# Patient Record
Sex: FEMALE | Race: WHITE | NOT HISPANIC OR LATINO | URBAN - METROPOLITAN AREA
[De-identification: names, ages, dates, MRNs, and addresses within clinical notes are randomized per-mention and may not be internally consistent; named-entity substitution may affect disease eponyms.]

---

## 2021-07-09 ENCOUNTER — OFFICE (AMBULATORY)
Dept: URBAN - METROPOLITAN AREA PATHOLOGY 4 | Facility: PATHOLOGY | Age: 71
End: 2021-07-09
Payer: MEDICARE

## 2021-07-09 ENCOUNTER — ON CAMPUS - OUTPATIENT (AMBULATORY)
Dept: URBAN - METROPOLITAN AREA HOSPITAL 2 | Facility: HOSPITAL | Age: 71
End: 2021-07-09
Payer: MEDICARE

## 2021-07-09 VITALS
SYSTOLIC BLOOD PRESSURE: 139 MMHG | HEART RATE: 78 BPM | DIASTOLIC BLOOD PRESSURE: 75 MMHG | DIASTOLIC BLOOD PRESSURE: 81 MMHG | WEIGHT: 161 LBS | HEART RATE: 74 BPM | HEART RATE: 82 BPM | HEART RATE: 79 BPM | RESPIRATION RATE: 16 BRPM | HEART RATE: 95 BPM | OXYGEN SATURATION: 100 % | SYSTOLIC BLOOD PRESSURE: 141 MMHG | DIASTOLIC BLOOD PRESSURE: 89 MMHG | OXYGEN SATURATION: 97 % | DIASTOLIC BLOOD PRESSURE: 82 MMHG | SYSTOLIC BLOOD PRESSURE: 116 MMHG | HEART RATE: 80 BPM | SYSTOLIC BLOOD PRESSURE: 154 MMHG | RESPIRATION RATE: 18 BRPM | OXYGEN SATURATION: 99 % | HEIGHT: 63 IN | SYSTOLIC BLOOD PRESSURE: 144 MMHG | TEMPERATURE: 97.5 F | SYSTOLIC BLOOD PRESSURE: 179 MMHG | DIASTOLIC BLOOD PRESSURE: 67 MMHG | DIASTOLIC BLOOD PRESSURE: 84 MMHG | HEART RATE: 75 BPM | SYSTOLIC BLOOD PRESSURE: 177 MMHG | DIASTOLIC BLOOD PRESSURE: 68 MMHG | OXYGEN SATURATION: 96 % | OXYGEN SATURATION: 98 % | SYSTOLIC BLOOD PRESSURE: 164 MMHG | DIASTOLIC BLOOD PRESSURE: 85 MMHG

## 2021-07-09 DIAGNOSIS — K63.3 ULCER OF INTESTINE: ICD-10-CM

## 2021-07-09 DIAGNOSIS — K52.9 NONINFECTIVE GASTROENTERITIS AND COLITIS, UNSPECIFIED: ICD-10-CM

## 2021-07-09 DIAGNOSIS — K62.1 RECTAL POLYP: ICD-10-CM

## 2021-07-09 DIAGNOSIS — K64.4 RESIDUAL HEMORRHOIDAL SKIN TAGS: ICD-10-CM

## 2021-07-09 DIAGNOSIS — K52.89 OTHER SPECIFIED NONINFECTIVE GASTROENTERITIS AND COLITIS: ICD-10-CM

## 2021-07-09 DIAGNOSIS — K64.0 FIRST DEGREE HEMORRHOIDS: ICD-10-CM

## 2021-07-09 DIAGNOSIS — Z86.010 PERSONAL HISTORY OF COLONIC POLYPS: ICD-10-CM

## 2021-07-09 LAB
GI HISTOLOGY: A. SELECT: (no result)
GI HISTOLOGY: B. SELECT: (no result)
GI HISTOLOGY: C. UNSPECIFIED: (no result)
GI HISTOLOGY: D. SELECT: (no result)
GI HISTOLOGY: PDF REPORT: (no result)

## 2021-07-09 PROCEDURE — 45380 COLONOSCOPY AND BIOPSY: CPT | Mod: PT,59 | Performed by: INTERNAL MEDICINE

## 2021-07-09 PROCEDURE — 88305 TISSUE EXAM BY PATHOLOGIST: CPT | Mod: 26 | Performed by: INTERNAL MEDICINE

## 2021-07-09 PROCEDURE — 45385 COLONOSCOPY W/LESION REMOVAL: CPT | Mod: PT | Performed by: INTERNAL MEDICINE

## 2021-07-09 PROCEDURE — 45380 COLONOSCOPY AND BIOPSY: CPT | Mod: 59,PT | Performed by: INTERNAL MEDICINE

## 2021-07-09 RX ORDER — PREDNISONE 10 MG/1
TABLET ORAL
Qty: 189 | Refills: 0 | Status: COMPLETED
End: 2021-10-27

## 2021-08-20 ENCOUNTER — OFFICE (AMBULATORY)
Dept: URBAN - METROPOLITAN AREA CLINIC 64 | Facility: CLINIC | Age: 71
End: 2021-08-20

## 2021-08-20 VITALS
HEIGHT: 63 IN | HEART RATE: 90 BPM | DIASTOLIC BLOOD PRESSURE: 100 MMHG | WEIGHT: 173 LBS | SYSTOLIC BLOOD PRESSURE: 187 MMHG

## 2021-08-20 DIAGNOSIS — R10.11 RIGHT UPPER QUADRANT PAIN: ICD-10-CM

## 2021-08-20 DIAGNOSIS — R10.30 LOWER ABDOMINAL PAIN, UNSPECIFIED: ICD-10-CM

## 2021-08-20 DIAGNOSIS — R15.2 FECAL URGENCY: ICD-10-CM

## 2021-08-20 DIAGNOSIS — R19.7 DIARRHEA, UNSPECIFIED: ICD-10-CM

## 2021-08-20 DIAGNOSIS — R11.2 NAUSEA WITH VOMITING, UNSPECIFIED: ICD-10-CM

## 2021-08-20 DIAGNOSIS — K63.3 ULCER OF INTESTINE: ICD-10-CM

## 2021-08-20 PROCEDURE — 99214 OFFICE O/P EST MOD 30 MIN: CPT | Performed by: NURSE PRACTITIONER

## 2021-08-20 RX ORDER — DICYCLOMINE HYDROCHLORIDE 10 MG/1
40 CAPSULE ORAL
Qty: 60 | Refills: 11 | Status: ACTIVE
Start: 2021-08-20

## 2021-09-02 PROBLEM — K63.89 OTHER SPECIFIED DISEASES OF INTESTINE: Status: ACTIVE | Noted: 2021-07-09

## 2021-10-27 ENCOUNTER — OFFICE (AMBULATORY)
Dept: URBAN - METROPOLITAN AREA CLINIC 64 | Facility: CLINIC | Age: 71
End: 2021-10-27

## 2021-10-27 VITALS
WEIGHT: 183 LBS | SYSTOLIC BLOOD PRESSURE: 189 MMHG | HEIGHT: 63 IN | HEART RATE: 92 BPM | DIASTOLIC BLOOD PRESSURE: 92 MMHG

## 2021-10-27 DIAGNOSIS — R19.7 DIARRHEA, UNSPECIFIED: ICD-10-CM

## 2021-10-27 DIAGNOSIS — R10.11 RIGHT UPPER QUADRANT PAIN: ICD-10-CM

## 2021-10-27 DIAGNOSIS — R15.9 FULL INCONTINENCE OF FECES: ICD-10-CM

## 2021-10-27 PROCEDURE — 99214 OFFICE O/P EST MOD 30 MIN: CPT | Performed by: INTERNAL MEDICINE

## 2021-12-16 ENCOUNTER — OFFICE (AMBULATORY)
Dept: URBAN - METROPOLITAN AREA CLINIC 64 | Facility: CLINIC | Age: 71
End: 2021-12-16

## 2021-12-16 VITALS
HEIGHT: 63 IN | HEART RATE: 81 BPM | WEIGHT: 174 LBS | DIASTOLIC BLOOD PRESSURE: 72 MMHG | SYSTOLIC BLOOD PRESSURE: 118 MMHG

## 2021-12-16 DIAGNOSIS — K52.9 NONINFECTIVE GASTROENTERITIS AND COLITIS, UNSPECIFIED: ICD-10-CM

## 2021-12-16 PROBLEM — K64.0 FIRST DEGREE HEMORRHOIDS: Status: ACTIVE | Noted: 2021-07-09

## 2021-12-16 PROBLEM — K63.3: Status: ACTIVE | Noted: 2021-07-09

## 2021-12-16 PROCEDURE — 99214 OFFICE O/P EST MOD 30 MIN: CPT | Performed by: NURSE PRACTITIONER

## 2021-12-16 RX ORDER — COLESTIPOL HYDROCHLORIDE 1 G/1
2 TABLET, FILM COATED ORAL
Qty: 60 | Refills: 11 | Status: ACTIVE
Start: 2021-12-16

## 2021-12-16 RX ORDER — PANTOPRAZOLE SODIUM 20 MG/1
20 TABLET, DELAYED RELEASE ORAL
Qty: 90 | Refills: 3 | Status: ACTIVE
Start: 2021-12-16

## 2022-12-09 NOTE — SERVICENOTES
I have personally verified, reviewed, released, signed, authenticated, authorized, confirmed,finalized, and approved the actions of the CMA. Pt does have ulceration and narrowing of TI likely related crohn's disease vs NSAIDS, multiple bx were performed, will start pred given h/o RLQ pain, diarrhea, family h/o CD

## 2024-09-17 ENCOUNTER — OFFICE (AMBULATORY)
Age: 74
End: 2024-09-17
Payer: MEDICARE

## 2024-09-17 ENCOUNTER — OFFICE (AMBULATORY)
Dept: URBAN - METROPOLITAN AREA CLINIC 64 | Facility: CLINIC | Age: 74
End: 2024-09-17
Payer: MEDICARE

## 2024-09-17 VITALS
HEART RATE: 90 BPM | HEIGHT: 63 IN | HEART RATE: 90 BPM | DIASTOLIC BLOOD PRESSURE: 69 MMHG | WEIGHT: 129 LBS | SYSTOLIC BLOOD PRESSURE: 118 MMHG | HEIGHT: 63 IN | DIASTOLIC BLOOD PRESSURE: 69 MMHG | DIASTOLIC BLOOD PRESSURE: 69 MMHG | WEIGHT: 129 LBS | SYSTOLIC BLOOD PRESSURE: 118 MMHG | SYSTOLIC BLOOD PRESSURE: 118 MMHG | HEART RATE: 90 BPM | HEIGHT: 63 IN | HEIGHT: 63 IN | WEIGHT: 129 LBS | WEIGHT: 129 LBS | HEART RATE: 90 BPM | SYSTOLIC BLOOD PRESSURE: 118 MMHG | WEIGHT: 129 LBS | SYSTOLIC BLOOD PRESSURE: 118 MMHG | HEIGHT: 63 IN | HEART RATE: 90 BPM | HEART RATE: 90 BPM | SYSTOLIC BLOOD PRESSURE: 118 MMHG | HEIGHT: 63 IN | HEART RATE: 90 BPM | WEIGHT: 129 LBS | DIASTOLIC BLOOD PRESSURE: 69 MMHG | WEIGHT: 129 LBS | DIASTOLIC BLOOD PRESSURE: 69 MMHG | DIASTOLIC BLOOD PRESSURE: 69 MMHG | DIASTOLIC BLOOD PRESSURE: 69 MMHG | SYSTOLIC BLOOD PRESSURE: 118 MMHG | HEIGHT: 63 IN

## 2024-09-17 DIAGNOSIS — K63.3 ULCER OF INTESTINE: ICD-10-CM

## 2024-09-17 DIAGNOSIS — R11.2 NAUSEA WITH VOMITING, UNSPECIFIED: ICD-10-CM

## 2024-09-17 DIAGNOSIS — R93.3 ABNORMAL FINDINGS ON DIAGNOSTIC IMAGING OF OTHER PARTS OF DI: ICD-10-CM

## 2024-09-17 DIAGNOSIS — R10.84 GENERALIZED ABDOMINAL PAIN: ICD-10-CM

## 2024-09-17 DIAGNOSIS — K12.0 RECURRENT ORAL APHTHAE: ICD-10-CM

## 2024-09-17 DIAGNOSIS — R63.4 ABNORMAL WEIGHT LOSS: ICD-10-CM

## 2024-09-17 DIAGNOSIS — M06.9 RHEUMATOID ARTHRITIS, UNSPECIFIED: ICD-10-CM

## 2024-09-17 DIAGNOSIS — R19.7 DIARRHEA, UNSPECIFIED: ICD-10-CM

## 2024-09-17 PROCEDURE — 99214 OFFICE O/P EST MOD 30 MIN: CPT

## 2024-10-08 ENCOUNTER — HOSPITAL ENCOUNTER (EMERGENCY)
Facility: HOSPITAL | Age: 74
Discharge: LEFT BEFORE DISCHARGE | DRG: 981 | End: 2024-10-08
Attending: EMERGENCY MEDICINE
Payer: MEDICARE

## 2024-10-08 ENCOUNTER — HOSPITAL ENCOUNTER (INPATIENT)
Facility: HOSPITAL | Age: 74
LOS: 14 days | Discharge: HOME | End: 2024-10-23
Attending: EMERGENCY MEDICINE | Admitting: INTERNAL MEDICINE
Payer: MEDICARE

## 2024-10-08 VITALS
TEMPERATURE: 98 F | OXYGEN SATURATION: 98 % | WEIGHT: 121 LBS | SYSTOLIC BLOOD PRESSURE: 122 MMHG | HEIGHT: 61 IN | DIASTOLIC BLOOD PRESSURE: 70 MMHG | HEART RATE: 78 BPM | RESPIRATION RATE: 18 BRPM | BODY MASS INDEX: 22.84 KG/M2

## 2024-10-08 DIAGNOSIS — I34.0 MODERATE TO SEVERE MITRAL REGURGITATION: ICD-10-CM

## 2024-10-08 DIAGNOSIS — R07.89 CHEST PAIN, ATYPICAL: ICD-10-CM

## 2024-10-08 DIAGNOSIS — T17.800A MULTIPLE TRACHEOBRONCHIAL MUCUS PLUGS: ICD-10-CM

## 2024-10-08 DIAGNOSIS — R91.8 LUNG NODULES: ICD-10-CM

## 2024-10-08 DIAGNOSIS — E87.1 HYPONATREMIA: ICD-10-CM

## 2024-10-08 DIAGNOSIS — J98.4 CAVITARY LESION OF LUNG: ICD-10-CM

## 2024-10-08 DIAGNOSIS — E87.6 HYPOKALEMIA: ICD-10-CM

## 2024-10-08 DIAGNOSIS — E87.1 HYPONATREMIA: Primary | ICD-10-CM

## 2024-10-08 DIAGNOSIS — R53.81 PHYSICAL DECONDITIONING: ICD-10-CM

## 2024-10-08 DIAGNOSIS — I25.10 CAD, MULTIPLE VESSEL: Primary | ICD-10-CM

## 2024-10-08 DIAGNOSIS — R19.7 DIARRHEA, UNSPECIFIED TYPE: ICD-10-CM

## 2024-10-08 LAB
ALBUMIN SERPL-MCNC: 3.3 G/DL (ref 3.5–5.2)
ALBUMIN SERPL-MCNC: 3.3 G/DL (ref 3.5–5.2)
ALBUMIN/GLOB SERPL: 1.3 G/DL
ALBUMIN/GLOB SERPL: 1.4 G/DL
ALP SERPL-CCNC: 100 U/L (ref 39–117)
ALP SERPL-CCNC: 89 U/L (ref 39–117)
ALT SERPL W P-5'-P-CCNC: 13 U/L (ref 1–33)
ALT SERPL W P-5'-P-CCNC: 17 U/L (ref 1–33)
ANION GAP SERPL CALCULATED.3IONS-SCNC: 14.4 MMOL/L (ref 5–15)
ANION GAP SERPL CALCULATED.3IONS-SCNC: 18.5 MMOL/L (ref 5–15)
AST SERPL-CCNC: 15 U/L (ref 1–32)
AST SERPL-CCNC: 18 U/L (ref 1–32)
BACTERIA UR QL AUTO: ABNORMAL /HPF
BASOPHILS # BLD AUTO: 0.01 10*3/MM3 (ref 0–0.2)
BASOPHILS # BLD AUTO: 0.01 10*3/MM3 (ref 0–0.2)
BASOPHILS NFR BLD AUTO: 0.2 % (ref 0–1.5)
BASOPHILS NFR BLD AUTO: 0.2 % (ref 0–1.5)
BILIRUB SERPL-MCNC: 0.7 MG/DL (ref 0–1.2)
BILIRUB SERPL-MCNC: 0.7 MG/DL (ref 0–1.2)
BILIRUB UR QL STRIP: NEGATIVE
BUN SERPL-MCNC: 24 MG/DL (ref 8–23)
BUN SERPL-MCNC: 35 MG/DL (ref 8–23)
BUN/CREAT SERPL: 32 (ref 7–25)
BUN/CREAT SERPL: 32.7 (ref 7–25)
CALCIUM SPEC-SCNC: 8.7 MG/DL (ref 8.6–10.5)
CALCIUM SPEC-SCNC: 8.7 MG/DL (ref 8.6–10.5)
CHLORIDE SERPL-SCNC: 81 MMOL/L (ref 98–107)
CHLORIDE SERPL-SCNC: 87 MMOL/L (ref 98–107)
CLARITY UR: CLEAR
CO2 SERPL-SCNC: 22.5 MMOL/L (ref 22–29)
CO2 SERPL-SCNC: 24.6 MMOL/L (ref 22–29)
COLOR UR: ABNORMAL
CREAT SERPL-MCNC: 0.75 MG/DL (ref 0.57–1)
CREAT SERPL-MCNC: 1.07 MG/DL (ref 0.57–1)
DEPRECATED RDW RBC AUTO: 57.1 FL (ref 37–54)
DEPRECATED RDW RBC AUTO: 57.2 FL (ref 37–54)
EGFRCR SERPLBLD CKD-EPI 2021: 54.6 ML/MIN/1.73
EGFRCR SERPLBLD CKD-EPI 2021: 83.7 ML/MIN/1.73
EOSINOPHIL # BLD AUTO: 0.07 10*3/MM3 (ref 0–0.4)
EOSINOPHIL # BLD AUTO: 0.07 10*3/MM3 (ref 0–0.4)
EOSINOPHIL NFR BLD AUTO: 1.1 % (ref 0.3–6.2)
EOSINOPHIL NFR BLD AUTO: 1.6 % (ref 0.3–6.2)
ERYTHROCYTE [DISTWIDTH] IN BLOOD BY AUTOMATED COUNT: 17 % (ref 12.3–15.4)
ERYTHROCYTE [DISTWIDTH] IN BLOOD BY AUTOMATED COUNT: 17.1 % (ref 12.3–15.4)
GLOBULIN UR ELPH-MCNC: 2.3 GM/DL
GLOBULIN UR ELPH-MCNC: 2.6 GM/DL
GLUCOSE SERPL-MCNC: 103 MG/DL (ref 65–99)
GLUCOSE SERPL-MCNC: 98 MG/DL (ref 65–99)
GLUCOSE UR STRIP-MCNC: NEGATIVE MG/DL
HCT VFR BLD AUTO: 27.6 % (ref 34–46.6)
HCT VFR BLD AUTO: 30.4 % (ref 34–46.6)
HGB BLD-MCNC: 10.3 G/DL (ref 12–15.9)
HGB BLD-MCNC: 9.4 G/DL (ref 12–15.9)
HGB UR QL STRIP.AUTO: NEGATIVE
HOLD SPECIMEN: NORMAL
HYALINE CASTS UR QL AUTO: ABNORMAL /LPF
IMM GRANULOCYTES # BLD AUTO: 0.02 10*3/MM3 (ref 0–0.05)
IMM GRANULOCYTES # BLD AUTO: 0.02 10*3/MM3 (ref 0–0.05)
IMM GRANULOCYTES NFR BLD AUTO: 0.3 % (ref 0–0.5)
IMM GRANULOCYTES NFR BLD AUTO: 0.4 % (ref 0–0.5)
KETONES UR QL STRIP: ABNORMAL
LEUKOCYTE ESTERASE UR QL STRIP.AUTO: ABNORMAL
LYMPHOCYTES # BLD AUTO: 0.69 10*3/MM3 (ref 0.7–3.1)
LYMPHOCYTES # BLD AUTO: 1.21 10*3/MM3 (ref 0.7–3.1)
LYMPHOCYTES NFR BLD AUTO: 15.3 % (ref 19.6–45.3)
LYMPHOCYTES NFR BLD AUTO: 18.3 % (ref 19.6–45.3)
MAGNESIUM SERPL-MCNC: 1.9 MG/DL (ref 1.6–2.4)
MAGNESIUM SERPL-MCNC: 2 MG/DL (ref 1.6–2.4)
MCH RBC QN AUTO: 31.2 PG (ref 26.6–33)
MCH RBC QN AUTO: 31.4 PG (ref 26.6–33)
MCHC RBC AUTO-ENTMCNC: 33.9 G/DL (ref 31.5–35.7)
MCHC RBC AUTO-ENTMCNC: 34.1 G/DL (ref 31.5–35.7)
MCV RBC AUTO: 92.1 FL (ref 79–97)
MCV RBC AUTO: 92.3 FL (ref 79–97)
MONOCYTES # BLD AUTO: 0.05 10*3/MM3 (ref 0.1–0.9)
MONOCYTES # BLD AUTO: 0.1 10*3/MM3 (ref 0.1–0.9)
MONOCYTES NFR BLD AUTO: 1.1 % (ref 5–12)
MONOCYTES NFR BLD AUTO: 1.5 % (ref 5–12)
NEUTROPHILS NFR BLD AUTO: 3.67 10*3/MM3 (ref 1.7–7)
NEUTROPHILS NFR BLD AUTO: 5.19 10*3/MM3 (ref 1.7–7)
NEUTROPHILS NFR BLD AUTO: 78.6 % (ref 42.7–76)
NEUTROPHILS NFR BLD AUTO: 81.4 % (ref 42.7–76)
NITRITE UR QL STRIP: NEGATIVE
NRBC BLD AUTO-RTO: 0 /100 WBC (ref 0–0.2)
NRBC BLD AUTO-RTO: 0 /100 WBC (ref 0–0.2)
PH UR STRIP.AUTO: 5.5 [PH] (ref 5–8)
PLATELET # BLD AUTO: 402 10*3/MM3 (ref 140–450)
PLATELET # BLD AUTO: 449 10*3/MM3 (ref 140–450)
PMV BLD AUTO: 8.6 FL (ref 6–12)
PMV BLD AUTO: 9 FL (ref 6–12)
POTASSIUM SERPL-SCNC: 3 MMOL/L (ref 3.5–5.2)
POTASSIUM SERPL-SCNC: 3.2 MMOL/L (ref 3.5–5.2)
PROT SERPL-MCNC: 5.6 G/DL (ref 6–8.5)
PROT SERPL-MCNC: 5.9 G/DL (ref 6–8.5)
PROT UR QL STRIP: NEGATIVE
RBC # BLD AUTO: 2.99 10*6/MM3 (ref 3.77–5.28)
RBC # BLD AUTO: 3.3 10*6/MM3 (ref 3.77–5.28)
RBC # UR STRIP: ABNORMAL /HPF
REF LAB TEST METHOD: ABNORMAL
SODIUM SERPL-SCNC: 122 MMOL/L (ref 136–145)
SODIUM SERPL-SCNC: 126 MMOL/L (ref 136–145)
SP GR UR STRIP: 1.01 (ref 1–1.03)
SQUAMOUS #/AREA URNS HPF: ABNORMAL /HPF
TRANS CELLS #/AREA URNS HPF: ABNORMAL /HPF
UROBILINOGEN UR QL STRIP: ABNORMAL
WBC # UR STRIP: ABNORMAL /HPF
WBC NRBC COR # BLD AUTO: 4.51 10*3/MM3 (ref 3.4–10.8)
WBC NRBC COR # BLD AUTO: 6.6 10*3/MM3 (ref 3.4–10.8)
WHOLE BLOOD HOLD COAG: NORMAL

## 2024-10-08 PROCEDURE — 83735 ASSAY OF MAGNESIUM: CPT | Performed by: PHYSICIAN ASSISTANT

## 2024-10-08 PROCEDURE — 80053 COMPREHEN METABOLIC PANEL: CPT | Performed by: NURSE PRACTITIONER

## 2024-10-08 PROCEDURE — 99283 EMERGENCY DEPT VISIT LOW MDM: CPT

## 2024-10-08 PROCEDURE — 85025 COMPLETE CBC W/AUTO DIFF WBC: CPT | Performed by: NURSE PRACTITIONER

## 2024-10-08 PROCEDURE — P9612 CATHETERIZE FOR URINE SPEC: HCPCS

## 2024-10-08 PROCEDURE — 99285 EMERGENCY DEPT VISIT HI MDM: CPT

## 2024-10-08 PROCEDURE — 81001 URINALYSIS AUTO W/SCOPE: CPT | Performed by: NURSE PRACTITIONER

## 2024-10-08 PROCEDURE — 36415 COLL VENOUS BLD VENIPUNCTURE: CPT

## 2024-10-08 PROCEDURE — G0378 HOSPITAL OBSERVATION PER HR: HCPCS

## 2024-10-08 PROCEDURE — 80053 COMPREHEN METABOLIC PANEL: CPT | Performed by: PHYSICIAN ASSISTANT

## 2024-10-08 PROCEDURE — 83735 ASSAY OF MAGNESIUM: CPT | Performed by: NURSE PRACTITIONER

## 2024-10-08 PROCEDURE — 25810000003 SODIUM CHLORIDE 0.9 % SOLUTION: Performed by: NURSE PRACTITIONER

## 2024-10-08 PROCEDURE — 85025 COMPLETE CBC W/AUTO DIFF WBC: CPT | Performed by: PHYSICIAN ASSISTANT

## 2024-10-08 RX ORDER — FOLIC ACID 1 MG/1
1 TABLET ORAL DAILY
Status: ON HOLD | COMMUNITY
Start: 2024-10-25

## 2024-10-08 RX ORDER — SODIUM CHLORIDE AND POTASSIUM CHLORIDE 300; 900 MG/100ML; MG/100ML
125 INJECTION, SOLUTION INTRAVENOUS CONTINUOUS
Status: DISCONTINUED | OUTPATIENT
Start: 2024-10-08 | End: 2024-10-08 | Stop reason: SDUPTHER

## 2024-10-08 RX ORDER — SODIUM CHLORIDE 0.9 % (FLUSH) 0.9 %
10 SYRINGE (ML) INJECTION AS NEEDED
Status: DISCONTINUED | OUTPATIENT
Start: 2024-10-08 | End: 2024-10-23 | Stop reason: HOSPADM

## 2024-10-08 RX ORDER — AMLODIPINE BESYLATE 10 MG/1
10 TABLET ORAL DAILY
COMMUNITY
End: 2024-10-23 | Stop reason: HOSPADM

## 2024-10-08 RX ORDER — BISACODYL 10 MG
10 SUPPOSITORY, RECTAL RECTAL DAILY PRN
Status: DISCONTINUED | OUTPATIENT
Start: 2024-10-08 | End: 2024-10-23 | Stop reason: HOSPADM

## 2024-10-08 RX ORDER — BISACODYL 5 MG/1
5 TABLET, DELAYED RELEASE ORAL DAILY PRN
Status: DISCONTINUED | OUTPATIENT
Start: 2024-10-08 | End: 2024-10-23 | Stop reason: HOSPADM

## 2024-10-08 RX ORDER — EZETIMIBE 10 MG/1
1 TABLET ORAL DAILY
COMMUNITY
Start: 2024-10-25 | End: 2024-11-19

## 2024-10-08 RX ORDER — SODIUM CHLORIDE 0.9 % (FLUSH) 0.9 %
10 SYRINGE (ML) INJECTION AS NEEDED
Status: DISCONTINUED | OUTPATIENT
Start: 2024-10-08 | End: 2024-10-09

## 2024-10-08 RX ORDER — SODIUM CHLORIDE AND POTASSIUM CHLORIDE 300; 900 MG/100ML; MG/100ML
125 INJECTION, SOLUTION INTRAVENOUS CONTINUOUS
Status: DISPENSED | OUTPATIENT
Start: 2024-10-08 | End: 2024-10-09

## 2024-10-08 RX ORDER — AMOXICILLIN 250 MG
2 CAPSULE ORAL 2 TIMES DAILY PRN
Status: DISCONTINUED | OUTPATIENT
Start: 2024-10-08 | End: 2024-10-23 | Stop reason: HOSPADM

## 2024-10-08 RX ORDER — POTASSIUM CHLORIDE 1500 MG/1
40 TABLET, EXTENDED RELEASE ORAL ONCE
Status: COMPLETED | OUTPATIENT
Start: 2024-10-08 | End: 2024-10-08

## 2024-10-08 RX ORDER — PANTOPRAZOLE SODIUM 20 MG/1
1 TABLET, DELAYED RELEASE ORAL DAILY
Status: ON HOLD | COMMUNITY
Start: 2024-10-25

## 2024-10-08 RX ORDER — LEVOTHYROXINE SODIUM 50 UG/1
1 TABLET ORAL DAILY
Status: ON HOLD | COMMUNITY
Start: 2024-10-25

## 2024-10-08 RX ORDER — POLYETHYLENE GLYCOL 3350 17 G/17G
17 POWDER, FOR SOLUTION ORAL DAILY PRN
Status: DISCONTINUED | OUTPATIENT
Start: 2024-10-08 | End: 2024-10-23 | Stop reason: HOSPADM

## 2024-10-08 RX ORDER — SODIUM CHLORIDE 0.9 % (FLUSH) 0.9 %
10 SYRINGE (ML) INJECTION EVERY 12 HOURS SCHEDULED
Status: DISCONTINUED | OUTPATIENT
Start: 2024-10-08 | End: 2024-10-23 | Stop reason: HOSPADM

## 2024-10-08 RX ORDER — ONDANSETRON 2 MG/ML
4 INJECTION INTRAMUSCULAR; INTRAVENOUS EVERY 6 HOURS PRN
Status: DISCONTINUED | OUTPATIENT
Start: 2024-10-08 | End: 2024-10-18 | Stop reason: SDUPTHER

## 2024-10-08 RX ORDER — PREDNISONE 5 MG/1
1 TABLET ORAL DAILY
COMMUNITY
Start: 2024-10-25 | End: 2024-11-12 | Stop reason: HOSPADM

## 2024-10-08 RX ORDER — TIOTROPIUM BROMIDE 18 UG/1
1 CAPSULE ORAL; RESPIRATORY (INHALATION)
COMMUNITY
Start: 2024-10-25 | End: 2024-11-19

## 2024-10-08 RX ORDER — SODIUM CHLORIDE 0.9 % (FLUSH) 0.9 %
10 SYRINGE (ML) INJECTION AS NEEDED
Status: DISCONTINUED | OUTPATIENT
Start: 2024-10-08 | End: 2024-10-08 | Stop reason: HOSPADM

## 2024-10-08 RX ORDER — METHOTREXATE 2.5 MG/1
6 TABLET ORAL WEEKLY
Status: ON HOLD | COMMUNITY
Start: 2024-10-25

## 2024-10-08 RX ORDER — MONTELUKAST SODIUM 4 MG/1
1 TABLET, CHEWABLE ORAL 2 TIMES DAILY
COMMUNITY
Start: 2024-10-25 | End: 2024-11-06

## 2024-10-08 RX ORDER — HYDROCHLOROTHIAZIDE 25 MG/1
25 TABLET ORAL DAILY
COMMUNITY
End: 2024-10-23 | Stop reason: HOSPADM

## 2024-10-08 RX ADMIN — POTASSIUM CHLORIDE 40 MEQ: 1500 TABLET, EXTENDED RELEASE ORAL at 11:22

## 2024-10-08 RX ADMIN — SODIUM CHLORIDE 1000 ML: 9 INJECTION, SOLUTION INTRAVENOUS at 11:24

## 2024-10-08 NOTE — Clinical Note
A 6 fr sheath was successfully inserted into the right femoral artery. Sheath insertion not delayed. Arava Counseling:  Patient counseled regarding adverse effects of Arava including but not limited to nausea, vomiting, abnormalities in liver function tests. Patients may develop mouth sores, rash, diarrhea, and abnormalities in blood counts. The patient understands that monitoring is required including LFTs and blood counts.  There is a rare possibility of scarring of the liver and lung problems that can occur when taking methotrexate. Persistent nausea, loss of appetite, pale stools, dark urine, cough, and shortness of breath should be reported immediately. Patient advised to discontinue Arava treatment and consult with a physician prior to attempting conception. The patient will have to undergo a treatment to eliminate Arava from the body prior to conception.

## 2024-10-08 NOTE — Clinical Note
Sheath Left Intact after the procedure.  Pressure Bag was used to stabalize the sheath post procedure.

## 2024-10-08 NOTE — ED PROVIDER NOTES
"Subjective     Provider in Triage Note  Patient is a 74-year-old female history of Crohn's presents to the ER with her family stating that she change her mind regarding admission.  Patient was initially seen here earlier today for \"abnormal blood work\", and was offered admission but ultimately left AGAINST MEDICAL ADVICE.  Patient states that her primary care doctor called her and told her to come back into the hospital.  She reports intermittent abdominal cramping.  No nausea vomiting or diarrhea.  No chest pain shortness of breath.  No headache or fever.  She is unsure what blood work was abnormal earlier.    Due to significant overcrowding in the emergency department patient was initially seen and evaluated in triage.  Provider in triage recommended patient placement in the treatment area to initiate therapy and movement to an ER bed as soon as possible.      History of Present Illness  I interviewed the patient HPI and agree with the nurse practitioner providing triage note as noted above  Review of Systems    No past medical history on file.    Allergies   Allergen Reactions    Codeine Hives    Penicillin G Sodium Hives       No past surgical history on file.    No family history on file.    Social History     Socioeconomic History    Marital status:            Objective   Physical Exam  Neurologic exam is nonfocal.  Neck has no adenopathy JVD or bruits.  Lungs clear.  Heart has regular rhythm without murmur.  Chest nontender.  Ab soft.  Manage exam unremarkable.  Procedures           ED Course      Results for orders placed or performed during the hospital encounter of 10/08/24   Comprehensive Metabolic Panel    Specimen: Arm, Left; Blood   Result Value Ref Range    Glucose 103 (H) 65 - 99 mg/dL    BUN 24 (H) 8 - 23 mg/dL    Creatinine 0.75 0.57 - 1.00 mg/dL    Sodium 126 (L) 136 - 145 mmol/L    Potassium 3.2 (L) 3.5 - 5.2 mmol/L    Chloride 87 (L) 98 - 107 mmol/L    CO2 24.6 22.0 - 29.0 mmol/L    " Calcium 8.7 8.6 - 10.5 mg/dL    Total Protein 5.6 (L) 6.0 - 8.5 g/dL    Albumin 3.3 (L) 3.5 - 5.2 g/dL    ALT (SGPT) 13 1 - 33 U/L    AST (SGOT) 15 1 - 32 U/L    Alkaline Phosphatase 89 39 - 117 U/L    Total Bilirubin 0.7 0.0 - 1.2 mg/dL    Globulin 2.3 gm/dL    A/G Ratio 1.4 g/dL    BUN/Creatinine Ratio 32.0 (H) 7.0 - 25.0    Anion Gap 14.4 5.0 - 15.0 mmol/L    eGFR 83.7 >60.0 mL/min/1.73   Magnesium    Specimen: Arm, Left; Blood   Result Value Ref Range    Magnesium 1.9 1.6 - 2.4 mg/dL   CBC Auto Differential    Specimen: Arm, Left; Blood   Result Value Ref Range    WBC 4.51 3.40 - 10.80 10*3/mm3    RBC 2.99 (L) 3.77 - 5.28 10*6/mm3    Hemoglobin 9.4 (L) 12.0 - 15.9 g/dL    Hematocrit 27.6 (L) 34.0 - 46.6 %    MCV 92.3 79.0 - 97.0 fL    MCH 31.4 26.6 - 33.0 pg    MCHC 34.1 31.5 - 35.7 g/dL    RDW 17.1 (H) 12.3 - 15.4 %    RDW-SD 57.2 (H) 37.0 - 54.0 fl    MPV 8.6 6.0 - 12.0 fL    Platelets 402 140 - 450 10*3/mm3    Neutrophil % 81.4 (H) 42.7 - 76.0 %    Lymphocyte % 15.3 (L) 19.6 - 45.3 %    Monocyte % 1.1 (L) 5.0 - 12.0 %    Eosinophil % 1.6 0.3 - 6.2 %    Basophil % 0.2 0.0 - 1.5 %    Immature Grans % 0.4 0.0 - 0.5 %    Neutrophils, Absolute 3.67 1.70 - 7.00 10*3/mm3    Lymphocytes, Absolute 0.69 (L) 0.70 - 3.10 10*3/mm3    Monocytes, Absolute 0.05 (L) 0.10 - 0.90 10*3/mm3    Eosinophils, Absolute 0.07 0.00 - 0.40 10*3/mm3    Basophils, Absolute 0.01 0.00 - 0.20 10*3/mm3    Immature Grans, Absolute 0.02 0.00 - 0.05 10*3/mm3    nRBC 0.0 0.0 - 0.2 /100 WBC   Gold Top - SST   Result Value Ref Range    Extra Tube Hold for add-ons.    Light Blue Top   Result Value Ref Range    Extra Tube Hold for add-ons.                                               Medical Decision Making  BMP shows hyponatremia sodium 126 and potassium 3.2.  She has no renal insufficiency.  CBC shows no leukocytosis no left shift and chronic anemia unchanged from baseline.  Magnesium is 1.9.  Patient placed in ED observation for IV fluids and  potassium replacement.    Amount and/or Complexity of Data Reviewed  Labs: ordered. Decision-making details documented in ED Course.    Risk  Prescription drug management.  Decision regarding hospitalization.        Final diagnoses:   Hyponatremia   Hypokalemia       ED Disposition  ED Disposition       ED Disposition   Decision to Admit    Condition   --    Comment   --               No follow-up provider specified.       Medication List      No changes were made to your prescriptions during this visit.            Nadir Barrientos MD  10/08/24 0612

## 2024-10-09 ENCOUNTER — APPOINTMENT (OUTPATIENT)
Dept: GENERAL RADIOLOGY | Facility: HOSPITAL | Age: 74
End: 2024-10-09
Payer: MEDICARE

## 2024-10-09 LAB
ANION GAP SERPL CALCULATED.3IONS-SCNC: 9.4 MMOL/L (ref 5–15)
ANION GAP SERPL CALCULATED.3IONS-SCNC: 9.5 MMOL/L (ref 5–15)
BASOPHILS # BLD AUTO: 0 10*3/MM3 (ref 0–0.2)
BASOPHILS NFR BLD AUTO: 0 % (ref 0–1.5)
BUN SERPL-MCNC: 13 MG/DL (ref 8–23)
BUN SERPL-MCNC: 19 MG/DL (ref 8–23)
BUN/CREAT SERPL: 25 (ref 7–25)
BUN/CREAT SERPL: 31.7 (ref 7–25)
CALCIUM SPEC-SCNC: 7.8 MG/DL (ref 8.6–10.5)
CALCIUM SPEC-SCNC: 8 MG/DL (ref 8.6–10.5)
CHLORIDE SERPL-SCNC: 91 MMOL/L (ref 98–107)
CHLORIDE SERPL-SCNC: 93 MMOL/L (ref 98–107)
CO2 SERPL-SCNC: 23.5 MMOL/L (ref 22–29)
CO2 SERPL-SCNC: 25.6 MMOL/L (ref 22–29)
CREAT SERPL-MCNC: 0.52 MG/DL (ref 0.57–1)
CREAT SERPL-MCNC: 0.6 MG/DL (ref 0.57–1)
CREAT UR-MCNC: 55.8 MG/DL
DEPRECATED RDW RBC AUTO: 58.1 FL (ref 37–54)
EGFRCR SERPLBLD CKD-EPI 2021: 94.3 ML/MIN/1.73
EGFRCR SERPLBLD CKD-EPI 2021: 97.6 ML/MIN/1.73
EOSINOPHIL # BLD AUTO: 0.05 10*3/MM3 (ref 0–0.4)
EOSINOPHIL NFR BLD AUTO: 1.7 % (ref 0.3–6.2)
ERYTHROCYTE [DISTWIDTH] IN BLOOD BY AUTOMATED COUNT: 17.2 % (ref 12.3–15.4)
GLUCOSE SERPL-MCNC: 102 MG/DL (ref 65–99)
GLUCOSE SERPL-MCNC: 103 MG/DL (ref 65–99)
HCT VFR BLD AUTO: 24.6 % (ref 34–46.6)
HCT VFR BLD AUTO: 25.6 % (ref 34–46.6)
HGB BLD-MCNC: 8.1 G/DL (ref 12–15.9)
HGB BLD-MCNC: 8.4 G/DL (ref 12–15.9)
IMM GRANULOCYTES # BLD AUTO: 0.01 10*3/MM3 (ref 0–0.05)
IMM GRANULOCYTES NFR BLD AUTO: 0.3 % (ref 0–0.5)
LYMPHOCYTES # BLD AUTO: 0.66 10*3/MM3 (ref 0.7–3.1)
LYMPHOCYTES NFR BLD AUTO: 22.4 % (ref 19.6–45.3)
MCH RBC QN AUTO: 30.8 PG (ref 26.6–33)
MCHC RBC AUTO-ENTMCNC: 32.9 G/DL (ref 31.5–35.7)
MCV RBC AUTO: 93.5 FL (ref 79–97)
MONOCYTES # BLD AUTO: 0.07 10*3/MM3 (ref 0.1–0.9)
MONOCYTES NFR BLD AUTO: 2.4 % (ref 5–12)
NEUTROPHILS NFR BLD AUTO: 2.15 10*3/MM3 (ref 1.7–7)
NEUTROPHILS NFR BLD AUTO: 73.2 % (ref 42.7–76)
NRBC BLD AUTO-RTO: 0 /100 WBC (ref 0–0.2)
OSMOLALITY UR: 431 MOSM/KG (ref 300–800)
PLATELET # BLD AUTO: 343 10*3/MM3 (ref 140–450)
PMV BLD AUTO: 8.8 FL (ref 6–12)
POTASSIUM SERPL-SCNC: 3.7 MMOL/L (ref 3.5–5.2)
POTASSIUM SERPL-SCNC: 4 MMOL/L (ref 3.5–5.2)
PROT ?TM UR-MCNC: 8.5 MG/DL
RBC # BLD AUTO: 2.63 10*6/MM3 (ref 3.77–5.28)
SODIUM SERPL-SCNC: 125 MMOL/L (ref 136–145)
SODIUM SERPL-SCNC: 126 MMOL/L (ref 136–145)
SODIUM SERPL-SCNC: 126 MMOL/L (ref 136–145)
SODIUM UR-SCNC: <20 MMOL/L
TRIGL SERPL-MCNC: 182 MG/DL (ref 0–150)
URATE SERPL-MCNC: 6.7 MG/DL (ref 2.4–5.7)
WBC NRBC COR # BLD AUTO: 2.94 10*3/MM3 (ref 3.4–10.8)

## 2024-10-09 PROCEDURE — 25010000002 ONDANSETRON PER 1 MG: Performed by: EMERGENCY MEDICINE

## 2024-10-09 PROCEDURE — 85025 COMPLETE CBC W/AUTO DIFF WBC: CPT | Performed by: EMERGENCY MEDICINE

## 2024-10-09 PROCEDURE — 84156 ASSAY OF PROTEIN URINE: CPT | Performed by: INTERNAL MEDICINE

## 2024-10-09 PROCEDURE — 85018 HEMOGLOBIN: CPT | Performed by: PHYSICIAN ASSISTANT

## 2024-10-09 PROCEDURE — 83935 ASSAY OF URINE OSMOLALITY: CPT | Performed by: PHYSICIAN ASSISTANT

## 2024-10-09 PROCEDURE — 84478 ASSAY OF TRIGLYCERIDES: CPT | Performed by: PHYSICIAN ASSISTANT

## 2024-10-09 PROCEDURE — 84300 ASSAY OF URINE SODIUM: CPT | Performed by: PHYSICIAN ASSISTANT

## 2024-10-09 PROCEDURE — 97162 PT EVAL MOD COMPLEX 30 MIN: CPT

## 2024-10-09 PROCEDURE — 94761 N-INVAS EAR/PLS OXIMETRY MLT: CPT

## 2024-10-09 PROCEDURE — 71045 X-RAY EXAM CHEST 1 VIEW: CPT

## 2024-10-09 PROCEDURE — 25010000002 SODIUM CHLORIDE 0.9 % WITH KCL 40 MEQ/L 40-0.9 MEQ/L-% SOLUTION: Performed by: EMERGENCY MEDICINE

## 2024-10-09 PROCEDURE — 84550 ASSAY OF BLOOD/URIC ACID: CPT | Performed by: PHYSICIAN ASSISTANT

## 2024-10-09 PROCEDURE — 80048 BASIC METABOLIC PNL TOTAL CA: CPT | Performed by: INTERNAL MEDICINE

## 2024-10-09 PROCEDURE — 25810000003 SODIUM CHLORIDE 0.9 % SOLUTION: Performed by: INTERNAL MEDICINE

## 2024-10-09 PROCEDURE — 94640 AIRWAY INHALATION TREATMENT: CPT

## 2024-10-09 PROCEDURE — 80048 BASIC METABOLIC PNL TOTAL CA: CPT | Performed by: EMERGENCY MEDICINE

## 2024-10-09 PROCEDURE — 94799 UNLISTED PULMONARY SVC/PX: CPT

## 2024-10-09 PROCEDURE — 85014 HEMATOCRIT: CPT | Performed by: PHYSICIAN ASSISTANT

## 2024-10-09 PROCEDURE — 82570 ASSAY OF URINE CREATININE: CPT | Performed by: INTERNAL MEDICINE

## 2024-10-09 RX ORDER — AMLODIPINE BESYLATE 5 MG/1
10 TABLET ORAL DAILY
Status: DISCONTINUED | OUTPATIENT
Start: 2024-10-09 | End: 2024-10-09

## 2024-10-09 RX ORDER — FOLIC ACID 1 MG/1
1 TABLET ORAL DAILY
Status: DISCONTINUED | OUTPATIENT
Start: 2024-10-09 | End: 2024-10-23 | Stop reason: HOSPADM

## 2024-10-09 RX ORDER — PANTOPRAZOLE SODIUM 40 MG/1
40 TABLET, DELAYED RELEASE ORAL DAILY
Status: DISCONTINUED | OUTPATIENT
Start: 2024-10-09 | End: 2024-10-23 | Stop reason: HOSPADM

## 2024-10-09 RX ORDER — LEVOTHYROXINE SODIUM 50 UG/1
50 TABLET ORAL DAILY
Status: DISCONTINUED | OUTPATIENT
Start: 2024-10-09 | End: 2024-10-18

## 2024-10-09 RX ADMIN — ONDANSETRON 4 MG: 2 INJECTION INTRAMUSCULAR; INTRAVENOUS at 03:55

## 2024-10-09 RX ADMIN — FOLIC ACID 1 MG: 1 TABLET ORAL at 08:07

## 2024-10-09 RX ADMIN — Medication 10 ML: at 08:08

## 2024-10-09 RX ADMIN — TIOTROPIUM BROMIDE INHALATION SPRAY 2 PUFF: 3.12 SPRAY, METERED RESPIRATORY (INHALATION) at 11:50

## 2024-10-09 RX ADMIN — Medication 10 ML: at 03:55

## 2024-10-09 RX ADMIN — SERTRALINE 50 MG: 50 TABLET, FILM COATED ORAL at 08:08

## 2024-10-09 RX ADMIN — SODIUM CHLORIDE 400 ML: 9 INJECTION, SOLUTION INTRAVENOUS at 21:59

## 2024-10-09 RX ADMIN — LEVOTHYROXINE SODIUM 50 MCG: 0.05 TABLET ORAL at 08:07

## 2024-10-09 RX ADMIN — PANTOPRAZOLE SODIUM 40 MG: 40 TABLET, DELAYED RELEASE ORAL at 08:07

## 2024-10-09 RX ADMIN — POTASSIUM CHLORIDE AND SODIUM CHLORIDE 125 ML/HR: 900; 300 INJECTION, SOLUTION INTRAVENOUS at 01:27

## 2024-10-09 RX ADMIN — ONDANSETRON 4 MG: 2 INJECTION INTRAMUSCULAR; INTRAVENOUS at 15:51

## 2024-10-09 RX ADMIN — Medication 10 ML: at 21:23

## 2024-10-09 RX ADMIN — POTASSIUM CHLORIDE AND SODIUM CHLORIDE 125 ML/HR: 900; 300 INJECTION, SOLUTION INTRAVENOUS at 10:14

## 2024-10-09 NOTE — PLAN OF CARE
Problem: Adult Inpatient Plan of Care  Goal: Plan of Care Review  Outcome: Progressing  Flowsheets (Taken 10/9/2024 0623)  Progress: improving  Plan of Care Reviewed With: patient  Goal: Patient-Specific Goal (Individualized)  Outcome: Progressing  Goal: Absence of Hospital-Acquired Illness or Injury  Outcome: Progressing  Intervention: Identify and Manage Fall Risk  Recent Flowsheet Documentation  Taken 10/9/2024 0622 by Paulette Denson RN  Safety Promotion/Fall Prevention:   safety round/check completed   mobility aid in reach   gait belt   fall prevention program maintained   clutter free environment maintained   assistive device/personal items within reach   activity supervised  Taken 10/9/2024 0537 by Paulette Denson RN  Safety Promotion/Fall Prevention: safety round/check completed  Taken 10/9/2024 0430 by Paulette Denson RN  Safety Promotion/Fall Prevention:   safety round/check completed   fall prevention program maintained   mobility aid in reach   assistive device/personal items within reach   activity supervised   clutter free environment maintained  Taken 10/9/2024 0305 by Paulette Denson RN  Safety Promotion/Fall Prevention: (refused nonskid socks)   safety round/check completed   mobility aid in reach   gait belt   fall prevention program maintained   clutter free environment maintained   assistive device/personal items within reach   activity supervised  Taken 10/9/2024 0230 by Paulette Denson RN  Safety Promotion/Fall Prevention: (refuses nonskid socks)   safety round/check completed   mobility aid in reach   gait belt   fall prevention program maintained   clutter free environment maintained   assistive device/personal items within reach   activity supervised  Taken 10/9/2024 0148 by Paulette Denson, RN  Safety Promotion/Fall Prevention:   safety round/check completed   mobility aid in reach   gait belt   fall prevention program maintained  Taken 10/9/2024 0015 by Janiya  ULYSSES Caldwell  Safety Promotion/Fall Prevention: (refuses gripper socks)   safety round/check completed   mobility aid in reach   gait belt   fall prevention program maintained   assistive device/personal items within reach   activity supervised  Intervention: Prevent and Manage VTE (Venous Thromboembolism) Risk  Recent Flowsheet Documentation  Taken 10/9/2024 0015 by Paulette Denson RN  VTE Prevention/Management: sequential compression devices off  Goal: Optimal Comfort and Wellbeing  Outcome: Progressing  Intervention: Provide Person-Centered Care  Recent Flowsheet Documentation  Taken 10/9/2024 0015 by Paulette Denson RN  Trust Relationship/Rapport:   care explained   questions answered  Goal: Readiness for Transition of Care  Outcome: Progressing  Intervention: Mutually Develop Transition Plan  Recent Flowsheet Documentation  Taken 10/9/2024 0022 by Paulette Denson RN  Transportation Anticipated: family or friend will provide  Patient/Family Anticipated Services at Transition: none  Patient/Family Anticipates Transition to: home with family  Taken 10/9/2024 0019 by Paulette Denson RN  Equipment Currently Used at Home:   rollator   nebulizer   commode     Problem: Fall Injury Risk  Goal: Absence of Fall and Fall-Related Injury  Outcome: Progressing  Intervention: Promote Injury-Free Environment  Recent Flowsheet Documentation  Taken 10/9/2024 0622 by Paulette Denson RN  Safety Promotion/Fall Prevention:   safety round/check completed   mobility aid in reach   gait belt   fall prevention program maintained   clutter free environment maintained   assistive device/personal items within reach   activity supervised  Taken 10/9/2024 0537 by Paulette Denson RN  Safety Promotion/Fall Prevention: safety round/check completed  Taken 10/9/2024 0430 by Paulette Denson RN  Safety Promotion/Fall Prevention:   safety round/check completed   fall prevention program maintained   mobility aid in reach    assistive device/personal items within reach   activity supervised   clutter free environment maintained  Taken 10/9/2024 0305 by Paulette Denson RN  Safety Promotion/Fall Prevention: (refused nonskid socks)   safety round/check completed   mobility aid in reach   gait belt   fall prevention program maintained   clutter free environment maintained   assistive device/personal items within reach   activity supervised  Taken 10/9/2024 0230 by Paulette Denson RN  Safety Promotion/Fall Prevention: (refuses nonskid socks)   safety round/check completed   mobility aid in reach   gait belt   fall prevention program maintained   clutter free environment maintained   assistive device/personal items within reach   activity supervised  Taken 10/9/2024 0148 by Paulette Denson RN  Safety Promotion/Fall Prevention:   safety round/check completed   mobility aid in reach   gait belt   fall prevention program maintained  Taken 10/9/2024 0015 by Paulette Denson, RN  Safety Promotion/Fall Prevention: (refuses gripper socks)   safety round/check completed   mobility aid in reach   gait belt   fall prevention program maintained   assistive device/personal items within reach   activity supervised   Goal Outcome Evaluation:  Plan of Care Reviewed With: patient        Progress: improving

## 2024-10-09 NOTE — THERAPY EVALUATION
Patient Name: Maryann Gaitan  : 1950    MRN: [id number]                              Today's Date: 10/9/2024       Admit Date: 10/8/2024    Visit Dx:     ICD-10-CM ICD-9-CM   1. Hyponatremia  E87.1 276.1   2. Hypokalemia  E87.6 276.8     Patient Active Problem List   Diagnosis    Hyponatremia     Past Medical History:   Diagnosis Date    Arthritis     COPD (chronic obstructive pulmonary disease)     Hypertension      Past Surgical History:   Procedure Laterality Date    HYSTERECTOMY        General Information       Row Name 10/09/24 1543          Physical Therapy Time and Intention    Document Type evaluation  -     Mode of Treatment physical therapy  -       Row Name 10/09/24 1543          General Information    Patient Profile Reviewed yes  -     Prior Level of Function min assist:;mod assist:;gait;bed mobility;bathing;max assist:;home management  -     Existing Precautions/Restrictions fall  -     Barriers to Rehab previous functional deficit;medically complex  -       Row Name 10/09/24 1543          Living Environment    People in Home grandchild(keegan)  adult grandchildren  -       Row Name 10/09/24 1543          Home Main Entrance    Number of Stairs, Main Entrance two  -     Stair Railings, Main Entrance none  -       Row Name 10/09/24 1543          Stairs Within Home, Primary    Number of Stairs, Within Home, Primary none  -       Row Name 10/09/24 1543          Cognition    Orientation Status (Cognition) oriented x 4  -       Row Name 10/09/24 1543          Safety Issues, Functional Mobility    Impairments Affecting Function (Mobility) endurance/activity tolerance;strength;balance;postural/trunk control  -               User Key  (r) = Recorded By, (t) = Taken By, (c) = Cosigned By      Initials Name Provider Type     Jessica Dickinson, PT Physical Therapist                   Mobility       Row Name 10/09/24 1545          Bed Mobility    Bed Mobility bed mobility (all)  activities  -     All Activities, [place] (Bed Mobility) minimum assist (75% patient effort)  -     Assistive Device (Bed Mobility) bed rails;head of bed elevated  -[facility] Name 10/09/24 1545          Bed-Chair Transfer    Bed-Chair [place] (Transfers) minimum assist (75% patient effort)  -     Assistive Device (Bed-Chair Transfers) walker, front-wheeled  -[facility] Name 10/09/24 1545          Sit-Stand Transfer    Sit-Stand [place] (Transfers) minimum assist (75% patient effort)  -     Assistive Device (Sit-Stand Transfers) walker, front-wheeled  -[facility] Name 10/09/24 1545          Gait/Stairs (Locomotion)    Patient was able to Ambulate no, other medical factors prevent ambulation  -     Reason Patient was unable to Ambulate --  Pt refused  -               User Key  (r) = Recorded By, (t) = Taken By, (c) = Cosigned By      Initials Name Provider Type     Jessica Dickinson, PT Physical Therapist                   Obj/Interventions       [facility] Name 10/09/24 1546          Range of Motion Comprehensive    General Range of Motion no range of motion deficits identified  -AH       Row Name 10/09/24 1546          Strength Comprehensive (MMT)    General Manual Muscle Testing (MMT) Assessment lower extremity strength deficits identified  -     Comment, General Manual Muscle Testing (MMT) Assessment BLE grossly 3+/5  -AH       Row Name 10/09/24 1546          Balance    Balance Assessment sitting static balance;sitting dynamic balance;sit to stand dynamic balance;standing static balance  -     Static Sitting Balance supervision  -     Dynamic Sitting Balance contact guard  -     Position, Sitting Balance unsupported;sitting edge of bed  -     Sit to Stand Dynamic Balance minimal assist  -     Static Standing Balance contact guard  -     Position/Device Used, Standing Balance walker, front-wheeled  -[facility] Name 10/09/24 1546          Sensory Assessment  (Somatosensory)    Sensory Assessment (Somatosensory) sensation intact  -               User Key  (r) = Recorded By, (t) = Taken By, (c) = Cosigned By      Initials Name Provider Type    Jessica Aparicio, PT Physical Therapist                   Goals/Plan       [facility] Name 10/09/24 1601          Bed Mobility Goal 1 (PT)    Activity/Assistive Device (Bed Mobility Goal 1, PT) bed mobility activities, all  -     [place] Level/Cues Needed (Bed Mobility Goal 1, PT) supervision required  -     Time Frame (Bed Mobility Goal 1, PT) long term goal (LTG);2 weeks  -[facility] Name 10/09/24 1601          Transfer Goal 1 (PT)    Activity/Assistive Device (Transfer Goal 1, PT) transfers, all  -     [place] Level/Cues Needed (Transfer Goal 1, PT) supervision required  -     Time Frame (Transfer Goal 1, PT) long term goal (LTG);2 weeks  -[facility] Name 10/09/24 1601          Gait Training Goal 1 (PT)    Activity/Assistive Device (Gait Training Goal 1, PT) gait (walking locomotion)  -     [place] Level (Gait Training Goal 1, PT) standby assist  -     Distance (Gait Training Goal 1, PT) 30 ft  -     Time Frame (Gait Training Goal 1, PT) long term goal (LTG);2 weeks  -[facility] Name 10/09/24 1601          Therapy Assessment/Plan (PT)    Planned Therapy Interventions (PT) balance training;bed mobility training;gait training;home exercise program;patient/family education;strengthening;transfer training;stair training  [facility]               User Key  (r) = Recorded By, (t) = Taken By, (c) = Cosigned By      Initials Name Provider Type    Jessica Aparicio, CHINTAN Physical Therapist                   Clinical Impression       Row Name 10/09/24 1548          Pain    Pretreatment Pain Rating 0/10 - no pain  -     Posttreatment Pain Rating 0/10 - no pain  -[facility] Name 10/09/24 1542          Plan of Care Review    Plan of Care Reviewed With patient  -     Outcome Evaluation Maryann CERVANTES  "Kandy is a 75 yo F with a PMH of Crohn's disease presented to [facility] on 10/8/24 for abnormal blood work from her primary care doctor and intermittent abdominal cramping, being treated for hyponatremia. Pt is A&Ox4, reports she lives with her 2 adult grandaughters in a H with 2 RICKEY. Pt has 24/7 care from her grandchildren and states she recieves assistance with all mobility and ADLs. She has a rollator which she says \"topples over all the time\" when she ambulates and her grandaughter walks with her to the bathroom and to and from her bedroom so she doesn't fall. Today she requires Justa with bed mobility and transfer to chair. Pt is very anxious about falling and refused to ambulate on this date despite education on importance of movement in the acute setting. Pt has BLE weakness and poor activity endurance. She would benefit from SNF upon d/c to address weakness and functional endurance deficits but pt adamately refuses to stay inpatient for rehab,  PT may be another option. PT will follow while IP and progress activity as tolerated.  -       Row Name 10/09/24 2717          Therapy Assessment/Plan (PT)    Rehab Potential (PT) good, to achieve stated therapy goals  -     Criteria for Skilled Interventions Met (PT) yes  -     Therapy Frequency (PT) 5 times/wk  -       Row Name 10/09/24 5157          Positioning and Restraints    Pre-Treatment Position in bed  -     Post Treatment Position chair  -     In Chair notified nsg;call light within reach;encouraged to call for assist;exit alarm on;with nsg;sitting  -               User Key  (r) = Recorded By, (t) = Taken By, (c) = Cosigned By      Initials Name Provider Type    Jessica Aparicio, PT Physical Therapist                   Outcome Measures       Row Name 10/09/24 0800          How much help from another person do you currently need...    Turning from your back to your side while in flat bed without using bedrails? 3  -KH     Moving from " lying on back to sitting on the side of a flat bed without bedrails? 3  -KH     Moving to and from a bed to a chair (including a wheelchair)? 3  -KH     Standing up from a chair using your arms (e.g., wheelchair, bedside chair)? 2  -KH     Climbing 3-5 steps with a railing? 1  -KH     To walk in hospital room? 2  -KH     AM-PAC 6 Clicks Score (PT) 14  -KH     Highest Level of Mobility Goal 4 --> Transfer to chair/commode  -               User Key  (r) = Recorded By, (t) = Taken By, (c) = Cosigned By      Initials Name Provider Type     Yady Jefferson, RN Registered Nurse                                 Physical Therapy Education       Title: PT OT SLP Therapies (Done)       Topic: Physical Therapy (Done)       Point: Mobility training (Done)       Learning Progress Summary             Patient Acceptance, E, VU,NR by  at 10/9/2024 1604                         Point: Home exercise program (Done)       Learning Progress Summary             Patient Acceptance, E, VU,NR by  at 10/9/2024 1604                         Point: Body mechanics (Done)       Learning Progress Summary             Patient Acceptance, E, VU,NR by  at 10/9/2024 1604                         Point: Precautions (Done)       Learning Progress Summary             Patient Acceptance, E, VU,NR by  at 10/9/2024 1604                                         User Key       Initials Effective Dates Name Provider Type [facility] 08/12/24 -  Jessica Dickinson, PT Physical Therapist PT                  PT Recommendation and Plan  Planned Therapy Interventions (PT): balance training, bed mobility training, gait training, home exercise program, patient/family education, strengthening, transfer training, stair training  Plan of Care Reviewed With: patient  Outcome Evaluation: Maryann Gaitan is a 75 yo F with a PMH of Crohn's disease presented to [facility] on 10/8/24 for abnormal blood work from her primary care doctor and intermittent abdominal  "cramping, being treated for hyponatremia. Pt is A&Ox4, reports she lives with her 2 adult grandaughters in a [facility] with 2 RICKEY. Pt has 24/7 care from her grandchildren and states she recieves assistance with all mobility and ADLs. She has a rollator which she says \"topples over all the time\" when she ambulates and her grandaughter walks with her to the bathroom and to and from her bedroom so she doesn't fall. Today she requires Justa with bed mobility and transfer to chair. Pt is very anxious about falling and refused to ambulate on this date despite education on importance of movement in the acute setting. Pt has BLE weakness and poor activity endurance. She would benefit from SNF upon d/c to address weakness and functional endurance deficits but pt adamately refuses to stay inpatient for rehab, HH PT may be another option. PT will follow while IP and progress activity as tolerated.     Time Calculation:         PT Charges       Row Name 10/09/24 1604             Time Calculation    Start Time 1338  -      Stop Time 1403  -      Time Calculation (min) 25 min  -      PT Received On 10/09/24  -      PT - Next Appointment 10/10/24  -      PT Goal Re-Cert Due Date 10/23/24  -                User Key  (r) = Recorded By, (t) = Taken By, (c) = Cosigned By      Initials Name Provider Type    Jessica Aparicio, PT Physical Therapist                  Therapy Charges for Today       Code Description Service Date Service Provider Modifiers Qty    [id number] HC PT EVAL MOD COMPLEXITY 4 10/9/2024 Jessica Dickinson, PT GP 1            PT G-Codes  AM-PAC 6 Clicks Score (PT): 14  PT Discharge Summary  Anticipated Discharge Disposition (PT): skilled nursing facility, home with home health    Jessica Dickinson, CHINTAN  10/9/2024    "

## 2024-10-09 NOTE — H&P
"[facility] H&P    Patient Name: Maryann Gaitan  : 1950  MRN: [id number]  Primary Care Physician: Eduard Little MD  Date of admission: 10/8/2024     Patient Care Team:  Eduard Little MD as PCP - General (Family Medicine)          Subjective   History Present Illness     Chief Complaint:   Chief Complaint   Patient presents with    Abnormal Lab         History of Present Illness  Obtained from admitting physician HPI on 10/8/2024:  Patient is a 74-year-old female history of Crohn's presents to the ER with her family stating that she change her mind regarding admission.  Patient was initially seen here earlier today for \"abnormal blood work\", and was offered admission but ultimately left AGAINST MEDICAL ADVICE.  Patient states that her primary care doctor called her and told her to come back into the hospital.  She reports intermittent abdominal cramping.  No nausea vomiting or diarrhea.  No chest pain shortness of breath.  No headache or fever.  She is unsure what blood work was abnormal earlier.     10/09/24:  Patient confirms the HPI noted above and reports that she has had increase in her baseline GI symptoms including severe abdominal cramps as well as some vomiting and diarrhea which she relates to her history of Crohn's disease.  She does report some occasional peripheral edema but reports that her p.o. intake as well as urine output has been generally normal.  She denies any other pain, dyspnea, cough, lightheadedness or fever.  Following fluid replacement patient does report that she is feeling somewhat better.  Some diffuse wheezing and slight rhonchi were noted on exam, patient reports she is a former smoker.  She has been taking Cymbalta at this dose for a prolonged time        ROS  Review of Systems   Constitutional: Negative.   HENT: Negative.     Eyes: Negative.    Cardiovascular: Negative.    Respiratory: Negative.     Skin: Negative.    Musculoskeletal: " Negative.    Gastrointestinal:  Positive for abdominal pain, diarrhea, nausea and vomiting.   Genitourinary: Negative.    Neurological: Negative.    Psychiatric/Behavioral: Negative.           Personal History     Past Medical History:   Past Medical History:   Diagnosis Date    Arthritis     COPD (chronic obstructive pulmonary disease)     Hypertension        Surgical History:      Past Surgical History:   Procedure Laterality Date    HYSTERECTOMY             Family History: family history is not on file. Otherwise pertinent FHx was reviewed and unremarkable.     Social History:  reports that she has quit smoking. Her smoking use included cigarettes. She has never used smokeless tobacco. She reports that she does not drink alcohol and does not use drugs.      Medications:  Prior to Admission medications    Medication Sig Start Date End Date Taking? Authorizing Provider   Adalimumab (Humira, 2 Pen,) 40 MG/0.4ML Pen-injector Kit Inject 40 mg under the skin into the appropriate area as directed Every 14 (Fourteen) Days.   Yes Donna Kraft MD   amLODIPine (NORVASC) 10 MG tablet Take 1 tablet by mouth Daily.   Yes Donna Kraft MD   cholecalciferol (VITAMIN D3) 1.25 MG (85083 UT) capsule Take 1 capsule by mouth 2 (Two) Times a Week. Wed, Sat   Yes Donna Kraft MD   colestipol (COLESTID) 1 g tablet Take 1 tablet by mouth 2 (Two) Times a Day.   Yes Donna Kraft MD   diclofenac (VOLTAREN) 50 MG EC tablet Take 1 tablet by mouth 2 (Two) Times a Day.   Yes Donna Kraft MD   ezetimibe (ZETIA) 10 MG tablet Take 1 tablet by mouth Daily.   Yes Donna Kraft MD   folic acid (FOLVITE) 1 MG tablet Take 1 tablet by mouth Daily.   Yes Donna Kraft MD   hydroCHLOROthiazide 25 MG tablet Take 1 tablet by mouth Daily.   Yes Donna Kraft MD   levothyroxine (SYNTHROID, LEVOTHROID) 50 MCG tablet Take 1 tablet by mouth Daily.   Yes Donna Kraft MD   methotrexate  2.5 MG tablet Take 8 tablets by mouth 1 (One) Time Per Week.   Yes Provider, MD Donna   pantoprazole (PROTONIX) 20 MG EC tablet Take 1 tablet by mouth Daily.   Yes Provider, MD Donna   predniSONE (DELTASONE) 5 MG tablet Take 1 tablet by mouth Daily.   Yes Provider, MD Donna   sertraline (ZOLOFT) 50 MG tablet Take 1 tablet by mouth Daily.   Yes Donna Kraft MD   tiotropium (SPIRIVA) 18 MCG per inhalation capsule Place 1 capsule into inhaler and inhale Daily.   Yes Provider, MD Donna       Allergies:    Allergies   Allergen Reactions    Codeine Hives    Penicillin G Sodium Hives       Objective   Objective     Vital Signs  Temp:  [97.6 °F (36.4 °C)-98.1 °F (36.7 °C)] 98.1 °F (36.7 °C)  Heart Rate:  [76-90] 78  Resp:  [16-18] 18  BP: ()/(57-73) 106/61  SpO2:  [96 %-100 %] 96 %  on   ;   Device (Oxygen Therapy): room air  Body mass index is 22.87 kg/m².    Physical Exam  Vitals reviewed.   Constitutional:       General: She is not in acute distress.     Appearance: Normal appearance. She is normal weight. She is not ill-appearing, toxic-appearing or diaphoretic.   HENT:      Head: Normocephalic.      Right Ear: External ear normal.      Left Ear: External ear normal.      Nose: Nose normal.      Mouth/Throat:      Mouth: Mucous membranes are moist.   Eyes:      Extraocular Movements: Extraocular movements intact.   Cardiovascular:      Rate and Rhythm: Normal rate and regular rhythm.      Pulses: Normal pulses.   Pulmonary:      Effort: Pulmonary effort is normal.      Breath sounds: Wheezing and rhonchi present.   Abdominal:      General: Bowel sounds are normal.      Palpations: Abdomen is soft.   Musculoskeletal:      Cervical back: Normal range of motion.      Right lower leg: Edema present.      Left lower leg: Edema present.   Skin:     General: Skin is warm and dry.      Capillary Refill: Capillary refill takes less than 2 seconds.   Neurological:      General: No focal  deficit present.      Mental Status: She is alert.   Psychiatric:         Mood and Affect: Mood normal.         Behavior: Behavior normal.         Thought Content: Thought content normal.         Judgment: Judgment normal.     Results Review:  I have personally reviewed most recent lab results and radiology images and interpretations and agree with findings, most notably: BMP, CBC, chest x-ray, UA, urine sodium, urine osmolality, uric acid.    Results from last 7 days   Lab Units 10/09/24  1407 10/09/24  0542   WBC 10*3/mm3  --  2.94*   HEMOGLOBIN g/dL 8.4* 8.1*   HEMATOCRIT % 25.6* 24.6*   PLATELETS 10*3/mm3  --  343     Results from last 7 days   Lab Units 10/09/24  1407 10/09/24  0542 10/08/24  2105   SODIUM mmol/L 125* 126* 126*   POTASSIUM mmol/L  --  4.0 3.2*   CHLORIDE mmol/L  --  91* 87*   CO2 mmol/L  --  25.6 24.6   BUN mg/dL  --  19 24*   CREATININE mg/dL  --  0.60 0.75   GLUCOSE mg/dL  --  102* 103*   CALCIUM mg/dL  --  7.8* 8.7   ALK PHOS U/L  --   --  89   ALT (SGPT) U/L  --   --  13   AST (SGOT) U/L  --   --  15     Estimated Creatinine Clearance: 71.3 mL/min (by C-G formula based on SCr of 0.6 mg/dL).  Brief Urine Lab Results  (Last result in the past 365 days)        Color   Clarity   Blood   Leuk Est   Nitrite   Protein   CREAT   Urine HCG        10/08/24 1023 Dark Yellow   Clear   Negative   Trace   Negative   Negative                   Microbiology Results (last 10 days)       ** No results found for the last 240 hours. **            ECG/EMG Results (most recent)       None                    XR Chest 1 View    Result Date: 10/9/2024  Impression: No active disease Electronically Signed: Lawson Pritchard MD  10/9/2024 12:48 PM EDT  Workstation ID: [id number]       Estimated Creatinine Clearance: 71.3 mL/min (by C-G formula based on SCr of 0.6 mg/dL).    Assessment & Plan   Assessment/Plan       Active Hospital Problems    Diagnosis  POA    **Hyponatremia [E87.1]  Yes      Resolved Hospital Problems   No  resolved problems to display.     Hyponatremia        Lab Results   Component Value Date      (L) 10/09/2024     GLUCOSE 102 (H) 10/09/2024    --Trended to 125 on the afternoon of 10/9, nephrology consulted  -UA shows a specific gravity of 1.014  -Urine osmolality  -Urine sodium  -Uric acid  -In the ED pt saline with 40 mEq/L of potassium chloride  -Hold hydrochlorothiazide  -Check triglycerides  -Recheck sodium at 8 hours  -Hospitalist consulted for further management     Hypokalemia        Lab Results   Component Value Date     K 4.0 10/09/2024     MG 1.9 10/08/2024   -Potassium initially 3.0  -Electrolyte replacement protocol ordered  -Monitor potassium and magnesium     Anemia        Lab Results   Component Value Date     HGB 8.1 (L) 10/09/2024     MCV 93.5 10/09/2024     MCHC 32.9 10/09/2024   -Hemoglobin: 10.3 on the morning of 10/8  -Monitor H&H closely  -Trended to 8.4 on the afternoon of 10/9     Hypertension  -Well controlled       BP Readings from Last 1 Encounters:   10/09/24 103/62   -Hydrochlorothiazide held due to hyponatremia as above and will hold amlodipine for now as well.  - Monitor while admitted     Hypothyroidism  -Levothyroxine     Depression  -Hold Zoloft for now due to hyponatremia            VTE Prophylaxis - Documented VTE Prophylaxis Not Indicated  Reason:        Start        10/08/24 5711  VTE Prophylaxis Not Indicated: Low Risk; No Risk Factors (0)  Once                              CODE STATUS:    Code Status and Medical Interventions: CPR (Attempt to Resuscitate); Full Support   Ordered at: 10/09/24 1058     Code Status (Patient has no pulse and is not breathing):    CPR (Attempt to Resuscitate)     Medical Interventions (Patient has pulse or is breathing):    Full Support       This patient has been examined wearing personal protective equipment.     I discussed the patient's findings and my recommendations with patient and nursing staff.      Signature:Electronically signed  by Daniel Jasmine PA-C, 10/09/24, 2:43 PM EDT.

## 2024-10-09 NOTE — H&P
"[facility] Medicine Services  History & Physical    Patient Name: Maryann Gaitan  : 1950  MRN: [id number]  Primary Care Physician:  Eduard Little MD  Date of admission: 10/8/2024  Date and Time of Service: 10/8/2024 at 1530    Subjective      Chief Complaint: \"abnormal labs\"    History of Present Illness: Maryann Gaitan is a 74 y.o. female with a CMH of HTN, anemia, Crohns, who presented to [facility] on 10/8/2024 due to \"abnormal labs\". Patient was noted to be hyponatremic with Na of 122. Patient was then admitted to ED observation unit. Despite holding    medications and giving IVFs, patient sodium levels have improved slightly but continue to remain low with latest sodium 125. Patient has now met inpatient criteria and hospitalist team to assume care for remainder of admission.     Patient states she is feeling better today. She currently reports n/v/d worsening over the last week with diffuse abdominal tenderness. She states she believes she is in acute crohn's. Denies bloody/black stools. Denies excessive fluid intake at home. Denies h/o alcohol use.     Review of Systems   Constitutional:  Negative for chills and fever.   Respiratory:  Negative for shortness of breath.    Cardiovascular:  Negative for chest pain.   Gastrointestinal:  Positive for abdominal pain, diarrhea, nausea and vomiting.       Personal History     Past Medical History:   Diagnosis Date    Arthritis     COPD (chronic obstructive pulmonary disease)     Hypertension        Past Surgical History:   Procedure Laterality Date    HYSTERECTOMY         Family History: family history is not on file. Otherwise pertinent FHx was reviewed and not pertinent to current issue.    Social History:  reports that she has quit smoking. Her smoking use included cigarettes. She has never used smokeless tobacco. She reports that she does not drink alcohol and does not use drugs.    Home Medications:  Prior to Admission " Medications       Prescriptions Last Dose Informant Patient Reported? Taking?    Adalimumab (Humira, 2 Pen,) 40 MG/0.4ML Pen-injector Kit 9/27/2024  Yes Yes    Inject 40 mg under the skin into the appropriate area as directed Every 14 (Fourteen) Days.    amLODIPine (NORVASC) 10 MG tablet   Yes Yes    Take 1 tablet by mouth Daily.    cholecalciferol (VITAMIN D3) 1.25 MG (44025 UT) capsule   Yes Yes    Take 1 capsule by mouth 2 (Two) Times a Week. Wed, Sat    colestipol (COLESTID) 1 g tablet   Yes Yes    Take 1 tablet by mouth 2 (Two) Times a Day.    diclofenac (VOLTAREN) 50 MG EC tablet   Yes Yes    Take 1 tablet by mouth 2 (Two) Times a Day.    ezetimibe (ZETIA) 10 MG tablet   Yes Yes    Take 1 tablet by mouth Daily.    folic acid (FOLVITE) 1 MG tablet   Yes Yes    Take 1 tablet by mouth Daily.    hydroCHLOROthiazide 25 MG tablet   Yes Yes    Take 1 tablet by mouth Daily.    levothyroxine (SYNTHROID, LEVOTHROID) 50 MCG tablet   Yes Yes    Take 1 tablet by mouth Daily.    methotrexate 2.5 MG tablet   Yes Yes    Take 8 tablets by mouth 1 (One) Time Per Week.    pantoprazole (PROTONIX) 20 MG EC tablet   Yes Yes    Take 1 tablet by mouth Daily.    predniSONE (DELTASONE) 5 MG tablet   Yes Yes    Take 1 tablet by mouth Daily.    sertraline (ZOLOFT) 50 MG tablet   Yes Yes    Take 1 tablet by mouth Daily.    tiotropium (SPIRIVA) 18 MCG per inhalation capsule   Yes Yes    Place 1 capsule into inhaler and inhale Daily.              Allergies:  Allergies   Allergen Reactions    Codeine Hives    Penicillin G Sodium Hives       Objective      Vitals:   Temp:  [97.6 °F (36.4 °C)-98.1 °F (36.7 °C)] 98.1 °F (36.7 °C)  Heart Rate:  [76-90] 78  Resp:  [16-18] 18  BP: ()/(57-73) 106/61  Body mass index is 22.87 kg/m².    Physical Exam  General: 73 yo WF, Alert and oriented, well nourished, no acute distress.  HENT: Normocephalic, normal hearing, moist oral mucosa, no scleral icterus.  Neck: Supple, non-tender, no carotid bruits,  no JVD, no LAD.  Lungs: Clear to auscultation, non-labored respiration.  Heart: RRR, no murmur, gallop or edema.  Abdomen: Soft, mild diffuse ttp, non-distended, + bowel sounds.  Musculoskeletal: Normal range of motion and strength, no tenderness or swelling.  Skin: Skin is warm, dry and pink, no rashes or lesions.  Psychiatric: Cooperative, appropriate mood and affect.    Diagnostic Data:  Lab Results (last 24 hours)       Procedure Component Value Units Date/Time    Sodium [[id number]]  (Abnormal) Collected: 10/09/24 1407    Specimen: Blood from Arm, Right Updated: 10/09/24 1432     Sodium 125 mmol/L     Hemoglobin & Hematocrit, Blood [[id number]]  (Abnormal) Collected: 10/09/24 1407    Specimen: Blood from Arm, Right Updated: 10/09/24 1416     Hemoglobin 8.4 g/dL      Hematocrit 25.6 %     Osmolality, Urine - Urine, Clean Catch [[id number]]  (Normal) Collected: 10/09/24 0939    Specimen: Urine, Clean Catch Updated: 10/09/24 1021     Osmolality, Urine 431 mOsm/kg     Sodium, Urine, Random - Urine, Clean Catch [[id number]] Collected: 10/09/24 0939    Specimen: Urine, Clean Catch Updated: 10/09/24 0956     Sodium, Urine <20 mmol/L     Narrative:      Reference intervals for random urine have not been established.  Clinical usage is dependent upon physician's interpretation in combination with other laboratory tests.       Uric Acid [[id number]]  (Abnormal) Collected: 10/09/24 0542    Specimen: Blood from Arm, Right Updated: 10/09/24 0817     Uric Acid 6.7 mg/dL     Triglycerides [[id number]]  (Abnormal) Collected: 10/09/24 0542    Specimen: Blood from Arm, Right Updated: 10/09/24 0817     Triglycerides 182 mg/dL     Basic Metabolic Panel [[id number]]  (Abnormal) Collected: 10/09/24 0542    Specimen: Blood from Arm, Right Updated: 10/09/24 0633     Glucose 102 mg/dL      BUN 19 mg/dL      Creatinine 0.60 mg/dL      Sodium 126 mmol/L      Potassium 4.0 mmol/L      Chloride 91 mmol/L      CO2 25.6 mmol/L      Calcium 7.8  mg/dL      BUN/Creatinine Ratio 31.7     Anion Gap 9.4 mmol/L      eGFR 94.3 mL/min/1.73     Narrative:      GFR Normal >60  Chronic Kidney Disease <60  Kidney Failure <15    The GFR formula is only valid for adults with stable renal function between ages 18 and 70.    CBC Auto Differential [[id number]]  (Abnormal) Collected: 10/09/24 0542    Specimen: Blood from Arm, Right Updated: 10/09/24 0603     WBC 2.94 10*3/mm3      RBC 2.63 10*6/mm3      Hemoglobin 8.1 g/dL      Hematocrit 24.6 %      MCV 93.5 fL      MCH 30.8 pg      MCHC 32.9 g/dL      RDW 17.2 %      RDW-SD 58.1 fl      MPV 8.8 fL      Platelets 343 10*3/mm3      Neutrophil % 73.2 %      Lymphocyte % 22.4 %      Monocyte % 2.4 %      Eosinophil % 1.7 %      Basophil % 0.0 %      Immature Grans % 0.3 %      Neutrophils, Absolute 2.15 10*3/mm3      Lymphocytes, Absolute 0.66 10*3/mm3      Monocytes, Absolute 0.07 10*3/mm3      Eosinophils, Absolute 0.05 10*3/mm3      Basophils, Absolute 0.00 10*3/mm3      Immature Grans, Absolute 0.01 10*3/mm3      nRBC 0.0 /100 WBC     Comprehensive Metabolic Panel [[id number]]  (Abnormal) Collected: 10/08/24 2105    Specimen: Blood from Arm, Left Updated: 10/08/24 2131     Glucose 103 mg/dL      BUN 24 mg/dL      Creatinine 0.75 mg/dL      Sodium 126 mmol/L      Potassium 3.2 mmol/L      Chloride 87 mmol/L      CO2 24.6 mmol/L      Calcium 8.7 mg/dL      Total Protein 5.6 g/dL      Albumin 3.3 g/dL      ALT (SGPT) 13 U/L      AST (SGOT) 15 U/L      Alkaline Phosphatase 89 U/L      Total Bilirubin 0.7 mg/dL      Globulin 2.3 gm/dL      A/G Ratio 1.4 g/dL      BUN/Creatinine Ratio 32.0     Anion Gap 14.4 mmol/L      eGFR 83.7 mL/min/1.73     Narrative:      GFR Normal >60  Chronic Kidney Disease <60  Kidney Failure <15    The GFR formula is only valid for adults with stable renal function between ages 18 and 70.    Magnesium [[id number]]  (Normal) Collected: 10/08/24 2105    Specimen: Blood from Arm, Left Updated: 10/08/24  2131     Magnesium 1.9 mg/dL     Extra Tubes [[id number]] Collected: 10/08/24 2105    Specimen: Blood from Arm, Left Updated: 10/08/24 2116    Narrative:      The following orders were created for panel order Extra Tubes.  Procedure                               Abnormality         Status                     ---------                               -----------         ------                     Gold Top - SST[[id number]]                                   Final result               Light Blue Top[[id number]]                                   Final result                 Please view results for these tests on the individual orders.    Gold Top - SST [[id number]] Collected: 10/08/24 2105    Specimen: Blood from Arm, Left Updated: 10/08/24 2116     Extra Tube Hold for add-ons.     Comment: Auto resulted.       Light Blue Top [[id number]] Collected: 10/08/24 2105    Specimen: Blood from Arm, Left Updated: 10/08/24 2116     Extra Tube Hold for add-ons.     Comment: Auto resulted       CBC & Differential [[id number]]  (Abnormal) Collected: 10/08/24 2105    Specimen: Blood from Arm, Left Updated: 10/08/24 2113    Narrative:      The following orders were created for panel order CBC & Differential.  Procedure                               Abnormality         Status                     ---------                               -----------         ------                     CBC Auto Differential[[id number]]        Abnormal            Final result                 Please view results for these tests on the individual orders.    CBC Auto Differential [[id number]]  (Abnormal) Collected: 10/08/24 2105    Specimen: Blood from Arm, Left Updated: 10/08/24 2113     WBC 4.51 10*3/mm3      RBC 2.99 10*6/mm3      Hemoglobin 9.4 g/dL      Hematocrit 27.6 %      MCV 92.3 fL      MCH 31.4 pg      MCHC 34.1 g/dL      RDW 17.1 %      RDW-SD 57.2 fl      MPV 8.6 fL      Platelets 402 10*3/mm3      Neutrophil % 81.4 %      Lymphocyte % 15.3 %      Monocyte  % 1.1 %      Eosinophil % 1.6 %      Basophil % 0.2 %      Immature Grans % 0.4 %      Neutrophils, Absolute 3.67 10*3/mm3      Lymphocytes, Absolute 0.69 10*3/mm3      Monocytes, Absolute 0.05 10*3/mm3      Eosinophils, Absolute 0.07 10*3/mm3      Basophils, Absolute 0.01 10*3/mm3      Immature Grans, Absolute 0.02 10*3/mm3      nRBC 0.0 /100 WBC              Imaging Results (Last 24 Hours)       Procedure Component Value Units Date/Time    XR Chest 1 View [[id number]] Collected: 10/09/24 1241     Updated: 10/09/24 1250    Narrative:      XR CHEST 1 VW    Date of Exam: 10/9/2024 12:29 PM EDT    Indication: Diffuse wheeze/rhonchi    Comparison: None available.    Findings:  Heart size is within normal limits. The pulmonary vascular pattern in the chest is normal and the lungs appear clear.      Impression:      Impression:  No active disease      Electronically Signed: Lawson Pritchard MD    10/9/2024 12:48 PM EDT    Workstation ID: [id number]              Assessment & Plan        This is a 74 y.o. female with:    Active and Resolved Problems  Active Hospital Problems    Diagnosis  POA    **Hyponatremia [E87.1]  Yes      Resolved Hospital Problems   No resolved problems to display.       Hyponatremia   Na 122 -> 126 -> 125   Urine Osm 431, Urine Na < 20  Continue holding HCTZ, sertraline   Nephrology consulted to assist     Hypokalemia  K initially 3, now 4  Continue to monitor and replete lytes as necessary     Nausea, vomiting, diarrhea  Crohns disease  Symptomatic control  Continue to monitor    Anemia   Hgb 10.3 OA, now 8.4  No overt s/sx of bleeding   Continue to monitor CBC  Transfuse if Hgb < 7    Hypertension   BP low   Holding HCTZ given hypoNa and holding Amlodipine    Hypothyroidism  Continue Levothyroxine        Depression  Holding sertraline       VTE Prophylaxis:  Mechanical VTE prophylaxis orders are present.        The patient desires to be as follows:    CODE STATUS:    Code Status (Patient has no pulse  and is not breathing): CPR (Attempt to Resuscitate)  Medical Interventions (Patient has pulse or is breathing): Full Support        Nadir Gaitan, who can be contacted at [phone], is the designated person to make medical decisions on the patient's behalf if she is incapable of doing so. This was clarified with patient and/or next of kin on 10/8/2024 during the course of this H&P.    Admission Status:  I believe this patient meets inpatient status.    Expected Length of Stay: 2-3 days    PDMP and Medication Dispenses via Sidebar reviewed and consistent with patient reported medications.    I discussed the patient's findings and my recommendations with patient.      Signature:     This document has been electronically signed by Fco Figueroa PA-C on October 9, 2024 15:31 EDT   [facility] Hospitalist Team

## 2024-10-09 NOTE — ED NOTES
"Nursing report ED to floor  Maryann Gaitan  74 y.o.  female    HPI:   Chief Complaint   Patient presents with    Abnormal Lab       Admitting doctor:   Nadir Barrientos MD    Admitting diagnosis:   The primary encounter diagnosis was Hyponatremia. A diagnosis of Hypokalemia was also pertinent to this visit.    Code status:   Current Code Status       Date Active Code Status Order ID Comments User Context       Not on file            Allergies:   Codeine and Penicillin g sodium    Isolation:  No active isolations     Fall Risk:  Fall Risk Assessment was completed, and patient is at high risk for falls.   Predictive Model Details         16 (Low) Factor Value    Calculated 10/8/2024 22:53 Age 74    Risk of Fall Model Active Peripheral IV Present     Magnesium 1.9 mg/dL     Diastolic BP 58     Chloride 87 mmol/L     Drug Use Not Asked     Albumin 3.3 g/dL     Calcium 8.7 mg/dL     Armando Scale not on file     Total Bilirubin 0.7 mg/dL     Number of Distinct Medication Classes administered 1     Respiratory Rate 16     Days after Admission 0.122     Tobacco Use Not Asked     Potassium 3.2 mmol/L     Creatinine 0.75 mg/dL     ALT 13 U/L         Weight:       10/08/24  1956   Weight: 54.9 kg (121 lb 0.5 oz)       Intake and Output  No intake or output data in the 24 hours ending 10/08/24 2254    Diet:   Dietary Orders (From admission, onward)       Start     Ordered    10/08/24 2251  Diet: Regular/House; Fluid Consistency: Thin (IDDSI 0)  Diet Effective Now        References:    Diet Order Crosswalk   Question Answer Comment   Diets: Regular/House    Fluid Consistency: Thin (IDDSI 0)        10/08/24 2250                     Most recent vitals:   Vitals:    10/08/24 1956 10/08/24 1957 10/08/24 2056 10/08/24 2158   BP: 96/61  127/73 109/58   Pulse: 87  81 80   Resp: 18  16 16   Temp: 97.6 °F (36.4 °C)      SpO2:  100% 97% 97%   Weight: 54.9 kg (121 lb 0.5 oz)      Height: 154.9 cm (61\")          Active LDAs/IV Access:   Lines, " Drains & Airways       Active LDAs       Name Placement date Placement time Site Days    Peripheral IV 10/08/24 2034 Left;Posterior Forearm 10/08/24  2034  Forearm  less than 1                    Skin Condition:   Skin Assessments (last day)       None             Labs (abnormal labs have a star):   Labs Reviewed   COMPREHENSIVE METABOLIC PANEL - Abnormal; Notable for the following components:       Result Value    Glucose 103 (*)     BUN 24 (*)     Sodium 126 (*)     Potassium 3.2 (*)     Chloride 87 (*)     Total Protein 5.6 (*)     Albumin 3.3 (*)     BUN/Creatinine Ratio 32.0 (*)     All other components within normal limits    Narrative:     GFR Normal >60  Chronic Kidney Disease <60  Kidney Failure <15    The GFR formula is only valid for adults with stable renal function between ages 18 and 70.   CBC WITH AUTO DIFFERENTIAL - Abnormal; Notable for the following components:    RBC 2.99 (*)     Hemoglobin 9.4 (*)     Hematocrit 27.6 (*)     RDW 17.1 (*)     RDW-SD 57.2 (*)     Neutrophil % 81.4 (*)     Lymphocyte % 15.3 (*)     Monocyte % 1.1 (*)     Lymphocytes, Absolute 0.69 (*)     Monocytes, Absolute 0.05 (*)     All other components within normal limits   MAGNESIUM - Normal   BASIC METABOLIC PANEL   CBC WITH AUTO DIFFERENTIAL   CBC AND DIFFERENTIAL    Narrative:     The following orders were created for panel order CBC & Differential.  Procedure                               Abnormality         Status                     ---------                               -----------         ------                     CBC Auto Differential[[id number]]        Abnormal            Final result                 Please view results for these tests on the individual orders.   EXTRA TUBES    Narrative:     The following orders were created for panel order Extra Tubes.  Procedure                               Abnormality         Status                     ---------                               -----------         ------                      Gold Top - SST[[id number]]                                   Final result               Light Blue Top[[id number]]                                   Final result                 Please view results for these tests on the individual orders.   [facility]   LIGHT BLUE TOP       LOC: Person, Place, Time, and Situation    Telemetry:  Observation Unit    Cardiac Monitoring Ordered: no    EKG:   No orders to display       Medications Given in the ED:   Medications   sodium chloride 0.9 % flush 10 mL (has no administration in time range)   sodium chloride 0.9 % with KCl 40 mEq/L infusion (has no administration in time range)   sodium chloride 0.9 % flush 10 mL (has no administration in time range)   sodium chloride 0.9 % flush 10 mL (has no administration in time range)   ondansetron (ZOFRAN) injection 4 mg (has no administration in time range)   melatonin tablet 5 mg (has no administration in time range)   sennosides-docusate (PERICOLACE) 8.6-50 MG per tablet 2 tablet (has no administration in time range)     And   polyethylene glycol (MIRALAX) packet 17 g (has no administration in time range)     And   bisacodyl (DULCOLAX) EC tablet 5 mg (has no administration in time range)     And   bisacodyl (DULCOLAX) suppository 10 mg (has no administration in time range)       Imaging results:  No radiology results for the last day    Social issues:   Social History     Socioeconomic History    Marital status:        NIH Stroke Scale:  Interval: (not recorded)  1a. Level of Consciousness: (not recorded)  1b. LOC Questions: (not recorded)  1c. LOC Commands: (not recorded)  2. Best Gaze: (not recorded)  3. Visual: (not recorded)  4. Facial Palsy: (not recorded)  5a. Motor Arm, Left: (not recorded)  5b. Motor Arm, Right: (not recorded)  6a. Motor Leg, Left: (not recorded)  6b. Motor Leg, Right: (not recorded)  7. Limb Ataxia: (not recorded)  8. Sensory: (not recorded)  9. Best Language: (not recorded)  10.  Dysarthria: (not recorded)  11. Extinction and Inattention (formerly Neglect): (not recorded)    Total (NIH Stroke Scale): (not recorded)     Additional notable assessment information:     Nursing report ED to floor:  Pat Gates RN   10/08/24 22:54 EDT

## 2024-10-09 NOTE — PLAN OF CARE
"Goal Outcome Evaluation:  Plan of Care Reviewed With: patient           Outcome Evaluation: Maryann Gaitan is a 73 yo F with a PMH of Crohn's disease presented to [facility] on 10/8/24 for abnormal blood work from her primary care doctor and intermittent abdominal cramping, being treated for hyponatremia. Pt is A&Ox4, reports she lives with her 2 adult grandaughters in a [facility] with 2 [facility]. Pt has 24/7 care from her grandchildren and states she recieves assistance with all mobility and ADLs. She has a rollator which she says \"topples over all the time\" when she ambulates and her grandaughter walks with her to the bathroom and to and from her bedroom so she doesn't fall. Today she requires Justa with bed mobility and transfer to chair. Pt is very anxious about falling and refused to ambulate on this date despite education on importance of movement in the acute setting. Pt has BLE weakness and poor activity endurance. She would benefit from SNF upon d/c to address weakness and functional endurance deficits but pt adamately refuses to stay inpatient for rehab, HH PT may be another option. PT will follow while IP and progress activity as tolerated.      Anticipated Discharge Disposition (PT): skilled nursing facility, home with home health                        "

## 2024-10-09 NOTE — CONSULTS
INITIAL CONSULT NOTE      Patient Name: Maryann Gaitan  : 1950  MRN: [id number]  Primary Care Physician: Eduard Little MD  Date of admission: 10/8/2024    Patient Care Team:  Eduard Little MD as PCP - General (Family Medicine)        Reason for Consult:       BISHNU  Subjective   History of Present Illness:   Chief Complaint:   Chief Complaint   Patient presents with    Abnormal Lab     HISTORY:  Maryann Gaitan is a 74 y.o. female with past medical history of Crohn disease, hypertension, on hydrochlorothiazide, history of COPD, hypothyroidism, on methotrexate also on Humira. who presents with hyponatremia as her primary care provider sent her to the ER she was on Cymbalta that has been discontinued but patient blood pressure medicine also discontinued patient sodium level that was 122 already 126 today no other complaint at this time.  Urine studies with increased urine osmolality but normal urine sodium.  At less than 20.  Patient has history of Crohn disease which is active at this time and she is having diarrhea almost every day for the last 1 month.  She is not sure when hydrochlorothiazide was started and whether she was taking or not.  She denies drinking a lot of water.  She is weak she is not eating much.  Her albumin level is significantly low and other electrolytes are also affected specially magnesium of 1.4.  Check phosphorus level.  Uric acid level is not on the low side.          Review of systems:  All other review of system unremarkable  Constitutional: No fever, no chills, no lethargy, no weakness.  HEENT:  No headache, otalgia, itchy eyes, nasal discharge or sore throat.  Cardiac:  No chest pain, dyspnea, orthopnea or PND.  Chest:              No cough, phlegm or wheezing.  Abdomen:  No abdominal pain, nausea or vomiting.  Neuro:  No focal weakness, abnormal movements orseizure like activity.  :   No hematuria, no pyuria, no dysuria, no flank pain.  ROS was  otherwise negative except as mentioned in the [place].       Personal History:     Past Medical History:   Past Medical History:   Diagnosis Date    Arthritis     COPD (chronic obstructive pulmonary disease)     Hypertension        Surgical History:      Past Surgical History:   Procedure Laterality Date    HYSTERECTOMY         Family History: family history is not on file. Otherwise pertinent FHx was reviewed and unremarkable.     Social History:  reports that she has quit smoking. Her smoking use included cigarettes. She has never used smokeless tobacco. She reports that she does not drink alcohol and does not use drugs.    Medications:  Prior to Admission medications    Medication Sig Start Date End Date Taking? Authorizing Provider   Adalimumab (Humira, 2 Pen,) 40 MG/0.4ML Pen-injector Kit Inject 40 mg under the skin into the appropriate area as directed Every 14 (Fourteen) Days.   Yes Donna Kraft MD   amLODIPine (NORVASC) 10 MG tablet Take 1 tablet by mouth Daily.   Yes Donna Kraft MD   cholecalciferol (VITAMIN D3) 1.25 MG (18757 UT) capsule Take 1 capsule by mouth 2 (Two) Times a Week. Wed, Sat   Yes Donna Kraft MD   colestipol (COLESTID) 1 g tablet Take 1 tablet by mouth 2 (Two) Times a Day.   Yes Donna Kraft MD   diclofenac (VOLTAREN) 50 MG EC tablet Take 1 tablet by mouth 2 (Two) Times a Day.   Yes Donna Kraft MD   ezetimibe (ZETIA) 10 MG tablet Take 1 tablet by mouth Daily.   Yes Donna Kraft MD   folic acid (FOLVITE) 1 MG tablet Take 1 tablet by mouth Daily.   Yes Donna Kraft MD   hydroCHLOROthiazide 25 MG tablet Take 1 tablet by mouth Daily.   Yes Donna Kraft MD   levothyroxine (SYNTHROID, LEVOTHROID) 50 MCG tablet Take 1 tablet by mouth Daily.   Yes Donna Kraft MD   methotrexate 2.5 MG tablet Take 8 tablets by mouth 1 (One) Time Per Week.   Yes Donna Kraft MD   pantoprazole (PROTONIX) 20 MG EC tablet Take 1  tablet by mouth Daily.   Yes Provider, MD Donna   predniSONE (DELTASONE) 5 MG tablet Take 1 tablet by mouth Daily.   Yes Provider, MD Donna   sertraline (ZOLOFT) 50 MG tablet Take 1 tablet by mouth Daily.   Yes Provider, MD Donna   tiotropium (SPIRIVA) 18 MCG per inhalation capsule Place 1 capsule into inhaler and inhale Daily.   Yes Provider, MD Donna     Scheduled Meds:folic acid, 1 mg, Oral, Daily  levothyroxine, 50 mcg, Oral, Daily  pantoprazole, 40 mg, Oral, Daily  [Held by provider] sertraline, 50 mg, Oral, Daily  sodium chloride, 10 mL, Intravenous, Q12H  tiotropium bromide monohydrate, 2 puff, Inhalation, Daily - RT      Continuous Infusions:   PRN Meds:  senna-docusate sodium **AND** polyethylene glycol **AND** bisacodyl **AND** bisacodyl    melatonin    ondansetron    sodium chloride  Allergies:    Allergies   Allergen Reactions    Codeine Hives    Penicillin G Sodium Hives       Objective   Exam:     Vital Signs  Temp:  [97.6 °F (36.4 °C)-98.1 °F (36.7 °C)] 98.1 °F (36.7 °C)  Heart Rate:  [76-90] 78  Resp:  [16-18] 18  BP: ()/(57-73) 106/61  SpO2:  [96 %-100 %] 96 %  on   ;   Device (Oxygen Therapy): room air  Body mass index is 22.87 kg/m².  EXAM  General: Elderly white female in no acute distress.    Head:      Normocephalic and atraumatic.    Eyes:      PERRL/EOM intact, conjunctivae and sclerae clear without nystagmus.    Neck:      No masses, thyromegaly,  trachea central   Lungs:    Clear bilaterally to auscultation.    Heart:      Regular rate and rhythm, no murmur no gallop  Abd:        Soft, nontender, not distended, bowel sounds positive, no shifting dullness.  Msk:        No deformity or scoliosis noted of thoracic or lumbar spine.    Pulses:   Pulses normal in all 4 extremities.    Extremities:        No cyanosis or clubbing--no significant edema.    Neuro:    No focal deficits.   alert oriented x3  Skin:       Intact without lesions or rashes.    Psych:    Alert  and cooperative; normal mood and affect; normal attention span       Results Review:  I have personally reviewed most recent Data :  BMP @LABNT(creatinine:10)  CBC    Results from last 7 days   Lab Units 10/09/24  1407 10/09/24  0542 10/08/24  2105 10/08/24  0927   WBC 10*3/mm3  --  2.94* 4.51 6.60   HEMOGLOBIN g/dL 8.4* 8.1* 9.4* 10.3*   PLATELETS 10*3/mm3  --  343 402 449     CMP   Results from last 7 days   Lab Units 10/09/24  1407 10/09/24  0542 10/08/24  2105 10/08/24  0927   SODIUM mmol/L 125* 126* 126* 122*   POTASSIUM mmol/L  --  4.0 3.2* 3.0*   CHLORIDE mmol/L  --  91* 87* 81*   CO2 mmol/L  --  25.6 24.6 22.5   BUN mg/dL  --  19 24* 35*   CREATININE mg/dL  --  0.60 0.75 1.07*   GLUCOSE mg/dL  --  102* 103* 98   ALBUMIN g/dL  --   --  3.3* 3.3*   BILIRUBIN mg/dL  --   --  0.7 0.7   ALK PHOS U/L  --   --  89 100   AST (SGOT) U/L  --   --  15 18   ALT (SGPT) U/L  --   --  13 17     ABG      XR Chest 1 View    Result Date: 10/9/2024  Impression: No active disease Electronically Signed: Lawson Pritchard MD  10/9/2024 12:48 PM EDT  Workstation ID: [id number]           Assessment & Plan   Assessment and Plan:         Hyponatremia    ASSESSMENT:  Hyponatremia likely etiology thiazide diuretics in the presence of Cymbalta  History of hypertension  History of arthritis  History of Crohn disease          PLAN :     Clinically patient has diarrhea seem to be somewhat volume depleted blood pressure is on the low side although BNP level is high I will start normal saline at 100 cc an hour for next 8 hours total of 1 L will be given that will improve patient hemodynamics will help with the blood pressure  Check basic metabolic panel every 4 hours.  Follow-up with repeat labs later today and tomorrow morning  Sodium level dropped to 123 this morning repeat 1/1/2024 at this time   need to follow-up with GI regarding Crohn disease  Need to improve diarrhea  Will not restart HCTZ at the time of discharge  Okay to restart  Cymbalta  Thank you for letting me participate      Buddy Pierre MD  [facility]  10/9/2024  15:44 EDT    Patient antihypertensive medication also discontinued sodium level that was 122 increased to 126

## 2024-10-10 LAB
ALBUMIN SERPL-MCNC: 2.6 G/DL (ref 3.5–5.2)
ALBUMIN/GLOB SERPL: 1.4 G/DL
ALP SERPL-CCNC: 70 U/L (ref 39–117)
ALT SERPL W P-5'-P-CCNC: 13 U/L (ref 1–33)
ANION GAP SERPL CALCULATED.3IONS-SCNC: 6.3 MMOL/L (ref 5–15)
ANISOCYTOSIS BLD QL: ABNORMAL
AST SERPL-CCNC: 14 U/L (ref 1–32)
BILIRUB SERPL-MCNC: 0.5 MG/DL (ref 0–1.2)
BUN SERPL-MCNC: 8 MG/DL (ref 8–23)
BUN/CREAT SERPL: 18.2 (ref 7–25)
CALCIUM SPEC-SCNC: 7.9 MG/DL (ref 8.6–10.5)
CHLORIDE SERPL-SCNC: 92 MMOL/L (ref 98–107)
CO2 SERPL-SCNC: 24.7 MMOL/L (ref 22–29)
CREAT SERPL-MCNC: 0.44 MG/DL (ref 0.57–1)
DEPRECATED RDW RBC AUTO: 57.9 FL (ref 37–54)
EGFRCR SERPLBLD CKD-EPI 2021: 101.6 ML/MIN/1.73
ELLIPTOCYTES BLD QL SMEAR: ABNORMAL
EOSINOPHIL # BLD MANUAL: 0.02 10*3/MM3 (ref 0–0.4)
EOSINOPHIL NFR BLD MANUAL: 1 % (ref 0.3–6.2)
ERYTHROCYTE [DISTWIDTH] IN BLOOD BY AUTOMATED COUNT: 17 % (ref 12.3–15.4)
GLOBULIN UR ELPH-MCNC: 1.8 GM/DL
GLUCOSE SERPL-MCNC: 99 MG/DL (ref 65–99)
HCT VFR BLD AUTO: 23.3 % (ref 34–46.6)
HGB BLD-MCNC: 7.7 G/DL (ref 12–15.9)
LYMPHOCYTES # BLD MANUAL: 0.45 10*3/MM3 (ref 0.7–3.1)
LYMPHOCYTES NFR BLD MANUAL: 5 % (ref 5–12)
MAGNESIUM SERPL-MCNC: 1.4 MG/DL (ref 1.6–2.4)
MCH RBC QN AUTO: 30.8 PG (ref 26.6–33)
MCHC RBC AUTO-ENTMCNC: 33 G/DL (ref 31.5–35.7)
MCV RBC AUTO: 93.2 FL (ref 79–97)
MONOCYTES # BLD: 0.08 10*3/MM3 (ref 0.1–0.9)
NEUTROPHILS # BLD AUTO: 1 10*3/MM3 (ref 1.7–7)
NEUTROPHILS NFR BLD MANUAL: 58 % (ref 42.7–76)
NEUTS BAND NFR BLD MANUAL: 7 % (ref 0–5)
NT-PROBNP SERPL-MCNC: 1780 PG/ML (ref 0–900)
OSMOLALITY UR: 443 MOSM/KG (ref 300–800)
PLATELET # BLD AUTO: 315 10*3/MM3 (ref 140–450)
PMV BLD AUTO: 9.3 FL (ref 6–12)
POTASSIUM SERPL-SCNC: 3.2 MMOL/L (ref 3.5–5.2)
PROT SERPL-MCNC: 4.4 G/DL (ref 6–8.5)
RBC # BLD AUTO: 2.5 10*6/MM3 (ref 3.77–5.28)
SCAN SLIDE: NORMAL
SMALL PLATELETS BLD QL SMEAR: ADEQUATE
SODIUM SERPL-SCNC: 122 MMOL/L (ref 136–145)
SODIUM SERPL-SCNC: 123 MMOL/L (ref 136–145)
SODIUM SERPL-SCNC: 124 MMOL/L (ref 136–145)
SODIUM SERPL-SCNC: 125 MMOL/L (ref 136–145)
SODIUM SERPL-SCNC: 126 MMOL/L (ref 136–145)
SODIUM UR-SCNC: <20 MMOL/L
URATE SERPL-MCNC: 3.8 MG/DL (ref 2.4–5.7)
VARIANT LYMPHS NFR BLD MANUAL: 29 % (ref 19.6–45.3)
WBC MORPH BLD: NORMAL
WBC NRBC COR # BLD AUTO: 1.54 10*3/MM3 (ref 3.4–10.8)

## 2024-10-10 PROCEDURE — 84300 ASSAY OF URINE SODIUM: CPT | Performed by: INTERNAL MEDICINE

## 2024-10-10 PROCEDURE — 84550 ASSAY OF BLOOD/URIC ACID: CPT | Performed by: INTERNAL MEDICINE

## 2024-10-10 PROCEDURE — 85007 BL SMEAR W/DIFF WBC COUNT: CPT | Performed by: INTERNAL MEDICINE

## 2024-10-10 PROCEDURE — 83735 ASSAY OF MAGNESIUM: CPT | Performed by: INTERNAL MEDICINE

## 2024-10-10 PROCEDURE — 25810000003 SODIUM CHLORIDE 0.9 % SOLUTION: Performed by: INTERNAL MEDICINE

## 2024-10-10 PROCEDURE — 83935 ASSAY OF URINE OSMOLALITY: CPT | Performed by: INTERNAL MEDICINE

## 2024-10-10 PROCEDURE — 85025 COMPLETE CBC W/AUTO DIFF WBC: CPT | Performed by: INTERNAL MEDICINE

## 2024-10-10 PROCEDURE — 84295 ASSAY OF SERUM SODIUM: CPT | Performed by: INTERNAL MEDICINE

## 2024-10-10 PROCEDURE — 83880 ASSAY OF NATRIURETIC PEPTIDE: CPT | Performed by: INTERNAL MEDICINE

## 2024-10-10 PROCEDURE — 80053 COMPREHEN METABOLIC PANEL: CPT | Performed by: INTERNAL MEDICINE

## 2024-10-10 RX ORDER — ENOXAPARIN SODIUM 100 MG/ML
40 INJECTION SUBCUTANEOUS EVERY 24 HOURS
Status: DISCONTINUED | OUTPATIENT
Start: 2024-10-10 | End: 2024-10-21

## 2024-10-10 RX ORDER — SODIUM CHLORIDE 9 MG/ML
125 INJECTION, SOLUTION INTRAVENOUS CONTINUOUS
Status: DISCONTINUED | OUTPATIENT
Start: 2024-10-10 | End: 2024-10-10

## 2024-10-10 RX ADMIN — Medication 15 G: at 08:17

## 2024-10-10 RX ADMIN — SODIUM CHLORIDE 125 ML/HR: 9 INJECTION, SOLUTION INTRAVENOUS at 15:55

## 2024-10-10 RX ADMIN — PANTOPRAZOLE SODIUM 40 MG: 40 TABLET, DELAYED RELEASE ORAL at 08:10

## 2024-10-10 RX ADMIN — LEVOTHYROXINE SODIUM 50 MCG: 0.05 TABLET ORAL at 08:10

## 2024-10-10 RX ADMIN — FOLIC ACID 1 MG: 1 TABLET ORAL at 08:10

## 2024-10-10 RX ADMIN — Medication 10 ML: at 08:10

## 2024-10-10 RX ADMIN — Medication 15 G: at 20:40

## 2024-10-10 NOTE — PROGRESS NOTES
[facility] MEDICINE SERVICE  DAILY PROGRESS NOTE    NAME: Maryann Gaitan  : 1950  MRN: [id number]      LOS: 1 day     PROVIDER OF SERVICE: Dede Liu MD    Chief Complaint: Hyponatremia    Subjective:     Interval History:    Patient seen and evaluated at bedside. Continues to have several episodes of diarrhea. Reports she has been seeing Dr. Castillo as an outpatient over the last couple of months for increased diarrhea related to crohns. Has scopes scheduled 10/31.    Treatment plan discussed with patient. All questions addressed.     Review of Systems:   Denies fevers, chills  Denies chest pain, edema  Denies shortness of breath, cough  +n/v/d  Denies dysuria, hematuria    Objective:     Vital Signs  Temp:  [97.5 °F (36.4 °C)-98 °F (36.7 °C)] 97.8 °F (36.6 °C)  Heart Rate:  [85-95] 85  Resp:  [16-17] 16  BP: ()/(46-65) 97/57   Body mass index is 24.2 kg/m².    Physical Exam   General: No acute distress, appears stated age  Neuro: Awake and alert, oriented x3, no focal deficits appreciated  HEENT: EOMI, dry mucous membranes  CV: RRR, no murmurs appreciated, no peripheral edema  Pulm: CTAB, no increased work of breathing  Abd: Soft, nontender, nondistended  Skin: Warm, dry and intact  Psych: Appropriate mood and affect    Scheduled Meds   folic acid, 1 mg, Oral, Daily  levothyroxine, 50 mcg, Oral, Daily  pantoprazole, 40 mg, Oral, Daily  [Held by provider] sertraline, 50 mg, Oral, Daily  sodium chloride, 10 mL, Intravenous, Q12H  tiotropium bromide monohydrate, 2 puff, Inhalation, Daily - RT  Urea, 15 g, Oral, BID       PRN Meds     senna-docusate sodium **AND** polyethylene glycol **AND** bisacodyl **AND** bisacodyl    melatonin    ondansetron    sodium chloride   Infusions  sodium chloride, 125 mL/hr, Last Rate: Stopped (10/10/24 0946)          Diagnostic Data    Results from last 7 days   Lab Units 10/10/24  1228 10/10/24  0808 10/10/24  0428   WBC 10*3/mm3  --   --  1.54*   HEMOGLOBIN  g/dL  --   --  7.7*   HEMATOCRIT %  --   --  23.3*   PLATELETS 10*3/mm3  --   --  315   GLUCOSE mg/dL  --   --  99   CREATININE mg/dL  --   --  0.44*   BUN mg/dL  --   --  8   SODIUM mmol/L 122*   < > 123*   POTASSIUM mmol/L  --   --  3.2*   AST (SGOT) U/L  --   --  14   ALT (SGPT) U/L  --   --  13   ALK PHOS U/L  --   --  70   BILIRUBIN mg/dL  --   --  0.5   ANION GAP mmol/L  --   --  6.3    < > = values in this interval not displayed.       XR Chest 1 View    Result Date: 10/9/2024  Impression: No active disease Electronically Signed: Lawson Pritchard MD  10/9/2024 12:48 PM EDT  Workstation ID: [id number]     Interval results reviewed.    Assessment/Plan:     Hyponatremia  - Nephrology consulted, appreciate recs  - Likely secondary to hypovolemia given recent diarrhea, nausea and vomiting  - Holding HCTZ  - Fluids per nephrology    Hypokalemia  - Repeat labs in am  - Replete prn    Crohns disease  Diarrhea  - Follows with Dr. Castillo; plans for outpatient scopes 10/31 for ongoing Crohns  - Symptomatic control    Anemia  - No overt s/sx of bleeding  - Will continue to monitor  - Hold off on pharmacological vte ppx given drop in hgb since admission  - Repeat labs in am    Hypertension  - Holding HCTZ given hyponatremia  - Holding amlodipine given soft bps    Depression  - Okay to resume ssri per nephrology    Treatment plan discussed with nursing staff.     VTE Prophylaxis:  Mechanical VTE prophylaxis orders are present.    Code status is   Code Status and Medical Interventions: CPR (Attempt to Resuscitate); Full Support   Ordered at: 10/09/24 1058     Code Status (Patient has no pulse and is not breathing):    CPR (Attempt to Resuscitate)     Medical Interventions (Patient has pulse or is breathing):    Full Support       Plan for disposition: Pending clinical course    Barriers to discharge: Hyponatremia    Time: 35+ minutes     Signature: Electronically signed by Dede Liu MD, 10/10/24, 13:38 EDT.  Meri Yeung  Hospitalist Team

## 2024-10-10 NOTE — PLAN OF CARE
Problem: Adult Inpatient Plan of Care  Goal: Plan of Care Review  Outcome: Progressing  Flowsheets (Taken 10/10/2024 0648)  Progress: improving  Plan of Care Reviewed With: patient  Goal: Patient-Specific Goal (Individualized)  Outcome: Progressing  Goal: Absence of Hospital-Acquired Illness or Injury  Outcome: Progressing  Intervention: Identify and Manage Fall Risk  Recent Flowsheet Documentation  Taken 10/10/2024 0200 by Paulette Denson RN  Safety Promotion/Fall Prevention:   safety round/check completed   mobility aid in reach   fall prevention program maintained   clutter free environment maintained   assistive device/personal items within reach   activity supervised   gait belt  Taken 10/10/2024 0040 by Paulette Denson RN  Safety Promotion/Fall Prevention:   safety round/check completed   mobility aid in reach   gait belt   fall prevention program maintained   activity supervised   assistive device/personal items within reach   clutter free environment maintained  Taken 10/9/2024 2215 by Paulette Denson RN  Safety Promotion/Fall Prevention:   safety round/check completed   mobility aid in reach   fall prevention program maintained   gait belt   assistive device/personal items within reach   activity supervised   clutter free environment maintained  Taken 10/9/2024 2020 by Paulette Denson RN  Safety Promotion/Fall Prevention: (refuses nonskid socks)   safety round/check completed   mobility aid in reach   fall prevention program maintained   clutter free environment maintained   assistive device/personal items within reach   activity supervised  Intervention: Prevent and Manage VTE (Venous Thromboembolism) Risk  Recent Flowsheet Documentation  Taken 10/9/2024 2020 by Paulette Denson RN  VTE Prevention/Management: sequential compression devices off  Intervention: Prevent Infection  Recent Flowsheet Documentation  Taken 10/9/2024 2020 by Paulette Denson RN  Infection Prevention: hand hygiene  promoted  Goal: Optimal Comfort and Wellbeing  Outcome: Progressing  Intervention: Provide Person-Centered Care  Recent Flowsheet Documentation  Taken 10/9/2024 2020 by Paulette Denson RN  Trust Relationship/Rapport:   care explained   questions answered  Goal: Readiness for Transition of Care  Outcome: Progressing     Problem: Fall Injury Risk  Goal: Absence of Fall and Fall-Related Injury  Outcome: Progressing  Intervention: Promote Injury-Free Environment  Recent Flowsheet Documentation  Taken 10/10/2024 0200 by Paulette Denson RN  Safety Promotion/Fall Prevention:   safety round/check completed   mobility aid in reach   fall prevention program maintained   clutter free environment maintained   assistive device/personal items within reach   activity supervised   gait belt  Taken 10/10/2024 0040 by Paulette Denson RN  Safety Promotion/Fall Prevention:   safety round/check completed   mobility aid in reach   gait belt   fall prevention program maintained   activity supervised   assistive device/personal items within reach   clutter free environment maintained  Taken 10/9/2024 2215 by Paulette Denson RN  Safety Promotion/Fall Prevention:   safety round/check completed   mobility aid in reach   fall prevention program maintained   gait belt   assistive device/personal items within reach   activity supervised   clutter free environment maintained  Taken 10/9/2024 2020 by Paulette Denson RN  Safety Promotion/Fall Prevention: (refuses nonskid socks)   safety round/check completed   mobility aid in reach   fall prevention program maintained   clutter free environment maintained   assistive device/personal items within reach   activity supervised     Problem: Skin Injury Risk Increased  Goal: Skin Health and Integrity  Outcome: Progressing   Goal Outcome Evaluation:  Plan of Care Reviewed With: patient        Progress: improving

## 2024-10-10 NOTE — PLAN OF CARE
Problem: Adult Inpatient Plan of Care  Goal: Plan of Care Review  Flowsheets (Taken 10/10/2024 1741)  Goal: Absence of Hospital-Acquired Illness or Injury  Intervention: Identify and Manage Fall Risk  Recent Flowsheet Documentation  Taken 10/10/2024 1600 by Yady Jefferson RN  Safety Promotion/Fall Prevention: safety round/check completed  Taken 10/10/2024 1400 by Yady Jefferson RN  Safety Promotion/Fall Prevention: safety round/check completed  Taken 10/10/2024 1200 by Yady Jefferson RN  Safety Promotion/Fall Prevention: safety round/check completed  Taken 10/10/2024 1000 by Yady Jefferson RN  Safety Promotion/Fall Prevention: safety round/check completed  Taken 10/10/2024 0800 by Yady Jefferson RN  Safety Promotion/Fall Prevention: safety round/check completed  Intervention: Prevent Skin Injury  Recent Flowsheet Documentation  Taken 10/10/2024 1600 by Yady Jefferson RN  Body Position: position changed independently  Intervention: Prevent and Manage VTE (Venous Thromboembolism) Risk  Recent Flowsheet Documentation  Taken 10/10/2024 1600 by Yady Jefferson RN  Range of Motion: active ROM (range of motion) encouraged  Taken 10/10/2024 1200 by Yady Jefferson RN  Activity Management:   up to bedside commode   back to bed  Taken 10/10/2024 0800 by Yady Jefferson RN  Activity Management:   up to bedside commode   back to bed  Range of Motion: active ROM (range of motion) encouraged   Goal Outcome Evaluation:         Plan of Care Reviewed With: patient  Progress: no change

## 2024-10-11 ENCOUNTER — INPATIENT HOSPITAL (AMBULATORY)
Dept: URBAN - METROPOLITAN AREA HOSPITAL 84 | Facility: HOSPITAL | Age: 74
End: 2024-10-11
Payer: MEDICARE

## 2024-10-11 ENCOUNTER — INPATIENT HOSPITAL (AMBULATORY)
Age: 74
End: 2024-10-11
Payer: MEDICARE

## 2024-10-11 ENCOUNTER — APPOINTMENT (OUTPATIENT)
Dept: CT IMAGING | Facility: HOSPITAL | Age: 74
End: 2024-10-11
Payer: MEDICARE

## 2024-10-11 DIAGNOSIS — R63.4 ABNORMAL WEIGHT LOSS: ICD-10-CM

## 2024-10-11 DIAGNOSIS — E87.8 OTHER DISORDERS OF ELECTROLYTE AND FLUID BALANCE, NOT ELSEWH: ICD-10-CM

## 2024-10-11 DIAGNOSIS — D64.9 ANEMIA, UNSPECIFIED: ICD-10-CM

## 2024-10-11 DIAGNOSIS — Z90.49 ACQUIRED ABSENCE OF OTHER SPECIFIED PARTS OF DIGESTIVE TRACT: ICD-10-CM

## 2024-10-11 DIAGNOSIS — R11.2 NAUSEA WITH VOMITING, UNSPECIFIED: ICD-10-CM

## 2024-10-11 DIAGNOSIS — R19.7 DIARRHEA, UNSPECIFIED: ICD-10-CM

## 2024-10-11 LAB
ABO GROUP BLD: NORMAL
ANION GAP SERPL CALCULATED.3IONS-SCNC: 7.8 MMOL/L (ref 5–15)
ANION GAP SERPL CALCULATED.3IONS-SCNC: 8 MMOL/L (ref 5–15)
ANION GAP SERPL CALCULATED.3IONS-SCNC: 9.1 MMOL/L (ref 5–15)
BASOPHILS # BLD AUTO: 0 10*3/MM3 (ref 0–0.2)
BASOPHILS NFR BLD AUTO: 0 % (ref 0–1.5)
BLD GP AB SCN SERPL QL: NEGATIVE
BUN SERPL-MCNC: 21 MG/DL (ref 8–23)
BUN SERPL-MCNC: 40 MG/DL (ref 8–23)
BUN SERPL-MCNC: 55 MG/DL (ref 8–23)
BUN/CREAT SERPL: 114.6 (ref 7–25)
BUN/CREAT SERPL: 55.3 (ref 7–25)
BUN/CREAT SERPL: 81.6 (ref 7–25)
CALCIUM SPEC-SCNC: 7.7 MG/DL (ref 8.6–10.5)
CALCIUM SPEC-SCNC: 8.1 MG/DL (ref 8.6–10.5)
CALCIUM SPEC-SCNC: 8.5 MG/DL (ref 8.6–10.5)
CHLORIDE SERPL-SCNC: 94 MMOL/L (ref 98–107)
CHLORIDE SERPL-SCNC: 94 MMOL/L (ref 98–107)
CHLORIDE SERPL-SCNC: 97 MMOL/L (ref 98–107)
CO2 SERPL-SCNC: 23.9 MMOL/L (ref 22–29)
CO2 SERPL-SCNC: 24 MMOL/L (ref 22–29)
CO2 SERPL-SCNC: 24.2 MMOL/L (ref 22–29)
CREAT SERPL-MCNC: 0.38 MG/DL (ref 0.57–1)
CREAT SERPL-MCNC: 0.48 MG/DL (ref 0.57–1)
CREAT SERPL-MCNC: 0.49 MG/DL (ref 0.57–1)
CRP SERPL-MCNC: 8.01 MG/DL (ref 0–0.5)
D-LACTATE SERPL-SCNC: 1.6 MMOL/L (ref 0.5–2)
DEPRECATED RDW RBC AUTO: 57.3 FL (ref 37–54)
EGFRCR SERPLBLD CKD-EPI 2021: 105.3 ML/MIN/1.73
EGFRCR SERPLBLD CKD-EPI 2021: 99 ML/MIN/1.73
EGFRCR SERPLBLD CKD-EPI 2021: 99.5 ML/MIN/1.73
EOSINOPHIL # BLD AUTO: 0.05 10*3/MM3 (ref 0–0.4)
EOSINOPHIL NFR BLD AUTO: 1.8 % (ref 0.3–6.2)
ERYTHROCYTE [DISTWIDTH] IN BLOOD BY AUTOMATED COUNT: 16.8 % (ref 12.3–15.4)
ERYTHROCYTE [SEDIMENTATION RATE] IN BLOOD: 8 MM/HR (ref 0–30)
FERRITIN SERPL-MCNC: 427 NG/ML (ref 13–150)
GLUCOSE SERPL-MCNC: 108 MG/DL (ref 65–99)
GLUCOSE SERPL-MCNC: 122 MG/DL (ref 65–99)
GLUCOSE SERPL-MCNC: 139 MG/DL (ref 65–99)
HCT VFR BLD AUTO: 21.5 % (ref 34–46.6)
HCT VFR BLD AUTO: 24.6 % (ref 34–46.6)
HGB BLD-MCNC: 7 G/DL (ref 12–15.9)
HGB BLD-MCNC: 7.8 G/DL (ref 12–15.9)
HOLD SPECIMEN: NORMAL
IMM GRANULOCYTES # BLD AUTO: 0.19 10*3/MM3 (ref 0–0.05)
IMM GRANULOCYTES NFR BLD AUTO: 6.8 % (ref 0–0.5)
IRON 24H UR-MRATE: 25 MCG/DL (ref 37–145)
IRON SATN MFR SERPL: 13 % (ref 20–50)
LYMPHOCYTES # BLD AUTO: 0.73 10*3/MM3 (ref 0.7–3.1)
LYMPHOCYTES NFR BLD AUTO: 26.1 % (ref 19.6–45.3)
MAGNESIUM SERPL-MCNC: 1.3 MG/DL (ref 1.6–2.4)
MCH RBC QN AUTO: 30.6 PG (ref 26.6–33)
MCHC RBC AUTO-ENTMCNC: 32.6 G/DL (ref 31.5–35.7)
MCV RBC AUTO: 93.9 FL (ref 79–97)
MONOCYTES # BLD AUTO: 0.11 10*3/MM3 (ref 0.1–0.9)
MONOCYTES NFR BLD AUTO: 3.9 % (ref 5–12)
NEUTROPHILS NFR BLD AUTO: 1.72 10*3/MM3 (ref 1.7–7)
NEUTROPHILS NFR BLD AUTO: 61.4 % (ref 42.7–76)
NRBC BLD AUTO-RTO: 0 /100 WBC (ref 0–0.2)
NT-PROBNP SERPL-MCNC: 2573 PG/ML (ref 0–900)
PLATELET # BLD AUTO: 269 10*3/MM3 (ref 140–450)
PMV BLD AUTO: 9 FL (ref 6–12)
POTASSIUM SERPL-SCNC: 2.6 MMOL/L (ref 3.5–5.2)
POTASSIUM SERPL-SCNC: 3 MMOL/L (ref 3.5–5.2)
POTASSIUM SERPL-SCNC: 3.9 MMOL/L (ref 3.5–5.2)
RBC # BLD AUTO: 2.29 10*6/MM3 (ref 3.77–5.28)
RETICS # AUTO: 0.02 10*6/MM3 (ref 0.02–0.13)
RETICS/RBC NFR AUTO: 0.72 % (ref 0.7–1.9)
RH BLD: POSITIVE
SODIUM SERPL-SCNC: 126 MMOL/L (ref 136–145)
SODIUM SERPL-SCNC: 127 MMOL/L (ref 136–145)
SODIUM SERPL-SCNC: 129 MMOL/L (ref 136–145)
T&S EXPIRATION DATE: NORMAL
TIBC SERPL-MCNC: 192 MCG/DL (ref 298–536)
TRANSFERRIN SERPL-MCNC: 129 MG/DL (ref 200–360)
WBC NRBC COR # BLD AUTO: 2.8 10*3/MM3 (ref 3.4–10.8)
WHOLE BLOOD HOLD COAG: NORMAL
WHOLE BLOOD HOLD COAG: NORMAL

## 2024-10-11 PROCEDURE — 86901 BLOOD TYPING SEROLOGIC RH(D): CPT | Performed by: STUDENT IN AN ORGANIZED HEALTH CARE EDUCATION/TRAINING PROGRAM

## 2024-10-11 PROCEDURE — 99223 1ST HOSP IP/OBS HIGH 75: CPT | Performed by: NURSE PRACTITIONER

## 2024-10-11 PROCEDURE — 99221 1ST HOSP IP/OBS SF/LOW 40: CPT | Performed by: INTERNAL MEDICINE

## 2024-10-11 PROCEDURE — 86850 RBC ANTIBODY SCREEN: CPT | Performed by: STUDENT IN AN ORGANIZED HEALTH CARE EDUCATION/TRAINING PROGRAM

## 2024-10-11 PROCEDURE — 85025 COMPLETE CBC W/AUTO DIFF WBC: CPT | Performed by: STUDENT IN AN ORGANIZED HEALTH CARE EDUCATION/TRAINING PROGRAM

## 2024-10-11 PROCEDURE — 82607 VITAMIN B-12: CPT | Performed by: INTERNAL MEDICINE

## 2024-10-11 PROCEDURE — 36430 TRANSFUSION BLD/BLD COMPNT: CPT

## 2024-10-11 PROCEDURE — 97112 NEUROMUSCULAR REEDUCATION: CPT

## 2024-10-11 PROCEDURE — 82746 ASSAY OF FOLIC ACID SERUM: CPT | Performed by: INTERNAL MEDICINE

## 2024-10-11 PROCEDURE — 94799 UNLISTED PULMONARY SVC/PX: CPT

## 2024-10-11 PROCEDURE — 74177 CT ABD & PELVIS W/CONTRAST: CPT

## 2024-10-11 PROCEDURE — 82728 ASSAY OF FERRITIN: CPT | Performed by: INTERNAL MEDICINE

## 2024-10-11 PROCEDURE — 86900 BLOOD TYPING SEROLOGIC ABO: CPT

## 2024-10-11 PROCEDURE — 94761 N-INVAS EAR/PLS OXIMETRY MLT: CPT

## 2024-10-11 PROCEDURE — 80048 BASIC METABOLIC PNL TOTAL CA: CPT | Performed by: STUDENT IN AN ORGANIZED HEALTH CARE EDUCATION/TRAINING PROGRAM

## 2024-10-11 PROCEDURE — 83540 ASSAY OF IRON: CPT | Performed by: INTERNAL MEDICINE

## 2024-10-11 PROCEDURE — 85652 RBC SED RATE AUTOMATED: CPT | Performed by: NURSE PRACTITIONER

## 2024-10-11 PROCEDURE — 83735 ASSAY OF MAGNESIUM: CPT | Performed by: NURSE PRACTITIONER

## 2024-10-11 PROCEDURE — 97530 THERAPEUTIC ACTIVITIES: CPT

## 2024-10-11 PROCEDURE — 80048 BASIC METABOLIC PNL TOTAL CA: CPT | Performed by: INTERNAL MEDICINE

## 2024-10-11 PROCEDURE — 25510000001 IOPAMIDOL PER 1 ML: Performed by: STUDENT IN AN ORGANIZED HEALTH CARE EDUCATION/TRAINING PROGRAM

## 2024-10-11 PROCEDURE — 86900 BLOOD TYPING SEROLOGIC ABO: CPT | Performed by: STUDENT IN AN ORGANIZED HEALTH CARE EDUCATION/TRAINING PROGRAM

## 2024-10-11 PROCEDURE — 84466 ASSAY OF TRANSFERRIN: CPT | Performed by: INTERNAL MEDICINE

## 2024-10-11 PROCEDURE — 83605 ASSAY OF LACTIC ACID: CPT | Performed by: STUDENT IN AN ORGANIZED HEALTH CARE EDUCATION/TRAINING PROGRAM

## 2024-10-11 PROCEDURE — 85045 AUTOMATED RETICULOCYTE COUNT: CPT | Performed by: INTERNAL MEDICINE

## 2024-10-11 PROCEDURE — 83880 ASSAY OF NATRIURETIC PEPTIDE: CPT | Performed by: INTERNAL MEDICINE

## 2024-10-11 PROCEDURE — 86901 BLOOD TYPING SEROLOGIC RH(D): CPT

## 2024-10-11 PROCEDURE — 85018 HEMOGLOBIN: CPT | Performed by: STUDENT IN AN ORGANIZED HEALTH CARE EDUCATION/TRAINING PROGRAM

## 2024-10-11 PROCEDURE — P9016 RBC LEUKOCYTES REDUCED: HCPCS

## 2024-10-11 PROCEDURE — 86140 C-REACTIVE PROTEIN: CPT | Performed by: NURSE PRACTITIONER

## 2024-10-11 PROCEDURE — 94664 DEMO&/EVAL PT USE INHALER: CPT

## 2024-10-11 PROCEDURE — 86923 COMPATIBILITY TEST ELECTRIC: CPT

## 2024-10-11 PROCEDURE — 85014 HEMATOCRIT: CPT | Performed by: STUDENT IN AN ORGANIZED HEALTH CARE EDUCATION/TRAINING PROGRAM

## 2024-10-11 RX ORDER — DIPHENHYDRAMINE HYDROCHLORIDE AND LIDOCAINE HYDROCHLORIDE AND ALUMINUM HYDROXIDE AND MAGNESIUM HYDRO
10 KIT EVERY 6 HOURS
Status: DISCONTINUED | OUTPATIENT
Start: 2024-10-11 | End: 2024-10-23 | Stop reason: HOSPADM

## 2024-10-11 RX ORDER — MAGNESIUM SULFATE HEPTAHYDRATE 40 MG/ML
2 INJECTION, SOLUTION INTRAVENOUS
Status: CANCELLED | OUTPATIENT
Start: 2024-10-11 | End: 2024-10-11

## 2024-10-11 RX ORDER — POTASSIUM CHLORIDE 1500 MG/1
40 TABLET, EXTENDED RELEASE ORAL EVERY 4 HOURS
Status: DISPENSED | OUTPATIENT
Start: 2024-10-11 | End: 2024-10-11

## 2024-10-11 RX ORDER — ALBUTEROL SULFATE 0.63 MG/3ML
0.63 SOLUTION RESPIRATORY (INHALATION) EVERY 6 HOURS PRN
Status: DISCONTINUED | OUTPATIENT
Start: 2024-10-11 | End: 2024-10-23 | Stop reason: HOSPADM

## 2024-10-11 RX ORDER — IOPAMIDOL 755 MG/ML
100 INJECTION, SOLUTION INTRAVASCULAR
Status: COMPLETED | OUTPATIENT
Start: 2024-10-11 | End: 2024-10-11

## 2024-10-11 RX ORDER — SODIUM CHLORIDE 1 G/1
1 TABLET ORAL
Status: DISCONTINUED | OUTPATIENT
Start: 2024-10-11 | End: 2024-10-11

## 2024-10-11 RX ORDER — SODIUM, POTASSIUM,MAG SULFATES 17.5-3.13G
1 SOLUTION, RECONSTITUTED, ORAL ORAL EVERY 12 HOURS
Status: COMPLETED | OUTPATIENT
Start: 2024-10-11 | End: 2024-10-12

## 2024-10-11 RX ORDER — BUDESONIDE 3 MG/1
9 CAPSULE, COATED PELLETS ORAL DAILY
Status: DISCONTINUED | OUTPATIENT
Start: 2024-10-11 | End: 2024-10-14

## 2024-10-11 RX ORDER — NITROGLYCERIN 0.4 MG/1
0.4 TABLET SUBLINGUAL
Status: DISCONTINUED | OUTPATIENT
Start: 2024-10-11 | End: 2024-10-23 | Stop reason: HOSPADM

## 2024-10-11 RX ORDER — NYSTATIN 100000 [USP'U]/ML
5 SUSPENSION ORAL 4 TIMES DAILY
Status: DISPENSED | OUTPATIENT
Start: 2024-10-11 | End: 2024-10-21

## 2024-10-11 RX ORDER — LIDOCAINE HYDROCHLORIDE 20 MG/ML
10 SOLUTION OROPHARYNGEAL
Status: DISCONTINUED | OUTPATIENT
Start: 2024-10-11 | End: 2024-10-23 | Stop reason: HOSPADM

## 2024-10-11 RX ORDER — SODIUM CHLORIDE 1 G/1
1 TABLET ORAL ONCE
Status: COMPLETED | OUTPATIENT
Start: 2024-10-11 | End: 2024-10-11

## 2024-10-11 RX ADMIN — POTASSIUM CHLORIDE 40 MEQ: 1500 TABLET, EXTENDED RELEASE ORAL at 17:57

## 2024-10-11 RX ADMIN — POTASSIUM CHLORIDE 40 MEQ: 1500 TABLET, EXTENDED RELEASE ORAL at 05:51

## 2024-10-11 RX ADMIN — TIOTROPIUM BROMIDE INHALATION SPRAY 2 PUFF: 3.12 SPRAY, METERED RESPIRATORY (INHALATION) at 08:03

## 2024-10-11 RX ADMIN — NYSTATIN 500000 UNITS: 100000 SUSPENSION ORAL at 17:58

## 2024-10-11 RX ADMIN — Medication 15 G: at 07:43

## 2024-10-11 RX ADMIN — NYSTATIN 500000 UNITS: 100000 SUSPENSION ORAL at 21:26

## 2024-10-11 RX ADMIN — DIPHENHYDRAMINE HYDROCHLORIDE AND LIDOCAINE HYDROCHLORIDE AND ALUMINUM HYDROXIDE AND MAGNESIUM HYDRO 10 ML: KIT at 17:58

## 2024-10-11 RX ADMIN — SODIUM SULFATE, POTASSIUM SULFATE, MAGNESIUM SULFATE 1 BOTTLE: 17.5; 3.13; 1.6 SOLUTION, CONCENTRATE ORAL at 21:22

## 2024-10-11 RX ADMIN — IOPAMIDOL 100 ML: 755 INJECTION, SOLUTION INTRAVENOUS at 19:21

## 2024-10-11 RX ADMIN — SODIUM CHLORIDE 1 G: 1 TABLET ORAL at 17:57

## 2024-10-11 RX ADMIN — Medication 15 G: at 21:26

## 2024-10-11 RX ADMIN — PANTOPRAZOLE SODIUM 40 MG: 40 TABLET, DELAYED RELEASE ORAL at 07:44

## 2024-10-11 RX ADMIN — FOLIC ACID 1 MG: 1 TABLET ORAL at 07:44

## 2024-10-11 RX ADMIN — Medication 10 ML: at 21:26

## 2024-10-11 RX ADMIN — LEVOTHYROXINE SODIUM 50 MCG: 0.05 TABLET ORAL at 07:44

## 2024-10-11 RX ADMIN — ALBUTEROL SULFATE 0.63 MG: 0.63 SOLUTION RESPIRATORY (INHALATION) at 18:50

## 2024-10-11 RX ADMIN — SERTRALINE 50 MG: 50 TABLET, FILM COATED ORAL at 07:44

## 2024-10-11 NOTE — CONSULTS
"        Hematology/Oncology Inpatient Consultation    Patient name: Maryann Gaitan  : 1950  MRN: [id number]  Referring Provider: Dede Liu  Reason for Consultation: Anemia and leukopenia    Chief complaint: abdominal cramping in setting of Crohn's disease    History of present illness:    Maryann Gaitan is a 74 y.o. female with past medical history significant for hypertension, COPD, arthritis, Crohn's who presented to [facility] on 10/8/2024 with complaints of \"abnormal blood work\" per her PCP.  Initially left AGAINST MEDICAL ADVICE however her primary care told her to get herself back to the emergency room and get admitted.  On admission she did report intermittent severe abdominal cramping, nausea, vomiting.  OF NOTE: well known to GI team who is also consulted  2021 colonoscopy by Dr. Castillo-ulceration, granularity, scarring, and stenosis in TI with biopsy showing focal chronic active ileitis with detached fragment of acutely inflamed chronic ulcer.  HP polyp, grade 1 internal and external, benign random colon biopsies.  2024 CT A/P with -bowel wall thickening involving ileal loops and the terminal ileum with fatty infiltration of the cecum and ascending colon, additional bowel wall thickening involving sigmoid colon and rectum   -*Current GI plan is to get CT enterography for further eval for possible bleeding, GI PCR, fecal calprotectin, start budesonide to monitor for improvement in back diarrhea and may need to repeat colonoscopy    -10/8/2024 WBC 6.6 (differential left shifted), hemoglobin 10.3/hematocrit 30.4, with normal indices except for RDW elevated at 17% platelets 449K.  -10/9/2024 WBC 2.94, hemoglobin 8.1/hematocrit 24.6 normal indices, platelets 343K  -10/11/2024 WBC 2.8 (differential not as left shifted, immature granulocytes up at 6.8% (190 absolute), hemoglobin 7.0/hematocrit 21.5 with normal indices except for RDW elevated at 16.8%, platelets 269K    -10/11/24 CT " enterography abdomen pelvis with contrast ordered.    10/11/24  Hematology/Oncology was consulted for anemia and leukopenia  On consult, the patient reports fatigue, chronic abdominal pain/cramping and non-bloody diarrhea but denies unintentional weight loss, fevers, chills, drenching night sweats, lymphadenopathy, BRBPR, melena, bruising. She denies having any IV iron or blood transfusions.  Per RN pt has not received budesonide yet.      PCP: Eduard Little MD    History:  Past Medical History:   Diagnosis Date    Arthritis     COPD (chronic obstructive pulmonary disease)     Hypertension    ,   Past Surgical History:   Procedure Laterality Date    HYSTERECTOMY     , History reviewed. No pertinent family history.,   Social History     Tobacco Use    Smoking status: Former     Types: Cigarettes    Smokeless tobacco: Never   Vaping Use    Vaping status: Never Used   Substance Use Topics    Alcohol use: Never    Drug use: Never   ,   Medications Prior to Admission   Medication Sig Dispense Refill Last Dose    Adalimumab (Humira, 2 Pen,) 40 MG/0.4ML Pen-injector Kit Inject 40 mg under the skin into the appropriate area as directed Every 14 (Fourteen) Days.   9/27/2024    amLODIPine (NORVASC) 10 MG tablet Take 1 tablet by mouth Daily.       cholecalciferol (VITAMIN D3) 1.25 MG (20084 UT) capsule Take 1 capsule by mouth 2 (Two) Times a Week. Wed, Sat       colestipol (COLESTID) 1 g tablet Take 1 tablet by mouth 2 (Two) Times a Day.       diclofenac (VOLTAREN) 50 MG EC tablet Take 1 tablet by mouth 2 (Two) Times a Day.       ezetimibe (ZETIA) 10 MG tablet Take 1 tablet by mouth Daily.       folic acid (FOLVITE) 1 MG tablet Take 1 tablet by mouth Daily.       hydroCHLOROthiazide 25 MG tablet Take 1 tablet by mouth Daily.       levothyroxine (SYNTHROID, LEVOTHROID) 50 MCG tablet Take 1 tablet by mouth Daily.       methotrexate 2.5 MG tablet Take 8 tablets by mouth 1 (One) Time Per Week.       pantoprazole  "(PROTONIX) 20 MG EC tablet Take 1 tablet by mouth Daily.       predniSONE (DELTASONE) 5 MG tablet Take 1 tablet by mouth Daily.       sertraline (ZOLOFT) 50 MG tablet Take 1 tablet by mouth Daily.       tiotropium (SPIRIVA) 18 MCG per inhalation capsule Place 1 capsule into inhaler and inhale Daily.      , Scheduled Meds:  Budesonide, 9 mg, Oral, Daily  [Held by provider] enoxaparin, 40 mg, Subcutaneous, Q24H  First Mouthwash (Magic Mouthwash), 10 mL, Swish & Spit, Q6H  folic acid, 1 mg, Oral, Daily  levothyroxine, 50 mcg, Oral, Daily  pantoprazole, 40 mg, Oral, Daily  potassium chloride ER, 40 mEq, Oral, Q4H  sertraline, 50 mg, Oral, Daily  sodium chloride, 10 mL, Intravenous, Q12H  sodium chloride, 1 g, Oral, Once  tiotropium bromide monohydrate, 2 puff, Inhalation, Daily - RT  Urea, 15 g, Oral, BID    , Continuous Infusions:   , PRN Meds:    albuterol    senna-docusate sodium **AND** polyethylene glycol **AND** bisacodyl **AND** bisacodyl    Lidocaine Viscous HCl    Magnesium Standard Dose Replacement - Follow Nurse / BPA Driven Protocol    melatonin    nitroglycerin    ondansetron    Phosphorus Replacement - Follow Nurse / BPA Driven Protocol    Potassium Replacement - Follow Nurse / BPA Driven Protocol    sodium chloride   Allergies:  Codeine and Penicillin g sodium    Subjective     ROS:  Review of Systems   Constitutional:  Positive for fatigue.   HENT: Negative.     Eyes: Negative.    Respiratory:  Positive for shortness of breath.    Cardiovascular: Negative.    Gastrointestinal:  Positive for abdominal pain and diarrhea.   Endocrine: Negative.    Genitourinary: Negative.    Musculoskeletal: Negative.    Allergic/Immunologic: Negative.    Neurological: Negative.    Hematological: Negative.    Psychiatric/Behavioral: Negative.          Objective   Vital Signs:   /67   Pulse 83   Temp 97.8 °F (36.6 °C) (Oral)   Resp 15   Ht 154.9 cm (61\")   Wt 58 kg (127 lb 13.9 oz)   SpO2 96%   BMI 24.16 kg/m² "     Physical Exam: (performed by MD)  Physical Exam  Vitals and nursing note reviewed.   Constitutional:       General: She is not in acute distress.     Appearance: Normal appearance. She is normal weight. She is not ill-appearing or toxic-appearing.   HENT:      Head: Normocephalic and atraumatic.      Right Ear: External ear normal.      Left Ear: External ear normal.      Nose: Nose normal.      Mouth/Throat:      Mouth: Mucous membranes are moist.   Eyes:      Extraocular Movements: Extraocular movements intact.      Pupils: Pupils are equal, round, and reactive to light.   Cardiovascular:      Rate and Rhythm: Normal rate and regular rhythm.      Pulses: Normal pulses.      Heart sounds: Normal heart sounds.   Pulmonary:      Effort: Pulmonary effort is normal.      Breath sounds: Normal breath sounds. No wheezing.   Abdominal:      General: Abdomen is flat.      Palpations: Abdomen is soft.      Comments: Normal pitched hyper motile bowel sounds   Genitourinary:     Comments: deferred  Musculoskeletal:         General: Normal range of motion.      Cervical back: Normal range of motion.   Neurological:      General: No focal deficit present.      Mental Status: She is alert and oriented to person, place, and time.   Psychiatric:         Mood and Affect: Mood normal.         Behavior: Behavior normal.         Results Review:  Lab Results (last 48 hours)       Procedure Component Value Units Date/Time    Lactic Acid, Plasma [[id number]]  (Normal) Collected: 10/11/24 1257    Specimen: Blood Updated: 10/11/24 1338     Lactate 1.6 mmol/L     Basic Metabolic Panel [[id number]]  (Abnormal) Collected: 10/11/24 1258    Specimen: Blood Updated: 10/11/24 1331     Glucose 139 mg/dL      BUN 55 mg/dL      Creatinine 0.48 mg/dL      Sodium 126 mmol/L      Potassium 3.0 mmol/L      Chloride 94 mmol/L      CO2 24.2 mmol/L      Calcium 8.1 mg/dL      BUN/Creatinine Ratio 114.6     Anion Gap 7.8 mmol/L      eGFR 99.5 mL/min/1.73      Narrative:      GFR Normal >60  Chronic Kidney Disease <60  Kidney Failure <15    The GFR formula is only valid for adults with stable renal function between ages 18 and 70.    Magnesium [[id number]]  (Abnormal) Collected: 10/11/24 1258    Specimen: Blood Updated: 10/11/24 1331     Magnesium 1.3 mg/dL     Extra Tubes [[id number]] Collected: 10/11/24 1258    Specimen: Blood, Venous Line Updated: 10/11/24 1315    Narrative:      The following orders were created for panel order Extra Tubes.  Procedure                               Abnormality         Status                     ---------                               -----------         ------                     Red Top[[id number]]                                          Final result               Red Top[[id number]]                                          Final result               Grn Na Hep No Gel[[id number]]                                Final result               Gold Top - SST[[id number]]                                   Final result               Gold Top - SST[[id number]]                                   Final result               Light Blue Top[[id number]]                                   Final result               Light Blue Top[[id number]]                                   Final result                 Please view results for these tests on the individual orders.    Grn Na Hep No Gel [[id number]] Collected: 10/11/24 1258    Specimen: Blood Updated: 10/11/24 1315     Extra Tube Hold for add-ons.     Comment: Auto resulted.       Hemoglobin & Hematocrit, Blood [[id number]]  (Abnormal) Collected: 10/11/24 1237    Specimen: Blood from Arm, Left Updated: 10/11/24 1307     Hemoglobin 7.8 g/dL      Hematocrit 24.6 %     Red Top [[id number]] Collected: 10/11/24 1258    Specimen: Blood Updated: 10/11/24 1302     Extra Tube Hold for add-ons.     Comment: Auto resulted.       Red Top [[id number]] Collected: 10/11/24 1258    Specimen: Blood Updated: 10/11/24 1302     Extra  Tube Hold for add-ons.     Comment: Auto resulted.       Gold Top - SST [[id number]] Collected: 10/11/24 1258    Specimen: Blood Updated: 10/11/24 1302     Extra Tube Hold for add-ons.     Comment: Auto resulted.       Gold Top - SST [[id number]] Collected: 10/11/24 1258    Specimen: Blood Updated: 10/11/24 1302     Extra Tube Hold for add-ons.     Comment: Auto resulted.       Light Blue Top [[id number]] Collected: 10/11/24 1258    Specimen: Blood Updated: 10/11/24 1302     Extra Tube Hold for add-ons.     Comment: Auto resulted       Light Blue Top [[id number]] Collected: 10/11/24 1258    Specimen: Blood Updated: 10/11/24 1302     Extra Tube Hold for add-ons.     Comment: Auto resulted       Sedimentation Rate [[id number]]  (Normal) Collected: 10/11/24 0358    Specimen: Blood from Arm, Right Updated: 10/11/24 1156     Sed Rate 8 mm/hr     C-reactive Protein [[id number]]  (Abnormal) Collected: 10/11/24 0358    Specimen: Blood from Arm, Right Updated: 10/11/24 1143     C-Reactive Protein 8.01 mg/dL     BNP [[id number]]  (Abnormal) Collected: 10/11/24 0358    Specimen: Blood from Arm, Right Updated: 10/11/24 0824     proBNP 2,573.0 pg/mL     Narrative:      This assay is used as an aid in the diagnosis of individuals suspected of having heart failure. It can be used as an aid in the diagnosis of acute decompensated heart failure (ADHF) in patients presenting with signs and symptoms of ADHF to the emergency department (ED). In addition, NT-proBNP of <300 pg/mL indicates ADHF is not likely.    Age Range Result Interpretation  NT-proBNP Concentration (pg/mL:      <50             Positive            >450                   Gray                 300-450                    Negative             <300    50-75           Positive            >900                  Gray                300-900                  Negative            <300      >75             Positive            >1800                  Gray                300-1800                   Negative            <300    Basic Metabolic Panel [[id number]]  (Abnormal) Collected: 10/11/24 0358    Specimen: Blood from Arm, Right Updated: 10/11/24 0512     Glucose 108 mg/dL      BUN 21 mg/dL      Creatinine 0.38 mg/dL      Sodium 127 mmol/L      Potassium 2.6 mmol/L      Chloride 94 mmol/L      CO2 23.9 mmol/L      Calcium 7.7 mg/dL      BUN/Creatinine Ratio 55.3     Anion Gap 9.1 mmol/L      eGFR 105.3 mL/min/1.73     Narrative:      GFR Normal >60  Chronic Kidney Disease <60  Kidney Failure <15    The GFR formula is only valid for adults with stable renal function between ages 18 and 70.    CBC & Differential [[id number]]  (Abnormal) Collected: 10/11/24 0358    Specimen: Blood from Arm, Right Updated: 10/11/24 0440    Narrative:      The following orders were created for panel order CBC & Differential.  Procedure                               Abnormality         Status                     ---------                               -----------         ------                     CBC Auto Differential[[id number]]        Abnormal            Final result               Scan Slide[[id number]]                                                                    Please view results for these tests on the individual orders.    CBC Auto Differential [[id number]]  (Abnormal) Collected: 10/11/24 0358    Specimen: Blood from Arm, Right Updated: 10/11/24 0440     WBC 2.80 10*3/mm3      RBC 2.29 10*6/mm3      Hemoglobin 7.0 g/dL      Hematocrit 21.5 %      MCV 93.9 fL      MCH 30.6 pg      MCHC 32.6 g/dL      RDW 16.8 %      RDW-SD 57.3 fl      MPV 9.0 fL      Platelets 269 10*3/mm3      Neutrophil % 61.4 %      Lymphocyte % 26.1 %      Monocyte % 3.9 %      Eosinophil % 1.8 %      Basophil % 0.0 %      Immature Grans % 6.8 %      Neutrophils, Absolute 1.72 10*3/mm3      Lymphocytes, Absolute 0.73 10*3/mm3      Monocytes, Absolute 0.11 10*3/mm3      Eosinophils, Absolute 0.05 10*3/mm3      Basophils, Absolute 0.00 10*3/mm3       Immature Grans, Absolute 0.19 10*3/mm3      nRBC 0.0 /100 WBC     Sodium [[id number]]  (Abnormal) Collected: 10/10/24 2055    Specimen: Blood from Arm, Left Updated: 10/10/24 2121     Sodium 125 mmol/L     Sodium [[id number]]  (Abnormal) Collected: 10/10/24 1603    Specimen: Blood from Arm, Right Updated: 10/10/24 1627     Sodium 126 mmol/L     Osmolality, Urine - Straight Cath [[id number]]  (Normal) Collected: 10/10/24 1524    Specimen: Urine from Straight Cath Updated: 10/10/24 1547     Osmolality, Urine 443 mOsm/kg     Sodium, Urine, Random - Straight Cath [[id number]] Collected: 10/10/24 1524    Specimen: Urine from Straight Cath Updated: 10/10/24 1541     Sodium, Urine <20 mmol/L     Narrative:      Reference intervals for random urine have not been established.  Clinical usage is dependent upon physician's interpretation in combination with other laboratory tests.       Sodium [[id number]]  (Abnormal) Collected: 10/10/24 1228    Specimen: Blood from Arm, Left Updated: 10/10/24 1258     Sodium 122 mmol/L     Sodium [[id number]]  (Abnormal) Collected: 10/10/24 0808    Specimen: Blood from Arm, Right Updated: 10/10/24 0846     Sodium 124 mmol/L     Manual Differential [[id number]]  (Abnormal) Collected: 10/10/24 0428    Specimen: Blood from Arm, Left Updated: 10/10/24 0709     Neutrophil % 58.0 %      Lymphocyte % 29.0 %      Monocyte % 5.0 %      Eosinophil % 1.0 %      Bands %  7.0 %      Neutrophils Absolute 1.00 10*3/mm3      Lymphocytes Absolute 0.45 10*3/mm3      Monocytes Absolute 0.08 10*3/mm3      Eosinophils Absolute 0.02 10*3/mm3      Anisocytosis Slight/1+     Elliptocytes Slight/1+     WBC Morphology Normal     Platelet Estimate Adequate    CBC & Differential [[id number]]  (Abnormal) Collected: 10/10/24 0428    Specimen: Blood from Arm, Left Updated: 10/10/24 0709    Narrative:      The following orders were created for panel order CBC & Differential.  Procedure                               Abnormality          Status                     ---------                               -----------         ------                     CBC Auto Differential[[id number]]        Abnormal            Final result               Scan Slide[[id number]]                                       Final result                 Please view results for these tests on the individual orders.    CBC Auto Differential [[id number]]  (Abnormal) Collected: 10/10/24 0428    Specimen: Blood from Arm, Left Updated: 10/10/24 0709     WBC 1.54 10*3/mm3      RBC 2.50 10*6/mm3      Hemoglobin 7.7 g/dL      Hematocrit 23.3 %      MCV 93.2 fL      MCH 30.8 pg      MCHC 33.0 g/dL      RDW 17.0 %      RDW-SD 57.9 fl      MPV 9.3 fL      Platelets 315 10*3/mm3     Narrative:      The previously reported component NRBC is no longer being reported. Previous result was 0.0 /100 WBC (Reference Range: 0.0-0.2 /100 WBC) on 10/10/2024 at 0609 EDT.    Scan Slide [[id number]] Collected: 10/10/24 0428    Specimen: Blood from Arm, Left Updated: 10/10/24 0709     Scan Slide --     Comment: See Manual Differential Results       Magnesium [[id number]]  (Abnormal) Collected: 10/10/24 0428    Specimen: Blood from Arm, Left Updated: 10/10/24 0630     Magnesium 1.4 mg/dL     BNP [[id number]]  (Abnormal) Collected: 10/10/24 0428    Specimen: Blood from Arm, Left Updated: 10/10/24 0623     proBNP 1,780.0 pg/mL     Narrative:      This assay is used as an aid in the diagnosis of individuals suspected of having heart failure. It can be used as an aid in the diagnosis of acute decompensated heart failure (ADHF) in patients presenting with signs and symptoms of ADHF to the emergency department (ED). In addition, NT-proBNP of <300 pg/mL indicates ADHF is not likely.    Age Range Result Interpretation  NT-proBNP Concentration (pg/mL:      <50             Positive            >450                   Gray                 300-450                    Negative             <300    50-75           Positive             >900                  Gray                300-900                  Negative            <300      >75             Positive            >1800                  Gray                300-1800                  Negative            <300    Comprehensive Metabolic Panel [[id number]]  (Abnormal) Collected: 10/10/24 0428    Specimen: Blood from Arm, Left Updated: 10/10/24 0623     Glucose 99 mg/dL      BUN 8 mg/dL      Creatinine 0.44 mg/dL      Sodium 123 mmol/L      Potassium 3.2 mmol/L      Chloride 92 mmol/L      CO2 24.7 mmol/L      Calcium 7.9 mg/dL      Total Protein 4.4 g/dL      Albumin 2.6 g/dL      ALT (SGPT) 13 U/L      AST (SGOT) 14 U/L      Alkaline Phosphatase 70 U/L      Total Bilirubin 0.5 mg/dL      Globulin 1.8 gm/dL      A/G Ratio 1.4 g/dL      BUN/Creatinine Ratio 18.2     Anion Gap 6.3 mmol/L      eGFR 101.6 mL/min/1.73     Narrative:      GFR Normal >60  Chronic Kidney Disease <60  Kidney Failure <15    The GFR formula is only valid for adults with stable renal function between ages 18 and 70.    Uric Acid [[id number]]  (Normal) Collected: 10/10/24 0428    Specimen: Blood from Arm, Left Updated: 10/10/24 0623     Uric Acid 3.8 mg/dL     Creatinine Urine Random (kidney function) GFR component - Urine, Clean Catch [[id number]] Collected: 10/09/24 0939    Specimen: Urine, Clean Catch Updated: 10/09/24 2103     Creatinine, Urine 55.8 mg/dL     Narrative:      Reference intervals for random urine have not been established.  Clinical usage is dependent upon physician's interpretation in combination with other laboratory tests.       Basic Metabolic Panel [[id number]]  (Abnormal) Collected: 10/09/24 1407    Specimen: Blood from Arm, Right Updated: 10/09/24 2056     Glucose 103 mg/dL      BUN 13 mg/dL      Creatinine 0.52 mg/dL      Sodium 126 mmol/L      Potassium 3.7 mmol/L      Chloride 93 mmol/L      CO2 23.5 mmol/L      Calcium 8.0 mg/dL      BUN/Creatinine Ratio 25.0     Anion Gap 9.5 mmol/L      eGFR 97.6  mL/min/1.73     Narrative:      GFR Normal >60  Chronic Kidney Disease <60  Kidney Failure <15    The GFR formula is only valid for adults with stable renal function between ages 18 and 70.             Pending Results: GI teams he ordered.    Imaging Reviewed:   XR Chest 1 View    Result Date: 10/9/2024  Impression: No active disease Electronically Signed: Lawson Pritchard MD  10/9/2024 12:48 PM EDT  Workstation ID: [id number]          Assessment & Plan   ASSESSMENT/PLAN  Normocytic anemia and leukopenia:-subacute? Pre-admission labs unavailable to see tempo. Unknown etiology: ddx large but includes poor nutrition, bleed, primary bone marrow disorder, infection,  -Some immature granulocytes noted on today's CBC. Ordered flow cytometry to ensure no leukemic cells present. Patient relatively asymptomatic.   -did added on retic, B12, folate, iron studies and ferritin to earlier labs to ensure no multi-vitamin/mineral deficiency isn't at least a partial cause.  -agree with CBCdiff daily while inpatient to check ANC level and follow immature granulocytes  -I agree with GI w/u and treatment plan given hx of Crohn's.  -If hgb <7 and symptomatic agree with PRBC  -monitor for fevers    will continue to follow    Electronically signed by Dede Amezquita MD PhD, 10/11/24, 4:46 PM EDT.        Thank you for this consult. We will be happy to follow along with you.

## 2024-10-11 NOTE — PROGRESS NOTES
PROGRESS NOTE      Patient Name: Maryann Gaitan  : 1950  MRN: [id number]  Primary Care Physician: Eduard Little MD  Date of admission: 10/8/2024    Patient Care Team:  Eduard Little MD as PCP - General (Family Medicine)        Subjective   Subjective:     Patient is still complaining of weakness not feeling good still having significant diarrhea  Review of systems:  All other review of system unremarkable      Allergies:    Allergies   Allergen Reactions    Codeine Hives    Penicillin G Sodium Hives       Objective   Exam:     Vital Signs  Temp:  [97.4 °F (36.3 °C)-97.8 °F (36.6 °C)] 97.5 °F (36.4 °C)  Heart Rate:  [78-86] 80  Resp:  [15-16] 16  BP: ()/(49-63) 121/63  SpO2:  [96 %-99 %] 99 %  on   ;   Device (Oxygen Therapy): room air  Body mass index is 24.16 kg/m².    General: Elderly female in no acute distress.    Head:      Normocephalic and atraumatic.    Eyes:      PERRL/EOM intact, conjunctivae and sclerae clear without nystagmus.    Neck:      No masses, thyromegaly,  trachea central with normal respiratory effort   Lungs:    Clear bilaterally to auscultation.    Heart:      Regular rate and rhythm, no murmur no gallop  Abd:        Soft, nontender, not distended, bowel sounds positive, no shifting dullness   Pulses:   Pulses palpable  Extr:        No cyanosis or clubbing--no edema.    Neuro:    No focal deficits.   alert oriented x3  Skin:       Intact without lesions or rashes.    Psych:    Alert and cooperative; normal mood and affect; .      Results Review:  I have personally reviewed most recent Data :  CBC    Results from last 7 days   Lab Units 10/11/24  0358 10/10/24  0428 10/09/24  1407 10/09/24  0542 10/08/24  2105 10/08/24  0927   WBC 10*3/mm3 2.80* 1.54*  --  2.94* 4.51 6.60   HEMOGLOBIN g/dL 7.0* 7.7* 8.4* 8.1* 9.4* 10.3*   PLATELETS 10*3/mm3 269 315  --  343 402 449     CMP   Results from last 7 days   Lab Units 10/11/24  0358 10/10/24  0435  10/10/24  1603 10/10/24  1228 10/10/24  0808 10/10/24  0428 10/09/24  1407 10/09/24  0542 10/08/24  2105 10/08/24  0927   SODIUM mmol/L 127* 125* 126* 122* 124* 123* 126*  125* 126* 126* 122*   POTASSIUM mmol/L 2.6*  --   --   --   --  3.2* 3.7 4.0 3.2* 3.0*   CHLORIDE mmol/L 94*  --   --   --   --  92* 93* 91* 87* 81*   CO2 mmol/L 23.9  --   --   --   --  24.7 23.5 25.6 24.6 22.5   BUN mg/dL 21  --   --   --   --  8 13 19 24* 35*   CREATININE mg/dL 0.38*  --   --   --   --  0.44* 0.52* 0.60 0.75 1.07*   GLUCOSE mg/dL 108*  --   --   --   --  99 103* 102* 103* 98   ALBUMIN g/dL  --   --   --   --   --  2.6*  --   --  3.3* 3.3*   BILIRUBIN mg/dL  --   --   --   --   --  0.5  --   --  0.7 0.7   ALK PHOS U/L  --   --   --   --   --  70  --   --  89 100   AST (SGOT) U/L  --   --   --   --   --  14  --   --  15 18   ALT (SGPT) U/L  --   --   --   --   --  13  --   --  13 17     ABG      XR Chest 1 View    Result Date: 10/9/2024  Impression: No active disease Electronically Signed: Lawson Pritchard MD  10/9/2024 12:48 PM EDT  Workstation ID: [id number]       Scheduled Meds:[Held by provider] enoxaparin, 40 mg, Subcutaneous, Q24H  folic acid, 1 mg, Oral, Daily  levothyroxine, 50 mcg, Oral, Daily  pantoprazole, 40 mg, Oral, Daily  potassium chloride ER, 40 mEq, Oral, Q4H  sertraline, 50 mg, Oral, Daily  sodium chloride, 10 mL, Intravenous, Q12H  sodium chloride, 1 g, Oral, Once  tiotropium bromide monohydrate, 2 puff, Inhalation, Daily - RT  Urea, 15 g, Oral, BID      Continuous Infusions:   PRN Meds:  senna-docusate sodium **AND** polyethylene glycol **AND** bisacodyl **AND** bisacodyl    Magnesium Standard Dose Replacement - Follow Nurse / BPA Driven Protocol    melatonin    ondansetron    Phosphorus Replacement - Follow Nurse / BPA Driven Protocol    Potassium Replacement - Follow Nurse / BPA Driven Protocol    sodium chloride    Assessment & Plan   Assessment and Plan:         Hyponatremia    ASSESSMENT:  Hyponatremia  likely etiology thiazide diuretics in the presence of Cymbalta  History of hypertension  History of arthritis  History of Crohn disease    PLAN :      Clinically patient has diarrhea seem to be somewhat volume depleted blood pressure is on the low side   Hyponatremia at this time is multifactorial including initially the use of thiazide and SSRI but also diarrhea volume depletion poor nutritional state decreased p.o. intake also contributing to hyponatremia   patient will be receiving blood at this time IV fluid not given because of some shortness of breath and wheezing yesterday although chest x-ray was unremarkable  I want to give 500 cc of saline bolus at this time but I will leave up to primary provider  Follow-up with repeat sodium later today  Patient also developing significant anemia with hemoglobin dropping from 10-7 in last 3 days  White cell count also dropping  Follow-up with hematology and GI  Repeat sodium in 4 hours again  Significant hypokalemia secondary to diarrhea  improvement in potassium will help with the sodium to  Thank you for letting me participate        Electronically signed by Buddy Pierre MD,   [facility] kidney consultant  [phone]  10/11/2024  08:29 EDT

## 2024-10-11 NOTE — THERAPY TREATMENT NOTE
"Subjective: Pt agreeable to therapeutic plan of care.    Objective:     Bed mobility - Min-A. Cues required for sequencing rolling and performing sidelying>sit.    Transfers - Min-A    Ambulation - 3 feet Min-A    Therapeutic Exercise - 10 Reps B LE AROM lying supine: ankle pumps, knee to chest, SLR    Vitals: WNL    Pain: 0 VAS   Location: N/A  Intervention for pain: N/A    Education: Provided education on the importance of mobility in the acute care setting, Verbal/Tactile Cues, Transfer Training, and Energy conservation strategies    Assessment: Maryann Gaitan presents with functional mobility impairments which indicate the need for skilled intervention. Pt with flat affect and poor motivation, requires encouragement. Pt requires [facility] for transfers, would continue to benefit from SNF. Tolerating session today without incident. Will continue to follow and progress as tolerated.     Plan/Recommendations:   If medically appropriate, Moderate Intensity Therapy recommended post-acute care. This is recommended as therapy feels the patient would require 3-4 days per week and wouldn't tolerate \"3 hour daily\" rehab intensity. SNF would be the preferred choice. If the patient does not agree to SNF, arrange HH or OP depending on home bound status. If patient is medically complex, consider LTACH. Pt requires no DME at discharge.     Pt desires Home with family assist at discharge. Pt cooperative; agreeable to therapeutic recommendations and plan of care.         Basic Mobility 6-click:  Rollin = Total, A lot = 2, A little = 3; 4 = None  Supine>Sit:   1 = Total, A lot = 2, A little = 3; 4 = None   Sit>Stand with arms:  1 = Total, A lot = 2, A little = 3; 4 = None  Bed>Chair:   1 = Total, A lot = 2, A little = 3; 4 = None  Ambulate in room:  1 = Total, A lot = 2, A little = 3; 4 = None  3-5 Steps with railin = Total, A lot = 2, A little = 3; 4 = None  Score: 17    Modified [place]: N/A = No pre-op " stroke/TIA    Post-Tx Position: Up in Chair, Alarms activated, and Call light and personal items within reach  PPE: gloves

## 2024-10-11 NOTE — PLAN OF CARE
Assessment: Maryann Gaitan presents with functional mobility impairments which indicate the need for skilled intervention. Pt with flat affect and poor motivation, requires encouragement. Pt requires [facility] for transfers, would continue to benefit from SNF. Tolerating session today without incident. Will continue to follow and progress as tolerated.

## 2024-10-11 NOTE — PLAN OF CARE
Goal Outcome Evaluation:   Patient has had no complaints of pain. Patient having frequent bowel movements.

## 2024-10-11 NOTE — CASE MANAGEMENT/SOCIAL WORK
Discharge Planning Assessment   [place]     Patient Name: Maryann Gaitan  MRN: [id number]  Today's Date: 10/11/2024    Admit Date: 10/8/2024    Plan: From home with family. Refusing SNF.   Discharge Needs Assessment       Row Name 10/11/24 1513       Living Environment    People in Home grandchild(keegan)    Current Living Arrangements home    Potentially Unsafe Housing Conditions none    In the past 12 months has the electric, gas, oil, or water company threatened to shut off services in your home? No    Primary Care Provided by self    Provides Primary Care For no one    Family Caregiver if Needed child(keegan), adult    Family Caregiver Names Son-Nadir    Quality of Family Relationships helpful;involved;supportive    Able to Return to Prior Arrangements yes       Resource/Environmental Concerns    Resource/Environmental Concerns none    Transportation Concerns none       Transportation Needs    In the past 12 months, has lack of transportation kept you from medical appointments or from getting medications? no    In the past 12 months, has lack of transportation kept you from meetings, work, or from getting things needed for daily living? No       Food Insecurity    Within the past 12 months, you worried that your food would run out before you got the money to buy more. Never true    Within the past 12 months, the food you bought just didn't last and you didn't have money to get more. Never true       Transition Planning    Patient/Family Anticipates Transition to home with family;home with help/services    Patient/Family Anticipated Services at Transition none    Transportation Anticipated family or friend will provide       Discharge Needs Assessment    Readmission Within the Last 30 Days no previous admission in last 30 days    Equipment Currently Used at Home rollator;nebulizer;commode    Concerns to be Addressed discharge planning    Anticipated Changes Related to Illness none                   Discharge Plan        Row Name 10/11/24 1514       Plan    Plan From home with family. Refusing SNF.    Patient/Family in Agreement with Plan yes    Plan Comments CM met with the patient at the bedside. Patient lives at home with her grandkids. [place] does not drive and depends on her family for transportation. Family will transport at discharge. Patient does ADL but has help from her family. PCP (Sidney) and pharmacy ([facility]) confirmed. Patient does not wish to be enrolled in the MTB program. Denies financial assistance needs with medications and/or food. CM let the patient know that PT is recommending SNF but patient is declining at this time. Patient would like to think about Home Health at this time. CM educated the patient on why PT is recommending SNF but patient still declining. Discharge barriers; GI following, Nephrology following, hematology following, 10/11-echo pending, electrolyte replacement, K critical, increased WBC.                  Continued Care and Services - Admitted Since 10/8/2024    No active coordination exists for this encounter.       Expected Discharge Date and Time       Expected Discharge Date Expected Discharge Time    Oct 12, 2024            Demographic Summary       Row Name 10/11/24 1512       General Information    Admission Type inpatient    Arrived From emergency department    Required Notices Provided Important Message from Medicare    Referral Source admission list    Reason for Consult discharge planning    Preferred Language English       Contact Information    Permission Granted to Share Info With                    Functional Status       Row Name 10/11/24 1512       Functional Status    Usual Activity Tolerance fair    Current Activity Tolerance fair       Functional Status, IADL    Medications independent    Meal Preparation assistive person    Housekeeping assistive person    Laundry independent    Shopping assistive person                Patient Forms       Row Name  10/11/24 1521       Patient Forms    Important Message from Medicare (IMM) Delivered  IMM given by MangoRN    Delivered to Patient    Method of delivery In person                  Met with patient in room wearing PPE: mask.    Maintained distance greater than six feet and spent less than 15 minutes in the room.       Radha Rodriguez RN

## 2024-10-11 NOTE — CONSULTS
"GI CONSULT  NOTE:    Referring Provider:  Dr. Liu    Chief complaint: Diarrhea    Subjective .     History of present illness: Maryann Gaitan is a 74 y.o. female with possible Crohn's, hypertension, COPD, RA, hysterectomy, and cholecystectomy who presents with complaints of abnormal labs.  She was noted to be hyponatremic with a sodium of 122.  Nephrology has been consulted.  GI has been asked to consult due to diarrhea.  The patient is well-known to our practice and was recently seen by Stefany Martines NP in 9/2024 for suspected Crohn's disease and mouth sores.  She has a history of colonoscopy in 2021 that showed chronic active ileitis with ulceration and possible TI stricture.  Pathology favored NSAID induced or possibly infectious ileitis.  No evidence of IBD was noted, but pathologist stated that the biopsy fragments were \"minute\" and therefore IBD could not be entirely ruled out.  Prometheus testing in 8/2021 was not consistent with IBD.  However, fecal calprotectin was significantly elevated at 365 in 11/2021.  She was treated with budesonide at that time and reportedly responded to this medication.  However, since budesonide taper, the patient has not been on any medication for possible Crohn's disease.  She does have rheumatoid arthritis and is followed by a rheumatologist.  Recently, she was prescribed a prednisone taper starting at 60 mg daily.  Also managed with  on hydrochlorothiazide and methotrexate.  She had previously been on orencia, but states that this was discontinued approximately 3 months ago.  Reports that she was started on Humira at that time.  Reports mouth sores and worsening diarrhea since medication change.  Patient reports that she has been having diarrhea daily.  Reports up to 7 loose bowel movements daily and states that diarrhea is waking her at night.  She denies any bright red blood per rectum or melena.  Denies recent antibiotic use.  She denies any improvement in diarrhea symptoms " on prednisone taper.  Denies abdominal pain.  Has had some nausea/vomiting.  Reports unintentional weight loss of approximately 30 pounds since 6/2024 due to sores/ulcers in her mouth and esophagus.  Denies heartburn or dysphagia.  She has states that she is unable to eat secondary to pain from the sores in her mouth and esophagus.  No recent fever.    11/2021 fecal calprotectin -365  8/2021 Prometheus -pattern not consistent with IBD    6/2024 CT A/P with -bowel wall thickening involving ileal loops and the terminal ileum with fatty infiltration of the cecum and ascending colon, additional bowel wall thickening involving sigmoid colon and rectum  11/3/2021 CT A/P with: Abnormal thickening of the wall of the distal ileum consistent with enteritis, questionable Crohn's.  No abscess.      Endo History:  7/2021 colonoscopy by Dr. Castillo-ulceration, granularity, scarring, and stenosis in TI with biopsy showing focal chronic active ileitis with detached fragment of acutely inflamed chronic ulcer.  HP polyp, grade 1 internal and external, benign random colon biopsies.  8/2016 EUS by Dr. Castillo-normal pancreatic parenchyma, and leg grade C esophagitis, hiatal hernia.    Past Medical History:  Past Medical History:   Diagnosis Date    Arthritis     COPD (chronic obstructive pulmonary disease)     Hypertension        Past Surgical History:  Past Surgical History:   Procedure Laterality Date    HYSTERECTOMY         Social History:  Social History     Tobacco Use    Smoking status: Former     Types: Cigarettes    Smokeless tobacco: Never   Vaping Use    Vaping status: Never Used   Substance Use Topics    Alcohol use: Never    Drug use: Never       Family History:  History reviewed. No pertinent family history.    Medications:  Medications Prior to Admission   Medication Sig Dispense Refill Last Dose    Adalimumab (Humira, 2 Pen,) 40 MG/0.4ML Pen-injector Kit Inject 40 mg under the skin into the appropriate area as directed  Every 14 (Fourteen) Days.   9/27/2024    amLODIPine (NORVASC) 10 MG tablet Take 1 tablet by mouth Daily.       cholecalciferol (VITAMIN D3) 1.25 MG (48476 UT) capsule Take 1 capsule by mouth 2 (Two) Times a Week. Wed, Sat       colestipol (COLESTID) 1 g tablet Take 1 tablet by mouth 2 (Two) Times a Day.       diclofenac (VOLTAREN) 50 MG EC tablet Take 1 tablet by mouth 2 (Two) Times a Day.       ezetimibe (ZETIA) 10 MG tablet Take 1 tablet by mouth Daily.       folic acid (FOLVITE) 1 MG tablet Take 1 tablet by mouth Daily.       hydroCHLOROthiazide 25 MG tablet Take 1 tablet by mouth Daily.       levothyroxine (SYNTHROID, LEVOTHROID) 50 MCG tablet Take 1 tablet by mouth Daily.       methotrexate 2.5 MG tablet Take 8 tablets by mouth 1 (One) Time Per Week.       pantoprazole (PROTONIX) 20 MG EC tablet Take 1 tablet by mouth Daily.       predniSONE (DELTASONE) 5 MG tablet Take 1 tablet by mouth Daily.       sertraline (ZOLOFT) 50 MG tablet Take 1 tablet by mouth Daily.       tiotropium (SPIRIVA) 18 MCG per inhalation capsule Place 1 capsule into inhaler and inhale Daily.          Scheduled Meds:[Held by provider] enoxaparin, 40 mg, Subcutaneous, Q24H  folic acid, 1 mg, Oral, Daily  levothyroxine, 50 mcg, Oral, Daily  pantoprazole, 40 mg, Oral, Daily  potassium chloride ER, 40 mEq, Oral, Q4H  sertraline, 50 mg, Oral, Daily  sodium chloride, 10 mL, Intravenous, Q12H  sodium chloride, 1 g, Oral, Once  tiotropium bromide monohydrate, 2 puff, Inhalation, Daily - RT  Urea, 15 g, Oral, BID      Continuous Infusions:   PRN Meds:.  senna-docusate sodium **AND** polyethylene glycol **AND** bisacodyl **AND** bisacodyl    Magnesium Standard Dose Replacement - Follow Nurse / BPA Driven Protocol    melatonin    nitroglycerin    ondansetron    Phosphorus Replacement - Follow Nurse / BPA Driven Protocol    Potassium Replacement - Follow Nurse / BPA Driven Protocol    sodium chloride    ALLERGIES:  Codeine and Penicillin g  sodium    ROS:  The following systems were reviewed and negative;   Constitution:  No fevers, chills, no unintentional weight loss  Skin: no rash, no jaundice  Eyes:  No blurry vision, no eye pain  HENT:  No change in hearing or smell  Resp:  No dyspnea or cough  CV:  No chest pain or palpitations  :  No dysuria, hematuria  Musculoskeletal:  No leg cramps or arthralgias  Neuro:  No tremor, no numbness  Psych:  No depression or confusion    Objective     Vital Signs:   Vitals:    10/11/24 0800 10/11/24 0803 10/11/24 0807 10/11/24 1144   BP: 100/47  (!) 88/48 111/84   BP Location: Left arm   Right arm   Patient Position: Lying   Lying   Pulse: 90 78 80 89   Resp: 16 16 16 12   Temp: 98 °F (36.7 °C)   98.7 °F (37.1 °C)   TempSrc: Oral      SpO2: 95% 99% 99% 94%   Weight:       Height:           Physical Exam:       General Appearance:    Awake and alert, in no acute distress, ill-appearing in bed   Head:    Normocephalic, without obvious abnormality, atraumatic   Throat:   Mouth ulcerations/sores present,  no thrush, oral mucosa moist   Lungs:     Respirations regular, even and unlabored   Chest Wall:    No abnormalities observed   Abdomen:     Soft, non-tender, no rebound or guarding, non-distended   Rectal:     Deferred   Extremities:   Moves all extremities, no edema, no cyanosis   Pulses:   Pulses palpable and equal bilaterally   Skin:   No rash, no jaundice, normal palpation   Lymph nodes:   No cervical, supraclavicular or submandibular palpable adenopathy   Neurologic:   Cranial nerves 2 - 12 grossly intact, no asterixis       Results Review:   I reviewed the patient's labs and imaging.  CBC    Results from last 7 days   Lab Units 10/11/24  0358 10/10/24  0428 10/09/24  1407 10/09/24  0542 10/08/24  2105 10/08/24  0927   WBC 10*3/mm3 2.80* 1.54*  --  2.94* 4.51 6.60   HEMOGLOBIN g/dL 7.0* 7.7* 8.4* 8.1* 9.4* 10.3*   PLATELETS 10*3/mm3 269 315  --  343 402 449     CMP   Results from last 7 days   Lab Units  "10/11/24  0358 10/10/24  2055 10/10/24  1603 10/10/24  1228 10/10/24  0808 10/10/24  0428 10/09/24  1407 10/09/24  0542 10/08/24  2105 10/08/24  0927   SODIUM mmol/L 127* 125* 126* 122* 124* 123* 126*  125* 126* 126* 122*   POTASSIUM mmol/L 2.6*  --   --   --   --  3.2* 3.7 4.0 3.2* 3.0*   CHLORIDE mmol/L 94*  --   --   --   --  92* 93* 91* 87* 81*   CO2 mmol/L 23.9  --   --   --   --  24.7 23.5 25.6 24.6 22.5   BUN mg/dL 21  --   --   --   --  8 13 19 24* 35*   CREATININE mg/dL 0.38*  --   --   --   --  0.44* 0.52* 0.60 0.75 1.07*   GLUCOSE mg/dL 108*  --   --   --   --  99 103* 102* 103* 98   ALBUMIN g/dL  --   --   --   --   --  2.6*  --   --  3.3* 3.3*   BILIRUBIN mg/dL  --   --   --   --   --  0.5  --   --  0.7 0.7   ALK PHOS U/L  --   --   --   --   --  70  --   --  89 100   AST (SGOT) U/L  --   --   --   --   --  14  --   --  15 18   ALT (SGPT) U/L  --   --   --   --   --  13  --   --  13 17   MAGNESIUM mg/dL  --   --   --   --   --  1.4*  --   --  1.9 2.0     Cr Clearance Estimated Creatinine Clearance: 106.4 mL/min (A) (by C-G formula based on SCr of 0.38 mg/dL (L)).  Coag     HbA1C No results found for: \"HGBA1C\"  Blood Glucose No results found for: \"POCGLU\"  Infection     UA    Results from last 7 days   Lab Units 10/08/24  1023   NITRITE UA  Negative   WBC UA /HPF 0-2   BACTERIA UA /HPF Trace*   SQUAM EPITHEL UA /HPF 0-2     Imaging Results (Last 72 Hours)       Procedure Component Value Units Date/Time    XR Chest 1 View [[id number]] Collected: 10/09/24 1241     Updated: 10/09/24 1250    Narrative:      XR CHEST 1 VW    Date of Exam: 10/9/2024 12:29 PM EDT    Indication: Diffuse wheeze/rhonchi    Comparison: None available.    Findings:  Heart size is within normal limits. The pulmonary vascular pattern in the chest is normal and the lungs appear clear.      Impression:      Impression:  No active disease      Electronically Signed: Lawson Pritchard MD    10/9/2024 12:48 PM EDT    Workstation ID: [id number] "            ASSESSMENT:  -Diarrhea  -Nausea/vomiting  -Unintentional weight loss  -Electrolyte abnormality  -Leukopenia  -Normocytic anemia  -Oral ulcers/sores  -Hypertension  -COPD  -Rheumatoid arthritis  -History of hysterectomy  -History of cholecystectomy    PLAN:  Patient is a 74-year-old female with history of rheumatoid arthritis and cholecystectomy who presented on 10/8 with hyponatremia.  Nephrology has been consulted.  GI has been asked to evaluate the patient for persistent diarrhea.  She does have a history of possible Crohn's disease, but this has not been biopsy-proven and [facility] testing has not been consistent with IBD.  Fecal calprotectin was elevated in 11/2021.    We will plan CT enterography for further evaluation.  Check GI PCR.  CRP 8.01.  Plan fecal calprotectin.  Will start budesonide and monitor for improvement in diarrhea.  Hemoglobin 7.0.  Continue to monitor H/H and transfuse as needed.  No reports of overt GI bleeding.  Continue PPI.  For no improvement in diarrhea, may need to consider repeat colonoscopy.  Antiemetics/analgesics as needed.  Supportive care.      I discussed the patients findings and my recommendations with the patient.  I will discuss the case with Dr. Delgado and change the plan accordingly.    We appreciate the referral.    Electronically signed by DRU Lew, 10/11/24, 12:00 PM EDT.

## 2024-10-11 NOTE — PLAN OF CARE
Problem: Adult Inpatient Plan of Care  Goal: Plan of Care Review  Outcome: Progressing  Goal: Patient-Specific Goal (Individualized)  Outcome: Progressing  Goal: Absence of Hospital-Acquired Illness or Injury  Outcome: Progressing  Intervention: Identify and Manage Fall Risk  Recent Flowsheet Documentation  Taken 10/11/2024 0230 by Katarina Brenner RN  Safety Promotion/Fall Prevention:   safety round/check completed   room organization consistent   clutter free environment maintained   assistive device/personal items within reach  Taken 10/11/2024 0040 by Katarina Brenner RN  Safety Promotion/Fall Prevention:   safety round/check completed   room organization consistent   clutter free environment maintained   assistive device/personal items within reach  Taken 10/10/2024 2240 by Katarina Brenner RN  Safety Promotion/Fall Prevention:   safety round/check completed   room organization consistent   clutter free environment maintained   assistive device/personal items within reach  Taken 10/10/2024 2045 by Katarina Brenner RN  Safety Promotion/Fall Prevention:   safety round/check completed   room organization consistent   clutter free environment maintained   assistive device/personal items within reach  Intervention: Prevent Skin Injury  Recent Flowsheet Documentation  Taken 10/10/2024 2045 by Katarina Brenner RN  Body Position: position changed independently  Skin Protection: adhesive use limited  Intervention: Prevent and Manage VTE (Venous Thromboembolism) Risk  Recent Flowsheet Documentation  Taken 10/10/2024 2045 by Katarina Brenner RN  Activity Management: up to bedside commode  VTE Prevention/Management:   compression stockings off   foot pump device off   sequential compression devices off  Range of Motion: active ROM (range of motion) encouraged  Intervention: Prevent Infection  Recent Flowsheet Documentation  Taken 10/11/2024 0230 by Katarina Brenner RN  Infection Prevention:   single patient room provided   rest/sleep  promoted   hand hygiene promoted   environmental surveillance performed  Taken 10/11/2024 0040 by Katarina Brenner RN  Infection Prevention:   single patient room provided   rest/sleep promoted   hand hygiene promoted   environmental surveillance performed  Taken 10/10/2024 2240 by Katarina Brenner RN  Infection Prevention:   single patient room provided   rest/sleep promoted   hand hygiene promoted   environmental surveillance performed  Taken 10/10/2024 2045 by Katarina Brenner RN  Infection Prevention:   single patient room provided   rest/sleep promoted   hand hygiene promoted   environmental surveillance performed  Goal: Optimal Comfort and Wellbeing  Outcome: Progressing  Intervention: Provide Person-Centered Care  Recent Flowsheet Documentation  Taken 10/10/2024 2045 by Katarina Brenner RN  Trust Relationship/Rapport:   care explained   choices provided   emotional support provided   empathic listening provided   questions answered   questions encouraged   reassurance provided   thoughts/feelings acknowledged  Goal: Readiness for Transition of Care  Outcome: Progressing     Problem: Fall Injury Risk  Goal: Absence of Fall and Fall-Related Injury  Outcome: Progressing  Intervention: Identify and Manage Contributors  Recent Flowsheet Documentation  Taken 10/10/2024 2045 by Katarina Brenner RN  Medication Review/Management: medications reviewed  Intervention: Promote Injury-Free Environment  Recent Flowsheet Documentation  Taken 10/11/2024 0230 by Katarina Brenner RN  Safety Promotion/Fall Prevention:   safety round/check completed   room organization consistent   clutter free environment maintained   assistive device/personal items within reach  Taken 10/11/2024 0040 by Katarina Brenner RN  Safety Promotion/Fall Prevention:   safety round/check completed   room organization consistent   clutter free environment maintained   assistive device/personal items within reach  Taken 10/10/2024 2240 by Katarina Brenner RN  Safety  Promotion/Fall Prevention:   safety round/check completed   room organization consistent   clutter free environment maintained   assistive device/personal items within reach  Taken 10/10/2024 2045 by Katarina Brenner RN  Safety Promotion/Fall Prevention:   safety round/check completed   room organization consistent   clutter free environment maintained   assistive device/personal items within reach     Problem: Skin Injury Risk Increased  Goal: Skin Health and Integrity  Outcome: Progressing  Intervention: Optimize Skin Protection  Recent Flowsheet Documentation  Taken 10/10/2024 2045 by Katarina Brenner RN  Pressure Reduction Techniques: frequent weight shift encouraged  Head of Bed (HOB) Positioning: HOB elevated  Pressure Reduction Devices: pressure-redistributing mattress utilized  Skin Protection: adhesive use limited   Goal Outcome Evaluation:

## 2024-10-11 NOTE — PROGRESS NOTES
[facility] MEDICINE SERVICE  DAILY PROGRESS NOTE    NAME: Maryann Gaitan  : 1950  MRN: [id number]      LOS: 2 days     PROVIDER OF SERVICE: Dede Liu MD    Chief Complaint: Hyponatremia    Subjective:     Interval History:    Patient seen and evaluated at bedside.  Patient with worsening diarrhea.  Has had at least 7 bowel movements this morning.  Denies any blood in stool.    Treatment plan discussed with patient. All questions addressed.     Review of Systems:   Denies fevers, chills  Denies chest pain, edema  Denies shortness of breath, cough  +n/v/d  Denies dysuria, hematuria    Objective:     Vital Signs  Temp:  [97.4 °F (36.3 °C)-98.7 °F (37.1 °C)] 98.7 °F (37.1 °C)  Heart Rate:  [78-90] 89  Resp:  [12-16] 12  BP: ()/(47-84) 111/84   Body mass index is 24.16 kg/m².    Physical Exam   General: No acute distress, appears stated age  Neuro: Awake and alert, oriented x3, no focal deficits appreciated  HEENT: EOMI, dry mucous membranes  CV: RRR, no murmurs appreciated, no peripheral edema  Pulm: CTAB, no increased work of breathing  Abd: Soft, nontender, nondistended  Skin: Warm, dry and intact  Psych: Appropriate mood and affect    Scheduled Meds   [Held by provider] enoxaparin, 40 mg, Subcutaneous, Q24H  folic acid, 1 mg, Oral, Daily  levothyroxine, 50 mcg, Oral, Daily  pantoprazole, 40 mg, Oral, Daily  potassium chloride ER, 40 mEq, Oral, Q4H  sertraline, 50 mg, Oral, Daily  sodium chloride, 10 mL, Intravenous, Q12H  sodium chloride, 1 g, Oral, Once  tiotropium bromide monohydrate, 2 puff, Inhalation, Daily - RT  Urea, 15 g, Oral, BID       PRN Meds     senna-docusate sodium **AND** polyethylene glycol **AND** bisacodyl **AND** bisacodyl    Magnesium Standard Dose Replacement - Follow Nurse / BPA Driven Protocol    melatonin    nitroglycerin    ondansetron    Phosphorus Replacement - Follow Nurse / BPA Driven Protocol    Potassium Replacement - Follow Nurse / BPA Driven Protocol     sodium chloride   Infusions           Diagnostic Data    Results from last 7 days   Lab Units 10/11/24  0358 10/10/24  0808 10/10/24  0428   WBC 10*3/mm3 2.80*  --  1.54*   HEMOGLOBIN g/dL 7.0*  --  7.7*   HEMATOCRIT % 21.5*  --  23.3*   PLATELETS 10*3/mm3 269  --  315   GLUCOSE mg/dL 108*  --  99   CREATININE mg/dL 0.38*  --  0.44*   BUN mg/dL 21  --  8   SODIUM mmol/L 127*   < > 123*   POTASSIUM mmol/L 2.6*  --  3.2*   AST (SGOT) U/L  --   --  14   ALT (SGPT) U/L  --   --  13   ALK PHOS U/L  --   --  70   BILIRUBIN mg/dL  --   --  0.5   ANION GAP mmol/L 9.1  --  6.3    < > = values in this interval not displayed.       XR Chest 1 View    Result Date: 10/9/2024  Impression: No active disease Electronically Signed: Lawson Pritchard MD  10/9/2024 12:48 PM EDT  Workstation ID: [id number]     Interval results reviewed.    Assessment/Plan:     Hyponatremia  - Nephrology consulted, appreciate recs  - Likely secondary to hypovolemia given recent diarrhea, nausea and vomiting  - Holding HCTZ  - Fluids per nephrology    Hypokalemia  - Likely exacerbated by significant episodes of diarrhea  - Repeat labs in am  - Replete prn    Crohns disease  Diarrhea  - Follows with Dr. Castillo; plans for outpatient scopes 10/31 for ongoing Crohns  - Continues to have significant diarrhea which is provoking electrolyte abnormalities  - Also in setting of worsening anemia  - Will consult GI, appreciate recs    Anemia  - No overt s/sx of bleeding  -Significant drop from 10-7 during admission  - Hold off on pharmacological vte ppx given drop in hgb since admission  -Transfuse 1 unit packed red blood cells today, repeat H&H  - Unclear etiology of anemia but concern for GI losses given significant diarrhea in the setting of Crohn's disease  - Given anemia with concurrent leukopenia, will consult hematology, appreciate recs    Leukopenia  - Not present on admission  - On Humira and methotrexate outpatient  - Consulted hematology, appreciate  recs    Hypertension  - Holding HCTZ given hyponatremia  - Holding amlodipine given soft bps    Depression  - Okay to resume ssri per nephrology    Treatment plan discussed with nursing staff.     VTE Prophylaxis:  Pharmacologic & mechanical VTE prophylaxis orders are present.    Code status is   Code Status and Medical Interventions: CPR (Attempt to Resuscitate); Full Support   Ordered at: 10/09/24 1058     Code Status (Patient has no pulse and is not breathing):    CPR (Attempt to Resuscitate)     Medical Interventions (Patient has pulse or is breathing):    Full Support       Plan for disposition: Pending clinical course    Barriers to discharge: Hyponatremia    Time: 35+ minutes     Signature: Electronically signed by Dede Liu MD, 10/11/24, 12:26 EDT.  [facility] Hospitalist Team

## 2024-10-12 ENCOUNTER — ANESTHESIA (OUTPATIENT)
Dept: GASTROENTEROLOGY | Facility: HOSPITAL | Age: 74
End: 2024-10-12
Payer: MEDICARE

## 2024-10-12 ENCOUNTER — APPOINTMENT (OUTPATIENT)
Dept: CARDIOLOGY | Facility: HOSPITAL | Age: 74
End: 2024-10-12
Payer: MEDICARE

## 2024-10-12 ENCOUNTER — ANESTHESIA EVENT (OUTPATIENT)
Dept: GASTROENTEROLOGY | Facility: HOSPITAL | Age: 74
End: 2024-10-12
Payer: MEDICARE

## 2024-10-12 ENCOUNTER — APPOINTMENT (OUTPATIENT)
Dept: GENERAL RADIOLOGY | Facility: HOSPITAL | Age: 74
End: 2024-10-12
Payer: MEDICARE

## 2024-10-12 ENCOUNTER — INPATIENT HOSPITAL (AMBULATORY)
Age: 74
End: 2024-10-12
Payer: MEDICARE

## 2024-10-12 ENCOUNTER — INPATIENT HOSPITAL (AMBULATORY)
Dept: URBAN - METROPOLITAN AREA HOSPITAL 84 | Facility: HOSPITAL | Age: 74
End: 2024-10-12
Payer: MEDICARE

## 2024-10-12 DIAGNOSIS — K44.9 DIAPHRAGMATIC HERNIA WITHOUT OBSTRUCTION OR GANGRENE: ICD-10-CM

## 2024-10-12 DIAGNOSIS — R93.3 ABNORMAL FINDINGS ON DIAGNOSTIC IMAGING OF OTHER PARTS OF DI: ICD-10-CM

## 2024-10-12 DIAGNOSIS — K50.812 CROHN'S DISEASE OF BOTH SMALL AND LARGE INTESTINE WITH INTES: ICD-10-CM

## 2024-10-12 DIAGNOSIS — R11.0 NAUSEA: ICD-10-CM

## 2024-10-12 DIAGNOSIS — K59.1 FUNCTIONAL DIARRHEA: ICD-10-CM

## 2024-10-12 DIAGNOSIS — K29.50 UNSPECIFIED CHRONIC GASTRITIS WITHOUT BLEEDING: ICD-10-CM

## 2024-10-12 DIAGNOSIS — K64.1 SECOND DEGREE HEMORRHOIDS: ICD-10-CM

## 2024-10-12 DIAGNOSIS — B78.9 STRONGYLOIDIASIS, UNSPECIFIED: ICD-10-CM

## 2024-10-12 PROBLEM — R19.7 DIARRHEA: Status: ACTIVE | Noted: 2024-10-08

## 2024-10-12 LAB
ALBUMIN SERPL-MCNC: 2.7 G/DL (ref 3.5–5.2)
ALBUMIN/GLOB SERPL: 1.2 G/DL
ALP SERPL-CCNC: 80 U/L (ref 39–117)
ALT SERPL W P-5'-P-CCNC: 13 U/L (ref 1–33)
ANION GAP SERPL CALCULATED.3IONS-SCNC: 12.2 MMOL/L (ref 5–15)
ANISOCYTOSIS BLD QL: ABNORMAL
AST SERPL-CCNC: 16 U/L (ref 1–32)
BH BB BLOOD EXPIRATION DATE: NORMAL
BH BB BLOOD TYPE BARCODE: 5100
BH BB DISPENSE STATUS: NORMAL
BH BB PRODUCT CODE: NORMAL
BH BB UNIT NUMBER: NORMAL
BILIRUB SERPL-MCNC: 1 MG/DL (ref 0–1.2)
BUN SERPL-MCNC: 38 MG/DL (ref 8–23)
BUN/CREAT SERPL: 74.5 (ref 7–25)
CALCIUM SPEC-SCNC: 8.7 MG/DL (ref 8.6–10.5)
CHLORIDE SERPL-SCNC: 96 MMOL/L (ref 98–107)
CO2 SERPL-SCNC: 23.8 MMOL/L (ref 22–29)
CREAT SERPL-MCNC: 0.51 MG/DL (ref 0.57–1)
CROSSMATCH INTERPRETATION: NORMAL
DEPRECATED RDW RBC AUTO: 54.9 FL (ref 37–54)
EGFRCR SERPLBLD CKD-EPI 2021: 98.1 ML/MIN/1.73
ERYTHROCYTE [DISTWIDTH] IN BLOOD BY AUTOMATED COUNT: 16.4 % (ref 12.3–15.4)
FOLATE SERPL-MCNC: 18.3 NG/ML (ref 4.78–24.2)
GLOBULIN UR ELPH-MCNC: 2.3 GM/DL
GLUCOSE SERPL-MCNC: 145 MG/DL (ref 65–99)
HCT VFR BLD AUTO: 32.9 % (ref 34–46.6)
HGB BLD-MCNC: 11 G/DL (ref 12–15.9)
LYMPHOCYTES # BLD MANUAL: 0.34 10*3/MM3 (ref 0.7–3.1)
LYMPHOCYTES NFR BLD MANUAL: 2 % (ref 5–12)
MCH RBC QN AUTO: 30.6 PG (ref 26.6–33)
MCHC RBC AUTO-ENTMCNC: 33.4 G/DL (ref 31.5–35.7)
MCV RBC AUTO: 91.4 FL (ref 79–97)
MONOCYTES # BLD: 0.05 10*3/MM3 (ref 0.1–0.9)
NEUTROPHILS # BLD AUTO: 2.24 10*3/MM3 (ref 1.7–7)
NEUTROPHILS NFR BLD MANUAL: 78 % (ref 42.7–76)
NEUTS BAND NFR BLD MANUAL: 7 % (ref 0–5)
PLAT MORPH BLD: NORMAL
PLATELET # BLD AUTO: 257 10*3/MM3 (ref 140–450)
PMV BLD AUTO: 9 FL (ref 6–12)
POTASSIUM SERPL-SCNC: 3.6 MMOL/L (ref 3.5–5.2)
PROT SERPL-MCNC: 5 G/DL (ref 6–8.5)
RBC # BLD AUTO: 3.6 10*6/MM3 (ref 3.77–5.28)
SCAN SLIDE: NORMAL
SODIUM SERPL-SCNC: 132 MMOL/L (ref 136–145)
TOXIC GRANULATION: ABNORMAL
UNIT  ABO: NORMAL
UNIT  RH: NORMAL
VARIANT LYMPHS NFR BLD MANUAL: 13 % (ref 19.6–45.3)
VIT B12 BLD-MCNC: 312 PG/ML (ref 211–946)
WBC NRBC COR # BLD AUTO: 2.64 10*3/MM3 (ref 3.4–10.8)

## 2024-10-12 PROCEDURE — 94799 UNLISTED PULMONARY SVC/PX: CPT

## 2024-10-12 PROCEDURE — 25010000002 PROPOFOL 10 MG/ML EMULSION

## 2024-10-12 PROCEDURE — 93306 TTE W/DOPPLER COMPLETE: CPT | Performed by: INTERNAL MEDICINE

## 2024-10-12 PROCEDURE — 45380 COLONOSCOPY AND BIOPSY: CPT | Performed by: INTERNAL MEDICINE

## 2024-10-12 PROCEDURE — 45386 COLONOSCOPY W/BALLOON DILAT: CPT | Performed by: INTERNAL MEDICINE

## 2024-10-12 PROCEDURE — 99233 SBSQ HOSP IP/OBS HIGH 50: CPT | Performed by: INTERNAL MEDICINE

## 2024-10-12 PROCEDURE — 88184 FLOWCYTOMETRY/ TC 1 MARKER: CPT

## 2024-10-12 PROCEDURE — 93010 ELECTROCARDIOGRAM REPORT: CPT | Performed by: INTERNAL MEDICINE

## 2024-10-12 PROCEDURE — 88185 FLOWCYTOMETRY/TC ADD-ON: CPT | Performed by: INTERNAL MEDICINE

## 2024-10-12 PROCEDURE — 80053 COMPREHEN METABOLIC PANEL: CPT | Performed by: NURSE PRACTITIONER

## 2024-10-12 PROCEDURE — 25810000003 SODIUM CHLORIDE 0.9 % SOLUTION

## 2024-10-12 PROCEDURE — 25010000002 PHENYLEPHRINE 10 MG/ML SOLUTION

## 2024-10-12 PROCEDURE — 43239 EGD BIOPSY SINGLE/MULTIPLE: CPT | Performed by: INTERNAL MEDICINE

## 2024-10-12 PROCEDURE — 0DB98ZX EXCISION OF DUODENUM, VIA NATURAL OR ARTIFICIAL OPENING ENDOSCOPIC, DIAGNOSTIC: ICD-10-PCS | Performed by: INTERNAL MEDICINE

## 2024-10-12 PROCEDURE — 71045 X-RAY EXAM CHEST 1 VIEW: CPT

## 2024-10-12 PROCEDURE — 85007 BL SMEAR W/DIFF WBC COUNT: CPT | Performed by: NURSE PRACTITIONER

## 2024-10-12 PROCEDURE — 88305 TISSUE EXAM BY PATHOLOGIST: CPT | Performed by: INTERNAL MEDICINE

## 2024-10-12 PROCEDURE — 0DB78ZX EXCISION OF STOMACH, PYLORUS, VIA NATURAL OR ARTIFICIAL OPENING ENDOSCOPIC, DIAGNOSTIC: ICD-10-PCS | Performed by: INTERNAL MEDICINE

## 2024-10-12 PROCEDURE — 94761 N-INVAS EAR/PLS OXIMETRY MLT: CPT

## 2024-10-12 PROCEDURE — 85025 COMPLETE CBC W/AUTO DIFF WBC: CPT | Performed by: NURSE PRACTITIONER

## 2024-10-12 PROCEDURE — 94664 DEMO&/EVAL PT USE INHALER: CPT

## 2024-10-12 PROCEDURE — 0DBE8ZX EXCISION OF LARGE INTESTINE, VIA NATURAL OR ARTIFICIAL OPENING ENDOSCOPIC, DIAGNOSTIC: ICD-10-PCS | Performed by: INTERNAL MEDICINE

## 2024-10-12 PROCEDURE — 93306 TTE W/DOPPLER COMPLETE: CPT

## 2024-10-12 PROCEDURE — C1726 CATH, BAL DIL, NON-VASCULAR: HCPCS | Performed by: INTERNAL MEDICINE

## 2024-10-12 PROCEDURE — 63710000001 ONDANSETRON ODT 4 MG TABLET DISPERSIBLE: Performed by: INTERNAL MEDICINE

## 2024-10-12 PROCEDURE — 0D7C8ZZ DILATION OF ILEOCECAL VALVE, VIA NATURAL OR ARTIFICIAL OPENING ENDOSCOPIC: ICD-10-PCS | Performed by: INTERNAL MEDICINE

## 2024-10-12 PROCEDURE — 25010000002 LIDOCAINE PF 2% 2 % SOLUTION

## 2024-10-12 PROCEDURE — 25010000002 METHYLPREDNISOLONE PER 40 MG: Performed by: INTERNAL MEDICINE

## 2024-10-12 PROCEDURE — 93005 ELECTROCARDIOGRAM TRACING: CPT | Performed by: STUDENT IN AN ORGANIZED HEALTH CARE EDUCATION/TRAINING PROGRAM

## 2024-10-12 RX ORDER — EPHEDRINE SULFATE 5 MG/ML
5 INJECTION INTRAVENOUS ONCE AS NEEDED
Status: DISCONTINUED | OUTPATIENT
Start: 2024-10-12 | End: 2024-10-12 | Stop reason: HOSPADM

## 2024-10-12 RX ORDER — PROPOFOL 10 MG/ML
VIAL (ML) INTRAVENOUS AS NEEDED
Status: DISCONTINUED | OUTPATIENT
Start: 2024-10-12 | End: 2024-10-12 | Stop reason: SURG

## 2024-10-12 RX ORDER — ONDANSETRON 4 MG/1
4 TABLET, ORALLY DISINTEGRATING ORAL EVERY 6 HOURS PRN
Status: DISCONTINUED | OUTPATIENT
Start: 2024-10-12 | End: 2024-10-23 | Stop reason: HOSPADM

## 2024-10-12 RX ORDER — IPRATROPIUM BROMIDE AND ALBUTEROL SULFATE 2.5; .5 MG/3ML; MG/3ML
3 SOLUTION RESPIRATORY (INHALATION) ONCE AS NEEDED
Status: DISCONTINUED | OUTPATIENT
Start: 2024-10-12 | End: 2024-10-12 | Stop reason: HOSPADM

## 2024-10-12 RX ORDER — HYDRALAZINE HYDROCHLORIDE 20 MG/ML
5 INJECTION INTRAMUSCULAR; INTRAVENOUS
Status: DISCONTINUED | OUTPATIENT
Start: 2024-10-12 | End: 2024-10-12 | Stop reason: HOSPADM

## 2024-10-12 RX ORDER — ONDANSETRON 2 MG/ML
4 INJECTION INTRAMUSCULAR; INTRAVENOUS ONCE AS NEEDED
Status: DISCONTINUED | OUTPATIENT
Start: 2024-10-12 | End: 2024-10-12 | Stop reason: HOSPADM

## 2024-10-12 RX ORDER — ONDANSETRON 2 MG/ML
4 INJECTION INTRAMUSCULAR; INTRAVENOUS EVERY 6 HOURS PRN
Status: DISCONTINUED | OUTPATIENT
Start: 2024-10-12 | End: 2024-10-23 | Stop reason: HOSPADM

## 2024-10-12 RX ORDER — METHYLPREDNISOLONE SODIUM SUCCINATE 40 MG/ML
20 INJECTION, POWDER, LYOPHILIZED, FOR SOLUTION INTRAMUSCULAR; INTRAVENOUS EVERY 8 HOURS
Status: DISCONTINUED | OUTPATIENT
Start: 2024-10-12 | End: 2024-10-17

## 2024-10-12 RX ORDER — MESALAMINE 1.2 G/1
4.8 TABLET, DELAYED RELEASE ORAL
Status: DISCONTINUED | OUTPATIENT
Start: 2024-10-13 | End: 2024-10-23 | Stop reason: HOSPADM

## 2024-10-12 RX ORDER — PHENYLEPHRINE HYDROCHLORIDE 10 MG/ML
INJECTION INTRAVENOUS AS NEEDED
Status: DISCONTINUED | OUTPATIENT
Start: 2024-10-12 | End: 2024-10-12 | Stop reason: SURG

## 2024-10-12 RX ORDER — SODIUM CHLORIDE 9 MG/ML
INJECTION, SOLUTION INTRAVENOUS CONTINUOUS PRN
Status: DISCONTINUED | OUTPATIENT
Start: 2024-10-12 | End: 2024-10-12 | Stop reason: SURG

## 2024-10-12 RX ORDER — LABETALOL HYDROCHLORIDE 5 MG/ML
5 INJECTION, SOLUTION INTRAVENOUS
Status: DISCONTINUED | OUTPATIENT
Start: 2024-10-12 | End: 2024-10-12 | Stop reason: HOSPADM

## 2024-10-12 RX ORDER — METOPROLOL TARTRATE 1 MG/ML
5 INJECTION, SOLUTION INTRAVENOUS ONCE
Status: COMPLETED | OUTPATIENT
Start: 2024-10-12 | End: 2024-10-12

## 2024-10-12 RX ORDER — LIDOCAINE HYDROCHLORIDE 20 MG/ML
INJECTION, SOLUTION EPIDURAL; INFILTRATION; INTRACAUDAL; PERINEURAL AS NEEDED
Status: DISCONTINUED | OUTPATIENT
Start: 2024-10-12 | End: 2024-10-12 | Stop reason: SURG

## 2024-10-12 RX ADMIN — DIPHENHYDRAMINE HYDROCHLORIDE AND LIDOCAINE HYDROCHLORIDE AND ALUMINUM HYDROXIDE AND MAGNESIUM HYDRO 10 ML: KIT at 08:34

## 2024-10-12 RX ADMIN — PHENYLEPHRINE HYDROCHLORIDE 100 MCG: 10 INJECTION INTRAVENOUS at 13:26

## 2024-10-12 RX ADMIN — ONDANSETRON 4 MG: 4 TABLET, ORALLY DISINTEGRATING ORAL at 17:21

## 2024-10-12 RX ADMIN — NYSTATIN 500000 UNITS: 100000 SUSPENSION ORAL at 08:36

## 2024-10-12 RX ADMIN — METHYLPREDNISOLONE SODIUM SUCCINATE 20 MG: 40 INJECTION, POWDER, FOR SOLUTION INTRAMUSCULAR; INTRAVENOUS at 15:24

## 2024-10-12 RX ADMIN — DIPHENHYDRAMINE HYDROCHLORIDE AND LIDOCAINE HYDROCHLORIDE AND ALUMINUM HYDROXIDE AND MAGNESIUM HYDRO 10 ML: KIT at 22:05

## 2024-10-12 RX ADMIN — PROPOFOL INJECTABLE EMULSION 50 MG: 10 INJECTION, EMULSION INTRAVENOUS at 13:17

## 2024-10-12 RX ADMIN — Medication 10 ML: at 20:12

## 2024-10-12 RX ADMIN — PANTOPRAZOLE SODIUM 40 MG: 40 TABLET, DELAYED RELEASE ORAL at 08:36

## 2024-10-12 RX ADMIN — PROPOFOL INJECTABLE EMULSION 125 MCG/KG/MIN: 10 INJECTION, EMULSION INTRAVENOUS at 13:19

## 2024-10-12 RX ADMIN — Medication 15 G: at 08:34

## 2024-10-12 RX ADMIN — METHYLPREDNISOLONE SODIUM SUCCINATE 20 MG: 40 INJECTION, POWDER, FOR SOLUTION INTRAMUSCULAR; INTRAVENOUS at 23:24

## 2024-10-12 RX ADMIN — SODIUM CHLORIDE: 9 INJECTION, SOLUTION INTRAVENOUS at 13:10

## 2024-10-12 RX ADMIN — Medication 10 ML: at 08:40

## 2024-10-12 RX ADMIN — METOPROLOL TARTRATE 5 MG: 1 INJECTION, SOLUTION INTRAVENOUS at 20:08

## 2024-10-12 RX ADMIN — TIOTROPIUM BROMIDE INHALATION SPRAY 2 PUFF: 3.12 SPRAY, METERED RESPIRATORY (INHALATION) at 11:04

## 2024-10-12 RX ADMIN — NYSTATIN 500000 UNITS: 100000 SUSPENSION ORAL at 22:04

## 2024-10-12 RX ADMIN — PHENYLEPHRINE HYDROCHLORIDE 100 MCG: 10 INJECTION INTRAVENOUS at 13:32

## 2024-10-12 RX ADMIN — LIDOCAINE HYDROCHLORIDE 50 MG: 20 INJECTION, SOLUTION EPIDURAL; INFILTRATION; INTRACAUDAL; PERINEURAL at 13:17

## 2024-10-12 RX ADMIN — SODIUM SULFATE, POTASSIUM SULFATE, MAGNESIUM SULFATE 1 BOTTLE: 17.5; 3.13; 1.6 SOLUTION, CONCENTRATE ORAL at 06:34

## 2024-10-12 RX ADMIN — DIPHENHYDRAMINE HYDROCHLORIDE AND LIDOCAINE HYDROCHLORIDE AND ALUMINUM HYDROXIDE AND MAGNESIUM HYDRO 10 ML: KIT at 01:15

## 2024-10-12 RX ADMIN — Medication 15 G: at 22:05

## 2024-10-12 NOTE — PROGRESS NOTES
Hematology/Oncology Progress Note    Patient name: Maryann Gaitan  : 1950  MRN: [id number]      Chief complaint:  abdominal cramping in setting of Crohn's disease     Subjective/interim summary:    -10/11/2024 CT A/P contrast Enterography: Areas of bowel wall thickening and enhancement through the distal ileum, colon and rectum consistent the patient's history of Crohn's disease. No evidence of bowel obstruction, perforation or abscess.   -Dr Rob Strong [facility] performed endoscopy: And colonoscopy reporting 2 cm hiatal hernia with erythema congestion diffusely throughout the stomach consistent with gastritis, biopsies taken and pending.  Duodenum appeared to be normal, biopsies taken and pending colonoscopy showed significant ulceration with a high-grade stricture within 3 cm of the ileocecal valve and terminal ileum measuring approximately 5 mm in diameter and length.  Stricture widened but still unable to pass.  Biopsies taken.  Colon noted to have multiple ulcerations of varying size and stages of healing with skip lesions with biopsies taken.  Mild differential reticulosis of sigmoid colon without diverticulitis or bleeding with grade 2 internal hemorrhoids.    -1 PRBC given as well    10/12/24  patient reports fatigue, chronic abdominal pain/cramping is not as bad today and continued non-bloody diarrhea but denies fevers, chills, drenching night sweats, BRBPR, melena, bruising.  Has had budesonide helped but GI is recommending IV steroids now      History:  Past Medical History:   Diagnosis Date    Arthritis     COPD (chronic obstructive pulmonary disease)     Hypertension    ,   Past Surgical History:   Procedure Laterality Date    HYSTERECTOMY     , History reviewed. No pertinent family history.,   Social History     Tobacco Use    Smoking status: Former     Types: Cigarettes    Smokeless tobacco: Never   Vaping Use    Vaping status: Never Used   Substance Use Topics    Alcohol use: Never     Drug use: Never   ,   Medications Prior to Admission   Medication Sig Dispense Refill Last Dose/Taking    Adalimumab (Humira, 2 Pen,) 40 MG/0.4ML Pen-injector Kit Inject 40 mg under the skin into the appropriate area as directed Every 14 (Fourteen) Days.   9/27/2024    amLODIPine (NORVASC) 10 MG tablet Take 1 tablet by mouth Daily.   Taking    cholecalciferol (VITAMIN D3) 1.25 MG (86548 UT) capsule Take 1 capsule by mouth 2 (Two) Times a Week. Wed, Sat   Taking    colestipol (COLESTID) 1 g tablet Take 1 tablet by mouth 2 (Two) Times a Day.   Taking    diclofenac (VOLTAREN) 50 MG EC tablet Take 1 tablet by mouth 2 (Two) Times a Day.   Taking    ezetimibe (ZETIA) 10 MG tablet Take 1 tablet by mouth Daily.   Taking    folic acid (FOLVITE) 1 MG tablet Take 1 tablet by mouth Daily.   Taking    hydroCHLOROthiazide 25 MG tablet Take 1 tablet by mouth Daily.   Taking    levothyroxine (SYNTHROID, LEVOTHROID) 50 MCG tablet Take 1 tablet by mouth Daily.   Taking    methotrexate 2.5 MG tablet Take 8 tablets by mouth 1 (One) Time Per Week.   Taking    pantoprazole (PROTONIX) 20 MG EC tablet Take 1 tablet by mouth Daily.   Taking    predniSONE (DELTASONE) 5 MG tablet Take 1 tablet by mouth Daily.   Taking    sertraline (ZOLOFT) 50 MG tablet Take 1 tablet by mouth Daily.   Taking    tiotropium (SPIRIVA) 18 MCG per inhalation capsule Place 1 capsule into inhaler and inhale Daily.   Taking   , Scheduled Meds:  Budesonide, 9 mg, Oral, Daily  [Held by provider] enoxaparin, 40 mg, Subcutaneous, Q24H  First Mouthwash (Magic Mouthwash), 10 mL, Swish & Spit, Q6H  folic acid, 1 mg, Oral, Daily  levothyroxine, 50 mcg, Oral, Daily  nystatin, 5 mL, Swish & Swallow, 4x Daily  pantoprazole, 40 mg, Oral, Daily  sertraline, 50 mg, Oral, Daily  sodium chloride, 10 mL, Intravenous, Q12H  tiotropium bromide monohydrate, 2 puff, Inhalation, Daily - RT  Urea, 15 g, Oral, BID    , Continuous Infusions:   , PRN Meds:    albuterol    atropine     "senna-docusate sodium **AND** polyethylene glycol **AND** bisacodyl **AND** bisacodyl    ePHEDrine Sulfate (Pressors)    hydrALAZINE    ipratropium-albuterol    labetalol    Lidocaine Viscous HCl    Magnesium Standard Dose Replacement - Follow Nurse / BPA Driven Protocol    melatonin    nitroglycerin    ondansetron    ondansetron    Phosphorus Replacement - Follow Nurse / BPA Driven Protocol    Potassium Replacement - Follow Nurse / BPA Driven Protocol    sodium chloride   Allergies:  Codeine and Penicillin g sodium    ROS:  Review of Systems   Constitutional:  Positive for fatigue. Negative for activity change, appetite change, chills and diaphoresis.   HENT: Negative.     Eyes: Negative.    Respiratory: Negative.     Cardiovascular: Negative.    Gastrointestinal:  Positive for diarrhea. Negative for abdominal pain.   Endocrine: Negative.    Genitourinary: Negative.    Musculoskeletal: Negative.    Skin: Negative.    Allergic/Immunologic: Negative.    Neurological: Negative.    Hematological: Negative.    Psychiatric/Behavioral:  Positive for confusion. Negative for hallucinations.         Objective   Vital Signs:   /68 (BP Location: Right arm, Patient Position: Sitting)   Pulse 79   Temp 98 °F (36.7 °C) (Oral)   Resp 22   Ht 154.9 cm (61\")   Wt 57.6 kg (127 lb)   SpO2 97%   BMI 24.00 kg/m²     Physical Exam: (performed by MD)  Physical Exam  Vitals and nursing note reviewed.   Constitutional:       General: She is not in acute distress.     Appearance: Normal appearance. She is normal weight. She is ill-appearing.   HENT:      Head: Normocephalic and atraumatic.      Right Ear: External ear normal.      Left Ear: External ear normal.      Nose: Nose normal.      Mouth/Throat:      Mouth: Mucous membranes are moist.      Pharynx: Oropharynx is clear.   Eyes:      Extraocular Movements: Extraocular movements intact.      Conjunctiva/sclera: Conjunctivae normal.      Pupils: Pupils are equal, round, and " reactive to light.   Cardiovascular:      Rate and Rhythm: Normal rate and regular rhythm.      Pulses: Normal pulses.      Heart sounds: Normal heart sounds.   Pulmonary:      Effort: Pulmonary effort is normal.      Breath sounds: Normal breath sounds.   Abdominal:      General: Abdomen is flat.      Tenderness: There is abdominal tenderness.      Comments: Hypoactive normal pitch bowel sounds mildly tender to exam on palpation   Genitourinary:     Comments: Deferred  Musculoskeletal:         General: Normal range of motion.      Cervical back: Normal range of motion.   Skin:     General: Skin is warm and dry.   Neurological:      General: No focal deficit present.      Mental Status: She is alert and oriented to person, place, and time. Mental status is at baseline.   Psychiatric:         Mood and Affect: Mood normal.         Behavior: Behavior normal.         Thought Content: Thought content normal.         Results Review:  Lab Results (last 48 hours)       Procedure Component Value Units Date/Time    CBC & Differential [[id number]]  (Abnormal) Collected: 10/12/24 0019    Specimen: Blood from Arm, Right Updated: 10/12/24 0323    Narrative:      The following orders were created for panel order CBC & Differential.  Procedure                               Abnormality         Status                     ---------                               -----------         ------                     CBC Auto Differential[[id number]]        Abnormal            Final result               Scan Slide[[id number]]                                       Final result                 Please view results for these tests on the individual orders.    Manual Differential [[id number]]  (Abnormal) Collected: 10/12/24 0019    Specimen: Blood from Arm, Right Updated: 10/12/24 0323     Neutrophil % 78.0 %      Lymphocyte % 13.0 %      Monocyte % 2.0 %      Bands %  7.0 %      Neutrophils Absolute 2.24 10*3/mm3      Lymphocytes Absolute 0.34 10*3/mm3       Monocytes Absolute 0.05 10*3/mm3      Anisocytosis Slight/1+     Toxic Granulation Slight/1+     Platelet Morphology Normal    CBC Auto Differential [[id number]]  (Abnormal) Collected: 10/12/24 0019    Specimen: Blood from Arm, Right Updated: 10/12/24 0323     WBC 2.64 10*3/mm3      RBC 3.60 10*6/mm3      Hemoglobin 11.0 g/dL      Comment: Result checked          Hematocrit 32.9 %      MCV 91.4 fL      MCH 30.6 pg      MCHC 33.4 g/dL      RDW 16.4 %      RDW-SD 54.9 fl      MPV 9.0 fL      Platelets 257 10*3/mm3     Scan Slide [[id number]] Collected: 10/12/24 0019    Specimen: Blood from Arm, Right Updated: 10/12/24 0323     Scan Slide --     Comment: See Manual Differential Results       Comprehensive Metabolic Panel [[id number]]  (Abnormal) Collected: 10/12/24 0019    Specimen: Blood from Arm, Right Updated: 10/12/24 0239     Glucose 145 mg/dL      BUN 38 mg/dL      Creatinine 0.51 mg/dL      Sodium 132 mmol/L      Potassium 3.6 mmol/L      Comment: Slight hemolysis detected by analyzer. Result may be falsely elevated.        Chloride 96 mmol/L      CO2 23.8 mmol/L      Calcium 8.7 mg/dL      Total Protein 5.0 g/dL      Albumin 2.7 g/dL      ALT (SGPT) 13 U/L      AST (SGOT) 16 U/L      Alkaline Phosphatase 80 U/L      Total Bilirubin 1.0 mg/dL      Globulin 2.3 gm/dL      A/G Ratio 1.2 g/dL      BUN/Creatinine Ratio 74.5     Anion Gap 12.2 mmol/L      eGFR 98.1 mL/min/1.73     Narrative:      GFR Normal >60  Chronic Kidney Disease <60  Kidney Failure <15    The GFR formula is only valid for adults with stable renal function between ages 18 and 70.    Flow Cytometry [[id number]] Collected: 10/12/24 0019    Specimen: Blood from Arm, Right Updated: 10/12/24 0054    Vitamin B12 [[id number]]  (Normal) Collected: 10/11/24 1258    Specimen: Blood Updated: 10/12/24 0035     Vitamin B-12 312 pg/mL     Narrative:      Results may be falsely increased if patient taking Biotin.      Folate [[id number]]  (Normal) Collected:  10/11/24 1258    Specimen: Blood Updated: 10/12/24 0035     Folate 18.30 ng/mL     Narrative:      Results may be falsely increased if patient taking Biotin.      Iron Profile [[id number]]  (Abnormal) Collected: 10/11/24 1258    Specimen: Blood Updated: 10/11/24 1819     Iron 25 mcg/dL      Iron Saturation (TSAT) 13 %      Transferrin 129 mg/dL      TIBC 192 mcg/dL     Ferritin [[id number]]  (Abnormal) Collected: 10/11/24 1258    Specimen: Blood Updated: 10/11/24 1819     Ferritin 427.00 ng/mL     Narrative:      Results may be falsely decreased if patient taking Biotin.      Basic Metabolic Panel [[id number]]  (Abnormal) Collected: 10/11/24 1722    Specimen: Blood Updated: 10/11/24 1816     Glucose 122 mg/dL      BUN 40 mg/dL      Creatinine 0.49 mg/dL      Sodium 129 mmol/L      Potassium 3.9 mmol/L      Chloride 97 mmol/L      CO2 24.0 mmol/L      Calcium 8.5 mg/dL      BUN/Creatinine Ratio 81.6     Anion Gap 8.0 mmol/L      eGFR 99.0 mL/min/1.73     Narrative:      GFR Normal >60  Chronic Kidney Disease <60  Kidney Failure <15    The GFR formula is only valid for adults with stable renal function between ages 18 and 70.    Reticulocytes [[id number]]  (Abnormal) Collected: 10/11/24 1237    Specimen: Blood from Arm, Left Updated: 10/11/24 1759     Reticulocyte % 0.72 %      Reticulocyte Absolute 0.0182 10*6/mm3     Lactic Acid, Plasma [[id number]]  (Normal) Collected: 10/11/24 1257    Specimen: Blood Updated: 10/11/24 1338     Lactate 1.6 mmol/L     Basic Metabolic Panel [[id number]]  (Abnormal) Collected: 10/11/24 1258    Specimen: Blood Updated: 10/11/24 1331     Glucose 139 mg/dL      BUN 55 mg/dL      Creatinine 0.48 mg/dL      Sodium 126 mmol/L      Potassium 3.0 mmol/L      Chloride 94 mmol/L      CO2 24.2 mmol/L      Calcium 8.1 mg/dL      BUN/Creatinine Ratio 114.6     Anion Gap 7.8 mmol/L      eGFR 99.5 mL/min/1.73     Narrative:      GFR Normal >60  Chronic Kidney Disease <60  Kidney Failure <15    The GFR  formula is only valid for adults with stable renal function between ages 18 and 70.    Magnesium [[id number]]  (Abnormal) Collected: 10/11/24 1258    Specimen: Blood Updated: 10/11/24 1331     Magnesium 1.3 mg/dL     Extra Tubes [[id number]] Collected: 10/11/24 1258    Specimen: Blood Updated: 10/11/24 1315    Narrative:      The following orders were created for panel order Extra Tubes.  Procedure                               Abnormality         Status                     ---------                               -----------         ------                     Red Top[[id number]]                                          Final result               Red Top[[id number]]                                          Final result               Grn Na Hep No Gel[[id number]]                                Final result               Gold Top - SST[[id number]]                                   Final result               Gold Top - SST[[id number]]                                   Final result               Light Blue Top[[id number]]                                   Final result               Light Blue Top[[id number]]                                   Final result                 Please view results for these tests on the individual orders.    Grn Na Hep No Gel [[id number]] Collected: 10/11/24 1258    Specimen: Blood Updated: 10/11/24 1315     Extra Tube Hold for add-ons.     Comment: Auto resulted.       Hemoglobin & Hematocrit, Blood [[id number]]  (Abnormal) Collected: 10/11/24 1237    Specimen: Blood from Arm, Left Updated: 10/11/24 1307     Hemoglobin 7.8 g/dL      Hematocrit 24.6 %     Red Top [[id number]] Collected: 10/11/24 1258    Specimen: Blood Updated: 10/11/24 1302     Extra Tube Hold for add-ons.     Comment: Auto resulted.       Red Top [[id number]] Collected: 10/11/24 1258    Specimen: Blood Updated: 10/11/24 1302     Extra Tube Hold for add-ons.     Comment: Auto resulted.       Gold Top - SST [[id number]] Collected: 10/11/24  1258    Specimen: Blood Updated: 10/11/24 1302     Extra Tube Hold for add-ons.     Comment: Auto resulted.       Gold Top - SST [[id number]] Collected: 10/11/24 1258    Specimen: Blood Updated: 10/11/24 1302     Extra Tube Hold for add-ons.     Comment: Auto resulted.       Light Blue Top [[id number]] Collected: 10/11/24 1258    Specimen: Blood Updated: 10/11/24 1302     Extra Tube Hold for add-ons.     Comment: Auto resulted       Light Blue Top [[id number]] Collected: 10/11/24 1258    Specimen: Blood Updated: 10/11/24 1302     Extra Tube Hold for add-ons.     Comment: Auto resulted       Sedimentation Rate [[id number]]  (Normal) Collected: 10/11/24 0358    Specimen: Blood from Arm, Right Updated: 10/11/24 1156     Sed Rate 8 mm/hr     C-reactive Protein [[id number]]  (Abnormal) Collected: 10/11/24 0358    Specimen: Blood from Arm, Right Updated: 10/11/24 1143     C-Reactive Protein 8.01 mg/dL     BNP [[id number]]  (Abnormal) Collected: 10/11/24 0358    Specimen: Blood from Arm, Right Updated: 10/11/24 0824     proBNP 2,573.0 pg/mL     Narrative:      This assay is used as an aid in the diagnosis of individuals suspected of having heart failure. It can be used as an aid in the diagnosis of acute decompensated heart failure (ADHF) in patients presenting with signs and symptoms of ADHF to the emergency department (ED). In addition, NT-proBNP of <300 pg/mL indicates ADHF is not likely.    Age Range Result Interpretation  NT-proBNP Concentration (pg/mL:      <50             Positive            >450                   Gray                 300-450                    Negative             <300    50-75           Positive            >900                  Gray                300-900                  Negative            <300      >75             Positive            >1800                  Gray                300-1800                  Negative            <300    Basic Metabolic Panel [[id number]]  (Abnormal) Collected: 10/11/24 0358     Specimen: Blood from Arm, Right Updated: 10/11/24 0512     Glucose 108 mg/dL      BUN 21 mg/dL      Creatinine 0.38 mg/dL      Sodium 127 mmol/L      Potassium 2.6 mmol/L      Chloride 94 mmol/L      CO2 23.9 mmol/L      Calcium 7.7 mg/dL      BUN/Creatinine Ratio 55.3     Anion Gap 9.1 mmol/L      eGFR 105.3 mL/min/1.73     Narrative:      GFR Normal >60  Chronic Kidney Disease <60  Kidney Failure <15    The GFR formula is only valid for adults with stable renal function between ages 18 and 70.    CBC & Differential [[id number]]  (Abnormal) Collected: 10/11/24 0358    Specimen: Blood from Arm, Right Updated: 10/11/24 0440    Narrative:      The following orders were created for panel order CBC & Differential.  Procedure                               Abnormality         Status                     ---------                               -----------         ------                     CBC Auto Differential[[id number]]        Abnormal            Final result               Scan Slide[[id number]]                                                                    Please view results for these tests on the individual orders.    CBC Auto Differential [[id number]]  (Abnormal) Collected: 10/11/24 0358    Specimen: Blood from Arm, Right Updated: 10/11/24 0440     WBC 2.80 10*3/mm3      RBC 2.29 10*6/mm3      Hemoglobin 7.0 g/dL      Hematocrit 21.5 %      MCV 93.9 fL      MCH 30.6 pg      MCHC 32.6 g/dL      RDW 16.8 %      RDW-SD 57.3 fl      MPV 9.0 fL      Platelets 269 10*3/mm3      Neutrophil % 61.4 %      Lymphocyte % 26.1 %      Monocyte % 3.9 %      Eosinophil % 1.8 %      Basophil % 0.0 %      Immature Grans % 6.8 %      Neutrophils, Absolute 1.72 10*3/mm3      Lymphocytes, Absolute 0.73 10*3/mm3      Monocytes, Absolute 0.11 10*3/mm3      Eosinophils, Absolute 0.05 10*3/mm3      Basophils, Absolute 0.00 10*3/mm3      Immature Grans, Absolute 0.19 10*3/mm3      nRBC 0.0 /100 WBC     Sodium [[id number]]  (Abnormal)  Collected: 10/10/24 2055    Specimen: Blood from Arm, Left Updated: 10/10/24 2121     Sodium 125 mmol/L     Sodium [[id number]]  (Abnormal) Collected: 10/10/24 1603    Specimen: Blood from Arm, Right Updated: 10/10/24 1627     Sodium 126 mmol/L     Osmolality, Urine - Straight Cath [[id number]]  (Normal) Collected: 10/10/24 1524    Specimen: Urine from Straight Cath Updated: 10/10/24 1547     Osmolality, Urine 443 mOsm/kg     Sodium, Urine, Random - Straight Cath [[id number]] Collected: 10/10/24 1524    Specimen: Urine from Straight Cath Updated: 10/10/24 1541     Sodium, Urine <20 mmol/L     Narrative:      Reference intervals for random urine have not been established.  Clinical usage is dependent upon physician's interpretation in combination with other laboratory tests.                Pending Results: EGD and colonoscopy path, Flow cytometry    Imaging Reviewed:   CT Enterography Abdomen Pelvis w Contrast    Result Date: 10/11/2024  Impression: Areas of bowel wall thickening and enhancement through the distal ileum, colon and rectum consistent the patient's history of Crohn's disease. No evidence of bowel obstruction, perforation or abscess. Electronically Signed: Jakob Hernandez MD  10/11/2024 10:08 PM EDT  Workstation ID: [id number]    XR Chest 1 View    Result Date: 10/9/2024  Impression: No active disease Electronically Signed: Lawson Pritchard MD  10/9/2024 12:48 PM EDT  Workstation ID: [id number]          Assessment & Plan   ASSESSMENT/PLAN  Normocytic anemia and leukopenia:-subacute? Pre-admission labs unavailable to see tempo. Unknown etiology: ddx large but includes poor nutrition, bleed, primary bone marrow disorder, infection,  -Some immature granulocytes noted on today's CBC. flow cytometry pending to ensure no leukemic cells present. Patient relatively asymptomatic.   -10/11/2022 reticulocyte numbers 0.72 (normal) however absolute reticulocyte decreased at 0.0182 suggesting either not enough building blocks to  make blood or bone marrow dysfunction  -B12 312 (normal), folate 18.3 (normal), iron studies (iron 25 low, iron saturation 13% low) and ferritin (427 high, but is an acute phase reactant) -appears there could still be some component of iron deficiency involved which is not surprising as she has a disease that could cause on again off again bleeding; will likely have to deal with IV iron as what was seen on EGD means she will be unlikely to tolerate oral iron or be able to absorb it as she will likely be on chronic PPIs  -agree with CBCdiff daily while inpatient to check ANC level and follow immature granulocytes  -see GI w/u and treatment plan given hx of Crohn's.  -If hgb <7 and symptomatic agree with PRBC already given  -monitor for fevers     will continue to follow    Electronically signed by Dede Amezquita MD PhD, 10/12/24, 1:38 PM EDT.        Thank you for this consult. We will be happy to follow along with you.

## 2024-10-12 NOTE — SIGNIFICANT NOTE
10/12/24 1710   OTHER   Discipline physical therapy assistant   Rehab Time/Intention   Session Not Performed patient/family declined treatment  (patient had colonoscopy earlier and did ot want to get up, c/o feeling cold.)   Therapy Assessment/Plan (PT)   Criteria for Skilled Interventions Met (PT)   (patient had colonoscopy earlier and did not want to get oob, c/o being cold)

## 2024-10-12 NOTE — ANESTHESIA PREPROCEDURE EVALUATION
Anesthesia Evaluation     Patient summary reviewed and Nursing notes reviewed   NPO Solid Status: > 8 hours  NPO Liquid Status: > 8 hours           Airway   Mallampati: II  TM distance: >3 FB  Neck ROM: full  No difficulty expected  Dental - normal exam     Pulmonary    (+) COPD,  Cardiovascular     (+) hypertension      Neuro/Psych  GI/Hepatic/Renal/Endo      Musculoskeletal     Abdominal    Substance History      OB/GYN          Other   arthritis, autoimmune disease rheumatoid arthritis, blood dyscrasia anemia,                 Anesthesia Plan    ASA 3     MAC     intravenous induction     Anesthetic plan, risks, benefits, and alternatives have been provided, discussed and informed consent has been obtained with: patient.    Plan discussed with CRNA.    CODE STATUS:    Code Status (Patient has no pulse and is not breathing): CPR (Attempt to Resuscitate)  Medical Interventions (Patient has pulse or is breathing): Full Support

## 2024-10-12 NOTE — OP NOTE
COLONOSCOPY, ESOPHAGOGASTRODUODENOSCOPY Procedure Report    Patient Name:  Maryann Gaitan  YOB: 1950    Date of Surgery:  10/12/2024     Preop diagnosis:  Nausea  Chronic diarrhea  Abnormal CT of the small bowel and colon    Postop diagnosis:  Small hiatal hernia  Nonerosive chronic gastritis  Normal duodenum  Terminal ileum stricture  Terminal ileum ulcers  Colon ulcers  Crohn's disease of small bowel and colon with complication new sigmoid colon diverticulosis without diverticulitis or bleeding  Grade 2 internal hemorrhoids        Procedure(s):  COLONOSCOPY WITH BIOPSY AND WIRE GUIDED BALLOON DILATION OF TERMINAL ILEUM  ESOPHAGOGASTRODUODENOSCOPY WITH BIOPSY X 2 AREA       Staff:  Surgeon(s):  Rob Strong MD      Anesthesia: Monitored Anesthesia Care    Implants:    Nothing was implanted during the procedure    Specimen:        See Below    Estimated blood loss: Minimal     DNR status: Patient wishes full code during the procedure    Complications:  None    Description of Procedure:  Informed consent was obtained for the procedure, including sedation.  Risks of perforation, hemorrhage, adverse drug reaction and aspiration were discussed.  The patient was brought into the endoscopy suite. Continuous cardiopulmonary monitoring was performed. The patient was placed in the left lateral decubitus position.  The bite block was inserted into the patient's mouth. After adequate sedation was attained, the Olympus gastroscope was inserted into the patient's mouth and advanced to the second portion of the duodenum without difficulty.  Circumferential examination was performed. A retroflex exam was performed in the patient's stomach.  On completion of the exam, the bowel was decompressed, the scope was removed from the patient, the patient tolerated the procedure well, there were no immediate post-operative complications.     Examination of the esophagus: Normal mucosa.  A small 2 cm hiatal  hernia was noted.  Examination of the stomach: Erythema and congestion seen diffusely throughout the stomach consistent with gastritis.  Cold forceps biopsies of the antrum body taken for histology and to rule H. pylori  Examination of the duodenum: Normal to second duodenum.  Cold forceps biopsies obtained to evaluate for malabsorptive process    Subsequently,  the Olympus colonoscope was inserted into the patient's rectum and advanced to the level of the cecum and terminal ileum without difficulty.  The bowel prep was good.  Circumferential examination of the patient's colon was performed on scope withdrawal.  The cecum, ascending colon, and hepatic flexure were examined twice.  The transverse colon, splenic flexure, descending, sigmoid colon and rectum were examined.  The bowel was decompressed, the scope was withdrawn from the patient, and the patient tolerated the procedure well. There were no immediate post-operative complications.     Findings:   Terminal ileum: Significant ulceration with a high-grade stricture seen within 3 cm of the ileocecal valve and the terminal ileum measuring approximately 5 mm in diameter and 5 mm in length.  The pediatric colonoscope was unable to traverse the stricture.  A wire was placed across the stricture with the balloon which was inflated from 10 mm up to 11 mm and 12 mm and held for 120 seconds.  It was then deflated revealing effective dilation of the stricture.  I was still unable to pass a pediatric colonoscope beyond the stricture.  Cold forceps biopsies were obtained for histology  Colon: Multiple ulcerations of varying sizes and varying stages of healing were seen throughout the colon with skip lesions preserving parts of the transverse colon descending and sigmoid colon with marked ulceration in the distal sigmoid colon and rectum.  Cold forceps biopsies obtained for histology  Mild diverticulosis of sigmoid colon without diverticulitis or bleeding  Grade 2 internal  hemorrhoids    Impression:  EGD shows small hiatal hernia, nonerosive gastritis and normal duodenum.  Colonoscopy shows Crohn's disease of the small bowel and colon with stricture of the terminal ileum unable to traverse with pediatric colonoscope even after wire-guided balloon dilation    Recommendations:  Follow-up on pathology  Solu-Medrol 20 mg IV every 8 hours  Mesalamine 4.8 g daily  Low residue diet  Will need Humira drug levels evaluated as an outpatient  Consider Crohn's stricturing clinical trial versus other advanced therapy such as Rinvoq which is also indicated for rheumatoid arthritis    We appreciate the referral    Electronically signed by Rob Strong MD, 10/12/24, 1:56 PM EDT.

## 2024-10-12 NOTE — PLAN OF CARE
"Continue to monitor and assess pain.  Patient is alert and oriented with no complaints but is having a bowel prep done so frequent bathroom breaks needed.   Problem: Adult Inpatient Plan of Care  Goal: Plan of Care Review  Outcome: Progressing  Flowsheets  Taken 10/12/2024 0700 by Skyla Laws RN  Progress: no change  Plan of Care Reviewed With: patient  Taken 10/9/2024 1547 by Jessica Dickinson, PT  Outcome Evaluation: Maryann Gaitan is a 73 yo F with a PMH of Crohn's disease presented to [facility] on 10/8/24 for abnormal blood work from her primary care doctor and intermittent abdominal cramping, being treated for hyponatremia. Pt is A&Ox4, reports she lives with her 2 adult grandaughters in a [facility] with 2 [facility]. Pt has 24/7 care from her grandchildren and states she recieves assistance with all mobility and ADLs. She has a rollator which she says \"topples over all the time\" when she ambulates and her grandaughter walks with her to the bathroom and to and from her bedroom so she doesn't fall. Today she requires Justa with bed mobility and transfer to chair. Pt is very anxious about falling and refused to ambulate on this date despite education on importance of movement in the acute setting. Pt has BLE weakness and poor activity endurance. She would benefit from SNF upon d/c to address weakness and functional endurance deficits but pt adamately refuses to stay inpatient for rehab, HH PT may be another option. PT will follow while IP and progress activity as tolerated.  Goal: Patient-Specific Goal (Individualized)  Outcome: Progressing  Goal: Absence of Hospital-Acquired Illness or Injury  Outcome: Progressing  Intervention: Identify and Manage Fall Risk  Flowsheets (Taken 10/12/2024 0700)  Safety Promotion/Fall Prevention:   clutter free environment maintained   assistive device/personal items within reach   fall prevention program maintained   safety round/check completed  Intervention: Prevent Skin " Injury  Flowsheets (Taken 10/12/2024 0700)  Body Position: position changed independently  Skin Protection: incontinence pads utilized  Intervention: Prevent and Manage VTE (Venous Thromboembolism) Risk  Flowsheets (Taken 10/11/2024 2017 by Conchita Hudson, RN)  VTE Prevention/Management:   sequential compression devices off   patient refused intervention  Intervention: Prevent Infection  Flowsheets (Taken 10/12/2024 0700)  Infection Prevention:   hand hygiene promoted   equipment surfaces disinfected   personal protective equipment utilized   rest/sleep promoted   single patient room provided  Goal: Optimal Comfort and Wellbeing  Outcome: Progressing  Intervention: Monitor Pain and Promote Comfort  Flowsheets (Taken 10/11/2024 1512 by Mari Petit, RN)  Pain Management Interventions: no interventions per patient request  Intervention: Provide Person-Centered Care  Flowsheets (Taken 10/11/2024 2017 by Conchita Hudson, RN)  Trust Relationship/Rapport:   care explained   choices provided  Goal: Readiness for Transition of Care  Outcome: Progressing  Intervention: Mutually Develop Transition Plan  Flowsheets (Taken 10/11/2024 1513 by Radha Rodriguez, RN)  Equipment Currently Used at Home:   rollator   nebulizer   commode  Anticipated Changes Related to Illness: none  Transportation Anticipated: family or friend will provide  Transportation Concerns: none  Concerns to be Addressed: discharge planning  Readmission Within the Last 30 Days: no previous admission in last 30 days  Patient/Family Anticipated Services at Transition: none  Patient/Family Anticipates Transition to:   home with family   home with help/services     Problem: Fall Injury Risk  Goal: Absence of Fall and Fall-Related Injury  Outcome: Progressing  Intervention: Identify and Manage Contributors  Flowsheets  Taken 10/12/2024 0700 by Skyla Laws RN  Medication Review/Management: medications reviewed  Taken 10/11/2024 2017 by Conchita Hudson,  RN  Self-Care Promotion:   independence encouraged   BADL personal objects within [facility]  Intervention: Promote Injury-Free Environment  Flowsheets (Taken 10/12/2024 0700)  Safety Promotion/Fall Prevention:   clutter free environment maintained   assistive device/personal items within [facility]   fall prevention program maintained   safety round/check completed     Problem: Skin Injury Risk Increased  Goal: Skin Health and Integrity  Outcome: Progressing  Intervention: Optimize Skin Protection  Recent Flowsheet Documentation  Taken 10/12/2024 0700 by Skyla Laws RN  Skin Protection: incontinence pads utilized   Goal Outcome Evaluation:  Plan of Care Reviewed With: patient        Progress: no change

## 2024-10-12 NOTE — CONSULTS
"Nutrition Services    Patient Name: Maryann Gaitan  YOB: 1950  MRN: [id number]  Admission date: 10/8/2024    Comment:      CLINICAL NUTRITION ASSESSMENT      Reason for Assessment 10/12:  Malnutrition screening tool score of 3     H&P      Past Medical History:   Diagnosis Date    Arthritis     COPD (chronic obstructive pulmonary disease)     Hypertension        Past Surgical History:   Procedure Laterality Date    HYSTERECTOMY          Current Problems      Hyponatremia   -Nephrology following     Hypokalemia  -Resolved currently     Nausea, vomiting, diarrhea  Crohns disease  -EGD colonoscopy 10/12     Anemia      Hypertension      Hypothyroidism     Depression          Encounter Information        Trending Narrative     10/12: Pt presented to the ED on 10/8 due to abnormal blood work and declined admission leaving AMA. Pt later returned to ED after being advised to return by her PCP and was admitted. Pt does report some intermittent abdominal cramping. RD unable to visit pt in person at this time. Pt in ENDO currently for EGD/colonoscopy. Will attempt to perform physical assessment at a later time. Diarrhea in the last 24 hours r/t bowel prep. NPO  currently due to EGD/colonoscopy.      Anthropometrics        Current Height, Weight Height: 154.9 cm (61\")  Weight: 57.6 kg (127 lb) (10/12/24 1153)       Usual Body Weight (UBW) Unable to obtain from patient       Trending Weight Hx     This admission: 10/12: 127#             PTA: 10/12: No weight trends documented PTA.     Wt Readings from Last 30 Encounters:   10/12/24 1153 57.6 kg (127 lb)   10/12/24 0757 57.6 kg (127 lb)   10/11/24 0420 58 kg (127 lb 13.9 oz)   10/10/24 0503 58.1 kg (128 lb 1.4 oz)   10/08/24 1956 54.9 kg (121 lb 0.5 oz)   10/08/24 0828 54.9 kg (121 lb)      BMI kg/m2 Body mass index is 24 kg/m².       Labs        Pertinent Labs Hyponatremia - management per attending   Magnesium replaced today   Results from last 7 days   Lab " "Units 10/12/24  0019 10/11/24  1722 10/11/24  1258 10/10/24  0808 10/10/24  0428 10/09/24  0542 10/08/24  2105   SODIUM mmol/L 132* 129* 126*   < > 123*   < > 126*   POTASSIUM mmol/L 3.6 3.9 3.0*   < > 3.2*   < > 3.2*   CHLORIDE mmol/L 96* 97* 94*   < > 92*   < > 87*   CO2 mmol/L 23.8 24.0 24.2   < > 24.7   < > 24.6   BUN mg/dL 38* 40* 55*   < > 8   < > 24*   CREATININE mg/dL 0.51* 0.49* 0.48*   < > 0.44*   < > 0.75   CALCIUM mg/dL 8.7 8.5* 8.1*   < > 7.9*   < > 8.7   BILIRUBIN mg/dL 1.0  --   --   --  0.5  --  0.7   ALK PHOS U/L 80  --   --   --  70  --  89   ALT (SGPT) U/L 13  --   --   --  13  --  13   AST (SGOT) U/L 16  --   --   --  14  --  15   GLUCOSE mg/dL 145* 122* 139*   < > 99   < > 103*    < > = values in this interval not displayed.     Results from last 7 days   Lab Units 10/12/24  0019 10/11/24  1258 10/11/24  0358 10/10/24  0428 10/09/24  1407 10/09/24  0542 10/08/24  2105   MAGNESIUM mg/dL  --  1.3*  --  1.4*  --   --  1.9   HEMOGLOBIN g/dL 11.0*  --    < > 7.7*   < > 8.1* 9.4*   HEMATOCRIT % 32.9*  --    < > 23.3*   < > 24.6* 27.6*   TRIGLYCERIDES mg/dL  --   --   --   --   --  182*  --     < > = values in this interval not displayed.     No results found for: \"HGBA1C\"     Medications    Scheduled Medications Budesonide, 9 mg, Oral, Daily  [Held by provider] enoxaparin, 40 mg, Subcutaneous, Q24H  First Mouthwash (Magic Mouthwash), 10 mL, Swish & Spit, Q6H  folic acid, 1 mg, Oral, Daily  levothyroxine, 50 mcg, Oral, Daily  nystatin, 5 mL, Swish & Swallow, 4x Daily  pantoprazole, 40 mg, Oral, Daily  sertraline, 50 mg, Oral, Daily  sodium chloride, 10 mL, Intravenous, Q12H  tiotropium bromide monohydrate, 2 puff, Inhalation, Daily - RT  Urea, 15 g, Oral, BID        Infusions      PRN Medications   albuterol    senna-docusate sodium **AND** polyethylene glycol **AND** bisacodyl **AND** bisacodyl    Lidocaine Viscous HCl    Magnesium Standard Dose Replacement - Follow Nurse / BPA Driven Protocol    " melatonin    nitroglycerin    ondansetron    Phosphorus Replacement - Follow Nurse / BPA Driven Protocol    Potassium Replacement - Follow Nurse / BPA Driven Protocol    sodium chloride     Physical Findings        Trending Physical   Appearance, NFPE 10/12: ALOK   --  Edema  NO edema documented      Bowel Function Last documented BM 10/12 - diarrhea     Tubes No feeding tube in place      Chewing/Swallowing No issues reported      Skin Unstageable sacral spine PI     --  Current Nutrition Orders & Evaluation of Intake       Oral Nutrition     Food Allergies NKFA   Current PO Diet NPO Diet NPO Type: Strict NPO   Supplement None    PO Evaluation     Trending % PO Intake 10/12: No meals documented    --  Nutritional Risk Screening        NRS-2002 Score          Nutrition Diagnosis         Nutrition Dx Problem 1 Inadequate po intake related to ongoing GI issues as evidenced by NPO status.       Nutrition Dx Problem 2        Intervention Goal         Intervention Goal(s) Tolerate po diet when able to resume  PO intake  > 75%  Prevent weight loss      Nutrition Intervention        RD Action Resume po diet when clinically feasible    Monitor for need for ONS after po diet resumed     Nutrition Prescription          Diet Prescription NPO   Supplement Prescription NPO   --  Monitor/Evaluation        Monitor Per protocol, I&O, PO intake, Pertinent labs, Weight, Skin status, GI status, Hemodynamic stability         Electronically signed by:  Renetta Arriaga RD  10/12/24 12:00 EDT

## 2024-10-12 NOTE — PLAN OF CARE
Goal Outcome Evaluation:  Plan of Care Reviewed With: patient        Progress: no change     Pt currently awake abed. Pt on bowel prep; stool green, runny, with chunks present. No distress noted. VSS cont plan of care.

## 2024-10-12 NOTE — PROGRESS NOTES
PROGRESS NOTE      Patient Name: Maryann Gaitan  : 1950  MRN: [id number]  Primary Care Physician: Eduard Little MD  Date of admission: 10/8/2024    Patient Care Team:  Eduard Little MD as PCP - General (Family Medicine)        Subjective   Subjective:     Seen and examined, comfortable, not in distress,  Sodium is stable at 132,      Review of systems:  All other review of system unremarkable      Allergies:    Allergies   Allergen Reactions    Codeine Hives    Penicillin G Sodium Hives       Objective   Exam:     Vital Signs  Temp:  [97.5 °F (36.4 °C)-98.9 °F (37.2 °C)] 97.5 °F (36.4 °C)  Heart Rate:  [] 84  Resp:  [12-28] 19  BP: ()/(47-84) 129/71  SpO2:  [93 %-100 %] 97 %  on   ;   Device (Oxygen Therapy): room air  Body mass index is 24.16 kg/m².    General: Elderly female in no acute distress.    Head:      Normocephalic and atraumatic.    Eyes:      PERRL/EOM intact, conjunctivae and sclerae clear without nystagmus.    Neck:      No masses, thyromegaly,  trachea central with normal respiratory effort   Lungs:    Clear bilaterally to auscultation.    Heart:      Regular rate and rhythm, no murmur no gallop  Abd:        Soft, nontender, not distended, bowel sounds positive, no shifting dullness   Pulses:   Pulses palpable  Extr:        No cyanosis or clubbing--no edema.    Neuro:    No focal deficits.   alert oriented x3  Skin:       Intact without lesions or rashes.    Psych:    Alert and cooperative; normal mood and affect; .      Results Review:  I have personally reviewed most recent Data :  CBC    Results from last 7 days   Lab Units 10/12/24  0019 10/11/24  1237 10/11/24  0358 10/10/24  0428 10/09/24  1407 10/09/24  0542 10/08/24  2105 10/08/24  0927   WBC 10*3/mm3 2.64*  --  2.80* 1.54*  --  2.94* 4.51 6.60   HEMOGLOBIN g/dL 11.0* 7.8* 7.0* 7.7* 8.4* 8.1* 9.4* 10.3*   PLATELETS 10*3/mm3 257  --  269 315  --  343 402 449     CMP   Results from last 7 days   Lab  Units 10/12/24  0019 10/11/24  1722 10/11/24  1258 10/11/24  0358 10/10/24  2055 10/10/24  1603 10/10/24  1228 10/10/24  0808 10/10/24  0428 10/09/24  1407 10/09/24  0542 10/08/24  2105 10/08/24  0927   SODIUM mmol/L 132* 129* 126* 127* 125* 126* 122*   < > 123* 126*  125* 126* 126* 122*   POTASSIUM mmol/L 3.6 3.9 3.0* 2.6*  --   --   --   --  3.2* 3.7 4.0 3.2* 3.0*   CHLORIDE mmol/L 96* 97* 94* 94*  --   --   --   --  92* 93* 91* 87* 81*   CO2 mmol/L 23.8 24.0 24.2 23.9  --   --   --   --  24.7 23.5 25.6 24.6 22.5   BUN mg/dL 38* 40* 55* 21  --   --   --   --  8 13 19 24* 35*   CREATININE mg/dL 0.51* 0.49* 0.48* 0.38*  --   --   --   --  0.44* 0.52* 0.60 0.75 1.07*   GLUCOSE mg/dL 145* 122* 139* 108*  --   --   --   --  99 103* 102* 103* 98   ALBUMIN g/dL 2.7*  --   --   --   --   --   --   --  2.6*  --   --  3.3* 3.3*   BILIRUBIN mg/dL 1.0  --   --   --   --   --   --   --  0.5  --   --  0.7 0.7   ALK PHOS U/L 80  --   --   --   --   --   --   --  70  --   --  89 100   AST (SGOT) U/L 16  --   --   --   --   --   --   --  14  --   --  15 18   ALT (SGPT) U/L 13  --   --   --   --   --   --   --  13  --   --  13 17    < > = values in this interval not displayed.     ABG      CT Enterography Abdomen Pelvis w Contrast    Result Date: 10/11/2024  Impression: Areas of bowel wall thickening and enhancement through the distal ileum, colon and rectum consistent the patient's history of Crohn's disease. No evidence of bowel obstruction, perforation or abscess. Electronically Signed: Jakob Hernandez MD  10/11/2024 10:08 PM EDT  Workstation ID: [id number]       Scheduled Meds:Budesonide, 9 mg, Oral, Daily  [Held by provider] enoxaparin, 40 mg, Subcutaneous, Q24H  First Mouthwash (Magic Mouthwash), 10 mL, Swish & Spit, Q6H  folic acid, 1 mg, Oral, Daily  levothyroxine, 50 mcg, Oral, Daily  nystatin, 5 mL, Swish & Swallow, 4x Daily  pantoprazole, 40 mg, Oral, Daily  sertraline, 50 mg, Oral, Daily  sodium chloride, 10 mL,  Intravenous, Q12H  tiotropium bromide monohydrate, 2 puff, Inhalation, Daily - RT  Urea, 15 g, Oral, BID      Continuous Infusions:   PRN Meds:  albuterol    senna-docusate sodium **AND** polyethylene glycol **AND** bisacodyl **AND** bisacodyl    Lidocaine Viscous HCl    Magnesium Standard Dose Replacement - Follow Nurse / BPA Driven Protocol    melatonin    nitroglycerin    ondansetron    Phosphorus Replacement - Follow Nurse / BPA Driven Protocol    Potassium Replacement - Follow Nurse / BPA Driven Protocol    sodium chloride    Assessment & Plan   Assessment and Plan:         Hyponatremia    Diarrhea    ASSESSMENT:  Hyponatremia likely etiology thiazide diuretics in the presence of Cymbalta  History of hypertension  History of arthritis  History of Crohn disease    PLAN :      Clinically patient has diarrhea seem to be somewhat volume depleted blood pressure is on the low side   Hyponatremia at this time is multifactorial including initially the use of thiazide and SSRI but also diarrhea volume depletion poor nutritional state decreased p.o. intake also contributing to hyponatremia     Sodium is stable at 132, patient getting EGD and colonoscopy, bowel prep as well as being n.p.o. definitely helping, needs to be on a fluid restriction when allowed to eat and drink, also supplement boost and Ensure,  Follow-up labs tomorrow,  Potassium acceptable today, continue replacement as per protocol with ongoing loss,  Follow-up with hematology and GI  Closely follow,  Blood pressure is stable, noted hypotensive episode yesterday,        Electronically signed by Ck Crump MD,   [facility] kidney consultant  [phone]  10/12/2024  07:35 EDT

## 2024-10-12 NOTE — ANESTHESIA POSTPROCEDURE EVALUATION
Patient: Maryann Gaitan    Procedure Summary       Date: 10/12/24 Room / Location: [facility] ENDOSCOPY 1 / [facility] ENDOSCOPY    Anesthesia Start: 1310 Anesthesia Stop: 1402    Procedures:       COLONOSCOPY WITH BIOPSY AND WIRE GUIDED BALLOON DILATION OF TERMINAL ILEUM      ESOPHAGOGASTRODUODENOSCOPY WITH BIOPSY X 2 AREA Diagnosis:       Diarrhea, unspecified type      (Diarrhea, unspecified type [R19.7])    Surgeons: Rob Strong MD Provider: Jimmy Castanon MD    Anesthesia Type: MAC ASA Status: 3            Anesthesia Type: MAC    Vitals  Vitals Value Taken Time   /69 10/12/24 1442   Temp 97.6 °F (36.4 °C) 10/12/24 1358   Pulse 90 10/12/24 1443   Resp 16 10/12/24 1428   SpO2 92 % 10/12/24 1443   Vitals shown include unfiled device data.        Post Anesthesia Care and Evaluation    Patient location during evaluation: PACU  Patient participation: complete - patient participated  Level of consciousness: awake  Pain scale: See nurse's notes for pain score.  Pain management: adequate    Airway patency: patent  Anesthetic complications: No anesthetic complications  PONV Status: none  Cardiovascular status: acceptable  Respiratory status: acceptable and spontaneous ventilation  Hydration status: acceptable    Comments: Patient seen and examined postoperatively; vital signs stable; SpO2 greater than or equal to 90%; cardiopulmonary status stable; nausea/vomiting adequately controlled; pain adequately controlled; no apparent anesthesia complications; patient discharged from anesthesia care when discharge criteria were met

## 2024-10-12 NOTE — PROGRESS NOTES
[facility] MEDICINE SERVICE  DAILY PROGRESS NOTE    NAME: Maryann Gaitan  : 1950  MRN: [id number]      LOS: 3 days     PROVIDER OF SERVICE: Jonathan Mackay MD    Chief Complaint: Hyponatremia    Subjective:     Interval History:    Patient seen and evaluated at bedside.  Patient is c/o diarrhea.      Treatment plan discussed with patient. All questions addressed.     Review of Systems:   Denies fevers, chills  Denies chest pain, edema  Denies shortness of breath, cough  +n/v/d  Denies dysuria, hematuria    Objective:     Vital Signs  Temp:  [97.5 °F (36.4 °C)-98.9 °F (37.2 °C)] 97.5 °F (36.4 °C)  Heart Rate:  [] 83  Resp:  [12-28] 16  BP: (109-149)/(61-84) 149/63   Body mass index is 24 kg/m².    Physical Exam   General: No acute distress, appears stated age  Neuro: Awake and alert, oriented x3, no focal deficits appreciated  HEENT: EOMI, dry mucous membranes  CV: RRR, no murmurs appreciated, no peripheral edema  Pulm: CTAB, no increased work of breathing  Abd: Soft, nontender, nondistended  Skin: Warm, dry and intact  Psych: Appropriate mood and affect    Scheduled Meds   Budesonide, 9 mg, Oral, Daily  [Held by provider] enoxaparin, 40 mg, Subcutaneous, Q24H  First Mouthwash (Magic Mouthwash), 10 mL, Swish & Spit, Q6H  folic acid, 1 mg, Oral, Daily  levothyroxine, 50 mcg, Oral, Daily  nystatin, 5 mL, Swish & Swallow, 4x Daily  pantoprazole, 40 mg, Oral, Daily  sertraline, 50 mg, Oral, Daily  sodium chloride, 10 mL, Intravenous, Q12H  tiotropium bromide monohydrate, 2 puff, Inhalation, Daily - RT  Urea, 15 g, Oral, BID       PRN Meds     albuterol    senna-docusate sodium **AND** polyethylene glycol **AND** bisacodyl **AND** bisacodyl    Lidocaine Viscous HCl    Magnesium Standard Dose Replacement - Follow Nurse / BPA Driven Protocol    melatonin    nitroglycerin    ondansetron    Phosphorus Replacement - Follow Nurse / BPA Driven Protocol    Potassium Replacement - Follow Nurse / BPA  Driven Protocol    sodium chloride   Infusions           Diagnostic Data    Results from last 7 days   Lab Units 10/12/24  0019   WBC 10*3/mm3 2.64*   HEMOGLOBIN g/dL 11.0*   HEMATOCRIT % 32.9*   PLATELETS 10*3/mm3 257   GLUCOSE mg/dL 145*   CREATININE mg/dL 0.51*   BUN mg/dL 38*   SODIUM mmol/L 132*   POTASSIUM mmol/L 3.6   AST (SGOT) U/L 16   ALT (SGPT) U/L 13   ALK PHOS U/L 80   BILIRUBIN mg/dL 1.0   ANION GAP mmol/L 12.2       CT Enterography Abdomen Pelvis w Contrast    Result Date: 10/11/2024  Impression: Areas of bowel wall thickening and enhancement through the distal ileum, colon and rectum consistent the patient's history of Crohn's disease. No evidence of bowel obstruction, perforation or abscess. Electronically Signed: Jakob Hernandez MD  10/11/2024 10:08 PM EDT  Workstation ID: [id number]     Interval results reviewed.    Assessment/Plan:     Hyponatremia  - Nephrology consulted, appreciate recs  - Likely secondary to hypovolemia given recent diarrhea, nausea and vomiting  - Holding HCTZ  - Fluids per nephrology    Hypokalemia  - Likely exacerbated by significant episodes of diarrhea  - Replete prn    Crohns disease  Diarrhea  - Follows with Dr. Castillo; plans for outpatient scopes 10/31 for ongoing Crohns  - Continues to have significant diarrhea which is provoking electrolyte abnormalities  - Also in setting of worsening anemia  - GI consulted- noted plan for EGD/colonoscopy today     Anemia  - No overt s/sx of bleeding  -Significant drop from 10-7 during admission  - Hold off on pharmacological vte ppx given drop in hgb since admission  -Transfuse 1 unit packed red blood cells today, repeat H&H  - Unclear etiology of anemia but concern for GI losses given significant diarrhea in the setting of Crohn's disease  - Hematology following - follow anemia work up    Leukopenia  - Not present on admission  - On Humira and methotrexate outpatient  - Consulted hematology, appreciate recs    Hypertension  - Holding  HCTZ given hyponatremia  - Holding amlodipine given soft bps    Depression  - Okay to resume ssri per nephrology    Treatment plan discussed with nursing staff.     VTE Prophylaxis:  Pharmacologic & mechanical VTE prophylaxis orders are present.    Code status is   Code Status and Medical Interventions: CPR (Attempt to Resuscitate); Full Support   Ordered at: 10/09/24 1058     Code Status (Patient has no pulse and is not breathing):    CPR (Attempt to Resuscitate)     Medical Interventions (Patient has pulse or is breathing):    Full Support       Plan for disposition: Pending clinical course    Barriers to discharge: medical clearance     Time: 35+ minutes     Signature: Electronically signed by Jonathan Mackay MD, 10/12/24, 11:10 EDT.  [facility] Hospitalist Team

## 2024-10-13 ENCOUNTER — INPATIENT HOSPITAL (AMBULATORY)
Age: 74
End: 2024-10-13
Payer: MEDICARE

## 2024-10-13 ENCOUNTER — APPOINTMENT (OUTPATIENT)
Dept: GENERAL RADIOLOGY | Facility: HOSPITAL | Age: 74
End: 2024-10-13
Payer: MEDICARE

## 2024-10-13 ENCOUNTER — INPATIENT HOSPITAL (AMBULATORY)
Dept: URBAN - METROPOLITAN AREA HOSPITAL 84 | Facility: HOSPITAL | Age: 74
End: 2024-10-13
Payer: MEDICARE

## 2024-10-13 DIAGNOSIS — Z90.49 ACQUIRED ABSENCE OF OTHER SPECIFIED PARTS OF DIGESTIVE TRACT: ICD-10-CM

## 2024-10-13 DIAGNOSIS — B78.9 STRONGYLOIDIASIS, UNSPECIFIED: ICD-10-CM

## 2024-10-13 DIAGNOSIS — D72.819 DECREASED WHITE BLOOD CELL COUNT, UNSPECIFIED: ICD-10-CM

## 2024-10-13 DIAGNOSIS — D64.9 ANEMIA, UNSPECIFIED: ICD-10-CM

## 2024-10-13 DIAGNOSIS — K12.30 ORAL MUCOSITIS (ULCERATIVE), UNSPECIFIED: ICD-10-CM

## 2024-10-13 DIAGNOSIS — K50.812 CROHN'S DISEASE OF BOTH SMALL AND LARGE INTESTINE WITH INTES: ICD-10-CM

## 2024-10-13 DIAGNOSIS — E87.8 OTHER DISORDERS OF ELECTROLYTE AND FLUID BALANCE, NOT ELSEWH: ICD-10-CM

## 2024-10-13 LAB
ANION GAP SERPL CALCULATED.3IONS-SCNC: 8.9 MMOL/L (ref 5–15)
ANION GAP SERPL CALCULATED.3IONS-SCNC: 9.2 MMOL/L (ref 5–15)
ARTERIAL PATENCY WRIST A: POSITIVE
ATMOSPHERIC PRESS: ABNORMAL MM[HG]
B PARAPERT DNA SPEC QL NAA+PROBE: NOT DETECTED
B PERT DNA SPEC QL NAA+PROBE: NOT DETECTED
BASE EXCESS BLDA CALC-SCNC: 1.4 MMOL/L (ref 0–3)
BDY SITE: ABNORMAL
BH CV ECHO MEAS - AO MAX PG: 9.6 MMHG
BH CV ECHO MEAS - AO MEAN PG: 5 MMHG
BH CV ECHO MEAS - AO V2 MAX: 155 CM/SEC
BH CV ECHO MEAS - AO V2 VTI: 33.3 CM
BH CV ECHO MEAS - AVA(I,D): 1.4 CM2
BH CV ECHO MEAS - EDV(CUBED): 79.5 ML
BH CV ECHO MEAS - EDV(MOD-SP4): 69.9 ML
BH CV ECHO MEAS - EF(MOD-SP4): 35.1 %
BH CV ECHO MEAS - ESV(CUBED): 50.7 ML
BH CV ECHO MEAS - ESV(MOD-SP4): 45.4 ML
BH CV ECHO MEAS - FS: 14 %
BH CV ECHO MEAS - IVS/LVPW: 0.78 CM
BH CV ECHO MEAS - IVSD: 0.7 CM
BH CV ECHO MEAS - LA DIMENSION: 2.5 CM
BH CV ECHO MEAS - LAT PEAK E' VEL: 12.3 CM/SEC
BH CV ECHO MEAS - LV DIASTOLIC VOL/BSA (35-75): 44.9 CM2
BH CV ECHO MEAS - LV MASS(C)D: 105.3 GRAMS
BH CV ECHO MEAS - LV MAX PG: 3.6 MMHG
BH CV ECHO MEAS - LV MEAN PG: 2 MMHG
BH CV ECHO MEAS - LV SYSTOLIC VOL/BSA (12-30): 29.1 CM2
BH CV ECHO MEAS - LV V1 MAX: 94.3 CM/SEC
BH CV ECHO MEAS - LV V1 VTI: 18.3 CM
BH CV ECHO MEAS - LVIDD: 4.3 CM
BH CV ECHO MEAS - LVIDS: 3.7 CM
BH CV ECHO MEAS - LVOT AREA: 2.5 CM2
BH CV ECHO MEAS - LVOT DIAM: 1.8 CM
BH CV ECHO MEAS - LVPWD: 0.9 CM
BH CV ECHO MEAS - MED PEAK E' VEL: 8.1 CM/SEC
BH CV ECHO MEAS - MV A MAX VEL: 110 CM/SEC
BH CV ECHO MEAS - MV DEC SLOPE: 534 CM/SEC2
BH CV ECHO MEAS - MV DEC TIME: 0.16 SEC
BH CV ECHO MEAS - MV E MAX VEL: 94.9 CM/SEC
BH CV ECHO MEAS - MV E/A: 0.86
BH CV ECHO MEAS - MV MAX PG: 8.1 MMHG
BH CV ECHO MEAS - MV MEAN PG: 3 MMHG
BH CV ECHO MEAS - MV P1/2T: 67.5 MSEC
BH CV ECHO MEAS - MV V2 VTI: 28.7 CM
BH CV ECHO MEAS - MVA(P1/2T): 3.3 CM2
BH CV ECHO MEAS - MVA(VTI): 1.62 CM2
BH CV ECHO MEAS - RAP SYSTOLE: 3 MMHG
BH CV ECHO MEAS - RVDD: 3.9 CM
BH CV ECHO MEAS - RVSP: 23.6 MMHG
BH CV ECHO MEAS - SV(LVOT): 46.6 ML
BH CV ECHO MEAS - SV(MOD-SP4): 24.5 ML
BH CV ECHO MEAS - SVI(LVOT): 29.9 ML/M2
BH CV ECHO MEAS - SVI(MOD-SP4): 15.7 ML/M2
BH CV ECHO MEAS - TAPSE (>1.6): 2.21 CM
BH CV ECHO MEAS - TR MAX PG: 20.6 MMHG
BH CV ECHO MEAS - TR MAX VEL: 227 CM/SEC
BH CV ECHO MEASUREMENTS AVERAGE E/E' RATIO: 9.3
BH CV XLRA - TDI S': 9.7 CM/SEC
BUN SERPL-MCNC: 32 MG/DL (ref 8–23)
BUN SERPL-MCNC: 37 MG/DL (ref 8–23)
BUN/CREAT SERPL: 57.1 (ref 7–25)
BUN/CREAT SERPL: 64.9 (ref 7–25)
C PNEUM DNA NPH QL NAA+NON-PROBE: NOT DETECTED
CA-I BLDA-SCNC: 1.27 MMOL/L (ref 1.15–1.33)
CALCIUM SPEC-SCNC: 8.4 MG/DL (ref 8.6–10.5)
CALCIUM SPEC-SCNC: 8.5 MG/DL (ref 8.6–10.5)
CHLORIDE SERPL-SCNC: 102 MMOL/L (ref 98–107)
CHLORIDE SERPL-SCNC: 107 MMOL/L (ref 98–107)
CHOLEST SERPL-MCNC: 141 MG/DL (ref 0–200)
CO2 SERPL-SCNC: 22.1 MMOL/L (ref 22–29)
CO2 SERPL-SCNC: 25.8 MMOL/L (ref 22–29)
CREAT BLDA-MCNC: 0.85 MG/DL (ref 0.6–1.3)
CREAT SERPL-MCNC: 0.56 MG/DL (ref 0.57–1)
CREAT SERPL-MCNC: 0.57 MG/DL (ref 0.57–1)
D DIMER PPP FEU-MCNC: 1.21 MG/L (FEU) (ref 0–0.74)
D-LACTATE SERPL-SCNC: 0.6 MMOL/L (ref 0.2–2)
DEPRECATED RDW RBC AUTO: 58.3 FL (ref 37–54)
EGFRCR SERPLBLD CKD-EPI 2021: 72 ML/MIN/1.73
EGFRCR SERPLBLD CKD-EPI 2021: 95.5 ML/MIN/1.73
EGFRCR SERPLBLD CKD-EPI 2021: 95.9 ML/MIN/1.73
ERYTHROCYTE [DISTWIDTH] IN BLOOD BY AUTOMATED COUNT: 17.4 % (ref 12.3–15.4)
FLUAV SUBTYP SPEC NAA+PROBE: NOT DETECTED
FLUBV RNA ISLT QL NAA+PROBE: NOT DETECTED
GEN 5 2HR TROPONIN T REFLEX: 22 NG/L
GLUCOSE BLDC GLUCOMTR-MCNC: 143 MG/DL (ref 70–105)
GLUCOSE BLDC GLUCOMTR-MCNC: 156 MG/DL (ref 74–100)
GLUCOSE BLDC GLUCOMTR-MCNC: 156 MG/DL (ref 74–100)
GLUCOSE SERPL-MCNC: 141 MG/DL (ref 65–99)
GLUCOSE SERPL-MCNC: 190 MG/DL (ref 65–99)
HADV DNA SPEC NAA+PROBE: NOT DETECTED
HBA1C MFR BLD: 5.64 % (ref 4.8–5.6)
HCO3 BLDA-SCNC: 25.9 MMOL/L (ref 21–28)
HCOV 229E RNA SPEC QL NAA+PROBE: NOT DETECTED
HCOV HKU1 RNA SPEC QL NAA+PROBE: NOT DETECTED
HCOV NL63 RNA SPEC QL NAA+PROBE: NOT DETECTED
HCOV OC43 RNA SPEC QL NAA+PROBE: NOT DETECTED
HCT VFR BLD AUTO: 30.3 % (ref 34–46.6)
HCT VFR BLDA CALC: 28 % (ref 38–51)
HDLC SERPL-MCNC: 55 MG/DL (ref 40–60)
HEMODILUTION: NO
HGB BLD-MCNC: 9.9 G/DL (ref 12–15.9)
HGB BLDA-MCNC: 9.4 G/DL (ref 12–17)
HMPV RNA NPH QL NAA+NON-PROBE: NOT DETECTED
HPIV1 RNA ISLT QL NAA+PROBE: NOT DETECTED
HPIV2 RNA SPEC QL NAA+PROBE: NOT DETECTED
HPIV3 RNA NPH QL NAA+PROBE: NOT DETECTED
HPIV4 P GENE NPH QL NAA+PROBE: NOT DETECTED
INHALED O2 CONCENTRATION: 28 %
LDLC SERPL CALC-MCNC: 68 MG/DL (ref 0–100)
LDLC/HDLC SERPL: 1.21 {RATIO}
M PNEUMO IGG SER IA-ACNC: NOT DETECTED
MAGNESIUM SERPL-MCNC: 1.5 MG/DL (ref 1.6–2.4)
MAGNESIUM SERPL-MCNC: 2.8 MG/DL (ref 1.6–2.4)
MAGNESIUM SERPL-MCNC: 3.7 MG/DL (ref 1.6–2.4)
MCH RBC QN AUTO: 30.4 PG (ref 26.6–33)
MCHC RBC AUTO-ENTMCNC: 32.7 G/DL (ref 31.5–35.7)
MCV RBC AUTO: 92.9 FL (ref 79–97)
MODALITY: ABNORMAL
MRSA DNA SPEC QL NAA+PROBE: NORMAL
PCO2 BLDA: 39.8 MM HG (ref 35–48)
PH BLDA: 7.42 PH UNITS (ref 7.35–7.45)
PLATELET # BLD AUTO: 173 10*3/MM3 (ref 140–450)
PMV BLD AUTO: 8.7 FL (ref 6–12)
PO2 BLD: 240 MM[HG] (ref 0–500)
PO2 BLDA: 67.1 MM HG (ref 83–108)
POTASSIUM BLDA-SCNC: 3.8 MMOL/L (ref 3.5–4.5)
POTASSIUM SERPL-SCNC: 3.1 MMOL/L (ref 3.5–5.2)
POTASSIUM SERPL-SCNC: 3.6 MMOL/L (ref 3.5–5.2)
POTASSIUM SERPL-SCNC: 5 MMOL/L (ref 3.5–5.2)
RBC # BLD AUTO: 3.26 10*6/MM3 (ref 3.77–5.28)
RHINOVIRUS RNA SPEC NAA+PROBE: NOT DETECTED
RSV RNA NPH QL NAA+NON-PROBE: NOT DETECTED
SAO2 % BLDCOA: 93.4 % (ref 94–98)
SARS-COV-2 RNA NPH QL NAA+NON-PROBE: NOT DETECTED
SINUS: 2.5 CM
SODIUM BLD-SCNC: 137 MMOL/L (ref 138–146)
SODIUM SERPL-SCNC: 137 MMOL/L (ref 136–145)
SODIUM SERPL-SCNC: 138 MMOL/L (ref 136–145)
STJ: 1.7 CM
TRIGL SERPL-MCNC: 97 MG/DL (ref 0–150)
TROPONIN T DELTA: -1 NG/L
TROPONIN T SERPL HS-MCNC: 23 NG/L
VLDLC SERPL-MCNC: 18 MG/DL (ref 5–40)
WBC NRBC COR # BLD AUTO: 3.43 10*3/MM3 (ref 3.4–10.8)

## 2024-10-13 PROCEDURE — 83605 ASSAY OF LACTIC ACID: CPT

## 2024-10-13 PROCEDURE — 94799 UNLISTED PULMONARY SVC/PX: CPT

## 2024-10-13 PROCEDURE — 83735 ASSAY OF MAGNESIUM: CPT | Performed by: NURSE PRACTITIONER

## 2024-10-13 PROCEDURE — 99232 SBSQ HOSP IP/OBS MODERATE 35: CPT | Performed by: NURSE PRACTITIONER

## 2024-10-13 PROCEDURE — 0202U NFCT DS 22 TRGT SARS-COV-2: CPT | Performed by: STUDENT IN AN ORGANIZED HEALTH CARE EDUCATION/TRAINING PROGRAM

## 2024-10-13 PROCEDURE — 83036 HEMOGLOBIN GLYCOSYLATED A1C: CPT

## 2024-10-13 PROCEDURE — 80051 ELECTROLYTE PANEL: CPT

## 2024-10-13 PROCEDURE — 87641 MR-STAPH DNA AMP PROBE: CPT | Performed by: STUDENT IN AN ORGANIZED HEALTH CARE EDUCATION/TRAINING PROGRAM

## 2024-10-13 PROCEDURE — 83735 ASSAY OF MAGNESIUM: CPT | Performed by: INTERNAL MEDICINE

## 2024-10-13 PROCEDURE — 83735 ASSAY OF MAGNESIUM: CPT | Performed by: STUDENT IN AN ORGANIZED HEALTH CARE EDUCATION/TRAINING PROGRAM

## 2024-10-13 PROCEDURE — 99222 1ST HOSP IP/OBS MODERATE 55: CPT | Performed by: INTERNAL MEDICINE

## 2024-10-13 PROCEDURE — 93010 ELECTROCARDIOGRAM REPORT: CPT | Performed by: INTERNAL MEDICINE

## 2024-10-13 PROCEDURE — 85027 COMPLETE CBC AUTOMATED: CPT | Performed by: NURSE PRACTITIONER

## 2024-10-13 PROCEDURE — 93005 ELECTROCARDIOGRAM TRACING: CPT | Performed by: STUDENT IN AN ORGANIZED HEALTH CARE EDUCATION/TRAINING PROGRAM

## 2024-10-13 PROCEDURE — 85018 HEMOGLOBIN: CPT

## 2024-10-13 PROCEDURE — 71045 X-RAY EXAM CHEST 1 VIEW: CPT

## 2024-10-13 PROCEDURE — 84484 ASSAY OF TROPONIN QUANT: CPT | Performed by: STUDENT IN AN ORGANIZED HEALTH CARE EDUCATION/TRAINING PROGRAM

## 2024-10-13 PROCEDURE — 82330 ASSAY OF CALCIUM: CPT

## 2024-10-13 PROCEDURE — 82803 BLOOD GASES ANY COMBINATION: CPT | Performed by: STUDENT IN AN ORGANIZED HEALTH CARE EDUCATION/TRAINING PROGRAM

## 2024-10-13 PROCEDURE — 80048 BASIC METABOLIC PNL TOTAL CA: CPT | Performed by: INTERNAL MEDICINE

## 2024-10-13 PROCEDURE — 82948 REAGENT STRIP/BLOOD GLUCOSE: CPT

## 2024-10-13 PROCEDURE — 82565 ASSAY OF CREATININE: CPT

## 2024-10-13 PROCEDURE — 25010000002 MAGNESIUM SULFATE 2 GM/50ML SOLUTION: Performed by: STUDENT IN AN ORGANIZED HEALTH CARE EDUCATION/TRAINING PROGRAM

## 2024-10-13 PROCEDURE — 94761 N-INVAS EAR/PLS OXIMETRY MLT: CPT

## 2024-10-13 PROCEDURE — 87040 BLOOD CULTURE FOR BACTERIA: CPT | Performed by: STUDENT IN AN ORGANIZED HEALTH CARE EDUCATION/TRAINING PROGRAM

## 2024-10-13 PROCEDURE — 36600 WITHDRAWAL OF ARTERIAL BLOOD: CPT | Performed by: STUDENT IN AN ORGANIZED HEALTH CARE EDUCATION/TRAINING PROGRAM

## 2024-10-13 PROCEDURE — 25010000002 METHYLPREDNISOLONE PER 40 MG: Performed by: INTERNAL MEDICINE

## 2024-10-13 PROCEDURE — 99233 SBSQ HOSP IP/OBS HIGH 50: CPT | Performed by: INTERNAL MEDICINE

## 2024-10-13 PROCEDURE — 85379 FIBRIN DEGRADATION QUANT: CPT | Performed by: INTERNAL MEDICINE

## 2024-10-13 PROCEDURE — 25010000002 ENOXAPARIN PER 10 MG: Performed by: NURSE PRACTITIONER

## 2024-10-13 PROCEDURE — 83993 ASSAY FOR CALPROTECTIN FECAL: CPT | Performed by: NURSE PRACTITIONER

## 2024-10-13 PROCEDURE — 80061 LIPID PANEL: CPT

## 2024-10-13 PROCEDURE — 84132 ASSAY OF SERUM POTASSIUM: CPT | Performed by: STUDENT IN AN ORGANIZED HEALTH CARE EDUCATION/TRAINING PROGRAM

## 2024-10-13 PROCEDURE — 25010000002 CEFEPIME PER 500 MG: Performed by: STUDENT IN AN ORGANIZED HEALTH CARE EDUCATION/TRAINING PROGRAM

## 2024-10-13 PROCEDURE — 94664 DEMO&/EVAL PT USE INHALER: CPT

## 2024-10-13 PROCEDURE — 80048 BASIC METABOLIC PNL TOTAL CA: CPT | Performed by: NURSE PRACTITIONER

## 2024-10-13 RX ORDER — HYDROXYZINE HYDROCHLORIDE 25 MG/1
25 TABLET, FILM COATED ORAL 3 TIMES DAILY PRN
Status: DISCONTINUED | OUTPATIENT
Start: 2024-10-13 | End: 2024-10-23 | Stop reason: HOSPADM

## 2024-10-13 RX ORDER — MAGNESIUM SULFATE HEPTAHYDRATE 40 MG/ML
2 INJECTION, SOLUTION INTRAVENOUS
Status: COMPLETED | OUTPATIENT
Start: 2024-10-13 | End: 2024-10-13

## 2024-10-13 RX ORDER — POTASSIUM CHLORIDE 1500 MG/1
40 TABLET, EXTENDED RELEASE ORAL EVERY 4 HOURS
Status: COMPLETED | OUTPATIENT
Start: 2024-10-13 | End: 2024-10-13

## 2024-10-13 RX ADMIN — DIPHENHYDRAMINE HYDROCHLORIDE AND LIDOCAINE HYDROCHLORIDE AND ALUMINUM HYDROXIDE AND MAGNESIUM HYDRO 10 ML: KIT at 13:47

## 2024-10-13 RX ADMIN — NYSTATIN 500000 UNITS: 100000 SUSPENSION ORAL at 17:01

## 2024-10-13 RX ADMIN — Medication 10 ML: at 09:13

## 2024-10-13 RX ADMIN — NYSTATIN 500000 UNITS: 100000 SUSPENSION ORAL at 13:47

## 2024-10-13 RX ADMIN — MAGNESIUM SULFATE HEPTAHYDRATE 2 G: 40 INJECTION, SOLUTION INTRAVENOUS at 04:38

## 2024-10-13 RX ADMIN — SERTRALINE 50 MG: 50 TABLET, FILM COATED ORAL at 09:12

## 2024-10-13 RX ADMIN — DIPHENHYDRAMINE HYDROCHLORIDE AND LIDOCAINE HYDROCHLORIDE AND ALUMINUM HYDROXIDE AND MAGNESIUM HYDRO 10 ML: KIT at 09:12

## 2024-10-13 RX ADMIN — POTASSIUM CHLORIDE 40 MEQ: 1500 TABLET, EXTENDED RELEASE ORAL at 13:48

## 2024-10-13 RX ADMIN — PANTOPRAZOLE SODIUM 40 MG: 40 TABLET, DELAYED RELEASE ORAL at 09:12

## 2024-10-13 RX ADMIN — ENOXAPARIN SODIUM 40 MG: 100 INJECTION SUBCUTANEOUS at 16:59

## 2024-10-13 RX ADMIN — MAGNESIUM SULFATE HEPTAHYDRATE 2 G: 40 INJECTION, SOLUTION INTRAVENOUS at 06:09

## 2024-10-13 RX ADMIN — BUDESONIDE 9 MG: 3 CAPSULE ORAL at 09:12

## 2024-10-13 RX ADMIN — Medication 10 ML: at 21:12

## 2024-10-13 RX ADMIN — DIPHENHYDRAMINE HYDROCHLORIDE AND LIDOCAINE HYDROCHLORIDE AND ALUMINUM HYDROXIDE AND MAGNESIUM HYDRO 10 ML: KIT at 19:55

## 2024-10-13 RX ADMIN — POTASSIUM CHLORIDE 40 MEQ: 1500 TABLET, EXTENDED RELEASE ORAL at 04:38

## 2024-10-13 RX ADMIN — METHYLPREDNISOLONE SODIUM SUCCINATE 20 MG: 40 INJECTION, POWDER, FOR SOLUTION INTRAMUSCULAR; INTRAVENOUS at 16:59

## 2024-10-13 RX ADMIN — LEVOTHYROXINE SODIUM 50 MCG: 0.05 TABLET ORAL at 09:12

## 2024-10-13 RX ADMIN — NYSTATIN 500000 UNITS: 100000 SUSPENSION ORAL at 09:12

## 2024-10-13 RX ADMIN — NYSTATIN 500000 UNITS: 100000 SUSPENSION ORAL at 21:11

## 2024-10-13 RX ADMIN — Medication 15 G: at 09:12

## 2024-10-13 RX ADMIN — FOLIC ACID 1 MG: 1 TABLET ORAL at 09:12

## 2024-10-13 RX ADMIN — MESALAMINE 4.8 G: 800 TABLET, DELAYED RELEASE ORAL at 10:28

## 2024-10-13 RX ADMIN — MAGNESIUM SULFATE HEPTAHYDRATE 2 G: 40 INJECTION, SOLUTION INTRAVENOUS at 09:15

## 2024-10-13 RX ADMIN — TIOTROPIUM BROMIDE INHALATION SPRAY 2 PUFF: 3.12 SPRAY, METERED RESPIRATORY (INHALATION) at 08:54

## 2024-10-13 RX ADMIN — POTASSIUM CHLORIDE 40 MEQ: 1500 TABLET, EXTENDED RELEASE ORAL at 09:12

## 2024-10-13 RX ADMIN — METHYLPREDNISOLONE SODIUM SUCCINATE 20 MG: 40 INJECTION, POWDER, FOR SOLUTION INTRAMUSCULAR; INTRAVENOUS at 09:12

## 2024-10-13 RX ADMIN — DIPHENHYDRAMINE HYDROCHLORIDE AND LIDOCAINE HYDROCHLORIDE AND ALUMINUM HYDROXIDE AND MAGNESIUM HYDRO 10 ML: KIT at 00:34

## 2024-10-13 NOTE — PLAN OF CARE
Continue to monitor and assess pain.  Patient is alert and oriented with no current complaints.   Problem: Adult Inpatient Plan of Care  Goal: Plan of Care Review  Outcome: Progressing  Flowsheets (Taken 10/13/2024 0724)  Progress: improving  Plan of Care Reviewed With:   patient   family  Goal: Patient-Specific Goal (Individualized)  Outcome: Progressing  Goal: Absence of Hospital-Acquired Illness or Injury  Outcome: Progressing  Intervention: Identify and Manage Fall Risk  Flowsheets (Taken 10/13/2024 0724)  Safety Promotion/Fall Prevention:   clutter free environment maintained   assistive device/personal items within reach   fall prevention program maintained   safety round/check completed  Intervention: Prevent Skin Injury  Flowsheets  Taken 10/12/2024 2008 by Conchita Hudson, RN  Body Position:   supine   position changed independently  Taken 10/12/2024 0730 by Skyla Laws RN  Skin Protection:   drying agents applied   incontinence pads utilized  Intervention: Prevent and Manage VTE (Venous Thromboembolism) Risk  Flowsheets (Taken 10/12/2024 2008 by Conchita Hudson, RN)  VTE Prevention/Management:   SCDs (sequential compression devices) off   patient refused intervention  Intervention: Prevent Infection  Flowsheets (Taken 10/13/2024 0724)  Infection Prevention:   hand hygiene promoted   personal protective equipment utilized   rest/sleep promoted   single patient room provided  Goal: Optimal Comfort and Wellbeing  Outcome: Progressing  Intervention: Monitor Pain and Promote Comfort  Flowsheets (Taken 10/13/2024 0724)  Pain Management Interventions:   relaxation techniques promoted   quiet environment facilitated  Intervention: Provide Person-Centered Care  Flowsheets (Taken 10/12/2024 2008 by Conchita Hudson, RN)  Trust Relationship/Rapport:   care explained   choices provided  Goal: Readiness for Transition of Care  Outcome: Progressing  Intervention: Mutually Develop Transition Plan  Flowsheets (Taken  10/11/2024 1513 by Radha Rodriguez, RN)  Equipment Currently Used at Home:   rollator   nebulizer   commode  Anticipated Changes Related to Illness: none  Transportation Anticipated: family or friend will provide  Transportation Concerns: none  Concerns to be Addressed: discharge planning  Readmission Within the Last 30 Days: no previous admission in last 30 days  Patient/Family Anticipated Services at Transition: none  Patient/Family Anticipates Transition to:   home with family   home with help/services     Problem: Fall Injury Risk  Goal: Absence of Fall and Fall-Related Injury  Outcome: Progressing  Intervention: Identify and Manage Contributors  Flowsheets  Taken 10/13/2024 0609 by Conchita Hudson, RN  Medication Review/Management:   medications reviewed   high-risk medications identified  Taken 10/12/2024 2008 by Conchita Hudson, RN  Self-Care Promotion:   independence encouraged   BADL personal objects within [facility]  Intervention: Promote Injury-Free Environment  Flowsheets (Taken 10/13/2024 0724)  Safety Promotion/Fall Prevention:   clutter free environment maintained   assistive device/personal items within [facility]   fall prevention program maintained   safety round/check completed     Problem: Skin Injury Risk Increased  Goal: Skin Health and Integrity  Outcome: Progressing  Intervention: Optimize Skin Protection  Flowsheets  Taken 10/12/2024 2008 by Conchita Hudson, RN  Activity Management:   up to bedside commode   activity encouraged  Head of Bed (HOB) Positioning: HOB at 30-45 degrees  Pressure Reduction Devices: specialty bed utilized  Taken 10/12/2024 0730 by Skyla Laws, RN  Pressure Reduction Techniques: frequent weight shift encouraged  Skin Protection:   drying agents applied   incontinence pads utilized   Goal Outcome Evaluation:  Plan of Care Reviewed With: patient, family        Progress: improving

## 2024-10-13 NOTE — NURSING NOTE
Rapid response called at 1130 for chest pain. BP on arrival was 139/61 (87). Oxygen 99% on 2 liters. EKG showed sinus arrhythmia. ABG and CXR ordered by Dr. Mackay. STAT trop ordered and collected. ABG showed pO2 67, RT increased patients oxygen to 4 liters. Patient will remain on the unit at this time.

## 2024-10-13 NOTE — PROGRESS NOTES
"Pharmacy Antimicrobial Dosing Service    Subjective:  Maryann Gaitan is a 74 y.o.female admitted with hyponatremia. Pharmacy has been consulted to dose Vancomycin and Cefepime for possible PNA.      Assessment/Plan    1. Day #1 Vancomycin: Goal -600 mcg*h/mL.  Will give vancomycin 1250 mg (~22 mg/kg TBW) x 1 dose, followed by 750 mg q12h, & check random level 10/15 am.   Per [facility], predicted  mg/L.hr  T1/2: 13 hr    2. Day #1 Cefepime: 2 g IV q12h for estCrCl 30-59 mL/min.    Will continue to monitor drug levels, renal function, culture and sensitivities, and patient clinical status.       Objective:  Relevant clinical data and objective history reviewed:  154.9 cm (61\")   57.6 kg (127 lb)   Ideal body weight: 47.8 kg (105 lb 6.1 oz)  Adjusted ideal body weight: 51.7 kg (114 lb 0.4 oz)  Body mass index is 24 kg/m².        Results from last 7 days   Lab Units 10/13/24  1144 10/13/24  0224 10/12/24  0019   CREATININE mg/dL 0.85 0.57 0.51*     Estimated Creatinine Clearance: 47.4 mL/min (by C-G formula based on SCr of 0.85 mg/dL).  I/O last 3 completed shifts:  In: 500 [I.V.:500]  Out: 550 [Stool:550]    Results from last 7 days   Lab Units 10/13/24  0224 10/12/24  0019 10/11/24  0358   WBC 10*3/mm3 3.43 2.64* 2.80*     Temperature    10/13/24 0435 10/13/24 0749 10/13/24 1131   Temp: 98.1 °F (36.7 °C) 98.1 °F (36.7 °C) 97.7 °F (36.5 °C)     Baseline culture/source/susceptibility:  Microbiology Results (last 10 days)       ** No results found for the last 240 hours. **            Tyrell Vera [facility]  10/13/24 12:18 EDT    "

## 2024-10-13 NOTE — CONSULTS
"    Cardiology Consult Note    Patient Identification:  Name: Maryann Gaitan  Age: 74 y.o.  Sex: female  :  1950  MRN: [id number]             Requesting Physician :  Jonathan Mackay MD     Reason for Consultation / Chief Complaint :   Arrhythmia, chest pain, shortness of breath    History of Present Illness:      Maryann Gaitan is a 74-year-old female with a PMH of    COPD  Hypertension  Rheumatoid arthritis  Crohn's disease    who presented to [facility] on 10/8/2024 due to \"abnormal labs\" and nausea/vomiting/diarrhea.  Patient noted to be hyponatremic with sodium of 122, hypokalemic potassium 3.0 as well as anemic.  Patient has been followed by nephrology and GI during hospitalization.  She underwent EGD and colonoscopy yesterday showing small hiatal hernia, nonerosive chronic gastritis and sigmoid colon diverticulitis.  Per GI she has a history of Crohn disease but this has not been proven by biopsy as [employer] testing has not been consistent with IBD.  Cardiology consulted for chest pain and abnormal EKG.  Rapid response called this morning on patient secondary to acute sudden onset of sharp left-sided chest pain with associated symptoms of shortness of breath, palpitations, lightheadedness/dizziness.  EKG 10/12 reviewed showing sinus tachycardia with PACs. Stat EKG today without acute ST-T segment changes, ABG with PaO2 of 67, troponin 23, H&H 9.9/30.3.  Patient denies personal or family history of CAD, hyperlipidemia.  She does report history of hypertension, type 2 diabetes.    Cardiology attending addendum :    I have personally performed a face-to-face diagnostic evaluation, physical exam and reviewed data on this patient.  I have reviewed documentation done by me and nurse practitioner  and corrected as needed.  And agree with the different components of documentation.Greater than 50% of the time spent in the care of this patient was provided by attending consultant/me.        Assessment:  " :    Chest pain  Shortness of breath  Arrhythmia  Abnormal EKG  Hypertension  Mitral regurgitation  Hyponatremia  Hypokalemia  Crohn's disease  Normocytic anemia  COPD  Multifocal pneumonia  Hyperglycemia, prediabetes with A1c of 5.6 from    Recommendations / Plan:        Patient presented 10/9/2024 because of abnormal labs showing low sodium, was complaining of nausea vomiting and diarrhea.  Patient's hydrochlorothiazide was held and nephrology consulted.  HS troponin is 23 and 22.  Previous proBNP was 2573.  Sodium is improved to 137.  Glucose elevated.  EKG done 10/13/2024 reviewed/interpreted by me reveals sinus arrhythmia at the rate of 78 bpm.  Patient has multifocal pneumonia and acute hypoxic respiratory failure.  He is on IV antibiotics and oxygen.  Blood cultures are pending.    Patient is tachycardic and has atypical chest pain.  Will check echo.  Will schedule stress test.  Will check electrolytes and replete as needed.  Follow-up with nephrology for hyponatremia  Follow-up with primary team and GI for nausea vomiting and possible Crohn's disease and microcytic anemia  Will follow and consider further evaluation treatment depending on how her condition evolves           Diagnosis Plan   1. Hyponatremia        2. Hypokalemia        3. Diarrhea, unspecified type  Case Request    Case Request    Tissue Pathology Exam    Tissue Pathology Exam                 Past Medical History:  Past Medical History:   Diagnosis Date    Arthritis     COPD (chronic obstructive pulmonary disease)     Hypertension      Past Surgical History:  Past Surgical History:   Procedure Laterality Date    HYSTERECTOMY        Allergies:  Allergies   Allergen Reactions    Codeine Hives    Penicillin G Sodium Hives     Home Meds:  Medications Prior to Admission   Medication Sig Dispense Refill Last Dose/Taking    Adalimumab (Humira, 2 Pen,) 40 MG/0.4ML Pen-injector Kit Inject 40 mg under the skin into the appropriate area as directed  Every 14 (Fourteen) Days.   9/27/2024    amLODIPine (NORVASC) 10 MG tablet Take 1 tablet by mouth Daily.   Taking    cholecalciferol (VITAMIN D3) 1.25 MG (07236 UT) capsule Take 1 capsule by mouth 2 (Two) Times a Week. Wed, Sat   Taking    colestipol (COLESTID) 1 g tablet Take 1 tablet by mouth 2 (Two) Times a Day.   Taking    diclofenac (VOLTAREN) 50 MG EC tablet Take 1 tablet by mouth 2 (Two) Times a Day.   Taking    ezetimibe (ZETIA) 10 MG tablet Take 1 tablet by mouth Daily.   Taking    folic acid (FOLVITE) 1 MG tablet Take 1 tablet by mouth Daily.   Taking    hydroCHLOROthiazide 25 MG tablet Take 1 tablet by mouth Daily.   Taking    levothyroxine (SYNTHROID, LEVOTHROID) 50 MCG tablet Take 1 tablet by mouth Daily.   Taking    methotrexate 2.5 MG tablet Take 8 tablets by mouth 1 (One) Time Per Week.   Taking    pantoprazole (PROTONIX) 20 MG EC tablet Take 1 tablet by mouth Daily.   Taking    predniSONE (DELTASONE) 5 MG tablet Take 1 tablet by mouth Daily.   Taking    sertraline (ZOLOFT) 50 MG tablet Take 1 tablet by mouth Daily.   Taking    tiotropium (SPIRIVA) 18 MCG per inhalation capsule Place 1 capsule into inhaler and inhale Daily.   Taking     Current Meds:     Current Facility-Administered Medications:     albuterol (ACCUNEB) nebulizer solution 0.63 mg, 0.63 mg, Nebulization, Q6H PRN, Mikayla Kc APRN, 0.63 mg at 10/11/24 1850    sennosides-docusate (PERICOLACE) 8.6-50 MG per tablet 2 tablet, 2 tablet, Oral, BID PRN **AND** polyethylene glycol (MIRALAX) packet 17 g, 17 g, Oral, Daily PRN **AND** bisacodyl (DULCOLAX) EC tablet 5 mg, 5 mg, Oral, Daily PRN **AND** bisacodyl (DULCOLAX) suppository 10 mg, 10 mg, Rectal, Daily PRN, Mikayla Kc APRN    Budesonide (ENTOCORT EC) 24 hr capsule 9 mg, 9 mg, Oral, Daily, Mikayla Kc APRN, 9 mg at 10/13/24 0912    cefepime 2000 mg IVPB in 100 mL NS (MBP), 2,000 mg, Intravenous, Once, Jonathan Mackay MD, Last Rate: 0 mL/hr at 10/13/24 1348,  Restarted at 10/13/24 1426    [START ON 10/14/2024] cefepime 2000 mg IVPB in 100 mL NS (MBP), 2,000 mg, Intravenous, Q12H, Jonathan Mackay MD    Enoxaparin Sodium (LOVENOX) syringe 40 mg, 40 mg, Subcutaneous, Q24H, Mikayla Kc APRN, 40 mg at 10/13/24 1659    First Mouthwash (Magic Mouthwash) 10 mL, 10 mL, Swish & Spit, Q6H, Mikayla Kc APRN, 10 mL at 10/13/24 1347    folic acid (FOLVITE) tablet 1 mg, 1 mg, Oral, Daily, Mikayla Kc APRN, 1 mg at 10/13/24 0912    hydrOXYzine (ATARAX) tablet 25 mg, 25 mg, Oral, TID PRN, Jonatahn Mackay MD    influenza vac split high-dose (FLUZONE HIGH DOSE) injection 0.5 mL, 0.5 mL, Intramuscular, During Hospitalization, Jonathan Mackay MD    levothyroxine (SYNTHROID, LEVOTHROID) tablet 50 mcg, 50 mcg, Oral, Daily, Mikayla Kc APRN, 50 mcg at 10/13/24 0912    Lidocaine Viscous HCl (XYLOCAINE) 2 % solution 10 mL, 10 mL, Mouth/Throat, Q3H PRN, Mikayla Kc APRN    Magnesium Standard Dose Replacement - Follow Nurse / BPA Driven Protocol, , Does not apply, PRN, Mikayla Kc APRN    melatonin tablet 5 mg, 5 mg, Oral, Nightly PRN, Mikayla Kc APRN    mesalamine (LIALDA) EC tablet 4.8 g, 4.8 g, Oral, Daily With Breakfast, Rob Strong MD, 4.8 g at 10/13/24 1028    methylPREDNISolone sodium succinate (SOLU-Medrol) injection 20 mg, 20 mg, Intravenous, Q8H, Rob Strong MD, 20 mg at 10/13/24 1659    nitroglycerin (NITROSTAT) SL tablet 0.4 mg, 0.4 mg, Sublingual, Q5 Min PRN, Mikayla Kc APRN    nystatin (MYCOSTATIN) 100,000 unit/mL suspension 500,000 Units, 5 mL, Swish & Swallow, 4x Daily, Mikayla Kc APRN, 500,000 Units at 10/13/24 1701    ondansetron (ZOFRAN) injection 4 mg, 4 mg, Intravenous, Q6H PRN, Mikayla Kc APRN, 4 mg at 10/09/24 1551    ondansetron ODT (ZOFRAN-ODT) disintegrating tablet 4 mg, 4 mg, Oral, Q6H PRN, 4 mg at 10/12/24 1721 **OR** ondansetron (ZOFRAN) injection 4 mg, 4 mg,  "Intravenous, Q6H PRN, Rob Strong MD    pantoprazole (PROTONIX) EC tablet 40 mg, 40 mg, Oral, Daily, Mikayla Kc APRN, 40 mg at 10/13/24 0912    Pharmacy to Dose Cefepime, , Does not apply, Continuous PRN, Jonathan Mackay MD    Phosphorus Replacement - Follow Nurse / BPA Driven Protocol, , Does not apply, PRN, Mikayla Kc APRN    Potassium Replacement - Follow Nurse / BPA Driven Protocol, , Does not apply, PRN, Cary Emery APRN    sertraline (ZOLOFT) tablet 50 mg, 50 mg, Oral, Daily, Mikayla Kc APRN, 50 mg at 10/13/24 0912    sodium chloride 0.9 % flush 10 mL, 10 mL, Intravenous, Q12H, Mikayla Kc APRN, 10 mL at 10/13/24 0913    sodium chloride 0.9 % flush 10 mL, 10 mL, Intravenous, PRN, Mikayla Kc APRN    tiotropium (SPIRIVA RESPIMAT) 2.5 mcg/act aerosol solution inhaler, 2 puff, Inhalation, Daily - RT, Mikayla Kc APRN, 2 puff at 10/13/24 0854  Social History:   Social History     Tobacco Use    Smoking status: Former     Types: Cigarettes    Smokeless tobacco: Never   Substance Use Topics    Alcohol use: Never      Family History:  History reviewed. No pertinent family history.     Review of Systems : Review of Systems   Constitutional: Positive for malaise/fatigue. Negative for diaphoresis.   Cardiovascular:  Positive for chest pain and palpitations. Negative for leg swelling, near-syncope and syncope.   Respiratory:  Positive for shortness of breath.    Gastrointestinal:  Positive for nausea. Negative for vomiting.   Neurological:  Positive for dizziness and light-headedness.          Constitutional:  Temp:  [97.7 °F (36.5 °C)-98.5 °F (36.9 °C)] 98 °F (36.7 °C)  Heart Rate:  [] 91  Resp:  [14-28] 24  BP: (114-147)/(57-77) 147/77    Physical Exam   /77 (BP Location: Right arm, Patient Position: Lying)   Pulse 91   Temp 98 °F (36.7 °C) (Oral)   Resp 24   Ht 154.9 cm (61\")   Wt 57.6 kg (127 lb)   SpO2 100%   BMI 24.00 kg/m² "   Physical Exam  General:  Appears in no acute distress  Eyes: Sclerae are anicteric,  conjunctivae are clear   HEENT:  No JVD. Thyroid not visibly enlarged. No mucosal pallor or cyanosis  Respiratory: Respirations regular and unlabored at rest.  Scattered rhonchi  Cardiovascular: S1,S2 Regular rate and rhythm.  2 out of 6 holosystolic murmur.  Gastrointestinal: Abdomen soft, flat, nontender. Bowel sounds present.   Musculoskeletal:  No abnormal movements  Extremities: No digital clubbing or cyanosis  Skin: Color pink. Skin warm and dry to touch. No rashes  No xanthoma  Neuro: Alert and awake, no lateralizing deficits appreciated    Cardiographics  ECG: EKG tracing was  personally reviewed/interpreted by me  ECG 12 Lead Chest Pain   Preliminary Result   HEART RATE=78  bpm   RR Mltypllh=466  ms   CT Breaqyrq=709  ms   P Horizontal Axis=-4  deg   P Front Axis=76  deg   QRSD Interval=94  ms   QT Vyqsdroe=598  ms   WUlC=250  ms   QRS Axis=32  deg   T Wave Axis=75  deg   - OTHERWISE NORMAL ECG -   Sinus arrhythmia   Low voltage, precordial leads   Date and Time of Study:2024-10-13 11:34:48      ECG 12 Lead Rhythm Change   Preliminary Result   HEART RATE=81  bpm   RR Lvewjplg=329  ms   CT Vzblgkdz=355  ms   P Horizontal Axis=-1  deg   P Front Axis=78  deg   QRSD Interval=94  ms   QT Dcbsrrei=160  ms   QPlG=828  ms   QRS Axis=15  deg   T Wave Axis=65  deg   - OTHERWISE NORMAL ECG -   Sinus rhythm   Low voltage, precordial leads   Date and Time of Study:2024-10-13 09:35:55      ECG 12 Lead Rhythm Change   Preliminary Result   HEART ECUQ=959  bpm   RR Vvlrkfmg=614  ms   CT Interval=  ms   P Horizontal Axis=  deg   P Front Axis=  deg   QRSD Interval=82  ms   QT Vxtlzbjn=325  ms   PNrS=222  ms   QRS Axis=20  deg   T Wave Axis=151  deg   - ABNORMAL ECG -   Atrial flutter/fibrillation   Ventricular premature complex   Probable  anterior infarct, age indeterminate   When compared with ECG of 05-Oct-2015 11:54:44,   Significant  change in rhythm: previously sinus   Date and Time of Study:2024-10-12 19:50:31      Telemetry Scan   Final Result      Telemetry Scan   Final Result      Telemetry Scan   Final Result      Telemetry Scan   Final Result      Telemetry Scan   Final Result      Telemetry Scan   Final Result      Telemetry Scan   Final Result      ECG 12 Lead Tachycardia    (Results Pending)       Telemetry: sinus rhythm     Echocardiogram:   Results for orders placed during the hospital encounter of 10/08/24    Adult Transthoracic Echo Complete w/ Color, Spectral and Contrast if Necessary Per Protocol    Interpretation Summary    Left ventricular systolic function is low normal. Left ventricular ejection fraction appears to be 51 - 55%.    Left ventricular diastolic function was normal.    The right ventricular cavity is mildly dilated.    Left atrial volume is severely increased.    The right atrial cavity is moderate to severely  dilated.    Moderate to severe mitral valve regurgitation is present.    Estimated right ventricular systolic pressure from tricuspid regurgitation is normal (<35 mmHg).    Conclusion    Technically difficult study due to poor acoustic windows.  Normal LV size.  Low normal LV systolic function, with estimated LV ejection fraction of 50%  Mildly dilated right ventricle.  Severe left atrial enlargement by volumes.  Pulmonic valve is not well visualized.  Aortic valve is not well-visualized.  But does not have any significant AS or AR.  Mitral valve appears structurally normal.  Moderate to severe mitral regurgitation seen.  Calculated RV systolic pressure normal at 24 mmHg  Tricuspid valve appears structurally normal, trace tricuspid regurgitation seen.  Regurgitation seen.  No pericardial effusion seen.  Proximal aorta appears normal in size.      Imaging  Chest X-ray:   Imaging Results (Last 24 Hours)       Procedure Component Value Units Date/Time    XR Chest 1 View [[id number]] Collected: 10/13/24 7431      Updated: 10/13/24 1211    Narrative:      XR CHEST 1 VW    Date of Exam: 10/13/2024 11:55 AM EDT    Indication: chest pain    Comparison: AP chest x-ray 10/12/2024, 10/9/2024, two-view chest x-ray 8/14/2024, CT chest 7/20/2023    Findings:  Multifocal patchy airspace opacities throughout the left lung of mildly decreased compared to 10/12/2024 chest x-ray. Right lung appears clear. No pneumothorax or large pleural effusion is seen. Cardiomediastinal contours appear stable.      Impression:      Impression:  Mildly decreased patchy airspace opacities throughout the left lung compared to 10/12/2024 chest x-ray, likely due to multifocal pneumonia.      Electronically Signed: Danae Gao    10/13/2024 12:09 PM EDT    Workstation ID: [id number]    XR Chest 1 View [[id number]] Collected: 10/12/24 2029     Updated: 10/12/24 2032    Narrative:      XR CHEST 1 VW    Date of Exam: 10/12/2024 8:18 PM EDT    Indication: tachycardia    Comparison: Chest radiograph 10/9/2024    Findings:  The heart size and pulmonary vessels are normal. There are diffuse alveolar airspace opacities throughout the left lung consistent with multifocal pneumonia. The right lung is clear. There is background of diffuse emphysema.      Impression:      Impression:  Multifocal pneumonia throughout the left lung.        Electronically Signed: Nadir Chow MD    10/12/2024 8:30 PM EDT    Workstation ID: [id number]            Lab Review: I have reviewed the labs  Results from last 7 days   Lab Units 10/13/24  1414 10/13/24  1152   HSTROP T ng/L 22* 23*     Results from last 7 days   Lab Units 10/13/24  1152   MAGNESIUM mg/dL 3.7*     Results from last 7 days   Lab Units 10/13/24  1152 10/13/24  1144 10/13/24  0224   SODIUM mmol/L  --   --  137   POTASSIUM mmol/L 3.6  --  3.1*   BUN mg/dL  --   --  37*   CREATININE mg/dL  --  0.85 0.57   CALCIUM mg/dL  --   --  8.5*         Results from last 7 days   Lab Units 10/11/24  0358   PROBNP pg/mL 2,573.0*      Results from last 7 days   Lab Units 10/13/24  1144 10/13/24  0224 10/12/24  0019 10/11/24  1237 10/11/24  0358   WBC 10*3/mm3  --  3.43 2.64*  --  2.80*   HEMOGLOBIN g/dL  --  9.9* 11.0*   < > 7.0*   HEMOGLOBIN, POC g/dL 9.4*  --   --   --   --    HEMATOCRIT %  --  30.3* 32.9*   < > 21.5*   HEMATOCRIT POC % 28*  --   --   --   --    PLATELETS 10*3/mm3  --  173 257  --  269    < > = values in this interval not displayed.                 Travis Connor MD  10/13/2024, 18:14 EDT      EMR Dragon/Transcription:   Dictated utilizing Dragon dictation

## 2024-10-13 NOTE — SIGNIFICANT NOTE
10/13/24 1608   OTHER   Discipline physical therapy assistant   Rehab Time/Intention   Session Not Performed unable to treat, medical status change  (PT held today due to rapid called earlier with pt having aflutter.)   Recommendation   PT - Next Appointment 10/14/24

## 2024-10-13 NOTE — PROGRESS NOTES
LOS: 4 days   Patient Care Team:  Eduard Little MD as PCP - General (Family Medicine)      Subjective     Interval History:   LABS: WBCs 3.43, hemoglobin 9.9 (11), platelets 173.  Sodium 137, potassium 3.1, creatinine 0.57.  10/12/2024 EGD/colonoscopy (Dr Strong) small hiatal hernia.  Nonerosive chronic gastritis.  Normal duodenum.  TI stricture status post dilation to 12 mm.  TI ulcers.  Colon ulcers.  Sigmoid colon diverticulosis.  Grade 2 internal hemorrhoids.  Fast team in process for chest pain.     ROS:   No chest pain, shortness of breath, or cough.         Medication Review:     Current Facility-Administered Medications:     albuterol (ACCUNEB) nebulizer solution 0.63 mg, 0.63 mg, Nebulization, Q6H PRN, Mikayla Kc APRN, 0.63 mg at 10/11/24 1850    sennosides-docusate (PERICOLACE) 8.6-50 MG per tablet 2 tablet, 2 tablet, Oral, BID PRN **AND** polyethylene glycol (MIRALAX) packet 17 g, 17 g, Oral, Daily PRN **AND** bisacodyl (DULCOLAX) EC tablet 5 mg, 5 mg, Oral, Daily PRN **AND** bisacodyl (DULCOLAX) suppository 10 mg, 10 mg, Rectal, Daily PRN, Mikayla Kc APRN    Budesonide (ENTOCORT EC) 24 hr capsule 9 mg, 9 mg, Oral, Daily, Mikayla Kc APRN, 9 mg at 10/13/24 0912    Enoxaparin Sodium (LOVENOX) syringe 40 mg, 40 mg, Subcutaneous, Q24H, Mikayla Kc APRN    First Mouthwash (Magic Mouthwash) 10 mL, 10 mL, Swish & Spit, Q6H, Mikayla Kc APRN, 10 mL at 10/13/24 0912    folic acid (FOLVITE) tablet 1 mg, 1 mg, Oral, Daily, Mikayla Kc APRN, 1 mg at 10/13/24 0912    influenza vac split high-dose (FLUZONE HIGH DOSE) injection 0.5 mL, 0.5 mL, Intramuscular, During Hospitalization, Jonathan Mackay MD    levothyroxine (SYNTHROID, LEVOTHROID) tablet 50 mcg, 50 mcg, Oral, Daily, Mikayla Kc APRN, 50 mcg at 10/13/24 0912    Lidocaine Viscous HCl (XYLOCAINE) 2 % solution 10 mL, 10 mL, Mouth/Throat, Q3H PRN, Mikayla Kc APRN    Magnesium Standard Dose  Replacement - Follow Nurse / BPA Driven Protocol, , Does not apply, PRN, Mikayla Kc APRN    magnesium sulfate 2g/50 mL (PREMIX) infusion, 2 g, Intravenous, Q2H, Jonathan Mackay MD, 2 g at 10/13/24 0915    melatonin tablet 5 mg, 5 mg, Oral, Nightly PRN, Mikayla Kc APRN    mesalamine (LIALDA) EC tablet 4.8 g, 4.8 g, Oral, Daily With Breakfast, Rob Strong MD, 4.8 g at 10/13/24 1028    methylPREDNISolone sodium succinate (SOLU-Medrol) injection 20 mg, 20 mg, Intravenous, Q8H, Rob Strong MD, 20 mg at 10/13/24 0912    nitroglycerin (NITROSTAT) SL tablet 0.4 mg, 0.4 mg, Sublingual, Q5 Min PRN, Mikayla Kc APRN    nystatin (MYCOSTATIN) 100,000 unit/mL suspension 500,000 Units, 5 mL, Swish & Swallow, 4x Daily, Mikayla Kc APRN, 500,000 Units at 10/13/24 0912    ondansetron (ZOFRAN) injection 4 mg, 4 mg, Intravenous, Q6H PRN, Mikayla Kc APRN, 4 mg at 10/09/24 1551    ondansetron ODT (ZOFRAN-ODT) disintegrating tablet 4 mg, 4 mg, Oral, Q6H PRN, 4 mg at 10/12/24 1721 **OR** ondansetron (ZOFRAN) injection 4 mg, 4 mg, Intravenous, Q6H PRN, Rob Strong MD    pantoprazole (PROTONIX) EC tablet 40 mg, 40 mg, Oral, Daily, Mikayla Kc APRN, 40 mg at 10/13/24 0912    Phosphorus Replacement - Follow Nurse / BPA Driven Protocol, , Does not apply, PRN, Mikayla Kc APRN    potassium chloride (KLOR-CON M20) CR tablet 40 mEq, 40 mEq, Oral, Q4H, Jonathan Mackay MD, 40 mEq at 10/13/24 0912    Potassium Replacement - Follow Nurse / BPA Driven Protocol, , Does not apply, PRN, Cary Emery APRN    sertraline (ZOLOFT) tablet 50 mg, 50 mg, Oral, Daily, Mikayla Kc APRN, 50 mg at 10/13/24 0912    sodium chloride 0.9 % flush 10 mL, 10 mL, Intravenous, Q12H, Mikayla Kc APRN, 10 mL at 10/13/24 0913    sodium chloride 0.9 % flush 10 mL, 10 mL, Intravenous, PRN, Mikayla Kc APRN    tiotropium (SPIRIVA RESPIMAT) 2.5 mcg/act aerosol  solution inhaler, 2 puff, Inhalation, Daily - RT, Mikayla Kc, APRN, 2 puff at 10/13/24 0854      Objective  Resting in the hospital bed. NAD. No family present. Multiple staff at bedside.     Vital Signs  Temp:  [97.6 °F (36.4 °C)-98.5 °F (36.9 °C)] 98.1 °F (36.7 °C)  Heart Rate:  [] 76  Resp:  [14-28] 20  BP: ()/(33-71) 114/61  Physical Exam:    General Appearance:    Awake and alert, in no acute distress   Head:    Normocephalic, without obvious abnormality   Eyes:          Conjunctivae normal, anicteric sclerae   Ears:    Hearing intact   Throat:   No oral lesions, no thrush, oral mucosa moist   Neck:   No adenopathy, supple, no JVD   Lungs:       respirations regular, even and unlabored        Abdomen:      soft, non-tender, no rebound or guarding, non-distended, no hepatosplenomegaly   Rectal:     Deferred   Extremities:   No edema, no cyanosis, no redness   Skin:   No bleeding, bruising or rash, no jaundice   Neurologic:   Sensation   intact        Results Review:    CBC    Results from last 7 days   Lab Units 10/13/24  0224 10/12/24  0019 10/11/24  1237 10/11/24  0358 10/10/24  0428 10/09/24  1407 10/09/24  0542 10/08/24  2105 10/08/24  0927   WBC 10*3/mm3 3.43 2.64*  --  2.80* 1.54*  --  2.94* 4.51 6.60   HEMOGLOBIN g/dL 9.9* 11.0* 7.8* 7.0* 7.7* 8.4* 8.1* 9.4* 10.3*   PLATELETS 10*3/mm3 173 257  --  269 315  --  343 402 449     CMP   Results from last 7 days   Lab Units 10/13/24  0224 10/12/24  0019 10/11/24  1722 10/11/24  1258 10/11/24  0358 10/10/24  2055 10/10/24  1603 10/10/24  0808 10/10/24  0428 10/09/24  1407 10/09/24  0542 10/08/24  2105 10/08/24  0927   SODIUM mmol/L 137 132* 129* 126* 127* 125* 126*   < > 123* 126*  125*   < > 126* 122*   POTASSIUM mmol/L 3.1* 3.6 3.9 3.0* 2.6*  --   --   --  3.2* 3.7   < > 3.2* 3.0*   CHLORIDE mmol/L 102 96* 97* 94* 94*  --   --   --  92* 93*   < > 87* 81*   CO2 mmol/L 25.8 23.8 24.0 24.2 23.9  --   --   --  24.7 23.5   < > 24.6 22.5   BUN  "mg/dL 37* 38* 40* 55* 21  --   --   --  8 13   < > 24* 35*   CREATININE mg/dL 0.57 0.51* 0.49* 0.48* 0.38*  --   --   --  0.44* 0.52*   < > 0.75 1.07*   GLUCOSE mg/dL 141* 145* 122* 139* 108*  --   --   --  99 103*   < > 103* 98   ALBUMIN g/dL  --  2.7*  --   --   --   --   --   --  2.6*  --   --  3.3* 3.3*   BILIRUBIN mg/dL  --  1.0  --   --   --   --   --   --  0.5  --   --  0.7 0.7   ALK PHOS U/L  --  80  --   --   --   --   --   --  70  --   --  89 100   AST (SGOT) U/L  --  16  --   --   --   --   --   --  14  --   --  15 18   ALT (SGPT) U/L  --  13  --   --   --   --   --   --  13  --   --  13 17   MAGNESIUM mg/dL 1.5*  --   --  1.3*  --   --   --   --  1.4*  --   --  1.9 2.0    < > = values in this interval not displayed.     Cr Clearance Estimated Creatinine Clearance: 70.7 mL/min (by C-G formula based on SCr of 0.57 mg/dL).  Coag     HbA1C No results found for: \"HGBA1C\"  Blood Glucose No results found for: \"POCGLU\"  Infection     UA    Results from last 7 days   Lab Units 10/08/24  1023   NITRITE UA  Negative   WBC UA /HPF 0-2   BACTERIA UA /HPF Trace*   SQUAM EPITHEL UA /HPF 0-2     Radiology(recent) XR Chest 1 View    Result Date: 10/12/2024  Impression: Multifocal pneumonia throughout the left lung. Electronically Signed: Nadir Chow MD  10/12/2024 8:30 PM EDT  Workstation ID: [id number]    CT Enterography Abdomen Pelvis w Contrast    Result Date: 10/11/2024  Impression: Areas of bowel wall thickening and enhancement through the distal ileum, colon and rectum consistent the patient's history of Crohn's disease. No evidence of bowel obstruction, perforation or abscess. Electronically Signed: Jakob Hernandez MD  10/11/2024 10:08 PM EDT  Workstation ID: [id number]         Assessment & Plan   -Crohn's disease of small bowel & colon   -Unintentional weight loss  -Electrolyte abnormality  -Leukopenia  -Normocytic anemia  -Oral ulcers/sores  -Hypertension  -COPD  -Rheumatoid arthritis  -History of " hysterectomy  -History of cholecystectomy    10/12/2024 EGD/colonoscopy (Dr Strong) small hiatal hernia.  Nonerosive chronic gastritis.  Normal duodenum.  TI stricture status post dilation to 12 mm.  TI ulcers.  Colon ulcers.  Sigmoid colon diverticulosis.  Grade 2 internal hemorrhoids.     PLAN:  Patient is a 74-year-old female with history of rheumatoid arthritis and cholecystectomy who presented on 10/8 with hyponatremia.  Nephrology has been consulted.  GI has been asked to evaluate the patient for persistent diarrhea.  She does have a history of possible Crohn's disease, but this has not been biopsy-proven and [facility] testing has not been consistent with IBD.  Fecal calprotectin was elevated in 11/2021.    Continue Mesalamine, solumedrol  Low residue diet.   Humira drug levels as an outpatient.   Consider Crohn's stricturing clinical trial versus other advanced therapy such as Rinvoq which is also indicated for rheumatoid arthritis       DRU Warner  10/13/24  10:35 EDT

## 2024-10-13 NOTE — PLAN OF CARE
Problem: Adult Inpatient Plan of Care  Goal: Plan of Care Review  Outcome: Not Progressing  Flowsheets (Taken 10/13/2024 0304)  Progress: no change  Plan of Care Reviewed With: patient  Goal: Patient-Specific Goal (Individualized)  Outcome: Not Progressing  Goal: Absence of Hospital-Acquired Illness or Injury  Outcome: Not Progressing  Intervention: Identify and Manage Fall Risk  Recent Flowsheet Documentation  Taken 10/13/2024 0220 by Conchita Hudson RN  Safety Promotion/Fall Prevention:   assistive device/personal items within reach   clutter free environment maintained   fall prevention program maintained   nonskid shoes/slippers when out of bed   room organization consistent   safety round/check completed  Taken 10/13/2024 0016 by Conchita Hudson RN  Safety Promotion/Fall Prevention:   assistive device/personal items within reach   clutter free environment maintained   fall prevention program maintained   nonskid shoes/slippers when out of bed   room organization consistent   safety round/check completed  Taken 10/12/2024 2208 by Conchita Hudson RN  Safety Promotion/Fall Prevention:   assistive device/personal items within reach   clutter free environment maintained   fall prevention program maintained   nonskid shoes/slippers when out of bed   room organization consistent   safety round/check completed  Taken 10/12/2024 2008 by Conchita Hudson, RN  Safety Promotion/Fall Prevention:   assistive device/personal items within reach   clutter free environment maintained   fall prevention program maintained   nonskid shoes/slippers when out of bed   safety round/check completed   room organization consistent  Intervention: Prevent Skin Injury  Recent Flowsheet Documentation  Taken 10/12/2024 2008 by Conchita Hudson, RN  Body Position:   supine   position changed independently  Intervention: Prevent and Manage VTE (Venous Thromboembolism) Risk  Recent Flowsheet Documentation  Taken 10/12/2024 2008 by Conchita Hudson  RN  VTE Prevention/Management:   SCDs (sequential compression devices) off   patient refused intervention  Intervention: Prevent Infection  Recent Flowsheet Documentation  Taken 10/13/2024 0220 by Conchita Hudson RN  Infection Prevention:   environmental surveillance performed   hand hygiene promoted   rest/sleep promoted   single patient room provided  Taken 10/13/2024 0016 by Conchita Hudson RN  Infection Prevention:   environmental surveillance performed   hand hygiene promoted   single patient room provided  Taken 10/12/2024 2208 by Conchita Hudson RN  Infection Prevention:   environmental surveillance performed   hand hygiene promoted   single patient room provided  Taken 10/12/2024 2008 by Conchita Hudson RN  Infection Prevention:   environmental surveillance performed   hand hygiene promoted   single patient room provided  Goal: Optimal Comfort and Wellbeing  Outcome: Not Progressing  Intervention: Provide Person-Centered Care  Recent Flowsheet Documentation  Taken 10/12/2024 2008 by Conchita Hudson RN  Trust Relationship/Rapport:   care explained   choices provided  Goal: Readiness for Transition of Care  Outcome: Not Progressing     Problem: Fall Injury Risk  Goal: Absence of Fall and Fall-Related Injury  Outcome: Not Progressing  Intervention: Identify and Manage Contributors  Recent Flowsheet Documentation  Taken 10/13/2024 0016 by Conchita Hudson RN  Medication Review/Management:   medications reviewed   high-risk medications identified  Taken 10/12/2024 2208 by Conchita Hudson RN  Medication Review/Management:   medications reviewed   high-risk medications identified  Taken 10/12/2024 2008 by Conchita Hudson RN  Medication Review/Management:   medications reviewed   high-risk medications identified  Self-Care Promotion:   independence encouraged   BADL personal objects within reach  Intervention: Promote Injury-Free Environment  Recent Flowsheet Documentation  Taken 10/13/2024 0220 by Conchita Hudson  RN  Safety Promotion/Fall Prevention:   assistive device/personal items within reach   clutter free environment maintained   fall prevention program maintained   nonskid shoes/slippers when out of bed   room organization consistent   safety round/check completed  Taken 10/13/2024 0016 by Conchita Hudson RN  Safety Promotion/Fall Prevention:   assistive device/personal items within reach   clutter free environment maintained   fall prevention program maintained   nonskid shoes/slippers when out of bed   room organization consistent   safety round/check completed  Taken 10/12/2024 2208 by Conchita Hudson RN  Safety Promotion/Fall Prevention:   assistive device/personal items within reach   clutter free environment maintained   fall prevention program maintained   nonskid shoes/slippers when out of bed   room organization consistent   safety round/check completed  Taken 10/12/2024 2008 by Conchita Hudson RN  Safety Promotion/Fall Prevention:   assistive device/personal items within reach   clutter free environment maintained   fall prevention program maintained   nonskid shoes/slippers when out of bed   safety round/check completed   room organization consistent     Problem: Skin Injury Risk Increased  Goal: Skin Health and Integrity  Outcome: Not Progressing  Intervention: Optimize Skin Protection  Recent Flowsheet Documentation  Taken 10/12/2024 2008 by Conchita Hudson, RN  Activity Management:   up to bedside commode   activity encouraged  Head of Bed (HOB) Positioning: HOB at 30-45 degrees  Pressure Reduction Devices: specialty bed utilized   Goal Outcome Evaluation:  Plan of Care Reviewed With: patient        Progress: no change     Alert and oriented x 4. Takes medication whole and tolerates well. Incontinent of bladder, external catheter in place. Continent of bowel. No c/o pain/discomfort noted. Increased SOB noted, O2 therapy increased from 3 LPM via NC to 5 LPM via NC with positive effect noted. Episode of  A-adele/HELEN-hoang noted, MD made aware. IV Metoprolol administered with positive effect noted. O2 therapy titrated down to 4 LPM via NC and tolerating well. Assist x 1 for transfers. Continues to be followed by nephrology, hematology and gastroenterology. Per case management, patient refusing SNF placement at this time. From home. Currently in bed, eyes closed. Rise and fall of chest observed. Call bell in reach.

## 2024-10-13 NOTE — PROGRESS NOTES
PROGRESS NOTE      Patient Name: Maryann Gaitan  : 1950  MRN: [id number]  Primary Care Physician: Eduard Little MD  Date of admission: 10/8/2024    Patient Care Team:  Eduard Little MD as PCP - General (Family Medicine)        Subjective   Subjective:     Seen and examined, comfortable, not in distress,  Sodium is 137 today, comfortable, not in distress,      Review of systems:  All other review of system unremarkable      Allergies:    Allergies   Allergen Reactions    Codeine Hives    Penicillin G Sodium Hives       Objective   Exam:     Vital Signs  Temp:  [97.6 °F (36.4 °C)-98.5 °F (36.9 °C)] 97.7 °F (36.5 °C)  Heart Rate:  [] 79  Resp:  [14-28] 25  BP: ()/(33-71) 124/65  SpO2:  [91 %-100 %] 100 %  on  Flow (L/min) (Oxygen Therapy):  [2-6] 2;   Device (Oxygen Therapy): nasal cannula  Body mass index is 24 kg/m².    General: Elderly female in no acute distress.    Head:      Normocephalic and atraumatic.    Eyes:      PERRL/EOM intact, conjunctivae and sclerae clear without nystagmus.    Neck:      No masses, thyromegaly,  trachea central with normal respiratory effort   Lungs:    Clear bilaterally to auscultation.    Heart:      Regular rate and rhythm, no murmur no gallop  Abd:        Soft, nontender, not distended, bowel sounds positive, no shifting dullness   Pulses:   Pulses palpable  Extr:        No cyanosis or clubbing--no edema.    Neuro:    No focal deficits.   alert oriented x3  Skin:       Intact without lesions or rashes.    Psych:    Alert and cooperative; normal mood and affect; .      Results Review:  I have personally reviewed most recent Data :  CBC    Results from last 7 days   Lab Units 10/13/24  1144 10/13/24  0224 10/12/24  0019 10/11/24  1237 10/11/24  0358 10/10/24  0428 10/09/24  1407 10/09/24  0542 10/08/24  2105 10/08/24  0927   WBC 10*3/mm3  --  3.43 2.64*  --  2.80* 1.54*  --  2.94* 4.51 6.60   HEMOGLOBIN g/dL  --  9.9* 11.0* 7.8* 7.0* 7.7*  8.4* 8.1* 9.4* 10.3*   HEMOGLOBIN, POC g/dL 9.4*  --   --   --   --   --   --   --   --   --    PLATELETS 10*3/mm3  --  173 257  --  269 315  --  343 402 449     CMP   Results from last 7 days   Lab Units 10/13/24  1152 10/13/24  1144 10/13/24  0224 10/12/24  0019 10/11/24  1722 10/11/24  1258 10/11/24  0358 10/10/24  2055 10/10/24  1603 10/10/24  0808 10/10/24  0428 10/09/24  1407 10/09/24  0542 10/08/24  2105 10/08/24  0927   SODIUM mmol/L  --   --  137 132* 129* 126* 127* 125* 126*   < > 123* 126*  125*   < > 126* 122*   POTASSIUM mmol/L 3.6  --  3.1* 3.6 3.9 3.0* 2.6*  --   --   --  3.2* 3.7   < > 3.2* 3.0*   CHLORIDE mmol/L  --   --  102 96* 97* 94* 94*  --   --   --  92* 93*   < > 87* 81*   CO2 mmol/L  --   --  25.8 23.8 24.0 24.2 23.9  --   --   --  24.7 23.5   < > 24.6 22.5   BUN mg/dL  --   --  37* 38* 40* 55* 21  --   --   --  8 13   < > 24* 35*   CREATININE mg/dL  --  0.85 0.57 0.51* 0.49* 0.48* 0.38*  --   --   --  0.44* 0.52*   < > 0.75 1.07*   GLUCOSE mg/dL  --   --  141* 145* 122* 139* 108*  --   --   --  99 103*   < > 103* 98   ALBUMIN g/dL  --   --   --  2.7*  --   --   --   --   --   --  2.6*  --   --  3.3* 3.3*   BILIRUBIN mg/dL  --   --   --  1.0  --   --   --   --   --   --  0.5  --   --  0.7 0.7   ALK PHOS U/L  --   --   --  80  --   --   --   --   --   --  70  --   --  89 100   AST (SGOT) U/L  --   --   --  16  --   --   --   --   --   --  14  --   --  15 18   ALT (SGPT) U/L  --   --   --  13  --   --   --   --   --   --  13  --   --  13 17    < > = values in this interval not displayed.     ABG    Results from last 7 days   Lab Units 10/13/24  1144   PH, ARTERIAL pH units 7.422   PCO2, ARTERIAL mm Hg 39.8   PO2 ART mm Hg 67.1*   O2 SATURATION ART % 93.4*   BASE EXCESS ART mmol/L 1.4     XR Chest 1 View    Result Date: 10/13/2024  Impression: Mildly decreased patchy airspace opacities throughout the left lung compared to 10/12/2024 chest x-ray, likely due to multifocal pneumonia.  Electronically Signed: Danae Gao  10/13/2024 12:09 PM EDT  Workstation ID: [id number]    XR Chest 1 View    Result Date: 10/12/2024  Impression: Multifocal pneumonia throughout the left lung. Electronically Signed: Nadir Chow MD  10/12/2024 8:30 PM EDT  Workstation ID: [id number]    CT Enterography Abdomen Pelvis w Contrast    Result Date: 10/11/2024  Impression: Areas of bowel wall thickening and enhancement through the distal ileum, colon and rectum consistent the patient's history of Crohn's disease. No evidence of bowel obstruction, perforation or abscess. Electronically Signed: Jakob Hernandez MD  10/11/2024 10:08 PM EDT  Workstation ID: [id number]       Scheduled Meds:Budesonide, 9 mg, Oral, Daily  cefepime, 2,000 mg, Intravenous, Once  [START ON 10/14/2024] cefepime, 2,000 mg, Intravenous, Q12H  enoxaparin, 40 mg, Subcutaneous, Q24H  First Mouthwash (Magic Mouthwash), 10 mL, Swish & Spit, Q6H  folic acid, 1 mg, Oral, Daily  levothyroxine, 50 mcg, Oral, Daily  mesalamine, 4.8 g, Oral, Daily With Breakfast  methylPREDNISolone sodium succinate, 20 mg, Intravenous, Q8H  nystatin, 5 mL, Swish & Swallow, 4x Daily  pantoprazole, 40 mg, Oral, Daily  potassium chloride ER, 40 mEq, Oral, Q4H  sertraline, 50 mg, Oral, Daily  sodium chloride, 10 mL, Intravenous, Q12H  tiotropium bromide monohydrate, 2 puff, Inhalation, Daily - RT  [START ON 10/14/2024] vancomycin, 750 mg, Intravenous, Q12H  vancomycin, 1,250 mg, Intravenous, Once      Continuous Infusions:Pharmacy to Dose Cefepime,   Pharmacy to dose vancomycin,       PRN Meds:  albuterol    senna-docusate sodium **AND** polyethylene glycol **AND** bisacodyl **AND** bisacodyl    hydrOXYzine    influenza vaccine    Lidocaine Viscous HCl    Magnesium Standard Dose Replacement - Follow Nurse / BPA Driven Protocol    melatonin    nitroglycerin    ondansetron    ondansetron ODT **OR** ondansetron    Pharmacy to Dose Cefepime    Pharmacy to dose vancomycin    Phosphorus  Replacement - Follow Nurse / BPA Driven Protocol    Potassium Replacement - Follow Nurse / BPA Driven Protocol    sodium chloride    Assessment & Plan   Assessment and Plan:         Hyponatremia    Diarrhea    ASSESSMENT:  Hyponatremia likely etiology thiazide diuretics in the presence of Cymbalta  History of hypertension  History of arthritis  History of Crohn disease   10/12/2024 EGD/colonoscopy  small hiatal hernia.  Nonerosive chronic gastritis.  Normal duodenum.  TI stricture status post dilation to 12 mm.  TI ulcers.  Colon ulcers.  Sigmoid colon diverticulosis.  Grade 2 internal hemorrhoids     PLAN :        Hyponatremia etiology multifactorial including initially the use of thiazide and SSRI but also diarrhea volume depletion poor nutritional state decreased p.o. intake also contributing to hyponatremia     Patient's sodium is much better today 137, continue fluid restriction, will stop the urea, and evaluate labs again tomorrow,  Kidney function looks stable,  Magnesium is 1.3, give 1 g of IV magnesium, if not replaced.   Potassium low, had bowel prep yesterday,  continue replacement as per protocol and recheck,  Supplement boost,  Patient has been started for vancomycin and cefepime, judicious dosing, risk of BISHNU,  Status post EGD and colonoscopy yesterday, report reviewed,  Follow-up with hematology and GI  Closely follow,           Electronically signed by Ck Crump MD,   [facility] kidney consultant  [phone]  10/13/2024  13:23 EDT

## 2024-10-13 NOTE — PROGRESS NOTES
[facility] MEDICINE SERVICE  DAILY PROGRESS NOTE    NAME: Maryann Gaitan  : 1950  MRN: [id number]      LOS: 4 days     PROVIDER OF SERVICE: Jonathan Mackay MD    Chief Complaint: Hyponatremia    Subjective:     Interval History:    Patient seen and evaluated at bedside.  Denies any new complaint this morning but later rapid was called because of chest pain and dyspnea.  Stat EKG and troponin ordered.  Noted to have sinus arrhythmia on EKG and elevated troponin 23.  Cardiology was consulted earlier.  Also, x-ray showed multifocal pneumonia, will start patient on IV antibiotic for hospital-acquired pneumonia.    Treatment plan discussed with patient. All questions addressed.     Review of Systems:   Denies fevers, chills  Denies chest pain, edema  Denies shortness of breath, cough  +n/v/d  Denies dysuria, hematuria    Objective:     Vital Signs  Temp:  [97.6 °F (36.4 °C)-98.5 °F (36.9 °C)] 97.7 °F (36.5 °C)  Heart Rate:  [] 77  Resp:  [14-28] 25  BP: ()/(33-71) 139/61  Flow (L/min) (Oxygen Therapy):  [2-6] 2   Body mass index is 24 kg/m².    Physical Exam   General: No acute distress, appears stated age  Neuro: Awake and alert, oriented x3, no focal deficits appreciated  HEENT: EOMI, dry mucous membranes  CV: RRR, no murmurs appreciated, no peripheral edema  Pulm: CTAB, no increased work of breathing  Abd: Soft, nontender, nondistended  Skin: Warm, dry and intact  Psych: Appropriate mood and affect    Scheduled Meds   Budesonide, 9 mg, Oral, Daily  cefepime, 2,000 mg, Intravenous, Once  [START ON 10/14/2024] cefepime, 2,000 mg, Intravenous, Q12H  enoxaparin, 40 mg, Subcutaneous, Q24H  First Mouthwash (Magic Mouthwash), 10 mL, Swish & Spit, Q6H  folic acid, 1 mg, Oral, Daily  levothyroxine, 50 mcg, Oral, Daily  mesalamine, 4.8 g, Oral, Daily With Breakfast  methylPREDNISolone sodium succinate, 20 mg, Intravenous, Q8H  nystatin, 5 mL, Swish & Swallow, 4x Daily  pantoprazole, 40 mg,  Oral, Daily  potassium chloride ER, 40 mEq, Oral, Q4H  sertraline, 50 mg, Oral, Daily  sodium chloride, 10 mL, Intravenous, Q12H  tiotropium bromide monohydrate, 2 puff, Inhalation, Daily - RT  [START ON 10/14/2024] vancomycin, 750 mg, Intravenous, Q12H  vancomycin, 1,250 mg, Intravenous, Once       PRN Meds     albuterol    senna-docusate sodium **AND** polyethylene glycol **AND** bisacodyl **AND** bisacodyl    hydrOXYzine    influenza vaccine    Lidocaine Viscous HCl    Magnesium Standard Dose Replacement - Follow Nurse / BPA Driven Protocol    melatonin    nitroglycerin    ondansetron    ondansetron ODT **OR** ondansetron    Pharmacy to Dose Cefepime    Pharmacy to dose vancomycin    Phosphorus Replacement - Follow Nurse / BPA Driven Protocol    Potassium Replacement - Follow Nurse / BPA Driven Protocol    sodium chloride   Infusions  Pharmacy to Dose Cefepime,   Pharmacy to dose vancomycin,             Diagnostic Data    Results from last 7 days   Lab Units 10/13/24  1152 10/13/24  1144 10/13/24  0224 10/12/24  0019   WBC 10*3/mm3  --   --  3.43 2.64*   HEMOGLOBIN g/dL  --   --  9.9* 11.0*   HEMOGLOBIN, POC g/dL  --  9.4*  --   --    HEMATOCRIT %  --   --  30.3* 32.9*   HEMATOCRIT POC %  --  28*  --   --    PLATELETS 10*3/mm3  --   --  173 257   GLUCOSE mg/dL  --   --  141* 145*   CREATININE mg/dL  --  0.85 0.57 0.51*   BUN mg/dL  --   --  37* 38*   SODIUM mmol/L  --   --  137 132*   POTASSIUM mmol/L 3.6  --  3.1* 3.6   AST (SGOT) U/L  --   --   --  16   ALT (SGPT) U/L  --   --   --  13   ALK PHOS U/L  --   --   --  80   BILIRUBIN mg/dL  --   --   --  1.0   ANION GAP mmol/L  --   --  9.2 12.2       XR Chest 1 View    Result Date: 10/13/2024  Impression: Mildly decreased patchy airspace opacities throughout the left lung compared to 10/12/2024 chest x-ray, likely due to multifocal pneumonia. Electronically Signed: Danae Gao  10/13/2024 12:09 PM EDT  Workstation ID: [id number]    XR Chest 1 View    Result Date:  10/12/2024  Impression: Multifocal pneumonia throughout the left lung. Electronically Signed: Nadir Chow MD  10/12/2024 8:30 PM EDT  Workstation ID: [id number]    CT Enterography Abdomen Pelvis w Contrast    Result Date: 10/11/2024  Impression: Areas of bowel wall thickening and enhancement through the distal ileum, colon and rectum consistent the patient's history of Crohn's disease. No evidence of bowel obstruction, perforation or abscess. Electronically Signed: Jakob Hernandez MD  10/11/2024 10:08 PM EDT  Workstation ID: [id number]     Interval results reviewed.    Assessment/Plan:     Multifocal pneumonia  Acute hypoxic respiratory failure  Send blood cultures and respiratory viral panel  Start patient on IV vancomycin and IV cefepime-pharmacy to dose  Supplemental oxygen to keep SpO2 more than 95%  Closely monitor vitals    Sinus arrhythmia  -Noted on EKG likely due to electrolyte imbalance  -Monitor and replace goal K>4, Mg>2 and PO4>3.5  -Cardiology consult    Hyponatremia  - Nephrology consulted, appreciate recs  - Likely secondary to hypovolemia given recent diarrhea, nausea and vomiting  - Holding HCTZ  - Fluids per nephrology    Hypokalemia  - Likely exacerbated by significant episodes of diarrhea  - Replete prn    Crohns disease  Diarrhea  - Follows with Dr. Castillo; plans for outpatient scopes 10/31 for ongoing Crohns  - Continues to have significant diarrhea which is provoking electrolyte abnormalities  - Also in setting of worsening anemia  - GI consulted- s/p EGD/colonoscopy-10/12/2024 EGD/colonoscopy (Dr Strong) small hiatal hernia.  Nonerosive chronic gastritis.  Normal duodenum.  TI stricture status post dilation to 12 mm.  TI ulcers.  Colon ulcers.  Sigmoid colon diverticulosis.  Grade 2 internal hemorrhoids.    - Continue Mesalamine, solumedrol  - Low residue diet.   - Humira drug levels as an outpatient.   - Consider Crohn's stricturing clinical trial versus other advanced therapy such as Rinvoq  which is also indicated for rheumatoid arthritis       Anemia  - No overt s/sx of bleeding  -Significant drop from 10-7 during admission  - Hold off on pharmacological vte ppx given drop in hgb since admission  -Transfuse 1 unit packed red blood cells today, repeat H&H  - Unclear etiology of anemia but concern for GI losses given significant diarrhea in the setting of Crohn's disease  - Hematology following     Leukopenia  - Not present on admission  - On Humira and methotrexate outpatient  - Consulted hematology, appreciate recs    Hypertension  - Holding HCTZ given hyponatremia  - Holding amlodipine given soft bps    Depression  - Okay to resume ssri per nephrology    Treatment plan discussed with nursing staff.     VTE Prophylaxis:  Pharmacologic & mechanical VTE prophylaxis orders are present.    Code status is   Code Status and Medical Interventions: CPR (Attempt to Resuscitate); Full Support   Ordered at: 10/09/24 1058     Code Status (Patient has no pulse and is not breathing):    CPR (Attempt to Resuscitate)     Medical Interventions (Patient has pulse or is breathing):    Full Support       Plan for disposition: Pending clinical course    Barriers to discharge: medical clearance     Time: 35+ minutes     Signature: Electronically signed by Jonathan Mackay MD, 10/13/24, 12:27 EDT.  [facility] Hospitalist Team

## 2024-10-13 NOTE — PROGRESS NOTES
"        Hematology/Oncology Progress Note     Patient name: Maryann Gaitan  : 1950  MRN: [id number]      Chief complaint: In distress, chest pain    Subjective/interim summary:    10/13/24  patient had rapid response team in the room when writer approached, originally was responding only to pressing on the extremities with a \"ouch\", complaining of chest pain, feeling like \"I am going to be sick\".      History:  Past Medical History:   Diagnosis Date    Arthritis     COPD (chronic obstructive pulmonary disease)     Hypertension    ,   Past Surgical History:   Procedure Laterality Date    HYSTERECTOMY     , History reviewed. No pertinent family history.,   Social History     Tobacco Use    Smoking status: Former     Types: Cigarettes    Smokeless tobacco: Never   Vaping Use    Vaping status: Never Used   Substance Use Topics    Alcohol use: Never    Drug use: Never   ,   Medications Prior to Admission   Medication Sig Dispense Refill Last Dose/Taking    Adalimumab (Humira, 2 Pen,) 40 MG/0.4ML Pen-injector Kit Inject 40 mg under the skin into the appropriate area as directed Every 14 (Fourteen) Days.   2024    amLODIPine (NORVASC) 10 MG tablet Take 1 tablet by mouth Daily.   Taking    cholecalciferol (VITAMIN D3) 1.25 MG (65158 UT) capsule Take 1 capsule by mouth 2 (Two) Times a Week. Wed, Sat   Taking    colestipol (COLESTID) 1 g tablet Take 1 tablet by mouth 2 (Two) Times a Day.   Taking    diclofenac (VOLTAREN) 50 MG EC tablet Take 1 tablet by mouth 2 (Two) Times a Day.   Taking    ezetimibe (ZETIA) 10 MG tablet Take 1 tablet by mouth Daily.   Taking    folic acid (FOLVITE) 1 MG tablet Take 1 tablet by mouth Daily.   Taking    hydroCHLOROthiazide 25 MG tablet Take 1 tablet by mouth Daily.   Taking    levothyroxine (SYNTHROID, LEVOTHROID) 50 MCG tablet Take 1 tablet by mouth Daily.   Taking    methotrexate 2.5 MG tablet Take 8 tablets by mouth 1 (One) Time Per Week.   Taking    pantoprazole (PROTONIX) 20 " MG EC tablet Take 1 tablet by mouth Daily.   Taking    predniSONE (DELTASONE) 5 MG tablet Take 1 tablet by mouth Daily.   Taking    sertraline (ZOLOFT) 50 MG tablet Take 1 tablet by mouth Daily.   Taking    tiotropium (SPIRIVA) 18 MCG per inhalation capsule Place 1 capsule into inhaler and inhale Daily.   Taking   , Scheduled Meds:  Budesonide, 9 mg, Oral, Daily  cefepime, 2,000 mg, Intravenous, Once  [START ON 10/14/2024] cefepime, 2,000 mg, Intravenous, Q12H  enoxaparin, 40 mg, Subcutaneous, Q24H  First Mouthwash (Magic Mouthwash), 10 mL, Swish & Spit, Q6H  folic acid, 1 mg, Oral, Daily  levothyroxine, 50 mcg, Oral, Daily  mesalamine, 4.8 g, Oral, Daily With Breakfast  methylPREDNISolone sodium succinate, 20 mg, Intravenous, Q8H  nystatin, 5 mL, Swish & Swallow, 4x Daily  pantoprazole, 40 mg, Oral, Daily  potassium chloride ER, 40 mEq, Oral, Q4H  sertraline, 50 mg, Oral, Daily  sodium chloride, 10 mL, Intravenous, Q12H  tiotropium bromide monohydrate, 2 puff, Inhalation, Daily - RT  [START ON 10/14/2024] vancomycin, 750 mg, Intravenous, Q12H  vancomycin, 1,250 mg, Intravenous, Once    , Continuous Infusions:  Pharmacy to Dose Cefepime,   Pharmacy to dose vancomycin,     , PRN Meds:    albuterol    senna-docusate sodium **AND** polyethylene glycol **AND** bisacodyl **AND** bisacodyl    hydrOXYzine    influenza vaccine    Lidocaine Viscous HCl    Magnesium Standard Dose Replacement - Follow Nurse / BPA Driven Protocol    melatonin    nitroglycerin    ondansetron    ondansetron ODT **OR** ondansetron    Pharmacy to Dose Cefepime    Pharmacy to dose vancomycin    Phosphorus Replacement - Follow Nurse / BPA Driven Protocol    Potassium Replacement - Follow Nurse / BPA Driven Protocol    sodium chloride   Allergies:  Codeine and Penicillin g sodium      ROS:  Review of Systems     Objective   Vital Signs:   /61 (BP Location: Right arm, Patient Position: Lying)   Pulse 77   Temp 97.7 °F (36.5 °C) (Oral)   Resp  "25   Ht 154.9 cm (61\")   Wt 57.6 kg (127 lb)   SpO2 100%   BMI 24.00 kg/m²     Physical Exam: (performed by MD)  Vitals and nursing note reviewed.   Constitutional:       General: She is in acute distress.     Appearance: Normal appearance. She is normal weight. She is ill-appearing.   HENT:      Head: Normocephalic and atraumatic.      Right Ear: External ear normal.      Left Ear: External ear normal.      Nose: Nose normal.      Mouth/Throat:      Mouth: Mucous membranes are moist.      Pharynx: Oropharynx is clear.   Eyes:      Extraocular Movements: Extraocular movements intact.      Conjunctiva/sclera: Conjunctivae normal.      Pupils: Pupils are equal, round, and reactive to light.   Cardiovascular:      Rate and Rhythm: Normal rate and regular rhythm.      Pulses: Normal pulses.      Heart sounds: Normal heart sounds.   Pulmonary:      Effort: Pulmonary effort is normal.      Breath sounds: Normal breath sounds.   Abdominal:      General: Abdomen is flat.      Tenderness: There is abdominal tenderness.      Comments: Hypoactive normal pitch bowel sounds mildly tender to exam on palpation   Genitourinary:     Comments: Deferred  Musculoskeletal:         Unable to assess due to mental status  Skin:     General: Skin is warm and dry, some tenting on pinching the skin.   Neurological:      Mental Status: She is not alert, only responding to painful stimuli  Psychiatric:         Unable to assess at this time       Results Review:  Lab Results (last 48 hours)       Procedure Component Value Units Date/Time    Potassium [[id number]]  (Normal) Collected: 10/13/24 1152    Specimen: Blood Updated: 10/13/24 1226     Potassium 3.6 mmol/L     High Sensitivity Troponin T [[id number]]  (Abnormal) Collected: 10/13/24 1152    Specimen: Blood Updated: 10/13/24 1226     HS Troponin T 23 ng/L     Narrative:      High Sensitive Troponin T Reference Range:  <14.0 ng/L- Negative Female for AMI  <22.0 ng/L- Negative Male for " AMI  >=14 - Abnormal Female indicating possible myocardial injury.  >=22 - Abnormal Male indicating possible myocardial injury.   Clinicians would have to utilize clinical acumen, EKG, Troponin, and serial changes to determine if it is an Acute Myocardial Infarction or myocardial injury due to an underlying chronic condition.         Magnesium [[id number]] Collected: 10/13/24 1152    Specimen: Blood Updated: 10/13/24 1159    POC Lactate [[id number]]  (Normal) Collected: 10/13/24 1144    Specimen: Arterial Blood Updated: 10/13/24 1148     Lactate 0.6 mmol/L      Comment: Serial Number: [id number]:  [id number]       POC Glucose Once [[id number]]  (Abnormal) Collected: 10/13/24 1144    Specimen: Arterial Blood Updated: 10/13/24 1148     Glucose 156 mg/dL      Comment: Serial Number: [id number]:  [id number]       Blood Gas, Arterial - [[id number]]  (Abnormal) Collected: 10/13/24 1144    Specimen: Arterial Blood Updated: 10/13/24 1148     Site Left Radial     Blayne's Test Positive     pH, Arterial 7.422 pH units      pCO2, Arterial 39.8 mm Hg      pO2, Arterial 67.1 mm Hg      HCO3, Arterial 25.9 mmol/L      Base Excess, Arterial 1.4 mmol/L      Comment: Serial Number: [id number]:  [id number]        O2 Saturation, Arterial 93.4 %      Barometric Pressure for Blood Gas --     Comment: N/A        Modality Cannula     FIO2 28 %      Hemodilution No     PO2/FIO2 240    POC Creatinine [[id number]]  (Normal) Collected: 10/13/24 1144    Specimen: Arterial Blood Updated: 10/13/24 1148     Creatinine 0.85 mg/dL      Comment: Serial Number: [id number]:  [id number]        eGFR 72.0 mL/min/1.73     POCT Electrolytes +HGB +HCT [[id number]]  (Abnormal) Collected: 10/13/24 1144    Specimen: Arterial Blood Updated: 10/13/24 1148     Sodium 137 mmol/L      POC Potassium 3.8 mmol/L      Ionized Calcium 1.27 mmol/L      Comment: Serial Number: [id number]:  [id number]        Glucose 156 mg/dL      Hematocrit 28 %      Hemoglobin 9.4 g/dL     POC  Glucose Once [[id number]]  (Abnormal) Collected: 10/13/24 1133    Specimen: Blood Updated: 10/13/24 1134     Glucose 143 mg/dL      Comment: Serial Number: [id number]:  [id number]       Tissue Pathology Exam [[id number]] Collected: 10/12/24 1319    Specimen: Tissue from Small Intestine, Duodenum; Tissue from Stomach; Tissue from Small Intestine; Tissue from Large Intestine, Right / Ascending Colon; Tissue from Large Intestine, Left / Descending Colon; Tissue from Large Intestine, Rectum Updated: 10/13/24 0653    Basic Metabolic Panel [[id number]]  (Abnormal) Collected: 10/13/24 0224    Specimen: Blood from Arm, Right Updated: 10/13/24 0310     Glucose 141 mg/dL      BUN 37 mg/dL      Creatinine 0.57 mg/dL      Sodium 137 mmol/L      Potassium 3.1 mmol/L      Chloride 102 mmol/L      CO2 25.8 mmol/L      Calcium 8.5 mg/dL      BUN/Creatinine Ratio 64.9     Anion Gap 9.2 mmol/L      eGFR 95.5 mL/min/1.73     Narrative:      GFR Normal >60  Chronic Kidney Disease <60  Kidney Failure <15    The GFR formula is only valid for adults with stable renal function between ages 18 and 70.    Magnesium [[id number]]  (Abnormal) Collected: 10/13/24 0224    Specimen: Blood from Arm, Right Updated: 10/13/24 0310     Magnesium 1.5 mg/dL     CBC (No Diff) [[id number]]  (Abnormal) Collected: 10/13/24 0224    Specimen: Blood from Arm, Right Updated: 10/13/24 0243     WBC 3.43 10*3/mm3      RBC 3.26 10*6/mm3      Hemoglobin 9.9 g/dL      Hematocrit 30.3 %      MCV 92.9 fL      MCH 30.4 pg      MCHC 32.7 g/dL      RDW 17.4 %      RDW-SD 58.3 fl      MPV 8.7 fL      Platelets 173 10*3/mm3     CBC & Differential [[id number]]  (Abnormal) Collected: 10/12/24 0019    Specimen: Blood from Arm, Right Updated: 10/12/24 0323    Narrative:      The following orders were created for panel order CBC & Differential.  Procedure                               Abnormality         Status                     ---------                                -----------         ------                     CBC Auto Differential[[id number]]        Abnormal            Final result               Scan Slide[[id number]]                                       Final result                 Please view results for these tests on the individual orders.    Manual Differential [[id number]]  (Abnormal) Collected: 10/12/24 0019    Specimen: Blood from Arm, Right Updated: 10/12/24 0323     Neutrophil % 78.0 %      Lymphocyte % 13.0 %      Monocyte % 2.0 %      Bands %  7.0 %      Neutrophils Absolute 2.24 10*3/mm3      Lymphocytes Absolute 0.34 10*3/mm3      Monocytes Absolute 0.05 10*3/mm3      Anisocytosis Slight/1+     Toxic Granulation Slight/1+     Platelet Morphology Normal    CBC Auto Differential [[id number]]  (Abnormal) Collected: 10/12/24 0019    Specimen: Blood from Arm, Right Updated: 10/12/24 0323     WBC 2.64 10*3/mm3      RBC 3.60 10*6/mm3      Hemoglobin 11.0 g/dL      Comment: Result checked          Hematocrit 32.9 %      MCV 91.4 fL      MCH 30.6 pg      MCHC 33.4 g/dL      RDW 16.4 %      RDW-SD 54.9 fl      MPV 9.0 fL      Platelets 257 10*3/mm3     Scan Slide [[id number]] Collected: 10/12/24 0019    Specimen: Blood from Arm, Right Updated: 10/12/24 0323     Scan Slide --     Comment: See Manual Differential Results       Comprehensive Metabolic Panel [[id number]]  (Abnormal) Collected: 10/12/24 0019    Specimen: Blood from Arm, Right Updated: 10/12/24 0239     Glucose 145 mg/dL      BUN 38 mg/dL      Creatinine 0.51 mg/dL      Sodium 132 mmol/L      Potassium 3.6 mmol/L      Comment: Slight hemolysis detected by analyzer. Result may be falsely elevated.        Chloride 96 mmol/L      CO2 23.8 mmol/L      Calcium 8.7 mg/dL      Total Protein 5.0 g/dL      Albumin 2.7 g/dL      ALT (SGPT) 13 U/L      AST (SGOT) 16 U/L      Alkaline Phosphatase 80 U/L      Total Bilirubin 1.0 mg/dL      Globulin 2.3 gm/dL      A/G Ratio 1.2 g/dL      BUN/Creatinine Ratio 74.5     Anion Gap  12.2 mmol/L      eGFR 98.1 mL/min/1.73     Narrative:      GFR Normal >60  Chronic Kidney Disease <60  Kidney Failure <15    The GFR formula is only valid for adults with stable renal function between ages 18 and 70.    Flow Cytometry [[id number]] Collected: 10/12/24 0019    Specimen: Blood from Arm, Right Updated: 10/12/24 0054    Vitamin B12 [[id number]]  (Normal) Collected: 10/11/24 1258    Specimen: Blood Updated: 10/12/24 0035     Vitamin B-12 312 pg/mL     Narrative:      Results may be falsely increased if patient taking Biotin.      Folate [[id number]]  (Normal) Collected: 10/11/24 1258    Specimen: Blood Updated: 10/12/24 0035     Folate 18.30 ng/mL     Narrative:      Results may be falsely increased if patient taking Biotin.      Iron Profile [[id number]]  (Abnormal) Collected: 10/11/24 1258    Specimen: Blood Updated: 10/11/24 1819     Iron 25 mcg/dL      Iron Saturation (TSAT) 13 %      Transferrin 129 mg/dL      TIBC 192 mcg/dL     Ferritin [[id number]]  (Abnormal) Collected: 10/11/24 1258    Specimen: Blood Updated: 10/11/24 1819     Ferritin 427.00 ng/mL     Narrative:      Results may be falsely decreased if patient taking Biotin.      Basic Metabolic Panel [[id number]]  (Abnormal) Collected: 10/11/24 1722    Specimen: Blood Updated: 10/11/24 1816     Glucose 122 mg/dL      BUN 40 mg/dL      Creatinine 0.49 mg/dL      Sodium 129 mmol/L      Potassium 3.9 mmol/L      Chloride 97 mmol/L      CO2 24.0 mmol/L      Calcium 8.5 mg/dL      BUN/Creatinine Ratio 81.6     Anion Gap 8.0 mmol/L      eGFR 99.0 mL/min/1.73     Narrative:      GFR Normal >60  Chronic Kidney Disease <60  Kidney Failure <15    The GFR formula is only valid for adults with stable renal function between ages 18 and 70.    Reticulocytes [[id number]]  (Abnormal) Collected: 10/11/24 1237    Specimen: Blood from Arm, Left Updated: 10/11/24 1759     Reticulocyte % 0.72 %      Reticulocyte Absolute 0.0182 10*6/mm3     Lactic Acid, Plasma  [[id number]]  (Normal) Collected: 10/11/24 1257    Specimen: Blood Updated: 10/11/24 1338     Lactate 1.6 mmol/L     Basic Metabolic Panel [[id number]]  (Abnormal) Collected: 10/11/24 1258    Specimen: Blood Updated: 10/11/24 1331     Glucose 139 mg/dL      BUN 55 mg/dL      Creatinine 0.48 mg/dL      Sodium 126 mmol/L      Potassium 3.0 mmol/L      Chloride 94 mmol/L      CO2 24.2 mmol/L      Calcium 8.1 mg/dL      BUN/Creatinine Ratio 114.6     Anion Gap 7.8 mmol/L      eGFR 99.5 mL/min/1.73     Narrative:      GFR Normal >60  Chronic Kidney Disease <60  Kidney Failure <15    The GFR formula is only valid for adults with stable renal function between ages 18 and 70.    Magnesium [[id number]]  (Abnormal) Collected: 10/11/24 1258    Specimen: Blood Updated: 10/11/24 1331     Magnesium 1.3 mg/dL     Extra Tubes [[id number]] Collected: 10/11/24 1258    Specimen: Blood Updated: 10/11/24 1315    Narrative:      The following orders were created for panel order Extra Tubes.  Procedure                               Abnormality         Status                     ---------                               -----------         ------                     Red Top[[id number]]                                          Final result               Red Top[[id number]]                                          Final result               Grn Na Hep No Gel[[id number]]                                Final result               Gold Top - SST[[id number]]                                   Final result               Gold Top - SST[[id number]]                                   Final result               Light Blue Top[[id number]]                                   Final result               Light Blue Top[[id number]]                                   Final result                 Please view results for these tests on the individual orders.    Grn Na Hep No Gel [[id number]] Collected: 10/11/24 1258    Specimen: Blood Updated: 10/11/24 1315     Extra Tube Hold  for add-ons.     Comment: Auto resulted.       Hemoglobin & Hematocrit, Blood [[id number]]  (Abnormal) Collected: 10/11/24 1237    Specimen: Blood from Arm, Left Updated: 10/11/24 1307     Hemoglobin 7.8 g/dL      Hematocrit 24.6 %     Red Top [[id number]] Collected: 10/11/24 1258    Specimen: Blood Updated: 10/11/24 1302     Extra Tube Hold for add-ons.     Comment: Auto resulted.       Red Top [[id number]] Collected: 10/11/24 1258    Specimen: Blood Updated: 10/11/24 1302     Extra Tube Hold for add-ons.     Comment: Auto resulted.       Gold Top - SST [[id number]] Collected: 10/11/24 1258    Specimen: Blood Updated: 10/11/24 1302     Extra Tube Hold for add-ons.     Comment: Auto resulted.       Gold Top - SST [[id number]] Collected: 10/11/24 1258    Specimen: Blood Updated: 10/11/24 1302     Extra Tube Hold for add-ons.     Comment: Auto resulted.       Light Blue Top [[id number]] Collected: 10/11/24 1258    Specimen: Blood Updated: 10/11/24 1302     Extra Tube Hold for add-ons.     Comment: Auto resulted       Light Blue Top [[id number]] Collected: 10/11/24 1258    Specimen: Blood Updated: 10/11/24 1302     Extra Tube Hold for add-ons.     Comment: Auto resulted                Pending Results: cardiology assessment    Imaging Reviewed:   XR Chest 1 View    Result Date: 10/13/2024  Impression: Mildly decreased patchy airspace opacities throughout the left lung compared to 10/12/2024 chest x-ray, likely due to multifocal pneumonia. Electronically Signed: Danae Gao  10/13/2024 12:09 PM EDT  Workstation ID: [id number]    XR Chest 1 View    Result Date: 10/12/2024  Impression: Multifocal pneumonia throughout the left lung. Electronically Signed: Nadir Chow MD  10/12/2024 8:30 PM EDT  Workstation ID: [id number]    CT Enterography Abdomen Pelvis w Contrast    Result Date: 10/11/2024  Impression: Areas of bowel wall thickening and enhancement through the distal ileum, colon and rectum consistent the patient's history of  Crohn's disease. No evidence of bowel obstruction, perforation or abscess. Electronically Signed: Jakob Hernandez MD  10/11/2024 10:08 PM EDT  Workstation ID: [id number]    XR Chest 1 View    Result Date: 10/9/2024  Impression: No active disease Electronically Signed: Lawson Pritchard MD  10/9/2024 12:48 PM EDT  Workstation ID: [id number]          Assessment & Plan   ASSESSMENT/PLAN  Normocytic anemia and leukopenia:-subacute? Pre-admission labs unavailable to see tempo. Unknown etiology: ddx large but includes poor nutrition, bleed, primary bone marrow disorder, infection,  -Some immature granulocytes noted on today's CBC. flow cytometry pending to ensure no leukemic cells present. Patient relatively asymptomatic.   -10/11/2022 reticulocyte numbers 0.72 (normal) however absolute reticulocyte decreased at 0.0182 suggesting either not enough building blocks to make blood or bone marrow dysfunction  -B12 312 (normal), folate 18.3 (normal), iron studies (iron 25 low, iron saturation 13% low) and ferritin (427 high, but is an acute phase reactant) -appears there could still be some component of iron deficiency involved which is not surprising as she has a disease that could cause on again off again bleeding; will likely have to deal with IV iron as what was seen on EGD means she will be unlikely to tolerate oral iron or be able to absorb it as she will likely be on chronic PPIs  -agree with CBCdiff daily while inpatient to check ANC level and follow immature granulocytes  -see GI w/u and treatment plan given hx of Crohn's.  -If hgb <7 and symptomatic agree with PRBC ( 1 already given this admission)  -monitor for fevers    2.  Patient complaining of chest pain-may be exacerbated by anemia, electrolyte abnormalities from her diarrhea as she is required potassium and magnesium supplementation  -Rapid response at bedside with patient's nurse, labs to come back with after repletion potassium within normal limits, patient had been  given at least 2 g of magnesium, another 2 hanging.  Patient's vitals were 120 systolic,Heart rate was ranging between the 80s and 110s.  And she was saturating 98 to 100% on room air.  Afebrile.  EKG was done on bedside by rapid and looked like NSR, troponins were sent and pending, ABG was sent.  Hemoglobin was 9.9 but she appeared to be possibly dry to me so that might have been because she was well and Meckley concentrated.  RN reported that she had had at least 3 glasses of ice water this morning and had gotten up to urinate.  -I discussed with the nurses and rapid response all potential interventions; they were doing everything that I would asked them to do. team had paged the hospitalist and cardiology.  RN and rapid response reported no further assistance from me, while patient appeared to be in distress she was clinically stable, so I deferred to primary team and cardiology.     will continue to follow    Electronically signed by Dede Amezquita MD PhD, 10/13/24, 12:29 PM EDT.        Thank you for this consult. We will be happy to follow along with you.

## 2024-10-14 ENCOUNTER — INPATIENT HOSPITAL (AMBULATORY)
Age: 74
End: 2024-10-14
Payer: MEDICARE

## 2024-10-14 ENCOUNTER — APPOINTMENT (OUTPATIENT)
Dept: CT IMAGING | Facility: HOSPITAL | Age: 74
End: 2024-10-14
Payer: MEDICARE

## 2024-10-14 ENCOUNTER — INPATIENT HOSPITAL (AMBULATORY)
Dept: URBAN - METROPOLITAN AREA HOSPITAL 84 | Facility: HOSPITAL | Age: 74
End: 2024-10-14
Payer: MEDICARE

## 2024-10-14 DIAGNOSIS — D72.819 DECREASED WHITE BLOOD CELL COUNT, UNSPECIFIED: ICD-10-CM

## 2024-10-14 DIAGNOSIS — K50.812 CROHN'S DISEASE OF BOTH SMALL AND LARGE INTESTINE WITH INTES: ICD-10-CM

## 2024-10-14 DIAGNOSIS — K12.30 ORAL MUCOSITIS (ULCERATIVE), UNSPECIFIED: ICD-10-CM

## 2024-10-14 DIAGNOSIS — E87.8 OTHER DISORDERS OF ELECTROLYTE AND FLUID BALANCE, NOT ELSEWH: ICD-10-CM

## 2024-10-14 DIAGNOSIS — D64.9 ANEMIA, UNSPECIFIED: ICD-10-CM

## 2024-10-14 DIAGNOSIS — Z90.49 ACQUIRED ABSENCE OF OTHER SPECIFIED PARTS OF DIGESTIVE TRACT: ICD-10-CM

## 2024-10-14 PROBLEM — I34.0 MODERATE TO SEVERE MITRAL REGURGITATION: Status: ACTIVE | Noted: 2024-10-08

## 2024-10-14 PROBLEM — R07.89 CHEST PAIN, ATYPICAL: Status: ACTIVE | Noted: 2024-10-08

## 2024-10-14 LAB
ANION GAP SERPL CALCULATED.3IONS-SCNC: 5.1 MMOL/L (ref 5–15)
BUN SERPL-MCNC: 24 MG/DL (ref 8–23)
BUN/CREAT SERPL: 46.2 (ref 7–25)
CALCIUM SPEC-SCNC: 8.2 MG/DL (ref 8.6–10.5)
CHLORIDE SERPL-SCNC: 106 MMOL/L (ref 98–107)
CO2 SERPL-SCNC: 23.9 MMOL/L (ref 22–29)
CREAT SERPL-MCNC: 0.52 MG/DL (ref 0.57–1)
EGFRCR SERPLBLD CKD-EPI 2021: 97.6 ML/MIN/1.73
GLUCOSE SERPL-MCNC: 150 MG/DL (ref 65–99)
MAGNESIUM SERPL-MCNC: 2.5 MG/DL (ref 1.6–2.4)
POTASSIUM SERPL-SCNC: 5.3 MMOL/L (ref 3.5–5.2)
QT INTERVAL: 306 MS
QT INTERVAL: 372 MS
QT INTERVAL: 380 MS
QTC INTERVAL: 424 MS
QTC INTERVAL: 434 MS
QTC INTERVAL: 435 MS
SODIUM SERPL-SCNC: 135 MMOL/L (ref 136–145)

## 2024-10-14 PROCEDURE — 99232 SBSQ HOSP IP/OBS MODERATE 35: CPT | Performed by: NURSE PRACTITIONER

## 2024-10-14 PROCEDURE — 84433 ASY THIOPURIN S-MTHYLTRNSFRS: CPT | Performed by: INTERNAL MEDICINE

## 2024-10-14 PROCEDURE — 80048 BASIC METABOLIC PNL TOTAL CA: CPT | Performed by: INTERNAL MEDICINE

## 2024-10-14 PROCEDURE — 25010000002 ENOXAPARIN PER 10 MG: Performed by: INTERNAL MEDICINE

## 2024-10-14 PROCEDURE — 25510000001 IOPAMIDOL PER 1 ML: Performed by: STUDENT IN AN ORGANIZED HEALTH CARE EDUCATION/TRAINING PROGRAM

## 2024-10-14 PROCEDURE — 71275 CT ANGIOGRAPHY CHEST: CPT

## 2024-10-14 PROCEDURE — 99232 SBSQ HOSP IP/OBS MODERATE 35: CPT | Performed by: INTERNAL MEDICINE

## 2024-10-14 PROCEDURE — 83735 ASSAY OF MAGNESIUM: CPT | Performed by: INTERNAL MEDICINE

## 2024-10-14 PROCEDURE — 25010000002 METHYLPREDNISOLONE PER 40 MG: Performed by: INTERNAL MEDICINE

## 2024-10-14 PROCEDURE — 25010000002 CEFEPIME PER 500 MG: Performed by: STUDENT IN AN ORGANIZED HEALTH CARE EDUCATION/TRAINING PROGRAM

## 2024-10-14 RX ORDER — IPRATROPIUM BROMIDE AND ALBUTEROL SULFATE 2.5; .5 MG/3ML; MG/3ML
3 SOLUTION RESPIRATORY (INHALATION) EVERY 4 HOURS PRN
Status: DISCONTINUED | OUTPATIENT
Start: 2024-10-14 | End: 2024-10-23 | Stop reason: HOSPADM

## 2024-10-14 RX ORDER — IOPAMIDOL 755 MG/ML
100 INJECTION, SOLUTION INTRAVASCULAR
Status: COMPLETED | OUTPATIENT
Start: 2024-10-14 | End: 2024-10-14

## 2024-10-14 RX ADMIN — DIPHENHYDRAMINE HYDROCHLORIDE AND LIDOCAINE HYDROCHLORIDE AND ALUMINUM HYDROXIDE AND MAGNESIUM HYDRO 10 ML: KIT at 13:02

## 2024-10-14 RX ADMIN — NYSTATIN 500000 UNITS: 100000 SUSPENSION ORAL at 18:59

## 2024-10-14 RX ADMIN — DIPHENHYDRAMINE HYDROCHLORIDE AND LIDOCAINE HYDROCHLORIDE AND ALUMINUM HYDROXIDE AND MAGNESIUM HYDRO 10 ML: KIT at 00:17

## 2024-10-14 RX ADMIN — CEFEPIME 2000 MG: 2 INJECTION, POWDER, FOR SOLUTION INTRAVENOUS at 09:44

## 2024-10-14 RX ADMIN — IOPAMIDOL 100 ML: 755 INJECTION, SOLUTION INTRAVENOUS at 10:38

## 2024-10-14 RX ADMIN — NYSTATIN 500000 UNITS: 100000 SUSPENSION ORAL at 20:20

## 2024-10-14 RX ADMIN — Medication 10 ML: at 20:21

## 2024-10-14 RX ADMIN — METHYLPREDNISOLONE SODIUM SUCCINATE 20 MG: 40 INJECTION, POWDER, FOR SOLUTION INTRAMUSCULAR; INTRAVENOUS at 17:11

## 2024-10-14 RX ADMIN — LEVOTHYROXINE SODIUM 50 MCG: 0.05 TABLET ORAL at 08:57

## 2024-10-14 RX ADMIN — MESALAMINE 4.8 G: 800 TABLET, DELAYED RELEASE ORAL at 11:06

## 2024-10-14 RX ADMIN — DIPHENHYDRAMINE HYDROCHLORIDE AND LIDOCAINE HYDROCHLORIDE AND ALUMINUM HYDROXIDE AND MAGNESIUM HYDRO 10 ML: KIT at 20:22

## 2024-10-14 RX ADMIN — PANTOPRAZOLE SODIUM 40 MG: 40 TABLET, DELAYED RELEASE ORAL at 11:01

## 2024-10-14 RX ADMIN — FOLIC ACID 1 MG: 1 TABLET ORAL at 11:01

## 2024-10-14 RX ADMIN — METHYLPREDNISOLONE SODIUM SUCCINATE 20 MG: 40 INJECTION, POWDER, FOR SOLUTION INTRAMUSCULAR; INTRAVENOUS at 00:10

## 2024-10-14 RX ADMIN — DIPHENHYDRAMINE HYDROCHLORIDE AND LIDOCAINE HYDROCHLORIDE AND ALUMINUM HYDROXIDE AND MAGNESIUM HYDRO 10 ML: KIT at 08:57

## 2024-10-14 RX ADMIN — NYSTATIN 500000 UNITS: 100000 SUSPENSION ORAL at 08:57

## 2024-10-14 RX ADMIN — NYSTATIN 500000 UNITS: 100000 SUSPENSION ORAL at 12:56

## 2024-10-14 RX ADMIN — ENOXAPARIN SODIUM 40 MG: 100 INJECTION SUBCUTANEOUS at 17:11

## 2024-10-14 RX ADMIN — SERTRALINE 50 MG: 50 TABLET, FILM COATED ORAL at 11:01

## 2024-10-14 RX ADMIN — CEFEPIME 2000 MG: 2 INJECTION, POWDER, FOR SOLUTION INTRAVENOUS at 00:10

## 2024-10-14 RX ADMIN — SODIUM ZIRCONIUM CYCLOSILICATE 10 G: 10 POWDER, FOR SUSPENSION ORAL at 12:56

## 2024-10-14 RX ADMIN — CEFEPIME 2000 MG: 2 INJECTION, POWDER, FOR SOLUTION INTRAVENOUS at 21:48

## 2024-10-14 RX ADMIN — Medication 10 ML: at 11:01

## 2024-10-14 NOTE — PROGRESS NOTES
"  Cardiology Progress Note    Patient Identification:  Name: Maryann Gaitan  Age: 74 y.o.  Sex: female  :  1950  MRN: [id number]                 Follow Up / Chief Complaint: Chest pain  Chief Complaint   Patient presents with    Abnormal Lab       Interval History: Patient presented to the hospital with abnormal labs underwent EGD and colonoscopy and developed chest pain    NP note: Patient seen and examined.  She denies any further chest pain examination she is resting in bed she does report shortness of breath and has cough with significant rhonchi.  Elevated D-dimer noted therefore CT PE protocol has been ordered.  Echocardiogram with moderate to severe MR will cancel stress test and plan for cardiac cath tomorrow if CT is negative for PE plan for Dr. Navarro to evaluate MR     Electronically signed by DRU Rubio, 10/14/24, 1:47 PM EDT.    Cardiology attending addendum :    I have personally performed a face-to-face diagnostic evaluation, physical exam and reviewed data on this patient.  I have reviewed documentation done by me and nurse practitioner  and corrected as needed.  And agree with the different components of documentation.Greater than 50% of the time spent in the care of this patient was provided by attending consultant/me.        Subjective: Patient seen and examined; chart and labs reviewed; discussed with bedside nurse.  Patient had elevated D-dimer.  CT PE study is negative for PE but is abnormal with cavitary lesion.      Objective:  10/14/2024: Sodium 135 potassium 5.3 BUN 24 creatinine 0.52 glucose 150    History of present illness:      Maryann Gaitan is a 74-year-old female with a PMH of     COPD  Hypertension  Rheumatoid arthritis  Crohn's disease     who presented to [facility] on 10/8/2024 due to \"abnormal labs\" and nausea/vomiting/diarrhea.  Patient noted to be hyponatremic with sodium of 122, hypokalemic potassium 3.0 as well as anemic.  Patient has been followed by nephrology and GI " during hospitalization.  She underwent EGD and colonoscopy yesterday showing small hiatal hernia, nonerosive chronic gastritis and sigmoid colon diverticulitis.  Per GI she has a history of Crohn disease but this has not been proven by biopsy as [employer] testing has not been consistent with IBD.  Cardiology consulted for chest pain and abnormal EKG.  Rapid response called this morning on patient secondary to acute sudden onset of sharp left-sided chest pain with associated symptoms of shortness of breath, palpitations, lightheadedness/dizziness.  EKG 10/12 reviewed showing sinus tachycardia with PACs. Stat EKG today without acute ST-T segment changes, ABG with PaO2 of 67, troponin 23, H&H 9.9/30.3.  Patient denies personal or family history of CAD, hyperlipidemia.  She does report history of hypertension, type 2 diabetes.                Assessment:  :     Chest pain  Shortness of breath  Arrhythmia  Abnormal EKG  Hypertension  Mitral regurgitation  Hyponatremia  Hypokalemia  Crohn's disease  Normocytic anemia  COPD  Multifocal pneumonia  Hyperglycemia, prediabetes with A1c of 5.6 from     Recommendations / Plan:         Patient presented 10/9/2024 because of abnormal labs showing low sodium, was complaining of nausea vomiting and diarrhea.  Patient's hydrochlorothiazide was held and nephrology consulted.  HS troponin is 23 and 22.  Previous proBNP was 2573.  Sodium is improved to 137.  Glucose elevated.  EKG done 10/13/2024 reviewed/interpreted by me reveals sinus arrhythmia at the rate of 78 bpm.  Patient has multifocal pneumonia and acute hypoxic respiratory failure.  He is on IV antibiotics and oxygen.  Blood cultures are pending.  Patient had D-dimer which was elevated.  CT PE study is negative for PE but has cavitary lesion in the lung.  Will consult pulmonary.  Patient has severe MR will benefit from cardiac cath.  Will review with structural heart team.  Will follow and consider further evaluation treatment  depending on how her condition evolves.     Echocardiogram 10/12/2024 is revealing EF of 51 to 55% with mild RV enlargement severe left atrial enlargement and right atrial enlargement and moderate to severe MR    Will check electrolytes and replete as needed.  Follow-up with nephrology for hyponatremia  Follow-up with primary team and GI for nausea vomiting and possible Crohn's disease and microcytic anemia  Will follow and consider further evaluation treatment depending on how her condition evolves    Copied text in this portion of the note has been reviewed and is accurate as of 10/14/2024    Past Medical History:  Past Medical History:   Diagnosis Date    Arthritis     COPD (chronic obstructive pulmonary disease)     Hypertension      Past Surgical History:  Past Surgical History:   Procedure Laterality Date    HYSTERECTOMY          Social History:   Social History     Tobacco Use    Smoking status: Former     Types: Cigarettes    Smokeless tobacco: Never   Substance Use Topics    Alcohol use: Never      Family History:  History reviewed. No pertinent family history.       Allergies:  Allergies   Allergen Reactions    Codeine Hives    Penicillin G Sodium Hives     Scheduled Meds:  cefepime, 2,000 mg, Once  cefepime, 2,000 mg, Q12H  enoxaparin, 40 mg, Q24H  First Mouthwash (Magic Mouthwash), 10 mL, Q6H  folic acid, 1 mg, Daily  levothyroxine, 50 mcg, Daily  mesalamine, 4.8 g, Daily With Breakfast  methylPREDNISolone sodium succinate, 20 mg, Q8H  nystatin, 5 mL, 4x Daily  pantoprazole, 40 mg, Daily  sertraline, 50 mg, Daily  sodium chloride, 10 mL, Q12H  tiotropium bromide monohydrate, 2 puff, Daily - RT          Review of Systems:   ROS  Review of Systems   Constitution: Negative for chills and fever.   Cardiovascular: Negative for chest pain and palpitations.   Respiratory: Negative for cough and hemoptysis.    Gastrointestinal: Negative for nausea.        Constitutional:  Temp:  [97.7 °F (36.5 °C)-98.4 °F (36.9  "°C)] 98 °F (36.7 °C)  Heart Rate:  [73-94] 79  Resp:  [14-25] 17  BP: (114-147)/(61-77) 118/67    Physical Exam   /67 (BP Location: Right arm, Patient Position: Lying)   Pulse 79   Temp 98 °F (36.7 °C) (Oral)   Resp 17   Ht 154.9 cm (61\")   Wt 57.6 kg (127 lb)   SpO2 100%   BMI 24.00 kg/m²   General:  Appears in no acute distress  Eyes: Sclera is anicteric,  conjunctiva is clear   HEENT:  No JVD. Thyroid not visibly enlarged. No mucosal pallor or cyanosis  Respiratory: Respirations regular and unlabored at rest.  Scattered rhonchi   Cardiovascular: S1,S2 Regular rate and rhythm.  2/6 holosystolic murmur  Gastrointestinal: Abdomen nondistended.  Musculoskeletal:  No abnormal movements  Extremities: No digital clubbing or cyanosis  Skin: Color pink.   Neuro: Alert and awake.    INTAKE AND OUTPUT:    Intake/Output Summary (Last 24 hours) at 10/14/2024 0748  Last data filed at 10/14/2024 0000  Gross per 24 hour   Intake 990 ml   Output 60 ml   Net 930 ml       Cardiographics  Telemetry: sinus rhythm         ECG:   ECG 12 Lead Chest Pain   Preliminary Result   HEART RATE=78  bpm   RR Zckklfej=365  ms   OK Ikkhrttu=239  ms   P Horizontal Axis=-4  deg   P Front Axis=76  deg   QRSD Interval=94  ms   QT Ksnnnicx=621  ms   ATmM=253  ms   QRS Axis=32  deg   T Wave Axis=75  deg   - OTHERWISE NORMAL ECG -   Sinus arrhythmia   Low voltage, precordial leads   Date and Time of Study:2024-10-13 11:34:48      ECG 12 Lead Rhythm Change   Preliminary Result   HEART RATE=81  bpm   RR Igelwrpu=131  ms   OK Xxjvizmv=452  ms   P Horizontal Axis=-1  deg   P Front Axis=78  deg   QRSD Interval=94  ms   QT Rgfwhxtt=547  ms   PFsR=826  ms   QRS Axis=15  deg   T Wave Axis=65  deg   - OTHERWISE NORMAL ECG -   Sinus rhythm   Low voltage, precordial leads   Date and Time of Study:2024-10-13 09:35:55      ECG 12 Lead Rhythm Change   Preliminary Result   HEART VEZH=237  bpm   RR Jlpzdnzv=885  ms   OK Interval=  ms   P Horizontal Axis=  " deg   P Front Axis=  deg   QRSD Interval=82  ms   QT Eesaidvd=120  ms   VXwL=345  ms   QRS Axis=20  deg   T Wave Axis=151  deg   - ABNORMAL ECG -   Atrial flutter/fibrillation   Ventricular premature complex   Probable  anterior infarct, age indeterminate   When compared with ECG of 05-Oct-2015 11:54:44,   Significant change in rhythm: previously sinus   Date and Time of Study:2024-10-12 19:50:31      Telemetry Scan   Final Result      Telemetry Scan   Final Result      Telemetry Scan   Final Result      Telemetry Scan   Final Result      Telemetry Scan   Final Result      Telemetry Scan   Final Result      Telemetry Scan   Final Result      Telemetry Scan   Final Result      Telemetry Scan   Final Result      Telemetry Scan   Final Result      Telemetry Scan   Final Result      Telemetry Scan   Final Result      Telemetry Scan   Final Result      Telemetry Scan   Final Result      Telemetry Scan   Final Result      Telemetry Scan   Final Result      ECG 12 Lead Tachycardia    (Results Pending)     I have personally reviewed EKG    Echocardiogram: Results for orders placed during the hospital encounter of 10/08/24    Adult Transthoracic Echo Complete w/ Color, Spectral and Contrast if Necessary Per Protocol    Interpretation Summary    Left ventricular systolic function is low normal. Left ventricular ejection fraction appears to be 51 - 55%.    Left ventricular diastolic function was normal.    The right ventricular cavity is mildly dilated.    Left atrial volume is severely increased.    The right atrial cavity is moderate to severely  dilated.    Moderate to severe mitral valve regurgitation is present.    Estimated right ventricular systolic pressure from tricuspid regurgitation is normal (<35 mmHg).    Conclusion    Technically difficult study due to poor acoustic windows.  Normal LV size.  Low normal LV systolic function, with estimated LV ejection fraction of 50%  Mildly dilated right ventricle.  Severe left  atrial enlargement by volumes.  Pulmonic valve is not well visualized.  Aortic valve is not well-visualized.  But does not have any significant AS or AR.  Mitral valve appears structurally normal.  Moderate to severe mitral regurgitation seen.  Calculated RV systolic pressure normal at 24 mmHg  Tricuspid valve appears structurally normal, trace tricuspid regurgitation seen.  Regurgitation seen.  No pericardial effusion seen.  Proximal aorta appears normal in size.      Lab Review   I have reviewed the labs  Results from last 7 days   Lab Units 10/13/24  1414 10/13/24  1152   HSTROP T ng/L 22* 23*     Results from last 7 days   Lab Units 10/14/24  0425   MAGNESIUM mg/dL 2.5*     Results from last 7 days   Lab Units 10/14/24  0425   SODIUM mmol/L 135*   POTASSIUM mmol/L 5.3*   BUN mg/dL 24*   CREATININE mg/dL 0.52*   CALCIUM mg/dL 8.2*         Results from last 7 days   Lab Units 10/13/24  1144 10/13/24  0224 10/12/24  0019 10/11/24  1237 10/11/24  0358   WBC 10*3/mm3  --  3.43 2.64*  --  2.80*   HEMOGLOBIN g/dL  --  9.9* 11.0*   < > 7.0*   HEMOGLOBIN, POC g/dL 9.4*  --   --   --   --    HEMATOCRIT %  --  30.3* 32.9*   < > 21.5*   HEMATOCRIT POC % 28*  --   --   --   --    PLATELETS 10*3/mm3  --  173 257  --  269    < > = values in this interval not displayed.           RADIOLOGY:  Imaging Results (Last 24 Hours)       Procedure Component Value Units Date/Time    XR Chest 1 View [[id number]] Collected: 10/13/24 1205     Updated: 10/13/24 1211    Narrative:      XR CHEST 1 VW    Date of Exam: 10/13/2024 11:55 AM EDT    Indication: chest pain    Comparison: AP chest x-ray 10/12/2024, 10/9/2024, two-view chest x-ray 8/14/2024, CT chest 7/20/2023    Findings:  Multifocal patchy airspace opacities throughout the left lung of mildly decreased compared to 10/12/2024 chest x-ray. Right lung appears clear. No pneumothorax or large pleural effusion is seen. Cardiomediastinal contours appear stable.      Impression:       "Impression:  Mildly decreased patchy airspace opacities throughout the left lung compared to 10/12/2024 chest x-ray, likely due to multifocal pneumonia.      Electronically Signed: Danae Gao    10/13/2024 12:09 PM EDT    Workstation ID: [id number]                  )10/14/2024  MD EVERETTE Araujo WO Funding/Transcription:   \"Dictated utilizing Dragon dictation\".   "

## 2024-10-14 NOTE — NURSING NOTE
WOCN note:    74 yr old female admitted 10/8/24 with abnormal labs and hyponatremia. Patient has a hx of Crohn's disease, COPD and HTN. WOCN consult received for a wound in the left breast fold and possible hospital acquired pressure injury.     Patient presents with a full thickness unstageable pressure injury within the left breast fold that measures approximately 0.7 x 1.3 cm with a soft yellow wound base. There is no intertrigo or yeast rash noted to the area. The wound was cleansed with NS and a silicone foam dressing was applied.   Patient is also noted to have a partial thickness stage 2 pressure injury to her mid sacrum that measures about 1.3 x 0.8 cm. There is a silicone foam dressing in place. A waffle cushion was provided for the chair and patient was instructed on frequent turns and weight shifts. We will continue to follow.

## 2024-10-14 NOTE — PROGRESS NOTES
LOS: 5 days   Patient Care Team:  Eduard Little MD as PCP - General (Family Medicine)      Subjective     Interval History:     Subjective: Patient reports that she is feeling some better.  Does report 5-6 loose bowel movements in the past 24 hours.  No overt GI bleeding.  Denies nausea/vomiting or abdominal pain.  Tolerating small amounts of oral intake.      ROS:   No chest pain, shortness of breath, or cough.        Medication Review:     Current Facility-Administered Medications:     albuterol (ACCUNEB) nebulizer solution 0.63 mg, 0.63 mg, Nebulization, Q6H PRN, Rob Strong MD, 0.63 mg at 10/11/24 1850    sennosides-docusate (PERICOLACE) 8.6-50 MG per tablet 2 tablet, 2 tablet, Oral, BID PRN **AND** polyethylene glycol (MIRALAX) packet 17 g, 17 g, Oral, Daily PRN **AND** bisacodyl (DULCOLAX) EC tablet 5 mg, 5 mg, Oral, Daily PRN **AND** bisacodyl (DULCOLAX) suppository 10 mg, 10 mg, Rectal, Daily PRN, Rob Strong MD    cefepime 2000 mg IVPB in 100 mL NS (MBP), 2,000 mg, Intravenous, Once, Rob Strong MD, Last Rate: 0 mL/hr at 10/13/24 1348, Restarted at 10/13/24 1426    cefepime 2000 mg IVPB in 100 mL NS (MBP), 2,000 mg, Intravenous, Q8H, Jonathan Mackay MD, 2,000 mg at 10/14/24 0944    Enoxaparin Sodium (LOVENOX) syringe 40 mg, 40 mg, Subcutaneous, Q24H, Rob Strong MD, 40 mg at 10/13/24 1659    First Mouthwash (Magic Mouthwash) 10 mL, 10 mL, Swish & Spit, Q6H, Rob Strong MD, 10 mL at 10/14/24 0857    folic acid (FOLVITE) tablet 1 mg, 1 mg, Oral, Daily, Rob Strong MD, 1 mg at 10/13/24 0912    hydrOXYzine (ATARAX) tablet 25 mg, 25 mg, Oral, TID PRN, Rob Strong MD    influenza vac split high-dose (FLUZONE HIGH DOSE) injection 0.5 mL, 0.5 mL, Intramuscular, During Hospitalization, Rob Strong MD    levothyroxine (SYNTHROID, LEVOTHROID) tablet 50 mcg, 50 mcg, Oral, Daily,  Rob Strong MD, 50 mcg at 10/14/24 0857    Lidocaine Viscous HCl (XYLOCAINE) 2 % solution 10 mL, 10 mL, Mouth/Throat, Q3H PRN, Rob Strong MD    Magnesium Standard Dose Replacement - Follow Nurse / BPA Driven Protocol, , Does not apply, PRN, Rob Strong MD    melatonin tablet 5 mg, 5 mg, Oral, Nightly PRN, Rob Strong MD    mesalamine (LIALDA) EC tablet 4.8 g, 4.8 g, Oral, Daily With Breakfast, Rob Strong MD, 4.8 g at 10/13/24 1028    methylPREDNISolone sodium succinate (SOLU-Medrol) injection 20 mg, 20 mg, Intravenous, Q8H, Rob Strong MD, 20 mg at 10/14/24 0010    nitroglycerin (NITROSTAT) SL tablet 0.4 mg, 0.4 mg, Sublingual, Q5 Min PRN, Rob Strong MD    nystatin (MYCOSTATIN) 100,000 unit/mL suspension 500,000 Units, 5 mL, Swish & Swallow, 4x Daily, Rob Strong MD, 500,000 Units at 10/14/24 0857    ondansetron (ZOFRAN) injection 4 mg, 4 mg, Intravenous, Q6H PRN, Rob Strong MD, 4 mg at 10/09/24 1551    ondansetron ODT (ZOFRAN-ODT) disintegrating tablet 4 mg, 4 mg, Oral, Q6H PRN, 4 mg at 10/12/24 1721 **OR** ondansetron (ZOFRAN) injection 4 mg, 4 mg, Intravenous, Q6H PRN, Rob Strong MD    pantoprazole (PROTONIX) EC tablet 40 mg, 40 mg, Oral, Daily, Rob Strong MD, 40 mg at 10/13/24 0912    Pharmacy to Dose Cefepime, , Does not apply, Continuous PRN, Rob Strong MD    Phosphorus Replacement - Follow Nurse / BPA Driven Protocol, , Does not apply, PRN, Rob Strong MD    Potassium Replacement - Follow Nurse / BPA Driven Protocol, , Does not apply, PRN, Rob Strong MD    sertraline (ZOLOFT) tablet 50 mg, 50 mg, Oral, Daily, Rob Strong MD, 50 mg at 10/13/24 0912    sodium chloride 0.9 % flush 10 mL, 10 mL, Intravenous, Q12H, Rob Strong MD, 10 mL at 10/13/24 2112    sodium  chloride 0.9 % flush 10 mL, 10 mL, Intravenous, PRN, Rob Strong MD    tiotropium (SPIRIVA RESPIMAT) 2.5 mcg/act aerosol solution inhaler, 2 puff, Inhalation, Daily - RT, Rob Strong MD, 2 puff at 10/13/24 0854      Objective     Vital Signs  Vitals:    10/13/24 2011 10/14/24 0000 10/14/24 0342 10/14/24 0727   BP: 122/67 118/70 124/63 118/67   BP Location: Right arm Right arm Right arm Right arm   Patient Position: Lying Lying Lying Lying   Pulse: 84 94 84 79   Resp: 22 24 24 17   Temp: 98.1 °F (36.7 °C) 98.4 °F (36.9 °C) 98 °F (36.7 °C) 98 °F (36.7 °C)   TempSrc: Oral Oral Oral Oral   SpO2: 97% 99% 98% 100%   Weight:       Height:           Physical Exam:     General Appearance:    Awake and alert, in no acute distress   Head:    Normocephalic, without obvious abnormality   Eyes:          Conjunctivae normal, anicteric sclera   Throat:   No oral lesions, no thrush, oral mucosa moist   Neck:   No adenopathy, supple, no JVD   Lungs:     respirations regular, even and unlabored   Abdomen:     Soft, non-tender, no rebound or guarding, non-distended   Rectal:     Deferred   Extremities:   No edema, no cyanosis   Skin:   No bruising or rash, no jaundice        Results Review:    CBC    Results from last 7 days   Lab Units 10/13/24  1144 10/13/24  0224 10/12/24  0019 10/11/24  1237 10/11/24  0358 10/10/24  0428 10/09/24  1407 10/09/24  0542 10/08/24  2105 10/08/24  0927   WBC 10*3/mm3  --  3.43 2.64*  --  2.80* 1.54*  --  2.94* 4.51 6.60   HEMOGLOBIN g/dL  --  9.9* 11.0* 7.8* 7.0* 7.7* 8.4* 8.1* 9.4* 10.3*   HEMOGLOBIN, POC g/dL 9.4*  --   --   --   --   --   --   --   --   --    PLATELETS 10*3/mm3  --  173 257  --  269 315  --  343 402 449     CMP   Results from last 7 days   Lab Units 10/14/24  0425 10/13/24  2027 10/13/24  1152 10/13/24  1144 10/13/24  0224 10/12/24  0019 10/11/24  1722 10/11/24  1258 10/11/24  0358 10/10/24  0808 10/10/24  0428 10/09/24  0542 10/08/24  2105 10/08/24  0927    SODIUM mmol/L 135* 138  --   --  137 132* 129* 126* 127*   < > 123*   < > 126* 122*   POTASSIUM mmol/L 5.3* 5.0 3.6  --  3.1* 3.6 3.9 3.0* 2.6*  --  3.2*   < > 3.2* 3.0*   CHLORIDE mmol/L 106 107  --   --  102 96* 97* 94* 94*  --  92*   < > 87* 81*   CO2 mmol/L 23.9 22.1  --   --  25.8 23.8 24.0 24.2 23.9  --  24.7   < > 24.6 22.5   BUN mg/dL 24* 32*  --   --  37* 38* 40* 55* 21  --  8   < > 24* 35*   CREATININE mg/dL 0.52* 0.56*  --  0.85 0.57 0.51* 0.49* 0.48* 0.38*  --  0.44*   < > 0.75 1.07*   GLUCOSE mg/dL 150* 190*  --   --  141* 145* 122* 139* 108*  --  99   < > 103* 98   ALBUMIN g/dL  --   --   --   --   --  2.7*  --   --   --   --  2.6*  --  3.3* 3.3*   BILIRUBIN mg/dL  --   --   --   --   --  1.0  --   --   --   --  0.5  --  0.7 0.7   ALK PHOS U/L  --   --   --   --   --  80  --   --   --   --  70  --  89 100   AST (SGOT) U/L  --   --   --   --   --  16  --   --   --   --  14  --  15 18   ALT (SGPT) U/L  --   --   --   --   --  13  --   --   --   --  13  --  13 17   MAGNESIUM mg/dL 2.5* 2.8* 3.7*  --  1.5*  --   --  1.3*  --   --  1.4*  --  1.9 2.0    < > = values in this interval not displayed.     Cr Clearance Estimated Creatinine Clearance: 77.5 mL/min (A) (by C-G formula based on SCr of 0.52 mg/dL (L)).  Coag     HbA1C   Lab Results   Component Value Date    HGBA1C 5.64 (H) 10/13/2024     Results from last 7 days   Lab Units 10/13/24  1414 10/13/24  1152   HSTROP T ng/L 22* 23*     Infection     UA    Results from last 7 days   Lab Units 10/08/24  1023   NITRITE UA  Negative   WBC UA /HPF 0-2   BACTERIA UA /HPF Trace*   SQUAM EPITHEL UA /HPF 0-2     Microbiology Results (last 10 days)       Procedure Component Value - Date/Time    Respiratory Panel PCR w/COVID-19(SARS-CoV-2) ANAMIKA/FABRICIO/JOSSY/PAD/COR/DANIELLE In-House, NP Swab in UTM/VTM, 2 HR TAT - Swab, Nasopharynx [[id number]]  (Normal) Collected: 10/13/24 1228    Lab Status: Final result Specimen: Swab from Nasopharynx Updated: 10/13/24 1325     ADENOVIRUS,  PCR Not Detected     Coronavirus 229E Not Detected     Coronavirus HKU1 Not Detected     Coronavirus NL63 Not Detected     Coronavirus OC43 Not Detected     COVID19 Not Detected     Human Metapneumovirus Not Detected     Human Rhinovirus/Enterovirus Not Detected     Influenza A PCR Not Detected     Influenza B PCR Not Detected     Parainfluenza Virus 1 Not Detected     Parainfluenza Virus 2 Not Detected     Parainfluenza Virus 3 Not Detected     Parainfluenza Virus 4 Not Detected     RSV, PCR Not Detected     Bordetella pertussis pcr Not Detected     Bordetella parapertussis PCR Not Detected     Chlamydophila pneumoniae PCR Not Detected     Mycoplasma pneumo by PCR Not Detected    Narrative:      In the setting of a positive respiratory panel with a viral infection PLUS a negative procalcitonin without other underlying concern for bacterial infection, consider observing off antibiotics or discontinuation of antibiotics and continue supportive care. If the respiratory panel is positive for atypical bacterial infection (Bordetella pertussis, Chlamydophila pneumoniae, or Mycoplasma pneumoniae), consider antibiotic de-escalation to target atypical bacterial infection.    MRSA Screen, PCR (Inpatient) - Swab, Nares [[id number]]  (Normal) Collected: 10/13/24 1228    Lab Status: Final result Specimen: Swab from Nares Updated: 10/13/24 1349     MRSA PCR No MRSA Detected    Narrative:      The negative predictive value of this diagnostic test is high and should only be used to consider de-escalating anti-MRSA therapy. A positive result may indicate colonization with MRSA and must be correlated clinically.          Imaging Results (Last 72 Hours)       Procedure Component Value Units Date/Time    XR Chest 1 View [[id number]] Collected: 10/13/24 1205     Updated: 10/13/24 1211    Narrative:      XR CHEST 1 VW    Date of Exam: 10/13/2024 11:55 AM EDT    Indication: chest pain    Comparison: [facility] chest x-ray 10/12/2024, 10/9/2024,  two-view chest x-ray 8/14/2024, CT chest 7/20/2023    Findings:  Multifocal patchy airspace opacities throughout the left lung of mildly decreased compared to 10/12/2024 chest x-ray. Right lung appears clear. No pneumothorax or large pleural effusion is seen. Cardiomediastinal contours appear stable.      Impression:      Impression:  Mildly decreased patchy airspace opacities throughout the left lung compared to 10/12/2024 chest x-ray, likely due to multifocal pneumonia.      Electronically Signed: Danae Gao    10/13/2024 12:09 PM EDT    Workstation ID: [id number]    XR Chest 1 View [[id number]] Collected: 10/12/24 2029     Updated: 10/12/24 2032    Narrative:      XR CHEST 1 VW    Date of Exam: 10/12/2024 8:18 PM EDT    Indication: tachycardia    Comparison: Chest radiograph 10/9/2024    Findings:  The heart size and pulmonary vessels are normal. There are diffuse alveolar airspace opacities throughout the left lung consistent with multifocal pneumonia. The right lung is clear. There is background of diffuse emphysema.      Impression:      Impression:  Multifocal pneumonia throughout the left lung.        Electronically Signed: Nadir Chow MD    10/12/2024 8:30 PM EDT    Workstation ID: [id number]    CT Enterography Abdomen Pelvis w Contrast [[id number]] Collected: 10/11/24 2201     Updated: 10/11/24 2210    Narrative:      CT ABDOMEN PELVIS W CONTRAST ENTEROGRAPHY    Date of Exam: 10/11/2024 7:15 PM EDT    Indication: suspected Crohn's, abdominal pain, diarrhea.    Comparison: 6/25/2024    Technique: Axial CT images were obtained of the abdomen and pelvis after the uneventful intravenous administration of iodinated contrast.  Neutral oral contrast was also administered for the enterography protocol.  Sagittal and coronal reconstructions   were performed.  Automated exposure control and iterative reconstruction methods were used.      Findings:  ABDOMEN: There is a minimal right pleural effusion with adjacent  airspace consolidation likely atelectasis. The liver, spleen, pancreas and adrenal glands are normal. Subcentimeter cyst in the left kidney. Prior cholecystectomy.    PELVIS: Fluid is present throughout the large and small bowel. There are areas of bowel wall thickening and enhancement throughout the distal ileum, colon and rectum consistent with the patient's history of Crohn's disease. No evidence of bowel   obstruction, perforation or abscess. Prior hysterectomy. The abdominal aorta is mildly ectatic measuring up to 2.5 cm. Atherosclerotic vascular calcification is present. There is dense calcification of the aortic bifurcation. Degenerative changes are   present in the lumbar spine. Mild anterolisthesis at L4-L5.      Impression:      Impression:  Areas of bowel wall thickening and enhancement through the distal ileum, colon and rectum consistent the patient's history of Crohn's disease. No evidence of bowel obstruction, perforation or abscess.        Electronically Signed: Jakob Hernandez MD    10/11/2024 10:08 PM EDT    Workstation ID: [id number]            Assessment & Plan     ASSESSMENT:  -Crohn's disease of small bowel & colon   -Unintentional weight loss  -Electrolyte abnormality  -Leukopenia  -Normocytic anemia  -Oral ulcers/sores  -Hypertension  -COPD  -Rheumatoid arthritis  -History of hysterectomy  -History of cholecystectomy    PLAN:  Patient is a 74-year-old female with history of rheumatoid arthritis and cholecystectomy who presented on 10/8 with hyponatremia. Nephrology has been consulted. GI has been asked to evaluate the patient for persistent diarrhea. She does have a history of possible Crohn's disease, but this has not been biopsy-proven and [facility] testing has not been consistent with IBD. Fecal calprotectin was elevated in 11/2021.     Patient with no new complaints today.  Reports 5-6 loose bowel movements in the past 24 hours without overt GI bleeding.  No nausea/vomiting or abdominal  pain.  S/p EGD/colonoscopy on 10/12 showing small hiatal hernia, gastritis, TI stricture s/p dilation to 12 mm, TI and colon ulcers, diverticulosis, and grade 2 internal hemorrhoids.  Path is still pending.  Continue mesalamine and IV Solu-Medrol.  Diet as tolerated.  Patient will likely need Rinvoq as outpatient. Task has been sent to our office to initiate approval process.  Could also consider clinical trial secondary to Crohn's with stricture.  Continue diet as tolerated.  Likely home in the next 1 to 2 days from GI standpoint.  Continue supportive care.      Electronically signed by DRU Lew, 10/14/24, 9:58 AM EDT.

## 2024-10-14 NOTE — CASE MANAGEMENT/SOCIAL WORK
Continued Stay Note  ERIC Yeung     Patient Name: Maryann Gaitan  MRN: [id number]  Today's Date: 10/14/2024    Admit Date: 10/8/2024    Plan: Return home with family. Refusing SNF. Needs [facility] choices   Discharge Plan       Row Name 10/14/24 1714       Plan    Plan Return home with family. Refusing SNF. Needs [facility] choices    Plan Comments DC barriers: pending blood culture results, CT angiogram chest= new lesion, IV abx, I steroids, ECHO tomorrow. Pulm, cardiology, oncology, GI and nephrology following.                     Veronica Ceja RN      Office phone: [phone]  Office fax: [phone]

## 2024-10-14 NOTE — PLAN OF CARE
Goal Outcome Evaluation:              Outcome Evaluation: Patient slept some overnight. She woke up several times to have bowel movements. Patient's vitals have been stable and she has been Alert or Oriented x4. Patient has been NPO since midnight, due to going for a stress test later today. She has been on room air through out the shift without incident.

## 2024-10-14 NOTE — SIGNIFICANT NOTE
10/14/24 1141   OTHER   Discipline physical therapist   Rehab Time/Intention   Session Not Performed patient/family declined treatment  (Pt reports she wants to hold on PT today.  She had stress test, and is possibly having heart cath tomorrow.)   Therapy Assessment/Plan (PT)   Criteria for Skilled Interventions Met (PT) yes;meets criteria   Recommendation   PT - Next Appointment 10/15/24

## 2024-10-14 NOTE — PROGRESS NOTES
"        Hematology/Oncology Progress Note    Patient name: Maryann Gaitan  : 1950  MRN: [id number]    Chief complaint: In distress, \"I don't feel well\"     Subjective/interim summary:    10/14/24  patient reports she is still not feeling well, but not completely able to give full ROS reliably.     History:  Past Medical History:   Diagnosis Date    Arthritis     COPD (chronic obstructive pulmonary disease)     Hypertension    ,   Past Surgical History:   Procedure Laterality Date    HYSTERECTOMY     , History reviewed. No pertinent family history.,   Social History     Tobacco Use    Smoking status: Former     Types: Cigarettes    Smokeless tobacco: Never   Vaping Use    Vaping status: Never Used   Substance Use Topics    Alcohol use: Never    Drug use: Never   ,   Medications Prior to Admission   Medication Sig Dispense Refill Last Dose/Taking    Adalimumab (Humira, 2 Pen,) 40 MG/0.4ML Pen-injector Kit Inject 40 mg under the skin into the appropriate area as directed Every 14 (Fourteen) Days.   2024    amLODIPine (NORVASC) 10 MG tablet Take 1 tablet by mouth Daily.   Taking    cholecalciferol (VITAMIN D3) 1.25 MG (04588 UT) capsule Take 1 capsule by mouth 2 (Two) Times a Week. Wed, Sat   Taking    colestipol (COLESTID) 1 g tablet Take 1 tablet by mouth 2 (Two) Times a Day.   Taking    diclofenac (VOLTAREN) 50 MG EC tablet Take 1 tablet by mouth 2 (Two) Times a Day.   Taking    ezetimibe (ZETIA) 10 MG tablet Take 1 tablet by mouth Daily.   Taking    folic acid (FOLVITE) 1 MG tablet Take 1 tablet by mouth Daily.   Taking    hydroCHLOROthiazide 25 MG tablet Take 1 tablet by mouth Daily.   Taking    levothyroxine (SYNTHROID, LEVOTHROID) 50 MCG tablet Take 1 tablet by mouth Daily.   Taking    methotrexate 2.5 MG tablet Take 8 tablets by mouth 1 (One) Time Per Week.   Taking    pantoprazole (PROTONIX) 20 MG EC tablet Take 1 tablet by mouth Daily.   Taking    predniSONE (DELTASONE) 5 MG tablet Take 1 tablet " by mouth Daily.   Taking    sertraline (ZOLOFT) 50 MG tablet Take 1 tablet by mouth Daily.   Taking    tiotropium (SPIRIVA) 18 MCG per inhalation capsule Place 1 capsule into inhaler and inhale Daily.   Taking   , Scheduled Meds:  cefepime, 2,000 mg, Intravenous, Once  cefepime, 2,000 mg, Intravenous, Q8H  enoxaparin, 40 mg, Subcutaneous, Q24H  First Mouthwash (Magic Mouthwash), 10 mL, Swish & Spit, Q6H  folic acid, 1 mg, Oral, Daily  levothyroxine, 50 mcg, Oral, Daily  mesalamine, 4.8 g, Oral, Daily With Breakfast  methylPREDNISolone sodium succinate, 20 mg, Intravenous, Q8H  nystatin, 5 mL, Swish & Swallow, 4x Daily  pantoprazole, 40 mg, Oral, Daily  sertraline, 50 mg, Oral, Daily  sodium chloride, 10 mL, Intravenous, Q12H  tiotropium bromide monohydrate, 2 puff, Inhalation, Daily - RT    , Continuous Infusions:  Pharmacy to Dose Cefepime,     , PRN Meds:    albuterol    senna-docusate sodium **AND** polyethylene glycol **AND** bisacodyl **AND** bisacodyl    hydrOXYzine    influenza vaccine    Lidocaine Viscous HCl    Magnesium Standard Dose Replacement - Follow Nurse / BPA Driven Protocol    melatonin    nitroglycerin    ondansetron    ondansetron ODT **OR** ondansetron    Pharmacy to Dose Cefepime    Phosphorus Replacement - Follow Nurse / BPA Driven Protocol    Potassium Replacement - Follow Nurse / BPA Driven Protocol    sodium chloride   Allergies:  Codeine and Penicillin g sodium    Review of Systems   Constitutional:  Positive for fatigue. Negative for activity change, appetite change, chills and diaphoresis.   HENT: Negative.     Eyes: Negative.    Respiratory: Negative.     Cardiovascular: Negative.    Gastrointestinal:  Negative for abdominal pain.   Endocrine: Negative.    Genitourinary: Negative.    Musculoskeletal: Negative.    Skin: Negative.    Allergic/Immunologic: Negative.    Neurological: Negative.    Hematological: Negative.    Psychiatric/Behavioral:  Positive for confusion. Negative for  "hallucinations.     Objective   Vital Signs:   BP 97/59 (BP Location: Right arm, Patient Position: Lying)   Pulse 86   Temp 98 °F (36.7 °C) (Oral)   Resp 24   Ht 154.9 cm (61\")   Wt 57.6 kg (127 lb)   SpO2 98%   BMI 24.00 kg/m²     Physical Exam: (performed by MD)  Vitals and nursing note reviewed.   Constitutional:       General: She is not in acute distress.     Appearance: Normal appearance. She is normal weight. She is ill-appearing.   HENT:      Head: Normocephalic and atraumatic.      Right Ear: External ear normal.      Left Ear: External ear normal.      Nose: Nose normal.      Mouth/Throat:      Mouth: Mucous membranes are moist.      Pharynx: Oropharynx is clear.   Eyes:      Extraocular Movements: Extraocular movements intact.      Conjunctiva/sclera: Conjunctivae normal.      Pupils: Pupils are equal, round, and reactive to light.   Cardiovascular:      Rate and Rhythm: Normal rate and regular rhythm.      Pulses: Normal pulses.      Heart sounds: Normal heart sounds.   Pulmonary:      Effort: Pulmonary effort is normal.      Breath sounds: Normal breath sounds.   Abdominal:      General: Abdomen is flat.      Tenderness: There is abdominal tenderness.      Comments: Hypoactive normal pitch bowel sounds mildly tender to exam on palpation   Genitourinary:     Comments: Deferred  Musculoskeletal:         General: Normal range of motion.      Cervical back: Normal range of motion.   Skin:     General: Skin is warm and dry.   Neurological:      General: No focal deficit present.      Mental Status: She is alert and oriented to person, place, and time. Mental status is at baseline.   Psychiatric:         Mood and Affect: Mood normal.         Behavior: Behavior normal.         Thought Content: Thought content normal.     Results Review:  Lab Results (last 48 hours)       Procedure Component Value Units Date/Time    Blood Culture - Blood, Arm, Left [[id number]]  (Normal) Collected: 10/13/24 1415    " Specimen: Blood from Arm, Left Updated: 10/14/24 1445     Blood Culture No growth at 24 hours    Narrative:      Less than seven (7) mL's of blood was collected.  Insufficient quantity may yield false negative results.    Blood Culture - Blood, Arm, Right [[id number]]  (Normal) Collected: 10/13/24 1415    Specimen: Blood from Arm, Right Updated: 10/14/24 1445     Blood Culture No growth at 24 hours    Narrative:      Less than seven (7) mL's of blood was collected.  Insufficient quantity may yield false negative results.    Tissue Pathology Exam [[id number]] Collected: 10/12/24 1319    Specimen: Tissue from Small Intestine, Duodenum; Tissue from Stomach; Tissue from Small Intestine; Tissue from Large Intestine, Right / Ascending Colon; Tissue from Large Intestine, Left / Descending Colon; Tissue from Large Intestine, Rectum Updated: 10/14/24 1007    Magnesium [[id number]]  (Abnormal) Collected: 10/14/24 0425    Specimen: Blood Updated: 10/14/24 0524     Magnesium 2.5 mg/dL     Basic Metabolic Panel [[id number]]  (Abnormal) Collected: 10/14/24 0425    Specimen: Blood Updated: 10/14/24 0524     Glucose 150 mg/dL      BUN 24 mg/dL      Creatinine 0.52 mg/dL      Sodium 135 mmol/L      Potassium 5.3 mmol/L      Chloride 106 mmol/L      CO2 23.9 mmol/L      Calcium 8.2 mg/dL      BUN/Creatinine Ratio 46.2     Anion Gap 5.1 mmol/L      eGFR 97.6 mL/min/1.73     Narrative:      GFR Normal >60  Chronic Kidney Disease <60  Kidney Failure <15    The GFR formula is only valid for adults with stable renal function between ages 18 and 70.    TPMT Enzyme [[id number]] Collected: 10/14/24 0425    Specimen: Blood Updated: 10/14/24 0453    Basic Metabolic Panel [[id number]]  (Abnormal) Collected: 10/13/24 2027    Specimen: Blood Updated: 10/13/24 2122     Glucose 190 mg/dL      BUN 32 mg/dL      Creatinine 0.56 mg/dL      Sodium 138 mmol/L      Potassium 5.0 mmol/L      Chloride 107 mmol/L      CO2 22.1 mmol/L      Calcium 8.4 mg/dL       "BUN/Creatinine Ratio 57.1     Anion Gap 8.9 mmol/L      eGFR 95.9 mL/min/1.73     Narrative:      GFR Normal >60  Chronic Kidney Disease <60  Kidney Failure <15    The GFR formula is only valid for adults with stable renal function between ages 18 and 70.    Magnesium [[id number]]  (Abnormal) Collected: 10/13/24 2027    Specimen: Blood Updated: 10/13/24 2122     Magnesium 2.8 mg/dL     Hemoglobin A1c [[id number]]  (Abnormal) Collected: 10/13/24 0224    Specimen: Blood from Arm, Right Updated: 10/13/24 1826     Hemoglobin A1C 5.64 %     Calprotectin, Fecal - Stool, Per Rectum [[id number]] Collected: 10/13/24 1605    Specimen: Stool from Per Rectum Updated: 10/13/24 1636    D-dimer, Quantitative [[id number]]  (Abnormal) Collected: 10/13/24 1305    Specimen: Blood from Arm, Right Updated: 10/13/24 1542     D-Dimer, Quantitative 1.21 mg/L (FEU)     Narrative:      According to the assay 's published package insert, a normal (<0.50 mg/L (FEU)) D-dimer result in conjunction with a non-high clinical probability assessment, excludes deep vein thrombosis (DVT) and pulmonary embolism (PE) with high sensitivity.    D-dimer values increase with age and this can make VTE exclusion of an older population difficult. To address this, the [employer], based on best available evidence and recent guidelines, recommends that clinicians use age-adjusted D-dimer thresholds in patients greater than 50 years of age with: a) a low probability of PE who do not meet all Pulmonary Embolism Rule Out Criteria, or b) in those with intermediate probability of PE.   The formula for an age-adjusted D-dimer cut-off is \"age/100\".  For example, a 60 year old patient would have an age-adjusted cut-off of 0.60 mg/L (FEU) and an 80 year old 0.80 mg/L (FEU).    Lipid Panel [[id number]] Collected: 10/13/24 1414    Specimen: Blood from Arm, Right Updated: 10/13/24 1539     Total Cholesterol 141 mg/dL      Triglycerides 97 mg/dL      " HDL Cholesterol 55 mg/dL      LDL Cholesterol  68 mg/dL      VLDL Cholesterol 18 mg/dL      LDL/HDL Ratio 1.21    Narrative:      Cholesterol Reference Ranges  (U.S. Department of Health and Human Services ATP III Classifications)    Desirable          <200 mg/dL  Borderline High    200-239 mg/dL  High Risk          >240 mg/dL      Triglyceride Reference Ranges  (U.S. Department of Health and Human Services ATP III Classifications)    Normal           <150 mg/dL  Borderline High  150-199 mg/dL  High             200-499 mg/dL  Very High        >500 mg/dL    HDL Reference Ranges  (U.S. Department of Health and Human Services ATP III Classifications)    Low     <40 mg/dl (major risk factor for CHD)  High    >60 mg/dl ('negative' risk factor for CHD)        LDL Reference Ranges  (U.S. Department of Health and Human Services ATP III Classifications)    Optimal          <100 mg/dL  Near Optimal     100-129 mg/dL  Borderline High  130-159 mg/dL  High             160-189 mg/dL  Very High        >189 mg/dL    High Sensitivity Troponin T 2Hr [[id number]]  (Abnormal) Collected: 10/13/24 1414    Specimen: Blood from Arm, Right Updated: 10/13/24 1459     HS Troponin T 22 ng/L      Troponin T Delta -1 ng/L     Narrative:      High Sensitive Troponin T Reference Range:  <14.0 ng/L- Negative Female for AMI  <22.0 ng/L- Negative Male for AMI  >=14 - Abnormal Female indicating possible myocardial injury.  >=22 - Abnormal Male indicating possible myocardial injury.   Clinicians would have to utilize clinical acumen, EKG, Troponin, and serial changes to determine if it is an Acute Myocardial Infarction or myocardial injury due to an underlying chronic condition.         MRSA Screen, PCR (Inpatient) - Swab, Nares [[id number]]  (Normal) Collected: 10/13/24 1228    Specimen: Swab from Nares Updated: 10/13/24 1349     MRSA PCR No MRSA Detected    Narrative:      The negative predictive value of this diagnostic test is high and should only be  used to consider de-escalating anti-MRSA therapy. A positive result may indicate colonization with MRSA and must be correlated clinically.    Respiratory Panel PCR w/COVID-19(SARS-CoV-2) ANAMIKA/FABRICIO/JOSSY/PAD/COR/DANIELLE In-House, NP Swab in UTM/VTM, 2 HR TAT - Swab, Nasopharynx [[id number]]  (Normal) Collected: 10/13/24 1228    Specimen: Swab from Nasopharynx Updated: 10/13/24 1325     ADENOVIRUS, PCR Not Detected     Coronavirus 229E Not Detected     Coronavirus HKU1 Not Detected     Coronavirus NL63 Not Detected     Coronavirus OC43 Not Detected     COVID19 Not Detected     Human Metapneumovirus Not Detected     Human Rhinovirus/Enterovirus Not Detected     Influenza A PCR Not Detected     Influenza B PCR Not Detected     Parainfluenza Virus 1 Not Detected     Parainfluenza Virus 2 Not Detected     Parainfluenza Virus 3 Not Detected     Parainfluenza Virus 4 Not Detected     RSV, PCR Not Detected     Bordetella pertussis pcr Not Detected     Bordetella parapertussis PCR Not Detected     Chlamydophila pneumoniae PCR Not Detected     Mycoplasma pneumo by PCR Not Detected    Narrative:      In the setting of a positive respiratory panel with a viral infection PLUS a negative procalcitonin without other underlying concern for bacterial infection, consider observing off antibiotics or discontinuation of antibiotics and continue supportive care. If the respiratory panel is positive for atypical bacterial infection (Bordetella pertussis, Chlamydophila pneumoniae, or Mycoplasma pneumoniae), consider antibiotic de-escalation to target atypical bacterial infection.    Magnesium [[id number]]  (Abnormal) Collected: 10/13/24 1152    Specimen: Blood Updated: 10/13/24 1229     Magnesium 3.7 mg/dL      Comment: Pt with Magnesium sulfate       Potassium [[id number]]  (Normal) Collected: 10/13/24 1152    Specimen: Blood Updated: 10/13/24 1226     Potassium 3.6 mmol/L     High Sensitivity Troponin T [[id number]]  (Abnormal) Collected: 10/13/24  1152    Specimen: Blood Updated: 10/13/24 1226     HS Troponin T 23 ng/L     Narrative:      High Sensitive Troponin T Reference Range:  <14.0 ng/L- Negative Female for AMI  <22.0 ng/L- Negative Male for AMI  >=14 - Abnormal Female indicating possible myocardial injury.  >=22 - Abnormal Male indicating possible myocardial injury.   Clinicians would have to utilize clinical acumen, EKG, Troponin, and serial changes to determine if it is an Acute Myocardial Infarction or myocardial injury due to an underlying chronic condition.         POC Lactate [[id number]]  (Normal) Collected: 10/13/24 1144    Specimen: Arterial Blood Updated: 10/13/24 1148     Lactate 0.6 mmol/L      Comment: Serial Number: [id number]:  [id number]       POC Glucose Once [[id number]]  (Abnormal) Collected: 10/13/24 1144    Specimen: Arterial Blood Updated: 10/13/24 1148     Glucose 156 mg/dL      Comment: Serial Number: [id number]:  [id number]       Blood Gas, Arterial - [[id number]]  (Abnormal) Collected: 10/13/24 1144    Specimen: Arterial Blood Updated: 10/13/24 1148     Site Left Radial     Blayne's Test Positive     pH, Arterial 7.422 pH units      pCO2, Arterial 39.8 mm Hg      pO2, Arterial 67.1 mm Hg      HCO3, Arterial 25.9 mmol/L      Base Excess, Arterial 1.4 mmol/L      Comment: Serial Number: [id number]:  [id number]        O2 Saturation, Arterial 93.4 %      Barometric Pressure for Blood Gas --     Comment: N/A        Modality Cannula     FIO2 28 %      Hemodilution No     PO2/FIO2 240    POC Creatinine [[id number]]  (Normal) Collected: 10/13/24 1144    Specimen: Arterial Blood Updated: 10/13/24 1148     Creatinine 0.85 mg/dL      Comment: Serial Number: [id number]:  [id number]        eGFR 72.0 mL/min/1.73     POCT Electrolytes +HGB +HCT [[id number]]  (Abnormal) Collected: 10/13/24 1144    Specimen: Arterial Blood Updated: 10/13/24 1148     Sodium 137 mmol/L      POC Potassium 3.8 mmol/L      Ionized Calcium 1.27 mmol/L      Comment: Serial  Number: [id number]:  [id number]        Glucose 156 mg/dL      Hematocrit 28 %      Hemoglobin 9.4 g/dL     POC Glucose Once [[id number]]  (Abnormal) Collected: 10/13/24 1133    Specimen: Blood Updated: 10/13/24 1134     Glucose 143 mg/dL      Comment: Serial Number: [id number]:  [id number]       Basic Metabolic Panel [[id number]]  (Abnormal) Collected: 10/13/24 0224    Specimen: Blood from Arm, Right Updated: 10/13/24 0310     Glucose 141 mg/dL      BUN 37 mg/dL      Creatinine 0.57 mg/dL      Sodium 137 mmol/L      Potassium 3.1 mmol/L      Chloride 102 mmol/L      CO2 25.8 mmol/L      Calcium 8.5 mg/dL      BUN/Creatinine Ratio 64.9     Anion Gap 9.2 mmol/L      eGFR 95.5 mL/min/1.73     Narrative:      GFR Normal >60  Chronic Kidney Disease <60  Kidney Failure <15    The GFR formula is only valid for adults with stable renal function between ages 18 and 70.    Magnesium [[id number]]  (Abnormal) Collected: 10/13/24 0224    Specimen: Blood from Arm, Right Updated: 10/13/24 0310     Magnesium 1.5 mg/dL     CBC (No Diff) [[id number]]  (Abnormal) Collected: 10/13/24 0224    Specimen: Blood from Arm, Right Updated: 10/13/24 0243     WBC 3.43 10*3/mm3      RBC 3.26 10*6/mm3      Hemoglobin 9.9 g/dL      Hematocrit 30.3 %      MCV 92.9 fL      MCH 30.4 pg      MCHC 32.7 g/dL      RDW 17.4 %      RDW-SD 58.3 fl      MPV 8.7 fL      Platelets 173 10*3/mm3              Pending Results: flow cytometry    Imaging Reviewed:   CT Angiogram Chest    Result Date: 10/14/2024  Impression: 1. No evidence for pulmonary embolus. 2. New cavitary lesion in the left upper lung measuring 1.6 x 1.4 cm. There is adjacent groundglass opacification with additional nodular opacities and tree-in-bud opacities within the left upper and left lower lung. This may represent a multifocal pneumonia however underlying cavitary neoplasm cannot be excluded. Recommend treatment with follow-up imaging to ensure complete resolution. 3. Calcified and  noncalcified atherosclerotic disease involving the thoracic aortic arch and descending thoracic aorta with several aortic ulcers along the descending thoracic aorta. Electronically Signed: Manisha Khan MD  10/14/2024 12:01 PM EDT  Workstation ID: [id number]    XR Chest 1 View    Result Date: 10/13/2024  Impression: Mildly decreased patchy airspace opacities throughout the left lung compared to 10/12/2024 chest x-ray, likely due to multifocal pneumonia. Electronically Signed: Danae Gao  10/13/2024 12:09 PM EDT  Workstation ID: [id number]    XR Chest 1 View    Result Date: 10/12/2024  Impression: Multifocal pneumonia throughout the left lung. Electronically Signed: Nadir Chow MD  10/12/2024 8:30 PM EDT  Workstation ID: [id number]    CT Enterography Abdomen Pelvis w Contrast    Result Date: 10/11/2024  Impression: Areas of bowel wall thickening and enhancement through the distal ileum, colon and rectum consistent the patient's history of Crohn's disease. No evidence of bowel obstruction, perforation or abscess. Electronically Signed: Jakob Hernandez MD  10/11/2024 10:08 PM EDT  Workstation ID: [id number]    XR Chest 1 View    Result Date: 10/9/2024  Impression: No active disease Electronically Signed: Lawson Pritchard MD  10/9/2024 12:48 PM EDT  Workstation ID: [id number]          Assessment & Plan   ASSESSMENT/PLAN  Normocytic anemia and leukopenia:-subacute? Pre-admission labs unavailable to see tempo. Unknown etiology: ddx large but includes poor nutrition, bleed, primary bone marrow disorder, infection,  -Some immature granulocytes noted on today's CBC. flow cytometry still pending to ensure no leukemic cells present. Patient relatively asymptomatic.   -10/11/2022 reticulocyte numbers 0.72 (normal) however absolute reticulocyte decreased at 0.0182 suggesting either not enough building blocks to make blood or bone marrow dysfunction  -B12 312 (normal), folate 18.3 (normal), iron studies (iron 25 low, iron saturation 13%  low) and ferritin (427 high, but is an acute phase reactant) -appears there could still be some component of iron deficiency involved which is not surprising as she has a disease that could cause on again off again bleeding; will likely have to be treated in the future with IV iron as what was seen on EGD means she will be unlikely to tolerate oral iron or be able to absorb it as she will likely be on chronic PPIs  -agree with CBCdiff daily while inpatient to check ANC level and follow immature granulocytes: no diff yesterday but WBC has increased from 2.64-3.43, hemoglobin is 9.9, platelets are still within normal limits at 173K (almost half of what they were 5 days ago).  Since patient remains inpatient, flow cytometry still pending we will continue to follow.  -see GI w/u and treatment plan given hx of Crohn's.  -If hgb <7 and symptomatic agree with PRBC already given  -monitor for fevers     will continue to follow    Electronically signed by Dede Amezquita MD PhD, 10/14/24, 5:44 PM EDT.        Thank you for this consult. We will be happy to follow along with you.

## 2024-10-14 NOTE — PLAN OF CARE
Goal Outcome Evaluation:  Plan of Care Reviewed With: patient           Outcome Evaluation: A&Ox4, VSS, no complaints of pain. Per cardiology stress test was canceled, CT ordered and completed, plans for cardiac cath 10/15 and potentially GONZÁLEZ. NPO at midnight. Will continue to monitor.

## 2024-10-14 NOTE — PROGRESS NOTES
[facility] MEDICINE SERVICE  DAILY PROGRESS NOTE    NAME: Maryann Gaitan  : 1950  MRN: [id number]      LOS: 5 days     PROVIDER OF SERVICE: Jonathan Mackay MD    Chief Complaint: Hyponatremia    Subjective:     Interval History:  History taken from: Patient and patient's chart     Shortness of breath and dyspnea better this am. D-dimer elevated, CT PE protocol order noted. Scheduled for Stress test today.         Review of Systems:   Review of Systems  All negative except above   Objective:     Vital Signs  Temp:  [97.7 °F (36.5 °C)-98.4 °F (36.9 °C)] 98 °F (36.7 °C)  Heart Rate:  [73-94] 79  Resp:  [17-25] 17  BP: (118-147)/(61-77) 118/67  Flow (L/min) (Oxygen Therapy):  [2] 2   Body mass index is 24 kg/m².    Physical Exam  Physical Exam  General: Alert and oriented, no acute distress.  HENT: Normocephalic, moist oral mucosa, no scleral icterus.  Neck: Supple, nontender, no carotid bruits, no JVD, no LAD.  Lungs: Clear to auscultation, nonlabored respiration.  Heart: RRR, no murmur, gallop or edema.  Abdomen: Soft, nontender, nondistended, + bowel sounds.  Musculoskeletal: Normal range of motion and strength, no tenderness or swelling.  Neuro: alert and awake, moving all 4 extremities   Skin: Skin is warm, dry and pink, no rashes or lesions.  Psychiatric: Cooperative, appropriate mood and affect.         Diagnostic Data    Results from last 7 days   Lab Units 10/14/24  0425 10/13/24  1152 10/13/24  1144 10/13/24  0224 10/12/24  0019   WBC 10*3/mm3  --   --   --  3.43 2.64*   HEMOGLOBIN g/dL  --   --   --  9.9* 11.0*   HEMOGLOBIN, POC g/dL  --   --  9.4*  --   --    HEMATOCRIT %  --   --   --  30.3* 32.9*   HEMATOCRIT POC %  --   --  28*  --   --    PLATELETS 10*3/mm3  --   --   --  173 257   GLUCOSE mg/dL 150*   < >  --  141* 145*   CREATININE mg/dL 0.52*   < > 0.85 0.57 0.51*   BUN mg/dL 24*   < >  --  37* 38*   SODIUM mmol/L 135*   < >  --  137 132*   POTASSIUM mmol/L 5.3*   < >  --   3.1* 3.6   AST (SGOT) U/L  --   --   --   --  16   ALT (SGPT) U/L  --   --   --   --  13   ALK PHOS U/L  --   --   --   --  80   BILIRUBIN mg/dL  --   --   --   --  1.0   ANION GAP mmol/L 5.1   < >  --  9.2 12.2    < > = values in this interval not displayed.       XR Chest 1 View    Result Date: 10/13/2024  Impression: Mildly decreased patchy airspace opacities throughout the left lung compared to 10/12/2024 chest x-ray, likely due to multifocal pneumonia. Electronically Signed: Danae Gao  10/13/2024 12:09 PM EDT  Workstation ID: [id number]    XR Chest 1 View    Result Date: 10/12/2024  Impression: Multifocal pneumonia throughout the left lung. Electronically Signed: Nadir Chow MD  10/12/2024 8:30 PM EDT  Workstation ID: [id number]       I have reviewed patient labs and imaging     Assessment/Plan:     Active and Resolved Problems  Multifocal pneumonia  Acute hypoxic respiratory failure  Send blood cultures and respiratory viral panel  continue IV cefepime-pharmacy to dose  MRSA negative- IV vanco discontinued  Supplemental oxygen to keep SpO2 more than 95%  Closely monitor vitals  D-Dimer elevated - CT PE protocol ordered.      Sinus arrhythmia  -Noted on EKG likely due to electrolyte imbalance  -Monitor and replace goal K>4, Mg>2 and PO4>3.5  -Cardiology consulted  -noted plan for stress test     Hyponatremia  - Nephrology consulted, appreciate recs  - Likely secondary to hypovolemia given recent diarrhea, nausea and vomiting  - Holding HCTZ  - Fluids per nephrology  - Na 135 today     Hypokalemia  - Likely exacerbated by significant episodes of diarrhea  - Replete prn     Crohns disease  Diarrhea  - Follows with Dr. Castillo; plans for outpatient scopes 10/31 for ongoing Crohns  - Continues to have significant diarrhea which is provoking electrolyte abnormalities  - Also in setting of worsening anemia  - GI consulted- s/p EGD/colonoscopy-10/12/2024 EGD/colonoscopy (Dr Strong) small hiatal hernia.  Nonerosive  chronic gastritis.  Normal duodenum.  TI stricture status post dilation to 12 mm.  TI ulcers.  Colon ulcers.  Sigmoid colon diverticulosis.  Grade 2 internal hemorrhoids.    - Continue Mesalamine, solumedrol  - Low residue diet.   - Patient will likely need Rinvoq as outpatient. Task has been sent to our office to initiate approval process.  - Could also consider clinical trial secondary to Crohn's with stricture.  Continue diet as tolerated.  - Likely home in the next 1 to 2 days from GI standpoint.        Anemia  - No overt s/sx of bleeding  -Significant drop from 10-7 during admission  - Hold off on pharmacological vte ppx given drop in hgb since admission  -Transfuse 1 unit packed red blood cells today, repeat H&H  - Unclear etiology of anemia but concern for GI losses given significant diarrhea in the setting of Crohn's disease  - Hematology following      Leukopenia  - Not present on admission  - On Humira and methotrexate outpatient  - Consulted hematology, appreciate recs     Hypertension  - Holding HCTZ given hyponatremia  - Holding amlodipine given soft bps     Depression  - Okay to resume ssri per nephrology       VTE Prophylaxis:  Pharmacologic & mechanical VTE prophylaxis orders are present.             Disposition Planning:     Barriers to Discharge:medical clearance   Anticipated Date of Discharge: 10/16  Place of Discharge: home      Time: 40 minutes     Code Status and Medical Interventions: CPR (Attempt to Resuscitate); Full Support   Ordered at: 10/09/24 1058     Code Status (Patient has no pulse and is not breathing):    CPR (Attempt to Resuscitate)     Medical Interventions (Patient has pulse or is breathing):    Full Support       Signature: Electronically signed by Jonathan Mackay MD, 10/14/24, 10:08 EDT.  [facility] Hospitalist Team

## 2024-10-14 NOTE — PROGRESS NOTES
PROGRESS NOTE      Patient Name: Maryann Gaitan  : 1950  MRN: [id number]  Primary Care Physician: Eduard Little MD  Date of admission: 10/8/2024    Patient Care Team:  Eduard Little MD as PCP - General (Family Medicine)        Subjective   Subjective:     Seen and examined, comfortable, not in distress,  Sodium is 137 today, comfortable, not in distress,      Review of systems:  All other review of system unremarkable      Allergies:    Allergies   Allergen Reactions    Codeine Hives    Penicillin G Sodium Hives       Objective   Exam:     Vital Signs  Temp:  [97.7 °F (36.5 °C)-98.4 °F (36.9 °C)] 98 °F (36.7 °C)  Heart Rate:  [73-94] 79  Resp:  [17-25] 17  BP: (118-147)/(61-77) 118/67  SpO2:  [97 %-100 %] 100 %  on  Flow (L/min) (Oxygen Therapy):  [2] 2;   Device (Oxygen Therapy): room air  Body mass index is 24 kg/m².    General: Elderly female in no acute distress.    Head:      Normocephalic and atraumatic.    Eyes:      PERRL/EOM intact, conjunctivae and sclerae clear without nystagmus.    Neck:      No masses, thyromegaly,  trachea central with normal respiratory effort   Lungs:    Clear bilaterally to auscultation.    Heart:      Regular rate and rhythm, no murmur no gallop  Abd:        Soft, nontender, not distended, bowel sounds positive, no shifting dullness   Pulses:   Pulses palpable  Extr:        No cyanosis or clubbing--no edema.    Neuro:    No focal deficits.   alert oriented x3  Skin:       Intact without lesions or rashes.    Psych:    Alert and cooperative; normal mood and affect; .      Results Review:  I have personally reviewed most recent Data :  CBC    Results from last 7 days   Lab Units 10/13/24  1144 10/13/24  0224 10/12/24  0019 10/11/24  1237 10/11/24  0358 10/10/24  0428 10/09/24  1407 10/09/24  0542 10/08/24  2105 10/08/24  0927   WBC 10*3/mm3  --  3.43 2.64*  --  2.80* 1.54*  --  2.94* 4.51 6.60   HEMOGLOBIN g/dL  --  9.9* 11.0* 7.8* 7.0* 7.7* 8.4*  8.1* 9.4* 10.3*   HEMOGLOBIN, POC g/dL 9.4*  --   --   --   --   --   --   --   --   --    PLATELETS 10*3/mm3  --  173 257  --  269 315  --  343 402 449     CMP   Results from last 7 days   Lab Units 10/14/24  0425 10/13/24  2027 10/13/24  1152 10/13/24  1144 10/13/24  0224 10/12/24  0019 10/11/24  1722 10/11/24  1258 10/11/24  0358 10/10/24  0808 10/10/24  0428 10/09/24  0542 10/08/24  2105 10/08/24  0927   SODIUM mmol/L 135* 138  --   --  137 132* 129* 126* 127*   < > 123*   < > 126* 122*   POTASSIUM mmol/L 5.3* 5.0 3.6  --  3.1* 3.6 3.9 3.0* 2.6*  --  3.2*   < > 3.2* 3.0*   CHLORIDE mmol/L 106 107  --   --  102 96* 97* 94* 94*  --  92*   < > 87* 81*   CO2 mmol/L 23.9 22.1  --   --  25.8 23.8 24.0 24.2 23.9  --  24.7   < > 24.6 22.5   BUN mg/dL 24* 32*  --   --  37* 38* 40* 55* 21  --  8   < > 24* 35*   CREATININE mg/dL 0.52* 0.56*  --  0.85 0.57 0.51* 0.49* 0.48* 0.38*  --  0.44*   < > 0.75 1.07*   GLUCOSE mg/dL 150* 190*  --   --  141* 145* 122* 139* 108*  --  99   < > 103* 98   ALBUMIN g/dL  --   --   --   --   --  2.7*  --   --   --   --  2.6*  --  3.3* 3.3*   BILIRUBIN mg/dL  --   --   --   --   --  1.0  --   --   --   --  0.5  --  0.7 0.7   ALK PHOS U/L  --   --   --   --   --  80  --   --   --   --  70  --  89 100   AST (SGOT) U/L  --   --   --   --   --  16  --   --   --   --  14  --  15 18   ALT (SGPT) U/L  --   --   --   --   --  13  --   --   --   --  13  --  13 17    < > = values in this interval not displayed.     ABG    Results from last 7 days   Lab Units 10/13/24  1144   PH, ARTERIAL pH units 7.422   PCO2, ARTERIAL mm Hg 39.8   PO2 ART mm Hg 67.1*   O2 SATURATION ART % 93.4*   BASE EXCESS ART mmol/L 1.4     XR Chest 1 View    Result Date: 10/13/2024  Impression: Mildly decreased patchy airspace opacities throughout the left lung compared to 10/12/2024 chest x-ray, likely due to multifocal pneumonia. Electronically Signed: Danae Gao  10/13/2024 12:09 PM EDT  Workstation ID: [id number]    XR Chest  1 View    Result Date: 10/12/2024  Impression: Multifocal pneumonia throughout the left lung. Electronically Signed: Nadir Chow MD  10/12/2024 8:30 PM EDT  Workstation ID: [id number]     Results for orders placed during the hospital encounter of 10/08/24    Adult Transthoracic Echo Complete w/ Color, Spectral and Contrast if Necessary Per Protocol    Interpretation Summary    Left ventricular systolic function is low normal. Left ventricular ejection fraction appears to be 51 - 55%.    Left ventricular diastolic function was normal.    The right ventricular cavity is mildly dilated.    Left atrial volume is severely increased.    The right atrial cavity is moderate to severely  dilated.    Moderate to severe mitral valve regurgitation is present.    Estimated right ventricular systolic pressure from tricuspid regurgitation is normal (<35 mmHg).    Conclusion    Technically difficult study due to poor acoustic windows.  Normal LV size.  Low normal LV systolic function, with estimated LV ejection fraction of 50%  Mildly dilated right ventricle.  Severe left atrial enlargement by volumes.  Pulmonic valve is not well visualized.  Aortic valve is not well-visualized.  But does not have any significant AS or AR.  Mitral valve appears structurally normal.  Moderate to severe mitral regurgitation seen.  Calculated RV systolic pressure normal at 24 mmHg  Tricuspid valve appears structurally normal, trace tricuspid regurgitation seen.  Regurgitation seen.  No pericardial effusion seen.  Proximal aorta appears normal in size.    Scheduled Meds:cefepime, 2,000 mg, Intravenous, Once  cefepime, 2,000 mg, Intravenous, Q8H  enoxaparin, 40 mg, Subcutaneous, Q24H  First Mouthwash (Magic Mouthwash), 10 mL, Swish & Spit, Q6H  folic acid, 1 mg, Oral, Daily  levothyroxine, 50 mcg, Oral, Daily  mesalamine, 4.8 g, Oral, Daily With Breakfast  methylPREDNISolone sodium succinate, 20 mg, Intravenous, Q8H  nystatin, 5 mL, Swish & Swallow, 4x  Daily  pantoprazole, 40 mg, Oral, Daily  sertraline, 50 mg, Oral, Daily  sodium chloride, 10 mL, Intravenous, Q12H  sodium zirconium cyclosilicate, 10 g, Oral, Once  tiotropium bromide monohydrate, 2 puff, Inhalation, Daily - RT      Continuous Infusions:Pharmacy to Dose Cefepime,       PRN Meds:  albuterol    senna-docusate sodium **AND** polyethylene glycol **AND** bisacodyl **AND** bisacodyl    hydrOXYzine    influenza vaccine    Lidocaine Viscous HCl    Magnesium Standard Dose Replacement - Follow Nurse / BPA Driven Protocol    melatonin    nitroglycerin    ondansetron    ondansetron ODT **OR** ondansetron    Pharmacy to Dose Cefepime    Phosphorus Replacement - Follow Nurse / BPA Driven Protocol    Potassium Replacement - Follow Nurse / BPA Driven Protocol    sodium chloride    Assessment & Plan   Assessment and Plan:         Hyponatremia    Diarrhea    ASSESSMENT:  Hyponatremia likely etiology thiazide diuretics in the presence of Cymbalta  History of hypertension  History of arthritis  History of Crohn disease   10/12/2024 EGD/colonoscopy  small hiatal hernia.  Nonerosive chronic gastritis.  Normal duodenum.  TI stricture status post dilation to 12 mm.  TI ulcers.  Colon ulcers.  Sigmoid colon diverticulosis.  Grade 2 internal hemorrhoids     PLAN :        Hyponatremia etiology multifactorial including initially the use of thiazide and SSRI but also diarrhea volume depletion poor nutritional state decreased p.o. intake also contributing to hyponatremia     Sodium level continue to improve at this time no need for urea at this time nutritional status is improved  Patient will go for cardiac catheterization  Kidney function looks stable,   hyperkalemia noted a dose of Lokelma was given.  I think will improve discontinue potassium replacement  Continue to follow-up with the nutritional status  Patient has been started for vancomycin and cefepime, judicious dosing, risk of BISHNU,  Status post EGD and colonoscopy  yesterday, report reviewed,  Follow-up with hematology and GI  Closely follow,           Electronically signed by Buddy Pierre MD,   [facility] kidney consultant  [phone]  10/14/2024  11:01 EDT

## 2024-10-14 NOTE — PROGRESS NOTES
Nutrition Services    Patient Name: Maryann Gaitan  YOB: 1950  MRN: [id number]  Admission date: 10/8/2024    See MSA below.    Moderate chronic disease related malnutrition R/t diminished appetite and likely malabsorption with diarrhea/Crohns Dz x 3 months; as evidenced by weight loss greater than 7.5% in three months, with intakes meeting less than 75% estimated needs for at least one month, and moderate muscle and fat wasting per NFPE.    PROGRESS NOTE      Encounter Information: Progress note to check on NFPE and screen for malnutrition. Pt with persistent diarrhea leading up to this admission. Pt reports an estimated 30# weight loss (documented weight Hx not available) over the last three months due to persistent diarrhea and GI distress impacting intakes/and likely indicative of malabsorption. GI service is following for further diagnostics and plan. When scoping pt this admission, significant ulceration and stricture was found within the ileum, requiring dilation. Multiple ulcerations also were found within the colon, which were biopsied. Diagnostics were revealing for Crohns Dz and pt starting medications for this. Suspect as medication takes effect, absorption may improve and weight may begin to stabilize. See MSA. Of note -- pt with B-12 level less than 400 pg/mL - due to age and GI Dz (inflammatory bowel Dz), need to maintain level above 400pg/mL. Secure message sent to attending to suggest supplementation.     Nutrition problem statement: Moderate chronic disease related malnutrition R/t diminished appetite and likely malabsorption with diarrhea/Crohns Dz x 3 months; as evidenced by weight loss greater than 7.5% in three months, with intakes meeting less than 75% estimated needs for at least one month, and moderate muscle and fat wasting per NFPE.       PO Diet: NPO Diet NPO Type: Strict NPO  NPO Diet NPO Type: Strict NPO   PO Supplements: None ordered, but pt open to Boost supplements when  diet advances    PO Intake:  0-25% at meals        Current nutrition support:    Nutrition support review:        Labs (reviewed below): Mild hyponatremia - monitor  Hyperkalemia - new (possibly rebound from replacement)   Hyperglycemia - ongoing; may be R/t steroid side-effects  Folate 18.3 - WNL  B-12 - 312 - LOW based on age and GI status - secure message sent to provider regarding supplementation        GI Function:  Ongoing diarrhea; GI following and medication to begin to help address this problem        Nutrition Intervention Updates: Recommend starting B-12 supplementation, as serum level is less than 400pg/mL. Given pt's likely malabsorption with Crohns and current use of Protonix, pt will benefit from injectable B-12 rather than oral tablets.     Recommend starting 1000 micrograms B-12 IM weekly x 4 weeks, then monthly thereafter.     Most recent folate lab was WNL, but would recommend re-checking this every 3-6 months for further monitoring.        Results from last 7 days   Lab Units 10/14/24  0425 10/13/24  2027 10/13/24  1152 10/13/24  1144 10/13/24  0224 10/12/24  0019 10/10/24  0808 10/10/24  0428 10/09/24  0542 10/08/24  2105   SODIUM mmol/L 135* 138  --   --  137 132*   < > 123*   < > 126*   POTASSIUM mmol/L 5.3* 5.0 3.6  --  3.1* 3.6   < > 3.2*   < > 3.2*   CHLORIDE mmol/L 106 107  --   --  102 96*   < > 92*   < > 87*   CO2 mmol/L 23.9 22.1  --   --  25.8 23.8   < > 24.7   < > 24.6   BUN mg/dL 24* 32*  --   --  37* 38*   < > 8   < > 24*   CREATININE mg/dL 0.52* 0.56*  --  0.85 0.57 0.51*   < > 0.44*   < > 0.75   CALCIUM mg/dL 8.2* 8.4*  --   --  8.5* 8.7   < > 7.9*   < > 8.7   BILIRUBIN mg/dL  --   --   --   --   --  1.0  --  0.5  --  0.7   ALK PHOS U/L  --   --   --   --   --  80  --  70  --  89   ALT (SGPT) U/L  --   --   --   --   --  13  --  13  --  13   AST (SGOT) U/L  --   --   --   --   --  16  --  14  --  15   GLUCOSE mg/dL 150* 190*  --   --  141* 145*   < > 99   < > 103*    < > = values  in this interval not displayed.     Results from last 7 days   Lab Units 10/14/24  0425 10/13/24  2027 10/13/24  1414 10/13/24  1152 10/13/24  1144   MAGNESIUM mg/dL 2.5* 2.8*  --  3.7*  --    HEMOGLOBIN, POC g/dL  --   --   --   --  9.4*   HEMATOCRIT POC %  --   --   --   --  28*   TRIGLYCERIDES mg/dL  --   --  97  --   --      COVID19   Date Value Ref Range Status   10/13/2024 Not Detected Not Detected - Ref. Range Final     Lab Results   Component Value Date    HGBA1C 5.64 (H) 10/13/2024     Malnutrition Severity Assessment      Patient meets criteria for : Moderate (non-severe) Malnutrition  Malnutrition Type (Last 8 Hours)       Malnutrition Severity Assessment       Row Name 10/14/24 1828       Malnutrition Severity Assessment    Malnutrition Type Chronic Disease - Related Malnutrition      Row Name 10/14/24 1828       Insufficient Energy Intake     Insufficient Energy Intake Findings Moderate    Insufficient Energy Intake  <75% of est. energy requirement for > or equal to 1 month      Row Name 10/14/24 1828       Unintentional Weight Loss     Unintentional Weight Loss Findings Severe    Unintentional Weight Loss  Weight loss greater than 7.5% in three months      Row Name 10/14/24 1828       Muscle Loss    Loss of Muscle Mass Findings Moderate    [place] Region Moderate - slight depression    Clavicle Bone Region Moderate - some protrusion in females, visible in males    Dorsal Hand Region Moderate - slight depression    Patellar Region Moderate - patella more prominent, less muscle definition around patella    Anterior Thigh Region Moderate - mild depression on inner thigh    Posterior Calf Region Moderate - some roundness, slight firmness      Row Name 10/14/24 1828       Fat Loss    Subcutaneous Fat Loss Findings Moderate    Orbital Region  Moderate -  somewhat hollowness, slightly dark circles      Row Name 10/14/24 1828       Criteria Met (Must meet criteria for severity in at least 2 of these categories:  M Wasting, Fat Loss, Fluid, Secondary Signs, Wt. Status, Intake)    Patient meets criteria for  Moderate (non-severe) Malnutrition                       RD to follow up per protocol.    Electronically signed by:  Jessica Caal RD  10/14/24 18:21 EDT

## 2024-10-15 ENCOUNTER — APPOINTMENT (OUTPATIENT)
Dept: CARDIOLOGY | Facility: HOSPITAL | Age: 74
End: 2024-10-15
Payer: MEDICARE

## 2024-10-15 ENCOUNTER — INPATIENT HOSPITAL (AMBULATORY)
Age: 74
End: 2024-10-15
Payer: MEDICARE

## 2024-10-15 ENCOUNTER — INPATIENT HOSPITAL (AMBULATORY)
Dept: URBAN - METROPOLITAN AREA HOSPITAL 84 | Facility: HOSPITAL | Age: 74
End: 2024-10-15
Payer: MEDICARE

## 2024-10-15 DIAGNOSIS — Z90.49 ACQUIRED ABSENCE OF OTHER SPECIFIED PARTS OF DIGESTIVE TRACT: ICD-10-CM

## 2024-10-15 DIAGNOSIS — K50.812 CROHN'S DISEASE OF BOTH SMALL AND LARGE INTESTINE WITH INTES: ICD-10-CM

## 2024-10-15 DIAGNOSIS — K12.30 ORAL MUCOSITIS (ULCERATIVE), UNSPECIFIED: ICD-10-CM

## 2024-10-15 DIAGNOSIS — D64.9 ANEMIA, UNSPECIFIED: ICD-10-CM

## 2024-10-15 DIAGNOSIS — D72.819 DECREASED WHITE BLOOD CELL COUNT, UNSPECIFIED: ICD-10-CM

## 2024-10-15 DIAGNOSIS — E87.8 OTHER DISORDERS OF ELECTROLYTE AND FLUID BALANCE, NOT ELSEWH: ICD-10-CM

## 2024-10-15 PROBLEM — J98.4 CAVITARY LESION OF LUNG: Status: ACTIVE | Noted: 2024-10-08

## 2024-10-15 PROBLEM — T17.800A MULTIPLE TRACHEOBRONCHIAL MUCUS PLUGS: Status: ACTIVE | Noted: 2024-10-08

## 2024-10-15 LAB
ACT BLD: 134 SECONDS (ref 89–137)
ACT BLD: 152 SECONDS (ref 89–137)
ALBUMIN SERPL-MCNC: 2.4 G/DL (ref 3.5–5.2)
ALBUMIN/GLOB SERPL: 1.4 G/DL
ALP SERPL-CCNC: 70 U/L (ref 39–117)
ALT SERPL W P-5'-P-CCNC: 7 U/L (ref 1–33)
ANION GAP SERPL CALCULATED.3IONS-SCNC: 7 MMOL/L (ref 5–15)
AST SERPL-CCNC: 8 U/L (ref 1–32)
BASOPHILS # BLD AUTO: 0 10*3/MM3 (ref 0–0.2)
BASOPHILS NFR BLD AUTO: 0 % (ref 0–1.5)
BH CV XLRA MEAS - DIST GSV CALF DIST LEFT: 0.16 CM
BH CV XLRA MEAS - DIST GSV CALF DIST RIGHT: 0.15 CM
BH CV XLRA MEAS - DIST GSV THIGH DIST LEFT: 0.24 CM
BH CV XLRA MEAS - DIST GSV THIGH DIST RIGHT: 0.31 CM
BH CV XLRA MEAS - MID GSV CALF LEFT: 0.11 CM
BH CV XLRA MEAS - MID GSV CALF RIGHT: 0.14 CM
BH CV XLRA MEAS - MID GSV THIGH  LEFT: 0.29 CM
BH CV XLRA MEAS - MID GSV THIGH  RIGHT: 0.33 CM
BH CV XLRA MEAS - PROX GSV CALF DIST LEFT: 0.12 CM
BH CV XLRA MEAS - PROX GSV CALF DIST RIGHT: 0.12 CM
BH CV XLRA MEAS - PROX GSV THIGH  LEFT: 0.34 CM
BH CV XLRA MEAS - PROX GSV THIGH  RIGHT: 0.35 CM
BH CV XLRA MEAS LEFT CAROTID BULB EDV: 22.7 CM/SEC
BH CV XLRA MEAS LEFT CAROTID BULB PSV: 107 CM/SEC
BH CV XLRA MEAS LEFT DIST CCA EDV: 19.6 CM/SEC
BH CV XLRA MEAS LEFT DIST CCA PSV: 117 CM/SEC
BH CV XLRA MEAS LEFT DIST ICA EDV: -18 CM/SEC
BH CV XLRA MEAS LEFT DIST ICA PSV: -47.4 CM/SEC
BH CV XLRA MEAS LEFT ICA/CCA RATIO: 0.97
BH CV XLRA MEAS LEFT PROX CCA EDV: 18.9 CM/SEC
BH CV XLRA MEAS LEFT PROX CCA PSV: 80.6 CM/SEC
BH CV XLRA MEAS LEFT PROX ECA PSV: 122 CM/SEC
BH CV XLRA MEAS LEFT PROX ICA EDV: 40 CM/SEC
BH CV XLRA MEAS LEFT PROX ICA PSV: 113 CM/SEC
BH CV XLRA MEAS LEFT PROX SCLA PSV: 113 CM/SEC
BH CV XLRA MEAS LEFT VERTEBRAL A EDV: 13.2 CM/SEC
BH CV XLRA MEAS LEFT VERTEBRAL A PSV: 67.5 CM/SEC
BH CV XLRA MEAS RIGHT CAROTID BULB EDV: 24.5 CM/SEC
BH CV XLRA MEAS RIGHT CAROTID BULB PSV: 93.9 CM/SEC
BH CV XLRA MEAS RIGHT DIST CCA EDV: 13.7 CM/SEC
BH CV XLRA MEAS RIGHT DIST CCA PSV: 70.2 CM/SEC
BH CV XLRA MEAS RIGHT DIST ICA EDV: -37 CM/SEC
BH CV XLRA MEAS RIGHT DIST ICA PSV: -96.7 CM/SEC
BH CV XLRA MEAS RIGHT ICA/CCA RATIO: -1.26
BH CV XLRA MEAS RIGHT PROX CCA EDV: 25.5 CM/SEC
BH CV XLRA MEAS RIGHT PROX CCA PSV: 80.8 CM/SEC
BH CV XLRA MEAS RIGHT PROX ECA PSV: -159 CM/SEC
BH CV XLRA MEAS RIGHT PROX ICA EDV: -33.6 CM/SEC
BH CV XLRA MEAS RIGHT PROX ICA PSV: -102 CM/SEC
BH CV XLRA MEAS RIGHT PROX SCLA PSV: 146 CM/SEC
BH CV XLRA MEAS RIGHT VERTEBRAL A EDV: -14.7 CM/SEC
BH CV XLRA MEAS RIGHT VERTEBRAL A PSV: -35 CM/SEC
BILIRUB SERPL-MCNC: 0.4 MG/DL (ref 0–1.2)
BUN SERPL-MCNC: 12 MG/DL (ref 8–23)
BUN/CREAT SERPL: 28.6 (ref 7–25)
CALCIUM SPEC-SCNC: 7.9 MG/DL (ref 8.6–10.5)
CALPROTECTIN STL-MCNT: 1590 UG/G (ref 0–120)
CHLORIDE SERPL-SCNC: 102 MMOL/L (ref 98–107)
CO2 SERPL-SCNC: 22 MMOL/L (ref 22–29)
CREAT SERPL-MCNC: 0.42 MG/DL (ref 0.57–1)
DEPRECATED RDW RBC AUTO: 62.4 FL (ref 37–54)
EGFRCR SERPLBLD CKD-EPI 2021: 102.8 ML/MIN/1.73
EOSINOPHIL # BLD AUTO: 0.02 10*3/MM3 (ref 0–0.4)
EOSINOPHIL NFR BLD AUTO: 0.9 % (ref 0.3–6.2)
ERYTHROCYTE [DISTWIDTH] IN BLOOD BY AUTOMATED COUNT: 17.8 % (ref 12.3–15.4)
GLOBULIN UR ELPH-MCNC: 1.7 GM/DL
GLUCOSE SERPL-MCNC: 123 MG/DL (ref 65–99)
HCT VFR BLD AUTO: 26.4 % (ref 34–46.6)
HGB BLD-MCNC: 8.4 G/DL (ref 12–15.9)
IMM GRANULOCYTES # BLD AUTO: 0.01 10*3/MM3 (ref 0–0.05)
IMM GRANULOCYTES NFR BLD AUTO: 0.4 % (ref 0–0.5)
INR PPP: 1.01 (ref 0.93–1.1)
LAB AP CASE REPORT: NORMAL
LAB AP DIAGNOSIS COMMENT: NORMAL
LYMPHOCYTES # BLD AUTO: 0.73 10*3/MM3 (ref 0.7–3.1)
LYMPHOCYTES NFR BLD AUTO: 31.9 % (ref 19.6–45.3)
MCH RBC QN AUTO: 30.5 PG (ref 26.6–33)
MCHC RBC AUTO-ENTMCNC: 31.8 G/DL (ref 31.5–35.7)
MCV RBC AUTO: 96 FL (ref 79–97)
MONOCYTES # BLD AUTO: 0.28 10*3/MM3 (ref 0.1–0.9)
MONOCYTES NFR BLD AUTO: 12.2 % (ref 5–12)
NEUTROPHILS NFR BLD AUTO: 1.25 10*3/MM3 (ref 1.7–7)
NEUTROPHILS NFR BLD AUTO: 54.6 % (ref 42.7–76)
NRBC BLD AUTO-RTO: 0 /100 WBC (ref 0–0.2)
PATH REPORT.FINAL DX SPEC: NORMAL
PATH REPORT.GROSS SPEC: NORMAL
PLATELET # BLD AUTO: 145 10*3/MM3 (ref 140–450)
PMV BLD AUTO: 9.9 FL (ref 6–12)
POTASSIUM SERPL-SCNC: 4.4 MMOL/L (ref 3.5–5.2)
PROT SERPL-MCNC: 4.1 G/DL (ref 6–8.5)
PROTHROMBIN TIME: 11 SECONDS (ref 9.6–11.7)
RBC # BLD AUTO: 2.75 10*6/MM3 (ref 3.77–5.28)
SODIUM SERPL-SCNC: 131 MMOL/L (ref 136–145)
WBC NRBC COR # BLD AUTO: 2.29 10*3/MM3 (ref 3.4–10.8)

## 2024-10-15 PROCEDURE — 85610 PROTHROMBIN TIME: CPT | Performed by: NURSE PRACTITIONER

## 2024-10-15 PROCEDURE — 99232 SBSQ HOSP IP/OBS MODERATE 35: CPT | Performed by: INTERNAL MEDICINE

## 2024-10-15 PROCEDURE — 99232 SBSQ HOSP IP/OBS MODERATE 35: CPT | Performed by: NURSE PRACTITIONER

## 2024-10-15 PROCEDURE — B2111ZZ FLUOROSCOPY OF MULTIPLE CORONARY ARTERIES USING LOW OSMOLAR CONTRAST: ICD-10-PCS | Performed by: INTERNAL MEDICINE

## 2024-10-15 PROCEDURE — 93458 L HRT ARTERY/VENTRICLE ANGIO: CPT | Performed by: INTERNAL MEDICINE

## 2024-10-15 PROCEDURE — 80053 COMPREHEN METABOLIC PANEL: CPT | Performed by: NURSE PRACTITIONER

## 2024-10-15 PROCEDURE — 97116 GAIT TRAINING THERAPY: CPT

## 2024-10-15 PROCEDURE — 25810000003 SODIUM CHLORIDE 0.9 % SOLUTION: Performed by: NURSE PRACTITIONER

## 2024-10-15 PROCEDURE — B2151ZZ FLUOROSCOPY OF LEFT HEART USING LOW OSMOLAR CONTRAST: ICD-10-PCS | Performed by: INTERNAL MEDICINE

## 2024-10-15 PROCEDURE — 94799 UNLISTED PULMONARY SVC/PX: CPT

## 2024-10-15 PROCEDURE — 4A023N7 MEASUREMENT OF CARDIAC SAMPLING AND PRESSURE, LEFT HEART, PERCUTANEOUS APPROACH: ICD-10-PCS | Performed by: INTERNAL MEDICINE

## 2024-10-15 PROCEDURE — 25010000002 CEFEPIME PER 500 MG: Performed by: STUDENT IN AN ORGANIZED HEALTH CARE EDUCATION/TRAINING PROGRAM

## 2024-10-15 PROCEDURE — 93970 EXTREMITY STUDY: CPT | Performed by: SURGERY

## 2024-10-15 PROCEDURE — 94761 N-INVAS EAR/PLS OXIMETRY MLT: CPT

## 2024-10-15 PROCEDURE — C1894 INTRO/SHEATH, NON-LASER: HCPCS | Performed by: INTERNAL MEDICINE

## 2024-10-15 PROCEDURE — 93970 EXTREMITY STUDY: CPT

## 2024-10-15 PROCEDURE — 25510000001 IOPAMIDOL PER 1 ML: Performed by: INTERNAL MEDICINE

## 2024-10-15 PROCEDURE — 93880 EXTRACRANIAL BILAT STUDY: CPT

## 2024-10-15 PROCEDURE — 25810000003 SODIUM CHLORIDE 0.9 % SOLUTION: Performed by: INTERNAL MEDICINE

## 2024-10-15 PROCEDURE — 25010000002 METHYLPREDNISOLONE PER 40 MG: Performed by: INTERNAL MEDICINE

## 2024-10-15 PROCEDURE — 25010000002 FENTANYL CITRATE (PF) 100 MCG/2ML SOLUTION: Performed by: INTERNAL MEDICINE

## 2024-10-15 PROCEDURE — 93880 EXTRACRANIAL BILAT STUDY: CPT | Performed by: SURGERY

## 2024-10-15 PROCEDURE — 25010000002 HYDROMORPHONE 1 MG/ML SOLUTION: Performed by: INTERNAL MEDICINE

## 2024-10-15 PROCEDURE — 25010000002 LIDOCAINE 2% SOLUTION: Performed by: INTERNAL MEDICINE

## 2024-10-15 PROCEDURE — C1769 GUIDE WIRE: HCPCS | Performed by: INTERNAL MEDICINE

## 2024-10-15 PROCEDURE — 85347 COAGULATION TIME ACTIVATED: CPT

## 2024-10-15 PROCEDURE — 85025 COMPLETE CBC W/AUTO DIFF WBC: CPT | Performed by: NURSE PRACTITIONER

## 2024-10-15 PROCEDURE — 25010000002 MIDAZOLAM PER 1 MG: Performed by: INTERNAL MEDICINE

## 2024-10-15 RX ORDER — IVERMECTIN 3 MG/1
200 TABLET ORAL EVERY 24 HOURS
Status: DISCONTINUED | OUTPATIENT
Start: 2024-10-15 | End: 2024-10-16

## 2024-10-15 RX ORDER — MIDAZOLAM HYDROCHLORIDE 1 MG/ML
INJECTION, SOLUTION INTRAMUSCULAR; INTRAVENOUS
Status: DISCONTINUED | OUTPATIENT
Start: 2024-10-15 | End: 2024-10-15 | Stop reason: HOSPADM

## 2024-10-15 RX ORDER — LIDOCAINE HYDROCHLORIDE 20 MG/ML
INJECTION, SOLUTION INFILTRATION; PERINEURAL
Status: DISCONTINUED | OUTPATIENT
Start: 2024-10-15 | End: 2024-10-15 | Stop reason: HOSPADM

## 2024-10-15 RX ORDER — SODIUM CHLORIDE 9 MG/ML
INJECTION, SOLUTION INTRAVENOUS
Status: COMPLETED | OUTPATIENT
Start: 2024-10-15 | End: 2024-10-15

## 2024-10-15 RX ORDER — ACETAMINOPHEN 325 MG/1
650 TABLET ORAL EVERY 4 HOURS PRN
Status: DISCONTINUED | OUTPATIENT
Start: 2024-10-15 | End: 2024-10-22

## 2024-10-15 RX ORDER — IOPAMIDOL 755 MG/ML
INJECTION, SOLUTION INTRAVASCULAR
Status: DISCONTINUED | OUTPATIENT
Start: 2024-10-15 | End: 2024-10-15 | Stop reason: HOSPADM

## 2024-10-15 RX ORDER — FENTANYL CITRATE 50 UG/ML
INJECTION, SOLUTION INTRAMUSCULAR; INTRAVENOUS
Status: DISCONTINUED | OUTPATIENT
Start: 2024-10-15 | End: 2024-10-15 | Stop reason: HOSPADM

## 2024-10-15 RX ORDER — SODIUM CHLORIDE 9 MG/ML
50 INJECTION, SOLUTION INTRAVENOUS CONTINUOUS
Status: DISPENSED | OUTPATIENT
Start: 2024-10-15 | End: 2024-10-15

## 2024-10-15 RX ADMIN — METHYLPREDNISOLONE SODIUM SUCCINATE 20 MG: 40 INJECTION, POWDER, FOR SOLUTION INTRAMUSCULAR; INTRAVENOUS at 00:10

## 2024-10-15 RX ADMIN — Medication 10 ML: at 08:56

## 2024-10-15 RX ADMIN — CEFEPIME 2000 MG: 2 INJECTION, POWDER, FOR SOLUTION INTRAVENOUS at 13:28

## 2024-10-15 RX ADMIN — CEFEPIME 2000 MG: 2 INJECTION, POWDER, FOR SOLUTION INTRAVENOUS at 23:03

## 2024-10-15 RX ADMIN — NYSTATIN 500000 UNITS: 100000 SUSPENSION ORAL at 17:44

## 2024-10-15 RX ADMIN — DIPHENHYDRAMINE HYDROCHLORIDE AND LIDOCAINE HYDROCHLORIDE AND ALUMINUM HYDROXIDE AND MAGNESIUM HYDRO 10 ML: KIT at 00:15

## 2024-10-15 RX ADMIN — CEFEPIME 2000 MG: 2 INJECTION, POWDER, FOR SOLUTION INTRAVENOUS at 05:46

## 2024-10-15 RX ADMIN — METHYLPREDNISOLONE SODIUM SUCCINATE 20 MG: 40 INJECTION, POWDER, FOR SOLUTION INTRAMUSCULAR; INTRAVENOUS at 08:49

## 2024-10-15 RX ADMIN — SODIUM CHLORIDE 50 ML/HR: 9 INJECTION, SOLUTION INTRAVENOUS at 17:45

## 2024-10-15 RX ADMIN — NYSTATIN 500000 UNITS: 100000 SUSPENSION ORAL at 23:05

## 2024-10-15 NOTE — PROGRESS NOTES
LOS: 6 days   Patient Care Team:  Eduard Little MD as PCP - General (Family Medicine)      Subjective     Interval History:     Subjective: Patient continues to complain of diarrhea.  No overt GI bleeding.  Denies abdominal pain or nausea/vomiting.      ROS:   No chest pain, shortness of breath, or cough.        Medication Review:     Current Facility-Administered Medications:     albuterol (ACCUNEB) nebulizer solution 0.63 mg, 0.63 mg, Nebulization, Q6H PRN, Rob Strong MD, 0.63 mg at 10/11/24 1850    sennosides-docusate (PERICOLACE) 8.6-50 MG per tablet 2 tablet, 2 tablet, Oral, BID PRN **AND** polyethylene glycol (MIRALAX) packet 17 g, 17 g, Oral, Daily PRN **AND** bisacodyl (DULCOLAX) EC tablet 5 mg, 5 mg, Oral, Daily PRN **AND** bisacodyl (DULCOLAX) suppository 10 mg, 10 mg, Rectal, Daily PRN, Rob Strong MD    cefepime 2000 mg IVPB in 100 mL NS (MBP), 2,000 mg, Intravenous, Once, Rob Strong MD, Last Rate: 0 mL/hr at 10/13/24 1348, Restarted at 10/13/24 1426    cefepime 2000 mg IVPB in 100 mL NS (MBP), 2,000 mg, Intravenous, Q8H, Jonathan Mackay MD, 2,000 mg at 10/15/24 0546    Enoxaparin Sodium (LOVENOX) syringe 40 mg, 40 mg, Subcutaneous, Q24H, Rob Strong MD, 40 mg at 10/14/24 1711    First Mouthwash (Magic Mouthwash) 10 mL, 10 mL, Swish & Spit, Q6H, Rob Strong MD, 10 mL at 10/15/24 0015    folic acid (FOLVITE) tablet 1 mg, 1 mg, Oral, Daily, Rob Strong MD, 1 mg at 10/14/24 1101    hydrOXYzine (ATARAX) tablet 25 mg, 25 mg, Oral, TID PRN, Rob Strong MD    influenza vac split high-dose (FLUZONE HIGH DOSE) injection 0.5 mL, 0.5 mL, Intramuscular, During Hospitalization, Rob Strong MD    ipratropium-albuterol (DUO-NEB) nebulizer solution 3 mL, 3 mL, Nebulization, Q4H PRN, Jonathan Mackay MD    levothyroxine (SYNTHROID, LEVOTHROID) tablet 50 mcg, 50 mcg, Oral,  Daily, Rob Strong MD, 50 mcg at 10/14/24 0857    Lidocaine Viscous HCl (XYLOCAINE) 2 % solution 10 mL, 10 mL, Mouth/Throat, Q3H PRN, Rob Strong MD    Magnesium Standard Dose Replacement - Follow Nurse / BPA Driven Protocol, , Does not apply, PRN, Rob Strong MD    melatonin tablet 5 mg, 5 mg, Oral, Nightly PRN, Rob Strong MD    mesalamine (LIALDA) EC tablet 4.8 g, 4.8 g, Oral, Daily With Breakfast, Rob Strong MD, 4.8 g at 10/14/24 1106    methylPREDNISolone sodium succinate (SOLU-Medrol) injection 20 mg, 20 mg, Intravenous, Q8H, Rob Strong MD, 20 mg at 10/15/24 0849    nitroglycerin (NITROSTAT) SL tablet 0.4 mg, 0.4 mg, Sublingual, Q5 Min PRN, Rob Strong MD    nystatin (MYCOSTATIN) 100,000 unit/mL suspension 500,000 Units, 5 mL, Swish & Swallow, 4x Daily, Rob Strong MD, 500,000 Units at 10/14/24 2020    ondansetron (ZOFRAN) injection 4 mg, 4 mg, Intravenous, Q6H PRN, Rob Strong MD, 4 mg at 10/09/24 1551    ondansetron ODT (ZOFRAN-ODT) disintegrating tablet 4 mg, 4 mg, Oral, Q6H PRN, 4 mg at 10/12/24 1721 **OR** ondansetron (ZOFRAN) injection 4 mg, 4 mg, Intravenous, Q6H PRN, Rob Strong MD    pantoprazole (PROTONIX) EC tablet 40 mg, 40 mg, Oral, Daily, Rob Strong MD, 40 mg at 10/14/24 1101    Pharmacy to Dose Cefepime, , Does not apply, Continuous PRN, Rob Strong MD    Phosphorus Replacement - Follow Nurse / BPA Driven Protocol, , Does not apply, PRN, Rob Strong MD    Potassium Replacement - Follow Nurse / BPA Driven Protocol, , Does not apply, PRN, Rob Strong MD    sertraline (ZOLOFT) tablet 50 mg, 50 mg, Oral, Daily, Rob Strong MD, 50 mg at 10/14/24 1101    sodium chloride 0.9 % flush 10 mL, 10 mL, Intravenous, Q12H, Rob Strong MD, 10 mL at 10/15/24 0856    sodium  chloride 0.9 % flush 10 mL, 10 mL, Intravenous, PRN, Rob Strong MD    tiotropium (SPIRIVA RESPIMAT) 2.5 mcg/act aerosol solution inhaler, 2 puff, Inhalation, Daily - RT, Rob Strong MD, 2 puff at 10/13/24 0854      Objective     Vital Signs  Vitals:    10/15/24 0500 10/15/24 0616 10/15/24 0722 10/15/24 0902   BP: 116/55 116/56  117/65   BP Location: Right arm Right arm  Right arm   Patient Position: Lying Lying  Lying   Pulse: 107 80 88    Resp: 22 20 16 24   Temp: 98.7 °F (37.1 °C)   97.7 °F (36.5 °C)   TempSrc: Oral   Oral   SpO2: 94% 95% 95%    Weight:       Height:           Physical Exam:     General Appearance:    Awake and alert, in no acute distress   Head:    Normocephalic, without obvious abnormality   Eyes:          Conjunctivae normal, anicteric sclera   Throat:   No oral lesions, no thrush, oral mucosa moist   Neck:   No adenopathy, supple, no JVD   Lungs:     respirations regular, even and unlabored   Abdomen:     Soft, non-tender, no rebound or guarding, non-distended   Rectal:     Deferred   Extremities:   No edema, no cyanosis   Skin:   No bruising or rash, no jaundice        Results Review:    CBC    Results from last 7 days   Lab Units 10/15/24  0008 10/13/24  1144 10/13/24  0224 10/12/24  0019 10/11/24  1237 10/11/24  0358 10/10/24  0428 10/09/24  1407 10/09/24  0542 10/08/24  2105   WBC 10*3/mm3 2.29*  --  3.43 2.64*  --  2.80* 1.54*  --  2.94* 4.51   HEMOGLOBIN g/dL 8.4*  --  9.9* 11.0* 7.8* 7.0* 7.7*   < > 8.1* 9.4*   HEMOGLOBIN, POC g/dL  --  9.4*  --   --   --   --   --   --   --   --    PLATELETS 10*3/mm3 145  --  173 257  --  269 315  --  343 402    < > = values in this interval not displayed.     CMP   Results from last 7 days   Lab Units 10/15/24  0008 10/14/24  0425 10/13/24  2027 10/13/24  1152 10/13/24  1144 10/13/24  0224 10/12/24  0019 10/11/24  1722 10/11/24  1258 10/10/24  0808 10/10/24  0428 10/09/24  0542 10/08/24  2105   SODIUM mmol/L 131* 135*  138  --   --  137 132* 129* 126*   < > 123*   < > 126*   POTASSIUM mmol/L 4.4 5.3* 5.0 3.6  --  3.1* 3.6 3.9 3.0*   < > 3.2*   < > 3.2*   CHLORIDE mmol/L 102 106 107  --   --  102 96* 97* 94*   < > 92*   < > 87*   CO2 mmol/L 22.0 23.9 22.1  --   --  25.8 23.8 24.0 24.2   < > 24.7   < > 24.6   BUN mg/dL 12 24* 32*  --   --  37* 38* 40* 55*   < > 8   < > 24*   CREATININE mg/dL 0.42* 0.52* 0.56*  --  0.85 0.57 0.51* 0.49* 0.48*   < > 0.44*   < > 0.75   GLUCOSE mg/dL 123* 150* 190*  --   --  141* 145* 122* 139*   < > 99   < > 103*   ALBUMIN g/dL 2.4*  --   --   --   --   --  2.7*  --   --   --  2.6*  --  3.3*   BILIRUBIN mg/dL 0.4  --   --   --   --   --  1.0  --   --   --  0.5  --  0.7   ALK PHOS U/L 70  --   --   --   --   --  80  --   --   --  70  --  89   AST (SGOT) U/L 8  --   --   --   --   --  16  --   --   --  14  --  15   ALT (SGPT) U/L 7  --   --   --   --   --  13  --   --   --  13  --  13   MAGNESIUM mg/dL  --  2.5* 2.8* 3.7*  --  1.5*  --   --  1.3*  --  1.4*  --  1.9    < > = values in this interval not displayed.     Cr Clearance Estimated Creatinine Clearance: 95.9 mL/min (A) (by C-G formula based on SCr of 0.42 mg/dL (L)).  Coag   Results from last 7 days   Lab Units 10/15/24  0008   INR  1.01     HbA1C   Lab Results   Component Value Date    HGBA1C 5.64 (H) 10/13/2024     Results from last 7 days   Lab Units 10/13/24  1414 10/13/24  1152   HSTROP T ng/L 22* 23*     Infection   Results from last 7 days   Lab Units 10/13/24  1415   BLOODCX  No growth at 24 hours  No growth at 24 hours     UA      Microbiology Results (last 10 days)       Procedure Component Value - Date/Time    Blood Culture - Blood, Arm, Left [[id number]]  (Normal) Collected: 10/13/24 1415    Lab Status: Preliminary result Specimen: Blood from Arm, Left Updated: 10/14/24 1445     Blood Culture No growth at 24 hours    Narrative:      Less than seven (7) mL's of blood was collected.  Insufficient quantity may yield false negative  results.    Blood Culture - Blood, Arm, Right [[id number]]  (Normal) Collected: 10/13/24 1415    Lab Status: Preliminary result Specimen: Blood from Arm, Right Updated: 10/14/24 1445     Blood Culture No growth at 24 hours    Narrative:      Less than seven (7) mL's of blood was collected.  Insufficient quantity may yield false negative results.    Respiratory Panel PCR w/COVID-19(SARS-CoV-2) ANAMIKA/FABRICIO/JOSSY/PAD/COR/DANIELLE In-House, NP Swab in UTM/VTM, 2 HR TAT - Swab, Nasopharynx [[id number]]  (Normal) Collected: 10/13/24 1228    Lab Status: Final result Specimen: Swab from Nasopharynx Updated: 10/13/24 1325     ADENOVIRUS, PCR Not Detected     Coronavirus 229E Not Detected     Coronavirus HKU1 Not Detected     Coronavirus NL63 Not Detected     Coronavirus OC43 Not Detected     COVID19 Not Detected     Human Metapneumovirus Not Detected     Human Rhinovirus/Enterovirus Not Detected     Influenza A PCR Not Detected     Influenza B PCR Not Detected     Parainfluenza Virus 1 Not Detected     Parainfluenza Virus 2 Not Detected     Parainfluenza Virus 3 Not Detected     Parainfluenza Virus 4 Not Detected     RSV, PCR Not Detected     Bordetella pertussis pcr Not Detected     Bordetella parapertussis PCR Not Detected     Chlamydophila pneumoniae PCR Not Detected     Mycoplasma pneumo by PCR Not Detected    Narrative:      In the setting of a positive respiratory panel with a viral infection PLUS a negative procalcitonin without other underlying concern for bacterial infection, consider observing off antibiotics or discontinuation of antibiotics and continue supportive care. If the respiratory panel is positive for atypical bacterial infection (Bordetella pertussis, Chlamydophila pneumoniae, or Mycoplasma pneumoniae), consider antibiotic de-escalation to target atypical bacterial infection.    MRSA Screen, PCR (Inpatient) - Swab, Nares [[id number]]  (Normal) Collected: 10/13/24 1228    Lab Status: Final result Specimen: Swab from  [place] Updated: 10/13/24 1349     MRSA PCR No MRSA Detected    Narrative:      The negative predictive value of this diagnostic test is high and should only be used to consider de-escalating anti-MRSA therapy. A positive result may indicate colonization with MRSA and must be correlated clinically.          Imaging Results (Last 72 Hours)       Procedure Component Value Units Date/Time    CT Angiogram Chest [[id number]] Collected: 10/14/24 1152     Updated: 10/14/24 1203    Narrative:      CT ANGIOGRAM CHEST    Date of Exam: 10/14/2024 10:17 AM EDT    Indication: elevated d-dimer, shortness of breath.    Comparison: Chest x-ray 10/13/2024 and CT chest 7/20/2023    Technique: CTA of the chest was performed before and after the uneventful intravenous administration of iodinated contrast. Reconstructed coronal and sagittal images were also obtained. In addition, a 3-D volume rendered image was created for   interpretation. Automated exposure control and iterative reconstruction methods were used.      Findings:  PULMONARY VASCULATURE: Pulmonary arteries are widely patent without evidence of embolus.Main pulmonary artery is normal in size. No evidence of right heart strain.    MEDIASTINUM: There is calcified atherosclerotic disease at the thoracic aortic arch. Along the descending thoracic aorta there is noncalcified atherosclerotic disease and wall thickening with several penetrating aortic ulcers noted. No aortic dissection   identified. No mass nor pericardial effusion.  CORONARY ARTERIES: Mild to moderate calcified atherosclerotic disease.    LUNGS: Evaluation of lung parenchyma demonstrates a cavitary lesion in the left upper lung measuring 1.6 x 1.4 cm (image 34 of series 4 which is new from prior study. There is some adjacent groundglass attenuation and opacification with septal thickening   extending into the left upper lung. Findings are superimposed on a background of centrilobular emphysema. There is an  additional subpleural nodule in the right upper lung measuring 3 mm (image 32 of series 4). Additional groundglass opacities and   infiltrates are noted within the lingula and in a peripheral distribution in the left lower lung some of which have a tree-in-bud configuration compatible with inflammatory changes. Some nodular opacification extends along the length of the left major   fissure.  PLEURAL SPACE: No effusion, mass, nor pneumothorax.  LYMPH NODES: There are no pathologically enlarged lymph nodes.    UPPER ABDOMEN: The liver has decreased attenuation compatible with steatosis.    OSSEOUS STRUCTURES: Appropriate for age with no acute process identified.      Impression:      Impression:    1. No evidence for pulmonary embolus.    2. New cavitary lesion in the left upper lung measuring 1.6 x 1.4 cm. There is adjacent groundglass opacification with additional nodular opacities and tree-in-bud opacities within the left upper and left lower lung. This may represent a multifocal   pneumonia however underlying cavitary neoplasm cannot be excluded. Recommend treatment with follow-up imaging to ensure complete resolution.    3. Calcified and noncalcified atherosclerotic disease involving the thoracic aortic arch and descending thoracic aorta with several aortic ulcers along the descending thoracic aorta.        Electronically Signed: Manisha Khan MD    10/14/2024 12:01 PM EDT    Workstation ID: [id number]    XR Chest 1 View [[id number]] Collected: 10/13/24 1205     Updated: 10/13/24 1211    Narrative:      XR CHEST 1 VW    Date of Exam: 10/13/2024 11:55 AM EDT    Indication: chest pain    Comparison: AP chest x-ray 10/12/2024, 10/9/2024, two-view chest x-ray 8/14/2024, CT chest 7/20/2023    Findings:  Multifocal patchy airspace opacities throughout the left lung of mildly decreased compared to 10/12/2024 chest x-ray. Right lung appears clear. No pneumothorax or large pleural effusion is seen. Cardiomediastinal  contours appear stable.      Impression:      Impression:  Mildly decreased patchy airspace opacities throughout the left lung compared to 10/12/2024 chest x-ray, likely due to multifocal pneumonia.      Electronically Signed: Danae Gao    10/13/2024 12:09 PM EDT    Workstation ID: [id number]    XR Chest 1 View [[id number]] Collected: 10/12/24 2029     Updated: 10/12/24 2032    Narrative:      XR CHEST 1 VW    Date of Exam: 10/12/2024 8:18 PM EDT    Indication: tachycardia    Comparison: Chest radiograph 10/9/2024    Findings:  The heart size and pulmonary vessels are normal. There are diffuse alveolar airspace opacities throughout the left lung consistent with multifocal pneumonia. The right lung is clear. There is background of diffuse emphysema.      Impression:      Impression:  Multifocal pneumonia throughout the left lung.        Electronically Signed: Nadir Chow MD    10/12/2024 8:30 PM EDT    Workstation ID: [id number]            Assessment & Plan     ASSESSMENT:  -Crohn's disease of small bowel & colon   -Strongyloides  -Unintentional weight loss  -Electrolyte abnormality  -Leukopenia  -Normocytic anemia  -Oral ulcers/sores  -Hypertension  -COPD  -Rheumatoid arthritis  -History of hysterectomy  -History of cholecystectomy     PLAN:  Patient is a 74-year-old female with history of rheumatoid arthritis and cholecystectomy who presented on 10/8 with hyponatremia. Nephrology has been consulted. GI has been asked to evaluate the patient for persistent diarrhea. She does have a history of possible Crohn's disease, but this has not been biopsy-proven and [facility] testing has not been consistent with IBD. Fecal calprotectin was elevated in 11/2021.     Patient continues to complain of diarrhea.  No abdominal pain or nausea/vomiting.  S/p EGD/colonoscopy on 10/12 showing small hiatal hernia, gastritis, TI stricture s/p dilation to 12 mm, TI and colon ulcers, diverticulosis, and grade 2 internal hemorrhoids. Path  is still pending.   I have spoken to Dr. Strong who received a call from pathology.  Patient does have strongyloides.  Will treat with ivermectin.  Continue mesalamine and IV Solu-Medrol.  Diet as tolerated.  Patient will likely need Rinvoq as outpatient. Task has been sent to our office to initiate approval process.  Could also consider clinical trial secondary to Crohn's with stricture.  Patient reports that heart cath is planned for today.  Await results.  Continue supportive care.      Electronically signed by DRU Lew, 10/15/24, 11:05 AM EDT.

## 2024-10-15 NOTE — PROGRESS NOTES
Cardiology Progress Note    Patient Identification:  Name: Maryann Gaitan  Age: 74 y.o.  Sex: female  :  1950  MRN: [id number]                 Follow Up / Chief Complaint: Chest pain  Chief Complaint   Patient presents with    Abnormal Lab       Interval History: Patient presented to the hospital with abnormal labs underwent EGD and colonoscopy and developed chest pain    NP note: Patient seen and examined.  Sitting up in chair.  She denies any chest pain but continues to have significant wheezing and rhonchi on examination.  She reports that she was seen by pulmonology with plan for possible bronchoscopy tomorrow due to her CT scan cavarity lesion and pneumonia.  CT also shows calcified and noncalcified atherosclerotic disease of the thoracic aortic arch with several aortic ulcers long the descending thoracic aorta.     Patient to undergo cardiac cath this afternoon and possible GONZÁLEZ tomorrow.     Electronically signed by DRU Rubio, 10/15/24, 1:00 PM EDT.      Cardiology attending addendum :    I have personally performed a face-to-face diagnostic evaluation, physical exam and reviewed data on this patient.  I have reviewed documentation done by me and nurse practitioner  and corrected as needed.  And agree with the different components of documentation.Greater than 50% of the time spent in the care of this patient was provided by attending consultant/me.        Subjective: Patient seen and examined; chart and labs reviewed; discussed with bedside nurse.  Patient had elevated D-dimer.  CT PE study is negative for PE but is abnormal with cavitary lesion.      Objective:  10/14/2024: Sodium 135 potassium 5.3 BUN 24 creatinine 0.52 glucose 150  10/15/2024: sodium 131, potassium 4.4. bun 12 creatinine 0.42 hgb 8.4     History of present illness:      Maryann Gaitan is a 74-year-old female with a PMH of     COPD  Hypertension  Rheumatoid arthritis  Crohn's disease     who presented to [facility] on 10/8/2024  "due to \"abnormal labs\" and nausea/vomiting/diarrhea.  Patient noted to be hyponatremic with sodium of 122, hypokalemic potassium 3.0 as well as anemic.  Patient has been followed by nephrology and GI during hospitalization.  She underwent EGD and colonoscopy yesterday showing small hiatal hernia, nonerosive chronic gastritis and sigmoid colon diverticulitis.  Per GI she has a history of Crohn disease but this has not been proven by biopsy as [employer] testing has not been consistent with IBD.  Cardiology consulted for chest pain and abnormal EKG.  Rapid response called this morning on patient secondary to acute sudden onset of sharp left-sided chest pain with associated symptoms of shortness of breath, palpitations, lightheadedness/dizziness.  EKG 10/12 reviewed showing sinus tachycardia with PACs. Stat EKG today without acute ST-T segment changes, ABG with PaO2 of 67, troponin 23, H&H 9.9/30.3.  Patient denies personal or family history of CAD, hyperlipidemia.  She does report history of hypertension, type 2 diabetes.                Assessment:  :     Chest pain  Shortness of breath  Arrhythmia  Abnormal EKG  Hypertension  Mitral regurgitation  Hyponatremia  Hypokalemia  Crohn's disease  Normocytic anemia  COPD  Multifocal pneumonia  Hyperglycemia, prediabetes with A1c of 5.6 from     Recommendations / Plan:         Patient presented 10/9/2024 because of abnormal labs showing low sodium, was complaining of nausea vomiting and diarrhea.  Patient's hydrochlorothiazide was held and nephrology consulted.  HS troponin is 23 and 22.  Previous proBNP was 2573.  Sodium is improved to 137.  Glucose elevated.  EKG done 10/13/2024 reviewed/interpreted by me reveals sinus arrhythmia at the rate of 78 bpm.  Patient has multifocal pneumonia and acute hypoxic respiratory failure.  He is on IV antibiotics and oxygen.  Blood cultures are pending.  Patient had D-dimer which was elevated.  CT PE study is negative for PE but has " cavitary lesion in the lung.  Will follow-up with pulmonary pulmonary.  Patient has severe MR will benefit from cardiac cath.  Patient underwent cardiac cath 10/15/2024 which revealed total right and severe ostial LAD disease.  Descending thoracic aorta has an outpouching probably saccular aneurysm versus  penetrating aortic ulcer .  Will consult CT surgery.  Discussed with .  Echocardiogram 10/12/2024 is revealing EF of 51 to 55% with mild RV enlargement severe left atrial enlargement and right atrial enlargement and moderate to severe MR    Follow-up with nephrology for hyponatremia  Follow-up with primary team and GI for nausea vomiting and possible Crohn's disease and microcytic anemia  Will follow and consider further evaluation treatment depending on how her condition evolves    Copied text in this portion of the note has been reviewed and is accurate as of 10/15/2024    Past Medical History:  Past Medical History:   Diagnosis Date    Arthritis     COPD (chronic obstructive pulmonary disease)     Hypertension      Past Surgical History:  Past Surgical History:   Procedure Laterality Date    COLONOSCOPY N/A 10/12/2024    Procedure: COLONOSCOPY WITH BIOPSY AND WIRE GUIDED BALLOON DILATION OF TERMINAL ILEUM;  Surgeon: Rob Strong MD;  Location: [facility] ENDOSCOPY;  Service: Gastroenterology;  Laterality: N/A;  Colitis, crohns of terminal ileum, right colon ulcers, diverticulosis, hemorroids    ENDOSCOPY N/A 10/12/2024    Procedure: ESOPHAGOGASTRODUODENOSCOPY WITH BIOPSY X 2 AREA;  Surgeon: Rob Strong MD;  Location: [facility] ENDOSCOPY;  Service: Gastroenterology;  Laterality: N/A;  Chronic gastritis, HH    HYSTERECTOMY          Social History:   Social History     Tobacco Use    Smoking status: Former     Types: Cigarettes    Smokeless tobacco: Never   Substance Use Topics    Alcohol use: Never      Family History:  History reviewed. No pertinent family history.  "      Allergies:  Allergies   Allergen Reactions    Codeine Hives    Penicillin G Sodium Hives     Scheduled Meds:  cefepime, 2,000 mg, Once  cefepime, 2,000 mg, Q8H  [Transfer Hold] enoxaparin, 40 mg, Q24H  First Mouthwash (Magic Mouthwash), 10 mL, Q6H  folic acid, 1 mg, Daily  [Transfer Hold] ivermectin, 200 mcg/kg, Q24H  levothyroxine, 50 mcg, Daily  mesalamine, 4.8 g, Daily With Breakfast  methylPREDNISolone sodium succinate, 20 mg, Q8H  nystatin, 5 mL, 4x Daily  pantoprazole, 40 mg, Daily  sertraline, 50 mg, Daily  sodium chloride, 10 mL, Q12H  tiotropium bromide monohydrate, 2 puff, Daily - RT          Review of Systems:   ROS  Review of Systems   Constitution: Negative for chills and fever.   Cardiovascular: Negative for chest pain and palpitations.   Respiratory: Negative for cough and hemoptysis.    Gastrointestinal: Negative for nausea.        Constitutional:  Temp:  [97.2 °F (36.2 °C)-98.9 °F (37.2 °C)] 97.2 °F (36.2 °C)  Heart Rate:  [] 83  Resp:  [15-24] 18  BP: (116-142)/(55-81) 126/81    Physical Exam   /81   Pulse 83   Temp 97.2 °F (36.2 °C) (Oral)   Resp 18   Ht 154.9 cm (61\")   Wt 57.6 kg (127 lb)   SpO2 98%   BMI 24.00 kg/m²   General:  Appears in no acute distress  Eyes: Sclera is anicteric,  conjunctiva is clear   HEENT:  No JVD. Thyroid not visibly enlarged. No mucosal pallor or cyanosis  Respiratory: Respirations regular and unlabored at rest.  Scattered rhonchi   Cardiovascular: S1,S2 Regular rate and rhythm.  2/6 holosystolic murmur  Gastrointestinal: Abdomen nondistended.  Musculoskeletal:  No abnormal movements  Extremities: No digital clubbing or cyanosis  Skin: Color pink.   Neuro: Alert and awake.    INTAKE AND OUTPUT:    Intake/Output Summary (Last 24 hours) at 10/15/2024 0447  Last data filed at 10/15/2024 0546  Gross per 24 hour   Intake 440 ml   Output --   Net 440 ml       Cardiographics  Telemetry: sinus rhythm         ECG:   ECG 12 Lead Chest Pain   Final " Result   HEART RATE=78  bpm   RR Udgudskg=807  ms   ID Icbepyjf=158  ms   P Horizontal Axis=-4  deg   P Front Axis=76  deg   QRSD Interval=94  ms   QT Ohuqollp=805  ms   YHwA=358  ms   QRS Axis=32  deg   T Wave Axis=75  deg   - OTHERWISE NORMAL ECG -   Sinus arrhythmia   Low voltage, precordial leads   When compared with ECG of 13-Oct-2024 09:35:55,   No significant change   Electronically Signed By: Travis Connor ([facility]) 2024-10-14 08:10:34   Date and Time of Study:2024-10-13 11:34:48      ECG 12 Lead Rhythm Change   Final Result   HEART RATE=81  bpm   RR Wuajpkib=137  ms   ID Rgnwjtfq=903  ms   P Horizontal Axis=-1  deg   P Front Axis=78  deg   QRSD Interval=94  ms   QT Gelomolz=108  ms   AVvI=415  ms   QRS Axis=15  deg   T Wave Axis=65  deg   - OTHERWISE NORMAL ECG -   Sinus rhythm   Low voltage, precordial leads   When compared with ECG of 12-Oct-2024 19:50:31,   Significant change in rhythm: previously atrial fibrillation   Electronically Signed By: Travis Connor ([facility]) 2024-10-14 08:10:41   Date and Time of Study:2024-10-13 09:35:55      ECG 12 Lead Rhythm Change   Final Result   HEART HSKY=551  bpm   RR Siennewo=566  ms   ID Interval=  ms   P Horizontal Axis=  deg   P Front Axis=  deg   QRSD Interval=82  ms   QT Dycnjhva=107  ms   HMzQ=937  ms   QRS Axis=20  deg   T Wave Axis=151  deg   - ABNORMAL ECG -   Atrial flutter/fibrillation   Ventricular premature complex   Probable  anterior infarct, age indeterminate   When compared with ECG of 05-Oct-2015 11:54:44,   Significant change in rhythm: previously sinus   Electronically Signed By: Travis Connor ([facility]) 2024-10-14 08:10:51   Date and Time of Study:2024-10-12 19:50:31      Telemetry Scan   Final Result      Telemetry Scan   Final Result      Telemetry Scan   Final Result      Telemetry Scan   Final Result      Telemetry Scan   Final Result      Telemetry Scan   Final Result      Telemetry Scan   Final Result      Telemetry Scan   Final Result       Telemetry Scan   Final Result      Telemetry Scan   Final Result      Telemetry Scan   Final Result      Telemetry Scan   Final Result      Telemetry Scan   Final Result      Telemetry Scan   Final Result      Telemetry Scan   Final Result      Telemetry Scan   Final Result      Telemetry Scan   Final Result      Telemetry Scan   Final Result      ECG 12 Lead Tachycardia    (Results Pending)     I have personally reviewed EKG    Echocardiogram: Results for orders placed during the hospital encounter of 10/08/24    Adult Transthoracic Echo Complete w/ Color, Spectral and Contrast if Necessary Per Protocol    Interpretation Summary    Left ventricular systolic function is low normal. Left ventricular ejection fraction appears to be 51 - 55%.    Left ventricular diastolic function was normal.    The right ventricular cavity is mildly dilated.    Left atrial volume is severely increased.    The right atrial cavity is moderate to severely  dilated.    Moderate to severe mitral valve regurgitation is present.    Estimated right ventricular systolic pressure from tricuspid regurgitation is normal (<35 mmHg).    Conclusion    Technically difficult study due to poor acoustic windows.  Normal LV size.  Low normal LV systolic function, with estimated LV ejection fraction of 50%  Mildly dilated right ventricle.  Severe left atrial enlargement by volumes.  Pulmonic valve is not well visualized.  Aortic valve is not well-visualized.  But does not have any significant AS or AR.  Mitral valve appears structurally normal.  Moderate to severe mitral regurgitation seen.  Calculated RV systolic pressure normal at 24 mmHg  Tricuspid valve appears structurally normal, trace tricuspid regurgitation seen.  Regurgitation seen.  No pericardial effusion seen.  Proximal aorta appears normal in size.      Lab Review   I have reviewed the labs  Results from last 7 days   Lab Units 10/13/24  1414 10/13/24  1152   HSTROP T ng/L 22* 23*  "    Results from last 7 days   Lab Units 10/14/24  0425   MAGNESIUM mg/dL 2.5*     Results from last 7 days   Lab Units 10/15/24  0008   SODIUM mmol/L 131*   POTASSIUM mmol/L 4.4   BUN mg/dL 12   CREATININE mg/dL 0.42*   CALCIUM mg/dL 7.9*         Results from last 7 days   Lab Units 10/15/24  0008 10/13/24  1144 10/13/24  0224 10/12/24  0019   WBC 10*3/mm3 2.29*  --  3.43 2.64*   HEMOGLOBIN g/dL 8.4*  --  9.9* 11.0*   HEMOGLOBIN, POC g/dL  --  9.4*  --   --    HEMATOCRIT % 26.4*  --  30.3* 32.9*   HEMATOCRIT POC %  --  28*  --   --    PLATELETS 10*3/mm3 145  --  173 257     Results from last 7 days   Lab Units 10/15/24  0008   INR  1.01       RADIOLOGY:  Imaging Results (Last 24 Hours)       ** No results found for the last 24 hours. **                  )10/15/2024  Travis Connor MD      [facility] Dragon/Transcription:   \"Dictated utilizing Dragon dictation\".   "

## 2024-10-15 NOTE — CONSULTS
Group: [facility]         CONSULT NOTE    Patient Identification:  Maryann Gaitan  74 y.o.  female  1950  [id number]            Requesting physician: Attending physician    Reason for Consultation: Cavitary lung lesion      History of Present Illness:  75 y/o female with h/o Crohn's disease on oral steroids who presented 10/8/2024 with hyponatremia, chronic nausea and diarrhea. Mild chronic cough.    Assessment:  Cavitary lung lesion  Multifocal pneumonia in a patient on chronic steroids  Hypoxia  COPD: not in exacerbation     Hyponatremia  Crohn disease  Anemia  HTN  Hypothyroidism    s/p EGD/colonoscopy-10/12/2024 EGD/colonoscopy, small hiatal hernia. Nonerosive chronic gastritis. Normal duodenum. TI stricture status post dilation to 12 mm. TI ulcers. Colon ulcers. Sigmoid colon diverticulosis. Grade 2 internal hemorrhoids , strongyloides.      Recommendations:  Schedule bronchoscopy 10/16/2024  Oxygen supplement and titration to maintain saturation 90 to 95%  Bronchodilators, spiriva     Abx: cefepime    Followed by GI: on steroids, mesalamine and ivermectin.  DVT/GI prophylaxis  Check daily labs and correct electrolytes as needed  I personally reviewed the radiological studies      Review of Sytems:  Constitutional: Negative for chills, and fever and positive for malaise/fatigue.   HENT: Negative.    Eyes: Negative.    Cardiovascular: Negative.    Respiratory: Positive for cough and shortness of breath.    Skin: Negative.    Musculoskeletal: Negative.    Gastrointestinal: chronic abd pain and nausea  Genitourinary: Negative.    Neurological: Negative.    Psychiatric/Behavioral: Negative.    Past Medical History:  Past Medical History:   Diagnosis Date    Arthritis     COPD (chronic obstructive pulmonary disease)     Hypertension        Past Surgical History:  Past Surgical History:   Procedure Laterality Date    HYSTERECTOMY          Home Meds:  Medications Prior to Admission   Medication Sig  Dispense Refill Last Dose/Taking    Adalimumab (Humira, 2 Pen,) 40 MG/0.4ML Pen-injector Kit Inject 40 mg under the skin into the appropriate area as directed Every 14 (Fourteen) Days.   9/27/2024    amLODIPine (NORVASC) 10 MG tablet Take 1 tablet by mouth Daily.   Taking    cholecalciferol (VITAMIN D3) 1.25 MG (35023 UT) capsule Take 1 capsule by mouth 2 (Two) Times a Week. Wed, Sat   Taking    colestipol (COLESTID) 1 g tablet Take 1 tablet by mouth 2 (Two) Times a Day.   Taking    diclofenac (VOLTAREN) 50 MG EC tablet Take 1 tablet by mouth 2 (Two) Times a Day.   Taking    ezetimibe (ZETIA) 10 MG tablet Take 1 tablet by mouth Daily.   Taking    folic acid (FOLVITE) 1 MG tablet Take 1 tablet by mouth Daily.   Taking    hydroCHLOROthiazide 25 MG tablet Take 1 tablet by mouth Daily.   Taking    levothyroxine (SYNTHROID, LEVOTHROID) 50 MCG tablet Take 1 tablet by mouth Daily.   Taking    methotrexate 2.5 MG tablet Take 8 tablets by mouth 1 (One) Time Per Week.   Taking    pantoprazole (PROTONIX) 20 MG EC tablet Take 1 tablet by mouth Daily.   Taking    predniSONE (DELTASONE) 5 MG tablet Take 1 tablet by mouth Daily.   Taking    sertraline (ZOLOFT) 50 MG tablet Take 1 tablet by mouth Daily.   Taking    tiotropium (SPIRIVA) 18 MCG per inhalation capsule Place 1 capsule into inhaler and inhale Daily.   Taking       Allergies:  Allergies   Allergen Reactions    Codeine Hives    Penicillin G Sodium Hives       Social History:   Social History     Socioeconomic History    Marital status:    Tobacco Use    Smoking status: Former     Types: Cigarettes    Smokeless tobacco: Never   Vaping Use    Vaping status: Never Used   Substance and Sexual Activity    Alcohol use: Never    Drug use: Never    Sexual activity: Defer       Family History:  History reviewed. No pertinent family history.    Physical Exam:  /56 (BP Location: Right arm, Patient Position: Lying)   Pulse 88   Temp 98.7 °F (37.1 °C) (Oral)   Resp 16   " Ht 154.9 cm (61\")   Wt 57.6 kg (127 lb)   SpO2 95%   BMI 24.00 kg/m²  Body mass index is 24 kg/m². 95% 57.6 kg (127 lb)  General Appearance:  Alert   HEENT:  Normocephalic, without obvious abnormality, Conjunctiva/corneas clear,.   Nares normal, no drainage     Neck:  Supple, symmetrical, trachea midline. No JVD.  Lungs /Chest wall:   Bilateral basal rhonchi, respirations unlabored, symmetrical wall movement.     Heart:  Regular rate and rhythm, S1 S2 normal  Abdomen: Soft, non-tender, no masses, no organomegaly.    Extremities: No edema, no clubbing or cyanosis    LABS:  Lab Results   Component Value Date    CALCIUM 7.9 (L) 10/15/2024     Results from last 7 days   Lab Units 10/15/24  0008 10/14/24  0425 10/13/24  2027 10/13/24  1152 10/13/24  1144 10/13/24  0224 10/12/24  0019 10/11/24  1237 10/11/24  0358 10/10/24  0808 10/10/24  0428   MAGNESIUM mg/dL  --  2.5* 2.8* 3.7*  --  1.5*  --    < >  --   --  1.4*   SODIUM mmol/L 131* 135* 138  --   --  137 132*   < > 127*   < > 123*   POTASSIUM mmol/L 4.4 5.3* 5.0 3.6  --  3.1* 3.6   < > 2.6*  --  3.2*   CHLORIDE mmol/L 102 106 107  --   --  102 96*   < > 94*  --  92*   CO2 mmol/L 22.0 23.9 22.1  --   --  25.8 23.8   < > 23.9  --  24.7   BUN mg/dL 12 24* 32*  --   --  37* 38*   < > 21  --  8   CREATININE mg/dL 0.42* 0.52* 0.56*  --  0.85 0.57 0.51*   < > 0.38*  --  0.44*   GLUCOSE mg/dL 123* 150* 190*  --   --  141* 145*   < > 108*  --  99   CALCIUM mg/dL 7.9* 8.2* 8.4*  --   --  8.5* 8.7   < > 7.7*  --  7.9*   WBC 10*3/mm3 2.29*  --   --   --   --  3.43 2.64*  --  2.80*  --  1.54*   HEMOGLOBIN g/dL 8.4*  --   --   --   --  9.9* 11.0*   < > 7.0*  --  7.7*   HEMOGLOBIN, POC g/dL  --   --   --   --  9.4*  --   --   --   --   --   --    PLATELETS 10*3/mm3 145  --   --   --   --  173 257  --  269  --  315   ALT (SGPT) U/L 7  --   --   --   --   --  13  --   --   --  13   AST (SGOT) U/L 8  --   --   --   --   --  16  --   --   --  14   PROBNP pg/mL  --   --   --   --   " "--   --   --   --  2,573.0*  --  1,780.0*    < > = values in this interval not displayed.     Lab Results   Component Value Date    TROPONINT 22 (H) 10/13/2024     Results from last 7 days   Lab Units 10/13/24  1414 10/13/24  1152   HSTROP T ng/L 22* 23*     Results from last 7 days   Lab Units 10/13/24  1415   BLOODCX  No growth at 24 hours  No growth at 24 hours     Results from last 7 days   Lab Units 10/13/24  1144 10/11/24  1257   LACTATE mmol/L 0.6 1.6     Results from last 7 days   Lab Units 10/13/24  1144   PH, ARTERIAL pH units 7.422   PCO2, ARTERIAL mm Hg 39.8   PO2 ART mm Hg 67.1*   O2 SATURATION ART % 93.4*   MODALITY  Cannula     Results from last 7 days   Lab Units 10/13/24  1228   ADENOVIRUS DETECTION BY PCR  Not Detected   CORONAVIRUS 229E  Not Detected   CORONAVIRUS HKU1  Not Detected   CORONAVIRUS NL63  Not Detected   CORONAVIRUS OC43  Not Detected   HUMAN METAPNEUMOVIRUS  Not Detected   HUMAN RHINOVIRUS/ENTEROVIRUS  Not Detected   INFLUENZA B PCR  Not Detected   PARAINFLUENZA 1  Not Detected   PARAINFLUENZA VIRUS 2  Not Detected   PARAINFLUENZA VIRUS 3  Not Detected   PARAINFLUENZA VIRUS 4  Not Detected   BORDETELLA PERTUSSIS PCR  Not Detected   CHLAMYDOPHILA PNEUMONIAE PCR  Not Detected   MYCOPLAMA PNEUMO PCR  Not Detected   INFLUENZA A PCR  Not Detected   RSV, PCR  Not Detected     Results from last 7 days   Lab Units 10/15/24  0008   INR  1.01     Results from last 7 days   Lab Units 10/13/24  1415   BLOODCX  No growth at 24 hours  No growth at 24 hours     No results found for: \"TSH\"  Estimated Creatinine Clearance: 95.9 mL/min (A) (by C-G formula based on SCr of 0.42 mg/dL (L)).  Results from last 7 days   Lab Units 10/08/24  1023   NITRITE UA  Negative   WBC UA /HPF 0-2   BACTERIA UA /HPF Trace*   SQUAM EPITHEL UA /HPF 0-2        Imaging:  Imaging Results (Last 24 Hours)       Procedure Component Value Units Date/Time    CT Angiogram Chest [[id number]] Collected: 10/14/24 1152     Updated: " 10/14/24 1203    Narrative:      CT ANGIOGRAM CHEST    Date of Exam: 10/14/2024 10:17 AM EDT    Indication: elevated d-dimer, shortness of breath.    Comparison: Chest x-ray 10/13/2024 and CT chest 7/20/2023    Technique: CTA of the chest was performed before and after the uneventful intravenous administration of iodinated contrast. Reconstructed coronal and sagittal images were also obtained. In addition, a 3-D volume rendered image was created for   interpretation. Automated exposure control and iterative reconstruction methods were used.      Findings:  PULMONARY VASCULATURE: Pulmonary arteries are widely patent without evidence of embolus.Main pulmonary artery is normal in size. No evidence of right heart strain.    MEDIASTINUM: There is calcified atherosclerotic disease at the thoracic aortic arch. Along the descending thoracic aorta there is noncalcified atherosclerotic disease and wall thickening with several penetrating aortic ulcers noted. No aortic dissection   identified. No mass nor pericardial effusion.  CORONARY ARTERIES: Mild to moderate calcified atherosclerotic disease.    LUNGS: Evaluation of lung parenchyma demonstrates a cavitary lesion in the left upper lung measuring 1.6 x 1.4 cm (image 34 of series 4 which is new from prior study. There is some adjacent groundglass attenuation and opacification with septal thickening   extending into the left upper lung. Findings are superimposed on a background of centrilobular emphysema. There is an additional subpleural nodule in the right upper lung measuring 3 mm (image 32 of series 4). Additional groundglass opacities and   infiltrates are noted within the lingula and in a peripheral distribution in the left lower lung some of which have a tree-in-bud configuration compatible with inflammatory changes. Some nodular opacification extends along the length of the left major   fissure.  PLEURAL SPACE: No effusion, mass, nor pneumothorax.  LYMPH NODES: There  are no pathologically enlarged lymph nodes.    UPPER ABDOMEN: The liver has decreased attenuation compatible with steatosis.    OSSEOUS STRUCTURES: Appropriate for age with no acute process identified.      Impression:      Impression:    1. No evidence for pulmonary embolus.    2. New cavitary lesion in the left upper lung measuring 1.6 x 1.4 cm. There is adjacent groundglass opacification with additional nodular opacities and tree-in-bud opacities within the left upper and left lower lung. This may represent a multifocal   pneumonia however underlying cavitary neoplasm cannot be excluded. Recommend treatment with follow-up imaging to ensure complete resolution.    3. Calcified and noncalcified atherosclerotic disease involving the thoracic aortic arch and descending thoracic aorta with several aortic ulcers along the descending thoracic aorta.        Electronically Signed: Manisha Khan MD    10/14/2024 12:01 PM EDT    Workstation ID: [id number]              Current Meds:   SCHEDULE  cefepime, 2,000 mg, Intravenous, Once  cefepime, 2,000 mg, Intravenous, Q8H  enoxaparin, 40 mg, Subcutaneous, Q24H  First Mouthwash (Magic Mouthwash), 10 mL, Swish & Spit, Q6H  folic acid, 1 mg, Oral, Daily  levothyroxine, 50 mcg, Oral, Daily  mesalamine, 4.8 g, Oral, Daily With Breakfast  methylPREDNISolone sodium succinate, 20 mg, Intravenous, Q8H  nystatin, 5 mL, Swish & Swallow, 4x Daily  pantoprazole, 40 mg, Oral, Daily  sertraline, 50 mg, Oral, Daily  sodium chloride, 10 mL, Intravenous, Q12H  tiotropium bromide monohydrate, 2 puff, Inhalation, Daily - RT      Infusions  Pharmacy to Dose Cefepime,       PRNs    albuterol    senna-docusate sodium **AND** polyethylene glycol **AND** bisacodyl **AND** bisacodyl    hydrOXYzine    influenza vaccine    ipratropium-albuterol    Lidocaine Viscous HCl    Magnesium Standard Dose Replacement - Follow Nurse / BPA Driven Protocol    melatonin    nitroglycerin    ondansetron    ondansetron  ODT **OR** ondansetron    Pharmacy to Dose Cefepime    Phosphorus Replacement - Follow Nurse / BPA Driven Protocol    Potassium Replacement - Follow Nurse / BPA Driven Protocol    sodium chloride        Gallo Pope MD  10/15/2024  08:49 EDT      Much of this encounter note is an electronic transcription/translation of spoken language to printed text using Dragon Software.

## 2024-10-15 NOTE — PLAN OF CARE
Goal Outcome Evaluation:         Maryann Gaitan presents with functional mobility impairments which indicate the need for skilled intervention. PT is progressing well with her mobility, expresses fear of using rollator and reporting she wants a w/c.  Educated on importance of staying mobile and not relying solely on w/c for mobility.  Pt does well ambulating in gonzales with CGAx1.  Tolerating session today without incident. Will continue to follow and progress as tolerated.

## 2024-10-15 NOTE — PROGRESS NOTES
[facility] MEDICINE SERVICE  DAILY PROGRESS NOTE    NAME: Maryann Gaitan  : 1950  MRN: [id number]      LOS: 6 days     PROVIDER OF SERVICE: Jonathan Mackay MD    Chief Complaint: Hyponatremia    Subjective:     Interval History:  History taken from: Patient and patient's chart     Mild shortness of breath.  Scheduled for cardiac cath today.        Review of Systems:   Review of Systems  All negative except above   Objective:     Vital Signs  Temp:  [97.2 °F (36.2 °C)-98.9 °F (37.2 °C)] 97.2 °F (36.2 °C)  Heart Rate:  [] 88  Resp:  [16-24] 16  BP: ()/(55-70) 117/65  Flow (L/min) (Oxygen Therapy):  [2] 2   Body mass index is 24 kg/m².    Physical Exam  Physical Exam  General: Alert and oriented, no acute distress.  HENT: Normocephalic, moist oral mucosa, no scleral icterus.  Neck: Supple, nontender, no carotid bruits, no JVD, no LAD.  Lungs: Bilateral rhonchi  Heart: RRR, no murmur, gallop or edema.  Abdomen: Soft, nontender, nondistended, + bowel sounds.  Musculoskeletal: Normal range of motion and strength, no tenderness or swelling.  Neuro: alert and awake, moving all 4 extremities   Skin: Skin is warm, dry and pink, no rashes or lesions.  Psychiatric: Cooperative, appropriate mood and affect.         Diagnostic Data    Results from last 7 days   Lab Units 10/15/24  0008   WBC 10*3/mm3 2.29*   HEMOGLOBIN g/dL 8.4*   HEMATOCRIT % 26.4*   PLATELETS 10*3/mm3 145   GLUCOSE mg/dL 123*   CREATININE mg/dL 0.42*   BUN mg/dL 12   SODIUM mmol/L 131*   POTASSIUM mmol/L 4.4   AST (SGOT) U/L 8   ALT (SGPT) U/L 7   ALK PHOS U/L 70   BILIRUBIN mg/dL 0.4   ANION GAP mmol/L 7.0       CT Angiogram Chest    Result Date: 10/14/2024  Impression: 1. No evidence for pulmonary embolus. 2. New cavitary lesion in the left upper lung measuring 1.6 x 1.4 cm. There is adjacent groundglass opacification with additional nodular opacities and tree-in-bud opacities within the left upper and left lower lung.  This may represent a multifocal pneumonia however underlying cavitary neoplasm cannot be excluded. Recommend treatment with follow-up imaging to ensure complete resolution. 3. Calcified and noncalcified atherosclerotic disease involving the thoracic aortic arch and descending thoracic aorta with several aortic ulcers along the descending thoracic aorta. Electronically Signed: Manisha Khan MD  10/14/2024 12:01 PM EDT  Workstation ID: [id number]    XR Chest 1 View    Result Date: 10/13/2024  Impression: Mildly decreased patchy airspace opacities throughout the left lung compared to 10/12/2024 chest x-ray, likely due to multifocal pneumonia. Electronically Signed: Danae Gao  10/13/2024 12:09 PM EDT  Workstation ID: [id number]       I have reviewed patient labs and imaging     Assessment/Plan:     Active and Resolved Problems  Multifocal pneumonia  Cavitary lung lesion  Acute hypoxic respiratory failure  Send blood cultures and respiratory viral panel  continue IV cefepime-pharmacy to dose  MRSA negative- IV vanco discontinued  Supplemental oxygen to keep SpO2 more than 95%  Closely monitor vitals  CT PE showed cavitary lung lesion-pulmonary consulted     Sinus arrhythmia  Severe MR  -Noted on EKG likely due to electrolyte imbalance  -Monitor and replace goal K>4, Mg>2 and PO4>3.5  -Cardiology consulted  -noted plan for cardiac cath      Hyponatremia  - Nephrology consulted, appreciate recs  - Likely secondary to hypovolemia given recent diarrhea, nausea and vomiting  - Holding HCTZ  - Fluids per nephrology       Hypokalemia  - Likely exacerbated by significant episodes of diarrhea  - Replete prn     Crohns disease  Diarrhea  - Follows with Dr. Castillo; plans for outpatient scopes 10/31 for ongoing Crohns  - Continues to have significant diarrhea which is provoking electrolyte abnormalities  - Also in setting of worsening anemia  - GI consulted- s/p EGD/colonoscopy-10/12/2024 EGD/colonoscopy (Dr Strong) small  hiatal hernia.  Nonerosive chronic gastritis.  Normal duodenum.  TI stricture status post dilation to 12 mm.  TI ulcers.  Colon ulcers.  Sigmoid colon diverticulosis.  Grade 2 internal hemorrhoids.    - Continue Mesalamine, solumedrol  - Low residue diet.   - Patient will likely need Rinvoq as outpatient. Task has been sent to our office to initiate approval process.  - Could also consider clinical trial secondary to Crohn's with stricture.  Continue diet as tolerated.  - Likely home in the next 1 to 2 days from GI standpoint.        Anemia  - No overt s/sx of bleeding  -Significant drop from 10-7 during admission  - Hold off on pharmacological vte ppx given drop in hgb since admission  -Transfuse 1 unit packed red blood cells today, repeat H&H  - Unclear etiology of anemia but concern for GI losses given significant diarrhea in the setting of Crohn's disease  - Hematology following      Leukopenia  - Not present on admission  - On Humira and methotrexate outpatient  - Consulted hematology, appreciate recs     Hypertension  - Holding HCTZ given hyponatremia  - Holding amlodipine given soft bps     Depression  - Okay to resume ssri per nephrology       VTE Prophylaxis:  Pharmacologic & mechanical VTE prophylaxis orders are present.             Disposition Planning:     Barriers to Discharge:medical clearance   Anticipated Date of Discharge: 10/18  Place of Discharge: home      Time: 40 minutes     Code Status and Medical Interventions: CPR (Attempt to Resuscitate); Full Support   Ordered at: 10/09/24 1058     Code Status (Patient has no pulse and is not breathing):    CPR (Attempt to Resuscitate)     Medical Interventions (Patient has pulse or is breathing):    Full Support       Signature: Electronically signed by Jonathan Mackay MD, 10/15/24, 11:32 EDT.  [facility] Hospitalist Team

## 2024-10-15 NOTE — PLAN OF CARE
Goal Outcome Evaluation:        Problem: Adult Inpatient Plan of Care  Goal: Absence of Hospital-Acquired Illness or Injury  Intervention: Identify and Manage Fall Risk  Intervention: Prevent and Manage VTE (Venous Thromboembolism) Risk  Goal: Optimal Comfort and Wellbeing  Intervention: Provide Person-Centered Care     Problem: Fall Injury Risk  Goal: Absence of Fall and Fall-Related Injury  Intervention: Identify and Manage Contributors  Intervention: Promote Injury-Free Environment     Problem: Skin Injury Risk Increased  Goal: Skin Health and Integrity  Intervention: Optimize Skin Protection      Patient was A&O Patient went for procedure at around 1400 and is still of the unit.

## 2024-10-15 NOTE — DISCHARGE PLACEMENT REQUEST
"Bhargavi Gaitan (74 y.o. Female)       Date of Birth   1950    Social Security Number       Address   [address] [place] [address] [place] [facility]    Home Phone   [phone]    MRN   [id number]       Scientologist   None    Marital Status                               Admission Date   10/8/24    Admission Type   Emergency    Admitting Provider   Colt Franklin MD    Attending Provider   Jonathan Mackay MD    Department, Room/Bed   [facility] INPATIENT, 309/1       Discharge Date       Discharge Disposition       Discharge Destination                                 Attending Provider: Jonathan Mackay MD    Allergies: Codeine, Penicillin G Sodium    Isolation: None   Infection: None   Code Status: CPR    Ht: 154.9 cm (61\")   Wt: 57.6 kg (127 lb)    Admission Cmt: None   Principal Problem: Hyponatremia [E87.1]                   Active Insurance as of 10/8/2024       Primary Coverage       Payor Plan Insurance Group Employer/Plan Group    HUMANA MEDICARE REPLACEMENT HUMANA MED ADV SNP HMO [id number]       Payor Plan Address Payor Plan Phone Number Payor Plan Fax Number Effective Dates    [address] [address] [phone]  4/1/2024 - None Entered    [facility] [id number]         Subscriber Name Subscriber Birth Date Member ID       BHARGAVI GAITAN 1950 [id number]                     Emergency Contacts        (Rel.) Home Phone Work Phone Mobile Phone    Nadir Gaitan (Son) -- -- [phone]    Sania Celeste (Grandchild) -- -- [phone]    Hector [place] (Grandchild) -- -- [phone]                "

## 2024-10-15 NOTE — PROGRESS NOTES
PROGRESS NOTE      Patient Name: Maryann Gaitan  : 1950  MRN: [id number]  Primary Care Physician: Eduard Little MD  Date of admission: 10/8/2024    Patient Care Team:  Eduard Little MD as PCP - General (Family Medicine)        Subjective   Subjective:     Seen and examined, comfortable, not in distress,  Sodium is 137 today, comfortable, not in distress,      Review of systems:  All other review of system unremarkable      Allergies:    Allergies   Allergen Reactions    Codeine Hives    Penicillin G Sodium Hives       Objective   Exam:     Vital Signs  Temp:  [97.2 °F (36.2 °C)-98.9 °F (37.2 °C)] 97.2 °F (36.2 °C)  Heart Rate:  [] 83  Resp:  [15-24] 18  BP: (116-142)/(55-81) 126/81  SpO2:  [92 %-100 %] 98 %  on  Flow (L/min) (Oxygen Therapy):  [2] 2;   Device (Oxygen Therapy): room air  Body mass index is 24 kg/m².    General: Elderly female in no acute distress.    Head:      Normocephalic and atraumatic.    Eyes:      PERRL/EOM intact, conjunctivae and sclerae clear without nystagmus.    Neck:      No masses, thyromegaly,  trachea central with normal respiratory effort   Lungs:    Clear bilaterally to auscultation.    Heart:      Regular rate and rhythm, no murmur no gallop  Abd:        Soft, nontender, not distended, bowel sounds positive, no shifting dullness   Pulses:   Pulses palpable  Extr:        No cyanosis or clubbing--no edema.    Neuro:    No focal deficits.   alert oriented x3  Skin:       Intact without lesions or rashes.    Psych:    Alert and cooperative; normal mood and affect; .      Results Review:  I have personally reviewed most recent Data :  CBC    Results from last 7 days   Lab Units 10/15/24  0008 10/13/24  1144 10/13/24  0224 10/12/24  0019 10/11/24  1237 10/11/24  0358 10/10/24  0428 10/09/24  1407 10/09/24  0542 10/08/24  2105   WBC 10*3/mm3 2.29*  --  3.43 2.64*  --  2.80* 1.54*  --  2.94* 4.51   HEMOGLOBIN g/dL 8.4*  --  9.9* 11.0* 7.8* 7.0* 7.7*    < > 8.1* 9.4*   HEMOGLOBIN, POC g/dL  --  9.4*  --   --   --   --   --   --   --   --    PLATELETS 10*3/mm3 145  --  173 257  --  269 315  --  343 402    < > = values in this interval not displayed.     CMP   Results from last 7 days   Lab Units 10/15/24  0008 10/14/24  0425 10/13/24  2027 10/13/24  1152 10/13/24  1144 10/13/24  0224 10/12/24  0019 10/11/24  1722 10/11/24  1258 10/10/24  0808 10/10/24  0428 10/09/24  0542 10/08/24  2105   SODIUM mmol/L 131* 135* 138  --   --  137 132* 129* 126*   < > 123*   < > 126*   POTASSIUM mmol/L 4.4 5.3* 5.0 3.6  --  3.1* 3.6 3.9 3.0*   < > 3.2*   < > 3.2*   CHLORIDE mmol/L 102 106 107  --   --  102 96* 97* 94*   < > 92*   < > 87*   CO2 mmol/L 22.0 23.9 22.1  --   --  25.8 23.8 24.0 24.2   < > 24.7   < > 24.6   BUN mg/dL 12 24* 32*  --   --  37* 38* 40* 55*   < > 8   < > 24*   CREATININE mg/dL 0.42* 0.52* 0.56*  --  0.85 0.57 0.51* 0.49* 0.48*   < > 0.44*   < > 0.75   GLUCOSE mg/dL 123* 150* 190*  --   --  141* 145* 122* 139*   < > 99   < > 103*   ALBUMIN g/dL 2.4*  --   --   --   --   --  2.7*  --   --   --  2.6*  --  3.3*   BILIRUBIN mg/dL 0.4  --   --   --   --   --  1.0  --   --   --  0.5  --  0.7   ALK PHOS U/L 70  --   --   --   --   --  80  --   --   --  70  --  89   AST (SGOT) U/L 8  --   --   --   --   --  16  --   --   --  14  --  15   ALT (SGPT) U/L 7  --   --   --   --   --  13  --   --   --  13  --  13    < > = values in this interval not displayed.     ABG    Results from last 7 days   Lab Units 10/13/24  1144   PH, ARTERIAL pH units 7.422   PCO2, ARTERIAL mm Hg 39.8   PO2 ART mm Hg 67.1*   O2 SATURATION ART % 93.4*   BASE EXCESS ART mmol/L 1.4     CT Angiogram Chest    Result Date: 10/14/2024  Impression: 1. No evidence for pulmonary embolus. 2. New cavitary lesion in the left upper lung measuring 1.6 x 1.4 cm. There is adjacent groundglass opacification with additional nodular opacities and tree-in-bud opacities within the left upper and left lower lung. This  may represent a multifocal pneumonia however underlying cavitary neoplasm cannot be excluded. Recommend treatment with follow-up imaging to ensure complete resolution. 3. Calcified and noncalcified atherosclerotic disease involving the thoracic aortic arch and descending thoracic aorta with several aortic ulcers along the descending thoracic aorta. Electronically Signed: Manisha Khan MD  10/14/2024 12:01 PM EDT  Workstation ID: [id number]     Results for orders placed during the hospital encounter of 10/08/24    Adult Transthoracic Echo Complete w/ Color, Spectral and Contrast if Necessary Per Protocol    Interpretation Summary    Left ventricular systolic function is low normal. Left ventricular ejection fraction appears to be 51 - 55%.    Left ventricular diastolic function was normal.    The right ventricular cavity is mildly dilated.    Left atrial volume is severely increased.    The right atrial cavity is moderate to severely  dilated.    Moderate to severe mitral valve regurgitation is present.    Estimated right ventricular systolic pressure from tricuspid regurgitation is normal (<35 mmHg).    Conclusion    Technically difficult study due to poor acoustic windows.  Normal LV size.  Low normal LV systolic function, with estimated LV ejection fraction of 50%  Mildly dilated right ventricle.  Severe left atrial enlargement by volumes.  Pulmonic valve is not well visualized.  Aortic valve is not well-visualized.  But does not have any significant AS or AR.  Mitral valve appears structurally normal.  Moderate to severe mitral regurgitation seen.  Calculated RV systolic pressure normal at 24 mmHg  Tricuspid valve appears structurally normal, trace tricuspid regurgitation seen.  Regurgitation seen.  No pericardial effusion seen.  Proximal aorta appears normal in size.    Scheduled Meds:cefepime, 2,000 mg, Intravenous, Once  cefepime, 2,000 mg, Intravenous, Q8H  [Transfer Hold] enoxaparin, 40 mg, Subcutaneous,  Q24H  First Mouthwash (Magic Mouthwash), 10 mL, Swish & Spit, Q6H  folic acid, 1 mg, Oral, Daily  [Transfer Hold] ivermectin, 200 mcg/kg, Oral, Q24H  levothyroxine, 50 mcg, Oral, Daily  mesalamine, 4.8 g, Oral, Daily With Breakfast  methylPREDNISolone sodium succinate, 20 mg, Intravenous, Q8H  nystatin, 5 mL, Swish & Swallow, 4x Daily  pantoprazole, 40 mg, Oral, Daily  sertraline, 50 mg, Oral, Daily  sodium chloride, 10 mL, Intravenous, Q12H  tiotropium bromide monohydrate, 2 puff, Inhalation, Daily - RT      Continuous Infusions:Pharmacy to Dose Cefepime,   sodium chloride, 50 mL/hr, Last Rate: 50 mL/hr (10/15/24 2772)      PRN Meds:  acetaminophen    albuterol    senna-docusate sodium **AND** polyethylene glycol **AND** bisacodyl **AND** bisacodyl    hydrOXYzine    influenza vaccine    ipratropium-albuterol    Lidocaine Viscous HCl    Magnesium Standard Dose Replacement - Follow Nurse / BPA Driven Protocol    melatonin    nitroglycerin    ondansetron    ondansetron ODT **OR** ondansetron    Pharmacy to Dose Cefepime    Phosphorus Replacement - Follow Nurse / BPA Driven Protocol    Potassium Replacement - Follow Nurse / BPA Driven Protocol    sodium chloride    Assessment & Plan   Assessment and Plan:         Hyponatremia    Diarrhea    Moderate to severe mitral regurgitation    Chest pain, atypical    Cavitary lesion of lung    Multiple tracheobronchial mucus plugs    ASSESSMENT:  Hyponatremia likely etiology thiazide diuretics in the presence of Cymbalta  History of hypertension  History of arthritis  History of Crohn disease   10/12/2024 EGD/colonoscopy  small hiatal hernia.  Nonerosive chronic gastritis.  Normal duodenum.  TI stricture status post dilation to 12 mm.  TI ulcers.  Colon ulcers.  Sigmoid colon diverticulosis.  Grade 2 internal hemorrhoids     PLAN :        Hyponatremia etiology multifactorial including initially the use of thiazide and SSRI but also diarrhea volume depletion poor nutritional state  decreased p.o. intake also contributing to hyponatremia     Sodium level is slightly lower than yesterday patient is also n.p.o.  Continue diet  Follow-up with sodium level tomorrow if needed another dose of urea may be needed by tomorrow  Patient is going for colonoscopy that might be contributing to hyponatremia because of diarrhea  Potassium level better  Continue to follow-up with the nutritional status  Patient has been started for vancomycin and cefepime, judicious dosing, risk of BISHNU,  Status post EGD and colonoscopy yesterday, report reviewed,  Follow-up with hematology and GI  Closely follow,           Electronically signed by Buddy Pierre MD,   [facility] kidney consultant  [phone]  10/15/2024  17:56 EDT

## 2024-10-15 NOTE — THERAPY TREATMENT NOTE
"Subjective: Pt agreeable to therapeutic plan of care.    Objective:   Pt agreeable to work with therapy after encouragement.  Plans heart cath and GONZÁLEZ today at 1400.    Precautions - fall    Bed mobility - Independent  Transfers - Supervision  Ambulation - 100 feet CGA, with hand held assist, pt does not want to use her rollator that is in the room    Vitals: WNL    Pain: 0 VAS   Location:   Intervention for pain: N/A    Education: Provided education on the importance of mobility in the acute care setting    Assessment: Maryann Gaitan presents with functional mobility impairments which indicate the need for skilled intervention. PT is progressing well with her mobility, expresses fear of using rollator and reporting she wants a w/c.  Educated on importance of staying mobile and not relying solely on w/c for mobility.  Pt does well ambulating in gonzales with CGAx1.  Tolerating session today without incident. Will continue to follow and progress as tolerated.     Plan/Recommendations:   If medically appropriate, Low Intensity Therapy recommended post-acute care - This is recommended as therapy feels this patient would require 2-3 visits per week. OP or HH would be the best option depending on patient's home bound status. Consider, if the patient has other  \"skilled\" needs such as wounds, IV antibiotics, etc. Combined with \"low intensity\" could also equate to a SNF. If patient is medically complex, consider LTAC. Pt requires no DME at discharge.     Pt desires Home with family assist and Home Health at discharge. Pt cooperative; agreeable to therapeutic recommendations and plan of care.         Basic Mobility 6-click:  Rollin = Total, A lot = 2, A little = 3; 4 = None  Supine>Sit:   1 = Total, A lot = 2, A little = 3; 4 = None   Sit>Stand with arms:  1 = Total, A lot = 2, A little = 3; 4 = None  Bed>Chair:   1 = Total, A lot = 2, A little = 3; 4 = None  Ambulate in room:  1 = Total, A lot = 2, A little = 3; 4 " = None  3-5 Steps with railin = Total, A lot = 2, A little = 3; 4 = None  Score: 22    Modified Doug: N/A = No pre-op stroke/TIA    Post-Tx Position: Up in Chair, Alarms activated, and Call light and personal items within reach  PPE: gloves and surgical mask

## 2024-10-15 NOTE — CASE MANAGEMENT/SOCIAL WORK
Continued Stay Note  [facility]     Patient Name: Maryann Gaitan  MRN: [id number]  Today's Date: 10/15/2024    Admit Date: 10/8/2024    Plan: Return home with family and [facility] (accepting- will need order.) Pt refusing SNF   Discharge Plan       Row Name 10/15/24 1559       Plan    Plan Return home with family and [facility] (accepting- will need order.) Pt refusing SNF               Veronica Ceja RN      Office phone: [phone]  Office fax: [phone]

## 2024-10-15 NOTE — PROGRESS NOTES
Pt not in room when rounding: Flow still pending. All cell lines again ?dilutional?. Pt planned for/getting heart cath.  Will round on again tomorrow

## 2024-10-15 NOTE — CASE MANAGEMENT/SOCIAL WORK
Continued Stay Note   [facility]     Patient Name: Maryann Gaitan  MRN: [id number]  Today's Date: 10/15/2024    Admit Date: 10/8/2024    Plan: Return home with family and [facility] (pending acceptance- will need order.) Pt refusing SNF   Discharge Plan       Row Name 10/15/24 1138       Plan    Plan Return home with family and [facility] (pending acceptance- will need order.) Pt refusing SNF    Plan Comments CM met with pt at bedside to discuss SNF choices per PT recommendations; pt declining SNF but is open to home healthcare and choices obtained: 1) [facility] referral placed and liachip Odom contacted (pending acceptance) 2) Fernando Ceja RN     Office phone: [phone]  Office fax: [phone]

## 2024-10-15 NOTE — PLAN OF CARE
Goal Outcome Evaluation:           Progress: no change  Outcome Evaluation: Pt resting in bed A/Ox4, remains on IV ABT, awaiting stress test and cardiac cath with potential GONZÁLEZ in am.  Will be NPO at 0730 this morning per MD order. Pt remains a falls risk with bed alarm on, Pt remainsa  q2turn.  Will cotninue with current orders care plans and monitoring

## 2024-10-16 ENCOUNTER — DOCUMENTATION (OUTPATIENT)
Dept: HOME HEALTH SERVICES | Facility: HOME HEALTHCARE | Age: 74
End: 2024-10-16
Payer: MEDICARE

## 2024-10-16 ENCOUNTER — APPOINTMENT (OUTPATIENT)
Dept: CARDIOLOGY | Facility: HOSPITAL | Age: 74
End: 2024-10-16
Payer: MEDICARE

## 2024-10-16 ENCOUNTER — ANESTHESIA EVENT (OUTPATIENT)
Dept: GASTROENTEROLOGY | Facility: HOSPITAL | Age: 74
End: 2024-10-16
Payer: MEDICARE

## 2024-10-16 ENCOUNTER — INPATIENT HOSPITAL (AMBULATORY)
Age: 74
End: 2024-10-16
Payer: MEDICARE

## 2024-10-16 ENCOUNTER — ANESTHESIA (OUTPATIENT)
Dept: GASTROENTEROLOGY | Facility: HOSPITAL | Age: 74
End: 2024-10-16
Payer: MEDICARE

## 2024-10-16 ENCOUNTER — INPATIENT HOSPITAL (AMBULATORY)
Dept: URBAN - METROPOLITAN AREA HOSPITAL 84 | Facility: HOSPITAL | Age: 74
End: 2024-10-16
Payer: MEDICARE

## 2024-10-16 DIAGNOSIS — D64.9 ANEMIA, UNSPECIFIED: ICD-10-CM

## 2024-10-16 DIAGNOSIS — K50.812 CROHN'S DISEASE OF BOTH SMALL AND LARGE INTESTINE WITH INTES: ICD-10-CM

## 2024-10-16 DIAGNOSIS — K12.30 ORAL MUCOSITIS (ULCERATIVE), UNSPECIFIED: ICD-10-CM

## 2024-10-16 DIAGNOSIS — E87.8 OTHER DISORDERS OF ELECTROLYTE AND FLUID BALANCE, NOT ELSEWH: ICD-10-CM

## 2024-10-16 DIAGNOSIS — Z90.49 ACQUIRED ABSENCE OF OTHER SPECIFIED PARTS OF DIGESTIVE TRACT: ICD-10-CM

## 2024-10-16 DIAGNOSIS — D72.819 DECREASED WHITE BLOOD CELL COUNT, UNSPECIFIED: ICD-10-CM

## 2024-10-16 DIAGNOSIS — B78.9 STRONGYLOIDIASIS, UNSPECIFIED: ICD-10-CM

## 2024-10-16 LAB
ALBUMIN SERPL-MCNC: 2.5 G/DL (ref 3.5–5.2)
ALBUMIN/GLOB SERPL: 1.6 G/DL
ALP SERPL-CCNC: 64 U/L (ref 39–117)
ALT SERPL W P-5'-P-CCNC: 7 U/L (ref 1–33)
ANION GAP SERPL CALCULATED.3IONS-SCNC: 7.4 MMOL/L (ref 5–15)
AST SERPL-CCNC: 9 U/L (ref 1–32)
B PARAPERT DNA SPEC QL NAA+PROBE: NOT DETECTED
B PERT DNA SPEC QL NAA+PROBE: NOT DETECTED
BASOPHILS # BLD AUTO: 0.01 10*3/MM3 (ref 0–0.2)
BASOPHILS NFR BLD AUTO: 0.4 % (ref 0–1.5)
BH CV ECHO MEAS - LAA 0 DEGREE LENGTH: 1.3 CM
BH CV ECHO MEAS - LAA 0 DEGREE WIDTH: 1.4 CM
BH CV ECHO MEAS - LAA 135 DEGREE LENGTH: 1.2 CM
BH CV ECHO MEAS - LAA 135 DEGREE WIDTH: 1.2 CM
BH CV ECHO MEAS - LAA 45 DEGREE LENGTH: 1.8 CM
BH CV ECHO MEAS - LAA 45 DEGREE WIDTH: 1.5 CM
BH CV ECHO MEAS - LAA 90 DEGREE LENGTH: 1.6 CM
BH CV ECHO MEAS - LAA 90 DEGREE WIDTH: 1.8 CM
BH CV ECHO MEAS - MR MAX PG: 29.6 MMHG
BH CV ECHO MEAS - MR MAX VEL: 272 CM/SEC
BH CV ECHO MEAS - MV DEC SLOPE: 434 CM/SEC2
BH CV ECHO MEAS - MV MAX PG: 94.8 MMHG
BH CV ECHO MEAS - MV MEAN PG: 44 MMHG
BH CV ECHO MEAS - MV P1/2T: 90.4 MSEC
BH CV ECHO MEAS - MV V2 VTI: 128.2 CM
BH CV ECHO MEAS - MVA(P1/2T): 2.43 CM2
BILIRUB SERPL-MCNC: 0.4 MG/DL (ref 0–1.2)
BUN SERPL-MCNC: 8 MG/DL (ref 8–23)
BUN/CREAT SERPL: 25 (ref 7–25)
C PNEUM DNA NPH QL NAA+NON-PROBE: NOT DETECTED
CALCIUM SPEC-SCNC: 8 MG/DL (ref 8.6–10.5)
CHLORIDE SERPL-SCNC: 99 MMOL/L (ref 98–107)
CO2 SERPL-SCNC: 22.6 MMOL/L (ref 22–29)
CREAT SERPL-MCNC: 0.32 MG/DL (ref 0.57–1)
CRP SERPL-MCNC: 2.03 MG/DL (ref 0–0.5)
DEPRECATED RDW RBC AUTO: 58.1 FL (ref 37–54)
EGFRCR SERPLBLD CKD-EPI 2021: 109.8 ML/MIN/1.73
EOSINOPHIL # BLD AUTO: 0.02 10*3/MM3 (ref 0–0.4)
EOSINOPHIL NFR BLD AUTO: 0.8 % (ref 0.3–6.2)
ERYTHROCYTE [DISTWIDTH] IN BLOOD BY AUTOMATED COUNT: 16.8 % (ref 12.3–15.4)
FLUAV SUBTYP SPEC NAA+PROBE: NOT DETECTED
FLUBV RNA ISLT QL NAA+PROBE: NOT DETECTED
GLOBULIN UR ELPH-MCNC: 1.6 GM/DL
GLUCOSE SERPL-MCNC: 127 MG/DL (ref 65–99)
HADV DNA SPEC NAA+PROBE: NOT DETECTED
HCOV 229E RNA SPEC QL NAA+PROBE: NOT DETECTED
HCOV HKU1 RNA SPEC QL NAA+PROBE: NOT DETECTED
HCOV NL63 RNA SPEC QL NAA+PROBE: NOT DETECTED
HCOV OC43 RNA SPEC QL NAA+PROBE: NOT DETECTED
HCT VFR BLD AUTO: 25.5 % (ref 34–46.6)
HGB BLD-MCNC: 8.3 G/DL (ref 12–15.9)
HMPV RNA NPH QL NAA+NON-PROBE: NOT DETECTED
HPIV1 RNA ISLT QL NAA+PROBE: NOT DETECTED
HPIV2 RNA SPEC QL NAA+PROBE: NOT DETECTED
HPIV3 RNA NPH QL NAA+PROBE: NOT DETECTED
HPIV4 P GENE NPH QL NAA+PROBE: NOT DETECTED
IMM GRANULOCYTES # BLD AUTO: 0.02 10*3/MM3 (ref 0–0.05)
IMM GRANULOCYTES NFR BLD AUTO: 0.8 % (ref 0–0.5)
LYMPHOCYTES # BLD AUTO: 0.46 10*3/MM3 (ref 0.7–3.1)
LYMPHOCYTES NFR BLD AUTO: 19.3 % (ref 19.6–45.3)
Lab: NORMAL
M PNEUMO IGG SER IA-ACNC: NOT DETECTED
MCH RBC QN AUTO: 31.1 PG (ref 26.6–33)
MCHC RBC AUTO-ENTMCNC: 32.5 G/DL (ref 31.5–35.7)
MCV RBC AUTO: 95.5 FL (ref 79–97)
MONOCYTES # BLD AUTO: 0.17 10*3/MM3 (ref 0.1–0.9)
MONOCYTES NFR BLD AUTO: 7.1 % (ref 5–12)
NEUTROPHILS NFR BLD AUTO: 1.7 10*3/MM3 (ref 1.7–7)
NEUTROPHILS NFR BLD AUTO: 71.6 % (ref 42.7–76)
NRBC BLD AUTO-RTO: 0 /100 WBC (ref 0–0.2)
PLATELET # BLD AUTO: 182 10*3/MM3 (ref 140–450)
PMV BLD AUTO: 10 FL (ref 6–12)
POTASSIUM SERPL-SCNC: 4.1 MMOL/L (ref 3.5–5.2)
PROT SERPL-MCNC: 4.1 G/DL (ref 6–8.5)
RBC # BLD AUTO: 2.67 10*6/MM3 (ref 3.77–5.28)
RHINOVIRUS RNA SPEC NAA+PROBE: NOT DETECTED
RSV RNA NPH QL NAA+NON-PROBE: NOT DETECTED
SARS-COV-2 RNA NPH QL NAA+NON-PROBE: NOT DETECTED
SODIUM SERPL-SCNC: 129 MMOL/L (ref 136–145)
WBC NRBC COR # BLD AUTO: 2.38 10*3/MM3 (ref 3.4–10.8)

## 2024-10-16 PROCEDURE — 87205 SMEAR GRAM STAIN: CPT | Performed by: INTERNAL MEDICINE

## 2024-10-16 PROCEDURE — 80053 COMPREHEN METABOLIC PANEL: CPT | Performed by: NURSE PRACTITIONER

## 2024-10-16 PROCEDURE — 99232 SBSQ HOSP IP/OBS MODERATE 35: CPT | Performed by: NURSE PRACTITIONER

## 2024-10-16 PROCEDURE — 25010000002 ONDANSETRON PER 1 MG: Performed by: NURSE PRACTITIONER

## 2024-10-16 PROCEDURE — 25010000002 LIDOCAINE PF 1% 1 % SOLUTION: Performed by: NURSE ANESTHETIST, CERTIFIED REGISTERED

## 2024-10-16 PROCEDURE — 99232 SBSQ HOSP IP/OBS MODERATE 35: CPT | Performed by: INTERNAL MEDICINE

## 2024-10-16 PROCEDURE — 93312 ECHO TRANSESOPHAGEAL: CPT

## 2024-10-16 PROCEDURE — 94799 UNLISTED PULMONARY SVC/PX: CPT

## 2024-10-16 PROCEDURE — 87070 CULTURE OTHR SPECIMN AEROBIC: CPT | Performed by: INTERNAL MEDICINE

## 2024-10-16 PROCEDURE — 87798 DETECT AGENT NOS DNA AMP: CPT | Performed by: INTERNAL MEDICINE

## 2024-10-16 PROCEDURE — 3E1F88Z IRRIGATION OF RESPIRATORY TRACT USING IRRIGATING SUBSTANCE, VIA NATURAL OR ARTIFICIAL OPENING ENDOSCOPIC: ICD-10-PCS | Performed by: INTERNAL MEDICINE

## 2024-10-16 PROCEDURE — 88312 SPECIAL STAINS GROUP 1: CPT | Performed by: INTERNAL MEDICINE

## 2024-10-16 PROCEDURE — 25010000002 PHENYLEPHRINE 10 MG/ML SOLUTION: Performed by: NURSE ANESTHETIST, CERTIFIED REGISTERED

## 2024-10-16 PROCEDURE — 93325 DOPPLER ECHO COLOR FLOW MAPG: CPT | Performed by: INTERNAL MEDICINE

## 2024-10-16 PROCEDURE — 25010000002 CEFEPIME PER 500 MG: Performed by: STUDENT IN AN ORGANIZED HEALTH CARE EDUCATION/TRAINING PROGRAM

## 2024-10-16 PROCEDURE — 93325 DOPPLER ECHO COLOR FLOW MAPG: CPT

## 2024-10-16 PROCEDURE — 87206 SMEAR FLUORESCENT/ACID STAI: CPT | Performed by: INTERNAL MEDICINE

## 2024-10-16 PROCEDURE — 94761 N-INVAS EAR/PLS OXIMETRY MLT: CPT

## 2024-10-16 PROCEDURE — 87102 FUNGUS ISOLATION CULTURE: CPT | Performed by: INTERNAL MEDICINE

## 2024-10-16 PROCEDURE — 94664 DEMO&/EVAL PT USE INHALER: CPT

## 2024-10-16 PROCEDURE — 25010000002 METHYLPREDNISOLONE PER 40 MG: Performed by: NURSE PRACTITIONER

## 2024-10-16 PROCEDURE — 85025 COMPLETE CBC W/AUTO DIFF WBC: CPT | Performed by: NURSE PRACTITIONER

## 2024-10-16 PROCEDURE — 87116 MYCOBACTERIA CULTURE: CPT | Performed by: INTERNAL MEDICINE

## 2024-10-16 PROCEDURE — 25010000002 PROPOFOL 200 MG/20ML EMULSION: Performed by: NURSE ANESTHETIST, CERTIFIED REGISTERED

## 2024-10-16 PROCEDURE — 93320 DOPPLER ECHO COMPLETE: CPT | Performed by: INTERNAL MEDICINE

## 2024-10-16 PROCEDURE — 87305 ASPERGILLUS AG IA: CPT | Performed by: INTERNAL MEDICINE

## 2024-10-16 PROCEDURE — 0202U NFCT DS 22 TRGT SARS-COV-2: CPT | Performed by: INTERNAL MEDICINE

## 2024-10-16 PROCEDURE — 93312 ECHO TRANSESOPHAGEAL: CPT | Performed by: INTERNAL MEDICINE

## 2024-10-16 PROCEDURE — 93320 DOPPLER ECHO COMPLETE: CPT

## 2024-10-16 PROCEDURE — 93321 DOPPLER ECHO F-UP/LMTD STD: CPT

## 2024-10-16 PROCEDURE — 88108 CYTOPATH CONCENTRATE TECH: CPT | Performed by: INTERNAL MEDICINE

## 2024-10-16 PROCEDURE — 86140 C-REACTIVE PROTEIN: CPT | Performed by: NURSE PRACTITIONER

## 2024-10-16 PROCEDURE — 25810000003 SODIUM CHLORIDE 0.9 % SOLUTION: Performed by: NURSE ANESTHETIST, CERTIFIED REGISTERED

## 2024-10-16 RX ORDER — ASPIRIN 81 MG/1
81 TABLET ORAL DAILY
Status: DISCONTINUED | OUTPATIENT
Start: 2024-10-16 | End: 2024-10-23 | Stop reason: HOSPADM

## 2024-10-16 RX ORDER — SODIUM CHLORIDE 9 MG/ML
INJECTION, SOLUTION INTRAVENOUS CONTINUOUS PRN
Status: DISCONTINUED | OUTPATIENT
Start: 2024-10-16 | End: 2024-10-16 | Stop reason: SURG

## 2024-10-16 RX ORDER — LIDOCAINE HYDROCHLORIDE 10 MG/ML
INJECTION, SOLUTION EPIDURAL; INFILTRATION; INTRACAUDAL; PERINEURAL AS NEEDED
Status: DISCONTINUED | OUTPATIENT
Start: 2024-10-16 | End: 2024-10-16 | Stop reason: SURG

## 2024-10-16 RX ORDER — IVERMECTIN 3 MG/1
200 TABLET ORAL EVERY 24 HOURS
Status: COMPLETED | OUTPATIENT
Start: 2024-10-16 | End: 2024-10-17

## 2024-10-16 RX ORDER — PROPOFOL 10 MG/ML
INJECTION, EMULSION INTRAVENOUS AS NEEDED
Status: DISCONTINUED | OUTPATIENT
Start: 2024-10-16 | End: 2024-10-16 | Stop reason: SURG

## 2024-10-16 RX ORDER — PHENYLEPHRINE HYDROCHLORIDE 10 MG/ML
INJECTION INTRAVENOUS AS NEEDED
Status: DISCONTINUED | OUTPATIENT
Start: 2024-10-16 | End: 2024-10-16 | Stop reason: SURG

## 2024-10-16 RX ORDER — ATORVASTATIN CALCIUM 40 MG/1
40 TABLET, FILM COATED ORAL NIGHTLY
Status: DISCONTINUED | OUTPATIENT
Start: 2024-10-16 | End: 2024-10-23 | Stop reason: HOSPADM

## 2024-10-16 RX ADMIN — NYSTATIN 500000 UNITS: 100000 SUSPENSION ORAL at 14:08

## 2024-10-16 RX ADMIN — Medication 10 ML: at 08:23

## 2024-10-16 RX ADMIN — MESALAMINE 4.8 G: 800 TABLET, DELAYED RELEASE ORAL at 08:22

## 2024-10-16 RX ADMIN — PHENYLEPHRINE HYDROCHLORIDE 100 MCG: 10 INJECTION INTRAVENOUS at 11:55

## 2024-10-16 RX ADMIN — PROPOFOL 25 MG: 10 INJECTION, EMULSION INTRAVENOUS at 11:53

## 2024-10-16 RX ADMIN — ASPIRIN 81 MG: 81 TABLET, COATED ORAL at 14:08

## 2024-10-16 RX ADMIN — NYSTATIN 500000 UNITS: 100000 SUSPENSION ORAL at 08:22

## 2024-10-16 RX ADMIN — TIOTROPIUM BROMIDE INHALATION SPRAY 2 PUFF: 3.12 SPRAY, METERED RESPIRATORY (INHALATION) at 07:49

## 2024-10-16 RX ADMIN — Medication 15 G: at 14:08

## 2024-10-16 RX ADMIN — DIPHENHYDRAMINE HYDROCHLORIDE AND LIDOCAINE HYDROCHLORIDE AND ALUMINUM HYDROXIDE AND MAGNESIUM HYDRO 10 ML: KIT at 00:09

## 2024-10-16 RX ADMIN — DIPHENHYDRAMINE HYDROCHLORIDE AND LIDOCAINE HYDROCHLORIDE AND ALUMINUM HYDROXIDE AND MAGNESIUM HYDRO 10 ML: KIT at 08:22

## 2024-10-16 RX ADMIN — CEFEPIME 2000 MG: 2 INJECTION, POWDER, FOR SOLUTION INTRAVENOUS at 22:54

## 2024-10-16 RX ADMIN — LEVOTHYROXINE SODIUM 50 MCG: 0.05 TABLET ORAL at 08:22

## 2024-10-16 RX ADMIN — PROPOFOL 25 MG: 10 INJECTION, EMULSION INTRAVENOUS at 11:44

## 2024-10-16 RX ADMIN — METHYLPREDNISOLONE SODIUM SUCCINATE 20 MG: 40 INJECTION, POWDER, FOR SOLUTION INTRAMUSCULAR; INTRAVENOUS at 17:18

## 2024-10-16 RX ADMIN — ATORVASTATIN CALCIUM 40 MG: 40 TABLET, FILM COATED ORAL at 21:37

## 2024-10-16 RX ADMIN — PROPOFOL 25 MG: 10 INJECTION, EMULSION INTRAVENOUS at 11:56

## 2024-10-16 RX ADMIN — METHYLPREDNISOLONE SODIUM SUCCINATE 20 MG: 40 INJECTION, POWDER, FOR SOLUTION INTRAMUSCULAR; INTRAVENOUS at 08:22

## 2024-10-16 RX ADMIN — Medication 10 ML: at 22:07

## 2024-10-16 RX ADMIN — PROPOFOL 25 MG: 10 INJECTION, EMULSION INTRAVENOUS at 12:10

## 2024-10-16 RX ADMIN — NYSTATIN 500000 UNITS: 100000 SUSPENSION ORAL at 21:37

## 2024-10-16 RX ADMIN — METHYLPREDNISOLONE SODIUM SUCCINATE 20 MG: 40 INJECTION, POWDER, FOR SOLUTION INTRAMUSCULAR; INTRAVENOUS at 00:08

## 2024-10-16 RX ADMIN — PROPOFOL 20 MG: 10 INJECTION, EMULSION INTRAVENOUS at 12:20

## 2024-10-16 RX ADMIN — Medication 15 G: at 21:37

## 2024-10-16 RX ADMIN — CEFEPIME 2000 MG: 2 INJECTION, POWDER, FOR SOLUTION INTRAVENOUS at 08:23

## 2024-10-16 RX ADMIN — FOLIC ACID 1 MG: 1 TABLET ORAL at 08:22

## 2024-10-16 RX ADMIN — ONDANSETRON 4 MG: 2 INJECTION INTRAMUSCULAR; INTRAVENOUS at 14:09

## 2024-10-16 RX ADMIN — DIPHENHYDRAMINE HYDROCHLORIDE AND LIDOCAINE HYDROCHLORIDE AND ALUMINUM HYDROXIDE AND MAGNESIUM HYDRO 10 ML: KIT at 01:00

## 2024-10-16 RX ADMIN — PHENYLEPHRINE HYDROCHLORIDE 100 MCG: 10 INJECTION INTRAVENOUS at 12:14

## 2024-10-16 RX ADMIN — LIDOCAINE HYDROCHLORIDE 50 MG: 10 INJECTION, SOLUTION EPIDURAL; INFILTRATION; INTRACAUDAL; PERINEURAL at 11:44

## 2024-10-16 RX ADMIN — PANTOPRAZOLE SODIUM 40 MG: 40 TABLET, DELAYED RELEASE ORAL at 08:22

## 2024-10-16 RX ADMIN — DIPHENHYDRAMINE HYDROCHLORIDE AND LIDOCAINE HYDROCHLORIDE AND ALUMINUM HYDROXIDE AND MAGNESIUM HYDRO 10 ML: KIT at 14:06

## 2024-10-16 RX ADMIN — ONDANSETRON 4 MG: 2 INJECTION, SOLUTION INTRAMUSCULAR; INTRAVENOUS at 14:01

## 2024-10-16 RX ADMIN — PROPOFOL 25 MG: 10 INJECTION, EMULSION INTRAVENOUS at 12:15

## 2024-10-16 RX ADMIN — CEFEPIME 2000 MG: 2 INJECTION, POWDER, FOR SOLUTION INTRAVENOUS at 14:05

## 2024-10-16 RX ADMIN — SODIUM CHLORIDE: 9 INJECTION, SOLUTION INTRAVENOUS at 11:41

## 2024-10-16 RX ADMIN — PROPOFOL 20 MG: 10 INJECTION, EMULSION INTRAVENOUS at 12:23

## 2024-10-16 RX ADMIN — NYSTATIN 500000 UNITS: 100000 SUSPENSION ORAL at 17:18

## 2024-10-16 RX ADMIN — IVERMECTIN 12000 MCG: 3 TABLET ORAL at 14:06

## 2024-10-16 RX ADMIN — SERTRALINE 50 MG: 50 TABLET, FILM COATED ORAL at 08:22

## 2024-10-16 RX ADMIN — DIPHENHYDRAMINE HYDROCHLORIDE AND LIDOCAINE HYDROCHLORIDE AND ALUMINUM HYDROXIDE AND MAGNESIUM HYDRO 10 ML: KIT at 22:04

## 2024-10-16 RX ADMIN — PROPOFOL 25 MG: 10 INJECTION, EMULSION INTRAVENOUS at 11:47

## 2024-10-16 RX ADMIN — PROPOFOL 25 MG: 10 INJECTION, EMULSION INTRAVENOUS at 12:04

## 2024-10-16 NOTE — PROGRESS NOTES
Daily Progress Note        Hyponatremia    Diarrhea    Moderate to severe mitral regurgitation    Chest pain, atypical    Cavitary lesion of lung    Multiple tracheobronchial mucus plugs    Assessment:    New cavitary lesion in the left upper lung measuring 1.6 x 1.4 cm  nodular opacities and tree-in-bud opacities withinthe left upper and left lower lung    patient on chronic steroids  For rheumatoid arthritis  Crohn disease    severe MR , s/p  cardiac cath 10/15/2024 which revealed total right and severe ostial LAD disease.  Descending thoracic aorta has an outpouching probably saccular aneurysm versus  penetrating aortic ulcer . CT surgery consulted    Hypoxia    COPD: not in exacerbation      Hyponatremia    Anemia  HTN  Hypothyroidism     s/p EGD/colonoscopy-10/12/2024 EGD/colonoscopy, small hiatal hernia. Nonerosive chronic gastritis. Normal duodenum. TI stricture status post dilation to 12 mm. TI ulcers. Colon ulcers. Sigmoid colon diverticulosis. Grade 2 internal hemorrhoids , strongyloides.        Recommendations:    Bronchoscopy today , with GONZÁLEZ    Oxygen supplement and titration to maintain saturation 90 to 95%  Bronchodilators, spiriva      Abx: cefepime     Followed by GI: on steroids, mesalamine and ivermectin.    I personally reviewed the radiological studies            LOS: 7 days     Subjective         Objective     Vital signs for last 24 hours:  Vitals:    10/16/24 0500 10/16/24 0700 10/16/24 0749 10/16/24 0753   BP:       BP Location:       Patient Position:       Pulse: 79  84 82   Resp:  19 18 18   Temp:  97.8 °F (36.6 °C)     TempSrc:  Oral     SpO2: 96%  100% 98%   Weight:       Height:           Intake/Output last 3 shifts:  I/O last 3 completed shifts:  In: 1390 [P.O.:640; I.V.:550; IV Piggyback:200]  Out: 0   Intake/Output this shift:  No intake/output data recorded.      Radiology  Imaging Results (Last 24 Hours)       ** No results found for the last 24 hours. **            Labs:  Results  from last 7 days   Lab Units 10/16/24  0518   WBC 10*3/mm3 2.38*   HEMOGLOBIN g/dL 8.3*   HEMATOCRIT % 25.5*   PLATELETS 10*3/mm3 182     Results from last 7 days   Lab Units 10/16/24  0518   SODIUM mmol/L 129*   POTASSIUM mmol/L 4.1   CHLORIDE mmol/L 99   CO2 mmol/L 22.6   BUN mg/dL 8   CREATININE mg/dL 0.32*   CALCIUM mg/dL 8.0*   BILIRUBIN mg/dL 0.4   ALK PHOS U/L 64   ALT (SGPT) U/L 7   AST (SGOT) U/L 9   GLUCOSE mg/dL 127*     Results from last 7 days   Lab Units 10/13/24  1144   PH, ARTERIAL pH units 7.422   PO2 ART mm Hg 67.1*   PCO2, ARTERIAL mm Hg 39.8   HCO3 ART mmol/L 25.9     Results from last 7 days   Lab Units 10/16/24  0518 10/15/24  0008 10/12/24  0019   ALBUMIN g/dL 2.5* 2.4* 2.7*     Results from last 7 days   Lab Units 10/13/24  1414 10/13/24  1152   HSTROP T ng/L 22* 23*         Results from last 7 days   Lab Units 10/14/24  0425   MAGNESIUM mg/dL 2.5*     Results from last 7 days   Lab Units 10/15/24  0008   INR  1.01               Meds:   SCHEDULE  cefepime, 2,000 mg, Intravenous, Once  cefepime, 2,000 mg, Intravenous, Q8H  [Transfer Hold] enoxaparin, 40 mg, Subcutaneous, Q24H  First Mouthwash (Magic Mouthwash), 10 mL, Swish & Spit, Q6H  folic acid, 1 mg, Oral, Daily  [Transfer Hold] ivermectin, 200 mcg/kg, Oral, Q24H  levothyroxine, 50 mcg, Oral, Daily  mesalamine, 4.8 g, Oral, Daily With Breakfast  methylPREDNISolone sodium succinate, 20 mg, Intravenous, Q8H  nystatin, 5 mL, Swish & Swallow, 4x Daily  pantoprazole, 40 mg, Oral, Daily  sertraline, 50 mg, Oral, Daily  sodium chloride, 10 mL, Intravenous, Q12H  tiotropium bromide monohydrate, 2 puff, Inhalation, Daily - RT      Infusions  Pharmacy to Dose Cefepime,       PRNs    acetaminophen    albuterol    senna-docusate sodium **AND** polyethylene glycol **AND** bisacodyl **AND** bisacodyl    hydrOXYzine    influenza vaccine    ipratropium-albuterol    Lidocaine Viscous HCl    Magnesium Standard Dose Replacement - Follow Nurse / BPA Driven  Protocol    melatonin    nitroglycerin    ondansetron    ondansetron ODT **OR** ondansetron    Pharmacy to Dose Cefepime    Phosphorus Replacement - Follow Nurse / BPA Driven Protocol    Potassium Replacement - Follow Nurse / BPA Driven Protocol    sodium chloride    Physical Exam:  Physical Exam  Cardiovascular:      Heart sounds: Murmur heard.      No gallop.   Pulmonary:      Effort: No respiratory distress.      Breath sounds: No stridor. Rhonchi and rales present. No wheezing.   Chest:      Chest wall: No tenderness.         ROS  Review of Systems   Respiratory:  Positive for cough and shortness of breath. Negative for wheezing and stridor.    Cardiovascular:  Positive for chest pain and palpitations. Negative for leg swelling.             Total time spent with patient greater than: 45 Minutes

## 2024-10-16 NOTE — CONSULTS
Patient Care Team:  Eduard Little MD as PCP - General (Family Medicine)  Referring Provider:  Dr. Connor  Reason for consultation:  MV CAD    Chief complaint:  abnormal lab work and abd cramping    Subjective     History of Present Illness:  73 y/o woman came to [facility] ED and left AMA before she could be admitted d/t abnormal lab work.  She initially sought care d/t increased GI symptoms from her baseline.  She has longstanding Crohn's disease.  She also reports a 30 pound unintentional weight loss.  Additionally, she reports occasional peripheral edema.  She has known HTN, hypothyroidism, chronic immunosuppression d/t Crohn's disease, COPD, and  former tobacco abuse.  Labs in ED showed sodium 122 and K 3 as well as wbc 2.94 and hgb 10.  Nephrology was consulted for hyponatremia and meds were adjusted.  GI consulted as well.  Heme/onc was consulted for anemia/ leukopenia.  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  Ivermectin was ordered. Cardiology consulted for CP and abn EKG after rapid response called for CP with SOA, palpitations, and lightheadedness/ dizziness.  CTA chest was obtained 10/14 d/t SOA and elevated d-dimer that showed no PE but JUVENTINO cavitary lesion and multifocal pna.  Additionally, a DTA outpouching/ saccular aneurysm vs penetrating aortic ulcer.  Pulm was consulted.  Cardiac cath showed total right and severe ostial LAD disease and severe MR. Dr. Lott was consulted for surgical evaluation.    Review of Systems   Constitutional:  Positive for fatigue and unexpected weight change.   Respiratory:  Positive for shortness of breath.    Cardiovascular:  Positive for chest pain and leg swelling.   Gastrointestinal:  Positive for diarrhea.        Past Medical History:   Diagnosis Date    Arthritis     COPD (chronic obstructive pulmonary disease)      Hypertension      Past Surgical History:   Procedure Laterality Date    CARDIAC CATHETERIZATION N/A 10/15/2024    Procedure: Left Heart Cath, possible pci;  Surgeon: Travis Connor MD;  Location: [facility] CATH INVASIVE LOCATION;  Service: Cardiovascular;  Laterality: N/A;    COLONOSCOPY N/A 10/12/2024    Procedure: COLONOSCOPY WITH BIOPSY AND WIRE GUIDED BALLOON DILATION OF TERMINAL ILEUM;  Surgeon: Rob Strong MD;  Location: [facility] ENDOSCOPY;  Service: Gastroenterology;  Laterality: N/A;  Colitis, crohns of terminal ileum, right colon ulcers, diverticulosis, hemorroids    ENDOSCOPY N/A 10/12/2024    Procedure: ESOPHAGOGASTRODUODENOSCOPY WITH BIOPSY X 2 AREA;  Surgeon: Rob Strong MD;  Location: [facility] ENDOSCOPY;  Service: Gastroenterology;  Laterality: N/A;  Chronic gastritis, HH    HYSTERECTOMY       History reviewed. No pertinent family history.  Social History     Tobacco Use    Smoking status: Former     Types: Cigarettes    Smokeless tobacco: Never   Vaping Use    Vaping status: Never Used   Substance Use Topics    Alcohol use: Never    Drug use: Never     Medications Prior to Admission   Medication Sig Dispense Refill Last Dose/Taking    Adalimumab (Humira, 2 Pen,) 40 MG/0.4ML Pen-injector Kit Inject 40 mg under the skin into the appropriate area as directed Every 14 (Fourteen) Days.   9/27/2024    amLODIPine (NORVASC) 10 MG tablet Take 1 tablet by mouth Daily.   Taking    cholecalciferol (VITAMIN D3) 1.25 MG (82262 UT) capsule Take 1 capsule by mouth 2 (Two) Times a Week. Wed, Sat   Taking    colestipol (COLESTID) 1 g tablet Take 1 tablet by mouth 2 (Two) Times a Day.   Taking    diclofenac (VOLTAREN) 50 MG EC tablet Take 1 tablet by mouth 2 (Two) Times a Day.   Taking    ezetimibe (ZETIA) 10 MG tablet Take 1 tablet by mouth Daily.   Taking    folic acid (FOLVITE) 1 MG tablet Take 1 tablet by mouth Daily.   Taking    hydroCHLOROthiazide 25 MG tablet Take 1 tablet by  "mouth Daily.   Taking    levothyroxine (SYNTHROID, LEVOTHROID) 50 MCG tablet Take 1 tablet by mouth Daily.   Taking    methotrexate 2.5 MG tablet Take 8 tablets by mouth 1 (One) Time Per Week.   Taking    pantoprazole (PROTONIX) 20 MG EC tablet Take 1 tablet by mouth Daily.   Taking    predniSONE (DELTASONE) 5 MG tablet Take 1 tablet by mouth Daily.   Taking    sertraline (ZOLOFT) 50 MG tablet Take 1 tablet by mouth Daily.   Taking    tiotropium (SPIRIVA) 18 MCG per inhalation capsule Place 1 capsule into inhaler and inhale Daily.   Taking     aspirin, 81 mg, Oral, Daily  atorvastatin, 40 mg, Oral, Nightly  cefepime, 2,000 mg, Intravenous, Once  cefepime, 2,000 mg, Intravenous, Q8H  [Transfer Hold] enoxaparin, 40 mg, Subcutaneous, Q24H  First Mouthwash (Magic Mouthwash), 10 mL, Swish & Spit, Q6H  folic acid, 1 mg, Oral, Daily  ivermectin, 200 mcg/kg, Oral, Q24H  levothyroxine, 50 mcg, Oral, Daily  mesalamine, 4.8 g, Oral, Daily With Breakfast  methylPREDNISolone sodium succinate, 20 mg, Intravenous, Q8H  nystatin, 5 mL, Swish & Swallow, 4x Daily  pantoprazole, 40 mg, Oral, Daily  sertraline, 50 mg, Oral, Daily  sodium chloride, 10 mL, Intravenous, Q12H  tiotropium bromide monohydrate, 2 puff, Inhalation, Daily - RT  Urea, 15 g, Oral, BID      Allergies:  Codeine and Penicillin g sodium    Objective      Vital Signs  Temp:  [97.6 °F (36.4 °C)-98.2 °F (36.8 °C)] 98 °F (36.7 °C)  Heart Rate:  [] 91  Resp:  [11-25] 25  BP: ()/() 116/68    Flowsheet Rows      Flowsheet Row First Filed Value   Admission Height 154.9 cm (61\") Documented at 10/08/2024 1956   Admission Weight 54.9 kg (121 lb 0.5 oz) Documented at 10/08/2024 1956          154.9 cm (61\")    Physical Exam  Vitals and nursing note reviewed.   Constitutional:       General: She is awake.      Appearance: Normal appearance. She is well-developed and well-groomed.      Comments: Seen in CVU, no family with pt at that time   HENT:      Head: " Normocephalic and atraumatic.      Nose: Nose normal.      Mouth/Throat:      Lips: Pink.      Mouth: Mucous membranes are moist.      Pharynx: Uvula midline.   Eyes:      General: Lids are normal. No scleral icterus.     Extraocular Movements: Extraocular movements intact.      Conjunctiva/sclera: Conjunctivae normal.      Pupils: Pupils are equal, round, and reactive to light.   Neck:      Thyroid: No thyroid mass or thyromegaly.      Vascular: Normal carotid pulses. No carotid bruit, hepatojugular reflux or JVD.      Trachea: Trachea normal.   Cardiovascular:      Rate and Rhythm: Normal rate and regular rhythm.      Pulses:           Carotid pulses are 2+ on the right side and 2+ on the left side.       Radial pulses are 2+ on the right side and 2+ on the left side.        Femoral pulses are 2+ on the right side and 2+ on the left side.       Popliteal pulses are 2+ on the right side and 2+ on the left side.        Dorsalis pedis pulses are 2+ on the right side and 2+ on the left side.        Posterior tibial pulses are 2+ on the right side and 2+ on the left side.      Heart sounds: Normal heart sounds. No murmur heard.     Comments: Tele:  SR 80-90s  Pulmonary:      Effort: Pulmonary effort is normal.      Breath sounds: Normal breath sounds.   Abdominal:      General: Bowel sounds are normal. There is no distension.      Palpations: Abdomen is soft.      Tenderness: There is no abdominal tenderness.   Musculoskeletal:      Cervical back: Neck supple.      Right lower leg: No edema.      Left lower leg: No edema.      Comments: Gait steady and strong without use of assistive devices   Lymphadenopathy:      Cervical: No cervical adenopathy.      Upper Body:      Right upper body: No supraclavicular adenopathy.      Left upper body: No supraclavicular adenopathy.   Skin:     General: Skin is warm and dry.      Capillary Refill: Capillary refill takes less than 2 seconds.      Findings: No erythema or rash.       Nails: There is no clubbing.   Neurological:      Mental Status: She is alert and oriented to person, place, and time.      GCS: GCS eye subscore is 4. GCS verbal subscore is 5. GCS motor subscore is 6.   Psychiatric:         Attention and Perception: Attention and perception normal.         Mood and Affect: Mood and affect normal.         Speech: Speech normal.         Behavior: Behavior normal. Behavior is cooperative.         Thought Content: Thought content normal.         Cognition and Memory: Cognition and memory normal.         Judgment: Judgment normal.         Results Review:   Lab Results (last 24 hours)       Procedure Component Value Units Date/Time    Blood Culture - Blood, Arm, Left [[id number]]  (Normal) Collected: 10/13/24 1415    Specimen: Blood from Arm, Left Updated: 10/16/24 1445     Blood Culture No growth at 3 days    Narrative:      Less than seven (7) mL's of blood was collected.  Insufficient quantity may yield false negative results.    Blood Culture - Blood, Arm, Right [[id number]]  (Normal) Collected: 10/13/24 1415    Specimen: Blood from Arm, Right Updated: 10/16/24 1445     Blood Culture No growth at 3 days    Narrative:      Less than seven (7) mL's of blood was collected.  Insufficient quantity may yield false negative results.    Respiratory Culture - Wash, Lung, Left Upper Lobe [[id number]] Collected: 10/16/24 1147    Specimen: Wash from Lung, Left Upper Lobe Updated: 10/16/24 1325     Gram Stain Moderate (3+) WBCs per low power field      Few (2+) Gram positive bacilli      Rare (1+) Gram positive cocci    Respiratory Panel PCR w/COVID-19(SARS-CoV-2) ANAMIKA/FABRICIO/JOSSY/PAD/COR/DANIELLE In-House, NP Swab in UTM/VTM, 2 HR TAT - Wash, Lung, Left Upper Lobe [[id number]]  (Normal) Collected: 10/16/24 1147    Specimen: Wash from Lung, Left Upper Lobe Updated: 10/16/24 1306     ADENOVIRUS, PCR Not Detected     Coronavirus 229E Not Detected     Coronavirus HKU1 Not Detected     Coronavirus NL63 Not  Detected     Coronavirus OC43 Not Detected     COVID19 Not Detected     Human Metapneumovirus Not Detected     Human Rhinovirus/Enterovirus Not Detected     Influenza A PCR Not Detected     Influenza B PCR Not Detected     Parainfluenza Virus 1 Not Detected     Parainfluenza Virus 2 Not Detected     Parainfluenza Virus 3 Not Detected     Parainfluenza Virus 4 Not Detected     RSV, PCR Not Detected     Bordetella pertussis pcr Not Detected     Bordetella parapertussis PCR Not Detected     Chlamydophila pneumoniae PCR Not Detected     Mycoplasma pneumo by PCR Not Detected    Narrative:      In the setting of a positive respiratory panel with a viral infection PLUS a negative procalcitonin without other underlying concern for bacterial infection, consider observing off antibiotics or discontinuation of antibiotics and continue supportive care. If the respiratory panel is positive for atypical bacterial infection (Bordetella pertussis, Chlamydophila pneumoniae, or Mycoplasma pneumoniae), consider antibiotic de-escalation to target atypical bacterial infection.    Non-gynecologic Cytology [[id number]] Collected: 10/16/24 1147    Specimen: Wash from Lung, Left Upper Lobe Updated: 10/16/24 1238    Aspergillus Galactomannan Antigen - Wash, Lung, Left Upper Lobe [[id number]] Collected: 10/16/24 1147    Specimen: Wash from Lung, Left Upper Lobe Updated: 10/16/24 1210    Pneumocystis PCR - Wash, Lung, Left Upper Lobe [[id number]] Collected: 10/16/24 1147    Specimen: Wash from Lung, Left Upper Lobe Updated: 10/16/24 1210    Fungus Culture - Wash, Lung, Left Upper Lobe [[id number]] Collected: 10/16/24 1147    Specimen: Wash from Lung, Left Upper Lobe Updated: 10/16/24 1210    AFB Culture - Wash, Lung, Left Upper Lobe [[id number]] Collected: 10/16/24 1147    Specimen: Wash from Lung, Left Upper Lobe Updated: 10/16/24 1210    Comprehensive Metabolic Panel [[id number]]  (Abnormal) Collected: 10/16/24 0518    Specimen: Blood Updated:  10/16/24 0549     Glucose 127 mg/dL      BUN 8 mg/dL      Creatinine 0.32 mg/dL      Sodium 129 mmol/L      Potassium 4.1 mmol/L      Chloride 99 mmol/L      CO2 22.6 mmol/L      Calcium 8.0 mg/dL      Total Protein 4.1 g/dL      Albumin 2.5 g/dL      ALT (SGPT) 7 U/L      AST (SGOT) 9 U/L      Alkaline Phosphatase 64 U/L      Total Bilirubin 0.4 mg/dL      Globulin 1.6 gm/dL      A/G Ratio 1.6 g/dL      BUN/Creatinine Ratio 25.0     Anion Gap 7.4 mmol/L      eGFR 109.8 mL/min/1.73     Narrative:      GFR Normal >60  Chronic Kidney Disease <60  Kidney Failure <15    The GFR formula is only valid for adults with stable renal function between ages 18 and 70.    C-reactive Protein [[id number]]  (Abnormal) Collected: 10/16/24 0518    Specimen: Blood Updated: 10/16/24 0549     C-Reactive Protein 2.03 mg/dL     CBC & Differential [[id number]]  (Abnormal) Collected: 10/16/24 0518    Specimen: Blood Updated: 10/16/24 0525    Narrative:      The following orders were created for panel order CBC & Differential.  Procedure                               Abnormality         Status                     ---------                               -----------         ------                     CBC Auto Differential[[id number]]        Abnormal            Final result                 Please view results for these tests on the individual orders.    CBC Auto Differential [[id number]]  (Abnormal) Collected: 10/16/24 0518    Specimen: Blood Updated: 10/16/24 0525     WBC 2.38 10*3/mm3      RBC 2.67 10*6/mm3      Hemoglobin 8.3 g/dL      Hematocrit 25.5 %      MCV 95.5 fL      MCH 31.1 pg      MCHC 32.5 g/dL      RDW 16.8 %      RDW-SD 58.1 fl      MPV 10.0 fL      Platelets 182 10*3/mm3      Neutrophil % 71.6 %      Lymphocyte % 19.3 %      Monocyte % 7.1 %      Eosinophil % 0.8 %      Basophil % 0.4 %      Immature Grans % 0.8 %      Neutrophils, Absolute 1.70 10*3/mm3      Lymphocytes, Absolute 0.46 10*3/mm3      Monocytes, Absolute 0.17  10*3/mm3      Eosinophils, Absolute 0.02 10*3/mm3      Basophils, Absolute 0.01 10*3/mm3      Immature Grans, Absolute 0.02 10*3/mm3      nRBC 0.0 /100 WBC     POC Activated Clotting Time [[id number]]  (Normal) Collected: 10/15/24 2304    Specimen: Arterial Blood Updated: 10/15/24 2308     Activated Clotting Time  134 Seconds      Comment: Serial Number: [id number]:  [id number]       Calprotectin, Fecal - Stool, Per Rectum [[id number]]  (Abnormal) Collected: 10/13/24 1605    Specimen: Stool from Per Rectum Updated: 10/15/24 1908     Calprotectin, Fecal 1590 ug/g      Comment: **Results verified by repeat testing**  Concentration     Interpretation   Follow-Up  < 5 - 50 ug/g     Normal           None  >50 -120 ug/g     Borderline       Re-evaluate in 4-6 weeks      >120 ug/g     Abnormal         Repeat as clinically                                     indicated       Narrative:      Performed at:  [address] [place] [address]  [id number]  : Gris Montanez MD, Phone:  [phone]    POC Activated Clotting Time [[id number]]  (Abnormal) Collected: 10/15/24 1719    Specimen: Arterial Blood Updated: 10/15/24 1728     Activated Clotting Time  152 Seconds      Comment: Serial Number: [id number]:  [id number]               Cardiac cath per Dr. Connor 10/15/2024  Hemodynamics      Pressures     Ao:                                           114/49 mmHg     LV:                                           108/9   mmHg  End-diastolic pressure:           9          mmHg  No significant aortic valvular gradient on pullback              Coronary Angiography      Left Main :  The left main   20% distal luminal irregularities     Left Anterior Descending : Heavily calcified 90% ostial and proximal LAD     Left Circumflex : Calcified 20% luminal irregularities gives rise to principal mid marginal which divides into 2 and moderate to large caliber distal marginal without disease     Right Coronary  Artery :  The right coronary artery   is dominant vessel.  100% proximal.  Bridging collaterals from left system visualized all the way up to mid RCA.                 Dominance:  []  Left  [x]  Right  []  Co-Dominant       Left Ventriculography:       Left ventriculography was done in standard [place] view.  Left ventricular size and contractility appears preserved.  EF is 65%.  MR compared to echo appears less severe.  On follow-through of aorta aorta appears calcified and there is a outpouching in the descending thoracic aorta right about the diaphragmatic level probably a saccular aneurysm or penetrating ulcer.     Impression:      Complex multivessel disease with ostial LAD and total right.  Patient had severe MR on echo and catheter MR appears less severe.  Penetrating ulcer of descending thoracic aorta     Recommendations:      CT surgery consultation      Assessment & Plan       Hyponatremia    Diarrhea    Moderate to severe mitral regurgitation    Chest pain, atypical    Cavitary lesion of lung    Multiple tracheobronchial mucus plugs      Assessment & Plan    - MV CAD, EF 65% (cath)--surgical eval in progress  - Severe mitral regurgitation--GONZÁLEZ today  - Descending thoracic dilatation/ outpouching/saccular, 3.8 cm--will need yearly aorta surveillance  - JUVENTINO cavitary lesion/ multifocal pneumonia--bronch today, on cefepime  - Crohn's disease with terminal ileum stricture--GI following, s/p EGD/ colonoscopy 10/12 and dilatation  - 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  Plans for GONZÁLEZ/ Bronch today.  Carotids normal.  Vein mapping adequate bilat thighs.  Dr. Lott to evaluate pt and provide further  recs.    Thank you for allowing us to participate in the care of this patient.      Toshia Osman, APRN  10/16/24  16:11 EDT    **all problems new to this examiner  **EKG and CXR independently reviewed and interpreted

## 2024-10-16 NOTE — PROGRESS NOTES
Spoke with patient.  Demographics verified and agreeable to Home Health services.Verified no other agency in home.  Additional info: Granddaughters Liza and Sania live with her.  Contact Son Nadir to set up appts at [phone].  He lives behind pt.  Can also try her number.   Disciplines:   Pharmacy: [facility]  PCP:  Eduard Mojica is agreeable to follow for Home Health orders and sign the POC.

## 2024-10-16 NOTE — PROGRESS NOTES
Nutrition Services    Patient Name: Maryann Gaitan  YOB: 1950  MRN: [id number]  Admission date: 10/8/2024      PROGRESS NOTE      Encounter Information: Progress note to check on PO intakes and patient desire for ONS.  Patient NPO for bronch today.  RD to order supplement for post procedure.         PO Diet: NPO Diet NPO Type: Strict NPO   PO Supplements: None ordered   PO Intake:  0-25% at meals        Current nutrition support:    Nutrition support review:        Labs (reviewed below): Hyponatremia        GI Function:  Last documented BM 10/16 (today)       Nutrition Intervention Updates: Diet advancement per MD post procedure     Add Boost Plus BID (Provides 720 kcals, 28 g protein if consumed)        Results from last 7 days   Lab Units 10/16/24  0518 10/15/24  0008 10/14/24  0425 10/13/24  0224 10/12/24  0019   SODIUM mmol/L 129* 131* 135*   < > 132*   POTASSIUM mmol/L 4.1 4.4 5.3*   < > 3.6   CHLORIDE mmol/L 99 102 106   < > 96*   CO2 mmol/L 22.6 22.0 23.9   < > 23.8   BUN mg/dL 8 12 24*   < > 38*   CREATININE mg/dL 0.32* 0.42* 0.52*   < > 0.51*   CALCIUM mg/dL 8.0* 7.9* 8.2*   < > 8.7   BILIRUBIN mg/dL 0.4 0.4  --   --  1.0   ALK PHOS U/L 64 70  --   --  80   ALT (SGPT) U/L 7 7  --   --  13   AST (SGOT) U/L 9 8  --   --  16   GLUCOSE mg/dL 127* 123* 150*   < > 145*    < > = values in this interval not displayed.     Results from last 7 days   Lab Units 10/16/24  0518 10/15/24  0008 10/14/24  0425 10/13/24  2027 10/13/24  1414 10/13/24  1152   MAGNESIUM mg/dL  --   --  2.5* 2.8*  --  3.7*   HEMOGLOBIN g/dL 8.3*   < >  --   --   --   --    HEMATOCRIT % 25.5*   < >  --   --   --   --    TRIGLYCERIDES mg/dL  --   --   --   --  97  --     < > = values in this interval not displayed.     COVID19   Date Value Ref Range Status   10/13/2024 Not Detected Not Detected - Ref. Range Final     Lab Results   Component Value Date    HGBA1C 5.64 (H) 10/13/2024     Malnutrition Severity Assessment      Patient  meets criteria for : Moderate (non-severe) Malnutrition           RD to follow up per protocol.    Electronically signed by:  Mini Arellano RD  10/16/24 08:19 EDT

## 2024-10-16 NOTE — NURSING NOTE
Care was handed off to [facility]  nurse post bronchoscopy and prior to the start of GONZÁLEZ procedure.

## 2024-10-16 NOTE — ANESTHESIA POSTPROCEDURE EVALUATION
Patient: Maryann Gaitan    Procedure Summary       Date: 10/16/24 Room / Location: [facility] ENDOSCOPY 3 / [facility] ENDOSCOPY    Anesthesia Start: 1143 Anesthesia Stop: 1230    Procedure: BRONCHOSCOPY (Bronchus) Diagnosis:     Surgeons: Gallo Pope MD Provider: Jimmy Castanon MD    Anesthesia Type: MAC ASA Status: 3            Anesthesia Type: MAC    Vitals  Vitals Value Taken Time   /68 10/16/24 1325   Temp     Pulse 92 10/16/24 1325   Resp 16 10/16/24 1315   SpO2 98 % 10/16/24 1325   Vitals shown include unfiled device data.        Post Anesthesia Care and Evaluation    Patient location during evaluation: PACU  Patient participation: complete - patient participated  Level of consciousness: awake  Pain scale: See nurse's notes for pain score.  Pain management: adequate    Airway patency: patent  Anesthetic complications: No anesthetic complications  PONV Status: none  Cardiovascular status: acceptable  Respiratory status: acceptable and spontaneous ventilation  Hydration status: acceptable    Comments: Patient seen and examined postoperatively; vital signs stable; SpO2 greater than or equal to 90%; cardiopulmonary status stable; nausea/vomiting adequately controlled; pain adequately controlled; no apparent anesthesia complications; patient discharged from anesthesia care when discharge criteria were met

## 2024-10-16 NOTE — PROGRESS NOTES
Maryann Gaitan  1950    Inpatient Progress Note    Subjective/Interim summary:  10/16/24  patient reports she is feeling better, but not completely able to give full ROS reliably. Is able to recount the last few days.       Past Medical History:   Diagnosis Date    Arthritis     COPD (chronic obstructive pulmonary disease)     Hypertension      Past Surgical History:   Procedure Laterality Date    CARDIAC CATHETERIZATION N/A 10/15/2024    Procedure: Left Heart Cath, possible pci;  Surgeon: Travis Connor MD;  Location: [facility] CATH INVASIVE LOCATION;  Service: Cardiovascular;  Laterality: N/A;    COLONOSCOPY N/A 10/12/2024    Procedure: COLONOSCOPY WITH BIOPSY AND WIRE GUIDED BALLOON DILATION OF TERMINAL ILEUM;  Surgeon: Rob Strong MD;  Location: [facility] ENDOSCOPY;  Service: Gastroenterology;  Laterality: N/A;  Colitis, crohns of terminal ileum, right colon ulcers, diverticulosis, hemorroids    ENDOSCOPY N/A 10/12/2024    Procedure: ESOPHAGOGASTRODUODENOSCOPY WITH BIOPSY X 2 AREA;  Surgeon: Rob Strong MD;  Location: [facility] ENDOSCOPY;  Service: Gastroenterology;  Laterality: N/A;  Chronic gastritis, HH    HYSTERECTOMY         Current Facility-Administered Medications:     acetaminophen (TYLENOL) tablet 650 mg, 650 mg, Oral, Q4H PRN, Corina Murcia L, APRN    albuterol (ACCUNEB) nebulizer solution 0.63 mg, 0.63 mg, Nebulization, Q6H PRN, Corina Murcia, APRN, 0.63 mg at 10/11/24 1850    aspirin EC tablet 81 mg, 81 mg, Oral, Daily, Corina Murcia L, APRN, 81 mg at 10/16/24 1408    atorvastatin (LIPITOR) tablet 40 mg, 40 mg, Oral, Nightly, Corina Murcia L, APRN    sennosides-docusate (PERICOLACE) 8.6-50 MG per tablet 2 tablet, 2 tablet, Oral, BID PRN **AND** polyethylene glycol (MIRALAX) packet 17 g, 17 g, Oral, Daily PRN **AND** bisacodyl (DULCOLAX) EC tablet 5 mg, 5 mg, Oral, Daily PRN **AND** bisacodyl (DULCOLAX) suppository 10 mg, 10 mg, Rectal, Daily PRN, Corina Murcia, APRN     cefepime 2000 mg IVPB in 100 mL NS (MBP), 2,000 mg, Intravenous, Once, Rob Strong MD, Last Rate: 0 mL/hr at 10/13/24 1348, Restarted at 10/13/24 1426    cefepime 2000 mg IVPB in 100 mL NS (MBP), 2,000 mg, Intravenous, Q8H, Jonathan Mackay MD, 2,000 mg at 10/16/24 1405    [Transfer Hold] Enoxaparin Sodium (LOVENOX) syringe 40 mg, 40 mg, Subcutaneous, Q24H, Rob Strong MD, 40 mg at 10/14/24 1711    First Mouthwash (Magic Mouthwash) 10 mL, 10 mL, Swish & Spit, Q6H, Corina Murcia, APRN, 10 mL at 10/16/24 1406    folic acid (FOLVITE) tablet 1 mg, 1 mg, Oral, Daily, Corina Murcia APRN, 1 mg at 10/16/24 0822    hydrOXYzine (ATARAX) tablet 25 mg, 25 mg, Oral, TID PRN, Corina Murcia, APRN    influenza vac split high-dose (FLUZONE HIGH DOSE) injection 0.5 mL, 0.5 mL, Intramuscular, During Hospitalization, Corina Murcia, APRN    ipratropium-albuterol (DUO-NEB) nebulizer solution 3 mL, 3 mL, Nebulization, Q4H PRN, Corina Murcia, APRN    ivermectin (STROMECTOL) tablet 12,000 mcg, 200 mcg/kg, Oral, Q24H, Corina Murcia, APRN, 12,000 mcg at 10/16/24 1406    levothyroxine (SYNTHROID, LEVOTHROID) tablet 50 mcg, 50 mcg, Oral, Daily, Corina Murcia, APRN, 50 mcg at 10/16/24 0822    Lidocaine Viscous HCl (XYLOCAINE) 2 % solution 10 mL, 10 mL, Mouth/Throat, Q3H PRN, Corina Murcia, APRN    Magnesium Standard Dose Replacement - Follow Nurse / BPA Driven Protocol, , Does not apply, PRN, Corina Murcia, APRN    melatonin tablet 5 mg, 5 mg, Oral, Nightly PRN, Corina Murcia, APRN    mesalamine (LIALDA) EC tablet 4.8 g, 4.8 g, Oral, Daily With Breakfast, Corina Murcia APRN, 4.8 g at 10/16/24 0822    methylPREDNISolone sodium succinate (SOLU-Medrol) injection 20 mg, 20 mg, Intravenous, Q8H, Corina Murcia, APRN, 20 mg at 10/16/24 1718    nitroglycerin (NITROSTAT) SL tablet 0.4 mg, 0.4 mg, Sublingual, Q5 Min PRN, Corina Murcia, APRN    nystatin (MYCOSTATIN) 100,000 unit/mL suspension 500,000 Units, 5 mL,  Swish & Swallow, 4x Daily, Corina Murcia APRN, 500,000 Units at 10/16/24 1718    ondansetron (ZOFRAN) injection 4 mg, 4 mg, Intravenous, Q6H PRN, Corina Murcia, APRN, 4 mg at 10/16/24 1409    ondansetron ODT (ZOFRAN-ODT) disintegrating tablet 4 mg, 4 mg, Oral, Q6H PRN, 4 mg at 10/12/24 1721 **OR** ondansetron (ZOFRAN) injection 4 mg, 4 mg, Intravenous, Q6H PRN, Corina Murcia, APRN, 4 mg at 10/16/24 1401    pantoprazole (PROTONIX) EC tablet 40 mg, 40 mg, Oral, Daily, Corina Murcia APRN, 40 mg at 10/16/24 0822    Pharmacy to Dose Cefepime, , Does not apply, Continuous PRN, Rob Strong MD    Phosphorus Replacement - Follow Nurse / BPA Driven Protocol, , Does not apply, PRN, Corina Murcia, APRN    Potassium Replacement - Follow Nurse / BPA Driven Protocol, , Does not apply, PRN, Rob Strong MD    sertraline (ZOLOFT) tablet 50 mg, 50 mg, Oral, Daily, Corina Murcia APRN, 50 mg at 10/16/24 0822    sodium chloride 0.9 % flush 10 mL, 10 mL, Intravenous, Q12H, Corina Murcia APRN, 10 mL at 10/16/24 0823    sodium chloride 0.9 % flush 10 mL, 10 mL, Intravenous, PRN, Corina Murcia, APRN    tiotropium (SPIRIVA RESPIMAT) 2.5 mcg/act aerosol solution inhaler, 2 puff, Inhalation, Daily - RT, Corina Murcia APRN, 2 puff at 10/16/24 0749    Urea (URE-NA) packet 15 g, 15 g, Oral, BID, Buddy Pierre MD, 15 g at 10/16/24 1408    Allergies   Allergen Reactions    Codeine Hives    Penicillin G Sodium Hives     History reviewed. No pertinent family history.    Cancer-related family history is not on file.    Social History     Tobacco Use    Smoking status: Former     Types: Cigarettes    Smokeless tobacco: Never   Vaping Use    Vaping status: Never Used   Substance Use Topics    Alcohol use: Never    Drug use: Never       Chief Complaint   Patient presents with    Abnormal Lab       Review of Systems   Constitutional:  Positive for fatigue. Negative for chills, diaphoresis and fever.   HENT: Negative.      Eyes: Negative.    Respiratory:  Positive for cough and shortness of breath.    Cardiovascular:  Negative for chest pain.   Gastrointestinal:  Positive for abdominal pain, diarrhea and nausea.   Endocrine: Negative.    Genitourinary: Negative.    Musculoskeletal: Negative.    Skin: Negative.    Allergic/Immunologic: Negative.    Neurological: Negative.    Hematological:  Bruises/bleeds easily.   Psychiatric/Behavioral: Negative.         Objective:    Vitals:    10/16/24 1400 10/16/24 1500 10/16/24 1600 10/16/24 1700   BP: 128/75 103/63 120/60    BP Location:  Right arm     Patient Position:  Lying     Pulse: 103 78 79 95   Resp:  25     Temp:  98 °F (36.7 °C)     TempSrc:  Oral     SpO2:  91% 92% 96%   Weight:       Height:           (3) Capable of limited self-care, confined to bed or chair > 50% of waking hours    Physical Exam  Vitals and nursing note reviewed.   Constitutional:       Appearance: She is ill-appearing. She is not toxic-appearing or diaphoretic.   HENT:      Head: Normocephalic and atraumatic.      Right Ear: External ear normal.      Left Ear: External ear normal.      Nose: Nose normal.      Mouth/Throat:      Mouth: Mucous membranes are moist.      Pharynx: Oropharynx is clear.   Eyes:      Extraocular Movements: Extraocular movements intact.      Conjunctiva/sclera: Conjunctivae normal.      Pupils: Pupils are equal, round, and reactive to light.   Cardiovascular:      Rate and Rhythm: Normal rate and regular rhythm.      Pulses: Normal pulses.   Pulmonary:      Effort: Pulmonary effort is normal.      Breath sounds: Rhonchi present. No wheezing.   Abdominal:      General: Bowel sounds are normal. There is no distension.      Palpations: Abdomen is soft.      Tenderness: There is abdominal tenderness.   Genitourinary:     Comments: deferred  Musculoskeletal:         General: No swelling.      Cervical back: Normal range of motion.   Skin:     General: Skin is warm and dry.      Findings:  Bruising present.   Neurological:      General: No focal deficit present.      Mental Status: She is alert and oriented to person, place, and time. Mental status is at baseline.   Psychiatric:         Mood and Affect: Mood normal.         Behavior: Behavior normal.         Thought Content: Thought content normal.         Judgment: Judgment normal.              Assessment & Plan     Normocytic anemia and leukopenia:-subacute? Most likely from strongyloides infection and pneumonia, and bleeding aortic ulcer    -10/11/2022 reticulocyte numbers 0.72 (normal) however absolute reticulocyte decreased at 0.0182 suggesting either not enough building blocks to make blood or bone marrow dysfunction  -B12 312 (normal), folate 18.3 (normal), iron studies (iron 25 low, iron saturation 13% low) and ferritin (427 high, but is an acute phase reactant given her hospital course) -appears there could still be some component of iron deficiency involved which is not surprising as she has a disease that could cause on again off again bleeding; will likely have to be treated in the future with IV iron as what was seen on EGD means she will be unlikely to tolerate oral iron or be able to absorb it as she will likely be on chronic PPIs    -Flow cytometry WNL  -CBCdiff daily while inpatient to check ANC level as neutropenic fever could add to morbidity   -see GI w/u and treatment plan given hx of Crohn's.  -Agree with transfusion -may want to consider raising transfusion parameters to hgb <9 given her multifocal arrhythmia (lower hgb will make her more likely to go into SVT), MR, and multivessel CAD +/- symptomatic particularly since she might be oozing. agree with PRBC already given  -Agree with treatment with abx for strongyloides infection and pneumonia  -monitor for fevers       will continue to follow

## 2024-10-16 NOTE — PROGRESS NOTES
PROGRESS NOTE      Patient Name: Maryann Gaitan  : 1950  MRN: [id number]  Primary Care Physician: Eduard Little MD  Date of admission: 10/8/2024    Patient Care Team:  Eduard Little MD as PCP - General (Family Medicine)        Subjective   Subjective:     Seen and examined, comfortable, not in distress,  Sodium is 137 today, comfortable, not in distress,      Review of systems:  All other review of system unremarkable      Allergies:    Allergies   Allergen Reactions    Codeine Hives    Penicillin G Sodium Hives       Objective   Exam:     Vital Signs  Temp:  [97.6 °F (36.4 °C)-98.2 °F (36.8 °C)] 98 °F (36.7 °C)  Heart Rate:  [] 91  Resp:  [11-25] 25  BP: ()/() 116/68  SpO2:  [92 %-100 %] 98 %  on  Flow (L/min) (Oxygen Therapy):  [2-10] 2;   Device (Oxygen Therapy): nasal cannula  Body mass index is 24 kg/m².    General: Elderly female in no acute distress.    Head:      Normocephalic and atraumatic.    Eyes:      PERRL/EOM intact, conjunctivae and sclerae clear without nystagmus.    Neck:      No masses, thyromegaly,  trachea central with normal respiratory effort   Lungs:    Clear bilaterally to auscultation.    Heart:      Regular rate and rhythm, no murmur no gallop  Abd:        Soft, nontender, not distended, bowel sounds positive, no shifting dullness   Pulses:   Pulses palpable  Extr:        No cyanosis or clubbing--no edema.    Neuro:    No focal deficits.   alert oriented x3  Skin:       Intact without lesions or rashes.    Psych:    Alert and cooperative; normal mood and affect; .      Results Review:  I have personally reviewed most recent Data :  CBC    Results from last 7 days   Lab Units 10/16/24  0518 10/15/24  0008 10/13/24  1144 10/13/24  0224 10/12/24  0019 10/11/24  1237 10/11/24  0358 10/10/24  0428   WBC 10*3/mm3 2.38* 2.29*  --  3.43 2.64*  --  2.80* 1.54*   HEMOGLOBIN g/dL 8.3* 8.4*  --  9.9* 11.0* 7.8* 7.0* 7.7*   HEMOGLOBIN, POC g/dL  --    --  9.4*  --   --   --   --   --    PLATELETS 10*3/mm3 182 145  --  173 257  --  269 315     CMP   Results from last 7 days   Lab Units 10/16/24  0518 10/15/24  0008 10/14/24  0425 10/13/24  2027 10/13/24  1152 10/13/24  1144 10/13/24  0224 10/12/24  0019 10/11/24  1722 10/10/24  0808 10/10/24  0428   SODIUM mmol/L 129* 131* 135* 138  --   --  137 132* 129*   < > 123*   POTASSIUM mmol/L 4.1 4.4 5.3* 5.0 3.6  --  3.1* 3.6 3.9   < > 3.2*   CHLORIDE mmol/L 99 102 106 107  --   --  102 96* 97*   < > 92*   CO2 mmol/L 22.6 22.0 23.9 22.1  --   --  25.8 23.8 24.0   < > 24.7   BUN mg/dL 8 12 24* 32*  --   --  37* 38* 40*   < > 8   CREATININE mg/dL 0.32* 0.42* 0.52* 0.56*  --  0.85 0.57 0.51* 0.49*   < > 0.44*   GLUCOSE mg/dL 127* 123* 150* 190*  --   --  141* 145* 122*   < > 99   ALBUMIN g/dL 2.5* 2.4*  --   --   --   --   --  2.7*  --   --  2.6*   BILIRUBIN mg/dL 0.4 0.4  --   --   --   --   --  1.0  --   --  0.5   ALK PHOS U/L 64 70  --   --   --   --   --  80  --   --  70   AST (SGOT) U/L 9 8  --   --   --   --   --  16  --   --  14   ALT (SGPT) U/L 7 7  --   --   --   --   --  13  --   --  13    < > = values in this interval not displayed.     ABG    Results from last 7 days   Lab Units 10/13/24  1144   PH, ARTERIAL pH units 7.422   PCO2, ARTERIAL mm Hg 39.8   PO2 ART mm Hg 67.1*   O2 SATURATION ART % 93.4*   BASE EXCESS ART mmol/L 1.4     No radiology results for the last day    Results for orders placed during the hospital encounter of 10/08/24    Adult Transesophageal Echo (GONZÁLEZ) W/ Cont if Necessary Per Protocol (Cardiology Department)    Interpretation Summary    Left ventricular systolic function is normal. Left ventricular ejection fraction appears to be 56 - 60%.    The left atrial cavity is severely dilated.    Saline test results are negative.    There is mild, posterior mitral leaflet thickening present.    Moderate to severe mitral valve regurgitation is present with a centrally-directed jet noted.    There  is moderate, (grade 3) plaque in the descending aorta present.    Scheduled Meds:aspirin, 81 mg, Oral, Daily  atorvastatin, 40 mg, Oral, Nightly  cefepime, 2,000 mg, Intravenous, Once  cefepime, 2,000 mg, Intravenous, Q8H  [Transfer Hold] enoxaparin, 40 mg, Subcutaneous, Q24H  First Mouthwash (Magic Mouthwash), 10 mL, Swish & Spit, Q6H  folic acid, 1 mg, Oral, Daily  ivermectin, 200 mcg/kg, Oral, Q24H  levothyroxine, 50 mcg, Oral, Daily  mesalamine, 4.8 g, Oral, Daily With Breakfast  methylPREDNISolone sodium succinate, 20 mg, Intravenous, Q8H  nystatin, 5 mL, Swish & Swallow, 4x Daily  pantoprazole, 40 mg, Oral, Daily  sertraline, 50 mg, Oral, Daily  sodium chloride, 10 mL, Intravenous, Q12H  tiotropium bromide monohydrate, 2 puff, Inhalation, Daily - RT  Urea, 15 g, Oral, BID      Continuous Infusions:Pharmacy to Dose Cefepime,       PRN Meds:  acetaminophen    albuterol    senna-docusate sodium **AND** polyethylene glycol **AND** bisacodyl **AND** bisacodyl    hydrOXYzine    influenza vaccine    ipratropium-albuterol    Lidocaine Viscous HCl    Magnesium Standard Dose Replacement - Follow Nurse / BPA Driven Protocol    melatonin    nitroglycerin    ondansetron    ondansetron ODT **OR** ondansetron    Pharmacy to Dose Cefepime    Phosphorus Replacement - Follow Nurse / BPA Driven Protocol    Potassium Replacement - Follow Nurse / BPA Driven Protocol    sodium chloride    Assessment & Plan   Assessment and Plan:         Hyponatremia    Diarrhea    Moderate to severe mitral regurgitation    Chest pain, atypical    Cavitary lesion of lung    Multiple tracheobronchial mucus plugs    ASSESSMENT:  Hyponatremia likely etiology thiazide diuretics in the presence of Cymbalta  History of hypertension  History of arthritis  History of Crohn disease  Severe mitral regurgitation   10/12/2024 EGD/colonoscopy  small hiatal hernia.  Nonerosive chronic gastritis.  Normal duodenum.  TI stricture status post dilation to 12 mm.  TI  ulcers.  Colon ulcers.  Sigmoid colon diverticulosis.  Grade 2 internal hemorrhoids     PLAN :        Sodium level that was getting better now worsening again  And we will restart urea at this time  Patient has cavitary lesion that might be contributing to hyponatremia with some SIADH  Urea has been started at this time because of concern of SIADH  Most recent echo showed moderate to severe mitral regurgitation  Patient may need a GONZÁLEZ    Continue to follow-up with electrolytes  Follow-up with repeat sodium tomorrow morning   patient is going for colonoscopy that might be contributing to hyponatremia because of diarrhea  Potassium level better  Continue to follow-up with the nutritional status  Diarrhea has improved  Status post EGD and colonoscopy yesterday, report reviewed,  Follow-up with hematology and GI  Closely follow,           Electronically signed by Buddy Pierre MD,   [facility] kidney consultant  [phone]  10/16/2024  15:51 EDT

## 2024-10-16 NOTE — PROGRESS NOTES
LOS: 7 days   Patient Care Team:  Eduard Little MD as PCP - General (Family Medicine)      Subjective     Interval History:     Subjective: Patient continues to report diarrhea.  No abdominal pain or nausea/vomiting.  Possible bronchoscopy scheduled for today.      ROS:   No chest pain, shortness of breath, or cough.        Medication Review:     Current Facility-Administered Medications:     acetaminophen (TYLENOL) tablet 650 mg, 650 mg, Oral, Q4H PRN, Corina Murcia, APRN    albuterol (ACCUNEB) nebulizer solution 0.63 mg, 0.63 mg, Nebulization, Q6H PRN, Corina Murcia, APRN, 0.63 mg at 10/11/24 1850    aspirin EC tablet 81 mg, 81 mg, Oral, Daily, Corina Murcia, APRN    atorvastatin (LIPITOR) tablet 40 mg, 40 mg, Oral, Nightly, Corina Murcia, APRN    sennosides-docusate (PERICOLACE) 8.6-50 MG per tablet 2 tablet, 2 tablet, Oral, BID PRN **AND** polyethylene glycol (MIRALAX) packet 17 g, 17 g, Oral, Daily PRN **AND** bisacodyl (DULCOLAX) EC tablet 5 mg, 5 mg, Oral, Daily PRN **AND** bisacodyl (DULCOLAX) suppository 10 mg, 10 mg, Rectal, Daily PRN, Corina Murcia, APRN    cefepime 2000 mg IVPB in 100 mL NS (MBP), 2,000 mg, Intravenous, Once, Rob Strong MD, Last Rate: 0 mL/hr at 10/13/24 1348, Restarted at 10/13/24 1426    cefepime 2000 mg IVPB in 100 mL NS (MBP), 2,000 mg, Intravenous, Q8H, Jonathan Mackay MD, 2,000 mg at 10/16/24 0823    [Transfer Hold] Enoxaparin Sodium (LOVENOX) syringe 40 mg, 40 mg, Subcutaneous, Q24H, Rob Strong MD, 40 mg at 10/14/24 1711    First Mouthwash (Magic Mouthwash) 10 mL, 10 mL, Swish & Spit, Q6H, Corina Murcia APRN, 10 mL at 10/16/24 0822    folic acid (FOLVITE) tablet 1 mg, 1 mg, Oral, Daily, Corina Murcia APRN, 1 mg at 10/16/24 0822    hydrOXYzine (ATARAX) tablet 25 mg, 25 mg, Oral, TID PRN, Corina Murcia APRN    influenza vac split high-dose (FLUZONE HIGH DOSE) injection 0.5 mL, 0.5 mL, Intramuscular, During Hospitalization,  Corina Murcia APRN    ipratropium-albuterol (DUO-NEB) nebulizer solution 3 mL, 3 mL, Nebulization, Q4H PRN, Corina Murcia APRN    ivermectin (STROMECTOL) tablet 12,000 mcg, 200 mcg/kg, Oral, Q24H, Corina Murcia APRN    levothyroxine (SYNTHROID, LEVOTHROID) tablet 50 mcg, 50 mcg, Oral, Daily, Corina Murcia APRN, 50 mcg at 10/16/24 0822    Lidocaine Viscous HCl (XYLOCAINE) 2 % solution 10 mL, 10 mL, Mouth/Throat, Q3H PRN, Corina Murcia APRN    Magnesium Standard Dose Replacement - Follow Nurse / BPA Driven Protocol, , Does not apply, PRN, Corina Murcia APRN    melatonin tablet 5 mg, 5 mg, Oral, Nightly PRN, Corina Murcia APRN    mesalamine (LIALDA) EC tablet 4.8 g, 4.8 g, Oral, Daily With Breakfast, Corina Murcia APRN, 4.8 g at 10/16/24 0822    methylPREDNISolone sodium succinate (SOLU-Medrol) injection 20 mg, 20 mg, Intravenous, Q8H, Corina Murcia APRN, 20 mg at 10/16/24 0822    nitroglycerin (NITROSTAT) SL tablet 0.4 mg, 0.4 mg, Sublingual, Q5 Min PRN, Corina Murcia APRROBBIE    nystatin (MYCOSTATIN) 100,000 unit/mL suspension 500,000 Units, 5 mL, Swish & Swallow, 4x Daily, Corina Murcia APRN, 500,000 Units at 10/16/24 0822    ondansetron (ZOFRAN) injection 4 mg, 4 mg, Intravenous, Q6H PRN, Corina Murcia APRN, 4 mg at 10/09/24 1551    ondansetron ODT (ZOFRAN-ODT) disintegrating tablet 4 mg, 4 mg, Oral, Q6H PRN, 4 mg at 10/12/24 1721 **OR** ondansetron (ZOFRAN) injection 4 mg, 4 mg, Intravenous, Q6H PRN, Corina Murcia APRN    pantoprazole (PROTONIX) EC tablet 40 mg, 40 mg, Oral, Daily, Corina Murcia APRN, 40 mg at 10/16/24 0822    Pharmacy to Dose Cefepime, , Does not apply, Continuous PRTae AVALOS Matthew David, MD    Phosphorus Replacement - Follow Nurse / BPA Driven Protocol, , Does not apply, PRN, Corina Murcia APRN    Potassium Replacement - Follow Nurse / BPA Driven Protocol, , Does not apply, Tae SIU Matthew David, MD    sertraline (ZOLOFT) tablet 50 mg, 50 mg, Oral, Daily, Corina Murcia  BAUDILIO, APRN, 50 mg at 10/16/24 0822    sodium chloride 0.9 % flush 10 mL, 10 mL, Intravenous, Q12H, Corina Murcia, APRN, 10 mL at 10/16/24 0823    sodium chloride 0.9 % flush 10 mL, 10 mL, Intravenous, PRN, Corina Murcia, APRN    tiotropium (SPIRIVA RESPIMAT) 2.5 mcg/act aerosol solution inhaler, 2 puff, Inhalation, Daily - RT, Corina Murcia APRN, 2 puff at 10/16/24 0749      Objective     Vital Signs  Vitals:    10/16/24 0749 10/16/24 0753 10/16/24 0800 10/16/24 0900   BP:   120/71 122/65   BP Location:       Patient Position:       Pulse: 84 82 88 81   Resp: 18 18     Temp:       TempSrc:       SpO2: 100% 98% 97% 96%   Weight:       Height:           Physical Exam:     General Appearance:    Awake and alert, in no acute distress   Head:    Normocephalic, without obvious abnormality   Eyes:          Conjunctivae normal, anicteric sclera   Throat:   No oral lesions, no thrush, oral mucosa moist   Neck:   No adenopathy, supple, no JVD   Lungs:     respirations regular, even and unlabored   Abdomen:     Soft, non-tender, no rebound or guarding, non-distended   Rectal:     Deferred   Extremities:   No edema, no cyanosis   Skin:   No bruising or rash, no jaundice        Results Review:    CBC    Results from last 7 days   Lab Units 10/16/24  0518 10/15/24  0008 10/13/24  1144 10/13/24  0224 10/12/24  0019 10/11/24  1237 10/11/24  0358 10/10/24  0428   WBC 10*3/mm3 2.38* 2.29*  --  3.43 2.64*  --  2.80* 1.54*   HEMOGLOBIN g/dL 8.3* 8.4*  --  9.9* 11.0* 7.8* 7.0* 7.7*   HEMOGLOBIN, POC g/dL  --   --  9.4*  --   --   --   --   --    PLATELETS 10*3/mm3 182 145  --  173 257  --  269 315     CMP   Results from last 7 days   Lab Units 10/16/24  0518 10/15/24  0008 10/14/24  0425 10/13/24  2027 10/13/24  1152 10/13/24  1144 10/13/24  0224 10/12/24  0019 10/11/24  1722 10/11/24  1258 10/10/24  0808 10/10/24  0428   SODIUM mmol/L 129* 131* 135* 138  --   --  137 132* 129* 126*   < > 123*   POTASSIUM mmol/L 4.1 4.4 5.3* 5.0 3.6  --   3.1* 3.6 3.9 3.0*   < > 3.2*   CHLORIDE mmol/L 99 102 106 107  --   --  102 96* 97* 94*   < > 92*   CO2 mmol/L 22.6 22.0 23.9 22.1  --   --  25.8 23.8 24.0 24.2   < > 24.7   BUN mg/dL 8 12 24* 32*  --   --  37* 38* 40* 55*   < > 8   CREATININE mg/dL 0.32* 0.42* 0.52* 0.56*  --  0.85 0.57 0.51* 0.49* 0.48*   < > 0.44*   GLUCOSE mg/dL 127* 123* 150* 190*  --   --  141* 145* 122* 139*   < > 99   ALBUMIN g/dL 2.5* 2.4*  --   --   --   --   --  2.7*  --   --   --  2.6*   BILIRUBIN mg/dL 0.4 0.4  --   --   --   --   --  1.0  --   --   --  0.5   ALK PHOS U/L 64 70  --   --   --   --   --  80  --   --   --  70   AST (SGOT) U/L 9 8  --   --   --   --   --  16  --   --   --  14   ALT (SGPT) U/L 7 7  --   --   --   --   --  13  --   --   --  13   MAGNESIUM mg/dL  --   --  2.5* 2.8* 3.7*  --  1.5*  --   --  1.3*  --  1.4*    < > = values in this interval not displayed.     Cr Clearance Estimated Creatinine Clearance: 125.9 mL/min (A) (by C-G formula based on SCr of 0.32 mg/dL (L)).  Coag   Results from last 7 days   Lab Units 10/15/24  0008   INR  1.01     HbA1C   Lab Results   Component Value Date    HGBA1C 5.64 (H) 10/13/2024     Results from last 7 days   Lab Units 10/13/24  1414 10/13/24  1152   HSTROP T ng/L 22* 23*     Infection   Results from last 7 days   Lab Units 10/13/24  1415   BLOODCX  No growth at 2 days  No growth at 2 days     UA      Microbiology Results (last 10 days)       Procedure Component Value - Date/Time    Blood Culture - Blood, Arm, Left [[id number]]  (Normal) Collected: 10/13/24 1415    Lab Status: Preliminary result Specimen: Blood from Arm, Left Updated: 10/15/24 1445     Blood Culture No growth at 2 days    Narrative:      Less than seven (7) mL's of blood was collected.  Insufficient quantity may yield false negative results.    Blood Culture - Blood, Arm, Right [[id number]]  (Normal) Collected: 10/13/24 1415    Lab Status: Preliminary result Specimen: Blood from Arm, Right Updated: 10/15/24 7950      Blood Culture No growth at 2 days    Narrative:      Less than seven (7) mL's of blood was collected.  Insufficient quantity may yield false negative results.    Respiratory Panel PCR w/COVID-19(SARS-CoV-2) ANAMIKA/FABRICIO/JOSSY/PAD/COR/DANIELLE In-House, NP Swab in UTM/VTM, 2 HR TAT - Swab, Nasopharynx [[id number]]  (Normal) Collected: 10/13/24 1228    Lab Status: Final result Specimen: Swab from Nasopharynx Updated: 10/13/24 1325     ADENOVIRUS, PCR Not Detected     Coronavirus 229E Not Detected     Coronavirus HKU1 Not Detected     Coronavirus NL63 Not Detected     Coronavirus OC43 Not Detected     COVID19 Not Detected     Human Metapneumovirus Not Detected     Human Rhinovirus/Enterovirus Not Detected     Influenza A PCR Not Detected     Influenza B PCR Not Detected     Parainfluenza Virus 1 Not Detected     Parainfluenza Virus 2 Not Detected     Parainfluenza Virus 3 Not Detected     Parainfluenza Virus 4 Not Detected     RSV, PCR Not Detected     Bordetella pertussis pcr Not Detected     Bordetella parapertussis PCR Not Detected     Chlamydophila pneumoniae PCR Not Detected     Mycoplasma pneumo by PCR Not Detected    Narrative:      In the setting of a positive respiratory panel with a viral infection PLUS a negative procalcitonin without other underlying concern for bacterial infection, consider observing off antibiotics or discontinuation of antibiotics and continue supportive care. If the respiratory panel is positive for atypical bacterial infection (Bordetella pertussis, Chlamydophila pneumoniae, or Mycoplasma pneumoniae), consider antibiotic de-escalation to target atypical bacterial infection.    MRSA Screen, PCR (Inpatient) - Swab, Nares [[id number]]  (Normal) Collected: 10/13/24 1228    Lab Status: Final result Specimen: Swab from Nares Updated: 10/13/24 1349     MRSA PCR No MRSA Detected    Narrative:      The negative predictive value of this diagnostic test is high and should only be used to consider  de-escalating anti-MRSA therapy. A positive result may indicate colonization with MRSA and must be correlated clinically.          Imaging Results (Last 72 Hours)       Procedure Component Value Units Date/Time    CT Angiogram Chest [[id number]] Collected: 10/14/24 1152     Updated: 10/14/24 1203    Narrative:      CT ANGIOGRAM CHEST    Date of Exam: 10/14/2024 10:17 AM EDT    Indication: elevated d-dimer, shortness of breath.    Comparison: Chest x-ray 10/13/2024 and CT chest 7/20/2023    Technique: CTA of the chest was performed before and after the uneventful intravenous administration of iodinated contrast. Reconstructed coronal and sagittal images were also obtained. In addition, a 3-D volume rendered image was created for   interpretation. Automated exposure control and iterative reconstruction methods were used.      Findings:  PULMONARY VASCULATURE: Pulmonary arteries are widely patent without evidence of embolus.Main pulmonary artery is normal in size. No evidence of right heart strain.    MEDIASTINUM: There is calcified atherosclerotic disease at the thoracic aortic arch. Along the descending thoracic aorta there is noncalcified atherosclerotic disease and wall thickening with several penetrating aortic ulcers noted. No aortic dissection   identified. No mass nor pericardial effusion.  CORONARY ARTERIES: Mild to moderate calcified atherosclerotic disease.    LUNGS: Evaluation of lung parenchyma demonstrates a cavitary lesion in the left upper lung measuring 1.6 x 1.4 cm (image 34 of series 4 which is new from prior study. There is some adjacent groundglass attenuation and opacification with septal thickening   extending into the left upper lung. Findings are superimposed on a background of centrilobular emphysema. There is an additional subpleural nodule in the right upper lung measuring 3 mm (image 32 of series 4). Additional groundglass opacities and   infiltrates are noted within the lingula and in a  peripheral distribution in the left lower lung some of which have a tree-in-bud configuration compatible with inflammatory changes. Some nodular opacification extends along the length of the left major   fissure.  PLEURAL SPACE: No effusion, mass, nor pneumothorax.  LYMPH NODES: There are no pathologically enlarged lymph nodes.    UPPER ABDOMEN: The liver has decreased attenuation compatible with steatosis.    OSSEOUS STRUCTURES: Appropriate for age with no acute process identified.      Impression:      Impression:    1. No evidence for pulmonary embolus.    2. New cavitary lesion in the left upper lung measuring 1.6 x 1.4 cm. There is adjacent groundglass opacification with additional nodular opacities and tree-in-bud opacities within the left upper and left lower lung. This may represent a multifocal   pneumonia however underlying cavitary neoplasm cannot be excluded. Recommend treatment with follow-up imaging to ensure complete resolution.    3. Calcified and noncalcified atherosclerotic disease involving the thoracic aortic arch and descending thoracic aorta with several aortic ulcers along the descending thoracic aorta.        Electronically Signed: Manisha Khan MD    10/14/2024 12:01 PM EDT    Workstation ID: [id number]    XR Chest 1 View [[id number]] Collected: 10/13/24 1205     Updated: 10/13/24 1211    Narrative:      XR CHEST 1 VW    Date of Exam: 10/13/2024 11:55 AM EDT    Indication: chest pain    Comparison: AP chest x-ray 10/12/2024, 10/9/2024, two-view chest x-ray 8/14/2024, CT chest 7/20/2023    Findings:  Multifocal patchy airspace opacities throughout the left lung of mildly decreased compared to 10/12/2024 chest x-ray. Right lung appears clear. No pneumothorax or large pleural effusion is seen. Cardiomediastinal contours appear stable.      Impression:      Impression:  Mildly decreased patchy airspace opacities throughout the left lung compared to 10/12/2024 chest x-ray, likely due to  multifocal pneumonia.      Electronically Signed: Danae Gao    10/13/2024 12:09 PM EDT    Workstation ID: [id number]            Assessment & Plan     ASSESSMENT:  -Crohn's disease of small bowel & colon   -Strongyloides  -Unintentional weight loss  -Electrolyte abnormality  -Leukopenia  -Normocytic anemia  -Oral ulcers/sores  -Hypertension  -COPD  -Rheumatoid arthritis  -History of hysterectomy  -History of cholecystectomy     PLAN:  Patient is a 74-year-old female with history of rheumatoid arthritis and cholecystectomy who presented on 10/8 with hyponatremia. Nephrology has been consulted. GI has been asked to evaluate the patient for persistent diarrhea. She does have a history of possible Crohn's disease, but this has not been biopsy-proven and [employer] testing has not been consistent with IBD. Fecal calprotectin was elevated in 11/2021.     Patient continues to complain of diarrhea.  Denies abdominal pain or nausea/vomiting.  S/p EGD/colonoscopy on 10/12 showing small hiatal hernia, gastritis, TI stricture s/p dilation to 12 mm, TI and colon ulcers (biopsy consistent with IBD), diverticulosis, and grade 2 internal hemorrhoids.   Pathology positive for Strongyloides.  Ivermectin was ordered yesterday, but not given for some unknown reason.  Discussed with bedside RN today and medication has been rescheduled.  Continue mesalamine and IV Solu-Medrol.  Diet as tolerated.  Patient will likely need Rinvoq as outpatient. Task has been sent to our office to initiate approval process.  Could also consider clinical trial secondary to Crohn's with stricture.  S/p heart cath yesterday which showed total right and severe ostial LAD disease.  Plan for possible bronchoscopy today due to pulmonary cavitary lesion.  Continue supportive care.      Electronically signed by DRU Lew, 10/16/24, 10:46 AM EDT.

## 2024-10-16 NOTE — SIGNIFICANT NOTE
10/16/24 1102   OTHER   Discipline physical therapist   Rehab Time/Intention   Session Not Performed patient/family declined treatment  (pt is sitting up in chair, reports she has been up walking in the room with nursing and is going down soon for a GONZÁLEZ and bronch.)   Therapy Assessment/Plan (PT)   Criteria for Skilled Interventions Met (PT) yes   Recommendation   PT - Next Appointment 10/17/24

## 2024-10-16 NOTE — CASE MANAGEMENT/SOCIAL WORK
Continued Stay Note   [facility]     Patient Name: Maryann Gaitan  MRN: [id number]  Today's Date: 10/16/2024    Admit Date: 10/8/2024    Plan: Return home with family and [facility] (accepting- will need order.) Pt refusing SNF   Discharge Plan       Row Name 10/16/24 0839       Plan    Plan Return home with family and [facility] (accepting- will need order.) Pt refusing SNF    Plan Comments DC Barriers:  NPO for Bronchoscopy today 10/16, IV Abxs, IV steroids, Pulm/Cardio/HemOnc/GI/Nephro following.                 Expected Discharge Date and Time       Expected Discharge Date Expected Discharge Time    Oct 18, 2024               TORIN Turner RN  ICU/CVU   O: [phone]  C: [phone]  [email or web address]

## 2024-10-16 NOTE — ANESTHESIA PREPROCEDURE EVALUATION
Anesthesia Evaluation     Patient summary reviewed and Nursing notes reviewed   NPO Solid Status: > 8 hours  NPO Liquid Status: > 8 hours           Airway   Mallampati: II  TM distance: >3 FB  Neck ROM: full  No difficulty expected  Dental    (+) edentulous    Pulmonary    (+) COPD,  Cardiovascular     (+) hypertension, valvular problems/murmurs MR, CAD      Neuro/Psych  GI/Hepatic/Renal/Endo      Musculoskeletal     Abdominal    Substance History      OB/GYN          Other   arthritis,     ROS/Med Hx Other: 10/15/24 heart cath  90% LAD - awaiting CT surgery consult    EF 50%              Anesthesia Plan    ASA 3     MAC     intravenous induction     Anesthetic plan, risks, benefits, and alternatives have been provided, discussed and informed consent has been obtained with: patient.    Plan discussed with CRNA.    CODE STATUS:    Code Status (Patient has no pulse and is not breathing): CPR (Attempt to Resuscitate)  Medical Interventions (Patient has pulse or is breathing): Full Support

## 2024-10-16 NOTE — PROGRESS NOTES
[facility] [address] MEDICINE SERVICE  DAILY PROGRESS NOTE    NAME: Maryann Gaitan  : 1950  MRN: [id number]      LOS: 7 days     PROVIDER OF SERVICE: Noreen Nj MD    Chief Complaint: Hyponatremia    Subjective:     Interval History:  History taken from: patient    No new complaint            Review of Systems:   Review of Systems   All other systems reviewed and are negative.      Objective:     Vital Signs  Temp:  [97.6 °F (36.4 °C)-98.2 °F (36.8 °C)] 98.2 °F (36.8 °C)  Heart Rate:  [69-99] 80  Resp:  [15-22] 16  BP: ()/(50-81) 122/77   Body mass index is 24 kg/m².    Physical Exam  Physical Exam  Constitutional:       Appearance: Normal appearance.   HENT:      Head: Normocephalic and atraumatic.      Nose: Nose normal.      Mouth/Throat:      Mouth: Mucous membranes are moist.   Eyes:      Extraocular Movements: Extraocular movements intact.      Pupils: Pupils are equal, round, and reactive to light.   Cardiovascular:      Rate and Rhythm: Normal rate and regular rhythm.   Pulmonary:      Effort: Pulmonary effort is normal.      Breath sounds: Normal breath sounds.   Abdominal:      General: Abdomen is flat. Bowel sounds are normal.      Palpations: Abdomen is soft.   Musculoskeletal:         General: Normal range of motion.      Cervical back: Normal range of motion and neck supple.   Skin:     General: Skin is warm and dry.   Neurological:      General: No focal deficit present.      Mental Status: She is alert and oriented to person, place, and time.   Psychiatric:         Mood and Affect: Mood normal.         Behavior: Behavior normal.         Thought Content: Thought content normal.         Judgment: Judgment normal.         Current Medications:  Scheduled Meds:aspirin, 81 mg, Oral, Daily  atorvastatin, 40 mg, Oral, Nightly  cefepime, 2,000 mg, Intravenous, Once  cefepime, 2,000 mg, Intravenous, Q8H  [Transfer Hold] enoxaparin, 40 mg, Subcutaneous, Q24H  First Mouthwash (Magic  Mouthwash), 10 mL, Swish & Spit, Q6H  folic acid, 1 mg, Oral, Daily  ivermectin, 200 mcg/kg, Oral, Q24H  levothyroxine, 50 mcg, Oral, Daily  mesalamine, 4.8 g, Oral, Daily With Breakfast  methylPREDNISolone sodium succinate, 20 mg, Intravenous, Q8H  nystatin, 5 mL, Swish & Swallow, 4x Daily  pantoprazole, 40 mg, Oral, Daily  sertraline, 50 mg, Oral, Daily  sodium chloride, 10 mL, Intravenous, Q12H  tiotropium bromide monohydrate, 2 puff, Inhalation, Daily - RT  Urea, 15 g, Oral, BID      Continuous Infusions:Pharmacy to Dose Cefepime,       PRN Meds:.  acetaminophen    albuterol    senna-docusate sodium **AND** polyethylene glycol **AND** bisacodyl **AND** bisacodyl    hydrOXYzine    influenza vaccine    ipratropium-albuterol    Lidocaine Viscous HCl    Magnesium Standard Dose Replacement - Follow Nurse / BPA Driven Protocol    melatonin    nitroglycerin    ondansetron    ondansetron ODT **OR** ondansetron    Pharmacy to Dose Cefepime    Phosphorus Replacement - Follow Nurse / BPA Driven Protocol    Potassium Replacement - Follow Nurse / BPA Driven Protocol    sodium chloride       Diagnostic Data    Results from last 7 days   Lab Units 10/16/24  0518   WBC 10*3/mm3 2.38*   HEMOGLOBIN g/dL 8.3*   HEMATOCRIT % 25.5*   PLATELETS 10*3/mm3 182   GLUCOSE mg/dL 127*   CREATININE mg/dL 0.32*   BUN mg/dL 8   SODIUM mmol/L 129*   POTASSIUM mmol/L 4.1   AST (SGOT) U/L 9   ALT (SGPT) U/L 7   ALK PHOS U/L 64   BILIRUBIN mg/dL 0.4   ANION GAP mmol/L 7.4       No radiology results for the last day      I reviewed the patient's new clinical results.    Assessment/Plan:     Active and Resolved Problems  Active Hospital Problems    Diagnosis  POA    **Hyponatremia [E87.1]  Yes    Diarrhea [R19.7]  Unknown    Moderate to severe mitral regurgitation [I34.0]  Unknown    Chest pain, atypical [R07.89]  Unknown    Cavitary lesion of lung [J98.4]  Unknown    Multiple tracheobronchial mucus plugs [T17.800A]  Unknown      Resolved Hospital  Problems   No resolved problems to display.       Active and Resolved Problems  Multifocal pneumonia  Cavitary lung lesion  Acute hypoxic respiratory failure  Send blood cultures and respiratory viral panel  continue IV cefepime-pharmacy to dose  MRSA negative- IV vanco discontinued  Supplemental oxygen to keep SpO2 more than 95%  Closely monitor vitals  CT PE showed cavitary lung lesion-pulmonary consulted      Hyponatremia  -Mild downward trend of sodium.  See further recommendations per nephrology.      Pneumonia with cavitary lesion in the left upper lobe of lung  - Continue cefepime and follow-up on BAL cultures.  New cavitary lesion in the left upper lung measuring 1.6 x 1.4 cm  nodular opacities and tree-in-bud opacities withinthe left upper and left lower lung  - Going for bronchoscopy today    Hypokalemia  -  Resolved      Crohns disease  Diarrhea  - Follows with Dr. Castillo; plans for outpatient scopes 10/31 for ongoing Crohns  - Continues to have significant diarrhea which is provoking electrolyte abnormalities  - Also in setting of worsening anemia  - GI consulted- s/p EGD/colonoscopy-10/12/2024 EGD/colonoscopy (Dr Strong) small hiatal hernia.  Nonerosive chronic gastritis.  Normal duodenum.  TI stricture status post dilation to 12 mm.  TI ulcers.  Colon ulcers.  Sigmoid colon diverticulosis.  Grade 2 internal hemorrhoids.    - Continue Mesalamine, solumedrol  - Low residue diet.   - Patient will likely need Rinvoq as outpatient. Task has been sent to GI office to initiate approval process.  - Could also consider clinical trial secondary to Crohn's with stricture.  Continue diet as tolerated.  - Likely home in the next 1 to 2 days from GI standpoint.        Anemia  - Mild downward trend in H&H.  Monitor.       Leukopenia  -.Upward trending WBC and improving.  Monitor.     Hypertension  - Blood pressure is controlled.  Continue current BP med regimen.     Moderate to severe mitral regurgitation     Chest pain  -severe MR , s/p  cardiac cath 10/15/2024 which revealed total right and severe ostial LAD disease.  Descending thoracic aorta has an outpouching probably saccular aneurysm versus  penetrating aortic ulcer . CT surgery consulted   -Going for GONZÁLEZ today.  Cardiology following.            VTE Prophylaxis:  Pharmacologic & mechanical VTE prophylaxis orders are present.             Disposition Planning:     Barriers to Discharge: Pending clinical improvement  Anticipated Date of Discharge:  10/20/2024  Place of Discharge: Likely home           Code Status and Medical Interventions: CPR (Attempt to Resuscitate); Full Support   Ordered at: 10/09/24 1058     Code Status (Patient has no pulse and is not breathing):    CPR (Attempt to Resuscitate)     Medical Interventions (Patient has pulse or is breathing):    Full Support       Signature: Electronically signed by Noreen Nj MD, 10/16/24, 12:07 EDT.  [facility] Hospitalist Team

## 2024-10-16 NOTE — PROGRESS NOTES
Cardiology Progress Note    Patient Identification:  Name: Maryann Gaitan  Age: 74 y.o.  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  Sitting up in chair no distress noted.  Patient denies any complaints chest pain shortness of breath.  She is on room air.  Awaiting bronchoscopy to evaluate lesion and GONZÁLEZ to evaluate MR.   Discussed with CT surgery nurse practitioner  to discuss plan with patient after GONZÁLEZ is completed later today.     Electronically signed by DRU Rubio, 10/16/24, 12:36 PM EDT.          Cardiology attending addendum :    I have personally performed a face-to-face diagnostic evaluation, physical exam and reviewed data on this patient.  I have reviewed documentation done by me and nurse practitioner  and corrected as needed.  And agree with the different components of documentation.Greater than 50% of the time spent in the care of this patient was provided by attending consultant/me.        Subjective: Patient seen and examined; chart and labs reviewed; discussed with bedside nurse.  Patient had elevated D-dimer.  CT PE study is negative for PE but is abnormal with cavitary lesion.  Patient underwent cardiac cath 10/15/2024 which revealed total right and severe ostial LAD.  Patient underwent GONZÁLEZ 10/16/2024 which revealed moderate to severe MR.      Objective:  10/14/2024: Sodium 135 potassium 5.3 BUN 24 creatinine 0.52 glucose 150  10/15/2024: sodium 131, potassium 4.4.  "bun 12 creatinine 0.42 hgb 8.4   10/16/2024: Odium 129 potassium 4.1 BUN 8 creatinine 0.32 glucose 127 CRP 2.03 WBC 2.38 hemoglobin 8.3    History of present illness:      Maryann Gaitan is a 74-year-old female with a PMH of     COPD  Hypertension  Rheumatoid arthritis  Crohn's disease     who presented to [facility] due to \"abnormal labs\" and nausea/vomiting/diarrhea.  Patient noted to be hyponatremic with sodium of 122, hypokalemic potassium 3.0 as well as anemic.  Patient has been followed by nephrology and GI during hospitalization.  She underwent EGD and colonoscopy yesterday showing small hiatal hernia, nonerosive chronic gastritis and sigmoid colon diverticulitis.  Per GI she has a history of Crohn disease but this has not been proven by biopsy as [employer] testing has not been consistent with IBD.  Cardiology consulted for chest pain and abnormal EKG.  Rapid response called this morning on patient secondary to acute sudden onset of sharp left-sided chest pain with associated symptoms of shortness of breath, palpitations, lightheadedness/dizziness.  EKG 10/12 reviewed showing sinus tachycardia with PACs. Stat EKG today without acute ST-T segment changes, ABG with PaO2 of 67, troponin 23, H&H 9.9/30.3.  Patient denies personal or family history of CAD, hyperlipidemia.  She does report history of hypertension, type 2 diabetes.      EGD/colonoscopy 10/12/2024 revealed small hiatal hernia, nonerosive gastritis, T1 stricture s/p dilatation with 12 mm, T1 ulcer, colon ulcers, sigmoid diverticulosis, grade 2 internal hemorrhoids and strongyloides  Echo 10/12/2024 EF of 51 to 55% with mild RV enlargement, severe left atrial enlargement, moderate to severe MR  Transesophageal echo 10/16/2024: LVEF of 55 to 60% with severe left atrial enlargement, moderate to severe MR and grade 3 descending aortic plaque  Cardiac cath 10/15/2024 reveals total right and severe ostial LAD, descending thoracic aorta had an " outpouching consistent with penetrating aortic ulcer versus aneurysm.        Assessment:  :     Chest pain  Shortness of breath  Arrhythmia  Abnormal EKG  Hypertension  Mitral regurgitation  Hyponatremia  Hypokalemia  Crohn's disease  Normocytic anemia  COPD, chronic steroid therapy  Multifocal pneumonia  Hyperglycemia, prediabetes with A1c of 5.6 from     Recommendations / Plan:         Patient presented 10/9/2024 because of abnormal labs showing low sodium, was complaining of nausea vomiting and diarrhea.  Patient's hydrochlorothiazide was held and nephrology consulted.  HS troponin is 23 and 22.  Previous proBNP was 2573.  Sodium is improved to 137.  Glucose elevated.  EKG done 10/13/2024 reviewed/interpreted by me reveals sinus arrhythmia at the rate of 78 bpm.  Patient has multifocal pneumonia and acute hypoxic respiratory failure.  He is on IV antibiotics and oxygen.  Blood cultures are pending.  Patient had D-dimer which was elevated.  CT PE study is negative for PE but has cavitary lesion in the lung.  Pulmonary Dr. León saw the patient.  He is scheduled for [facility] today.  Patient has severe MR will benefit from cardiac cath.  Patient underwent cardiac cath 10/15/2024 which revealed total right and severe ostial LAD disease.  Descending thoracic aorta has an outpouching probably saccular aneurysm versus  penetrating aortic ulcer .  Will consult CT surgery.  Discussed with .  Echocardiogram 10/12/2024 is revealing EF of 51 to 55% with mild RV enlargement severe left atrial enlargement and right atrial enlargement and moderate to severe MR  GONZÁLEZ is revealing moderate to severe MR.  Patient would benefit from CABG and mitral valve surgery.  Will defer timing to CT VS.  Follow-up with nephrology for hyponatremia  Follow-up with primary team and GI for nausea vomiting and possible Crohn's disease and microcytic anemia  Will follow and consider further evaluation treatment depending on how her  condition evolves    Copied text in this portion of the note has been reviewed and is accurate as of 10/16/2024    Past Medical History:  Past Medical History:   Diagnosis Date    Arthritis     COPD (chronic obstructive pulmonary disease)     Hypertension      Past Surgical History:  Past Surgical History:   Procedure Laterality Date    CARDIAC CATHETERIZATION N/A 10/15/2024    Procedure: Left Heart Cath, possible pci;  Surgeon: Travis Connor MD;  Location: [facility] CATH INVASIVE LOCATION;  Service: Cardiovascular;  Laterality: N/A;    COLONOSCOPY N/A 10/12/2024    Procedure: COLONOSCOPY WITH BIOPSY AND WIRE GUIDED BALLOON DILATION OF TERMINAL ILEUM;  Surgeon: Rob Strong MD;  Location: [facility] ENDOSCOPY;  Service: Gastroenterology;  Laterality: N/A;  Colitis, crohns of terminal ileum, right colon ulcers, diverticulosis, hemorroids    ENDOSCOPY N/A 10/12/2024    Procedure: ESOPHAGOGASTRODUODENOSCOPY WITH BIOPSY X 2 AREA;  Surgeon: Rob Strong MD;  Location: [facility] ENDOSCOPY;  Service: Gastroenterology;  Laterality: N/A;  Chronic gastritis, HH    HYSTERECTOMY          Social History:   Social History     Tobacco Use    Smoking status: Former     Types: Cigarettes    Smokeless tobacco: Never   Substance Use Topics    Alcohol use: Never      Family History:  History reviewed. No pertinent family history.       Allergies:  Allergies   Allergen Reactions    Codeine Hives    Penicillin G Sodium Hives     Scheduled Meds:  aspirin, 81 mg, Daily  atorvastatin, 40 mg, Nightly  cefepime, 2,000 mg, Once  cefepime, 2,000 mg, Q8H  [Transfer Hold] enoxaparin, 40 mg, Q24H  First Mouthwash (Magic Mouthwash), 10 mL, Q6H  folic acid, 1 mg, Daily  ivermectin, 200 mcg/kg, Q24H  levothyroxine, 50 mcg, Daily  mesalamine, 4.8 g, Daily With Breakfast  methylPREDNISolone sodium succinate, 20 mg, Q8H  nystatin, 5 mL, 4x Daily  pantoprazole, 40 mg, Daily  sertraline, 50 mg, Daily  sodium chloride, 10 mL,  "Q12H  tiotropium bromide monohydrate, 2 puff, Daily - RT  Urea, 15 g, BID          Review of Systems:   ROS        Constitutional:  Temp:  [97.6 °F (36.4 °C)-98.2 °F (36.8 °C)] 98.2 °F (36.8 °C)  Heart Rate:  [] 91  Resp:  [11-22] 16  BP: ()/() 116/68    Physical Exam   /68 (BP Location: Right arm, Patient Position: Lying)   Pulse 91   Temp 98.2 °F (36.8 °C) (Oral)   Resp 16   Ht 154.9 cm (61\")   Wt 57.6 kg (127 lb)   SpO2 98%   BMI 24.00 kg/m²   General:  Appears in no acute distress  Eyes: Sclera is anicteric,  conjunctiva is clear   HEENT:  No JVD. Thyroid not visibly enlarged. No mucosal pallor or cyanosis  Respiratory: Respirations regular and unlabored at rest.  Scattered rhonchi   Cardiovascular: S1,S2 Regular rate and rhythm.  2/6 holosystolic murmur  Gastrointestinal: Abdomen nondistended.  Musculoskeletal:  No abnormal movements  Extremities: No digital clubbing or cyanosis  Skin: Color pink.   Neuro: Alert and awake.    INTAKE AND OUTPUT:    Intake/Output Summary (Last 24 hours) at 10/16/2024 1418  Last data filed at 10/16/2024 1230  Gross per 24 hour   Intake 1150 ml   Output 0 ml   Net 1150 ml       Cardiographics  Telemetry: sinus rhythm         ECG:   ECG 12 Lead Chest Pain   Final Result   HEART RATE=78  bpm   RR Wrbjenaq=196  ms   RI Bybdbsjp=958  ms   P Horizontal Axis=-4  deg   P Front Axis=76  deg   QRSD Interval=94  ms   QT Posactqh=509  ms   PNyI=164  ms   QRS Axis=32  deg   T Wave Axis=75  deg   - OTHERWISE NORMAL ECG -   Sinus arrhythmia   Low voltage, precordial leads   When compared with ECG of 13-Oct-2024 09:35:55,   No significant change   Electronically Signed By: Travis Connor (JOSSY) 2024-10-14 08:10:34   Date and Time of Study:2024-10-13 11:34:48      ECG 12 Lead Rhythm Change   Final Result   HEART RATE=81  bpm   RR Ifpgwxpf=758  ms   RI Fnrcyail=516  ms   P Horizontal Axis=-1  deg   P Front Axis=78  deg   QRSD Interval=94  ms   QT Mkhmzyoc=669  ms "   RIsF=222  ms   QRS Axis=15  deg   T Wave Axis=65  deg   - OTHERWISE NORMAL ECG -   Sinus rhythm   Low voltage, precordial leads   When compared with ECG of 12-Oct-2024 19:50:31,   Significant change in rhythm: previously atrial fibrillation   Electronically Signed By: Travis Connor ([facility]) 2024-10-14 08:10:41   Date and Time of Study:2024-10-13 09:35:55      ECG 12 Lead Rhythm Change   Final Result   HEART BIJJ=812  bpm   RR Gsjlsxjf=262  ms   AL Interval=  ms   P Horizontal Axis=  deg   P Front Axis=  deg   QRSD Interval=82  ms   QT Sckdloon=818  ms   JFqS=626  ms   QRS Axis=20  deg   T Wave Axis=151  deg   - ABNORMAL ECG -   Atrial flutter/fibrillation   Ventricular premature complex   Probable  anterior infarct, age indeterminate   When compared with ECG of 05-Oct-2015 11:54:44,   Significant change in rhythm: previously sinus   Electronically Signed By: Travis Connor ([facility]) 2024-10-14 08:10:51   Date and Time of Study:2024-10-12 19:50:31      Telemetry Scan   Final Result      Telemetry Scan   Final Result      Telemetry Scan   Final Result      Telemetry Scan   Final Result      Telemetry Scan   Final Result      Telemetry Scan   Final Result      Telemetry Scan   Final Result      Telemetry Scan   Final Result      Telemetry Scan   Final Result      Telemetry Scan   Final Result      Telemetry Scan   Final Result      Telemetry Scan   Final Result      Telemetry Scan   Final Result      Telemetry Scan   Final Result      Telemetry Scan   Final Result      Telemetry Scan   Final Result      Telemetry Scan   Final Result      Telemetry Scan   Final Result      Telemetry Scan   Final Result      Telemetry Scan   Final Result      Telemetry Scan   Final Result      Telemetry Scan   Final Result      Telemetry Scan   Final Result      Telemetry Scan   Final Result      ECG 12 Lead Tachycardia    (Results Pending)   ECG 12 Lead Other; post op    (Results Pending)     I have personally reviewed  "EKG    Echocardiogram: Results for orders placed during the hospital encounter of 10/08/24    Adult Transesophageal Echo (GONZÁLEZ) W/ Cont if Necessary Per Protocol (Cardiology Department)    Interpretation Summary    Left ventricular systolic function is normal. Left ventricular ejection fraction appears to be 56 - 60%.    The left atrial cavity is severely dilated.    Saline test results are negative.    There is mild, posterior mitral leaflet thickening present.    Moderate to severe mitral valve regurgitation is present with a centrally-directed jet noted.    There is moderate, (grade 3) plaque in the descending aorta present.      Lab Review   I have reviewed the labs  Results from last 7 days   Lab Units 10/13/24  1414 10/13/24  1152   HSTROP T ng/L 22* 23*     Results from last 7 days   Lab Units 10/14/24  0425   MAGNESIUM mg/dL 2.5*     Results from last 7 days   Lab Units 10/16/24  0518   SODIUM mmol/L 129*   POTASSIUM mmol/L 4.1   BUN mg/dL 8   CREATININE mg/dL 0.32*   CALCIUM mg/dL 8.0*         Results from last 7 days   Lab Units 10/16/24  0518 10/15/24  0008 10/13/24  1144 10/13/24  0224   WBC 10*3/mm3 2.38* 2.29*  --  3.43   HEMOGLOBIN g/dL 8.3* 8.4*  --  9.9*   HEMOGLOBIN, POC g/dL  --   --  9.4*  --    HEMATOCRIT % 25.5* 26.4*  --  30.3*   HEMATOCRIT POC %  --   --  28*  --    PLATELETS 10*3/mm3 182 145  --  173     Results from last 7 days   Lab Units 10/15/24  0008   INR  1.01       RADIOLOGY:  Imaging Results (Last 24 Hours)       ** No results found for the last 24 hours. **                  )10/16/2024  Travis Connor MD      EMR Dragon/Transcription:   \"Dictated utilizing Dragon dictation\".   "

## 2024-10-17 ENCOUNTER — INPATIENT HOSPITAL (AMBULATORY)
Age: 74
End: 2024-10-17
Payer: MEDICARE

## 2024-10-17 ENCOUNTER — INPATIENT HOSPITAL (AMBULATORY)
Dept: URBAN - METROPOLITAN AREA HOSPITAL 84 | Facility: HOSPITAL | Age: 74
End: 2024-10-17
Payer: MEDICARE

## 2024-10-17 DIAGNOSIS — Z90.49 ACQUIRED ABSENCE OF OTHER SPECIFIED PARTS OF DIGESTIVE TRACT: ICD-10-CM

## 2024-10-17 DIAGNOSIS — B78.9 STRONGYLOIDIASIS, UNSPECIFIED: ICD-10-CM

## 2024-10-17 DIAGNOSIS — D72.819 DECREASED WHITE BLOOD CELL COUNT, UNSPECIFIED: ICD-10-CM

## 2024-10-17 DIAGNOSIS — K50.812 CROHN'S DISEASE OF BOTH SMALL AND LARGE INTESTINE WITH INTES: ICD-10-CM

## 2024-10-17 DIAGNOSIS — E87.8 OTHER DISORDERS OF ELECTROLYTE AND FLUID BALANCE, NOT ELSEWH: ICD-10-CM

## 2024-10-17 DIAGNOSIS — K12.30 ORAL MUCOSITIS (ULCERATIVE), UNSPECIFIED: ICD-10-CM

## 2024-10-17 DIAGNOSIS — D64.9 ANEMIA, UNSPECIFIED: ICD-10-CM

## 2024-10-17 LAB
ALBUMIN SERPL-MCNC: 2.6 G/DL (ref 3.5–5.2)
ALBUMIN/GLOB SERPL: 1.5 G/DL
ALP SERPL-CCNC: 65 U/L (ref 39–117)
ALT SERPL W P-5'-P-CCNC: 9 U/L (ref 1–33)
ANION GAP SERPL CALCULATED.3IONS-SCNC: 6.7 MMOL/L (ref 5–15)
AST SERPL-CCNC: 10 U/L (ref 1–32)
BASOPHILS # BLD AUTO: 0 10*3/MM3 (ref 0–0.2)
BASOPHILS NFR BLD AUTO: 0 % (ref 0–1.5)
BILIRUB SERPL-MCNC: 0.4 MG/DL (ref 0–1.2)
BUN SERPL-MCNC: 28 MG/DL (ref 8–23)
BUN/CREAT SERPL: 66.7 (ref 7–25)
CALCIUM SPEC-SCNC: 8.2 MG/DL (ref 8.6–10.5)
CHLORIDE SERPL-SCNC: 96 MMOL/L (ref 98–107)
CO2 SERPL-SCNC: 24.3 MMOL/L (ref 22–29)
CREAT SERPL-MCNC: 0.42 MG/DL (ref 0.57–1)
DEPRECATED RDW RBC AUTO: 56.6 FL (ref 37–54)
EGFRCR SERPLBLD CKD-EPI 2021: 102.8 ML/MIN/1.73
EOSINOPHIL # BLD AUTO: 0.04 10*3/MM3 (ref 0–0.4)
EOSINOPHIL NFR BLD AUTO: 1.6 % (ref 0.3–6.2)
ERYTHROCYTE [DISTWIDTH] IN BLOOD BY AUTOMATED COUNT: 16.2 % (ref 12.3–15.4)
GLOBULIN UR ELPH-MCNC: 1.7 GM/DL
GLUCOSE SERPL-MCNC: 138 MG/DL (ref 65–99)
HCT VFR BLD AUTO: 25 % (ref 34–46.6)
HGB BLD-MCNC: 8.2 G/DL (ref 12–15.9)
IMM GRANULOCYTES # BLD AUTO: 0.03 10*3/MM3 (ref 0–0.05)
IMM GRANULOCYTES NFR BLD AUTO: 1.2 % (ref 0–0.5)
LYMPHOCYTES # BLD AUTO: 0.73 10*3/MM3 (ref 0.7–3.1)
LYMPHOCYTES NFR BLD AUTO: 28.4 % (ref 19.6–45.3)
MCH RBC QN AUTO: 31.3 PG (ref 26.6–33)
MCHC RBC AUTO-ENTMCNC: 32.8 G/DL (ref 31.5–35.7)
MCV RBC AUTO: 95.4 FL (ref 79–97)
MONOCYTES # BLD AUTO: 0.32 10*3/MM3 (ref 0.1–0.9)
MONOCYTES NFR BLD AUTO: 12.5 % (ref 5–12)
NEUTROPHILS NFR BLD AUTO: 1.45 10*3/MM3 (ref 1.7–7)
NEUTROPHILS NFR BLD AUTO: 56.3 % (ref 42.7–76)
NRBC BLD AUTO-RTO: 0 /100 WBC (ref 0–0.2)
PLATELET # BLD AUTO: 209 10*3/MM3 (ref 140–450)
PMV BLD AUTO: 9.3 FL (ref 6–12)
POTASSIUM SERPL-SCNC: 4 MMOL/L (ref 3.5–5.2)
PROT SERPL-MCNC: 4.3 G/DL (ref 6–8.5)
RBC # BLD AUTO: 2.62 10*6/MM3 (ref 3.77–5.28)
REF LAB TEST METHOD: NORMAL
SODIUM SERPL-SCNC: 127 MMOL/L (ref 136–145)
WBC NRBC COR # BLD AUTO: 2.57 10*3/MM3 (ref 3.4–10.8)

## 2024-10-17 PROCEDURE — 25810000003 SODIUM CHLORIDE 0.9 % SOLUTION: Performed by: INTERNAL MEDICINE

## 2024-10-17 PROCEDURE — 99232 SBSQ HOSP IP/OBS MODERATE 35: CPT | Performed by: NURSE PRACTITIONER

## 2024-10-17 PROCEDURE — 99232 SBSQ HOSP IP/OBS MODERATE 35: CPT | Performed by: INTERNAL MEDICINE

## 2024-10-17 PROCEDURE — 94799 UNLISTED PULMONARY SVC/PX: CPT

## 2024-10-17 PROCEDURE — 25010000002 CEFEPIME PER 500 MG: Performed by: STUDENT IN AN ORGANIZED HEALTH CARE EDUCATION/TRAINING PROGRAM

## 2024-10-17 PROCEDURE — 85025 COMPLETE CBC W/AUTO DIFF WBC: CPT | Performed by: NURSE PRACTITIONER

## 2024-10-17 PROCEDURE — 80053 COMPREHEN METABOLIC PANEL: CPT | Performed by: NURSE PRACTITIONER

## 2024-10-17 PROCEDURE — 94761 N-INVAS EAR/PLS OXIMETRY MLT: CPT

## 2024-10-17 PROCEDURE — 99222 1ST HOSP IP/OBS MODERATE 55: CPT | Performed by: INTERNAL MEDICINE

## 2024-10-17 PROCEDURE — 25010000002 METHYLPREDNISOLONE PER 40 MG: Performed by: NURSE PRACTITIONER

## 2024-10-17 PROCEDURE — 94664 DEMO&/EVAL PT USE INHALER: CPT

## 2024-10-17 PROCEDURE — 25010000002 NA FERRIC GLUC CPLX PER 12.5 MG: Performed by: INTERNAL MEDICINE

## 2024-10-17 RX ORDER — PREDNISONE 20 MG/1
40 TABLET ORAL
Status: DISCONTINUED | OUTPATIENT
Start: 2024-10-18 | End: 2024-10-23 | Stop reason: HOSPADM

## 2024-10-17 RX ADMIN — CEFEPIME 2000 MG: 2 INJECTION, POWDER, FOR SOLUTION INTRAVENOUS at 15:18

## 2024-10-17 RX ADMIN — Medication 15 G: at 08:09

## 2024-10-17 RX ADMIN — Medication 15 G: at 20:29

## 2024-10-17 RX ADMIN — DIPHENHYDRAMINE HYDROCHLORIDE AND LIDOCAINE HYDROCHLORIDE AND ALUMINUM HYDROXIDE AND MAGNESIUM HYDRO 10 ML: KIT at 08:08

## 2024-10-17 RX ADMIN — ASPIRIN 81 MG: 81 TABLET, COATED ORAL at 08:10

## 2024-10-17 RX ADMIN — METHYLPREDNISOLONE SODIUM SUCCINATE 20 MG: 40 INJECTION, POWDER, FOR SOLUTION INTRAMUSCULAR; INTRAVENOUS at 08:09

## 2024-10-17 RX ADMIN — NYSTATIN 500000 UNITS: 100000 SUSPENSION ORAL at 12:40

## 2024-10-17 RX ADMIN — DIPHENHYDRAMINE HYDROCHLORIDE AND LIDOCAINE HYDROCHLORIDE AND ALUMINUM HYDROXIDE AND MAGNESIUM HYDRO 10 ML: KIT at 12:40

## 2024-10-17 RX ADMIN — MESALAMINE 4.8 G: 800 TABLET, DELAYED RELEASE ORAL at 08:09

## 2024-10-17 RX ADMIN — CEFEPIME 2000 MG: 2 INJECTION, POWDER, FOR SOLUTION INTRAVENOUS at 07:20

## 2024-10-17 RX ADMIN — METHYLPREDNISOLONE SODIUM SUCCINATE 20 MG: 40 INJECTION, POWDER, FOR SOLUTION INTRAMUSCULAR; INTRAVENOUS at 01:09

## 2024-10-17 RX ADMIN — ATORVASTATIN CALCIUM 40 MG: 40 TABLET, FILM COATED ORAL at 20:29

## 2024-10-17 RX ADMIN — DIPHENHYDRAMINE HYDROCHLORIDE AND LIDOCAINE HYDROCHLORIDE AND ALUMINUM HYDROXIDE AND MAGNESIUM HYDRO 10 ML: KIT at 02:10

## 2024-10-17 RX ADMIN — PANTOPRAZOLE SODIUM 40 MG: 40 TABLET, DELAYED RELEASE ORAL at 08:09

## 2024-10-17 RX ADMIN — IVERMECTIN 12000 MCG: 3 TABLET ORAL at 12:39

## 2024-10-17 RX ADMIN — DIPHENHYDRAMINE HYDROCHLORIDE AND LIDOCAINE HYDROCHLORIDE AND ALUMINUM HYDROXIDE AND MAGNESIUM HYDRO 10 ML: KIT at 18:22

## 2024-10-17 RX ADMIN — NYSTATIN 500000 UNITS: 100000 SUSPENSION ORAL at 08:10

## 2024-10-17 RX ADMIN — SODIUM CHLORIDE 250 MG: 9 INJECTION, SOLUTION INTRAVENOUS at 12:40

## 2024-10-17 RX ADMIN — NYSTATIN 500000 UNITS: 100000 SUSPENSION ORAL at 18:22

## 2024-10-17 RX ADMIN — SERTRALINE 50 MG: 50 TABLET, FILM COATED ORAL at 08:10

## 2024-10-17 RX ADMIN — LEVOTHYROXINE SODIUM 50 MCG: 0.05 TABLET ORAL at 08:09

## 2024-10-17 RX ADMIN — Medication 10 ML: at 20:29

## 2024-10-17 RX ADMIN — CEFEPIME 2000 MG: 2 INJECTION, POWDER, FOR SOLUTION INTRAVENOUS at 21:30

## 2024-10-17 RX ADMIN — NYSTATIN 500000 UNITS: 100000 SUSPENSION ORAL at 20:29

## 2024-10-17 RX ADMIN — TIOTROPIUM BROMIDE INHALATION SPRAY 2 PUFF: 3.12 SPRAY, METERED RESPIRATORY (INHALATION) at 07:39

## 2024-10-17 RX ADMIN — FOLIC ACID 1 MG: 1 TABLET ORAL at 08:09

## 2024-10-17 NOTE — CONSULTS
Referring Provider: Noreen Nj MD    Reason for Consultation: Structural cardiology consultation for treatment of severe mitral regurgitation.      Patient Care Team:  Eduard Little MD as PCP - General (Family Medicine)      SUBJECTIVE     Chief Complaint: Abnormal labs, nausea, vomiting, hyponatremia, heart failure    History of present illness:  Maryann Gaitan is a 74 y.o. female with hypertension, hyperlipidemia, hypothyroidism, coronary artery disease, moderate to severe symptomatic mitral valve regurgitation who has been referred to structural heart team to discuss therapeutic options for severe mitral regurgitation.  Patient was noted to be hyponatremic, mildly elevated troponin and proBNP of 2500.  She was found to have multifocal pneumonia with acute respiratory failure and was started on antibiotics, steroids and oxygen supplementation.  Echocardiogram showed preserved LV function with moderate to severe mitral regurgitation which was confirmed on transesophageal echocardiogram.  Cardiac catheterization showed significant coronary disease with  of the right coronary artery and severe ostial LAD disease.  She also has penetrating thoracic aortic ulcer.  Cardiac surgery was consulted however the patient has been turned down for surgery due to multiple comorbidities including COPD, pneumonia, Crohn's disease, rheumatoid arthritis, anemia.  Plan is to proceed with revascularization/PCI next week.  Structural cardiology team has been consulted for percutaneous management of valvular heart disease.      Review of systems:    Constitutional: + weakness, fatigue, fever, rigors, chills   Eyes: No vision changes, eye pain   ENT/oropharynx: No difficulty swallowing, sore throat, epistaxis, changes in hearing   Cardiovascular: No chest pain, chest tightness, palpitations, paroxysmal nocturnal dyspnea, orthopnea, diaphoresis, dizziness / syncopal episode   Respiratory: + shortness of breath,  dyspnea on exertion, cough, wheezing, hemoptysis   Gastrointestinal: No abdominal pain, nausea, vomiting, diarrhea, bloody stools   Genitourinary: No hematuria, dysuria   Neurological: No headache, tremors, numbness, one-sided weakness    Musculoskeletal: No cramps, myalgias, joint pain, joint swelling   Integument: No rash, edema        Personal History:      Past Medical History:   Diagnosis Date    Arthritis     COPD (chronic obstructive pulmonary disease)     Hypertension        Past Surgical History:   Procedure Laterality Date    CARDIAC CATHETERIZATION N/A 10/15/2024    Procedure: Left Heart Cath, possible pci;  Surgeon: Travis Connor MD;  Location: [facility] CATH INVASIVE LOCATION;  Service: Cardiovascular;  Laterality: N/A;    COLONOSCOPY N/A 10/12/2024    Procedure: COLONOSCOPY WITH BIOPSY AND WIRE GUIDED BALLOON DILATION OF TERMINAL ILEUM;  Surgeon: Rob Strong MD;  Location: [facility] ENDOSCOPY;  Service: Gastroenterology;  Laterality: N/A;  Colitis, crohns of terminal ileum, right colon ulcers, diverticulosis, hemorroids    ENDOSCOPY N/A 10/12/2024    Procedure: ESOPHAGOGASTRODUODENOSCOPY WITH BIOPSY X 2 AREA;  Surgeon: Rob Strong MD;  Location: [facility] ENDOSCOPY;  Service: Gastroenterology;  Laterality: N/A;  Chronic gastritis, HH    HYSTERECTOMY         History reviewed. No pertinent family history.    Social History     Tobacco Use    Smoking status: Former     Types: Cigarettes    Smokeless tobacco: Never   Vaping Use    Vaping status: Never Used   Substance Use Topics    Alcohol use: Never    Drug use: Never        Home meds:  Prior to Admission medications    Medication Sig Start Date End Date Taking? Authorizing Provider   Adalimumab (Humira, 2 Pen,) 40 MG/0.4ML Pen-injector Kit Inject 40 mg under the skin into the appropriate area as directed Every 14 (Fourteen) Days.   Yes Provider, MD Donna   amLODIPine (NORVASC) 10 MG tablet Take 1 tablet by mouth  Daily.   Yes Donna Kraft MD   cholecalciferol (VITAMIN D3) 1.25 MG (17938 UT) capsule Take 1 capsule by mouth 2 (Two) Times a Week. Wed, Sat   Yes Donna Kraft MD   colestipol (COLESTID) 1 g tablet Take 1 tablet by mouth 2 (Two) Times a Day.   Yes Donna Kraft MD   diclofenac (VOLTAREN) 50 MG EC tablet Take 1 tablet by mouth 2 (Two) Times a Day.   Yes Donna Kraft MD   ezetimibe (ZETIA) 10 MG tablet Take 1 tablet by mouth Daily.   Yes Donna Kraft MD   folic acid (FOLVITE) 1 MG tablet Take 1 tablet by mouth Daily.   Yes Donna Kraft MD   hydroCHLOROthiazide 25 MG tablet Take 1 tablet by mouth Daily.   Yes Donna Kraft MD   levothyroxine (SYNTHROID, LEVOTHROID) 50 MCG tablet Take 1 tablet by mouth Daily.   Yes Donna Kraft MD   methotrexate 2.5 MG tablet Take 8 tablets by mouth 1 (One) Time Per Week.   Yes Donna Kraft MD   pantoprazole (PROTONIX) 20 MG EC tablet Take 1 tablet by mouth Daily.   Yes Donna Kraft MD   predniSONE (DELTASONE) 5 MG tablet Take 1 tablet by mouth Daily.   Yes Donna Kraft MD   sertraline (ZOLOFT) 50 MG tablet Take 1 tablet by mouth Daily.   Yes Donna Kraft MD   tiotropium (SPIRIVA) 18 MCG per inhalation capsule Place 1 capsule into inhaler and inhale Daily.   Yes Donna Kraft MD       Allergies:     Codeine and Penicillin g sodium    Scheduled Meds:aspirin, 81 mg, Oral, Daily  atorvastatin, 40 mg, Oral, Nightly  cefepime, 2,000 mg, Intravenous, Once  cefepime, 2,000 mg, Intravenous, Q8H  [Transfer Hold] enoxaparin, 40 mg, Subcutaneous, Q24H  First Mouthwash (Magic Mouthwash), 10 mL, Swish & Spit, Q6H  folic acid, 1 mg, Oral, Daily  levothyroxine, 50 mcg, Oral, Daily  mesalamine, 4.8 g, Oral, Daily With Breakfast  nystatin, 5 mL, Swish & Swallow, 4x Daily  pantoprazole, 40 mg, Oral, Daily  [START ON 10/18/2024] predniSONE, 40 mg, Oral, Daily With Breakfast  sertraline, 50  "mg, Oral, Daily  sodium chloride, 10 mL, Intravenous, Q12H  tiotropium bromide monohydrate, 2 puff, Inhalation, Daily - RT  Urea, 15 g, Oral, BID      Continuous Infusions:Pharmacy to Dose Cefepime,       PRN Meds:  acetaminophen    albuterol    senna-docusate sodium **AND** polyethylene glycol **AND** bisacodyl **AND** bisacodyl    hydrOXYzine    influenza vaccine    ipratropium-albuterol    Lidocaine Viscous HCl    Magnesium Standard Dose Replacement - Follow Nurse / BPA Driven Protocol    melatonin    nitroglycerin    ondansetron    ondansetron ODT **OR** ondansetron    Pharmacy to Dose Cefepime    Phosphorus Replacement - Follow Nurse / BPA Driven Protocol    Potassium Replacement - Follow Nurse / BPA Driven Protocol    sodium chloride      OBJECTIVE    Vital Signs  Vitals:    10/17/24 0739 10/17/24 0743 10/17/24 0753 10/17/24 1230   BP:   100/61 126/59   BP Location:   Left arm Left arm   Patient Position:   Lying Lying   Pulse: 80 87     Resp: 18 18 22 24   Temp:   98 °F (36.7 °C) 98.1 °F (36.7 °C)   TempSrc:   Oral Oral   SpO2: 96% 95%     Weight:       Height:           Flowsheet Rows      Flowsheet Row First Filed Value   Admission Height 154.9 cm (61\") Documented at 10/08/2024 1956   Admission Weight 54.9 kg (121 lb 0.5 oz) Documented at 10/08/2024 1956              Intake/Output Summary (Last 24 hours) at 10/17/2024 1617  Last data filed at 10/17/2024 0900  Gross per 24 hour   Intake 1050 ml   Output 600 ml   Net 450 ml        Telemetry: Sinus rhythm/sinus arrhythmia    Physical Exam:  The patient is alert, oriented and in no distress.  Frail-appearing  Vital signs as noted above.  Head and neck revealed no carotid bruits or jugular venous distention.  No thyromegaly or lymphadenopathy is present  Lungs clear.  No wheezing.  Breath sounds are normal bilaterally.  Heart: Normal first and second heart sounds. No murmur.  No precordial rub is present.  No gallop is present.  Abdomen: Soft and nontender.  No " organomegaly is present.  Extremities with good peripheral pulses without any pedal edema.  Skin: Warm and dry.  Musculoskeletal system is grossly normal.  CNS grossly normal.       Results Review:  I have personally reviewed the results from the time of this admission to 10/17/2024 16:17 EDT and agree with these findings:  []  Laboratory  []  Microbiology  []  Radiology  []  EKG/Telemetry   []  Cardiology/Vascular   []  Pathology  []  Old records  []  Other:    Most notable findings include:     Lab Results (last 24 hours)       Procedure Component Value Units Date/Time    Blood Culture - Blood, Arm, Left [[id number]]  (Normal) Collected: 10/13/24 1415    Specimen: Blood from Arm, Left Updated: 10/17/24 1445     Blood Culture No growth at 4 days    Narrative:      Less than seven (7) mL's of blood was collected.  Insufficient quantity may yield false negative results.    Blood Culture - Blood, Arm, Right [[id number]]  (Normal) Collected: 10/13/24 1415    Specimen: Blood from Arm, Right Updated: 10/17/24 1445     Blood Culture No growth at 4 days    Narrative:      Less than seven (7) mL's of blood was collected.  Insufficient quantity may yield false negative results.    TPMT Enzyme [[id number]] Collected: 10/14/24 0425    Specimen: Blood Updated: 10/17/24 1436     Reference Lab Report See Attached Report    Respiratory Culture - Wash, Lung, Left Upper Lobe [[id number]] Collected: 10/16/24 1147    Specimen: Wash from Lung, Left Upper Lobe Updated: 10/17/24 1044     Respiratory Culture Light growth (2+) The culture consists of normal respiratory wu. This is a preliminary report; final report to follow.     Gram Stain Moderate (3+) WBCs per low power field      Few (2+) Gram positive bacilli      Rare (1+) Gram positive cocci    AFB Culture - Wash, Lung, Left Upper Lobe [[id number]] Collected: 10/16/24 1147    Specimen: Wash from Lung, Left Upper Lobe Updated: 10/17/24 0855     AFB Stain No acid fast bacilli seen on  concentrated smear    Comprehensive Metabolic Panel [[id number]]  (Abnormal) Collected: 10/17/24 0443    Specimen: Blood Updated: 10/17/24 0514     Glucose 138 mg/dL      BUN 28 mg/dL      Creatinine 0.42 mg/dL      Sodium 127 mmol/L      Potassium 4.0 mmol/L      Chloride 96 mmol/L      CO2 24.3 mmol/L      Calcium 8.2 mg/dL      Total Protein 4.3 g/dL      Albumin 2.6 g/dL      ALT (SGPT) 9 U/L      AST (SGOT) 10 U/L      Alkaline Phosphatase 65 U/L      Total Bilirubin 0.4 mg/dL      Globulin 1.7 gm/dL      A/G Ratio 1.5 g/dL      BUN/Creatinine Ratio 66.7     Anion Gap 6.7 mmol/L      eGFR 102.8 mL/min/1.73     Narrative:      GFR Normal >60  Chronic Kidney Disease <60  Kidney Failure <15    The GFR formula is only valid for adults with stable renal function between ages 18 and 70.    CBC & Differential [[id number]]  (Abnormal) Collected: 10/17/24 0443    Specimen: Blood Updated: 10/17/24 0450    Narrative:      The following orders were created for panel order CBC & Differential.  Procedure                               Abnormality         Status                     ---------                               -----------         ------                     CBC Auto Differential[[id number]]        Abnormal            Final result                 Please view results for these tests on the individual orders.    CBC Auto Differential [[id number]]  (Abnormal) Collected: 10/17/24 0443    Specimen: Blood Updated: 10/17/24 0450     WBC 2.57 10*3/mm3      RBC 2.62 10*6/mm3      Hemoglobin 8.2 g/dL      Hematocrit 25.0 %      MCV 95.4 fL      MCH 31.3 pg      MCHC 32.8 g/dL      RDW 16.2 %      RDW-SD 56.6 fl      MPV 9.3 fL      Platelets 209 10*3/mm3      Neutrophil % 56.3 %      Lymphocyte % 28.4 %      Monocyte % 12.5 %      Eosinophil % 1.6 %      Basophil % 0.0 %      Immature Grans % 1.2 %      Neutrophils, Absolute 1.45 10*3/mm3      Lymphocytes, Absolute 0.73 10*3/mm3      Monocytes, Absolute 0.32 10*3/mm3       Eosinophils, Absolute 0.04 10*3/mm3      Basophils, Absolute 0.00 10*3/mm3      Immature Grans, Absolute 0.03 10*3/mm3      nRBC 0.0 /100 WBC     Flow Cytometry [[id number]] Collected: 10/12/24 0019    Specimen: Blood from Arm, Right Updated: 10/16/24 1632     Consult Global Result Comment^Text^TXT     Comment: Peripheral Blood:  No significant immunophenotypic abnormality detected.  DISCLAIMER: REFER TO HARDCOPY OR PDF FOR COMPLETE RESULT.   If synopsis provided, clinical decisions should not be   based on this interfaced synopsis alone.  Performed at:  1 - [facility].  [address], [place] [address]  :  Jaja Pantoja M.D., Ph.D., Phone:  [phone]       Narrative:      Performed at:  1 - [facility].  [address], [place]  [address]  :  Jaja Pantoja M.D., Ph.D., Phone:  [phone]            Imaging Results (Last 24 Hours)       ** No results found for the last 24 hours. **            LAB RESULTS (LAST 7 DAYS)    CBC  Results from last 7 days   Lab Units 10/17/24  0443 10/16/24  0518 10/15/24  0008 10/13/24  1144 10/13/24  0224 10/12/24  0019 10/11/24  1237 10/11/24  0358   WBC 10*3/mm3 2.57* 2.38* 2.29*  --  3.43 2.64*  --  2.80*   RBC 10*6/mm3 2.62* 2.67* 2.75*  --  3.26* 3.60*  --  2.29*   HEMOGLOBIN g/dL 8.2* 8.3* 8.4*  --  9.9* 11.0* 7.8* 7.0*   HEMOGLOBIN, POC g/dL  --   --   --  9.4*  --   --   --   --    HEMATOCRIT % 25.0* 25.5* 26.4*  --  30.3* 32.9* 24.6* 21.5*   HEMATOCRIT POC %  --   --   --  28*  --   --   --   --    MCV fL 95.4 95.5 96.0  --  92.9 91.4  --  93.9   PLATELETS 10*3/mm3 209 182 145  --  173 257  --  269       BMP  Results from last 7 days   Lab Units 10/17/24  0443 10/16/24  0518 10/15/24  0008 10/14/24  0425 10/13/24  2027 10/13/24  1152 10/13/24  1144 10/13/24  0224 10/12/24  0019 10/11/24  1722 10/11/24  1258   SODIUM mmol/L 127* 129* 131* 135* 138  --    --  137 132*   < > 126*   POTASSIUM mmol/L 4.0 4.1 4.4 5.3* 5.0 3.6  --  3.1* 3.6   < > 3.0*   CHLORIDE mmol/L 96* 99 102 106 107  --   --  102 96*   < > 94*   CO2 mmol/L 24.3 22.6 22.0 23.9 22.1  --   --  25.8 23.8   < > 24.2   BUN mg/dL 28* 8 12 24* 32*  --   --  37* 38*   < > 55*   CREATININE mg/dL 0.42* 0.32* 0.42* 0.52* 0.56*  --  0.85 0.57 0.51*   < > 0.48*   GLUCOSE mg/dL 138* 127* 123* 150* 190*  --   --  141* 145*   < > 139*   MAGNESIUM mg/dL  --   --   --  2.5* 2.8* 3.7*  --  1.5*  --   --  1.3*    < > = values in this interval not displayed.       CMP   Results from last 7 days   Lab Units 10/17/24  0443 10/16/24  0518 10/15/24  0008 10/14/24  0425 10/13/24  2027 10/13/24  1152 10/13/24  1144 10/13/24  0224 10/12/24  0019   SODIUM mmol/L 127* 129* 131* 135* 138  --   --  137 132*   POTASSIUM mmol/L 4.0 4.1 4.4 5.3* 5.0 3.6  --  3.1* 3.6   CHLORIDE mmol/L 96* 99 102 106 107  --   --  102 96*   CO2 mmol/L 24.3 22.6 22.0 23.9 22.1  --   --  25.8 23.8   BUN mg/dL 28* 8 12 24* 32*  --   --  37* 38*   CREATININE mg/dL 0.42* 0.32* 0.42* 0.52* 0.56*  --  0.85 0.57 0.51*   GLUCOSE mg/dL 138* 127* 123* 150* 190*  --   --  141* 145*   ALBUMIN g/dL 2.6* 2.5* 2.4*  --   --   --   --   --  2.7*   BILIRUBIN mg/dL 0.4 0.4 0.4  --   --   --   --   --  1.0   ALK PHOS U/L 65 64 70  --   --   --   --   --  80   AST (SGOT) U/L 10 9 8  --   --   --   --   --  16   ALT (SGPT) U/L 9 7 7  --   --   --   --   --  13       BNP        TROPONIN  Results from last 7 days   Lab Units 10/13/24  1414   HSTROP T ng/L 22*       CoAg  Results from last 7 days   Lab Units 10/15/24  0008   INR  1.01       Creatinine Clearance  Estimated Creatinine Clearance: 95.9 mL/min (A) (by C-G formula based on SCr of 0.42 mg/dL (L)).    ABG  Results from last 7 days   Lab Units 10/13/24  1144   PH, ARTERIAL pH units 7.422   PCO2, ARTERIAL mm Hg 39.8   PO2 ART mm Hg 67.1*   O2 SATURATION ART % 93.4*   BASE EXCESS ART mmol/L 1.4         Radiology  No  radiology results for the last day      EKG  I personally viewed and interpreted the patient's EKG/Telemetry data:  ECG 12 Lead Chest Pain   Final Result   HEART RATE=78  bpm   RR Brotsovu=483  ms   MD Keffheib=041  ms   P Horizontal Axis=-4  deg   P Front Axis=76  deg   QRSD Interval=94  ms   QT Zixrokie=186  ms   WEzO=578  ms   QRS Axis=32  deg   T Wave Axis=75  deg   - OTHERWISE NORMAL ECG -   Sinus arrhythmia   Low voltage, precordial leads   When compared with ECG of 13-Oct-2024 09:35:55,   No significant change   Electronically Signed By: Travis Connor (JOSSY) 2024-10-14 08:10:34   Date and Time of Study:2024-10-13 11:34:48      ECG 12 Lead Rhythm Change   Final Result   HEART RATE=81  bpm   RR Ydmytnrz=635  ms   MD Dnbrtkco=471  ms   P Horizontal Axis=-1  deg   P Front Axis=78  deg   QRSD Interval=94  ms   QT Xzwwdasv=262  ms   UEsJ=697  ms   QRS Axis=15  deg   T Wave Axis=65  deg   - OTHERWISE NORMAL ECG -   Sinus rhythm   Low voltage, precordial leads   When compared with ECG of 12-Oct-2024 19:50:31,   Significant change in rhythm: previously atrial fibrillation   Electronically Signed By: Travis Connor (JOSSY) 2024-10-14 08:10:41   Date and Time of Study:2024-10-13 09:35:55      ECG 12 Lead Rhythm Change   Final Result   HEART ABMX=564  bpm   RR Xdsshrbt=184  ms   MD Interval=  ms   P Horizontal Axis=  deg   P Front Axis=  deg   QRSD Interval=82  ms   QT Awoaarvg=530  ms   MLqA=565  ms   QRS Axis=20  deg   T Wave Axis=151  deg   - ABNORMAL ECG -   Atrial flutter/fibrillation   Ventricular premature complex   Probable  anterior infarct, age indeterminate   When compared with ECG of 05-Oct-2015 11:54:44,   Significant change in rhythm: previously sinus   Electronically Signed By: Travis Connor (JOSSY) 2024-10-14 08:10:51   Date and Time of Study:2024-10-12 19:50:31      Telemetry Scan   Final Result      Telemetry Scan   Final Result      Telemetry Scan   Final Result      Telemetry Scan   Final  Result      Telemetry Scan   Final Result      Telemetry Scan   Final Result      Telemetry Scan   Final Result      Telemetry Scan   Final Result      Telemetry Scan   Final Result      Telemetry Scan   Final Result      Telemetry Scan   Final Result      Telemetry Scan   Final Result      Telemetry Scan   Final Result      Telemetry Scan   Final Result      Telemetry Scan   Final Result      Telemetry Scan   Final Result      Telemetry Scan   Final Result      Telemetry Scan   Final Result      Telemetry Scan   Final Result      Telemetry Scan   Final Result      Telemetry Scan   Final Result      Telemetry Scan   Final Result      Telemetry Scan   Final Result      Telemetry Scan   Final Result      Telemetry Scan   Final Result      Telemetry Scan   Final Result      Telemetry Scan   Final Result      Telemetry Scan   Final Result      Telemetry Scan   Final Result      Telemetry Scan   Final Result      ECG 12 Lead Tachycardia    (Results Pending)   ECG 12 Lead Other; post op    (Results Pending)         Echocardiogram:    Results for orders placed during the hospital encounter of 10/08/24    Adult Transesophageal Echo (G[facility]LEZ) W/ Cont if Necessary Per Protocol (Cardiology Department)    Interpretation Summary    Left ventricular systolic function is normal. Left ventricular ejection fraction appears to be 56 - 60%.    The left atrial cavity is severely dilated.    Saline test results are negative.    There is mild, posterior mitral leaflet thickening present.    Moderate to severe mitral valve regurgitation is present with a centrally-directed jet noted.    There is moderate, (grade 3) plaque in the descending aorta present.        Stress Test:        Cardiac Catheterization:  Results for orders placed during the hospital encounter of 10/08/24    Cardiac Catheterization/Vascular Study    Conclusion  Table formatting from the original result was not included.  October 15, 2024      Heart Cath Report    NAME:               Maryann Gaitan  :                1950  AGE/SEX:        74 y.o. female  MRN:                [id number]        Procedures Performed    Left heart catheterization  Selective coronary angiography  Left ventriculography    :   Travis Connor MD    Vascular Access Site: Femoral    Indication for procedure: Chest pain, shortness of breath, elevated troponin, severe MR, hypertension, prediabetes      Procedure Note    After discussing the risks, benefits, and alternatives of the procedure, informed consent was obtained.  Timeout was done before the procedure.  Moderate conscious sedation was given utilizing IV Versed and fentanyl administered by RN with continuous EKG oximetry and hemodynamic monitoring supervised by me throughout the entire case.  I was present with the patient for the duration of moderate sedation and supervised staff who had no other duties and monitored the patient for the entire procedure patient had Pradhan 2-3 sedation scale. the vascular access site was prepped and draped in the usual sterile fashion.  2% lidocaine was used for local anesthesia. Appropriate landmarks were assessed.  A 6 Malay short sheath was inserted in the artery using the modified Seldinger technique.  Patient had heavily calcified femoral arteries on both sides.  Had to use glide wire to go up..    Selective coronary angiography was performed with JL4 and JR4 diagnostic catheters. Left ventriculogram  was performed with an angled pigtail catheter.  All exchanges were performed over the wire.  No specimens were removed.  There were no apparent acute or early complications.  The patient tolerated the procedure well and was transferred to the recovery area in stable condition.    Artery hemostasis will be achieved with  manual pressure in OPCV.        Complications:  None  Blood Loss: minimal    Hemodynamics    Pressures    Ao:    114/49 mmHg  LV:    108/9 mmHg  End-diastolic pressure:  9  mmHg  No  significant aortic valvular gradient on pullback    Coronary Angiography    Left Main :  The left main   20% distal luminal irregularities    Left Anterior Descending : Heavily calcified 90% ostial and proximal LAD    Left Circumflex : Calcified 20% luminal irregularities gives rise to principal mid marginal which divides into 2 and moderate to large caliber distal marginal without disease    Right Coronary Artery :  The right coronary artery   is dominant vessel.  100% proximal.  Bridging collaterals from left system visualized all the way up to mid RCA.    Dominance:  []  Left  [x]  Right  []  Co-Dominant    Left Ventriculography:    Left ventriculography was done in standard [place] view.  Left ventricular size and contractility appears preserved.  EF is 65%.  MR compared to echo appears less severe.  On follow-through of aorta aorta appears calcified and there is a outpouching in the descending thoracic aorta right about the diaphragmatic level probably a saccular aneurysm or penetrating ulcer.    Impression:    Complex multivessel disease with ostial LAD and total right.  Patient had severe MR on echo and catheter MR appears less severe.  Penetrating ulcer of descending thoracic aorta    Recommendations:    CT surgery consultation        I sincerely appreciate the opportunity to participate in your patient's care. Please feel free to contact me anytime if I can be of assistance in this or any other way.      Pertinent History    Past Medical History:  Diagnosis Date   Arthritis   COPD (chronic obstructive pulmonary disease)   Hypertension    Past Surgical History:  Procedure Laterality Date   COLONOSCOPY N/A 10/12/2024  Procedure: COLONOSCOPY WITH BIOPSY AND WIRE GUIDED BALLOON DILATION OF TERMINAL ILEUM;  Surgeon: Rob Strong MD;  Location: [facility] ENDOSCOPY;  Service: Gastroenterology;  Laterality: N/A;  Colitis, crohns of terminal ileum, right colon ulcers, diverticulosis, hemorroids   ENDOSCOPY  N/A 10/12/2024  Procedure: ESOPHAGOGASTRODUODENOSCOPY WITH BIOPSY X 2 AREA;  Surgeon: Rob Strong MD;  Location: [facility] ENDOSCOPY;  Service: Gastroenterology;  Laterality: N/A;  Chronic gastritis, HH   HYSTERECTOMY    Prior to Admission medications  Medication Sig Start Date End Date Taking? Authorizing Provider  Adalimumab (Humira, 2 Pen,) 40 MG/0.4ML Pen-injector Kit Inject 40 mg under the skin into the appropriate area as directed Every 14 (Fourteen) Days.   Yes Donna Kraft MD  amLODIPine (NORVASC) 10 MG tablet Take 1 tablet by mouth Daily.   Yes Donna Kraft MD  cholecalciferol (VITAMIN D3) 1.25 MG (95509 UT) capsule Take 1 capsule by mouth 2 (Two) Times a Week. Wed, Sat   Yes Donna Kraft MD  colestipol (COLESTID) 1 g tablet Take 1 tablet by mouth 2 (Two) Times a Day.   Yes Donna Kraft MD  diclofenac (VOLTAREN) 50 MG EC tablet Take 1 tablet by mouth 2 (Two) Times a Day.   Yes Donna Kraft MD  ezetimibe (ZETIA) 10 MG tablet Take 1 tablet by mouth Daily.   Yes Donna Kraft MD  folic acid (FOLVITE) 1 MG tablet Take 1 tablet by mouth Daily.   Yes Donna Kraft MD  hydroCHLOROthiazide 25 MG tablet Take 1 tablet by mouth Daily.   Yes Donna Kraft MD  levothyroxine (SYNTHROID, LEVOTHROID) 50 MCG tablet Take 1 tablet by mouth Daily.   Yes Donna Kraft MD  methotrexate 2.5 MG tablet Take 8 tablets by mouth 1 (One) Time Per Week.   Yes Donna Kraft MD  pantoprazole (PROTONIX) 20 MG EC tablet Take 1 tablet by mouth Daily.   Yes Donna Kraft MD  predniSONE (DELTASONE) 5 MG tablet Take 1 tablet by mouth Daily.   Yes Donna Kraft MD  sertraline (ZOLOFT) 50 MG tablet Take 1 tablet by mouth Daily.   Yes Donna Kraft MD  tiotropium (SPIRIVA) 18 MCG per inhalation capsule Place 1 capsule into inhaler and inhale Daily.   Yes Donna Kraft MD      Pre-Procedure Notes  H&P Performed  [x]  Yes  []  No       []  N/A    Indications:  []  ACS <= 24 HRS  []  ACS >24 HRS  []  New Onset Angina <= 2 mos  []  Worsening Angina  []  Resuscitated Cardiac Arrest  []  Angina on Exertion:  []  Suspected CAD  []  Valvular Disease  []  Pericardial Disease  []  Cardiac Arrythmia  []  Cardiomyopathy  []  LV Dysfunction  []  Syncope  []  Post Cardiac Transplant  []  Eval. For Exercise Clearance  []  Other  []  Pre-Operative Evaluation  If Pre-Op Eval:  Evaluation for Surgery Type:  []  Cardiac Surgery   []  Non-Cardiac Surgery  Functional Capacity:  []  <4 METS  []  >=4 METS w/o symptoms  []  >= 4 METS with symptoms  []  Unknown  Surgical Risk:  []  Low  []  Intermediate  []  High Risk: Vascular  []  High Risk Non-Vascular    Risks, Benefits, & Complications Discussed:  [x]  Yes  []  No  []  N/A    Questions Answered:  [x]  Yes  []  No  []  N/A    Consent Obtained:  [x]  Yes  []  No  []  N/A    CHF: []  Yes  [x]  No  If Yes:  Newly Diagnosed?  []  Yes  []  No  If Yes:  HF Type:  []  Diastolic  []  Systolic  []  Unknown      Travis Connor MD  10/15/2024  16:19 EDT  Electronically signed by Travis Connor MD, 10/15/24, 4:19 PM EDT.        Other:      ASSESSMENT & PLAN:    Principal Problem:    Hyponatremia  Active Problems:    Diarrhea    Moderate to severe mitral regurgitation    Chest pain, atypical    Cavitary lesion of lung    Multiple tracheobronchial mucus plugs    Severe mitral valve regurgitation  Reviewed echocardiogram and transesophageal echocardiogram  Anatomy is suitable for transcatheter onec-bf-teax mitral valve repair.  Procedure was described to the patient in details and risk and benefit were also discussed.  I have specifically discussed the timing of the procedure with the patient.  MitraClip will be offered electively after treatment of pneumonia has been completed and PCI has been performed.  She is currently in NYHA class III, acute on chronic HFpEF  Consider starting  Jardiance    STS score  CABG + MVR   Operative Mortality 11.5%   Morbidity & Mortality 34.4%   Stroke 2.34%   Renal Failure 5.9%   Reoperation 13%   Prolonged Ventilation 19.5%   Deep Sternal Wound Infection 0.128%   Long Hospital Stay (>14 days) 28.5%   Short Hospital Stay (<6 days)* 5.58%     Coronary artery disease   of RCA, severe ostial LAD lesion  Turndown for CABG  Plan for PCI next week  Currently on aspirin, high intensity statin.    Hyperlipidemia  LDL 68, HDL 55, triglycerides 97 and total cholesterol 141.  Continue high intensity statin  A1c is 5.6.    Multifocal pneumonia  Currently on antibiotics    Anemia  H&H 8.2/25.  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

## 2024-10-17 NOTE — PROGRESS NOTES
Hematology Inpatient Progress Note     Patient name: Maryann Gaitan  : 1950  MRN: [id number]      Chief complaint: chest pain    Subjective/Interim summary:  10/17/24  patient reports she is feeling better, abdomen is not as painful, diarrhea is not as bad. Is drinking sprite.    PCP: Eduard Little MD    History:  Past Medical History:   Diagnosis Date    Arthritis     COPD (chronic obstructive pulmonary disease)     Hypertension    ,   Past Surgical History:   Procedure Laterality Date    BRONCHOSCOPY N/A 10/16/2024    Procedure: BRONCHOSCOPY;  Surgeon: Gallo Pope MD;  Location: [facility] ENDOSCOPY;  Service: Pulmonary;  Laterality: N/A;    CARDIAC CATHETERIZATION N/A 10/15/2024    Procedure: Left Heart Cath, possible pci;  Surgeon: Travis Connor MD;  Location: [facility] CATH INVASIVE LOCATION;  Service: Cardiovascular;  Laterality: N/A;    COLONOSCOPY N/A 10/12/2024    Procedure: COLONOSCOPY WITH BIOPSY AND WIRE GUIDED BALLOON DILATION OF TERMINAL ILEUM;  Surgeon: Rob Strong MD;  Location: [facility] ENDOSCOPY;  Service: Gastroenterology;  Laterality: N/A;  Colitis, crohns of terminal ileum, right colon ulcers, diverticulosis, hemorroids    ENDOSCOPY N/A 10/12/2024    Procedure: ESOPHAGOGASTRODUODENOSCOPY WITH BIOPSY X 2 AREA;  Surgeon: Rob Strong MD;  Location: [facility] ENDOSCOPY;  Service: Gastroenterology;  Laterality: N/A;  Chronic gastritis, HH    HYSTERECTOMY     , History reviewed. No pertinent family history.,   Social History     Tobacco Use    Smoking status: Former     Types: Cigarettes    Smokeless tobacco: Never   Vaping Use    Vaping status: Never Used   Substance Use Topics    Alcohol use: Never    Drug use: Never   ,   Medications Prior to Admission   Medication Sig Dispense Refill Last Dose/Taking    Adalimumab (Humira, 2 Pen,) 40 MG/0.4ML Pen-injector Kit Inject 40 mg under the skin into the appropriate area as directed Every 14  (Fourteen) Days.   9/27/2024    amLODIPine (NORVASC) 10 MG tablet Take 1 tablet by mouth Daily.   Taking    cholecalciferol (VITAMIN D3) 1.25 MG (44624 UT) capsule Take 1 capsule by mouth 2 (Two) Times a Week. Wed, Sat   Taking    colestipol (COLESTID) 1 g tablet Take 1 tablet by mouth 2 (Two) Times a Day.   Taking    diclofenac (VOLTAREN) 50 MG EC tablet Take 1 tablet by mouth 2 (Two) Times a Day.   Taking    ezetimibe (ZETIA) 10 MG tablet Take 1 tablet by mouth Daily.   Taking    folic acid (FOLVITE) 1 MG tablet Take 1 tablet by mouth Daily.   Taking    hydroCHLOROthiazide 25 MG tablet Take 1 tablet by mouth Daily.   Taking    levothyroxine (SYNTHROID, LEVOTHROID) 50 MCG tablet Take 1 tablet by mouth Daily.   Taking    methotrexate 2.5 MG tablet Take 8 tablets by mouth 1 (One) Time Per Week.   Taking    pantoprazole (PROTONIX) 20 MG EC tablet Take 1 tablet by mouth Daily.   Taking    predniSONE (DELTASONE) 5 MG tablet Take 1 tablet by mouth Daily.   Taking    sertraline (ZOLOFT) 50 MG tablet Take 1 tablet by mouth Daily.   Taking    tiotropium (SPIRIVA) 18 MCG per inhalation capsule Place 1 capsule into inhaler and inhale Daily.   Taking   , Scheduled Meds:  aspirin, 81 mg, Oral, Daily  atorvastatin, 40 mg, Oral, Nightly  cefepime, 2,000 mg, Intravenous, Once  cefepime, 2,000 mg, Intravenous, Q8H  [Transfer Hold] enoxaparin, 40 mg, Subcutaneous, Q24H  First Mouthwash (Magic Mouthwash), 10 mL, Swish & Spit, Q6H  folic acid, 1 mg, Oral, Daily  levothyroxine, 50 mcg, Oral, Daily  mesalamine, 4.8 g, Oral, Daily With Breakfast  nystatin, 5 mL, Swish & Swallow, 4x Daily  pantoprazole, 40 mg, Oral, Daily  [START ON 10/18/2024] predniSONE, 40 mg, Oral, Daily With Breakfast  sertraline, 50 mg, Oral, Daily  sodium chloride, 10 mL, Intravenous, Q12H  tiotropium bromide monohydrate, 2 puff, Inhalation, Daily - RT  Urea, 15 g, Oral, BID    , Continuous Infusions:  Pharmacy to Dose Cefepime,     , PRN Meds:    acetaminophen     "albuterol    senna-docusate sodium **AND** polyethylene glycol **AND** bisacodyl **AND** bisacodyl    hydrOXYzine    influenza vaccine    ipratropium-albuterol    Lidocaine Viscous HCl    Magnesium Standard Dose Replacement - Follow Nurse / BPA Driven Protocol    melatonin    nitroglycerin    ondansetron    ondansetron ODT **OR** ondansetron    Pharmacy to Dose Cefepime    Phosphorus Replacement - Follow Nurse / BPA Driven Protocol    Potassium Replacement - Follow Nurse / BPA Driven Protocol    sodium chloride   Allergies:  Codeine and Penicillin g sodium    Review of Systems   Constitutional:  Positive for fatigue. Negative for chills, diaphoresis and fever.   HENT: Negative.     Eyes: Negative.    Respiratory:  Positive for cough (improved) and shortness of breath (improved).    Cardiovascular:  Negative for chest pain.   Gastrointestinal:  Positive for abdominal pain (improved), diarrhea (improved) and nausea.   Endocrine: Negative.    Genitourinary: Negative.    Musculoskeletal: Negative.    Skin: Negative.    Allergic/Immunologic: Negative.    Neurological: Negative.    Hematological:  Bruises/bleeds easily.   Psychiatric/Behavioral: Negative.          Objective   Vital Signs:   /71 (BP Location: Right arm, Patient Position: Lying)   Pulse 87   Temp 98.2 °F (36.8 °C) (Oral)   Resp 26   Ht 154.9 cm (61\")   Wt 57.6 kg (127 lb)   SpO2 95%   BMI 24.00 kg/m²     Physical Exam: (performed by MD)  Vitals and nursing note reviewed.   Constitutional:       Appearance: She is chronically ill-appearing. She is not toxic-appearing or diaphoretic.   HENT:      Head: Normocephalic and atraumatic.      Right Ear: External ear normal.      Left Ear: External ear normal.      Nose: Nose normal.      Mouth/Throat:      Mouth: Mucous membranes are moist.      Pharynx: Oropharynx is clear.   Eyes:      Extraocular Movements: Extraocular movements intact.      Conjunctiva/sclera: Conjunctivae normal.      Pupils: " Pupils are equal, round, and reactive to light.   Cardiovascular:      Rate and Rhythm: Normal rate and regular rhythm.      Pulses: Normal pulses.   Pulmonary:      Effort: Pulmonary effort is normal.      Breath sounds: Rhonchi present but much improved. No wheezing.   Abdominal:      General: Bowel sounds are normal. There is no distension.      Palpations: Abdomen is soft.      Tenderness: There is abdominal tenderness.   Genitourinary:     Comments: deferred  Musculoskeletal:         General: No swelling.      Cervical back: Normal range of motion.   Skin:     General: Skin is warm and dry.      Findings: Bruising present.   Neurological:      General: No focal deficit present.      Mental Status: She is alert and oriented to person, place, and time. Mental status is at baseline.   Psychiatric:         Mood and Affect: Mood normal.         Behavior: Behavior normal.         Thought Content: Thought content normal.         Judgment: Judgment normal.        Results Review:  Lab Results (last 48 hours)       Procedure Component Value Units Date/Time    Blood Culture - Blood, Arm, Left [[id number]]  (Normal) Collected: 10/13/24 1415    Specimen: Blood from Arm, Left Updated: 10/17/24 1445     Blood Culture No growth at 4 days    Narrative:      Less than seven (7) mL's of blood was collected.  Insufficient quantity may yield false negative results.    Blood Culture - Blood, Arm, Right [[id number]]  (Normal) Collected: 10/13/24 1415    Specimen: Blood from Arm, Right Updated: 10/17/24 1445     Blood Culture No growth at 4 days    Narrative:      Less than seven (7) mL's of blood was collected.  Insufficient quantity may yield false negative results.    TPMT Enzyme [[id number]] Collected: 10/14/24 0425    Specimen: Blood Updated: 10/17/24 1436     Reference Lab Report See Attached Report    Respiratory Culture - Wash, Lung, Left Upper Lobe [[id number]] Collected: 10/16/24 1147    Specimen: Wash from Lung, Left Upper Lobe  Updated: 10/17/24 1044     Respiratory Culture Light growth (2+) The culture consists of normal respiratory wu. This is a preliminary report; final report to follow.     Gram Stain Moderate (3+) WBCs per low power field      Few (2+) Gram positive bacilli      Rare (1+) Gram positive cocci    AFB Culture - Wash, Lung, Left Upper Lobe [[id number]] Collected: 10/16/24 1147    Specimen: Wash from Lung, Left Upper Lobe Updated: 10/17/24 0855     AFB Stain No acid fast bacilli seen on concentrated smear    Comprehensive Metabolic Panel [[id number]]  (Abnormal) Collected: 10/17/24 0443    Specimen: Blood Updated: 10/17/24 0514     Glucose 138 mg/dL      BUN 28 mg/dL      Creatinine 0.42 mg/dL      Sodium 127 mmol/L      Potassium 4.0 mmol/L      Chloride 96 mmol/L      CO2 24.3 mmol/L      Calcium 8.2 mg/dL      Total Protein 4.3 g/dL      Albumin 2.6 g/dL      ALT (SGPT) 9 U/L      AST (SGOT) 10 U/L      Alkaline Phosphatase 65 U/L      Total Bilirubin 0.4 mg/dL      Globulin 1.7 gm/dL      A/G Ratio 1.5 g/dL      BUN/Creatinine Ratio 66.7     Anion Gap 6.7 mmol/L      eGFR 102.8 mL/min/1.73     Narrative:      GFR Normal >60  Chronic Kidney Disease <60  Kidney Failure <15    The GFR formula is only valid for adults with stable renal function between ages 18 and 70.    CBC & Differential [[id number]]  (Abnormal) Collected: 10/17/24 0443    Specimen: Blood Updated: 10/17/24 0450    Narrative:      The following orders were created for panel order CBC & Differential.  Procedure                               Abnormality         Status                     ---------                               -----------         ------                     CBC Auto Differential[[id number]]        Abnormal            Final result                 Please view results for these tests on the individual orders.    CBC Auto Differential [[id number]]  (Abnormal) Collected: 10/17/24 0443    Specimen: Blood Updated: 10/17/24 0450     WBC 2.57 10*3/mm3       RBC 2.62 10*6/mm3      Hemoglobin 8.2 g/dL      Hematocrit 25.0 %      MCV 95.4 fL      MCH 31.3 pg      MCHC 32.8 g/dL      RDW 16.2 %      RDW-SD 56.6 fl      MPV 9.3 fL      Platelets 209 10*3/mm3      Neutrophil % 56.3 %      Lymphocyte % 28.4 %      Monocyte % 12.5 %      Eosinophil % 1.6 %      Basophil % 0.0 %      Immature Grans % 1.2 %      Neutrophils, Absolute 1.45 10*3/mm3      Lymphocytes, Absolute 0.73 10*3/mm3      Monocytes, Absolute 0.32 10*3/mm3      Eosinophils, Absolute 0.04 10*3/mm3      Basophils, Absolute 0.00 10*3/mm3      Immature Grans, Absolute 0.03 10*3/mm3      nRBC 0.0 /100 WBC     Flow Cytometry [[id number]] Collected: 10/12/24 0019    Specimen: Blood from Arm, Right Updated: 10/16/24 1632     Consult Global Result Comment^Text^TXT     Comment: Peripheral Blood:  No significant immunophenotypic abnormality detected.  DISCLAIMER: REFER TO HARDCOPY OR PDF FOR COMPLETE RESULT.   If synopsis provided, clinical decisions should not be   based on this interfaced synopsis alone.  Performed at:  [facility]  [address] [address]  :  Jaja Pantoja M.D., Ph.D., Phone:  [phone]       Narrative:      Performed at:  [facility]  [address], [place] [address]  :  Jaja Pantoja M.D., Ph.D., Phone:  [phone]    Respiratory Panel PCR w/COVID-19(SARS-CoV-2) ANAMIKA/FABRICIO/JOSSY/PAD/COR/DANIELLE In-House, NP Swab in UTM/VTM, 2 HR TAT - Wash, Lung, Left Upper Lobe [[id number]]  (Normal) Collected: 10/16/24 1147    Specimen: Wash from Lung, Left Upper Lobe Updated: 10/16/24 1306     ADENOVIRUS, PCR Not Detected     Coronavirus 229E Not Detected     Coronavirus HKU1 Not Detected     Coronavirus NL63 Not Detected     Coronavirus OC43 Not Detected     COVID19 Not Detected     Human Metapneumovirus Not Detected     Human Rhinovirus/Enterovirus Not Detected      Influenza A PCR Not Detected     Influenza B PCR Not Detected     Parainfluenza Virus 1 Not Detected     Parainfluenza Virus 2 Not Detected     Parainfluenza Virus 3 Not Detected     Parainfluenza Virus 4 Not Detected     RSV, PCR Not Detected     Bordetella pertussis pcr Not Detected     Bordetella parapertussis PCR Not Detected     Chlamydophila pneumoniae PCR Not Detected     Mycoplasma pneumo by PCR Not Detected    Narrative:      In the setting of a positive respiratory panel with a viral infection PLUS a negative procalcitonin without other underlying concern for bacterial infection, consider observing off antibiotics or discontinuation of antibiotics and continue supportive care. If the respiratory panel is positive for atypical bacterial infection (Bordetella pertussis, Chlamydophila pneumoniae, or Mycoplasma pneumoniae), consider antibiotic de-escalation to target atypical bacterial infection.    Non-gynecologic Cytology [[id number]] Collected: 10/16/24 1147    Specimen: Wash from Lung, Left Upper Lobe Updated: 10/16/24 1238    Aspergillus Galactomannan Antigen - Wash, Lung, Left Upper Lobe [[id number]] Collected: 10/16/24 1147    Specimen: Wash from Lung, Left Upper Lobe Updated: 10/16/24 1210    Pneumocystis PCR - Wash, Lung, Left Upper Lobe [[id number]] Collected: 10/16/24 1147    Specimen: Wash from Lung, Left Upper Lobe Updated: 10/16/24 1210    Fungus Culture - Wash, Lung, Left Upper Lobe [[id number]] Collected: 10/16/24 1147    Specimen: Wash from Lung, Left Upper Lobe Updated: 10/16/24 1210    Comprehensive Metabolic Panel [[id number]]  (Abnormal) Collected: 10/16/24 0518    Specimen: Blood Updated: 10/16/24 0549     Glucose 127 mg/dL      BUN 8 mg/dL      Creatinine 0.32 mg/dL      Sodium 129 mmol/L      Potassium 4.1 mmol/L      Chloride 99 mmol/L      CO2 22.6 mmol/L      Calcium 8.0 mg/dL      Total Protein 4.1 g/dL      Albumin 2.5 g/dL      ALT (SGPT) 7 U/L      AST (SGOT) 9 U/L      Alkaline  Phosphatase 64 U/L      Total Bilirubin 0.4 mg/dL      Globulin 1.6 gm/dL      A/G Ratio 1.6 g/dL      BUN/Creatinine Ratio 25.0     Anion Gap 7.4 mmol/L      eGFR 109.8 mL/min/1.73     Narrative:      GFR Normal >60  Chronic Kidney Disease <60  Kidney Failure <15    The GFR formula is only valid for adults with stable renal function between ages 18 and 70.    C-reactive Protein [[id number]]  (Abnormal) Collected: 10/16/24 0518    Specimen: Blood Updated: 10/16/24 0549     C-Reactive Protein 2.03 mg/dL     CBC & Differential [[id number]]  (Abnormal) Collected: 10/16/24 0518    Specimen: Blood Updated: 10/16/24 0525    Narrative:      The following orders were created for panel order CBC & Differential.  Procedure                               Abnormality         Status                     ---------                               -----------         ------                     CBC Auto Differential[[id number]]        Abnormal            Final result                 Please view results for these tests on the individual orders.    CBC Auto Differential [[id number]]  (Abnormal) Collected: 10/16/24 0518    Specimen: Blood Updated: 10/16/24 0525     WBC 2.38 10*3/mm3      RBC 2.67 10*6/mm3      Hemoglobin 8.3 g/dL      Hematocrit 25.5 %      MCV 95.5 fL      MCH 31.1 pg      MCHC 32.5 g/dL      RDW 16.8 %      RDW-SD 58.1 fl      MPV 10.0 fL      Platelets 182 10*3/mm3      Neutrophil % 71.6 %      Lymphocyte % 19.3 %      Monocyte % 7.1 %      Eosinophil % 0.8 %      Basophil % 0.4 %      Immature Grans % 0.8 %      Neutrophils, Absolute 1.70 10*3/mm3      Lymphocytes, Absolute 0.46 10*3/mm3      Monocytes, Absolute 0.17 10*3/mm3      Eosinophils, Absolute 0.02 10*3/mm3      Basophils, Absolute 0.01 10*3/mm3      Immature Grans, Absolute 0.02 10*3/mm3      nRBC 0.0 /100 WBC     POC Activated Clotting Time [[id number]]  (Normal) Collected: 10/15/24 2304    Specimen: Arterial Blood Updated: 10/15/24 2308     Activated Clotting  Time  134 Seconds      Comment: Serial Number: [id number]:  [id number]       Calprotectin, Fecal - Stool, Per Rectum [[id number]]  (Abnormal) Collected: 10/13/24 1605    Specimen: Stool from Per Rectum Updated: 10/15/24 1908     Calprotectin, Fecal 1590 ug/g      Comment: **Results verified by repeat testing**  Concentration     Interpretation   Follow-Up  < 5 - 50 ug/g     Normal           None  >50 -120 ug/g     Borderline       Re-evaluate in 4-6 weeks      >120 ug/g     Abnormal         Repeat as clinically                                     indicated       Narrative:      Performed at:  [address] [place] [address]  [id number]  : Gris Montanez MD, Phone:  [phone]    POC Activated Clotting Time [[id number]]  (Abnormal) Collected: 10/15/24 1719    Specimen: Arterial Blood Updated: 10/15/24 1728     Activated Clotting Time  152 Seconds      Comment: Serial Number: [id number]:  [id number]                Pending Results: none    Imaging Reviewed:   CT Angiogram Chest    Result Date: 10/14/2024  Impression: 1. No evidence for pulmonary embolus. 2. New cavitary lesion in the left upper lung measuring 1.6 x 1.4 cm. There is adjacent groundglass opacification with additional nodular opacities and tree-in-bud opacities within the left upper and left lower lung. This may represent a multifocal pneumonia however underlying cavitary neoplasm cannot be excluded. Recommend treatment with follow-up imaging to ensure complete resolution. 3. Calcified and noncalcified atherosclerotic disease involving the thoracic aortic arch and descending thoracic aorta with several aortic ulcers along the descending thoracic aorta. Electronically Signed: Manisha Khan MD  10/14/2024 12:01 PM EDT  Workstation ID: [id number]    XR Chest 1 View    Result Date: 10/13/2024  Impression: Mildly decreased patchy airspace opacities throughout the left lung compared to 10/12/2024 chest x-ray, likely due to  multifocal pneumonia. Electronically Signed: Danaetangela Gao  10/13/2024 12:09 PM EDT  Workstation ID: [id number]    XR Chest 1 View    Result Date: 10/12/2024  Impression: Multifocal pneumonia throughout the left lung. Electronically Signed: Nadir Chow MD  10/12/2024 8:30 PM EDT  Workstation ID: [id number]    CT Enterography Abdomen Pelvis w Contrast    Result Date: 10/11/2024  Impression: Areas of bowel wall thickening and enhancement through the distal ileum, colon and rectum consistent the patient's history of Crohn's disease. No evidence of bowel obstruction, perforation or abscess. Electronically Signed: Jakob Hernandez MD  10/11/2024 10:08 PM EDT  Workstation ID: [id number]          Assessment & Plan   Normocytic anemia and leukopenia:-subacute? Most likely from strongyloides infection and pneumonia, and MR     -10/11/2022 reticulocyte numbers 0.72 (normal) however absolute reticulocyte decreased at 0.0182 suggesting either not enough building blocks to make blood or bone marrow dysfunction  -B12 312 (normal), folate 18.3 (normal), iron studies (iron 25 low, iron saturation 13% low) and ferritin (427 high, but is an acute phase reactant given her hospital course) -appears there could still be some component of iron deficiency involved which is not surprising as she has a disease that could cause on again off again bleeding; will likely have to be treated in the future with IV iron as what was seen on EGD means she will be unlikely to tolerate oral iron or be able to absorb it as she will likely be on chronic PPIs     -Flow cytometry WNL  -CBCdiff daily while inpatient to check ANC level as neutropenic fever could add to morbidity   -see GI w/u and treatment plan given hx of Crohn's.  -Agree with transfusion -may want to consider raising transfusion parameters to hgb <8/9 particularly prior to procedures, given her multifocal arrhythmia (lower hgb will make her more likely to go into SVT), MR, and multivessel CAD +/-  symptomatic particularly since she might be oozing. agree with PRBC already given  -Agree with treatment with abx for strongyloides infection and pneumonia  -WBC/hgb stable for now, plts have been WNL so would be ok for DAPT for cardiac procedures.  -monitor for fevers    Electronically signed by Dede Amezquita MD PhD, 10/17/24, 5:24 PM EDT.        Thank you for this consult. We will be happy to follow along with you.

## 2024-10-17 NOTE — PROGRESS NOTES
"Enter Query Response Below      Query Response: moderate malnutrition              If applicable, please update the problem list.       Patient: Maryann Gaitan        : 1950  Account: [id number]           Admit Date:         How to Respond to this query:       a. Click New Note     b. Answer query within the yellow box.                c. Update the Problem List, if applicable.      If you have any questions about this query contact me at: [email or web address]     Dr. Nj,    Patient presented on 10/8 and was seen by Registered Dietitian on 10/14.  Registered Dietitian documented patient's moderate insufficient energy intake, severe unintentional weight loss, moderate loss of fat and muscle, and, \"Moderate chronic disease related malnutrition R/t diminished appetite and likely malabsorption with diarrhea/Crohns Dz\" and, \"pt open to Boost supplements when diet advances.\"    Please clarify diagnosis treated/monitored:    - moderate malnutrition  - other- specify __________    By submitting this query, we are merely seeking further clarification of documentation to accurately reflect all conditions that you are monitoring, evaluating, treating or that extend the hospitalization or utilize additional resources of care. Please utilize your independent clinical judgment when addressing the question(s) above.     This query and your response, once completed, will be entered into the legal medical record.    Sincerely,  Ruby Butler RN CCDS [facility]  Clinical Documentation Integrity Program   "

## 2024-10-17 NOTE — CASE MANAGEMENT/SOCIAL WORK
Continued Stay Note   Gamal     Patient Name: Maryann Gaitan  MRN: [id number]  Today's Date: 10/17/2024    Admit Date: 10/8/2024    Plan: Return home with family and [facility] (accepting- will need order.) Pt refusing SNF   Discharge Plan       Row Name 10/17/24 1155       Plan    Plan Return home with family and [facility] (accepting- will need order.) Pt refusing SNF    Plan Comments DC Barriers: IV Abxs, IV steroids, monitor/replace electrolytes, paracite tx, Pulm/Cardio/HemOnc/GI/Nephro following.                 Expected Discharge Date and Time       Expected Discharge Date Expected Discharge Time    Oct 18, 2024               TORIN Turner RN  ICU/CVU   O: [phone]  C: [phone]  [email or web address]

## 2024-10-17 NOTE — PROCEDURES
Bronchoscopy Procedure Note    Timeout was done appropriately by staff    Procedure:  Bronchoscopy, Diagnostic   Left upper lobe washing    Preoperative Diagnosis:   left upper lobe nodule    Postoperative Diagnosis:     No endobronchial lesions  No endobronchial secretions  Left upper lobe washing was obtained    Anesthesia: Moderate Sedation    Procedure Details: Patient was consented for the procedure with all risks and benefits of the procedure explained in detail.  Patient was given the opportunity to ask questions and all concerns were answered.  The bronchocope was inserted into the main airway via the oropharynx. An anatomical survey was done of the main airways and the subsegmental bronchus to at least the first subsegmental level of all five lobes of both lungs.  The findings are reported below.  A bronchoalveolar lavage was performed using aliquots of normal saline instilled into the airways then aspirated back.      Findings:         No endobronchial lesions  No endobronchial secretions  Left upper lobe washing was obtained    Patient tolerated procedure well        Estimated Blood Loss:  Minimal           Specimens:  Sent serosanguinous fluid                Complications:  None; patient tolerated the procedure well.           Disposition: PACU - hemodynamically stable.      Patient tolerated the procedure well.    Oz Cho MD  10/17/2024  17:24 EDT

## 2024-10-17 NOTE — PROGRESS NOTES
[facility] MEDICINE SERV[facility]  DAILY PROGRESS NOTE    NAME: Maryann Gaitan  : 1950  MRN: [id number]      LOS: 8 days     PROVIDER OF SERVICE: Noreen Nj MD    Chief Complaint: Hyponatremia    Subjective:     Interval History:  History taken from: patient    No new complaint      Review of Systems:   Review of Systems   All other systems reviewed and are negative.      Objective:     Vital Signs  Temp:  [97.8 °F (36.6 °C)-98.3 °F (36.8 °C)] 98.1 °F (36.7 °C)  Heart Rate:  [71-99] 87  Resp:  [18-25] 24  BP: ()/(53-79) 126/59  Flow (L/min) (Oxygen Therapy):  [2] 2   Body mass index is 24 kg/m².    Physical Exam  Physical Exam  Constitutional:       Appearance: Normal appearance.   HENT:      Head: Normocephalic and atraumatic.      Nose: Nose normal.      Mouth/Throat:      Mouth: Mucous membranes are moist.   Eyes:      Extraocular Movements: Extraocular movements intact.      Pupils: Pupils are equal, round, and reactive to light.   Cardiovascular:      Rate and Rhythm: Normal rate and regular rhythm.   Pulmonary:      Effort: Pulmonary effort is normal.      Breath sounds: Normal breath sounds.   Abdominal:      General: Abdomen is flat. Bowel sounds are normal.      Palpations: Abdomen is soft.   Musculoskeletal:         General: Normal range of motion.      Cervical back: Normal range of motion and neck supple.   Skin:     General: Skin is warm and dry.   Neurological:      General: No focal deficit present.      Mental Status: She is alert and oriented to person, place, and time.   Psychiatric:         Mood and Affect: Mood normal.         Behavior: Behavior normal.         Thought Content: Thought content normal.         Judgment: Judgment normal.         Current Medications:  Scheduled Meds:aspirin, 81 mg, Oral, Daily  atorvastatin, 40 mg, Oral, Nightly  cefepime, 2,000 mg, Intravenous, Once  cefepime, 2,000 mg, Intravenous, Q8H  [Transfer Hold] enoxaparin, 40 mg, Subcutaneous,  Q24H  First Mouthwash (Magic Mouthwash), 10 mL, Swish & Spit, Q6H  folic acid, 1 mg, Oral, Daily  levothyroxine, 50 mcg, Oral, Daily  mesalamine, 4.8 g, Oral, Daily With Breakfast  nystatin, 5 mL, Swish & Swallow, 4x Daily  pantoprazole, 40 mg, Oral, Daily  [START ON 10/18/2024] predniSONE, 40 mg, Oral, Daily With Breakfast  sertraline, 50 mg, Oral, Daily  sodium chloride, 10 mL, Intravenous, Q12H  tiotropium bromide monohydrate, 2 puff, Inhalation, Daily - RT  Urea, 15 g, Oral, BID      Continuous Infusions:Pharmacy to Dose Cefepime,       PRN Meds:.  acetaminophen    albuterol    senna-docusate sodium **AND** polyethylene glycol **AND** bisacodyl **AND** bisacodyl    hydrOXYzine    influenza vaccine    ipratropium-albuterol    Lidocaine Viscous HCl    Magnesium Standard Dose Replacement - Follow Nurse / BPA Driven Protocol    melatonin    nitroglycerin    ondansetron    ondansetron ODT **OR** ondansetron    Pharmacy to Dose Cefepime    Phosphorus Replacement - Follow Nurse / BPA Driven Protocol    Potassium Replacement - Follow Nurse / BPA Driven Protocol    sodium chloride       Diagnostic Data    Results from last 7 days   Lab Units 10/17/24  0443   WBC 10*3/mm3 2.57*   HEMOGLOBIN g/dL 8.2*   HEMATOCRIT % 25.0*   PLATELETS 10*3/mm3 209   GLUCOSE mg/dL 138*   CREATININE mg/dL 0.42*   BUN mg/dL 28*   SODIUM mmol/L 127*   POTASSIUM mmol/L 4.0   AST (SGOT) U/L 10   ALT (SGPT) U/L 9   ALK PHOS U/L 65   BILIRUBIN mg/dL 0.4   ANION GAP mmol/L 6.7       No radiology results for the last day      I reviewed the patient's new clinical results.    Assessment/Plan:     Active and Resolved Problems  Active Hospital Problems    Diagnosis  POA    **Hyponatremia [E87.1]  Yes    Diarrhea [R19.7]  Unknown    Moderate to severe mitral regurgitation [I34.0]  Unknown    Chest pain, atypical [R07.89]  Unknown    Cavitary lesion of lung [J98.4]  Unknown    Multiple tracheobronchial mucus plugs [T17.800A]  Unknown      Resolved  Hospital Problems   No resolved problems to display.       Active and Resolved Problems  Multifocal pneumonia  Cavitary lung lesion  Acute hypoxic respiratory failure  Send blood cultures and respiratory viral panel  continue IV cefepime-pharmacy to dose  MRSA negative- IV vanco discontinued  Supplemental oxygen to keep SpO2 more than 95%  Closely monitor vitals  CT PE showed cavitary lung lesion-pulmonary consulted      Hyponatremia  -Downward trend of sodium.  See further recommendations per nephrology.      Pneumonia with cavitary lesion in the left upper lobe of lung  - Continue cefepime and follow-up on BAL cultures.  New cavitary lesion in the left upper lung measuring 1.6 x 1.4 cm  nodular opacities and tree-in-bud opacities withinthe left upper and left lower lung  - Status post bronchoscopy.  Follow-up on BAL cultures and other results    Hypokalemia  -  Resolved      Crohns disease-see management per GI  Diarrhea-see management per GI  - Follows with Dr. Castillo; plans for outpatient scopes 10/31 for ongoing Crohns  - Continues to have significant diarrhea which is provoking electrolyte abnormalities  - Also in setting of worsening anemia  - GI consulted- s/p EGD/colonoscopy-10/12/2024 EGD/colonoscopy (Dr Strong) small hiatal hernia.  Nonerosive chronic gastritis.  Normal duodenum.  TI stricture status post dilation to 12 mm.  TI ulcers.  Colon ulcers.  Sigmoid colon diverticulosis.  Grade 2 internal hemorrhoids.    - Continue Mesalamine, solumedrol  - Low residue diet.   - Patient will likely need Rinvoq as outpatient. Task has been sent to GI office to initiate approval process.  - Could also consider clinical trial secondary to Crohn's with stricture.  Continue diet as tolerated.  - Likely home in the next 1 to 2 days from GI standpoint.        Anemia  - Mild downward trend in H&H.  Monitor.       Leukopenia  -.Upward trending WBC and improving.  Monitor.     Hypertension  - Blood pressure is  controlled.  Continue current BP med regimen.     Moderate to severe mitral regurgitation    Chest pain  -severe MR , s/p  cardiac cath 10/15/2024 which revealed total right and severe ostial LAD disease.  Descending thoracic aorta has an outpouching probably saccular aneurysm versus  penetrating aortic ulcer . CT surgery consulted   -Status post GONZÁLEZ which showed EF of 56 to 60% with a dilated left atrium.  Cardiology planning PCI for next week and evaluation for MitraClip procedure.           VTE Prophylaxis:  Pharmacologic & mechanical VTE prophylaxis orders are present.             Disposition Planning:     Barriers to Discharge: Pending clinical improvement  Anticipated Date of Discharge:  10/20/2024  Place of Discharge: Likely home           Code Status and Medical Interventions: CPR (Attempt to Resuscitate); Full Support   Ordered at: 10/09/24 1058     Code Status (Patient has no pulse and is not breathing):    CPR (Attempt to Resuscitate)     Medical Interventions (Patient has pulse or is breathing):    Full Support       Signature: Electronically signed by Noreen Nj MD, 10/17/24, 14:46 EDT.  [facility] Hospitalist Team

## 2024-10-17 NOTE — PLAN OF CARE
Problem: Skin Injury Risk Increased  Goal: Skin Health and Integrity  Outcome: Progressing  Intervention: Optimize Skin Protection  Recent Flowsheet Documentation  Taken 10/17/2024 1600 by Key Shanks RN  Pressure Reduction Techniques: frequent weight shift encouraged  Head of Bed (HOB) Positioning: [facility] elevated  Pressure Reduction Devices: pressure-redistributing mattress utilized  Skin Protection: silicone foam dressing in place  Taken 10/17/2024 1400 by Key Shanks RN  Pressure Reduction Techniques: frequent weight shift encouraged  Head of Bed (HOB) Positioning: HOB elevated  Pressure Reduction Devices: pressure-redistributing mattress utilized  Skin Protection: silicone foam dressing in place  Taken 10/17/2024 1200 by Key Shanks RN  Pressure Reduction Techniques: frequent weight shift encouraged  Head of Bed (HOB) Positioning: [facility] elevated  Pressure Reduction Devices: pressure-redistributing mattress utilized  Skin Protection: silicone foam dressing in place  Taken 10/17/2024 1000 by Key Shanks RN  Pressure Reduction Techniques: frequent weight shift encouraged  Head of Bed (HOB) Positioning: [facility] elevated  Pressure Reduction Devices: pressure-redistributing mattress utilized  Skin Protection: silicone foam dressing in place  Taken 10/17/2024 0800 by Key Shanks RN  Pressure Reduction Techniques: frequent weight shift encouraged  Head of Bed (HOB) Positioning: [facility] elevated  Pressure Reduction Devices: pressure-redistributing mattress utilized  Skin Protection:   silicone foam dressing in place   transparent dressing maintained   incontinence pads utilized  Intervention: Promote and Optimize Oral Intake  Recent Flowsheet Documentation  Taken 10/17/2024 0800 by Key Shanks RN  Nutrition Interventions: supplemental drinks provided     Problem: Fall Injury Risk  Goal: Absence of Fall and Fall-Related Injury  Outcome: Progressing  Intervention: Identify and Manage  Contributors  Recent Flowsheet Documentation  Taken 10/17/2024 1600 by Key Shanks RN  Medication Review/Management: medications reviewed  Self-Care Promotion: independence encouraged  Taken 10/17/2024 1200 by Key Shanks RN  Medication Review/Management: medications reviewed  Self-Care Promotion: independence encouraged  Taken 10/17/2024 0800 by Key Shanks, RN  Medication Review/Management: medications reviewed  Self-Care Promotion: independence encouraged  Intervention: Promote Injury-Free Environment  Recent Flowsheet Documentation  Taken 10/17/2024 1600 by Key Shanks, RN  Safety Promotion/Fall Prevention:   safety round/check completed   room organization consistent   nonskid shoes/slippers when out of bed   lighting adjusted   fall prevention program maintained   clutter free environment maintained   assistive device/personal items within reach  Taken 10/17/2024 1400 by Key Shanks RN  Safety Promotion/Fall Prevention:   safety round/check completed   room organization consistent   nonskid shoes/slippers when out of bed   lighting adjusted   fall prevention program maintained   clutter free environment maintained   assistive device/personal items within reach  Taken 10/17/2024 1200 by Key Shanks, RN  Safety Promotion/Fall Prevention:   safety round/check completed   room organization consistent   nonskid shoes/slippers when out of bed   lighting adjusted   fall prevention program maintained   clutter free environment maintained   assistive device/personal items within reach  Taken 10/17/2024 1000 by Key Shanks, RN  Safety Promotion/Fall Prevention:   safety round/check completed   room organization consistent   nonskid shoes/slippers when out of bed   lighting adjusted   fall prevention program maintained   clutter free environment maintained   assistive device/personal items within reach  Taken 10/17/2024 0800 by Key Shanks, RN  Safety  Promotion/Fall Prevention:   safety round/check completed   room organization consistent   nonskid shoes/slippers when out of bed   lighting adjusted   fall prevention program maintained   clutter free environment maintained   assistive device/personal items within reach   Goal Outcome Evaluation:         Pt resting comfortably. Pt received Kareem Gluconate today. NCO pain. Several small stools today. Pt using call light appropriately. Son at bedside during conversation with cardiology regarding plan of care.

## 2024-10-17 NOTE — PROGRESS NOTES
CC:  abnormal lab work and abd cramping     F/U:  MV CAD/ mod to severe MR/ DTA saccular dilatation--Camporrotondo  EF 65% (cath)    Subjective:  no c/o's, wants to get everything fixed    No events overnight  GONZÁLEZ yest EF 55-60%, mod to severe MR with central jet, moderate grade 3 plaque in DTA  Bronch yest cultures sent      PREOP studies:  Carotid duplex:  < 50% bilat  Vein amari:  adequate above knees bilat  A1c:  5.64   Plt agg:  pending  MRSA screen:  negative  COVID-19 screen:  negative  UA:  negative for UTI 10/8        Intake/Output Summary (Last 24 hours) at 10/17/2024 1216  Last data filed at 10/17/2024 0900  Gross per 24 hour   Intake 1250 ml   Output 600 ml   Net 650 ml     Temp:  [97.8 °F (36.6 °C)-98.3 °F (36.8 °C)] 98 °F (36.7 °C)  Heart Rate:  [] 87  Resp:  [11-25] 22  BP: ()/() 100/61      Results from last 7 days   Lab Units 10/17/24  0443 10/16/24  0518 10/15/24  0008   WBC 10*3/mm3 2.57* 2.38* 2.29*   HEMOGLOBIN g/dL 8.2* 8.3* 8.4*   HEMATOCRIT % 25.0* 25.5* 26.4*   PLATELETS 10*3/mm3 209 182 145   INR   --   --  1.01     Results from last 7 days   Lab Units 10/17/24  0443 10/15/24  0008 10/14/24  0425   CREATININE mg/dL 0.42*   < > 0.52*   POTASSIUM mmol/L 4.0   < > 5.3*   SODIUM mmol/L 127*   < > 135*   MAGNESIUM mg/dL  --   --  2.5*    < > = values in this interval not displayed.       Physical Exam:  Neuro intact, nad, resting in bed, son at bedside  Tele:  SR 80s  Diminished bases, 95% 2L  Benign abd, + BM  No edema    Assessment/Plan:  Principal Problem:    Hyponatremia  Active Problems:    Diarrhea    Moderate to severe mitral regurgitation    Chest pain, atypical    Cavitary lesion of lung    Multiple tracheobronchial mucus plugs    - MV CAD, EF 65% (cath)--surgical eval in progress  - Severe mitral regurgitation--GONZÁLEZ today  - Descending thoracic dilatation/ outpouching/saccular, 3.8 cm--will need yearly aorta surveillance  - JUVENTINO cavitary lesion/ multifocal  pneumonia--bronch today, on cefepime  - Crohn's disease with terminal ileum stricture--GI following, s/p EGD/ colonoscopy 10/12 and dilatation  - 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 Lott and Dr. Connor discussed pt today.  Plans for PCI next week and evaluation for MitraClip procedure.  D/w Dr. Cho, plans for IV iron today.  Long d/w son at bedside.  Plans for her to f/u in [facility] in 6 months with CTA to re-evaluate her DTA.  Our office will call her closer to 6 month kelli and order CTA scan at that time.  D/w Dr. Lott--her aorta is NOT leaking.    Toshia Gimenez-Priscilla, APRN  10/17/2024  12:16 EDT

## 2024-10-17 NOTE — PROGRESS NOTES
LOS: 8 days   Patient Care Team:  Eduard Little MD as PCP - General (Family Medicine)      Subjective     Interval History:     Subjective: Patient reports 3-4 loose bowel movements in the past 24 hours.  No overt GI bleeding.  Denies nausea/vomiting or abdominal pain.      ROS:   No chest pain, shortness of breath, or cough.        Medication Review:     Current Facility-Administered Medications:     acetaminophen (TYLENOL) tablet 650 mg, 650 mg, Oral, Q4H PRN, Corina Murcia L, APRN    albuterol (ACCUNEB) nebulizer solution 0.63 mg, 0.63 mg, Nebulization, Q6H PRN, Corina Murcia L, APRN, 0.63 mg at 10/11/24 1850    aspirin EC tablet 81 mg, 81 mg, Oral, Daily, Corina Murcia, APRN, 81 mg at 10/17/24 0810    atorvastatin (LIPITOR) tablet 40 mg, 40 mg, Oral, Nightly, Corina Murcia L, APRN, 40 mg at 10/16/24 2137    sennosides-docusate (PERICOLACE) 8.6-50 MG per tablet 2 tablet, 2 tablet, Oral, BID PRN **AND** polyethylene glycol (MIRALAX) packet 17 g, 17 g, Oral, Daily PRN **AND** bisacodyl (DULCOLAX) EC tablet 5 mg, 5 mg, Oral, Daily PRN **AND** bisacodyl (DULCOLAX) suppository 10 mg, 10 mg, Rectal, Daily PRN, Corina Murcia L, APRN    cefepime 2000 mg IVPB in 100 mL NS (MBP), 2,000 mg, Intravenous, Once, Rob Strong MD, Last Rate: 0 mL/hr at 10/13/24 1348, Restarted at 10/13/24 1426    cefepime 2000 mg IVPB in 100 mL NS (MBP), 2,000 mg, Intravenous, Q8H, Jonathan Mackay MD, 2,000 mg at 10/17/24 0720    [Transfer Hold] Enoxaparin Sodium (LOVENOX) syringe 40 mg, 40 mg, Subcutaneous, Q24H, Rob Strong MD, 40 mg at 10/14/24 1711    ferric gluconate (FERRLECIT) 250 MG in sodium chloride 0.9% 250 mL IVPB, 250 mg, Intravenous, Once, Draw, MD Oz, Last Rate: 125 mL/hr at 10/17/24 1240, 250 mg at 10/17/24 1240    First Mouthwash (Magic Mouthwash) 10 mL, 10 mL, Swish & Spit, Q6H, Corina Murcia, APRN, 10 mL at 10/17/24 1240    folic acid (FOLVITE) tablet 1 mg, 1 mg, Oral, Daily,  Leezer, Corina L, APRN, 1 mg at 10/17/24 0809    hydrOXYzine (ATARAX) tablet 25 mg, 25 mg, Oral, TID PRN, Corina Murcia APRN    influenza vac split high-dose (FLUZONE HIGH DOSE) injection 0.5 mL, 0.5 mL, Intramuscular, During Hospitalization, Corina Murcia APRN    ipratropium-albuterol (DUO-NEB) nebulizer solution 3 mL, 3 mL, Nebulization, Q4H PRN, Corina Murcia APRN    levothyroxine (SYNTHROID, LEVOTHROID) tablet 50 mcg, 50 mcg, Oral, Daily, Corina Murcia APRN, 50 mcg at 10/17/24 0809    Lidocaine Viscous HCl (XYLOCAINE) 2 % solution 10 mL, 10 mL, Mouth/Throat, Q3H PRN, Corina Murcia APRN    Magnesium Standard Dose Replacement - Follow Nurse / BPA Driven Protocol, , Does not apply, PRN, Corina Murcia APRN    melatonin tablet 5 mg, 5 mg, Oral, Nightly PRN, Corina Murcia APRN    mesalamine (LIALDA) EC tablet 4.8 g, 4.8 g, Oral, Daily With Breakfast, Corina Murcia APRN, 4.8 g at 10/17/24 0809    methylPREDNISolone sodium succinate (SOLU-Medrol) injection 20 mg, 20 mg, Intravenous, Q8H, Corina Murcia APRN, 20 mg at 10/17/24 0809    nitroglycerin (NITROSTAT) SL tablet 0.4 mg, 0.4 mg, Sublingual, Q5 Min PRN, Corina Murcia APRN    nystatin (MYCOSTATIN) 100,000 unit/mL suspension 500,000 Units, 5 mL, Swish & Swallow, 4x Daily, Corina Murcia APRN, 500,000 Units at 10/17/24 1240    ondansetron (ZOFRAN) injection 4 mg, 4 mg, Intravenous, Q6H PRN, Corina Murcia APRN, 4 mg at 10/16/24 1409    ondansetron ODT (ZOFRAN-ODT) disintegrating tablet 4 mg, 4 mg, Oral, Q6H PRN, 4 mg at 10/12/24 1721 **OR** ondansetron (ZOFRAN) injection 4 mg, 4 mg, Intravenous, Q6H PRN, Corina Murcia, APRN, 4 mg at 10/16/24 1401    pantoprazole (PROTONIX) EC tablet 40 mg, 40 mg, Oral, Daily, Corina Murcia, APRN, 40 mg at 10/17/24 0809    Pharmacy to Dose Cefepime, , Does not apply, Continuous PRN, Rob Strong MD    Phosphorus Replacement - Follow Nurse / BPA Driven Protocol, , Does not apply, PRN, Corina Murcia, APRN    Potassium  Replacement - Follow Nurse / BPA Driven Protocol, , Does not apply, PRN, Rob Strong MD    sertraline (ZOLOFT) tablet 50 mg, 50 mg, Oral, Daily, Corina Murcia APRN, 50 mg at 10/17/24 0810    sodium chloride 0.9 % flush 10 mL, 10 mL, Intravenous, Q12H, Corina Murcia, APRN, 10 mL at 10/16/24 2207    sodium chloride 0.9 % flush 10 mL, 10 mL, Intravenous, PRN, Corina Murcia, APRN    tiotropium (SPIRIVA RESPIMAT) 2.5 mcg/act aerosol solution inhaler, 2 puff, Inhalation, Daily - RT, Corina Murcia APRN, 2 puff at 10/17/24 0739    Urea (URE-NA) packet 15 g, 15 g, Oral, BID, Buddy Pierre MD, 15 g at 10/17/24 0809      Objective     Vital Signs  Vitals:    10/17/24 0739 10/17/24 0743 10/17/24 0753 10/17/24 1230   BP:   100/61 126/59   BP Location:   Left arm Left arm   Patient Position:   Lying Lying   Pulse: 80 87     Resp: 18 18 22 24   Temp:   98 °F (36.7 °C) 98.1 °F (36.7 °C)   TempSrc:   Oral Oral   SpO2: 96% 95%     Weight:       Height:           Physical Exam:     General Appearance:    Awake and alert, in no acute distress   Head:    Normocephalic, without obvious abnormality   Eyes:          Conjunctivae normal, anicteric sclera   Throat:   No oral lesions, no thrush, oral mucosa moist   Neck:   No adenopathy, supple, no JVD   Lungs:     respirations regular, even and unlabored   Abdomen:     Soft, non-tender, no rebound or guarding, non-distended   Rectal:     Deferred   Extremities:   No edema, no cyanosis   Skin:   No bruising or rash, no jaundice        Results Review:    CBC    Results from last 7 days   Lab Units 10/17/24  0443 10/16/24  0518 10/15/24  0008 10/13/24  1144 10/13/24  0224 10/12/24  0019 10/11/24  1237 10/11/24  0358   WBC 10*3/mm3 2.57* 2.38* 2.29*  --  3.43 2.64*  --  2.80*   HEMOGLOBIN g/dL 8.2* 8.3* 8.4*  --  9.9* 11.0* 7.8* 7.0*   HEMOGLOBIN, POC g/dL  --   --   --  9.4*  --   --   --   --    PLATELETS 10*3/mm3 209 182 145  --  173 257  --  269     CMP   Results from  last 7 days   Lab Units 10/17/24  0443 10/16/24  0518 10/15/24  0008 10/14/24  0425 10/13/24  2027 10/13/24  1152 10/13/24  1144 10/13/24  0224 10/12/24  0019 10/11/24  1722 10/11/24  1258   SODIUM mmol/L 127* 129* 131* 135* 138  --   --  137 132*   < > 126*   POTASSIUM mmol/L 4.0 4.1 4.4 5.3* 5.0 3.6  --  3.1* 3.6   < > 3.0*   CHLORIDE mmol/L 96* 99 102 106 107  --   --  102 96*   < > 94*   CO2 mmol/L 24.3 22.6 22.0 23.9 22.1  --   --  25.8 23.8   < > 24.2   BUN mg/dL 28* 8 12 24* 32*  --   --  37* 38*   < > 55*   CREATININE mg/dL 0.42* 0.32* 0.42* 0.52* 0.56*  --  0.85 0.57 0.51*   < > 0.48*   GLUCOSE mg/dL 138* 127* 123* 150* 190*  --   --  141* 145*   < > 139*   ALBUMIN g/dL 2.6* 2.5* 2.4*  --   --   --   --   --  2.7*  --   --    BILIRUBIN mg/dL 0.4 0.4 0.4  --   --   --   --   --  1.0  --   --    ALK PHOS U/L 65 64 70  --   --   --   --   --  80  --   --    AST (SGOT) U/L 10 9 8  --   --   --   --   --  16  --   --    ALT (SGPT) U/L 9 7 7  --   --   --   --   --  13  --   --    MAGNESIUM mg/dL  --   --   --  2.5* 2.8* 3.7*  --  1.5*  --   --  1.3*    < > = values in this interval not displayed.     Cr Clearance Estimated Creatinine Clearance: 95.9 mL/min (A) (by C-G formula based on SCr of 0.42 mg/dL (L)).  Coag   Results from last 7 days   Lab Units 10/15/24  0008   INR  1.01     HbA1C   Lab Results   Component Value Date    HGBA1C 5.64 (H) 10/13/2024     Results from last 7 days   Lab Units 10/13/24  1414 10/13/24  1152   HSTROP T ng/L 22* 23*     Infection   Results from last 7 days   Lab Units 10/16/24  1147 10/13/24  1415   BLOODCX   --  No growth at 3 days  No growth at 3 days   RESPCX  Light growth (2+) The culture consists of normal respiratory wu. This is a preliminary report; final report to follow.  --      UA      Microbiology Results (last 10 days)       Procedure Component Value - Date/Time    AFB Culture - Wash, Lung, Left Upper Lobe [[id number]] Collected: 10/16/24 1147    Lab Status:  Preliminary result Specimen: Wash from Lung, Left Upper Lobe Updated: 10/17/24 0855     AFB Stain No acid fast bacilli seen on concentrated smear    Respiratory Culture - Wash, Lung, Left Upper Lobe [[id number]] Collected: 10/16/24 1147    Lab Status: Preliminary result Specimen: Wash from Lung, Left Upper Lobe Updated: 10/17/24 1044     Respiratory Culture Light growth (2+) The culture consists of normal respiratory wu. This is a preliminary report; final report to follow.     Gram Stain Moderate (3+) WBCs per low power field      Few (2+) Gram positive bacilli      Rare (1+) Gram positive cocci    Respiratory Panel PCR w/COVID-19(SARS-CoV-2) ANAMIKA/FABRICIO/JOSSY/PAD/COR/DANIELLE In-House, NP Swab in UTM/VTM, 2 HR TAT - Wash, Lung, Left Upper Lobe [[id number]]  (Normal) Collected: 10/16/24 1147    Lab Status: Final result Specimen: Wash from Lung, Left Upper Lobe Updated: 10/16/24 1306     ADENOVIRUS, PCR Not Detected     Coronavirus 229E Not Detected     Coronavirus HKU1 Not Detected     Coronavirus NL63 Not Detected     Coronavirus OC43 Not Detected     COVID19 Not Detected     Human Metapneumovirus Not Detected     Human Rhinovirus/Enterovirus Not Detected     Influenza A PCR Not Detected     Influenza B PCR Not Detected     Parainfluenza Virus 1 Not Detected     Parainfluenza Virus 2 Not Detected     Parainfluenza Virus 3 Not Detected     Parainfluenza Virus 4 Not Detected     RSV, PCR Not Detected     Bordetella pertussis pcr Not Detected     Bordetella parapertussis PCR Not Detected     Chlamydophila pneumoniae PCR Not Detected     Mycoplasma pneumo by PCR Not Detected    Narrative:      In the setting of a positive respiratory panel with a viral infection PLUS a negative procalcitonin without other underlying concern for bacterial infection, consider observing off antibiotics or discontinuation of antibiotics and continue supportive care. If the respiratory panel is positive for atypical bacterial infection (Bordetella  pertussis, Chlamydophila pneumoniae, or Mycoplasma pneumoniae), consider antibiotic de-escalation to target atypical bacterial infection.    Blood Culture - Blood, Arm, Left [[id number]]  (Normal) Collected: 10/13/24 1415    Lab Status: Preliminary result Specimen: Blood from Arm, Left Updated: 10/16/24 1445     Blood Culture No growth at 3 days    Narrative:      Less than seven (7) mL's of blood was collected.  Insufficient quantity may yield false negative results.    Blood Culture - Blood, Arm, Right [[id number]]  (Normal) Collected: 10/13/24 1415    Lab Status: Preliminary result Specimen: Blood from Arm, Right Updated: 10/16/24 1445     Blood Culture No growth at 3 days    Narrative:      Less than seven (7) mL's of blood was collected.  Insufficient quantity may yield false negative results.    Respiratory Panel PCR w/COVID-19(SARS-CoV-2) ANAMIKA/FABRICIO/JOSSY/PAD/COR/DANIELLE In-House, NP Swab in UTM/VTM, 2 HR TAT - Swab, Nasopharynx [[id number]]  (Normal) Collected: 10/13/24 1228    Lab Status: Final result Specimen: Swab from Nasopharynx Updated: 10/13/24 1325     ADENOVIRUS, PCR Not Detected     Coronavirus 229E Not Detected     Coronavirus HKU1 Not Detected     Coronavirus NL63 Not Detected     Coronavirus OC43 Not Detected     COVID19 Not Detected     Human Metapneumovirus Not Detected     Human Rhinovirus/Enterovirus Not Detected     Influenza A PCR Not Detected     Influenza B PCR Not Detected     Parainfluenza Virus 1 Not Detected     Parainfluenza Virus 2 Not Detected     Parainfluenza Virus 3 Not Detected     Parainfluenza Virus 4 Not Detected     RSV, PCR Not Detected     Bordetella pertussis pcr Not Detected     Bordetella parapertussis PCR Not Detected     Chlamydophila pneumoniae PCR Not Detected     Mycoplasma pneumo by PCR Not Detected    Narrative:      In the setting of a positive respiratory panel with a viral infection PLUS a negative procalcitonin without other underlying concern for bacterial infection,  consider observing off antibiotics or discontinuation of antibiotics and continue supportive care. If the respiratory panel is positive for atypical bacterial infection (Bordetella pertussis, Chlamydophila pneumoniae, or Mycoplasma pneumoniae), consider antibiotic de-escalation to target atypical bacterial infection.    MRSA Screen, PCR (Inpatient) - Swab, Nares [[id number]]  (Normal) Collected: 10/13/24 1228    Lab Status: Final result Specimen: Swab from Nares Updated: 10/13/24 1349     MRSA PCR No MRSA Detected    Narrative:      The negative predictive value of this diagnostic test is high and should only be used to consider de-escalating anti-MRSA therapy. A positive result may indicate colonization with MRSA and must be correlated clinically.          Imaging Results (Last 72 Hours)       ** No results found for the last 72 hours. **            Assessment & Plan     ASSESSMENT:  -Crohn's disease of small bowel & colon   -Strongyloides  -Unintentional weight loss  -Electrolyte abnormality  -Leukopenia  -Normocytic anemia  -Oral ulcers/sores  -Hypertension  -COPD  -Rheumatoid arthritis  -History of hysterectomy  -History of cholecystectomy     PLAN:  Patient is a 74-year-old female with history of rheumatoid arthritis and cholecystectomy who presented on 10/8 with hyponatremia. Nephrology has been consulted. GI has been asked to evaluate the patient for persistent diarrhea. She does have a history of possible Crohn's disease, but this has not been biopsy-proven and [facility] testing has not been consistent with IBD. Fecal calprotectin was elevated in 11/2021. S/p EGD/colonoscopy on 10/12 showing small hiatal hernia, gastritis, TI stricture s/p dilation to 12 mm, TI and colon ulcers (biopsy consistent with IBD), diverticulosis, and grade 2 internal hemorrhoids.   Pathology positive for Strongyloides. Treated with Ivermectin.     Patient with no new complaints.  Reports 3-4 loose bowel movements in the past 24  hours.  Patient has been treated with ivermectin for Strongyloides.  Continue mesalamine.  Will switch IV Solu-Medrol to oral prednisone.  Diet as tolerated.  Patient will likely need Rinvoq as outpatient. Task has been sent to our office to initiate approval process.  Could also consider clinical trial secondary to Crohn's with stricture.  S/p heart cath yesterday which showed total right and severe ostial LAD disease.  Plan for PCI next week.  Pulmonary following for cavitary lung lesion.  Continue supportive care.      Electronically signed by DRU Lew, 10/17/24, 1:00 PM EDT.

## 2024-10-17 NOTE — PROGRESS NOTES
Daily Progress Note        Hyponatremia    Diarrhea    Moderate to severe mitral regurgitation    Chest pain, atypical    Cavitary lesion of lung    Multiple tracheobronchial mucus plugs    Assessment:    New cavitary lesion in the left upper lung measuring 1.6 x 1.4 cm  nodular opacities and tree-in-bud opacities withinthe left upper and left lower lung    patient on chronic steroids  For rheumatoid arthritis  Crohn disease    severe MR , s/p  cardiac cath 10/15/2024 which revealed total right and severe ostial LAD disease.  Descending thoracic aorta has an outpouching probably saccular aneurysm versus  penetrating aortic ulcer . CT surgery consulted    Hypoxia    COPD: not in exacerbation      Hyponatremia    Anemia  HTN  Hypothyroidism     s/p EGD/colonoscopy-10/12/2024 EGD/colonoscopy, small hiatal hernia. Nonerosive chronic gastritis. Normal duodenum. TI stricture status post dilation to 12 mm. TI ulcers. Colon ulcers. Sigmoid colon diverticulosis. Grade 2 internal hemorrhoids , strongyloides.        Recommendations:     Iron transfusion     PET scan as an outpatient     Monitor bronchoscopy results    Oxygen supplement and titration to maintain saturation 90 to 95%  Bronchodilators, spiriva      Abx: cefepime will downgrade in 24 hour to avoid neurology side effects     Followed by GI: on steroids, mesalamine and ivermectin.    I personally reviewed the radiological studies            LOS: 8 days     Subjective         Objective     Vital signs for last 24 hours:  Vitals:    10/17/24 0743 10/17/24 0753 10/17/24 1230 10/17/24 1641   BP:  100/61 126/59 134/71   BP Location:  Left arm Left arm Right arm   Patient Position:  Lying Lying Lying   Pulse: 87      Resp: 18 22 24 26   Temp:  98 °F (36.7 °C) 98.1 °F (36.7 °C) 98.2 °F (36.8 °C)   TempSrc:  Oral Oral Oral   SpO2: 95%      Weight:       Height:           Intake/Output last 3 shifts:  I/O last 3 completed shifts:  In: 2200 [P.O.:1450; I.V.:750]  Out: 0    Intake/Output this shift:  I/O this shift:  In: -   Out: 600 [Urine:600]      Radiology  Imaging Results (Last 24 Hours)       ** No results found for the last 24 hours. **            Labs:  Results from last 7 days   Lab Units 10/17/24  0443   WBC 10*3/mm3 2.57*   HEMOGLOBIN g/dL 8.2*   HEMATOCRIT % 25.0*   PLATELETS 10*3/mm3 209     Results from last 7 days   Lab Units 10/17/24  0443   SODIUM mmol/L 127*   POTASSIUM mmol/L 4.0   CHLORIDE mmol/L 96*   CO2 mmol/L 24.3   BUN mg/dL 28*   CREATININE mg/dL 0.42*   CALCIUM mg/dL 8.2*   BILIRUBIN mg/dL 0.4   ALK PHOS U/L 65   ALT (SGPT) U/L 9   AST (SGOT) U/L 10   GLUCOSE mg/dL 138*     Results from last 7 days   Lab Units 10/13/24  1144   PH, ARTERIAL pH units 7.422   PO2 ART mm Hg 67.1*   PCO2, ARTERIAL mm Hg 39.8   HCO3 ART mmol/L 25.9     Results from last 7 days   Lab Units 10/17/24  0443 10/16/24  0518 10/15/24  0008   ALBUMIN g/dL 2.6* 2.5* 2.4*     Results from last 7 days   Lab Units 10/13/24  1414 10/13/24  1152   HSTROP T ng/L 22* 23*         Results from last 7 days   Lab Units 10/14/24  0425   MAGNESIUM mg/dL 2.5*     Results from last 7 days   Lab Units 10/15/24  0008   INR  1.01               Meds:   SCHEDULE  aspirin, 81 mg, Oral, Daily  atorvastatin, 40 mg, Oral, Nightly  cefepime, 2,000 mg, Intravenous, Once  cefepime, 2,000 mg, Intravenous, Q8H  [Transfer Hold] enoxaparin, 40 mg, Subcutaneous, Q24H  First Mouthwash (Magic Mouthwash), 10 mL, Swish & Spit, Q6H  folic acid, 1 mg, Oral, Daily  levothyroxine, 50 mcg, Oral, Daily  mesalamine, 4.8 g, Oral, Daily With Breakfast  nystatin, 5 mL, Swish & Swallow, 4x Daily  pantoprazole, 40 mg, Oral, Daily  [START ON 10/18/2024] predniSONE, 40 mg, Oral, Daily With Breakfast  sertraline, 50 mg, Oral, Daily  sodium chloride, 10 mL, Intravenous, Q12H  tiotropium bromide monohydrate, 2 puff, Inhalation, Daily - RT  Urea, 15 g, Oral, BID      Infusions  Pharmacy to Dose Cefepime,       PRNs    acetaminophen     albuterol    senna-docusate sodium **AND** polyethylene glycol **AND** bisacodyl **AND** bisacodyl    hydrOXYzine    influenza vaccine    ipratropium-albuterol    Lidocaine Viscous HCl    Magnesium Standard Dose Replacement - Follow Nurse / BPA Driven Protocol    melatonin    nitroglycerin    ondansetron    ondansetron ODT **OR** ondansetron    Pharmacy to Dose Cefepime    Phosphorus Replacement - Follow Nurse / BPA Driven Protocol    Potassium Replacement - Follow Nurse / BPA Driven Protocol    sodium chloride    Physical Exam:  Physical Exam  Cardiovascular:      Heart sounds: Murmur heard.      No gallop.   Pulmonary:      Effort: No respiratory distress.      Breath sounds: No stridor. Rhonchi and rales present. No wheezing.   Chest:      Chest wall: No tenderness.         ROS  Review of Systems   Respiratory:  Positive for cough and shortness of breath. Negative for wheezing and stridor.    Cardiovascular:  Positive for chest pain and palpitations. Negative for leg swelling.             Total time spent with patient greater than: 45 Minutes

## 2024-10-17 NOTE — PROGRESS NOTES
PROGRESS NOTE      Patient Name: Maryann Gaitan  : 1950  MRN: [id number]  Primary Care Physician: Eduard Little MD  Date of admission: 10/8/2024    Patient Care Team:  Eduard Little MD as PCP - General (Family Medicine)        Subjective   Subjective:     Seen and examined, comfortable, not in distress,  Sodium is 137 today, comfortable, not in distress,      Review of systems:  All other review of system unremarkable      Allergies:    Allergies   Allergen Reactions    Codeine Hives    Penicillin G Sodium Hives       Objective   Exam:     Vital Signs  Temp:  [97.8 °F (36.6 °C)-98.3 °F (36.8 °C)] 98.1 °F (36.7 °C)  Heart Rate:  [71-99] 87  Resp:  [18-24] 24  BP: ()/(53-79) 126/59  SpO2:  [92 %-98 %] 95 %  on  Flow (L/min) (Oxygen Therapy):  [2] 2;   Device (Oxygen Therapy): room air  Body mass index is 24 kg/m².    General: Elderly female in no acute distress.    Head:      Normocephalic and atraumatic.    Eyes:      PERRL/EOM intact, conjunctivae and sclerae clear without nystagmus.    Neck:      No masses, thyromegaly,  trachea central with normal respiratory effort   Lungs:    Clear bilaterally to auscultation.    Heart:      Regular rate and rhythm, no murmur no gallop  Abd:        Soft, nontender, not distended, bowel sounds positive, no shifting dullness   Pulses:   Pulses palpable  Extr:        No cyanosis or clubbing--no edema.    Neuro:    No focal deficits.   alert oriented x3  Skin:       Intact without lesions or rashes.    Psych:    Alert and cooperative; normal mood and affect; .      Results Review:  I have personally reviewed most recent Data :  CBC    Results from last 7 days   Lab Units 10/17/24  0443 10/16/24  0518 10/15/24  0008 10/13/24  1144 10/13/24  0224 10/12/24  0019 10/11/24  1237 10/11/24  0358   WBC 10*3/mm3 2.57* 2.38* 2.29*  --  3.43 2.64*  --  2.80*   HEMOGLOBIN g/dL 8.2* 8.3* 8.4*  --  9.9* 11.0* 7.8* 7.0*   HEMOGLOBIN, POC g/dL  --   --   --   9.4*  --   --   --   --    PLATELETS 10*3/mm3 209 182 145  --  173 257  --  269     CMP   Results from last 7 days   Lab Units 10/17/24  0443 10/16/24  0518 10/15/24  0008 10/14/24  0425 10/13/24  2027 10/13/24  1152 10/13/24  1144 10/13/24  0224 10/12/24  0019   SODIUM mmol/L 127* 129* 131* 135* 138  --   --  137 132*   POTASSIUM mmol/L 4.0 4.1 4.4 5.3* 5.0 3.6  --  3.1* 3.6   CHLORIDE mmol/L 96* 99 102 106 107  --   --  102 96*   CO2 mmol/L 24.3 22.6 22.0 23.9 22.1  --   --  25.8 23.8   BUN mg/dL 28* 8 12 24* 32*  --   --  37* 38*   CREATININE mg/dL 0.42* 0.32* 0.42* 0.52* 0.56*  --  0.85 0.57 0.51*   GLUCOSE mg/dL 138* 127* 123* 150* 190*  --   --  141* 145*   ALBUMIN g/dL 2.6* 2.5* 2.4*  --   --   --   --   --  2.7*   BILIRUBIN mg/dL 0.4 0.4 0.4  --   --   --   --   --  1.0   ALK PHOS U/L 65 64 70  --   --   --   --   --  80   AST (SGOT) U/L 10 9 8  --   --   --   --   --  16   ALT (SGPT) U/L 9 7 7  --   --   --   --   --  13     ABG    Results from last 7 days   Lab Units 10/13/24  1144   PH, ARTERIAL pH units 7.422   PCO2, ARTERIAL mm Hg 39.8   PO2 ART mm Hg 67.1*   O2 SATURATION ART % 93.4*   BASE EXCESS ART mmol/L 1.4     No radiology results for the last day    Results for orders placed during the hospital encounter of 10/08/24    Adult Transesophageal Echo (GONZÁLEZ) W/ Cont if Necessary Per Protocol (Cardiology Department)    Interpretation Summary    Left ventricular systolic function is normal. Left ventricular ejection fraction appears to be 56 - 60%.    The left atrial cavity is severely dilated.    Saline test results are negative.    There is mild, posterior mitral leaflet thickening present.    Moderate to severe mitral valve regurgitation is present with a centrally-directed jet noted.    There is moderate, (grade 3) plaque in the descending aorta present.    Scheduled Meds:aspirin, 81 mg, Oral, Daily  atorvastatin, 40 mg, Oral, Nightly  cefepime, 2,000 mg, Intravenous, Once  cefepime, 2,000 mg,  Intravenous, Q8H  [Transfer Hold] enoxaparin, 40 mg, Subcutaneous, Q24H  First Mouthwash (Magic Mouthwash), 10 mL, Swish & Spit, Q6H  folic acid, 1 mg, Oral, Daily  levothyroxine, 50 mcg, Oral, Daily  mesalamine, 4.8 g, Oral, Daily With Breakfast  nystatin, 5 mL, Swish & Swallow, 4x Daily  pantoprazole, 40 mg, Oral, Daily  [START ON 10/18/2024] predniSONE, 40 mg, Oral, Daily With Breakfast  sertraline, 50 mg, Oral, Daily  sodium chloride, 10 mL, Intravenous, Q12H  tiotropium bromide monohydrate, 2 puff, Inhalation, Daily - RT  Urea, 15 g, Oral, BID      Continuous Infusions:Pharmacy to Dose Cefepime,       PRN Meds:  acetaminophen    albuterol    senna-docusate sodium **AND** polyethylene glycol **AND** bisacodyl **AND** bisacodyl    hydrOXYzine    influenza vaccine    ipratropium-albuterol    Lidocaine Viscous HCl    Magnesium Standard Dose Replacement - Follow Nurse / BPA Driven Protocol    melatonin    nitroglycerin    ondansetron    ondansetron ODT **OR** ondansetron    Pharmacy to Dose Cefepime    Phosphorus Replacement - Follow Nurse / BPA Driven Protocol    Potassium Replacement - Follow Nurse / BPA Driven Protocol    sodium chloride    Assessment & Plan   Assessment and Plan:         Hyponatremia    Diarrhea    Moderate to severe mitral regurgitation    Chest pain, atypical    Cavitary lesion of lung    Multiple tracheobronchial mucus plugs    ASSESSMENT:  Hyponatremia likely etiology thiazide diuretics in the presence of Cymbalta  History of hypertension  History of arthritis  History of Crohn disease  Severe mitral regurgitation   10/12/2024 EGD/colonoscopy  small hiatal hernia.  Nonerosive chronic gastritis.  Normal duodenum.  TI stricture status post dilation to 12 mm.  TI ulcers.  Colon ulcers.  Sigmoid colon diverticulosis.  Grade 2 internal hemorrhoids     PLAN :        Sodium level that was getting better now worsening again  Today Na 127  Pt is advised to stay on fluid restriction <1L   Patient has  cavitary lesion that might be contributing to hyponatremia with some SIADH  Urea to be continued  at this time because of concern of SIADH  Most recent echo showed moderate to severe mitral regurgitation  Patient may need a GONZÁLEZ  Pt need PCI next week     Continue to follow-up with electrolytes  Follow-up with repeat sodium tomorrow morning   Potassium level better  Continue to follow-up with the nutritional status  Diarrhea has improved  Status post EGD and colonoscopy yesterday, report reviewed,  Follow-up with hematology and GI  Closely follow,           Electronically signed by Buddy Pierre MD,   [facility] kidney consultant  [phone]  10/17/2024  15:56 EDT

## 2024-10-17 NOTE — PROGRESS NOTES
Cardiology Progress Note    Patient Identification:  Name: Maryann Gaitan  Age: 74 y.o.  Sex: female  :  1950  MRN: [id number]                 Follow Up / Chief Complaint: Chest pain  Chief Complaint   Patient presents with    Abnormal Lab       Interval History: Patient presented to the hospital with abnormal labs underwent EGD and colonoscopy and developed chest pain  Patient underwent cardiac cath on 10/15/2024 that showed complex multivessel disease with ostial LAD and total right she has severe MR and penetrating ulcer of the descending thoracic aorta CT surgery has been consulted for evaluation      NP note: Patient seen and examined.  Sitting up in bed.  No complaints of chest pain or shortness of breath patient is on room air.  At the time of my exam uncertain plan.  Awaiting further evaluation from CT surgery.  Continue aspirin, statin for now.     Electronically signed by DRU Rubio, 10/17/24, 1:34 PM EDT.          Cardiology attending addendum :    I have personally performed a face-to-face diagnostic evaluation, physical exam and reviewed data on this patient.  I have reviewed documentation done by me and nurse practitioner  and corrected as needed.  And agree with the different components of documentation.Greater than 50% of the time spent in the care of this patient was provided by attending consultant/me.        Subjective: Patient seen and examined; chart and labs reviewed; discussed with bedside nurse.  Patient had elevated D-dimer.  CT PE study is negative for PE but is abnormal with cavitary lesion.  Patient underwent cardiac cath 10/15/2024 which revealed total right and severe ostial LAD.  Patient underwent GONZÁLEZ 10/16/2024 which revealed moderate to severe MR.      Objective:  10/14/2024: Sodium 135 potassium 5.3 BUN 24 creatinine 0.52 glucose 150  10/15/2024: sodium 131, potassium 4.4. bun 12 creatinine 0.42 hgb 8.4   10/16/2024: sodium 129 potassium 4.1 BUN 8 creatinine 0.32  "glucose 127 CRP 2.03 WBC 2.38 hemoglobin 8.3  10/17/2024: Sodium 127 potassium 4.0 BUN 28 creatinine 0.42 hemoglobin 8.2    History of present illness:      Maryann Gaitan is a 74-year-old female with a PMH of     COPD  Hypertension  Rheumatoid arthritis  Crohn's disease     who [facility] to [facility]/8/[facility] due to \"abnormal labs\" and nausea/vomiting/diarrhea.  Patient noted to be hyponatremic with sodium of 122, hypokalemic potassium 3.0 as well as anemic.  Patient has been followed by nephrology and GI during hospitalization.  She underwent EGD and colonoscopy yesterday showing small hiatal hernia, nonerosive chronic gastritis and sigmoid colon diverticulitis.  Per GI she has a history of Crohn disease but this has not been proven by biopsy as [employer] testing has not been consistent with IBD.  Cardiology consulted for chest pain and abnormal EKG.  Rapid response called this morning on patient secondary to acute sudden onset of sharp left-sided chest pain with associated symptoms of shortness of breath, palpitations, lightheadedness/dizziness.  EKG 10/12 reviewed showing sinus tachycardia with PACs. Stat EKG today without acute ST-T segment changes, ABG with PaO2 of 67, troponin 23, H&H 9.9/30.3.  Patient denies personal or family history of CAD, hyperlipidemia.  She does report history of hypertension, type 2 diabetes.      EGD/colonoscopy 10/12/2024 revealed small hiatal hernia, nonerosive gastritis, T1 stricture s/p dilatation with 12 mm, T1 ulcer, colon ulcers, sigmoid diverticulosis, grade 2 internal hemorrhoids and strongyloides  Echo 10/12/2024 EF of 51 to 55% with mild RV enlargement, severe left atrial enlargement, moderate to severe MR  Transesophageal echo 10/16/2024: LVEF of 55 to 60% with severe left atrial enlargement, moderate to severe MR and grade 3 descending aortic plaque  Cardiac cath 10/15/2024 reveals total right and severe ostial LAD, descending thoracic aorta had an outpouching consistent " with penetrating aortic ulcer versus aneurysm.        Assessment:  :     Chest pain  Shortness of breath  Arrhythmia  Abnormal EKG  Hypertension  Mitral regurgitation  Hyponatremia  Hypokalemia  Crohn's disease  Normocytic anemia  COPD, chronic steroid therapy  Multifocal pneumonia  Hyperglycemia, prediabetes with A1c of 5.6 from     Recommendations / Plan:         Patient presented 10/9/2024 because of abnormal labs showing low sodium, was complaining of nausea vomiting and diarrhea.  Patient's hydrochlorothiazide was held and nephrology consulted.  HS troponin is 23 and 22.  Previous proBNP was 2573.  Sodium is improved to 137.  Glucose elevated.  EKG done 10/13/2024 reviewed/interpreted by me reveals sinus arrhythmia at the rate of 78 bpm.  Patient has multifocal pneumonia and acute hypoxic respiratory failure.  He is on IV antibiotics and oxygen.  Blood cultures are pending.  Patient had D-dimer which was elevated.  CT PE study is negative for PE but has cavitary lesion in the lung.  Pulmonary Dr. León saw the patient.  He is scheduled [facility] today.  Patient has severe MR will benefit from cardiac cath.  Patient underwent cardiac cath 10/15/2024 which revealed total right and severe ostial LAD disease.  Descending thoracic aorta has an outpouching probably saccular aneurysm versus  penetrating aortic ulcer .  Echocardiogram 10/12/2024 is revealing EF of 51 to 55% with mild RV enlargement severe left atrial enlargement and right atrial enlargement and moderate to severe MR  GONZÁLEZ is revealing moderate to severe MR.  Patient would benefit from CABG and mitral valve surgery.  Patient was seen by .  Patient has been turned down for surgery due to multiple comorbid conditions.  Patient has a cavitary lesion in her lungs had bronchoscopy.  Had multiple mucous plugs.  Is awaiting lavage results.  Will continue to monitor pulmonary status and once she recovers from that hopefully early next week we  will do PCI to LAD.  For her mitral regurgitation will follow-up with structural heart team.  Follow-up with primary team and GI for nausea vomiting and possible Crohn's disease and microcytic anemia  Discussed with multiple family members were at bedside and explained risk benefits alternatives of various approaches.  Will follow and consider further evaluation treatment depending on how her condition evolves    Copied text in this portion of the note has been reviewed and is accurate as of 10/17/2024    Past Medical History:  Past Medical History:   Diagnosis Date    Arthritis     COPD (chronic obstructive pulmonary disease)     Hypertension      Past Surgical History:  Past Surgical History:   Procedure Laterality Date    BRONCHOSCOPY N/A 10/16/2024    Procedure: BRONCHOSCOPY;  Surgeon: Gallo Pope MD;  Location: [facility] ENDOSCOPY;  Service: Pulmonary;  Laterality: N/A;    CARDIAC CATHETERIZATION N/A 10/15/2024    Procedure: Left Heart Cath, possible pci;  Surgeon: Travis Connor MD;  Location: [facility] CATH INVASIVE LOCATION;  Service: Cardiovascular;  Laterality: N/A;    COLONOSCOPY N/A 10/12/2024    Procedure: COLONOSCOPY WITH BIOPSY AND WIRE GUIDED BALLOON DILATION OF TERMINAL ILEUM;  Surgeon: Rob Strong MD;  Location: [facility] ENDOSCOPY;  Service: Gastroenterology;  Laterality: N/A;  Colitis, crohns of terminal ileum, right colon ulcers, diverticulosis, hemorroids    ENDOSCOPY N/A 10/12/2024    Procedure: ESOPHAGOGASTRODUODENOSCOPY WITH BIOPSY X 2 AREA;  Surgeon: Rob Strong MD;  Location: [facility] ENDOSCOPY;  Service: Gastroenterology;  Laterality: N/A;  Chronic gastritis, HH    HYSTERECTOMY          Social History:   Social History     Tobacco Use    Smoking status: Former     Types: Cigarettes    Smokeless tobacco: Never   Substance Use Topics    Alcohol use: Never      Family History:  History reviewed. No pertinent family history.       Allergies:  Allergies  "  Allergen Reactions    Codeine Hives    Penicillin G Sodium Hives     Scheduled Meds:  aspirin, 81 mg, Daily  atorvastatin, 40 mg, Nightly  cefepime, 2,000 mg, Once  cefepime, 2,000 mg, Q8H  [Transfer Hold] enoxaparin, 40 mg, Q24H  First Mouthwash (Magic Mouthwash), 10 mL, Q6H  folic acid, 1 mg, Daily  levothyroxine, 50 mcg, Daily  mesalamine, 4.8 g, Daily With Breakfast  nystatin, 5 mL, 4x Daily  pantoprazole, 40 mg, Daily  [START ON 10/18/2024] predniSONE, 40 mg, Daily With Breakfast  sertraline, 50 mg, Daily  sodium chloride, 10 mL, Q12H  tiotropium bromide monohydrate, 2 puff, Daily - RT  Urea, 15 g, BID          Review of Systems:   Review of Systems   Constitutional: Positive for malaise/fatigue.   Cardiovascular:  Negative for chest pain.   Respiratory:  Negative for shortness of breath.            Constitutional:  Temp:  [97.8 °F (36.6 °C)-98.3 °F (36.8 °C)] 98.2 °F (36.8 °C)  Heart Rate:  [71-99] 87  Resp:  [18-26] 26  BP: ()/(53-78) 134/71    Physical Exam   /71 (BP Location: Right arm, Patient Position: Lying)   Pulse 87   Temp 98.2 °F (36.8 °C) (Oral)   Resp 26   Ht 154.9 cm (61\")   Wt 57.6 kg (127 lb)   SpO2 95%   BMI 24.00 kg/m²   General:  Appears in no acute distress  Eyes: Sclera is anicteric,  conjunctiva is clear   HEENT:  No JVD. Thyroid not visibly enlarged. No mucosal pallor or cyanosis  Respiratory: Respirations regular and unlabored at rest.  Scattered rhonchi   Cardiovascular: S1,S2 Regular rate and rhythm.  2/6 holosystolic murmur  Gastrointestinal: Abdomen nondistended.  Musculoskeletal:  No abnormal movements  Extremities: No digital clubbing or cyanosis  Skin: Color pink.   Neuro: Alert and awake.    INTAKE AND OUTPUT:    Intake/Output Summary (Last 24 hours) at 10/17/2024 1904  Last data filed at 10/17/2024 1800  Gross per 24 hour   Intake 2010 ml   Output 600 ml   Net 1410 ml       Cardiographics  Telemetry: sinus rhythm         ECG:   ECG 12 Lead Chest Pain   Final " Result   HEART RATE=78  bpm   RR Jjcisoum=512  ms   UT Qcolajpf=117  ms   P Horizontal Axis=-4  deg   P Front Axis=76  deg   QRSD Interval=94  ms   QT Scrnvbej=435  ms   YRfQ=567  ms   QRS Axis=32  deg   T Wave Axis=75  deg   - OTHERWISE NORMAL ECG -   Sinus arrhythmia   Low voltage, precordial leads   When compared with ECG of 13-Oct-2024 09:35:55,   No significant change   Electronically Signed By: Travis Connor ([facility]) 2024-10-14 08:10:34   Date and Time of Study:2024-10-13 11:34:48      ECG 12 Lead Rhythm Change   Final Result   HEART RATE=81  bpm   RR Dixzqljx=854  ms   UT Fkktqooa=567  ms   P Horizontal Axis=-1  deg   P Front Axis=78  deg   QRSD Interval=94  ms   QT Pffxvwdw=675  ms   XJzL=258  ms   QRS Axis=15  deg   T Wave Axis=65  deg   - OTHERWISE NORMAL ECG -   Sinus rhythm   Low voltage, precordial leads   When compared with ECG of 12-Oct-2024 19:50:31,   Significant change in rhythm: previously atrial fibrillation   Electronically Signed By: Travis Connor ([facility]) 2024-10-14 08:10:41   Date and Time of Study:2024-10-13 09:35:55      ECG 12 Lead Rhythm Change   Final Result   HEART FGKV=747  bpm   RR Xszbkizt=211  ms   UT Interval=  ms   P Horizontal Axis=  deg   P Front Axis=  deg   QRSD Interval=82  ms   QT Vbamemiu=151  ms   ASzW=661  ms   QRS Axis=20  deg   T Wave Axis=151  deg   - ABNORMAL ECG -   Atrial flutter/fibrillation   Ventricular premature complex   Probable  anterior infarct, age indeterminate   When compared with ECG of 05-Oct-2015 11:54:44,   Significant change in rhythm: previously sinus   Electronically Signed By: Travis Connor ([facility]) 2024-10-14 08:10:51   Date and Time of Study:2024-10-12 19:50:31      Telemetry Scan   Final Result      Telemetry Scan   Final Result      Telemetry Scan   Final Result      Telemetry Scan   Final Result      Telemetry Scan   Final Result      Telemetry Scan   Final Result      Telemetry Scan   Final Result      Telemetry Scan   Final Result       Telemetry Scan   Final Result      Telemetry Scan   Final Result      Telemetry Scan   Final Result      Telemetry Scan   Final Result      Telemetry Scan   Final Result      Telemetry Scan   Final Result      Telemetry Scan   Final Result      Telemetry Scan   Final Result      Telemetry Scan   Final Result      Telemetry Scan   Final Result      Telemetry Scan   Final Result      Telemetry Scan   Final Result      Telemetry Scan   Final Result      Telemetry Scan   Final Result      Telemetry Scan   Final Result      Telemetry Scan   Final Result      Telemetry Scan   Final Result      Telemetry Scan   Final Result      Telemetry Scan   Final Result      Telemetry Scan   Final Result      Telemetry Scan   Final Result      Telemetry Scan   Final Result      ECG 12 Lead Tachycardia    (Results Pending)   ECG 12 Lead Other; post op    (Results Pending)     I have personally reviewed EKG    Echocardiogram: Results for orders placed during the hospital encounter of 10/08/24    Adult Transesophageal Echo (GONZÁLEZ) W/ Cont if Necessary Per Protocol (Cardiology Department)    Interpretation Summary    Left ventricular systolic function is normal. Left ventricular ejection fraction appears to be 56 - 60%.    The left atrial cavity is severely dilated.    Saline test results are negative.    There is mild, posterior mitral leaflet thickening present.    Moderate to severe mitral valve regurgitation is present with a centrally-directed jet noted.    There is moderate, (grade 3) plaque in the descending aorta present.      Lab Review   I have reviewed the labs  Results from last 7 days   Lab Units 10/13/24  1414 10/13/24  1152   HSTROP T ng/L 22* 23*     Results from last 7 days   Lab Units 10/14/24  0425   MAGNESIUM mg/dL 2.5*     Results from last 7 days   Lab Units 10/17/24  0443   SODIUM mmol/L 127*   POTASSIUM mmol/L 4.0   BUN mg/dL 28*   CREATININE mg/dL 0.42*   CALCIUM mg/dL 8.2*         Results from last 7 days   Lab  "Units 10/17/24  0443 10/16/24  0518 10/15/24  0008   WBC 10*3/mm3 2.57* 2.38* 2.29*   HEMOGLOBIN g/dL 8.2* 8.3* 8.4*   HEMATOCRIT % 25.0* 25.5* 26.4*   PLATELETS 10*3/mm3 209 182 145     Results from last 7 days   Lab Units 10/15/24  0008   INR  1.01       RADIOLOGY:  Imaging Results (Last 24 Hours)       ** No results found for the last 24 hours. **                  )10/17/2024  Travis Connor MD      [facility] [place]/Transcription:   \"Dictated utilizing Dragon dictation\".   "

## 2024-10-18 LAB
ALBUMIN SERPL-MCNC: 2.6 G/DL (ref 3.5–5.2)
ALBUMIN/GLOB SERPL: 1.6 G/DL
ALP SERPL-CCNC: 67 U/L (ref 39–117)
ALT SERPL W P-5'-P-CCNC: 8 U/L (ref 1–33)
ANION GAP SERPL CALCULATED.3IONS-SCNC: 5.2 MMOL/L (ref 5–15)
AST SERPL-CCNC: 10 U/L (ref 1–32)
BACTERIA SPEC AEROBE CULT: NORMAL
BACTERIA SPEC AEROBE CULT: NORMAL
BACTERIA SPEC RESP CULT: NORMAL
BASOPHILS # BLD AUTO: 0.01 10*3/MM3 (ref 0–0.2)
BASOPHILS NFR BLD AUTO: 0.3 % (ref 0–1.5)
BILIRUB SERPL-MCNC: 0.3 MG/DL (ref 0–1.2)
BUN SERPL-MCNC: 28 MG/DL (ref 8–23)
BUN/CREAT SERPL: 66.7 (ref 7–25)
CALCIUM SPEC-SCNC: 8.4 MG/DL (ref 8.6–10.5)
CHLORIDE SERPL-SCNC: 98 MMOL/L (ref 98–107)
CO2 SERPL-SCNC: 23.8 MMOL/L (ref 22–29)
CREAT SERPL-MCNC: 0.42 MG/DL (ref 0.57–1)
DEPRECATED RDW RBC AUTO: 54.7 FL (ref 37–54)
EGFRCR SERPLBLD CKD-EPI 2021: 102.8 ML/MIN/1.73
EOSINOPHIL # BLD AUTO: 0.24 10*3/MM3 (ref 0–0.4)
EOSINOPHIL NFR BLD AUTO: 7.1 % (ref 0.3–6.2)
ERYTHROCYTE [DISTWIDTH] IN BLOOD BY AUTOMATED COUNT: 15.9 % (ref 12.3–15.4)
GLOBULIN UR ELPH-MCNC: 1.6 GM/DL
GLUCOSE SERPL-MCNC: 113 MG/DL (ref 65–99)
GRAM STN SPEC: NORMAL
HCT VFR BLD AUTO: 29.5 % (ref 34–46.6)
HGB BLD-MCNC: 9.7 G/DL (ref 12–15.9)
IMM GRANULOCYTES # BLD AUTO: 0.25 10*3/MM3 (ref 0–0.05)
IMM GRANULOCYTES NFR BLD AUTO: 7.4 % (ref 0–0.5)
LYMPHOCYTES # BLD AUTO: 1.42 10*3/MM3 (ref 0.7–3.1)
LYMPHOCYTES NFR BLD AUTO: 41.8 % (ref 19.6–45.3)
MCH RBC QN AUTO: 31.2 PG (ref 26.6–33)
MCHC RBC AUTO-ENTMCNC: 32.9 G/DL (ref 31.5–35.7)
MCV RBC AUTO: 94.9 FL (ref 79–97)
MONOCYTES # BLD AUTO: 0.68 10*3/MM3 (ref 0.1–0.9)
MONOCYTES NFR BLD AUTO: 20 % (ref 5–12)
NEUTROPHILS NFR BLD AUTO: 0.8 10*3/MM3 (ref 1.7–7)
NEUTROPHILS NFR BLD AUTO: 23.4 % (ref 42.7–76)
NRBC BLD AUTO-RTO: 0 /100 WBC (ref 0–0.2)
PLATELET # BLD AUTO: 288 10*3/MM3 (ref 140–450)
PMV BLD AUTO: 9.7 FL (ref 6–12)
POTASSIUM SERPL-SCNC: 3.2 MMOL/L (ref 3.5–5.2)
POTASSIUM SERPL-SCNC: 5.2 MMOL/L (ref 3.5–5.2)
PROT SERPL-MCNC: 4.2 G/DL (ref 6–8.5)
QT INTERVAL: 371 MS
QTC INTERVAL: 415 MS
RBC # BLD AUTO: 3.11 10*6/MM3 (ref 3.77–5.28)
REF LAB TEST METHOD: NORMAL
SODIUM SERPL-SCNC: 127 MMOL/L (ref 136–145)
WBC NRBC COR # BLD AUTO: 3.4 10*3/MM3 (ref 3.4–10.8)

## 2024-10-18 PROCEDURE — 85025 COMPLETE CBC W/AUTO DIFF WBC: CPT | Performed by: NURSE PRACTITIONER

## 2024-10-18 PROCEDURE — 94799 UNLISTED PULMONARY SVC/PX: CPT

## 2024-10-18 PROCEDURE — 84132 ASSAY OF SERUM POTASSIUM: CPT | Performed by: INTERNAL MEDICINE

## 2024-10-18 PROCEDURE — 80053 COMPREHEN METABOLIC PANEL: CPT | Performed by: NURSE PRACTITIONER

## 2024-10-18 PROCEDURE — 93010 ELECTROCARDIOGRAM REPORT: CPT | Performed by: INTERNAL MEDICINE

## 2024-10-18 PROCEDURE — 94761 N-INVAS EAR/PLS OXIMETRY MLT: CPT

## 2024-10-18 PROCEDURE — 25010000002 CEFEPIME PER 500 MG: Performed by: STUDENT IN AN ORGANIZED HEALTH CARE EDUCATION/TRAINING PROGRAM

## 2024-10-18 PROCEDURE — 93005 ELECTROCARDIOGRAM TRACING: CPT | Performed by: INTERNAL MEDICINE

## 2024-10-18 PROCEDURE — 63710000001 PREDNISONE PER 1 MG: Performed by: NURSE PRACTITIONER

## 2024-10-18 PROCEDURE — 99232 SBSQ HOSP IP/OBS MODERATE 35: CPT | Performed by: INTERNAL MEDICINE

## 2024-10-18 PROCEDURE — 94664 DEMO&/EVAL PT USE INHALER: CPT

## 2024-10-18 RX ORDER — POTASSIUM CHLORIDE 1500 MG/1
40 TABLET, EXTENDED RELEASE ORAL EVERY 4 HOURS
Status: COMPLETED | OUTPATIENT
Start: 2024-10-18 | End: 2024-10-18

## 2024-10-18 RX ORDER — METOPROLOL TARTRATE 50 MG
50 TABLET ORAL EVERY 12 HOURS SCHEDULED
Status: DISCONTINUED | OUTPATIENT
Start: 2024-10-18 | End: 2024-10-23 | Stop reason: HOSPADM

## 2024-10-18 RX ORDER — LEVOTHYROXINE SODIUM 50 UG/1
50 TABLET ORAL
Status: DISCONTINUED | OUTPATIENT
Start: 2024-10-18 | End: 2024-10-23 | Stop reason: HOSPADM

## 2024-10-18 RX ADMIN — ATORVASTATIN CALCIUM 40 MG: 40 TABLET, FILM COATED ORAL at 21:03

## 2024-10-18 RX ADMIN — DIPHENHYDRAMINE HYDROCHLORIDE AND LIDOCAINE HYDROCHLORIDE AND ALUMINUM HYDROXIDE AND MAGNESIUM HYDRO 10 ML: KIT at 18:23

## 2024-10-18 RX ADMIN — PREDNISONE 40 MG: 20 TABLET ORAL at 08:39

## 2024-10-18 RX ADMIN — DIPHENHYDRAMINE HYDROCHLORIDE AND LIDOCAINE HYDROCHLORIDE AND ALUMINUM HYDROXIDE AND MAGNESIUM HYDRO 10 ML: KIT at 07:51

## 2024-10-18 RX ADMIN — PANTOPRAZOLE SODIUM 40 MG: 40 TABLET, DELAYED RELEASE ORAL at 08:35

## 2024-10-18 RX ADMIN — Medication 10 ML: at 21:04

## 2024-10-18 RX ADMIN — CEFEPIME 2000 MG: 2 INJECTION, POWDER, FOR SOLUTION INTRAVENOUS at 13:37

## 2024-10-18 RX ADMIN — DIPHENHYDRAMINE HYDROCHLORIDE AND LIDOCAINE HYDROCHLORIDE AND ALUMINUM HYDROXIDE AND MAGNESIUM HYDRO 10 ML: KIT at 13:38

## 2024-10-18 RX ADMIN — POTASSIUM CHLORIDE 40 MEQ: 1500 TABLET, EXTENDED RELEASE ORAL at 08:35

## 2024-10-18 RX ADMIN — LEVOTHYROXINE SODIUM 50 MCG: 0.05 TABLET ORAL at 08:35

## 2024-10-18 RX ADMIN — POTASSIUM CHLORIDE 40 MEQ: 1500 TABLET, EXTENDED RELEASE ORAL at 13:38

## 2024-10-18 RX ADMIN — TIOTROPIUM BROMIDE INHALATION SPRAY 2 PUFF: 3.12 SPRAY, METERED RESPIRATORY (INHALATION) at 10:12

## 2024-10-18 RX ADMIN — NYSTATIN 500000 UNITS: 100000 SUSPENSION ORAL at 21:03

## 2024-10-18 RX ADMIN — Medication 15 G: at 08:36

## 2024-10-18 RX ADMIN — METOPROLOL TARTRATE 50 MG: 50 TABLET, FILM COATED ORAL at 08:44

## 2024-10-18 RX ADMIN — SERTRALINE 50 MG: 50 TABLET, FILM COATED ORAL at 08:35

## 2024-10-18 RX ADMIN — NYSTATIN 500000 UNITS: 100000 SUSPENSION ORAL at 18:23

## 2024-10-18 RX ADMIN — ASPIRIN 81 MG: 81 TABLET, COATED ORAL at 08:35

## 2024-10-18 RX ADMIN — FOLIC ACID 1 MG: 1 TABLET ORAL at 08:35

## 2024-10-18 RX ADMIN — METOPROLOL TARTRATE 50 MG: 50 TABLET, FILM COATED ORAL at 21:03

## 2024-10-18 RX ADMIN — DIPHENHYDRAMINE HYDROCHLORIDE AND LIDOCAINE HYDROCHLORIDE AND ALUMINUM HYDROXIDE AND MAGNESIUM HYDRO 10 ML: KIT at 01:33

## 2024-10-18 RX ADMIN — Medication 15 G: at 21:03

## 2024-10-18 RX ADMIN — NYSTATIN 500000 UNITS: 100000 SUSPENSION ORAL at 08:36

## 2024-10-18 RX ADMIN — NYSTATIN 500000 UNITS: 100000 SUSPENSION ORAL at 13:38

## 2024-10-18 RX ADMIN — IPRATROPIUM BROMIDE AND ALBUTEROL SULFATE 3 ML: .5; 3 SOLUTION RESPIRATORY (INHALATION) at 14:41

## 2024-10-18 RX ADMIN — Medication 10 ML: at 08:42

## 2024-10-18 RX ADMIN — MESALAMINE 4.8 G: 800 TABLET, DELAYED RELEASE ORAL at 09:20

## 2024-10-18 RX ADMIN — CEFEPIME 2000 MG: 2 INJECTION, POWDER, FOR SOLUTION INTRAVENOUS at 05:32

## 2024-10-18 NOTE — PROGRESS NOTES
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  Descending thoracic aorta has an outpouching probably saccular aneurysm versus  penetrating aortic ulcer . CT surgery consulted    COPD: not in exacerbation      Hyponatremia    Anemia  HTN  Hypothyroidism     s/p EGD/colonoscopy-10/12/2024 EGD/colonoscopy, small hiatal hernia. Nonerosive chronic gastritis. Normal duodenum. TI stricture status post dilation to 12 mm. TI ulcers. Colon ulcers. Sigmoid colon diverticulosis. Grade 2 internal hemorrhoids , strongyloides.        Recommendations:     ivermectin 2 days only    S/p Iron transfusion     PET scan as an outpatient     Monitor bronchoscopy results    Oxygen supplement and titration to maintain saturation 90 to 95%  Bronchodilators, spiriva      Abx: cefepime will downgrade in 24 hour to avoid neurology side effects     Followed by GI: on steroids, mesalamine and ivermectin.    I personally reviewed the radiological studies            LOS: 9 days     Subjective         Objective     Vital signs for last 24 hours:  Vitals:    10/18/24 0345 10/18/24 0400 10/18/24 0415 10/18/24 0730   BP:  140/76  126/82   BP Location:    Right arm   Patient Position:    Lying   Pulse: 112 110 96 103   Resp:    25   Temp:    98 °F (36.7 °C)   TempSrc:    Oral   SpO2: 96% 97% 93% 96%   Weight:       Height:           Intake/Output last 3 shifts:  I/O last 3 completed shifts:  In: 3244  [P.O.:1430; I.V.:1114; IV Piggyback:700]  Out: 1200 [Urine:1200]  Intake/Output this shift:  No intake/output data recorded.      Radiology  Imaging Results (Last 24 Hours)       ** No results found for the last 24 hours. **            Labs:  Results from last 7 days   Lab Units 10/18/24  0454   WBC 10*3/mm3 3.40   HEMOGLOBIN g/dL 9.7*   HEMATOCRIT % 29.5*   PLATELETS 10*3/mm3 288     Results from last 7 days   Lab Units 10/18/24  0454   SODIUM mmol/L 127*   POTASSIUM mmol/L 3.2*   CHLORIDE mmol/L 98   CO2 mmol/L 23.8   BUN mg/dL 28*   CREATININE mg/dL 0.42*   CALCIUM mg/dL 8.4*   BILIRUBIN mg/dL 0.3   ALK PHOS U/L 67   ALT (SGPT) U/L 8   AST (SGOT) U/L 10   GLUCOSE mg/dL 113*     Results from last 7 days   Lab Units 10/13/24  1144   PH, ARTERIAL pH units 7.422   PO2 ART mm Hg 67.1*   PCO2, ARTERIAL mm Hg 39.8   HCO3 ART mmol/L 25.9     Results from last 7 days   Lab Units 10/18/24  0454 10/17/24  0443 10/16/24  0518   ALBUMIN g/dL 2.6* 2.6* 2.5*     Results from last 7 days   Lab Units 10/13/24  1414 10/13/24  1152   HSTROP T ng/L 22* 23*         Results from last 7 days   Lab Units 10/14/24  0425   MAGNESIUM mg/dL 2.5*     Results from last 7 days   Lab Units 10/15/24  0008   INR  1.01               Meds:   SCHEDULE  aspirin, 81 mg, Oral, Daily  atorvastatin, 40 mg, Oral, Nightly  cefepime, 2,000 mg, Intravenous, Once  cefepime, 2,000 mg, Intravenous, Q8H  [Transfer Hold] enoxaparin, 40 mg, Subcutaneous, Q24H  First Mouthwash (Magic Mouthwash), 10 mL, Swish & Spit, Q6H  folic acid, 1 mg, Oral, Daily  levothyroxine, 50 mcg, Oral, Q AM  mesalamine, 4.8 g, Oral, Daily With Breakfast  metoprolol tartrate, 50 mg, Oral, Q12H  nystatin, 5 mL, Swish & Swallow, 4x Daily  pantoprazole, 40 mg, Oral, Daily  potassium chloride ER, 40 mEq, Oral, Q4H  predniSONE, 40 mg, Oral, Daily With Breakfast  sertraline, 50 mg, Oral, Daily  sodium chloride, 10 mL, Intravenous, Q12H  tiotropium bromide monohydrate, 2 puff, Inhalation, Daily  - RT  Urea, 15 g, Oral, BID      Infusions  Pharmacy to Dose Cefepime,       PRNs    acetaminophen    albuterol    senna-docusate sodium **AND** polyethylene glycol **AND** bisacodyl **AND** bisacodyl    hydrOXYzine    influenza vaccine    ipratropium-albuterol    Lidocaine Viscous HCl    Magnesium Standard Dose Replacement - Follow Nurse / BPA Driven Protocol    melatonin    nitroglycerin    ondansetron ODT **OR** ondansetron    Pharmacy to Dose Cefepime    Phosphorus Replacement - Follow Nurse / BPA Driven Protocol    Potassium Replacement - Follow Nurse / BPA Driven Protocol    sodium chloride    Physical Exam:  Physical Exam  Cardiovascular:      Heart sounds: Murmur heard.      No gallop.   Pulmonary:      Effort: No respiratory distress.      Breath sounds: No stridor. Rhonchi and rales present. No wheezing.   Chest:      Chest wall: No tenderness.         ROS  Review of Systems   Respiratory:  Positive for cough and shortness of breath. Negative for wheezing and stridor.    Cardiovascular:  Positive for chest pain and palpitations. Negative for leg swelling.             Total time spent with patient greater than: 45 Minutes

## 2024-10-18 NOTE — SIGNIFICANT NOTE
10/18/24 1601   OTHER   Discipline physical therapist   Rehab Time/Intention   Session Not Performed   (cath pending, will follow up)   Therapy Assessment/Plan (PT)   Criteria for Skilled Interventions Met (PT) yes   Recommendation   PT - Next Appointment 10/19/24

## 2024-10-18 NOTE — PROGRESS NOTES
PROGRESS NOTE      Patient Name: Maryann Gaitan  : 1950  MRN: [id number]  Primary Care Physician: Eduard Little MD  Date of admission: 10/8/2024    Patient Care Team:  Eduard Little MD as PCP - General (Family Medicine)        Subjective   Subjective:     Seen and examined, comfortable, not in distress,  Sodium is 137 today, comfortable, not in distress,      Review of systems:  All other review of system unremarkable      Allergies:    Allergies   Allergen Reactions    Codeine Hives    Penicillin G Sodium Hives       Objective   Exam:     Vital Signs  Temp:  [97.9 °F (36.6 °C)-98.2 °F (36.8 °C)] 98 °F (36.7 °C)  Heart Rate:  [] 76  Resp:  [20-29] 22  BP: (120-145)/(64-83) 121/72  SpO2:  [93 %-100 %] 100 %  on   ;   Device (Oxygen Therapy): room air  Body mass index is 24 kg/m².    General: Elderly female in no acute distress.    Head:      Normocephalic and atraumatic.    Eyes:      PERRL/EOM intact, conjunctivae and sclerae clear without nystagmus.    Neck:      No masses, thyromegaly,  trachea central with normal respiratory effort   Lungs:    Clear bilaterally to auscultation.    Heart:      Regular rate and rhythm, no murmur no gallop  Abd:        Soft, nontender, not distended, bowel sounds positive, no shifting dullness   Pulses:   Pulses palpable  Extr:        No cyanosis or clubbing--no edema.    Neuro:    No focal deficits.   alert oriented x3  Skin:       Intact without lesions or rashes.    Psych:    Alert and cooperative; normal mood and affect; .      Results Review:  I have personally reviewed most recent Data :  CBC    Results from last 7 days   Lab Units 10/18/24  0454 10/17/24  0443 10/16/24  0518 10/15/24  0008 10/13/24  1144 10/13/24  0224 10/12/24  0019   WBC 10*3/mm3 3.40 2.57* 2.38* 2.29*  --  3.43 2.64*   HEMOGLOBIN g/dL 9.7* 8.2* 8.3* 8.4*  --  9.9* 11.0*   HEMOGLOBIN, POC g/dL  --   --   --   --  9.4*  --   --    PLATELETS 10*3/mm3 288 394 265 145  --   173 257     CMP   Results from last 7 days   Lab Units 10/18/24  0454 10/17/24  0443 10/16/24  0518 10/15/24  0008 10/14/24  0425 10/13/24  2027 10/13/24  1152 10/13/24  1144 10/13/24  0224 10/12/24  0019   SODIUM mmol/L 127* 127* 129* 131* 135* 138  --   --  137 132*   POTASSIUM mmol/L 3.2* 4.0 4.1 4.4 5.3* 5.0 3.6  --  3.1* 3.6   CHLORIDE mmol/L 98 96* 99 102 106 107  --   --  102 96*   CO2 mmol/L 23.8 24.3 22.6 22.0 23.9 22.1  --   --  25.8 23.8   BUN mg/dL 28* 28* 8 12 24* 32*  --   --  37* 38*   CREATININE mg/dL 0.42* 0.42* 0.32* 0.42* 0.52* 0.56*  --  0.85 0.57 0.51*   GLUCOSE mg/dL 113* 138* 127* 123* 150* 190*  --   --  141* 145*   ALBUMIN g/dL 2.6* 2.6* 2.5* 2.4*  --   --   --   --   --  2.7*   BILIRUBIN mg/dL 0.3 0.4 0.4 0.4  --   --   --   --   --  1.0   ALK PHOS U/L 67 65 64 70  --   --   --   --   --  80   AST (SGOT) U/L 10 10 9 8  --   --   --   --   --  16   ALT (SGPT) U/L 8 9 7 7  --   --   --   --   --  13     ABG    Results from last 7 days   Lab Units 10/13/24  1144   PH, ARTERIAL pH units 7.422   PCO2, ARTERIAL mm Hg 39.8   PO2 ART mm Hg 67.1*   O2 SATURATION ART % 93.4*   BASE EXCESS ART mmol/L 1.4     No radiology results for the last day    Results for orders placed during the hospital encounter of 10/08/24    Adult Transesophageal Echo (GONZÁLEZ) W/ Cont if Necessary Per Protocol (Cardiology Department)    Interpretation Summary    Left ventricular systolic function is normal. Left ventricular ejection fraction appears to be 56 - 60%.    The left atrial cavity is severely dilated.    Saline test results are negative.    There is mild, posterior mitral leaflet thickening present.    Moderate to severe mitral valve regurgitation is present with a centrally-directed jet noted.    There is moderate, (grade 3) plaque in the descending aorta present.    Scheduled Meds:aspirin, 81 mg, Oral, Daily  atorvastatin, 40 mg, Oral, Nightly  [Transfer Hold] enoxaparin, 40 mg, Subcutaneous, Q24H  First Mouthwash  (Magic Mouthwash), 10 mL, Swish & Spit, Q6H  folic acid, 1 mg, Oral, Daily  levothyroxine, 50 mcg, Oral, Q AM  mesalamine, 4.8 g, Oral, Daily With Breakfast  metoprolol tartrate, 50 mg, Oral, Q12H  nystatin, 5 mL, Swish & Swallow, 4x Daily  pantoprazole, 40 mg, Oral, Daily  predniSONE, 40 mg, Oral, Daily With Breakfast  sertraline, 50 mg, Oral, Daily  sodium chloride, 10 mL, Intravenous, Q12H  tiotropium bromide monohydrate, 2 puff, Inhalation, Daily - RT  Urea, 15 g, Oral, BID      Continuous Infusions:     PRN Meds:  acetaminophen    albuterol    senna-docusate sodium **AND** polyethylene glycol **AND** bisacodyl **AND** bisacodyl    hydrOXYzine    influenza vaccine    ipratropium-albuterol    Lidocaine Viscous HCl    Magnesium Standard Dose Replacement - Follow Nurse / BPA Driven Protocol    melatonin    nitroglycerin    ondansetron ODT **OR** ondansetron    Phosphorus Replacement - Follow Nurse / BPA Driven Protocol    Potassium Replacement - Follow Nurse / BPA Driven Protocol    sodium chloride    Assessment & Plan   Assessment and Plan:         Hyponatremia    Diarrhea    Moderate to severe mitral regurgitation    Chest pain, atypical    Cavitary lesion of lung    Multiple tracheobronchial mucus plugs    ASSESSMENT:  Hyponatremia likely etiology thiazide diuretics in the presence of Cymbalta  History of hypertension  History of arthritis  History of Crohn disease  Severe mitral regurgitation   10/12/2024 EGD/colonoscopy  small hiatal hernia.  Nonerosive chronic gastritis.  Normal duodenum.  TI stricture status post dilation to 12 mm.  TI ulcers.  Colon ulcers.  Sigmoid colon diverticulosis.  Grade 2 internal hemorrhoids     PLAN :        Sodium level that was getting better now worsening again  Today Na 127 again continue UREA   Although pt on SSRI can cause Hyponatremia to me patient lungs are more likley caused recent acute drop  Pt is advised to stay on fluid restriction <1L   Patient has cavitary lesion  that might be contributing to hyponatremia with some SIADH  Urea to be continued  at this time because of concern of SIADH  SSRI can be stopped if OK with psych may need evaluation from them  Most recent echo showed moderate to severe mitral regurgitation  Patient may need a GONZÁLEZ  Pt need PCI next week   Continue to follow-up with electrolytes  Follow-up with repeat sodium tomorrow morning   Potassium level better but still low cont to replace   Continue to follow-up with the nutritional status  Diarrhea has improved  Status post EGD and colonoscopy yesterday, report reviewed,  Follow-up with hematology and GI  Closely follow,           Electronically signed by Buddy Pierre MD,   [facility] kidney consultant  [phone]  10/18/2024  17:46 EDT

## 2024-10-18 NOTE — CASE MANAGEMENT/SOCIAL WORK
Continued Stay Note  ERIC Yeung     Patient Name: Maryann Gaitan  MRN: [id number]  Today's Date: 10/18/2024    Admit Date: 10/8/2024    Plan: Plan to return home with family and current [facility]  SN/PT/OT (need new order). Declines SNF.   Discharge Plan       Row Name 10/18/24 1226       Plan    Plan Plan to return home with family and current [facility]  SN/PT/OT (need new order). Declines SNF.    Plan Comments Cm received message from liaison that patient was current with [facility], referral in basket, accepted. Do Not plan to do [facility] during this admit, will schedule as OP. DC Barriers: IV Iron, IV Abxs, IV steroids, monitor/replace electrolytes, paracite tx, cultures pending, Pulm/Cardio/HemOnc/GI/Nephro following.                 Expected Discharge Date and Time       Expected Discharge Date Expected Discharge Time    Oct 21, 2024               TORIN Turner RN  ICU/CVU   O: [phone]  C: [phone]  [email or web address]

## 2024-10-18 NOTE — PROGRESS NOTES
Cardiology Progress Note    Patient Identification:  Name: Maryann Gaitan  Age: 74 y.o.  Sex: female  :  1950  MRN: [id number]                 Follow Up / Chief Complaint: Chest pain  Chief Complaint   Patient presents with    Abnormal Lab       Interval History: Patient presented to the hospital with abnormal labs underwent EGD and colonoscopy and developed chest pain  Patient underwent cardiac cath on 10/15/2024 that showed complex multivessel disease with ostial LAD and total right she has severe MR and penetrating ulcer of the descending thoracic aorta CT surgery has been consulted for evaluation      NP note: Patient seen by Dr. Connor.  Plans for PCI next week sometime.  Cultures pending from Bronch.      Continue aspirin, statin, beta blocker     Electronically signed by DRU Rubio, 10/18/24, 2:53 PM EDT.      Cardiology attending addendum :    I have personally performed a face-to-face diagnostic evaluation, physical exam and reviewed data on this patient.  I have reviewed documentation done by me and nurse practitioner  and corrected as needed.  And agree with the different components of documentation.Greater than 50% of the time spent in the care of this patient was provided by attending consultant/me.        Subjective: Patient seen and examined; chart and labs reviewed; discussed with bedside nurse.  Patient had elevated D-dimer.  CT PE study is negative for PE but is abnormal with cavitary lesion.  Patient underwent cardiac cath 10/15/2024 which revealed total right and severe ostial LAD.  Patient underwent GONZÁLEZ 10/16/2024 which revealed moderate to severe MR.      Objective:    10/14/2024: Sodium 135 potassium 5.3 BUN 24 creatinine 0.52 glucose 150  10/15/2024: sodium 131, potassium 4.4. bun 12 creatinine 0.42 hgb 8.4   10/16/2024: sodium 129 potassium 4.1 BUN 8 creatinine 0.32 glucose 127 CRP 2.03 WBC 2.38 hemoglobin 8.3  10/17/2024: Sodium 127 potassium 4.0 BUN 28 creatinine 0.42  "hemoglobin 8.2  10/18/2024: Sodium 127 potassium 3.2 BUN 28 creatinine 0.42 hemoglobin 9.7    History of present illness:      Maryann Gaitan is a 74-year-old female with a PMH of     COPD  Hypertension  Rheumatoid arthritis  Crohn's disease     who presented to [facility] on 10/8/2024 due to \"abnormal labs\" and nausea/vomiting/diarrhea.  Patient noted to be hyponatremic with sodium of 122, hypokalemic potassium 3.0 as well as anemic.  Patient has been followed by nephrology and GI during hospitalization.  She underwent EGD and colonoscopy yesterday showing small hiatal hernia, nonerosive chronic gastritis and sigmoid colon diverticulitis.  Per GI she has a history of Crohn disease but this has not been proven by biopsy as [employer] testing has not been consistent with IBD.  Cardiology consulted for chest pain and abnormal EKG.  Rapid response called this morning on patient secondary to acute sudden onset of sharp left-sided chest pain with associated symptoms of shortness of breath, palpitations, lightheadedness/dizziness.  EKG 10/12 reviewed showing sinus tachycardia with PACs. Stat EKG today without acute ST-T segment changes, ABG with PaO2 of 67, troponin 23, H&H 9.9/30.3.  Patient denies personal or family history of CAD, hyperlipidemia.  She does report history of hypertension, type 2 diabetes.      EGD/colonoscopy 10/12/2024 revealed small hiatal hernia, nonerosive gastritis, T1 stricture s/p dilatation with 12 mm, T1 ulcer, colon ulcers, sigmoid diverticulosis, grade 2 internal hemorrhoids and strongyloides  Echo 10/12/2024 EF of 51 to 55% with mild RV enlargement, severe left atrial enlargement, moderate to severe MR  Transesophageal echo 10/16/2024: LVEF of 55 to 60% with severe left atrial enlargement, moderate to severe MR and grade 3 descending aortic plaque  Cardiac cath 10/15/2024 reveals total right and severe ostial LAD, descending thoracic aorta had an outpouching consistent with penetrating aortic ulcer " versus aneurysm.        Assessment:  :     Chest pain  Shortness of breath  Arrhythmia  Abnormal EKG  Hypertension  Mitral regurgitation  Hyponatremia  Hypokalemia  Crohn's disease  Normocytic anemia  COPD, chronic steroid therapy  Multifocal pneumonia  Hyperglycemia, prediabetes with A1c of 5.6 from     Recommendations / Plan:         Patient presented 10/9/2024 because of abnormal labs showing low sodium, was complaining of nausea vomiting and diarrhea.  Patient's hydrochlorothiazide was held and nephrology consulted.  HS troponin is 23 and 22.  Previous proBNP was 2573.  Sodium is improved to 137.  Glucose elevated.  EKG done 10/13/2024 reviewed/interpreted by me reveals sinus arrhythmia at the rate of 78 bpm.  Patient has multifocal pneumonia and acute hypoxic respiratory failure.  He is on IV antibiotics and oxygen.  Blood cultures are pending.  Patient had D-dimer which was elevated.  CT PE study is negative for PE but has cavitary lesion in the lung.  Pulmonary DrNandini Cho, underwent bronchoscopy.  Patient has severe MR will benefit from cardiac cath.  Patient underwent cardiac cath 10/15/2024 which revealed total right and severe ostial LAD disease.  Descending thoracic aorta has an outpouching probably saccular aneurysm versus  penetrating aortic ulcer .  Echocardiogram 10/12/2024 is revealing EF of 51 to 55% with mild RV enlargement severe left atrial enlargement and right atrial enlargement and moderate to severe MR  GONZÁLEZ is revealing moderate to severe MR.  Patient would benefit from CABG and mitral valve surgery.  Patient was seen by .  Patient has been turned down for surgery due to multiple comorbid conditions.  Patient has a cavitary lesion in her lungs had bronchoscopy.  Had multiple mucous plugs.  Is awaiting lavage results.  Will schedule for PCI to LAD week after discharge electively.  Patient's RCA is total is not amenable to PCI.  For her mitral regurgitation will follow-up with  structural heart team.  Follow-up with primary team and GI for nausea vomiting and possible Crohn's disease and microcytic anemia  Discussed with multiple family members were at bedside and explained risk benefits alternatives of various approaches.  Will follow and consider further evaluation treatment depending on how her condition evolves    Copied text in this portion of the note has been reviewed and is accurate as of 10/18/2024    Past Medical History:  Past Medical History:   Diagnosis Date    Arthritis     COPD (chronic obstructive pulmonary disease)     Hypertension      Past Surgical History:  Past Surgical History:   Procedure Laterality Date    BRONCHOSCOPY N/A 10/16/2024    Procedure: BRONCHOSCOPY;  Surgeon: Gallo Pope MD;  Location: [facility] ENDOSCOPY;  Service: Pulmonary;  Laterality: N/A;    CARDIAC CATHETERIZATION N/A 10/15/2024    Procedure: Left Heart Cath, possible pci;  Surgeon: Travis Connor MD;  Location: [facility] CATH INVASIVE LOCATION;  Service: Cardiovascular;  Laterality: N/A;    COLONOSCOPY N/A 10/12/2024    Procedure: COLONOSCOPY WITH BIOPSY AND WIRE GUIDED BALLOON DILATION OF TERMINAL ILEUM;  Surgeon: Rob Strong MD;  Location: [facility] ENDOSCOPY;  Service: Gastroenterology;  Laterality: N/A;  Colitis, crohns of terminal ileum, right colon ulcers, diverticulosis, hemorroids    ENDOSCOPY N/A 10/12/2024    Procedure: ESOPHAGOGASTRODUODENOSCOPY WITH BIOPSY X 2 AREA;  Surgeon: Rob Strong MD;  Location: [facility] ENDOSCOPY;  Service: Gastroenterology;  Laterality: N/A;  Chronic gastritis, HH    HYSTERECTOMY          Social History:   Social History     Tobacco Use    Smoking status: Former     Types: Cigarettes    Smokeless tobacco: Never   Substance Use Topics    Alcohol use: Never      Family History:  History reviewed. No pertinent family history.       Allergies:  Allergies   Allergen Reactions    Codeine Hives    Penicillin G Sodium Hives  "    Scheduled Meds:  aspirin, 81 mg, Daily  atorvastatin, 40 mg, Nightly  cefepime, 2,000 mg, Once  cefepime, 2,000 mg, Q8H  [Transfer Hold] enoxaparin, 40 mg, Q24H  First Mouthwash (Magic Mouthwash), 10 mL, Q6H  folic acid, 1 mg, Daily  levothyroxine, 50 mcg, Daily  mesalamine, 4.8 g, Daily With Breakfast  nystatin, 5 mL, 4x Daily  pantoprazole, 40 mg, Daily  predniSONE, 40 mg, Daily With Breakfast  sertraline, 50 mg, Daily  sodium chloride, 10 mL, Q12H  tiotropium bromide monohydrate, 2 puff, Daily - RT  Urea, 15 g, BID          Review of Systems:   Review of Systems   Constitutional: Positive for malaise/fatigue.   Cardiovascular:  Negative for chest pain.   Respiratory:  Negative for shortness of breath.            Constitutional:  Temp:  [97.9 °F (36.6 °C)-98.2 °F (36.8 °C)] 98.2 °F (36.8 °C)  Heart Rate:  [] 96  Resp:  [18-29] 26  BP: (100-145)/(59-83) 140/76    Physical Exam   /76   Pulse 96   Temp 98.2 °F (36.8 °C) (Oral)   Resp 26   Ht 154.9 cm (61\")   Wt 57.6 kg (127 lb)   SpO2 93%   BMI 24.00 kg/m²   General:  Appears in no acute distress  Eyes: Sclera is anicteric,  conjunctiva is clear   HEENT:  No JVD. Thyroid not visibly enlarged. No mucosal pallor or cyanosis  Respiratory: Respirations regular and unlabored at rest.  Scattered rhonchi   Cardiovascular: S1,S2 Regular rate and rhythm.  2/6 holosystolic murmur  Gastrointestinal: Abdomen nondistended.  Musculoskeletal:  No abnormal movements  Extremities: No digital clubbing or cyanosis  Skin: Color pink.   Neuro: Alert and awake.    INTAKE AND OUTPUT:    Intake/Output Summary (Last 24 hours) at 10/18/2024 0714  Last data filed at 10/18/2024 0600  Gross per 24 hour   Intake 2894 ml   Output 1200 ml   Net 1694 ml       Cardiographics  Telemetry: sinus rhythm         ECG:   ECG 12 Lead Chest Pain   Final Result   HEART RATE=78  bpm   RR Tszwbqbc=859  ms   ND Luusitwi=051  ms   P Horizontal Axis=-4  deg   P Front Axis=76  deg   QRSD " Interval=94  ms   QT Pttsnlbn=534  ms   KGpZ=918  ms   QRS Axis=32  deg   T Wave Axis=75  deg   - OTHERWISE NORMAL ECG -   Sinus arrhythmia   Low voltage, precordial leads   When compared with ECG of 13-Oct-2024 09:35:55,   No significant change   Electronically Signed By: Travis Connor ([facility]) 2024-10-14 08:10:34   Date and Time of Study:2024-10-13 11:34:48      ECG 12 Lead Rhythm Change   Final Result   HEART RATE=81  bpm   RR Tautinfp=035  ms   PA Dsolvjqo=244  ms   P Horizontal Axis=-1  deg   P Front Axis=78  deg   QRSD Interval=94  ms   QT Plltfiyx=387  ms   ROvZ=960  ms   QRS Axis=15  deg   T Wave Axis=65  deg   - OTHERWISE NORMAL ECG -   Sinus rhythm   Low voltage, precordial leads   When compared with ECG of 12-Oct-2024 19:50:31,   Significant change in rhythm: previously atrial fibrillation   Electronically Signed By: Travis Connor ([facility]) 2024-10-14 08:10:41   Date and Time of Study:2024-10-13 09:35:55      ECG 12 Lead Rhythm Change   Final Result   HEART OMMF=088  bpm   RR Ianolcgr=132  ms   PA Interval=  ms   P Horizontal Axis=  deg   P Front Axis=  deg   QRSD Interval=82  ms   QT Sdkwloos=617  ms   KUcT=438  ms   QRS Axis=20  deg   T Wave Axis=151  deg   - ABNORMAL ECG -   Atrial flutter/fibrillation   Ventricular premature complex   Probable  anterior infarct, age indeterminate   When compared with ECG of 05-Oct-2015 11:54:44,   Significant change in rhythm: previously sinus   Electronically Signed By: Travis Connor ([facility]) 2024-10-14 08:10:51   Date and Time of Study:2024-10-12 19:50:31      Telemetry Scan   Final Result      Telemetry Scan   Final Result      Telemetry Scan   Final Result      Telemetry Scan   Final Result      Telemetry Scan   Final Result      Telemetry Scan   Final Result      Telemetry Scan   Final Result      Telemetry Scan   Final Result      Telemetry Scan   Final Result      Telemetry Scan   Final Result      Telemetry Scan   Final Result      Telemetry Scan   Final  Result      Telemetry Scan   Final Result      Telemetry Scan   Final Result      Telemetry Scan   Final Result      Telemetry Scan   Final Result      Telemetry Scan   Final Result      Telemetry Scan   Final Result      Telemetry Scan   Final Result      Telemetry Scan   Final Result      Telemetry Scan   Final Result      Telemetry Scan   Final Result      Telemetry Scan   Final Result      Telemetry Scan   Final Result      Telemetry Scan   Final Result      Telemetry Scan   Final Result      Telemetry Scan   Final Result      Telemetry Scan   Final Result      Telemetry Scan   Final Result      Telemetry Scan   Final Result      Telemetry Scan   Final Result      Telemetry Scan   Final Result      Telemetry Scan   Final Result      Telemetry Scan   Final Result      ECG 12 Lead Tachycardia    (Results Pending)   ECG 12 Lead Other; post op    (Results Pending)     I have personally reviewed EKG    Echocardiogram: Results for orders placed during the hospital encounter of 10/08/24    Adult Transesophageal Echo (GONZÁLEZ) W/ Cont if Necessary Per Protocol (Cardiology Department)    Interpretation Summary    Left ventricular systolic function is normal. Left ventricular ejection fraction appears to be 56 - 60%.    The left atrial cavity is severely dilated.    Saline test results are negative.    There is mild, posterior mitral leaflet thickening present.    Moderate to severe mitral valve regurgitation is present with a centrally-directed jet noted.    There is moderate, (grade 3) plaque in the descending aorta present.      Lab Review   I have reviewed the labs  Results from last 7 days   Lab Units 10/13/24  1414 10/13/24  1152   HSTROP T ng/L 22* 23*     Results from last 7 days   Lab Units 10/14/24  0425   MAGNESIUM mg/dL 2.5*     Results from last 7 days   Lab Units 10/18/24  0454   SODIUM mmol/L 127*   POTASSIUM mmol/L 3.2*   BUN mg/dL 28*   CREATININE mg/dL 0.42*   CALCIUM mg/dL 8.4*         Results from last 7  "days   Lab Units 10/18/24  0454 10/17/24  0443 10/16/24  0518   WBC 10*3/mm3 3.40 2.57* 2.38*   HEMOGLOBIN g/dL 9.7* 8.2* 8.3*   HEMATOCRIT % 29.5* 25.0* 25.5*   PLATELETS 10*3/mm3 288 209 182     Results from last 7 days   Lab Units 10/15/24  0008   INR  1.01       RADIOLOGY:  Imaging Results (Last 24 Hours)       ** No results found for the last 24 hours. **                  )10/18/2024  MD EVERETTE Araujo/Transcription:   \"Dictated utilizing Dragon dictation\".   "

## 2024-10-18 NOTE — PROGRESS NOTES
CC:  abnormal lab work and abd cramping     F/U:  MV CAD/ mod to severe MR/ DTA saccular dilatation--Camporrotondo  EF 65% (cath)    Subjective:  no c/o's    Plans for PCI this admission  GONZÁLEZ 10/16 EF 55-60%, mod to severe MR with central jet, moderate grade 3 plaque in DTA  Bronch 10/16 cultures sent      PREOP studies:  Carotid duplex:  < 50% bilat  Vein amari:  adequate above knees bilat  A1c:  5.64   Plt agg:  pending  MRSA screen:  negative  COVID-19 screen:  negative  UA:  negative for UTI 10/8        Intake/Output Summary (Last 24 hours) at 10/18/2024 1043  Last data filed at 10/18/2024 0600  Gross per 24 hour   Intake 2414 ml   Output 600 ml   Net 1814 ml     Temp:  [97.9 °F (36.6 °C)-98.2 °F (36.8 °C)] 98 °F (36.7 °C)  Heart Rate:  [] 103  Resp:  [20-29] 20  BP: (120-145)/(59-83) 126/82      Results from last 7 days   Lab Units 10/18/24  0454 10/17/24  0443 10/16/24  0518 10/15/24  0008   WBC 10*3/mm3 3.40 2.57*   < > 2.29*   HEMOGLOBIN g/dL 9.7* 8.2*   < > 8.4*   HEMATOCRIT % 29.5* 25.0*   < > 26.4*   PLATELETS 10*3/mm3 288 209   < > 145   INR   --   --   --  1.01    < > = values in this interval not displayed.     Results from last 7 days   Lab Units 10/18/24  0454 10/15/24  0008 10/14/24  0425   CREATININE mg/dL 0.42*   < > 0.52*   POTASSIUM mmol/L 3.2*   < > 5.3*   SODIUM mmol/L 127*   < > 135*   MAGNESIUM mg/dL  --   --  2.5*    < > = values in this interval not displayed.       Physical Exam:  Neuro intact, nad, resting in bed, son at bedside  Tele:  SR 80s  Diminished bases, 95% 2L  Benign abd, + BM  No edema    Assessment/Plan:  Principal Problem:    Hyponatremia  Active Problems:    Diarrhea    Moderate to severe mitral regurgitation    Chest pain, atypical    Cavitary lesion of lung    Multiple tracheobronchial mucus plugs    - MV CAD, EF 65% (cath)--surgical eval in progress  - Severe mitral regurgitation--GONZÁLEZ today  - Descending thoracic dilatation/ outpouching/saccular, 3.8 cm--will need  yearly aorta surveillance  - JUVENTINO cavitary lesion/ multifocal pneumonia--bronch today, on cefepime  - Crohn's disease with terminal ileum stricture--GI following, s/p EGD/ colonoscopy 10/12 and dilatation  - 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 Lott and Dr. Connor discussed pt yest.  Plans for PCI next week and evaluation for MitraClip procedure.  This plan was also d/w pt's son at bedside per Dr. Lott.   Plans for her to f/u in [facility] in 6 months with CTA to re-evaluate her DTA.  Our office will call her closer to 6 month kelli and order CTA scan at that time. Per Dr. Lott--her aorta is NOT leaking.    Toshia Osman, DRU  10/18/2024  10:43 EDT

## 2024-10-18 NOTE — CONSULTS
Nutrition Services    Patient Name: Maryann Gaitan  YOB: 1950  MRN: [id number]  Admission date: 10/8/2024    Comment:  -- Continue current diet and encourage good PO intakes.      CLINICAL NUTRITION ASSESSMENT      Reason for Assessment Follow up protocol   10/12:  Malnutrition screening tool score of 3     H&P      Past Medical History:   Diagnosis Date    Arthritis     COPD (chronic obstructive pulmonary disease)     Hypertension        Past Surgical History:   Procedure Laterality Date    BRONCHOSCOPY N/A 10/16/2024    Procedure: BRONCHOSCOPY;  Surgeon: Gallo Pope MD;  Location: [facility] ENDOSCOPY;  Service: Pulmonary;  Laterality: N/A;    CARDIAC CATHETERIZATION N/A 10/15/2024    Procedure: Left Heart Cath, possible pci;  Surgeon: Travis Connor MD;  Location: [facility] CATH INVASIVE LOCATION;  Service: Cardiovascular;  Laterality: N/A;    COLONOSCOPY N/A 10/12/2024    Procedure: COLONOSCOPY WITH BIOPSY AND WIRE GUIDED BALLOON DILATION OF TERMINAL ILEUM;  Surgeon: Rob Strong MD;  Location: [facility] ENDOSCOPY;  Service: Gastroenterology;  Laterality: N/A;  Colitis, crohns of terminal ileum, right colon ulcers, diverticulosis, hemorroids    ENDOSCOPY N/A 10/12/2024    Procedure: ESOPHAGOGASTRODUODENOSCOPY WITH BIOPSY X 2 AREA;  Surgeon: Rob Strong MD;  Location: [facility] ENDOSCOPY;  Service: Gastroenterology;  Laterality: N/A;  Chronic gastritis, HH    HYSTERECTOMY          Current Problems   Hyponatremia   -Nephrology following     Hypokalemia  -Resolved currently     Nausea, vomiting, diarrhea  Crohns disease  -EGD colonoscopy 10/12     Anemia      Hypertension      Hypothyroidism     Depression     Multiple tracheobronchial mucus plugs   - S/P bronch 10/16       Encounter Information        Trending Narrative     10/18: Since last RD review, diet advanced to solids.  S/P bronch 10/18.  Patient asleep at time of RD visit, RD left patient to rest.      10/12:  "Pt presented to the ED on 10/8 due to abnormal blood work and declined admission leaving A. Pt later returned to ED after being advised to return by her PCP and was admitted. Pt does report some intermittent abdominal cramping. RD unable to visit pt in person at this time. Pt in ENDO currently for EGD/colonoscopy. Will attempt to perform physical assessment at a later time. Diarrhea in the last 24 hours r/t bowel prep. NPO  currently due to EGD/colonoscopy.      Anthropometrics        Current Height, Weight Height: 154.9 cm (61\")  Weight: 57.6 kg (127 lb) (10/12/24 1153)       Usual Body Weight (UBW) Unable to obtain from patient       Trending Weight Hx     This admission: 10/18: no new weight since last RD review, RD ordered new one to be obtained   10/12: 127#             PTA: 10/12: No weight trends documented PTA.     Wt Readings from Last 30 Encounters:   10/12/24 1153 57.6 kg (127 lb)   10/12/24 0757 57.6 kg (127 lb)   10/11/24 0420 58 kg (127 lb 13.9 oz)   10/10/24 0503 58.1 kg (128 lb 1.4 oz)   10/08/24 1956 54.9 kg (121 lb 0.5 oz)   10/08/24 0828 54.9 kg (121 lb)      BMI kg/m2 Body mass index is 24 kg/m².       Labs        Pertinent Labs Hyponatremia - fluid restriction noted      Results from last 7 days   Lab Units 10/18/24  0454 10/17/24  0443 10/16/24  0518   SODIUM mmol/L 127* 127* 129*   POTASSIUM mmol/L 3.2* 4.0 4.1   CHLORIDE mmol/L 98 96* 99   CO2 mmol/L 23.8 24.3 22.6   BUN mg/dL 28* 28* 8   CREATININE mg/dL 0.42* 0.42* 0.32*   CALCIUM mg/dL 8.4* 8.2* 8.0*   BILIRUBIN mg/dL 0.3 0.4 0.4   ALK PHOS U/L 67 65 64   ALT (SGPT) U/L 8 9 7   AST (SGOT) U/L 10 10 9   GLUCOSE mg/dL 113* 138* 127*     Results from last 7 days   Lab Units 10/18/24  0454 10/15/24  0008 10/14/24  0425 10/13/24  2027 10/13/24  1414 10/13/24  1152   MAGNESIUM mg/dL  --   --  2.5* 2.8*  --  3.7*   HEMOGLOBIN g/dL 9.7*   < >  --   --   --   --    HEMATOCRIT % 29.5*   < >  --   --   --   --    TRIGLYCERIDES mg/dL  --   --   --   " --  97  --     < > = values in this interval not displayed.     Lab Results   Component Value Date    HGBA1C 5.64 (H) 10/13/2024        Medications    Scheduled Medications aspirin, 81 mg, Oral, Daily  atorvastatin, 40 mg, Oral, Nightly  cefepime, 2,000 mg, Intravenous, Once  cefepime, 2,000 mg, Intravenous, Q8H  [Transfer Hold] enoxaparin, 40 mg, Subcutaneous, Q24H  First Mouthwash (Magic Mouthwash), 10 mL, Swish & Spit, Q6H  folic acid, 1 mg, Oral, Daily  levothyroxine, 50 mcg, Oral, Q AM  mesalamine, 4.8 g, Oral, Daily With Breakfast  nystatin, 5 mL, Swish & Swallow, 4x Daily  pantoprazole, 40 mg, Oral, Daily  potassium chloride ER, 40 mEq, Oral, Q4H  predniSONE, 40 mg, Oral, Daily With Breakfast  sertraline, 50 mg, Oral, Daily  sodium chloride, 10 mL, Intravenous, Q12H  tiotropium bromide monohydrate, 2 puff, Inhalation, Daily - RT  Urea, 15 g, Oral, BID        Infusions Pharmacy to Dose Cefepime,         PRN Medications   acetaminophen    albuterol    senna-docusate sodium **AND** polyethylene glycol **AND** bisacodyl **AND** bisacodyl    hydrOXYzine    influenza vaccine    ipratropium-albuterol    Lidocaine Viscous HCl    Magnesium Standard Dose Replacement - Follow Nurse / BPA Driven Protocol    melatonin    nitroglycerin    ondansetron ODT **OR** ondansetron    Pharmacy to Dose Cefepime    Phosphorus Replacement - Follow Nurse / BPA Driven Protocol    Potassium Replacement - Follow Nurse / BPA Driven Protocol    sodium chloride     Physical Findings        Trending Physical   Appearance, NFPE 10/18: Agree with previous assessment     10/14: NFPE completed, consistent with nutrition diagnosis of malnutrition using AND/ASPEN criteria. See MSA below.     10/12: ALOK   --  Edema  No edema documented      Bowel Function Last documented BM 10/18 (today)     Tubes No feeding tube in place      Chewing/Swallowing No issues reported      Skin Unstageable sacral spine PI per [facility]OCN note 10/14     --  Current Nutrition  Orders & Evaluation of Intake       Oral Nutrition     Food Allergies NKFA   Current PO Diet Diet: Fluid Restriction (240 mL/tray); 1000 mL/day; Fluid Consistency: Thin (IDDSI 0)   Supplement None    PO Evaluation     Trending % PO Intake 10/18: 55% average PO intakes x last 5 documented meals   10/12: No meals documented    --  Nutritional Risk Screening        NRS-2002 Score          Nutrition Diagnosis         Nutrition Dx Problem 1 Inadequate energy intake related to intake less than needs as evidenced by PO intakes less than 75% since admission.        Nutrition Dx Problem 2        Intervention Goal         Intervention Goal(s) PO intake  > 75%  Prevent weight loss      Nutrition Intervention        RD Action Monitor PO intakes      Nutrition Prescription          Diet Prescription Regular, 1000 mL fluid restriction    Supplement Prescription None    --  Monitor/Evaluation        Monitor Per protocol, I&O, PO intake, Pertinent labs, Weight, Skin status, GI status, Hemodynamic stability     Electronically signed by:  Mini Arellano RD  10/18/24 08:35 EDT

## 2024-10-18 NOTE — PROGRESS NOTES
[facility] MEDICINE SERVICE  DAILY PROGRESS NOTE    NAME: Maryann Gaitan  : 1950  MRN: [id number]      LOS: 9 days     PROVIDER OF SERVICE: Noreen Nj MD    Chief Complaint: Hyponatremia    Subjective:     Interval History:  History taken from: patient    No new complaint      Review of Systems:   Review of Systems   All other systems reviewed and are negative.      Objective:     Vital Signs  Temp:  [97.9 °F (36.6 °C)-98.2 °F (36.8 °C)] 98 °F (36.7 °C)  Heart Rate:  [] 103  Resp:  [20-29] 22  BP: (120-145)/(59-83) 121/72   Body mass index is 24 kg/m².    Physical Exam  Physical Exam  Constitutional:       Appearance: Normal appearance.   HENT:      Head: Normocephalic and atraumatic.      Nose: Nose normal.      Mouth/Throat:      Mouth: Mucous membranes are moist.   Eyes:      Extraocular Movements: Extraocular movements intact.      Pupils: Pupils are equal, round, and reactive to light.   Cardiovascular:      Rate and Rhythm: Normal rate and regular rhythm.   Pulmonary:      Effort: Pulmonary effort is normal.      Breath sounds: Normal breath sounds.   Abdominal:      General: Abdomen is flat. Bowel sounds are normal.      Palpations: Abdomen is soft.   Musculoskeletal:         General: Normal range of motion.      Cervical back: Normal range of motion and neck supple.   Skin:     General: Skin is warm and dry.   Neurological:      General: No focal deficit present.      Mental Status: She is alert and oriented to person, place, and time.   Psychiatric:         Mood and Affect: Mood normal.         Behavior: Behavior normal.         Thought Content: Thought content normal.         Judgment: Judgment normal.         Current Medications:  Scheduled Meds:aspirin, 81 mg, Oral, Daily  atorvastatin, 40 mg, Oral, Nightly  cefepime, 2,000 mg, Intravenous, Once  cefepime, 2,000 mg, Intravenous, Q8H  [Transfer Hold] enoxaparin, 40 mg, Subcutaneous, Q24H  First Mouthwash (Magic Mouthwash),  10 mL, Swish & Spit, Q6H  folic acid, 1 mg, Oral, Daily  levothyroxine, 50 mcg, Oral, Q AM  mesalamine, 4.8 g, Oral, Daily With Breakfast  metoprolol tartrate, 50 mg, Oral, Q12H  nystatin, 5 mL, Swish & Swallow, 4x Daily  pantoprazole, 40 mg, Oral, Daily  potassium chloride ER, 40 mEq, Oral, Q4H  predniSONE, 40 mg, Oral, Daily With Breakfast  sertraline, 50 mg, Oral, Daily  sodium chloride, 10 mL, Intravenous, Q12H  tiotropium bromide monohydrate, 2 puff, Inhalation, Daily - RT  Urea, 15 g, Oral, BID      Continuous Infusions:Pharmacy to Dose Cefepime,       PRN Meds:.  acetaminophen    albuterol    senna-docusate sodium **AND** polyethylene glycol **AND** bisacodyl **AND** bisacodyl    hydrOXYzine    influenza vaccine    ipratropium-albuterol    Lidocaine Viscous HCl    Magnesium Standard Dose Replacement - Follow Nurse / BPA Driven Protocol    melatonin    nitroglycerin    ondansetron ODT **OR** ondansetron    Pharmacy to Dose Cefepime    Phosphorus Replacement - Follow Nurse / BPA Driven Protocol    Potassium Replacement - Follow Nurse / BPA Driven Protocol    sodium chloride       Diagnostic Data    Results from last 7 days   Lab Units 10/18/24  0454   WBC 10*3/mm3 3.40   HEMOGLOBIN g/dL 9.7*   HEMATOCRIT % 29.5*   PLATELETS 10*3/mm3 288   GLUCOSE mg/dL 113*   CREATININE mg/dL 0.42*   BUN mg/dL 28*   SODIUM mmol/L 127*   POTASSIUM mmol/L 3.2*   AST (SGOT) U/L 10   ALT (SGPT) U/L 8   ALK PHOS U/L 67   BILIRUBIN mg/dL 0.3   ANION GAP mmol/L 5.2       No radiology results for the last day      I reviewed the patient's new clinical results.    Assessment/Plan:     Active and Resolved Problems  Active Hospital Problems    Diagnosis  POA    **Hyponatremia [E87.1]  Yes    Diarrhea [R19.7]  Unknown    Moderate to severe mitral regurgitation [I34.0]  Unknown    Chest pain, atypical [R07.89]  Unknown    Cavitary lesion of lung [J98.4]  Unknown    Multiple tracheobronchial mucus plugs [T17.800A]  Unknown      Resolved  Hospital Problems   No resolved problems to display.       Active and Resolved Problems  Multifocal pneumonia  Cavitary lung lesion  Acute hypoxic respiratory failure  Send blood cultures and respiratory viral panel  continue IV cefepime-pharmacy to dose  MRSA negative- IV vanco discontinued  Supplemental oxygen to keep SpO2 more than 95%  Closely monitor vitals  CT PE showed cavitary lung lesion-pulmonary consulted      Hyponatremia  -Downward trend of sodium.  See further recommendations per nephrology.      Pneumonia with cavitary lesion in the left upper lobe of lung  - Continue cefepime and follow-up on BAL cultures.  New cavitary lesion in the left upper lung measuring 1.6 x 1.4 cm  nodular opacities and tree-in-bud opacities withinthe left upper and left lower lung  - Status post bronchoscopy.  Follow-up on BAL cultures and other results    Hypokalemia  -  Replace potassium     Crohns disease-see management per GI  Diarrhea-see management per GI  - Follows with Dr. Castillo; plans for outpatient scopes 10/31 for ongoing Crohns  - Continues to have significant diarrhea which is provoking electrolyte abnormalities  - Also in setting of worsening anemia  - GI consulted- s/p EGD/colonoscopy-10/12/2024 EGD/colonoscopy (Dr Strong) small hiatal hernia.  Nonerosive chronic gastritis.  Normal duodenum.  TI stricture status post dilation to 12 mm.  TI ulcers.  Colon ulcers.  Sigmoid colon diverticulosis.  Grade 2 internal hemorrhoids.    - Continue Mesalamine, solumedrol  - Low residue diet.   - Patient will likely need Rinvoq as outpatient. Task has been sent to GI office to initiate approval process.  - Could also consider clinical trial secondary to Crohn's with stricture.  Continue diet as tolerated.  - Likely home in the next 1 to 2 days from GI standpoint.        Anemia  - Mild downward trend in H&H.  Monitor.       Leukopenia  -.Upward trending WBC and improving.  Monitor.     Hypertension  - Blood pressure is  controlled.  Continue current BP med regimen.     Moderate to severe mitral regurgitation    Chest pain  -severe MR , s/p  cardiac cath 10/15/2024 which revealed total right and severe ostial LAD disease.  Descending thoracic aorta has an outpouching probably saccular aneurysm versus  penetrating aortic ulcer . CT surgery consulted   -Status post GONZÁLEZ which showed EF of 56 to 60% with a dilated left atrium.  Cardiology planning PCI for next week and evaluation for MitraClip procedure.           VTE Prophylaxis:  Pharmacologic & mechanical VTE prophylaxis orders are present.             Disposition Planning:     Barriers to Discharge: Pending clinical improvement  Anticipated Date of Discharge:  10/20/2024  Place of Discharge: Likely home           Code Status and Medical Interventions: CPR (Attempt to Resuscitate); Full Support   Ordered at: 10/09/24 1058     Code Status (Patient has no pulse and is not breathing):    CPR (Attempt to Resuscitate)     Medical Interventions (Patient has pulse or is breathing):    Full Support       Signature: Electronically signed by Noreen Nj MD, 10/18/24, 11:44 EDT.  [facility] Hospitalist Team

## 2024-10-19 LAB
ANION GAP SERPL CALCULATED.3IONS-SCNC: 7.5 MMOL/L (ref 5–15)
BUN SERPL-MCNC: 24 MG/DL (ref 8–23)
BUN/CREAT SERPL: 58.5 (ref 7–25)
CALCIUM SPEC-SCNC: 8.3 MG/DL (ref 8.6–10.5)
CHLORIDE SERPL-SCNC: 98 MMOL/L (ref 98–107)
CO2 SERPL-SCNC: 23.5 MMOL/L (ref 22–29)
CREAT SERPL-MCNC: 0.41 MG/DL (ref 0.57–1)
EGFRCR SERPLBLD CKD-EPI 2021: 103.4 ML/MIN/1.73
GLUCOSE SERPL-MCNC: 96 MG/DL (ref 65–99)
POTASSIUM SERPL-SCNC: 4.2 MMOL/L (ref 3.5–5.2)
SODIUM SERPL-SCNC: 129 MMOL/L (ref 136–145)

## 2024-10-19 PROCEDURE — 80048 BASIC METABOLIC PNL TOTAL CA: CPT | Performed by: INTERNAL MEDICINE

## 2024-10-19 PROCEDURE — 94664 DEMO&/EVAL PT USE INHALER: CPT

## 2024-10-19 PROCEDURE — 94799 UNLISTED PULMONARY SVC/PX: CPT

## 2024-10-19 PROCEDURE — 63710000001 PREDNISONE PER 1 MG: Performed by: NURSE PRACTITIONER

## 2024-10-19 PROCEDURE — 99232 SBSQ HOSP IP/OBS MODERATE 35: CPT | Performed by: INTERNAL MEDICINE

## 2024-10-19 PROCEDURE — 94761 N-INVAS EAR/PLS OXIMETRY MLT: CPT

## 2024-10-19 RX ADMIN — ASPIRIN 81 MG: 81 TABLET, COATED ORAL at 08:46

## 2024-10-19 RX ADMIN — DIPHENHYDRAMINE HYDROCHLORIDE AND LIDOCAINE HYDROCHLORIDE AND ALUMINUM HYDROXIDE AND MAGNESIUM HYDRO 10 ML: KIT at 20:00

## 2024-10-19 RX ADMIN — LEVOTHYROXINE SODIUM 50 MCG: 0.05 TABLET ORAL at 06:29

## 2024-10-19 RX ADMIN — PREDNISONE 40 MG: 20 TABLET ORAL at 08:45

## 2024-10-19 RX ADMIN — SERTRALINE 50 MG: 50 TABLET, FILM COATED ORAL at 08:46

## 2024-10-19 RX ADMIN — Medication 10 ML: at 20:01

## 2024-10-19 RX ADMIN — DIPHENHYDRAMINE HYDROCHLORIDE AND LIDOCAINE HYDROCHLORIDE AND ALUMINUM HYDROXIDE AND MAGNESIUM HYDRO 10 ML: KIT at 01:06

## 2024-10-19 RX ADMIN — DIPHENHYDRAMINE HYDROCHLORIDE AND LIDOCAINE HYDROCHLORIDE AND ALUMINUM HYDROXIDE AND MAGNESIUM HYDRO 10 ML: KIT at 12:00

## 2024-10-19 RX ADMIN — PANTOPRAZOLE SODIUM 40 MG: 40 TABLET, DELAYED RELEASE ORAL at 08:45

## 2024-10-19 RX ADMIN — FOLIC ACID 1 MG: 1 TABLET ORAL at 08:46

## 2024-10-19 RX ADMIN — TIOTROPIUM BROMIDE INHALATION SPRAY 2 PUFF: 3.12 SPRAY, METERED RESPIRATORY (INHALATION) at 08:07

## 2024-10-19 RX ADMIN — METOPROLOL TARTRATE 50 MG: 50 TABLET, FILM COATED ORAL at 08:46

## 2024-10-19 RX ADMIN — NYSTATIN 500000 UNITS: 100000 SUSPENSION ORAL at 20:01

## 2024-10-19 RX ADMIN — DIPHENHYDRAMINE HYDROCHLORIDE AND LIDOCAINE HYDROCHLORIDE AND ALUMINUM HYDROXIDE AND MAGNESIUM HYDRO 10 ML: KIT at 08:45

## 2024-10-19 RX ADMIN — Medication 10 ML: at 08:46

## 2024-10-19 RX ADMIN — ATORVASTATIN CALCIUM 40 MG: 40 TABLET, FILM COATED ORAL at 20:01

## 2024-10-19 RX ADMIN — METOPROLOL TARTRATE 50 MG: 50 TABLET, FILM COATED ORAL at 20:01

## 2024-10-19 RX ADMIN — Medication 15 G: at 08:45

## 2024-10-19 RX ADMIN — NYSTATIN 500000 UNITS: 100000 SUSPENSION ORAL at 08:45

## 2024-10-19 RX ADMIN — NYSTATIN 500000 UNITS: 100000 SUSPENSION ORAL at 11:47

## 2024-10-19 RX ADMIN — MESALAMINE 4.8 G: 800 TABLET, DELAYED RELEASE ORAL at 08:46

## 2024-10-19 NOTE — PROGRESS NOTES
[facility] MEDICINE SERVICE  DAILY PROGRESS NOTE    NAME: Maryann Gaitan  : 1950  MRN: [id number]      LOS: 10 days     PROVIDER OF SERVICE: Noreen Nj MD    Chief Complaint: Hyponatremia    Subjective:     Interval History:  History taken from: patient    No new complaint      Review of Systems:   Review of Systems   All other systems reviewed and are negative.      Objective:     Vital Signs  Temp:  [98 °F (36.7 °C)-98.3 °F (36.8 °C)] 98 °F (36.7 °C)  Heart Rate:  [] 81  Resp:  [17-22] 20  BP: ()/(53-74) 93/59   Body mass index is 24 kg/m².    Physical Exam  Physical Exam  Constitutional:       Appearance: Normal appearance.   HENT:      Head: Normocephalic and atraumatic.      Nose: Nose normal.      Mouth/Throat:      Mouth: Mucous membranes are moist.   Eyes:      Extraocular Movements: Extraocular movements intact.      Pupils: Pupils are equal, round, and reactive to light.   Cardiovascular:      Rate and Rhythm: Normal rate and regular rhythm.   Pulmonary:      Effort: Pulmonary effort is normal.      Breath sounds: Normal breath sounds.   Abdominal:      General: Abdomen is flat. Bowel sounds are normal.      Palpations: Abdomen is soft.   Musculoskeletal:         General: Normal range of motion.      Cervical back: Normal range of motion and neck supple.   Skin:     General: Skin is warm and dry.   Neurological:      General: No focal deficit present.      Mental Status: She is alert and oriented to person, place, and time.   Psychiatric:         Mood and Affect: Mood normal.         Behavior: Behavior normal.         Thought Content: Thought content normal.         Judgment: Judgment normal.         Current Medications:  Scheduled Meds:aspirin, 81 mg, Oral, Daily  atorvastatin, 40 mg, Oral, Nightly  [Transfer Hold] enoxaparin, 40 mg, Subcutaneous, Q24H  First Mouthwash (Magic Mouthwash), 10 mL, Swish & Spit, Q6H  folic acid, 1 mg, Oral, Daily  levothyroxine, 50 mcg,  Oral, Q AM  mesalamine, 4.8 g, Oral, Daily With Breakfast  metoprolol tartrate, 50 mg, Oral, Q12H  nystatin, 5 mL, Swish & Swallow, 4x Daily  pantoprazole, 40 mg, Oral, Daily  predniSONE, 40 mg, Oral, Daily With Breakfast  sertraline, 50 mg, Oral, Daily  sodium chloride, 10 mL, Intravenous, Q12H  tiotropium bromide monohydrate, 2 puff, Inhalation, Daily - RT  Urea, 15 g, Oral, BID      Continuous Infusions:     PRN Meds:.  acetaminophen    albuterol    senna-docusate sodium **AND** polyethylene glycol **AND** bisacodyl **AND** bisacodyl    hydrOXYzine    influenza vaccine    ipratropium-albuterol    Lidocaine Viscous HCl    Magnesium Standard Dose Replacement - Follow Nurse / BPA Driven Protocol    melatonin    nitroglycerin    ondansetron ODT **OR** ondansetron    Phosphorus Replacement - Follow Nurse / BPA Driven Protocol    Potassium Replacement - Follow Nurse / BPA Driven Protocol    sodium chloride       Diagnostic Data    Results from last 7 days   Lab Units 10/19/24  1052 10/18/24  1648 10/18/24  0454   WBC 10*3/mm3  --   --  3.40   HEMOGLOBIN g/dL  --   --  9.7*   HEMATOCRIT %  --   --  29.5*   PLATELETS 10*3/mm3  --   --  288   GLUCOSE mg/dL 96  --  113*   CREATININE mg/dL 0.41*  --  0.42*   BUN mg/dL 24*  --  28*   SODIUM mmol/L 129*  --  127*   POTASSIUM mmol/L 4.2   < > 3.2*   AST (SGOT) U/L  --   --  10   ALT (SGPT) U/L  --   --  8   ALK PHOS U/L  --   --  67   BILIRUBIN mg/dL  --   --  0.3   ANION GAP mmol/L 7.5  --  5.2    < > = values in this interval not displayed.       No radiology results for the last day      I reviewed the patient's new clinical results.    Assessment/Plan:     Active and Resolved Problems  Active Hospital Problems    Diagnosis  POA    **Hyponatremia [E87.1]  Yes    Diarrhea [R19.7]  Unknown    Moderate to severe mitral regurgitation [I34.0]  Unknown    Chest pain, atypical [R07.89]  Unknown    Cavitary lesion of lung [J98.4]  Unknown    Multiple tracheobronchial mucus plugs  [T17.800A]  Unknown      Resolved Hospital Problems   No resolved problems to display.       Active and Resolved Problems  Multifocal pneumonia  Cavitary lung lesion  Acute hypoxic respiratory failure  Send blood cultures and respiratory viral panel  continue IV cefepime-pharmacy to dose  MRSA negative- IV vanco discontinued  Supplemental oxygen to keep SpO2 more than 95%  Closely monitor vitals  CT PE showed cavitary lung lesion-pulmonary consulted      Hyponatremia  -Downward trend of sodium.  See further recommendations per nephrology.      Pneumonia with cavitary lesion in the left upper lobe of lung  - Management per pulmonary.  Follow-up on BAL cultures.  New cavitary lesion in the left upper lung measuring 1.6 x 1.4 cm  nodular opacities and tree-in-bud opacities withinthe left upper and left lower lung  - Status post bronchoscopy.  Follow-up on BAL cultures and other results    Hypokalemia  - Resolved     Crohns disease-see management per GI-on mesalamine and prednisone  Diarrhea-see management per GI  - Follows with Dr. Castillo; plans for outpatient scopes 10/31 for ongoing Crohns  - Continues to have significant diarrhea which is provoking electrolyte abnormalities  - Also in setting of worsening anemia  - GI consulted- s/p EGD/colonoscopy-10/12/2024 EGD/colonoscopy (Dr Strong) small hiatal hernia.  Nonerosive chronic gastritis.  Normal duodenum.  TI stricture status post dilation to 12 mm.  TI ulcers.  Colon ulcers.  Sigmoid colon diverticulosis.  Grade 2 internal hemorrhoids.    - Continue Mesalamine, solumedrol  - Low residue diet.   - Patient will likely need Rinvoq as outpatient. Task has been sent to GI office to initiate approval process.  - Could also consider clinical trial secondary to Crohn's with stricture.  Continue diet as tolerated.  - Likely home in the next 1 to 2 days from GI standpoint.        Anemia  - Mild downward trend in H&H.  Monitor.       Leukopenia  -.Upward trending WBC and  improving.  Monitor.     Hypertension  - Blood pressure is controlled.  Continue current BP med regimen.     Moderate to severe mitral regurgitation    Chest pain  -severe MR , s/p  cardiac cath 10/15/2024 which revealed total right and severe ostial LAD disease.  Descending thoracic aorta has an outpouching probably saccular aneurysm versus  penetrating aortic ulcer . CT surgery consulted   -Status post GONZÁLEZ which showed EF of 56 to 60% with a dilated left atrium.  Cardiology planning PCI for next week and evaluation for MitraClip procedure(after recovered from the pneumonia).           VTE Prophylaxis:  Pharmacologic & mechanical VTE prophylaxis orders are present.             Disposition Planning:     Barriers to Discharge: Pending clinical improvement  Anticipated Date of Discharge:  10/20/2024  Place of Discharge: Likely home           Code Status and Medical Interventions: CPR (Attempt to Resuscitate); Full Support   Ordered at: 10/09/24 1058     Code Status (Patient has no pulse and is not breathing):    CPR (Attempt to Resuscitate)     Medical Interventions (Patient has pulse or is breathing):    Full Support       Signature: Electronically signed by Noreen Nj MD, 10/19/24, 15:21 EDT.  [facility] Hospitalist Team

## 2024-10-19 NOTE — PROGRESS NOTES
Maryann Gaitan  1950    Inpatient Progress Note    Subjective/Interim summary:  10/19/2024: Feeling progressively better. Stronger. Able to eat. No nausea or vomiting.        Past Medical History:   Diagnosis Date    Arthritis     COPD (chronic obstructive pulmonary disease)     Hypertension      Past Surgical History:   Procedure Laterality Date    BRONCHOSCOPY N/A 10/16/2024    Procedure: BRONCHOSCOPY;  Surgeon: Gallo Pope MD;  Location: [facility] ENDOSCOPY;  Service: Pulmonary;  Laterality: N/A;    CARDIAC CATHETERIZATION N/A 10/15/2024    Procedure: Left Heart Cath, possible pci;  Surgeon: Travis Connor MD;  Location: [facility] CATH INVASIVE LOCATION;  Service: Cardiovascular;  Laterality: N/A;    COLONOSCOPY N/A 10/12/2024    Procedure: COLONOSCOPY WITH BIOPSY AND WIRE GUIDED BALLOON DILATION OF TERMINAL ILEUM;  Surgeon: Rob Strong MD;  Location: [facility] ENDOSCOPY;  Service: Gastroenterology;  Laterality: N/A;  Colitis, crohns of terminal ileum, right colon ulcers, diverticulosis, hemorroids    ENDOSCOPY N/A 10/12/2024    Procedure: ESOPHAGOGASTRODUODENOSCOPY WITH BIOPSY X 2 AREA;  Surgeon: Rob Strong MD;  Location: [facility] ENDOSCOPY;  Service: Gastroenterology;  Laterality: N/A;  Chronic gastritis, HH    HYSTERECTOMY         Current Facility-Administered Medications:     acetaminophen (TYLENOL) tablet 650 mg, 650 mg, Oral, Q4H PRN, Corina Murcia L, APRN    albuterol (ACCUNEB) nebulizer solution 0.63 mg, 0.63 mg, Nebulization, Q6H PRN, Corina Murcia, APRN, 0.63 mg at 10/11/24 1850    aspirin EC tablet 81 mg, 81 mg, Oral, Daily, Corina Murcia, APRN, 81 mg at 10/19/24 0846    atorvastatin (LIPITOR) tablet 40 mg, 40 mg, Oral, Nightly, Corina Murcia, APRN, 40 mg at 10/18/24 2103    sennosides-docusate (PERICOLACE) 8.6-50 MG per tablet 2 tablet, 2 tablet, Oral, BID PRN **AND** polyethylene glycol (MIRALAX) packet 17 g, 17 g, Oral, Daily PRN **AND** bisacodyl (DULCOLAX) EC  tablet 5 mg, 5 mg, Oral, Daily PRN **AND** bisacodyl (DULCOLAX) suppository 10 mg, 10 mg, Rectal, Daily PRN, Corina Murcia APRN    [Transfer Hold] Enoxaparin Sodium (LOVENOX) syringe 40 mg, 40 mg, Subcutaneous, Q24H, Rob Strong MD, 40 mg at 10/14/24 1711    First Mouthwash (Magic Mouthwash) 10 mL, 10 mL, Swish & Spit, Q6H, Corina Murcia APRN, 10 mL at 10/19/24 1200    folic acid (FOLVITE) tablet 1 mg, 1 mg, Oral, Daily, Corina Murcia APRN, 1 mg at 10/19/24 0846    hydrOXYzine (ATARAX) tablet 25 mg, 25 mg, Oral, TID PRN, Corina Murcia APRN    influenza vac split high-dose (FLUZONE HIGH DOSE) injection 0.5 mL, 0.5 mL, Intramuscular, During Hospitalization, Corina Murcia APRN    ipratropium-albuterol (DUO-NEB) nebulizer solution 3 mL, 3 mL, Nebulization, Q4H PRN, Corina Murcia APRN, 3 mL at 10/18/24 1441    levothyroxine (SYNTHROID, LEVOTHROID) tablet 50 mcg, 50 mcg, Oral, Q AM, Noreen Nj MD, 50 mcg at 10/19/24 0629    Lidocaine Viscous HCl (XYLOCAINE) 2 % solution 10 mL, 10 mL, Mouth/Throat, Q3H PRN, Corina Murcia APRN    Magnesium Standard Dose Replacement - Follow Nurse / BPA Driven Protocol, , Does not apply, PRN, Corina Murcia APRN    melatonin tablet 5 mg, 5 mg, Oral, Nightly PRN, Corina Murcia APRN    mesalamine (LIALDA) EC tablet 4.8 g, 4.8 g, Oral, Daily With Breakfast, Corina Murcia APRN, 4.8 g at 10/19/24 0846    metoprolol tartrate (LOPRESSOR) tablet 50 mg, 50 mg, Oral, Q12H, Travis Connor MD, 50 mg at 10/19/24 0846    nitroglycerin (NITROSTAT) SL tablet 0.4 mg, 0.4 mg, Sublingual, Q5 Min PRN, Corina Murcia APRN    nystatin (MYCOSTATIN) 100,000 unit/mL suspension 500,000 Units, 5 mL, Swish & Swallow, 4x Daily, Corina Murcia APRN, 500,000 Units at 10/19/24 1147    ondansetron ODT (ZOFRAN-ODT) disintegrating tablet 4 mg, 4 mg, Oral, Q6H PRN, 4 mg at 10/12/24 1721 **OR** ondansetron (ZOFRAN) injection 4 mg, 4 mg, Intravenous, Q6H PRN, Corina Murcia APRN, 4 mg  at 10/16/24 1401    pantoprazole (PROTONIX) EC tablet 40 mg, 40 mg, Oral, Daily, Corina Murcia, APRN, 40 mg at 10/19/24 0845    Phosphorus Replacement - Follow Nurse / BPA Driven Protocol, , Does not apply, PRN, Corina Murcia, APRN    Potassium Replacement - Follow Nurse / BPA Driven Protocol, , Does not apply, PRN, Rob Strong MD    predniSONE (DELTASONE) tablet 40 mg, 40 mg, Oral, Daily With Breakfast, Annalee Anderson, APRN, 40 mg at 10/19/24 0845    sertraline (ZOLOFT) tablet 50 mg, 50 mg, Oral, Daily, Corina Murcia, APRN, 50 mg at 10/19/24 0846    sodium chloride 0.9 % flush 10 mL, 10 mL, Intravenous, Q12H, Corina Murcia APRN, 10 mL at 10/19/24 0846    sodium chloride 0.9 % flush 10 mL, 10 mL, Intravenous, PRN, Corina Murcia, APRN    tiotropium (SPIRIVA RESPIMAT) 2.5 mcg/act aerosol solution inhaler, 2 puff, Inhalation, Daily - RT, Corina Murcia APRROBBIE, 2 puff at 10/19/24 0807    Urea (URE-NA) packet 15 g, 15 g, Oral, BID, Buddy Pierre MD, 15 g at 10/19/24 0845    Allergies   Allergen Reactions    Codeine Hives    Penicillin G Sodium Hives     History reviewed. No pertinent family history.    Cancer-related family history is not on file.    Social History     Tobacco Use    Smoking status: Former     Types: Cigarettes    Smokeless tobacco: Never   Vaping Use    Vaping status: Never Used   Substance Use Topics    Alcohol use: Never    Drug use: Never     Chief Complaint   Patient presents with    Abnormal Lab     Review of Systems   Constitutional:  Positive for fatigue. Negative for chills, diaphoresis and fever.   HENT: Negative.     Eyes: Negative.    Respiratory:  Positive for cough and shortness of breath.    Cardiovascular:  Negative for chest pain.   Gastrointestinal:  Positive for abdominal pain, diarrhea and nausea.   Endocrine: Negative.    Genitourinary: Negative.    Musculoskeletal: Negative.    Skin: Negative.    Allergic/Immunologic: Negative.    Neurological: Negative.     Hematological:  Bruises/bleeds easily.   Psychiatric/Behavioral: Negative.       Objective:    Vitals:    10/19/24 1100 10/19/24 1200 10/19/24 1300 10/19/24 1400   BP: 106/58 104/57 101/57 93/59   BP Location:       Patient Position:       Pulse: 94 91 79 81   Resp:       Temp:       TempSrc:       SpO2: 97% 96% 95% 96%   Weight:       Height:         (3) Capable of limited self-care, confined to bed or chair > 50% of waking hours    Physical Exam  Vitals and nursing note reviewed.   Constitutional:       General: She is not in acute distress.     Appearance: She is ill-appearing. She is not toxic-appearing or diaphoretic.   HENT:      Head: Normocephalic and atraumatic.      Right Ear: External ear normal.      Left Ear: External ear normal.      Nose: Nose normal.      Mouth/Throat:      Mouth: Mucous membranes are moist.      Pharynx: Oropharynx is clear. No oropharyngeal exudate or posterior oropharyngeal erythema.   Eyes:      General: No scleral icterus.        Right eye: No discharge.         Left eye: No discharge.      Extraocular Movements: Extraocular movements intact.      Conjunctiva/sclera: Conjunctivae normal.      Pupils: Pupils are equal, round, and reactive to light.   Cardiovascular:      Rate and Rhythm: Normal rate and regular rhythm.      Pulses: Normal pulses.      Heart sounds: No murmur heard.     No friction rub. No gallop.   Pulmonary:      Effort: Pulmonary effort is normal. No respiratory distress.      Breath sounds: No stridor. No wheezing, rhonchi or rales.   Abdominal:      General: Bowel sounds are normal. There is no distension.      Palpations: Abdomen is soft. There is no mass.      Tenderness: There is no abdominal tenderness. There is no right CVA tenderness, left CVA tenderness, guarding or rebound.      Hernia: No hernia is present.      Comments: Rounded and soft. No tenderness.    Genitourinary:     Comments: deferred  Musculoskeletal:         General: No tenderness,  deformity or signs of injury.      Cervical back: Normal range of motion. No rigidity.      Right lower leg: No edema.      Left lower leg: No edema.   Lymphadenopathy:      Cervical: No cervical adenopathy.   Skin:     General: Skin is warm and dry.      Coloration: Skin is not jaundiced or pale.      Findings: No bruising, lesion or rash.   Neurological:      General: No focal deficit present.      Mental Status: She is alert and oriented to person, place, and time. Mental status is at baseline.      Cranial Nerves: No cranial nerve deficit.   Psychiatric:         Mood and Affect: Mood normal.         Behavior: Behavior normal.         Thought Content: Thought content normal.         Judgment: Judgment normal.     I Jorge Mcdaniels MD performed the physical exam on 10/19/2024 as documented above.       Assessment & Plan     Normocytic anemia and leukopenia: Seems to be improving. No intervention at this time.     Jorge Mcdaniels MD on 10/19/2024 at 14:54

## 2024-10-19 NOTE — PROGRESS NOTES
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  Descending thoracic aorta has an outpouching probably saccular aneurysm versus  penetrating aortic ulcer . CT surgery consulted    COPD: not in exacerbation      Hyponatremia    Anemia  HTN  Hypothyroidism     s/p EGD/colonoscopy-10/12/2024 EGD/colonoscopy, small hiatal hernia. Nonerosive chronic gastritis. Normal duodenum. TI stricture status post dilation to 12 mm. TI ulcers. Colon ulcers. Sigmoid colon diverticulosis. Grade 2 internal hemorrhoids , strongyloides.        Recommendations:     ivermectin 2 days only    S/p Iron transfusion     PET scan as an outpatient     Monitor bronchoscopy results    Oxygen supplement and titration to maintain saturation 90 to 95%  Bronchodilators, spiriva      Abx: cefepime will downgrade in 24 hour to avoid neurology side effects     Followed by GI: on steroids, mesalamine and ivermectin.    I personally reviewed the radiological studies            LOS: 10 days     Subjective         Objective     Vital signs for last 24 hours:  Vitals:    10/19/24 0700 10/19/24 0800 10/19/24 0807 10/19/24 0809   BP: 94/58 111/72     BP Location: Right arm      Patient Position: Lying      Pulse: 75 105 100 106   Resp: 21  20    Temp: 98 °F (36.7 °C)      TempSrc: Oral      SpO2: 94% 98% 96% 95%   Weight:       Height:           Intake/Output last 3 shifts:  I/O last 3 completed shifts:  In: 5480  [P.O.:900; I.V.:1274; IV Piggyback:100]  Out: 1700 [Urine:1700]  Intake/Output this shift:  I/O this shift:  In: -   Out: 750 [Urine:750]      Radiology  Imaging Results (Last 24 Hours)       ** No results found for the last 24 hours. **            Labs:  Results from last 7 days   Lab Units 10/18/24  0454   WBC 10*3/mm3 3.40   HEMOGLOBIN g/dL 9.7*   HEMATOCRIT % 29.5*   PLATELETS 10*3/mm3 288     Results from last 7 days   Lab Units 10/18/24  1648 10/18/24  0454   SODIUM mmol/L  --  127*   POTASSIUM mmol/L 5.2 3.2*   CHLORIDE mmol/L  --  98   CO2 mmol/L  --  23.8   BUN mg/dL  --  28*   CREATININE mg/dL  --  0.42*   CALCIUM mg/dL  --  8.4*   BILIRUBIN mg/dL  --  0.3   ALK PHOS U/L  --  67   ALT (SGPT) U/L  --  8   AST (SGOT) U/L  --  10   GLUCOSE mg/dL  --  113*     Results from last 7 days   Lab Units 10/13/24  1144   PH, ARTERIAL pH units 7.422   PO2 ART mm Hg 67.1*   PCO2, ARTERIAL mm Hg 39.8   HCO3 ART mmol/L 25.9     Results from last 7 days   Lab Units 10/18/24  0454 10/17/24  0443 10/16/24  0518   ALBUMIN g/dL 2.6* 2.6* 2.5*     Results from last 7 days   Lab Units 10/13/24  1414 10/13/24  1152   HSTROP T ng/L 22* 23*         Results from last 7 days   Lab Units 10/14/24  0425   MAGNESIUM mg/dL 2.5*     Results from last 7 days   Lab Units 10/15/24  0008   INR  1.01               Meds:   SCHEDULE  aspirin, 81 mg, Oral, Daily  atorvastatin, 40 mg, Oral, Nightly  [Transfer Hold] enoxaparin, 40 mg, Subcutaneous, Q24H  First Mouthwash (Magic Mouthwash), 10 mL, Swish & Spit, Q6H  folic acid, 1 mg, Oral, Daily  levothyroxine, 50 mcg, Oral, Q AM  mesalamine, 4.8 g, Oral, Daily With Breakfast  metoprolol tartrate, 50 mg, Oral, Q12H  nystatin, 5 mL, Swish & Swallow, 4x Daily  pantoprazole, 40 mg, Oral, Daily  predniSONE, 40 mg, Oral, Daily With Breakfast  sertraline, 50 mg, Oral, Daily  sodium chloride, 10 mL, Intravenous, Q12H  tiotropium bromide monohydrate, 2 puff, Inhalation, Daily - RT  Urea, 15 g, Oral,  BID      Infusions       PRNs    acetaminophen    albuterol    senna-docusate sodium **AND** polyethylene glycol **AND** bisacodyl **AND** bisacodyl    hydrOXYzine    influenza vaccine    ipratropium-albuterol    Lidocaine Viscous HCl    Magnesium Standard Dose Replacement - Follow Nurse / BPA Driven Protocol    melatonin    nitroglycerin    ondansetron ODT **OR** ondansetron    Phosphorus Replacement - Follow Nurse / BPA Driven Protocol    Potassium Replacement - Follow Nurse / BPA Driven Protocol    sodium chloride    Physical Exam:  Physical Exam  Cardiovascular:      Heart sounds: Murmur heard.      No gallop.   Pulmonary:      Effort: No respiratory distress.      Breath sounds: No stridor. Rhonchi and rales present. No wheezing.   Chest:      Chest wall: No tenderness.         ROS  Review of Systems   Respiratory:  Positive for cough and shortness of breath. Negative for wheezing and stridor.    Cardiovascular:  Positive for chest pain and palpitations. Negative for leg swelling.             Total time spent with patient greater than: 45 Minutes

## 2024-10-19 NOTE — PROGRESS NOTES
CC--- pneumonia, coronary artery disease, mitral regurgitation    Sub  Patient resting and denies any active dyspnea or chest pain or febrile illness        Past Medical History:   Diagnosis Date    Arthritis     COPD (chronic obstructive pulmonary disease)     Hypertension      Past Surgical History:   Procedure Laterality Date    BRONCHOSCOPY N/A 10/16/2024    Procedure: BRONCHOSCOPY;  Surgeon: Gallo Pope MD;  Location: [facility] ENDOSCOPY;  Service: Pulmonary;  Laterality: N/A;    CARDIAC CATHETERIZATION N/A 10/15/2024    Procedure: Left Heart Cath, possible pci;  Surgeon: Travis Connor MD;  Location: [facility] CATH INVASIVE LOCATION;  Service: Cardiovascular;  Laterality: N/A;    COLONOSCOPY N/A 10/12/2024    Procedure: COLONOSCOPY WITH BIOPSY AND WIRE GUIDED BALLOON DILATION OF TERMINAL ILEUM;  Surgeon: Rob Strong MD;  Location: [facility] ENDOSCOPY;  Service: Gastroenterology;  Laterality: N/A;  Colitis, crohns of terminal ileum, right colon ulcers, diverticulosis, hemorroids    ENDOSCOPY N/A 10/12/2024    Procedure: ESOPHAGOGASTRODUODENOSCOPY WITH BIOPSY X 2 AREA;  Surgeon: Rob Strong MD;  Location: [facility] ENDOSCOPY;  Service: Gastroenterology;  Laterality: N/A;  Chronic gastritis, HH    HYSTERECTOMY         Physical Exam    General:      well developed, well nourished, in no acute distress.    Head:      normocephalic and atraumatic.    Eyes:      PERRL/EOM intact, conjunctivae and sclerae clear without nystagmus.    Neck:      no  thyromegaly, trachea central with normal respiratory effort  Lungs:      clear bilaterally to auscultation.    Heart:       regular rate and rhythm, S1, S2 without  rubs, or gallops  Skin:      intact without lesions or rashes.    Psych:      alert and cooperative; normal mood and affect; normal attention span and concentration.            CBC    Results from last 7 days   Lab Units 10/18/24  0454 10/17/24  0443 10/16/24  0518 10/15/24  0008  10/13/24  1144 10/13/24  0224   WBC 10*3/mm3 3.40 2.57* 2.38* 2.29*  --  3.43   HEMOGLOBIN g/dL 9.7* 8.2* 8.3* 8.4*  --  9.9*   HEMOGLOBIN, POC g/dL  --   --   --   --  9.4*  --    PLATELETS 10*3/mm3 288 209 182 145  --  173     BMP   Results from last 7 days   Lab Units 10/19/24  1052 10/18/24  1648 10/18/24  0454 10/17/24  0443 10/16/24  0518 10/15/24  0008 10/14/24  0425 10/13/24  2027 10/13/24  1152 10/13/24  1144 10/13/24  0224   SODIUM mmol/L 129*  --  127* 127* 129* 131* 135* 138  --   --  137   POTASSIUM mmol/L 4.2 5.2 3.2* 4.0 4.1 4.4 5.3* 5.0 3.6  --  3.1*   CHLORIDE mmol/L 98  --  98 96* 99 102 106 107  --   --  102   CO2 mmol/L 23.5  --  23.8 24.3 22.6 22.0 23.9 22.1  --   --  25.8   BUN mg/dL 24*  --  28* 28* 8 12 24* 32*  --   --  37*   CREATININE mg/dL 0.41*  --  0.42* 0.42* 0.32* 0.42* 0.52* 0.56*  --    < > 0.57   GLUCOSE mg/dL 96  --  113* 138* 127* 123* 150* 190*  --   --  141*   MAGNESIUM mg/dL  --   --   --   --   --   --  2.5* 2.8* 3.7*  --  1.5*    < > = values in this interval not displayed.     Infection   Results from last 7 days   Lab Units 10/16/24  1147 10/13/24  1415   BLOODCX   --  No growth at 5 days  No growth at 5 days   RESPCX  Light growth (2+) Normal respiratory wu. No S. aureus or Pseudomonas aeruginosa detected. Final report.  --      CMP   Results from last 7 days   Lab Units 10/19/24  1052 10/18/24  1648 10/18/24  0454 10/17/24  0443 10/16/24  0518 10/15/24  0008 10/14/24  0425 10/13/24  2027   SODIUM mmol/L 129*  --  127* 127* 129* 131* 135* 138   POTASSIUM mmol/L 4.2 5.2 3.2* 4.0 4.1 4.4 5.3* 5.0   CHLORIDE mmol/L 98  --  98 96* 99 102 106 107   CO2 mmol/L 23.5  --  23.8 24.3 22.6 22.0 23.9 22.1   BUN mg/dL 24*  --  28* 28* 8 12 24* 32*   CREATININE mg/dL 0.41*  --  0.42* 0.42* 0.32* 0.42* 0.52* 0.56*   GLUCOSE mg/dL 96  --  113* 138* 127* 123* 150* 190*   ALBUMIN g/dL  --   --  2.6* 2.6* 2.5* 2.4*  --   --    BILIRUBIN mg/dL  --   --  0.3 0.4 0.4 0.4  --   --    ALK  PHOS U/L  --   --  67 65 64 70  --   --    AST (SGOT) U/L  --   --  10 10 9 8  --   --    ALT (SGPT) U/L  --   --  8 9 7 7  --   --          Assessment and plan    Coronary artery disease with LAD stenosis awaiting PCI  Cavitary  pneumonia being followed by pulmonary team  Moderate to severe MR  Hypokalemia corrected  Mild hyponatremia  Normocytic anemia    Currently stable and await recovery from cavitary pneumonia before LAD PCI      Electronically signed by Ashkan Ruiz MD, 10/19/24, 1:50 PM EDT.

## 2024-10-19 NOTE — PROGRESS NOTES
PROGRESS NOTE      Patient Name: Maryann Gaitan  : 1950  MRN: [id number]  Primary Care Physician: Eduard Little MD  Date of admission: 10/8/2024    Patient Care Team:  Eduard Little MD as PCP - General (Family Medicine)        Subjective   Subjective:     Seen and examined, comfortable, not in distress,  Sodium is 137 today, comfortable, not in distress,      Review of systems:  All other review of system unremarkable      Allergies:    Allergies   Allergen Reactions    Codeine Hives    Penicillin G Sodium Hives       Objective   Exam:     Vital Signs  Temp:  [98 °F (36.7 °C)-98.3 °F (36.8 °C)] 98 °F (36.7 °C)  Heart Rate:  [] 80  Resp:  [17-22] 22  BP: ()/(53-74) 120/70  SpO2:  [94 %-99 %] 96 %  on   ;   Device (Oxygen Therapy): room air  Body mass index is 24 kg/m².    General: Elderly female in no acute distress.    Head:      Normocephalic and atraumatic.    Eyes:      PERRL/EOM intact, conjunctivae and sclerae clear without nystagmus.    Neck:      No masses, thyromegaly,  trachea central with normal respiratory effort   Lungs:    Clear bilaterally to auscultation.    Heart:      Regular rate and rhythm, no murmur no gallop  Abd:        Soft, nontender, not distended, bowel sounds positive, no shifting dullness   Pulses:   Pulses palpable  Extr:        No cyanosis or clubbing--no edema.    Neuro:    No focal deficits.   alert oriented x3  Skin:       Intact without lesions or rashes.    Psych:    Alert and cooperative; normal mood and affect; .      Results Review:  I have personally reviewed most recent Data :  CBC    Results from last 7 days   Lab Units 10/18/24  0454 10/17/24  0443 10/16/24  0518 10/15/24  0008 10/13/24  1144 10/13/24  0224   WBC 10*3/mm3 3.40 2.57* 2.38* 2.29*  --  3.43   HEMOGLOBIN g/dL 9.7* 8.2* 8.3* 8.4*  --  9.9*   HEMOGLOBIN, POC g/dL  --   --   --   --  9.4*  --    PLATELETS 10*3/mm3 288 209 182 145  --  173     CMP   Results from last 7  days   Lab Units 10/19/24  1052 10/18/24  1648 10/18/24  0454 10/17/24  0443 10/16/24  0518 10/15/24  0008 10/14/24  0425 10/13/24  2027   SODIUM mmol/L 129*  --  127* 127* 129* 131* 135* 138   POTASSIUM mmol/L 4.2 5.2 3.2* 4.0 4.1 4.4 5.3* 5.0   CHLORIDE mmol/L 98  --  98 96* 99 102 106 107   CO2 mmol/L 23.5  --  23.8 24.3 22.6 22.0 23.9 22.1   BUN mg/dL 24*  --  28* 28* 8 12 24* 32*   CREATININE mg/dL 0.41*  --  0.42* 0.42* 0.32* 0.42* 0.52* 0.56*   GLUCOSE mg/dL 96  --  113* 138* 127* 123* 150* 190*   ALBUMIN g/dL  --   --  2.6* 2.6* 2.5* 2.4*  --   --    BILIRUBIN mg/dL  --   --  0.3 0.4 0.4 0.4  --   --    ALK PHOS U/L  --   --  67 65 64 70  --   --    AST (SGOT) U/L  --   --  10 10 9 8  --   --    ALT (SGPT) U/L  --   --  8 9 7 7  --   --      ABG    Results from last 7 days   Lab Units 10/13/24  1144   PH, ARTERIAL pH units 7.422   PCO2, ARTERIAL mm Hg 39.8   PO2 ART mm Hg 67.1*   O2 SATURATION ART % 93.4*   BASE EXCESS ART mmol/L 1.4     No radiology results for the last day    Results for orders placed during the hospital encounter of 10/08/24    Adult Transesophageal Echo (GONZÁLEZ) W/ Cont if Necessary Per Protocol (Cardiology Department)    Interpretation Summary    Left ventricular systolic function is normal. Left ventricular ejection fraction appears to be 56 - 60%.    The left atrial cavity is severely dilated.    Saline test results are negative.    There is mild, posterior mitral leaflet thickening present.    Moderate to severe mitral valve regurgitation is present with a centrally-directed jet noted.    There is moderate, (grade 3) plaque in the descending aorta present.    Scheduled Meds:aspirin, 81 mg, Oral, Daily  atorvastatin, 40 mg, Oral, Nightly  [Transfer Hold] enoxaparin, 40 mg, Subcutaneous, Q24H  First Mouthwash (Magic Mouthwash), 10 mL, Swish & Spit, Q6H  folic acid, 1 mg, Oral, Daily  levothyroxine, 50 mcg, Oral, Q AM  mesalamine, 4.8 g, Oral, Daily With Breakfast  metoprolol tartrate, 50  mg, Oral, Q12H  nystatin, 5 mL, Swish & Swallow, 4x Daily  pantoprazole, 40 mg, Oral, Daily  predniSONE, 40 mg, Oral, Daily With Breakfast  sertraline, 50 mg, Oral, Daily  sodium chloride, 10 mL, Intravenous, Q12H  tiotropium bromide monohydrate, 2 puff, Inhalation, Daily - RT  Urea, 15 g, Oral, BID      Continuous Infusions:     PRN Meds:  acetaminophen    albuterol    senna-docusate sodium **AND** polyethylene glycol **AND** bisacodyl **AND** bisacodyl    hydrOXYzine    influenza vaccine    ipratropium-albuterol    Lidocaine Viscous HCl    Magnesium Standard Dose Replacement - Follow Nurse / BPA Driven Protocol    melatonin    nitroglycerin    ondansetron ODT **OR** ondansetron    Phosphorus Replacement - Follow Nurse / BPA Driven Protocol    Potassium Replacement - Follow Nurse / BPA Driven Protocol    sodium chloride    Assessment & Plan   Assessment and Plan:         Hyponatremia    Diarrhea    Moderate to severe mitral regurgitation    Chest pain, atypical    Cavitary lesion of lung    Multiple tracheobronchial mucus plugs    ASSESSMENT:  Hyponatremia likely etiology thiazide diuretics in the presence of Cymbalta  History of hypertension  History of arthritis  History of Crohn disease  Severe mitral regurgitation   10/12/2024 EGD/colonoscopy  small hiatal hernia.  Nonerosive chronic gastritis.  Normal duodenum.  TI stricture status post dilation to 12 mm.  TI ulcers.  Colon ulcers.  Sigmoid colon diverticulosis.  Grade 2 internal hemorrhoids     PLAN :        Sodium level that was getting better now worsening again  Today Na better than yesterday on p.o. urea at this time  Although pt on SSRI can cause Hyponatremia to me patient lungs condition are more likley caused recent acute drop in the sodium  Pt is advised to stay on fluid restriction <1L   Patient has cavitary lesion that might be contributing to hyponatremia with some SIADH  Urea to be continued  at this time because of concern of SIADH  SSRI can be  stopped if OK with psych may need evaluation from them  Most recent echo showed moderate to severe mitral regurgitation  Patient may need a GONZÁLEZ  Pt need PCI next week   Continue to follow-up with electrolytes  Follow-up with repeat sodium tomorrow morning   Potassium level better but still low cont to replace   Continue to follow-up with the nutritional status  Diarrhea has improved  Status post EGD and colonoscopy yesterday, report reviewed,  Follow-up with hematology and GI  Closely follow,           Electronically signed by Buddy Pierre MD,   [facility] kidney consultant  [phone]  10/19/2024  18:58 EDT

## 2024-10-20 ENCOUNTER — APPOINTMENT (OUTPATIENT)
Dept: CARDIOLOGY | Facility: HOSPITAL | Age: 74
End: 2024-10-20
Payer: MEDICARE

## 2024-10-20 LAB
ANION GAP SERPL CALCULATED.3IONS-SCNC: 7.9 MMOL/L (ref 5–15)
BH CV UPPER VENOUS LEFT AXILLARY AUGMENT: NORMAL
BH CV UPPER VENOUS LEFT AXILLARY COMPRESS: NORMAL
BH CV UPPER VENOUS LEFT AXILLARY PHASIC: NORMAL
BH CV UPPER VENOUS LEFT AXILLARY SPONT: NORMAL
BH CV UPPER VENOUS LEFT BASILIC FOREARM COMPRESS: NORMAL
BH CV UPPER VENOUS LEFT BASILIC UPPER COLOR: 1
BH CV UPPER VENOUS LEFT BASILIC UPPER COMPRESS: NORMAL
BH CV UPPER VENOUS LEFT BASILIC UPPER THROMBUS: NORMAL
BH CV UPPER VENOUS LEFT BRACHIAL COMPRESS: NORMAL
BH CV UPPER VENOUS LEFT CEPHALIC FOREARM COMPRESS: NORMAL
BH CV UPPER VENOUS LEFT CEPHALIC UPPER COMPRESS: NORMAL
BH CV UPPER VENOUS LEFT INTERNAL JUGULAR AUGMENT: NORMAL
BH CV UPPER VENOUS LEFT INTERNAL JUGULAR COMPRESS: NORMAL
BH CV UPPER VENOUS LEFT INTERNAL JUGULAR PHASIC: NORMAL
BH CV UPPER VENOUS LEFT INTERNAL JUGULAR SPONT: NORMAL
BH CV UPPER VENOUS LEFT RADIAL COMPRESS: NORMAL
BH CV UPPER VENOUS LEFT SUBCLAVIAN AUGMENT: NORMAL
BH CV UPPER VENOUS LEFT SUBCLAVIAN COMPRESS: NORMAL
BH CV UPPER VENOUS LEFT SUBCLAVIAN PHASIC: NORMAL
BH CV UPPER VENOUS LEFT SUBCLAVIAN SPONT: NORMAL
BH CV UPPER VENOUS LEFT ULNAR COMPRESS: NORMAL
BH CV UPPER VENOUS RIGHT INTERNAL JUGULAR AUGMENT: NORMAL
BH CV UPPER VENOUS RIGHT INTERNAL JUGULAR COMPRESS: NORMAL
BH CV UPPER VENOUS RIGHT INTERNAL JUGULAR PHASIC: NORMAL
BH CV UPPER VENOUS RIGHT INTERNAL JUGULAR SPONT: NORMAL
BH CV UPPER VENOUS RIGHT SUBCLAVIAN AUGMENT: NORMAL
BH CV UPPER VENOUS RIGHT SUBCLAVIAN COMPRESS: NORMAL
BH CV UPPER VENOUS RIGHT SUBCLAVIAN PHASIC: NORMAL
BH CV UPPER VENOUS RIGHT SUBCLAVIAN SPONT: NORMAL
BH CV VAS PRELIMINARY FINDINGS SCRIPTING: 1
BUN SERPL-MCNC: 12 MG/DL (ref 8–23)
BUN/CREAT SERPL: 34.3 (ref 7–25)
CALCIUM SPEC-SCNC: 8 MG/DL (ref 8.6–10.5)
CHLORIDE SERPL-SCNC: 96 MMOL/L (ref 98–107)
CO2 SERPL-SCNC: 26.1 MMOL/L (ref 22–29)
CREAT SERPL-MCNC: 0.35 MG/DL (ref 0.57–1)
DEPRECATED RDW RBC AUTO: 55 FL (ref 37–54)
EGFRCR SERPLBLD CKD-EPI 2021: 107.4 ML/MIN/1.73
ERYTHROCYTE [DISTWIDTH] IN BLOOD BY AUTOMATED COUNT: 16 % (ref 12.3–15.4)
GLUCOSE SERPL-MCNC: 79 MG/DL (ref 65–99)
HCT VFR BLD AUTO: 27.3 % (ref 34–46.6)
HGB BLD-MCNC: 9.1 G/DL (ref 12–15.9)
MCH RBC QN AUTO: 31.6 PG (ref 26.6–33)
MCHC RBC AUTO-ENTMCNC: 33.3 G/DL (ref 31.5–35.7)
MCV RBC AUTO: 94.8 FL (ref 79–97)
PLATELET # BLD AUTO: 316 10*3/MM3 (ref 140–450)
PMV BLD AUTO: 9.5 FL (ref 6–12)
POTASSIUM SERPL-SCNC: 3.5 MMOL/L (ref 3.5–5.2)
POTASSIUM SERPL-SCNC: 3.7 MMOL/L (ref 3.5–5.2)
RBC # BLD AUTO: 2.88 10*6/MM3 (ref 3.77–5.28)
SODIUM SERPL-SCNC: 130 MMOL/L (ref 136–145)
WBC NRBC COR # BLD AUTO: 6.27 10*3/MM3 (ref 3.4–10.8)

## 2024-10-20 PROCEDURE — 94799 UNLISTED PULMONARY SVC/PX: CPT

## 2024-10-20 PROCEDURE — 93971 EXTREMITY STUDY: CPT | Performed by: SURGERY

## 2024-10-20 PROCEDURE — 85027 COMPLETE CBC AUTOMATED: CPT | Performed by: INTERNAL MEDICINE

## 2024-10-20 PROCEDURE — 99232 SBSQ HOSP IP/OBS MODERATE 35: CPT | Performed by: INTERNAL MEDICINE

## 2024-10-20 PROCEDURE — 84132 ASSAY OF SERUM POTASSIUM: CPT | Performed by: INTERNAL MEDICINE

## 2024-10-20 PROCEDURE — 94761 N-INVAS EAR/PLS OXIMETRY MLT: CPT

## 2024-10-20 PROCEDURE — 63710000001 PREDNISONE PER 1 MG: Performed by: NURSE PRACTITIONER

## 2024-10-20 PROCEDURE — 80048 BASIC METABOLIC PNL TOTAL CA: CPT | Performed by: INTERNAL MEDICINE

## 2024-10-20 PROCEDURE — 94664 DEMO&/EVAL PT USE INHALER: CPT

## 2024-10-20 PROCEDURE — 93971 EXTREMITY STUDY: CPT

## 2024-10-20 RX ORDER — POTASSIUM CHLORIDE 1500 MG/1
40 TABLET, EXTENDED RELEASE ORAL EVERY 4 HOURS
Status: COMPLETED | OUTPATIENT
Start: 2024-10-20 | End: 2024-10-20

## 2024-10-20 RX ORDER — POTASSIUM CHLORIDE 1500 MG/1
20 TABLET, EXTENDED RELEASE ORAL ONCE
Status: COMPLETED | OUTPATIENT
Start: 2024-10-20 | End: 2024-10-20

## 2024-10-20 RX ADMIN — NYSTATIN 500000 UNITS: 100000 SUSPENSION ORAL at 08:06

## 2024-10-20 RX ADMIN — METOPROLOL TARTRATE 50 MG: 50 TABLET, FILM COATED ORAL at 08:06

## 2024-10-20 RX ADMIN — POTASSIUM CHLORIDE 20 MEQ: 1500 TABLET, EXTENDED RELEASE ORAL at 08:06

## 2024-10-20 RX ADMIN — LEVOTHYROXINE SODIUM 50 MCG: 0.05 TABLET ORAL at 05:46

## 2024-10-20 RX ADMIN — DIPHENHYDRAMINE HYDROCHLORIDE AND LIDOCAINE HYDROCHLORIDE AND ALUMINUM HYDROXIDE AND MAGNESIUM HYDRO 10 ML: KIT at 11:58

## 2024-10-20 RX ADMIN — SERTRALINE 50 MG: 50 TABLET, FILM COATED ORAL at 08:06

## 2024-10-20 RX ADMIN — DIPHENHYDRAMINE HYDROCHLORIDE AND LIDOCAINE HYDROCHLORIDE AND ALUMINUM HYDROXIDE AND MAGNESIUM HYDRO 10 ML: KIT at 20:46

## 2024-10-20 RX ADMIN — Medication 10 ML: at 08:06

## 2024-10-20 RX ADMIN — PREDNISONE 40 MG: 20 TABLET ORAL at 08:06

## 2024-10-20 RX ADMIN — DIPHENHYDRAMINE HYDROCHLORIDE AND LIDOCAINE HYDROCHLORIDE AND ALUMINUM HYDROXIDE AND MAGNESIUM HYDRO 10 ML: KIT at 08:11

## 2024-10-20 RX ADMIN — NYSTATIN 500000 UNITS: 100000 SUSPENSION ORAL at 11:58

## 2024-10-20 RX ADMIN — HYDROXYZINE HYDROCHLORIDE 25 MG: 25 TABLET, FILM COATED ORAL at 22:22

## 2024-10-20 RX ADMIN — MESALAMINE 4.8 G: 800 TABLET, DELAYED RELEASE ORAL at 08:06

## 2024-10-20 RX ADMIN — PANTOPRAZOLE SODIUM 40 MG: 40 TABLET, DELAYED RELEASE ORAL at 08:06

## 2024-10-20 RX ADMIN — TIOTROPIUM BROMIDE INHALATION SPRAY 2 PUFF: 3.12 SPRAY, METERED RESPIRATORY (INHALATION) at 08:16

## 2024-10-20 RX ADMIN — NYSTATIN 500000 UNITS: 100000 SUSPENSION ORAL at 20:46

## 2024-10-20 RX ADMIN — ASPIRIN 81 MG: 81 TABLET, COATED ORAL at 08:06

## 2024-10-20 RX ADMIN — METOPROLOL TARTRATE 50 MG: 50 TABLET, FILM COATED ORAL at 20:46

## 2024-10-20 RX ADMIN — Medication 10 ML: at 21:01

## 2024-10-20 RX ADMIN — ATORVASTATIN CALCIUM 40 MG: 40 TABLET, FILM COATED ORAL at 20:46

## 2024-10-20 RX ADMIN — FOLIC ACID 1 MG: 1 TABLET ORAL at 08:06

## 2024-10-20 RX ADMIN — POTASSIUM CHLORIDE 40 MEQ: 1500 TABLET, EXTENDED RELEASE ORAL at 16:15

## 2024-10-20 RX ADMIN — POTASSIUM CHLORIDE 40 MEQ: 1500 TABLET, EXTENDED RELEASE ORAL at 20:59

## 2024-10-20 RX ADMIN — HYDROXYZINE HYDROCHLORIDE 25 MG: 25 TABLET, FILM COATED ORAL at 04:37

## 2024-10-20 NOTE — PROGRESS NOTES
CC--- pneumonia, coronary artery disease, mitral regurgitation    Sub  Patient is comfortable without any active symptoms        Past Medical History:   Diagnosis Date    Arthritis     COPD (chronic obstructive pulmonary disease)     Hypertension      Past Surgical History:   Procedure Laterality Date    BRONCHOSCOPY N/A 10/16/2024    Procedure: BRONCHOSCOPY;  Surgeon: Gallo Pope MD;  Location: [facility] ENDOSCOPY;  Service: Pulmonary;  Laterality: N/A;    CARDIAC CATHETERIZATION N/A 10/15/2024    Procedure: Left Heart Cath, possible pci;  Surgeon: Travis Connor MD;  Location: [facility] CATH INVASIVE LOCATION;  Service: Cardiovascular;  Laterality: N/A;    COLONOSCOPY N/A 10/12/2024    Procedure: COLONOSCOPY WITH BIOPSY AND WIRE GUIDED BALLOON DILATION OF TERMINAL ILEUM;  Surgeon: Rob Strong MD;  Location: [facility] ENDOSCOPY;  Service: Gastroenterology;  Laterality: N/A;  Colitis, crohns of terminal ileum, right colon ulcers, diverticulosis, hemorroids    ENDOSCOPY N/A 10/12/2024    Procedure: ESOPHAGOGASTRODUODENOSCOPY WITH BIOPSY X 2 AREA;  Surgeon: Rob Strong MD;  Location: [facility] ENDOSCOPY;  Service: Gastroenterology;  Laterality: N/A;  Chronic gastritis, HH    HYSTERECTOMY         Physical Exam    General:      well developed, well nourished, in no acute distress.    Head:      normocephalic and atraumatic.    Eyes:      PERRL/EOM intact, conjunctivae and sclerae clear without nystagmus.    Neck:      no  thyromegaly, trachea central with normal respiratory effort  Lungs:      clear bilaterally to auscultation.    Heart:       regular rate and rhythm, S1, S2 without murmurs, rubs, or gallops  Skin:      intact without lesions or rashes.    Psych:      alert and cooperative; normal mood and affect; normal attention span and concentration.              CBC    Results from last 7 days   Lab Units 10/20/24  0539 10/18/24  0454 10/17/24  0443 10/16/24  0518 10/15/24  0008  10/13/24  1144   WBC 10*3/mm3 6.27 3.40 2.57* 2.38* 2.29*  --    HEMOGLOBIN g/dL 9.1* 9.7* 8.2* 8.3* 8.4*  --    HEMOGLOBIN, POC g/dL  --   --   --   --   --  9.4*   PLATELETS 10*3/mm3 316 288 209 182 145  --      BMP   Results from last 7 days   Lab Units 10/20/24  0539 10/19/24  1052 10/18/24  1648 10/18/24  0454 10/17/24  0443 10/16/24  0518 10/15/24  0008 10/14/24  0425 10/13/24  2027 10/13/24  1152 10/13/24  1152   SODIUM mmol/L 130* 129*  --  127* 127* 129* 131* 135* 138   < >  --    POTASSIUM mmol/L 3.7 4.2 5.2 3.2* 4.0 4.1 4.4 5.3* 5.0  --  3.6   CHLORIDE mmol/L 96* 98  --  98 96* 99 102 106 107   < >  --    CO2 mmol/L 26.1 23.5  --  23.8 24.3 22.6 22.0 23.9 22.1   < >  --    BUN mg/dL 12 24*  --  28* 28* 8 12 24* 32*   < >  --    CREATININE mg/dL 0.35* 0.41*  --  0.42* 0.42* 0.32* 0.42* 0.52* 0.56*   < >  --    GLUCOSE mg/dL 79 96  --  113* 138* 127* 123* 150* 190*   < >  --    MAGNESIUM mg/dL  --   --   --   --   --   --   --  2.5* 2.8*  --  3.7*    < > = values in this interval not displayed.     Infection   Results from last 7 days   Lab Units 10/16/24  1147 10/13/24  1415   BLOODCX   --  No growth at 5 days  No growth at 5 days   RESPCX  Light growth (2+) Normal respiratory wu. No S. aureus or Pseudomonas aeruginosa detected. Final report.  --      CMP   Results from last 7 days   Lab Units 10/20/24  0539 10/19/24  1052 10/18/24  1648 10/18/24  0454 10/17/24  0443 10/16/24  0518 10/15/24  0008 10/14/24  0425   SODIUM mmol/L 130* 129*  --  127* 127* 129* 131* 135*   POTASSIUM mmol/L 3.7 4.2 5.2 3.2* 4.0 4.1 4.4 5.3*   CHLORIDE mmol/L 96* 98  --  98 96* 99 102 106   CO2 mmol/L 26.1 23.5  --  23.8 24.3 22.6 22.0 23.9   BUN mg/dL 12 24*  --  28* 28* 8 12 24*   CREATININE mg/dL 0.35* 0.41*  --  0.42* 0.42* 0.32* 0.42* 0.52*   GLUCOSE mg/dL 79 96  --  113* 138* 127* 123* 150*   ALBUMIN g/dL  --   --   --  2.6* 2.6* 2.5* 2.4*  --    BILIRUBIN mg/dL  --   --   --  0.3 0.4 0.4 0.4  --    ALK PHOS U/L  --    --   --  67 65 64 70  --    AST (SGOT) U/L  --   --   --  10 10 9 8  --    ALT (SGPT) U/L  --   --   --  8 9 7 7  --          Assessment and plan    Coronary disease with LAD stenosis awaiting PCI  Catheter pneumonia being treated by pulmonary team  Moderate to severe MR  Hypokalemia corrected  Mild hyponatremia stable  Normocytic anemia    Currently stable and await recovery from cavitary pneumonia before LAD PCI      Electronically signed by Ashkan Ruiz MD, 10/20/24, 10:57 AM EDT.

## 2024-10-20 NOTE — PROGRESS NOTES
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  Descending thoracic aorta has an outpouching probably saccular aneurysm versus  penetrating aortic ulcer . CT surgery consulted    COPD: not in exacerbation      Hyponatremia    Anemia  HTN  Hypothyroidism     s/p EGD/colonoscopy-10/12/2024 EGD/colonoscopy, small hiatal hernia. Nonerosive chronic gastritis. Normal duodenum. TI stricture status post dilation to 12 mm. TI ulcers. Colon ulcers. Sigmoid colon diverticulosis. Grade 2 internal hemorrhoids , strongyloides.        Recommendations:    S/p ivermectin 2 days only    S/p Iron transfusion     PET scan as an outpatient     Monitor bronchoscopy results    Oxygen supplement and titration to maintain saturation 90 to 95%  Bronchodilators, spiriva      Abx: cefepime will downgrade in 24 hour to avoid neurology side effects     Followed by GI: on steroids, mesalamine and ivermectin.    I personally reviewed the radiological studies            LOS: 11 days     Subjective         Objective     Vital signs for last 24 hours:  Vitals:    10/20/24 0816 10/20/24 0820 10/20/24 0830 10/20/24 0900   BP:    91/50   BP Location:       Patient Position:       Pulse: 102 113 120 86   Resp: 24 24     Temp:       TempSrc:       SpO2: 97% 96% 95% 95%   Weight:       Height:           Intake/Output last 3 shifts:  I/O last 3 completed shifts:  In: 630 [P.O.:630]  Out: 2050  [Urine:2050]  Intake/Output this shift:  I/O this shift:  In: 237 [P.O.:237]  Out: -       Radiology  Imaging Results (Last 24 Hours)       ** No results found for the last 24 hours. **            Labs:  Results from last 7 days   Lab Units 10/20/24  0539   WBC 10*3/mm3 6.27   HEMOGLOBIN g/dL 9.1*   HEMATOCRIT % 27.3*   PLATELETS 10*3/mm3 316     Results from last 7 days   Lab Units 10/20/24  0539 10/18/24  1648 10/18/24  0454   SODIUM mmol/L 130*   < > 127*   POTASSIUM mmol/L 3.7   < > 3.2*   CHLORIDE mmol/L 96*   < > 98   CO2 mmol/L 26.1   < > 23.8   BUN mg/dL 12   < > 28*   CREATININE mg/dL 0.35*   < > 0.42*   CALCIUM mg/dL 8.0*   < > 8.4*   BILIRUBIN mg/dL  --   --  0.3   ALK PHOS U/L  --   --  67   ALT (SGPT) U/L  --   --  8   AST (SGOT) U/L  --   --  10   GLUCOSE mg/dL 79   < > 113*    < > = values in this interval not displayed.     Results from last 7 days   Lab Units 10/13/24  1144   PH, ARTERIAL pH units 7.422   PO2 ART mm Hg 67.1*   PCO2, ARTERIAL mm Hg 39.8   HCO3 ART mmol/L 25.9     Results from last 7 days   Lab Units 10/18/24  0454 10/17/24  0443 10/16/24  0518   ALBUMIN g/dL 2.6* 2.6* 2.5*     Results from last 7 days   Lab Units 10/13/24  1414 10/13/24  1152   HSTROP T ng/L 22* 23*         Results from last 7 days   Lab Units 10/14/24  0425   MAGNESIUM mg/dL 2.5*     Results from last 7 days   Lab Units 10/15/24  0008   INR  1.01               Meds:   SCHEDULE  aspirin, 81 mg, Oral, Daily  atorvastatin, 40 mg, Oral, Nightly  [Transfer Hold] enoxaparin, 40 mg, Subcutaneous, Q24H  First Mouthwash (Magic Mouthwash), 10 mL, Swish & Spit, Q6H  folic acid, 1 mg, Oral, Daily  levothyroxine, 50 mcg, Oral, Q AM  mesalamine, 4.8 g, Oral, Daily With Breakfast  metoprolol tartrate, 50 mg, Oral, Q12H  nystatin, 5 mL, Swish & Swallow, 4x Daily  pantoprazole, 40 mg, Oral, Daily  predniSONE, 40 mg, Oral, Daily With Breakfast  sertraline, 50 mg, Oral, Daily  sodium chloride, 10 mL, Intravenous, Q12H  tiotropium  bromide monohydrate, 2 puff, Inhalation, Daily - RT  Urea, 15 g, Oral, BID      Infusions       PRNs    acetaminophen    albuterol    senna-docusate sodium **AND** polyethylene glycol **AND** bisacodyl **AND** bisacodyl    hydrOXYzine    influenza vaccine    ipratropium-albuterol    Lidocaine Viscous HCl    Magnesium Standard Dose Replacement - Follow Nurse / BPA Driven Protocol    melatonin    nitroglycerin    ondansetron ODT **OR** ondansetron    Phosphorus Replacement - Follow Nurse / BPA Driven Protocol    Potassium Replacement - Follow Nurse / BPA Driven Protocol    sodium chloride    Physical Exam:  Physical Exam  Cardiovascular:      Heart sounds: Murmur heard.      No gallop.   Pulmonary:      Effort: No respiratory distress.      Breath sounds: No stridor. Rhonchi and rales present. No wheezing.   Chest:      Chest wall: No tenderness.         ROS  Review of Systems   Respiratory:  Positive for cough and shortness of breath. Negative for wheezing and stridor.    Cardiovascular:  Positive for chest pain and palpitations. Negative for leg swelling.             Total time spent with patient greater than: 45 Minutes

## 2024-10-20 NOTE — PROGRESS NOTES
PROGRESS NOTE      Patient Name: Maryann Gaitan  : 1950  MRN: [id number]  Primary Care Physician: Eduard Little MD  Date of admission: 10/8/2024    Patient Care Team:  Eduard Little MD as PCP - General (Family Medicine)        Subjective   Subjective:     Seen and examined, comfortable, not in distress,  Sodium is 137 today, comfortable, not in distress,      Review of systems:  All other review of system unremarkable      Allergies:    Allergies   Allergen Reactions    Codeine Hives    Penicillin G Sodium Hives       Objective   Exam:     Vital Signs  Temp:  [97.6 °F (36.4 °C)-98.2 °F (36.8 °C)] 98 °F (36.7 °C)  Heart Rate:  [] 85  Resp:  [18-29] 25  BP: ()/(48-70) 108/65  SpO2:  [92 %-97 %] 96 %  on   ;   Device (Oxygen Therapy): room air  Body mass index is 24 kg/m².    General: Elderly female in no acute distress.    Head:      Normocephalic and atraumatic.    Eyes:      PERRL/EOM intact, conjunctivae and sclerae clear without nystagmus.    Neck:      No masses, thyromegaly,  trachea central with normal respiratory effort   Lungs:    Clear bilaterally to auscultation.    Heart:      Regular rate and rhythm, no murmur no gallop  Abd:        Soft, nontender, not distended, bowel sounds positive, no shifting dullness   Pulses:   Pulses palpable  Extr:        No cyanosis or clubbing--no edema.    Neuro:    No focal deficits.   alert oriented x3  Skin:       Intact without lesions or rashes.    Psych:    Alert and cooperative; normal mood and affect; .      Results Review:  I have personally reviewed most recent Data :  CBC    Results from last 7 days   Lab Units 10/20/24  0539 10/18/24  0454 10/17/24  0443 10/16/24  0518 10/15/24  0008   WBC 10*3/mm3 6.27 3.40 2.57* 2.38* 2.29*   HEMOGLOBIN g/dL 9.1* 9.7* 8.2* 8.3* 8.4*   PLATELETS 10*3/mm3 316 288 209 182 145     CMP   Results from last 7 days   Lab Units 10/20/24  1155 10/20/24  0539 10/19/24  1052 10/18/24  1644  10/18/24  0454 10/17/24  0443 10/16/24  0518 10/15/24  0008 10/14/24  0425   SODIUM mmol/L  --  130* 129*  --  127* 127* 129* 131* 135*   POTASSIUM mmol/L 3.5 3.7 4.2 5.2 3.2* 4.0 4.1 4.4 5.3*   CHLORIDE mmol/L  --  96* 98  --  98 96* 99 102 106   CO2 mmol/L  --  26.1 23.5  --  23.8 24.3 22.6 22.0 23.9   BUN mg/dL  --  12 24*  --  28* 28* 8 12 24*   CREATININE mg/dL  --  0.35* 0.41*  --  0.42* 0.42* 0.32* 0.42* 0.52*   GLUCOSE mg/dL  --  79 96  --  113* 138* 127* 123* 150*   ALBUMIN g/dL  --   --   --   --  2.6* 2.6* 2.5* 2.4*  --    BILIRUBIN mg/dL  --   --   --   --  0.3 0.4 0.4 0.4  --    ALK PHOS U/L  --   --   --   --  67 65 64 70  --    AST (SGOT) U/L  --   --   --   --  10 10 9 8  --    ALT (SGPT) U/L  --   --   --   --  8 9 7 7  --      ABG          No radiology results for the last day    Results for orders placed during the hospital encounter of 10/08/24    Adult Transesophageal Echo (GONZÁLEZ) W/ Cont if Necessary Per Protocol (Cardiology Department)    Interpretation Summary    Left ventricular systolic function is normal. Left ventricular ejection fraction appears to be 56 - 60%.    The left atrial cavity is severely dilated.    Saline test results are negative.    There is mild, posterior mitral leaflet thickening present.    Moderate to severe mitral valve regurgitation is present with a centrally-directed jet noted.    There is moderate, (grade 3) plaque in the descending aorta present.    Scheduled Meds:aspirin, 81 mg, Oral, Daily  atorvastatin, 40 mg, Oral, Nightly  [Transfer Hold] enoxaparin, 40 mg, Subcutaneous, Q24H  First Mouthwash (Magic Mouthwash), 10 mL, Swish & Spit, Q6H  folic acid, 1 mg, Oral, Daily  levothyroxine, 50 mcg, Oral, Q AM  mesalamine, 4.8 g, Oral, Daily With Breakfast  metoprolol tartrate, 50 mg, Oral, Q12H  nystatin, 5 mL, Swish & Swallow, 4x Daily  pantoprazole, 40 mg, Oral, Daily  potassium chloride ER, 40 mEq, Oral, Q4H  predniSONE, 40 mg, Oral, Daily With  Breakfast  sertraline, 50 mg, Oral, Daily  sodium chloride, 10 mL, Intravenous, Q12H  tiotropium bromide monohydrate, 2 puff, Inhalation, Daily - RT  Urea, 15 g, Oral, BID      Continuous Infusions:     PRN Meds:  acetaminophen    albuterol    senna-docusate sodium **AND** polyethylene glycol **AND** bisacodyl **AND** bisacodyl    hydrOXYzine    influenza vaccine    ipratropium-albuterol    Lidocaine Viscous HCl    Magnesium Standard Dose Replacement - Follow Nurse / BPA Driven Protocol    melatonin    nitroglycerin    ondansetron ODT **OR** ondansetron    Phosphorus Replacement - Follow Nurse / BPA Driven Protocol    Potassium Replacement - Follow Nurse / BPA Driven Protocol    sodium chloride    Assessment & Plan   Assessment and Plan:         Hyponatremia    Diarrhea    Moderate to severe mitral regurgitation    Chest pain, atypical    Cavitary lesion of lung    Multiple tracheobronchial mucus plugs    ASSESSMENT:  Hyponatremia likely etiology thiazide diuretics in the presence of Cymbalta  History of hypertension  History of arthritis  History of Crohn disease  Severe mitral regurgitation   10/12/2024 EGD/colonoscopy  small hiatal hernia.  Nonerosive chronic gastritis.  Normal duodenum.  TI stricture status post dilation to 12 mm.  TI ulcers.  Colon ulcers.  Sigmoid colon diverticulosis.  Grade 2 internal hemorrhoids     PLAN :        Sodium level getting better now   Sodium level improved to 130  Today Na better than yesterday on p.o. urea at this time  Although pt on SSRI can cause Hyponatremia to me patient lungs condition are more likley caused recent acute drop in the sodium  Pt is advised to stay on fluid restriction <1L   Patient has cavitary lesion that might be contributing to hyponatremia with some SIADH  Urea to be continued  at this time because of concern of SIADH  SSRI can be stopped if OK with psych may need evaluation from them  Most recent echo showed moderate to severe mitral  regurgitation  Patient may need a GONZÁLEZ  Pt need PCI next week   Continue to follow-up with electrolytes  Follow-up with repeat sodium tomorrow morning   Potassium level better but still low cont to replace   Continue to follow-up with the nutritional status  Diarrhea has improved  Status post EGD and colonoscopy   Follow-up with hematology and GI  Closely follow,           Electronically signed by Buddy Pierre MD,   [facility] kidney consultant  [phone]  10/20/2024  17:25 EDT

## 2024-10-20 NOTE — PROGRESS NOTES
[facility] MEDICINE SERVICE  DAILY PROGRESS NOTE    NAME: Maryann Gaitan  : 1950  MRN: [id number]      LOS: 11 days     PROVIDER OF SERVICE: Noreen Nj MD    Chief Complaint: Hyponatremia    Subjective:     Interval History:  History taken from: patient    No new complaint      Review of Systems:   Review of Systems   All other systems reviewed and are negative.      Objective:     Vital Signs  Temp:  [97.6 °F (36.4 °C)-98.2 °F (36.8 °C)] 98.2 °F (36.8 °C)  Heart Rate:  [] 106  Resp:  [18-29] 29  BP: ()/(48-70) 107/53   Body mass index is 24 kg/m².    Physical Exam  Physical Exam  Constitutional:       Appearance: Normal appearance.   HENT:      Head: Normocephalic and atraumatic.      Nose: Nose normal.      Mouth/Throat:      Mouth: Mucous membranes are moist.   Eyes:      Extraocular Movements: Extraocular movements intact.      Pupils: Pupils are equal, round, and reactive to light.   Cardiovascular:      Rate and Rhythm: Normal rate and regular rhythm.   Pulmonary:      Effort: Pulmonary effort is normal.      Breath sounds: Normal breath sounds.   Abdominal:      General: Abdomen is flat. Bowel sounds are normal.      Palpations: Abdomen is soft.   Musculoskeletal:         General: Normal range of motion.      Cervical back: Normal range of motion and neck supple.   Skin:     General: Skin is warm and dry.   Neurological:      General: No focal deficit present.      Mental Status: She is alert and oriented to person, place, and time.   Psychiatric:         Mood and Affect: Mood normal.         Behavior: Behavior normal.         Thought Content: Thought content normal.         Judgment: Judgment normal.         Current Medications:  Scheduled Meds:aspirin, 81 mg, Oral, Daily  atorvastatin, 40 mg, Oral, Nightly  [Transfer Hold] enoxaparin, 40 mg, Subcutaneous, Q24H  First Mouthwash (Magic Mouthwash), 10 mL, Swish & Spit, Q6H  folic acid, 1 mg, Oral, Daily  levothyroxine, 50  mcg, Oral, Q AM  mesalamine, 4.8 g, Oral, Daily With Breakfast  metoprolol tartrate, 50 mg, Oral, Q12H  nystatin, 5 mL, Swish & Swallow, 4x Daily  pantoprazole, 40 mg, Oral, Daily  predniSONE, 40 mg, Oral, Daily With Breakfast  sertraline, 50 mg, Oral, Daily  sodium chloride, 10 mL, Intravenous, Q12H  tiotropium bromide monohydrate, 2 puff, Inhalation, Daily - RT  Urea, 15 g, Oral, BID      Continuous Infusions:     PRN Meds:.  acetaminophen    albuterol    senna-docusate sodium **AND** polyethylene glycol **AND** bisacodyl **AND** bisacodyl    hydrOXYzine    influenza vaccine    ipratropium-albuterol    Lidocaine Viscous HCl    Magnesium Standard Dose Replacement - Follow Nurse / BPA Driven Protocol    melatonin    nitroglycerin    ondansetron ODT **OR** ondansetron    Phosphorus Replacement - Follow Nurse / BPA Driven Protocol    Potassium Replacement - Follow Nurse / BPA Driven Protocol    sodium chloride       Diagnostic Data    Results from last 7 days   Lab Units 10/20/24  1155 10/20/24  0539 10/18/24  1648 10/18/24  0454   WBC 10*3/mm3  --  6.27  --  3.40   HEMOGLOBIN g/dL  --  9.1*  --  9.7*   HEMATOCRIT %  --  27.3*  --  29.5*   PLATELETS 10*3/mm3  --  316  --  288   GLUCOSE mg/dL  --  79   < > 113*   CREATININE mg/dL  --  0.35*   < > 0.42*   BUN mg/dL  --  12   < > 28*   SODIUM mmol/L  --  130*   < > 127*   POTASSIUM mmol/L 3.5 3.7   < > 3.2*   AST (SGOT) U/L  --   --   --  10   ALT (SGPT) U/L  --   --   --  8   ALK PHOS U/L  --   --   --  67   BILIRUBIN mg/dL  --   --   --  0.3   ANION GAP mmol/L  --  7.9   < > 5.2    < > = values in this interval not displayed.       No radiology results for the last day      I reviewed the patient's new clinical results.    Assessment/Plan:     Active and Resolved Problems  Active Hospital Problems    Diagnosis  POA    **Hyponatremia [E87.1]  Yes    Diarrhea [R19.7]  Unknown    Moderate to severe mitral regurgitation [I34.0]  Unknown    Chest pain, atypical [R07.89]   Unknown    Cavitary lesion of lung [J98.4]  Unknown    Multiple tracheobronchial mucus plugs [T17.800A]  Unknown      Resolved Hospital Problems   No resolved problems to display.       Active and Resolved Problems  Multifocal pneumonia  Cavitary lung lesion  Acute hypoxic respiratory failure  Send blood cultures and respiratory viral panel  continue IV cefepime-pharmacy to dose  MRSA negative- IV vanco discontinued  Supplemental oxygen to keep SpO2 more than 95%  Closely monitor vitals  CT PE showed cavitary lung lesion-pulmonary consulted      Hyponatremia  -Improving, monitor.  See further recommendations per nephrology.      Pneumonia with cavitary lesion in the left upper lobe of lung  - Management per pulmonary.  Follow-up on BAL cultures.  New cavitary lesion in the left upper lung measuring 1.6 x 1.4 cm  nodular opacities and tree-in-bud opacities withinthe left upper and left lower lung  - Status post bronchoscopy.  Follow-up on BAL cultures and other results    Hypokalemia  - Resolved     Crohns disease-see management per GI-on mesalamine and prednisone  Diarrhea-see management per GI  - Follows with Dr. Castillo; plans for outpatient scopes 10/31 for ongoing Crohns  - Continues to have significant diarrhea which is provoking electrolyte abnormalities  - Also in setting of worsening anemia  - GI consulted- s/p EGD/colonoscopy-10/12/2024 EGD/colonoscopy (Dr Strong) small hiatal hernia.  Nonerosive chronic gastritis.  Normal duodenum.  TI stricture status post dilation to 12 mm.  TI ulcers.  Colon ulcers.  Sigmoid colon diverticulosis.  Grade 2 internal hemorrhoids.    - Continue Mesalamine, solumedrol  - Low residue diet.   - Patient will likely need Rinvoq as outpatient. Task has been sent to GI office to initiate approval process.  - Could also consider clinical trial secondary to Crohn's with stricture.  Continue diet as tolerated.  - Likely home in the next 1 to 2 days from GI standpoint.         Anemia  - Mild downward trend in H&H.  Monitor.       Leukopenia  -.Upward trending WBC and improving.  Monitor.     Hypertension  - Blood pressure is controlled.  Continue current BP med regimen.     Moderate to severe mitral regurgitation    Chest pain  -severe MR , s/p  cardiac cath 10/15/2024 which revealed total right and severe ostial LAD disease.  Descending thoracic aorta has an outpouching probably saccular aneurysm versus  penetrating aortic ulcer . CT surgery consulted   -Status post GONZÁLEZ which showed EF of 56 to 60% with a dilated left atrium.  Cardiology planning PCI for next week and evaluation for MitraClip procedure(after recovered from the pneumonia).           VTE Prophylaxis:  Pharmacologic & mechanical VTE prophylaxis orders are present.             Disposition Planning:     Barriers to Discharge: Pending clinical improvement  Anticipated Date of Discharge:  10/20/2024  Place of Discharge: Likely home           Code Status and Medical Interventions: CPR (Attempt to Resuscitate); Full Support   Ordered at: 10/09/24 1058     Code Status (Patient has no pulse and is not breathing):    CPR (Attempt to Resuscitate)     Medical Interventions (Patient has pulse or is breathing):    Full Support       Signature: Electronically signed by Noreen Nj MD, 10/20/24, 12:47 EDT.  [facility] Hospitalist Team

## 2024-10-21 PROBLEM — I25.10 CAD, MULTIPLE VESSEL: Status: ACTIVE | Noted: 2024-10-08

## 2024-10-21 LAB
ANION GAP SERPL CALCULATED.3IONS-SCNC: 5.7 MMOL/L (ref 5–15)
BUN SERPL-MCNC: 9 MG/DL (ref 8–23)
BUN/CREAT SERPL: 23.7 (ref 7–25)
CALCIUM SPEC-SCNC: 8.4 MG/DL (ref 8.6–10.5)
CHLORIDE SERPL-SCNC: 100 MMOL/L (ref 98–107)
CO2 SERPL-SCNC: 25.3 MMOL/L (ref 22–29)
CREAT SERPL-MCNC: 0.38 MG/DL (ref 0.57–1)
DEPRECATED RDW RBC AUTO: 56.1 FL (ref 37–54)
EGFRCR SERPLBLD CKD-EPI 2021: 105.3 ML/MIN/1.73
ERYTHROCYTE [DISTWIDTH] IN BLOOD BY AUTOMATED COUNT: 16.6 % (ref 12.3–15.4)
GLUCOSE SERPL-MCNC: 84 MG/DL (ref 65–99)
HCT VFR BLD AUTO: 26.7 % (ref 34–46.6)
HGB BLD-MCNC: 8.7 G/DL (ref 12–15.9)
LAB AP CASE REPORT: NORMAL
MCH RBC QN AUTO: 30.7 PG (ref 26.6–33)
MCHC RBC AUTO-ENTMCNC: 32.6 G/DL (ref 31.5–35.7)
MCV RBC AUTO: 94.3 FL (ref 79–97)
P JIROVECII DNA L RESP QL NAA+NON-PROBE: NEGATIVE
PATH REPORT.FINAL DX SPEC: NORMAL
PATH REPORT.GROSS SPEC: NORMAL
PLATELET # BLD AUTO: 361 10*3/MM3 (ref 140–450)
PMV BLD AUTO: 9.2 FL (ref 6–12)
POTASSIUM SERPL-SCNC: 4.8 MMOL/L (ref 3.5–5.2)
POTASSIUM SERPL-SCNC: 5 MMOL/L (ref 3.5–5.2)
RBC # BLD AUTO: 2.83 10*6/MM3 (ref 3.77–5.28)
REF LAB TEST METHOD: NORMAL
SODIUM SERPL-SCNC: 131 MMOL/L (ref 136–145)
WBC NRBC COR # BLD AUTO: 9.38 10*3/MM3 (ref 3.4–10.8)

## 2024-10-21 PROCEDURE — 63710000001 PREDNISONE PER 1 MG: Performed by: NURSE PRACTITIONER

## 2024-10-21 PROCEDURE — 80048 BASIC METABOLIC PNL TOTAL CA: CPT | Performed by: INTERNAL MEDICINE

## 2024-10-21 PROCEDURE — 84132 ASSAY OF SERUM POTASSIUM: CPT | Performed by: INTERNAL MEDICINE

## 2024-10-21 PROCEDURE — 99232 SBSQ HOSP IP/OBS MODERATE 35: CPT | Performed by: INTERNAL MEDICINE

## 2024-10-21 PROCEDURE — 94761 N-INVAS EAR/PLS OXIMETRY MLT: CPT

## 2024-10-21 PROCEDURE — 94664 DEMO&/EVAL PT USE INHALER: CPT

## 2024-10-21 PROCEDURE — 85027 COMPLETE CBC AUTOMATED: CPT | Performed by: INTERNAL MEDICINE

## 2024-10-21 PROCEDURE — 94799 UNLISTED PULMONARY SVC/PX: CPT

## 2024-10-21 RX ORDER — ENOXAPARIN SODIUM 100 MG/ML
40 INJECTION SUBCUTANEOUS
Status: DISCONTINUED | OUTPATIENT
Start: 2024-10-21 | End: 2024-10-23

## 2024-10-21 RX ADMIN — PREDNISONE 40 MG: 20 TABLET ORAL at 07:51

## 2024-10-21 RX ADMIN — PANTOPRAZOLE SODIUM 40 MG: 40 TABLET, DELAYED RELEASE ORAL at 08:48

## 2024-10-21 RX ADMIN — FOLIC ACID 1 MG: 1 TABLET ORAL at 08:48

## 2024-10-21 RX ADMIN — Medication 10 ML: at 08:48

## 2024-10-21 RX ADMIN — DIPHENHYDRAMINE HYDROCHLORIDE AND LIDOCAINE HYDROCHLORIDE AND ALUMINUM HYDROXIDE AND MAGNESIUM HYDRO 10 ML: KIT at 07:51

## 2024-10-21 RX ADMIN — METOPROLOL TARTRATE 50 MG: 50 TABLET, FILM COATED ORAL at 20:13

## 2024-10-21 RX ADMIN — TIOTROPIUM BROMIDE INHALATION SPRAY 2 PUFF: 3.12 SPRAY, METERED RESPIRATORY (INHALATION) at 07:16

## 2024-10-21 RX ADMIN — SERTRALINE 50 MG: 50 TABLET, FILM COATED ORAL at 08:48

## 2024-10-21 RX ADMIN — NYSTATIN 500000 UNITS: 100000 SUSPENSION ORAL at 07:51

## 2024-10-21 RX ADMIN — DIPHENHYDRAMINE HYDROCHLORIDE AND LIDOCAINE HYDROCHLORIDE AND ALUMINUM HYDROXIDE AND MAGNESIUM HYDRO 10 ML: KIT at 12:15

## 2024-10-21 RX ADMIN — LEVOTHYROXINE SODIUM 50 MCG: 0.05 TABLET ORAL at 05:57

## 2024-10-21 RX ADMIN — NYSTATIN 500000 UNITS: 100000 SUSPENSION ORAL at 12:07

## 2024-10-21 RX ADMIN — MESALAMINE 4.8 G: 800 TABLET, DELAYED RELEASE ORAL at 07:51

## 2024-10-21 RX ADMIN — METOPROLOL TARTRATE 50 MG: 50 TABLET, FILM COATED ORAL at 08:48

## 2024-10-21 RX ADMIN — DIPHENHYDRAMINE HYDROCHLORIDE AND LIDOCAINE HYDROCHLORIDE AND ALUMINUM HYDROXIDE AND MAGNESIUM HYDRO 10 ML: KIT at 00:25

## 2024-10-21 RX ADMIN — ATORVASTATIN CALCIUM 40 MG: 40 TABLET, FILM COATED ORAL at 20:13

## 2024-10-21 RX ADMIN — DIPHENHYDRAMINE HYDROCHLORIDE AND LIDOCAINE HYDROCHLORIDE AND ALUMINUM HYDROXIDE AND MAGNESIUM HYDRO 10 ML: KIT at 20:13

## 2024-10-21 RX ADMIN — ASPIRIN 81 MG: 81 TABLET, COATED ORAL at 08:48

## 2024-10-21 NOTE — PROGRESS NOTES
Cardiology Progress Note    Patient Identification:  Name: Maryann Gaitan  Age: 74 y.o.  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   Plans for laser PCI tomorrow       NP note: Patient seen and examined this morning.  No distress noted.  Discussed with hospitalist.  Abx have been completed.  Patient does continue to have cough with sputum, but feels much improved.      Continue aspirin, statin, beta blocker     Electronically signed by DRU Rubio, 10/21/24, 10:18 AM EDT.      Cardiology attending addendum :    I have personally performed a face-to-face diagnostic evaluation, physical exam and reviewed data on this patient.  I have reviewed documentation done by me and nurse practitioner  and corrected as needed.  And agree with the different components of documentation.Greater than 50% of the time spent in the care of this patient was provided by attending consultant/me.        Subjective: Patient seen and examined; chart and labs reviewed; discussed with bedside nurse.  Patient had elevated D-dimer.  CT PE study is negative for PE but is abnormal with cavitary lesion.  Patient underwent cardiac cath 10/15/2024 which revealed total right and severe ostial LAD.  Patient underwent GONZÁLEZ 10/16/2024 which revealed moderate to severe MR. Patient was turned down for surgery.      Objective:    10/14/2024: Sodium 135 potassium 5.3 BUN 24 creatinine 0.52 glucose 150  10/15/2024: sodium 131, potassium  "4.4. bun 12 creatinine 0.42 hgb 8.4   10/16/2024: sodium 129 potassium 4.1 BUN 8 creatinine 0.32 glucose 127 CRP 2.03 WBC 2.38 hemoglobin 8.3  10/17/2024: Sodium 127 potassium 4.0 BUN 28 creatinine 0.42 hemoglobin 8.2  10/18/2024: Sodium 127 potassium 3.2 BUN 28 creatinine 0.42 hemoglobin 9.7    10/21/2024: Sodium 131 creatinine 0.38 hemoglobin 8.7    History of present illness:      Maryann Gaitan is a 74-year-old female with a PMH of     COPD  Hypertension  Rheumatoid arthritis  Crohn's disease     who presented to [facility] on 10/8/2024 due to \"abnormal labs\" and nausea/vomiting/diarrhea.  Patient noted to be hyponatremic with sodium of 122, hypokalemic potassium 3.0 as well as anemic.  Patient has been followed by nephrology and GI during hospitalization.  She underwent EGD and colonoscopy yesterday showing small hiatal hernia, nonerosive chronic gastritis and sigmoid colon diverticulitis.  Per GI she has a history of Crohn disease but this has not been proven by biopsy as [employer] testing has not been consistent with IBD.  Cardiology consulted for chest pain and abnormal EKG.  Rapid response called this morning on patient secondary to acute sudden onset of sharp left-sided chest pain with associated symptoms of shortness of breath, palpitations, lightheadedness/dizziness.  EKG 10/12 reviewed showing sinus tachycardia with PACs. Stat EKG today without acute ST-T segment changes, ABG with PaO2 of 67, troponin 23, H&H 9.9/30.3.  Patient denies personal or family history of CAD, hyperlipidemia.  She does report history of hypertension, type 2 diabetes.      EGD/colonoscopy 10/12/2024 revealed small hiatal hernia, nonerosive gastritis, T1 stricture s/p dilatation with 12 mm, T1 ulcer, colon ulcers, sigmoid diverticulosis, grade 2 internal hemorrhoids and strongyloides  Echo 10/12/2024 EF of 51 to 55% with mild RV enlargement, severe left atrial enlargement, moderate to severe MR  Transesophageal echo 10/16/2024: LVEF of " 55 to 60% with severe left atrial enlargement, moderate to severe MR and grade 3 descending aortic plaque  Cardiac cath 10/15/2024 reveals total right and severe ostial LAD, descending thoracic aorta had an outpouching consistent with penetrating aortic ulcer versus aneurysm.        Assessment:  :     Chest pain  Shortness of breath  Arrhythmia  Abnormal EKG  Hypertension  Mitral regurgitation  Hyponatremia  Hypokalemia  Crohn's disease  Normocytic anemia  COPD, chronic steroid therapy  Multifocal pneumonia  Hyperglycemia, prediabetes with A1c of 5.6 from     Recommendations / Plan:         Patient presented 10/9/2024 because of abnormal labs showing low sodium, was complaining of nausea vomiting and diarrhea.  Patient's hydrochlorothiazide was held and nephrology consulted.  HS troponin is 23 and 22.  Previous proBNP was 2573.  Sodium is improved to 137.  Glucose elevated.  EKG done 10/13/2024 reviewed/interpreted by me reveals sinus arrhythmia at the rate of 78 bpm.  Patient has multifocal pneumonia and acute hypoxic respiratory failure.  He is on IV antibiotics and oxygen.  Blood cultures are pending.  Patient had D-dimer which was elevated.  CT PE study is negative for PE but has cavitary lesion in the lung.  Pulmonary Dr. Cho, underwent bronchoscopy.  Patient has severe MR will benefit from cardiac cath.  Patient underwent cardiac cath 10/15/2024 which revealed total right and severe ostial LAD disease.  Descending thoracic aorta has an outpouching probably saccular aneurysm versus  penetrating aortic ulcer .  Echocardiogram 10/12/2024 is revealing EF of 51 to 55% with mild RV enlargement severe left atrial enlargement and right atrial enlargement and moderate to severe MR  GONZÁLEZ is revealing moderate to severe MR.  Patient would benefit from CABG and mitral valve surgery.  Patient was seen by .  Patient has been turned down for surgery due to multiple comorbid conditions.  Patient has a cavitary  lesion in her lungs had bronchoscopy.  Had multiple mucous plugs.  Is awaiting lavage results.  Will schedule for PCI to LAD with laser atherectomy.  Risk benefits alternatives discussed.  Right appears to be chronic total with bridging collaterals.  For her mitral regurgitation will follow-up with structural heart team.  Follow-up with primary team and GI for nausea vomiting and possible Crohn's disease and microcytic anemia  Discussed with multiple family members were at bedside and explained risk benefits alternatives of various approaches.  Will follow and consider further evaluation treatment depending on how her condition evolves    Copied text in this portion of the note has been reviewed and is accurate as of 10/21/2024    Past Medical History:  Past Medical History:   Diagnosis Date    Arthritis     COPD (chronic obstructive pulmonary disease)     Hypertension      Past Surgical History:  Past Surgical History:   Procedure Laterality Date    BRONCHOSCOPY N/A 10/16/2024    Procedure: BRONCHOSCOPY;  Surgeon: Gallo Pope MD;  Location: [facility] ENDOSCOPY;  Service: Pulmonary;  Laterality: N/A;    CARDIAC CATHETERIZATION N/A 10/15/2024    Procedure: Left Heart Cath, possible pci;  Surgeon: Travis Connor MD;  Location: [facility] CATH INVASIVE LOCATION;  Service: Cardiovascular;  Laterality: N/A;    COLONOSCOPY N/A 10/12/2024    Procedure: COLONOSCOPY WITH BIOPSY AND WIRE GUIDED BALLOON DILATION OF TERMINAL ILEUM;  Surgeon: Rob Strong MD;  Location: [facility] ENDOSCOPY;  Service: Gastroenterology;  Laterality: N/A;  Colitis, crohns of terminal ileum, right colon ulcers, diverticulosis, hemorroids    ENDOSCOPY N/A 10/12/2024    Procedure: ESOPHAGOGASTRODUODENOSCOPY WITH BIOPSY X 2 AREA;  Surgeon: Rob Strong MD;  Location: [facility] ENDOSCOPY;  Service: Gastroenterology;  Laterality: N/A;  Chronic gastritis, HH    HYSTERECTOMY          Social History:   Social History  "    Tobacco Use    Smoking status: Former     Types: Cigarettes    Smokeless tobacco: Never   Substance Use Topics    Alcohol use: Never      Family History:  History reviewed. No pertinent family history.       Allergies:  Allergies   Allergen Reactions    Codeine Hives    Penicillin G Sodium Hives     Scheduled Meds:  aspirin, 81 mg, Daily  atorvastatin, 40 mg, Nightly  enoxaparin, 40 mg, Q24H  First Mouthwash (Magic Mouthwash), 10 mL, Q6H  folic acid, 1 mg, Daily  levothyroxine, 50 mcg, Q AM  mesalamine, 4.8 g, Daily With Breakfast  metoprolol tartrate, 50 mg, Q12H  nystatin, 5 mL, 4x Daily  pantoprazole, 40 mg, Daily  predniSONE, 40 mg, Daily With Breakfast  sertraline, 50 mg, Daily  sodium chloride, 10 mL, Q12H  tiotropium bromide monohydrate, 2 puff, Daily - RT  Urea, 15 g, BID          Review of Systems:   Review of Systems   Constitutional: Positive for malaise/fatigue.   Cardiovascular:  Negative for chest pain.   Respiratory:  Negative for shortness of breath.            Constitutional:  Temp:  [97.8 °F (36.6 °C)-98.5 °F (36.9 °C)] 97.8 °F (36.6 °C)  Heart Rate:  [] 89  Resp:  [18-28] 20  BP: ()/(52-74) 94/54    Physical Exam   BP 94/54   Pulse 89   Temp 97.8 °F (36.6 °C) (Oral)   Resp 20   Ht 154.9 cm (61\")   Wt 57.6 kg (127 lb)   SpO2 94%   BMI 24.00 kg/m²   General:  Appears in no acute distress  Eyes: Sclera is anicteric,  conjunctiva is clear   HEENT:  No JVD. Thyroid not visibly enlarged. No mucosal pallor or cyanosis  Respiratory: Respirations regular and unlabored at rest.  Scattered rhonchi   Cardiovascular: S1,S2 Regular rate and rhythm.  2/6 holosystolic murmur  Gastrointestinal: Abdomen nondistended.  Musculoskeletal:  No abnormal movements  Extremities: No digital clubbing or cyanosis  Skin: Color pink.   Neuro: Alert and awake.    INTAKE AND OUTPUT:    Intake/Output Summary (Last 24 hours) at 10/21/2024 1715  Last data filed at 10/21/2024 1400  Gross per 24 hour   Intake " 660 ml   Output 780 ml   Net -120 ml       Cardiographics  Telemetry: sinus rhythm         ECG:   ECG 12 Lead Chest Pain   Final Result   HEART RATE=75  bpm   RR Djqmdkao=229  ms   OH Imszdpdr=675  ms   P Horizontal Axis=-46  deg   P Front Axis=-29  deg   QRSD Interval=84  ms   QT Agzjrazi=071  ms   ZVaW=015  ms   QRS Axis=29  deg   T Wave Axis=73  deg   - OTHERWISE NORMAL ECG -   Sinus rhythm   Atrial premature complex   Low voltage, precordial leads   When compared with ECG of 18-Oct-2024 08:29:17,   Significant rate decrease   Electronically Signed By: Juan Hamilton ([facility]) 2024-10-18 17:10:19   Date and Time of Study:2024-10-18 14:18:25      ECG 12 Lead Rhythm Change   Preliminary Result   HEART KHGC=287  bpm   RR Mhuokqkx=555  ms   OH Rlveaauy=462  ms   P Horizontal Axis=95  deg   P Front Axis=77  deg   QRSD Interval=81  ms   QT Fsdnsiic=088  ms   YXmE=597  ms   QRS Axis=28  deg   T Wave Axis=79  deg   - ABNORMAL ECG -   Incomplete analysis due to missing data in precordial lead(s)   Sinus tachycardia   Multiple premature complexes, vent & supraven   Date and Time of Study:2024-10-18 08:29:17      ECG 12 Lead Chest Pain   Final Result   HEART RATE=78  bpm   RR Uzejrxox=394  ms   OH Btvcsqpi=647  ms   P Horizontal Axis=-4  deg   P Front Axis=76  deg   QRSD Interval=94  ms   QT Jritxmsu=626  ms   EIpD=264  ms   QRS Axis=32  deg   T Wave Axis=75  deg   - OTHERWISE NORMAL ECG -   Sinus arrhythmia   Low voltage, precordial leads   When compared with ECG of 13-Oct-2024 09:35:55,   No significant change   Electronically Signed By: Travis Connor ([facility]) 2024-10-14 08:10:34   Date and Time of Study:2024-10-13 11:34:48      ECG 12 Lead Rhythm Change   Final Result   HEART RATE=81  bpm   RR Lzslszsm=091  ms   OH Kheywoly=553  ms   P Horizontal Axis=-1  deg   P Front Axis=78  deg   QRSD Interval=94  ms   QT Gwscfmxd=359  ms   JErX=705  ms   QRS Axis=15  deg   T Wave Axis=65  deg   - OTHERWISE NORMAL ECG -   Sinus  rhythm   Low voltage, precordial leads   When compared with ECG of 12-Oct-2024 19:50:31,   Significant change in rhythm: previously atrial fibrillation   Electronically Signed By: Travis Connor ([facility]) 2024-10-14 08:10:41   Date and Time of Study:2024-10-13 09:35:55      ECG 12 Lead Rhythm Change   Final Result   HEART HBCC=521  bpm   RR Imgacdkm=643  ms   PA Interval=  ms   P Horizontal Axis=  deg   P Front Axis=  deg   QRSD Interval=82  ms   QT Ficwkvti=407  ms   UHhQ=240  ms   QRS Axis=20  deg   T Wave Axis=151  deg   - ABNORMAL ECG -   Atrial flutter/fibrillation   Ventricular premature complex   Probable  anterior infarct, age indeterminate   When compared with ECG of 05-Oct-2015 11:54:44,   Significant change in rhythm: previously sinus   Electronically Signed By: Travis Connor ([facility]) 2024-10-14 08:10:51   Date and Time of Study:2024-10-12 19:50:31      Telemetry Scan   Final Result      Telemetry Scan   Final Result      Telemetry Scan   Final Result      Telemetry Scan   Final Result      Telemetry Scan   Final Result      Telemetry Scan   Final Result      Telemetry Scan   Final Result      Telemetry Scan   Final Result      Telemetry Scan   Final Result      Telemetry Scan   Final Result      Telemetry Scan   Final Result      Telemetry Scan   Final Result      Telemetry Scan   Final Result      Telemetry Scan   Final Result      Telemetry Scan   Final Result      Telemetry Scan   Final Result      Telemetry Scan   Final Result      Telemetry Scan   Final Result      Telemetry Scan   Final Result      Telemetry Scan   Final Result      Telemetry Scan   Final Result      Telemetry Scan   Final Result      Telemetry Scan   Final Result      Telemetry Scan   Final Result      Telemetry Scan   Final Result      Telemetry Scan   Final Result      Telemetry Scan   Final Result      Telemetry Scan   Final Result      Telemetry Scan   Final Result      Telemetry Scan   Final Result      Telemetry Scan    Final Result      Telemetry Scan   Final Result      Telemetry Scan   Final Result      Telemetry Scan   Final Result      Telemetry Scan   Final Result      Telemetry Scan   Final Result      Telemetry Scan   Final Result      Telemetry Scan   Final Result      Telemetry Scan   Final Result      Telemetry Scan   Final Result      Telemetry Scan   Final Result      Telemetry Scan   Final Result      Telemetry Scan   Final Result      Telemetry Scan   Final Result      Telemetry Scan   Final Result      ECG 12 Lead Tachycardia    (Results Pending)   ECG 12 Lead Other; post op    (Results Pending)     I have personally reviewed EKG    Echocardiogram: Results for orders placed during the hospital encounter of 10/08/24    Adult Transesophageal Echo (GONZÁLEZ) W/ Cont if Necessary Per Protocol (Cardiology Department)    Interpretation Summary    Left ventricular systolic function is normal. Left ventricular ejection fraction appears to be 56 - 60%.    The left atrial cavity is severely dilated.    Saline test results are negative.    There is mild, posterior mitral leaflet thickening present.    Moderate to severe mitral valve regurgitation is present with a centrally-directed jet noted.    There is moderate, (grade 3) plaque in the descending aorta present.      Lab Review   I have reviewed the labs              Results from last 7 days   Lab Units 10/21/24  0542   SODIUM mmol/L 131*   POTASSIUM mmol/L 4.8   BUN mg/dL 9   CREATININE mg/dL 0.38*   CALCIUM mg/dL 8.4*         Results from last 7 days   Lab Units 10/21/24  0542 10/20/24  0539 10/18/24  0454   WBC 10*3/mm3 9.38 6.27 3.40   HEMOGLOBIN g/dL 8.7* 9.1* 9.7*   HEMATOCRIT % 26.7* 27.3* 29.5*   PLATELETS 10*3/mm3 361 316 288     Results from last 7 days   Lab Units 10/15/24  0008   INR  1.01       RADIOLOGY:  Imaging Results (Last 24 Hours)       ** No results found for the last 24 hours. **                  )10/21/2024  Travis Connor MD      EMR  "Dragon/Transcription:   \"Dictated utilizing Dragon dictation\".   "

## 2024-10-21 NOTE — CASE MANAGEMENT/SOCIAL WORK
Continued Stay Note  [facility]     Patient Name: Maryann Gaitan  MRN: [id number]  Today's Date: 10/21/2024    Admit Date: 10/8/2024    Plan: Plan to return home with family and current [facility] SN/PT/OT (need new order). Declines SNF.   Discharge Plan       Row Name 10/21/24 1150       Plan    Plan Plan to return home with family and current [facility] SN/PT/OT (need new order). Declines SNF.    Plan Comments  received a message from Cris with [employer] and she was mistaken about this patient being current  wrong patient.  [facility] updated and accepted. DC Barriers: Plan C today 10/22, monitor/replace electrolytes,  Pulm/Cardio/HemOnc/GI/Nephro following.                 Expected Discharge Date and Time       Expected Discharge Date Expected Discharge Time    Oct 22, 2024               TORIN Turner RN  ICU/CVU   O: [phone]  C: [phone]  [email or web address]

## 2024-10-21 NOTE — PROGRESS NOTES
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  Descending thoracic aorta has an outpouching probably saccular aneurysm versus  penetrating aortic ulcer . CT surgery consulted    COPD: not in exacerbation      Hyponatremia    Anemia  HTN  Hypothyroidism     s/p EGD/colonoscopy-10/12/2024 EGD/colonoscopy, small hiatal hernia. Nonerosive chronic gastritis. Normal duodenum. TI stricture status post dilation to 12 mm. TI ulcers. Colon ulcers. Sigmoid colon diverticulosis. Grade 2 internal hemorrhoids , strongyloides.        Recommendations:    S/p ivermectin 2 days only    S/p Iron transfusion     PET scan as an outpatient     Monitor bronchoscopy results    Oxygen supplement and titration to maintain saturation 90 to 95%  Bronchodilators, spiriva      Abx: cefepime will downgrade in 24 hour to avoid neurology side effects     Followed by GI: on steroids, mesalamine and ivermectin.    I personally reviewed the radiological studies            LOS: 12 days     Subjective         Objective     Vital signs for last 24 hours:  Vitals:    10/21/24 0500 10/21/24 0600 10/21/24 0716 10/21/24 0717   BP: 113/58 115/70  112/64   BP Location:    Right arm   Patient Position:    Lying   Pulse: 96 86 83 86   Resp:   18 18   Temp:    98.3 °F (36.8 °C)   TempSrc:    Oral   SpO2: 96% 93% 92% 93%   Weight:       Height:           Intake/Output last 3 shifts:  I/O last 3 completed  shifts:  In: 770 [P.O.:770]  Out: 1150 [Urine:1150]  Intake/Output this shift:  No intake/output data recorded.      Radiology  Imaging Results (Last 24 Hours)       ** No results found for the last 24 hours. **            Labs:  Results from last 7 days   Lab Units 10/21/24  0542   WBC 10*3/mm3 9.38   HEMOGLOBIN g/dL 8.7*   HEMATOCRIT % 26.7*   PLATELETS 10*3/mm3 361     Results from last 7 days   Lab Units 10/21/24  0542 10/18/24  1648 10/18/24  0454   SODIUM mmol/L 131*   < > 127*   POTASSIUM mmol/L 4.8   < > 3.2*   CHLORIDE mmol/L 100   < > 98   CO2 mmol/L 25.3   < > 23.8   BUN mg/dL 9   < > 28*   CREATININE mg/dL 0.38*   < > 0.42*   CALCIUM mg/dL 8.4*   < > 8.4*   BILIRUBIN mg/dL  --   --  0.3   ALK PHOS U/L  --   --  67   ALT (SGPT) U/L  --   --  8   AST (SGOT) U/L  --   --  10   GLUCOSE mg/dL 84   < > 113*    < > = values in this interval not displayed.           Results from last 7 days   Lab Units 10/18/24  0454 10/17/24  0443 10/16/24  0518   ALBUMIN g/dL 2.6* 2.6* 2.5*                     Results from last 7 days   Lab Units 10/15/24  0008   INR  1.01               Meds:   SCHEDULE  aspirin, 81 mg, Oral, Daily  atorvastatin, 40 mg, Oral, Nightly  [Transfer Hold] enoxaparin, 40 mg, Subcutaneous, Q24H  First Mouthwash (Magic Mouthwash), 10 mL, Swish & Spit, Q6H  folic acid, 1 mg, Oral, Daily  levothyroxine, 50 mcg, Oral, Q AM  mesalamine, 4.8 g, Oral, Daily With Breakfast  metoprolol tartrate, 50 mg, Oral, Q12H  nystatin, 5 mL, Swish & Swallow, 4x Daily  pantoprazole, 40 mg, Oral, Daily  predniSONE, 40 mg, Oral, Daily With Breakfast  sertraline, 50 mg, Oral, Daily  sodium chloride, 10 mL, Intravenous, Q12H  tiotropium bromide monohydrate, 2 puff, Inhalation, Daily - RT  Urea, 15 g, Oral, BID      Infusions       PRNs    acetaminophen    albuterol    senna-docusate sodium **AND** polyethylene glycol **AND** bisacodyl **AND** bisacodyl    hydrOXYzine    influenza vaccine    ipratropium-albuterol    Lidocaine  Viscous HCl    Magnesium Standard Dose Replacement - Follow Nurse / BPA Driven Protocol    melatonin    nitroglycerin    ondansetron ODT **OR** ondansetron    Phosphorus Replacement - Follow Nurse / BPA Driven Protocol    Potassium Replacement - Follow Nurse / BPA Driven Protocol    sodium chloride    Physical Exam:  Physical Exam  Cardiovascular:      Heart sounds: Murmur heard.      No gallop.   Pulmonary:      Effort: No respiratory distress.      Breath sounds: No stridor. Rhonchi and rales present. No wheezing.   Chest:      Chest wall: No tenderness.         ROS  Review of Systems   Respiratory:  Positive for cough and shortness of breath. Negative for wheezing and stridor.    Cardiovascular:  Positive for chest pain and palpitations. Negative for leg swelling.             Total time spent with patient greater than: 45 Minutes

## 2024-10-21 NOTE — SIGNIFICANT NOTE
10/21/24 1510   OTHER   Discipline physical therapist   Rehab Time/Intention   Session Not Performed patient/family declined treatment;other (see comments)  (PT checked on pt in AM and she declined PT.)   Recommendation   PT - Next Appointment 10/22/24

## 2024-10-21 NOTE — PROGRESS NOTES
PROGRESS NOTE      Patient Name: Maryann Gaitan  : 1950  MRN: [id number]  Primary Care Physician: Eduard Little MD  Date of admission: 10/8/2024    Patient Care Team:  Eduard Little MD as PCP - General (Family Medicine)        Subjective   Subjective:     Seen and examined, comfortable, not in distress,  Sodium is 137 today, comfortable, not in distress,      Review of systems:  All other review of system unremarkable      Allergies:    Allergies   Allergen Reactions    Codeine Hives    Penicillin G Sodium Hives       Objective   Exam:     Vital Signs  Temp:  [97.8 °F (36.6 °C)-98.5 °F (36.9 °C)] 98.3 °F (36.8 °C)  Heart Rate:  [] 86  Resp:  [18-28] 18  BP: ()/(52-70) 112/64  SpO2:  [92 %-96 %] 93 %  on   ;   Device (Oxygen Therapy): room air  Body mass index is 24 kg/m².    General: Elderly female in no acute distress.    Head:      Normocephalic and atraumatic.    Eyes:      PERRL/EOM intact, conjunctivae and sclerae clear without nystagmus.    Neck:      No masses, thyromegaly,  trachea central with normal respiratory effort   Lungs:    Clear bilaterally to auscultation.    Heart:      Regular rate and rhythm, no murmur no gallop  Abd:        Soft, nontender, not distended, bowel sounds positive, no shifting dullness   Pulses:   Pulses palpable  Extr:        No cyanosis or clubbing--no edema.    Neuro:    No focal deficits.   alert oriented x3  Skin:       Intact without lesions or rashes.    Psych:    Alert and cooperative; normal mood and affect; .      Results Review:  I have personally reviewed most recent Data :  CBC    Results from last 7 days   Lab Units 10/21/24  0542 10/20/24  0539 10/18/24  0454 10/17/24  0443 10/16/24  0518 10/15/24  0008   WBC 10*3/mm3 9.38 6.27 3.40 2.57* 2.38* 2.29*   HEMOGLOBIN g/dL 8.7* 9.1* 9.7* 8.2* 8.3* 8.4*   PLATELETS 10*3/mm3 361 316 288 209 182 145     CMP   Results from last 7 days   Lab Units 10/21/24  0542 10/21/24  0022  10/20/24  1155 10/20/24  0539 10/19/24  1052 10/18/24  1648 10/18/24  0454 10/17/24  0443 10/16/24  0518 10/15/24  0008   SODIUM mmol/L 131*  --   --  130* 129*  --  127* 127* 129* 131*   POTASSIUM mmol/L 4.8 5.0 3.5 3.7 4.2 5.2 3.2* 4.0 4.1 4.4   CHLORIDE mmol/L 100  --   --  96* 98  --  98 96* 99 102   CO2 mmol/L 25.3  --   --  26.1 23.5  --  23.8 24.3 22.6 22.0   BUN mg/dL 9  --   --  12 24*  --  28* 28* 8 12   CREATININE mg/dL 0.38*  --   --  0.35* 0.41*  --  0.42* 0.42* 0.32* 0.42*   GLUCOSE mg/dL 84  --   --  79 96  --  113* 138* 127* 123*   ALBUMIN g/dL  --   --   --   --   --   --  2.6* 2.6* 2.5* 2.4*   BILIRUBIN mg/dL  --   --   --   --   --   --  0.3 0.4 0.4 0.4   ALK PHOS U/L  --   --   --   --   --   --  67 65 64 70   AST (SGOT) U/L  --   --   --   --   --   --  10 10 9 8   ALT (SGPT) U/L  --   --   --   --   --   --  8 9 7 7     ABG          No radiology results for the last day    Results for orders placed during the hospital encounter of 10/08/24    Adult Transesophageal Echo (GONZLÁEZ) W/ Cont if Necessary Per Protocol (Cardiology Department)    Interpretation Summary    Left ventricular systolic function is normal. Left ventricular ejection fraction appears to be 56 - 60%.    The left atrial cavity is severely dilated.    Saline test results are negative.    There is mild, posterior mitral leaflet thickening present.    Moderate to severe mitral valve regurgitation is present with a centrally-directed jet noted.    There is moderate, (grade 3) plaque in the descending aorta present.    Scheduled Meds:aspirin, 81 mg, Oral, Daily  atorvastatin, 40 mg, Oral, Nightly  [Transfer Hold] enoxaparin, 40 mg, Subcutaneous, Q24H  First Mouthwash (Magic Mouthwash), 10 mL, Swish & Spit, Q6H  folic acid, 1 mg, Oral, Daily  levothyroxine, 50 mcg, Oral, Q AM  mesalamine, 4.8 g, Oral, Daily With Breakfast  metoprolol tartrate, 50 mg, Oral, Q12H  nystatin, 5 mL, Swish & Swallow, 4x Daily  pantoprazole, 40 mg, Oral,  Daily  predniSONE, 40 mg, Oral, Daily With Breakfast  sertraline, 50 mg, Oral, Daily  sodium chloride, 10 mL, Intravenous, Q12H  tiotropium bromide monohydrate, 2 puff, Inhalation, Daily - RT  Urea, 15 g, Oral, BID      Continuous Infusions:     PRN Meds:  acetaminophen    albuterol    senna-docusate sodium **AND** polyethylene glycol **AND** bisacodyl **AND** bisacodyl    hydrOXYzine    influenza vaccine    ipratropium-albuterol    Lidocaine Viscous HCl    Magnesium Standard Dose Replacement - Follow Nurse / BPA Driven Protocol    melatonin    nitroglycerin    ondansetron ODT **OR** ondansetron    Phosphorus Replacement - Follow Nurse / BPA Driven Protocol    Potassium Replacement - Follow Nurse / BPA Driven Protocol    sodium chloride    Assessment & Plan   Assessment and Plan:         Hyponatremia    Diarrhea    Moderate to severe mitral regurgitation    Chest pain, atypical    Cavitary lesion of lung    Multiple tracheobronchial mucus plugs    CAD, multiple vessel    ASSESSMENT:  Hyponatremia likely etiology thiazide diuretics in the presence of Cymbalta  History of hypertension  History of arthritis  History of Crohn disease  Severe mitral regurgitation   10/12/2024 EGD/colonoscopy  small hiatal hernia.  Nonerosive chronic gastritis.  Normal duodenum.  TI stricture status post dilation to 12 mm.  TI ulcers.  Colon ulcers.  Sigmoid colon diverticulosis.  Grade 2 internal hemorrhoids     PLAN :        Sodium level getting better now   Sodium level improved to 131   she is not taking her urea last 2 days  I think with fluid restriction sodium level is getting better  Okay to be discharged with fluid restriction and add sodium chloride pill if sodium level drop again  Although pt on SSRI can cause Hyponatremia to me patient lungs condition are more likley caused recent acute drop in the sodium  Pt is advised to stay on fluid restriction <1L   Patient has cavitary lesion that might be contributing to hyponatremia  with some SIADH  Follow-up in the renal clinic  PCI next week as an outpatient  GONZÁLEZ showed severe mitral regurgitation with a plaque in the ascending aorta  Pt need PCI next week   Continue to follow-up with electrolytes  Follow-up with repeat sodium tomorrow morning   Potassium level better but still low cont to replace   Continue to follow-up with the nutritional status  Diarrhea has improved  Status post EGD and colonoscopy   Follow-up with hematology and GI  Closely follow,           Electronically signed by Buddy Pierre MD,   [facility] kidney consultant  [phone]  10/21/2024  11:24 EDT

## 2024-10-21 NOTE — PROGRESS NOTES
[facility] MEDICINE SERVICE  DAILY PROGRESS NOTE    NAME: Maryann Gaitan  : 1950  MRN: [id number]      LOS: 12 days     PROVIDER OF SERVICE: Noreen Nj MD    Chief Complaint: Hyponatremia    Subjective:     Interval History:  History taken from: patient    No new complaint      Review of Systems:   Review of Systems   All other systems reviewed and are negative.      Objective:     Vital Signs  Temp:  [97.8 °F (36.6 °C)-98.5 °F (36.9 °C)] 98 °F (36.7 °C)  Heart Rate:  [] 86  Resp:  [18-28] 27  BP: ()/(52-70) 112/64   Body mass index is 24 kg/m².    Physical Exam  Physical Exam  Constitutional:       Appearance: Normal appearance.   HENT:      Head: Normocephalic and atraumatic.      Nose: Nose normal.      Mouth/Throat:      Mouth: Mucous membranes are moist.   Eyes:      Extraocular Movements: Extraocular movements intact.      Pupils: Pupils are equal, round, and reactive to light.   Cardiovascular:      Rate and Rhythm: Normal rate and regular rhythm.   Pulmonary:      Effort: Pulmonary effort is normal.      Breath sounds: Normal breath sounds.   Abdominal:      General: Abdomen is flat. Bowel sounds are normal.      Palpations: Abdomen is soft.   Musculoskeletal:         General: Normal range of motion.      Cervical back: Normal range of motion and neck supple.   Skin:     General: Skin is warm and dry.   Neurological:      General: No focal deficit present.      Mental Status: She is alert and oriented to person, place, and time.   Psychiatric:         Mood and Affect: Mood normal.         Behavior: Behavior normal.         Thought Content: Thought content normal.         Judgment: Judgment normal.         Current Medications:  Scheduled Meds:aspirin, 81 mg, Oral, Daily  atorvastatin, 40 mg, Oral, Nightly  [Transfer Hold] enoxaparin, 40 mg, Subcutaneous, Q24H  First Mouthwash (Magic Mouthwash), 10 mL, Swish & Spit, Q6H  folic acid, 1 mg, Oral, Daily  levothyroxine, 50  mcg, Oral, Q AM  mesalamine, 4.8 g, Oral, Daily With Breakfast  metoprolol tartrate, 50 mg, Oral, Q12H  nystatin, 5 mL, Swish & Swallow, 4x Daily  pantoprazole, 40 mg, Oral, Daily  predniSONE, 40 mg, Oral, Daily With Breakfast  sertraline, 50 mg, Oral, Daily  sodium chloride, 10 mL, Intravenous, Q12H  tiotropium bromide monohydrate, 2 puff, Inhalation, Daily - RT  Urea, 15 g, Oral, BID      Continuous Infusions:     PRN Meds:.  acetaminophen    albuterol    senna-docusate sodium **AND** polyethylene glycol **AND** bisacodyl **AND** bisacodyl    hydrOXYzine    influenza vaccine    ipratropium-albuterol    Lidocaine Viscous HCl    Magnesium Standard Dose Replacement - Follow Nurse / BPA Driven Protocol    melatonin    nitroglycerin    ondansetron ODT **OR** ondansetron    Phosphorus Replacement - Follow Nurse / BPA Driven Protocol    Potassium Replacement - Follow Nurse / BPA Driven Protocol    sodium chloride       Diagnostic Data    Results from last 7 days   Lab Units 10/21/24  0542 10/18/24  1648 10/18/24  0454   WBC 10*3/mm3 9.38   < > 3.40   HEMOGLOBIN g/dL 8.7*   < > 9.7*   HEMATOCRIT % 26.7*   < > 29.5*   PLATELETS 10*3/mm3 361   < > 288   GLUCOSE mg/dL 84   < > 113*   CREATININE mg/dL 0.38*   < > 0.42*   BUN mg/dL 9   < > 28*   SODIUM mmol/L 131*   < > 127*   POTASSIUM mmol/L 4.8   < > 3.2*   AST (SGOT) U/L  --   --  10   ALT (SGPT) U/L  --   --  8   ALK PHOS U/L  --   --  67   BILIRUBIN mg/dL  --   --  0.3   ANION GAP mmol/L 5.7   < > 5.2    < > = values in this interval not displayed.       No radiology results for the last day      I reviewed the patient's new clinical results.    Assessment/Plan:     Active and Resolved Problems  Active Hospital Problems    Diagnosis  POA    **Hyponatremia [E87.1]  Yes    Diarrhea [R19.7]  Unknown    Moderate to severe mitral regurgitation [I34.0]  Unknown    Chest pain, atypical [R07.89]  Unknown    Cavitary lesion of lung [J98.4]  Unknown    Multiple tracheobronchial  mucus plugs [T17.800A]  Unknown    CAD, multiple vessel [I25.10]  Unknown      Resolved Hospital Problems   No resolved problems to display.       Active and Resolved Problems  Multifocal pneumonia  Cavitary lung lesion  Acute hypoxic respiratory failure  Send blood cultures and respiratory viral panel  continue IV cefepime-pharmacy to dose  MRSA negative- IV vanco discontinued  Supplemental oxygen to keep SpO2 more than 95%  Closely monitor vitals  CT PE showed cavitary lung lesion-pulmonary consulted      Hyponatremia  -Improving, monitor.  See further recommendations per nephrology.      Pneumonia with cavitary lesion in the left upper lobe of lung  - Management per pulmonary.  Follow-up on BAL cultures.  New cavitary lesion in the left upper lung measuring 1.6 x 1.4 cm  nodular opacities and tree-in-bud opacities withinthe left upper and left lower lung  - Status post bronchoscopy.  Follow-up on BAL cultures and other results    Hypokalemia  - Resolved     Crohns disease-see management per GI-on mesalamine and prednisone  Diarrhea-see management per GI  - Follows with Dr. Castillo; plans for outpatient scopes 10/31 for ongoing Crohns  - Continues to have significant diarrhea which is provoking electrolyte abnormalities  - Also in setting of worsening anemia  - GI consulted- s/p EGD/colonoscopy-10/12/2024 EGD/colonoscopy (Dr Strong) small hiatal hernia.  Nonerosive chronic gastritis.  Normal duodenum.  TI stricture status post dilation to 12 mm.  TI ulcers.  Colon ulcers.  Sigmoid colon diverticulosis.  Grade 2 internal hemorrhoids.    - Continue Mesalamine, solumedrol  - Low residue diet.   - Patient will likely need Rinvoq as outpatient. Task has been sent to GI office to initiate approval process.  - Could also consider clinical trial secondary to Crohn's with stricture.  Continue diet as tolerated.  - Likely home in the next 1 to 2 days from GI standpoint.        Anemia  - Mild downward trend in H&H.   Monitor.       Leukopenia  -.Upward trending WBC and improving.  Monitor.     Hypertension  - Blood pressure is controlled.  Continue current BP med regimen.     Moderate to severe mitral regurgitation    Chest pain  -severe MR , s/p  cardiac cath 10/15/2024 which revealed total right and severe ostial LAD disease.  Descending thoracic aorta has an outpouching probably saccular aneurysm versus  penetrating aortic ulcer . CT surgery consulted   -Status post GONZÁLEZ which showed EF of 56 to 60% with a dilated left atrium.  Cardiology planning PCI for tomorrow and evaluation for MitraClip procedure(after recovered from the pneumonia).           VTE Prophylaxis:  Pharmacologic & mechanical VTE prophylaxis orders are present.             Disposition Planning:     Barriers to Discharge: Pending clinical improvement  Anticipated Date of Discharge:  10/26/2024  Place of Discharge: Likely home           Code Status and Medical Interventions: CPR (Attempt to Resuscitate); Full Support   Ordered at: 10/09/24 1058     Code Status (Patient has no pulse and is not breathing):    CPR (Attempt to Resuscitate)     Medical Interventions (Patient has pulse or is breathing):    Full Support       Signature: Electronically signed by Noreen Nj MD, 10/21/24, 11:55 EDT.  [facility] Hospitalist Team

## 2024-10-21 NOTE — NURSING NOTE
Met with patient for an introduction to the Structural Heart Team.  Education given on Mitral Regurgitation and procedures available for treatment.  Educational handouts and contact information given.  Patient is scheduled for laser PCI carmen zapata/Dr. Connor.  MitraClip procedure discussed with patient and patient agreeable to be scheduled on 11/21/24 at 0800.  Will call patient with Pre-Op Instructions once discharged from the hospital.  Patient encouraged to call with any questions. Will continue to follow.

## 2024-10-22 PROBLEM — I25.10 CAD, MULTIPLE VESSEL: Chronic | Status: ACTIVE | Noted: 2024-10-08

## 2024-10-22 PROBLEM — I50.31 ACUTE HEART FAILURE WITH PRESERVED EJECTION FRACTION (HFPEF): Status: ACTIVE | Noted: 2024-10-22

## 2024-10-22 LAB
ACT BLD: 152 SECONDS (ref 89–137)
ACT BLD: 201 SECONDS (ref 89–137)
ALBUMIN SERPL-MCNC: 2.5 G/DL (ref 3.5–5.2)
ALBUMIN/GLOB SERPL: 1.3 G/DL
ALP SERPL-CCNC: 74 U/L (ref 39–117)
ALT SERPL W P-5'-P-CCNC: 9 U/L (ref 1–33)
ANION GAP SERPL CALCULATED.3IONS-SCNC: 6.6 MMOL/L (ref 5–15)
ANISOCYTOSIS BLD QL: ABNORMAL
AST SERPL-CCNC: 13 U/L (ref 1–32)
BILIRUB SERPL-MCNC: 0.3 MG/DL (ref 0–1.2)
BUN SERPL-MCNC: 9 MG/DL (ref 8–23)
BUN/CREAT SERPL: 20.5 (ref 7–25)
CALCIUM SPEC-SCNC: 8.3 MG/DL (ref 8.6–10.5)
CHLORIDE SERPL-SCNC: 99 MMOL/L (ref 98–107)
CO2 SERPL-SCNC: 25.4 MMOL/L (ref 22–29)
CREAT SERPL-MCNC: 0.44 MG/DL (ref 0.57–1)
DEPRECATED RDW RBC AUTO: 59 FL (ref 37–54)
EGFRCR SERPLBLD CKD-EPI 2021: 101.6 ML/MIN/1.73
ERYTHROCYTE [DISTWIDTH] IN BLOOD BY AUTOMATED COUNT: 17.2 % (ref 12.3–15.4)
GLOBULIN UR ELPH-MCNC: 1.9 GM/DL
GLUCOSE SERPL-MCNC: 82 MG/DL (ref 65–99)
HCT VFR BLD AUTO: 26.3 % (ref 34–46.6)
HGB BLD-MCNC: 8.2 G/DL (ref 12–15.9)
INR PPP: 0.95 (ref 0.93–1.1)
LARGE PLATELETS: ABNORMAL
LYMPHOCYTES # BLD MANUAL: 5.66 10*3/MM3 (ref 0.7–3.1)
LYMPHOCYTES NFR BLD MANUAL: 10 % (ref 5–12)
MCH RBC QN AUTO: 30 PG (ref 26.6–33)
MCHC RBC AUTO-ENTMCNC: 31.2 G/DL (ref 31.5–35.7)
MCV RBC AUTO: 96.3 FL (ref 79–97)
MONOCYTES # BLD: 1.01 10*3/MM3 (ref 0.1–0.9)
NEUTROPHILS # BLD AUTO: 3.44 10*3/MM3 (ref 1.7–7)
NEUTROPHILS NFR BLD MANUAL: 30 % (ref 42.7–76)
NEUTS BAND NFR BLD MANUAL: 4 % (ref 0–5)
PLATELET # BLD AUTO: 345 10*3/MM3 (ref 140–450)
PMV BLD AUTO: 8.8 FL (ref 6–12)
POTASSIUM SERPL-SCNC: 4.3 MMOL/L (ref 3.5–5.2)
PROT SERPL-MCNC: 4.4 G/DL (ref 6–8.5)
PROTHROMBIN TIME: 10.4 SECONDS (ref 9.6–11.7)
QT INTERVAL: 336 MS
QTC INTERVAL: 456 MS
RBC # BLD AUTO: 2.73 10*6/MM3 (ref 3.77–5.28)
SCAN SLIDE: NORMAL
SODIUM SERPL-SCNC: 131 MMOL/L (ref 136–145)
VARIANT LYMPHS NFR BLD MANUAL: 3 % (ref 0–5)
VARIANT LYMPHS NFR BLD MANUAL: 53 % (ref 19.6–45.3)
WBC MORPH BLD: NORMAL
WBC NRBC COR # BLD AUTO: 10.11 10*3/MM3 (ref 3.4–10.8)

## 2024-10-22 PROCEDURE — 94761 N-INVAS EAR/PLS OXIMETRY MLT: CPT

## 2024-10-22 PROCEDURE — 027034Z DILATION OF CORONARY ARTERY, ONE ARTERY WITH DRUG-ELUTING INTRALUMINAL DEVICE, PERCUTANEOUS APPROACH: ICD-10-PCS | Performed by: INTERNAL MEDICINE

## 2024-10-22 PROCEDURE — 25810000003 SODIUM CHLORIDE 0.9 % SOLUTION: Performed by: INTERNAL MEDICINE

## 2024-10-22 PROCEDURE — 80053 COMPREHEN METABOLIC PANEL: CPT | Performed by: NURSE PRACTITIONER

## 2024-10-22 PROCEDURE — 94799 UNLISTED PULMONARY SVC/PX: CPT

## 2024-10-22 PROCEDURE — 85347 COAGULATION TIME ACTIVATED: CPT

## 2024-10-22 PROCEDURE — 63710000001 PREDNISONE PER 1 MG: Performed by: NURSE PRACTITIONER

## 2024-10-22 PROCEDURE — 02C03ZZ EXTIRPATION OF MATTER FROM CORONARY ARTERY, ONE ARTERY, PERCUTANEOUS APPROACH: ICD-10-PCS | Performed by: INTERNAL MEDICINE

## 2024-10-22 PROCEDURE — 4A033BC MEASUREMENT OF ARTERIAL PRESSURE, CORONARY, PERCUTANEOUS APPROACH: ICD-10-PCS | Performed by: INTERNAL MEDICINE

## 2024-10-22 PROCEDURE — 92928 PRQ TCAT PLMT NTRAC ST 1 LES: CPT | Performed by: INTERNAL MEDICINE

## 2024-10-22 PROCEDURE — 93799 UNLISTED CV SVC/PROCEDURE: CPT | Performed by: INTERNAL MEDICINE

## 2024-10-22 PROCEDURE — 97530 THERAPEUTIC ACTIVITIES: CPT

## 2024-10-22 PROCEDURE — C1769 GUIDE WIRE: HCPCS | Performed by: INTERNAL MEDICINE

## 2024-10-22 PROCEDURE — 25010000002 HEPARIN (PORCINE) PER 1000 UNITS: Performed by: INTERNAL MEDICINE

## 2024-10-22 PROCEDURE — C1887 CATHETER, GUIDING: HCPCS | Performed by: INTERNAL MEDICINE

## 2024-10-22 PROCEDURE — 85025 COMPLETE CBC W/AUTO DIFF WBC: CPT | Performed by: NURSE PRACTITIONER

## 2024-10-22 PROCEDURE — C1725 CATH, TRANSLUMIN NON-LASER: HCPCS | Performed by: INTERNAL MEDICINE

## 2024-10-22 PROCEDURE — 85007 BL SMEAR W/DIFF WBC COUNT: CPT | Performed by: NURSE PRACTITIONER

## 2024-10-22 PROCEDURE — C1874 STENT, COATED/COV W/DEL SYS: HCPCS | Performed by: INTERNAL MEDICINE

## 2024-10-22 PROCEDURE — C9600 PERC DRUG-EL COR STENT SING: HCPCS | Performed by: INTERNAL MEDICINE

## 2024-10-22 PROCEDURE — 99232 SBSQ HOSP IP/OBS MODERATE 35: CPT | Performed by: INTERNAL MEDICINE

## 2024-10-22 PROCEDURE — 25510000001 IOPAMIDOL PER 1 ML: Performed by: INTERNAL MEDICINE

## 2024-10-22 PROCEDURE — C1894 INTRO/SHEATH, NON-LASER: HCPCS | Performed by: INTERNAL MEDICINE

## 2024-10-22 PROCEDURE — 93571 IV DOP VEL&/PRESS C FLO 1ST: CPT | Performed by: INTERNAL MEDICINE

## 2024-10-22 PROCEDURE — 25010000002 LIDOCAINE 1 % SOLUTION: Performed by: INTERNAL MEDICINE

## 2024-10-22 PROCEDURE — 85610 PROTHROMBIN TIME: CPT | Performed by: NURSE PRACTITIONER

## 2024-10-22 DEVICE — XIENCE SKYPOINT™ EVEROLIMUS ELUTING CORONARY STENT SYSTEM 3.50 MM X 18 MM / RAPID-EXCHANGE
Type: IMPLANTABLE DEVICE | Site: CORONARY | Status: FUNCTIONAL
Brand: XIENCE SKYPOINT™

## 2024-10-22 RX ORDER — CLOPIDOGREL BISULFATE 75 MG/1
300 TABLET ORAL ONCE
Status: DISCONTINUED | OUTPATIENT
Start: 2024-10-22 | End: 2024-10-23

## 2024-10-22 RX ORDER — IOPAMIDOL 755 MG/ML
INJECTION, SOLUTION INTRAVASCULAR
Status: DISCONTINUED | OUTPATIENT
Start: 2024-10-22 | End: 2024-10-22 | Stop reason: HOSPADM

## 2024-10-22 RX ORDER — SODIUM CHLORIDE 9 MG/ML
INJECTION, SOLUTION INTRAVENOUS
Status: COMPLETED | OUTPATIENT
Start: 2024-10-22 | End: 2024-10-22

## 2024-10-22 RX ORDER — CLOPIDOGREL BISULFATE 75 MG/1
75 TABLET ORAL DAILY
Status: DISCONTINUED | OUTPATIENT
Start: 2024-10-23 | End: 2024-10-23

## 2024-10-22 RX ORDER — HEPARIN SODIUM 1000 [USP'U]/ML
INJECTION, SOLUTION INTRAVENOUS; SUBCUTANEOUS
Status: DISCONTINUED | OUTPATIENT
Start: 2024-10-22 | End: 2024-10-22 | Stop reason: HOSPADM

## 2024-10-22 RX ORDER — LIDOCAINE HYDROCHLORIDE 10 MG/ML
INJECTION, SOLUTION INFILTRATION; PERINEURAL
Status: DISCONTINUED | OUTPATIENT
Start: 2024-10-22 | End: 2024-10-22 | Stop reason: HOSPADM

## 2024-10-22 RX ORDER — ACETAMINOPHEN 325 MG/1
650 TABLET ORAL EVERY 4 HOURS PRN
Status: DISCONTINUED | OUTPATIENT
Start: 2024-10-22 | End: 2024-10-23 | Stop reason: HOSPADM

## 2024-10-22 RX ORDER — CLOPIDOGREL BISULFATE 75 MG/1
TABLET ORAL
Status: DISCONTINUED | OUTPATIENT
Start: 2024-10-22 | End: 2024-10-22 | Stop reason: HOSPADM

## 2024-10-22 RX ORDER — SODIUM CHLORIDE 9 MG/ML
75 INJECTION, SOLUTION INTRAVENOUS CONTINUOUS
Status: DISPENSED | OUTPATIENT
Start: 2024-10-22 | End: 2024-10-23

## 2024-10-22 RX ADMIN — TIOTROPIUM BROMIDE INHALATION SPRAY 2 PUFF: 3.12 SPRAY, METERED RESPIRATORY (INHALATION) at 07:22

## 2024-10-22 RX ADMIN — DIPHENHYDRAMINE HYDROCHLORIDE AND LIDOCAINE HYDROCHLORIDE AND ALUMINUM HYDROXIDE AND MAGNESIUM HYDRO 10 ML: KIT at 01:38

## 2024-10-22 RX ADMIN — PANTOPRAZOLE SODIUM 40 MG: 40 TABLET, DELAYED RELEASE ORAL at 08:43

## 2024-10-22 RX ADMIN — ASPIRIN 81 MG: 81 TABLET, COATED ORAL at 08:44

## 2024-10-22 RX ADMIN — LEVOTHYROXINE SODIUM 50 MCG: 0.05 TABLET ORAL at 05:06

## 2024-10-22 RX ADMIN — ATORVASTATIN CALCIUM 40 MG: 40 TABLET, FILM COATED ORAL at 20:17

## 2024-10-22 RX ADMIN — DIPHENHYDRAMINE HYDROCHLORIDE AND LIDOCAINE HYDROCHLORIDE AND ALUMINUM HYDROXIDE AND MAGNESIUM HYDRO 10 ML: KIT at 12:23

## 2024-10-22 RX ADMIN — METOPROLOL TARTRATE 50 MG: 50 TABLET, FILM COATED ORAL at 08:44

## 2024-10-22 RX ADMIN — METOPROLOL TARTRATE 50 MG: 50 TABLET, FILM COATED ORAL at 20:17

## 2024-10-22 RX ADMIN — PREDNISONE 40 MG: 20 TABLET ORAL at 08:44

## 2024-10-22 RX ADMIN — SODIUM CHLORIDE 75 ML/HR: 9 INJECTION, SOLUTION INTRAVENOUS at 16:15

## 2024-10-22 RX ADMIN — DIPHENHYDRAMINE HYDROCHLORIDE AND LIDOCAINE HYDROCHLORIDE AND ALUMINUM HYDROXIDE AND MAGNESIUM HYDRO 10 ML: KIT at 20:56

## 2024-10-22 RX ADMIN — Medication 10 ML: at 08:58

## 2024-10-22 RX ADMIN — MESALAMINE 4.8 G: 800 TABLET, DELAYED RELEASE ORAL at 08:43

## 2024-10-22 RX ADMIN — Medication 10 ML: at 21:57

## 2024-10-22 RX ADMIN — FOLIC ACID 1 MG: 1 TABLET ORAL at 08:44

## 2024-10-22 RX ADMIN — SERTRALINE 50 MG: 50 TABLET, FILM COATED ORAL at 08:43

## 2024-10-22 NOTE — PROGRESS NOTES
PROGRESS NOTE      Patient Name: Maryann Gaitan  : 1950  MRN: [id number]  Primary Care Physician: Eduard Little MD  Date of admission: 10/8/2024    Patient Care Team:  Eduard Little MD as PCP - General (Family Medicine)        Subjective   Subjective:     Seen and examined, comfortable, not in distress  Sodium stable      Review of systems:  All other review of system unremarkable      Allergies:    Allergies   Allergen Reactions    Codeine Hives    Penicillin G Sodium Hives       Objective   Exam:     Vital Signs  Temp:  [97.8 °F (36.6 °C)-98.4 °F (36.9 °C)] 98.4 °F (36.9 °C)  Heart Rate:  [73-95] 85  Resp:  [16-27] 25  BP: ()/(54-78) 106/55  SpO2:  [91 %-97 %] 93 %  on  Flow (L/min) (Oxygen Therapy):  [2-3] 2;   Device (Oxygen Therapy): room air  Body mass index is 24 kg/m².    General: Elderly female in no acute distress.    Head:      Normocephalic and atraumatic.    Eyes:      PERRL/EOM intact, conjunctivae and sclerae clear without nystagmus.    Neck:      No masses, thyromegaly,  trachea central with normal respiratory effort   Lungs:    Clear bilaterally to auscultation.    Heart:      Regular rate and rhythm, no murmur no gallop  Abd:        Soft, nontender, not distended, bowel sounds positive, no shifting dullness   Pulses:   Pulses palpable  Extr:        No cyanosis or clubbing--no edema.    Neuro:    No focal deficits.   alert oriented x3  Skin:       Intact without lesions or rashes.    Psych:    Alert and cooperative; normal mood and affect; .      Results Review:  I have personally reviewed most recent Data :  CBC    Results from last 7 days   Lab Units 10/22/24  0335 10/21/24  0542 10/20/24  0539 10/18/24  0454 10/17/24  0443 10/16/24  0518   WBC 10*3/mm3 10.11 9.38 6.27 3.40 2.57* 2.38*   HEMOGLOBIN g/dL 8.2* 8.7* 9.1* 9.7* 8.2* 8.3*   PLATELETS 10*3/mm3 345 361 316 288 209 182     CMP   Results from last 7 days   Lab Units 10/22/24  0335 10/21/24  0542  10/21/24  0022 10/20/24  1155 10/20/24  0539 10/19/24  1052 10/18/24  1648 10/18/24  0454 10/17/24  0443 10/16/24  0518   SODIUM mmol/L 131* 131*  --   --  130* 129*  --  127* 127* 129*   POTASSIUM mmol/L 4.3 4.8 5.0 3.5 3.7 4.2 5.2 3.2* 4.0 4.1   CHLORIDE mmol/L 99 100  --   --  96* 98  --  98 96* 99   CO2 mmol/L 25.4 25.3  --   --  26.1 23.5  --  23.8 24.3 22.6   BUN mg/dL 9 9  --   --  12 24*  --  28* 28* 8   CREATININE mg/dL 0.44* 0.38*  --   --  0.35* 0.41*  --  0.42* 0.42* 0.32*   GLUCOSE mg/dL 82 84  --   --  79 96  --  113* 138* 127*   ALBUMIN g/dL 2.5*  --   --   --   --   --   --  2.6* 2.6* 2.5*   BILIRUBIN mg/dL 0.3  --   --   --   --   --   --  0.3 0.4 0.4   ALK PHOS U/L 74  --   --   --   --   --   --  67 65 64   AST (SGOT) U/L 13  --   --   --   --   --   --  10 10 9   ALT (SGPT) U/L 9  --   --   --   --   --   --  8 9 7     ABG          No radiology results for the last day    Results for orders placed during the hospital encounter of 10/08/24    Adult Transesophageal Echo (GONZÁLEZ) W/ Cont if Necessary Per Protocol (Cardiology Department)    Interpretation Summary    Left ventricular systolic function is normal. Left ventricular ejection fraction appears to be 56 - 60%.    The left atrial cavity is severely dilated.    Saline test results are negative.    There is mild, posterior mitral leaflet thickening present.    Moderate to severe mitral valve regurgitation is present with a centrally-directed jet noted.    There is moderate, (grade 3) plaque in the descending aorta present.    Scheduled Meds:aspirin, 81 mg, Oral, Daily  atorvastatin, 40 mg, Oral, Nightly  enoxaparin, 40 mg, Subcutaneous, Q24H  First Mouthwash (Magic Mouthwash), 10 mL, Swish & Spit, Q6H  folic acid, 1 mg, Oral, Daily  levothyroxine, 50 mcg, Oral, Q AM  mesalamine, 4.8 g, Oral, Daily With Breakfast  metoprolol tartrate, 50 mg, Oral, Q12H  pantoprazole, 40 mg, Oral, Daily  predniSONE, 40 mg, Oral, Daily With Breakfast  sertraline, 50  mg, Oral, Daily  sodium chloride, 10 mL, Intravenous, Q12H  tiotropium bromide monohydrate, 2 puff, Inhalation, Daily - RT      Continuous Infusions:     PRN Meds:  acetaminophen    albuterol    senna-docusate sodium **AND** polyethylene glycol **AND** bisacodyl **AND** bisacodyl    hydrOXYzine    influenza vaccine    ipratropium-albuterol    Lidocaine Viscous HCl    Magnesium Standard Dose Replacement - Follow Nurse / BPA Driven Protocol    melatonin    nitroglycerin    ondansetron ODT **OR** ondansetron    Phosphorus Replacement - Follow Nurse / BPA Driven Protocol    Potassium Replacement - Follow Nurse / BPA Driven Protocol    sodium chloride    Assessment & Plan   Assessment and Plan:         Hyponatremia    Diarrhea    Moderate to severe mitral regurgitation    Chest pain, atypical    Cavitary lesion of lung    Multiple tracheobronchial mucus plugs    CAD, multiple vessel    ASSESSMENT:  Hyponatremia likely etiology thiazide diuretics in the presence of Cymbalta  History of hypertension  History of arthritis  History of Crohn disease  Severe mitral regurgitation   10/12/2024 EGD/colonoscopy  small hiatal hernia.  Nonerosive chronic gastritis.  Normal duodenum.  TI stricture status post dilation to 12 mm.  TI ulcers.  Colon ulcers.  Sigmoid colon diverticulosis.  Grade 2 internal hemorrhoids     PLAN :        Sodium level improving, up to 131 today  She was refusing Urea, no longer ordered. Continue fluid restriction   Okay to be discharged with fluid restriction and add sodium chloride pill if sodium level drop again  Although pt on SSRI can cause Hyponatremia to me patient lungs condition are more likley the cause of recent acute drop in the sodium  Pt is advised to stay on fluid restriction <1L   Patient has cavitary lesion that might be contributing to hyponatremia with some SIADH  Follow-up in the renal clinic  PCI later today   GONZÁLEZ showed severe mitral regurgitation with a plaque in the ascending  aorta  Continue to follow-up with electrolytes  Continue to follow-up with the nutritional status  Diarrhea has improved  Status post EGD and colonoscopy   Follow-up with hematology and GI  Daily labs    We will continue to follow         Electronically signed by DRU Bueno   [facility] kidney consultant  [phone]  10/22/2024  12:32 EDT

## 2024-10-22 NOTE — PROGRESS NOTES
[facility] MEDICINE SERVICE  DAILY PROGRESS NOTE    NAME: Maryann Gaitan  : 1950  MRN: [id number]      LOS: 13 days     PROVIDER OF SERVICE: Noreen Nj MD    Chief Complaint: Hyponatremia    Subjective:     Interval History:  History taken from: patient    No new complaint      Review of Systems:   Review of Systems   All other systems reviewed and are negative.      Objective:     Vital Signs  Temp:  [97.8 °F (36.6 °C)-98.4 °F (36.9 °C)] 98.4 °F (36.9 °C)  Heart Rate:  [73-95] 86  Resp:  [16-27] 25  BP: ()/(53-78) 121/72  Flow (L/min) (Oxygen Therapy):  [1-3] 1   Body mass index is 24 kg/m².    Physical Exam  Physical Exam  Constitutional:       Appearance: Normal appearance.   HENT:      Head: Normocephalic and atraumatic.      Nose: Nose normal.      Mouth/Throat:      Mouth: Mucous membranes are moist.   Eyes:      Extraocular Movements: Extraocular movements intact.      Pupils: Pupils are equal, round, and reactive to light.   Cardiovascular:      Rate and Rhythm: Normal rate and regular rhythm.   Pulmonary:      Effort: Pulmonary effort is normal.      Breath sounds: Normal breath sounds.   Abdominal:      General: Abdomen is flat. Bowel sounds are normal.      Palpations: Abdomen is soft.   Musculoskeletal:         General: Normal range of motion.      Cervical back: Normal range of motion and neck supple.   Skin:     General: Skin is warm and dry.   Neurological:      General: No focal deficit present.      Mental Status: She is alert and oriented to person, place, and time.   Psychiatric:         Mood and Affect: Mood normal.         Behavior: Behavior normal.         Thought Content: Thought content normal.         Judgment: Judgment normal.         Current Medications:  Scheduled Meds:aspirin, 81 mg, Oral, Daily  atorvastatin, 40 mg, Oral, Nightly  enoxaparin, 40 mg, Subcutaneous, Q24H  First Mouthwash (Magic Mouthwash), 10 mL, Swish & Spit, Q6H  folic acid, 1 mg, Oral,  Daily  levothyroxine, 50 mcg, Oral, Q AM  mesalamine, 4.8 g, Oral, Daily With Breakfast  metoprolol tartrate, 50 mg, Oral, Q12H  pantoprazole, 40 mg, Oral, Daily  predniSONE, 40 mg, Oral, Daily With Breakfast  sertraline, 50 mg, Oral, Daily  sodium chloride, 10 mL, Intravenous, Q12H  tiotropium bromide monohydrate, 2 puff, Inhalation, Daily - RT      Continuous Infusions:     PRN Meds:.  acetaminophen    albuterol    senna-docusate sodium **AND** polyethylene glycol **AND** bisacodyl **AND** bisacodyl    hydrOXYzine    influenza vaccine    ipratropium-albuterol    Lidocaine Viscous HCl    Magnesium Standard Dose Replacement - Follow Nurse / BPA Driven Protocol    melatonin    nitroglycerin    ondansetron ODT **OR** ondansetron    Phosphorus Replacement - Follow Nurse / BPA Driven Protocol    Potassium Replacement - Follow Nurse / BPA Driven Protocol    sodium chloride       Diagnostic Data    Results from last 7 days   Lab Units 10/22/24  0335   WBC 10*3/mm3 10.11   HEMOGLOBIN g/dL 8.2*   HEMATOCRIT % 26.3*   PLATELETS 10*3/mm3 345   GLUCOSE mg/dL 82   CREATININE mg/dL 0.44*   BUN mg/dL 9   SODIUM mmol/L 131*   POTASSIUM mmol/L 4.3   AST (SGOT) U/L 13   ALT (SGPT) U/L 9   ALK PHOS U/L 74   BILIRUBIN mg/dL 0.3   ANION GAP mmol/L 6.6       No radiology results for the last day      I reviewed the patient's new clinical results.    Assessment/Plan:     Active and Resolved Problems  Active Hospital Problems    Diagnosis  POA    **Hyponatremia [E87.1]  Yes    Diarrhea [R19.7]  Unknown    Moderate to severe mitral regurgitation [I34.0]  Unknown    Chest pain, atypical [R07.89]  Unknown    Cavitary lesion of lung [J98.4]  Unknown    Multiple tracheobronchial mucus plugs [T17.800A]  Unknown    CAD, multiple vessel [I25.10]  Unknown      Resolved Hospital Problems   No resolved problems to display.       Active and Resolved Problems  Multifocal pneumonia  Cavitary lung lesion  Acute hypoxic respiratory  failure          Moderate to severe mitral regurgitation    Chest pain  -severe MR , s/p  cardiac cath 10/15/2024 which revealed total right and severe ostial LAD disease.  Descending thoracic aorta has an outpouching probably saccular aneurysm versus  penetrating aortic ulcer . CT surgery consulted   -Status post GONZÁLEZ which showed EF of 56 to 60% with a dilated left atrium.  Cardiology planning PCI to LAD today . Evaluation for MitraClip procedure to be done upon follow-up with structural heart team per cardiology    Hyponatremia  -Improving, monitor.  See further recommendations per nephrology.      Pneumonia with cavitary lesion in the left upper lobe of lung  - Has completed antibiotics.  New cavitary lesion in the left upper lung measuring 1.6 x 1.4 cm  nodular opacities and tree-in-bud opacities withinthe left upper and left lower lung  - Status post bronchoscopy.  BAL culture grew normal respiratory wu.      Hypokalemia  - Resolved     Crohns disease-see management per GI-on mesalamine and prednisone  Diarrhea-see management per GI  - Follows with Dr. Castillo; plans for outpatient scopes 10/31 for ongoing Crohns  - Continues to have significant diarrhea which is provoking electrolyte abnormalities  - Also in setting of worsening anemia  - GI consulted- s/p EGD/colonoscopy-10/12/2024 EGD/colonoscopy (Dr Strong) small hiatal hernia.  Nonerosive chronic gastritis.  Normal duodenum.  TI stricture status post dilation to 12 mm.  TI ulcers.  Colon ulcers.  Sigmoid colon diverticulosis.  Grade 2 internal hemorrhoids.    - Continue Mesalamine, prednisone  - Low residue diet.   - Patient will likely need Rinvoq as outpatient. Task has been sent to GI office to initiate approval process.  - Could also consider clinical trial secondary to Crohn's with stricture.  Continue diet as tolerated.     Anemia  - Mild downward trend in H&H.  Monitor.    Leukopenia  -.Upward trending WBC and improving.  Monitor.    CHF  history/chronic diastolic CHF  - EF of 56 to 60%.  Not on ACE inhibitors or ARB's or beta-blocker therapy due to hypotension.  Midodrine ordered for hypotension.     Hypertension  - Blood pressure is controlled.  Continue current BP med regimen.       VTE Prophylaxis:  Pharmacologic & mechanical VTE prophylaxis orders are present.             Disposition Planning:     Barriers to Discharge: Pending clinical improvement  Anticipated Date of Discharge:  10/26/2024  Place of Discharge: Likely home           Code Status and Medical Interventions: CPR (Attempt to Resuscitate); Full Support   Ordered at: 10/09/24 1058     Code Status (Patient has no pulse and is not breathing):    CPR (Attempt to Resuscitate)     Medical Interventions (Patient has pulse or is breathing):    Full Support       Signature: Electronically signed by Noreen Nj MD, 10/22/24, 14:54 EDT.  [facility] Hospitalist Team

## 2024-10-22 NOTE — CASE MANAGEMENT/SOCIAL WORK
Continued Stay Note   [facility]     Patient Name: Maryann Gaitan  MRN: [id number]  Today's Date: 10/22/2024    Admit Date: 10/8/2024    Plan: Plan to return home with family and current [facility]  SN/PT/OT (need new order). Declines SNF.   Discharge Plan       Row Name 10/22/24 0842       Plan    Plan Plan to return home with family and current [facility]  SN/PT/OT (need new order). Declines SNF.    Plan Comments DC Barriers: Plan [facility] today 10/22, monitor/replace electrolytes, Pulm/Cardio/HemOnc/GI/Nephro following.                 Expected Discharge Date and Time       Expected Discharge Date Expected Discharge Time    Oct 23, 2024               TORIN Turner RN  ICU/CVU   O: [phone]  C: [phone]  [email or web address]

## 2024-10-22 NOTE — PLAN OF CARE
Goal Outcome Evaluation:  Plan of Care Reviewed With: patient     Problem: Adult Inpatient Plan of Care  Goal: Plan of Care Review  Outcome: Progressing  Flowsheets (Taken 10/22/2024 7863)  Progress: improving  Plan of Care Reviewed With: patient        Progress: improving

## 2024-10-22 NOTE — THERAPY TREATMENT NOTE
"Subjective: Pt agreeable to therapeutic plan of care. Pt to have PCI this afternoon.    Objective:     Precautions - cardiac , HFR    Bed mobility - CGA  Transfers - CGA  Ambulation - Pt agreeable to small steps for transfer to recliner only    Vitals: WNL    Pain: 0 VAS   Location: N/A  Intervention for pain: N/A    Education: Provided education on the importance of mobility in the acute care setting, Verbal/Tactile Cues, and Transfer Training    Assessment: Maryann Gaitan presents with functional mobility impairments which indicate the need for skilled intervention. Cardiology plans for pt to have laser PCI his afternoon. Pt agreeable with encouragement to sit up in recliner. Pt fatigues easily with minimal exertion. Tolerating session today without incident. Will continue to follow and progress as tolerated.     Plan/Recommendations:   If medically appropriate, Moderate Intensity Therapy recommended post-acute care. This is recommended as therapy feels the patient would require 3-4 days per week and wouldn't tolerate \"3 hour daily\" rehab intensity. SNF would be the preferred choice. If the patient does not agree to SNF, arrange HH or OP depending on home bound status. If patient is medically complex, consider LTACH. Pt requires no DME at discharge.     Pt desires Home with family assist and Home Health at discharge. Pt cooperative; agreeable to therapeutic recommendations and plan of care.         Basic Mobility 6-click:  Rollin = Total, A lot = 2, A little = 3; 4 = None  Supine>Sit:   1 = Total, A lot = 2, A little = 3; 4 = None   Sit>Stand with arms:  1 = Total, A lot = 2, A little = 3; 4 = None  Bed>Chair:   1 = Total, A lot = 2, A little = 3; 4 = None  Ambulate in room:  1 = Total, A lot = 2, A little = 3; 4 = None  3-5 Steps with railin = Total, A lot = 2, A little = 3; 4 = None  Score: 18    Modified Doug: N/A = No pre-op stroke/TIA    Post-Tx Position: Up in Chair, Staff Present, Alarms " activated, and Call light and personal items within reach  PPE: gloves

## 2024-10-22 NOTE — PROGRESS NOTES
Nutrition Services    Patient Name: Maryann Gaitan  YOB: 1950  MRN: [id number]  Admission date: 10/8/2024      PROGRESS NOTE      Encounter Information: Progress note to check on PO intakes.  NPO for laser coronary atherectomy today.         PO Diet: NPO Diet NPO Type: Sips with Meds   PO Supplements: Boost Plus BID    PO Intake:  0-100% at meals        Current nutrition support:    Nutrition support review:        Labs (reviewed below): Hyponatremia        GI Function:  Last documented BM 10/22 (today)       Nutrition Intervention Updates: Diet advancement per MD post procedure        Results from last 7 days   Lab Units 10/22/24  0335 10/21/24  0542 10/21/24  0022 10/20/24  1155 10/20/24  0539 10/18/24  1648 10/18/24  0454 10/17/24  0443   SODIUM mmol/L 131* 131*  --   --  130*   < > 127* 127*   POTASSIUM mmol/L 4.3 4.8 5.0   < > 3.7   < > 3.2* 4.0   CHLORIDE mmol/L 99 100  --   --  96*   < > 98 96*   CO2 mmol/L 25.4 25.3  --   --  26.1   < > 23.8 24.3   BUN mg/dL 9 9  --   --  12   < > 28* 28*   CREATININE mg/dL 0.44* 0.38*  --   --  0.35*   < > 0.42* 0.42*   CALCIUM mg/dL 8.3* 8.4*  --   --  8.0*   < > 8.4* 8.2*   BILIRUBIN mg/dL 0.3  --   --   --   --   --  0.3 0.4   ALK PHOS U/L 74  --   --   --   --   --  67 65   ALT (SGPT) U/L 9  --   --   --   --   --  8 9   AST (SGOT) U/L 13  --   --   --   --   --  10 10   GLUCOSE mg/dL 82 84  --   --  79   < > 113* 138*    < > = values in this interval not displayed.     Results from last 7 days   Lab Units 10/22/24  0335   HEMOGLOBIN g/dL 8.2*   HEMATOCRIT % 26.3*     COVID19   Date Value Ref Range Status   10/16/2024 Not Detected Not Detected - Ref. Range Final     Lab Results   Component Value Date    HGBA1C 5.64 (H) 10/13/2024     Malnutrition Severity Assessment      Patient meets criteria for : Moderate (non-severe) Malnutrition           RD to follow up per protocol.    Electronically signed by:  Mini Arellano RD  10/22/24 11:00 EDT

## 2024-10-22 NOTE — PROGRESS NOTES
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  Descending thoracic aorta has an outpouching probably saccular aneurysm versus  penetrating aortic ulcer . CT surgery consulted    COPD: not in exacerbation      Hyponatremia    Anemia  HTN  Hypothyroidism     s/p EGD/colonoscopy-10/12/2024 EGD/colonoscopy, small hiatal hernia. Nonerosive chronic gastritis. Normal duodenum. TI stricture status post dilation to 12 mm. TI ulcers. Colon ulcers. Sigmoid colon diverticulosis. Grade 2 internal hemorrhoids , strongyloides.        Recommendations:     PET scan as an outpatient     Monitor bronchoscopy results    Oxygen supplement and titration to maintain saturation 90 to 95%  Bronchodilators, spiriva      Followed by GI: on steroids, mesalamine     I personally reviewed the radiological studies            LOS: 13 days     Subjective         Objective     Vital signs for last 24 hours:  Vitals:    10/22/24 0600 10/22/24 0716 10/22/24 0722 10/22/24 0723   BP: 93/61 110/73     BP Location:  Right arm     Patient Position:  Sitting     Pulse: 77 89 89 88   Resp:  27 18 18   Temp:  98.3 °F (36.8 °C)     TempSrc:  Oral     SpO2: 93% 96% 96% 95%   Weight:       Height:           Intake/Output last 3 shifts:  I/O last 3 completed shifts:  In: 780  [P.O.:780]  Out: 780 [Urine:780]  Intake/Output this shift:  No intake/output data recorded.      Radiology  Imaging Results (Last 24 Hours)       ** No results found for the last 24 hours. **            Labs:  Results from last 7 days   Lab Units 10/22/24  0335   WBC 10*3/mm3 10.11   HEMOGLOBIN g/dL 8.2*   HEMATOCRIT % 26.3*   PLATELETS 10*3/mm3 345     Results from last 7 days   Lab Units 10/22/24  0335   SODIUM mmol/L 131*   POTASSIUM mmol/L 4.3   CHLORIDE mmol/L 99   CO2 mmol/L 25.4   BUN mg/dL 9   CREATININE mg/dL 0.44*   CALCIUM mg/dL 8.3*   BILIRUBIN mg/dL 0.3   ALK PHOS U/L 74   ALT (SGPT) U/L 9   AST (SGOT) U/L 13   GLUCOSE mg/dL 82           Results from last 7 days   Lab Units 10/22/24  0335 10/18/24  0454 10/17/24  0443   ALBUMIN g/dL 2.5* 2.6* 2.6*                     Results from last 7 days   Lab Units 10/22/24  0335   INR  0.95               Meds:   SCHEDULE  aspirin, 81 mg, Oral, Daily  atorvastatin, 40 mg, Oral, Nightly  enoxaparin, 40 mg, Subcutaneous, Q24H  First Mouthwash (Magic Mouthwash), 10 mL, Swish & Spit, Q6H  folic acid, 1 mg, Oral, Daily  levothyroxine, 50 mcg, Oral, Q AM  mesalamine, 4.8 g, Oral, Daily With Breakfast  metoprolol tartrate, 50 mg, Oral, Q12H  pantoprazole, 40 mg, Oral, Daily  predniSONE, 40 mg, Oral, Daily With Breakfast  sertraline, 50 mg, Oral, Daily  sodium chloride, 10 mL, Intravenous, Q12H  tiotropium bromide monohydrate, 2 puff, Inhalation, Daily - RT  Urea, 15 g, Oral, BID      Infusions       PRNs    acetaminophen    albuterol    senna-docusate sodium **AND** polyethylene glycol **AND** bisacodyl **AND** bisacodyl    hydrOXYzine    influenza vaccine    ipratropium-albuterol    Lidocaine Viscous HCl    Magnesium Standard Dose Replacement - Follow Nurse / BPA Driven Protocol    melatonin    nitroglycerin    ondansetron ODT **OR** ondansetron    Phosphorus Replacement - Follow Nurse / BPA Driven Protocol    Potassium Replacement - Follow Nurse / BPA Driven Protocol     sodium chloride    Physical Exam:  Physical Exam  Cardiovascular:      Heart sounds: Murmur heard.      No gallop.   Pulmonary:      Effort: No respiratory distress.      Breath sounds: No stridor. Rhonchi and rales present. No wheezing.   Chest:      Chest wall: No tenderness.         ROS  Review of Systems   Respiratory:  Positive for cough and shortness of breath. Negative for wheezing and stridor.    Cardiovascular:  Positive for chest pain and palpitations. Negative for leg swelling.             Total time spent with patient greater than: 45 Minutes

## 2024-10-22 NOTE — PROGRESS NOTES
Cardiology Progress Note    Patient Identification:  Name: Maryann Gaitan  Age: 74 y.o.  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   Plans for laser PCI tomorrow       NP note: Patient seen and examined this morning.  Patient sitting in chair.  No distress noted.  Awaiting laser PCI this afternoon.   Discussed mitraclip will be scheduled outpatient in about 4 weeks.     Electronically signed by DRU Rubio, 10/22/24, 12:39 PM EDT.    Cardiology attending addendum :    I have personally performed a face-to-face diagnostic evaluation, physical exam and reviewed data on this patient.  I have reviewed documentation done by me and nurse practitioner  and corrected as needed.  And agree with the different components of documentation.Greater than 50% of the time spent in the care of this patient was provided by attending consultant/me.        Subjective: Patient seen and examined; chart and labs reviewed; discussed with bedside nurse.  Patient had elevated D-dimer.  CT PE study is negative for PE but is abnormal with cavitary lesion.  Patient underwent cardiac cath 10/15/2024 which revealed total right and severe ostial LAD.  Patient underwent GONZÁLEZ 10/16/2024 which revealed moderate to severe MR. Patient was turned down for surgery.      Objective:    10/14/2024: Sodium 135 potassium 5.3 BUN 24 creatinine 0.52 glucose 150  10/15/2024: sodium 131, potassium 4.4. bun 12 creatinine 0.42 hgb 8.4  "  10/16/2024: sodium 129 potassium 4.1 BUN 8 creatinine 0.32 glucose 127 CRP 2.03 WBC 2.38 hemoglobin 8.3  10/17/2024: Sodium 127 potassium 4.0 BUN 28 creatinine 0.42 hemoglobin 8.2  10/18/2024: Sodium 127 potassium 3.2 BUN 28 creatinine 0.42 hemoglobin 9.7    10/21/2024: Sodium 131 creatinine 0.38 hemoglobin 8.7  10/22/2024: Sodium 131 creatinine 0.44 hemoglobin 8.2    History of present illness:      Maryann Gaitan is a 74-year-old female with a PMH of     COPD  Hypertension  Rheumatoid arthritis  Crohn's disease     who presented to [facility] on 10/[facility]/2024 due to \"abnormal labs\" and nausea/vomiting/diarrhea.  Patient noted to be hyponatremic with sodium of 122, hypokalemic potassium 3.0 as well as anemic.  Patient has been followed by nephrology and GI during hospitalization.  She underwent EGD and colonoscopy yesterday showing small hiatal hernia, nonerosive chronic gastritis and sigmoid colon diverticulitis.  Per GI she has a history of Crohn disease but this has not been proven by biopsy as [employer] testing has not been consistent with IBD.  Cardiology consulted for chest pain and abnormal EKG.  Rapid response called this morning on patient secondary to acute sudden onset of sharp left-sided chest pain with associated symptoms of shortness of breath, palpitations, lightheadedness/dizziness.  EKG 10/12 reviewed showing sinus tachycardia with PACs. Stat EKG today without acute ST-T segment changes, ABG with PaO2 of 67, troponin 23, H&H 9.9/30.3.  Patient denies personal or family history of CAD, hyperlipidemia.  She does report history of hypertension, type 2 diabetes.      EGD/colonoscopy 10/12/2024 revealed small hiatal hernia, nonerosive gastritis, T1 stricture s/p dilatation with 12 mm, T1 ulcer, colon ulcers, sigmoid diverticulosis, grade 2 internal hemorrhoids and strongyloides  Echo 10/12/2024 EF of 51 to 55% with mild RV enlargement, severe left atrial enlargement, moderate to severe MR  Transesophageal echo " 10/16/2024: LVEF of 55 to 60% with severe left atrial enlargement, moderate to severe MR and grade 3 descending aortic plaque  Cardiac cath 10/15/2024 reveals total right and severe ostial LAD, descending thoracic aorta had an outpouching consistent with penetrating aortic ulcer versus aneurysm.        Assessment:  :     Chest pain  Shortness of breath  Arrhythmia  Abnormal EKG  Hypertension  Mitral regurgitation  CAD  Acute congestive heart failure due to diastolic dysfunction from CAD and valvular heart disease/mitral regurgitation  Hyponatremia  Hypokalemia  Crohn's disease  Normocytic anemia  COPD, chronic steroid therapy  Multifocal pneumonia  Hyperglycemia, prediabetes with A1c of 5.6 from     Recommendations / Plan:         Patient presented 10/9/2024 because of abnormal labs showing low sodium, was complaining of nausea vomiting and diarrhea.  Patient's hydrochlorothiazide was held and nephrology consulted.  HS troponin is 23 and 22.  Previous proBNP was 2573.  Sodium is improved to 137.  Glucose elevated.  EKG done 10/13/2024 reviewed/interpreted by me reveals sinus arrhythmia at the rate of 78 bpm.  Patient has multifocal pneumonia and acute hypoxic respiratory failure.  He is on IV antibiotics and oxygen.  Blood cultures are pending.  Patient had D-dimer which was elevated.  CT PE study is negative for PE but has cavitary lesion in the lung.  Pulmonary Dr. Cho, underwent bronchoscopy.  Patient has severe MR will benefit from cardiac cath.  Patient underwent cardiac cath 10/15/2024 which revealed total right and severe ostial LAD disease.  Descending thoracic aorta has an outpouching probably saccular aneurysm versus  penetrating aortic ulcer .  Echocardiogram 10/12/2024 is revealing EF of 51 to 55% with mild RV enlargement severe left atrial enlargement and right atrial enlargement and moderate to severe MR  GONZÁLEZ is revealing moderate to severe MR.  Patient would benefit from CABG and mitral valve  surgery.  Patient was seen by .  Patient has been turned down for surgery due to multiple comorbid conditions.  Patient has a cavitary lesion in her lungs had bronchoscopy.  Had multiple mucous plugs.  Is awaiting lavage results.  Patient underwent drug-eluting stent to ostial LAD 10/12/2024.  Will follow-up mitral regurgitation with structural heart team as an outpatient.  Will give dual antiplatelet therapy with aspirin and Plavix as tolerated.  Will continue aspirin Plavix metoprolol statin send add ARB's losartan as blood pressure allows.  Follow-up with primary team and GI for nausea vomiting and possible Crohn's disease and microcytic anemia  Discussed with multiple family members were at bedside and explained risk benefits alternatives of various approaches.  Will follow and consider further evaluation treatment depending on how her condition evolves    Copied text in this portion of the note has been reviewed and is accurate as of 10/22/2024    Past Medical History:  Past Medical History:   Diagnosis Date    Arthritis     COPD (chronic obstructive pulmonary disease)     Hypertension      Past Surgical History:  Past Surgical History:   Procedure Laterality Date    BRONCHOSCOPY N/A 10/16/2024    Procedure: BRONCHOSCOPY;  Surgeon: Gallo Pope MD;  Location: [facility] ENDOSCOPY;  Service: Pulmonary;  Laterality: N/A;    CARDIAC CATHETERIZATION N/A 10/15/2024    Procedure: Left Heart Cath, possible pci;  Surgeon: Travis Connor MD;  Location: [facility] CATH INVASIVE LOCATION;  Service: Cardiovascular;  Laterality: N/A;    COLONOSCOPY N/A 10/12/2024    Procedure: COLONOSCOPY WITH BIOPSY AND WIRE GUIDED BALLOON DILATION OF TERMINAL ILEUM;  Surgeon: Rob Strong MD;  Location: [facility] ENDOSCOPY;  Service: Gastroenterology;  Laterality: N/A;  Colitis, crohns of terminal ileum, right colon ulcers, diverticulosis, hemorroids    ENDOSCOPY N/A 10/12/2024    Procedure:  "ESOPHAGOGASTRODUODENOSCOPY WITH BIOPSY X 2 AREA;  Surgeon: Rob Strong MD;  Location: [facility] ENDOSCOPY;  Service: Gastroenterology;  Laterality: N/A;  Chronic gastritis, HH    HYSTERECTOMY          Social History:   Social History     Tobacco Use    Smoking status: Former     Types: Cigarettes    Smokeless tobacco: Never   Substance Use Topics    Alcohol use: Never      Family History:  History reviewed. No pertinent family history.       Allergies:  Allergies   Allergen Reactions    Codeine Hives    Penicillin G Sodium Hives     Scheduled Meds:  [Transfer Hold] aspirin, 81 mg, Daily  [Transfer Hold] atorvastatin, 40 mg, Nightly  [Transfer Hold] enoxaparin, 40 mg, Q24H  [Transfer Hold] First Mouthwash (Magic Mouthwash), 10 mL, Q6H  [Transfer Hold] folic acid, 1 mg, Daily  [Transfer Hold] levothyroxine, 50 mcg, Q AM  [Transfer Hold] mesalamine, 4.8 g, Daily With Breakfast  metoprolol tartrate, 50 mg, Q12H  [Transfer Hold] pantoprazole, 40 mg, Daily  [Transfer Hold] predniSONE, 40 mg, Daily With Breakfast  [Transfer Hold] sertraline, 50 mg, Daily  [Transfer Hold] sodium chloride, 10 mL, Q12H  [Transfer Hold] tiotropium bromide monohydrate, 2 puff, Daily - RT          Review of Systems:   Review of Systems   Constitutional: Positive for malaise/fatigue.   Cardiovascular:  Negative for chest pain.   Respiratory:  Negative for shortness of breath.            Constitutional:  Temp:  [98.2 °F (36.8 °C)-98.4 °F (36.9 °C)] 98.4 °F (36.9 °C)  Heart Rate:  [71-95] 71  Resp:  [15-27] 21  BP: ()/(53-78) 121/63    Physical Exam   /63   Pulse 71   Temp 98.4 °F (36.9 °C) (Oral)   Resp 21   Ht 154.9 cm (61\")   Wt 57.6 kg (127 lb)   SpO2 98%   BMI 24.00 kg/m²   General:  Appears in no acute distress  Eyes: Sclera is anicteric,  conjunctiva is clear   HEENT:  No JVD. Thyroid not visibly enlarged. No mucosal pallor or cyanosis  Respiratory: Respirations regular and unlabored at rest.  Scattered " rhonchi   Cardiovascular: S1,S2 Regular rate and rhythm.  2/6 holosystolic murmur  Gastrointestinal: Abdomen nondistended.  Musculoskeletal:  No abnormal movements  Extremities: No digital clubbing or cyanosis  Skin: Color pink.   Neuro: Alert and awake.    INTAKE AND OUTPUT:    Intake/Output Summary (Last 24 hours) at 10/22/2024 1602  Last data filed at 10/22/2024 1400  Gross per 24 hour   Intake 410 ml   Output 645 ml   Net -235 ml       Cardiographics  Telemetry: sinus rhythm         ECG:   ECG 12 Lead Chest Pain   Final Result   HEART RATE=75  bpm   RR Ypsemejh=284  ms   AR Whnqtffu=667  ms   P Horizontal Axis=-46  deg   P Front Axis=-29  deg   QRSD Interval=84  ms   QT Zwyhszpm=495  ms   NNnF=653  ms   QRS Axis=29  deg   T Wave Axis=73  deg   - OTHERWISE NORMAL ECG -   Sinus rhythm   Atrial premature complex   Low voltage, precordial leads   When compared with ECG of 18-Oct-2024 08:29:17,   Significant rate decrease   Electronically Signed By: Juan Hamilton ([facility]) 2024-10-18 17:10:19   Date and Time of Study:2024-10-18 14:18:25      ECG 12 Lead Rhythm Change   Final Result   HEART MSXI=966  bpm   RR Lqlrkdld=530  ms   AR Pqchbxfw=113  ms   P Horizontal Axis=95  deg   P Front Axis=77  deg   QRSD Interval=81  ms   QT Kqhnqgdc=189  ms   QQpO=023  ms   QRS Axis=28  deg   T Wave Axis=79  deg   - ABNORMAL ECG -   Incomplete analysis due to missing data in precordial lead(s)   Sinus tachycardia   Multiple premature complexes, vent & supraven   When compared with ECG of 13-Oct-2024 11:34:48,   Significant rate increase   Significant axis, voltage or hypertrophy change   Electronically Signed By: Dmitri Navarro ([facility]) 2024-10-22 09:17:18   Date and Time of Study:2024-10-18 08:29:17      ECG 12 Lead Chest Pain   Final Result   HEART RATE=78  bpm   RR Sixfovwr=382  ms   AR Eujdstfj=308  ms   P Horizontal Axis=-4  deg   P Front Axis=76  deg   QRSD Interval=94  ms   QT Tcgucrqj=560  ms   MAoM=091  ms   QRS Axis=32  deg    T Wave Axis=75  deg   - OTHERWISE NORMAL ECG -   Sinus arrhythmia   Low voltage, precordial leads   When compared with ECG of 13-Oct-2024 09:35:55,   No significant change   Electronically Signed By: Travis Connor ([facility]) 2024-10-14 08:10:34   Date and Time of Study:2024-10-13 11:34:48      ECG 12 Lead Rhythm Change   Final Result   HEART RATE=81  bpm   RR Mtsuvdxm=340  ms   MA Bkxfdsnl=587  ms   P Horizontal Axis=-1  deg   P Front Axis=78  deg   QRSD Interval=94  ms   QT Hgwnqkec=849  ms   EZhL=083  ms   QRS Axis=15  deg   T Wave Axis=65  deg   - OTHERWISE NORMAL ECG -   Sinus rhythm   Low voltage, precordial leads   When compared with ECG of 12-Oct-2024 19:50:31,   Significant change in rhythm: previously atrial fibrillation   Electronically Signed By: Travis Connor ([facility]) 2024-10-14 08:10:41   Date and Time of Study:2024-10-13 09:35:55      ECG 12 Lead Rhythm Change   Final Result   HEART WFRM=473  bpm   RR Bknglysg=166  ms   MA Interval=  ms   P Horizontal Axis=  deg   P Front Axis=  deg   QRSD Interval=82  ms   QT Kwkykyns=646  ms   YWqW=086  ms   QRS Axis=20  deg   T Wave Axis=151  deg   - ABNORMAL ECG -   Atrial flutter/fibrillation   Ventricular premature complex   Probable  anterior infarct, age indeterminate   When compared with ECG of 05-Oct-2015 11:54:44,   Significant change in rhythm: previously sinus   Electronically Signed By: Travis Connor ([facility]) 2024-10-14 08:10:51   Date and Time of Study:2024-10-12 19:50:31      Telemetry Scan   Final Result      Telemetry Scan   Final Result      Telemetry Scan   Final Result      Telemetry Scan   Final Result      Telemetry Scan   Final Result      Telemetry Scan   Final Result      Telemetry Scan   Final Result      Telemetry Scan   Final Result      Telemetry Scan   Final Result      Telemetry Scan   Final Result      Telemetry Scan   Final Result      Telemetry Scan   Final Result      Telemetry Scan   Final Result      Telemetry Scan   Final  Result      Telemetry Scan   Final Result      Telemetry Scan   Final Result      Telemetry Scan   Final Result      Telemetry Scan   Final Result      Telemetry Scan   Final Result      Telemetry Scan   Final Result      Telemetry Scan   Final Result      Telemetry Scan   Final Result      Telemetry Scan   Final Result      Telemetry Scan   Final Result      Telemetry Scan   Final Result      Telemetry Scan   Final Result      Telemetry Scan   Final Result      Telemetry Scan   Final Result      Telemetry Scan   Final Result      Telemetry Scan   Final Result      Telemetry Scan   Final Result      Telemetry Scan   Final Result      Telemetry Scan   Final Result      Telemetry Scan   Final Result      Telemetry Scan   Final Result      Telemetry Scan   Final Result      Telemetry Scan   Final Result      Telemetry Scan   Final Result      Telemetry Scan   Final Result      Telemetry Scan   Final Result      Telemetry Scan   Final Result      Telemetry Scan   Final Result      Telemetry Scan   Final Result      Telemetry Scan   Final Result      Telemetry Scan   Final Result      Telemetry Scan   Final Result      Telemetry Scan   Final Result      Telemetry Scan   Final Result      Telemetry Scan   Final Result      Telemetry Scan   Final Result      Telemetry Scan   Final Result      Telemetry Scan   Final Result      Telemetry Scan   Final Result      Telemetry Scan   Final Result      Telemetry Scan   Final Result      Telemetry Scan   Final Result      Telemetry Scan   Final Result      Telemetry Scan   Final Result      Telemetry Scan   Final Result      Telemetry Scan   Final Result      Telemetry Scan   Final Result      ECG 12 Lead Tachycardia    (Results Pending)   ECG 12 Lead Other; post op    (Results Pending)     I have personally reviewed EKG    Echocardiogram: Results for orders placed during the hospital encounter of 10/08/24    Adult Transesophageal Echo (GONZÁLEZ) W/ Cont if Necessary Per Protocol  "(Cardiology Department)    Interpretation Summary    Left ventricular systolic function is normal. Left ventricular ejection fraction appears to be 56 - 60%.    The left atrial cavity is severely dilated.    Saline test results are negative.    There is mild, posterior mitral leaflet thickening present.    Moderate to severe mitral valve regurgitation is present with a centrally-directed jet noted.    There is moderate, (grade 3) plaque in the descending aorta present.      Lab Review   I have reviewed the labs              Results from last 7 days   Lab Units 10/22/24  0335   SODIUM mmol/L 131*   POTASSIUM mmol/L 4.3   BUN mg/dL 9   CREATININE mg/dL 0.44*   CALCIUM mg/dL 8.3*         Results from last 7 days   Lab Units 10/22/24  0335 10/21/24  0542 10/20/24  0539   WBC 10*3/mm3 10.11 9.38 6.27   HEMOGLOBIN g/dL 8.2* 8.7* 9.1*   HEMATOCRIT % 26.3* 26.7* 27.3*   PLATELETS 10*3/mm3 345 361 316     Results from last 7 days   Lab Units 10/22/24  0335   INR  0.95       RADIOLOGY:  Imaging Results (Last 24 Hours)       ** No results found for the last 24 hours. **                  )10/22/2024  Travis Connor MD      EMR Dragon/Transcription:   \"Dictated utilizing Dragon dictation\".   "

## 2024-10-22 NOTE — PLAN OF CARE
Goal Outcome Evaluation:      Pt rested throughout shift. VSS. PT NPO after breakfast for heart cath. All questions and concerns addressed.

## 2024-10-22 NOTE — PLAN OF CARE
"Goal Outcome Evaluation:  ssessment: Maryann Gaitan presents with functional mobility impairments which indicate the need for skilled intervention. Cardiology plans for pt to have laser PCI his afternoon. Pt agreeable with encouragement to sit up in recliner. Pt fatigues easily with minimal exertion. Tolerating session today without incident. Will continue to follow and progress as tolerated.     Plan/Recommendations:   If medically appropriate, Moderate Intensity Therapy recommended post-acute care. This is recommended as therapy feels the patient would require 3-4 days per week and wouldn't tolerate \"3 hour daily\" rehab intensity. SNF would be the preferred choice. If the patient does not agree to SNF, arrange HH or OP depending on home bound status. If patient is medically complex, consider LTACH. Pt requires no DME at discharge.     Pt desires Home with family assist and Home Health at discharge. Pt cooperative; agreeable to therapeutic recommendations and plan of care.                    Anticipated Discharge Disposition (PT): skilled nursing facility                        "

## 2024-10-23 ENCOUNTER — READMISSION MANAGEMENT (OUTPATIENT)
Dept: CALL CENTER | Facility: HOSPITAL | Age: 74
End: 2024-10-23
Payer: MEDICARE

## 2024-10-23 ENCOUNTER — APPOINTMENT (OUTPATIENT)
Dept: RESPIRATORY THERAPY | Facility: HOSPITAL | Age: 74
End: 2024-10-23
Payer: MEDICARE

## 2024-10-23 VITALS
TEMPERATURE: 98.4 F | WEIGHT: 127 LBS | SYSTOLIC BLOOD PRESSURE: 121 MMHG | RESPIRATION RATE: 25 BRPM | HEART RATE: 82 BPM | BODY MASS INDEX: 23.98 KG/M2 | HEIGHT: 61 IN | OXYGEN SATURATION: 97 % | DIASTOLIC BLOOD PRESSURE: 73 MMHG

## 2024-10-23 LAB
ABO GROUP BLD: NORMAL
ALBUMIN SERPL-MCNC: 2.4 G/DL (ref 3.5–5.2)
ANION GAP SERPL CALCULATED.3IONS-SCNC: 7 MMOL/L (ref 5–15)
BLD GP AB SCN SERPL QL: NEGATIVE
BUN SERPL-MCNC: 8 MG/DL (ref 8–23)
BUN/CREAT SERPL: 22.2 (ref 7–25)
CALCIUM SPEC-SCNC: 7.6 MG/DL (ref 8.6–10.5)
CHLORIDE SERPL-SCNC: 101 MMOL/L (ref 98–107)
CO2 SERPL-SCNC: 26 MMOL/L (ref 22–29)
CREAT SERPL-MCNC: 0.36 MG/DL (ref 0.57–1)
EGFRCR SERPLBLD CKD-EPI 2021: 106.7 ML/MIN/1.73
FUNGUS WND CULT: NORMAL
GLUCOSE SERPL-MCNC: 74 MG/DL (ref 65–99)
HCT VFR BLD AUTO: 26.3 % (ref 34–46.6)
HCT VFR BLD AUTO: 29.8 % (ref 34–46.6)
HGB BLD-MCNC: 7.7 G/DL (ref 12–15.9)
HGB BLD-MCNC: 9.6 G/DL (ref 12–15.9)
MYCOBACTERIUM SPEC CULT: NORMAL
NIGHT BLUE STAIN TISS: NORMAL
POTASSIUM SERPL-SCNC: 3.9 MMOL/L (ref 3.5–5.2)
POTASSIUM SERPL-SCNC: 4.3 MMOL/L (ref 3.5–5.2)
RH BLD: POSITIVE
SODIUM SERPL-SCNC: 134 MMOL/L (ref 136–145)
T&S EXPIRATION DATE: NORMAL

## 2024-10-23 PROCEDURE — 94799 UNLISTED PULMONARY SVC/PX: CPT

## 2024-10-23 PROCEDURE — 86900 BLOOD TYPING SEROLOGIC ABO: CPT | Performed by: INTERNAL MEDICINE

## 2024-10-23 PROCEDURE — 94761 N-INVAS EAR/PLS OXIMETRY MLT: CPT

## 2024-10-23 PROCEDURE — 86923 COMPATIBILITY TEST ELECTRIC: CPT

## 2024-10-23 PROCEDURE — 85014 HEMATOCRIT: CPT | Performed by: INTERNAL MEDICINE

## 2024-10-23 PROCEDURE — 94664 DEMO&/EVAL PT USE INHALER: CPT

## 2024-10-23 PROCEDURE — 99232 SBSQ HOSP IP/OBS MODERATE 35: CPT | Performed by: INTERNAL MEDICINE

## 2024-10-23 PROCEDURE — 82040 ASSAY OF SERUM ALBUMIN: CPT

## 2024-10-23 PROCEDURE — 63710000001 PREDNISONE PER 1 MG: Performed by: INTERNAL MEDICINE

## 2024-10-23 PROCEDURE — 86900 BLOOD TYPING SEROLOGIC ABO: CPT

## 2024-10-23 PROCEDURE — P9016 RBC LEUKOCYTES REDUCED: HCPCS

## 2024-10-23 PROCEDURE — 84132 ASSAY OF SERUM POTASSIUM: CPT | Performed by: INTERNAL MEDICINE

## 2024-10-23 PROCEDURE — 36430 TRANSFUSION BLD/BLD COMPNT: CPT

## 2024-10-23 PROCEDURE — 86850 RBC ANTIBODY SCREEN: CPT | Performed by: INTERNAL MEDICINE

## 2024-10-23 PROCEDURE — 86901 BLOOD TYPING SEROLOGIC RH(D): CPT | Performed by: INTERNAL MEDICINE

## 2024-10-23 PROCEDURE — 93010 ELECTROCARDIOGRAM REPORT: CPT | Performed by: INTERNAL MEDICINE

## 2024-10-23 PROCEDURE — 85018 HEMOGLOBIN: CPT | Performed by: INTERNAL MEDICINE

## 2024-10-23 PROCEDURE — 93005 ELECTROCARDIOGRAM TRACING: CPT | Performed by: INTERNAL MEDICINE

## 2024-10-23 PROCEDURE — 80048 BASIC METABOLIC PNL TOTAL CA: CPT | Performed by: INTERNAL MEDICINE

## 2024-10-23 RX ORDER — METOPROLOL TARTRATE 50 MG
50 TABLET ORAL EVERY 12 HOURS SCHEDULED
Qty: 60 TABLET | Refills: 0 | Status: SHIPPED | OUTPATIENT
Start: 2024-10-23 | End: 2024-10-30

## 2024-10-23 RX ORDER — MESALAMINE 1.2 G/1
4.8 TABLET, DELAYED RELEASE ORAL
Qty: 120 TABLET | Refills: 0 | Status: SHIPPED | OUTPATIENT
Start: 2024-10-24 | End: 2024-11-06

## 2024-10-23 RX ORDER — POTASSIUM CHLORIDE 1.5 G/1.58G
20 POWDER, FOR SOLUTION ORAL ONCE
Status: COMPLETED | OUTPATIENT
Start: 2024-10-23 | End: 2024-10-23

## 2024-10-23 RX ORDER — MIDODRINE HYDROCHLORIDE 10 MG/1
10 TABLET ORAL
Qty: 90 TABLET | Refills: 0 | Status: ON HOLD | OUTPATIENT
Start: 2024-10-23 | End: 2024-11-22

## 2024-10-23 RX ORDER — ASPIRIN 81 MG/1
81 TABLET ORAL DAILY
Qty: 30 TABLET | Refills: 0 | Status: ON HOLD | OUTPATIENT
Start: 2024-10-24 | End: 2024-11-12

## 2024-10-23 RX ORDER — CLOPIDOGREL BISULFATE 75 MG/1
75 TABLET ORAL DAILY
Status: DISCONTINUED | OUTPATIENT
Start: 2024-10-24 | End: 2024-10-23 | Stop reason: HOSPADM

## 2024-10-23 RX ORDER — ATORVASTATIN CALCIUM 40 MG/1
40 TABLET, FILM COATED ORAL NIGHTLY
Qty: 30 TABLET | Refills: 0 | Status: ON HOLD | OUTPATIENT
Start: 2024-10-23 | End: 2024-11-22

## 2024-10-23 RX ORDER — CLOPIDOGREL BISULFATE 75 MG/1
75 TABLET ORAL DAILY
Qty: 30 TABLET | Refills: 0 | Status: ON HOLD | OUTPATIENT
Start: 2024-10-23 | End: 2024-11-22

## 2024-10-23 RX ORDER — MIDODRINE HYDROCHLORIDE 5 MG/1
10 TABLET ORAL
Status: DISCONTINUED | OUTPATIENT
Start: 2024-10-23 | End: 2024-10-23 | Stop reason: HOSPADM

## 2024-10-23 RX ADMIN — PANTOPRAZOLE SODIUM 40 MG: 40 TABLET, DELAYED RELEASE ORAL at 09:08

## 2024-10-23 RX ADMIN — ASPIRIN 81 MG: 81 TABLET, COATED ORAL at 09:08

## 2024-10-23 RX ADMIN — POTASSIUM CHLORIDE 20 MEQ: 1.5 POWDER, FOR SOLUTION ORAL at 05:20

## 2024-10-23 RX ADMIN — LEVOTHYROXINE SODIUM 50 MCG: 0.05 TABLET ORAL at 05:21

## 2024-10-23 RX ADMIN — METOPROLOL TARTRATE 50 MG: 50 TABLET, FILM COATED ORAL at 09:07

## 2024-10-23 RX ADMIN — Medication 10 ML: at 09:09

## 2024-10-23 RX ADMIN — MESALAMINE 4.8 G: 800 TABLET, DELAYED RELEASE ORAL at 09:08

## 2024-10-23 RX ADMIN — DIPHENHYDRAMINE HYDROCHLORIDE AND LIDOCAINE HYDROCHLORIDE AND ALUMINUM HYDROXIDE AND MAGNESIUM HYDRO 10 ML: KIT at 01:46

## 2024-10-23 RX ADMIN — CLOPIDOGREL BISULFATE 75 MG: 75 TABLET ORAL at 09:07

## 2024-10-23 RX ADMIN — PREDNISONE 40 MG: 20 TABLET ORAL at 09:08

## 2024-10-23 RX ADMIN — MIDODRINE HYDROCHLORIDE 10 MG: 5 TABLET ORAL at 17:35

## 2024-10-23 RX ADMIN — APIXABAN 5 MG: 5 TABLET, FILM COATED ORAL at 12:02

## 2024-10-23 RX ADMIN — DIPHENHYDRAMINE HYDROCHLORIDE AND LIDOCAINE HYDROCHLORIDE AND ALUMINUM HYDROXIDE AND MAGNESIUM HYDRO 10 ML: KIT at 06:46

## 2024-10-23 RX ADMIN — SERTRALINE 50 MG: 50 TABLET, FILM COATED ORAL at 09:08

## 2024-10-23 RX ADMIN — DIPHENHYDRAMINE HYDROCHLORIDE AND LIDOCAINE HYDROCHLORIDE AND ALUMINUM HYDROXIDE AND MAGNESIUM HYDRO 10 ML: KIT at 13:22

## 2024-10-23 RX ADMIN — TIOTROPIUM BROMIDE INHALATION SPRAY 2 PUFF: 3.12 SPRAY, METERED RESPIRATORY (INHALATION) at 08:53

## 2024-10-23 RX ADMIN — FOLIC ACID 1 MG: 1 TABLET ORAL at 09:07

## 2024-10-23 NOTE — PROGRESS NOTES
PROGRESS NOTE      Patient Name: Maryann Gaitan  : 1950  MRN: [id number]  Primary Care Physician: Eduard Little MD  Date of admission: 10/8/2024    Patient Care Team:  Eduard Little MD as PCP - General (Family Medicine)        Subjective   Subjective:     Seen and examined, comfortable, not in distress  Sodium improving       Review of systems:  All other review of system unremarkable      Allergies:    Allergies   Allergen Reactions    Codeine Hives    Penicillin G Sodium Hives       Objective   Exam:     Vital Signs  Temp:  [97.8 °F (36.6 °C)-98.4 °F (36.9 °C)] 97.8 °F (36.6 °C)  Heart Rate:  [] 94  Resp:  [15-27] 25  BP: ()/(48-72) 114/63  SpO2:  [90 %-100 %] 98 %  on  Flow (L/min) (Oxygen Therapy):  [1] 1;   Device (Oxygen Therapy): room air  Body mass index is 24 kg/m².    General: Elderly female in no acute distress.    Head:      Normocephalic and atraumatic.    Eyes:      PERRL/EOM intact, conjunctivae and sclerae clear without nystagmus.    Neck:      No masses, thyromegaly,  trachea central with normal respiratory effort   Lungs:    Clear bilaterally to auscultation.    Heart:      Regular rate and rhythm, no murmur no gallop  Abd:        Soft, nontender, not distended, bowel sounds positive, no shifting dullness   Pulses:   Pulses palpable  Extr:        No cyanosis or clubbing--no edema.    Neuro:    No focal deficits.   alert oriented x3  Skin:       Intact without lesions or rashes.    Psych:    Alert and cooperative; normal mood and affect; .      Results Review:  I have personally reviewed most recent Data :  CBC    Results from last 7 days   Lab Units 10/22/24  0335 10/21/24  0542 10/20/24  0539 10/18/24  0454 10/17/24  0443   WBC 10*3/mm3 10.11 9.38 6.27 3.40 2.57*   HEMOGLOBIN g/dL 8.2* 8.7* 9.1* 9.7* 8.2*   PLATELETS 10*3/mm3 345 361 316 288 209     CMP   Results from last 7 days   Lab Units 10/23/24  0922 10/23/24  0314 10/22/24  0335 10/21/24  0542  10/21/24  0022 10/20/24  1155 10/20/24  0539 10/19/24  1052 10/18/24  1648 10/18/24  7274 10/17/24  8203   SODIUM mmol/L  --  134* 131* 131*  --   --  130* 129*  --  127* 127*   POTASSIUM mmol/L 4.3 3.9 4.3 4.8 5.0 3.5 3.7 4.2   < > 3.2* 4.0   CHLORIDE mmol/L  --  101 99 100  --   --  96* 98  --  98 96*   CO2 mmol/L  --  26.0 25.4 25.3  --   --  26.1 23.5  --  23.8 24.3   BUN mg/dL  --  8 9 9  --   --  12 24*  --  28* 28*   CREATININE mg/dL  --  0.36* 0.44* 0.38*  --   --  0.35* 0.41*  --  0.42* 0.42*   GLUCOSE mg/dL  --  74 82 84  --   --  79 96  --  113* 138*   ALBUMIN g/dL  --   --  2.5*  --   --   --   --   --   --  2.6* 2.6*   BILIRUBIN mg/dL  --   --  0.3  --   --   --   --   --   --  0.3 0.4   ALK PHOS U/L  --   --  74  --   --   --   --   --   --  67 65   AST (SGOT) U/L  --   --  13  --   --   --   --   --   --  10 10   ALT (SGPT) U/L  --   --  9  --   --   --   --   --   --  8 9    < > = values in this interval not displayed.     ABG          No radiology results for the last day    Results for orders placed during the hospital encounter of 10/08/24    Adult Transesophageal Echo (GONZÁLEZ) W/ Cont if Necessary Per Protocol (Cardiology Department)    Interpretation Summary    Left ventricular systolic function is normal. Left ventricular ejection fraction appears to be 56 - 60%.    The left atrial cavity is severely dilated.    Saline test results are negative.    There is mild, posterior mitral leaflet thickening present.    Moderate to severe mitral valve regurgitation is present with a centrally-directed jet noted.    There is moderate, (grade 3) plaque in the descending aorta present.    Scheduled Meds:apixaban, 5 mg, Oral, Q12H  aspirin, 81 mg, Oral, Daily  atorvastatin, 40 mg, Oral, Nightly  clopidogrel, 300 mg, Oral, Once   And  clopidogrel, 75 mg, Oral, Daily  First Mouthwash (Magic Mouthwash), 10 mL, Swish & Spit, Q6H  folic acid, 1 mg, Oral, Daily  levothyroxine, 50 mcg, Oral, Q AM  mesalamine, 4.8 g,  Oral, Daily With Breakfast  metoprolol tartrate, 50 mg, Oral, Q12H  pantoprazole, 40 mg, Oral, Daily  predniSONE, 40 mg, Oral, Daily With Breakfast  sertraline, 50 mg, Oral, Daily  sodium chloride, 10 mL, Intravenous, Q12H  tiotropium bromide monohydrate, 2 puff, Inhalation, Daily - RT      Continuous Infusions:sodium chloride, 75 mL/hr, Last Rate: 75 mL/hr (10/22/24 1615)        PRN Meds:  acetaminophen    albuterol    senna-docusate sodium **AND** polyethylene glycol **AND** bisacodyl **AND** bisacodyl    hydrOXYzine    influenza vaccine    ipratropium-albuterol    Lidocaine Viscous HCl    Magnesium Standard Dose Replacement - Follow Nurse / BPA Driven Protocol    melatonin    nitroglycerin    ondansetron ODT **OR** ondansetron    Phosphorus Replacement - Follow Nurse / BPA Driven Protocol    Potassium Replacement - Follow Nurse / BPA Driven Protocol    sodium chloride    Assessment & Plan   Assessment and Plan:         Hyponatremia    Diarrhea    Moderate to severe mitral regurgitation    Chest pain, atypical    Cavitary lesion of lung    Multiple tracheobronchial mucus plugs    CAD, multiple vessel    Acute heart failure with preserved ejection fraction (HFpEF)    ASSESSMENT:  Hyponatremia likely etiology thiazide diuretics in the presence of Cymbalta  History of hypertension  History of arthritis  History of Crohn disease  Severe mitral regurgitation   10/12/2024 EGD/colonoscopy  small hiatal hernia.  Nonerosive chronic gastritis.  Normal duodenum.  TI stricture status post dilation to 12 mm.  TI ulcers.  Colon ulcers.  Sigmoid colon diverticulosis.  Grade 2 internal hemorrhoids     PLAN :        Sodium level improving, up to 134 today  She was refusing Urea, no longer ordered. Continue fluid restriction   Okay to be discharged with fluid restriction and add sodium chloride pill if sodium level drop again  Although pt on SSRI can cause Hyponatremia to me patient lungs condition are more likley the cause of  recent acute drop in the sodium  Pt is advised to stay on fluid restriction <1L   Patient has cavitary lesion that might be contributing to hyponatremia with some SIADH  Follow-up in the renal clinic  PCI later today possibly?  GONZÁLEZ showed severe mitral regurgitation with a plaque in the ascending aorta  Electrolytes/acid base stable, calcium low, likely from hypoalbuminemia but will check albumin level in am   Continue to follow-up with the nutritional status  Diarrhea has improved  Status post EGD and colonoscopy   Follow-up with hematology and GI  Daily labs   We will continue to follow         Electronically signed by DRU Bu[facility] kidney consultant  [phone]  10/23/2024  11:13 EDT

## 2024-10-23 NOTE — ED PROVIDER NOTES
Subjective   History of Present Illness  Patient is a 74-year-old white female with history of Crohn's disease and hypertension presents today with reports of abnormal labs demonstrate instructed to come to the ED by her primary care provider.  She is unsure which labs were abnormal.  She has had some loose nonbloody stools.  No significant pain.  Some nausea no vomiting.  She denies any fevers.  She does feel generally weak.      Review of Systems   Constitutional:  Negative for fever.   Gastrointestinal:  Positive for diarrhea and nausea. Negative for abdominal pain and vomiting.       Past Medical History:   Diagnosis Date    Arthritis     COPD (chronic obstructive pulmonary disease)     Hypertension        Allergies   Allergen Reactions    Codeine Hives    Penicillin G Sodium Hives       Past Surgical History:   Procedure Laterality Date    BRONCHOSCOPY N/A 10/16/2024    Procedure: BRONCHOSCOPY;  Surgeon: Gallo Pope MD;  Location: [facility] ENDOSCOPY;  Service: Pulmonary;  Laterality: N/A;    CARDIAC CATHETERIZATION N/A 10/15/2024    Procedure: Left Heart Cath, possible pci;  Surgeon: Travis Connor MD;  Location: [facility] CATH INVASIVE LOCATION;  Service: Cardiovascular;  Laterality: N/A;    COLONOSCOPY N/A 10/12/2024    Procedure: COLONOSCOPY WITH BIOPSY AND WIRE GUIDED BALLOON DILATION OF TERMINAL ILEUM;  Surgeon: Rob Strong MD;  Location: [facility] ENDOSCOPY;  Service: Gastroenterology;  Laterality: N/A;  Colitis, crohns of terminal ileum, right colon ulcers, diverticulosis, hemorroids    ENDOSCOPY N/A 10/12/2024    Procedure: ESOPHAGOGASTRODUODENOSCOPY WITH BIOPSY X 2 AREA;  Surgeon: Rob Strong MD;  Location: [facility] ENDOSCOPY;  Service: Gastroenterology;  Laterality: N/A;  Chronic gastritis, HH    HYSTERECTOMY         History reviewed. No pertinent family history.    Social History     Socioeconomic History    Marital status:    Tobacco Use    Smoking status:  Former     Types: Cigarettes    Smokeless tobacco: Never   Vaping Use    Vaping status: Never Used   Substance and Sexual Activity    Alcohol use: Never    Drug use: Never    Sexual activity: Defer           Objective   Physical Exam  Vital signs and triage nurse note reviewed.  Constitutional: Awake, alert; well-developed and well-nourished. No acute distress is noted.  HEENT: Normocephalic, atraumatic; pupils are PERRL with intact EOM; oropharynx is pink and moist without exudate or erythema.  No drooling or pooling of oral secretions.  Neck: Supple, full range of motion without pain; no cervical lymphadenopathy. Normal phonation.  Cardiovascular: Regular rate and rhythm, normal S1-S2.  No murmur noted.  Pulmonary: Respiratory effort regular nonlabored, breath sounds clear to auscultation all fields.  Abdomen: Soft, nontender, nondistended with normoactive bowel sounds; no rebound or guarding.  Musculoskeletal: Independent range of motion of all extremities with no palpable tenderness or edema.   Skin: Flesh tone, warm, dry, intact; no erythematous or petechial rash or lesion.    Procedures           ED Course      Labs Reviewed   COMPREHENSIVE METABOLIC PANEL - Abnormal; Notable for the following components:       Result Value    BUN 35 (*)     Creatinine 1.07 (*)     Sodium 122 (*)     Potassium 3.0 (*)     Chloride 81 (*)     Total Protein 5.9 (*)     Albumin 3.3 (*)     BUN/Creatinine Ratio 32.7 (*)     Anion Gap 18.5 (*)     eGFR 54.6 (*)     All other components within normal limits    Narrative:     GFR Normal >60  Chronic Kidney Disease <60  Kidney Failure <15    The GFR formula is only valid for adults with stable renal function between ages 18 and 70.   URINALYSIS W/ MICROSCOPIC IF INDICATED (NO CULTURE) - Abnormal; Notable for the following components:    Color, UA Dark Yellow (*)     Ketones, UA 15 mg/dL (1+) (*)     Leuk Esterase, UA Trace (*)     All other components within normal limits   CBC WITH  AUTO DIFFERENTIAL - Abnormal; Notable for the following components:    RBC 3.30 (*)     Hemoglobin 10.3 (*)     Hematocrit 30.4 (*)     RDW 17.0 (*)     RDW-SD 57.1 (*)     Neutrophil % 78.6 (*)     Lymphocyte % 18.3 (*)     Monocyte % 1.5 (*)     All other components within normal limits   URINALYSIS, MICROSCOPIC ONLY - Abnormal; Notable for the following components:    Bacteria, UA Trace (*)     All other components within normal limits   MAGNESIUM - Normal   CBC AND DIFFERENTIAL    Narrative:     The following orders were created for panel order CBC & Differential.  Procedure                               Abnormality         Status                     ---------                               -----------         ------                     CBC Auto Differential[[id number]]        Abnormal            Final result                 Please view results for these tests on the individual orders.     No radiology results for the last day  Medications   sodium chloride 0.9 % bolus 1,000 mL (0 mL Intravenous Stopped 10/8/24 1226)   potassium chloride (KLOR-CON M20) CR tablet 40 mEq (40 mEq Oral Given 10/8/24 1122)                                              Medical Decision Making  Patient presents today with the above complaint.    She had above exam and evaluation.  IV was established.  Labs were obtained.    Workup: CBC significant for hemoglobin of 10.3 which appears stable from prior, normal white blood cell count of 6.6.  Metabolic panel significant for sodium of 122 and potassium of 3.0.  Magnesium within normal limits at 2.0.  Urinalysis shows trace leuks and trace bacteria.    Patient is given potassium 40 mEq p.o.  She is given a small normal saline fluid bolus.    Findings were discussed with the patient.   Discussed need for admission due to patient's hyponatremia.  She declines states she will follow-up with her primary care provider.  Risk of leaving were discussed with the patient and she voiced understanding.   She signed out AMA.    Amount and/or Complexity of Data Reviewed  Labs: ordered.    Risk  Prescription drug management.        Final diagnoses:   Hyponatremia   Hypokalemia       ED Disposition  ED Disposition       ED Disposition   AMA    Condition   --    Comment   --               No follow-up provider specified.       Medication List      No changes were made to your prescriptions during this visit.            Maranda Christian, APRN  10/23/24 0641

## 2024-10-23 NOTE — PROGRESS NOTES
PROGRESS NOTE      Patient Name: Maryann Gaitan  : 1950  MRN: [id number]  Primary Care Physician: Eduard Little MD  Date of admission: 10/8/2024    Patient Care Team:  Eduard Little MD as PCP - General (Family Medicine)        Subjective   Subjective:     Seen and examined, comfortable, not in distress  Sodium stable      Review of systems:  All other review of system unremarkable      Allergies:    Allergies   Allergen Reactions    Codeine Hives    Penicillin G Sodium Hives       Objective   Exam:     Vital Signs  Temp:  [97.8 °F (36.6 °C)-98.5 °F (36.9 °C)] 98.4 °F (36.9 °C)  Heart Rate:  [] 80  Resp:  [22-27] 25  BP: ()/(45-68) 114/65  SpO2:  [90 %-100 %] 97 %  on  Flow (L/min) (Oxygen Therapy):  [1] 1;   Device (Oxygen Therapy): room air  Body mass index is 24 kg/m².    General: Elderly female in no acute distress.    Head:      Normocephalic and atraumatic.    Eyes:      PERRL/EOM intact, conjunctivae and sclerae clear without nystagmus.    Neck:      No masses, thyromegaly,  trachea central with normal respiratory effort   Lungs:    Clear bilaterally to auscultation.    Heart:      Regular rate and rhythm, no murmur no gallop  Abd:        Soft, nontender, not distended, bowel sounds positive, no shifting dullness   Pulses:   Pulses palpable  Extr:        No cyanosis or clubbing--no edema.    Neuro:    No focal deficits.   alert oriented x3  Skin:       Intact without lesions or rashes.    Psych:    Alert and cooperative; normal mood and affect; .      Results Review:  I have personally reviewed most recent Data :  CBC    Results from last 7 days   Lab Units 10/23/24  1118 10/22/24  0335 10/21/24  0542 10/20/24  0539 10/18/24  0454 10/17/24  0443   WBC 10*3/mm3  --  10.11 9.38 6.27 3.40 2.57*   HEMOGLOBIN g/dL 7.7* 8.2* 8.7* 9.1* 9.7* 8.2*   PLATELETS 10*3/mm3  --  345 361 316 288 209     CMP   Results from last 7 days   Lab Units 10/23/24  0922 10/23/24  0314  10/22/24  0335 10/21/24  0542 10/21/24  0022 10/20/24  1155 10/20/24  0539 10/19/24  1052 10/18/24  1648 10/18/24  0454 10/17/24  0443   SODIUM mmol/L  --  134* 131* 131*  --   --  130* 129*  --  127* 127*   POTASSIUM mmol/L 4.3 3.9 4.3 4.8 5.0 3.5 3.7 4.2   < > 3.2* 4.0   CHLORIDE mmol/L  --  101 99 100  --   --  96* 98  --  98 96*   CO2 mmol/L  --  26.0 25.4 25.3  --   --  26.1 23.5  --  23.8 24.3   BUN mg/dL  --  8 9 9  --   --  12 24*  --  28* 28*   CREATININE mg/dL  --  0.36* 0.44* 0.38*  --   --  0.35* 0.41*  --  0.42* 0.42*   GLUCOSE mg/dL  --  74 82 84  --   --  79 96  --  113* 138*   ALBUMIN g/dL 2.4*  --  2.5*  --   --   --   --   --   --  2.6* 2.6*   BILIRUBIN mg/dL  --   --  0.3  --   --   --   --   --   --  0.3 0.4   ALK PHOS U/L  --   --  74  --   --   --   --   --   --  67 65   AST (SGOT) U/L  --   --  13  --   --   --   --   --   --  10 10   ALT (SGPT) U/L  --   --  9  --   --   --   --   --   --  8 9    < > = values in this interval not displayed.     ABG          No radiology results for the last day    Results for orders placed during the hospital encounter of 10/08/24    Adult Transesophageal Echo (GONZÁLEZ) W/ Cont if Necessary Per Protocol (Cardiology Department)    Interpretation Summary    Left ventricular systolic function is normal. Left ventricular ejection fraction appears to be 56 - 60%.    The left atrial cavity is severely dilated.    Saline test results are negative.    There is mild, posterior mitral leaflet thickening present.    Moderate to severe mitral valve regurgitation is present with a centrally-directed jet noted.    There is moderate, (grade 3) plaque in the descending aorta present.    Scheduled Meds:aspirin, 81 mg, Oral, Daily  atorvastatin, 40 mg, Oral, Nightly  [START ON 10/24/2024] clopidogrel, 75 mg, Oral, Daily  First Mouthwash (Magic Mouthwash), 10 mL, Swish & Spit, Q6H  folic acid, 1 mg, Oral, Daily  levothyroxine, 50 mcg, Oral, Q AM  mesalamine, 4.8 g, Oral, Daily With  Breakfast  metoprolol tartrate, 50 mg, Oral, Q12H  midodrine, 10 mg, Oral, TID AC  pantoprazole, 40 mg, Oral, Daily  predniSONE, 40 mg, Oral, Daily With Breakfast  sertraline, 50 mg, Oral, Daily  sodium chloride, 10 mL, Intravenous, Q12H  tiotropium bromide monohydrate, 2 puff, Inhalation, Daily - RT      Continuous Infusions:     PRN Meds:  acetaminophen    albuterol    senna-docusate sodium **AND** polyethylene glycol **AND** bisacodyl **AND** bisacodyl    hydrOXYzine    influenza vaccine    ipratropium-albuterol    Lidocaine Viscous HCl    Magnesium Standard Dose Replacement - Follow Nurse / BPA Driven Protocol    melatonin    nitroglycerin    ondansetron ODT **OR** ondansetron    Phosphorus Replacement - Follow Nurse / BPA Driven Protocol    Potassium Replacement - Follow Nurse / BPA Driven Protocol    sodium chloride    Assessment & Plan   Assessment and Plan:         Hyponatremia    Diarrhea    Moderate to severe mitral regurgitation    Chest pain, atypical    Cavitary lesion of lung    Multiple tracheobronchial mucus plugs    CAD, multiple vessel    Acute heart failure with preserved ejection fraction (HFpEF)    ASSESSMENT:  Hyponatremia likely etiology thiazide diuretics in the presence of Cymbalta  History of hypertension  History of arthritis  History of Crohn disease  Severe mitral regurgitation   10/12/2024 EGD/colonoscopy  small hiatal hernia.  Nonerosive chronic gastritis.  Normal duodenum.  TI stricture status post dilation to 12 mm.  TI ulcers.  Colon ulcers.  Sigmoid colon diverticulosis.  Grade 2 internal hemorrhoids     PLAN :        Sodium level improving, up to 133 today  She was refusing Urea, no longer ordered. Continue fluid restriction   No more urea  Okay to be discharged sodium chloride pill only if needed  Although pt on SSRI can cause Hyponatremia to me patient lungs condition are more likley the cause of recent acute drop in the sodium  Status post PCI yesterday  Pt is advised to stay  on fluid restriction <1L   Patient has cavitary lesion that might be contributing to hyponatremia with some SIADH  Follow-up in the renal clinic  GONZÁLEZ showed severe mitral regurgitation with a plaque in the ascending aorta  Continue to follow-up with electrolytes  Continue to follow-up with the nutritional status  GI regarding Crohn disease  Daily labs    We will continue to follow         Electronically signed by Buddy Pierre MD,   [facility] kidney consultant  [phone]  10/23/2024  16:56 EDT

## 2024-10-23 NOTE — DISCHARGE SUMMARY
[facility] Medicine Services  Discharge Summary    Date of Service: 10/23/2024  Patient Name: Maryann Gaitan  : 1950  MRN: [id number]    Date of Admission: 10/8/2024  Discharge Diagnosis:     Moderate to severe mitral regurgitation  Hyponatremia  Pneumonia with cavitary lesion in the left upper lobe of lung  Hypokalemia  Crohn's disease  Anemia  Chronic diastolic CHF  Hypertension    Date of Discharge: 10/23/2024  Primary Care Physician: Eduard Little MD      Presenting Problem:   Hypokalemia [E87.6]  Hyponatremia [E87.1]    Active and Resolved Hospital Problems:  Active Hospital Problems    Diagnosis POA    **Hyponatremia [E87.1] Yes    Acute heart failure with preserved ejection fraction (HFpEF) [I50.31] Unknown    Diarrhea [R19.7] Unknown    Moderate to severe mitral regurgitation [I34.0] Unknown    Chest pain, atypical [R07.89] Unknown    Cavitary lesion of lung [J98.4] Unknown    Multiple tracheobronchial mucus plugs [T17.800A] Unknown    CAD, multiple vessel [I25.10] Unknown      Resolved Hospital Problems   No resolved problems to display.         Hospital Course       Hospital Course:  This patient presented with a low sodium level and was treated for this.  She had been on hydrochlorothiazide which was held.  Nephrology was consulted.  Hyponatremia gradually improved.      She was also diagnosed with multifocal pneumonia with acute hypoxic respiratory failure and treated with IV antibiotics.  A CT scan of the chest PE protocol was done which was negative for PE but showed cavitary lesions in the lungs.  Pulmonary was consulted and she had a bronchoscopy done.  BAL culture grew normal respiratory wu.  She did complete her course of antibiotics during the hospital course.  She was weaned off of oxygen successfully to room air.      She had mild elevated troponins and an echocardiogram was done which showed an ejection fraction of 56 to 60% and moderate to severe mitral  regurgitation.(GONZÁLEZ) cardiology felt the patient would benefit from CABG and mitral valve surgery.  Cardiothoracic surgery however turndown this patient due to multiple medical comorbidities.  She is to follow-up with the structural heart team upon discharge per cardiology.  Cardiology performed cardiac cath on this patient which revealed right total and severe ostial LAD disease.  PCI was performed to LAD.  Patient has been cleared for discharge home by cardiology.  She was started on dual antiplatelet therapy with optimal medical management for CAD.  Of note is that during cardiac catheter it was observed that she had an episode of atrial fibrillation and so Eliquis was started by cardiology however her hemoglobin and hematocrit trended downwards requiring transfusion of packed red blood cells and so Eliquis was held.  Cardiology planning on MCOT in the outpatient setting.      Patient was also seen by gastroenterology for Crohn's disease management.  Patient was on mesalamine and prednisone during the hospital course.  An EGD and colonoscopy were done by gastroenterology.  EGD showed a small hiatal hernia with nonerosive chronic gastritis and a normal duodenum.  Patient was found to have stricture and is status post dilation.  Colonoscopy showed sigmoid diverticulosis with grade 2 internal hemorrhoids.  GI recommended continuing mesalamine and prednisone.          DISCHARGE Follow Up Recommendations for labs and diagnostics: Follow-up with PCP in 1 week, follow-up with cardiology in 1 week, follow-up with pulmonary in 1 week, follow-up with nephrology in 1 week, F/u with Gi in one week.        Day of Discharge     Vital Signs:  Temp:  [97.8 °F (36.6 °C)-98.5 °F (36.9 °C)] 98.4 °F (36.9 °C)  Heart Rate:  [] 82  Resp:  [22-27] 25  BP: ()/(45-73) 121/73    Physical Exam:  Physical Exam  Constitutional:       Appearance: Normal appearance.   HENT:      Head: Normocephalic and atraumatic.      Nose: Nose  normal.      Mouth/Throat:      Mouth: Mucous membranes are moist.   Eyes:      Extraocular Movements: Extraocular movements intact.      Pupils: Pupils are equal, round, and reactive to light.   Cardiovascular:      Rate and Rhythm: Normal rate and regular rhythm.   Pulmonary:      Effort: Pulmonary effort is normal.      Breath sounds: Normal breath sounds.   Abdominal:      General: Abdomen is flat. Bowel sounds are normal.      Palpations: Abdomen is soft.   Musculoskeletal:         General: Normal range of motion.      Cervical back: Normal range of motion and neck supple.   Skin:     General: Skin is warm and dry.   Neurological:      General: No focal deficit present.      Mental Status: She is alert and oriented to person, place, and time.   Psychiatric:         Mood and Affect: Mood normal.         Behavior: Behavior normal.         Thought Content: Thought content normal.         Judgment: Judgment normal.           Pertinent  and/or Most Recent Results     LAB RESULTS:      Lab 10/23/24  1734 10/23/24  1118 10/22/24  0335 10/21/24  0542 10/20/24  0539 10/18/24  0454 10/17/24  0443   WBC  --   --  10.11 9.38 6.27 3.40 2.57*   HEMOGLOBIN 9.6* 7.7* 8.2* 8.7* 9.1* 9.7* 8.2*   HEMATOCRIT 29.8* 26.3* 26.3* 26.7* 27.3* 29.5* 25.0*   PLATELETS  --   --  345 361 316 288 209   NEUTROS ABS  --   --  3.44  --   --  0.80* 1.45*   IMMATURE GRANS (ABS)  --   --   --   --   --  0.25* 0.03   LYMPHS ABS  --   --   --   --   --  1.42 0.73   MONOS ABS  --   --   --   --   --  0.68 0.32   EOS ABS  --   --   --   --   --  0.24 0.04   MCV  --   --  96.3 94.3 94.8 94.9 95.4   PROTIME  --   --  10.4  --   --   --   --          Lab 10/23/24  0922 10/23/24  0314 10/22/24  0335 10/21/24  0542 10/21/24  0022 10/20/24  1155 10/20/24  0539 10/19/24  1052   SODIUM  --  134* 131* 131*  --   --  130* 129*   POTASSIUM 4.3 3.9 4.3 4.8 5.0   < > 3.7 4.2   CHLORIDE  --  101 99 100  --   --  96* 98   CO2  --  26.0 25.4 25.3  --   --  26.1 23.5    ANION GAP  --  7.0 6.6 5.7  --   --  7.9 7.5   BUN  --  8 9 9  --   --  12 24*   CREATININE  --  0.36* 0.44* 0.38*  --   --  0.35* 0.41*   EGFR  --  106.7 101.6 105.3  --   --  107.4 103.4   GLUCOSE  --  74 82 84  --   --  79 96   CALCIUM  --  7.6* 8.3* 8.4*  --   --  8.0* 8.3*    < > = values in this interval not displayed.         Lab 10/23/24  0922 10/22/24  0335 10/18/24  0454 10/17/24  0443   TOTAL PROTEIN  --  4.4* 4.2* 4.3*   ALBUMIN 2.4* 2.5* 2.6* 2.6*   GLOBULIN  --  1.9 1.6 1.7   ALT (SGPT)  --  9 8 9   AST (SGOT)  --  13 10 10   BILIRUBIN  --  0.3 0.3 0.4   ALK PHOS  --  74 67 65         Lab 10/22/24  0335   PROTIME 10.4   INR 0.95             Lab 10/23/24  1241   ABO TYPING O   RH TYPING Positive   ANTIBODY SCREEN Negative         Brief Urine Lab Results  (Last result in the past 365 days)        Color   Clarity   Blood   Leuk Est   Nitrite   Protein   CREAT   Urine HCG        10/09/24 0939             55.8               Microbiology Results (last 10 days)       Procedure Component Value - Date/Time    Fungus Culture - Wash, Lung, Left Upper Lobe [[id number]] Collected: 10/16/24 1147    Lab Status: Preliminary result Specimen: Wash from Lung, Left Upper Lobe Updated: 10/23/24 1215     Fungus Culture No fungus isolated at 1 week    Aspergillus Galactomannan Antigen - Wash, Lung, Left Upper Lobe [[id number]] Collected: 10/16/24 1147    Lab Status: Final result Specimen: Wash from Lung, Left Upper Lobe Updated: 10/18/24 1357     Reference Lab Report See Attached Report    AFB Culture - Wash, Lung, Left Upper Lobe [[id number]] Collected: 10/16/24 1147    Lab Status: Preliminary result Specimen: Wash from Lung, Left Upper Lobe Updated: 10/23/24 1215     AFB Culture No AFB isolated at 1 week     AFB Stain No acid fast bacilli seen on concentrated smear    Respiratory Culture - Wash, Lung, Left Upper Lobe [[id number]] Collected: 10/16/24 1147    Lab Status: Final result Specimen: Wash from Lung, Left Upper Lobe  Updated: 10/18/24 1109     Respiratory Culture Light growth (2+) Normal respiratory wu. No S. aureus or Pseudomonas aeruginosa detected. Final report.     Gram Stain Moderate (3+) WBCs per low power field      Few (2+) Gram positive bacilli      Rare (1+) Gram positive cocci    Pneumocystis PCR - Wash, Lung, Left Upper Lobe [[id number]] Collected: 10/16/24 1147    Lab Status: Final result Specimen: Wash from Lung, Left Upper Lobe Updated: 10/21/24 1408     Reference Lab Report See Attached Report     Pneumocystis Negative    Respiratory Panel PCR w/COVID-19(SARS-CoV-2) ANAMIKA/FABRICIO/JOSSY/PAD/COR/DANIELLE In-House, NP Swab in UTM/VTM, 2 HR TAT - Wash, Lung, Left Upper Lobe [[id number]]  (Normal) Collected: 10/16/24 1147    Lab Status: Final result Specimen: Wash from Lung, Left Upper Lobe Updated: 10/16/24 1306     ADENOVIRUS, PCR Not Detected     Coronavirus 229E Not Detected     Coronavirus HKU1 Not Detected     Coronavirus NL63 Not Detected     Coronavirus OC43 Not Detected     COVID19 Not Detected     Human Metapneumovirus Not Detected     Human Rhinovirus/Enterovirus Not Detected     Influenza A PCR Not Detected     Influenza B PCR Not Detected     Parainfluenza Virus 1 Not Detected     Parainfluenza Virus 2 Not Detected     Parainfluenza Virus 3 Not Detected     Parainfluenza Virus 4 Not Detected     RSV, PCR Not Detected     Bordetella pertussis pcr Not Detected     Bordetella parapertussis PCR Not Detected     Chlamydophila pneumoniae PCR Not Detected     Mycoplasma pneumo by PCR Not Detected    Narrative:      In the setting of a positive respiratory panel with a viral infection PLUS a negative procalcitonin without other underlying concern for bacterial infection, consider observing off antibiotics or discontinuation of antibiotics and continue supportive care. If the respiratory panel is positive for atypical bacterial infection (Bordetella pertussis, Chlamydophila pneumoniae, or Mycoplasma pneumoniae), consider  antibiotic de-escalation to target atypical bacterial infection.            CT Angiogram Chest    Result Date: 10/14/2024  Impression: Impression: 1. No evidence for pulmonary embolus. 2. New cavitary lesion in the left upper lung measuring 1.6 x 1.4 cm. There is adjacent groundglass opacification with additional nodular opacities and tree-in-bud opacities within the left upper and left lower lung. This may represent a multifocal pneumonia however underlying cavitary neoplasm cannot be excluded. Recommend treatment with follow-up imaging to ensure complete resolution. 3. Calcified and noncalcified atherosclerotic disease involving the thoracic aortic arch and descending thoracic aorta with several aortic ulcers along the descending thoracic aorta. Electronically Signed: Manisha Khan MD  10/14/2024 12:01 PM EDT  Workstation ID: [id number]    XR Chest 1 View    Result Date: 10/13/2024  Impression: Impression: Mildly decreased patchy airspace opacities throughout the left lung compared to 10/12/2024 chest x-ray, likely due to multifocal pneumonia. Electronically Signed: Danae Gao  10/13/2024 12:09 PM EDT  Workstation ID: [id number]    XR Chest 1 View    Result Date: 10/12/2024  Impression: Impression: Multifocal pneumonia throughout the left lung. Electronically Signed: Nadir Chow MD  10/12/2024 8:30 PM EDT  Workstation ID: [id number]    CT Enterography Abdomen Pelvis w Contrast    Result Date: 10/11/2024  Impression: Impression: Areas of bowel wall thickening and enhancement through the distal ileum, colon and rectum consistent the patient's history of Crohn's disease. No evidence of bowel obstruction, perforation or abscess. Electronically Signed: Jakob Hernandez MD  10/11/2024 10:08 PM EDT  Workstation ID: [id number]    XR Chest 1 View    Result Date: 10/9/2024  Impression: Impression: No active disease Electronically Signed: Lawson Pritchard MD  10/9/2024 12:48 PM EDT  Workstation ID: [id number]     Results for orders  placed during the hospital encounter of 10/08/24    Duplex Venous Upper Extremity - Left CAR    Interpretation Summary    Acute left upper extremity superficial thrombophlebitis noted in the basilic (upper arm).    All other left sided vessels appear normal.      Results for orders placed during the hospital encounter of 10/08/24    Duplex Venous Upper Extremity - Left CAR    Interpretation Summary    Acute left upper extremity superficial thrombophlebitis noted in the basilic (upper arm).    All other left sided vessels appear normal.      Results for orders placed during the hospital encounter of 10/08/24    Adult Transesophageal Echo (GONZÁLEZ) W/ Cont if Necessary Per Protocol (Cardiology Department)    Interpretation Summary    Left ventricular systolic function is normal. Left ventricular ejection fraction appears to be 56 - 60%.    The left atrial cavity is severely dilated.    Saline test results are negative.    There is mild, posterior mitral leaflet thickening present.    Moderate to severe mitral valve regurgitation is present with a centrally-directed jet noted.    There is moderate, (grade 3) plaque in the descending aorta present.      Labs Pending at Discharge:  Pending Labs       Order Current Status    AFB Culture - Wash, Lung, Left Upper Lobe Preliminary result    Fungus Culture - Wash, Lung, Left Upper Lobe Preliminary result            Procedures Performed  Procedure(s):  Laser Coronary Atherectomy         Consults:   Consults       Date and Time Order Name Status Description    10/14/2024  5:03 PM Inpatient Pulmonology Consult Completed     10/13/2024 10:38 AM Inpatient Cardiology Consult Completed     10/11/2024 10:12 AM Hematology & Oncology Inpatient Consult Completed     10/11/2024  8:11 AM Inpatient Gastroenterology Consult Completed     10/9/2024  2:43 PM Inpatient Hospitalist Consult      10/9/2024  2:34 PM Inpatient Nephrology Consult                Discharge Details        Discharge  Medications        New Medications        Instructions Start Date   aspirin 81 MG EC tablet   81 mg, Oral, Daily   Start Date: October 24, 2024     atorvastatin 40 MG tablet  Commonly known as: LIPITOR   40 mg, Oral, Nightly      clopidogrel 75 MG tablet  Commonly known as: PLAVIX   75 mg, Oral, Daily      mesalamine 1.2 g EC tablet  Commonly known as: LIALDA   4.8 g, Oral, Daily With Breakfast   Start Date: October 24, 2024     metoprolol tartrate 50 MG tablet  Commonly known as: LOPRESSOR   50 mg, Oral, Every 12 Hours Scheduled      midodrine 10 MG tablet  Commonly known as: PROAMATINE   10 mg, Oral, 3 Times Daily Before Meals             Continue These Medications        Instructions Start Date   cholecalciferol 1.25 MG (41225 UT) capsule  Commonly known as: VITAMIN D3   50,000 Units, Oral, 2 Times Weekly, Wed, Sat      colestipol 1 g tablet  Commonly known as: COLESTID   1 g, Oral, 2 Times Daily      diclofenac 50 MG EC tablet  Commonly known as: VOLTAREN   50 mg, Oral, 2 Times Daily      ezetimibe 10 MG tablet  Commonly known as: ZETIA   10 mg, Oral, Daily      folic acid 1 MG tablet  Commonly known as: FOLVITE   1 mg, Oral, Daily      Humira (2 Pen) 40 MG/0.4ML Pen-injector Kit  Generic drug: Adalimumab   40 mg, Subcutaneous, Every 14 Days      levothyroxine 50 MCG tablet  Commonly known as: SYNTHROID, LEVOTHROID   50 mcg, Oral, Daily      methotrexate 2.5 MG tablet   20 mg, Oral, Weekly      pantoprazole 20 MG EC tablet  Commonly known as: PROTONIX   20 mg, Oral, Daily      predniSONE 5 MG tablet  Commonly known as: DELTASONE   5 mg, Oral, Daily      sertraline 50 MG tablet  Commonly known as: ZOLOFT   50 mg, Oral, Daily      tiotropium 18 MCG per inhalation capsule  Commonly known as: SPIRIVA   1 capsule, Inhalation, Daily - RT             Stop These Medications      amLODIPine 10 MG tablet  Commonly known as: NORVASC     hydroCHLOROthiazide 25 MG tablet              Allergies   Allergen Reactions    Codeine  Hives    Penicillin G Sodium Hives         Discharge Disposition: Home      Diet:  Hospital:  Diet Order   Procedures    Diet: Cardiac; Healthy Heart (2-3 Na+); Fluid Consistency: Thin (IDDSI 0)         Discharge Activity:         CODE STATUS:  Code Status and Medical Interventions: CPR (Attempt to Resuscitate); Full Support   Ordered at: 10/09/24 1058     Code Status (Patient has no pulse and is not breathing):    CPR (Attempt to Resuscitate)     Medical Interventions (Patient has pulse or is breathing):    Full Support         Future Appointments   Date Time Provider Department Center   10/30/2024  1:30 PM Corina Murcia, DR[facility]       Additional Instructions for the Follow-ups that You Need to Schedule       Ambulatory Referral to Home Health   As directed      Face to Face Visit Date: 10/23/2024   Follow-up provider for Plan of Care?: I treated the patient in an acute care facility and will not continue treatment after discharge.   Follow-up provider: CHANDAN PARSON [[id number]]   Reason/Clinical Findings: Limitations with mobility   Describe mobility limitations that make leaving home difficult: Limitations with mobility   Nursing/Therapeutic Services Requested: Physical Therapy   Frequency: 1 Week 1       Additional information on Labs and Follow-ups:    Follow up BMP in 2 weeks- Dr. Pierre           Time spent on Discharge including face to face service: Greater than 30 minutes    Signature: Electronically signed by Noreen Nj MD, 10/23/24, 19:35 EDT.  [facility] Hospitalist Team

## 2024-10-23 NOTE — CASE MANAGEMENT/SOCIAL WORK
Continued Stay Note   [facility]al     Patient Name: aMryann Gaitan  MRN: [id number]  Today's Date: 10/23/2024    Admit Date: 10/8/2024    Plan: Plan to return home with family and current F  SN/PT/OT (need new order). Declines SNF.   Discharge Plan       Row Name 10/23/24 0829       Plan    Plan Plan to return home with family and current F  SN/PT/OT (need new order). Declines SNF.    Plan Comments DC Barriers: monitor/replace electrolytes, Pulm/Cardio/HemOnc/GI/Nephro following.                 Expected Discharge Date and Time       Expected Discharge Date Expected Discharge Time    Oct 24, 2024               TORIN Turner RN  ICU/CVU   O: [phone]  C: [phone]  [email or web address]

## 2024-10-23 NOTE — PROGRESS NOTES
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  Descending thoracic aorta has an outpouching probably saccular aneurysm versus  penetrating aortic ulcer . CT surgery consulted    COPD: not in exacerbation      Hyponatremia    Anemia  HTN  Hypothyroidism     s/p EGD/colonoscopy-10/12/2024 EGD/colonoscopy, small hiatal hernia. Nonerosive chronic gastritis. Normal duodenum. TI stricture status post dilation to 12 mm. TI ulcers. Colon ulcers. Sigmoid colon diverticulosis. Grade 2 internal hemorrhoids , strongyloides.        Recommendations:     PET scan as an outpatient     Monitor bronchoscopy results    Oxygen supplement and titration to maintain saturation 90 to 95%  Bronchodilators, spiriva      Followed by GI: on steroids, mesalamine     I personally reviewed the radiological studies            LOS: 14 days     Subjective         Objective     Vital signs for last 24 hours:  Vitals:    10/23/24 1600 10/23/24 1633 10/23/24 1700 10/23/24 1735   BP: 97/57 114/65 118/68    BP Location:       Patient Position:       Pulse: 75 80 75 84   Resp:  25     Temp:  98.4 °F (36.9 °C)     TempSrc:       SpO2: 97% 97% 96%    Weight:       Height:           Intake/Output last 3  shifts:  I/O last 3 completed shifts:  In: 1326 [P.O.:410; I.V.:916]  Out: 995 [Urine:995]  Intake/Output this shift:  I/O this shift:  In: 1028 [P.O.:640; I.V.:30; Blood:358]  Out: 0       Radiology  Imaging Results (Last 24 Hours)       ** No results found for the last 24 hours. **            Labs:  Results from last 7 days   Lab Units 10/23/24  1734 10/23/24  1118 10/22/24  0335   WBC 10*3/mm3  --   --  10.11   HEMOGLOBIN g/dL 9.6*   < > 8.2*   HEMATOCRIT % 29.8*   < > 26.3*   PLATELETS 10*3/mm3  --   --  345    < > = values in this interval not displayed.     Results from last 7 days   Lab Units 10/23/24  0922 10/23/24  0314 10/22/24  0335   SODIUM mmol/L  --  134* 131*   POTASSIUM mmol/L 4.3 3.9 4.3   CHLORIDE mmol/L  --  101 99   CO2 mmol/L  --  26.0 25.4   BUN mg/dL  --  8 9   CREATININE mg/dL  --  0.36* 0.44*   CALCIUM mg/dL  --  7.6* 8.3*   BILIRUBIN mg/dL  --   --  0.3   ALK PHOS U/L  --   --  74   ALT (SGPT) U/L  --   --  9   AST (SGOT) U/L  --   --  13   GLUCOSE mg/dL  --  74 82           Results from last 7 days   Lab Units 10/23/24  0922 10/22/24  0335 10/18/24  0454   ALBUMIN g/dL 2.4* 2.5* 2.6*                     Results from last 7 days   Lab Units 10/22/24  0335   INR  0.95               Meds:   SCHEDULE  aspirin, 81 mg, Oral, Daily  atorvastatin, 40 mg, Oral, Nightly  [START ON 10/24/2024] clopidogrel, 75 mg, Oral, Daily  First Mouthwash (Magic Mouthwash), 10 mL, Swish & Spit, Q6H  folic acid, 1 mg, Oral, Daily  levothyroxine, 50 mcg, Oral, Q AM  mesalamine, 4.8 g, Oral, Daily With Breakfast  metoprolol tartrate, 50 mg, Oral, Q12H  midodrine, 10 mg, Oral, TID AC  pantoprazole, 40 mg, Oral, Daily  predniSONE, 40 mg, Oral, Daily With Breakfast  sertraline, 50 mg, Oral, Daily  sodium chloride, 10 mL, Intravenous, Q12H  tiotropium bromide monohydrate, 2 puff, Inhalation, Daily - RT      Infusions       PRNs    acetaminophen    albuterol    senna-docusate sodium **AND** polyethylene glycol **AND**  bisacodyl **AND** bisacodyl    hydrOXYzine    influenza vaccine    ipratropium-albuterol    Lidocaine Viscous HCl    Magnesium Standard Dose Replacement - Follow Nurse / BPA Driven Protocol    melatonin    nitroglycerin    ondansetron ODT **OR** ondansetron    Phosphorus Replacement - Follow Nurse / BPA Driven Protocol    Potassium Replacement - Follow Nurse / BPA Driven Protocol    sodium chloride    Physical Exam:  Physical Exam  Cardiovascular:      Heart sounds: Murmur heard.      No gallop.   Pulmonary:      Effort: No respiratory distress.      Breath sounds: No stridor. Rhonchi and rales present. No wheezing.   Chest:      Chest wall: No tenderness.         ROS  Review of Systems   Respiratory:  Positive for cough and shortness of breath. Negative for wheezing and stridor.    Cardiovascular:  Positive for chest pain and palpitations. Negative for leg swelling.             Total time spent with patient greater than: 45 Minutes

## 2024-10-23 NOTE — SIGNIFICANT NOTE
10/23/24 1350   OTHER   Discipline physical therapist   Rehab Time/Intention   Session Not Performed patient unavailable for treatment  (Pt is getting a blood transfusion this afternoon.)   Recommendation   PT - Next Appointment 10/24/24

## 2024-10-23 NOTE — CONSULTS
Cardiac Rehab consult.  Pt receiving care at this time.  Education given to nurse to give to patient.

## 2024-10-23 NOTE — PROGRESS NOTES
Cardiology Progress Note    Patient Identification:  Name: Maryann Gaitan  Age: 74 y.o.  Sex: female  :  1950  MRN: [id number]                 Follow Up / Chief Complaint: Chest pain  Chief Complaint   Patient presents with    Abnormal Lab       Interval History: Patient presented to the hospital with abnormal labs underwent EGD and colonoscopy and developed chest pain  Patient underwent cardiac cath on 10/15/2024 that showed complex multivessel disease with ostial LAD and total right she has severe MR and penetrating ulcer of the descending thoracic aorta CT surgery has been consulted for evaluation  Patient is not a candidate for surgery.   She underwent successful PTCA and drug-eluting stent to the ostial and proximal LAD.        NP note: Patient seen with Dr. Connor this morning.  Patient denies any complaints.  Monitor readings possible A-fib however she is in normal sinus with PACs.  Currently has been in A-fib though will start patient on Plavix and Eliquis as well as aspirin DC aspirin in 1 week    Will follow-up in office in 1 week  See AVS for appointment  Patient will be brought back in about 4 weeks for MitraClip    Electronically signed by DRU Rubio, 10/23/24, 11:04 AM EDT.      cardiology attending addendum :    I have personally performed a face-to-face diagnostic evaluation, physical exam and reviewed data on this patient.  I have reviewed documentation done by me and nurse practitioner  and corrected as needed.  And agree with the different components of documentation.Greater than 50% of the time spent in the care of this patient was provided by attending consultant/me.        Subjective: Patient seen and examined; chart and labs reviewed; discussed with bedside nurse.  Patient had elevated D-dimer.  CT PE study is negative for PE but is abnormal with cavitary lesion.  Patient underwent cardiac cath 10/15/2024 which revealed total right and severe ostial LAD.  Patient underwent  "GONZÁLEZ 10/16/2024 which revealed moderate to severe MR. Patient was turned down for surgery.      Objective:    10/14/2024: Sodium 135 potassium 5.3 BUN 24 creatinine 0.52 glucose 150  10/15/2024: sodium 131, potassium 4.4. bun 12 creatinine 0.42 hgb 8.4   10/16/2024: sodium 129 potassium 4.1 BUN 8 creatinine 0.32 glucose 127 CRP 2.03 WBC 2.38 hemoglobin 8.3  10/17/2024: Sodium 127 potassium 4.0 BUN 28 creatinine 0.42 hemoglobin 8.2  10/18/2024: Sodium 127 potassium 3.2 BUN 28 creatinine 0.42 hemoglobin 9.7    10/21/2024: Sodium 131 creatinine 0.38 hemoglobin 8.7  10/22/2024: Sodium 131 creatinine 0.44 hemoglobin 8.2  10/23/2024: Sodium 134 creatinine 0.36    History of present illness:      Maryann Gaitan is a 74-year-old female with a PMH of     COPD  Hypertension  Rheumatoid arthritis  Crohn's disease     who presented to [facility] on 10/8/2024 due to \"abnormal labs\" and nausea/vomiting/diarrhea.  Patient noted to be hyponatremic with sodium of 122, hypokalemic potassium 3.0 as well as anemic.  Patient has been followed by nephrology and GI during hospitalization.  She underwent EGD and colonoscopy yesterday showing small hiatal hernia, nonerosive chronic gastritis and sigmoid colon diverticulitis.  Per GI she has a history of Crohn disease but this has not been proven by biopsy as [employer] testing has not been consistent with IBD.  Cardiology consulted for chest pain and abnormal EKG.  Rapid response called this morning on patient secondary to acute sudden onset of sharp left-sided chest pain with associated symptoms of shortness of breath, palpitations, lightheadedness/dizziness.  EKG 10/12 reviewed showing sinus tachycardia with PACs. Stat EKG today without acute ST-T segment changes, ABG with PaO2 of 67, troponin 23, H&H 9.9/30.3.  Patient denies personal or family history of CAD, hyperlipidemia.  She does report history of hypertension, type 2 diabetes.      EGD/colonoscopy 10/12/2024 revealed small hiatal hernia, " nonerosive gastritis, T1 stricture s/p dilatation with 12 mm, T1 ulcer, colon ulcers, sigmoid diverticulosis, grade 2 internal hemorrhoids and strongyloides  Echo 10/12/2024 EF of 51 to 55% with mild RV enlargement, severe left atrial enlargement, moderate to severe MR  Transesophageal echo 10/16/2024: LVEF of 55 to 60% with severe left atrial enlargement, moderate to severe MR and grade 3 descending aortic plaque  Cardiac cath 10/15/2024 reveals total right and severe ostial LAD, descending thoracic aorta had an outpouching consistent with penetrating aortic ulcer versus aneurysm.        Assessment:  :     Chest pain  Shortness of breath  Arrhythmia  Abnormal EKG  Hypertension  Mitral regurgitation  CAD  Acute congestive heart failure due to diastolic dysfunction from CAD and valvular heart disease/mitral regurgitation  Hyponatremia  Hypokalemia  Crohn's disease  Normocytic anemia  COPD, chronic steroid therapy  Multifocal pneumonia  Hyperglycemia, prediabetes with A1c of 5.6 from     Recommendations / Plan:         Patient presented 10/9/2024 because of abnormal labs showing low sodium, was complaining of nausea vomiting and diarrhea.  Patient's hydrochlorothiazide was held and nephrology consulted.  HS troponin is 23 and 22.  Previous proBNP was 2573.  Sodium is improved to 137.  Glucose elevated.  EKG done 10/13/2024 reviewed/interpreted by me reveals sinus arrhythmia at the rate of 78 bpm.  Patient has multifocal pneumonia and acute hypoxic respiratory failure.  He is on IV antibiotics and oxygen.  Blood cultures are pending.  Patient had D-dimer which was elevated.  CT PE study is negative for PE but has cavitary lesion in the lung.  Pulmonary Dr. Cho, underwent bronchoscopy.  Patient has severe MR will benefit from cardiac cath.  Patient underwent cardiac cath 10/15/2024 which revealed total right and severe ostial LAD disease.  Descending thoracic aorta has an outpouching probably saccular aneurysm versus   penetrating aortic ulcer .  Echocardiogram 10/12/2024 is revealing EF of 51 to 55% with mild RV enlargement severe left atrial enlargement and right atrial enlargement and moderate to severe MR  GONZÁLEZ is revealing moderate to severe MR.  Patient would benefit from CABG and mitral valve surgery.  Patient was seen by .  Patient has been turned down for surgery due to multiple comorbid conditions.  Patient has a cavitary lesion in her lungs had bronchoscopy.  Had multiple mucous plugs.  Is awaiting lavage results.  Patient underwent drug-eluting stent to ostial LAD 10/12/2024.  Continue dual antiplatelet therapy with aspirin and Plavix as tolerated continue metoprolol and statins and losartan as tolerated.  Will follow-up mitral regurgitation and follow-up in with structural heart team.  Patient had an EKG done 10/12/2024 which revealed sinus rhythm degenerating to A-fib with RVR.  Patient would benefit from long-term anticoagulation to prevent thromboembolic events.  Given her Crohn's disease and microcytic anemia patient will be a poor candidate for long-term anticoagulation.  Will follow and consider watchman evaluation as outpatient.  Will follow and consider further evaluation treatment depending on how her condition evolves    Copied text in this portion of the note has been reviewed and is accurate as of 10/23/2024    Past Medical History:  Past Medical History:   Diagnosis Date    Arthritis     COPD (chronic obstructive pulmonary disease)     Hypertension      Past Surgical History:  Past Surgical History:   Procedure Laterality Date    BRONCHOSCOPY N/A 10/16/2024    Procedure: BRONCHOSCOPY;  Surgeon: Gallo Pope MD;  Location: [facility] ENDOSCOPY;  Service: Pulmonary;  Laterality: N/A;    CARDIAC CATHETERIZATION N/A 10/15/2024    Procedure: Left Heart Cath, possible pci;  Surgeon: Travis Connor MD;  Location: [facility] CATH INVASIVE LOCATION;  Service: Cardiovascular;  Laterality: N/A;  "   COLONOSCOPY N/A 10/12/2024    Procedure: COLONOSCOPY WITH BIOPSY AND WIRE GUIDED BALLOON DILATION OF TERMINAL ILEUM;  Surgeon: Rob Strong MD;  Location: [facility] ENDOSCOPY;  Service: Gastroenterology;  Laterality: N/A;  Colitis, crohns of terminal ileum, right colon ulcers, diverticulosis, hemorroids    ENDOSCOPY N/A 10/12/2024    Procedure: ESOPHAGOGASTRODUODENOSCOPY WITH BIOPSY X 2 AREA;  Surgeon: Rob Strong MD;  Location: [facility] ENDOSCOPY;  Service: Gastroenterology;  Laterality: N/A;  Chronic gastritis, HH    HYSTERECTOMY          Social History:   Social History     Tobacco Use    Smoking status: Former     Types: Cigarettes    Smokeless tobacco: Never   Substance Use Topics    Alcohol use: Never      Family History:  History reviewed. No pertinent family history.       Allergies:  Allergies   Allergen Reactions    Codeine Hives    Penicillin G Sodium Hives     Scheduled Meds:  aspirin, 81 mg, Daily  atorvastatin, 40 mg, Nightly  clopidogrel, 300 mg, Once   And  clopidogrel, 75 mg, Daily  enoxaparin, 40 mg, Q24H  First Mouthwash (Magic Mouthwash), 10 mL, Q6H  folic acid, 1 mg, Daily  levothyroxine, 50 mcg, Q AM  mesalamine, 4.8 g, Daily With Breakfast  metoprolol tartrate, 50 mg, Q12H  pantoprazole, 40 mg, Daily  predniSONE, 40 mg, Daily With Breakfast  sertraline, 50 mg, Daily  sodium chloride, 10 mL, Q12H  tiotropium bromide monohydrate, 2 puff, Daily - RT          Review of Systems:   Review of Systems   Constitutional: Positive for malaise/fatigue.   Cardiovascular:  Negative for chest pain.   Respiratory:  Negative for shortness of breath.            Constitutional:  Temp:  [97.8 °F (36.6 °C)-98.4 °F (36.9 °C)] 97.8 °F (36.6 °C)  Heart Rate:  [] 86  Resp:  [15-27] 27  BP: ()/(48-72) 99/53    Physical Exam   BP 99/53 (BP Location: Right arm, Patient Position: Sitting)   Pulse 86   Temp 97.8 °F (36.6 °C) (Oral)   Resp 27   Ht 154.9 cm (61\")   Wt 57.6 kg (127 " lb)   SpO2 93%   BMI 24.00 kg/m²   General:  Appears in no acute distress  Eyes: Sclera is anicteric,  conjunctiva is clear   HEENT:  No JVD. Thyroid not visibly enlarged. No mucosal pallor or cyanosis  Respiratory: Respirations regular and unlabored at rest.  Scattered rhonchi   Cardiovascular: S1,S2 Regular rate and rhythm.  2/6 holosystolic murmur  Gastrointestinal: Abdomen nondistended.  Musculoskeletal:  No abnormal movements  Extremities: No digital clubbing or cyanosis  Skin: Color pink.   Neuro: Alert and awake.    INTAKE AND OUTPUT:    Intake/Output Summary (Last 24 hours) at 10/23/2024 0818  Last data filed at 10/23/2024 0529  Gross per 24 hour   Intake 1156 ml   Output 595 ml   Net 561 ml       Cardiographics  Telemetry: sinus rhythm with PACs        ECG:   ECG 12 Lead Rhythm Change   Preliminary Result   HEART RATE=78  bpm   RR Ofxzmhzg=359  ms   VT Eejqehbi=619  ms   P Horizontal Axis=-9  deg   P Front Axis=14  deg   QRSD Interval=82  ms   QT Pcqhwiqf=317  ms   GYyH=419  ms   QRS Axis=42  deg   T Wave Axis=63  deg   - ABNORMAL ECG -   Sinus rhythm   Atrial premature complexes   Low voltage, precordial leads   Date and Time of Study:2024-10-23 05:26:45      ECG 12 Lead Chest Pain   Final Result   HEART RATE=75  bpm   RR Zhsvmzce=345  ms   VT Hpdurpoi=989  ms   P Horizontal Axis=-46  deg   P Front Axis=-29  deg   QRSD Interval=84  ms   QT Sovvnsaq=346  ms   JIvV=521  ms   QRS Axis=29  deg   T Wave Axis=73  deg   - OTHERWISE NORMAL ECG -   Sinus rhythm   Atrial premature complex   Low voltage, precordial leads   When compared with ECG of 18-Oct-2024 08:29:17,   Significant rate decrease   Electronically Signed By: Juan Hamilton (JOSSY) 2024-10-18 17:10:19   Date and Time of Study:2024-10-18 14:18:25      ECG 12 Lead Rhythm Change   Final Result   HEART HPVE=265  bpm   RR Ujgicuwi=384  ms   VT Hkhsrmeu=179  ms   P Horizontal Axis=95  deg   P Front Axis=77  deg   QRSD Interval=81  ms   QT  Zdnkkcjo=538  ms   RGgL=419  ms   QRS Axis=28  deg   T Wave Axis=79  deg   - ABNORMAL ECG -   Incomplete analysis due to missing data in precordial lead(s)   Sinus tachycardia   Multiple premature complexes, vent & supraven   When compared with ECG of 13-Oct-2024 11:34:48,   Significant rate increase   Significant axis, voltage or hypertrophy change   Electronically Signed By: Dmitri Navarro ([facility]) 2024-10-22 09:17:18   Date and Time of Study:2024-10-18 08:29:17      ECG 12 Lead Chest Pain   Final Result   HEART RATE=78  bpm   RR Hzjpsbmv=126  ms   SC Powpoauh=626  ms   P Horizontal Axis=-4  deg   P Front Axis=76  deg   QRSD Interval=94  ms   QT Szfvtbds=528  ms   WRwQ=805  ms   QRS Axis=32  deg   T Wave Axis=75  deg   - OTHERWISE NORMAL ECG -   Sinus arrhythmia   Low voltage, precordial leads   When compared with ECG of 13-Oct-2024 09:35:55,   No significant change   Electronically Signed By: Travis Connor ([facility]) 2024-10-14 08:10:34   Date and Time of Study:2024-10-13 11:34:48      ECG 12 Lead Rhythm Change   Final Result   HEART RATE=81  bpm   RR Nspjoqnz=667  ms   SC Ldfequwf=161  ms   P Horizontal Axis=-1  deg   P Front Axis=78  deg   QRSD Interval=94  ms   QT Usaplmyd=028  ms   CVrR=493  ms   QRS Axis=15  deg   T Wave Axis=65  deg   - OTHERWISE NORMAL ECG -   Sinus rhythm   Low voltage, precordial leads   When compared with ECG of 12-Oct-2024 19:50:31,   Significant change in rhythm: previously atrial fibrillation   Electronically Signed By: Travis Connor ([facility]) 2024-10-14 08:10:41   Date and Time of Study:2024-10-13 09:35:55      ECG 12 Lead Rhythm Change   Final Result   HEART QZCE=667  bpm   RR Gkwvarye=897  ms   SC Interval=  ms   P Horizontal Axis=  deg   P Front Axis=  deg   QRSD Interval=82  ms   QT Hdlkavxo=979  ms   SMkO=418  ms   QRS Axis=20  deg   T Wave Axis=151  deg   - ABNORMAL ECG -   Atrial flutter/fibrillation   Ventricular premature complex   Probable  anterior infarct, age indeterminate    When compared with ECG of 05-Oct-2015 11:54:44,   Significant change in rhythm: previously sinus   Electronically Signed By: Travis Connor) 2024-10-14 08:10:51   Date and Time of Study:2024-10-12 19:50:31      Telemetry Scan   Final Result      Telemetry Scan   Final Result      Telemetry Scan   Final Result      Telemetry Scan   Final Result      Telemetry Scan   Final Result      Telemetry Scan   Final Result      Telemetry Scan   Final Result      Telemetry Scan   Final Result      Telemetry Scan   Final Result      Telemetry Scan   Final Result      Telemetry Scan   Final Result      Telemetry Scan   Final Result      Telemetry Scan   Final Result      Telemetry Scan   Final Result      Telemetry Scan   Final Result      Telemetry Scan   Final Result      Telemetry Scan   Final Result      Telemetry Scan   Final Result      Telemetry Scan   Final Result      Telemetry Scan   Final Result      Telemetry Scan   Final Result      Telemetry Scan   Final Result      Telemetry Scan   Final Result      Telemetry Scan   Final Result      Telemetry Scan   Final Result      Telemetry Scan   Final Result      Telemetry Scan   Final Result      Telemetry Scan   Final Result      Telemetry Scan   Final Result      Telemetry Scan   Final Result      Telemetry Scan   Final Result      Telemetry Scan   Final Result      Telemetry Scan   Final Result      Telemetry Scan   Final Result      Telemetry Scan   Final Result      Telemetry Scan   Final Result      Telemetry Scan   Final Result      Telemetry Scan   Final Result      Telemetry Scan   Final Result      Telemetry Scan   Final Result      Telemetry Scan   Final Result      Telemetry Scan   Final Result      Telemetry Scan   Final Result      Telemetry Scan   Final Result      Telemetry Scan   Final Result      Telemetry Scan   Final Result      Telemetry Scan   Final Result      Telemetry Scan   Final Result      Telemetry Scan   Final Result      Telemetry  Scan   Final Result      Telemetry Scan   Final Result      Telemetry Scan   Final Result      Telemetry Scan   Final Result      Telemetry Scan   Final Result      Telemetry Scan   Final Result      Telemetry Scan   Final Result      Telemetry Scan   Final Result      Telemetry Scan   Final Result      Telemetry Scan   Final Result      Telemetry Scan   Final Result      Telemetry Scan   Final Result      Telemetry Scan   Final Result      Telemetry Scan   Final Result      Telemetry Scan   Final Result      Telemetry Scan   Final Result      Telemetry Scan   Final Result      ECG 12 Lead Tachycardia    (Results Pending)   ECG 12 Lead Other; post op    (Results Pending)     I have personally reviewed EKG    Echocardiogram: Results for orders placed during the hospital encounter of 10/08/24    Adult Transesophageal Echo (GONZÁLEZ) W/ Cont if Necessary Per Protocol (Cardiology Department)    Interpretation Summary    Left ventricular systolic function is normal. Left ventricular ejection fraction appears to be 56 - 60%.    The left atrial cavity is severely dilated.    Saline test results are negative.    There is mild, posterior mitral leaflet thickening present.    Moderate to severe mitral valve regurgitation is present with a centrally-directed jet noted.    There is moderate, (grade 3) plaque in the descending aorta present.      Lab Review   I have reviewed the labs              Results from last 7 days   Lab Units 10/23/24  0314   SODIUM mmol/L 134*   POTASSIUM mmol/L 3.9   BUN mg/dL 8   CREATININE mg/dL 0.36*   CALCIUM mg/dL 7.6*         Results from last 7 days   Lab Units 10/22/24  0335 10/21/24  0542 10/20/24  0539   WBC 10*3/mm3 10.11 9.38 6.27   HEMOGLOBIN g/dL 8.2* 8.7* 9.1*   HEMATOCRIT % 26.3* 26.7* 27.3*   PLATELETS 10*3/mm3 345 361 316     Results from last 7 days   Lab Units 10/22/24  0335   INR  0.95       RADIOLOGY:  Imaging Results (Last 24 Hours)       ** No results found for the last 24 hours. **  "                 )10/23/2024  MD EVERETTE Araujo/Transcription:   \"Dictated utilizing Dragon dictation\".   "

## 2024-10-24 ENCOUNTER — HOME HEALTH ADMISSION (OUTPATIENT)
Dept: HOME HEALTH SERVICES | Facility: HOME HEALTHCARE | Age: 74
End: 2024-10-24
Payer: MEDICARE

## 2024-10-24 ENCOUNTER — TRANSCRIBE ORDERS (OUTPATIENT)
Dept: HOME HEALTH SERVICES | Facility: HOME HEALTHCARE | Age: 74
End: 2024-10-24
Payer: MEDICARE

## 2024-10-24 DIAGNOSIS — E87.1 HYPONATREMIA: Primary | ICD-10-CM

## 2024-10-24 LAB
BH BB BLOOD EXPIRATION DATE: NORMAL
BH BB BLOOD TYPE BARCODE: 5100
BH BB DISPENSE STATUS: NORMAL
BH BB PRODUCT CODE: NORMAL
BH BB UNIT NUMBER: NORMAL
CROSSMATCH INTERPRETATION: NORMAL
UNIT  ABO: NORMAL
UNIT  RH: NORMAL

## 2024-10-24 NOTE — CASE MANAGEMENT/SOCIAL WORK
Case Management Discharge Note      Final Note: Home with Meri [facility].            Home Medical Care Coordination complete.      Service Provider Services Address Phone Fax Patient Preferred    [facility] [address] [place] [address] [phone] --               Transportation Services  Private: Car    Final Discharge Disposition Code: 06 - home with home health care

## 2024-10-24 NOTE — OUTREACH NOTE
Prep Survey      Flowsheet Row Responses   [facility] facility patient discharged from? [facility]   Is LACE score < 7 ? No   Eligibility Readm Mgmt   Discharge diagnosis Hyponatremia, heart cath with PCI   Does the patient have one of the following disease processes/diagnoses(primary or secondary)? Other   Does the patient have Home health ordered? Yes   What is the Home health agency?  current with [facility]   Is there a DME ordered? No   Is this a private patient? Yes   Prep survey completed? Yes            Isabel CHILD - Registered Nurse

## 2024-10-25 ENCOUNTER — HOME CARE VISIT (OUTPATIENT)
Dept: HOME HEALTH SERVICES | Facility: HOME HEALTHCARE | Age: 74
End: 2024-10-25
Payer: MEDICARE

## 2024-10-25 VITALS
OXYGEN SATURATION: 96 % | RESPIRATION RATE: 18 BRPM | DIASTOLIC BLOOD PRESSURE: 64 MMHG | TEMPERATURE: 97.4 F | HEART RATE: 61 BPM | SYSTOLIC BLOOD PRESSURE: 118 MMHG

## 2024-10-25 DIAGNOSIS — I50.32 CHRONIC HEART FAILURE WITH PRESERVED EJECTION FRACTION (HFPEF): ICD-10-CM

## 2024-10-25 DIAGNOSIS — I10 PRIMARY HYPERTENSION: ICD-10-CM

## 2024-10-25 DIAGNOSIS — I34.0 SEVERE MITRAL REGURGITATION: Primary | ICD-10-CM

## 2024-10-25 PROCEDURE — G0299 HHS/HOSPICE OF RN EA 15 MIN: HCPCS

## 2024-10-25 NOTE — Clinical Note
"Please route to Eduard Little MD    SOC Note: Patient had an excerbation of Crohns that lead her to the hospital. During the hospital stay, the patient also had a cardiac stent placed. Patient is dealing with some depression as she has had three family members pass in the last year. Son lives nearby and family friend assists patient with medications. Vital signs stable. Appetite poor. Mediplanner assessed.     Home Health ordered for: disciplines SN 1 wk 8, PT Eval, OT Eval    Reason for Hosp/Primary Dx/Co-morbidities: Hypo-osmolality and hyponatremia    Focus of Care: Hypo-osmolality and hyponatremia    Patient's goal(s):\"to get well and stronger.\"    Current Functional status/mobility/DME: Patient has rollator present in the home.     HB status/Living Arrangements: Patient is homebound and lives with family.    Skin Integrity/wound status: Wounds present; Patient stated she was too tired and would allow SN to assess wounds at next visit. Sn provided education about the importance of assessing wounds (weekly measurements and photos)    Code Status: No CPR    Fall Risk/Safety concerns: Patient is very weak with unsteady gait.     Educated on Emergency Plan, steps to take prior to going to the ER and when to Call Home Health [facility]:  Education provided and teach back used to verbilize understanding     Medication issues/Concerns:Medications reviewed and education provided about side effects.    Additional Problems/Concerns: Patient is battling with depression from passed family members.    SDOH Barriers (i.e. caregiver concerns, social isolation, transportation, food insecurity, environment, income etc.)/Need for MSW: NA"

## 2024-10-25 NOTE — HOME HEALTH
"SOC Note: Patient had an excerbation of Crohns that lead her to the hospital. During the hospital stay, the patient also had a cardiac stent placed. Patient is dealing with some depression as she has had three family members pass in the last year. Son lives nearby and family friend assists patient with medications. Vital signs stable. Appetite poor. Mediplanner assessed.     Home Health ordered for: disciplines SN 1 wk 8, PT Eval, OT Eval    Reason for Hosp/Primary Dx/Co-morbidities: Hypo-osmolality and hyponatremia    Focus of Care: Hypo-osmolality and hyponatremia    Patient's goal(s):\"to get well and stronger.\"    Current Functional status/mobility/DME: Patient has rollator present in the home.     HB status/Living Arrangements: Patient is homebound and lives with family.    Skin Integrity/wound status: Wounds present; Patient stated she was too tired and would allow SN to assess wounds at next visit. Sn provided education about the importance of assessing wounds (weekly measurements and photos)    Code Status: No CPR    Fall Risk/Safety concerns: Patient is very weak with unsteady gait.     Educated on Emergency Plan, steps to take prior to going to the ER and when to Call Home Health [facility]:  Education provided and teach back used to verbilize understanding     Medication issues/Concerns:Medications reviewed and education provided about side effects.    Additional Problems/Concerns: Patient is battling with depression from passed family members.    SDOH Barriers (i.e. caregiver concerns, social isolation, transportation, food insecurity, environment, income etc.)/Need for MSW: NA"

## 2024-10-25 NOTE — Clinical Note
please route to Eduard Little MD    Drug-Drug: methotrexate and diclofenac   Plasma concentrations and toxic effects of methotrexate may be increased by diclofenac. Severe toxicity characterized by bone marrow suppression, nephrotoxicity and mucositis has occurred in patients receiving diclofenac and high-dose methotrexate chemotherapy. Fatal toxicity has occurred. Coadministration of high-doses of methotrexate and diclofenac should be avoided. Use of low-dose methotrexate for rheumatoid arthritis, commonly used in conjunction with NSAIDs, is considerably less likely to result in a clinically significant interaction.   Details Major   methotrexate 2.5 MG tablet     diclofenac (VOLTAREN) 50 MG EC tablet   Drug-Drug  <jscript:void(0)>  Drug-Drug: aspirin and methotrexate   aspirin may increase plasma concentrations of methotrexate with an increased risk of bone marrow and hepatic toxicity.   Details Major   aspirin 81 MG EC tablet     methotrexate 2.5 MG tablet   Drug-Drug  <jscript:void(0)>  Drug-Drug: diclofenac and sertraline   Toxic effects may be increased with concurrent administration of diclofenac and sertraline. The risk of upper gastrointestinal bleeding may be increased. Patients taking both drugs concurrently should be educated about the signs and symptoms of GI bleeding.   Details Major   diclofenac (VOLTAREN) 50 MG EC tablet     sertraline (ZOLOFT) 50 MG tablet   Long-term.   Drug-Drug  <jscript:void(0)>  Drug-Drug: pantoprazole and clopidogrel   Coadministration of pantoprazole and clopidogrel may increase the risk of major adverse cardiovascular events.   Details Major   pantoprazole (PROTONIX) 20 MG EC tablet     clopidogrel (PLAVIX) 75 MG tablet   Drug-Drug  <jscript:void(0)>  Drug-Drug: methotrexate and pantoprazole   Proton pump inhibitors may decrease the renal elimination of methotrexate and potentially increase toxicity related to methotrexate, especially in patients receiving  high-dose intravenous methotrexate for the treatment of cancer.

## 2024-10-27 LAB
QT INTERVAL: 375 MS
QTC INTERVAL: 422 MS

## 2024-10-28 ENCOUNTER — READMISSION MANAGEMENT (OUTPATIENT)
Dept: CALL CENTER | Facility: HOSPITAL | Age: 74
End: 2024-10-28
Payer: MEDICARE

## 2024-10-28 ENCOUNTER — HOME CARE VISIT (OUTPATIENT)
Dept: HOME HEALTH SERVICES | Facility: HOME HEALTHCARE | Age: 74
End: 2024-10-28
Payer: MEDICARE

## 2024-10-28 VITALS — SYSTOLIC BLOOD PRESSURE: 120 MMHG | HEART RATE: 78 BPM | DIASTOLIC BLOOD PRESSURE: 80 MMHG | OXYGEN SATURATION: 98 %

## 2024-10-28 PROCEDURE — G0151 HHCP-SERV OF PT,EA 15 MIN: HCPCS

## 2024-10-29 PROBLEM — I34.0 SEVERE MITRAL REGURGITATION: Status: ACTIVE | Noted: 2024-10-25

## 2024-10-30 ENCOUNTER — TELEPHONE (OUTPATIENT)
Dept: CARDIOLOGY | Facility: HOSPITAL | Age: 74
End: 2024-10-30
Payer: MEDICARE

## 2024-10-30 ENCOUNTER — HOME CARE VISIT (OUTPATIENT)
Dept: HOME HEALTH SERVICES | Facility: HOME HEALTHCARE | Age: 74
End: 2024-10-30
Payer: MEDICARE

## 2024-10-30 ENCOUNTER — OFFICE VISIT (OUTPATIENT)
Dept: CARDIOLOGY | Facility: CLINIC | Age: 74
End: 2024-10-30
Payer: MEDICARE

## 2024-10-30 VITALS
OXYGEN SATURATION: 97 % | WEIGHT: 116 LBS | HEIGHT: 61 IN | HEART RATE: 70 BPM | BODY MASS INDEX: 21.9 KG/M2 | DIASTOLIC BLOOD PRESSURE: 66 MMHG | SYSTOLIC BLOOD PRESSURE: 112 MMHG

## 2024-10-30 DIAGNOSIS — I25.10 CAD S/P PERCUTANEOUS CORONARY ANGIOPLASTY: ICD-10-CM

## 2024-10-30 DIAGNOSIS — Z09 HOSPITAL DISCHARGE FOLLOW-UP: Primary | ICD-10-CM

## 2024-10-30 DIAGNOSIS — E78.5 DYSLIPIDEMIA: ICD-10-CM

## 2024-10-30 DIAGNOSIS — I10 ESSENTIAL (PRIMARY) HYPERTENSION: ICD-10-CM

## 2024-10-30 DIAGNOSIS — I34.0 SEVERE MITRAL REGURGITATION: ICD-10-CM

## 2024-10-30 DIAGNOSIS — I25.10 CAD, MULTIPLE VESSEL: Chronic | ICD-10-CM

## 2024-10-30 DIAGNOSIS — I34.0 NONRHEUMATIC MITRAL VALVE REGURGITATION: ICD-10-CM

## 2024-10-30 DIAGNOSIS — Z98.61 CAD S/P PERCUTANEOUS CORONARY ANGIOPLASTY: ICD-10-CM

## 2024-10-30 DIAGNOSIS — E87.1 HYPONATREMIA: ICD-10-CM

## 2024-10-30 LAB
FUNGUS WND CULT: NORMAL
MYCOBACTERIUM SPEC CULT: NORMAL
NIGHT BLUE STAIN TISS: NORMAL

## 2024-10-30 RX ORDER — METOPROLOL SUCCINATE 25 MG/1
25 TABLET, EXTENDED RELEASE ORAL DAILY
Qty: 90 TABLET | Refills: 3 | Status: SHIPPED | OUTPATIENT
Start: 2024-10-30

## 2024-10-30 NOTE — TELEPHONE ENCOUNTER
Called and s/w Sania, grand daughter of patient regarding MitraClip scheduled for 11/21/24 at 0800.  Patient to arrive at 0600, pre-op instructions emailed, as requested.  Will call back with medication instructions prior to procedure.  PAT to call and scheduled.

## 2024-10-30 NOTE — TELEPHONE ENCOUNTER
Called patient to f/u after hospital discharge regarding MitraClip scheduled 11/21/24.  No answer and no VM set up.  Will continue to follow.

## 2024-10-30 NOTE — PROGRESS NOTES
"    Subjective:     Encounter Date:10/30/2024      Patient ID: aMryann Gaitan is a 74 y.o. female.    Chief Complaint: Hospital follow-up status post cardiac cath PCI, mitral regurgitation    History of Present Illness    Maryann Gaitan is a 74-year-old female with a PMH of     CAD status post cardiac cath with multivessel CAD poor candidate for CABG, underwent PCI to ostial and proximal LAD 10/22/2024  Moderate to severe MR with central jet noted  COPD, cavitary lesion  Hypertension  Rheumatoid arthritis  Crohn's disease; + Strongyloides      who presented to [facility] on 10/8/2024 due to \"abnormal labs\" and nausea/vomiting/diarrhea.  Patient noted to be hyponatremic with sodium of 122, hypokalemic potassium 3.0 as well as anemic.  Patient has been followed by nephrology and GI during hospitalization.  She underwent EGD and colonoscopy yesterday showing small hiatal hernia, nonerosive chronic gastritis and sigmoid colon diverticulitis.  Per GI she has a history of Crohn disease but this has not been proven by biopsy as [employer] testing has not been consistent with IBD.  Cardiology consulted for chest pain and abnormal EKG.  Rapid response called this morning on patient secondary to acute sudden onset of sharp left-sided chest pain with associated symptoms of shortness of breath, palpitations, lightheadedness/dizziness.  EKG 10/12 reviewed showing sinus tachycardia with PACs.  Possible A-fib with RVR stat EKG today without acute ST-T segment changes, ABG with PaO2 of 67, troponin 23, H&H 9.9/30.3.  Patient denies personal or family history of CAD, hyperlipidemia.  She does report history of hypertension, type 2 diabetes.     Patient underwent the following procedures during hospitalization  EGD/colonoscopy 10/12/2024 revealed small hiatal hernia, nonerosive gastritis, T1 stricture s/p dilatation with 12 mm, T1 ulcer, colon ulcers, sigmoid diverticulosis, grade 2 internal hemorrhoids and strongyloides  Echo 10/12/2024 EF " of 51 to 55% with mild RV enlargement, severe left atrial enlargement, moderate to severe MR  Transesophageal echo 10/16/2024: LVEF of 55 to 60% with severe left atrial enlargement, moderate to severe MR and grade 3 descending aortic plaque  Cardiac cath 10/15/2024 reveals total right and severe ostial LAD, descending thoracic aorta had an outpouching consistent with penetrating aortic ulcer versus aneurysm.    Patient was evaluated by CT surgery not a candidate for CABG therefore patient underwent PCI and drug-eluting stent to the ostium proximal LAD.  And she was seen by valve team and will be set up for MitraClip once she recovers from this hospitalization.    Today she is here for hospital follow-up.  She is being pushed on her walker by her granddaughter.  She is very lethargic today and granddaughter reports that her PCP advised to give melatonin as she is not sleeping well since being home.  She denies any lower extremity edema but does report being somewhat short of breath.  Her lung sounds have significantly improved since hospitalization    Blood pressure today is 112/66 heart rate 70 oxygen 97% on room air weight 116 pounds          Lab Review:   10/14/2024: Sodium 135 potassium 5.3 BUN 24 creatinine 0.52 glucose 150  10/15/2024: sodium 131, potassium 4.4. bun 12 creatinine 0.42 hgb 8.4   10/16/2024: sodium 129 potassium 4.1 BUN 8 creatinine 0.32 glucose 127 CRP 2.03 WBC 2.38 hemoglobin 8.3  10/17/2024: Sodium 127 potassium 4.0 BUN 28 creatinine 0.42 hemoglobin 8.2  10/18/2024: Sodium 127 potassium 3.2 BUN 28 creatinine 0.42 hemoglobin 9.7     10/21/2024: Sodium 131 creatinine 0.38 hemoglobin 8.7  10/22/2024: Sodium 131 creatinine 0.44 hemoglobin 8.2  10/23/2024: Sodium 134 creatinine 0.36      The following portions of the patient's history were reviewed and updated as appropriate: allergies, current medications, past family history, past medical history, past social history, past surgical history, and  problem list.      Review of Systems   Constitutional: Positive for malaise/fatigue.   Cardiovascular:  Positive for dyspnea on exertion. Negative for chest pain, leg swelling and palpitations.   Respiratory:  Negative for cough and shortness of breath.    Gastrointestinal:  Positive for nausea. Negative for abdominal pain and vomiting.   Neurological:  Positive for dizziness, light-headedness, loss of balance and weakness. Negative for focal weakness, headaches and numbness.   All other systems reviewed and are negative.      Past Medical History:   Diagnosis Date    Arthritis     COPD (chronic obstructive pulmonary disease)     Hypertension      Past Surgical History:   Procedure Laterality Date    BRONCHOSCOPY N/A 10/16/2024    Procedure: BRONCHOSCOPY;  Surgeon: Gallo Pope MD;  Location: [facility] ENDOSCOPY;  Service: Pulmonary;  Laterality: N/A;    CARDIAC CATHETERIZATION N/A 10/15/2024    Procedure: Left Heart Cath, possible pci;  Surgeon: Travis Connor MD;  Location: [facility] CATH INVASIVE LOCATION;  Service: Cardiovascular;  Laterality: N/A;    CARDIAC CATHETERIZATION N/A 10/22/2024    Procedure: Laser Coronary Atherectomy;  Surgeon: Travis Connor MD;  Location: [facility] CATH INVASIVE LOCATION;  Service: Cardiovascular;  Laterality: N/A;    COLONOSCOPY N/A 10/12/2024    Procedure: COLONOSCOPY WITH BIOPSY AND WIRE GUIDED BALLOON DILATION OF TERMINAL ILEUM;  Surgeon: Rob Strong MD;  Location: [facility] ENDOSCOPY;  Service: Gastroenterology;  Laterality: N/A;  Colitis, crohns of terminal ileum, right colon ulcers, diverticulosis, hemorroids    ENDOSCOPY N/A 10/12/2024    Procedure: ESOPHAGOGASTRODUODENOSCOPY WITH BIOPSY X 2 AREA;  Surgeon: Rob Strong MD;  Location: [facility] ENDOSCOPY;  Service: Gastroenterology;  Laterality: N/A;  Chronic gastritis, HH    HYSTERECTOMY       /66 (BP Location: Left arm, Patient Position: Sitting, Cuff Size: Adult)   Pulse 70   " Ht 154.9 cm (61\")   Wt 52.6 kg (116 lb)   SpO2 97%   BMI 21.92 kg/m²   Family History   Problem Relation Age of Onset    Heart disease Mother     Dementia Mother     Stroke Mother     Heart disease Father     Hypertension Father        Current Outpatient Medications:     Adalimumab (Humira, 2 Pen,) 40 MG/0.4ML Pen-injector Kit, Inject 40 mg under the skin into the appropriate area as directed Every 14 (Fourteen) Days. Indications: Rheumatoid Arthritis, Disp: , Rfl:     aspirin 81 MG EC tablet, Take 1 tablet by mouth Daily for 30 days., Disp: 30 tablet, Rfl: 0    atorvastatin (LIPITOR) 40 MG tablet, Take 1 tablet by mouth Every Night for 30 days., Disp: 30 tablet, Rfl: 0    cholecalciferol (VITAMIN D3) 1.25 MG (10837 UT) capsule, Take 1 capsule by mouth 2 (Two) Times a Week. Wed, Sat  Indications: Vitamin D Deficiency, Disp: , Rfl:     clopidogrel (PLAVIX) 75 MG tablet, Take 1 tablet by mouth Daily for 30 days., Disp: 30 tablet, Rfl: 0    colestipol (COLESTID) 1 g tablet, Take 1 tablet by mouth 2 (Two) Times a Day. Indications: High Amount of Cholesterol in the Blood, Disp: , Rfl:     diclofenac (VOLTAREN) 50 MG EC tablet, Take 1 tablet by mouth 2 (Two) Times a Day. Indications: Rheumatoid Arthritis, Disp: , Rfl:     ezetimibe (ZETIA) 10 MG tablet, Take 1 tablet by mouth Daily. Indications: High Amount of Fats in the Blood, Disp: , Rfl:     folic acid (FOLVITE) 1 MG tablet, Take 1 tablet by mouth Daily. Indications: Anemia From Inadequate Folic Acid, Disp: , Rfl:     levothyroxine (SYNTHROID, LEVOTHROID) 50 MCG tablet, Take 1 tablet by mouth Daily. Indications: Underactive Thyroid, Disp: , Rfl:     mesalamine (LIALDA) 1.2 g EC tablet, Take 4 tablets by mouth Daily With Breakfast for 30 days., Disp: 120 tablet, Rfl: 0    methotrexate 2.5 MG tablet, Take 8 tablets by mouth 1 (One) Time Per Week. Indications: Non-oncologic, Disp: , Rfl:     midodrine (PROAMATINE) 10 MG tablet, Take 1 tablet by mouth 3 (Three) Times " a Day Before Meals for 30 days., Disp: 90 tablet, Rfl: 0    pantoprazole (PROTONIX) 20 MG EC tablet, Take 1 tablet by mouth Daily. Indications: Heartburn, Disp: , Rfl:     polyethylene glycol (MIRALAX) 17 g packet, Take 17 g by mouth Daily. Indications: Constipation, Disp: , Rfl:     predniSONE (DELTASONE) 5 MG tablet, Take 1 tablet by mouth Daily. Indications: ACUTE EXACERBATION OF COPD (INACTIVE), Disp: , Rfl:     sertraline (ZOLOFT) 50 MG tablet, Take 1 tablet by mouth Daily. Indications: Major Depressive Disorder, Disp: , Rfl:     tiotropium (SPIRIVA) 18 MCG per inhalation capsule, Place 1 capsule into inhaler and inhale Daily. Indications: Chronic Obstructive Lung Disease, Disp: , Rfl:     metoprolol succinate XL (TOPROL-XL) 25 MG 24 hr tablet, Take 1 tablet by mouth Daily. Indications: Cardiac Failure, Disp: 90 tablet, Rfl: 3  Allergies   Allergen Reactions    Codeine Hives    Penicillin G Sodium Hives     Social History     Socioeconomic History    Marital status:    Tobacco Use    Smoking status: Former     Types: Cigarettes    Smokeless tobacco: Never   Vaping Use    Vaping status: Never Used   Substance and Sexual Activity    Alcohol use: Never    Drug use: Never    Sexual activity: Defer                Objective:     Vitals reviewed.   Constitutional:       Appearance: Not in distress. Frail. Chronically ill-appearing.   Neck:      Vascular: No JVR. JVD normal.   Pulmonary:      Effort: Pulmonary effort is normal. Increased respiratory effort.      Breath sounds: Normal breath sounds. No wheezing. No rhonchi. No rales.   Chest:      Chest wall: Not tender to palpatation.   Cardiovascular:      PMI at left midclavicular line. Normal rate. Regular rhythm. Normal S1. Normal S2.       Murmurs: There is a grade 2/6 high frequency blowing holosystolic murmur at the apex.      No gallop.  No click. No rub.   Pulses:     Intact distal pulses.   Edema:     Peripheral edema absent.   Abdominal:      General:  Bowel sounds are normal.      Palpations: Abdomen is soft.      Tenderness: There is no abdominal tenderness.   Musculoskeletal: Normal range of motion.         General: No tenderness. Skin:     General: Skin is warm and dry.   Neurological:      General: No focal deficit present.      Mental Status: Alert and oriented to person, place and time.       Procedures                  Assessment:     [facility]       Diagnosis Plan   1. Hospital discharge follow-up        2. Nonrheumatic mitral valve regurgitation  CBC (No Diff)    BNP    Basic Metabolic Panel      3. Essential (primary) hypertension  BNP      4. CAD, multiple vessel        5. Hyponatremia        6. Severe mitral regurgitation        7. Dyslipidemia        8. CAD S/P percutaneous coronary angioplasty                       Plan:   Chart reviewed  Labs reviewed  Hospitalization reviewed  Echocardiogram reviewed with normal EF and moderate to severe MR  Reviewed cardiac cath with patient status post PCI    Reviewed medications with patient  Patient to continue aspirin Plavix statin metoprolol for CAD  Continue midodrine for borderline blood pressure    Change metoprolol to tartrate to succinate 25 mg daily  May be able to wean off midodrine with this change    Repeat labs next week with home health CBC BMP  BNP    - Daily weight:  same time every day, same clothing   - Low Salt (sodium) diet       Patient to call the valve coordinator ULYSSES Escoto (office phone number given) to discuss timing of MitraClip  Presently scheduled for November 21    Electronically signed by DRU Rubio, 10/30/24, 3:01 PM EDT.                  This document is intended for medical expert use only.  Reading of this document by patients and/or patient's family without participating medical staff guidance may result in misinterpretation and unintended morbidity. Any interpretation of such data is the responsibility of the patient and/or family member responsible for the patient in  concert with their primary or specialist providers, not to be left for sources of online search as such as [employer], [employer] or similar queries.  Relying on these approaches to knowledge may result in misinterpretation, misguided goals of care and even death should patient or family members try recommendations outside of the realm of professional medical care in a supervised inpatient environment.

## 2024-10-31 ENCOUNTER — HOME CARE VISIT (OUTPATIENT)
Dept: HOME HEALTH SERVICES | Facility: HOME HEALTHCARE | Age: 74
End: 2024-10-31
Payer: MEDICARE

## 2024-10-31 ENCOUNTER — TELEPHONE (OUTPATIENT)
Dept: PREADMISSION TESTING | Facility: HOSPITAL | Age: 74
End: 2024-10-31
Payer: MEDICARE

## 2024-10-31 ENCOUNTER — LAB REQUISITION (OUTPATIENT)
Dept: LAB | Facility: HOSPITAL | Age: 74
End: 2024-10-31
Payer: MEDICARE

## 2024-10-31 ENCOUNTER — TELEPHONE (OUTPATIENT)
Dept: CARDIOLOGY | Facility: HOSPITAL | Age: 74
End: 2024-10-31
Payer: MEDICARE

## 2024-10-31 VITALS
OXYGEN SATURATION: 97 % | DIASTOLIC BLOOD PRESSURE: 46 MMHG | HEART RATE: 74 BPM | SYSTOLIC BLOOD PRESSURE: 104 MMHG | TEMPERATURE: 97.4 F

## 2024-10-31 DIAGNOSIS — Z95.818 S/P MITRAL VALVE CLIP IMPLANTATION: ICD-10-CM

## 2024-10-31 DIAGNOSIS — I34.0 SEVERE MITRAL REGURGITATION: Primary | ICD-10-CM

## 2024-10-31 DIAGNOSIS — I34.0 NONRHEUMATIC MITRAL (VALVE) INSUFFICIENCY: ICD-10-CM

## 2024-10-31 DIAGNOSIS — Z98.890 S/P MITRAL VALVE CLIP IMPLANTATION: ICD-10-CM

## 2024-10-31 LAB
ANION GAP SERPL CALCULATED.3IONS-SCNC: 10 MMOL/L (ref 5–15)
BASOPHILS # BLD AUTO: 0.07 10*3/MM3 (ref 0–0.2)
BASOPHILS NFR BLD AUTO: 0.3 % (ref 0–1.5)
BUN SERPL-MCNC: 38 MG/DL (ref 8–23)
BUN/CREAT SERPL: 33.6 (ref 7–25)
CALCIUM SPEC-SCNC: 8.8 MG/DL (ref 8.6–10.5)
CHLORIDE SERPL-SCNC: 102 MMOL/L (ref 98–107)
CO2 SERPL-SCNC: 25 MMOL/L (ref 22–29)
CREAT SERPL-MCNC: 1.13 MG/DL (ref 0.57–1)
DEPRECATED RDW RBC AUTO: 63.7 FL (ref 37–54)
EGFRCR SERPLBLD CKD-EPI 2021: 51.2 ML/MIN/1.73
EOSINOPHIL # BLD AUTO: 0.12 10*3/MM3 (ref 0–0.4)
EOSINOPHIL NFR BLD AUTO: 0.5 % (ref 0.3–6.2)
ERYTHROCYTE [DISTWIDTH] IN BLOOD BY AUTOMATED COUNT: 17.9 % (ref 12.3–15.4)
GLUCOSE SERPL-MCNC: 165 MG/DL (ref 65–99)
HCT VFR BLD AUTO: 29.2 % (ref 34–46.6)
HGB BLD-MCNC: 9.2 G/DL (ref 12–15.9)
IMM GRANULOCYTES # BLD AUTO: 0.15 10*3/MM3 (ref 0–0.05)
IMM GRANULOCYTES NFR BLD AUTO: 0.7 % (ref 0–0.5)
LYMPHOCYTES # BLD AUTO: 2.4 10*3/MM3 (ref 0.7–3.1)
LYMPHOCYTES NFR BLD AUTO: 10.8 % (ref 19.6–45.3)
MCH RBC QN AUTO: 30.9 PG (ref 26.6–33)
MCHC RBC AUTO-ENTMCNC: 31.5 G/DL (ref 31.5–35.7)
MCV RBC AUTO: 98 FL (ref 79–97)
MONOCYTES # BLD AUTO: 1.81 10*3/MM3 (ref 0.1–0.9)
MONOCYTES NFR BLD AUTO: 8.2 % (ref 5–12)
NEUTROPHILS NFR BLD AUTO: 17.65 10*3/MM3 (ref 1.7–7)
NEUTROPHILS NFR BLD AUTO: 79.5 % (ref 42.7–76)
NRBC BLD AUTO-RTO: 0 /100 WBC (ref 0–0.2)
NT-PROBNP SERPL-MCNC: 1851 PG/ML (ref 0–900)
PLATELET # BLD AUTO: 406 10*3/MM3 (ref 140–450)
PMV BLD AUTO: 9.6 FL (ref 6–12)
POTASSIUM SERPL-SCNC: 4.6 MMOL/L (ref 3.5–5.2)
RBC # BLD AUTO: 2.98 10*6/MM3 (ref 3.77–5.28)
SODIUM SERPL-SCNC: 137 MMOL/L (ref 136–145)
WBC NRBC COR # BLD AUTO: 22.2 10*3/MM3 (ref 3.4–10.8)

## 2024-10-31 PROCEDURE — 85025 COMPLETE CBC W/AUTO DIFF WBC: CPT | Performed by: NURSE PRACTITIONER

## 2024-10-31 PROCEDURE — 80048 BASIC METABOLIC PNL TOTAL CA: CPT | Performed by: NURSE PRACTITIONER

## 2024-10-31 PROCEDURE — 83880 ASSAY OF NATRIURETIC PEPTIDE: CPT | Performed by: NURSE PRACTITIONER

## 2024-10-31 PROCEDURE — G0299 HHS/HOSPICE OF RN EA 15 MIN: HCPCS

## 2024-10-31 NOTE — TELEPHONE ENCOUNTER
Pilar and s/w Sania, patient's grand-daughter.  Per patient's grand-daughter, patient is currently taking amlodipine 10mg daily and hydrochlorothiazide 25mg daily.  Per NATHANIEL Escalante, patient is to STOP both of those now.  Patient can continue to take spironolactone 25mg.  Grand-daughter encouraged to call with any questions.

## 2024-10-31 NOTE — TELEPHONE ENCOUNTER
Called pt to go over PAT instructions and appt times.  Pt taking 3 meds that were not on MAR.  Notified Jevon [facility] heart nurse.  Jevon will call patient and go over meds.

## 2024-11-01 ENCOUNTER — HOME CARE VISIT (OUTPATIENT)
Dept: HOME HEALTH SERVICES | Facility: HOME HEALTHCARE | Age: 74
End: 2024-11-01
Payer: MEDICARE

## 2024-11-01 ENCOUNTER — TELEPHONE (OUTPATIENT)
Dept: CARDIOLOGY | Facility: CLINIC | Age: 74
End: 2024-11-01
Payer: MEDICARE

## 2024-11-01 NOTE — TELEPHONE ENCOUNTER
Nurse Daniela, [facility]   [phone]    Wanted to verify labs from yesterday are available and being reviewed. Concerned with BMP results.

## 2024-11-04 ENCOUNTER — HOME CARE VISIT (OUTPATIENT)
Dept: HOME HEALTH SERVICES | Facility: HOME HEALTHCARE | Age: 74
End: 2024-11-04
Payer: MEDICARE

## 2024-11-04 ENCOUNTER — TELEPHONE (OUTPATIENT)
Dept: CARDIOLOGY | Facility: HOSPITAL | Age: 74
End: 2024-11-04
Payer: MEDICARE

## 2024-11-04 PROCEDURE — G0152 HHCP-SERV OF OT,EA 15 MIN: HCPCS

## 2024-11-04 NOTE — TELEPHONE ENCOUNTER
Patient's grand-daughter called regarding her MCOT falling off last week.  Patient given number to [phone] and instcucted to call [employer]ay and notify them.  NATHANIEL Escalante notified.

## 2024-11-06 ENCOUNTER — HOSPITAL ENCOUNTER (INPATIENT)
Facility: HOSPITAL | Age: 74
LOS: 6 days | Discharge: HOME | End: 2024-11-12
Attending: FAMILY MEDICINE | Admitting: FAMILY MEDICINE
Payer: MEDICARE

## 2024-11-06 ENCOUNTER — READMISSION MANAGEMENT (OUTPATIENT)
Dept: CALL CENTER | Facility: HOSPITAL | Age: 74
End: 2024-11-06
Payer: MEDICARE

## 2024-11-06 ENCOUNTER — HOME CARE VISIT (OUTPATIENT)
Dept: HOME HEALTH SERVICES | Facility: HOME HEALTHCARE | Age: 74
End: 2024-11-06
Payer: MEDICARE

## 2024-11-06 ENCOUNTER — APPOINTMENT (OUTPATIENT)
Dept: CT IMAGING | Facility: HOSPITAL | Age: 74
End: 2024-11-06
Payer: MEDICARE

## 2024-11-06 VITALS — SYSTOLIC BLOOD PRESSURE: 110 MMHG | HEART RATE: 57 BPM | DIASTOLIC BLOOD PRESSURE: 68 MMHG | OXYGEN SATURATION: 93 %

## 2024-11-06 VITALS
TEMPERATURE: 97.8 F | SYSTOLIC BLOOD PRESSURE: 102 MMHG | HEART RATE: 82 BPM | DIASTOLIC BLOOD PRESSURE: 50 MMHG | RESPIRATION RATE: 15 BRPM | OXYGEN SATURATION: 92 %

## 2024-11-06 DIAGNOSIS — E44.0 MODERATE PROTEIN-CALORIE MALNUTRITION: ICD-10-CM

## 2024-11-06 DIAGNOSIS — N30.01 ACUTE CYSTITIS WITH HEMATURIA: ICD-10-CM

## 2024-11-06 DIAGNOSIS — J44.9 CHRONIC OBSTRUCTIVE PULMONARY DISEASE, UNSPECIFIED COPD TYPE: ICD-10-CM

## 2024-11-06 DIAGNOSIS — E87.5 HYPERKALEMIA: ICD-10-CM

## 2024-11-06 DIAGNOSIS — R10.84 GENERALIZED ABDOMINAL PAIN: ICD-10-CM

## 2024-11-06 DIAGNOSIS — N17.9 AKI (ACUTE KIDNEY INJURY): Primary | ICD-10-CM

## 2024-11-06 PROBLEM — K50.90 INFLAMMATORY BOWEL DISEASE (CROHN'S DISEASE): Status: ACTIVE | Noted: 2024-11-06

## 2024-11-06 PROBLEM — E03.9 HYPOTHYROIDISM (ACQUIRED): Status: ACTIVE | Noted: 2024-11-06

## 2024-11-06 PROBLEM — D72.829 LEUKOCYTOSIS: Status: ACTIVE | Noted: 2024-11-06

## 2024-11-06 PROBLEM — F41.8 ANXIETY ASSOCIATED WITH DEPRESSION: Status: ACTIVE | Noted: 2024-11-06

## 2024-11-06 PROBLEM — K52.9 COLITIS: Status: ACTIVE | Noted: 2024-11-06

## 2024-11-06 PROBLEM — M06.9 RHEUMATOID ARTHRITIS: Status: ACTIVE | Noted: 2024-11-06

## 2024-11-06 PROBLEM — N39.0 ACUTE UTI (URINARY TRACT INFECTION): Status: ACTIVE | Noted: 2024-11-06

## 2024-11-06 PROBLEM — K92.1 BLOOD IN STOOL: Status: ACTIVE | Noted: 2024-11-06

## 2024-11-06 LAB
ABO GROUP BLD: NORMAL
ALBUMIN SERPL-MCNC: 2.9 G/DL (ref 3.5–5.2)
ALBUMIN/GLOB SERPL: 1 G/DL
ALP SERPL-CCNC: 108 U/L (ref 39–117)
ALT SERPL W P-5'-P-CCNC: 12 U/L (ref 1–33)
ANION GAP SERPL CALCULATED.3IONS-SCNC: 13.1 MMOL/L (ref 5–15)
ANISOCYTOSIS BLD QL: ABNORMAL
AST SERPL-CCNC: 15 U/L (ref 1–32)
BACTERIA UR QL AUTO: ABNORMAL /HPF
BASOPHILS # BLD MANUAL: 0.12 10*3/MM3 (ref 0–0.2)
BASOPHILS NFR BLD MANUAL: 1 % (ref 0–1.5)
BILIRUB SERPL-MCNC: 0.4 MG/DL (ref 0–1.2)
BILIRUB UR QL STRIP: NEGATIVE
BLD GP AB SCN SERPL QL: NEGATIVE
BUN SERPL-MCNC: 75 MG/DL (ref 8–23)
BUN/CREAT SERPL: 26.4 (ref 7–25)
CALCIUM SPEC-SCNC: 9.3 MG/DL (ref 8.6–10.5)
CHLORIDE SERPL-SCNC: 97 MMOL/L (ref 98–107)
CLARITY UR: ABNORMAL
CO2 SERPL-SCNC: 23.9 MMOL/L (ref 22–29)
COLOR UR: ABNORMAL
CREAT SERPL-MCNC: 2.84 MG/DL (ref 0.57–1)
D-LACTATE SERPL-SCNC: 1 MMOL/L (ref 0.5–2)
DEPRECATED RDW RBC AUTO: 63.2 FL (ref 37–54)
EGFRCR SERPLBLD CKD-EPI 2021: 16.9 ML/MIN/1.73
ERYTHROCYTE [DISTWIDTH] IN BLOOD BY AUTOMATED COUNT: 17.8 % (ref 12.3–15.4)
FUNGUS WND CULT: NORMAL
GLOBULIN UR ELPH-MCNC: 3 GM/DL
GLUCOSE SERPL-MCNC: 138 MG/DL (ref 65–99)
GLUCOSE UR STRIP-MCNC: NEGATIVE MG/DL
HCT VFR BLD AUTO: 29.7 % (ref 34–46.6)
HGB BLD-MCNC: 9.4 G/DL (ref 12–15.9)
HGB UR QL STRIP.AUTO: NEGATIVE
HOLD SPECIMEN: NORMAL
HOLD SPECIMEN: NORMAL
HYALINE CASTS UR QL AUTO: ABNORMAL /LPF
HYPOCHROMIA BLD QL: ABNORMAL
KETONES UR QL STRIP: ABNORMAL
LARGE PLATELETS: ABNORMAL
LEUKOCYTE ESTERASE UR QL STRIP.AUTO: ABNORMAL
LIPASE SERPL-CCNC: 22 U/L (ref 13–60)
LYMPHOCYTES # BLD MANUAL: 5.61 10*3/MM3 (ref 0.7–3.1)
LYMPHOCYTES NFR BLD MANUAL: 3 % (ref 5–12)
MCH RBC QN AUTO: 31 PG (ref 26.6–33)
MCHC RBC AUTO-ENTMCNC: 31.6 G/DL (ref 31.5–35.7)
MCV RBC AUTO: 98 FL (ref 79–97)
METAMYELOCYTES NFR BLD MANUAL: 1 % (ref 0–0)
MICROCYTES BLD QL: ABNORMAL
MONOCYTES # BLD: 0.37 10*3/MM3 (ref 0.1–0.9)
MYCOBACTERIUM SPEC CULT: NORMAL
NEUTROPHILS # BLD AUTO: 6.23 10*3/MM3 (ref 1.7–7)
NEUTROPHILS NFR BLD MANUAL: 40 % (ref 42.7–76)
NEUTS BAND NFR BLD MANUAL: 10 % (ref 0–5)
NIGHT BLUE STAIN TISS: NORMAL
NITRITE UR QL STRIP: NEGATIVE
OVALOCYTES BLD QL SMEAR: ABNORMAL
PH UR STRIP.AUTO: <=5 [PH] (ref 5–8)
PLATELET # BLD AUTO: 526 10*3/MM3 (ref 140–450)
PMV BLD AUTO: 9.6 FL (ref 6–12)
POIKILOCYTOSIS BLD QL SMEAR: ABNORMAL
POLYCHROMASIA BLD QL SMEAR: ABNORMAL
POTASSIUM SERPL-SCNC: 5.8 MMOL/L (ref 3.5–5.2)
PROT SERPL-MCNC: 5.9 G/DL (ref 6–8.5)
PROT UR QL STRIP: ABNORMAL
RBC # BLD AUTO: 3.03 10*6/MM3 (ref 3.77–5.28)
RBC # UR STRIP: ABNORMAL /HPF
REF LAB TEST METHOD: ABNORMAL
RH BLD: POSITIVE
SCAN SLIDE: NORMAL
SMALL PLATELETS BLD QL SMEAR: ABNORMAL
SODIUM SERPL-SCNC: 134 MMOL/L (ref 136–145)
SP GR UR STRIP: 1.02 (ref 1–1.03)
SQUAMOUS #/AREA URNS HPF: ABNORMAL /HPF
T&S EXPIRATION DATE: NORMAL
TOXIC GRANULATION: ABNORMAL
TRANS CELLS #/AREA URNS HPF: ABNORMAL /HPF
UROBILINOGEN UR QL STRIP: ABNORMAL
VARIANT LYMPHS NFR BLD MANUAL: 45 % (ref 19.6–45.3)
WBC # UR STRIP: ABNORMAL /HPF
WBC NRBC COR # BLD AUTO: 12.46 10*3/MM3 (ref 3.4–10.8)
WHOLE BLOOD HOLD COAG: NORMAL
WHOLE BLOOD HOLD SPECIMEN: NORMAL

## 2024-11-06 PROCEDURE — 25010000002 CEFTRIAXONE PER 250 MG

## 2024-11-06 PROCEDURE — P9612 CATHETERIZE FOR URINE SPEC: HCPCS

## 2024-11-06 PROCEDURE — 83605 ASSAY OF LACTIC ACID: CPT

## 2024-11-06 PROCEDURE — 86901 BLOOD TYPING SEROLOGIC RH(D): CPT

## 2024-11-06 PROCEDURE — 25810000003 SODIUM CHLORIDE 0.9 % SOLUTION: Performed by: NURSE PRACTITIONER

## 2024-11-06 PROCEDURE — 25010000002 ONDANSETRON PER 1 MG

## 2024-11-06 PROCEDURE — 74176 CT ABD & PELVIS W/O CONTRAST: CPT

## 2024-11-06 PROCEDURE — 25810000003 SODIUM CHLORIDE 0.9 % SOLUTION

## 2024-11-06 PROCEDURE — 80053 COMPREHEN METABOLIC PANEL: CPT

## 2024-11-06 PROCEDURE — 85007 BL SMEAR W/DIFF WBC COUNT: CPT

## 2024-11-06 PROCEDURE — 85025 COMPLETE CBC W/AUTO DIFF WBC: CPT

## 2024-11-06 PROCEDURE — 36415 COLL VENOUS BLD VENIPUNCTURE: CPT

## 2024-11-06 PROCEDURE — 86900 BLOOD TYPING SEROLOGIC ABO: CPT

## 2024-11-06 PROCEDURE — G0157 HHC PT ASSISTANT EA 15: HCPCS

## 2024-11-06 PROCEDURE — 87040 BLOOD CULTURE FOR BACTERIA: CPT

## 2024-11-06 PROCEDURE — 83690 ASSAY OF LIPASE: CPT

## 2024-11-06 PROCEDURE — 86850 RBC ANTIBODY SCREEN: CPT

## 2024-11-06 PROCEDURE — 93005 ELECTROCARDIOGRAM TRACING: CPT

## 2024-11-06 PROCEDURE — 81001 URINALYSIS AUTO W/SCOPE: CPT

## 2024-11-06 PROCEDURE — 99285 EMERGENCY DEPT VISIT HI MDM: CPT

## 2024-11-06 RX ORDER — SODIUM CHLORIDE 0.9 % (FLUSH) 0.9 %
10 SYRINGE (ML) INJECTION AS NEEDED
Status: DISCONTINUED | OUTPATIENT
Start: 2024-11-06 | End: 2024-11-12 | Stop reason: HOSPADM

## 2024-11-06 RX ORDER — SODIUM CHLORIDE 9 MG/ML
100 INJECTION, SOLUTION INTRAVENOUS CONTINUOUS
Status: DISPENSED | OUTPATIENT
Start: 2024-11-06 | End: 2024-11-07

## 2024-11-06 RX ORDER — PANTOPRAZOLE SODIUM 40 MG/10ML
80 INJECTION, POWDER, LYOPHILIZED, FOR SOLUTION INTRAVENOUS ONCE
Status: COMPLETED | OUTPATIENT
Start: 2024-11-06 | End: 2024-11-06

## 2024-11-06 RX ORDER — MIDODRINE HYDROCHLORIDE 5 MG/1
10 TABLET ORAL ONCE
Status: COMPLETED | OUTPATIENT
Start: 2024-11-06 | End: 2024-11-06

## 2024-11-06 RX ORDER — KETOROLAC TROMETHAMINE 30 MG/ML
15 INJECTION, SOLUTION INTRAMUSCULAR; INTRAVENOUS ONCE
Status: DISCONTINUED | OUTPATIENT
Start: 2024-11-06 | End: 2024-11-06

## 2024-11-06 RX ORDER — ATORVASTATIN CALCIUM 40 MG/1
40 TABLET, FILM COATED ORAL NIGHTLY
Status: DISCONTINUED | OUTPATIENT
Start: 2024-11-06 | End: 2024-11-12 | Stop reason: HOSPADM

## 2024-11-06 RX ORDER — ALBUTEROL SULFATE 0.83 MG/ML
2.5 SOLUTION RESPIRATORY (INHALATION) EVERY 6 HOURS PRN
Status: ON HOLD | COMMUNITY
Start: 2024-11-13

## 2024-11-06 RX ORDER — UPADACITINIB 45 MG/1
1 TABLET, EXTENDED RELEASE ORAL DAILY
Status: ON HOLD | COMMUNITY
Start: 2024-11-13

## 2024-11-06 RX ORDER — FOLIC ACID 1 MG/1
1000 TABLET ORAL DAILY
Status: DISCONTINUED | OUTPATIENT
Start: 2024-11-07 | End: 2024-11-12 | Stop reason: HOSPADM

## 2024-11-06 RX ORDER — ONDANSETRON 2 MG/ML
4 INJECTION INTRAMUSCULAR; INTRAVENOUS EVERY 6 HOURS PRN
Status: DISCONTINUED | OUTPATIENT
Start: 2024-11-06 | End: 2024-11-12 | Stop reason: HOSPADM

## 2024-11-06 RX ORDER — LEVOTHYROXINE SODIUM 50 UG/1
50 TABLET ORAL
Status: DISCONTINUED | OUTPATIENT
Start: 2024-11-07 | End: 2024-11-12 | Stop reason: HOSPADM

## 2024-11-06 RX ORDER — PREDNISONE 5 MG/1
5 TABLET ORAL DAILY
Status: DISCONTINUED | OUTPATIENT
Start: 2024-11-07 | End: 2024-11-07

## 2024-11-06 RX ORDER — SPIRONOLACTONE 25 MG/1
1 TABLET ORAL DAILY
Status: ON HOLD | COMMUNITY
Start: 2024-11-13

## 2024-11-06 RX ORDER — PANTOPRAZOLE SODIUM 40 MG/1
40 TABLET, DELAYED RELEASE ORAL DAILY
Status: DISCONTINUED | OUTPATIENT
Start: 2024-11-07 | End: 2024-11-12 | Stop reason: HOSPADM

## 2024-11-06 RX ORDER — MIDODRINE HYDROCHLORIDE 5 MG/1
10 TABLET ORAL EVERY 8 HOURS SCHEDULED
Status: DISCONTINUED | OUTPATIENT
Start: 2024-11-06 | End: 2024-11-12 | Stop reason: HOSPADM

## 2024-11-06 RX ORDER — SPIRONOLACTONE 25 MG/1
25 TABLET ORAL DAILY
Status: DISCONTINUED | OUTPATIENT
Start: 2024-11-07 | End: 2024-11-12 | Stop reason: HOSPADM

## 2024-11-06 RX ORDER — ONDANSETRON 2 MG/ML
4 INJECTION INTRAMUSCULAR; INTRAVENOUS ONCE
Status: COMPLETED | OUTPATIENT
Start: 2024-11-06 | End: 2024-11-06

## 2024-11-06 RX ORDER — CLOPIDOGREL BISULFATE 75 MG/1
75 TABLET ORAL DAILY
Status: DISCONTINUED | OUTPATIENT
Start: 2024-11-07 | End: 2024-11-07

## 2024-11-06 RX ORDER — MORPHINE SULFATE 2 MG/ML
2 INJECTION, SOLUTION INTRAMUSCULAR; INTRAVENOUS ONCE AS NEEDED
Status: DISCONTINUED | OUTPATIENT
Start: 2024-11-06 | End: 2024-11-08

## 2024-11-06 RX ORDER — ALBUTEROL SULFATE 0.83 MG/ML
2.5 SOLUTION RESPIRATORY (INHALATION) EVERY 6 HOURS PRN
Status: DISCONTINUED | OUTPATIENT
Start: 2024-11-06 | End: 2024-11-12 | Stop reason: HOSPADM

## 2024-11-06 RX ADMIN — PANTOPRAZOLE SODIUM 80 MG: 40 INJECTION, POWDER, FOR SOLUTION INTRAVENOUS at 19:33

## 2024-11-06 RX ADMIN — SODIUM CHLORIDE 100 ML/HR: 9 INJECTION, SOLUTION INTRAVENOUS at 19:33

## 2024-11-06 RX ADMIN — ONDANSETRON 4 MG: 2 INJECTION, SOLUTION INTRAMUSCULAR; INTRAVENOUS at 15:32

## 2024-11-06 RX ADMIN — MIDODRINE HYDROCHLORIDE 10 MG: 5 TABLET ORAL at 19:33

## 2024-11-06 RX ADMIN — SODIUM CHLORIDE 1000 ML: 9 INJECTION, SOLUTION INTRAVENOUS at 15:31

## 2024-11-06 RX ADMIN — CEFTRIAXONE 1000 MG: 1 INJECTION, POWDER, FOR SOLUTION INTRAMUSCULAR; INTRAVENOUS at 17:17

## 2024-11-06 RX ADMIN — SODIUM ZIRCONIUM CYCLOSILICATE 10 G: 10 POWDER, FOR SUSPENSION ORAL at 19:33

## 2024-11-06 NOTE — ED PROVIDER NOTES
Subjective   History of Present Illness  Patient is a 74-year-old female presenting to the ED for abdominal pain and dark red stools.  Patient states she has been having generalized abdominal pain over the past 2 days that have been worsening.  She rates the pain a 7 out of 10 and states it is constant.  She also reports having some dark red loose stools today.  Reports intermittent nausea.  She denies any fever, chills, vomiting, dysuria, hematuria, recent travel.        Review of Systems   Constitutional:  Negative for chills and fever.   Gastrointestinal:  Positive for abdominal pain, blood in stool, diarrhea and nausea. Negative for constipation and vomiting.   Genitourinary:  Negative for dysuria.       Past Medical History:   Diagnosis Date    Arthritis     COPD (chronic obstructive pulmonary disease)     Hypertension        Allergies   Allergen Reactions    Codeine Hives    Penicillin G Sodium Hives       Past Surgical History:   Procedure Laterality Date    BRONCHOSCOPY N/A 10/16/2024    Procedure: BRONCHOSCOPY;  Surgeon: Gallo Pope MD;  Location: [facility] ENDOSCOPY;  Service: Pulmonary;  Laterality: N/A;    CARDIAC CATHETERIZATION N/A 10/15/2024    Procedure: Left Heart Cath, possible pci;  Surgeon: Travis Connor MD;  Location: [facility] CATH INVASIVE LOCATION;  Service: Cardiovascular;  Laterality: N/A;    CARDIAC CATHETERIZATION N/A 10/22/2024    Procedure: Laser Coronary Atherectomy;  Surgeon: Travis Connor MD;  Location: [facility] CATH INVASIVE LOCATION;  Service: Cardiovascular;  Laterality: N/A;    COLONOSCOPY N/A 10/12/2024    Procedure: COLONOSCOPY WITH BIOPSY AND WIRE GUIDED BALLOON DILATION OF TERMINAL ILEUM;  Surgeon: Rob Strong MD;  Location: [facility] ENDOSCOPY;  Service: Gastroenterology;  Laterality: N/A;  Colitis, crohns of terminal ileum, right colon ulcers, diverticulosis, hemorroids    ENDOSCOPY N/A 10/12/2024    Procedure: ESOPHAGOGASTRODUODENOSCOPY WITH  BIOPSY X 2 AREA;  Surgeon: Rob Strong MD;  Location: [facility] ENDOSCOPY;  Service: Gastroenterology;  Laterality: N/A;  Chronic gastritis, HH    HYSTERECTOMY         Family History   Problem Relation Age of Onset    Heart disease Mother     Dementia Mother     Stroke Mother     Heart disease Father     Hypertension Father        Social History     Socioeconomic History    Marital status:    Tobacco Use    Smoking status: Former     Types: Cigarettes    Smokeless tobacco: Never   Vaping Use    Vaping status: Never Used   Substance and Sexual Activity    Alcohol use: Never    Drug use: Never    Sexual activity: Defer           Objective   Physical Exam  Exam conducted with a chaperone present.   Constitutional:       Appearance: She is well-developed.   HENT:      Head: Normocephalic and atraumatic.   Eyes:      Extraocular Movements: Extraocular movements intact.      Pupils: Pupils are equal, round, and reactive to light.   Cardiovascular:      Rate and Rhythm: Normal rate and regular rhythm.      Heart sounds: Normal heart sounds.   Pulmonary:      Effort: Pulmonary effort is normal.      Breath sounds: Normal breath sounds.   Abdominal:      General: Abdomen is flat. Bowel sounds are normal.      Palpations: Abdomen is soft.      Tenderness: There is generalized abdominal tenderness.      Hernia: No hernia is present.   Genitourinary:     Comments: Hemorrhoids noted on physical exam with no signs of thrombosis.  No bleeding visualized.  Musculoskeletal:         General: Normal range of motion.      Cervical back: Normal range of motion.      Right lower leg: No edema.      Left lower leg: No edema.   Skin:     General: Skin is warm and dry.      Capillary Refill: Capillary refill takes less than 2 seconds.   Neurological:      General: No focal deficit present.      Mental Status: She is alert and oriented to person, place, and time.   Psychiatric:         Mood and Affect: Mood normal.          "Behavior: Behavior normal.         Procedures           ED Course  ED Course as of 11/06/24 1649   Wed Nov 06, 2024   1641 Spoke with Stephanie MARTINEZ with hospitalist group who agrees to admit patient. [EC]      ED Course User Index  [EC] Niya Sierra PA-C      /50   Pulse 56   Temp 97.6 °F (36.4 °C) (Oral)   Resp 16   Ht 154.9 cm (61\")   Wt 52.6 kg (115 lb 15.4 oz)   SpO2 95%   BMI 21.91 kg/m²   Labs Reviewed   COMPREHENSIVE METABOLIC PANEL - Abnormal; Notable for the following components:       Result Value    Glucose 138 (*)     BUN 75 (*)     Creatinine 2.84 (*)     Sodium 134 (*)     Potassium 5.8 (*)     Chloride 97 (*)     Total Protein 5.9 (*)     Albumin 2.9 (*)     BUN/Creatinine Ratio 26.4 (*)     eGFR 16.9 (*)     All other components within normal limits    Narrative:     GFR Normal >60  Chronic Kidney Disease <60  Kidney Failure <15    The GFR formula is only valid for adults with stable renal function between ages 18 and 70.   URINALYSIS W/ CULTURE IF INDICATED - Abnormal; Notable for the following components:    Color, UA Dark Yellow (*)     Appearance, UA Slightly Cloudy (*)     Ketones, UA Trace (*)     Protein, UA 30 mg/dL (1+) (*)     Leuk Esterase, UA Small (1+) (*)     All other components within normal limits    Narrative:     In absence of clinical symptoms, the presence of pyuria, bacteria, and/or nitrites on the urinalysis result does not correlate with infection.   CBC WITH AUTO DIFFERENTIAL - Abnormal; Notable for the following components:    WBC 12.46 (*)     RBC 3.03 (*)     Hemoglobin 9.4 (*)     Hematocrit 29.7 (*)     MCV 98.0 (*)     RDW 17.8 (*)     RDW-SD 63.2 (*)     Platelets 526 (*)     All other components within normal limits    Narrative:     The previously reported component NRBC is no longer being reported. Previous result was 0.0 /100 WBC (Reference Range: 0.0-0.2 /100 WBC) on 11/6/2024 at 1546 EST.   URINALYSIS, MICROSCOPIC ONLY - Abnormal; Notable for the " following components:    RBC, UA 3-5 (*)     WBC, UA 3-5 (*)     Bacteria, UA Trace (*)     Squamous Epithelial Cells, UA 3-6 (*)     Transitional Epithelial Cells, UA 3-6 (*)     All other components within normal limits   MANUAL DIFFERENTIAL - Abnormal; Notable for the following components:    Neutrophil % 40.0 (*)     Monocyte % 3.0 (*)     Bands %  10.0 (*)     Metamyelocyte % 1.0 (*)     Lymphocytes Absolute 5.61 (*)     All other components within normal limits   LIPASE - Normal   BLOOD CULTURE   BLOOD CULTURE   RAINBOW DRAW    Narrative:     The following orders were created for panel order [place] Draw.  Procedure                               Abnormality         Status                     ---------                               -----------         ------                     Green Top (Gel)[[id number]]                                  Final result               Lavender Top[[id number]]                                     Final result               Gold Top - SST[[id number]]                                   Final result               Light Blue Top[[id number]]                                   Final result                 Please view results for these tests on the individual orders.   SCAN SLIDE   LACTIC ACID, PLASMA   TYPE AND SCREEN   GREEN TOP   LAVENDER TOP   GOLD TOP - SST   LIGHT BLUE TOP   CBC AND DIFFERENTIAL    Narrative:     The following orders were created for panel order CBC & Differential.  Procedure                               Abnormality         Status                     ---------                               -----------         ------                     CBC Auto Differential[[id number]]        Abnormal            Final result               Scan Slide[[id number]]                                       Final result                 Please view results for these tests on the individual orders.     Medications   sodium chloride 0.9 % flush 10 mL (has no administration in time range)   cefTRIAXone  (ROCEPHIN) 1,000 mg in sodium chloride 0.9 % 100 mL MBP (has no administration in time range)   ondansetron (ZOFRAN) injection 4 mg (4 mg Intravenous Given 11/6/24 1532)   sodium chloride 0.9 % bolus 1,000 mL (1,000 mL Intravenous New Bag 11/6/24 1531)       CT Abdomen Pelvis Without Contrast    Result Date: 11/6/2024  Impression: 1. Segmental thickening and inflammatory change of the descending colon-sigmoid colon junction. Correlate for infectious or inflammatory colitis. 2. Incompletely imaged 3.8 cm mid ascending thoracic aortic aneurysm. Electronically Signed: Ute Snider MD  11/6/2024 4:23 PM EST  Workstation ID: [id number]                                            Medical Decision Making  Chart review: 10/30/24 Corina GONSALES note: Patient underwent the following procedures during hospitalization  EGD/colonoscopy 10/12/2024 revealed small hiatal hernia, nonerosive gastritis, T1 stricture s/p dilatation with 12 mm, T1 ulcer, colon ulcers, sigmoid diverticulosis, grade 2 internal hemorrhoids and strongyloides  Echo 10/12/2024 EF of 51 to 55% with mild RV enlargement, severe left atrial enlargement, moderate to severe MR  Transesophageal echo 10/16/2024: LVEF of 55 to 60% with severe left atrial enlargement, moderate to severe MR and grade 3 descending aortic plaque  Cardiac cath 10/15/2024 reveals total right and severe ostial LAD, descending thoracic aorta had an outpouching consistent with penetrating aortic ulcer versus aneurysm.  Patient was evaluated by CT surgery not a candidate for CABG therefore patient underwent PCI and drug-eluting stent to the ostium proximal LAD.  And she was seen by valve team and will be set up for MitraClip once she recovers from this hospitalization.    Patient presented to the ED for the above complaint.    Patient underwent the above exam and evaluation.    In the ED patient was placed in gown and IV was established.  Blood work was obtained to assess for anemia,  electrolyte abnormality, dehydration.  CT abdomen was obtained to assess for colitis, pancreatitis, gastritis.  EKG was obtained to assess for arrhythmia.  Patient was given Zofran.  Upon reevaluation patient resting comfortably.  Discussed findings with patient and family at bedside.  Educated patient that we will be admitting her for further IV fluids and antibiotics.  Spoke with Stephanie MARTINEZ with hospitalist group who agrees to admit patient.    EKG independently interpreted by Dr. Santizo showing sinus bradycardia rate 58, MS interval 146, QTc 432 compared to previous EKG on 10/23/2024 showing sinus rhythm rate 78.  Labs were independently interpreted by myself and deemed remarkable for the following: WBC 12.46, hemoglobin 9.4, 10% bands, and creatinine 2.84, sodium 134, potassium 5.8, lipase 22, urinalysis positive for leuk esterase and trace bacteria.  CT abdomen pelvis independently interpreted by Dr. Santizo and reviewed by myself showing: Infectious or inflammatory colitis, 3.8 cm ascending thoracic aortic aneurysm.  Further interpretation by radiologist noted above.    Appropriate PPE was worn during each patient encounter.    Based on clinical findings I anticipate this patient will require 2 midnight stay.    Discussed this patient with Dr. Santizo who agrees with plan.      Problems Addressed:  Acute cystitis with hematuria: complicated acute illness or injury  BISHNU (acute kidney injury): complicated acute illness or injury  Generalized abdominal pain: complicated acute illness or injury  Hyperkalemia: complicated acute illness or injury    Amount and/or Complexity of Data Reviewed  Labs: ordered.  Radiology: ordered.  ECG/medicine tests: ordered.    Risk  Prescription drug management.  Decision regarding hospitalization.        Final diagnoses:   BISHNU (acute kidney injury)   Hyperkalemia   Acute cystitis with hematuria   Generalized abdominal pain       ED Disposition  ED Disposition       ED Disposition    Decision to Admit    Condition   --    Comment   Level of Care: Telemetry [5]   Admitting Physician: EVLMA FREDERICK [[id number]]   Attending Physician: VELMA FREDERICK [[id number]]                 No follow-up provider specified.       Medication List      No changes were made to your prescriptions during this visit.            Niya Sierra PA-C  11/06/24 7539

## 2024-11-06 NOTE — ED NOTES
Nursing report ED to floor  Maryann Gaitan  74 y.o.  female    HPI:   Chief Complaint   Patient presents with    Black or Bloody Stool    Abdominal Pain       Admitting doctor:   Luma Ramirez MD    Admitting diagnosis:   The primary encounter diagnosis was BISHNU (acute kidney injury). Diagnoses of Hyperkalemia, Acute cystitis with hematuria, and Generalized abdominal pain were also pertinent to this visit.    Code status:   Current Code Status       Date Active Code Status Order ID Comments User Context       Prior            Allergies:   Codeine and Penicillin g sodium    Isolation:  No active isolations     Fall Risk:  Fall Risk Assessment was completed, and patient is at moderate risk for falls.   Predictive Model Details         15 (Low) Factor Value    Calculated 11/6/2024 17:20 Age 74    Risk of Fall Model Active Peripheral IV Present     Imaging order in this encounter Present     Diastolic BP 44     Magnesium not on file     Albumin 2.9 g/dL     Number of Distinct Medication Classes administered 3     Creatinine 2.84 mg/dL     Armando Scale not on file     Chloride 97 mmol/L     Respiratory Rate 16     Potassium 5.8 mmol/L     Calcium 9.3 mg/dL     Days after Admission 0.12     Total Bilirubin 0.4 mg/dL     Tobacco Use Quit     ALT 12 U/L         Weight:       11/06/24  1426   Weight: 52.6 kg (115 lb 15.4 oz)       Intake and Output  No intake or output data in the 24 hours ending 11/06/24 1737    Diet:        Most recent vitals:   Vitals:    11/06/24 1629 11/06/24 1649 11/06/24 1659 11/06/24 1722   BP: 95/46 92/48 98/44 98/48   Pulse: 55 55 54 58   Resp:       Temp:       TempSrc:       SpO2: 97% 95% 96% 96%   Weight:       Height:           Active LDAs/IV Access:   Lines, Drains & Airways       Active LDAs       Name Placement date Placement time Site Days    Peripheral IV 11/06/24 1442 Anterior;Left;Upper Arm 11/06/24  1442  Arm  less than 1                    Skin Condition:   Skin Assessments (last day)        Date/Time Skin L    11/06/24 14:44:15 [facility]             Labs (abnormal labs have a star):   Labs Reviewed   COMPREHENSIVE METABOLIC PANEL - Abnormal; Notable for the following components:       Result Value    Glucose 138 (*)     BUN 75 (*)     Creatinine 2.84 (*)     Sodium 134 (*)     Potassium 5.8 (*)     Chloride 97 (*)     Total Protein 5.9 (*)     Albumin 2.9 (*)     BUN/Creatinine Ratio 26.4 (*)     eGFR 16.9 (*)     All other components within normal limits    Narrative:     GFR Normal >60  Chronic Kidney Disease <60  Kidney Failure <15    The GFR formula is only valid for adults with stable renal function between ages 18 and 70.   URINALYSIS W/ CULTURE IF INDICATED - Abnormal; Notable for the following components:    Color, UA Dark Yellow (*)     Appearance, UA Slightly Cloudy (*)     Ketones, UA Trace (*)     Protein, UA 30 mg/dL (1+) (*)     Leuk Esterase, UA Small (1+) (*)     All other components within normal limits    Narrative:     In absence of clinical symptoms, the presence of pyuria, bacteria, and/or nitrites on the urinalysis result does not correlate with infection.   CBC WITH AUTO DIFFERENTIAL - Abnormal; Notable for the following components:    WBC 12.46 (*)     RBC 3.03 (*)     Hemoglobin 9.4 (*)     Hematocrit 29.7 (*)     MCV 98.0 (*)     RDW 17.8 (*)     RDW-SD 63.2 (*)     Platelets 526 (*)     All other components within normal limits    Narrative:     The previously reported component NRBC is no longer being reported. Previous result was 0.0 /100 WBC (Reference Range: 0.0-0.2 /100 WBC) on 11/6/2024 at 1546 EST.   URINALYSIS, MICROSCOPIC ONLY - Abnormal; Notable for the following components:    RBC, UA 3-5 (*)     WBC, UA 3-5 (*)     Bacteria, UA Trace (*)     Squamous Epithelial Cells, UA 3-6 (*)     Transitional Epithelial Cells, UA 3-6 (*)     All other components within normal limits   MANUAL DIFFERENTIAL - Abnormal; Notable for the following components:    Neutrophil % 40.0  (*)     Monocyte % 3.0 (*)     Bands %  10.0 (*)     Metamyelocyte % 1.0 (*)     Lymphocytes Absolute 5.61 (*)     All other components within normal limits   LIPASE - Normal   BLOOD CULTURE   BLOOD CULTURE   RAINBOW DRAW    Narrative:     The following orders were created for panel order [place] Draw.  Procedure                               Abnormality         Status                     ---------                               -----------         ------                     Green Top (Gel)[[id number]]                                  Final result               Lavender Top[[id number]]                                     Final result               Gold Top - SST[[id number]]                                   Final result               Light Blue Top[[id number]]                                   Final result                 Please view results for these tests on the individual orders.   SCAN SLIDE   LACTIC ACID, PLASMA   TYPE AND SCREEN   GREEN TOP   LAVENDER TOP   GOLD TOP - SST   LIGHT BLUE TOP   CBC AND DIFFERENTIAL    Narrative:     The following orders were created for panel order CBC & Differential.  Procedure                               Abnormality         Status                     ---------                               -----------         ------                     CBC Auto Differential[[id number]]        Abnormal            Final result               Scan Slide[[id number]]                                       Final result                 Please view results for these tests on the individual orders.       LOC: Person, Place, Time, and Situation    Telemetry:  Telemetry    Cardiac Monitoring Ordered: yes    EKG:   ECG 12 Lead Electrolyte Imbalance   Preliminary Result   HEART RATE=58  bpm   RR Mvyxscrq=1682  ms   IA Vmevmcbu=497  ms   P Horizontal Axis=11  deg   P Front Axis=61  deg   QRSD Interval=87  ms   QT Thyeqvea=032  ms   DHjJ=192  ms   QRS Axis=34  deg   T Wave Axis=59  deg   - OTHERWISE NORMAL ECG -    Sinus bradycardia   Date and Time of Study:2024-11-06 15:41:09      Telemetry Scan   Final Result          Medications Given in the ED:   Medications   sodium chloride 0.9 % flush 10 mL (has no administration in time range)   cefTRIAXone (ROCEPHIN) 1,000 mg in sodium chloride 0.9 % 100 mL MBP (1,000 mg Intravenous New Bag 11/6/24 1717)   ondansetron (ZOFRAN) injection 4 mg (4 mg Intravenous Given 11/6/24 1532)   sodium chloride 0.9 % bolus 1,000 mL (0 mL Intravenous Stopped 11/6/24 1601)       Imaging results:  CT Abdomen Pelvis Without Contrast    Result Date: 11/6/2024  Impression: 1. Segmental thickening and inflammatory change of the descending colon-sigmoid colon junction. Correlate for infectious or inflammatory colitis. 2. Incompletely imaged 3.8 cm mid ascending thoracic aortic aneurysm. Electronically Signed: Ute Snider MD  11/6/2024 4:23 PM EST  Workstation ID: [id number]     Social issues:   Social History     Socioeconomic History    Marital status:    Tobacco Use    Smoking status: Former     Types: Cigarettes    Smokeless tobacco: Never   Vaping Use    Vaping status: Never Used   Substance and Sexual Activity    Alcohol use: Never    Drug use: Never    Sexual activity: Defer       NIH Stroke Scale:  Interval: (not recorded)  1a. Level of Consciousness: (not recorded)  1b. LOC Questions: (not recorded)  1c. LOC Commands: (not recorded)  2. Best Gaze: (not recorded)  3. Visual: (not recorded)  4. Facial Palsy: (not recorded)  5a. Motor Arm, Left: (not recorded)  5b. Motor Arm, Right: (not recorded)  6a. Motor Leg, Left: (not recorded)  6b. Motor Leg, Right: (not recorded)  7. Limb Ataxia: (not recorded)  8. Sensory: (not recorded)  9. Best Language: (not recorded)  10. Dysarthria: (not recorded)  11. Extinction and Inattention (formerly Neglect): (not recorded)    Total (NIH Stroke Scale): (not recorded)     Additional notable assessment information:.     Nursing report ED to  floor:  Ignacia Dover LPN   11/06/24 17:37 EST

## 2024-11-06 NOTE — ED TRIAGE NOTES
PT arrived via PV c/o abd pain x 2days and dark red stools that started today. PT is on Plavix and ASA

## 2024-11-06 NOTE — Clinical Note
[facility] EMERGENCY DEPARTMENT  [address] [place] [address]  Phone: [phone]    Sania Celeste accompanied Maryann Gaitan to the emergency department on 11/6/2024. They may return to work on 11/07/2024.        Thank you for choosing [facility].    Niya Sierra PA-C

## 2024-11-06 NOTE — Clinical Note
Level of Care: Telemetry [5]   Admitting Physician: VELMA FREDERICK [[id number]]   Attending Physician: VEMLA FREDERICK [[id number]]

## 2024-11-06 NOTE — OUTREACH NOTE
Medical Week 2 Survey      Flowsheet Row Responses   [facility]? [facility]   Does the patient have one of the following disease processes/diagnoses(primary or secondary)? Other   Week 2 attempt successful? No   Unsuccessful attempts Attempt 1  [patient is currently in ED]            Marily PHILIP - Registered Nurse

## 2024-11-06 NOTE — HOME HEALTH
Pt is a 74 female who lives in a 1 story home.   Pt recently hospitalized secondary to exacerbation of her Chron's disease and hyponatremia.        PLOF pt independent with feeding grooming and toileting.   Pt required MIN assist with bathing and dressing and total assist with IADLs.    CLOF  pt independent with feeding grooming and toileting.   Pt requires MAX assist with bathing and dressing and total assist with IADLs.        Skilled OT for ther ex and adl training.   Pt and therapist in agreement with goals and poc.      Pt denies any falls or med changes

## 2024-11-06 NOTE — Clinical Note
[facility] EMERGENCY DEPARTMENT  [address] [place] [address]  Phone: [phone]    Sania Celeste accompanied Maryann Gaitan to the emergency department on 11/6/2024. They may return to work on 11/07/2024.        Thank you for choosing [facility].    Niya Sierra PA-C

## 2024-11-06 NOTE — ED NOTES
Pt c/o abdominal pain and having blood in her stool that started yesterday.  Pt sts that it has hurts to have a BM.

## 2024-11-07 ENCOUNTER — INPATIENT HOSPITAL (AMBULATORY)
Age: 74
End: 2024-11-07
Payer: MEDICARE

## 2024-11-07 ENCOUNTER — DOCUMENTATION (OUTPATIENT)
Dept: HOME HEALTH SERVICES | Facility: HOME HEALTHCARE | Age: 74
End: 2024-11-07
Payer: MEDICARE

## 2024-11-07 ENCOUNTER — INPATIENT HOSPITAL (AMBULATORY)
Dept: URBAN - METROPOLITAN AREA HOSPITAL 84 | Facility: HOSPITAL | Age: 74
End: 2024-11-07
Payer: MEDICARE

## 2024-11-07 DIAGNOSIS — D64.9 ANEMIA, UNSPECIFIED: ICD-10-CM

## 2024-11-07 DIAGNOSIS — E87.8 OTHER DISORDERS OF ELECTROLYTE AND FLUID BALANCE, NOT ELSEWH: ICD-10-CM

## 2024-11-07 DIAGNOSIS — K62.5 HEMORRHAGE OF ANUS AND RECTUM: ICD-10-CM

## 2024-11-07 DIAGNOSIS — R10.30 LOWER ABDOMINAL PAIN, UNSPECIFIED: ICD-10-CM

## 2024-11-07 DIAGNOSIS — Z90.49 ACQUIRED ABSENCE OF OTHER SPECIFIED PARTS OF DIGESTIVE TRACT: ICD-10-CM

## 2024-11-07 DIAGNOSIS — R19.7 DIARRHEA, UNSPECIFIED: ICD-10-CM

## 2024-11-07 DIAGNOSIS — R93.3 ABNORMAL FINDINGS ON DIAGNOSTIC IMAGING OF OTHER PARTS OF DI: ICD-10-CM

## 2024-11-07 DIAGNOSIS — D72.829 ELEVATED WHITE BLOOD CELL COUNT, UNSPECIFIED: ICD-10-CM

## 2024-11-07 DIAGNOSIS — K50.80 CROHN'S DISEASE OF BOTH SMALL AND LARGE INTESTINE WITHOUT CO: ICD-10-CM

## 2024-11-07 LAB
ANION GAP SERPL CALCULATED.3IONS-SCNC: 9.6 MMOL/L (ref 5–15)
BASOPHILS # BLD AUTO: 0.03 10*3/MM3 (ref 0–0.2)
BASOPHILS NFR BLD AUTO: 0.2 % (ref 0–1.5)
BUN SERPL-MCNC: 62 MG/DL (ref 8–23)
BUN/CREAT SERPL: 27.9 (ref 7–25)
CALCIUM SPEC-SCNC: 8.6 MG/DL (ref 8.6–10.5)
CHLORIDE SERPL-SCNC: 104 MMOL/L (ref 98–107)
CO2 SERPL-SCNC: 22.4 MMOL/L (ref 22–29)
CREAT SERPL-MCNC: 2.22 MG/DL (ref 0.57–1)
DEPRECATED RDW RBC AUTO: 62 FL (ref 37–54)
EGFRCR SERPLBLD CKD-EPI 2021: 22.8 ML/MIN/1.73
EOSINOPHIL # BLD AUTO: 0.3 10*3/MM3 (ref 0–0.4)
EOSINOPHIL NFR BLD AUTO: 2.4 % (ref 0.3–6.2)
ERYTHROCYTE [DISTWIDTH] IN BLOOD BY AUTOMATED COUNT: 17.9 % (ref 12.3–15.4)
GLUCOSE SERPL-MCNC: 76 MG/DL (ref 65–99)
HCT VFR BLD AUTO: 26.2 % (ref 34–46.6)
HGB BLD-MCNC: 8 G/DL (ref 12–15.9)
IMM GRANULOCYTES # BLD AUTO: 0.31 10*3/MM3 (ref 0–0.05)
IMM GRANULOCYTES NFR BLD AUTO: 2.5 % (ref 0–0.5)
LYMPHOCYTES # BLD AUTO: 4.48 10*3/MM3 (ref 0.7–3.1)
LYMPHOCYTES NFR BLD AUTO: 35.4 % (ref 19.6–45.3)
MCH RBC QN AUTO: 29.5 PG (ref 26.6–33)
MCHC RBC AUTO-ENTMCNC: 30.5 G/DL (ref 31.5–35.7)
MCV RBC AUTO: 96.7 FL (ref 79–97)
MONOCYTES # BLD AUTO: 1.39 10*3/MM3 (ref 0.1–0.9)
MONOCYTES NFR BLD AUTO: 11 % (ref 5–12)
NEUTROPHILS NFR BLD AUTO: 48.5 % (ref 42.7–76)
NEUTROPHILS NFR BLD AUTO: 6.14 10*3/MM3 (ref 1.7–7)
NRBC BLD AUTO-RTO: 0 /100 WBC (ref 0–0.2)
PLATELET # BLD AUTO: 451 10*3/MM3 (ref 140–450)
PMV BLD AUTO: 10 FL (ref 6–12)
POTASSIUM SERPL-SCNC: 5 MMOL/L (ref 3.5–5.2)
QT INTERVAL: 439 MS
QTC INTERVAL: 432 MS
RBC # BLD AUTO: 2.71 10*6/MM3 (ref 3.77–5.28)
SODIUM SERPL-SCNC: 136 MMOL/L (ref 136–145)
WBC NRBC COR # BLD AUTO: 12.65 10*3/MM3 (ref 3.4–10.8)

## 2024-11-07 PROCEDURE — 99223 1ST HOSP IP/OBS HIGH 75: CPT | Performed by: NURSE PRACTITIONER

## 2024-11-07 PROCEDURE — 94640 AIRWAY INHALATION TREATMENT: CPT

## 2024-11-07 PROCEDURE — 86682 HELMINTH ANTIBODY: CPT | Performed by: NURSE PRACTITIONER

## 2024-11-07 PROCEDURE — 94761 N-INVAS EAR/PLS OXIMETRY MLT: CPT

## 2024-11-07 PROCEDURE — 85007 BL SMEAR W/DIFF WBC COUNT: CPT | Performed by: NURSE PRACTITIONER

## 2024-11-07 PROCEDURE — 99222 1ST HOSP IP/OBS MODERATE 55: CPT | Performed by: INTERNAL MEDICINE

## 2024-11-07 PROCEDURE — 84100 ASSAY OF PHOSPHORUS: CPT | Performed by: HOSPITALIST

## 2024-11-07 PROCEDURE — 97166 OT EVAL MOD COMPLEX 45 MIN: CPT

## 2024-11-07 PROCEDURE — 83735 ASSAY OF MAGNESIUM: CPT | Performed by: HOSPITALIST

## 2024-11-07 PROCEDURE — 25010000002 METHYLPREDNISOLONE PER 40 MG: Performed by: NURSE PRACTITIONER

## 2024-11-07 PROCEDURE — 85025 COMPLETE CBC W/AUTO DIFF WBC: CPT | Performed by: NURSE PRACTITIONER

## 2024-11-07 PROCEDURE — 25810000003 SODIUM CHLORIDE 0.9 % SOLUTION: Performed by: NURSE PRACTITIONER

## 2024-11-07 PROCEDURE — 94799 UNLISTED PULMONARY SVC/PX: CPT

## 2024-11-07 PROCEDURE — 97162 PT EVAL MOD COMPLEX 30 MIN: CPT

## 2024-11-07 PROCEDURE — 25010000002 METRONIDAZOLE 500 MG/100ML SOLUTION

## 2024-11-07 PROCEDURE — 80053 COMPREHEN METABOLIC PANEL: CPT | Performed by: NURSE PRACTITIONER

## 2024-11-07 PROCEDURE — 83880 ASSAY OF NATRIURETIC PEPTIDE: CPT | Performed by: INTERNAL MEDICINE

## 2024-11-07 RX ORDER — METRONIDAZOLE 500 MG/100ML
500 INJECTION, SOLUTION INTRAVENOUS EVERY 8 HOURS
Status: DISCONTINUED | OUTPATIENT
Start: 2024-11-07 | End: 2024-11-08

## 2024-11-07 RX ORDER — METHYLPREDNISOLONE SODIUM SUCCINATE 40 MG/ML
20 INJECTION, POWDER, LYOPHILIZED, FOR SOLUTION INTRAMUSCULAR; INTRAVENOUS EVERY 8 HOURS
Status: DISCONTINUED | OUTPATIENT
Start: 2024-11-07 | End: 2024-11-12 | Stop reason: HOSPADM

## 2024-11-07 RX ORDER — CLOPIDOGREL BISULFATE 75 MG/1
75 TABLET ORAL DAILY
Status: DISCONTINUED | OUTPATIENT
Start: 2024-11-07 | End: 2024-11-09

## 2024-11-07 RX ADMIN — MIDODRINE HYDROCHLORIDE 10 MG: 5 TABLET ORAL at 13:48

## 2024-11-07 RX ADMIN — METHYLPREDNISOLONE SODIUM SUCCINATE 20 MG: 40 INJECTION, POWDER, FOR SOLUTION INTRAMUSCULAR; INTRAVENOUS at 21:22

## 2024-11-07 RX ADMIN — METRONIDAZOLE 500 MG: 500 INJECTION, SOLUTION INTRAVENOUS at 13:48

## 2024-11-07 RX ADMIN — MIDODRINE HYDROCHLORIDE 10 MG: 5 TABLET ORAL at 21:21

## 2024-11-07 RX ADMIN — CLOPIDOGREL BISULFATE 75 MG: 75 TABLET ORAL at 10:25

## 2024-11-07 RX ADMIN — METHYLPREDNISOLONE SODIUM SUCCINATE 20 MG: 40 INJECTION, POWDER, FOR SOLUTION INTRAMUSCULAR; INTRAVENOUS at 13:48

## 2024-11-07 RX ADMIN — TIOTROPIUM BROMIDE INHALATION SPRAY 2 PUFF: 3.12 SPRAY, METERED RESPIRATORY (INHALATION) at 07:34

## 2024-11-07 RX ADMIN — SODIUM CHLORIDE 100 ML/HR: 9 INJECTION, SOLUTION INTRAVENOUS at 01:28

## 2024-11-07 RX ADMIN — METRONIDAZOLE 500 MG: 500 INJECTION, SOLUTION INTRAVENOUS at 05:56

## 2024-11-07 RX ADMIN — METRONIDAZOLE 500 MG: 500 INJECTION, SOLUTION INTRAVENOUS at 21:21

## 2024-11-07 NOTE — CASE MANAGEMENT/SOCIAL WORK
Continued Stay Note   [facility]     Patient Name: Maryann Gaitan  MRN: [id number]  Today's Date: 11/7/2024    Admit Date: 11/6/2024    Plan: From home with granddaugters and [facility] (Need [facility])   Discharge Plan       Row Name 11/07/24 1610       Plan    Patient/Family in Agreement with Plan yes    Plan Comments CM received POA copy from son, CM faxed copy to HIM STAT at [phone] and placed copy in patients hard chart.      Row Name 11/07/24 1425       Plan    Plan From home with granddaugters and [facility] (Need [facility])    Patient/Family in Agreement with Plan yes    Provided Post Acute Provider List? N/A    Provided Post Acute Provider Quality & Resource List? N/A    Plan Comments CM met with patient at the bedside to review discharge planning. Patient lives at home with two granddaughters - Sania and Jerrica, is assist with IADLs and doesn't drive. Granddaughters to transport patient at d/c. Confirmed PCP, insurance, and pharmacy. Patient declines [facility]. Patient denies any difficulty affording food, utilities, or medications. Patient is not current with any HHC/OPPT/OT services. DC Barriers: ID/Neph/Nutrition/GI, IV abx, IVF, blood cultures pending, elevated BUN/Cr, drop in hgb               Expected Discharge Date and Time       Expected Discharge Date Expected Discharge Time    Nov 9, 2024      Lacy Dumont RN     [phone]  [email or web address]

## 2024-11-07 NOTE — PLAN OF CARE
Problem: Bowel Disease, Inflammatory (Ulcerative Colitis or Crohn's Disease)  Goal: Optimal Adaptation to Chronic Illness  Outcome: Not Progressing  Intervention: Support Psychosocial Response to Illness  Recent Flowsheet Documentation  Taken 11/7/2024 0814 by Katarina Peck LPN  Family/Support System Care:   self-care encouraged   support provided  Goal: Effective Diarrhea Management  Outcome: Not Progressing  Goal: Absence of Infection Signs and Symptoms  Outcome: Not Progressing  Goal: Optimal Nutrition Delivery  Outcome: Not Progressing  Goal: Optimal Pain Control and Function  Outcome: Not Progressing     Problem: Adult Inpatient Plan of Care  Goal: Plan of Care Review  Outcome: Not Progressing  Flowsheets (Taken 11/7/2024 1440)  Progress: no change  Outcome Evaluation: Patient is stable. Blood pressures on the lower side, started midodrine. See MAR. Continued with IV abx per MD order. Clear liquids started and tolerted today. Patient will likely need IP rehab. CM aware. Safety measures in place. Call light within reach. Patient able to make needs known. Plan of care ongoing.  Plan of Care Reviewed With:   patient   child  Goal: Patient-Specific Goal (Individualized)  Outcome: Not Progressing  Goal: Absence of Hospital-Acquired Illness or Injury  Outcome: Not Progressing  Intervention: Identify and Manage Fall Risk  Recent Flowsheet Documentation  Taken 11/7/2024 1401 by Katarina Peck LPN  Safety Promotion/Fall Prevention:   activity supervised   assistive device/personal items within reach   clutter free environment maintained   gait belt   fall prevention program maintained   nonskid shoes/slippers when out of bed   room organization consistent   safety round/check completed  Taken 11/7/2024 1212 by Katarina Peck LPN  Safety Promotion/Fall Prevention:   activity supervised   assistive device/personal items within reach   clutter free environment maintained   fall prevention program maintained   gait  belt   nonskid shoes/slippers when out of bed   safety round/check completed   room organization consistent  Taken 11/7/2024 1007 by Katarina Peck LPN  Safety Promotion/Fall Prevention:   activity supervised   assistive device/personal items within reach   clutter free environment maintained   nonskid shoes/slippers when out of bed   safety round/check completed   room organization consistent   fall prevention program maintained   gait belt  Taken 11/7/2024 0814 by Katarina Peck LPN  Safety Promotion/Fall Prevention:   activity supervised   clutter free environment maintained   assistive device/personal items within reach   fall prevention program maintained   gait belt   nonskid shoes/slippers when out of bed   room organization consistent  Intervention: Prevent Skin Injury  Recent Flowsheet Documentation  Taken 11/7/2024 0814 by Katarina Peck LPN  Skin Protection: incontinence pads utilized  Intervention: Prevent and Manage VTE (Venous Thromboembolism) Risk  Recent Flowsheet Documentation  Taken 11/7/2024 0814 by Katarina Peck LPN  VTE Prevention/Management:   bilateral   SCDs (sequential compression devices) off  Intervention: Prevent Infection  Recent Flowsheet Documentation  Taken 11/7/2024 1212 by Katarina Peck LPN  Infection Prevention:   hand hygiene promoted   personal protective equipment utilized   single patient room provided  Taken 11/7/2024 1007 by Katarina Peck LPN  Infection Prevention:   hand hygiene promoted   personal protective equipment utilized   single patient room provided  Taken 11/7/2024 0814 by Katarina Peck LPN  Infection Prevention:   hand hygiene promoted   personal protective equipment utilized   single patient room provided  Goal: Optimal Comfort and Wellbeing  Outcome: Not Progressing  Intervention: Provide Person-Centered Care  Recent Flowsheet Documentation  Taken 11/7/2024 0814 by Katarina Peck LPN  Trust Relationship/Rapport:   care explained   questions  encouraged   questions answered   reassurance provided   thoughts/feelings acknowledged  Goal: Readiness for Transition of Care  Outcome: Not Progressing     Problem: Skin Injury Risk Increased  Goal: Skin Health and Integrity  Outcome: Not Progressing  Intervention: Optimize Skin Protection  Recent Flowsheet Documentation  Taken 11/7/2024 1212 by Katarina Peck LPN  Activity Management: up in chair  Taken 11/7/2024 0814 by Katarina Peck LPN  Activity Management: activity encouraged  Pressure Reduction Techniques:   frequent weight shift encouraged   weight shift assistance provided  Pressure Reduction Devices: pressure-redistributing mattress utilized  Skin Protection: incontinence pads utilized     Problem: Fall Injury Risk  Goal: Absence of Fall and Fall-Related Injury  Outcome: Not Progressing  Intervention: Identify and Manage Contributors  Recent Flowsheet Documentation  Taken 11/7/2024 1401 by Katarina Peck LPN  Medication Review/Management: medications reviewed  Taken 11/7/2024 1212 by Katarina Peck LPN  Medication Review/Management: medications reviewed  Taken 11/7/2024 1007 by Katarina Peck LPN  Medication Review/Management: medications reviewed  Taken 11/7/2024 0814 by Katarina Peck LPN  Medication Review/Management: medications reviewed  Intervention: Promote Injury-Free Environment  Recent Flowsheet Documentation  Taken 11/7/2024 1401 by Katarina Peck LPN  Safety Promotion/Fall Prevention:   activity supervised   assistive device/personal items within reach   clutter free environment maintained   gait belt   fall prevention program maintained   nonskid shoes/slippers when out of bed   room organization consistent   safety round/check completed  Taken 11/7/2024 1212 by Katarina Peck LPN  Safety Promotion/Fall Prevention:   activity supervised   assistive device/personal items within reach   clutter free environment maintained   fall prevention program maintained   gait belt   nonskid  shoes/slippers when out of bed   safety round/check completed   room organization consistent  Taken 11/7/2024 1007 by Katarina Peck LPN  Safety Promotion/Fall Prevention:   activity supervised   assistive device/personal items within reach   clutter free environment maintained   nonskid shoes/slippers when out of bed   safety round/check completed   room organization consistent   fall prevention program maintained   gait belt  Taken 11/7/2024 0814 by Katarina Peck LPN  Safety Promotion/Fall Prevention:   activity supervised   clutter free environment maintained   assistive device/personal items within reach   fall prevention program maintained   gait belt   nonskid shoes/slippers when out of bed   room organization consistent   Goal Outcome Evaluation:  Plan of Care Reviewed With: patient, child        Progress: no change  Outcome Evaluation: Patient is stable. Blood pressures on the lower side, started midodrine. See MAR. Continued with IV abx per MD order. Clear liquids started and tolerted today. Patient will likely need IP rehab. CM aware. Safety measures in place. Call light within reach. Patient able to make needs known. Plan of care ongoing.

## 2024-11-07 NOTE — CONSULTS
Infectious Diseases Consult Note    Referring Provider: Marciano Multani MD    Reason for Consultation: colitis    Patient Care Team:  Eduard Little MD as PCP - General (Family Medicine)    Chief complaint abdominal pain, diarrhea, blood in stool    Subjective     History of present illness:    This is a 74-year-old female presents the hospital on 11/6/2024 with complaints of abdominal pain diarrhea and bloody stool.  Patient does take Humira  Methotrexate, Rinvoq and prednisone for both her Crohn's disease and rheumatoid arthritis.  Denies fever and chills, has chronic shortness of breath without a productive cough.  Denies nausea vomiting or urinary symptoms.  Patient is just generally weak    Review of Systems   Review of Systems   Constitutional: Negative.  Positive for fatigue.   HENT: Negative.     Eyes: Negative.    Respiratory: Negative.  Positive for shortness of breath.    Cardiovascular: Negative.    Gastrointestinal: Negative.  Positive for abdominal pain, blood in stool and diarrhea.   Endocrine: Negative.    Genitourinary: Negative.    Musculoskeletal: Negative.  Positive for joint swelling.   Skin: Negative.    Neurological: Negative.  Positive for weakness.   Psychiatric/Behavioral: Negative.     All other systems reviewed and are negative.      Medications  Medications Prior to Admission   Medication Sig Dispense Refill Last Dose/Taking    Adalimumab (Humira, 2 Pen,) 40 MG/0.4ML Pen-injector Kit Inject 40 mg under the skin into the appropriate area as directed Every 14 (Fourteen) Days. Indications: Rheumatoid Arthritis   Past Month    aspirin 81 MG EC tablet Take 1 tablet by mouth Daily for 30 days. 30 tablet 0 Taking    atorvastatin (LIPITOR) 40 MG tablet Take 1 tablet by mouth Every Night for 30 days. 30 tablet 0 Taking    methotrexate 2.5 MG tablet Take 8 tablets by mouth 1 (One) Time Per Week. Saturdays   Indications: Non-oncologic   Past Week    midodrine (PROAMATINE) 10 MG tablet  Take 1 tablet by mouth 3 (Three) Times a Day Before Meals for 30 days. 90 tablet 0 Taking    pantoprazole (PROTONIX) 20 MG EC tablet Take 1 tablet by mouth Daily. Indications: Heartburn   Taking    predniSONE (DELTASONE) 5 MG tablet Take 1 tablet by mouth Daily. Indications: ACUTE EXACERBATION OF COPD (INACTIVE)   Taking    sertraline (ZOLOFT) 50 MG tablet Take 1 tablet by mouth Daily. Indications: Major Depressive Disorder   Taking    spironolactone (ALDACTONE) 25 MG tablet Take 1 tablet by mouth Daily.   Taking    tiotropium (SPIRIVA) 18 MCG per inhalation capsule Place 1 capsule into inhaler and inhale Daily. Indications: Chronic Obstructive Lung Disease   Taking    upadacitinib ER (Rinvoq) 45 MG tablet sustained-release 24 hour extended release tablet Take 1 tablet by mouth Daily.   11/6/2024 at  8:00 AM    albuterol (PROVENTIL) (2.5 MG/3ML) 0.083% nebulizer solution Take 2.5 mg by nebulization Every 6 (Six) Hours As Needed for Wheezing.       cholecalciferol (VITAMIN D3) 1.25 MG (91640 UT) capsule Take 1 capsule by mouth 2 (Two) Times a Week. Wed, Sat  Indications: Vitamin D Deficiency       clopidogrel (PLAVIX) 75 MG tablet Take 1 tablet by mouth Daily for 30 days. 30 tablet 0     ezetimibe (ZETIA) 10 MG tablet Take 1 tablet by mouth Daily. Indications: High Amount of Fats in the Blood       folic acid (FOLVITE) 1 MG tablet Take 1 tablet by mouth Daily. Indications: Anemia From Inadequate Folic Acid       levothyroxine (SYNTHROID, LEVOTHROID) 50 MCG tablet Take 1 tablet by mouth Daily. Indications: Underactive Thyroid       Melatonin (Melatonin Extra Strength) 10 MG tablet Take 1 tablet by mouth As Needed. Indications: Trouble Sleeping          History  Past Medical History:   Diagnosis Date    Arthritis     CAD (coronary artery disease)     COPD (chronic obstructive pulmonary disease)     Hypertension     Mood disorder     Thyroid disease      Past Surgical History:   Procedure Laterality Date    BRONCHOSCOPY  N/A 10/16/2024    Procedure: BRONCHOSCOPY;  Surgeon: Gallo Pope MD;  Location: [facility] ENDOSCOPY;  Service: Pulmonary;  Laterality: N/A;    CARDIAC CATHETERIZATION N/A 10/15/2024    Procedure: Left Heart Cath, possible pci;  Surgeon: Travis Connor MD;  Location: [facility] CATH INVASIVE LOCATION;  Service: Cardiovascular;  Laterality: N/A;    CARDIAC CATHETERIZATION N/A 10/22/2024    Procedure: Laser Coronary Atherectomy;  Surgeon: Travis Connor MD;  Location: [facility] CATH INVASIVE LOCATION;  Service: Cardiovascular;  Laterality: N/A;    COLONOSCOPY N/A 10/12/2024    Procedure: COLONOSCOPY WITH BIOPSY AND WIRE GUIDED BALLOON DILATION OF TERMINAL ILEUM;  Surgeon: Rob Strong MD;  Location: [facility] ENDOSCOPY;  Service: Gastroenterology;  Laterality: N/A;  Colitis, crohns of terminal ileum, right colon ulcers, diverticulosis, hemorroids    ENDOSCOPY N/A 10/12/2024    Procedure: ESOPHAGOGASTRODUODENOSCOPY WITH BIOPSY X 2 AREA;  Surgeon: Rob Strong MD;  Location: [facility] ENDOSCOPY;  Service: Gastroenterology;  Laterality: N/A;  Chronic gastritis, HH    HYSTERECTOMY      LEFT HEART CATH         Family History  Family History   Problem Relation Age of Onset    Heart disease Mother     Dementia Mother     Stroke Mother     Heart disease Father     Hypertension Father        Social History   reports that she has quit smoking. Her smoking use included cigarettes. She has never used smokeless tobacco. She reports that she does not drink alcohol and does not use drugs.    Allergies  Codeine and Penicillin g sodium    Objective     Vital Signs   Vital Signs (last 24 hours)         11/06 0700  11/07 0659 11/07 0700  11/07 1624   Most Recent      Temp (°F) 97.6 -  98.6      97.9     97.9 (36.6) 11/07 1138    Heart Rate 54 -  77    64 -  67     67 11/07 1551    Resp 16 -  25    18 -  21     21 11/07 1551    BP 85/39 -  122/50    91/48 -  100/57     100/57 11/07 1551    SpO2 (%) 95  -  100    95 -  99     99 11/07 1551            Physical Exam:  Physical Exam  Vitals and nursing note reviewed.   Constitutional:       General: She is not in acute distress.     Appearance: She is well-developed and normal weight. She is ill-appearing. She is not diaphoretic.   HENT:      Head: Normocephalic and atraumatic.   Eyes:      General: No scleral icterus.     Extraocular Movements: Extraocular movements intact.      Conjunctiva/sclera: Conjunctivae normal.      Pupils: Pupils are equal, round, and reactive to light.   Cardiovascular:      Rate and Rhythm: Normal rate and regular rhythm.      Heart sounds: Normal heart sounds, S1 normal and S2 normal. No murmur heard.  Pulmonary:      Effort: Pulmonary effort is normal. No respiratory distress.      Breath sounds: Normal breath sounds. No stridor. No wheezing or rales.   Chest:      Chest wall: No tenderness.   Abdominal:      General: Bowel sounds are normal. There is no distension.      Palpations: Abdomen is soft. There is no mass.      Tenderness: There is abdominal tenderness. There is no guarding.   Genitourinary:     Comments: External catheter  Musculoskeletal:         General: No swelling, tenderness or deformity.      Cervical back: Neck supple.   Skin:     General: Skin is warm and dry.      Coloration: Skin is not pale.      Findings: No bruising, erythema or rash.   Neurological:      General: No focal deficit present.      Mental Status: She is alert and oriented to person, place, and time. Mental status is at baseline.      Cranial Nerves: No cranial nerve deficit.   Psychiatric:         Mood and Affect: Mood normal.         Microbiology  Microbiology Results (last 10 days)       ** No results found for the last 240 hours. **            Laboratory  Results from last 7 days   Lab Units 11/07/24  0133   WBC 10*3/mm3 12.65*   HEMOGLOBIN g/dL 8.0*   HEMATOCRIT % 26.2*   PLATELETS 10*3/mm3 451*     Results from last 7 days   Lab Units  11/07/24  0227   SODIUM mmol/L 136   POTASSIUM mmol/L 5.0   CHLORIDE mmol/L 104   CO2 mmol/L 22.4   BUN mg/dL 62*   CREATININE mg/dL 2.22*   GLUCOSE mg/dL 76   CALCIUM mg/dL 8.6     Results from last 7 days   Lab Units 11/07/24 0227   SODIUM mmol/L 136   POTASSIUM mmol/L 5.0   CHLORIDE mmol/L 104   CO2 mmol/L 22.4   BUN mg/dL 62*   CREATININE mg/dL 2.22*   GLUCOSE mg/dL 76   CALCIUM mg/dL 8.6                   Radiology  Imaging Results (Last 72 Hours)       Procedure Component Value Units Date/Time    CT Abdomen Pelvis Without Contrast [[id number]] Collected: 11/06/24 1618     Updated: 11/06/24 1625    Narrative:      CT ABDOMEN PELVIS WO CONTRAST    Date of Exam: 11/6/2024 4:08 PM EST    Indication: Abdominal pain.    Comparison: CT abdomen pelvis with contrast 10/11/2024    Technique: Axial CT images were obtained of the abdomen and pelvis without the administration of contrast. Sagittal and coronal reconstructions were performed.  Automated exposure control and iterative reconstruction methods were used.      Findings:  3.8 cm mid descending thoracic aortic aneurysm (2/1), incompletely imaged. Advanced calcific atherosclerosis in the abdominal aorta and common iliac arteries.    Heart size is normal. Coronary artery calcifications are present. No acute basilar airspace disease.    Cholecystectomy. No abnormal biliary ductal dilation. Liver, spleen, pancreas, adrenals and kidneys maintain a normal noncontrast appearance.    Submucosal fat deposition is demonstrated within the ascending and transverse colonic segments as can be seen as sequelae of prior bouts of colitis.    There is short segment wall thickening and stranding about the distal descending colon-sigmoid colon junction (series 2 image 86, series 3 image 38) suggesting active inflammation.. No evidence of bowel obstruction. Mild colonic stool burden.    Urinary bladder and rectum are within normal limits. Hysterectomy.    No adenopathy or free fluid.  No pneumatosis.    Multilevel advanced lumbar facet arthropathy with grade 1 anterolisthesis L4 upon L5. No acute or suspicious osseous abnormalities.        Impression:      Impression:    1. Segmental thickening and inflammatory change of the descending colon-sigmoid colon junction. Correlate for infectious or inflammatory colitis.  2. Incompletely imaged 3.8 cm mid ascending thoracic aortic aneurysm.            Electronically Signed: Ute Snider MD    11/6/2024 4:23 PM EST    Workstation ID: [id number]            Cardiology      Results Review:  I have reviewed all clinical data, test, lab, and imaging results.       Schedule Meds  [Held by provider] atorvastatin, 40 mg, Oral, Nightly  cefTRIAXone, 1,000 mg, Intravenous, Q24H  clopidogrel, 75 mg, Oral, Daily  [Held by provider] folic acid, 1,000 mcg, Oral, Daily  levothyroxine, 50 mcg, Oral, Q AM  [Held by provider] melatonin, 10 mg, Oral, Nightly  methylPREDNISolone sodium succinate, 20 mg, Intravenous, Q8H  metroNIDAZOLE, 500 mg, Intravenous, Q8H  midodrine, 10 mg, Oral, Q8H  [Held by provider] pantoprazole, 40 mg, Oral, Daily  sertraline, 50 mg, Oral, Daily  [Held by provider] spironolactone, 25 mg, Oral, Daily  tiotropium bromide monohydrate, 2 puff, Inhalation, Daily - RT        Infusion Meds  sodium chloride, 100 mL/hr, Last Rate: 100 mL/hr (11/07/24 0128)        PRN Meds    albuterol    influenza vaccine    Morphine    ondansetron    sodium chloride      Assessment & Plan       Assessment    Colitis noted on CT scan of abdomen pelvis probably secondary to Crohn's disease rather than infection.  Patient presented to hospital with rectal bleed.  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  Treated with p.o. ivermectin by GI service.  Patient does not appear to be septic to suspect hyperinfection     Crohn's disease and rheumatoid arthritis.  Patient states  that she has been on Humira since June but has lost over 40 pounds since that time.  She also states that she is on methotrexate, prednisone and Rinvoq.    Acute kidney injury on chronic kidney disease-nephrology following    COPD-chronically on 2 L of oxygen by nasal cannula    Plan    Discontinue Rocephin  Continue IV Flagyl 500 mg every 8 hours for now  Send stool for GI PCR panel, C. difficile toxin and antigen and ova and parasites  Stool was positive for strongyloidiasis then recommend sputum for ova and parasite to rule out disseminated disease and try to avoid long-term steroids since increase the risk of hyperinfection  Continue supportive care  A.m. labs  Patient will need to hold immunosuppressive medications until any infection has been cleared    Kiah Chirinos, DRU  11/07/24  16:24 EST    Note is dictated utilizing voice recognition software/Dragon

## 2024-11-07 NOTE — CONSULTS
INITIAL CONSULT NOTE      Patient Name: Maryann Gaitan  : 1950  MRN: [id number]  Primary Care Physician: Eduard Little MD  Date of admission: 2024    Patient Care Team:  Eduard Little MD as PCP - General (Family Medicine)        Reason for Consult:       BISHNU  Subjective   History of Present Illness:   Chief Complaint:   Chief Complaint   Patient presents with    Black or Bloody Stool    Abdominal Pain     HISTORY:  Maryann Gaitan is a 74 y.o. female with past medical history of Crohn disease, hypertension, on hydrochlorothiazide, history of COPD, hypothyroidism, on methotrexate also on Humira.  Also history of mitral regurgitation coronary artery disease mainly admitted to the hospital because of the blood in the stool and also complaining of some volume depletion with some dizziness.  Blood pressure was significantly on the low side in 90s at the time of admission also some reading of mid 80s.  Patient was taking his antihypertensive medicine also on methotrexate.  Also on diuretics.  IV fluid given creatinine that was 2.8 already improving to 2.2 with some improvement in the urine output  We will follow-up with the complete labs tomorrow morning          Review of systems:  All other review of system unremarkable  Constitutional: No fever, no chills, no lethargy, no weakness.  HEENT:  No headache, otalgia, itchy eyes, nasal discharge or sore throat.  Cardiac:  No chest pain, dyspnea, orthopnea or PND.  Chest:              No cough, phlegm or wheezing.  Abdomen:  No abdominal pain, nausea or vomiting.  Neuro:  No focal weakness, abnormal movements orseizure like activity.  :   No hematuria, no pyuria, no dysuria, no flank pain.  ROS was otherwise negative except as mentioned in the [place].       Personal History:     Past Medical History:   Past Medical History:   Diagnosis Date    Arthritis     CAD (coronary artery disease)     COPD (chronic obstructive pulmonary disease)      Hypertension     Mood disorder     Thyroid disease        Surgical History:      Past Surgical History:   Procedure Laterality Date    BRONCHOSCOPY N/A 10/16/2024    Procedure: BRONCHOSCOPY;  Surgeon: Gallo Pope MD;  Location: [facility] ENDOSCOPY;  Service: Pulmonary;  Laterality: N/A;    CARDIAC CATHETERIZATION N/A 10/15/2024    Procedure: Left Heart Cath, possible pci;  Surgeon: Travis Connor MD;  Location: [facility] CATH INVASIVE LOCATION;  Service: Cardiovascular;  Laterality: N/A;    CARDIAC CATHETERIZATION N/A 10/22/2024    Procedure: Laser Coronary Atherectomy;  Surgeon: Travis Connor MD;  Location: [facility] CATH INVASIVE LOCATION;  Service: Cardiovascular;  Laterality: N/A;    COLONOSCOPY N/A 10/12/2024    Procedure: COLONOSCOPY WITH BIOPSY AND WIRE GUIDED BALLOON DILATION OF TERMINAL ILEUM;  Surgeon: Rob Strong MD;  Location: [facility] ENDOSCOPY;  Service: Gastroenterology;  Laterality: N/A;  Colitis, crohns of terminal ileum, right colon ulcers, diverticulosis, hemorroids    ENDOSCOPY N/A 10/12/2024    Procedure: ESOPHAGOGASTRODUODENOSCOPY WITH BIOPSY X 2 AREA;  Surgeon: Rob Strong MD;  Location: [facility] ENDOSCOPY;  Service: Gastroenterology;  Laterality: N/A;  Chronic gastritis, HH    HYSTERECTOMY      LEFT HEART CATH         Family History: family history includes Dementia in her mother; Heart disease in her father and mother; Hypertension in her father; Stroke in her mother. Otherwise pertinent FHx was reviewed and unremarkable.     Social History:  reports that she has quit smoking. Her smoking use included cigarettes. She has never used smokeless tobacco. She reports that she does not drink alcohol and does not use drugs.    Medications:  Prior to Admission medications    Medication Sig Start Date End Date Taking? Authorizing Provider   Adalimumab (Humira, 2 Pen,) 40 MG/0.4ML Pen-injector Kit Inject 40 mg under the skin into the appropriate area as  directed Every 14 (Fourteen) Days.   Yes Donna Kraft MD   amLODIPine (NORVASC) 10 MG tablet Take 1 tablet by mouth Daily.   Yes Donna Kraft MD   cholecalciferol (VITAMIN D3) 1.25 MG (98844 UT) capsule Take 1 capsule by mouth 2 (Two) Times a Week. Wed, Sat   Yes Donna Kraft MD   colestipol (COLESTID) 1 g tablet Take 1 tablet by mouth 2 (Two) Times a Day.   Yes Donna Kraft MD   diclofenac (VOLTAREN) 50 MG EC tablet Take 1 tablet by mouth 2 (Two) Times a Day.   Yes Donna Kraft MD   ezetimibe (ZETIA) 10 MG tablet Take 1 tablet by mouth Daily.   Yes Donna Kraft MD   folic acid (FOLVITE) 1 MG tablet Take 1 tablet by mouth Daily.   Yes Donna Kraft MD   hydroCHLOROthiazide 25 MG tablet Take 1 tablet by mouth Daily.   Yes Donna Kraft MD   levothyroxine (SYNTHROID, LEVOTHROID) 50 MCG tablet Take 1 tablet by mouth Daily.   Yes Donna Kraft MD   methotrexate 2.5 MG tablet Take 8 tablets by mouth 1 (One) Time Per Week.   Yes Donna Kraft MD   pantoprazole (PROTONIX) 20 MG EC tablet Take 1 tablet by mouth Daily.   Yes Donna Kraft MD   predniSONE (DELTASONE) 5 MG tablet Take 1 tablet by mouth Daily.   Yes Donna Kraft MD   sertraline (ZOLOFT) 50 MG tablet Take 1 tablet by mouth Daily.   Yes Donna Kraft MD   tiotropium (SPIRIVA) 18 MCG per inhalation capsule Place 1 capsule into inhaler and inhale Daily.   Yes Donna Kraft MD     Scheduled Meds:[Held by provider] atorvastatin, 40 mg, Oral, Nightly  cefTRIAXone, 1,000 mg, Intravenous, Q24H  [Held by provider] clopidogrel, 75 mg, Oral, Daily  [Held by provider] folic acid, 1,000 mcg, Oral, Daily  levothyroxine, 50 mcg, Oral, Q AM  [Held by provider] melatonin, 10 mg, Oral, Nightly  methylPREDNISolone sodium succinate, 20 mg, Intravenous, Q8H  metroNIDAZOLE, 500 mg, Intravenous, Q8H  midodrine, 10 mg, Oral, Q8H  [Held by provider] pantoprazole, 40 mg,  Oral, Daily  sertraline, 50 mg, Oral, Daily  [Held by provider] spironolactone, 25 mg, Oral, Daily  tiotropium bromide monohydrate, 2 puff, Inhalation, Daily - RT      Continuous Infusions:sodium chloride, 100 mL/hr, Last Rate: 100 mL/hr (11/07/24 0128)      PRN Meds:  albuterol    influenza vaccine    Morphine    ondansetron    sodium chloride  Allergies:    Allergies   Allergen Reactions    Codeine Hives    Penicillin G Sodium Hives       Objective   Exam:     Vital Signs  Temp:  [97.9 °F (36.6 °C)-98.6 °F (37 °C)] 97.9 °F (36.6 °C)  Heart Rate:  [54-77] 65  Resp:  [16-25] 19  BP: ()/(38-56) 95/38  SpO2:  [95 %-98 %] 97 %  on   ;   Device (Oxygen Therapy): room air  Body mass index is 21.91 kg/m².  EXAM  General: Elderly white female in no acute distress.    Head:      Normocephalic and atraumatic.    Eyes:      PERRL/EOM intact, conjunctivae and sclerae clear without nystagmus.    Neck:      No masses, thyromegaly,  trachea central   Lungs:    Clear bilaterally to auscultation.    Heart:      Regular rate and rhythm, no murmur no gallop  Abd:        Soft, nontender, not distended, bowel sounds positive, no shifting dullness.  Msk:        No deformity or scoliosis noted of thoracic or lumbar spine.    Pulses:   Pulses normal in all 4 extremities.    Extremities:        No cyanosis or clubbing--no significant edema.    Neuro:    No focal deficits.   alert oriented x3  Skin:       Intact without lesions or rashes.    Psych:    Alert and cooperative; normal mood and affect; normal attention span       Results Review:  I have personally reviewed most recent Data :  BMP @LABRCNT(creatinine:10)  CBC    Results from last 7 days   Lab Units 11/07/24  0133 11/06/24  1442   WBC 10*3/mm3 12.65* 12.46*   HEMOGLOBIN g/dL 8.0* 9.4*   PLATELETS 10*3/mm3 451* 526*     CMP   Results from last 7 days   Lab Units 11/07/24  0227 11/06/24  1442   SODIUM mmol/L 136 134*   POTASSIUM mmol/L 5.0 5.8*   CHLORIDE mmol/L 104 97*    CO2 mmol/L 22.4 23.9   BUN mg/dL 62* 75*   CREATININE mg/dL 2.22* 2.84*   GLUCOSE mg/dL 76 138*   ALBUMIN g/dL  --  2.9*   BILIRUBIN mg/dL  --  0.4   ALK PHOS U/L  --  108   AST (SGOT) U/L  --  15   ALT (SGPT) U/L  --  12   LIPASE U/L  --  22     ABG      CT Abdomen Pelvis Without Contrast    Result Date: 11/6/2024  Impression: 1. Segmental thickening and inflammatory change of the descending colon-sigmoid colon junction. Correlate for infectious or inflammatory colitis. 2. Incompletely imaged 3.8 cm mid ascending thoracic aortic aneurysm. Electronically Signed: Ute Snider MD  11/6/2024 4:23 PM EST  Workstation ID: [id number]     Results for orders placed during the hospital encounter of 10/08/24    Adult Transesophageal Echo ([facility]) W/ Cont if Necessary Per Protocol (Cardiology Department)    Interpretation Summary    Left ventricular systolic function is normal. Left ventricular ejection fraction appears to be 56 - 60%.    The left atrial cavity is severely dilated.    Saline test results are negative.    There is mild, posterior mitral leaflet thickening present.    Moderate to severe mitral valve regurgitation is present with a centrally-directed jet noted.    There is moderate, (grade 3) plaque in the descending aorta present.        Assessment & Plan   Assessment and Plan:         Blood in stool    Severe mitral regurgitation    Dyslipidemia    CAD S/P percutaneous coronary angioplasty    Acute kidney injury    Hyperkalemia    Leukocytosis    Acute UTI (urinary tract infection)    Colitis    Inflammatory bowel disease (Crohn's disease)    Hypothyroidism (acquired)    Anxiety associated with depression    COPD (chronic obstructive pulmonary disease)    Rheumatoid arthritis    ASSESSMENT:  Acute kidney injury likely related to volume depletion   Hyperkalemia secondary to acute kidney injury which is getting better  hyponatremia much better than before   history of hypertension  History of arthritis  History of  Crohn disease  10/12/2024 outpatient EGD colonoscopy showed small hiatal hernia chronic gastritis, normal duodenum, colon ulcer noted.  History of severe mitral regurg scheduled for MitraClip  History of coronary artery disease  Blood in the stool likely related to Crohn disease        PLAN :     Renal functions are improving with IV fluid will continue IV fluid for another 24 hours  Likely BISHNU related to patient intermittent diarrhea and acute abdomen  baseline renal functions are normal with creatinine of 0.36 back in October 2024  Hyperkalemia secondary to Aldactone and BISHNU  Follow-up with the labs tomorrow morning  Potassium that was pretty high yesterday has improved  History of significant hyponatremia now better.  Likely had some SIADH which is improved  Patient need to follow-up in the renal clinic  Hold all medications specially any nephrotoxic medicine I will hold methotrexate for today may be able to restart.  Hold Aldactone for hyperkalemia      Buddy Pierre MD  [facility]  11/7/2024  15:29 EST    Patient antihypertensive medication also discontinued sodium level that was 122 increased to 126

## 2024-11-07 NOTE — CASE MANAGEMENT/SOCIAL WORK
Case Management Readmission Assessment Note    Case Management Readmission Assessment (all recorded)       Readmission Interview       Row Name 11/07/24 1430             Readmission Indications    Is the patient and/or family able to complete the readmission assessment questions? Yes      Is this hospitalization related to the prior hospital diagnosis? Yes        Row Name 11/07/24 1430             Recommendation for rehospitalization    Did you speak with your physician prior to coming to the hospital Yes  Namita Lui called PCP/ Per HH liaison during PTA visit 11/6, patient was extremely fatigued. Jeramy showed PTA  photo of earlier BM with large amount of blood noted. Granddaughter stated PCP advised patient to seek ER treatment.      If yes, what physician did you speak with? PCP - [place]        Row Name 11/07/24 1430             Follow-up Appointments    Do you have a PCP? Yes      Did you have an appointment with PCP after your hospitalization? No      Did you have an appointment with a Specialist? No  upcoming GI appt      Are you current with the Pulmonary Clinic? No      Are you current with the CHF Clinic? No        Row Name 11/07/24 1430             Medications    Did you have newly prescribed medications at discharge? Yes      Did you understand the reasons for your medications at discharge and how to take them? Yes      Did you understand the side effects of your medications? Yes      Are you taking all of you prescribed medications? Yes      What pharmacy was used to fill prescription(s)? [facility] [place]      Were medications picked up? Yes        Row Name 11/07/24 1430             Discharge Instructions    Did you understand your discharge instructions? Yes      Did your family/caregiver hear your instructions? Yes      Were you told to eat a special diet? No      Did you adhere to the diet? No      Were you given a number of someone to call if you had questions or concerns? Yes         Row Name 11/07/24 1430             Index discharge location/services    Where did you go upon discharge? Home with services      Do you have supportive family or friends in the home? Yes      What services were arranged at discharge? Home Health      Home Health Service used? [facility]      Have you had a Home Health visit since discharge? Yes        Row Name 11/07/24 1430             Discharge Readiness    On a scale of 1-5 (5 being well prepared), how ready were you for discharge 5      Recommendation based on interview Transportation assistance;Additional caregiver support        Row Name 11/07/24 1430             Palliative Care/Hospice    Are you current with Palliative Care? No      Are you current with Hospice Care? No        Row Name 11/07/24 1430 11/06/24 2051          Advance Directives (For Healthcare)    Pre-existing AND/MOST/POLST Order No No     Advance Directive Status Patient has advance directive, copy in chart Patient has advance directive, copy in chart     Type of Advance Directive Health care directive/Living will Health care directive/Living will     Have you reviewed your Advance Directive and is it valid for this stay? Yes Yes     Literature Provided on Advance Directives No No     Patient Requests Assistance on Advance Directives Patient Declined --       Row Name 11/07/24 1430             Readmission Assessment Final Comments    Final Comments Previous 10/8-10/23 To ED CT negative for PE but showed cavitary lesions. Multifocal pneumonia with acute hypoxic respiratory failure. Pulmonary performed bronch with BAL - and it grew normal resp wu. Pt had IV abx + 02. Course of IV abx complete and weaned O2 to RA. Also had mildly elevated Troponins. Echo showed EF 56-60%. Cardiology wanted CABG + mitral valve surgery however cariothoracic declined d/t comorbidities. Cardiac Cath showed right total and severe ostial LAD disease. PCI to LAD. After heart cath A fib was seen so started on Eliquis  however drop in H+H so hold Eliquis. GI consulted for Crohns mgmt, Mesalamine and Prednisone. EGD/Colon showed chronic gastritis, stricture that was dilated, diverticulosis and Grade II internal Hemorrhoids. Cards planning [facility] outpt. Current 11/6: Present to ED with abd pain and dark red stools x2days. Granddaughter showed PTA on HH visit and had previously called PCP whom both suggested going to ED. Abd CT showing colitis, BISHNU BUN 75, Cr 2.5. Elevated WBC - likely UTI. IV abx, IV steroid, IVF, blood cultures pending, Consult to cardiology, ID, Neph, Nutrition, GI

## 2024-11-07 NOTE — THERAPY EVALUATION
Patient Name: Maryann Gaitan  : 1950    MRN: [id number]                              Today's Date: 2024       Admit Date: 2024    Visit Dx:     ICD-10-CM ICD-9-CM   1. BISHNU (acute kidney injury)  N17.9 584.9   2. Hyperkalemia  E87.5 276.7   3. Acute cystitis with hematuria  N30.01 595.0   4. Generalized abdominal pain  R10.84 789.07     Patient Active Problem List   Diagnosis    Hyponatremia    Diarrhea    Moderate to severe mitral regurgitation    Chest pain, atypical    Cavitary lesion of lung    Multiple tracheobronchial mucus plugs    CAD, multiple vessel    Acute heart failure with preserved ejection fraction (HFpEF)    Severe mitral regurgitation    Dyslipidemia    CAD S/P percutaneous coronary angioplasty    Blood in stool    Acute kidney injury    Hyperkalemia    Leukocytosis    Acute UTI (urinary tract infection)    Colitis    Inflammatory bowel disease (Crohn's disease)    Hypothyroidism (acquired)    Anxiety associated with depression    COPD (chronic obstructive pulmonary disease)    Rheumatoid arthritis     Past Medical History:   Diagnosis Date    Arthritis     CAD (coronary artery disease)     COPD (chronic obstructive pulmonary disease)     Hypertension     Mood disorder     Thyroid disease      Past Surgical History:   Procedure Laterality Date    BRONCHOSCOPY N/A 10/16/2024    Procedure: BRONCHOSCOPY;  Surgeon: Gallo Pope MD;  Location: [facility] ENDOSCOPY;  Service: Pulmonary;  Laterality: N/A;    CARDIAC CATHETERIZATION N/A 10/15/2024    Procedure: Left Heart Cath, possible pci;  Surgeon: Travis Connor MD;  Location: [facility] CATH INVASIVE LOCATION;  Service: Cardiovascular;  Laterality: N/A;    CARDIAC CATHETERIZATION N/A 10/22/2024    Procedure: Laser Coronary Atherectomy;  Surgeon: Travis Connor MD;  Location: [facility] CATH INVASIVE LOCATION;  Service: Cardiovascular;  Laterality: N/A;    COLONOSCOPY N/A 10/12/2024    Procedure: COLONOSCOPY WITH BIOPSY  AND WIRE GUIDED BALLOON DILATION OF TERMINAL ILEUM;  Surgeon: Rob Strong MD;  Location: [facility] ENDOSCOPY;  Service: Gastroenterology;  Laterality: N/A;  Colitis, crohns of terminal ileum, right colon ulcers, diverticulosis, hemorroids    ENDOSCOPY N/A 10/12/2024    Procedure: ESOPHAGOGASTRODUODENOSCOPY WITH BIOPSY X 2 AREA;  Surgeon: Rob Strong MD;  Location: [facility] ENDOSCOPY;  Service: Gastroenterology;  Laterality: N/A;  Chronic gastritis, HH    HYSTERECTOMY      LEFT HEART CATH        General Information       Row Name 11/07/24 1636          OT Time and Intention    Document Type evaluation  -LS     Mode of Treatment occupational therapy  -LS     Patient Effort adequate  -LS       Row Name 11/07/24 1636          General Information    Patient Profile Reviewed yes  -LS     Prior Level of Function min assist:;ADL's;all household mobility  Uses a rollator  -LS     Existing Precautions/Restrictions fall;orthostatic hypotension  -LS     Barriers to Rehab medically complex;previous functional deficit;cognitive status  -LS       Row Name 11/07/24 1636          Living Environment    People in Home grandchild(keegan)  Grandchildren provide care for pt  -LS       Row Name 11/07/24 1636          Home Main Entrance    Number of Stairs, Main Entrance two  -LS       Row Name 11/07/24 1636          Cognition    Orientation Status (Cognition) oriented to;person;place;disoriented to;situation;time  -LS       Row Name 11/07/24 1636          Safety Issues/Impairments Affecting Functional Mobility    Safety Issues Affecting Function (Mobility) awareness of need for assistance;impulsivity;insight into deficits/self-awareness;problem-solving  -LS     Impairments Affecting Function (Mobility) endurance/activity tolerance;pain;strength;cognition  -LS               User Key  (r) = Recorded By, (t) = Taken By, (c) = Cosigned By      Initials Name Provider Type    LS Dwaine Jones OT Occupational Therapist                      Mobility/ADL's       Row Name 11/07/24 1640          Bed Mobility    Bed Mobility supine-sit;rolling right  -LS     Supine-Sit [place] (Bed Mobility) contact guard  -LS       Row Name 11/07/24 1640          Transfers    Transfers bed-chair transfer;sit-stand transfer  -LS       Row Name 11/07/24 1640          Bed-Chair Transfer    Bed-Chair [place] (Transfers) contact guard  -LS     Assistive Device (Bed-Chair Transfers) walker, front-wheeled  -LS       Row Name 11/07/24 1640          Sit-Stand Transfer    Sit-Stand [place] (Transfers) contact guard  -LS     Assistive Device (Sit-Stand Transfers) walker, front-wheeled  -LS       Row Name 11/07/24 1640          Functional Mobility    Patient was able to Ambulate no, other medical factors prevent ambulation  -     Reason Patient was unable to Ambulate Hypotension  -       Row Name 11/07/24 1640          Activities of Daily Living    BADL Assessment/Intervention toileting;lower body dressing  -       Row Name 11/07/24 1640          Toileting Assessment/Training    [place] Level (Toileting) toileting skills;dependent (less than 25% patient effort)  -       Row Name 11/07/24 1640          Lower Body Dressing Assessment/Training    [place] Level (Lower Body Dressing) lower body dressing skills;doff;don;pants/bottoms;moderate assist (50% patient effort)  -     Position (Lower Body Dressing) edge of bed sitting;supported standing  -               User Key  (r) = Recorded By, (t) = Taken By, (c) = Cosigned By      Initials Name Provider Type     Dwaine Jones OT Occupational Therapist                   Obj/Interventions       Row Name 11/07/24 1645          Sensory Assessment (Somatosensory)    Sensory Assessment (Somatosensory) sensation intact  -       Row Name 11/07/24 1645          Vision Assessment/Intervention    Vision Assessment Comment Wearing sunglasses indoors  -       Row Name 11/07/24 1646           Range of Motion Comprehensive    General Range of Motion bilateral lower extremity ROM WNL  -LS       Row Name 11/07/24 1645          Strength Comprehensive (MMT)    General Manual Muscle Testing (MMT) Assessment no strength deficits identified  -       Row Name 11/07/24 1645          Balance    Balance Assessment sitting static balance;sitting dynamic balance;standing static balance;standing dynamic balance  -LS     Static Sitting Balance independent  -LS     Dynamic Sitting Balance independent  -LS     Position, Sitting Balance sitting edge of bed  -LS     Static Standing Balance contact guard  -LS     Dynamic Standing Balance contact guard  -LS     Position/Device Used, Standing Balance walker, front-wheeled  -LS               User Key  (r) = Recorded By, (t) = Taken By, (c) = Cosigned By      Initials Name Provider Type    LS Dwaine Jones OT Occupational Therapist                   Goals/Plan       Row Name 11/07/24 1657          Bed Mobility Goal 1 (OT)    Activity/Assistive Device (Bed Mobility Goal 1, OT) bed mobility activities, all  -LS     [place] Level/Cues Needed (Bed Mobility Goal 1, OT) modified independence  -LS     Time Frame (Bed Mobility Goal 1, OT) long term goal (LTG);2 weeks  -       Row Name 11/07/24 1657          Transfer Goal 1 (OT)    Activity/Assistive Device (Transfer Goal 1, OT) transfers, all  -LS     [place] Level/Cues Needed (Transfer Goal 1, OT) modified independence  -LS     Time Frame (Transfer Goal 1, OT) long term goal (LTG);2 weeks  -       Row Name 11/07/24 1657          Dressing Goal 1 (OT)    Activity/Device (Dressing Goal 1, OT) dressing skills, all  -LS     [place]/Cues Needed (Dressing Goal 1, OT) minimum assist (75% or more patient effort)  -LS     Time Frame (Dressing Goal 1, OT) long term goal (LTG);2 weeks  -       Row Name 11/07/24 1657          Toileting Goal 1 (OT)    Activity/Device (Toileting Goal 1, OT) toileting skills, all  -LS      [place] Level/Cues Needed (Toileting Goal 1, OT) minimum assist (75% or more patient effort)  -LS     Time Frame (Toileting Goal 1, OT) long term goal (LTG);2 weeks  -LS       Row Name 11/07/24 1657          Therapy Assessment/Plan (OT)    Planned Therapy Interventions (OT) activity tolerance training;BADL retraining;functional balance retraining;IADL retraining;occupation/activity based interventions;ROM/therapeutic exercise;strengthening exercise;transfer/mobility retraining;patient/caregiver education/training  -LS               User Key  (r) = Recorded By, (t) = Taken By, (c) = Cosigned By      Initials Name Provider Type    LS Dwaine Jones OT Occupational Therapist                   Clinical Impression       Row Name 11/07/24 1646          Pain Assessment    Pretreatment Pain Rating 0/10 - no pain  -LS     Posttreatment Pain Rating 0/10 - no pain  -LS       Row Name 11/07/24 1646          Plan of Care Review    Plan of Care Reviewed With patient  -LS     Outcome Evaluation 74 y.o. female with a CMH of coronary artery disease, recent PCI October 22, 2024, COPD not on oxygen, Crohn's disease and rheumatoid arthritis, severe mitral regurgitation currently scheduled for transcatheter mitral valve repair 11/21/2024, history of thoracic aortic aneurysm 3.8 cm per recent CT, who [facility]24 with complaints of abdominal pain and dark bloody stools. Pt dx with acute UTI, still undergoing workup for melena. Patient lives with granddaughters and normally uses rollator for household mobility with min A as she has had falls using her rollator per her reports. Granddaughters assist with ADLs. Patient is current with  PT/OT. This date, pt had had a BM in brief upon arrival. Rolling in bed completed with verbal cues, otherwise pt dependent for karine-hygiene. SBA to come to sitting EOB, Min A for donning briefs over LEs. She required CGA to stand, and was able to manage briefs over  hips in standigng. Limited mobility secondary to hypotension, thus transfer was extent of mobility. At dc, pt safe to return home with 24/7 supervision/assist. OT will follow.  -       Row Name 11/07/24 1646          Therapy Assessment/Plan (OT)    Rehab Potential (OT) good  -LS     Criteria for Skilled Therapeutic Interventions Met (OT) yes;skilled treatment is necessary  -LS     Therapy Frequency (OT) 3 times/wk  -LS     Predicted Duration of Therapy Intervention (OT) until dc  -       Row Name 11/07/24 1646          Therapy Plan Review/Discharge Plan (OT)    Anticipated Discharge Disposition (OT) home with 24/7 care;home with home health  -       Row Name 11/07/24 1646          Vital Signs    Pre Systolic BP Rehab 85  -LS     Pre Treatment Diastolic BP 50  -LS     Intra Systolic BP Rehab 99  -LS     Intra Treatment Diastolic BP 44  -LS     Post Systolic BP Rehab 96  -LS     Post Treatment Diastolic BP 56  -LS     O2 Delivery Pre Treatment room air  -LS     O2 Delivery Intra Treatment room air  -LS     O2 Delivery Post Treatment room air  -LS     Pre Patient Position Supine  -LS     Intra Patient Position Standing  -LS     Post Patient Position Sitting  -LS       Row Name 11/07/24 1646          Positioning and Restraints    Pre-Treatment Position in bed  -LS     Post Treatment Position chair  -LS     In Chair notified nsg;reclined;call light within reach;encouraged to call for assist;exit alarm on;with other staff  -               User Key  (r) = Recorded By, (t) = Taken By, (c) = Cosigned By      Initials Name Provider Type    LS Dwaine Jones, [place] Occupational Therapist                   Outcome Measures       Row Name 11/07/24 1657          How much help from another is currently needed...    Putting on and taking off regular lower body clothing? 2  -LS     Bathing (including washing, rinsing, and drying) 2  -LS     Toileting (which includes using toilet bed pan or urinal) 2  -LS     Putting on and  taking off regular upper body clothing 3  -LS     Taking care of personal grooming (such as brushing teeth) 3  -LS     Eating meals 4  -LS     AM-PAC 6 Clicks Score (OT) 16  -LS       Row Name 11/07/24 1558 11/07/24 0814       How much help from another person do you currently need...    Turning from your back to your side while in flat bed without using bedrails? 4  -CR 4  -AH    Moving from lying on back to sitting on the side of a flat bed without bedrails? 4  -CR 3  -AH    Moving to and from a bed to a chair (including a wheelchair)? 3  -CR 3  -AH    Standing up from a chair using your arms (e.g., wheelchair, bedside chair)? 3  -CR 3  -AH    Climbing 3-5 steps with a railing? 3  -CR 3  -AH    To walk in hospital room? 3  -CR 3  -AH    AM-PAC 6 Clicks Score (PT) 20  -CR 19  -AH      Row Name 11/07/24 1657          Functional Assessment    Outcome Measure Options AM-PAC 6 Clicks Daily Activity (OT)  -               User Key  (r) = Recorded By, (t) = Taken By, (c) = Cosigned By      Initials Name Provider Type    CR Reyes, Carmela, PT Physical Therapist    Katarina Newsome LPN Licensed Nurse    Dwaine Calvo OT Occupational Therapist                    Occupational Therapy Education       Title: PT OT SLP Therapies (In Progress)       Topic: Occupational Therapy (In Progress)       Point: ADL training (Done)       Description:   Instruct learner(s) on proper safety adaptation and remediation techniques during self care or transfers.   Instruct in proper use of assistive devices.                  Learning Progress Summary            Patient Acceptance, E,TB, VU by  at 11/7/2024 1651                      Point: Home exercise program (Not Started)       Description:   Instruct learner(s) on appropriate technique for monitoring, assisting and/or progressing therapeutic exercises/activities.                  Learner Progress:  Not documented in this visit.              Point: Precautions (Done)        Description:   Instruct learner(s) on prescribed precautions during self-care and functional transfers.                  Learning Progress Summary            Patient Acceptance, E,TB, VU by  at 11/7/2024 1658                      Point: Body mechanics (Done)       Description:   Instruct learner(s) on proper positioning and spine alignment during self-care, functional mobility activities and/or exercises.                  Learning Progress Summary            Patient Acceptance, E,TB, VU by  at 11/7/2024 1658                                      User Key       Initials Effective Dates Name Provider Type Discipline     09/22/22 -  Dwaine Jones OT Occupational Therapist OT                  OT Recommendation and Plan  Planned Therapy Interventions (OT): activity tolerance training, BADL retraining, functional balance retraining, IADL retraining, occupation/activity based interventions, ROM/therapeutic exercise, strengthening exercise, transfer/mobility retraining, patient/caregiver education/training  Therapy Frequency (OT): 3 times/wk  Plan of Care Review  Plan of Care Reviewed With: patient  Outcome Evaluation: 74 y.o. female with a CMH of coronary artery disease, recent PCI October 22, 2024, COPD not on oxygen, Crohn's disease and rheumatoid arthritis, severe mitral regurgitation currently scheduled for transcatheter mitral valve repair 11/21/2024, history of thoracic aortic aneurysm 3.8 cm per recent CT, who presented to [facility] on 11/6/2024 with complaints of abdominal pain and dark bloody stools. Pt dx with acute UTI, still undergoing workup for melena. Patient lives with granddaughters and normally uses rollator for household mobility with min A as she has had falls using her rollator per her reports. Granddaughters assist with ADLs. Patient is current with  PT/OT. This date, pt had had a BM in brief upon arrival. Rolling in bed completed with verbal cues, otherwise pt dependent for  karine-hygiene. SBA to come to sitting EOB, Min A for donning briefs over LEs. She required CGA to stand, and was able to manage briefs over hips in standigng. Limited mobility secondary to hypotension, thus transfer was extent of mobility. At CA, pt safe to return home with 24/7 supervision/assist. OT will follow.     Time Calculation:         Time Calculation- OT       Row Name 11/07/24 1658             Time Calculation- OT    OT Start Time 1139  -LS      OT Stop Time 1158  -LS      OT Time Calculation (min) 19 min  -LS      OT Received On 11/07/24  -LS      OT - Next Appointment 11/11/24  -      OT Goal Re-Cert Due Date 11/21/24  -         Untimed Charges    OT Eval/Re-eval Minutes 19  -LS         Total Minutes    Untimed Charges Total Minutes 19  -LS       Total Minutes 19  -LS                User Key  (r) = Recorded By, (t) = Taken By, (c) = Cosigned By      Initials Name Provider Type     Dwaine Jones OT Occupational Therapist                  Therapy Charges for Today       Code Description Service Date Service Provider Modifiers Qty    88058964339 HC OT EVAL MOD COMPLEXITY 4 11/7/2024 Dwaine Jones OT GO 1                 Dwaine Jones OT  11/7/2024

## 2024-11-07 NOTE — PROGRESS NOTES
[facility] MEDICINE SERVICE  DAILY PROGRESS NOTE    NAME: Maryann Gaitan  : 1950  MRN: [id number]      LOS: 1 day     PROVIDER OF SERVICE: Marciano Multani MD    Chief Complaint: Blood in stool    Subjective:     Interval History:  History taken from: patient chart family RN    Abdominal pain.  No chest pain  Son at bedside       Review of Systems:   Review of Systems  All negative except as above   Objective:     Vital Signs  Temp:  [97.6 °F (36.4 °C)-98.6 °F (37 °C)] 97.9 °F (36.6 °C)  Heart Rate:  [54-82] 64  Resp:  [15-25] 18  BP: ()/(39-59) 91/40   Body mass index is 21.91 kg/m².    Physical Exam  Physical Exam  AOx3 NAD  RRR S1 and S2 audible  Lungs with fair air entry  Abdomen with left sided tenderness no guarding no rigidity bowel sounds positive    Diagnostic Data    Results from last 7 days   Lab Units 24  0227 24  0133 24  1442   WBC 10*3/mm3  --  12.65* 12.46*   HEMOGLOBIN g/dL  --  8.0* 9.4*   HEMATOCRIT %  --  26.2* 29.7*   PLATELETS 10*3/mm3  --  451* 526*   GLUCOSE mg/dL 76  --  138*   CREATININE mg/dL 2.22*  --  2.84*   BUN mg/dL 62*  --  75*   SODIUM mmol/L 136  --  134*   POTASSIUM mmol/L 5.0  --  5.8*   AST (SGOT) U/L  --   --  15   ALT (SGPT) U/L  --   --  12   ALK PHOS U/L  --   --  108   BILIRUBIN mg/dL  --   --  0.4   ANION GAP mmol/L 9.6  --  13.1       CT Abdomen Pelvis Without Contrast    Result Date: 2024  Impression: 1. Segmental thickening and inflammatory change of the descending colon-sigmoid colon junction. Correlate for infectious or inflammatory colitis. 2. Incompletely imaged 3.8 cm mid ascending thoracic aortic aneurysm. Electronically Signed: Ute Snider MD  2024 4:23 PM EST  Workstation ID: [id number]       I reviewed the patient's new clinical results.  I reviewed the patient's new imaging results and agree with the interpretation.    Assessment/Plan:     Active and Resolved Problems  Active Hospital Problems    Diagnosis   POA    **Blood in stool [K92.1]  Yes    Acute kidney injury [N17.9]  Yes    Hyperkalemia [E87.5]  Yes    Leukocytosis [D72.829]  Yes    Acute UTI (urinary tract infection) [N39.0]  Yes    Colitis [K52.9]  Yes    Inflammatory bowel disease (Crohn's disease) [K50.90]  Yes    Hypothyroidism (acquired) [E03.9]  Yes    Anxiety associated with depression [F41.8]  Yes    COPD (chronic obstructive pulmonary disease) [J44.9]  Yes    Rheumatoid arthritis [M06.9]  Yes    Dyslipidemia [E78.5]  Yes    CAD S/P percutaneous coronary angioplasty [I25.10, Z98.61]  Not Applicable    Severe mitral regurgitation [I34.0]  Yes      Resolved Hospital Problems   No resolved problems to display.       74-year-old female history of CAD status post PCI 10/22/2024, COPD, Crohn's disease, rheumatoid arthritis on DMARDs, hyperlipidemia, hypothyroidism, severe mitral regurg admitted to [facility] 11/6 with abdominal pain and blood in stool    #Acute colitis  #History of Crohn's  GI following started on IV Solu-Medrol.  History of Strongyloides ID consulted  GI pathogen panel, C. difficile stool for ova and parasite along with Strongyloides  Currently on ceftriaxone and Flagyl    #Acute on chronic anemia  Plavix discontinued by GI patient with recent PCI to LAD on aspirin and Plavix at home high risk to discontinue will get cardiology on board  Monitor H&H  Transfuse PRBCs as needed to keep hemoglobin around 8  Noted no plan for endoscopic intervention given recent scopes by GI    #CAD status post recent PCI to LAD 10/22/2024  #severe mitral regurg    Plavix discontinued by GI patient with recent PCI to LAD on aspirin and Plavix at home high risk for stent stenosis.  Will get cardiology on board       #BISHNU  #Hyperkalemia  On IV fluids  Nephrology following  Diuretics on hold  BMP daily  Avoid hypotension    #History of hypotension  Restart home dose of midodrine      #Hypothyroidism  Restart home dose of levothyroxine     #COPD not in  exacerbation  Continue current nebs    #Rheumatoid arthritis  Hold Plaquenil and steroids for now    #MDD  Restart Zoloft    VTE Prophylaxis:  Mechanical VTE prophylaxis orders are present.             Disposition Planning:     Barriers to Discharge:medical workup   Anticipated Date of Discharge: TBD  Place of Discharge: Home vs Rehab       Time: 52 minutes     Code Status and Medical Interventions: CPR (Attempt to Resuscitate); Full Support   Ordered at: 11/06/24 1922     Code Status (Patient has no pulse and is not breathing):    CPR (Attempt to Resuscitate)     Medical Interventions (Patient has pulse or is breathing):    Full Support       Signature: Electronically signed by Marciano Multani MD, 11/07/24, 09:11 EST.  [facility] Hospitalist Team

## 2024-11-07 NOTE — CASE MANAGEMENT/SOCIAL WORK
Discharge Planning Assessment   [place]     Patient Name: Maryann Gaitan  MRN: [id number]  Today's Date: 11/7/2024    Admit Date: 11/6/2024    Plan: From home with granddaugters and [facility] (Need LETHA)   Discharge Needs Assessment       Row Name 11/07/24 1423       Living Environment    People in Home grandchild(keegan)    Name(s) of People in Home daaleshia Pino    Current Living Arrangements home    Potentially Unsafe Housing Conditions none    In the past 12 months has the electric, gas, oil, or water company threatened to shut off services in your home? No    Primary Care Provided by self;other (see comments)  Juvencio Lui and Jerrica    Provides Primary Care For no one    Family Caregiver if Needed grandchild(keegan), adult    Family Caregiver Names Granddaughteres Sania and Jerrica    Quality of Family Relationships helpful;involved;supportive    Able to Return to Prior Arrangements yes       Resource/Environmental Concerns    Resource/Environmental Concerns none    Transportation Concerns none       Transportation Needs    In the past 12 months, has lack of transportation kept you from medical appointments or from getting medications? no    In the past 12 months, has lack of transportation kept you from meetings, work, or from getting things needed for daily living? No       Food Insecurity    Within the past 12 months, you worried that your food would run out before you got the money to buy more. Never true    Within the past 12 months, the food you bought just didn't last and you didn't have money to get more. Never true       Transition Planning    Patient/Family Anticipates Transition to home with family    Patient/Family Anticipated Services at Transition none    Transportation Anticipated family or friend will provide       Discharge Needs Assessment    Equipment Currently Used at Home shower chair;walker, rolling;nebulizer    Concerns to be Addressed no discharge needs  identified;denies needs/concerns at this time    Do you want help finding or keeping work or a job? I do not need or want help    Do you want help with school or training? For example, starting or completing job training or getting a high school diploma, GED or equivalent No    Anticipated Changes Related to Illness none    Equipment Needed After Discharge none    Outpatient/Agency/Support Group Needs adult     Provided Post Acute Provider List? N/A    Provided Post Acute Provider Quality & Resource List? N/A    Offered/Gave Vendor List no                   Discharge Plan       Row Name 11/07/24 1425       Plan    Plan From home with granddaugters and [facility] (Need LETHA)    Patient/Family in Agreement with Plan yes    Provided Post Acute Provider List? N/A    Provided Post Acute Provider Quality & Resource List? N/A    Plan Comments CM met with patient at the bedside to review discharge planning. Patient lives at home with two granddaughters - Sania and Jerrica, is assist with IADLs and doesn't drive. Granddaughters to transport patient at d/c. Confirmed PCP, insurance, and pharmacy. Patient declines [facility]. Patient denies any difficulty affording food, utilities, or medications. Patient is not current with any HHC/OPPT/OT services. DC Barriers: ID/Neph/Nutrition/GI, IV abx, IVF, blood cultures pending, elevated BUN/Cr, drop in hgb                  Continued Care and Services - Admitted Since 11/6/2024       Home Medical Care Coordination complete.      Service Provider Request Status Services Address Phone Fax Patient Preferred    [facility]  Selected Home Health Services, Home Nursing [address] [facility] [id number] [phone] --                  Expected Discharge Date and Time       Expected Discharge Date Expected Discharge Time    Nov 9, 2024            Demographic Summary       Row Name 11/07/24 1425       General Information    Admission Type inpatient    Arrived From emergency  department    Required Notices Provided Important Message from Medicare    Referral Source admission list    Reason for Consult discharge planning    Preferred Language English       Contact Information    Permission Granted to Share Info With     Contact Information Obtained for                    Functional Status       Row Name 11/07/24 1423       Functional Status    Usual Activity Tolerance fair    Current Activity Tolerance poor       Functional Status, IADL    Medications assistive equipment and person    Meal Preparation assistive equipment and person    Housekeeping assistive equipment and person    Laundry assistive equipment and person    Shopping assistive equipment and person    If for any reason you need help with day-to-day activities such as bathing, preparing meals, shopping, managing finances, etc., do you get the help you need? I could use a little more help       Mental Status    General Appearance WDL WDL       Mental Status Summary    Recent Changes in Mental Status/Cognitive Functioning no changes                  Lacy Dumont RN     [phone]  [email or web address]

## 2024-11-07 NOTE — THERAPY EVALUATION
Patient Name: Maryann Gaitan  : 1950    MRN: [id number]                              Today's Date: 2024       Admit Date: 2024    Visit Dx:     ICD-10-CM ICD-9-CM   1. BISHNU (acute kidney injury)  N17.9 584.9   2. Hyperkalemia  E87.5 276.7   3. Acute cystitis with hematuria  N30.01 595.0   4. Generalized abdominal pain  R10.84 789.07     Patient Active Problem List   Diagnosis    Hyponatremia    Diarrhea    Moderate to severe mitral regurgitation    Chest pain, atypical    Cavitary lesion of lung    Multiple tracheobronchial mucus plugs    CAD, multiple vessel    Acute heart failure with preserved ejection fraction (HFpEF)    Severe mitral regurgitation    Dyslipidemia    CAD S/P percutaneous coronary angioplasty    Blood in stool    Acute kidney injury    Hyperkalemia    Leukocytosis    Acute UTI (urinary tract infection)    Colitis    Inflammatory bowel disease (Crohn's disease)    Hypothyroidism (acquired)    Anxiety associated with depression    COPD (chronic obstructive pulmonary disease)    Rheumatoid arthritis     Past Medical History:   Diagnosis Date    Arthritis     CAD (coronary artery disease)     COPD (chronic obstructive pulmonary disease)     Hypertension     Mood disorder     Thyroid disease      Past Surgical History:   Procedure Laterality Date    BRONCHOSCOPY N/A 10/16/2024    Procedure: BRONCHOSCOPY;  Surgeon: Gallo Pope MD;  Location: [facility] ENDOSCOPY;  Service: Pulmonary;  Laterality: N/A;    CARDIAC CATHETERIZATION N/A 10/15/2024    Procedure: Left Heart Cath, possible pci;  Surgeon: Travis Connor MD;  Location: [facility] CATH INVASIVE LOCATION;  Service: Cardiovascular;  Laterality: N/A;    CARDIAC CATHETERIZATION N/A 10/22/2024    Procedure: Laser Coronary Atherectomy;  Surgeon: Travis Connor MD;  Location: [facility] CATH INVASIVE LOCATION;  Service: Cardiovascular;  Laterality: N/A;    COLONOSCOPY N/A 10/12/2024    Procedure: COLONOSCOPY WITH BIOPSY  AND WIRE GUIDED BALLOON DILATION OF TERMINAL ILEUM;  Surgeon: Rob Strong MD;  Location: [facility] ENDOSCOPY;  Service: Gastroenterology;  Laterality: N/A;  Colitis, crohns of terminal ileum, right colon ulcers, diverticulosis, hemorroids    ENDOSCOPY N/A 10/12/2024    Procedure: ESOPHAGOGASTRODUODENOSCOPY WITH BIOPSY X 2 AREA;  Surgeon: Rob Strong MD;  Location: [facility] ENDOSCOPY;  Service: Gastroenterology;  Laterality: N/A;  Chronic gastritis, HH    HYSTERECTOMY      LEFT HEART CATH        General Information       Row Name 11/07/24 1536          Physical Therapy Time and Intention    Document Type evaluation  -CR     Mode of Treatment physical therapy  -CR       Row Name 11/07/24 1536          General Information    Patient Profile Reviewed yes  -CR     Prior Level of Function min assist:;gait;all household mobility  using rollator however reports of falling with it  -CR     Existing Precautions/Restrictions fall  hypotensive  -CR     Barriers to Rehab medically complex;previous functional deficit  -CR       Row Name 11/07/24 1536          Living Environment    People in Home grandchild(keegan)  current with HH  -CR       Row Name 11/07/24 1536          Home Main Entrance    Number of Stairs, Main Entrance two  -CR       Row Name 11/07/24 1536          Cognition    Orientation Status (Cognition) oriented to;place;person  -CR       Row Name 11/07/24 1536          Safety Issues/Impairments Affecting Functional Mobility    Safety Issues Affecting Function (Mobility) friction/shear risk  -CR     Impairments Affecting Function (Mobility) endurance/activity tolerance;pain;strength  -CR               User Key  (r) = Recorded By, (t) = Taken By, (c) = Cosigned By      Initials Name Provider Type    CR Reyes, Carmela, PT Physical Therapist                   Mobility       Row Name 11/07/24 1551          Bed Mobility    Bed Mobility supine-sit  -CR     Supine-Sit Winter (Bed Mobility) contact  guard  -CR       Row Name 11/07/24 1551          Transfers    Comment, (Transfers) BP low and patient reporting of dizziness, thus gait deferred  -CR       Row Name 11/07/24 1551          Bed-Chair Transfer    Bed-Chair Gage (Transfers) contact guard  -CR     Assistive Device (Bed-Chair Transfers) walker, front-wheeled  -CR       Row Name 11/07/24 1551          Sit-Stand Transfer    Sit-Stand Gage (Transfers) contact guard  -CR     Assistive Device (Sit-Stand Transfers) walker, front-wheeled  -CR               User Key  (r) = Recorded By, (t) = Taken By, (c) = Cosigned By      Initials Name Provider Type    CR Reyes, Carmela, PT Physical Therapist                   Obj/Interventions       Row Name 11/07/24 1552          Range of Motion Comprehensive    General Range of Motion bilateral lower extremity ROM WNL  -CR       Row Name 11/07/24 1552          Strength Comprehensive (MMT)    General Manual Muscle Testing (MMT) Assessment no strength deficits identified  -CR       Row Name 11/07/24 1552          Balance    Balance Assessment sitting static balance;sitting dynamic balance;standing static balance;standing dynamic balance  -CR     Static Sitting Balance independent  -CR     Dynamic Sitting Balance independent  -CR     Position, Sitting Balance sitting edge of bed  -CR     Static Standing Balance contact guard  -CR     Dynamic Standing Balance contact guard  -CR     Position/Device Used, Standing Balance walker, front-wheeled  -CR               User Key  (r) = Recorded By, (t) = Taken By, (c) = Cosigned By      Initials Name Provider Type    CR Reyes, Carmela, PT Physical Therapist                   Goals/Plan       Row Name 11/07/24 1557          Gait Training Goal 1 (PT)    Activity/Assistive Device (Gait Training Goal 1, PT) gait (walking locomotion);assistive device use;decrease fall risk;increase endurance/gait distance;walker, rolling  -CR     Gage Level (Gait Training Goal 1, PT)  standby assist  -CR     Distance (Gait Training Goal 1, PT) 50 ft x 2  -CR     Time Frame (Gait Training Goal 1, PT) long term goal (LTG);2 weeks  -CR       Row Name 11/07/24 0114          Patient Education Goal (PT)    Activity (Patient Education Goal, PT) HEP  -CR     Titus/Cues/Accuracy (Memory Goal 2, PT) demonstrates adequately  -CR     Time Frame (Patient Education Goal, PT) long term goal (LTG);2 weeks  -CR       Row Name 11/07/24 7065          Therapy Assessment/Plan (PT)    Planned Therapy Interventions (PT) balance training;gait training;home exercise program;patient/family education;strengthening  -CR               User Key  (r) = Recorded By, (t) = Taken By, (c) = Cosigned By      Initials Name Provider Type    CR Reyes, Carmela, PT Physical Therapist                   Clinical Impression       Row Name 11/07/24 8345          Pain    Additional Documentation --  patient reports of pain during perihygiene  -CR       Row Name 11/07/24 7117          Plan of Care Review    Plan of Care Reviewed With patient  -CR     Outcome Evaluation 75 y/o female admitted on 11/6 due to abd pain and family noted dark red stools. PMH includes Crohns dse and was in hospital 10/8 to 10/23, COPD, arthritis, CAD with MVR scheduled for 11/21/2024. Patient lives with grand daughter and normally uses rollator for household mobility with min A as she has had falls using her rollator. Patient is current with HH PT/OT. At time of eval patient independent with rolling and only needed CGA for supine to sit and transfers using rw for support. Had low BP entire tx session with patient reporting of being dizzy thus gait deferred at this time. RN made aware. Patient has good family support and should be safe to return home once medically stable with HH PT/OT follow up.  -CR       Row Name 11/07/24 7338          Therapy Assessment/Plan (PT)    Patient/Family Therapy Goals Statement (PT) home  -CR     Rehab Potential (PT) good  -CR      Criteria for Skilled Interventions Met (PT) yes;skilled treatment is necessary  -CR     Therapy Frequency (PT) 5 times/wk  -CR     Predicted Duration of Therapy Intervention (PT) dc  -CR       Row Name 11/07/24 1552          Positioning and Restraints    Post Treatment Position chair  -CR     In Chair notified nsg;reclined;call light within reach;exit alarm on  -CR               User Key  (r) = Recorded By, (t) = Taken By, (c) = Cosigned By      Initials Name Provider Type    CR Reyes, Carmela, PT Physical Therapist                   Outcome Measures       Row Name 11/07/24 1558 11/07/24 0814       How much help from another person do you currently need...    Turning from your back to your side while in flat bed without using bedrails? 4  -CR 4  -AH    Moving from lying on back to sitting on the side of a flat bed without bedrails? 4  -CR 3  -AH    Moving to and from a bed to a chair (including a wheelchair)? 3  -CR 3  -AH    Standing up from a chair using your arms (e.g., wheelchair, bedside chair)? 3  -CR 3  -AH    Climbing 3-5 steps with a railing? 3  -CR 3  -AH    To walk in hospital room? 3  -CR 3  -AH    AM-PAC 6 Clicks Score (PT) 20  -CR 19  -AH              User Key  (r) = Recorded By, (t) = Taken By, (c) = Cosigned By      Initials Name Provider Type    CR Reyes, Carmela, PT Physical Therapist    Katarina Newsome LPN Licensed Nurse                                 Physical Therapy Education       Title: PT OT SLP Therapies (In Progress)       Topic: Physical Therapy (In Progress)       Point: Mobility training (Done)       Learning Progress Summary            Patient Acceptance, E, VU by CR at 11/7/2024 1558                      Point: Home exercise program (Not Started)       Learner Progress:  Not documented in this visit.                              User Key       Initials Effective Dates Name Provider Type Discipline    CR 06/16/21 -  Reyes, Carmela, PT Physical Therapist PT                  PT  Recommendation and Plan  Planned Therapy Interventions (PT): balance training, gait training, home exercise program, patient/family education, strengthening  Outcome Evaluation: 73 y/o female admitted on 11/6 due to abd pain and family noted dark red stools. PMH includes Crohns dse and was in hospital 10/8 to 10/23, COPD, arthritis, CAD with MVR scheduled for 11/21/2024. Patient lives with grand daughter and normally uses rollator for household mobility with min A as she has had falls using her rollator. Patient is current with HH PT/OT. At time of eval patient independent with rolling and only needed CGA for supine to sit and transfers using rw for support. Had low BP entire tx session with patient reporting of being dizzy thus gait deferred at this time. RN made aware. Patient has good family support and should be safe to return home once medically stable with HH PT/OT follow up.     Time Calculation:         PT Charges       Row Name 11/07/24 1559             Time Calculation    Start Time 1139  -CR      Stop Time 1156  -CR      Time Calculation (min) 17 min  -CR      PT Received On 11/07/24  -CR      PT - Next Appointment 11/08/24  -CR      PT Goal Re-Cert Due Date 11/21/24  -CR         Time Calculation- PT    Total Timed Code Minutes- PT 0 minute(s)  -CR                User Key  (r) = Recorded By, (t) = Taken By, (c) = Cosigned By      Initials Name Provider Type    CR Reyes, Carmela, CHINTAN Physical Therapist                  Therapy Charges for Today       Code Description Service Date Service Provider Modifiers Qty    58331200253 HC PT EVAL MOD COMPLEXITY 3 11/7/2024 Reyes, Carmela, PT GP 1            PT G-Codes  AM-PAC 6 Clicks Score (PT): 20  PT Discharge Summary  Anticipated Discharge Disposition (PT): home with assist, home with home health    Carmela Reyes, PT  11/7/2024

## 2024-11-07 NOTE — NURSING NOTE
WOCN note:    74 yr old female admitted 11/6/24 with reports of abdominal pain and blood in her stool. WOCN consult received for pressure injuries noted upon admission. Patient is known to this service from a previous admission. She presents with 2 unstageable pressure injuries to her mid sacrum and under her left breast.   The left breast wound has improved from last admission and is autolytically debriding. The wound base remains covered with white-yellow slough and measures approximately 0.3 x 1 cm. Recommend to cover with a silicone foam dressing.     The sacral wound measures about 1 x 0.5 cm and the base is covered with a thin yellow-white slough. Recommend to pack with a small amount of Maxorb and cover with a silicone foam dressing. Implement pressure injury prevention measures with foam wedges for sacral offloading and frequent turning. We will continue to follow.

## 2024-11-07 NOTE — CONSULTS
GI CONSULT  NOTE:    Referring Provider:  Lisa Chicas NP     Chief complaint: Abdominal pain, rectal bleeding    Subjective .     History of present illness: Maryann Gaitan is a 74 y.o. female with history of Crohn's disease, hypertension, COPD, RA, hysterectomy, and cholecystectomy who presents with complaints of abdominal pain and bloody stool.  The patient is well-known to our practice and was recently seen during inpatient admission on 10/11/2024 for complaints of diarrhea.  During her recent admission, the patient did undergo EGD/colonoscopy with results below. Pathology from endoscopy was positive for Strongyloides and she was treated with ivermectin.  During her last admission, she was also treated with IV Solu-Medrol which was transitioned to oral prednisone prior to discharge.  The patient reports that over the past 2 days she has been having worsening abdominal pain and dark bloody stools.  She states that her abdominal pain is located in the lower abdomen.  Unable to identify any exacerbating or alleviating factors.  Does report intermittent nausea, but denies vomiting.  No heartburn or dysphagia.  Reports recent diarrhea.  Does report dark red bloody stools.  No melena.  States that she is still on her prednisone taper and believes that she is down to 25 mg daily (reports that she started at 60 mg daily on discharge).  Patient is a poor historian and is not confident in which medications she is taking.  Her home medication list was reviewed and she has both Humira and Rinvoq listed.  Also on methotrexate.    11/2021 fecal calprotectin -365  8/2021 [facility] -pattern not consistent with IBD     6/2024 CT A/P with -bowel wall thickening involving ileal loops and the terminal ileum with fatty infiltration of the cecum and ascending colon, additional bowel wall thickening involving sigmoid colon and rectum  11/3/2021 CT A/P with: Abnormal thickening of the wall of the distal ileum consistent with  enteritis, questionable Crohn's.  No abscess.      Endo History:  10/12/2024 EGD/colonoscopy (Dr. Strong) -hiatal hernia, gastritis, TI stricture, TI ulcers, Crohn's disease of the small bowel and colon (biopsy consistent with IBD), diverticulosis, grade 2 internal hemorrhoids  7/2021 colonoscopy by Dr. Castillo-ulceration, granularity, scarring, and stenosis in TI with biopsy showing focal chronic active ileitis with detached fragment of acutely inflamed chronic ulcer.  HP polyp, grade 1 internal and external, benign random colon biopsies.  8/2016 EUS by Dr. Castillo-normal pancreatic parenchyma, and leg grade C esophagitis, hiatal hernia.    Past Medical History:  Past Medical History:   Diagnosis Date    Arthritis     CAD (coronary artery disease)     COPD (chronic obstructive pulmonary disease)     Hypertension     Mood disorder     Thyroid disease        Past Surgical History:  Past Surgical History:   Procedure Laterality Date    BRONCHOSCOPY N/A 10/16/2024    Procedure: BRONCHOSCOPY;  Surgeon: Gallo Pope MD;  Location: [facility] ENDOSCOPY;  Service: Pulmonary;  Laterality: N/A;    CARDIAC CATHETERIZATION N/A 10/15/2024    Procedure: Left Heart Cath, possible pci;  Surgeon: Travis Connor MD;  Location: [facility] CATH INVASIVE LOCATION;  Service: Cardiovascular;  Laterality: N/A;    CARDIAC CATHETERIZATION N/A 10/22/2024    Procedure: Laser Coronary Atherectomy;  Surgeon: Travis Connor MD;  Location: [facility] CATH INVASIVE LOCATION;  Service: Cardiovascular;  Laterality: N/A;    COLONOSCOPY N/A 10/12/2024    Procedure: COLONOSCOPY WITH BIOPSY AND WIRE GUIDED BALLOON DILATION OF TERMINAL ILEUM;  Surgeon: Rob Strong MD;  Location: [facility] ENDOSCOPY;  Service: Gastroenterology;  Laterality: N/A;  Colitis, crohns of terminal ileum, right colon ulcers, diverticulosis, hemorroids    ENDOSCOPY N/A 10/12/2024    Procedure: ESOPHAGOGASTRODUODENOSCOPY WITH BIOPSY X 2 AREA;  Surgeon:  Rob Strong MD;  Location: [facility] ENDOSCOPY;  Service: Gastroenterology;  Laterality: N/A;  Chronic gastritis, HH    HYSTERECTOMY      LEFT HEART CATH         Social History:  Social History     Tobacco Use    Smoking status: Former     Types: Cigarettes    Smokeless tobacco: Never   Vaping Use    Vaping status: Never Used   Substance Use Topics    Alcohol use: Never    Drug use: Never       Family History:  Family History   Problem Relation Age of Onset    Heart disease Mother     Dementia Mother     Stroke Mother     Heart disease Father     Hypertension Father        Medications:  Medications Prior to Admission   Medication Sig Dispense Refill Last Dose/Taking    Adalimumab (Humira, 2 Pen,) 40 MG/0.4ML Pen-injector Kit Inject 40 mg under the skin into the appropriate area as directed Every 14 (Fourteen) Days. Indications: Rheumatoid Arthritis   Past Month    aspirin 81 MG EC tablet Take 1 tablet by mouth Daily for 30 days. 30 tablet 0 Taking    atorvastatin (LIPITOR) 40 MG tablet Take 1 tablet by mouth Every Night for 30 days. 30 tablet 0 Taking    methotrexate 2.5 MG tablet Take 8 tablets by mouth 1 (One) Time Per Week. Saturdays   Indications: Non-oncologic   Past Week    midodrine (PROAMATINE) 10 MG tablet Take 1 tablet by mouth 3 (Three) Times a Day Before Meals for 30 days. 90 tablet 0 Taking    pantoprazole (PROTONIX) 20 MG EC tablet Take 1 tablet by mouth Daily. Indications: Heartburn   Taking    predniSONE (DELTASONE) 5 MG tablet Take 1 tablet by mouth Daily. Indications: ACUTE EXACERBATION OF COPD (INACTIVE)   Taking    sertraline (ZOLOFT) 50 MG tablet Take 1 tablet by mouth Daily. Indications: Major Depressive Disorder   Taking    spironolactone (ALDACTONE) 25 MG tablet Take 1 tablet by mouth Daily.   Taking    tiotropium (SPIRIVA) 18 MCG per inhalation capsule Place 1 capsule into inhaler and inhale Daily. Indications: Chronic Obstructive Lung Disease   Taking    upadacitinib ER (Rinvoq)  45 MG tablet sustained-release 24 hour extended release tablet Take 1 tablet by mouth Daily.   11/6/2024 at  8:00 AM    albuterol (PROVENTIL) (2.5 MG/3ML) 0.083% nebulizer solution Take 2.5 mg by nebulization Every 6 (Six) Hours As Needed for Wheezing.       cholecalciferol (VITAMIN D3) 1.25 MG (87344 UT) capsule Take 1 capsule by mouth 2 (Two) Times a Week. Wed, Sat  Indications: Vitamin D Deficiency       clopidogrel (PLAVIX) 75 MG tablet Take 1 tablet by mouth Daily for 30 days. 30 tablet 0     ezetimibe (ZETIA) 10 MG tablet Take 1 tablet by mouth Daily. Indications: High Amount of Fats in the Blood       folic acid (FOLVITE) 1 MG tablet Take 1 tablet by mouth Daily. Indications: Anemia From Inadequate Folic Acid       levothyroxine (SYNTHROID, LEVOTHROID) 50 MCG tablet Take 1 tablet by mouth Daily. Indications: Underactive Thyroid       Melatonin (Melatonin Extra Strength) 10 MG tablet Take 1 tablet by mouth As Needed. Indications: Trouble Sleeping          Scheduled Meds:[Held by provider] atorvastatin, 40 mg, Oral, Nightly  cefTRIAXone, 1,000 mg, Intravenous, Q24H  clopidogrel, 75 mg, Oral, Daily  [Held by provider] folic acid, 1,000 mcg, Oral, Daily  [Held by provider] levothyroxine, 50 mcg, Oral, Q AM  [Held by provider] melatonin, 10 mg, Oral, Nightly  metroNIDAZOLE, 500 mg, Intravenous, Q8H  [Held by provider] midodrine, 10 mg, Oral, Q8H  [Held by provider] pantoprazole, 40 mg, Oral, Daily  [Held by provider] predniSONE, 5 mg, Oral, Daily  [Held by provider] sertraline, 50 mg, Oral, Daily  [Held by provider] spironolactone, 25 mg, Oral, Daily  tiotropium bromide monohydrate, 2 puff, Inhalation, Daily - RT      Continuous Infusions:sodium chloride, 100 mL/hr, Last Rate: 100 mL/hr (11/07/24 0128)      PRN Meds:.  albuterol    influenza vaccine    Morphine    ondansetron    sodium chloride    ALLERGIES:  Codeine and Penicillin g sodium    ROS:  The following systems were reviewed and negative;   Constitution:   No fevers, chills, no unintentional weight loss  Skin: no rash, no jaundice  Eyes:  No blurry vision, no eye pain  HENT:  No change in hearing or smell  Resp:  No dyspnea or cough  CV:  No chest pain or palpitations  :  No dysuria, hematuria  Musculoskeletal:  No leg cramps or arthralgias  Neuro:  No tremor, no numbness  Psych:  No depression or confusion    Objective     Vital Signs:   Vitals:    11/07/24 0348 11/07/24 0734 11/07/24 0736 11/07/24 1138   BP: 91/40   91/48   BP Location: Right arm   Right arm   Patient Position: Lying   Lying   Pulse: 77 64 64 65   Resp: 16 18 18 19   Temp: 97.9 °F (36.6 °C)   97.9 °F (36.6 °C)   TempSrc: Oral   Oral   SpO2:  95% 97% 97%   Weight:       Height:           Physical Exam:       General Appearance:    Awake and alert, in no acute distress   Head:    Normocephalic, without obvious abnormality, atraumatic   Throat:   No oral lesions, no thrush, oral mucosa moist   Lungs:     Respirations regular, even and unlabored   Chest Wall:    No abnormalities observed   Abdomen:     Soft, lower abdominal tenderness, no rebound or guarding, non-distended   Rectal:     Deferred   Extremities:   Moves all extremities, no edema, no cyanosis   Pulses:   Pulses palpable and equal bilaterally   Skin:   No rash, no jaundice, normal palpation   Lymph nodes:   No cervical, supraclavicular or submandibular palpable adenopathy   Neurologic:   Cranial nerves 2 - 12 grossly intact, no asterixis       Results Review:   I reviewed the patient's labs and imaging.  CBC    Results from last 7 days   Lab Units 11/07/24  0133 11/06/24  1442 10/31/24  1340   WBC 10*3/mm3 12.65* 12.46* 22.20*   HEMOGLOBIN g/dL 8.0* 9.4* 9.2*   PLATELETS 10*3/mm3 451* 526* 406     CMP   Results from last 7 days   Lab Units 11/07/24  0227 11/06/24  1442 10/31/24  1340   SODIUM mmol/L 136 134* 137   POTASSIUM mmol/L 5.0 5.8* 4.6   CHLORIDE mmol/L 104 97* 102   CO2 mmol/L 22.4 23.9 25.0   BUN mg/dL 62* 75* 38*   CREATININE  "mg/dL 2.22* 2.84* 1.13*   GLUCOSE mg/dL 76 138* 165*   ALBUMIN g/dL  --  2.9*  --    BILIRUBIN mg/dL  --  0.4  --    ALK PHOS U/L  --  108  --    AST (SGOT) U/L  --  15  --    ALT (SGPT) U/L  --  12  --    LIPASE U/L  --  22  --      Cr Clearance Estimated Creatinine Clearance: 18.5 mL/min (A) (by C-G formula based on SCr of 2.22 mg/dL (H)).  Coag     HbA1C   Lab Results   Component Value Date    HGBA1C 5.64 (H) 10/13/2024     Blood Glucose No results found for: \"POCGLU\"  Infection     UA    Results from last 7 days   Lab Units 11/06/24  1539   NITRITE UA  Negative   WBC UA /HPF 3-5*   BACTERIA UA /HPF Trace*   SQUAM EPITHEL UA /HPF 3-6*     Imaging Results (Last 72 Hours)       Procedure Component Value Units Date/Time    CT Abdomen Pelvis Without Contrast [[id number]] Collected: 11/06/24 1618     Updated: 11/06/24 1625    Narrative:      CT ABDOMEN PELVIS WO CONTRAST    Date of Exam: 11/6/2024 4:08 PM EST    Indication: Abdominal pain.    Comparison: CT abdomen pelvis with contrast 10/11/2024    Technique: Axial CT images were obtained of the abdomen and pelvis without the administration of contrast. Sagittal and coronal reconstructions were performed.  Automated exposure control and iterative reconstruction methods were used.      Findings:  3.8 cm mid descending thoracic aortic aneurysm (2/1), incompletely imaged. Advanced calcific atherosclerosis in the abdominal aorta and common iliac arteries.    Heart size is normal. Coronary artery calcifications are present. No acute basilar airspace disease.    Cholecystectomy. No abnormal biliary ductal dilation. Liver, spleen, pancreas, adrenals and kidneys maintain a normal noncontrast appearance.    Submucosal fat deposition is demonstrated within the ascending and transverse colonic segments as can be seen as sequelae of prior bouts of colitis.    There is short segment wall thickening and stranding about the distal descending colon-sigmoid colon junction (series 2 " image 86, series 3 image 38) suggesting active inflammation.. No evidence of bowel obstruction. Mild colonic stool burden.    Urinary bladder and rectum are within normal limits. Hysterectomy.    No adenopathy or free fluid. No pneumatosis.    Multilevel advanced lumbar facet arthropathy with grade 1 anterolisthesis L4 upon L5. No acute or suspicious osseous abnormalities.        Impression:      Impression:    1. Segmental thickening and inflammatory change of the descending colon-sigmoid colon junction. Correlate for infectious or inflammatory colitis.  2. Incompletely imaged 3.8 cm mid ascending thoracic aortic aneurysm.            Electronically Signed: Ute Snider MD    11/6/2024 4:23 PM EST    Workstation ID: [id number]            ASSESSMENT:  -Lower abdominal pain  -Diarrhea with rectal bleeding  -Leukocytosis  -Normocytic anemia  -Abnormal CT showing segmental thickening and inflammatory changes of the descending/sigmoid colon  -Recent Strongyloides infection in 10/2024 s/p treatment with ivermectin  -Small bowel and colonic Crohn's disease - on Rinvoq  -Electrolyte abnormalities  -UTI  -Hypertension  -COPD  -CAD s/p stent placement on Plavix  -RA - on Humira   -History of hysterectomy  -History of cholecystectomy    PLAN:  Patient is a 74-year-old female with history of small bowel and colonic Crohn's (on Rinvoq), rheumatoid arthritis (on Humira), and cholecystectomy who presented on 11/6 with complaints of abdominal pain, diarrhea, and rectal bleeding.  Recent admission in 10/2024 for persistent diarrhea and abdominal pain as well.  EGD/colonoscopy at that time confirmed Crohn's disease.  Pathology was also positive for Strongyloides and patient was treated with ivermectin.    CT abdomen/pelvis WO on admission shows segmental thickening and inflammatory changes of the descending/sigmoid colon.  Hemoglobin 8.0 from 9.4.  Continue to monitor H/H and transfuse as needed.  We will start IV Solu-Medrol.     Hold Plavix.  Patient recently had EGD/colonoscopy on 10/12/2024.  No plans for repeat at this time.  WBC count 12.65.  Continue antibiotics.  Creatinine 2.22 from 2.84.  Nephrology has been consulted.  Await input.  ID also consulted.  Correction of electrolytes per primary care team.  Will check stool studies to assess for infectious etiology of diarrhea.  Also check stool ova and parasites to ensure that Strongyloides infection has been treated successfully.   Clear liquid diet.  Antiemetics/analgesics as needed.  Supportive care.      I discussed the patients findings and my recommendations with the patient.  I will discuss the case with Dr. Strong and change the plan accordingly.    We appreciate the referral.    Electronically signed by DRU Lew, 11/07/24, 12:25 PM EST.

## 2024-11-07 NOTE — PLAN OF CARE
Goal Outcome Evaluation:                                          Patient admitted from ED for bloody stools/ colitis, BISHNU and hyperkalemia. GI and Nephrology consulted. Patient on IV fluids. Patient currently NPO with ice chips. Patient is from home with granddaughters. Patient A&O X4.

## 2024-11-07 NOTE — H&P
[facility]  History & Physical    Patient Name: Maryann Gaitan  : 1950  MRN: [id number]  Primary Care Physician:  Eduard Little MD  Date of admission: 2024  Date and Time of Service: 2024 at 2000    Subjective      Chief Complaint: abdominal pain and bloody stool    History of Present Illness: Maryann Gaitan is a 74 y.o. female with a CMH of coronary artery disease, recent PCI 2024, COPD not on oxygen, Crohn's disease and rheumatoid arthritis on DMARDs and steroids, hyperlipidemia, hypothyroidism, severe mitral regurgitation currently scheduled for transcatheter mitral valve repair 2024,, history of thoracic aortic aneurysm 3.8 cm per recent CT, who presented to [facility] on 2024 with complaints of abdominal pain generalized and dark bloody stools over the past 2 days recently getting worse.  She rates her pain a 7 out of 10 and says it is constant nothing makes it better or worse.  Reports intermittent nausea without vomiting.  She denies any fevers chills dysuria, hematuria, chest pain or shortness of air.  She is awake and alert and in relatively good historian, granddaughter at bedside assisting with history.    .  Labs today show sodium 134, potassium 5.8, BUN 75 creatinine 2.84 baseline 0.5, glucose 138, albumin 2.9, 1.0, WBC 12.46 and afebrile, hemoglobin 9.4 and stable from previous of 9.2, platelets 526, urinalysis showed 1+ leukocytes 3-5 WBCs trace bacteria.  EKG shows sinus bradycardia heart rate 58 otherwise normal.  CT abdomen and pelvis without contrast per radiology showed segmental thickening and inflammatory changes of the descending colon sigmoid colon junction also shows 3.8 cm mild ascending thoracic aortic aneurysm.    Blood pressure was mildly over the emergency department.  Started on 1 g IV ceftriaxone and given 1 L fluid bolus in the emergency department with some improvement.  She was subsequently  given 10 mg p.o. midodrine and one-time dose Lokelma.  She will be admitted for further evaluation and treatment and gastroenterology has been consulted.    Review of Systems   Constitutional: Negative.    HENT: Negative.     Eyes: Negative.    Respiratory: Negative.     Cardiovascular: Negative.    Gastrointestinal:  Positive for abdominal pain and blood in stool.   Endocrine: Negative.    Genitourinary: Negative.    Musculoskeletal: Negative.    Skin: Negative.    Allergic/Immunologic: Negative.    Neurological: Negative.    Hematological: Negative.    Psychiatric/Behavioral: Negative.     All other systems reviewed and are negative.      Personal History     Past Medical History:   Diagnosis Date    Arthritis     CAD (coronary artery disease)     COPD (chronic obstructive pulmonary disease)     Hypertension     Mood disorder     Thyroid disease        Past Surgical History:   Procedure Laterality Date    BRONCHOSCOPY N/A 10/16/2024    Procedure: BRONCHOSCOPY;  Surgeon: Gallo Pope MD;  Location: [facility] ENDOSCOPY;  Service: Pulmonary;  Laterality: N/A;    CARDIAC CATHETERIZATION N/A 10/15/2024    Procedure: Left Heart Cath, possible pci;  Surgeon: Travis Connor MD;  Location: [facility] CATH INVASIVE LOCATION;  Service: Cardiovascular;  Laterality: N/A;    CARDIAC CATHETERIZATION N/A 10/22/2024    Procedure: Laser Coronary Atherectomy;  Surgeon: Travis Connor MD;  Location: [facility] CATH INVASIVE LOCATION;  Service: Cardiovascular;  Laterality: N/A;    COLONOSCOPY N/A 10/12/2024    Procedure: COLONOSCOPY WITH BIOPSY AND WIRE GUIDED BALLOON DILATION OF TERMINAL ILEUM;  Surgeon: Rob Strong MD;  Location: [facility] ENDOSCOPY;  Service: Gastroenterology;  Laterality: N/A;  Colitis, crohns of terminal ileum, right colon ulcers, diverticulosis, hemorroids    ENDOSCOPY N/A 10/12/2024    Procedure: ESOPHAGOGASTRODUODENOSCOPY WITH BIOPSY X 2 AREA;  Surgeon: Rob Strong MD;   Location: [facility] ENDOSCOPY;  Service: Gastroenterology;  Laterality: N/A;  Chronic gastritis, HH    HYSTERECTOMY      LEFT HEART CATH         Family History: family history includes Dementia in her mother; Heart disease in her father and mother; Hypertension in her father; Stroke in her mother. Otherwise pertinent FHx was reviewed and not pertinent to current issue.    Social History:  reports that she has quit smoking. Her smoking use included cigarettes. She has never used smokeless tobacco. She reports that she does not drink alcohol and does not use drugs.    Home Medications:  Prior to Admission Medications       Prescriptions Last Dose Informant Patient Reported? Taking?    Adalimumab (Humira, 2 Pen,) 40 MG/0.4ML Pen-injector Kit Past Month  Yes Yes    Inject 40 mg under the skin into the appropriate area as directed Every 14 (Fourteen) Days. Indications: Rheumatoid Arthritis    aspirin 81 MG EC tablet   No Yes    Take 1 tablet by mouth Daily for 30 days.    atorvastatin (LIPITOR) 40 MG tablet   No Yes    Take 1 tablet by mouth Every Night for 30 days.    methotrexate 2.5 MG tablet Past Week  Yes Yes    Take 8 tablets by mouth 1 (One) Time Per Week. Saturdays   Indications: Non-oncologic    midodrine (PROAMATINE) 10 MG tablet   No Yes    Take 1 tablet by mouth 3 (Three) Times a Day Before Meals for 30 days.    pantoprazole (PROTONIX) 20 MG EC tablet   Yes Yes    Take 1 tablet by mouth Daily. Indications: Heartburn    predniSONE (DELTASONE) 5 MG tablet   Yes Yes    Take 1 tablet by mouth Daily. Indications: ACUTE EXACERBATION OF COPD (INACTIVE)    sertraline (ZOLOFT) 50 MG tablet   Yes Yes    Take 1 tablet by mouth Daily. Indications: Major Depressive Disorder    spironolactone (ALDACTONE) 25 MG tablet   Yes Yes    Take 1 tablet by mouth Daily.    tiotropium (SPIRIVA) 18 MCG per inhalation capsule   Yes Yes    Place 1 capsule into inhaler and inhale Daily. Indications: Chronic Obstructive Lung Disease     upadacitinib ER (Rinvoq) 45 MG tablet sustained-release 24 hour extended release tablet 11/6/2024  Yes Yes    Take 1 tablet by mouth Daily.    albuterol (PROVENTIL) (2.5 MG/3ML) 0.083% nebulizer solution   Yes No    Take 2.5 mg by nebulization Every 6 (Six) Hours As Needed for Wheezing.    cholecalciferol (VITAMIN D3) 1.25 MG (38299 UT) capsule   Yes No    Take 1 capsule by mouth 2 (Two) Times a Week. Wed, Sat  Indications: Vitamin D Deficiency    clopidogrel (PLAVIX) 75 MG tablet   No No    Take 1 tablet by mouth Daily for 30 days.    ezetimibe (ZETIA) 10 MG tablet   Yes No    Take 1 tablet by mouth Daily. Indications: High Amount of Fats in the Blood    folic acid (FOLVITE) 1 MG tablet   Yes No    Take 1 tablet by mouth Daily. Indications: Anemia From Inadequate Folic Acid    levothyroxine (SYNTHROID, LEVOTHROID) 50 MCG tablet   Yes No    Take 1 tablet by mouth Daily. Indications: Underactive Thyroid    Melatonin (Melatonin Extra Strength) 10 MG tablet   Yes No    Take 1 tablet by mouth As Needed. Indications: Trouble Sleeping              Allergies:  Allergies   Allergen Reactions    Codeine Hives    Penicillin G Sodium Hives       Objective      Vitals:   Temp:  [97.6 °F (36.4 °C)-97.8 °F (36.6 °C)] 97.6 °F (36.4 °C)  Heart Rate:  [54-82] 54  Resp:  [15-16] 16  BP: ()/(39-59) 85/39  Body mass index is 21.91 kg/m².  Physical Exam  Vitals reviewed.   Constitutional:       Appearance: Normal appearance.   HENT:      Head: Normocephalic and atraumatic.      Right Ear: External ear normal.      Left Ear: External ear normal.      Nose: Nose normal.      Mouth/Throat:      Mouth: Mucous membranes are moist.   Eyes:      Extraocular Movements: Extraocular movements intact.   Cardiovascular:      Rate and Rhythm: Regular rhythm. Bradycardia present.      Pulses: Normal pulses.      Heart sounds: Normal heart sounds.   Pulmonary:      Effort: Pulmonary effort is normal.      Breath sounds: Normal breath sounds.    Abdominal:      General: Bowel sounds are normal.      Palpations: Abdomen is soft.   Genitourinary:     Comments: deferred  Musculoskeletal:         General: Normal range of motion.      Cervical back: Normal range of motion and neck supple.   Skin:     General: Skin is warm and dry.   Neurological:      Mental Status: She is alert and oriented to person, place, and time.   Psychiatric:         Mood and Affect: Mood normal.         Behavior: Behavior normal.         Thought Content: Thought content normal.         Judgment: Judgment normal.         Diagnostic Data:  Lab Results (last 24 hours)       Procedure Component Value Units Date/Time    Lactic Acid, Plasma [[id number]]  (Normal) Collected: 11/06/24 1710    Specimen: Blood from Arm, Left Updated: 11/06/24 1744     Lactate 1.0 mmol/L     Blood Culture - Blood, Arm, Right [[id number]] Collected: 11/06/24 1715    Specimen: Blood from Arm, Right Updated: 11/06/24 1724    Blood Culture - Blood, Arm, Left [[id number]] Collected: 11/06/24 1710    Specimen: Blood from Arm, Left Updated: 11/06/24 1714    CBC & Differential [[id number]]  (Abnormal) Collected: 11/06/24 1442    Specimen: Blood Updated: 11/06/24 1613    Narrative:      The following orders were created for panel order CBC & Differential.  Procedure                               Abnormality         Status                     ---------                               -----------         ------                     CBC Auto Differential[[id number]]        Abnormal            Final result               Scan Slide[[id number]]                                       Final result                 Please view results for these tests on the individual orders.    CBC Auto Differential [[id number]]  (Abnormal) Collected: 11/06/24 1442    Specimen: Blood Updated: 11/06/24 1613     WBC 12.46 10*3/mm3      RBC 3.03 10*6/mm3      Hemoglobin 9.4 g/dL      Hematocrit 29.7 %      MCV 98.0 fL      MCH 31.0 pg      MCHC 31.6 g/dL       RDW 17.8 %      RDW-SD 63.2 fl      MPV 9.6 fL      Platelets 526 10*3/mm3     Narrative:      The previously reported component NRBC is no longer being reported. Previous result was 0.0 /100 WBC (Reference Range: 0.0-0.2 /100 WBC) on 11/6/2024 at 1546 EST.    Scan Slide [[id number]] Collected: 11/06/24 1442    Specimen: Blood Updated: 11/06/24 1613     Scan Slide --     Comment: See Manual Differential Results       Manual Differential [[id number]]  (Abnormal) Collected: 11/06/24 1442    Specimen: Blood Updated: 11/06/24 1613     Neutrophil % 40.0 %      Lymphocyte % 45.0 %      Monocyte % 3.0 %      Basophil % 1.0 %      Bands %  10.0 %      Metamyelocyte % 1.0 %      Neutrophils Absolute 6.23 10*3/mm3      Lymphocytes Absolute 5.61 10*3/mm3      Monocytes Absolute 0.37 10*3/mm3      Basophils Absolute 0.12 10*3/mm3      Anisocytosis Slight/1+     Hypochromia Slight/1+     Microcytes Slight/1+     Ovalocytes Slight/1+     Poikilocytes Slight/1+     Polychromasia Slight/1+     Toxic Granulation Slight/1+     Platelet Estimate Increased     Large Platelets Slight/1+    Urinalysis, Microscopic Only - Straight Cath [[id number]]  (Abnormal) Collected: 11/06/24 1539    Specimen: Urine from Straight Cath Updated: 11/06/24 1605     RBC, UA 3-5 /HPF      WBC, UA 3-5 /HPF      Comment: Urine culture not indicated.        Bacteria, UA Trace /HPF      Squamous Epithelial Cells, UA 3-6 /HPF      Transitional Epithelial Cells, UA 3-6 /HPF      Hyaline Casts, UA 7-12 /LPF      Methodology Manual Light Microscopy    Urinalysis With Culture If Indicated - Straight Cath [[id number]]  (Abnormal) Collected: 11/06/24 1539    Specimen: Urine from Straight Cath Updated: 11/06/24 1553     Color, UA Dark Yellow     Appearance, UA Slightly Cloudy     pH, UA <=5.0     Specific Gravity, UA 1.018     Glucose, UA Negative     Ketones, UA Trace     Bilirubin, UA Negative     Blood, UA Negative     Protein, UA 30 mg/dL (1+)     Leuk Esterase, UA  Small (1+)     Nitrite, UA Negative     Urobilinogen, UA 1.0 E.U./dL    Narrative:      In absence of clinical symptoms, the presence of pyuria, bacteria, and/or nitrites on the urinalysis result does not correlate with infection.    Comprehensive Metabolic Panel [[id number]]  (Abnormal) Collected: 11/06/24 1442    Specimen: Blood Updated: 11/06/24 1522     Glucose 138 mg/dL      BUN 75 mg/dL      Creatinine 2.84 mg/dL      Sodium 134 mmol/L      Potassium 5.8 mmol/L      Chloride 97 mmol/L      CO2 23.9 mmol/L      Calcium 9.3 mg/dL      Total Protein 5.9 g/dL      Albumin 2.9 g/dL      ALT (SGPT) 12 U/L      AST (SGOT) 15 U/L      Alkaline Phosphatase 108 U/L      Total Bilirubin 0.4 mg/dL      Globulin 3.0 gm/dL      A/G Ratio 1.0 g/dL      BUN/Creatinine Ratio 26.4     Anion Gap 13.1 mmol/L      eGFR 16.9 mL/min/1.73     Narrative:      GFR Normal >60  Chronic Kidney Disease <60  Kidney Failure <15    The GFR formula is only valid for adults with stable renal function between ages 18 and 70.    Lipase [[id number]]  (Normal) Collected: 11/06/24 1442    Specimen: Blood Updated: 11/06/24 1519     Lipase 22 U/L     [place] Draw [[id number]] Collected: 11/06/24 1442    Specimen: Blood Updated: 11/06/24 1500    Narrative:      The following orders were created for panel order [place] Draw.  Procedure                               Abnormality         Status                     ---------                               -----------         ------                     Green Top (Gel)[[id number]]                                  Final result               Lavender Top[[id number]]                                     Final result               Gold Top - SST[[id number]]                                   Final result               Light Blue Top[[id number]]                                   Final result                 Please view results for these tests on the individual orders.    Green Top (Gel) [[id number]] Collected: 11/06/24 1442     Specimen: Blood Updated: 11/06/24 1500     Extra Tube Hold for add-ons.     Comment: Auto resulted.       Light Blue Top [[id number]] Collected: 11/06/24 1442    Specimen: Blood Updated: 11/06/24 1500     Extra Tube Hold for add-ons.     Comment: Auto resulted       Lavender Top [[id number]] Collected: 11/06/24 1442    Specimen: Blood Updated: 11/06/24 1500     Extra Tube hold for add-on     Comment: Auto resulted       Gold Top - SST [[id number]] Collected: 11/06/24 1442    Specimen: Blood Updated: 11/06/24 1500     Extra Tube Hold for add-ons.     Comment: Auto resulted.                Imaging Results (Last 24 Hours)       Procedure Component Value Units Date/Time    CT Abdomen Pelvis Without Contrast [[id number]] Collected: 11/06/24 1618     Updated: 11/06/24 1625    Narrative:      CT ABDOMEN PELVIS WO CONTRAST    Date of Exam: 11/6/2024 4:08 PM EST    Indication: Abdominal pain.    Comparison: CT abdomen pelvis with contrast 10/11/2024    Technique: Axial CT images were obtained of the abdomen and pelvis without the administration of contrast. Sagittal and coronal reconstructions were performed.  Automated exposure control and iterative reconstruction methods were used.      Findings:  3.8 cm mid descending thoracic aortic aneurysm (2/1), incompletely imaged. Advanced calcific atherosclerosis in the abdominal aorta and common iliac arteries.    Heart size is normal. Coronary artery calcifications are present. No acute basilar airspace disease.    Cholecystectomy. No abnormal biliary ductal dilation. Liver, spleen, pancreas, adrenals and kidneys maintain a normal noncontrast appearance.    Submucosal fat deposition is demonstrated within the ascending and transverse colonic segments as can be seen as sequelae of prior bouts of colitis.    There is short segment wall thickening and stranding about the distal descending colon-sigmoid colon junction (series 2 image 86, series 3 image 38) suggesting active  inflammation.. No evidence of bowel obstruction. Mild colonic stool burden.    Urinary bladder and rectum are within normal limits. Hysterectomy.    No adenopathy or free fluid. No pneumatosis.    Multilevel advanced lumbar facet arthropathy with grade 1 anterolisthesis L4 upon L5. No acute or suspicious osseous abnormalities.        Impression:      Impression:    1. Segmental thickening and inflammatory change of the descending colon-sigmoid colon junction. Correlate for infectious or inflammatory colitis.  2. Incompletely imaged 3.8 cm mid ascending thoracic aortic aneurysm.            Electronically Signed: Ute Snider MD    11/6/2024 4:23 PM EST    Workstation ID: [id number]              Assessment & Plan        This is a 74 y.o. female with:    Active and Resolved Problems  Active Hospital Problems    Diagnosis  POA    **Blood in stool [K92.1]  Yes     Priority: High    Acute kidney injury [N17.9]  Yes     Priority: Medium    Hyperkalemia [E87.5]  Yes     Priority: Medium    Leukocytosis [D72.829]  Yes     Priority: Medium    Acute UTI (urinary tract infection) [N39.0]  Yes     Priority: Medium    Colitis [K52.9]  Yes     Priority: Medium    Inflammatory bowel disease (Crohn's disease) [K50.90]  Yes    Hypothyroidism (acquired) [E03.9]  Yes    Anxiety associated with depression [F41.8]  Yes    COPD (chronic obstructive pulmonary disease) [J44.9]  Yes    Rheumatoid arthritis [M06.9]  Yes    Dyslipidemia [E78.5]  Yes    CAD S/P percutaneous coronary angioplasty [I25.10, Z98.61]  Not Applicable    Severe mitral regurgitation [I34.0]  Yes      Resolved Hospital Problems   No resolved problems to display.       Blood in stool, with history of Crohn's disease, CT abdomen per radiology shows colitis, n.p.o. past midnight, normal saline IV fluid hydration, morphine 2 mg every 4 hours as needed pain, Zofran 4 mg every 6 hours as needed nausea, gastroenterology consulted, monitor CBC    Acute kidney injury BUN 75  creatinine 2.8 baseline 0.5, may be secondary dehydration, avoid nephrotoxic meds trend renal function nephrology consulted for further evaluation    Hyperkalemia, potassium 5.8, one-time dose 10 mg Lokelma, repeat BMP in a.m. nephrology consulted    Leukocytosis, WBC 12.46 afebrile likely secondary to acute UTI see plan below possible infectious colitis but unlikely on ceftriaxone    Acute UTI, UA shows leukocytes WBCs 3-5 trace bacteria, IV ceftriaxone x 3 days with urine culture pending    Colitis per CT likely secondary to Crohn's see plan above, on DMARDs and prednisone currently holding for n.p.o. status gastroenterology consult    Amatory bowel disease, see plan above    Hypothyroidism, on home levothyroxine hold while n.p.o.    Anxiety associated with depression, on home sertraline n.p.o.    COPD, stable on room air does not use home oxygen, reordered home albuterol and Spiriva    Rheumatoid arthritis, on Humira q. 14 days not reordered    Dyslipidemia, formulary substitute for home statin hold while n.p.o.    Coronary artery disease status post PCI in 20-24, denies chest pain or shortness of air continuous cardiac monitoring, on home aspirin, statin and Plavix hold while n.p.o.    Severe mitral regurgitation, scheduled for mitral valve repair 11/21/2024 stable    VTE Prophylaxis:  Mechanical VTE prophylaxis orders are present.        The patient desires to be as follows:    CODE STATUS:    Code Status (Patient has no pulse and is not breathing): CPR (Attempt to Resuscitate)  Medical Interventions (Patient has pulse or is breathing): Full Support          Admission Status:  I believe this patient meets inpatient  status.    Expected Length of Stay: pending further evaluation and clinical course     PDMP and Medication Dispenses via Sidebar reviewed and consistent with patient reported medications.    I discussed the patient's findings and my recommendations with patient and family.      Signature:     This  document has been electronically signed by DRU Myrick on November 6, 2024 19:34 EST   [facility] Hospitalist Team

## 2024-11-07 NOTE — OUTREACH NOTE
Medical Week 2 Survey      Flowsheet Row Responses   [facility] facility patient discharged from? [facility]   Does the patient have one of the following disease processes/diagnoses(primary or secondary)? Other   Week 2 attempt successful? No   Unsuccessful attempts Attempt 2   Revoke Readmitted            BEATA CERVANTES - Registered Nurse

## 2024-11-07 NOTE — CONSULTS
Cardiology Consult Note    Patient Identification:  Name: Maryann Gaitan  Age: 74 y.o.  Sex: female  :  1950  MRN: [id number]             Requesting Physician :  Marciano Multani     Reason for Consultation / Chief Complaint :   Stopping Plavix after PCI due to rectal bleed    History of Present Illness:        Ms. Maryann Gaitan has PMH of     CAD status post cardiac cath with multivessel CAD poor candidate for CABG, underwent PCI to ostial and proximal LAD 10/22/2024  Moderate to severe MR with central jet noted  COPD  Hypertension  Rheumatoid arthritis  Crohn's disease    Presented 2024 through [facility] with complaint of abdominal pain and dark red stool / rectal bleed.  Patient has close disease and rheumatoid arthritis and is on immunosuppressants methotrexate, Humira, Rinvoq now presented with rectal bleed and abdominal pain.  GI did a scope and biopsies of terminal ileum which were consistent with active Crohn's and biopsies of stomach and duodenum showed strong colitis infection, was treated by ivermectin.  GI started patient on Solu-Medrol and is holding immunosuppressants and wants to hold Plavix.    Patient was recently in the hospital 10/8/2024 with nausea vomiting diarrhea and abnormal labs.  With hyponatremia and hypokalemia and anemia.  Patient suddenly started having chest pain and fast team was called because of sharp left-sided chest pain with shortness of breath dizziness EKG showed sinus tachycardia and cardiac workup revealed severe MR, cath 10/15/2024 revealing severe ostial LAD and outpouching in the descending thoracic aorta probably a penetrating aortic ulcer versus aneurysm.  CT surgery was consulted and she was turned down for CABG therefore patient underwent drug-eluting stent to ostial LAD on 10/22/2024 and was supposed to have clip to mitral valve in follow-up.  In the meantime has been having worsening symptoms and abdominal pain, weight loss     Data:  Labs 2024 -  11/7/2024 revealed sodium 134, BUN/creatinine of 75/2.84, glucose 138, albumin 2.9.  Hemoglobin 9.4 has come down to 8.  MCV 98  EKG done 11/6/2024 reviewed/interpreted by me reveals sinus bradycardia at the rate of 58 bpm.        EGD/colonoscopy 10/12/2024 revealed small hiatal hernia, nonerosive gastritis, T1 stricture s/p dilatation with 12 mm, T1 ulcer, colon ulcers, sigmoid diverticulosis, grade 2 internal hemorrhoids and strongyloides  Echo 10/12/2024 EF of 51 to 55% with mild RV enlargement, severe left atrial enlargement, moderate to severe MR  Transesophageal echo 10/16/2024: LVEF of 55 to 60% with severe left atrial enlargement, moderate to severe MR and grade 3 descending aortic plaque  Cardiac cath 10/15/2024 reveals total right and severe ostial LAD, descending thoracic aorta had an outpouching consistent with penetrating aortic ulcer versus aneurysm.   Patient was evaluated by CT surgery not a candidate for CABG therefore patient underwent PCI and drug-eluting stent to the ostium proximal LAD.  And she was seen by valve team and will be set up for MitraClip once she recovers from this hospitalization      Assessment:  :     Abdominal pain, nausea vomiting diarrhea-possible infection  Rectal bleed  Crohn's disease  CAD, status post PCI  Mitral regurgitation  Chronic HFpEF due to valvular heart disease from mitral regurgitation  Hypertensive cardiovascular disease from hypertension  Hyponatremia  Hypokalemia  Crohn's disease  Normocytic anemia  COPD, chronic steroid therapy  Multifocal pneumonia  Hyperglycemia, prediabetes with A1c of 5.6 from     Recommendations / Plan:         Patient presented 10/9/2024 because of abnormal labs showing low sodium, was complaining of nausea vomiting and diarrhea.  Patient's hydrochlorothiazide was held and nephrology consulted.  HS troponin is 23 and 22.  Previous proBNP was 2573.  Sodium is improved to 137.   CT PE study is negative for PE but has cavitary lesion in  the lung.  Pulmonary Dr. Cho, underwent bronchoscopy.  Patient has severe MR will benefit from cardiac cath.  Patient underwent cardiac cath 10/15/2024 which revealed total right and severe ostial LAD disease.  Descending thoracic aorta has an outpouching probably saccular aneurysm versus  penetrating aortic ulcer .  Echocardiogram 10/12/2024 is revealing EF of 51 to 55% with mild RV enlargement severe left atrial enlargement and right atrial enlargement and moderate to severe MR  GONZÁLEZ is revealing moderate to severe MR.  Patient would benefit from CABG and mitral valve surgery.  Patient was seen by .  Patient has been turned down for surgery due to multiple comorbid conditions.  Patient has a cavitary lesion in her lungs had bronchoscopy.  Had multiple mucous plugs.  Patient underwent drug-eluting stent to ostial LAD 10/12/2024.  Patient was sent home on dual antiplatelet therapy with aspirin and Plavix, metoprolol and statins and losartan as tolerated.  Will follow-up mitral regurgitation and follow-up in with structural heart team.  Patient had an EKG done 10/12/2024 which revealed sinus rhythm degenerating to A-fib with RVR.  Patient would benefit from long-term anticoagulation to prevent thromboembolic events.  Given her Crohn's disease and microcytic anemia patient will be a poor candidate for long-term anticoagulation.  Will follow and consider watchman evaluation as outpatient.  Patient was not tolerating losartan and metoprolol was given intermittent midodrine.  Now patient presents with abdominal pain and rectal bleed.  GI is recommending holding Plavix.  Will discuss with GI about holding all the other antiplatelet therapy including aspirin and give Plavix as soon as possible from GI standpoint.  Since patient had drug-eluting stents 10/22/2024 and it has been only 2 weeks, stopping Plavix will put her at risk for stent thrombosis and MI and death.             Diagnosis Plan   1. BISHNU  (acute kidney injury)        2. Hyperkalemia        3. Acute cystitis with hematuria        4. Generalized abdominal pain                   Past Medical History:  Past Medical History:   Diagnosis Date    Arthritis     CAD (coronary artery disease)     COPD (chronic obstructive pulmonary disease)     Hypertension     Mood disorder     Thyroid disease      Past Surgical History:  Past Surgical History:   Procedure Laterality Date    BRONCHOSCOPY N/A 10/16/2024    Procedure: BRONCHOSCOPY;  Surgeon: Gallo Pope MD;  Location: [facility] ENDOSCOPY;  Service: Pulmonary;  Laterality: N/A;    CARDIAC CATHETERIZATION N/A 10/15/2024    Procedure: Left Heart Cath, possible pci;  Surgeon: Travis Connor MD;  Location: [facility] CATH INVASIVE LOCATION;  Service: Cardiovascular;  Laterality: N/A;    CARDIAC CATHETERIZATION N/A 10/22/2024    Procedure: Laser Coronary Atherectomy;  Surgeon: Travis Connor MD;  Location: [facility] CATH INVASIVE LOCATION;  Service: Cardiovascular;  Laterality: N/A;    COLONOSCOPY N/A 10/12/2024    Procedure: COLONOSCOPY WITH BIOPSY AND WIRE GUIDED BALLOON DILATION OF TERMINAL ILEUM;  Surgeon: Rob Strong MD;  Location: [facility] ENDOSCOPY;  Service: Gastroenterology;  Laterality: N/A;  Colitis, crohns of terminal ileum, right colon ulcers, diverticulosis, hemorroids    ENDOSCOPY N/A 10/12/2024    Procedure: ESOPHAGOGASTRODUODENOSCOPY WITH BIOPSY X 2 AREA;  Surgeon: Rob Strong MD;  Location: [facility] ENDOSCOPY;  Service: Gastroenterology;  Laterality: N/A;  Chronic gastritis, HH    HYSTERECTOMY      LEFT HEART CATH        Allergies:  Allergies   Allergen Reactions    Codeine Hives    Penicillin G Sodium Hives     Home Meds:  Medications Prior to Admission   Medication Sig Dispense Refill Last Dose/Taking    Adalimumab (Humira, 2 Pen,) 40 MG/0.4ML Pen-injector Kit Inject 40 mg under the skin into the appropriate area as directed Every 14 (Fourteen) Days.  Indications: Rheumatoid Arthritis   Past Month    aspirin 81 MG EC tablet Take 1 tablet by mouth Daily for 30 days. 30 tablet 0 Taking    atorvastatin (LIPITOR) 40 MG tablet Take 1 tablet by mouth Every Night for 30 days. 30 tablet 0 Taking    methotrexate 2.5 MG tablet Take 8 tablets by mouth 1 (One) Time Per Week. Saturdays   Indications: Non-oncologic   Past Week    midodrine (PROAMATINE) 10 MG tablet Take 1 tablet by mouth 3 (Three) Times a Day Before Meals for 30 days. 90 tablet 0 Taking    pantoprazole (PROTONIX) 20 MG EC tablet Take 1 tablet by mouth Daily. Indications: Heartburn   Taking    predniSONE (DELTASONE) 5 MG tablet Take 1 tablet by mouth Daily. Indications: ACUTE EXACERBATION OF COPD (INACTIVE)   Taking    sertraline (ZOLOFT) 50 MG tablet Take 1 tablet by mouth Daily. Indications: Major Depressive Disorder   Taking    spironolactone (ALDACTONE) 25 MG tablet Take 1 tablet by mouth Daily.   Taking    tiotropium (SPIRIVA) 18 MCG per inhalation capsule Place 1 capsule into inhaler and inhale Daily. Indications: Chronic Obstructive Lung Disease   Taking    upadacitinib ER (Rinvoq) 45 MG tablet sustained-release 24 hour extended release tablet Take 1 tablet by mouth Daily.   11/6/2024 at  8:00 AM    albuterol (PROVENTIL) (2.5 MG/3ML) 0.083% nebulizer solution Take 2.5 mg by nebulization Every 6 (Six) Hours As Needed for Wheezing.       cholecalciferol (VITAMIN D3) 1.25 MG (24446 UT) capsule Take 1 capsule by mouth 2 (Two) Times a Week. Wed, Sat  Indications: Vitamin D Deficiency       clopidogrel (PLAVIX) 75 MG tablet Take 1 tablet by mouth Daily for 30 days. 30 tablet 0     ezetimibe (ZETIA) 10 MG tablet Take 1 tablet by mouth Daily. Indications: High Amount of Fats in the Blood       folic acid (FOLVITE) 1 MG tablet Take 1 tablet by mouth Daily. Indications: Anemia From Inadequate Folic Acid       levothyroxine (SYNTHROID, LEVOTHROID) 50 MCG tablet Take 1 tablet by mouth Daily. Indications: Underactive  Thyroid       Melatonin (Melatonin Extra Strength) 10 MG tablet Take 1 tablet by mouth As Needed. Indications: Trouble Sleeping        Current Meds:     Current Facility-Administered Medications:     albuterol (PROVENTIL) nebulizer solution 0.083% 2.5 mg/3mL, 2.5 mg, Nebulization, Q6H PRN, Lisa Chicas APRN    [Held by provider] atorvastatin (LIPITOR) tablet 40 mg, 40 mg, Oral, Nightly, Lisa Chicas APRN    cefTRIAXone (ROCEPHIN) 1,000 mg in sodium chloride 0.9 % 100 mL MBP, 1,000 mg, Intravenous, Q24H, Joshua Simon MD    [Held by provider] clopidogrel (PLAVIX) tablet 75 mg, 75 mg, Oral, Daily, Marciano Multani MD, 75 mg at 11/07/24 1025    [Held by provider] folic acid (FOLVITE) tablet 1,000 mcg, 1,000 mcg, Oral, Daily, Lisa Chicas APRN    influenza vac split high-dose (FLUZONE HIGH DOSE) injection 0.5 mL, 0.5 mL, Intramuscular, During Hospitalization, Lisa Chicas APRN    levothyroxine (SYNTHROID, LEVOTHROID) tablet 50 mcg, 50 mcg, Oral, Q AM, Marciano Multani MD    [Held by provider] melatonin tablet 10 mg, 10 mg, Oral, Nightly, Lisa Chicas APRN    methylPREDNISolone sodium succinate (SOLU-Medrol) injection 20 mg, 20 mg, Intravenous, Q8H, Annalee Anderson APRN, 20 mg at 11/07/24 1348    metroNIDAZOLE (FLAGYL) IVPB 500 mg, 500 mg, Intravenous, Q8H, Joshua Simon MD, Last Rate: 200 mL/hr at 11/07/24 1348, 500 mg at 11/07/24 1348    midodrine (PROAMATINE) tablet 10 mg, 10 mg, Oral, Q8H, Marciano Multani MD, 10 mg at 11/07/24 1348    morphine injection 2 mg, 2 mg, Intravenous, Once PRN, Lisa Chicas APRN    ondansetron (ZOFRAN) injection 4 mg, 4 mg, Intravenous, Q6H PRN, Lisa Chicas APRN    [Held by provider] pantoprazole (PROTONIX) EC tablet 40 mg, 40 mg, Oral, Daily, Lisa Chicas APRN    sertraline (ZOLOFT) tablet 50 mg, 50 mg, Oral, Daily, Marciano Multani MD    sodium chloride 0.9 % flush 10 mL, 10 mL,  "Intravenous, PRN, Niya Sierra PA-C    sodium chloride 0.9 % infusion, 100 mL/hr, Intravenous, Continuous, Lisa Chicas APRN, Last Rate: 100 mL/hr at 11/07/24 0128, 100 mL/hr at 11/07/24 0128    [Held by provider] spironolactone (ALDACTONE) tablet 25 mg, 25 mg, Oral, Daily, Lisa Chicas APRN    tiotropium (SPIRIVA RESPIMAT) 2.5 mcg/act aerosol solution inhaler, 2 puff, Inhalation, Daily - RT, Lisa Chicas APRN, 2 puff at 11/07/24 0734  Social History:   Social History     Tobacco Use    Smoking status: Former     Types: Cigarettes    Smokeless tobacco: Never   Substance Use Topics    Alcohol use: Never      Family History:  Family History   Problem Relation Age of Onset    Heart disease Mother     Dementia Mother     Stroke Mother     Heart disease Father     Hypertension Father         Review of Systems : Review of Systems   All other systems reviewed and are negative.                 Constitutional:  Temp:  [97.9 °F (36.6 °C)-98.6 °F (37 °C)] 97.9 °F (36.6 °C)  Heart Rate:  [54-77] 65  Resp:  [16-25] 19  BP: ()/(38-56) 95/38    Physical Exam   BP (!) 95/38   Pulse 65   Temp 97.9 °F (36.6 °C) (Oral)   Resp 19   Ht 154.9 cm (61\")   Wt 52.6 kg (115 lb 15.4 oz)   SpO2 97%   BMI 21.91 kg/m²   Physical Exam  General:  Appears chronically ill  Eyes: Sclerae are anicteric,  conjunctivae are clear   HEENT:  No JVD. Thyroid not visibly enlarged. No mucosal pallor or cyanosis  Respiratory: Respirations regular and unlabored at rest.  Clear to auscultation  Cardiovascular: S1,S2 Regular rate and rhythm.  2/6 holosystolic murmur  Gastrointestinal: Abdomen is nondistended  Musculoskeletal:  No abnormal movements  Extremities: No digital clubbing or cyanosis  Skin: Color pink. Skin warm and dry to touch. No rashes  No xanthoma  Neuro: Alert and awake, no lateralizing deficits appreciated    Cardiographics  ECG: EKG tracing was  personally reviewed/interpreted by me  ECG 12 Lead " Electrolyte Imbalance   Final Result   HEART RATE=58  bpm   RR Evxdpnvr=6639  ms   RI Hwechxex=874  ms   P Horizontal Axis=11  deg   P Front Axis=61  deg   QRSD Interval=87  ms   QT Sjowdlce=897  ms   TKgD=370  ms   QRS Axis=34  deg   T Wave Axis=59  deg   - OTHERWISE NORMAL ECG -   Sinus bradycardia   When compared with ECG of 23-Oct-2024 05:26:45,   Significant axis, voltage or hypertrophy change   Electronically Signed By: Sandro SantizoJOSSY) 2024-11-07 06:08:29   Date and Time of Study:2024-11-06 15:41:09      Telemetry Scan   Final Result      Telemetry Scan   Final Result      Telemetry Scan   Final Result          Telemetry: Sinus rhythm    Echocardiogram:   Results for orders placed during the hospital encounter of 10/08/24    Adult Transesophageal Echo (GONZÁLEZ) W/ Cont if Necessary Per Protocol (Cardiology Department)    Interpretation Summary    Left ventricular systolic function is normal. Left ventricular ejection fraction appears to be 56 - 60%.    The left atrial cavity is severely dilated.    Saline test results are negative.    There is mild, posterior mitral leaflet thickening present.    Moderate to severe mitral valve regurgitation is present with a centrally-directed jet noted.    There is moderate, (grade 3) plaque in the descending aorta present.      Imaging  Chest X-ray:   Imaging Results (Last 24 Hours)       Procedure Component Value Units Date/Time    CT Abdomen Pelvis Without Contrast [[id number]] Collected: 11/06/24 1618     Updated: 11/06/24 1625    Narrative:      CT ABDOMEN PELVIS WO CONTRAST    Date of Exam: 11/6/2024 4:08 PM EST    Indication: Abdominal pain.    Comparison: CT abdomen pelvis with contrast 10/11/2024    Technique: Axial CT images were obtained of the abdomen and pelvis without the administration of contrast. Sagittal and coronal reconstructions were performed.  Automated exposure control and iterative reconstruction methods were used.      Findings:  3.8 cm mid  descending thoracic aortic aneurysm (2/1), incompletely imaged. Advanced calcific atherosclerosis in the abdominal aorta and common iliac arteries.    Heart size is normal. Coronary artery calcifications are present. No acute basilar airspace disease.    Cholecystectomy. No abnormal biliary ductal dilation. Liver, spleen, pancreas, adrenals and kidneys maintain a normal noncontrast appearance.    Submucosal fat deposition is demonstrated within the ascending and transverse colonic segments as can be seen as sequelae of prior bouts of colitis.    There is short segment wall thickening and stranding about the distal descending colon-sigmoid colon junction (series 2 image 86, series 3 image 38) suggesting active inflammation.. No evidence of bowel obstruction. Mild colonic stool burden.    Urinary bladder and rectum are within normal limits. Hysterectomy.    No adenopathy or free fluid. No pneumatosis.    Multilevel advanced lumbar facet arthropathy with grade 1 anterolisthesis L4 upon L5. No acute or suspicious osseous abnormalities.        Impression:      Impression:    1. Segmental thickening and inflammatory change of the descending colon-sigmoid colon junction. Correlate for infectious or inflammatory colitis.  2. Incompletely imaged 3.8 cm mid ascending thoracic aortic aneurysm.            Electronically Signed: Ute Snider MD    11/6/2024 4:23 PM EST    Workstation ID: [id number]            Lab Review: I have reviewed the labs          Results from last 7 days   Lab Units 11/07/24  0227   SODIUM mmol/L 136   POTASSIUM mmol/L 5.0   BUN mg/dL 62*   CREATININE mg/dL 2.22*   CALCIUM mg/dL 8.6             Results from last 7 days   Lab Units 11/07/24  0133 11/06/24  1442   WBC 10*3/mm3 12.65* 12.46*   HEMOGLOBIN g/dL 8.0* 9.4*   HEMATOCRIT % 26.2* 29.7*   PLATELETS 10*3/mm3 451* 526*                 Travis Connor MD  11/7/2024, 15:25 EST      EMR Dragon/Transcription:   Dictated utilizing Dragon  dictation

## 2024-11-07 NOTE — PLAN OF CARE
Goal Outcome Evaluation:  Plan of Care Reviewed With: patient           Outcome Evaluation: 75 y/o female admitted on 11/6 due to abd pain and family noted dark red stools. PMH includes Crohns dse and was in hospital 10/8 to 10/23, COPD, arthritis, CAD with MVR scheduled for 11/21/2024. Patient lives with grand daughter and normally uses rollator for household mobility with min A as she has had falls using her rollator. Patient is current with HH PT/OT. At time of eval patient independent with rolling and only needed CGA for supine to sit and transfers using rw for support. Had low BP entire tx session with patient reporting of being dizzy thus gait deferred at this time. RN made aware. Patient has good family support and should be safe to return home once medically stable with HH PT/OT follow up.    Anticipated Discharge Disposition (PT): home with assist, home with home health

## 2024-11-07 NOTE — PLAN OF CARE
Goal Outcome Evaluation:  Plan of Care Reviewed With: patient           Outcome Evaluation: 74 y.o. female with a CMH of coronary artery disease, recent PCI October 22, 2024, COPD not on oxygen, Crohn's disease and rheumatoid arthritis, severe mitral regurgitation currently scheduled for transcatheter mitral valve repair 11/21/2024, history of thoracic aortic aneurysm 3.8 cm per recent CT, who presented to [facility] on 11/6/2024 with complaints of abdominal pain and dark bloody stools. Pt dx with acute UTI, still undergoing workup for melena. Patient lives with granddaughters and normally uses rollator for household mobility with min A as she has had falls using her rollator per her reports. Granddaughters assist with ADLs. Patient is current with  PT/OT. This date, pt had had a BM in brief upon arrival. Rolling in bed completed with verbal cues, otherwise pt dependent for karine-hygiene. SBA to come to sitting EOB, Min A for donning briefs over LEs. She required CGA to stand, and was able to manage briefs over hips in standigng. Limited mobility secondary to hypotension, thus transfer was extent of mobility. At RI, pt safe to return home with 24/7 supervision/assist. OT will follow.    Anticipated Discharge Disposition (OT): home with 24/7 care, home with home health

## 2024-11-08 ENCOUNTER — INPATIENT HOSPITAL (AMBULATORY)
Age: 74
End: 2024-11-08
Payer: MEDICARE

## 2024-11-08 ENCOUNTER — HOME CARE VISIT (OUTPATIENT)
Dept: HOME HEALTH SERVICES | Facility: HOME HEALTHCARE | Age: 74
End: 2024-11-08
Payer: MEDICARE

## 2024-11-08 ENCOUNTER — INPATIENT HOSPITAL (AMBULATORY)
Dept: URBAN - METROPOLITAN AREA HOSPITAL 84 | Facility: HOSPITAL | Age: 74
End: 2024-11-08
Payer: MEDICARE

## 2024-11-08 DIAGNOSIS — K62.5 HEMORRHAGE OF ANUS AND RECTUM: ICD-10-CM

## 2024-11-08 DIAGNOSIS — E87.8 OTHER DISORDERS OF ELECTROLYTE AND FLUID BALANCE, NOT ELSEWH: ICD-10-CM

## 2024-11-08 DIAGNOSIS — K50.80 CROHN'S DISEASE OF BOTH SMALL AND LARGE INTESTINE WITHOUT CO: ICD-10-CM

## 2024-11-08 DIAGNOSIS — R93.3 ABNORMAL FINDINGS ON DIAGNOSTIC IMAGING OF OTHER PARTS OF DI: ICD-10-CM

## 2024-11-08 DIAGNOSIS — Z90.49 ACQUIRED ABSENCE OF OTHER SPECIFIED PARTS OF DIGESTIVE TRACT: ICD-10-CM

## 2024-11-08 DIAGNOSIS — R19.7 DIARRHEA, UNSPECIFIED: ICD-10-CM

## 2024-11-08 DIAGNOSIS — R10.30 LOWER ABDOMINAL PAIN, UNSPECIFIED: ICD-10-CM

## 2024-11-08 DIAGNOSIS — D72.829 ELEVATED WHITE BLOOD CELL COUNT, UNSPECIFIED: ICD-10-CM

## 2024-11-08 DIAGNOSIS — D64.9 ANEMIA, UNSPECIFIED: ICD-10-CM

## 2024-11-08 LAB
ALBUMIN SERPL-MCNC: 2.8 G/DL (ref 3.5–5.2)
ALBUMIN/GLOB SERPL: 0.9 G/DL
ALP SERPL-CCNC: 100 U/L (ref 39–117)
ALT SERPL W P-5'-P-CCNC: 9 U/L (ref 1–33)
ANION GAP SERPL CALCULATED.3IONS-SCNC: 11.7 MMOL/L (ref 5–15)
ANISOCYTOSIS BLD QL: ABNORMAL
AST SERPL-CCNC: 12 U/L (ref 1–32)
BILIRUB SERPL-MCNC: 0.3 MG/DL (ref 0–1.2)
BUN SERPL-MCNC: 36 MG/DL (ref 8–23)
BUN/CREAT SERPL: 24.2 (ref 7–25)
BURR CELLS BLD QL SMEAR: ABNORMAL
CALCIUM SPEC-SCNC: 9.1 MG/DL (ref 8.6–10.5)
CHLORIDE SERPL-SCNC: 105 MMOL/L (ref 98–107)
CO2 SERPL-SCNC: 20.3 MMOL/L (ref 22–29)
CREAT SERPL-MCNC: 1.49 MG/DL (ref 0.57–1)
DEPRECATED RDW RBC AUTO: 66.4 FL (ref 37–54)
EGFRCR SERPLBLD CKD-EPI 2021: 36.7 ML/MIN/1.73
ERYTHROCYTE [DISTWIDTH] IN BLOOD BY AUTOMATED COUNT: 17.5 % (ref 12.3–15.4)
GLOBULIN UR ELPH-MCNC: 3 GM/DL
GLUCOSE SERPL-MCNC: 123 MG/DL (ref 65–99)
HCT VFR BLD AUTO: 31.7 % (ref 34–46.6)
HGB BLD-MCNC: 9.1 G/DL (ref 12–15.9)
LYMPHOCYTES # BLD MANUAL: 0.75 10*3/MM3 (ref 0.7–3.1)
MACROCYTES BLD QL SMEAR: ABNORMAL
MAGNESIUM SERPL-MCNC: 2.2 MG/DL (ref 1.6–2.4)
MCH RBC QN AUTO: 29.6 PG (ref 26.6–33)
MCHC RBC AUTO-ENTMCNC: 28.7 G/DL (ref 31.5–35.7)
MCV RBC AUTO: 103.3 FL (ref 79–97)
METAMYELOCYTES NFR BLD MANUAL: 3 % (ref 0–0)
MYELOCYTES NFR BLD MANUAL: 1 % (ref 0–0)
NEUTROPHILS # BLD AUTO: 7.29 10*3/MM3 (ref 1.7–7)
NEUTROPHILS NFR BLD MANUAL: 63 % (ref 42.7–76)
NEUTS BAND NFR BLD MANUAL: 24 % (ref 0–5)
NT-PROBNP SERPL-MCNC: 2909 PG/ML (ref 0–900)
PHOSPHATE SERPL-MCNC: 4.3 MG/DL (ref 2.5–4.5)
PLATELET # BLD AUTO: 493 10*3/MM3 (ref 140–450)
PMV BLD AUTO: 9.5 FL (ref 6–12)
POIKILOCYTOSIS BLD QL SMEAR: ABNORMAL
POTASSIUM SERPL-SCNC: 4.8 MMOL/L (ref 3.5–5.2)
PROT SERPL-MCNC: 5.8 G/DL (ref 6–8.5)
RBC # BLD AUTO: 3.07 10*6/MM3 (ref 3.77–5.28)
SCAN SLIDE: NORMAL
SMALL PLATELETS BLD QL SMEAR: ABNORMAL
SODIUM SERPL-SCNC: 137 MMOL/L (ref 136–145)
VARIANT LYMPHS NFR BLD MANUAL: 9 % (ref 19.6–45.3)
WBC MORPH BLD: NORMAL
WBC NRBC COR # BLD AUTO: 8.38 10*3/MM3 (ref 3.4–10.8)

## 2024-11-08 PROCEDURE — 25010000002 METHYLPREDNISOLONE PER 40 MG: Performed by: NURSE PRACTITIONER

## 2024-11-08 PROCEDURE — 99232 SBSQ HOSP IP/OBS MODERATE 35: CPT | Performed by: NURSE PRACTITIONER

## 2024-11-08 PROCEDURE — 25010000002 METRONIDAZOLE 500 MG/100ML SOLUTION

## 2024-11-08 PROCEDURE — 25010000002 ONDANSETRON PER 1 MG: Performed by: NURSE PRACTITIONER

## 2024-11-08 PROCEDURE — 94799 UNLISTED PULMONARY SVC/PX: CPT

## 2024-11-08 PROCEDURE — 94664 DEMO&/EVAL PT USE INHALER: CPT

## 2024-11-08 PROCEDURE — 99232 SBSQ HOSP IP/OBS MODERATE 35: CPT | Performed by: INTERNAL MEDICINE

## 2024-11-08 PROCEDURE — 94761 N-INVAS EAR/PLS OXIMETRY MLT: CPT

## 2024-11-08 PROCEDURE — 25010000002 HYDROMORPHONE PER 4 MG: Performed by: HOSPITALIST

## 2024-11-08 RX ORDER — HYDROMORPHONE HYDROCHLORIDE 1 MG/ML
0.5 INJECTION, SOLUTION INTRAMUSCULAR; INTRAVENOUS; SUBCUTANEOUS
Status: DISCONTINUED | OUTPATIENT
Start: 2024-11-08 | End: 2024-11-12 | Stop reason: HOSPADM

## 2024-11-08 RX ORDER — MORPHINE SULFATE 2 MG/ML
2 INJECTION, SOLUTION INTRAMUSCULAR; INTRAVENOUS EVERY 4 HOURS PRN
Status: DISCONTINUED | OUTPATIENT
Start: 2024-11-08 | End: 2024-11-08

## 2024-11-08 RX ADMIN — MIDODRINE HYDROCHLORIDE 10 MG: 5 TABLET ORAL at 14:08

## 2024-11-08 RX ADMIN — HYDROMORPHONE HYDROCHLORIDE 0.5 MG: 1 INJECTION, SOLUTION INTRAMUSCULAR; INTRAVENOUS; SUBCUTANEOUS at 12:43

## 2024-11-08 RX ADMIN — ONDANSETRON 4 MG: 2 INJECTION, SOLUTION INTRAMUSCULAR; INTRAVENOUS at 17:10

## 2024-11-08 RX ADMIN — METHYLPREDNISOLONE SODIUM SUCCINATE 20 MG: 40 INJECTION, POWDER, FOR SOLUTION INTRAMUSCULAR; INTRAVENOUS at 05:10

## 2024-11-08 RX ADMIN — LEVOTHYROXINE SODIUM 50 MCG: 0.05 TABLET ORAL at 05:10

## 2024-11-08 RX ADMIN — MIDODRINE HYDROCHLORIDE 10 MG: 5 TABLET ORAL at 21:10

## 2024-11-08 RX ADMIN — METRONIDAZOLE 500 MG: 500 INJECTION, SOLUTION INTRAVENOUS at 05:10

## 2024-11-08 RX ADMIN — Medication 10 MG: at 21:10

## 2024-11-08 RX ADMIN — METRONIDAZOLE 500 MG: 500 INJECTION, SOLUTION INTRAVENOUS at 14:08

## 2024-11-08 RX ADMIN — HYDROMORPHONE HYDROCHLORIDE 0.5 MG: 1 INJECTION, SOLUTION INTRAMUSCULAR; INTRAVENOUS; SUBCUTANEOUS at 21:09

## 2024-11-08 RX ADMIN — SERTRALINE 50 MG: 50 TABLET, FILM COATED ORAL at 09:24

## 2024-11-08 RX ADMIN — ONDANSETRON 4 MG: 2 INJECTION, SOLUTION INTRAMUSCULAR; INTRAVENOUS at 09:24

## 2024-11-08 RX ADMIN — METHYLPREDNISOLONE SODIUM SUCCINATE 20 MG: 40 INJECTION, POWDER, FOR SOLUTION INTRAMUSCULAR; INTRAVENOUS at 21:10

## 2024-11-08 RX ADMIN — ALBUTEROL SULFATE 2.5 MG: 2.5 SOLUTION RESPIRATORY (INHALATION) at 10:48

## 2024-11-08 RX ADMIN — HYDROMORPHONE HYDROCHLORIDE 0.5 MG: 1 INJECTION, SOLUTION INTRAMUSCULAR; INTRAVENOUS; SUBCUTANEOUS at 17:10

## 2024-11-08 RX ADMIN — METHYLPREDNISOLONE SODIUM SUCCINATE 20 MG: 40 INJECTION, POWDER, FOR SOLUTION INTRAMUSCULAR; INTRAVENOUS at 14:08

## 2024-11-08 RX ADMIN — MIDODRINE HYDROCHLORIDE 10 MG: 5 TABLET ORAL at 06:21

## 2024-11-08 RX ADMIN — TIOTROPIUM BROMIDE INHALATION SPRAY 2 PUFF: 3.12 SPRAY, METERED RESPIRATORY (INHALATION) at 10:48

## 2024-11-08 NOTE — PLAN OF CARE
Goal Outcome Evaluation:              Outcome Evaluation: patient resting in bed, vs stable, c/o 8/10 abd, prn dilaudid given, no other nursing concerns

## 2024-11-08 NOTE — PROGRESS NOTES
PROGRESS NOTE      Patient Name: Maryann Gaitan  : 1950  MRN: [id number]  Primary Care Physician: Eduard Little MD  Date of admission: 2024    Patient Care Team:  Eduard Little MD as PCP - General (Family Medicine)        Subjective   Subjective:   Patient is feeling better no new complaint at this time still complaining of weakness and some abdominal pain  Review of systems:  All other review of system unremarkable      Allergies:    Allergies   Allergen Reactions    Codeine Hives    Penicillin G Sodium Hives       Objective   Exam:     Vital Signs  Temp:  [97.9 °F (36.6 °C)-98.2 °F (36.8 °C)] 97.9 °F (36.6 °C)  Heart Rate:  [64-73] 73  Resp:  [14-24] 24  BP: ()/(51-59) 115/55  SpO2:  [98 %-99 %] 98 %  on   ;   Device (Oxygen Therapy): room air  Body mass index is 21.91 kg/m².    General: Elderly thin white female in no acute distress.    Head:      Normocephalic and atraumatic.    Eyes:      PERRL/EOM intact, conjunctivae and sclerae clear without nystagmus.    Neck:      No masses, thyromegaly,  trachea central with normal respiratory effort   Lungs:    Clear bilaterally to auscultation.    Heart:      Regular rate and rhythm, no murmur no gallop  Abd:        Soft, mildly tender, not distended, bowel sounds positive, no shifting dullness   Pulses:   Pulses palpable  Extr:        No cyanosis or clubbing--no significant edema.    Neuro:    No focal deficits.   alert oriented x3  Skin:       Intact without lesions or rashes.    Psych:    Alert and cooperative; normal mood and affect; .      Results Review:  I have personally reviewed most recent Data :  CBC    Results from last 7 days   Lab Units 24  2355 24  0133 24  1442   WBC 10*3/mm3 8.38 12.65* 12.46*   HEMOGLOBIN g/dL 9.1* 8.0* 9.4*   PLATELETS 10*3/mm3 493* 451* 526*     CMP   Results from last 7 days   Lab Units 24  2355 24  0227 24  1442   SODIUM mmol/L 137 136 134*   POTASSIUM  mmol/L 4.8 5.0 5.8*   CHLORIDE mmol/L 105 104 97*   CO2 mmol/L 20.3* 22.4 23.9   BUN mg/dL 36* 62* 75*   CREATININE mg/dL 1.49* 2.22* 2.84*   GLUCOSE mg/dL 123* 76 138*   ALBUMIN g/dL 2.8*  --  2.9*   BILIRUBIN mg/dL 0.3  --  0.4   ALK PHOS U/L 100  --  108   AST (SGOT) U/L 12  --  15   ALT (SGPT) U/L 9  --  12   LIPASE U/L  --   --  22     ABG      CT Abdomen Pelvis Without Contrast    Result Date: 11/6/2024  Impression: 1. Segmental thickening and inflammatory change of the descending colon-sigmoid colon junction. Correlate for infectious or inflammatory colitis. 2. Incompletely imaged 3.8 cm mid ascending thoracic aortic aneurysm. Electronically Signed: Ute Snider MD  11/6/2024 4:23 PM EST  Workstation ID: [id number]     Results for orders placed during the hospital encounter of 10/08/24    Adult Transesophageal Echo (GONZÁLEZ) W/ Cont if Necessary Per Protocol (Cardiology Department)    Interpretation Summary    Left ventricular systolic function is normal. Left ventricular ejection fraction appears to be 56 - 60%.    The left atrial cavity is severely dilated.    Saline test results are negative.    There is mild, posterior mitral leaflet thickening present.    Moderate to severe mitral valve regurgitation is present with a centrally-directed jet noted.    There is moderate, (grade 3) plaque in the descending aorta present.    Scheduled Meds:[Held by provider] atorvastatin, 40 mg, Oral, Nightly  [Held by provider] clopidogrel, 75 mg, Oral, Daily  folic acid, 1,000 mcg, Oral, Daily  levothyroxine, 50 mcg, Oral, Q AM  melatonin, 10 mg, Oral, Nightly  methylPREDNISolone sodium succinate, 20 mg, Intravenous, Q8H  metroNIDAZOLE, 500 mg, Intravenous, Q8H  midodrine, 10 mg, Oral, Q8H  pantoprazole, 40 mg, Oral, Daily  sertraline, 50 mg, Oral, Daily  [Held by provider] spironolactone, 25 mg, Oral, Daily  tiotropium bromide monohydrate, 2 puff, Inhalation, Daily - RT      Continuous Infusions:   PRN Meds:  albuterol     HYDROmorphone    influenza vaccine    ondansetron    sodium chloride    Assessment & Plan   Assessment and Plan:         Blood in stool    Severe mitral regurgitation    Dyslipidemia    CAD S/P percutaneous coronary angioplasty    Acute kidney injury    Hyperkalemia    Leukocytosis    Acute UTI (urinary tract infection)    Colitis    Inflammatory bowel disease (Crohn's disease)    Hypothyroidism (acquired)    Anxiety associated with depression    COPD (chronic obstructive pulmonary disease)    Rheumatoid arthritis    ASSESSMENT:  Acute kidney injury likely related to volume depletion   Hyperkalemia secondary to acute kidney injury which is getting better  hyponatremia much better than before   history of hypertension  History of arthritis  History of Crohn disease  10/12/2024 outpatient EGD colonoscopy showed small hiatal hernia chronic gastritis, normal duodenum, colon ulcer noted.  History of severe mitral regurg scheduled for MitraClip  History of coronary artery disease  Blood in the stool likely related to Crohn disease           PLAN :      Patient renal functions continue to get better   Discontinue IV fluid at this time   likely BISHNU related to patient intermittent diarrhea and acute abdomen  baseline renal functions are normal with creatinine of 0.36 back in October 2024  Hyperkalemia secondary to Aldactone and BISHNU which is improving at this time  Follow-up with the labs tomorrow morning  Labs are still pending from today  History of significant hyponatremia now better.  Likely had some SIADH which is improved  Patient need to follow-up in the renal clinic  Hold all medications specially any nephrotoxic medicine   Hold Aldactone for hyperkalemia             Electronically signed by Buddy Pierre MD,   [facility] kidney consultant  [phone]  11/8/2024  15:14 EST

## 2024-11-08 NOTE — PROGRESS NOTES
Cardiology Progress Note    Patient Identification:  Name: Maryann Gaitan  Age: 74 y.o.  Sex: female  :  1950  MRN: [id number]                 Follow Up / Chief Complaint: Antiplatelet management   Chief Complaint   Patient presents with    Black or Bloody Stool    Abdominal Pain       Interval History: Patient presented with abdominal pain and GI bleeding         NP Note:  Patient seen and examined.  Sitting up in bed.  Granddaughter at bedside.  Patient reports continued blood in stool.  Spoke with GI this morning.  Continue to hold aspirin and plavix until bleeding resolved.  Restart plavix as soon as possible due to risk for stent thrombosis.  Discussed risk with patient.     Electronically signed by DRU Rubio, 24, 1:33 PM EST.      Cardiology attending addendum :    I have personally performed a face-to-face diagnostic evaluation, physical exam and reviewed data on this patient.  I have reviewed documentation done by me and nurse practitioner  and corrected as needed.  And agree with the different components of documentation.Greater than 50% of the time spent in the care of this patient was provided by attending consultant/me.        Subjective: Patient seen and examined; chart and labs reviewed; discussed with bedside nurse         Objective:  2024 - 2024 revealed sodium 134, BUN/creatinine of 75/2.84, glucose 138, albumin 2.9.  Hemoglobin 9.4 has come down to 8.  MCV 98  2024: proBNP 2909, BUN 36 creatinine 1.49 hemoglobin 9.1    History of present illness:      Ms. Maryann Gaitan has PMH of     CAD status post cardiac cath with multivessel CAD poor candidate for CABG, underwent PCI to ostial and proximal LAD 10/22/2024  Moderate to severe MR with central jet noted  COPD  Hypertension  Rheumatoid arthritis  Crohn's disease     Presented 2024 through [facility] with complaint of abdominal pain and dark red stool / rectal bleed.  Patient has close disease and rheumatoid  arthritis and is on immunosuppressants methotrexate, Humira, Rinvoq now presented with rectal bleed and abdominal pain.  GI did a scope and biopsies of terminal ileum which were consistent with active Crohn's and biopsies of stomach and duodenum showed strong colitis infection, was treated by ivermectin.  GI started patient on Solu-Medrol and is holding immunosuppressants and wants to hold Plavix.     Patient was recently in the hospital 10/8/2024 with nausea vomiting diarrhea and abnormal labs.  With hyponatremia and hypokalemia and anemia.  Patient suddenly started having chest pain and fast team was called because of sharp left-sided chest pain with shortness of breath dizziness EKG showed sinus tachycardia and cardiac workup revealed severe MR, cath 10/15/2024 revealing severe ostial LAD and outpouching in the descending thoracic aorta probably a penetrating aortic ulcer versus aneurysm.  CT surgery was consulted and she was turned down for CABG therefore patient underwent drug-eluting stent to ostial LAD on 10/22/2024 and was supposed to have clip to mitral valve in follow-up.  In the meantime has been having worsening symptoms and abdominal pain, weight loss     Data:  Labs 11/6/2024 - 11/7/2024 revealed sodium 134, BUN/creatinine of 75/2.84, glucose 138, albumin 2.9.  Hemoglobin 9.4 has come down to 8.  MCV 98  EKG done 11/6/2024 reviewed/interpreted by me reveals sinus bradycardia at the rate of 58 bpm.        EGD/colonoscopy 10/12/2024 revealed small hiatal hernia, nonerosive gastritis, T1 stricture s/p dilatation with 12 mm, T1 ulcer, colon ulcers, sigmoid diverticulosis, grade 2 internal hemorrhoids and strongyloides  Echo 10/12/2024 EF of 51 to 55% with mild RV enlargement, severe left atrial enlargement, moderate to severe MR  Transesophageal echo 10/16/2024: LVEF of 55 to 60% with severe left atrial enlargement, moderate to severe MR and grade 3 descending aortic plaque  Cardiac cath 10/15/2024 reveals  total right and severe ostial LAD, descending thoracic aorta had an outpouching consistent with penetrating aortic ulcer versus aneurysm.   Patient was evaluated by CT surgery not a candidate for CABG therefore patient underwent PCI and drug-eluting stent to the ostium proximal LAD.  And she was seen by valve team and will be set up for MitraClip once she recovers from this hospitalization      Assessment:  :     Abdominal pain, nausea vomiting diarrhea-possible infection  Rectal bleed  Crohn's disease  CAD, status post PCI  Mitral regurgitation  Chronic HFpEF due to valvular heart disease from mitral regurgitation  Hypertensive cardiovascular disease from hypertension  Hyponatremia  Hypokalemia  Crohn's disease  Normocytic anemia  COPD, chronic steroid therapy  Multifocal pneumonia  Hyperglycemia, prediabetes with A1c of 5.6 from     Recommendations / Plan:         Patient presented 10/9/2024 because of abnormal labs showing low sodium, was complaining of nausea vomiting and diarrhea.  Patient's hydrochlorothiazide was held and nephrology consulted.  HS troponin is 23 and 22.  Previous proBNP was 2573.  Sodium is improved to 137.   CT PE study is negative for PE but has cavitary lesion in the lung.  Pulmonary Dr. Cho, underwent bronchoscopy.  Patient has severe MR will benefit from cardiac cath.  Patient underwent cardiac cath 10/15/2024 which revealed total right and severe ostial LAD disease.  Descending thoracic aorta has an outpouching probably saccular aneurysm versus  penetrating aortic ulcer .  Echocardiogram 10/12/2024 is revealing EF of 51 to 55% with mild RV enlargement severe left atrial enlargement and right atrial enlargement and moderate to severe MR  GONZÁLEZ is revealing moderate to severe MR.  Patient would benefit from CABG and mitral valve surgery.  Patient was seen by .  Patient has been turned down for surgery due to multiple comorbid conditions.  Patient has a cavitary lesion in  her lungs had bronchoscopy.  Had multiple mucous plugs.  Patient underwent drug-eluting stent to ostial LAD 10/12/2024.  Patient was sent home on dual antiplatelet therapy with aspirin and Plavix, metoprolol and statins and losartan as tolerated.  Will follow-up mitral regurgitation and follow-up in with structural heart team.  Patient had an EKG done 10/12/2024 which revealed sinus rhythm degenerating to A-fib with RVR.  Patient would benefit from long-term anticoagulation to prevent thromboembolic events.  Given her Crohn's disease and microcytic anemia patient will be a poor candidate for long-term anticoagulation.  Will follow and consider watchman evaluation as outpatient.  Patient was not tolerating losartan and metoprolol was given intermittent midodrine.  Now patient presents with abdominal pain and rectal bleed.  GI is recommending holding Plavix.  Patient unfortunately is in a tough situation.  Had drug-eluting stents done 10/22/2024, hardly 2 weeks ago.  Would benefit from Plavix to prevent stent thrombosis.  Discussed risk benefits alternatives with patient.  Will follow-up with GI.            Copied text in this portion of the note has been reviewed and is accurate as of 11/8/2024    Past Medical History:  Past Medical History:   Diagnosis Date    Arthritis     CAD (coronary artery disease)     COPD (chronic obstructive pulmonary disease)     Hypertension     Mood disorder     Thyroid disease      Past Surgical History:  Past Surgical History:   Procedure Laterality Date    BRONCHOSCOPY N/A 10/16/2024    Procedure: BRONCHOSCOPY;  Surgeon: Gallo Pope MD;  Location: [facility] ENDOSCOPY;  Service: Pulmonary;  Laterality: N/A;    CARDIAC CATHETERIZATION N/A 10/15/2024    Procedure: Left Heart Cath, possible pci;  Surgeon: Travis Connor MD;  Location: [facility] CATH INVASIVE LOCATION;  Service: Cardiovascular;  Laterality: N/A;    CARDIAC CATHETERIZATION N/A 10/22/2024    Procedure: Laser Coronary  Atherectomy;  Surgeon: Travis Connor MD;  Location: [facility] CATH INVASIVE LOCATION;  Service: Cardiovascular;  Laterality: N/A;    COLONOSCOPY N/A 10/12/2024    Procedure: COLONOSCOPY WITH BIOPSY AND WIRE GUIDED BALLOON DILATION OF TERMINAL ILEUM;  Surgeon: Rob Strong MD;  Location: [facility] ENDOSCOPY;  Service: Gastroenterology;  Laterality: N/A;  Colitis, crohns of terminal ileum, right colon ulcers, diverticulosis, hemorroids    ENDOSCOPY N/A 10/12/2024    Procedure: ESOPHAGOGASTRODUODENOSCOPY WITH BIOPSY X 2 AREA;  Surgeon: Rob Strong MD;  Location: [facility] ENDOSCOPY;  Service: Gastroenterology;  Laterality: N/A;  Chronic gastritis, HH    HYSTERECTOMY      LEFT HEART CATH          Social History:   Social History     Tobacco Use    Smoking status: Former     Types: Cigarettes    Smokeless tobacco: Never   Substance Use Topics    Alcohol use: Never      Family History:  Family History   Problem Relation Age of Onset    Heart disease Mother     Dementia Mother     Stroke Mother     Heart disease Father     Hypertension Father           Allergies:  Allergies   Allergen Reactions    Codeine Hives    Penicillin G Sodium Hives     Scheduled Meds:  [Held by provider] atorvastatin, 40 mg, Nightly  [Held by provider] clopidogrel, 75 mg, Daily  folic acid, 1,000 mcg, Daily  levothyroxine, 50 mcg, Q AM  melatonin, 10 mg, Nightly  methylPREDNISolone sodium succinate, 20 mg, Q8H  metroNIDAZOLE, 500 mg, Q8H  midodrine, 10 mg, Q8H  pantoprazole, 40 mg, Daily  sertraline, 50 mg, Daily  [Held by provider] spironolactone, 25 mg, Daily  tiotropium bromide monohydrate, 2 puff, Daily - RT          Review of Systems:   Review of Systems   Gastrointestinal:  Positive for hematochezia.     Review of Systems   Constitution: Negative for chills and fever.   Cardiovascular: Negative for chest pain and palpitations.   Respiratory: Negative for cough and hemoptysis.    Gastrointestinal: Negative for  "nausea.        Constitutional:  Temp:  [97.9 °F (36.6 °C)-98.2 °F (36.8 °C)] 97.9 °F (36.6 °C)  Heart Rate:  [64-73] 73  Resp:  [14-24] 24  BP: ()/(51-59) 115/55    Physical Exam   /55 (BP Location: Right arm, Patient Position: Lying)   Pulse 73   Temp 97.9 °F (36.6 °C) (Axillary)   Resp 24   Ht 154.9 cm (61\")   Wt 52.6 kg (115 lb 15.4 oz)   SpO2 98%   BMI 21.91 kg/m²   General:  Appears in no acute distress  Eyes: Sclera is anicteric,  conjunctiva is clear   HEENT:  No JVD. Thyroid not visibly enlarged. No mucosal pallor or cyanosis  Respiratory: Respirations regular and unlabored at rest.  Clear to auscultation  Cardiovascular: S1,S2 Regular rate and rhythm. 2/6 murmur, no rub or gallop auscultated.  . No pretibial pitting edema  Gastrointestinal: Abdomen nondistended.  Musculoskeletal:  No abnormal movements  Extremities: No digital clubbing or cyanosis  Skin: Color pink.   Neuro: Alert and awake.    INTAKE AND OUTPUT:    Intake/Output Summary (Last 24 hours) at 11/8/2024 1458  Last data filed at 11/8/2024 0908  Gross per 24 hour   Intake 420 ml   Output --   Net 420 ml       Cardiographics  Telemetry: Sinus rhythm    ECG:   ECG 12 Lead Electrolyte Imbalance   Final Result   HEART RATE=58  bpm   RR Mexkdppp=3029  ms   OR Llmimlwz=690  ms   P Horizontal Axis=11  deg   P Front Axis=61  deg   QRSD Interval=87  ms   QT Nymjgfbk=855  ms   GBqA=521  ms   QRS Axis=34  deg   T Wave Axis=59  deg   - OTHERWISE NORMAL ECG -   Sinus bradycardia   When compared with ECG of 23-Oct-2024 05:26:45,   Significant axis, voltage or hypertrophy change   Electronically Signed By: Sandro Santizo (JOSSY) 2024-11-07 06:08:29   Date and Time of Study:2024-11-06 15:41:09      Telemetry Scan   Final Result      Telemetry Scan   Final Result      Telemetry Scan   Final Result        I have personally reviewed EKG    Echocardiogram: Results for orders placed during the hospital encounter of 10/08/24    Adult Transesophageal " "Echo (GONZÁLEZ) W/ Cont if Necessary Per Protocol (Cardiology Department)    Interpretation Summary    Left ventricular systolic function is normal. Left ventricular ejection fraction appears to be 56 - 60%.    The left atrial cavity is severely dilated.    Saline test results are negative.    There is mild, posterior mitral leaflet thickening present.    Moderate to severe mitral valve regurgitation is present with a centrally-directed jet noted.    There is moderate, (grade 3) plaque in the descending aorta present.      Lab Review   I have reviewed the labs      Results from last 7 days   Lab Units 11/07/24  2355   MAGNESIUM mg/dL 2.2     Results from last 7 days   Lab Units 11/07/24  2355   SODIUM mmol/L 137   POTASSIUM mmol/L 4.8   BUN mg/dL 36*   CREATININE mg/dL 1.49*   CALCIUM mg/dL 9.1         Results from last 7 days   Lab Units 11/07/24  2355 11/07/24  0133 11/06/24  1442   WBC 10*3/mm3 8.38 12.65* 12.46*   HEMOGLOBIN g/dL 9.1* 8.0* 9.4*   HEMATOCRIT % 31.7* 26.2* 29.7*   PLATELETS 10*3/mm3 493* 451* 526*           RADIOLOGY:  Imaging Results (Last 24 Hours)       ** No results found for the last 24 hours. **                  )11/8/2024  MD EVERETTE Araujo Dragon/Transcription:   \"Dictated utilizing Dragon dictation\".   "

## 2024-11-08 NOTE — PLAN OF CARE
Goal Outcome Evaluation:      Pt was weak and dizzy when ambulating with walker to bathroom. Helped back to bed. Ex cath applied. Told patient to use call light and staff would help her to bedside commode. V/S stable with b/p running on the low side. Room air Slept between care.

## 2024-11-08 NOTE — PROGRESS NOTES
[facility] MEDICINE SERVICE  DAILY PROGRESS NOTE    NAME: Maryann Gaitan  : 1950  MRN: [id number]      LOS: 2 days     PROVIDER OF SERVICE: Marciano Multani MD    Chief Complaint: Blood in stool    Subjective:     Interval History:  History taken from: patient chart family RN    Afebrile abdominal pain controlled with meds family at bedside       Review of Systems:   Review of Systems  all negative except as above  Objective:     Vital Signs  Temp:  [97.9 °F (36.6 °C)-98.2 °F (36.8 °C)] 98.2 °F (36.8 °C)  Heart Rate:  [64-69] 69  Resp:  [19-23] 22  BP: ()/(38-58) 109/58   Body mass index is 21.91 kg/m².    Physical Exam  Physical Exam  AOx3 NAD  RRR S1 and S2 audible  Lungs with fair air entry  Abdomen with left sided tenderness no guarding no rigidity bowel sounds positive     Diagnostic Data    Results from last 7 days   Lab Units 24  2355   WBC 10*3/mm3 8.38   HEMOGLOBIN g/dL 9.1*   HEMATOCRIT % 31.7*   PLATELETS 10*3/mm3 493*   GLUCOSE mg/dL 123*   CREATININE mg/dL 1.49*   BUN mg/dL 36*   SODIUM mmol/L 137   POTASSIUM mmol/L 4.8   AST (SGOT) U/L 12   ALT (SGPT) U/L 9   ALK PHOS U/L 100   BILIRUBIN mg/dL 0.3   ANION GAP mmol/L 11.7       CT Abdomen Pelvis Without Contrast    Result Date: 2024  Impression: 1. Segmental thickening and inflammatory change of the descending colon-sigmoid colon junction. Correlate for infectious or inflammatory colitis. 2. Incompletely imaged 3.8 cm mid ascending thoracic aortic aneurysm. Electronically Signed: Ute Snider MD  2024 4:23 PM EST  Workstation ID: [id number]       I reviewed the patient's new clinical results.  I reviewed the patient's new imaging results and agree with the interpretation.    Assessment/Plan:     Active and Resolved Problems  Active Hospital Problems    Diagnosis  POA    **Blood in stool [K92.1]  Yes    Acute kidney injury [N17.9]  Yes    Hyperkalemia [E87.5]  Yes    Leukocytosis [D72.829]  Yes    Acute UTI  (urinary tract infection) [N39.0]  Yes    Colitis [K52.9]  Yes    Inflammatory bowel disease (Crohn's disease) [K50.90]  Yes    Hypothyroidism (acquired) [E03.9]  Yes    Anxiety associated with depression [F41.8]  Yes    COPD (chronic obstructive pulmonary disease) [J44.9]  Yes    Rheumatoid arthritis [M06.9]  Yes    Dyslipidemia [E78.5]  Yes    CAD S/P percutaneous coronary angioplasty [I25.10, Z98.61]  Not Applicable    Severe mitral regurgitation [I34.0]  Yes      Resolved Hospital Problems   No resolved problems to display.       74-year-old female history of CAD status post PCI 10/22/2024, COPD, Crohn's disease, rheumatoid arthritis on DMARDs, hyperlipidemia, hypothyroidism, severe mitral regurg admitted to [facility] [place], operated by [facility] 11/6 with abdominal pain and blood in stool     #Acute colitis  #History of Crohn's concern for acute flare  GI following started on IV Solu-Medrol.  History of Strongyloides ID following continue Flagyl ceftriaxone discontinued  Await GI pathogen panel, C. difficile stool for ova and parasite along with Strongyloides  Isolation per protocol     #Acute on chronic anemia  Plavix discontinued by GI patient with recent PCI to LAD on aspirin and Plavix at home high risk to discontinue cardiology following   monitor H&H  Transfuse PRBCs as needed to keep hemoglobin around 8  Noted no plan for endoscopic intervention given recent scopes by GI     #CAD status post recent PCI to LAD 10/22/2024  #severe mitral regurg     Plavix discontinued by GI patient with recent PCI to LAD on aspirin and Plavix at home high risk for stent thrombosis.  Cardiology on board to resume Plavix once cleared from GI perspective          #BISHNU  #Hyperkalemia  On IV fluids  Nephrology following  Diuretics on hold  BMP daily  Avoid hypotension     #History of hypotension  Continue home dose of midodrine        #Hypothyroidism  Continue home dose of levothyroxine      #COPD not in exacerbation  Continue current nebs      #Rheumatoid arthritis  Hold Plaquenil for now      #MDD  continueZoloft    VTE Prophylaxis:  Mechanical VTE prophylaxis orders are present.             Disposition Planning:     Barriers to Discharge: Medical workup  Anticipated Date of Discharge: TBD  Place of Discharge: Home versus rehab      Time: 45 minutes     Code Status and Medical Interventions: CPR (Attempt to Resuscitate); Full Support   Ordered at: 11/06/24 1922     Code Status (Patient has no pulse and is not breathing):    CPR (Attempt to Resuscitate)     Medical Interventions (Patient has pulse or is breathing):    Full Support       Signature: Electronically signed by Marciano Multani MD, 11/08/24, 09:02 EST.  [facility] Hospitalist Team

## 2024-11-08 NOTE — CONSULTS
Nutrition Services    Patient Name: Maryann Gaitan  YOB: 1950  MRN: [id number]  Admission date: 11/6/2024    Comment:    Moderate chronic disease related malnutrition related to prolonged suboptimal intake and suspected hypermetabolism in the setting of multiple chronic conditions including BISHNU and Crohn's disease as evidenced by 5% weight loss documented in the last 1 month and evidenced of moderate muscle and fat wasting per NFPE.     Boost Original BID (Provides 480 kcals, 20 g protein if consumed)   Boost Original may be substituted for Boost Plus at this time, due to national shortage of many ONS products. If substituted, each Boost Original will provide 240 kcal and 10g PRO.    CLINICAL NUTRITION ASSESSMENT      Reason for Assessment 11/8: Malnutrition screening tool score of 4, Nursing admission screen consult      H&P      Past Medical History:   Diagnosis Date    Arthritis     CAD (coronary artery disease)     COPD (chronic obstructive pulmonary disease)     Hypertension     Mood disorder     Thyroid disease        Past Surgical History:   Procedure Laterality Date    BRONCHOSCOPY N/A 10/16/2024    Procedure: BRONCHOSCOPY;  Surgeon: Gallo Pope MD;  Location: [facility] ENDOSCOPY;  Service: Pulmonary;  Laterality: N/A;    CARDIAC CATHETERIZATION N/A 10/15/2024    Procedure: Left Heart Cath, possible pci;  Surgeon: Travis Connor MD;  Location: [facility] CATH INVASIVE LOCATION;  Service: Cardiovascular;  Laterality: N/A;    CARDIAC CATHETERIZATION N/A 10/22/2024    Procedure: Laser Coronary Atherectomy;  Surgeon: Travis Connor MD;  Location: [facility] CATH INVASIVE LOCATION;  Service: Cardiovascular;  Laterality: N/A;    COLONOSCOPY N/A 10/12/2024    Procedure: COLONOSCOPY WITH BIOPSY AND WIRE GUIDED BALLOON DILATION OF TERMINAL ILEUM;  Surgeon: Rob Strong MD;  Location: [facility] ENDOSCOPY;  Service: Gastroenterology;  Laterality: N/A;  Colitis, crohns of  "terminal ileum, right colon ulcers, diverticulosis, hemorroids    ENDOSCOPY N/A 10/12/2024    Procedure: ESOPHAGOGASTRODUODENOSCOPY WITH BIOPSY X 2 AREA;  Surgeon: Rob Strong MD;  Location: [facility] ENDOSCOPY;  Service: Gastroenterology;  Laterality: N/A;  Chronic gastritis, HH    HYSTERECTOMY      LEFT HEART CATH          Current Problems   Blood in stool, with history of Crohn's disease     Acute kidney injury   -nephrology following     Hyperkalemia     Leukocytosis     Acute UTI      Colitis per CT likely secondary to Crohn's      Amatory bowel disease     Hypothyroidism     Anxiety associated with depression     COPD    Rheumatoid arthritis    Dyslipidemia     Coronary artery disease status post PCI in 20-24     Severe mitral regurgitation        Encounter Information        Trending Narrative     11/8: Pt presented to ED on 11/6 with complaints of bloody stool and abdominal pain worsening with attempted BM. Pt reports that this started the day prior.      Anthropometrics        Current Height, Weight Height: 154.9 cm (61\")  Weight: 52.6 kg (115 lb 15.4 oz) (11/06/24 1426)       Usual Body Weight (UBW) Unable to obtain from patient        Trending Weight Hx     This admission: 11/8: (last weight 11/6) 115#             PTA: 11/8: ~5% weight loss in the last 1 month.     Wt Readings from Last 30 Encounters:   11/06/24 1426 52.6 kg (115 lb 15.4 oz)   10/30/24 1346 52.6 kg (116 lb)   10/12/24 1153 57.6 kg (127 lb)   10/12/24 0757 57.6 kg (127 lb)   10/11/24 0420 58 kg (127 lb 13.9 oz)   10/10/24 0503 58.1 kg (128 lb 1.4 oz)   10/08/24 1956 54.9 kg (121 lb 0.5 oz)   10/08/24 0828 54.9 kg (121 lb)      BMI kg/m2 Body mass index is 21.91 kg/m².       Labs        Pertinent Labs Reviewed. Management per attending.    Results from last 7 days   Lab Units 11/07/24  2355 11/07/24  0227 11/06/24  1442   SODIUM mmol/L 137 136 134*   POTASSIUM mmol/L 4.8 5.0 5.8*   CHLORIDE mmol/L 105 104 97*   CO2 mmol/L 20.3* " 22.4 23.9   BUN mg/dL 36* 62* 75*   CREATININE mg/dL 1.49* 2.22* 2.84*   CALCIUM mg/dL 9.1 8.6 9.3   BILIRUBIN mg/dL 0.3  --  0.4   ALK PHOS U/L 100  --  108   ALT (SGPT) U/L 9  --  12   AST (SGOT) U/L 12  --  15   GLUCOSE mg/dL 123* 76 138*     Results from last 7 days   Lab Units 11/07/24  2355   MAGNESIUM mg/dL 2.2   PHOSPHORUS mg/dL 4.3   HEMOGLOBIN g/dL 9.1*   HEMATOCRIT % 31.7*     Lab Results   Component Value Date    HGBA1C 5.64 (H) 10/13/2024        Medications    Scheduled Medications [Held by provider] atorvastatin, 40 mg, Oral, Nightly  [Held by provider] clopidogrel, 75 mg, Oral, Daily  folic acid, 1,000 mcg, Oral, Daily  levothyroxine, 50 mcg, Oral, Q AM  melatonin, 10 mg, Oral, Nightly  methylPREDNISolone sodium succinate, 20 mg, Intravenous, Q8H  metroNIDAZOLE, 500 mg, Intravenous, Q8H  midodrine, 10 mg, Oral, Q8H  pantoprazole, 40 mg, Oral, Daily  sertraline, 50 mg, Oral, Daily  [Held by provider] spironolactone, 25 mg, Oral, Daily  tiotropium bromide monohydrate, 2 puff, Inhalation, Daily - RT        Infusions      PRN Medications   albuterol    HYDROmorphone    influenza vaccine    ondansetron    sodium chloride     Physical Findings        Trending Physical   Appearance, NFPE 11/8: NFPE completed, consistent with nutrition diagnosis of malnutrition using AND/ASPEN criteria. See MSA below.      --  Edema  No edema documented      Bowel Function Last documented BM 11/7     Tubes No feeding tube in place     Chewing/Swallowing No issues reported     Skin No PI documented      --  Current Nutrition Orders & Evaluation of Intake       Oral Nutrition     Food Allergies NKFA   Current PO Diet Diet: Gastrointestinal; Fiber-Restricted; Fluid Consistency: Thin (IDDSI 0)   Supplement None    PO Evaluation     Trending % PO Intake 11/8: No po intake documented since admission. Pt advanced to solid diet today    --  Nutritional Risk Screening        NRS-2002 Score          Nutrition Diagnosis          Nutrition Dx Problem 1 Moderate chronic disease related malnutrition related to prolonged suboptimal intake and suspected hypermetabolism in the setting of multiple chronic conditions including BISHNU and Crohn's disease as evidenced by 5% weight loss documented in the last 1 month and evidenced of moderate muscle and fat wasting per NFPE.       Nutrition Dx Problem 2        Intervention Goal         Intervention Goal(s) Tolerate po diet  PO intake > 75%     Nutrition Intervention        RD Action NFPE completed    Boost Original BID (Provides 480 kcals, 20 g protein if consumed)   Boost Original may be substituted for Boost Plus at this time, due to national shortage of many ONS products. If substituted, each Boost Original will provide 240 kcal and 10g PRO.       Nutrition Prescription          Diet Prescription GI Fiber-Restricted diet   Supplement Prescription Boost Original BID (Provides 480 kcals, 20 g protein if consumed)   Boost Original may be substituted for Boost Plus at this time, due to national shortage of many ONS products. If substituted, each Boost Original will provide 240 kcal and 10g PRO.   --  Monitor/Evaluation        Monitor Per protocol, I&O, PO intake, Supplement intake, Pertinent labs, Weight, Skin status, GI status, Swallow function, Hemodynamic stability     Malnutrition Severity Assessment      Patient meets criteria for : Moderate (non-severe) Malnutrition  Malnutrition Type (Last 8 Hours)       Malnutrition Severity Assessment       Row Name 11/09/24 1353       Malnutrition Severity Assessment    Malnutrition Type Chronic Disease - Related Malnutrition      Row Name 11/09/24 1353       Unintentional Weight Loss     Unintentional Weight Loss Findings Moderate    Unintentional Weight Loss  Weight loss of 5% in one month      Row Name 11/09/24 1353       Muscle Loss    Loss of Muscle Mass Findings Moderate    [place] Region Moderate - slight depression    Clavicle Bone Region Moderate - some  protrusion in females, visible in males    Dorsal Hand Region Moderate - slight depression    Patellar Region Moderate - patella more prominent, less muscle definition around patella    Anterior Thigh Region Moderate - mild depression on inner thigh    Posterior Calf Region Moderate - some roundness, slight firmness      Row Name 11/09/24 8240       Fat Loss    Subcutaneous Fat Loss Findings Moderate    Orbital Region  Moderate -  somewhat hollowness, slightly dark circles    Upper Arm Region Moderate - some fat tissue, not ample      Row Name 11/09/24 9294       Criteria Met (Must meet criteria for severity in at least 2 of these categories: M Wasting, Fat Loss, Fluid, Secondary Signs, Wt. Status, Intake)    Patient meets criteria for  Moderate (non-severe) Malnutrition                           Electronically signed by:  Renetta Arriaga, EMILY  11/08/24 12:40 EST

## 2024-11-08 NOTE — CASE MANAGEMENT/SOCIAL WORK
Continued Stay Note  ERIC Yeung     Patient Name: Maryann Gaitan  MRN: [id number]  Today's Date: 11/8/2024    Admit Date: 11/6/2024    Plan: From home with granddaugters and [facility] (Need LETHA)   Discharge Plan       Row Name 11/08/24 1600       Plan    Plan Comments Discharge barriers: IV steriods, IV flagyl, lung cx pending, blood cx pending, increased BNP, cardio & nephrology & ID & GI following.               Expected Discharge Date and Time       Expected Discharge Date Expected Discharge Time    Nov 11, 2024           Phone communication or documentation only - no physical contact with patient or family.     Radha Rodriguez RN

## 2024-11-08 NOTE — HOME HEALTH
Patient is current with [facility]. Patient was admitted to [facility] on 11/06/2024. We will continue to follow and resume care once discharged home. Thank you.

## 2024-11-08 NOTE — Clinical Note
Transfer Summary:     Patient transferred to: [facility] for Transfer: Inpatient hospital admission for blood in stool and BISHNU.   Report called to: Hospital   Summary of care, treatments, or services provided to the patient including disciplines seeing the patient: Skilled Nursing, Physical Therapy, and Occupational Therapy..  Patients progress toward goals: Ongoing, recent hospitalization  Communicable Disease: None noted this admission

## 2024-11-08 NOTE — PROGRESS NOTES
LOS: 2 days   Patient Care Team:  Eduard Little MD as PCP - General (Family Medicine)      Subjective     Interval History:     Subjective: Patient reports persistent rectal bleeding.  Reports at least 3 bowel movements overnight which were bloody.  Continues to complain of abdominal pain.      ROS:   No chest pain, shortness of breath, or cough.        Medication Review:     Current Facility-Administered Medications:     albuterol (PROVENTIL) nebulizer solution 0.083% 2.5 mg/3mL, 2.5 mg, Nebulization, Q6H PRN, Lisa Chicas APRN, 2.5 mg at 11/08/24 1048    [Held by provider] atorvastatin (LIPITOR) tablet 40 mg, 40 mg, Oral, Nightly, Lisa Chicas APRN    [Held by provider] clopidogrel (PLAVIX) tablet 75 mg, 75 mg, Oral, Daily, Corina Murcia APRN, 75 mg at 11/07/24 1025    folic acid (FOLVITE) tablet 1,000 mcg, 1,000 mcg, Oral, Daily, Marciano Multani MD    influenza vac split high-dose (FLUZONE HIGH DOSE) injection 0.5 mL, 0.5 mL, Intramuscular, During Hospitalization, Lisa Chicas APRN    levothyroxine (SYNTHROID, LEVOTHROID) tablet 50 mcg, 50 mcg, Oral, Q AM, Marciano Multani MD, 50 mcg at 11/08/24 0510    melatonin tablet 10 mg, 10 mg, Oral, Nightly, Marciano Multani MD    methylPREDNISolone sodium succinate (SOLU-Medrol) injection 20 mg, 20 mg, Intravenous, Q8H, Annalee Anderson APRN, 20 mg at 11/08/24 0510    metroNIDAZOLE (FLAGYL) IVPB 500 mg, 500 mg, Intravenous, Q8H, Joshua Simon MD, Last Rate: 200 mL/hr at 11/08/24 0510, 500 mg at 11/08/24 0510    midodrine (PROAMATINE) tablet 10 mg, 10 mg, Oral, Q8H, Marciano Multani MD, 10 mg at 11/08/24 0621    morphine injection 2 mg, 2 mg, Intravenous, Once PRN, Lisa Chicas APRN    ondansetron (ZOFRAN) injection 4 mg, 4 mg, Intravenous, Q6H PRN, Lisa Chicas APRN, 4 mg at 11/08/24 0924    pantoprazole (PROTONIX) EC tablet 40 mg, 40 mg, Oral, Daily, Marciano Multani MD    sertraline  (ZOLOFT) tablet 50 mg, 50 mg, Oral, Daily, Marciano Multani MD, 50 mg at 11/08/24 0924    sodium chloride 0.9 % flush 10 mL, 10 mL, Intravenous, PRN, Niya Sierra PA-C    [Held by provider] spironolactone (ALDACTONE) tablet 25 mg, 25 mg, Oral, Daily, Lisa Chicas APRN    tiotropium (SPIRIVA RESPIMAT) 2.5 mcg/act aerosol solution inhaler, 2 puff, Inhalation, Daily - RT, Lisa Chicas APRN, 2 puff at 11/08/24 1048      Objective     Vital Signs  Vitals:    11/08/24 1051 11/08/24 1052 11/08/24 1055 11/08/24 1215   BP:    115/55   BP Location:    Right arm   Patient Position:    Lying   Pulse: 68 69 69 73   Resp: 15 16 14 24   Temp:    97.9 °F (36.6 °C)   TempSrc:    Axillary   SpO2: 98% 98% 99% 98%   Weight:       Height:           Physical Exam:     General Appearance:    Awake and alert, in no acute distress   Head:    Normocephalic, without obvious abnormality   Eyes:          Conjunctivae normal, anicteric sclera   Throat:   No oral lesions, no thrush, oral mucosa moist   Neck:   No adenopathy, supple, no JVD   Lungs:     respirations regular, even and unlabored   Abdomen:     Soft, generalized tenderness, no rebound or guarding, non-distended   Rectal:     Deferred   Extremities:   No edema, no cyanosis   Skin:   No bruising or rash, no jaundice        Results Review:    CBC    Results from last 7 days   Lab Units 11/07/24 2355 11/07/24  0133 11/06/24  1442   WBC 10*3/mm3 8.38 12.65* 12.46*   HEMOGLOBIN g/dL 9.1* 8.0* 9.4*   PLATELETS 10*3/mm3 493* 451* 526*     CMP   Results from last 7 days   Lab Units 11/07/24  2355 11/07/24  0227 11/06/24  1442   SODIUM mmol/L 137 136 134*   POTASSIUM mmol/L 4.8 5.0 5.8*   CHLORIDE mmol/L 105 104 97*   CO2 mmol/L 20.3* 22.4 23.9   BUN mg/dL 36* 62* 75*   CREATININE mg/dL 1.49* 2.22* 2.84*   GLUCOSE mg/dL 123* 76 138*   ALBUMIN g/dL 2.8*  --  2.9*   BILIRUBIN mg/dL 0.3  --  0.4   ALK PHOS U/L 100  --  108   AST (SGOT) U/L 12  --  15   ALT (SGPT) U/L 9   --  12   MAGNESIUM mg/dL 2.2  --   --    PHOSPHORUS mg/dL 4.3  --   --    LIPASE U/L  --   --  22     Cr Clearance Estimated Creatinine Clearance: 27.5 mL/min (A) (by C-G formula based on SCr of 1.49 mg/dL (H)).  Coag     HbA1C   Lab Results   Component Value Date    HGBA1C 5.64 (H) 10/13/2024         Infection   Results from last 7 days   Lab Units 11/06/24 1715 11/06/24  1710   BLOODCX  No growth at 24 hours No growth at 24 hours     UA    Results from last 7 days   Lab Units 11/06/24  1539   NITRITE UA  Negative   WBC UA /HPF 3-5*   BACTERIA UA /HPF Trace*   SQUAM EPITHEL UA /HPF 3-6*     Microbiology Results (last 10 days)       Procedure Component Value - Date/Time    Blood Culture - Blood, Arm, Right [[id number]]  (Normal) Collected: 11/06/24 1715    Lab Status: Preliminary result Specimen: Blood from Arm, Right Updated: 11/07/24 1731     Blood Culture No growth at 24 hours    Blood Culture - Blood, Arm, Left [[id number]]  (Normal) Collected: 11/06/24 1710    Lab Status: Preliminary result Specimen: Blood from Arm, Left Updated: 11/07/24 1715     Blood Culture No growth at 24 hours          Imaging Results (Last 72 Hours)       Procedure Component Value Units Date/Time    CT Abdomen Pelvis Without Contrast [[id number]] Collected: 11/06/24 1618     Updated: 11/06/24 1625    Narrative:      CT ABDOMEN PELVIS WO CONTRAST    Date of Exam: 11/6/2024 4:08 PM EST    Indication: Abdominal pain.    Comparison: CT abdomen pelvis with contrast 10/11/2024    Technique: Axial CT images were obtained of the abdomen and pelvis without the administration of contrast. Sagittal and coronal reconstructions were performed.  Automated exposure control and iterative reconstruction methods were used.      Findings:  3.8 cm mid descending thoracic aortic aneurysm (2/1), incompletely imaged. Advanced calcific atherosclerosis in the abdominal aorta and common iliac arteries.    Heart size is normal. Coronary artery calcifications are  present. No acute basilar airspace disease.    Cholecystectomy. No abnormal biliary ductal dilation. Liver, spleen, pancreas, adrenals and kidneys maintain a normal noncontrast appearance.    Submucosal fat deposition is demonstrated within the ascending and transverse colonic segments as can be seen as sequelae of prior bouts of colitis.    There is short segment wall thickening and stranding about the distal descending colon-sigmoid colon junction (series 2 image 86, series 3 image 38) suggesting active inflammation.. No evidence of bowel obstruction. Mild colonic stool burden.    Urinary bladder and rectum are within normal limits. Hysterectomy.    No adenopathy or free fluid. No pneumatosis.    Multilevel advanced lumbar facet arthropathy with grade 1 anterolisthesis L4 upon L5. No acute or suspicious osseous abnormalities.        Impression:      Impression:    1. Segmental thickening and inflammatory change of the descending colon-sigmoid colon junction. Correlate for infectious or inflammatory colitis.  2. Incompletely imaged 3.8 cm mid ascending thoracic aortic aneurysm.            Electronically Signed: Ute Snider MD    11/6/2024 4:23 PM EST    Workstation ID: [id number]            Assessment & Plan     ASSESSMENT:  -Lower abdominal pain  -Diarrhea with rectal bleeding  -Leukocytosis  -Normocytic anemia  -Abnormal CT showing segmental thickening and inflammatory changes of the descending/sigmoid colon  -Recent Strongyloides infection in 10/2024 s/p treatment with ivermectin  -Small bowel and colonic Crohn's disease - on Rinvoq  -Electrolyte abnormalities  -UTI  -Hypertension  -COPD  -CAD s/p stent placement on Plavix  -RA - on Humira   -History of hysterectomy  -History of cholecystectomy     PLAN:  Patient is a 74-year-old female with history of small bowel and colonic Crohn's (on Rinvoq), rheumatoid arthritis (on Humira), and cholecystectomy who presented on 11/6 with complaints of abdominal pain,  diarrhea, and rectal bleeding.  Recent admission in 10/2024 for persistent diarrhea and abdominal pain as well.  EGD/colonoscopy at that time confirmed Crohn's disease.  Pathology was also positive for Strongyloides and patient was treated with ivermectin. CT abdomen/pelvis WO on admission shows segmental thickening and inflammatory changes of the descending/sigmoid colon.     Patient reports at least 3 bloody bowel movements overnight.  Continues to complain of abdominal pain.  Hemoglobin 9.1 from 8.0.  Continue to monitor H/H and transfuse as needed.  Plavix on hold due to persistent bleeding.  Continue IV Solu-Medrol.  Humira, methotrexate, and Rinvoq on hold.  Patient recently had EGD/colonoscopy on 10/12/2024. No plans for repeat at this time.   WBC count now normal.  Continue antibiotics.  Creatinine 1.49 from 2.22.  Nephrology following.  Stool for ova and parasites is pending.  Await results.  We will advance to low residue diet.  Continue antiemetics/analgesics as needed.  Continue supportive care.    Electronically signed by DRU Lew, 11/08/24, 12:19 PM EST.

## 2024-11-08 NOTE — PROGRESS NOTES
Infectious Diseases Progress Note      LOS: 2 days   Patient Care Team:  Eduard Little MD as PCP - General (Family Medicine)    Chief Complaint: Abdominal pain and nausea    Subjective   the patient has been afebrile for the last 24 hours.  The patient is on room air, hemodynamically stable, and is tolerating antimicrobial therapy.  Patient has not had a bowel movement since we saw her yesterday.  Continues to have abdominal pain and nausea          Review of Systems:   Review of Systems   Constitutional:  Positive for fatigue.   HENT: Negative.     Eyes: Negative.    Respiratory: Negative.     Cardiovascular: Negative.    Gastrointestinal:  Positive for abdominal pain and nausea.   Endocrine: Negative.    Genitourinary: Negative.    Musculoskeletal:  Positive for joint swelling.   Skin: Negative.    Neurological:  Positive for weakness.   Psychiatric/Behavioral: Negative.     All other systems reviewed and are negative.       Objective     Vital Signs  Temp:  [97.9 °F (36.6 °C)-98.2 °F (36.8 °C)] 97.9 °F (36.6 °C)  Heart Rate:  [64-81] 81  Resp:  [14-25] 25  BP: ()/(51-59) 112/57    Physical Exam:  Physical Exam  Vitals and nursing note reviewed.   Constitutional:       General: She is not in acute distress.     Appearance: She is well-developed and normal weight. She is ill-appearing. She is not diaphoretic.   HENT:      Head: Normocephalic and atraumatic.   Eyes:      General: No scleral icterus.     Extraocular Movements: Extraocular movements intact.      Conjunctiva/sclera: Conjunctivae normal.      Pupils: Pupils are equal, round, and reactive to light.   Cardiovascular:      Rate and Rhythm: Normal rate and regular rhythm.      Heart sounds: Normal heart sounds, S1 normal and S2 normal. No murmur heard.  Pulmonary:      Effort: Pulmonary effort is normal. No respiratory distress.      Breath sounds: Normal breath sounds. No stridor. No wheezing or rales.   Chest:      Chest wall: No  tenderness.   Abdominal:      General: Bowel sounds are normal. There is no distension.      Palpations: Abdomen is soft. There is no mass.      Tenderness: There is abdominal tenderness. There is no guarding.   Genitourinary:     Comments: External catheters  Musculoskeletal:         General: No swelling, tenderness or deformity. Normal range of motion.      Cervical back: Neck supple.   Skin:     General: Skin is warm and dry.      Coloration: Skin is not pale.      Findings: No bruising, erythema or rash.   Neurological:      Mental Status: She is alert and oriented to person, place, and time.          Results Review:    I have reviewed all clinical data, test, lab, and imaging results.     Radiology  No Radiology Exams Resulted Within Past 24 Hours    Cardiology    Laboratory    Results from last 7 days   Lab Units 11/07/24 2355 11/07/24  0133 11/06/24  1442   WBC 10*3/mm3 8.38 12.65* 12.46*   HEMOGLOBIN g/dL 9.1* 8.0* 9.4*   HEMATOCRIT % 31.7* 26.2* 29.7*   PLATELETS 10*3/mm3 493* 451* 526*     Results from last 7 days   Lab Units 11/07/24  2355 11/07/24  0227 11/06/24  1442   SODIUM mmol/L 137 136 134*   POTASSIUM mmol/L 4.8 5.0 5.8*   CHLORIDE mmol/L 105 104 97*   CO2 mmol/L 20.3* 22.4 23.9   BUN mg/dL 36* 62* 75*   CREATININE mg/dL 1.49* 2.22* 2.84*   GLUCOSE mg/dL 123* 76 138*   ALBUMIN g/dL 2.8*  --  2.9*   BILIRUBIN mg/dL 0.3  --  0.4   ALK PHOS U/L 100  --  108   AST (SGOT) U/L 12  --  15   ALT (SGPT) U/L 9  --  12   LIPASE U/L  --   --  22   CALCIUM mg/dL 9.1 8.6 9.3                 Microbiology   Microbiology Results (last 10 days)       Procedure Component Value - Date/Time    Blood Culture - Blood, Arm, Right [[id number]]  (Normal) Collected: 11/06/24 1715    Lab Status: Preliminary result Specimen: Blood from Arm, Right Updated: 11/07/24 1731     Blood Culture No growth at 24 hours    Blood Culture - Blood, Arm, Left [[id number]]  (Normal) Collected: 11/06/24 1710    Lab Status: Preliminary result  Specimen: Blood from Arm, Left Updated: 11/07/24 2035     Blood Culture No growth at 24 hours            Medication Review:       Schedule Meds  [Held by provider] atorvastatin, 40 mg, Oral, Nightly  [Held by provider] clopidogrel, 75 mg, Oral, Daily  folic acid, 1,000 mcg, Oral, Daily  levothyroxine, 50 mcg, Oral, Q AM  melatonin, 10 mg, Oral, Nightly  methylPREDNISolone sodium succinate, 20 mg, Intravenous, Q8H  metroNIDAZOLE, 500 mg, Intravenous, Q8H  midodrine, 10 mg, Oral, Q8H  pantoprazole, 40 mg, Oral, Daily  sertraline, 50 mg, Oral, Daily  [Held by provider] spironolactone, 25 mg, Oral, Daily  tiotropium bromide monohydrate, 2 puff, Inhalation, Daily - RT        Infusion Meds       PRN Meds    albuterol    HYDROmorphone    influenza vaccine    ondansetron    sodium chloride        Assessment & Plan       Antimicrobial Therapy   1.         2.        3.        4.        5.            Assessment     Colitis noted on CT scan of abdomen pelvis probably secondary to Crohn's disease rather than infection.  Patient presented to hospital with rectal bleed.  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  Treated with p.o. ivermectin by GI service.  Patient does not appear to be septic to suspect hyperinfection      Crohn's disease and rheumatoid arthritis.  Patient states that she has been on Humira since June but has lost over 40 pounds since that time.  She also states that she is on methotrexate, prednisone and Rinvoq.     Acute kidney injury on chronic kidney disease-nephrology following     COPD-chronically on 2 L of oxygen by nasal cannula     Plan     Discontinue IV Flagyl   Send stool for GI PCR panel, C. difficile toxin and antigen and ova and parasites patient does have a bowel movement  If stool was positive for strongyloidiasis then recommend sputum for ova and parasite to rule out disseminated disease and  try to avoid long-term steroids since increase the risk of hyperinfection  Continue supportive care  Patient will need to hold immunosuppressive medications until any infection has been cleared  The patient should follow with GI  Not much more to add from infectious disease standpoint-we will sign off at this time-please call with any questions.    Kiah Chirinos, APRN  11/08/24  17:12 EST    Note is dictated utilizing voice recognition software/Dragon

## 2024-11-09 ENCOUNTER — INPATIENT HOSPITAL (AMBULATORY)
Age: 74
End: 2024-11-09
Payer: MEDICARE

## 2024-11-09 ENCOUNTER — INPATIENT HOSPITAL (AMBULATORY)
Dept: URBAN - METROPOLITAN AREA HOSPITAL 84 | Facility: HOSPITAL | Age: 74
End: 2024-11-09
Payer: MEDICARE

## 2024-11-09 DIAGNOSIS — K62.5 HEMORRHAGE OF ANUS AND RECTUM: ICD-10-CM

## 2024-11-09 PROBLEM — E44.0 MODERATE PROTEIN-CALORIE MALNUTRITION: Status: ACTIVE | Noted: 2024-11-09

## 2024-11-09 LAB
ADV 40+41 DNA STL QL NAA+NON-PROBE: NOT DETECTED
ALBUMIN SERPL-MCNC: 2.8 G/DL (ref 3.5–5.2)
ALBUMIN/GLOB SERPL: 1.1 G/DL
ALP SERPL-CCNC: 88 U/L (ref 39–117)
ALT SERPL W P-5'-P-CCNC: 7 U/L (ref 1–33)
ANION GAP SERPL CALCULATED.3IONS-SCNC: 10.7 MMOL/L (ref 5–15)
AST SERPL-CCNC: 7 U/L (ref 1–32)
ASTRO TYP 1-8 RNA STL QL NAA+NON-PROBE: NOT DETECTED
BASOPHILS # BLD AUTO: 0.02 10*3/MM3 (ref 0–0.2)
BASOPHILS NFR BLD AUTO: 0.2 % (ref 0–1.5)
BILIRUB SERPL-MCNC: 0.2 MG/DL (ref 0–1.2)
BUN SERPL-MCNC: 21 MG/DL (ref 8–23)
BUN/CREAT SERPL: 20.6 (ref 7–25)
C CAYETANENSIS DNA STL QL NAA+NON-PROBE: NOT DETECTED
C COLI+JEJ+UPSA DNA STL QL NAA+NON-PROBE: NOT DETECTED
CALCIUM SPEC-SCNC: 9.1 MG/DL (ref 8.6–10.5)
CHLORIDE SERPL-SCNC: 100 MMOL/L (ref 98–107)
CO2 SERPL-SCNC: 22.3 MMOL/L (ref 22–29)
CREAT SERPL-MCNC: 1.02 MG/DL (ref 0.57–1)
CRYPTOSP DNA STL QL NAA+NON-PROBE: NOT DETECTED
DEPRECATED RDW RBC AUTO: 60.1 FL (ref 37–54)
E HISTOLYT DNA STL QL NAA+NON-PROBE: NOT DETECTED
EAEC PAA PLAS AGGR+AATA ST NAA+NON-PRB: NOT DETECTED
EC STX1+STX2 GENES STL QL NAA+NON-PROBE: NOT DETECTED
EGFRCR SERPLBLD CKD-EPI 2021: 57.8 ML/MIN/1.73
EOSINOPHIL # BLD AUTO: 0 10*3/MM3 (ref 0–0.4)
EOSINOPHIL NFR BLD AUTO: 0 % (ref 0.3–6.2)
EPEC EAE GENE STL QL NAA+NON-PROBE: NOT DETECTED
ERYTHROCYTE [DISTWIDTH] IN BLOOD BY AUTOMATED COUNT: 16.9 % (ref 12.3–15.4)
ETEC LTA+ST1A+ST1B TOX ST NAA+NON-PROBE: NOT DETECTED
G LAMBLIA DNA STL QL NAA+NON-PROBE: NOT DETECTED
GLOBULIN UR ELPH-MCNC: 2.5 GM/DL
GLUCOSE SERPL-MCNC: 138 MG/DL (ref 65–99)
HCT VFR BLD AUTO: 26.9 % (ref 34–46.6)
HGB BLD-MCNC: 8.4 G/DL (ref 12–15.9)
IMM GRANULOCYTES # BLD AUTO: 0.51 10*3/MM3 (ref 0–0.05)
IMM GRANULOCYTES NFR BLD AUTO: 4.1 % (ref 0–0.5)
LYMPHOCYTES # BLD AUTO: 1.82 10*3/MM3 (ref 0.7–3.1)
LYMPHOCYTES NFR BLD AUTO: 14.5 % (ref 19.6–45.3)
MAGNESIUM SERPL-MCNC: 1.8 MG/DL (ref 1.6–2.4)
MCH RBC QN AUTO: 30 PG (ref 26.6–33)
MCHC RBC AUTO-ENTMCNC: 31.2 G/DL (ref 31.5–35.7)
MCV RBC AUTO: 96.1 FL (ref 79–97)
MONOCYTES # BLD AUTO: 0.88 10*3/MM3 (ref 0.1–0.9)
MONOCYTES NFR BLD AUTO: 7 % (ref 5–12)
NEUTROPHILS NFR BLD AUTO: 74.2 % (ref 42.7–76)
NEUTROPHILS NFR BLD AUTO: 9.32 10*3/MM3 (ref 1.7–7)
NOROVIRUS GI+II RNA STL QL NAA+NON-PROBE: NOT DETECTED
NRBC BLD AUTO-RTO: 0 /100 WBC (ref 0–0.2)
P SHIGELLOIDES DNA STL QL NAA+NON-PROBE: NOT DETECTED
PHOSPHATE SERPL-MCNC: 3.2 MG/DL (ref 2.5–4.5)
PLATELET # BLD AUTO: 567 10*3/MM3 (ref 140–450)
PMV BLD AUTO: 9.5 FL (ref 6–12)
POTASSIUM SERPL-SCNC: 4.1 MMOL/L (ref 3.5–5.2)
PROT SERPL-MCNC: 5.3 G/DL (ref 6–8.5)
RBC # BLD AUTO: 2.8 10*6/MM3 (ref 3.77–5.28)
RVA RNA STL QL NAA+NON-PROBE: NOT DETECTED
S ENT+BONG DNA STL QL NAA+NON-PROBE: NOT DETECTED
SAPO I+II+IV+V RNA STL QL NAA+NON-PROBE: NOT DETECTED
SHIGELLA SP+EIEC IPAH ST NAA+NON-PROBE: NOT DETECTED
SODIUM SERPL-SCNC: 133 MMOL/L (ref 136–145)
V CHOL+PARA+VUL DNA STL QL NAA+NON-PROBE: NOT DETECTED
V CHOLERAE DNA STL QL NAA+NON-PROBE: NOT DETECTED
WBC NRBC COR # BLD AUTO: 12.55 10*3/MM3 (ref 3.4–10.8)
Y ENTEROCOL DNA STL QL NAA+NON-PROBE: NOT DETECTED

## 2024-11-09 PROCEDURE — 99232 SBSQ HOSP IP/OBS MODERATE 35: CPT | Performed by: NURSE PRACTITIONER

## 2024-11-09 PROCEDURE — 25010000002 METHYLPREDNISOLONE PER 40 MG: Performed by: NURSE PRACTITIONER

## 2024-11-09 PROCEDURE — 25010000002 HYDROMORPHONE PER 4 MG: Performed by: HOSPITALIST

## 2024-11-09 PROCEDURE — 94799 UNLISTED PULMONARY SVC/PX: CPT

## 2024-11-09 PROCEDURE — 94664 DEMO&/EVAL PT USE INHALER: CPT

## 2024-11-09 PROCEDURE — 84100 ASSAY OF PHOSPHORUS: CPT | Performed by: HOSPITALIST

## 2024-11-09 PROCEDURE — 85025 COMPLETE CBC W/AUTO DIFF WBC: CPT | Performed by: NURSE PRACTITIONER

## 2024-11-09 PROCEDURE — 87507 IADNA-DNA/RNA PROBE TQ 12-25: CPT | Performed by: NURSE PRACTITIONER

## 2024-11-09 PROCEDURE — 25010000002 ONDANSETRON PER 1 MG: Performed by: NURSE PRACTITIONER

## 2024-11-09 PROCEDURE — 94761 N-INVAS EAR/PLS OXIMETRY MLT: CPT

## 2024-11-09 PROCEDURE — 80053 COMPREHEN METABOLIC PANEL: CPT | Performed by: HOSPITALIST

## 2024-11-09 PROCEDURE — 99233 SBSQ HOSP IP/OBS HIGH 50: CPT | Performed by: INTERNAL MEDICINE

## 2024-11-09 PROCEDURE — 83735 ASSAY OF MAGNESIUM: CPT | Performed by: HOSPITALIST

## 2024-11-09 RX ORDER — CLOPIDOGREL BISULFATE 75 MG/1
75 TABLET ORAL DAILY
Status: DISCONTINUED | OUTPATIENT
Start: 2024-11-09 | End: 2024-11-12 | Stop reason: HOSPADM

## 2024-11-09 RX ADMIN — ONDANSETRON 4 MG: 2 INJECTION, SOLUTION INTRAMUSCULAR; INTRAVENOUS at 05:38

## 2024-11-09 RX ADMIN — SERTRALINE 50 MG: 50 TABLET, FILM COATED ORAL at 09:30

## 2024-11-09 RX ADMIN — TIOTROPIUM BROMIDE INHALATION SPRAY 2 PUFF: 3.12 SPRAY, METERED RESPIRATORY (INHALATION) at 10:20

## 2024-11-09 RX ADMIN — FOLIC ACID 1000 MCG: 1 TABLET ORAL at 09:30

## 2024-11-09 RX ADMIN — HYDROMORPHONE HYDROCHLORIDE 0.5 MG: 1 INJECTION, SOLUTION INTRAMUSCULAR; INTRAVENOUS; SUBCUTANEOUS at 17:24

## 2024-11-09 RX ADMIN — METHYLPREDNISOLONE SODIUM SUCCINATE 20 MG: 40 INJECTION, POWDER, FOR SOLUTION INTRAMUSCULAR; INTRAVENOUS at 21:37

## 2024-11-09 RX ADMIN — Medication 10 MG: at 21:12

## 2024-11-09 RX ADMIN — METHYLPREDNISOLONE SODIUM SUCCINATE 20 MG: 40 INJECTION, POWDER, FOR SOLUTION INTRAMUSCULAR; INTRAVENOUS at 05:37

## 2024-11-09 RX ADMIN — PANTOPRAZOLE SODIUM 40 MG: 40 TABLET, DELAYED RELEASE ORAL at 09:30

## 2024-11-09 RX ADMIN — MIDODRINE HYDROCHLORIDE 10 MG: 5 TABLET ORAL at 21:12

## 2024-11-09 RX ADMIN — HYDROMORPHONE HYDROCHLORIDE 0.5 MG: 1 INJECTION, SOLUTION INTRAMUSCULAR; INTRAVENOUS; SUBCUTANEOUS at 00:08

## 2024-11-09 RX ADMIN — HYDROMORPHONE HYDROCHLORIDE 0.5 MG: 1 INJECTION, SOLUTION INTRAMUSCULAR; INTRAVENOUS; SUBCUTANEOUS at 09:30

## 2024-11-09 RX ADMIN — ATORVASTATIN CALCIUM 40 MG: 40 TABLET, FILM COATED ORAL at 21:12

## 2024-11-09 RX ADMIN — METHYLPREDNISOLONE SODIUM SUCCINATE 20 MG: 40 INJECTION, POWDER, FOR SOLUTION INTRAMUSCULAR; INTRAVENOUS at 17:23

## 2024-11-09 RX ADMIN — LEVOTHYROXINE SODIUM 50 MCG: 0.05 TABLET ORAL at 05:38

## 2024-11-09 RX ADMIN — HYDROMORPHONE HYDROCHLORIDE 0.5 MG: 1 INJECTION, SOLUTION INTRAMUSCULAR; INTRAVENOUS; SUBCUTANEOUS at 04:51

## 2024-11-09 RX ADMIN — MIDODRINE HYDROCHLORIDE 10 MG: 5 TABLET ORAL at 17:23

## 2024-11-09 RX ADMIN — CLOPIDOGREL BISULFATE 75 MG: 75 TABLET ORAL at 17:23

## 2024-11-09 RX ADMIN — MIDODRINE HYDROCHLORIDE 10 MG: 5 TABLET ORAL at 05:38

## 2024-11-09 RX ADMIN — ONDANSETRON 4 MG: 2 INJECTION, SOLUTION INTRAMUSCULAR; INTRAVENOUS at 00:08

## 2024-11-09 NOTE — PROGRESS NOTES
" LOS: 3 days   Patient Care Team:  Eduard Little MD as PCP - General (Family Medicine)      Subjective   \" I am not feeling well\"    Interval History:    Reports 2 bowel movements since 7 AM.  Large amount of liquid dark black/reddish stool.   Patient ate 25% of her breakfast.  LABS: Sodium 133, potassium 4.1, creatinine 1.02 (1.49).  LFTs are normal.  WBCs 12.55 (8.38), hemoglobin 8.4 (9.1), platelets 569.  GI stool panel negative.    ROS:   No chest pain, shortness of breath, or cough.        Medication Review:     Current Facility-Administered Medications:     albuterol (PROVENTIL) nebulizer solution 0.083% 2.5 mg/3mL, 2.5 mg, Nebulization, Q6H PRN, Lisa Chicas APRN, 2.5 mg at 11/08/24 1048    atorvastatin (LIPITOR) tablet 40 mg, 40 mg, Oral, Nightly, Marciano Multani MD    [Held by provider] clopidogrel (PLAVIX) tablet 75 mg, 75 mg, Oral, Daily, Mikayla Kc APRN, 75 mg at 11/07/24 1025    folic acid (FOLVITE) tablet 1,000 mcg, 1,000 mcg, Oral, Daily, Marciano Multani MD, 1,000 mcg at 11/09/24 0930    HYDROmorphone (DILAUDID) injection 0.5 mg, 0.5 mg, Intravenous, Q3H PRN, Marciano Multani MD, 0.5 mg at 11/09/24 0930    influenza vac split high-dose (FLUZONE HIGH DOSE) injection 0.5 mL, 0.5 mL, Intramuscular, During Hospitalization, Lisa Chicas APRN    levothyroxine (SYNTHROID, LEVOTHROID) tablet 50 mcg, 50 mcg, Oral, Q AM, Marciano Multani MD, 50 mcg at 11/09/24 0538    melatonin tablet 10 mg, 10 mg, Oral, Nightly, Marciano Multani MD, 10 mg at 11/08/24 2110    methylPREDNISolone sodium succinate (SOLU-Medrol) injection 20 mg, 20 mg, Intravenous, Q8H, Annalee Anderson APRROBBIE, 20 mg at 11/09/24 0537    midodrine (PROAMATINE) tablet 10 mg, 10 mg, Oral, Q8H, Marciano Multani MD, 10 mg at 11/09/24 0538    ondansetron (ZOFRAN) injection 4 mg, 4 mg, Intravenous, Q6H PRN, Lisa Chicas, DRU, 4 mg at 11/09/24 0538    pantoprazole (PROTONIX) EC tablet 40 mg, " 40 mg, Oral, Daily, Marciano Multani MD, 40 mg at 11/09/24 0930    sertraline (ZOLOFT) tablet 50 mg, 50 mg, Oral, Daily, Marciano Multani MD, 50 mg at 11/09/24 0930    sodium chloride 0.9 % flush 10 mL, 10 mL, Intravenous, PRN, Niya Sierra PA-C    [Held by provider] spironolactone (ALDACTONE) tablet 25 mg, 25 mg, Oral, Daily, Lisa Chicas APRN    tiotropium (SPIRIVA RESPIMAT) 2.5 mcg/act aerosol solution inhaler, 2 puff, Inhalation, Daily - RT, Lisa Chicas APRN, 2 puff at 11/09/24 1020      Objective resting in hospital bed.  NAD.  Room 368.  Nursing assistant at bedside.  Hospitalist at bedside.    Vital Signs  Vitals:    11/09/24 0302 11/09/24 1020 11/09/24 1023 11/09/24 1203   BP: 112/56   112/61   BP Location: Right arm   Right arm   Patient Position: Lying   Lying   Pulse: 58 61 63 59   Resp: 20 16 16 22   Temp: 97.8 °F (36.6 °C)   97.8 °F (36.6 °C)   TempSrc: Oral   Oral   SpO2: 96% 99% 97% 98%   Weight:       Height:           Physical Exam:   General Appearance:    Awake and alert, in no acute distress   Head:    Normocephalic, without obvious abnormality   Eyes:          Conjunctivae normal, anicteric sclera   Throat:   No oral lesions, no thrush, oral mucosa moist   Neck:   No adenopathy, supple, no JVD   Lungs:     respirations regular, even and unlabored   Abdomen:     Soft, generalized tenderness, no rebound or guarding, non-distended   Rectal:     Deferred   Extremities:   No edema, no cyanosis   Skin:   No bruising or rash, no jaundice        Results Review:    CBC    Results from last 7 days   Lab Units 11/09/24  0443 11/07/24  2355 11/07/24  0133 11/06/24  1442   WBC 10*3/mm3 12.55* 8.38 12.65* 12.46*   HEMOGLOBIN g/dL 8.4* 9.1* 8.0* 9.4*   PLATELETS 10*3/mm3 567* 493* 451* 526*     CMP   Results from last 7 days   Lab Units 11/09/24  0443 11/07/24  2355 11/07/24  0227 11/06/24  1442   SODIUM mmol/L 133* 137 136 134*   POTASSIUM mmol/L 4.1 4.8 5.0 5.8*   CHLORIDE  mmol/L 100 105 104 97*   CO2 mmol/L 22.3 20.3* 22.4 23.9   BUN mg/dL 21 36* 62* 75*   CREATININE mg/dL 1.02* 1.49* 2.22* 2.84*   GLUCOSE mg/dL 138* 123* 76 138*   ALBUMIN g/dL 2.8* 2.8*  --  2.9*   BILIRUBIN mg/dL 0.2 0.3  --  0.4   ALK PHOS U/L 88 100  --  108   AST (SGOT) U/L 7 12  --  15   ALT (SGPT) U/L 7 9  --  12   MAGNESIUM mg/dL 1.8 2.2  --   --    PHOSPHORUS mg/dL 3.2 4.3  --   --    LIPASE U/L  --   --   --  22     Cr Clearance Estimated Creatinine Clearance: 40.2 mL/min (A) (by C-G formula based on SCr of 1.02 mg/dL (H)).  Coag     HbA1C   Lab Results   Component Value Date    HGBA1C 5.64 (H) 10/13/2024         Infection   Results from last 7 days   Lab Units 11/06/24  1715 11/06/24  1710   BLOODCX  No growth at 2 days No growth at 2 days     UA    Results from last 7 days   Lab Units 11/06/24  1539   NITRITE UA  Negative   WBC UA /HPF 3-5*   BACTERIA UA /HPF Trace*   SQUAM EPITHEL UA /HPF 3-6*     Microbiology Results (last 10 days)       Procedure Component Value - Date/Time    Gastrointestinal Panel, PCR - Stool, Per Rectum [[id number]]  (Normal) Collected: 11/09/24 0702    Lab Status: Final result Specimen: Stool from Per Rectum Updated: 11/09/24 0900     Campylobacter Not Detected     Plesiomonas shigelloides Not Detected     Salmonella Not Detected     Vibrio Not Detected     Vibrio cholerae Not Detected     Yersinia enterocolitica Not Detected     Enteroaggregative E. coli (EAEC) Not Detected     Enteropathogenic E. coli (EPEC) Not Detected     Enterotoxigenic E. coli (ETEC) lt/st Not Detected     Shiga-like toxin-producing E. coli (STEC) stx1/stx2 Not Detected     Shigella/Enteroinvasive E. coli (EIEC) Not Detected     Cryptosporidium Not Detected     Cyclospora cayetanensis Not Detected     Entamoeba histolytica Not Detected     Giardia lamblia Not Detected     Adenovirus F40/41 Not Detected     Astrovirus Not Detected     Norovirus GI/GII Not Detected     Rotavirus A Not Detected     Sapovirus  (I, II, IV or V) Not Detected    Narrative:      If Aeromonas, Staphylococcus aureus or Bacillus cereus are suspected, please order [id number]: Stool Culture, Aeromonas, S aureus, B Cereus.    Blood Culture - Blood, Arm, Right [[id number]]  (Normal) Collected: 11/06/24 1715    Lab Status: Preliminary result Specimen: Blood from Arm, Right Updated: 11/08/24 1731     Blood Culture No growth at 2 days    Blood Culture - Blood, Arm, Left [[id number]]  (Normal) Collected: 11/06/24 1710    Lab Status: Preliminary result Specimen: Blood from Arm, Left Updated: 11/08/24 1715     Blood Culture No growth at 2 days          Imaging Results (Last 72 Hours)       Procedure Component Value Units Date/Time    CT Abdomen Pelvis Without Contrast [[id number]] Collected: 11/06/24 1618     Updated: 11/06/24 1625    Narrative:      CT ABDOMEN PELVIS WO CONTRAST    Date of Exam: 11/6/2024 4:08 PM EST    Indication: Abdominal pain.    Comparison: CT abdomen pelvis with contrast 10/11/2024    Technique: Axial CT images were obtained of the abdomen and pelvis without the administration of contrast. Sagittal and coronal reconstructions were performed.  Automated exposure control and iterative reconstruction methods were used.      Findings:  3.8 cm mid descending thoracic aortic aneurysm (2/1), incompletely imaged. Advanced calcific atherosclerosis in the abdominal aorta and common iliac arteries.    Heart size is normal. Coronary artery calcifications are present. No acute basilar airspace disease.    Cholecystectomy. No abnormal biliary ductal dilation. Liver, spleen, pancreas, adrenals and kidneys maintain a normal noncontrast appearance.    Submucosal fat deposition is demonstrated within the ascending and transverse colonic segments as can be seen as sequelae of prior bouts of colitis.    There is short segment wall thickening and stranding about the distal descending colon-sigmoid colon junction (series 2 image 86, series 3 image 38)  suggesting active inflammation.. No evidence of bowel obstruction. Mild colonic stool burden.    Urinary bladder and rectum are within normal limits. Hysterectomy.    No adenopathy or free fluid. No pneumatosis.    Multilevel advanced lumbar facet arthropathy with grade 1 anterolisthesis L4 upon L5. No acute or suspicious osseous abnormalities.        Impression:      Impression:    1. Segmental thickening and inflammatory change of the descending colon-sigmoid colon junction. Correlate for infectious or inflammatory colitis.  2. Incompletely imaged 3.8 cm mid ascending thoracic aortic aneurysm.            Electronically Signed: Ute Snider MD    11/6/2024 4:23 PM EST    Workstation ID: [id number]            Assessment & Plan     ASSESSMENT:  -Lower abdominal pain  -Diarrhea with rectal bleeding  -Leukocytosis  -Normocytic anemia  -Abnormal CT showing segmental thickening and inflammatory changes of the descending/sigmoid colon  -Recent Strongyloides infection in 10/2024 s/p treatment with ivermectin  -Small bowel and colonic Crohn's disease - on Rinvoq  -Electrolyte abnormalities  -UTI  -Hypertension  -COPD  -CAD s/p stent placement on Plavix  -RA - on Humira   -History of hysterectomy  -History of cholecystectomy     PLAN:  Patient is a 74-year-old female with history of small bowel and colonic Crohn's (on Rinvoq), rheumatoid arthritis (on Humira), and cholecystectomy who presented on 11/6 with complaints of abdominal pain, diarrhea, and rectal bleeding.  Recent admission in 10/2024 for persistent diarrhea and abdominal pain as well.  EGD/colonoscopy at that time confirmed Crohn's disease.  Pathology was also positive for Strongyloides and patient was treated with ivermectin. CT abdomen/pelvis WO on admission shows segmental thickening and inflammatory changes of the descending/sigmoid colon.     2 bowel movements that were large liquidy dark black/red in color per nursing staff.  Okay to restart Plavix.  Ate 25%  of her meal.  Stool panel is negative.  Hemoglobin 8.4 from 9.1.  Continue to monitor H&H transfuse as needed.  Continue IV Solu-Medrol.  Humira, methotrexate, and Rinvoq on hold.  Patient recently had EGD/colonoscopy on 10/12/2024. No plans for repeat at this time.   Continue antibiotics.  Creatinine 1.02.  Nephrology following.  Stool for ova and parasites reordered questionable canceled.  Await results.  Pending stronglyloides antibody IgG  Low residue diet.  Continue antiemetics/analgesics as needed.  Continue supportive care.      Electronically signed by DRU Warner, 11/09/24, 12:07 PM EST.

## 2024-11-09 NOTE — PROGRESS NOTES
PROGRESS NOTE      Patient Name: Maryann Gaitan  : 1950  MRN: [id number]  Primary Care Physician: Eduard Little MD  Date of admission: 2024    Patient Care Team:  Eduard Little MD as PCP - General (Family Medicine)        Subjective   Subjective:     Seen and examined, abdominal pain is better  Alert and oriented x 3,  Kidney function is stable      Review of systems:  All other review of system unremarkable      Allergies:    Allergies   Allergen Reactions    Codeine Hives    Penicillin G Sodium Hives       Objective   Exam:     Vital Signs  Temp:  [97.7 °F (36.5 °C)-97.8 °F (36.6 °C)] 97.8 °F (36.6 °C)  Heart Rate:  [58-75] 72  Resp:  [16-25] 17  BP: (112-126)/(56-66) 126/64  SpO2:  [96 %-100 %] 99 %  on   ;   Device (Oxygen Therapy): room air  Body mass index is 21.91 kg/m².    General: Elderly thin white female in no acute distress.    Head:      Normocephalic and atraumatic.    Eyes:      PERRL/EOM intact, conjunctivae and sclerae clear without nystagmus.    Neck:      No masses, thyromegaly,  trachea central with normal respiratory effort   Lungs:    Clear bilaterally to auscultation.    Heart:      Regular rate and rhythm, no murmur no gallop  Abd:        Soft, mildly tender, not distended, bowel sounds positive, no shifting dullness   Pulses:   Pulses palpable  Extr:        No cyanosis or clubbing--no significant edema.    Neuro:    No focal deficits.   alert oriented x3  Skin:       Intact without lesions or rashes.    Psych:    Alert and cooperative; normal mood and affect; .      Results Review:  I have personally reviewed most recent Data :  CBC    Results from last 7 days   Lab Units 24  0133 24  1442   WBC 10*3/mm3 12.55* 8.38 12.65* 12.46*   HEMOGLOBIN g/dL 8.4* 9.1* 8.0* 9.4*   PLATELETS 10*3/mm3 567* 493* 451* 526*     CMP   Results from last 7 days   Lab Units 243 24/24  0227 24  1442    SODIUM mmol/L 133* 137 136 134*   POTASSIUM mmol/L 4.1 4.8 5.0 5.8*   CHLORIDE mmol/L 100 105 104 97*   CO2 mmol/L 22.3 20.3* 22.4 23.9   BUN mg/dL 21 36* 62* 75*   CREATININE mg/dL 1.02* 1.49* 2.22* 2.84*   GLUCOSE mg/dL 138* 123* 76 138*   ALBUMIN g/dL 2.8* 2.8*  --  2.9*   BILIRUBIN mg/dL 0.2 0.3  --  0.4   ALK PHOS U/L 88 100  --  108   AST (SGOT) U/L 7 12  --  15   ALT (SGPT) U/L 7 9  --  12   LIPASE U/L  --   --   --  22     ABG      No radiology results for the last day    Results for orders placed during the hospital encounter of 10/08/24    Adult Transesophageal Echo ([facility]) W/ Cont if Necessary Per Protocol (Cardiology Department)    Interpretation Summary    Left ventricular systolic function is normal. Left ventricular ejection fraction appears to be 56 - 60%.    The left atrial cavity is severely dilated.    Saline test results are negative.    There is mild, posterior mitral leaflet thickening present.    Moderate to severe mitral valve regurgitation is present with a centrally-directed jet noted.    There is moderate, (grade 3) plaque in the descending aorta present.    Scheduled Meds:atorvastatin, 40 mg, Oral, Nightly  clopidogrel, 75 mg, Oral, Daily  folic acid, 1,000 mcg, Oral, Daily  levothyroxine, 50 mcg, Oral, Q AM  melatonin, 10 mg, Oral, Nightly  methylPREDNISolone sodium succinate, 20 mg, Intravenous, Q8H  midodrine, 10 mg, Oral, Q8H  pantoprazole, 40 mg, Oral, Daily  sertraline, 50 mg, Oral, Daily  [Held by provider] spironolactone, 25 mg, Oral, Daily  tiotropium bromide monohydrate, 2 puff, Inhalation, Daily - RT      Continuous Infusions:   PRN Meds:  albuterol    HYDROmorphone    influenza vaccine    ondansetron    sodium chloride    Assessment & Plan   Assessment and Plan:         Blood in stool    Severe mitral regurgitation    Dyslipidemia    CAD S/P percutaneous coronary angioplasty    Acute kidney injury    Hyperkalemia    Leukocytosis    Acute UTI (urinary tract infection)     Colitis    Inflammatory bowel disease (Crohn's disease)    Hypothyroidism (acquired)    Anxiety associated with depression    COPD (chronic obstructive pulmonary disease)    Rheumatoid arthritis    Moderate protein-calorie malnutrition    ASSESSMENT:  Acute kidney injury likely related to volume depletion   Hyperkalemia secondary to acute kidney injury which is getting better  hyponatremia much better than before   history of hypertension  History of arthritis  History of Crohn disease  10/12/2024 outpatient EGD colonoscopy showed small hiatal hernia chronic gastritis, normal duodenum, colon ulcer noted.  History of severe mitral regurg scheduled for MitraClip  History of coronary artery disease  Blood in the stool likely related to Crohn disease           PLAN :      Patient renal functions continue to get better   Discontinue IV fluid at this time   likely BISHNU related to patient intermittent diarrhea and acute abdomen  baseline renal functions are normal with creatinine of 0.36 back in October 2024  Hyperkalemia secondary to Aldactone and BISHNU which is improving at this time  Kidney function improving, creatinine currently 1.02, close to baseline, slight hyponatremia, monitor fluid intake  History of significant hyponatremia now better.  Likely had some SIADH which is improved  Patient need to follow-up in the renal clinic  Hold all medications specially any nephrotoxic medicine   Hold Aldactone for hyperkalemia           Electronically signed by BRIAN Whitten,   [facility]  [phone]  11/9/2024  17:37 EST    I have reviewed the notes, assessments, and/or procedures performed by brian, I concur with her/his documentation on patient.     Galileo Chang MD  [facility] kidney consultants.

## 2024-11-09 NOTE — PLAN OF CARE
Goal Outcome Evaluation:            Pt has complained of Nausea and Pain in her abdomin. See MAR for Tx. Pt tolerated q2hr turning and a bed bath. Loose BM incontinence. V/S stable.

## 2024-11-09 NOTE — PLAN OF CARE
Problem: Skin Injury Risk Increased  Goal: Skin Health and Integrity  Outcome: Progressing  Intervention: Optimize Skin Protection  Recent Flowsheet Documentation  Taken 11/9/2024 1247 by Radha Juarez LPN  Head of Bed (HOB) Positioning: HOB elevated  Taken 11/9/2024 1000 by Radha Juarez LPN  Head of Bed (HOB) Positioning: HOB elevated  Taken 11/9/2024 0755 by Radha Juarez LPN  Head of Bed (HOB) Positioning: HOB elevated     Problem: Fall Injury Risk  Goal: Absence of Fall and Fall-Related Injury  Outcome: Progressing  Intervention: Identify and Manage Contributors  Recent Flowsheet Documentation  Taken 11/9/2024 1247 by Radha Juarez LPN  Medication Review/Management: medications reviewed  Taken 11/9/2024 1000 by Radha Juarez LPN  Medication Review/Management: medications reviewed  Taken 11/9/2024 0755 by Radha Juarez LPN  Medication Review/Management: medications reviewed  Intervention: Promote Injury-Free Environment  Recent Flowsheet Documentation  Taken 11/9/2024 1247 by Radha Juarez LPN  Safety Promotion/Fall Prevention:   safety round/check completed   room organization consistent   assistive device/personal items within reach   clutter free environment maintained  Taken 11/9/2024 1000 by Radha Juarez LPN  Safety Promotion/Fall Prevention:   nonskid shoes/slippers when out of bed   safety round/check completed   room organization consistent   assistive device/personal items within reach   clutter free environment maintained  Taken 11/9/2024 0755 by Radha Juarez LPN  Safety Promotion/Fall Prevention:   safety round/check completed   room organization consistent   assistive device/personal items within reach   clutter free environment maintained   fall prevention program maintained   nonskid shoes/slippers when out of bed     Problem: Malnutrition  Goal: Improved Nutritional Intake  Outcome: Progressing   Goal Outcome Evaluation:   Patient resting comfortably in bed. Breathing even and unlabored on RA. Patient with c/o  abdominal pain and PRN pain medication found effective. Patient continued to be incontinent of B&B. Continues to have loose dark black red stool. All needs currently being met. Call light within reach. Bed alarm on and bed in lowest position.

## 2024-11-09 NOTE — PROGRESS NOTES
[facility] MEDICINE SERVICE  DAILY PROGRESS NOTE    NAME: Maryann Gaitan  : 1950  MRN: [id number]      LOS: 3 days     PROVIDER OF SERVICE: Marciano Multani MD    Chief Complaint: Blood in stool    Subjective:     Interval History:  History taken from: patient chart RN    Abdominal pain controlled afebrile        Review of Systems:   Review of Systems  All negative except as above  Objective:     Vital Signs  Temp:  [97.7 °F (36.5 °C)-97.9 °F (36.6 °C)] 97.8 °F (36.6 °C)  Heart Rate:  [58-81] 58  Resp:  [14-25] 20  BP: (112-124)/(55-66) 112/56   Body mass index is 21.91 kg/m².    Physical Exam  Physical Exam  AOx3 NAD  RRR S1 and S2 audible  Lungs with fair air entry  Abdomen with left sided tenderness no guarding no rigidity bowel sounds positive           Diagnostic Data    Results from last 7 days   Lab Units 24  0443   WBC 10*3/mm3 12.55*   HEMOGLOBIN g/dL 8.4*   HEMATOCRIT % 26.9*   PLATELETS 10*3/mm3 567*   GLUCOSE mg/dL 138*   CREATININE mg/dL 1.02*   BUN mg/dL 21   SODIUM mmol/L 133*   POTASSIUM mmol/L 4.1   AST (SGOT) U/L 7   ALT (SGPT) U/L 7   ALK PHOS U/L 88   BILIRUBIN mg/dL 0.2   ANION GAP mmol/L 10.7       No radiology results for the last day      I reviewed the patient's new clinical results.  I reviewed the patient's new imaging results and agree with the interpretation.    Assessment/Plan:     Active and Resolved Problems  Active Hospital Problems    Diagnosis  POA    **Blood in stool [K92.1]  Yes    Acute kidney injury [N17.9]  Yes    Hyperkalemia [E87.5]  Yes    Leukocytosis [D72.829]  Yes    Acute UTI (urinary tract infection) [N39.0]  Yes    Colitis [K52.9]  Yes    Inflammatory bowel disease (Crohn's disease) [K50.90]  Yes    Hypothyroidism (acquired) [E03.9]  Yes    Anxiety associated with depression [F41.8]  Yes    COPD (chronic obstructive pulmonary disease) [J44.9]  Yes    Rheumatoid arthritis [M06.9]  Yes    Dyslipidemia [E78.5]  Yes    CAD S/P percutaneous  coronary angioplasty [I25.10, Z98.61]  Not Applicable    Severe mitral regurgitation [I34.0]  Yes      Resolved Hospital Problems   No resolved problems to display.       74-year-old female history of CAD status post PCI 10/22/2024, COPD, Crohn's disease, rheumatoid arthritis on DMARDs, hyperlipidemia, hypothyroidism, severe mitral regurg admitted to [facility], [place], operated by [facility] 11/6 with abdominal pain and blood in stool     #Acute colitis  #History of Crohn's concern for acute flare  GI following started on IV Solu-Medrol.  History of Strongyloides   seen by infectious disease.  Antibiotics discontinued   GI PCR panel negative. Follow-up stool for ova and parasite  Isolation per protocol     #Acute on chronic anemia  No plan for endoscopic intervention currently by GI  Monitor H&H  Plavix held on admission with GI.  Patient is at high high risk for stent thrombosis given recent PCI.  Will resume Plavix from today as discussed with gastro       #CAD status post recent PCI to LAD 10/22/2024  #severe mitral regurg     Plavix held on admission per GI.  Patient at high risk for stent thrombosis given recent PCI to LAD.  Will resume Plavix from today # discussed with GI  Cardiology is following  GDMT as tolerated           #BISHNU  #Hyperkalemia  Status post IV fluids  Nephrology following  Diuretics on hold  BMP daily  Avoid hypotension     #History of hypotension  Continue home dose of midodrine        #Hypothyroidism  Continue home dose of levothyroxine      #COPD not in exacerbation  Continue current nebs     #Rheumatoid arthritis  Hold Plaquenil for now      #MDD  continueZoloft    VTE Prophylaxis:  Mechanical VTE prophylaxis orders are present.             Disposition Planning:     Barriers to Discharge: Medical workup  Anticipated Date of Discharge: TBD  Place of Discharge: Home versus rehab      Time: 45 minutes     Code Status and Medical Interventions: CPR (Attempt to Resuscitate); Full Support   Ordered at: 11/06/24 1922      Code Status (Patient has no pulse and is not breathing):    CPR (Attempt to Resuscitate)     Medical Interventions (Patient has pulse or is breathing):    Full Support       Signature: Electronically signed by Marciano Multani MD, 11/09/24, 10:09 EST.  [facility] Hospitalist Team

## 2024-11-09 NOTE — PROGRESS NOTES
Cardiology Progress Note      PATIENT IDENTIFICATION    Name: Maryann Gaitan  Age: 74 y.o. Sex: female : 1950  MRN: [id number]    Requesting Provider    Marciano Multani MD     LOS: 3 days       Reason For Followup:  Coronary artery disease  Rectal bleeding      Subjective:    Interval History:  Seen examined.  Chart and labs reviewed.  She denies any chest pain pressure heaviness or tightness.  She reports feeling fatigued because of frequent loose stools.  No red blood noted.    Review of Systems:  A complete review of systems was undertaken with the pertinent cardiovascular findings listed in history of present illness and all other systems being negative.     Assessment & Plan    Impressions:  Coronary artery disease status post PCI 2024  Valvular heart disease currently undergoing evaluation for MitraClip  Rectal bleeding secondary to antiplatelet therapy and concomitant inflammatory bowel disease  History of aortic ulceration along the descending thoracic aorta      Recommendations:  Very difficult situation.  Unfortunately, given recent PCI, patient must remain on dual antiplatelet therapy (despite the risk for massive bleeding) for minimum of 6 months  MitraClip evaluation is currently underway  Monitor hemoglobin hematocrit along with GI for signs of acute blood loss          Objective:    Medication Review:   Scheduled Meds:atorvastatin, 40 mg, Oral, Nightly  clopidogrel, 75 mg, Oral, Daily  folic acid, 1,000 mcg, Oral, Daily  levothyroxine, 50 mcg, Oral, Q AM  melatonin, 10 mg, Oral, Nightly  methylPREDNISolone sodium succinate, 20 mg, Intravenous, Q8H  midodrine, 10 mg, Oral, Q8H  pantoprazole, 40 mg, Oral, Daily  sertraline, 50 mg, Oral, Daily  [Held by provider] spironolactone, 25 mg, Oral, Daily  tiotropium bromide monohydrate, 2 puff, Inhalation, Daily - RT      Continuous Infusions:   PRN Meds:.  albuterol    HYDROmorphone    influenza vaccine    ondansetron    sodium  chloride      Blood in stool    Severe mitral regurgitation    Dyslipidemia    CAD S/P percutaneous coronary angioplasty    Acute kidney injury    Hyperkalemia    Leukocytosis    Acute UTI (urinary tract infection)    Colitis    Inflammatory bowel disease (Crohn's disease)    Hypothyroidism (acquired)    Anxiety associated with depression    COPD (chronic obstructive pulmonary disease)    Rheumatoid arthritis    Moderate protein-calorie malnutrition         Physical Exam:    General: Alert, cooperative, no distress, appears stated age.  Frail  Head:  Normocephalic, atraumatic, mucous membranes moist  Eyes:  Conjunctivae/corneas clear, EOMs intact     Neck:  Supple,  no bruit  Lungs:  Coarse and diminished bilaterally  Chest wall: No tenderness  Heart::  Regular rate and rhythm, S1 and S2 normal, 1/6 holosystolic murmur.  No rub or gallop  Abdomen: Soft, nontender, nondistended, bowel sounds active  Extremities: No cyanosis, clubbing, or edema  Pulses: Diminished pedal pulses  Skin:  No rashes or lesions  Neuro/psych: A&O x3. CN II through XII are grossly intact with appropriate affect    Vital Signs:  Vitals:    11/09/24 1020 11/09/24 1023 11/09/24 1203 11/09/24 1558   BP:   112/61 126/64   BP Location:   Right arm Right arm   Patient Position:   Lying Lying   Pulse: 61 63 59 72   Resp: 16 16 22 17   Temp:   97.8 °F (36.6 °C)    TempSrc:   Oral    SpO2: 99% 97% 98% 99%   Weight:       Height:         Wt Readings from Last 1 Encounters:   11/06/24 52.6 kg (115 lb 15.4 oz)       Intake/Output Summary (Last 24 hours) at 11/9/2024 1714  Last data filed at 11/9/2024 1557  Gross per 24 hour   Intake 120 ml   Output 250 ml   Net -130 ml         Results Review:     CBC    Results from last 7 days   Lab Units 11/09/24  0443 11/07/24  2355 11/07/24  0133 11/06/24  1442   WBC 10*3/mm3 12.55* 8.38 12.65* 12.46*   HEMOGLOBIN g/dL 8.4* 9.1* 8.0* 9.4*   PLATELETS 10*3/mm3 567* 493* 451* 526*     Cr Clearance Estimated Creatinine  "Clearance: 40.2 mL/min (A) (by C-G formula based on SCr of 1.02 mg/dL (H)).  Coag     HbA1C   Lab Results   Component Value Date    HGBA1C 5.64 (H) 10/13/2024     Blood Glucose No results found for: \"POCGLU\"  Infection   Results from last 7 days   Lab Units 11/06/24  1715 11/06/24  1710   BLOODCX  No growth at 2 days No growth at 2 days     CMP   Results from last 7 days   Lab Units 11/09/24 0443 11/07/24 2355 11/07/24 0227 11/06/24  1442   SODIUM mmol/L 133* 137 136 134*   POTASSIUM mmol/L 4.1 4.8 5.0 5.8*   CHLORIDE mmol/L 100 105 104 97*   CO2 mmol/L 22.3 20.3* 22.4 23.9   BUN mg/dL 21 36* 62* 75*   CREATININE mg/dL 1.02* 1.49* 2.22* 2.84*   GLUCOSE mg/dL 138* 123* 76 138*   ALBUMIN g/dL 2.8* 2.8*  --  2.9*   BILIRUBIN mg/dL 0.2 0.3  --  0.4   ALK PHOS U/L 88 100  --  108   AST (SGOT) U/L 7 12  --  15   ALT (SGPT) U/L 7 9  --  12   LIPASE U/L  --   --   --  22     ABG      UA    Results from last 7 days   Lab Units 11/06/24  1539   NITRITE UA  Negative   WBC UA /HPF 3-5*   BACTERIA UA /HPF Trace*   SQUAM EPITHEL UA /HPF 3-6*     RADHAMES  No results found for: \"POCMETH\", \"POCAMPHET\", \"POCBARBITUR\", \"POCBENZO\", \"POCCOCAINE\", \"POCOPIATES\", \"POCOXYCODO\", \"POCPHENCYC\", \"POCPROPOXY\", \"POCTHC\", \"POCTRICYC\"  Lysis Labs   Results from last 7 days   Lab Units 11/09/24 0443 11/07/24 2355 11/07/24 0227 11/07/24  0133 11/06/24  1442   HEMOGLOBIN g/dL 8.4* 9.1*  --  8.0* 9.4*   PLATELETS 10*3/mm3 567* 493*  --  451* 526*   CREATININE mg/dL 1.02* 1.49* 2.22*  --  2.84*     Radiology(recent) No radiology results for the last day            Imaging Results (Last 24 Hours)       ** No results found for the last 24 hours. **            Cardiac Studies:  Echo- Results for orders placed during the hospital encounter of 10/08/24    Adult Transesophageal Echo (GONZÁLEZ) W/ Cont if Necessary Per Protocol (Cardiology Department)    Interpretation Summary    Left ventricular systolic function is normal. Left ventricular ejection fraction " appears to be 56 - 60%.    The left atrial cavity is severely dilated.    Saline test results are negative.    There is mild, posterior mitral leaflet thickening present.    Moderate to severe mitral valve regurgitation is present with a centrally-directed jet noted.    There is moderate, (grade 3) plaque in the descending aorta present.    Stress Myoview-  Cath-        Daniel Song DO  11/09/24  17:14 EST    Copied information has been reviewed and is current as of 11/09/24

## 2024-11-10 ENCOUNTER — INPATIENT HOSPITAL (AMBULATORY)
Age: 74
End: 2024-11-10
Payer: MEDICARE

## 2024-11-10 ENCOUNTER — INPATIENT HOSPITAL (AMBULATORY)
Dept: URBAN - METROPOLITAN AREA HOSPITAL 84 | Facility: HOSPITAL | Age: 74
End: 2024-11-10
Payer: MEDICARE

## 2024-11-10 DIAGNOSIS — K62.5 HEMORRHAGE OF ANUS AND RECTUM: ICD-10-CM

## 2024-11-10 DIAGNOSIS — K50.80 CROHN'S DISEASE OF BOTH SMALL AND LARGE INTESTINE WITHOUT CO: ICD-10-CM

## 2024-11-10 DIAGNOSIS — D72.829 ELEVATED WHITE BLOOD CELL COUNT, UNSPECIFIED: ICD-10-CM

## 2024-11-10 DIAGNOSIS — E87.8 OTHER DISORDERS OF ELECTROLYTE AND FLUID BALANCE, NOT ELSEWH: ICD-10-CM

## 2024-11-10 DIAGNOSIS — R93.3 ABNORMAL FINDINGS ON DIAGNOSTIC IMAGING OF OTHER PARTS OF DI: ICD-10-CM

## 2024-11-10 DIAGNOSIS — D64.9 ANEMIA, UNSPECIFIED: ICD-10-CM

## 2024-11-10 DIAGNOSIS — Z90.49 ACQUIRED ABSENCE OF OTHER SPECIFIED PARTS OF DIGESTIVE TRACT: ICD-10-CM

## 2024-11-10 DIAGNOSIS — R10.30 LOWER ABDOMINAL PAIN, UNSPECIFIED: ICD-10-CM

## 2024-11-10 DIAGNOSIS — R19.7 DIARRHEA, UNSPECIFIED: ICD-10-CM

## 2024-11-10 LAB
ALBUMIN SERPL-MCNC: 2.5 G/DL (ref 3.5–5.2)
ALBUMIN/GLOB SERPL: 1.1 G/DL
ALP SERPL-CCNC: 77 U/L (ref 39–117)
ALT SERPL W P-5'-P-CCNC: 11 U/L (ref 1–33)
ANION GAP SERPL CALCULATED.3IONS-SCNC: 9.9 MMOL/L (ref 5–15)
AST SERPL-CCNC: 20 U/L (ref 1–32)
BASOPHILS # BLD AUTO: 0.01 10*3/MM3 (ref 0–0.2)
BASOPHILS NFR BLD AUTO: 0.1 % (ref 0–1.5)
BILIRUB SERPL-MCNC: 0.2 MG/DL (ref 0–1.2)
BUN SERPL-MCNC: 17 MG/DL (ref 8–23)
BUN/CREAT SERPL: 21 (ref 7–25)
CALCIUM SPEC-SCNC: 8.3 MG/DL (ref 8.6–10.5)
CHLORIDE SERPL-SCNC: 102 MMOL/L (ref 98–107)
CO2 SERPL-SCNC: 23.1 MMOL/L (ref 22–29)
CREAT SERPL-MCNC: 0.81 MG/DL (ref 0.57–1)
DEPRECATED RDW RBC AUTO: 58.7 FL (ref 37–54)
EGFRCR SERPLBLD CKD-EPI 2021: 76.3 ML/MIN/1.73
EOSINOPHIL # BLD AUTO: 0 10*3/MM3 (ref 0–0.4)
EOSINOPHIL NFR BLD AUTO: 0 % (ref 0.3–6.2)
ERYTHROCYTE [DISTWIDTH] IN BLOOD BY AUTOMATED COUNT: 16.8 % (ref 12.3–15.4)
GLOBULIN UR ELPH-MCNC: 2.3 GM/DL
GLUCOSE SERPL-MCNC: 131 MG/DL (ref 65–99)
HCT VFR BLD AUTO: 25 % (ref 34–46.6)
HGB BLD-MCNC: 7.8 G/DL (ref 12–15.9)
IMM GRANULOCYTES # BLD AUTO: 0.47 10*3/MM3 (ref 0–0.05)
IMM GRANULOCYTES NFR BLD AUTO: 4.4 % (ref 0–0.5)
LYMPHOCYTES # BLD AUTO: 1.47 10*3/MM3 (ref 0.7–3.1)
LYMPHOCYTES NFR BLD AUTO: 13.8 % (ref 19.6–45.3)
MAGNESIUM SERPL-MCNC: 1.6 MG/DL (ref 1.6–2.4)
MCH RBC QN AUTO: 30.1 PG (ref 26.6–33)
MCHC RBC AUTO-ENTMCNC: 31.2 G/DL (ref 31.5–35.7)
MCV RBC AUTO: 96.5 FL (ref 79–97)
MONOCYTES # BLD AUTO: 0.87 10*3/MM3 (ref 0.1–0.9)
MONOCYTES NFR BLD AUTO: 8.1 % (ref 5–12)
NEUTROPHILS NFR BLD AUTO: 7.86 10*3/MM3 (ref 1.7–7)
NEUTROPHILS NFR BLD AUTO: 73.6 % (ref 42.7–76)
NRBC BLD AUTO-RTO: 0 /100 WBC (ref 0–0.2)
PHOSPHATE SERPL-MCNC: 2.5 MG/DL (ref 2.5–4.5)
PLATELET # BLD AUTO: 437 10*3/MM3 (ref 140–450)
PMV BLD AUTO: 9.7 FL (ref 6–12)
POTASSIUM SERPL-SCNC: 4.1 MMOL/L (ref 3.5–5.2)
PROT SERPL-MCNC: 4.8 G/DL (ref 6–8.5)
RBC # BLD AUTO: 2.59 10*6/MM3 (ref 3.77–5.28)
SODIUM SERPL-SCNC: 135 MMOL/L (ref 136–145)
WBC NRBC COR # BLD AUTO: 10.68 10*3/MM3 (ref 3.4–10.8)

## 2024-11-10 PROCEDURE — 94799 UNLISTED PULMONARY SVC/PX: CPT

## 2024-11-10 PROCEDURE — 99232 SBSQ HOSP IP/OBS MODERATE 35: CPT | Performed by: NURSE PRACTITIONER

## 2024-11-10 PROCEDURE — 80053 COMPREHEN METABOLIC PANEL: CPT | Performed by: HOSPITALIST

## 2024-11-10 PROCEDURE — 99232 SBSQ HOSP IP/OBS MODERATE 35: CPT | Performed by: INTERNAL MEDICINE

## 2024-11-10 PROCEDURE — 84100 ASSAY OF PHOSPHORUS: CPT | Performed by: HOSPITALIST

## 2024-11-10 PROCEDURE — 25010000002 HYDROMORPHONE PER 4 MG: Performed by: HOSPITALIST

## 2024-11-10 PROCEDURE — 94761 N-INVAS EAR/PLS OXIMETRY MLT: CPT

## 2024-11-10 PROCEDURE — 83735 ASSAY OF MAGNESIUM: CPT | Performed by: HOSPITALIST

## 2024-11-10 PROCEDURE — 94664 DEMO&/EVAL PT USE INHALER: CPT

## 2024-11-10 PROCEDURE — 87177 OVA AND PARASITES SMEARS: CPT | Performed by: NURSE PRACTITIONER

## 2024-11-10 PROCEDURE — 25010000002 METHYLPREDNISOLONE PER 40 MG: Performed by: NURSE PRACTITIONER

## 2024-11-10 PROCEDURE — 87209 SMEAR COMPLEX STAIN: CPT | Performed by: NURSE PRACTITIONER

## 2024-11-10 PROCEDURE — 85025 COMPLETE CBC W/AUTO DIFF WBC: CPT | Performed by: NURSE PRACTITIONER

## 2024-11-10 RX ADMIN — LEVOTHYROXINE SODIUM 50 MCG: 0.05 TABLET ORAL at 05:11

## 2024-11-10 RX ADMIN — CLOPIDOGREL BISULFATE 75 MG: 75 TABLET ORAL at 09:58

## 2024-11-10 RX ADMIN — TIOTROPIUM BROMIDE INHALATION SPRAY 2 PUFF: 3.12 SPRAY, METERED RESPIRATORY (INHALATION) at 06:41

## 2024-11-10 RX ADMIN — MIDODRINE HYDROCHLORIDE 10 MG: 5 TABLET ORAL at 05:11

## 2024-11-10 RX ADMIN — METHYLPREDNISOLONE SODIUM SUCCINATE 20 MG: 40 INJECTION, POWDER, FOR SOLUTION INTRAMUSCULAR; INTRAVENOUS at 16:09

## 2024-11-10 RX ADMIN — HYDROMORPHONE HYDROCHLORIDE 0.5 MG: 1 INJECTION, SOLUTION INTRAMUSCULAR; INTRAVENOUS; SUBCUTANEOUS at 20:41

## 2024-11-10 RX ADMIN — PANTOPRAZOLE SODIUM 40 MG: 40 TABLET, DELAYED RELEASE ORAL at 09:58

## 2024-11-10 RX ADMIN — SERTRALINE 50 MG: 50 TABLET, FILM COATED ORAL at 09:58

## 2024-11-10 RX ADMIN — ATORVASTATIN CALCIUM 40 MG: 40 TABLET, FILM COATED ORAL at 20:42

## 2024-11-10 RX ADMIN — FOLIC ACID 1000 MCG: 1 TABLET ORAL at 09:58

## 2024-11-10 RX ADMIN — METHYLPREDNISOLONE SODIUM SUCCINATE 20 MG: 40 INJECTION, POWDER, FOR SOLUTION INTRAMUSCULAR; INTRAVENOUS at 05:11

## 2024-11-10 RX ADMIN — MIDODRINE HYDROCHLORIDE 10 MG: 5 TABLET ORAL at 16:09

## 2024-11-10 RX ADMIN — Medication 10 MG: at 20:42

## 2024-11-10 RX ADMIN — METHYLPREDNISOLONE SODIUM SUCCINATE 20 MG: 40 INJECTION, POWDER, FOR SOLUTION INTRAMUSCULAR; INTRAVENOUS at 20:42

## 2024-11-10 NOTE — PLAN OF CARE
Goal Outcome Evaluation:     Pt IV pulled out and was stuck to her blanket. New IV put in. Tolerated well. One small smear BM that was not enough for sample needed. V/S stable. Pt verbalized that she was feeling better than she has been. Slept between care.

## 2024-11-10 NOTE — PLAN OF CARE
Problem: Skin Injury Risk Increased  Goal: Skin Health and Integrity  Outcome: Progressing  Intervention: Optimize Skin Protection  Recent Flowsheet Documentation  Taken 11/10/2024 1422 by Radha Juarez LPN  Head of Bed (HOB) Positioning: HOB elevated  Taken 11/10/2024 1029 by Radha Juarez LPN  Head of Bed (HOB) Positioning: HOB elevated  Taken 11/10/2024 0807 by Radha Juarez LPN  Head of Bed (HOB) Positioning: HOB elevated  Pressure Reduction Devices: foam padding utilized     Problem: Fall Injury Risk  Goal: Absence of Fall and Fall-Related Injury  Outcome: Progressing  Intervention: Identify and Manage Contributors  Recent Flowsheet Documentation  Taken 11/10/2024 1029 by Radha Juarez LPN  Medication Review/Management: medications reviewed  Taken 11/10/2024 0807 by Radha Juarez LPN  Medication Review/Management: medications reviewed  Intervention: Promote Injury-Free Environment  Recent Flowsheet Documentation  Taken 11/10/2024 1422 by Radha Juarez LPN  Safety Promotion/Fall Prevention:   nonskid shoes/slippers when out of bed   safety round/check completed  Taken 11/10/2024 1029 by Radha Juarez LPN  Safety Promotion/Fall Prevention:   nonskid shoes/slippers when out of bed   safety round/check completed  Taken 11/10/2024 0807 by Radha Juarez LPN  Safety Promotion/Fall Prevention:   safety round/check completed   room organization consistent   assistive device/personal items within reach   clutter free environment maintained   nonskid shoes/slippers when out of bed     Problem: Malnutrition  Goal: Improved Nutritional Intake  Outcome: Progressing   Goal Outcome Evaluation:                                             Yes

## 2024-11-10 NOTE — PROGRESS NOTES
Cardiology Progress Note      PATIENT IDENTIFICATION    Name: Maryann Gaitan  Age: 74 y.o. Sex: female : 1950  MRN: [id number]    Requesting Provider    Marciano Multani MD     LOS: 4 days       Reason For Followup:  Coronary artery disease  Rectal bleeding      Subjective:    Interval History:  Seen examined.  Chart and labs reviewed.  She reports feeling better today.  Stools have slowed down.  No chest pain.  No blood in stools.    Review of Systems:  A complete review of systems was undertaken with the pertinent cardiovascular findings listed in history of present illness and all other systems being negative.     Assessment & Plan    Impressions:  Coronary artery disease status post PCI 2024  Valvular heart disease currently undergoing evaluation for MitraClip  Rectal bleeding secondary to antiplatelet therapy and concomitant inflammatory bowel disease  History of aortic ulceration along the descending thoracic aorta      Recommendations:  Very difficult situation.  Unfortunately, given recent PCI, patient must remain on dual antiplatelet therapy (despite the risk for massive bleeding) for minimum of 6 months  MitraClip evaluation is currently underway  Monitor hemoglobin hematocrit along with GI for signs of acute blood loss          Objective:    Medication Review:   Scheduled Meds:atorvastatin, 40 mg, Oral, Nightly  clopidogrel, 75 mg, Oral, Daily  folic acid, 1,000 mcg, Oral, Daily  levothyroxine, 50 mcg, Oral, Q AM  melatonin, 10 mg, Oral, Nightly  methylPREDNISolone sodium succinate, 20 mg, Intravenous, Q8H  midodrine, 10 mg, Oral, Q8H  pantoprazole, 40 mg, Oral, Daily  sertraline, 50 mg, Oral, Daily  [Held by provider] spironolactone, 25 mg, Oral, Daily  tiotropium bromide monohydrate, 2 puff, Inhalation, Daily - RT      Continuous Infusions:   PRN Meds:.  albuterol    HYDROmorphone    influenza vaccine    ondansetron    sodium chloride      Blood in stool    Severe mitral  regurgitation    Dyslipidemia    CAD S/P percutaneous coronary angioplasty    Acute kidney injury    Hyperkalemia    Leukocytosis    Acute UTI (urinary tract infection)    Colitis    Inflammatory bowel disease (Crohn's disease)    Hypothyroidism (acquired)    Anxiety associated with depression    COPD (chronic obstructive pulmonary disease)    Rheumatoid arthritis    Moderate protein-calorie malnutrition         Physical Exam:    General: Alert, cooperative, no distress, appears stated age.  Frail  Head:  Normocephalic, atraumatic, mucous membranes moist  Eyes:  Conjunctivae/corneas clear, EOMs intact     Neck:  Supple,  no bruit  Lungs:  Coarse and diminished bilaterally  Chest wall: No tenderness  Heart::  Regular rate and rhythm, S1 and S2 normal, 1/6 holosystolic murmur.  No rub or gallop  Abdomen: Soft, nontender, nondistended, bowel sounds active  Extremities: No cyanosis, clubbing, or edema  Pulses: Diminished pedal pulses  Skin:  No rashes or lesions  Neuro/psych: A&O x3. CN II through XII are grossly intact with appropriate affect    Vital Signs:  Vitals:    11/09/24 2057 11/10/24 0500 11/10/24 0641 11/10/24 0643   BP: 122/68 104/58     BP Location: Right arm Right arm     Patient Position: Lying Lying     Pulse: 74 63 64 69   Resp: 21 19 17 18   Temp: 98 °F (36.7 °C) 98.4 °F (36.9 °C)     TempSrc: Oral Oral     SpO2:   96% 97%   Weight:       Height:         Wt Readings from Last 1 Encounters:   11/06/24 52.6 kg (115 lb 15.4 oz)       Intake/Output Summary (Last 24 hours) at 11/10/2024 1134  Last data filed at 11/10/2024 0900  Gross per 24 hour   Intake 600 ml   Output --   Net 600 ml         Results Review:     CBC    Results from last 7 days   Lab Units 11/10/24  0348 11/09/24  0443 11/07/24  2355 11/07/24  0133 11/06/24  1442   WBC 10*3/mm3 10.68 12.55* 8.38 12.65* 12.46*   HEMOGLOBIN g/dL 7.8* 8.4* 9.1* 8.0* 9.4*   PLATELETS 10*3/mm3 437 567* 493* 451* 526*     Cr Clearance Estimated Creatinine  "Clearance: 50.6 mL/min (by C-G formula based on SCr of 0.81 mg/dL).  Coag     HbA1C   Lab Results   Component Value Date    HGBA1C 5.64 (H) 10/13/2024     Blood Glucose No results found for: \"POCGLU\"  Infection   Results from last 7 days   Lab Units 11/06/24  1715 11/06/24  1710   BLOODCX  No growth at 3 days No growth at 3 days     CMP   Results from last 7 days   Lab Units 11/10/24  0348 11/09/24  0443 11/07/24  2355 11/07/24  0227 11/06/24  1442   SODIUM mmol/L 135* 133* 137 136 134*   POTASSIUM mmol/L 4.1 4.1 4.8 5.0 5.8*   CHLORIDE mmol/L 102 100 105 104 97*   CO2 mmol/L 23.1 22.3 20.3* 22.4 23.9   BUN mg/dL 17 21 36* 62* 75*   CREATININE mg/dL 0.81 1.02* 1.49* 2.22* 2.84*   GLUCOSE mg/dL 131* 138* 123* 76 138*   ALBUMIN g/dL 2.5* 2.8* 2.8*  --  2.9*   BILIRUBIN mg/dL 0.2 0.2 0.3  --  0.4   ALK PHOS U/L 77 88 100  --  108   AST (SGOT) U/L 20 7 12  --  15   ALT (SGPT) U/L 11 7 9  --  12   LIPASE U/L  --   --   --   --  22     ABG      UA    Results from last 7 days   Lab Units 11/06/24  1539   NITRITE UA  Negative   WBC UA /HPF 3-5*   BACTERIA UA /HPF Trace*   SQUAM EPITHEL UA /HPF 3-6*     RADHAMES  No results found for: \"POCMETH\", \"POCAMPHET\", \"POCBARBITUR\", \"POCBENZO\", \"POCCOCAINE\", \"POCOPIATES\", \"POCOXYCODO\", \"POCPHENCYC\", \"POCPROPOXY\", \"POCTHC\", \"POCTRICYC\"  Lysis Labs   Results from last 7 days   Lab Units 11/10/24  0348 11/09/24  0443 11/07/24  2355 11/07/24  0227 11/07/24  0133 11/06/24  1442   HEMOGLOBIN g/dL 7.8* 8.4* 9.1*  --  8.0* 9.4*   PLATELETS 10*3/mm3 437 567* 493*  --  451* 526*   CREATININE mg/dL 0.81 1.02* 1.49* 2.22*  --  2.84*     Radiology(recent) No radiology results for the last day            Imaging Results (Last 24 Hours)       ** No results found for the last 24 hours. **            Cardiac Studies:  Echo- Results for orders placed during the hospital encounter of 10/08/24    Adult Transesophageal Echo (G[facility]Z) W/ Cont if Necessary Per Protocol (Cardiology Department)    Interpretation " Summary    Left ventricular systolic function is normal. Left ventricular ejection fraction appears to be 56 - 60%.    The left atrial cavity is severely dilated.    Saline test results are negative.    There is mild, posterior mitral leaflet thickening present.    Moderate to severe mitral valve regurgitation is present with a centrally-directed jet noted.    There is moderate, (grade 3) plaque in the descending aorta present.    Stress Myoview-  Cath-        Daniel Song DO  11/10/24  11:34 EST    Copied information has been reviewed and is current as of 11/10/24

## 2024-11-10 NOTE — PROGRESS NOTES
" LOS: 4 days   Patient Care Team:  Eduard Little MD as PCP - General (Family Medicine)      Subjective    \" Feeling better today\"    Interval History:    's labs: Sodium 135, potassium 4.1, creatinine 0.81.  LFTs are normal.  WBCs 10.68 down from 12.55 yesterday, hemoglobin 7.8 (8.4), platelets 437.  Patient was placed back on her Plavix yesterday.  GI stool panel is negative.  Still pending O&P and strongyloides.  Was able to collect small stool sample today.  I observed it was brown in color.  Soft.    ROS:   No chest pain, shortness of breath, or cough.        Medication Review:     Current Facility-Administered Medications:     albuterol (PROVENTIL) nebulizer solution 0.083% 2.5 mg/3mL, 2.5 mg, Nebulization, Q6H PRN, Lisa Chicas APRN, 2.5 mg at 11/08/24 1048    atorvastatin (LIPITOR) tablet 40 mg, 40 mg, Oral, Nightly, Marciano Multani MD, 40 mg at 11/09/24 2112    clopidogrel (PLAVIX) tablet 75 mg, 75 mg, Oral, Daily, Marciano Multani MD, 75 mg at 11/09/24 1723    folic acid (FOLVITE) tablet 1,000 mcg, 1,000 mcg, Oral, Daily, Marciano Multani MD, 1,000 mcg at 11/09/24 0930    HYDROmorphone (DILAUDID) injection 0.5 mg, 0.5 mg, Intravenous, Q3H PRN, Marciano Multani MD, 0.5 mg at 11/09/24 1724    influenza vac split high-dose (FLUZONE HIGH DOSE) injection 0.5 mL, 0.5 mL, Intramuscular, During Hospitalization, Lisa Chicas APRN    levothyroxine (SYNTHROID, LEVOTHROID) tablet 50 mcg, 50 mcg, Oral, Q AM, Marciano Multani MD, 50 mcg at 11/10/24 0511    melatonin tablet 10 mg, 10 mg, Oral, Nightly, Marciano Multani MD, 10 mg at 11/09/24 2112    methylPREDNISolone sodium succinate (SOLU-Medrol) injection 20 mg, 20 mg, Intravenous, Q8H, Annalee Anderson APRN, 20 mg at 11/10/24 0511    midodrine (PROAMATINE) tablet 10 mg, 10 mg, Oral, Q8H, Marciano Multani MD, 10 mg at 11/10/24 0511    ondansetron (ZOFRAN) injection 4 mg, 4 mg, Intravenous, Q6H PRN, Aviva, " DRU Gonzalez, 4 mg at 11/09/24 0538    pantoprazole (PROTONIX) EC tablet 40 mg, 40 mg, Oral, Daily, Marciano Multani MD, 40 mg at 11/09/24 0930    sertraline (ZOLOFT) tablet 50 mg, 50 mg, Oral, Daily, Marciano Multani MD, 50 mg at 11/09/24 0930    sodium chloride 0.9 % flush 10 mL, 10 mL, Intravenous, PRN, Niya Sierra PA-C    [Held by provider] spironolactone (ALDACTONE) tablet 25 mg, 25 mg, Oral, Daily, Lisa Chicas APRN    tiotropium (SPIRIVA RESPIMAT) 2.5 mcg/act aerosol solution inhaler, 2 puff, Inhalation, Daily - RT, Lisa Chicas APRN, 2 puff at 11/10/24 0641      Objective resting in hospital bed.  NAD.  Room 368.     Vital Signs  Vitals:    11/09/24 2057 11/10/24 0500 11/10/24 0641 11/10/24 0643   BP: 122/68 104/58     BP Location: Right arm Right arm     Patient Position: Lying Lying     Pulse: 74 63 64 69   Resp: 21 19 17 18   Temp: 98 °F (36.7 °C) 98.4 °F (36.9 °C)     TempSrc: Oral Oral     SpO2:   96% 97%   Weight:       Height:           Physical Exam:   General Appearance:    Awake and alert, in no acute distress   Head:    Normocephalic, without obvious abnormality   Eyes:          Conjunctivae normal, anicteric sclera   Throat:   No oral lesions, no thrush, oral mucosa moist   Neck:   No adenopathy, supple, no JVD   Lungs:     respirations regular, even and unlabored   Abdomen:     Soft, generalized tenderness, no rebound or guarding, non-distended   Rectal:     Deferred   Extremities:   No edema, no cyanosis   Skin:   No bruising or rash, no jaundice        Results Review:    CBC    Results from last 7 days   Lab Units 11/10/24  0348 11/09/24  0443 11/07/24  2355 11/07/24  0133 11/06/24  1442   WBC 10*3/mm3 10.68 12.55* 8.38 12.65* 12.46*   HEMOGLOBIN g/dL 7.8* 8.4* 9.1* 8.0* 9.4*   PLATELETS 10*3/mm3 437 567* 493* 451* 526*     CMP   Results from last 7 days   Lab Units 11/10/24  0348 11/09/24  0443 11/07/24  2355 11/07/24  0227 11/06/24  1442   SODIUM mmol/L 135*  133* 137 136 134*   POTASSIUM mmol/L 4.1 4.1 4.8 5.0 5.8*   CHLORIDE mmol/L 102 100 105 104 97*   CO2 mmol/L 23.1 22.3 20.3* 22.4 23.9   BUN mg/dL 17 21 36* 62* 75*   CREATININE mg/dL 0.81 1.02* 1.49* 2.22* 2.84*   GLUCOSE mg/dL 131* 138* 123* 76 138*   ALBUMIN g/dL 2.5* 2.8* 2.8*  --  2.9*   BILIRUBIN mg/dL 0.2 0.2 0.3  --  0.4   ALK PHOS U/L 77 88 100  --  108   AST (SGOT) U/L 20 7 12  --  15   ALT (SGPT) U/L 11 7 9  --  12   MAGNESIUM mg/dL 1.6 1.8 2.2  --   --    PHOSPHORUS mg/dL 2.5 3.2 4.3  --   --    LIPASE U/L  --   --   --   --  22     Cr Clearance Estimated Creatinine Clearance: 50.6 mL/min (by C-G formula based on SCr of 0.81 mg/dL).  Coag     HbA1C   Lab Results   Component Value Date    HGBA1C 5.64 (H) 10/13/2024         Infection   Results from last 7 days   Lab Units 11/06/24  1715 11/06/24  1710   BLOODCX  No growth at 3 days No growth at 3 days     UA    Results from last 7 days   Lab Units 11/06/24  1539   NITRITE UA  Negative   WBC UA /HPF 3-5*   BACTERIA UA /HPF Trace*   SQUAM EPITHEL UA /HPF 3-6*     Microbiology Results (last 10 days)       Procedure Component Value - Date/Time    Gastrointestinal Panel, PCR - Stool, Per Rectum [[id number]]  (Normal) Collected: 11/09/24 0702    Lab Status: Final result Specimen: Stool from Per Rectum Updated: 11/09/24 0900     Campylobacter Not Detected     Plesiomonas shigelloides Not Detected     Salmonella Not Detected     Vibrio Not Detected     Vibrio cholerae Not Detected     Yersinia enterocolitica Not Detected     Enteroaggregative E. coli (EAEC) Not Detected     Enteropathogenic E. coli (EPEC) Not Detected     Enterotoxigenic E. coli (ETEC) lt/st Not Detected     Shiga-like toxin-producing E. coli (STEC) stx1/stx2 Not Detected     Shigella/Enteroinvasive E. coli (EIEC) Not Detected     Cryptosporidium Not Detected     Cyclospora cayetanensis Not Detected     Entamoeba histolytica Not Detected     Giardia lamblia Not Detected     Adenovirus F40/41 Not  Detected     Astrovirus Not Detected     Norovirus GI/GII Not Detected     Rotavirus A Not Detected     Sapovirus (I, II, IV or V) Not Detected    Narrative:      If Aeromonas, Staphylococcus aureus or Bacillus cereus are suspected, please order [id number]: Stool Culture, Aeromonas, S aureus, B Cereus.    Blood Culture - Blood, Arm, Right [[id number]]  (Normal) Collected: 11/06/24 1715    Lab Status: Preliminary result Specimen: Blood from Arm, Right Updated: 11/09/24 1731     Blood Culture No growth at 3 days    Blood Culture - Blood, Arm, Left [[id number]]  (Normal) Collected: 11/06/24 1710    Lab Status: Preliminary result Specimen: Blood from Arm, Left Updated: 11/09/24 1715     Blood Culture No growth at 3 days          Imaging Results (Last 72 Hours)       ** No results found for the last 72 hours. **            Assessment & Plan     ASSESSMENT:  -Lower abdominal pain  -Diarrhea with rectal bleeding  -Leukocytosis  -Normocytic anemia  -Abnormal CT showing segmental thickening and inflammatory changes of the descending/sigmoid colon  -Recent Strongyloides infection in 10/2024 s/p treatment with ivermectin  -Small bowel and colonic Crohn's disease - on Rinvoq  -Electrolyte abnormalities  -UTI  -Hypertension  -COPD  -CAD s/p stent placement on Plavix  -RA - on Humira   -History of hysterectomy  -History of cholecystectomy     PLAN:  Patient is a 74-year-old female with history of small bowel and colonic Crohn's (on Rinvoq), rheumatoid arthritis (on Humira), and cholecystectomy who presented on 11/6 with complaints of abdominal pain, diarrhea, and rectal bleeding.  Recent admission in 10/2024 for persistent diarrhea and abdominal pain as well.  EGD/colonoscopy at that time confirmed Crohn's disease.  Pathology was also positive for Strongyloides and patient was treated with ivermectin. CT abdomen/pelvis WO on admission shows segmental thickening and inflammatory changes of the descending/sigmoid colon.     GI stool  panel is negative.  Still pending O&P and stronglyloides antibody IgG.  Stool was sent this morning.  Soft and brown in color as I observed it.  Patient states she ate pretty well yesterday has had some mild nausea.  Hemoglobin 7.8. Continue to monitor H&H transfuse as needed.  Plavix was restarted yesterday.  Continue IV Solu-Medrol.  Humira, methotrexate, and Rinvoq on hold.  Patient recently had EGD/colonoscopy on 10/12/2024. No plans for repeat at this time.   Off antibiotics  Creatinine 0.81, nephrology following.  Low residue diet with boost/Ensure supplementation twice a day.  Continue antiemetics/analgesics as needed.  Improving, hopefully home soon.  Continue supportive care.      Electronically signed by DRU Warner, 11/10/24, 8:23 AM EST.

## 2024-11-10 NOTE — PROGRESS NOTES
PROGRESS NOTE      Patient Name: Maryann Gaitan  : 1950  MRN: [id number]  Primary Care Physician: Eduard Little MD  Date of admission: 2024    Patient Care Team:  Eduard Little MD as PCP - General (Family Medicine)        Subjective   Subjective:     Seen and examined, abdominal pain is better  Alert and oriented x 3,  Kidney function is stable      Review of systems:  All other review of system unremarkable      Allergies:    Allergies   Allergen Reactions    Codeine Hives    Penicillin G Sodium Hives       Objective   Exam:     Vital Signs  Temp:  [98 °F (36.7 °C)-98.4 °F (36.9 °C)] 98.3 °F (36.8 °C)  Heart Rate:  [63-74] 70  Resp:  [17-21] 17  BP: (104-124)/(58-68) 124/58  SpO2:  [96 %-97 %] 97 %  on   ;   Device (Oxygen Therapy): room air  Body mass index is 21.91 kg/m².    General: Elderly thin white female in no acute distress.    Head:      Normocephalic and atraumatic.    Eyes:      PERRL/EOM intact, conjunctivae and sclerae clear without nystagmus.    Neck:      No masses, thyromegaly,  trachea central with normal respiratory effort   Lungs:    Clear bilaterally to auscultation.    Heart:      Regular rate and rhythm, no murmur no gallop  Abd:        Soft, mildly tender, not distended, bowel sounds positive, no shifting dullness   Pulses:   Pulses palpable  Extr:        No cyanosis or clubbing--no significant edema.    Neuro:    No focal deficits.   alert oriented x3  Skin:       Intact without lesions or rashes.    Psych:    Alert and cooperative; normal mood and affect; .      Results Review:  I have personally reviewed most recent Data :  CBC    Results from last 7 days   Lab Units 11/10/24  0348 24  0443 24  2355 24  0133 24  1442   WBC 10*3/mm3 10.68 12.55* 8.38 12.65* 12.46*   HEMOGLOBIN g/dL 7.8* 8.4* 9.1* 8.0* 9.4*   PLATELETS 10*3/mm3 437 567* 493* 451* 526*     CMP   Results from last 7 days   Lab Units 11/10/24  0348 24  7097  11/07/24  2355 11/07/24  0227 11/06/24  1442   SODIUM mmol/L 135* 133* 137 136 134*   POTASSIUM mmol/L 4.1 4.1 4.8 5.0 5.8*   CHLORIDE mmol/L 102 100 105 104 97*   CO2 mmol/L 23.1 22.3 20.3* 22.4 23.9   BUN mg/dL 17 21 36* 62* 75*   CREATININE mg/dL 0.81 1.02* 1.49* 2.22* 2.84*   GLUCOSE mg/dL 131* 138* 123* 76 138*   ALBUMIN g/dL 2.5* 2.8* 2.8*  --  2.9*   BILIRUBIN mg/dL 0.2 0.2 0.3  --  0.4   ALK PHOS U/L 77 88 100  --  108   AST (SGOT) U/L 20 7 12  --  15   ALT (SGPT) U/L 11 7 9  --  12   LIPASE U/L  --   --   --   --  22     ABG      No radiology results for the last day    Results for orders placed during the hospital encounter of 10/08/24    Adult Transesophageal Echo ([facility]) W/ Cont if Necessary Per Protocol (Cardiology Department)    Interpretation Summary    Left ventricular systolic function is normal. Left ventricular ejection fraction appears to be 56 - 60%.    The left atrial cavity is severely dilated.    Saline test results are negative.    There is mild, posterior mitral leaflet thickening present.    Moderate to severe mitral valve regurgitation is present with a centrally-directed jet noted.    There is moderate, (grade 3) plaque in the descending aorta present.    Scheduled Meds:atorvastatin, 40 mg, Oral, Nightly  clopidogrel, 75 mg, Oral, Daily  folic acid, 1,000 mcg, Oral, Daily  levothyroxine, 50 mcg, Oral, Q AM  melatonin, 10 mg, Oral, Nightly  methylPREDNISolone sodium succinate, 20 mg, Intravenous, Q8H  midodrine, 10 mg, Oral, Q8H  pantoprazole, 40 mg, Oral, Daily  sertraline, 50 mg, Oral, Daily  [Held by provider] spironolactone, 25 mg, Oral, Daily  tiotropium bromide monohydrate, 2 puff, Inhalation, Daily - RT      Continuous Infusions:   PRN Meds:  albuterol    HYDROmorphone    influenza vaccine    ondansetron    sodium chloride    Assessment & Plan   Assessment and Plan:         Blood in stool    Severe mitral regurgitation    Dyslipidemia    CAD S/P percutaneous coronary angioplasty     Acute kidney injury    Hyperkalemia    Leukocytosis    Acute UTI (urinary tract infection)    Colitis    Inflammatory bowel disease (Crohn's disease)    Hypothyroidism (acquired)    Anxiety associated with depression    COPD (chronic obstructive pulmonary disease)    Rheumatoid arthritis    Moderate protein-calorie malnutrition    ASSESSMENT:  Acute kidney injury likely related to volume depletion   Hyperkalemia secondary to acute kidney injury which is getting better  hyponatremia much better than before   history of hypertension  History of arthritis  History of Crohn disease  10/12/2024 outpatient EGD colonoscopy showed small hiatal hernia chronic gastritis, normal duodenum, colon ulcer noted.  History of severe mitral regurg scheduled for MitraClip  History of coronary artery disease  Blood in the stool likely related to Crohn disease           PLAN :      Patient renal functions continue to get better   Discontinue IV fluid at this time   likely BISHNU related to patient intermittent diarrhea and acute abdomen  baseline renal functions are normal with creatinine of 0.36 back in October 2024  Hyperkalemia secondary to Aldactone and BISHNU which is improving at this time  Kidney function improving, creatinine currently 0.81 back to baseline, resolved hyponatremia, monitor fluid intake  History of significant hyponatremia now better.  Likely had some SIADH which is improved  Patient need to follow-up in the renal clinic  Defer iron and hb to medicine. Recommend to transfuse for hb less than 7.  Hold all medications specially any nephrotoxic medicine   Hold Aldactone for hyperkalemia           Electronically signed by Galileo Chang MD,   [facility] kidney consultant  [phone]  11/10/2024  18:02 EST

## 2024-11-10 NOTE — PROGRESS NOTES
[facility] MEDICINE SERVICE  DAILY PROGRESS NOTE    NAME: Maryann Gaitan  : 1950  MRN: [id number]      LOS: 4 days     PROVIDER OF SERVICE: Marciano Multani MD    Chief Complaint: Blood in stool    Subjective:     Interval History:  History taken from: patient chart RN    Diarrhea abdominal pain improving        Review of Systems:   Review of Systems  All negative except as above  Objective:     Vital Signs  Temp:  [97.8 °F (36.6 °C)-98.4 °F (36.9 °C)] 98.4 °F (36.9 °C)  Heart Rate:  [59-74] 69  Resp:  [16-22] 18  BP: (104-126)/(58-68) 104/58   Body mass index is 21.91 kg/m².    Physical Exam  Physical Exam  AOx3 NAD  RRR S1 and S2 audible  Lungs with fair air entry  Abdomen with left sided tenderness no guarding no rigidity bowel sounds positive       Diagnostic Data    Results from last 7 days   Lab Units 11/10/24  0348   WBC 10*3/mm3 10.68   HEMOGLOBIN g/dL 7.8*   HEMATOCRIT % 25.0*   PLATELETS 10*3/mm3 437   GLUCOSE mg/dL 131*   CREATININE mg/dL 0.81   BUN mg/dL 17   SODIUM mmol/L 135*   POTASSIUM mmol/L 4.1   AST (SGOT) U/L 20   ALT (SGPT) U/L 11   ALK PHOS U/L 77   BILIRUBIN mg/dL 0.2   ANION GAP mmol/L 9.9       No radiology results for the last day      I reviewed the patient's new clinical results.  I reviewed the patient's new imaging results and agree with the interpretation.    Assessment/Plan:     Active and Resolved Problems  Active Hospital Problems    Diagnosis  POA    **Blood in stool [K92.1]  Yes    Moderate protein-calorie malnutrition [E44.0]  Yes    Acute kidney injury [N17.9]  Yes    Hyperkalemia [E87.5]  Yes    Leukocytosis [D72.829]  Yes    Acute UTI (urinary tract infection) [N39.0]  Yes    Colitis [K52.9]  Yes    Inflammatory bowel disease (Crohn's disease) [K50.90]  Yes    Hypothyroidism (acquired) [E03.9]  Yes    Anxiety associated with depression [F41.8]  Yes    COPD (chronic obstructive pulmonary disease) [J44.9]  Yes    Rheumatoid arthritis [M06.9]  Yes     Dyslipidemia [E78.5]  Yes    CAD S/P percutaneous coronary angioplasty [I25.10, Z98.61]  Not Applicable    Severe mitral regurgitation [I34.0]  Yes      Resolved Hospital Problems   No resolved problems to display.       74-year-old female history of CAD status post PCI 10/22/2024, COPD, Crohn's disease, rheumatoid arthritis on DMARDs, hyperlipidemia, hypothyroidism, severe mitral regurg admitted to [facility] 11/6 with abdominal pain and blood in stool     #Acute colitis  #History of Crohn's with acute flare   GI following started on IV Solu-Medrol.  History of Strongyloides   seen by infectious disease.  Antibiotics discontinued   GI PCR panel negative. Follow-up stool for ova and parasite  Isolation per protocol     #Acute on chronic anemia  No plan for endoscopic intervention currently by GI  Monitor H&H  Plavix held on admission per GI.  Patient is at high high risk for stent thrombosis given recent PCI.  Resumed on 11/9       #CAD status post recent PCI to LAD 10/22/2024  #severe mitral regurg     Plavix held on admission per GI.  Patient at high risk for stent thrombosis given recent PCI to LAD.  Plavix resumed 11/9  Cardiology is following  GDMT as tolerated           #BISHNU  #Hyperkalemia  Status post IV fluids  Nephrology following  Diuretics on hold  BMP daily  Avoid hypotension     #History of hypotension  Continue home dose of midodrine        #Hypothyroidism  Continue home dose of levothyroxine      #COPD not in exacerbation  Continue current nebs     #Rheumatoid arthritis  Hold Plaquenil for now      #MDD  continueZoloft    VTE Prophylaxis:  Mechanical VTE prophylaxis orders are present.             Disposition Planning:     Barriers to Discharge: Medical workup  Anticipated Date of Discharge: TBD  Place of Discharge: Home versus rehab      Time: 45 minutes     Code Status and Medical Interventions: CPR (Attempt to Resuscitate); Full Support   Ordered at: 11/06/24 1922     Code Status (Patient has no  pulse and is not breathing):    CPR (Attempt to Resuscitate)     Medical Interventions (Patient has pulse or is breathing):    Full Support       Signature: Electronically signed by Marciano Multani MD, 11/10/24, 08:58 EST.  [facility]

## 2024-11-11 LAB
ALBUMIN SERPL-MCNC: 2.7 G/DL (ref 3.5–5.2)
ALBUMIN/GLOB SERPL: 1.1 G/DL
ALP SERPL-CCNC: 73 U/L (ref 39–117)
ALT SERPL W P-5'-P-CCNC: 21 U/L (ref 1–33)
ANION GAP SERPL CALCULATED.3IONS-SCNC: 9.6 MMOL/L (ref 5–15)
AST SERPL-CCNC: 31 U/L (ref 1–32)
BACTERIA SPEC AEROBE CULT: NORMAL
BACTERIA SPEC AEROBE CULT: NORMAL
BASOPHILS # BLD AUTO: 0.01 10*3/MM3 (ref 0–0.2)
BASOPHILS NFR BLD AUTO: 0.1 % (ref 0–1.5)
BILIRUB SERPL-MCNC: 0.2 MG/DL (ref 0–1.2)
BUN SERPL-MCNC: 15 MG/DL (ref 8–23)
BUN/CREAT SERPL: 26.8 (ref 7–25)
CALCIUM SPEC-SCNC: 8.7 MG/DL (ref 8.6–10.5)
CHLORIDE SERPL-SCNC: 97 MMOL/L (ref 98–107)
CO2 SERPL-SCNC: 24.4 MMOL/L (ref 22–29)
CREAT SERPL-MCNC: 0.56 MG/DL (ref 0.57–1)
DEPRECATED RDW RBC AUTO: 58 FL (ref 37–54)
EGFRCR SERPLBLD CKD-EPI 2021: 95.9 ML/MIN/1.73
EOSINOPHIL # BLD AUTO: 0 10*3/MM3 (ref 0–0.4)
EOSINOPHIL NFR BLD AUTO: 0 % (ref 0.3–6.2)
ERYTHROCYTE [DISTWIDTH] IN BLOOD BY AUTOMATED COUNT: 16.7 % (ref 12.3–15.4)
GLOBULIN UR ELPH-MCNC: 2.4 GM/DL
GLUCOSE SERPL-MCNC: 127 MG/DL (ref 65–99)
HCT VFR BLD AUTO: 25.9 % (ref 34–46.6)
HGB BLD-MCNC: 8 G/DL (ref 12–15.9)
IMM GRANULOCYTES # BLD AUTO: 0.4 10*3/MM3 (ref 0–0.05)
IMM GRANULOCYTES NFR BLD AUTO: 4 % (ref 0–0.5)
LYMPHOCYTES # BLD AUTO: 1.7 10*3/MM3 (ref 0.7–3.1)
LYMPHOCYTES NFR BLD AUTO: 16.9 % (ref 19.6–45.3)
MAGNESIUM SERPL-MCNC: 1.6 MG/DL (ref 1.6–2.4)
MCH RBC QN AUTO: 29.4 PG (ref 26.6–33)
MCHC RBC AUTO-ENTMCNC: 30.9 G/DL (ref 31.5–35.7)
MCV RBC AUTO: 95.2 FL (ref 79–97)
MONOCYTES # BLD AUTO: 0.42 10*3/MM3 (ref 0.1–0.9)
MONOCYTES NFR BLD AUTO: 4.2 % (ref 5–12)
NEUTROPHILS NFR BLD AUTO: 7.51 10*3/MM3 (ref 1.7–7)
NEUTROPHILS NFR BLD AUTO: 74.8 % (ref 42.7–76)
NRBC BLD AUTO-RTO: 0 /100 WBC (ref 0–0.2)
PHOSPHATE SERPL-MCNC: 2.6 MG/DL (ref 2.5–4.5)
PLATELET # BLD AUTO: 482 10*3/MM3 (ref 140–450)
PMV BLD AUTO: 9.3 FL (ref 6–12)
POTASSIUM SERPL-SCNC: 4.3 MMOL/L (ref 3.5–5.2)
PROT SERPL-MCNC: 5.1 G/DL (ref 6–8.5)
RBC # BLD AUTO: 2.72 10*6/MM3 (ref 3.77–5.28)
SODIUM SERPL-SCNC: 131 MMOL/L (ref 136–145)
WBC NRBC COR # BLD AUTO: 10.04 10*3/MM3 (ref 3.4–10.8)

## 2024-11-11 PROCEDURE — 94799 UNLISTED PULMONARY SVC/PX: CPT

## 2024-11-11 PROCEDURE — 97535 SELF CARE MNGMENT TRAINING: CPT | Performed by: OCCUPATIONAL THERAPIST

## 2024-11-11 PROCEDURE — 94664 DEMO&/EVAL PT USE INHALER: CPT

## 2024-11-11 PROCEDURE — 97110 THERAPEUTIC EXERCISES: CPT

## 2024-11-11 PROCEDURE — 25010000002 METHYLPREDNISOLONE PER 40 MG: Performed by: NURSE PRACTITIONER

## 2024-11-11 PROCEDURE — 80053 COMPREHEN METABOLIC PANEL: CPT | Performed by: HOSPITALIST

## 2024-11-11 PROCEDURE — 94761 N-INVAS EAR/PLS OXIMETRY MLT: CPT

## 2024-11-11 PROCEDURE — 85025 COMPLETE CBC W/AUTO DIFF WBC: CPT | Performed by: NURSE PRACTITIONER

## 2024-11-11 PROCEDURE — 84100 ASSAY OF PHOSPHORUS: CPT | Performed by: HOSPITALIST

## 2024-11-11 PROCEDURE — 83735 ASSAY OF MAGNESIUM: CPT | Performed by: HOSPITALIST

## 2024-11-11 PROCEDURE — 97116 GAIT TRAINING THERAPY: CPT

## 2024-11-11 PROCEDURE — 97112 NEUROMUSCULAR REEDUCATION: CPT | Performed by: OCCUPATIONAL THERAPIST

## 2024-11-11 PROCEDURE — 97530 THERAPEUTIC ACTIVITIES: CPT | Performed by: OCCUPATIONAL THERAPIST

## 2024-11-11 PROCEDURE — 99233 SBSQ HOSP IP/OBS HIGH 50: CPT | Performed by: INTERNAL MEDICINE

## 2024-11-11 RX ADMIN — LEVOTHYROXINE SODIUM 50 MCG: 0.05 TABLET ORAL at 05:26

## 2024-11-11 RX ADMIN — MIDODRINE HYDROCHLORIDE 10 MG: 5 TABLET ORAL at 13:29

## 2024-11-11 RX ADMIN — SERTRALINE 50 MG: 50 TABLET, FILM COATED ORAL at 08:32

## 2024-11-11 RX ADMIN — METHYLPREDNISOLONE SODIUM SUCCINATE 20 MG: 40 INJECTION, POWDER, FOR SOLUTION INTRAMUSCULAR; INTRAVENOUS at 05:26

## 2024-11-11 RX ADMIN — FOLIC ACID 1000 MCG: 1 TABLET ORAL at 08:32

## 2024-11-11 RX ADMIN — METHYLPREDNISOLONE SODIUM SUCCINATE 20 MG: 40 INJECTION, POWDER, FOR SOLUTION INTRAMUSCULAR; INTRAVENOUS at 20:45

## 2024-11-11 RX ADMIN — ATORVASTATIN CALCIUM 40 MG: 40 TABLET, FILM COATED ORAL at 20:45

## 2024-11-11 RX ADMIN — Medication 10 ML: at 08:31

## 2024-11-11 RX ADMIN — PANTOPRAZOLE SODIUM 40 MG: 40 TABLET, DELAYED RELEASE ORAL at 08:32

## 2024-11-11 RX ADMIN — CLOPIDOGREL BISULFATE 75 MG: 75 TABLET ORAL at 08:32

## 2024-11-11 RX ADMIN — Medication 10 MG: at 20:45

## 2024-11-11 RX ADMIN — TIOTROPIUM BROMIDE INHALATION SPRAY 2 PUFF: 3.12 SPRAY, METERED RESPIRATORY (INHALATION) at 08:07

## 2024-11-11 RX ADMIN — METHYLPREDNISOLONE SODIUM SUCCINATE 20 MG: 40 INJECTION, POWDER, FOR SOLUTION INTRAMUSCULAR; INTRAVENOUS at 13:29

## 2024-11-11 NOTE — PLAN OF CARE
Goal Outcome Evaluation:   Patient remains on IV steroids. Patient experienced tachycardia throughout the day. MD notified. Dressings changed today. Patient has rested in bed and chair throughout the day. Aox4. Patient able to turn and shift weight on her own. Patient has kept sunglasses on all day. She stated light bothers her eyes. Possible discharge tomorrow with home health.

## 2024-11-11 NOTE — PROGRESS NOTES
PROGRESS NOTE      Patient Name: Maryann Gaitan  : 1950  MRN: [id number]  Primary Care Physician: Eduard Little MD  Date of admission: 2024    Patient Care Team:  Eduard Little MD as PCP - General (Family Medicine)        Subjective   Subjective:     Seen and examined, abdominal pain is better  Alert and oriented x 3,  Kidney function is stable      Review of systems:  All other review of system unremarkable      Allergies:    Allergies   Allergen Reactions    Codeine Hives    Penicillin G Sodium Hives       Objective   Exam:     Vital Signs  Temp:  [97.9 °F (36.6 °C)-98.2 °F (36.8 °C)] 98 °F (36.7 °C)  Heart Rate:  [78-92] 87  Resp:  [19-22] 22  BP: (111-123)/(65-67) 123/67  SpO2:  [94 %-97 %] 97 %  on   ;   Device (Oxygen Therapy): room air  Body mass index is 21.91 kg/m².    General: Elderly thin white female in no acute distress.    Head:      Normocephalic and atraumatic.    Eyes:      PERRL/EOM intact, conjunctivae and sclerae clear without nystagmus.    Neck:      No masses, thyromegaly,  trachea central with normal respiratory effort   Lungs:    Clear bilaterally to auscultation.    Heart:      Regular rate and rhythm, no murmur no gallop  Abd:        Soft, mildly tender, not distended, bowel sounds positive, no shifting dullness   Pulses:   Pulses palpable  Extr:        No cyanosis or clubbing--no significant edema.    Neuro:    No focal deficits.   alert oriented x3  Skin:       Intact without lesions or rashes.    Psych:    Alert and cooperative; normal mood and affect; .      Results Review:  I have personally reviewed most recent Data :  CBC    Results from last 7 days   Lab Units 24  0527 11/10/24  0348 24  0443 24  2355 24  0133 24  1442   WBC 10*3/mm3 10.04 10.68 12.55* 8.38 12.65* 12.46*   HEMOGLOBIN g/dL 8.0* 7.8* 8.4* 9.1* 8.0* 9.4*   PLATELETS 10*3/mm3 482* 437 567* 493* 451* 526*     CMP   Results from last 7 days   Lab Units  11/11/24  0527 11/10/24  0348 11/09/24  0443 11/07/24  2355 11/07/24  0227 11/06/24  1442   SODIUM mmol/L 131* 135* 133* 137 136 134*   POTASSIUM mmol/L 4.3 4.1 4.1 4.8 5.0 5.8*   CHLORIDE mmol/L 97* 102 100 105 104 97*   CO2 mmol/L 24.4 23.1 22.3 20.3* 22.4 23.9   BUN mg/dL 15 17 21 36* 62* 75*   CREATININE mg/dL 0.56* 0.81 1.02* 1.49* 2.22* 2.84*   GLUCOSE mg/dL 127* 131* 138* 123* 76 138*   ALBUMIN g/dL 2.7* 2.5* 2.8* 2.8*  --  2.9*   BILIRUBIN mg/dL 0.2 0.2 0.2 0.3  --  0.4   ALK PHOS U/L 73 77 88 100  --  108   AST (SGOT) U/L 31 20 7 12  --  15   ALT (SGPT) U/L 21 11 7 9  --  12   LIPASE U/L  --   --   --   --   --  22     ABG      No radiology results for the last day    Results for orders placed during the hospital encounter of 10/08/24    Adult Transesophageal Echo ([facility]) W/ Cont if Necessary Per Protocol (Cardiology Department)    Interpretation Summary    Left ventricular systolic function is normal. Left ventricular ejection fraction appears to be 56 - 60%.    The left atrial cavity is severely dilated.    Saline test results are negative.    There is mild, posterior mitral leaflet thickening present.    Moderate to severe mitral valve regurgitation is present with a centrally-directed jet noted.    There is moderate, (grade 3) plaque in the descending aorta present.    Scheduled Meds:atorvastatin, 40 mg, Oral, Nightly  clopidogrel, 75 mg, Oral, Daily  folic acid, 1,000 mcg, Oral, Daily  levothyroxine, 50 mcg, Oral, Q AM  melatonin, 10 mg, Oral, Nightly  methylPREDNISolone sodium succinate, 20 mg, Intravenous, Q8H  midodrine, 10 mg, Oral, Q8H  pantoprazole, 40 mg, Oral, Daily  sertraline, 50 mg, Oral, Daily  [Held by provider] spironolactone, 25 mg, Oral, Daily  tiotropium bromide monohydrate, 2 puff, Inhalation, Daily - RT      Continuous Infusions:   PRN Meds:  albuterol    HYDROmorphone    influenza vaccine    ondansetron    sodium chloride    Assessment & Plan   Assessment and Plan:         Blood in  stool    Severe mitral regurgitation    Dyslipidemia    CAD S/P percutaneous coronary angioplasty    Acute kidney injury    Hyperkalemia    Leukocytosis    Acute UTI (urinary tract infection)    Colitis    Inflammatory bowel disease (Crohn's disease)    Hypothyroidism (acquired)    Anxiety associated with depression    COPD (chronic obstructive pulmonary disease)    Rheumatoid arthritis    Moderate protein-calorie malnutrition    ASSESSMENT:  Acute kidney injury likely related to volume depletion   Hyperkalemia secondary to acute kidney injury which is getting better  hyponatremia much better than before   history of hypertension  History of arthritis  History of Crohn disease  10/12/2024 outpatient EGD colonoscopy showed small hiatal hernia chronic gastritis, normal duodenum, colon ulcer noted.  History of severe mitral regurg scheduled for MitraClip  History of coronary artery disease  Blood in the stool likely related to Crohn disease           PLAN :      Patient renal functions continue to get better   No need for any additional IV fluid  Hyponatremia noted patient is drinking a lot of fluid  Will start fluid restriction otherwise patient will need urea again  likely BISHNU related to patient intermittent diarrhea and acute abdomen  Creatinine back to baseline at 0.56  Hyperkalemia secondary to Aldactone and BISHNU which is improving at this time  Follow-up with electrolytes closely  History of significant hyponatremia now better.  Likely had some SIADH which is improved  Patient need to follow-up in the renal clinic  Defer iron and hb to medicine. Recommend to transfuse for hb less than 7.  Hold all medications specially any nephrotoxic medicine   Hold Aldactone for hyperkalemia           Electronically signed by Buddy Pierre MD,   [facility] kidney consultant  [phone]  11/11/2024  12:00 EST

## 2024-11-11 NOTE — THERAPY TREATMENT NOTE
"Subjective: Pt/nursing agreeable to therapeutic plan of care.    Objective:     Precautions - Fall, hypotensive     Bed mobility - In bed upon entry  Transfers - CGA  Ambulation - 40 feet CGA with FWW    Therapeutic Exercise - 10 Reps x 1-2 sets B LE AROM supported sitting / chair; PT cuing provided for proper technique     Vitals: WNL    Pain: 8 VAS   Location: Abdomen, as well as buttocks reports is d/t wound (bandage present)  Intervention for pain: Repositioned    Education: Verbal/Tactile Cues    Assessment: Maryann Gaitan presents with functional mobility impairments which indicate the need for skilled intervention. Tolerating session today without incident. Functional performance hindered by pain, decreased activity tolerance, and generalized weakness. Recommend 24 hr care from family and HH PT upon d/c. Pt would benefit from continued skilled PT services to obtain highest functional potential, lessen caregiver burden, and to decrease fall risk. Will continue to follow and progress as tolerated.     Plan/Recommendations:   If medically appropriate, Low Intensity Therapy recommended post-acute care - This is recommended as therapy feels this patient would require 2-3 visits per week. OP or HH would be the best option depending on patient's home bound status. Consider, if the patient has other  \"skilled\" needs such as wounds, IV antibiotics, etc. Combined with \"low intensity\" could also equate to a SNF. If patient is medically complex, consider LTAC. Pt requires no DME at discharge.     Pt desires Home with Home Health and Home with family assist at discharge. Pt cooperative; agreeable to therapeutic recommendations and plan of care.         Basic Mobility 6-click:  Rollin = Total, A lot = 2, A little = 3; 4 = None  Supine>Sit:   1 = Total, A lot = 2, A little = 3; 4 = None   Sit>Stand with arms:  1 = Total, A lot = 2, A little = 3; 4 = None  Bed>Chair:   1 = Total, A lot = 2, A little = 3; 4 = " None  Ambulate in room:  1 = Total, A lot = 2, A little = 3; 4 = None  3-5 Steps with railin = Total, A lot = 2, A little = 3; 4 = None  Score: 22      Post-Tx Position: Up in Chair, Alarms activated, and Call light and personal items within reach  PPE: gloves

## 2024-11-11 NOTE — PROGRESS NOTES
[facility] MEDICINE SERVICE  DAILY PROGRESS NOTE    NAME: Maryann Gaitan  : 1950  MRN: [id number]      LOS: 5 days     PROVIDER OF SERVICE: Eusebio Montenegro MD    Chief Complaint: Blood in stool    Subjective:     Interval History:  History taken from: patient chart RN    Symptoms seem to be stable from what is noted yesterday.        Review of Systems:   Review of Systems  All negative except as above  Objective:     Vital Signs  Temp:  [97.9 °F (36.6 °C)-98.2 °F (36.8 °C)] 98 °F (36.7 °C)  Heart Rate:  [78-99] 99  Resp:  [19-22] 20  BP: (111-123)/(65-67) 118/66   Body mass index is 21.91 kg/m².    Physical Exam  Physical Exam  AOx3 NAD  RRR S1 and S2 audible  Lungs with fair air entry  Abdomen with left sided tenderness no guarding no rigidity bowel sounds positive       Diagnostic Data    Results from last 7 days   Lab Units 24  0527   WBC 10*3/mm3 10.04   HEMOGLOBIN g/dL 8.0*   HEMATOCRIT % 25.9*   PLATELETS 10*3/mm3 482*   GLUCOSE mg/dL 127*   CREATININE mg/dL 0.56*   BUN mg/dL 15   SODIUM mmol/L 131*   POTASSIUM mmol/L 4.3   AST (SGOT) U/L 31   ALT (SGPT) U/L 21   ALK PHOS U/L 73   BILIRUBIN mg/dL 0.2   ANION GAP mmol/L 9.6       No radiology results for the last day      I reviewed the patient's new clinical results.  I reviewed the patient's new imaging results and agree with the interpretation.    Assessment/Plan:     Active and Resolved Problems  Active Hospital Problems    Diagnosis  POA    **Blood in stool [K92.1]  Yes    Moderate protein-calorie malnutrition [E44.0]  Yes    Acute kidney injury [N17.9]  Yes    Hyperkalemia [E87.5]  Yes    Leukocytosis [D72.829]  Yes    Acute UTI (urinary tract infection) [N39.0]  Yes    Colitis [K52.9]  Yes    Inflammatory bowel disease (Crohn's disease) [K50.90]  Yes    Hypothyroidism (acquired) [E03.9]  Yes    Anxiety associated with depression [F41.8]  Yes    COPD (chronic obstructive pulmonary disease) [J44.9]  Yes    Rheumatoid arthritis [M06.9]   Yes    Dyslipidemia [E78.5]  Yes    CAD S/P percutaneous coronary angioplasty [I25.10, Z98.61]  Not Applicable    Severe mitral regurgitation [I34.0]  Yes      Resolved Hospital Problems   No resolved problems to display.       74-year-old female history of CAD status post PCI 10/22/2024, COPD, Crohn's disease, rheumatoid arthritis on DMARDs, hyperlipidemia, hypothyroidism, severe mitral regurg admitted to [facility], operated by [facility] 11/6 with abdominal pain and blood in stool     #Acute colitis  #History of Crohn's with acute flare   GI following started on IV Solu-Medrol.  History of Strongyloides   seen by infectious disease.  Antibiotics discontinued   GI PCR panel negative. Follow-up stool for ova and parasite  Still quite a bit of pain today.  Has not seemed to improve since what is noted yesterday in the notes.  Would benefit from more inpatient treatment today.  If improving tomorrow could likely discharge.     #Acute on chronic anemia  No plan for endoscopic intervention currently by GI  Monitor H&H  Continue the Plavix despite this.  She has a very fresh stent.  Hemoglobin is stable today.  11/11/2024       #CAD status post recent PCI to LAD 10/22/2024  #severe mitral regurg     Plavix held on admission per GI.  Patient at high risk for stent thrombosis Plavix is resumed.  Patient should keep on their Plavix given their stent was only 2 weeks ago  Cardiology is following, appreciate their recommendations  GDMT as tolerated       #BISHNU  #Hyperkalemia  Status post IV fluids  Nephrology following  Diuretics on hold  BMP daily  Avoid hypotension       #History of hypotension  Continue home dose of midodrine        #Hypothyroidism  Continue home dose of levothyroxine        #COPD not in exacerbation  Continue current nebs       #Rheumatoid arthritis  Hold Plaquenil for now        #MDD  continueZoloft    VTE Prophylaxis:  Mechanical VTE prophylaxis orders are present.             Disposition Planning:     Barriers to  Discharge: Medical workup  Anticipated Date of Discharge: 11/13/2024  Place of Discharge: Home versus rehab      Time: 45 minutes     Code Status and Medical Interventions: CPR (Attempt to Resuscitate); Full Support   Ordered at: 11/06/24 1922     Code Status (Patient has no pulse and is not breathing):    CPR (Attempt to Resuscitate)     Medical Interventions (Patient has pulse or is breathing):    Full Support       Signature: Electronically signed by Eusebio Montenegro MD, 11/11/24, 16:57 EST.  [facility] Hospitalist Team

## 2024-11-11 NOTE — THERAPY TREATMENT NOTE
Subjective: Pt agreeable to therapeutic plan of care.  Cognition: oriented to Person and Place.   Pt confused this date stating she had fallen in the bathroom this morning & her daughter & grand-daughter had to come & pick her up. Double checked with nurse who confirmed there was no report of pt falling this a.m.     Objective:     Precautions - High fall risk.     Bed Mobility: SBA   Functional Transfers: Min-A with RW for bed>b/s commode>recliner.  Pt requiring physical assist, v.c. & tactile cues as she is tending to posterior lean this date. Pt stating she felt dizzy in standing this date. BP taken & was 115/70.      Balance: sitting EOB SBA; standing = min A with RW    Toileting: Max-A for karine-care & mod A for donning brief with v.c. for initiation & follow through of task. Pt incontinent of urine.   ADL Position: supported sitting and supported standing  ADL Comments:     Grooming: ind with s/u   ADL Position: supported sitting  ADL Comments:     Vitals: WNL    Pain: 0 Education: Provided education on the importance of mobility in the acute care setting, Verbal/Tactile Cues, ADL training, and Transfer Training      Assessment: Maryann Gaitan presents with ADL impairments affecting function including balance, endurance / activity tolerance, and strength. Pt demo dec standing balance this date requiring inc assist for ADL transfers & toileting tasks. Pt c/o dizziness in standing. No s/s of orthostatic hypotension noted. Her BP was 115/70 during t/f. Pt is at a high fall risk & highly recommend 24/7 care 7 assist with all transfers/amb at home. She also demo some inc confusion this date stating she fell in the bathroom this morning. Nurse assured me there is no record of her falling. Demonstrated functioning below baseline abilities indicate the need for continued skilled intervention while inpatient. Tolerating session today without incident. Will continue to follow and progress as tolerated.  "    Plan/Recommendations:   Low Intensity Therapy recommended post-acute care - This is recommended as therapy feels this patient would require 2-3 visits per week. OP or HH would be the best option depending on patient's home bound status. Consider, if the patient has other  \"skilled\" needs such as wounds, IV antibiotics, etc. Combined with \"low intensity\" could also equate to a SNF. If patient is medically complex, consider LTAC.. Pt requires no DME at discharge.     Pt desires Home with Home Health and Home with family assist at discharge. Pt cooperative; agreeable to therapeutic recommendations and plan of care.     Modified [place]: N/A = No pre-op stroke/TIA    Post-Tx Position: Up in Chair, Alarms activated, and Call light and personal items within reach  PPE: gloves          "

## 2024-11-11 NOTE — PROGRESS NOTES
Cardiology Progress Note    Patient Identification:  Name: Maryann Gaitan  Age: 74 y.o.  Sex: female  :  1950  MRN: [id number]                 Follow Up / Chief Complaint: Antiplatelet management   Chief Complaint   Patient presents with    Black or Bloody Stool    Abdominal Pain       Interval History: Patient presented with abdominal pain and GI bleeding         NP Note:  Patient seen and examined.  Patient denies any shortness of breath or chest pain now however she continues to have significant abdominal pain but reported blood in stool since being back on Plavix.      Electronically signed by DRU Rubio, 24, 11:39 AM EST.          Cardiology attending addendum :    I have personally performed a face-to-face diagnostic evaluation, physical exam and reviewed data on this patient.  I have reviewed documentation done by me and nurse practitioner  and corrected as needed.  And agree with the different components of documentation.Greater than 50% of the time spent in the care of this patient was provided by attending consultant/me.        Subjective: Patient seen and examined; chart and labs reviewed; discussed with bedside nurse         Objective:  2024 - 2024 revealed sodium 134, BUN/creatinine of 75/2.84, glucose 138, albumin 2.9.  Hemoglobin 9.4 has come down to 8.  MCV 98  2024: proBNP 2909, BUN 36 creatinine 1.49 hemoglobin 9.1    2024 sodium 131 potassium 4.3 BUN 15 creatinine 0.56 glucose 127 hemoglobin 8.0 platelets 482    History of present illness:      Ms. Maryann Gaitan has PMH of     CAD status post cardiac cath with multivessel CAD poor candidate for CABG, underwent PCI to ostial and proximal LAD 10/22/2024  Moderate to severe MR with central jet noted  COPD  Hypertension  Rheumatoid arthritis  Crohn's disease     Presented 2024 through [facility] with complaint of abdominal pain and dark red stool / rectal bleed.  Patient has close disease and rheumatoid  arthritis and is on immunosuppressants methotrexate, Humira, Rinvoq now presented with rectal bleed and abdominal pain.  GI did a scope and biopsies of terminal ileum which were consistent with active Crohn's and biopsies of stomach and duodenum showed strong colitis infection, was treated by ivermectin.  GI started patient on Solu-Medrol and is holding immunosuppressants and wants to hold Plavix.     Patient was recently in the hospital 10/8/2024 with nausea vomiting diarrhea and abnormal labs.  With hyponatremia and hypokalemia and anemia.  Patient suddenly started having chest pain and fast team was called because of sharp left-sided chest pain with shortness of breath dizziness EKG showed sinus tachycardia and cardiac workup revealed severe MR, cath 10/15/2024 revealing severe ostial LAD and outpouching in the descending thoracic aorta probably a penetrating aortic ulcer versus aneurysm.  CT surgery was consulted and she was turned down for CABG therefore patient underwent drug-eluting stent to ostial LAD on 10/22/2024 and was supposed to have clip to mitral valve in follow-up.  In the meantime has been having worsening symptoms and abdominal pain, weight loss.  Patient has been restarted on Plavix.  Will continue for bleeding closely.     Data:  Labs 11/6/2024 - 11/7/2024 revealed sodium 134, BUN/creatinine of 75/2.84, glucose 138, albumin 2.9.  Hemoglobin 9.4 has come down to 8.  MCV 98  EKG done 11/6/2024 reviewed/interpreted by me reveals sinus bradycardia at the rate of 58 bpm.        EGD/colonoscopy 10/12/2024 revealed small hiatal hernia, nonerosive gastritis, T1 stricture s/p dilatation with 12 mm, T1 ulcer, colon ulcers, sigmoid diverticulosis, grade 2 internal hemorrhoids and strongyloides  Echo 10/12/2024 EF of 51 to 55% with mild RV enlargement, severe left atrial enlargement, moderate to severe MR  Transesophageal echo 10/16/2024: LVEF of 55 to 60% with severe left atrial enlargement, moderate to  severe MR and grade 3 descending aortic plaque  Cardiac cath 10/15/2024 reveals total right and severe ostial LAD, descending thoracic aorta had an outpouching consistent with penetrating aortic ulcer versus aneurysm.   Patient was evaluated by CT surgery not a candidate for CABG therefore patient underwent PCI and drug-eluting stent to the ostium proximal LAD.  And she was seen by valve team and will be set up for MitraClip once she recovers from this hospitalization      Assessment:  :     Abdominal pain, nausea vomiting diarrhea-possible infection  Rectal bleed  Crohn's disease  CAD, status post PCI  Mitral regurgitation  Chronic HFpEF due to valvular heart disease from mitral regurgitation  Hypertensive cardiovascular disease from hypertension  Hyponatremia  Hypokalemia  Crohn's disease  Normocytic anemia  COPD, chronic steroid therapy  Multifocal pneumonia  Hyperglycemia, prediabetes with A1c of 5.6 from     Recommendations / Plan:         Patient presented 10/9/2024 because of abnormal labs showing low sodium, was complaining of nausea vomiting and diarrhea.  Patient's hydrochlorothiazide was held and nephrology consulted.  HS troponin is 23 and 22.  Previous proBNP was 2573.  Sodium is improved to 137.   CT PE study is negative for PE but has cavitary lesion in the lung.  Pulmonary Dr. Cho, underwent bronchoscopy.  Patient has severe MR will benefit from cardiac cath.  Patient underwent cardiac cath 10/15/2024 which revealed total right and severe ostial LAD disease.  Descending thoracic aorta has an outpouching probably saccular aneurysm versus  penetrating aortic ulcer .  Echocardiogram 10/12/2024 is revealing EF of 51 to 55% with mild RV enlargement severe left atrial enlargement and right atrial enlargement and moderate to severe MR  GONZÁLEZ is revealing moderate to severe MR.  Patient would benefit from CABG and mitral valve surgery.  Patient was seen by .  Patient has been turned down for  surgery due to multiple comorbid conditions.  Patient has a cavitary lesion in her lungs had bronchoscopy.  Had multiple mucous plugs.  Patient underwent drug-eluting stent to ostial LAD 10/12/2024.  Patient was sent home on dual antiplatelet therapy with aspirin and Plavix, metoprolol and statins and losartan as tolerated.  Will follow-up mitral regurgitation and follow-up in with structural heart team.  Patient had an EKG done 10/12/2024 which revealed sinus rhythm degenerating to A-fib with RVR.  Patient would benefit from long-term anticoagulation to prevent thromboembolic events.  Given her Crohn's disease and microcytic anemia patient will be a poor candidate for long-term anticoagulation.  Will follow and consider watchman evaluation as outpatient.  Patient was not tolerating losartan and metoprolol was given intermittent midodrine.  Now patient presents with abdominal pain and rectal bleed.  GI is recommending holding Plavix.  Patient unfortunately is in a tough situation.  Had drug-eluting stents done 10/22/2024, hardly 2 weeks ago.  Would benefit from Plavix to prevent stent thrombosis.  Patient has been restarted on Plavix.  Will continue to monitor for bleeding closely.  Will monitor hemoglobin.            Copied text in this portion of the note has been reviewed and is accurate as of 11/11/2024    Past Medical History:  Past Medical History:   Diagnosis Date    Arthritis     CAD (coronary artery disease)     COPD (chronic obstructive pulmonary disease)     Hypertension     Mood disorder     Thyroid disease      Past Surgical History:  Past Surgical History:   Procedure Laterality Date    BRONCHOSCOPY N/A 10/16/2024    Procedure: BRONCHOSCOPY;  Surgeon: Gallo Pope MD;  Location: [facility] ENDOSCOPY;  Service: Pulmonary;  Laterality: N/A;    CARDIAC CATHETERIZATION N/A 10/15/2024    Procedure: Left Heart Cath, possible pci;  Surgeon: Travis Connor MD;  Location: [facility] CATH INVASIVE  LOCATION;  Service: Cardiovascular;  Laterality: N/A;    CARDIAC CATHETERIZATION N/A 10/22/2024    Procedure: Laser Coronary Atherectomy;  Surgeon: Travis Connor MD;  Location: [facility] CATH INVASIVE LOCATION;  Service: Cardiovascular;  Laterality: N/A;    COLONOSCOPY N/A 10/12/2024    Procedure: COLONOSCOPY WITH BIOPSY AND WIRE GUIDED BALLOON DILATION OF TERMINAL ILEUM;  Surgeon: Rob Strong MD;  Location: [facility] ENDOSCOPY;  Service: Gastroenterology;  Laterality: N/A;  Colitis, crohns of terminal ileum, right colon ulcers, diverticulosis, hemorroids    ENDOSCOPY N/A 10/12/2024    Procedure: ESOPHAGOGASTRODUODENOSCOPY WITH BIOPSY X 2 AREA;  Surgeon: Rob Strong MD;  Location: [facility] ENDOSCOPY;  Service: Gastroenterology;  Laterality: N/A;  Chronic gastritis, HH    HYSTERECTOMY      LEFT HEART CATH          Social History:   Social History     Tobacco Use    Smoking status: Former     Types: Cigarettes    Smokeless tobacco: Never   Substance Use Topics    Alcohol use: Never      Family History:  Family History   Problem Relation Age of Onset    Heart disease Mother     Dementia Mother     Stroke Mother     Heart disease Father     Hypertension Father           Allergies:  Allergies   Allergen Reactions    Codeine Hives    Penicillin G Sodium Hives     Scheduled Meds:  atorvastatin, 40 mg, Nightly  clopidogrel, 75 mg, Daily  folic acid, 1,000 mcg, Daily  levothyroxine, 50 mcg, Q AM  melatonin, 10 mg, Nightly  methylPREDNISolone sodium succinate, 20 mg, Q8H  midodrine, 10 mg, Q8H  pantoprazole, 40 mg, Daily  sertraline, 50 mg, Daily  [Held by provider] spironolactone, 25 mg, Daily  tiotropium bromide monohydrate, 2 puff, Daily - RT          Review of Systems:   Review of Systems   Gastrointestinal:  Positive for hematochezia.     Review of Systems   Constitution: Negative for chills and fever.   Cardiovascular: Negative for chest pain and palpitations.   Respiratory: Negative  "for cough and hemoptysis.    Gastrointestinal: Negative for nausea.        Constitutional:  Temp:  [97.9 °F (36.6 °C)-98.3 °F (36.8 °C)] 97.9 °F (36.6 °C)  Heart Rate:  [70-91] 78  Resp:  [17-22] 22  BP: (111-124)/(58-67) 121/67    Physical Exam   /67 (BP Location: Right arm, Patient Position: Lying)   Pulse 78   Temp 97.9 °F (36.6 °C) (Oral)   Resp 22   Ht 154.9 cm (61\")   Wt 52.6 kg (115 lb 15.4 oz)   SpO2 96%   BMI 21.91 kg/m²   General:  Appears in no acute distress  Eyes: Sclera is anicteric,  conjunctiva is clear   HEENT:  No JVD. Thyroid not visibly enlarged. No mucosal pallor or cyanosis  Respiratory: Respirations regular and unlabored at rest.  Clear to auscultation  Cardiovascular: S1,S2 Regular rate and rhythm. 2/6 murmur, no rub or gallop auscultated.  . No pretibial pitting edema  Gastrointestinal: Abdomen nondistended.  Musculoskeletal:  No abnormal movements  Extremities: No digital clubbing or cyanosis  Skin: Color pink.   Neuro: Alert and awake.    INTAKE AND OUTPUT:    Intake/Output Summary (Last 24 hours) at 11/11/2024 0737  Last data filed at 11/10/2024 1300  Gross per 24 hour   Intake 480 ml   Output --   Net 480 ml       Cardiographics  Telemetry: Sinus rhythm    ECG:   ECG 12 Lead Electrolyte Imbalance   Final Result   HEART RATE=58  bpm   RR Jgnqbznr=2509  ms   [place] Jflujqbc=543  ms   P Horizontal Axis=11  deg   P Front Axis=61  deg   QRSD Interval=87  ms   QT Jpywkorl=695  ms   DIrY=275  ms   QRS Axis=34  deg   T Wave Axis=59  deg   - OTHERWISE NORMAL ECG -   Sinus bradycardia   When compared with ECG of 23-Oct-2024 05:26:45,   Significant axis, voltage or hypertrophy change   Electronically Signed By: Sandro Santizo (JOSSY) 2024-11-07 06:08:29   Date and Time of Study:2024-11-06 15:41:09      Telemetry Scan   Final Result      Telemetry Scan   Final Result      Telemetry Scan   Final Result        I have personally reviewed EKG    Echocardiogram: Results for orders placed during " "the hospital encounter of 10/08/24    Adult Transesophageal Echo (GONZÁLEZ) W/ Cont if Necessary Per Protocol (Cardiology Department)    Interpretation Summary    Left ventricular systolic function is normal. Left ventricular ejection fraction appears to be 56 - 60%.    The left atrial cavity is severely dilated.    Saline test results are negative.    There is mild, posterior mitral leaflet thickening present.    Moderate to severe mitral valve regurgitation is present with a centrally-directed jet noted.    There is moderate, (grade 3) plaque in the descending aorta present.      Lab Review   I have reviewed the labs      Results from last 7 days   Lab Units 11/11/24  0527   MAGNESIUM mg/dL 1.6     Results from last 7 days   Lab Units 11/11/24  0527   SODIUM mmol/L 131*   POTASSIUM mmol/L 4.3   BUN mg/dL 15   CREATININE mg/dL 0.56*   CALCIUM mg/dL 8.7         Results from last 7 days   Lab Units 11/11/24  0527 11/10/24  0348 11/09/24  0443   WBC 10*3/mm3 10.04 10.68 12.55*   HEMOGLOBIN g/dL 8.0* 7.8* 8.4*   HEMATOCRIT % 25.9* 25.0* 26.9*   PLATELETS 10*3/mm3 482* 437 567*           RADIOLOGY:  Imaging Results (Last 24 Hours)       ** No results found for the last 24 hours. **                  )11/11/2024  MD EVERETTE Araujo/Transcription:   \"Dictated utilizing Dragon dictation\".   "

## 2024-11-11 NOTE — PLAN OF CARE
Goal Outcome Evaluation:            Pt requested pain and nausea medicine; remains on IV steroids; encourage to turn; continue to monitor

## 2024-11-11 NOTE — PLAN OF CARE
"Goal Outcome Evaluation:      Assessment: Maryann Gaitan presents with ADL impairments affecting function including balance, endurance / activity tolerance, and strength. Pt demo dec standing balance this date requiring inc assist for ADL transfers & toileting tasks. Pt c/o dizziness in standing. No s/s of orthostatic hypotension noted. Her BP was 115/70 during t/f. Pt is at a high fall risk & highly recommend 24/7 care 7 assist with all transfers/amb at home. She also demo some inc confusion this date stating she fell in the bathroom this morning. Nurse assured me there is no record of her falling. Demonstrated functioning below baseline abilities indicate the need for continued skilled intervention while inpatient. Tolerating session today without incident. Will continue to follow and progress as tolerated.     Plan/Recommendations:   Low Intensity Therapy recommended post-acute care - This is recommended as therapy feels this patient would require 2-3 visits per week. OP or HH would be the best option depending on patient's home bound status. Consider, if the patient has other  \"skilled\" needs such as wounds, IV antibiotics, etc. Combined with \"low intensity\" could also equate to a SNF. If patient is medically complex, consider LTAC.. Pt requires no DME at discharge.     Pt desires Home with Home Health and Home with family assist at discharge. Pt cooperative; agreeable to therapeutic recommendations and plan of care.                                         "

## 2024-11-11 NOTE — CASE MANAGEMENT/SOCIAL WORK
Continued Stay Note   [facility]     Patient Name: Maryann Gaitan  MRN: [id number]  Today's Date: 11/11/2024    Admit Date: 11/6/2024    Plan: From home with granddaughters. Current with [facility], will need LETHA (requested 11/11)   Discharge Plan       Row Name 11/11/24 1638       Plan    Plan From home with granddaughters. Current with [facility], will need LETHA (requested 11/11)    Patient/Family in Agreement with Plan yes    Plan Comments DC Barriers: IV Steroids, Monitor H&H. Cardio, nephrology, ID, and GI following             Expected Discharge Date and Time       Expected Discharge Date Expected Discharge Time    Nov 12, 2024         Mariah Welch RN    phone [phone]  fax [phone]

## 2024-11-11 NOTE — PLAN OF CARE
"Goal Outcome Evaluation:            Assessment: Maryann Gaitan presents with functional mobility impairments which indicate the need for skilled intervention. Tolerating session today without incident. Functional performance hindered by pain, decreased activity tolerance, and generalized weakness. Recommend 24 hr care from family and HH PT upon d/c. Pt would benefit from continued skilled PT services to obtain highest functional potential, lessen caregiver burden, and to decrease fall risk. Will continue to follow and progress as tolerated.     Plan/Recommendations:   If medically appropriate, Low Intensity Therapy recommended post-acute care - This is recommended as therapy feels this patient would require 2-3 visits per week. OP or HH would be the best option depending on patient's home bound status. Consider, if the patient has other  \"skilled\" needs such as wounds, IV antibiotics, etc. Combined with \"low intensity\" could also equate to a SNF. If patient is medically complex, consider LTAC. Pt requires no DME at discharge.     Pt desires Home with Home Health and Home with family assist at discharge. Pt cooperative; agreeable to therapeutic recommendations and plan of care.                                 "

## 2024-11-12 ENCOUNTER — READMISSION MANAGEMENT (OUTPATIENT)
Dept: CALL CENTER | Facility: HOSPITAL | Age: 74
End: 2024-11-12
Payer: MEDICARE

## 2024-11-12 ENCOUNTER — TRANSCRIBE ORDERS (OUTPATIENT)
Dept: HOME HEALTH SERVICES | Facility: HOME HEALTHCARE | Age: 74
End: 2024-11-12
Payer: MEDICARE

## 2024-11-12 VITALS
TEMPERATURE: 98.2 F | SYSTOLIC BLOOD PRESSURE: 125 MMHG | RESPIRATION RATE: 23 BRPM | WEIGHT: 115.96 LBS | OXYGEN SATURATION: 96 % | HEART RATE: 75 BPM | BODY MASS INDEX: 21.89 KG/M2 | HEIGHT: 61 IN | DIASTOLIC BLOOD PRESSURE: 73 MMHG

## 2024-11-12 DIAGNOSIS — N17.9 AKI (ACUTE KIDNEY INJURY): Primary | ICD-10-CM

## 2024-11-12 LAB
ALBUMIN SERPL-MCNC: 3 G/DL (ref 3.5–5.2)
ALBUMIN/GLOB SERPL: 1.4 G/DL
ALP SERPL-CCNC: 81 U/L (ref 39–117)
ALT SERPL W P-5'-P-CCNC: 45 U/L (ref 1–33)
ANION GAP SERPL CALCULATED.3IONS-SCNC: 9.7 MMOL/L (ref 5–15)
AST SERPL-CCNC: 36 U/L (ref 1–32)
BASOPHILS # BLD AUTO: 0.01 10*3/MM3 (ref 0–0.2)
BASOPHILS NFR BLD AUTO: 0.1 % (ref 0–1.5)
BILIRUB SERPL-MCNC: 0.2 MG/DL (ref 0–1.2)
BUN SERPL-MCNC: 17 MG/DL (ref 8–23)
BUN/CREAT SERPL: 28.8 (ref 7–25)
CALCIUM SPEC-SCNC: 8.9 MG/DL (ref 8.6–10.5)
CHLORIDE SERPL-SCNC: 100 MMOL/L (ref 98–107)
CO2 SERPL-SCNC: 25.3 MMOL/L (ref 22–29)
CREAT SERPL-MCNC: 0.59 MG/DL (ref 0.57–1)
DEPRECATED RDW RBC AUTO: 56.6 FL (ref 37–54)
EGFRCR SERPLBLD CKD-EPI 2021: 94.7 ML/MIN/1.73
EOSINOPHIL # BLD AUTO: 0 10*3/MM3 (ref 0–0.4)
EOSINOPHIL NFR BLD AUTO: 0 % (ref 0.3–6.2)
ERYTHROCYTE [DISTWIDTH] IN BLOOD BY AUTOMATED COUNT: 16.9 % (ref 12.3–15.4)
GLOBULIN UR ELPH-MCNC: 2.2 GM/DL
GLUCOSE SERPL-MCNC: 148 MG/DL (ref 65–99)
HCT VFR BLD AUTO: 25.9 % (ref 34–46.6)
HGB BLD-MCNC: 8.2 G/DL (ref 12–15.9)
IMM GRANULOCYTES # BLD AUTO: 0.41 10*3/MM3 (ref 0–0.05)
IMM GRANULOCYTES NFR BLD AUTO: 4.4 % (ref 0–0.5)
LYMPHOCYTES # BLD AUTO: 1.81 10*3/MM3 (ref 0.7–3.1)
LYMPHOCYTES NFR BLD AUTO: 19.4 % (ref 19.6–45.3)
MAGNESIUM SERPL-MCNC: 1.7 MG/DL (ref 1.6–2.4)
MCH RBC QN AUTO: 29.7 PG (ref 26.6–33)
MCHC RBC AUTO-ENTMCNC: 31.7 G/DL (ref 31.5–35.7)
MCV RBC AUTO: 93.8 FL (ref 79–97)
MONOCYTES # BLD AUTO: 0.54 10*3/MM3 (ref 0.1–0.9)
MONOCYTES NFR BLD AUTO: 5.8 % (ref 5–12)
NEUTROPHILS NFR BLD AUTO: 6.57 10*3/MM3 (ref 1.7–7)
NEUTROPHILS NFR BLD AUTO: 70.3 % (ref 42.7–76)
NRBC BLD AUTO-RTO: 0 /100 WBC (ref 0–0.2)
PHOSPHATE SERPL-MCNC: 2.6 MG/DL (ref 2.5–4.5)
PLATELET # BLD AUTO: 523 10*3/MM3 (ref 140–450)
PMV BLD AUTO: 9.4 FL (ref 6–12)
POTASSIUM SERPL-SCNC: 4.1 MMOL/L (ref 3.5–5.2)
PROT SERPL-MCNC: 5.2 G/DL (ref 6–8.5)
RBC # BLD AUTO: 2.76 10*6/MM3 (ref 3.77–5.28)
SODIUM SERPL-SCNC: 135 MMOL/L (ref 136–145)
STRONGYLOIDES IGG SER QL IA: NEGATIVE
WBC NRBC COR # BLD AUTO: 9.34 10*3/MM3 (ref 3.4–10.8)

## 2024-11-12 PROCEDURE — 85025 COMPLETE CBC W/AUTO DIFF WBC: CPT | Performed by: NURSE PRACTITIONER

## 2024-11-12 PROCEDURE — 90662 IIV NO PRSV INCREASED AG IM: CPT | Performed by: NURSE PRACTITIONER

## 2024-11-12 PROCEDURE — 25010000002 METHYLPREDNISOLONE PER 40 MG: Performed by: NURSE PRACTITIONER

## 2024-11-12 PROCEDURE — 84100 ASSAY OF PHOSPHORUS: CPT | Performed by: HOSPITALIST

## 2024-11-12 PROCEDURE — 83735 ASSAY OF MAGNESIUM: CPT | Performed by: HOSPITALIST

## 2024-11-12 PROCEDURE — 25010000002 INFLUENZA VAC SPLIT HIGH-DOSE 0.5 ML SUSPENSION PREFILLED SYRINGE: Performed by: NURSE PRACTITIONER

## 2024-11-12 PROCEDURE — G0008 ADMIN INFLUENZA VIRUS VAC: HCPCS | Performed by: NURSE PRACTITIONER

## 2024-11-12 PROCEDURE — 80053 COMPREHEN METABOLIC PANEL: CPT | Performed by: HOSPITALIST

## 2024-11-12 PROCEDURE — 99233 SBSQ HOSP IP/OBS HIGH 50: CPT | Performed by: INTERNAL MEDICINE

## 2024-11-12 RX ORDER — ASPIRIN 81 MG/1
81 TABLET ORAL DAILY
Qty: 30 TABLET | Refills: 0 | Status: ON HOLD | OUTPATIENT
Start: 2024-11-15 | End: 2024-12-15

## 2024-11-12 RX ORDER — PREDNISONE 10 MG/1
TABLET ORAL
Qty: 126 TABLET | Refills: 0 | Status: ON HOLD | OUTPATIENT
Start: 2024-11-12 | End: 2025-01-07

## 2024-11-12 RX ADMIN — LEVOTHYROXINE SODIUM 50 MCG: 0.05 TABLET ORAL at 04:22

## 2024-11-12 RX ADMIN — PANTOPRAZOLE SODIUM 40 MG: 40 TABLET, DELAYED RELEASE ORAL at 08:48

## 2024-11-12 RX ADMIN — CLOPIDOGREL BISULFATE 75 MG: 75 TABLET ORAL at 08:48

## 2024-11-12 RX ADMIN — FOLIC ACID 1000 MCG: 1 TABLET ORAL at 08:48

## 2024-11-12 RX ADMIN — SERTRALINE 50 MG: 50 TABLET, FILM COATED ORAL at 08:48

## 2024-11-12 RX ADMIN — METHYLPREDNISOLONE SODIUM SUCCINATE 20 MG: 40 INJECTION, POWDER, FOR SOLUTION INTRAMUSCULAR; INTRAVENOUS at 04:22

## 2024-11-12 RX ADMIN — INFLUENZA A VIRUS A/VICTORIA/4897/2022 IVR-238 (H1N1) ANTIGEN (FORMALDEHYDE INACTIVATED), INFLUENZA A VIRUS A/CALIFORNIA/122/2022 SAN-022 (H3N2) ANTIGEN (FORMALDEHYDE INACTIVATED), AND INFLUENZA B VIRUS B/MICHIGAN/01/2021 ANTIGEN (FORMALDEHYDE INACTIVATED) 0.5 ML: 60; 60; 60 INJECTION, SUSPENSION INTRAMUSCULAR at 12:13

## 2024-11-12 NOTE — NURSING NOTE
Met with patient and reviewed MitraClip instructions for procedure schedule on 11/21/24.  Medication instructions reviewed and emailed to grand-daughter SaniaNandini YANCEY appt scheduled for 11/20/24 at 1330.

## 2024-11-12 NOTE — PLAN OF CARE
Goal Outcome Evaluation:         Patient discharging today. Plan to return home with home health. Patient Aox4 with no complaints of pain today. Rested in bed throughout the day.

## 2024-11-12 NOTE — PROGRESS NOTES
PROGRESS NOTE      Patient Name: Maryann Gaitan  : 1950  MRN: [id number]  Primary Care Physician: Eduard Little MD  Date of admission: 2024    Patient Care Team:  Eduard Little MD as PCP - General (Family Medicine)        Subjective   Subjective:     Seen and examined, abdominal pain is better  Alert and oriented x 3,  Kidney function is stable      Review of systems:  All other review of system unremarkable      Allergies:    Allergies   Allergen Reactions    Codeine Hives    Penicillin G Sodium Hives       Objective   Exam:     Vital Signs  Temp:  [97.4 °F (36.3 °C)-98.3 °F (36.8 °C)] 98.2 °F (36.8 °C)  Heart Rate:  [] 75  Resp:  [17-23] 23  BP: (122-133)/(71-78) 125/73  SpO2:  [96 %-100 %] 96 %  on   ;   Device (Oxygen Therapy): room air  Body mass index is 21.91 kg/m².    General: Elderly thin white female in no acute distress.    Head:      Normocephalic and atraumatic.    Eyes:      PERRL/EOM intact, conjunctivae and sclerae clear without nystagmus.    Neck:      No masses, thyromegaly,  trachea central with normal respiratory effort   Lungs:    Clear bilaterally to auscultation.    Heart:      Regular rate and rhythm, no murmur no gallop  Abd:        Soft, mildly tender, not distended, bowel sounds positive, no shifting dullness   Pulses:   Pulses palpable  Extr:        No cyanosis or clubbing--no significant edema.    Neuro:    No focal deficits.   alert oriented x3  Skin:       Intact without lesions or rashes.    Psych:    Alert and cooperative; normal mood and affect; .      Results Review:  I have personally reviewed most recent Data :  CBC    Results from last 7 days   Lab Units 24  0421 24  0527 11/10/24  0348 24  0443 24  2355 24  0133 24  1442   WBC 10*3/mm3 9.34 10.04 10.68 12.55* 8.38 12.65* 12.46*   HEMOGLOBIN g/dL 8.2* 8.0* 7.8* 8.4* 9.1* 8.0* 9.4*   PLATELETS 10*3/mm3 523* 482* 437 567* 493* 451* 526*     CMP    Results from last 7 days   Lab Units 11/12/24  0421 11/11/24  0527 11/10/24  0348 11/09/24  0443 11/07/24  2355 11/07/24  0227 11/06/24  1442   SODIUM mmol/L 135* 131* 135* 133* 137 136 134*   POTASSIUM mmol/L 4.1 4.3 4.1 4.1 4.8 5.0 5.8*   CHLORIDE mmol/L 100 97* 102 100 105 104 97*   CO2 mmol/L 25.3 24.4 23.1 22.3 20.3* 22.4 23.9   BUN mg/dL 17 15 17 21 36* 62* 75*   CREATININE mg/dL 0.59 0.56* 0.81 1.02* 1.49* 2.22* 2.84*   GLUCOSE mg/dL 148* 127* 131* 138* 123* 76 138*   ALBUMIN g/dL 3.0* 2.7* 2.5* 2.8* 2.8*  --  2.9*   BILIRUBIN mg/dL 0.2 0.2 0.2 0.2 0.3  --  0.4   ALK PHOS U/L 81 73 77 88 100  --  108   AST (SGOT) U/L 36* 31 20 7 12  --  15   ALT (SGPT) U/L 45* 21 11 7 9  --  12   LIPASE U/L  --   --   --   --   --   --  22     ABG      No radiology results for the last day    Results for orders placed during the hospital encounter of 10/08/24    Adult Transesophageal Echo (GONZÁLEZ) W/ Cont if Necessary Per Protocol (Cardiology Department)    Interpretation Summary    Left ventricular systolic function is normal. Left ventricular ejection fraction appears to be 56 - 60%.    The left atrial cavity is severely dilated.    Saline test results are negative.    There is mild, posterior mitral leaflet thickening present.    Moderate to severe mitral valve regurgitation is present with a centrally-directed jet noted.    There is moderate, (grade 3) plaque in the descending aorta present.    Scheduled Meds:atorvastatin, 40 mg, Oral, Nightly  clopidogrel, 75 mg, Oral, Daily  folic acid, 1,000 mcg, Oral, Daily  levothyroxine, 50 mcg, Oral, Q AM  melatonin, 10 mg, Oral, Nightly  methylPREDNISolone sodium succinate, 20 mg, Intravenous, Q8H  midodrine, 10 mg, Oral, Q8H  pantoprazole, 40 mg, Oral, Daily  sertraline, 50 mg, Oral, Daily  [Held by provider] spironolactone, 25 mg, Oral, Daily  tiotropium bromide monohydrate, 2 puff, Inhalation, Daily - RT      Continuous Infusions:   PRN Meds:  albuterol    HYDROmorphone     ondansetron    sodium chloride    Assessment & Plan   Assessment and Plan:         Blood in stool    Severe mitral regurgitation    Dyslipidemia    CAD S/P percutaneous coronary angioplasty    Acute kidney injury    Hyperkalemia    Leukocytosis    Acute UTI (urinary tract infection)    Colitis    Inflammatory bowel disease (Crohn's disease)    Hypothyroidism (acquired)    Anxiety associated with depression    COPD (chronic obstructive pulmonary disease)    Rheumatoid arthritis    Moderate protein-calorie malnutrition    ASSESSMENT:  Acute kidney injury likely related to volume depletion   Hyperkalemia secondary to acute kidney injury which is getting better  hyponatremia much better than before   history of hypertension  History of arthritis  History of Crohn disease  10/12/2024 outpatient EGD colonoscopy showed small hiatal hernia chronic gastritis, normal duodenum, colon ulcer noted.  History of severe mitral regurg scheduled for MitraClip  History of coronary artery disease  Blood in the stool likely related to Crohn disease           PLAN :      Patient renal functions continue to get better   No need for any additional IV fluid  Hyponatremia secondary to increased free water intake in the presence of some SIADH  Sodium level is improving after fluid restriction Will start fluid restriction otherwise patient will need urea again  likely BISHNU related to patient intermittent diarrhea and acute abdomen  Creatinine back to baseline at 0.56  Hyperkalemia secondary to Aldactone and BISHNU which is improving at this time  Follow-up with electrolytes closely  Patient need to follow-up in the renal clinic  Defer iron and hb to medicine. Recommend to transfuse for hb less than 7.  Hold all medications specially any nephrotoxic medicine   Hold Aldactone for hyperkalemia           Electronically signed by Buddy Pierre MD,   [facility] kidney consultant  [phone]  11/12/2024  15:04 EST

## 2024-11-12 NOTE — PROGRESS NOTES
Cardiology Progress Note    Patient Identification:  Name: Maryann Gaitan  Age: 74 y.o.  Sex: female  :  1950  MRN: [id number]                 Follow Up / Chief Complaint: Antiplatelet management   Chief Complaint   Patient presents with    Black or Bloody Stool    Abdominal Pain       Interval History: Patient presented with abdominal pain and GI bleeding         NP Note:  Patient seen and examined.  Patient denies any chest pain shortness of breath.  She does report some intermittent dizziness when getting up.  Vital signs stable on midodrine.  Patient denies any further bleeding and she reports that she is going home today    Continue Plavix would also recommend to restart aspirin for her recent PCI.    Continue with plans for MitraClip as outpatient on 2024    Electronically signed by DRU Rubio, 24, 12:03 PM EST.      Cardiology attending addendum :    I have personally performed a face-to-face diagnostic evaluation, physical exam and reviewed data on this patient.  I have reviewed documentation done by me and nurse practitioner  and corrected as needed.  And agree with the different components of documentation.Greater than 50% of the time spent in the care of this patient was provided by attending consultant/me.        Subjective: Patient seen and examined; chart and labs reviewed; discussed with bedside nurse         Objective:  2024 - 2024 revealed sodium 134, BUN/creatinine of 75/2.84, glucose 138, albumin 2.9.  Hemoglobin 9.4 has come down to 8.  MCV 98  2024: proBNP 2909, BUN 36 creatinine 1.49 hemoglobin 9.1    2024 sodium 131 potassium 4.3 BUN 15 creatinine 0.56 glucose 127 hemoglobin 8.0 platelets 482  2024: AST 36 ALT 45 hemoglobin 8.2 platelets 523    History of present illness:      Ms. Maryann Gaitan has PMH of     CAD status post cardiac cath with multivessel CAD poor candidate for CABG, underwent PCI to ostial and proximal LAD  10/22/2024  Moderate to severe MR with central jet noted  COPD  Hypertension  Rheumatoid arthritis  Crohn's disease     Presented 11/6/2024 through [facility] with complaint of abdominal pain and dark red stool / rectal bleed.  Patient has close disease and rheumatoid arthritis and is on immunosuppressants methotrexate, Humira, Rinvoq now presented with rectal bleed and abdominal pain.  GI did a scope and biopsies of terminal ileum which were consistent with active Crohn's and biopsies of stomach and duodenum showed strong colitis infection, was treated by ivermectin.  GI started patient on Solu-Medrol and is holding immunosuppressants and wants to hold Plavix.     Patient was recently in the hospital 10/8/2024 with nausea vomiting diarrhea and abnormal labs.  With hyponatremia and hypokalemia and anemia.  Patient suddenly started having chest pain and fast team was called because of sharp left-sided chest pain with shortness of breath dizziness EKG showed sinus tachycardia and cardiac workup revealed severe MR, cath 10/15/2024 revealing severe ostial LAD and outpouching in the descending thoracic aorta probably a penetrating aortic ulcer versus aneurysm.  CT surgery was consulted and she was turned down for CABG therefore patient underwent drug-eluting stent to ostial LAD on 10/22/2024 and was supposed to have clip to mitral valve in follow-up.  In the meantime has been having worsening symptoms and abdominal pain, weight loss.  Patient has been restarted on Plavix.  Will continue for bleeding closely.     Data:  Labs 11/6/2024 - 11/7/2024 revealed sodium 134, BUN/creatinine of 75/2.84, glucose 138, albumin 2.9.  Hemoglobin 9.4 has come down to 8.  MCV 98  EKG done 11/6/2024 reviewed/interpreted by me reveals sinus bradycardia at the rate of 58 bpm.        EGD/colonoscopy 10/12/2024 revealed small hiatal hernia, nonerosive gastritis, T1 stricture s/p dilatation with 12 mm, T1 ulcer, colon ulcers, sigmoid  diverticulosis, grade 2 internal hemorrhoids and strongyloides  Echo 10/12/2024 EF of 51 to 55% with mild RV enlargement, severe left atrial enlargement, moderate to severe MR  Transesophageal echo 10/16/2024: LVEF of 55 to 60% with severe left atrial enlargement, moderate to severe MR and grade 3 descending aortic plaque  Cardiac cath 10/15/2024 reveals total right and severe ostial LAD, descending thoracic aorta had an outpouching consistent with penetrating aortic ulcer versus aneurysm.   Patient was evaluated by CT surgery not a candidate for CABG therefore patient underwent PCI and drug-eluting stent to the ostium proximal LAD.  And she was seen by valve team and will be set up for MitraClip once she recovers from this hospitalization      Assessment:  :     Abdominal pain, nausea vomiting diarrhea-possible infection  Rectal bleed  Crohn's disease  CAD, status post PCI  Mitral regurgitation  Chronic HFpEF due to valvular heart disease from mitral regurgitation  Hypertensive cardiovascular disease from hypertension  Hyponatremia  Hypokalemia  Crohn's disease  Normocytic anemia  COPD, chronic steroid therapy  Multifocal pneumonia  Hyperglycemia, prediabetes with A1c of 5.6 from     Recommendations / Plan:         Patient presented 10/9/2024 because of abnormal labs showing low sodium, was complaining of nausea vomiting and diarrhea.  Patient's hydrochlorothiazide was held and nephrology consulted.  HS troponin is 23 and 22.  Previous proBNP was 2573.  Sodium is improved to 137.   CT PE study is negative for PE but has cavitary lesion in the lung.  Pulmonary Dr. Cho, underwent bronchoscopy.  Patient has severe MR will benefit from cardiac cath.  Patient underwent cardiac cath 10/15/2024 which revealed total right and severe ostial LAD disease.  Descending thoracic aorta has an outpouching probably saccular aneurysm versus  penetrating aortic ulcer .  Echocardiogram 10/12/2024 is revealing EF of 51 to 55% with  mild RV enlargement severe left atrial enlargement and right atrial enlargement and moderate to severe MR  [facility] is revealing moderate to severe MR.  Patient would benefit from CABG and mitral valve surgery.  Patient was seen by .  Patient has been turned down for surgery due to multiple comorbid conditions.  Patient has a cavitary lesion in her lungs had bronchoscopy.  Had multiple mucous plugs.  Patient underwent drug-eluting stent to ostial LAD 10/12/2024.  Patient was sent home on dual antiplatelet therapy with aspirin and Plavix, metoprolol and statins and losartan as tolerated.  Will follow-up mitral regurgitation and follow-up in with structural heart team.  Patient had an EKG done 10/12/2024 which revealed sinus rhythm degenerating to A-fib with RVR.  Patient would benefit from long-term anticoagulation to prevent thromboembolic events.  Given her Crohn's disease and microcytic anemia patient will be a poor candidate for long-term anticoagulation.  Will follow and consider watchman evaluation as outpatient.  Patient was not tolerating losartan and metoprolol was given intermittent midodrine.  Now patient presents with abdominal pain and rectal bleed.  GI was recommending holding Plavix.  Patient unfortunately is in a tough situation.  Had drug-eluting stents done 10/22/2024, hardly 2 weeks ago.  Would benefit from Plavix to prevent stent thrombosis.  Patient has been receiving Plavix uninterrupted.  Is without chest pain or shortness of breath.  Will monitor for bleeding.  Reviewed medications with patient and her daughter            Copied text in this portion of the note has been reviewed and is accurate as of 11/12/2024    Past Medical History:  Past Medical History:   Diagnosis Date    Arthritis     CAD (coronary artery disease)     COPD (chronic obstructive pulmonary disease)     Hypertension     Mood disorder     Thyroid disease      Past Surgical History:  Past Surgical History:    Procedure Laterality Date    BRONCHOSCOPY N/A 10/16/2024    Procedure: BRONCHOSCOPY;  Surgeon: Gallo Pope MD;  Location: [facility] ENDOSCOPY;  Service: Pulmonary;  Laterality: N/A;    CARDIAC CATHETERIZATION N/A 10/15/2024    Procedure: Left Heart Cath, possible pci;  Surgeon: Travis Connor MD;  Location: [facility] CATH INVASIVE LOCATION;  Service: Cardiovascular;  Laterality: N/A;    CARDIAC CATHETERIZATION N/A 10/22/2024    Procedure: Laser Coronary Atherectomy;  Surgeon: Travis Connor MD;  Location: [facility] CATH INVASIVE LOCATION;  Service: Cardiovascular;  Laterality: N/A;    COLONOSCOPY N/A 10/12/2024    Procedure: COLONOSCOPY WITH BIOPSY AND WIRE GUIDED BALLOON DILATION OF TERMINAL ILEUM;  Surgeon: Rob Strong MD;  Location: [facility] ENDOSCOPY;  Service: Gastroenterology;  Laterality: N/A;  Colitis, crohns of terminal ileum, right colon ulcers, diverticulosis, hemorroids    ENDOSCOPY N/A 10/12/2024    Procedure: ESOPHAGOGASTRODUODENOSCOPY WITH BIOPSY X 2 AREA;  Surgeon: Rob Strong MD;  Location: [facility] ENDOSCOPY;  Service: Gastroenterology;  Laterality: N/A;  Chronic gastritis, HH    HYSTERECTOMY      LEFT HEART CATH          Social History:   Social History     Tobacco Use    Smoking status: Former     Types: Cigarettes    Smokeless tobacco: Never   Substance Use Topics    Alcohol use: Never      Family History:  Family History   Problem Relation Age of Onset    Heart disease Mother     Dementia Mother     Stroke Mother     Heart disease Father     Hypertension Father           Allergies:  Allergies   Allergen Reactions    Codeine Hives    Penicillin G Sodium Hives     Scheduled Meds:  atorvastatin, 40 mg, Nightly  clopidogrel, 75 mg, Daily  folic acid, 1,000 mcg, Daily  levothyroxine, 50 mcg, Q AM  melatonin, 10 mg, Nightly  methylPREDNISolone sodium succinate, 20 mg, Q8H  midodrine, 10 mg, Q8H  pantoprazole, 40 mg, Daily  sertraline, 50 mg, Daily  [Held  "by provider] spironolactone, 25 mg, Daily  tiotropium bromide monohydrate, 2 puff, Daily - RT          Review of Systems:   ROS  Review of Systems   Constitution: Negative for chills and fever.   Cardiovascular: Negative for chest pain and palpitations.   Respiratory: Negative for cough and hemoptysis.    Gastrointestinal: Negative for nausea.        Constitutional:  Temp:  [97.4 °F (36.3 °C)-98.3 °F (36.8 °C)] 98.2 °F (36.8 °C)  Heart Rate:  [] 75  Resp:  [17-23] 23  BP: (122-133)/(71-78) 125/73    Physical Exam   /73 (BP Location: Right arm, Patient Position: Lying)   Pulse 75   Temp 98.2 °F (36.8 °C) (Oral)   Resp 23   Ht 154.9 cm (61\")   Wt 52.6 kg (115 lb 15.4 oz)   SpO2 96%   BMI 21.91 kg/m²   General:  Appears in no acute distress  Eyes: Sclera is anicteric,  conjunctiva is clear   HEENT:  No JVD. Thyroid not visibly enlarged. No mucosal pallor or cyanosis  Respiratory: Respirations regular and unlabored at rest.  Clear to auscultation  Cardiovascular: S1,S2 Regular rate and rhythm. 2/6 murmur, no rub or gallop auscultated.  . No pretibial pitting edema  Gastrointestinal: Abdomen nondistended.  Musculoskeletal:  No abnormal movements  Extremities: No digital clubbing or cyanosis  Skin: Color pink.   Neuro: Alert and awake.    INTAKE AND OUTPUT:    Intake/Output Summary (Last 24 hours) at 11/12/2024 1446  Last data filed at 11/12/2024 1335  Gross per 24 hour   Intake 240 ml   Output 200 ml   Net 40 ml       Cardiographics  Telemetry: Sinus rhythm    ECG:   ECG 12 Lead Electrolyte Imbalance   Final Result   HEART RATE=58  bpm   RR Xdipdphd=7304  ms   ID Znbbtzdu=287  ms   P Horizontal Axis=11  deg   P Front Axis=61  deg   QRSD Interval=87  ms   QT Taudklqt=802  ms   FOlW=381  ms   QRS Axis=34  deg   T Wave Axis=59  deg   - OTHERWISE NORMAL ECG -   Sinus bradycardia   When compared with ECG of 23-Oct-2024 05:26:45,   Significant axis, voltage or hypertrophy change   Electronically Signed By: " "Sandro Santizo (JOSSY) 2024-11-07 06:08:29   Date and Time of Study:2024-11-06 15:41:09      Telemetry Scan   Final Result      Telemetry Scan   Final Result      Telemetry Scan   Final Result        I have personally reviewed EKG    Echocardiogram: Results for orders placed during the hospital encounter of 10/08/24    Adult Transesophageal Echo ([facility]) W/ Cont if Necessary Per Protocol (Cardiology Department)    Interpretation Summary    Left ventricular systolic function is normal. Left ventricular ejection fraction appears to be 56 - 60%.    The left atrial cavity is severely dilated.    Saline test results are negative.    There is mild, posterior mitral leaflet thickening present.    Moderate to severe mitral valve regurgitation is present with a centrally-directed jet noted.    There is moderate, (grade 3) plaque in the descending aorta present.      Lab Review   I have reviewed the labs      Results from last 7 days   Lab Units 11/12/24  0421   MAGNESIUM mg/dL 1.7     Results from last 7 days   Lab Units 11/12/24  0421   SODIUM mmol/L 135*   POTASSIUM mmol/L 4.1   BUN mg/dL 17   CREATININE mg/dL 0.59   CALCIUM mg/dL 8.9         Results from last 7 days   Lab Units 11/12/24  0421 11/11/24  0527 11/10/24  0348   WBC 10*3/mm3 9.34 10.04 10.68   HEMOGLOBIN g/dL 8.2* 8.0* 7.8*   HEMATOCRIT % 25.9* 25.9* 25.0*   PLATELETS 10*3/mm3 523* 482* 437           RADIOLOGY:  Imaging Results (Last 24 Hours)       ** No results found for the last 24 hours. **                  )11/12/2024  MD EVERETTE Araujo/Transcription:   \"Dictated utilizing Dragon dictation\".   "

## 2024-11-12 NOTE — OUTREACH NOTE
Prep Survey      Flowsheet Row Responses   [facility] patient discharged [facility]? [facility]   Is LACE score < 7 ? No   Eligibility Readm Mgmt   Discharge diagnosis Blood in stool   Does the patient have one of the following disease processes/diagnoses(primary or secondary)? Other   Does the patient have Home health ordered? Yes   What is the Home health agency?  current with [facility]   Is there a DME ordered? No   Prep survey completed? Yes            Isabel CHILD - Registered Nurse

## 2024-11-12 NOTE — PLAN OF CARE
Goal Outcome Evaluation:      Pt voiced no concerns this shift; remains on IV steroids; encourage to turn; dressing in place to left flank/breast and coccyx; up with assist; continue to monitor

## 2024-11-12 NOTE — DISCHARGE SUMMARY
"             [facility] Medicine Services  Discharge Summary    Date of Service: 2024  Patient Name: Maryann Gaitan  : 1950  MRN: [id number]    Date of Admission: 2024  Discharge Diagnosis: Blood in stool  Date of Discharge: 2024  Primary Care Physician: Eduard Little MD      Presenting Problem:   Blood in stool [K92.1]  Hyperkalemia [E87.5]  Generalized abdominal pain [R10.84]  BISHNU (acute kidney injury) [N17.9]  Acute cystitis with hematuria [N30.01]    Active and Resolved Hospital Problems:  Active Hospital Problems    Diagnosis POA    **Blood in stool [K92.1] Yes    Moderate protein-calorie malnutrition [E44.0] Yes    Acute kidney injury [N17.9] Yes    Hyperkalemia [E87.5] Yes    Leukocytosis [D72.829] Yes    Acute UTI (urinary tract infection) [N39.0] Yes    Colitis [K52.9] Yes    Inflammatory bowel disease (Crohn's disease) [K50.90] Yes    Hypothyroidism (acquired) [E03.9] Yes    Anxiety associated with depression [F41.8] Yes    COPD (chronic obstructive pulmonary disease) [J44.9] Yes    Rheumatoid arthritis [M06.9] Yes    Dyslipidemia [E78.5] Yes    CAD S/P percutaneous coronary angioplasty [I25.10, Z98.61] Not Applicable    Severe mitral regurgitation [I34.0] Yes      Resolved Hospital Problems   No resolved problems to display.         Hospital Course     HPI:    \"Maryann Gaitan is a 74 y.o. female with a CMH of coronary artery disease, recent PCI 2024, COPD not on oxygen, Crohn's disease and rheumatoid arthritis on DMARDs and steroids, hyperlipidemia, hypothyroidism, severe mitral regurgitation currently scheduled for transcatheter mitral valve repair 2024,, history of thoracic aortic aneurysm 3.8 cm per recent CT, who presented to [facility] on 2024 with complaints of abdominal pain generalized and dark bloody stools over the past 2 days recently getting worse.  She rates her pain a 7 out of 10 and says it is constant nothing makes " "it better or worse.  Reports intermittent nausea without vomiting.  She denies any fevers chills dysuria, hematuria, chest pain or shortness of air.  She is awake and alert and in relatively good historian, granddaughter at bedside assisting with history.     .  Labs today show sodium 134, potassium 5.8, BUN 75 creatinine 2.84 baseline 0.5, glucose 138, albumin 2.9, 1.0, WBC 12.46 and afebrile, hemoglobin 9.4 and stable from previous of 9.2, platelets 526, urinalysis showed 1+ leukocytes 3-5 WBCs trace bacteria.  EKG shows sinus bradycardia heart rate 58 otherwise normal.  CT abdomen and pelvis without contrast per radiology showed segmental thickening and inflammatory changes of the descending colon sigmoid colon junction also shows 3.8 cm mild ascending thoracic aortic aneurysm.     Blood pressure was mildly over the emergency department.  Started on 1 g IV ceftriaxone and given 1 L fluid bolus in the emergency department with some improvement.  She was subsequently given 10 mg p.o. midodrine and one-time dose Lokelma.  She will be admitted for further evaluation and treatment and gastroenterology has been consulted.\"    Hospital Course:  Patient was admitted to the hospital with a flare of acute colitis with her history of Crohn's disease.  She was treated with IV steroids with improvements in her symptoms.  She was evaluated by cardiology and gastroenterology during this admission.  Despite her slight lowering of her hemoglobin cardiology recommended keeping her Plavix going given her very fresh cardiac stent within the last 2 weeks.  Her bleeding from the GI tract improved substantially during the steroid treatment.  Plan for patient to go home with a prolonged steroid taper and follow-up with her primary gastroenterologist outpatient.  She did have an acute kidney injury during this admission which improved status post volume resuscitation.        DISCHARGE Follow Up Recommendations for labs and diagnostics: "     Cardiology  Gastroenterology  Primary care medicine        Day of Discharge     Vital Signs:  Temp:  [97.4 °F (36.3 °C)-98.3 °F (36.8 °C)] 98.2 °F (36.8 °C)  Heart Rate:  [] 75  Resp:  [17-23] 23  BP: (122-133)/(71-78) 125/73    Physical Exam:  Physical Exam  Constitutional:       Appearance: Normal appearance.   Eyes:      Extraocular Movements: Extraocular movements intact.      Pupils: Pupils are equal, round, and reactive to light.   Cardiovascular:      Rate and Rhythm: Normal rate and regular rhythm.   Pulmonary:      Effort: Pulmonary effort is normal.      Breath sounds: Normal breath sounds.   Abdominal:      General: Abdomen is flat.      Palpations: Abdomen is soft.   Neurological:      Mental Status: She is alert. Mental status is at baseline.   Psychiatric:         Mood and Affect: Mood normal.            Pertinent  and/or Most Recent Results     LAB RESULTS:      Lab 11/12/24  0421 11/11/24  0527 11/10/24  0348 11/09/24  0443 11/07/24  2355 11/07/24  0133 11/06/24  1710   WBC 9.34 10.04 10.68 12.55* 8.38 12.65*  --    HEMOGLOBIN 8.2* 8.0* 7.8* 8.4* 9.1* 8.0*  --    HEMATOCRIT 25.9* 25.9* 25.0* 26.9* 31.7* 26.2*  --    PLATELETS 523* 482* 437 567* 493* 451*  --    NEUTROS ABS 6.57 7.51* 7.86* 9.32* 7.29* 6.14  --    IMMATURE GRANS (ABS) 0.41* 0.40* 0.47* 0.51*  --  0.31*  --    LYMPHS ABS 1.81 1.70 1.47 1.82  --  4.48*  --    MONOS ABS 0.54 0.42 0.87 0.88  --  1.39*  --    EOS ABS 0.00 0.00 0.00 0.00  --  0.30  --    MCV 93.8 95.2 96.5 96.1 103.3* 96.7  --    LACTATE  --   --   --   --   --   --  1.0         Lab 11/12/24  0421 11/11/24  0527 11/10/24  0348 11/09/24  0443 11/07/24  2355   SODIUM 135* 131* 135* 133* 137   POTASSIUM 4.1 4.3 4.1 4.1 4.8   CHLORIDE 100 97* 102 100 105   CO2 25.3 24.4 23.1 22.3 20.3*   ANION GAP 9.7 9.6 9.9 10.7 11.7   BUN 17 15 17 21 36*   CREATININE 0.59 0.56* 0.81 1.02* 1.49*   EGFR 94.7 95.9 76.3 57.8* 36.7*   GLUCOSE 148* 127* 131* 138* 123*   CALCIUM 8.9 8.7  8.3* 9.1 9.1   MAGNESIUM 1.7 1.6 1.6 1.8 2.2   PHOSPHORUS 2.6 2.6 2.5 3.2 4.3         Lab 11/12/24  0421 11/11/24  0527 11/10/24  0348 11/09/24  0443 11/07/24  2355 11/06/24  1442   TOTAL PROTEIN 5.2* 5.1* 4.8* 5.3* 5.8* 5.9*   ALBUMIN 3.0* 2.7* 2.5* 2.8* 2.8* 2.9*   GLOBULIN 2.2 2.4 2.3 2.5 3.0 3.0   ALT (SGPT) 45* 21 11 7 9 12   AST (SGOT) 36* 31 20 7 12 15   BILIRUBIN 0.2 0.2 0.2 0.2 0.3 0.4   ALK PHOS 81 73 77 88 100 108   LIPASE  --   --   --   --   --  22         Lab 11/07/24  2355   PROBNP 2,909.0*             Lab 11/06/24  1524   ABO TYPING O   RH TYPING Positive   ANTIBODY SCREEN Negative         Brief Urine Lab Results  (Last result in the past 365 days)        Color   Clarity   Blood   Leuk Est   Nitrite   Protein   CREAT   Urine HCG        11/06/24 1539 Dark Yellow   Slightly Cloudy   Negative   Small (1+)   Negative   30 mg/dL (1+)                 Microbiology Results (last 10 days)       Procedure Component Value - Date/Time    Gastrointestinal Panel, PCR - Stool, Per Rectum [[id number]]  (Normal) Collected: 11/09/24 0702    Lab Status: Final result Specimen: Stool from Per Rectum Updated: 11/09/24 0900     Campylobacter Not Detected     Plesiomonas shigelloides Not Detected     Salmonella Not Detected     Vibrio Not Detected     Vibrio cholerae Not Detected     Yersinia enterocolitica Not Detected     Enteroaggregative E. coli (EAEC) Not Detected     Enteropathogenic E. coli (EPEC) Not Detected     Enterotoxigenic E. coli (ETEC) lt/st Not Detected     Shiga-like toxin-producing E. coli (STEC) stx1/stx2 Not Detected     Shigella/Enteroinvasive E. coli (EIEC) Not Detected     Cryptosporidium Not Detected     Cyclospora cayetanensis Not Detected     Entamoeba histolytica Not Detected     Giardia lamblia Not Detected     Adenovirus F40/41 Not Detected     Astrovirus Not Detected     Norovirus GI/GII Not Detected     Rotavirus A Not Detected     Sapovirus (I, II, IV or V) Not Detected    Narrative:      If  Aeromonas, Staphylococcus aureus or Bacillus cereus are suspected, please order [id number]: Stool Culture, Aeromonas, S aureus, B Cereus.    Blood Culture - Blood, Arm, Right [[id number]]  (Normal) Collected: 11/06/24 1715    Lab Status: Final result Specimen: Blood from Arm, Right Updated: 11/11/24 1731     Blood Culture No growth at 5 days    Blood Culture - Blood, Arm, Left [[id number]]  (Normal) Collected: 11/06/24 1710    Lab Status: Final result Specimen: Blood from Arm, Left Updated: 11/11/24 1715     Blood Culture No growth at 5 days            CT Abdomen Pelvis Without Contrast    Result Date: 11/6/2024  Impression: Impression: 1. Segmental thickening and inflammatory change of the descending colon-sigmoid colon junction. Correlate for infectious or inflammatory colitis. 2. Incompletely imaged 3.8 cm mid ascending thoracic aortic aneurysm. Electronically Signed: Ute Snider MD  11/6/2024 4:23 PM EST  Workstation ID: [id number]    CT Angiogram Chest    Result Date: 10/14/2024  Impression: Impression: 1. No evidence for pulmonary embolus. 2. New cavitary lesion in the left upper lung measuring 1.6 x 1.4 cm. There is adjacent groundglass opacification with additional nodular opacities and tree-in-bud opacities within the left upper and left lower lung. This may represent a multifocal pneumonia however underlying cavitary neoplasm cannot be excluded. Recommend treatment with follow-up imaging to ensure complete resolution. 3. Calcified and noncalcified atherosclerotic disease involving the thoracic aortic arch and descending thoracic aorta with several aortic ulcers along the descending thoracic aorta. Electronically Signed: Manisha Khan MD  10/14/2024 12:01 PM EDT  Workstation ID: [id number]     Results for orders placed during the hospital encounter of 10/08/24    Duplex Venous Upper Extremity - Left CAR    Interpretation Summary    Acute left upper extremity superficial thrombophlebitis noted in the basilic  (upper arm).    All other left sided vessels appear normal.      Results for orders placed during the hospital encounter of 10/08/24    Duplex Venous Upper Extremity - Left CAR    Interpretation Summary    Acute left upper extremity superficial thrombophlebitis noted in the basilic (upper arm).    All other left sided vessels appear normal.      Results for orders placed during the hospital encounter of 10/08/24    Adult Transesophageal Echo (GONZÁLEZ) W/ Cont if Necessary Per Protocol (Cardiology Department)    Interpretation Summary    Left ventricular systolic function is normal. Left ventricular ejection fraction appears to be 56 - 60%.    The left atrial cavity is severely dilated.    Saline test results are negative.    There is mild, posterior mitral leaflet thickening present.    Moderate to severe mitral valve regurgitation is present with a centrally-directed jet noted.    There is moderate, (grade 3) plaque in the descending aorta present.      Labs Pending at Discharge:  Pending Results       Procedure [Order ID] Specimen - Date/Time    Ova & Parasite Examination - Stool, Per Rectum [[id number]] Collected: 11/10/24 1807    Specimen: Stool from Per Rectum Updated: 11/10/24 1834            Procedures Performed           Consults:   Consults       Date and Time Order Name Status Description    11/7/2024  1:20 PM Inpatient Cardiology Consult Completed     11/7/2024 10:08 AM Inpatient Nephrology Consult      11/7/2024  5:48 AM Inpatient Infectious Diseases Consult Completed     11/6/2024  7:25 PM Inpatient Gastroenterology Consult Completed     11/6/2024  4:29 PM Hospitalist (on-call MD unless specified)      10/14/2024  5:03 PM Inpatient Pulmonology Consult Completed     10/13/2024 10:38 AM Inpatient Cardiology Consult Completed     10/11/2024 10:12 AM Hematology & Oncology Inpatient Consult Completed     10/11/2024  8:11 AM Inpatient Gastroenterology Consult Completed               Discharge Details         Discharge Medications        Changes to Medications        Instructions Start Date   aspirin 81 MG EC tablet  What changed: These instructions start on November 15, 2024. If you are unsure what to do until then, ask your doctor or other care provider.   81 mg, Oral, Daily   Start Date: November 15, 2024     predniSONE 10 MG tablet  Commonly known as: DELTASONE  What changed:   medication strength  See the new instructions.   Take 4 tablets by mouth Daily for 7 days, THEN 3.5 tablets Daily for 7 days, THEN 3 tablets Daily for 7 days, THEN 2.5 tablets Daily for 7 days, THEN 2 tablets Daily for 7 days, THEN 1.5 tablets Daily for 7 days, THEN 1 tablet Daily for 7 days, THEN 0.5 tablets Daily for 7 days. Indications: Crohn's Disease   Start Date: November 12, 2024            Continue These Medications        Instructions Start Date   albuterol (2.5 MG/3ML) 0.083% nebulizer solution  Commonly known as: PROVENTIL   2.5 mg, Nebulization, Every 6 Hours PRN      atorvastatin 40 MG tablet  Commonly known as: LIPITOR   40 mg, Oral, Nightly      cholecalciferol 1.25 MG (27018 UT) capsule  Commonly known as: VITAMIN D3   1 capsule, 2 Times Weekly      clopidogrel 75 MG tablet  Commonly known as: PLAVIX   75 mg, Oral, Daily      ezetimibe 10 MG tablet  Commonly known as: ZETIA   1 tablet, Daily      folic acid 1 MG tablet  Commonly known as: FOLVITE   1 tablet, Daily      Humira (2 Pen) 40 MG/0.4ML Pen-injector Kit  Generic drug: Adalimumab   40 mg, Every 14 Days      levothyroxine 50 MCG tablet  Commonly known as: SYNTHROID, LEVOTHROID   1 tablet, Daily      Melatonin Extra Strength 10 MG tablet  Generic drug: Melatonin   10 mg, Oral, As Needed      methotrexate 2.5 MG tablet   8 tablets, Weekly      midodrine 10 MG tablet  Commonly known as: PROAMATINE   10 mg, Oral, 3 Times Daily Before Meals      pantoprazole 20 MG EC tablet  Commonly known as: PROTONIX   1 tablet, Daily      Rinvoq 45 MG tablet sustained-release 24 hour  extended release tablet  Generic drug: upadacitinib ER   45 mg, Daily      sertraline 50 MG tablet  Commonly known as: ZOLOFT   1 tablet, Daily      spironolactone 25 MG tablet  Commonly known as: ALDACTONE   25 mg, Daily      tiotropium 18 MCG per inhalation capsule  Commonly known as: SPIRIVA   1 capsule, Daily - RT               Allergies   Allergen Reactions    Codeine Hives    Penicillin G Sodium Hives         Discharge Disposition:   Home or Self Care    Diet:  Hospital:  Diet Order   Procedures    Diet: Fluid Restriction (240 mL/tray); 1000 mL/day; Fluid Consistency: Thin (IDDSI 0)         Discharge Activity:   Activity Instructions    As tolerated             CODE STATUS:  Code Status and Medical Interventions: CPR (Attempt to Resuscitate); Full Support   Ordered at: 11/06/24 1922     Code Status (Patient has no pulse and is not breathing):    CPR (Attempt to Resuscitate)     Medical Interventions (Patient has pulse or is breathing):    Full Support         Future Appointments   Date Time Provider Department Center   11/13/2024 To Be Determined Corina Pierre, RN  [facility]   11/14/2024 To Be Determined Akhil Scott, PT  [facility]   11/15/2024 To Be Determined Jackelyn Galeana OT  [facility]   11/20/2024  1:25 PM C19 PRE SCREEN [facility] LAB [facility]   11/20/2024  1:30 PM HEART 2 BH [facility] PAT [facility] PAT [facility]   11/21/2024  8:00 AM BH [facility] OPCV 1 [facility] O[facility]V [facility]   12/5/2024 11:00 AM Niya Mendoza APRN [facility]   1/6/2025  8:30 AM [facility] NA ECHO [facility]ARD [facility] NA   1/6/2025  9:45 AM Travis Connor MD [facility]   9/24/2025 10:15 AM [facility] NA ECHO [facility] NA CARD CTR NA   9/24/2025 11:45 AM Travis Connor MD [facility] NA       Additional Instructions for the Follow-ups that You Need to Schedule       [facility]ulatory Ref[facility])   As directed      Face to Face Visit Date: 11/12/2024   Follow-up provider for Plan of Care?: I treated  the patient in an acute care facility and will not continue treatment after discharge.   Follow-up provider: XXX [[id number]]   Reason/Clinical Findings: debility   Describe mobility limitations that make leaving home difficult: debility   Nursing/Therapeutic Services Requested: Skilled Nursing   Skilled nursing orders: Medication education   Frequency: 1 Week 1                Time spent on Discharge including face to face service:  60 minutes    Signature: Electronically signed by Eusebio Montenegro MD, 11/12/24, 15:45 EST.  [facility] Hospitalist Team

## 2024-11-12 NOTE — CASE MANAGEMENT/SOCIAL WORK
Case Management Discharge Note      Final Note: Home current with [facility], LETHA order placed.    Provided Post Acute Provider List?: N/A  Provided Post Acute Provider Quality & Resource List?: N/A    Selected Continued Care - Discharged on 11/12/2024 Admission date: 11/6/2024 - Discharge disposition: Home or Self Care        Home Medical Care Coordination complete.      Service Provider Services Address Phone Fax Patient Preferred    [facility] Home Nursing [address] [place] [address] [phone] --                    Selected Continued Care - Prior Encounters Includes continued care and service providers with selected services from prior encounters from 8/8/2024 to 11/12/2024      Discharged on 10/23/2024 Admission date: 10/8/2024 - Discharge disposition: Home-Health Care Svc      Home Medical Care       Service Provider Services Address Phone Fax Patient Preferred     [facility] [address] [place] [address] [phone] --                   Transportation Services  Private: Car    Final Discharge Disposition Code: 01 - home or self-care   Statement Selected

## 2024-11-13 ENCOUNTER — HOME CARE VISIT (OUTPATIENT)
Dept: HOME HEALTH SERVICES | Facility: HOME HEALTHCARE | Age: 74
End: 2024-11-13
Payer: MEDICARE

## 2024-11-13 LAB
CV ZIO BASELINE AVG BPM: 73 BPM
CV ZIO BASELINE BPM HIGH: 250 BPM
CV ZIO BASELINE BPM LOW: 46 BPM
CV ZIO DEVICE ANALYSIS TIME: NORMAL
CV ZIO ECT SVE COUNT: NORMAL EPISODES
CV ZIO ECT SVE CPLT COUNT: 4212 EPISODES
CV ZIO ECT SVE CPLT FREQ: NORMAL
CV ZIO ECT SVE FREQ: NORMAL
CV ZIO ECT SVE TPLT COUNT: 728 EPISODES
CV ZIO ECT SVE TPLT FREQ: NORMAL
CV ZIO ECT VE COUNT: 40 EPISODES
CV ZIO ECT VE CPLT COUNT: 7 EPISODES
CV ZIO ECT VE CPLT FREQ: NORMAL
CV ZIO ECT VE FREQ: NORMAL
CV ZIO ECT VE TPLT COUNT: 0 EPISODES
CV ZIO ECT VE TPLT FREQ: 0
CV ZIO ECTOPIC SVE COUPLET RAW PERCENT: 1.23 %
CV ZIO ECTOPIC SVE ISOLATED PERCENT: 7.14 %
CV ZIO ECTOPIC SVE TRIPLET RAW PERCENT: 0.32 %
CV ZIO ECTOPIC VE COUPLET RAW PERCENT: 0 %
CV ZIO ECTOPIC VE ISOLATED PERCENT: 0.01 %
CV ZIO ECTOPIC VE TRIPLET RAW PERCENT: 0 %
CV ZIO ENROLLMENT END: NORMAL
CV ZIO ENROLLMENT START: NORMAL
CV ZIO PATIENT EVENTS DIARIES: 0
CV ZIO PATIENT EVENTS TRIGGERS: 0
CV ZIO PAUSE COUNT: 0
CV ZIO PRESCRIPTION STATUS: NORMAL
CV ZIO SVT AVG BPM: 160 BPM
CV ZIO SVT BPM HIGH: 250 BPM
CV ZIO SVT BPM LOW: 92 BPM
CV ZIO SVT COUNT: 1238
CV ZIO SVT F EPI AVG BPM: 209 BPM
CV ZIO SVT F EPI BEATS: 17 BEATS
CV ZIO SVT F EPI BPM HIGH: 250 BPM
CV ZIO SVT F EPI BPM LOW: 132 BPM
CV ZIO SVT F EPI DUR: 5.1 SEC
CV ZIO SVT F EPI END: NORMAL
CV ZIO SVT F EPI START: NORMAL
CV ZIO SVT L EPI AVG BPM: 185 BPM
CV ZIO SVT L EPI BEATS: 110 BEATS
CV ZIO SVT L EPI BPM HIGH: 207 BPM
CV ZIO SVT L EPI BPM LOW: 169 BPM
CV ZIO SVT L EPI DUR: 35.9 SEC
CV ZIO SVT L EPI END: NORMAL
CV ZIO SVT L EPI START: NORMAL
CV ZIO TOTAL  ENROLLMENT PERIOD: NORMAL
CV ZIO VT COUNT: 0
FUNGUS WND CULT: NORMAL
MYCOBACTERIUM SPEC CULT: NORMAL
NIGHT BLUE STAIN TISS: NORMAL
O+P SPEC MICRO: NORMAL
O+P STL CONC: NORMAL

## 2024-11-13 PROCEDURE — G0299 HHS/HOSPICE OF RN EA 15 MIN: HCPCS

## 2024-11-13 PROCEDURE — 93228 REMOTE 30 DAY ECG REV/REPORT: CPT | Performed by: INTERNAL MEDICINE

## 2024-11-13 NOTE — HOME HEALTH
Resumption of Care Note: Pt. was inpatient at [facility] and returned home on 11/12/24 for flare up of Colitis r/t Crohn's dz., pt. was treated with steroids and released home, pt. to have valve replaced next week at [facility].  Pt. has new wound care orders for under left breast and sacral spine.  Pt's grand-daughters live with her and are primary caregivers, they were educated on wound care. Pt. aware and in agreeance with home health services, SN discussed with pt. importance of not missing visits for home health and compliance.    Reason for hospitalization/new problems: Colitis, hyponatremia, electrolyte imbalance    [facility] [place] Findings: as above    New/changed medications: Prednisone    New/changed orders: wound care orders, see care plan    Educated on Emergency Plan, steps to take prior to going to the ER and when to Call [facility]:  patient and grand-daughters aware    Plan/Focus of Care and Skilled need: Hypo-osmolality and hyponatremia

## 2024-11-13 NOTE — Clinical Note
Resumption of Care Note: Pt. was inpatient at [facility] and returned home on 11/12/24 for flare up of Colitis r/t Crohn's dz., pt. was treated with steroids and released home, pt. to have valve replaced next week at [facility].  Pt. has new wound care orders for under left breast and sacral spine.  Pt's grand-daughters live with her and are primary caregivers, they were educated on wound care. Pt. aware and in agreeance with home health services, SN discussed with pt. importance of not missing visits for home health and compliance.    Reason for hospitalization/new problems: Colitis, hyponatremia, electrolyte imbalance    [facility] [place] Findings: as above    New/changed medications: Prednisone    New/changed orders: wound care orders, see care plan    Educated on Emergency Plan, steps to take prior to going to the ER and when to Call [facility]:  patient and grand-daughters aware    Plan/Focus of Care and Skilled need: Hypo-osmolality and hyponatremia

## 2024-11-13 NOTE — Clinical Note
Drug-Drug  <jscript:void(0)>  Drug-Drug: pantoprazole and clopidogrel   Coadministration of pantoprazole and clopidogrel may increase the risk of major adverse cardiovascular events.   Details Major   pantoprazole (PROTONIX) 20 MG EC tablet     clopidogrel (PLAVIX) 75 MG tablet   Drug-Drug  <jscript:void(0)>  Drug-Drug: aspirin and methotrexate   aspirin may increase plasma concentrations of methotrexate with an increased risk of bone marrow and hepatic toxicity.   Details Major   aspirin 81 MG EC tablet     methotrexate 2.5 MG tablet   Drug-Drug  <jscript:void(0)>  Drug-Drug: methotrexate and pantoprazole   Proton pump inhibitors may decrease the renal elimination of methotrexate and potentially increase toxicity related to methotrexate, especially in patients receiving high-dose intravenous methotrexate for the treatment of cancer.   Details Major   methotrexate 2.5 MG tablet     pantoprazole (PROTONIX) 20 MG EC tablet

## 2024-11-14 ENCOUNTER — HOME CARE VISIT (OUTPATIENT)
Dept: HOME HEALTH SERVICES | Facility: HOME HEALTHCARE | Age: 74
End: 2024-11-14
Payer: MEDICARE

## 2024-11-14 VITALS
DIASTOLIC BLOOD PRESSURE: 60 MMHG | TEMPERATURE: 98 F | SYSTOLIC BLOOD PRESSURE: 110 MMHG | OXYGEN SATURATION: 98 % | HEART RATE: 88 BPM

## 2024-11-14 VITALS
HEIGHT: 64 IN | TEMPERATURE: 97.9 F | WEIGHT: 121 LBS | DIASTOLIC BLOOD PRESSURE: 70 MMHG | RESPIRATION RATE: 19 BRPM | OXYGEN SATURATION: 93 % | HEART RATE: 86 BPM | BODY MASS INDEX: 20.66 KG/M2 | SYSTOLIC BLOOD PRESSURE: 133 MMHG

## 2024-11-14 PROCEDURE — G0151 HHCP-SERV OF PT,EA 15 MIN: HCPCS

## 2024-11-15 ENCOUNTER — HOME CARE VISIT (OUTPATIENT)
Dept: HOME HEALTH SERVICES | Facility: HOME HEALTHCARE | Age: 74
End: 2024-11-15
Payer: MEDICARE

## 2024-11-15 PROCEDURE — G0152 HHCP-SERV OF OT,EA 15 MIN: HCPCS

## 2024-11-17 VITALS — SYSTOLIC BLOOD PRESSURE: 110 MMHG | DIASTOLIC BLOOD PRESSURE: 60 MMHG

## 2024-11-18 NOTE — HOME HEALTH
Pt is a 75 yo female who lives in a 1 story home with family members.   Pt recently hospitalized secondary to acute colitis      PLOF pt independent with feeding grooming and toileting. Pt required MIN assist with bathing and dressing and total assist with IADLs.     CLOF pt independent with feeding grooming and toileting. Pt requires MAX assist with bathing and dressing and total assist with IADLs.     Skilled OT for ther ex and adl training. Pt and therapist in agreement with goals and poc.     Pt denies any falls or med changes

## 2024-11-19 ENCOUNTER — HOSPITAL ENCOUNTER (INPATIENT)
Facility: HOSPITAL | Age: 74
LOS: 15 days | Discharge: NURSING, REHAB OR LONG-TERM CARE | DRG: 329 | End: 2024-12-04
Attending: EMERGENCY MEDICINE | Admitting: INTERNAL MEDICINE
Payer: MEDICARE

## 2024-11-19 ENCOUNTER — READMISSION MANAGEMENT (OUTPATIENT)
Dept: CALL CENTER | Facility: HOSPITAL | Age: 74
End: 2024-11-19
Payer: MEDICARE

## 2024-11-19 ENCOUNTER — HOME CARE VISIT (OUTPATIENT)
Dept: HOME HEALTH SERVICES | Facility: HOME HEALTHCARE | Age: 74
End: 2024-11-19
Payer: MEDICARE

## 2024-11-19 ENCOUNTER — ANESTHESIA EVENT (OUTPATIENT)
Dept: PERIOP | Facility: HOSPITAL | Age: 74
End: 2024-11-19
Payer: MEDICARE

## 2024-11-19 ENCOUNTER — APPOINTMENT (OUTPATIENT)
Dept: GENERAL RADIOLOGY | Facility: HOSPITAL | Age: 74
DRG: 329 | End: 2024-11-19
Payer: MEDICARE

## 2024-11-19 DIAGNOSIS — K50.90 CROHN DISEASE: ICD-10-CM

## 2024-11-19 DIAGNOSIS — Z98.890 S/P MITRAL VALVE CLIP IMPLANTATION: ICD-10-CM

## 2024-11-19 DIAGNOSIS — K92.2 GASTROINTESTINAL HEMORRHAGE, UNSPECIFIED GASTROINTESTINAL HEMORRHAGE TYPE: Primary | ICD-10-CM

## 2024-11-19 DIAGNOSIS — K57.30 DVRTCLOS OF LG INT W/O PERFORATION OR ABSCESS W/O BLEEDING: ICD-10-CM

## 2024-11-19 DIAGNOSIS — I34.0 SEVERE MITRAL REGURGITATION: ICD-10-CM

## 2024-11-19 DIAGNOSIS — Z95.818 S/P MITRAL VALVE CLIP IMPLANTATION: ICD-10-CM

## 2024-11-19 DIAGNOSIS — D64.9 ANEMIA, UNSPECIFIED TYPE: ICD-10-CM

## 2024-11-19 DIAGNOSIS — R07.9 CHEST PAIN, UNSPECIFIED TYPE: ICD-10-CM

## 2024-11-19 DIAGNOSIS — I50.32 CHRONIC HEART FAILURE WITH PRESERVED EJECTION FRACTION (HFPEF): ICD-10-CM

## 2024-11-19 LAB
ABO GROUP BLD: NORMAL
ALBUMIN SERPL-MCNC: 2.9 G/DL (ref 3.5–5.2)
ALBUMIN/GLOB SERPL: 1.3 G/DL
ALP SERPL-CCNC: 64 U/L (ref 39–117)
ALT SERPL W P-5'-P-CCNC: 35 U/L (ref 1–33)
ANION GAP SERPL CALCULATED.3IONS-SCNC: 9.8 MMOL/L (ref 5–15)
APTT PPP: 21.9 SECONDS (ref 22.7–35.4)
AST SERPL-CCNC: 19 U/L (ref 1–32)
BASOPHILS # BLD AUTO: 0.01 10*3/MM3 (ref 0–0.2)
BASOPHILS NFR BLD AUTO: 0.1 % (ref 0–1.5)
BILIRUB SERPL-MCNC: 0.3 MG/DL (ref 0–1.2)
BLD GP AB SCN SERPL QL: NEGATIVE
BUN SERPL-MCNC: 28 MG/DL (ref 8–23)
BUN/CREAT SERPL: 37.8 (ref 7–25)
CALCIUM SPEC-SCNC: 8.7 MG/DL (ref 8.6–10.5)
CHLORIDE SERPL-SCNC: 106 MMOL/L (ref 98–107)
CO2 SERPL-SCNC: 22.2 MMOL/L (ref 22–29)
CREAT SERPL-MCNC: 0.74 MG/DL (ref 0.57–1)
DEPRECATED RDW RBC AUTO: 66 FL (ref 37–54)
EGFRCR SERPLBLD CKD-EPI 2021: 85 ML/MIN/1.73
EOSINOPHIL # BLD AUTO: 0 10*3/MM3 (ref 0–0.4)
EOSINOPHIL NFR BLD AUTO: 0 % (ref 0.3–6.2)
ERYTHROCYTE [DISTWIDTH] IN BLOOD BY AUTOMATED COUNT: 18.1 % (ref 12.3–15.4)
GEN 5 1HR TROPONIN T REFLEX: 17 NG/L
GLOBULIN UR ELPH-MCNC: 2.2 GM/DL
GLUCOSE SERPL-MCNC: 77 MG/DL (ref 65–99)
HCT VFR BLD AUTO: 18.9 % (ref 34–46.6)
HCT VFR BLD AUTO: 23.5 % (ref 34–46.6)
HGB BLD-MCNC: 5.7 G/DL (ref 12–15.9)
HGB BLD-MCNC: 7.3 G/DL (ref 12–15.9)
HOLD SPECIMEN: NORMAL
IMM GRANULOCYTES # BLD AUTO: 0.15 10*3/MM3 (ref 0–0.05)
IMM GRANULOCYTES NFR BLD AUTO: 0.9 % (ref 0–0.5)
INR PPP: 0.99 (ref 0.9–1.1)
LIPASE SERPL-CCNC: 19 U/L (ref 13–60)
LYMPHOCYTES # BLD AUTO: 2.76 10*3/MM3 (ref 0.7–3.1)
LYMPHOCYTES NFR BLD AUTO: 16.3 % (ref 19.6–45.3)
MCH RBC QN AUTO: 30.2 PG (ref 26.6–33)
MCHC RBC AUTO-ENTMCNC: 30.2 G/DL (ref 31.5–35.7)
MCV RBC AUTO: 100 FL (ref 79–97)
MONOCYTES # BLD AUTO: 1.45 10*3/MM3 (ref 0.1–0.9)
MONOCYTES NFR BLD AUTO: 8.6 % (ref 5–12)
NEUTROPHILS NFR BLD AUTO: 12.56 10*3/MM3 (ref 1.7–7)
NEUTROPHILS NFR BLD AUTO: 74.1 % (ref 42.7–76)
NRBC BLD AUTO-RTO: 0 /100 WBC (ref 0–0.2)
PLATELET # BLD AUTO: 435 10*3/MM3 (ref 140–450)
PMV BLD AUTO: 10 FL (ref 6–12)
POTASSIUM SERPL-SCNC: 4.3 MMOL/L (ref 3.5–5.2)
PROT SERPL-MCNC: 5.1 G/DL (ref 6–8.5)
PROTHROMBIN TIME: 13.2 SECONDS (ref 11.7–14.2)
QT INTERVAL: 406 MS
QTC INTERVAL: 429 MS
RBC # BLD AUTO: 1.89 10*6/MM3 (ref 3.77–5.28)
RH BLD: POSITIVE
SODIUM SERPL-SCNC: 138 MMOL/L (ref 136–145)
T&S EXPIRATION DATE: NORMAL
TROPONIN T NUMERIC DELTA: -4 NG/L
TROPONIN T SERPL HS-MCNC: 21 NG/L
WBC NRBC COR # BLD AUTO: 16.93 10*3/MM3 (ref 3.4–10.8)

## 2024-11-19 PROCEDURE — 93005 ELECTROCARDIOGRAM TRACING: CPT

## 2024-11-19 PROCEDURE — P9016 RBC LEUKOCYTES REDUCED: HCPCS

## 2024-11-19 PROCEDURE — 86923 COMPATIBILITY TEST ELECTRIC: CPT

## 2024-11-19 PROCEDURE — 99285 EMERGENCY DEPT VISIT HI MDM: CPT

## 2024-11-19 PROCEDURE — 93005 ELECTROCARDIOGRAM TRACING: CPT | Performed by: EMERGENCY MEDICINE

## 2024-11-19 PROCEDURE — 80053 COMPREHEN METABOLIC PANEL: CPT | Performed by: EMERGENCY MEDICINE

## 2024-11-19 PROCEDURE — 85610 PROTHROMBIN TIME: CPT | Performed by: EMERGENCY MEDICINE

## 2024-11-19 PROCEDURE — 83690 ASSAY OF LIPASE: CPT | Performed by: EMERGENCY MEDICINE

## 2024-11-19 PROCEDURE — 93010 ELECTROCARDIOGRAM REPORT: CPT | Performed by: STUDENT IN AN ORGANIZED HEALTH CARE EDUCATION/TRAINING PROGRAM

## 2024-11-19 PROCEDURE — 36415 COLL VENOUS BLD VENIPUNCTURE: CPT

## 2024-11-19 PROCEDURE — 84484 ASSAY OF TROPONIN QUANT: CPT | Performed by: EMERGENCY MEDICINE

## 2024-11-19 PROCEDURE — 85730 THROMBOPLASTIN TIME PARTIAL: CPT | Performed by: EMERGENCY MEDICINE

## 2024-11-19 PROCEDURE — 85014 HEMATOCRIT: CPT | Performed by: INTERNAL MEDICINE

## 2024-11-19 PROCEDURE — 86900 BLOOD TYPING SEROLOGIC ABO: CPT

## 2024-11-19 PROCEDURE — 71045 X-RAY EXAM CHEST 1 VIEW: CPT

## 2024-11-19 PROCEDURE — 85025 COMPLETE CBC W/AUTO DIFF WBC: CPT | Performed by: EMERGENCY MEDICINE

## 2024-11-19 PROCEDURE — 86900 BLOOD TYPING SEROLOGIC ABO: CPT | Performed by: EMERGENCY MEDICINE

## 2024-11-19 PROCEDURE — 86850 RBC ANTIBODY SCREEN: CPT | Performed by: EMERGENCY MEDICINE

## 2024-11-19 PROCEDURE — 85018 HEMOGLOBIN: CPT | Performed by: INTERNAL MEDICINE

## 2024-11-19 PROCEDURE — 36430 TRANSFUSION BLD/BLD COMPNT: CPT

## 2024-11-19 PROCEDURE — 25010000002 METHYLPREDNISOLONE PER 40 MG: Performed by: INTERNAL MEDICINE

## 2024-11-19 PROCEDURE — 86901 BLOOD TYPING SEROLOGIC RH(D): CPT | Performed by: EMERGENCY MEDICINE

## 2024-11-19 RX ORDER — METHYLPREDNISOLONE SODIUM SUCCINATE 40 MG/ML
20 INJECTION, POWDER, LYOPHILIZED, FOR SOLUTION INTRAMUSCULAR; INTRAVENOUS EVERY 8 HOURS
Status: DISCONTINUED | OUTPATIENT
Start: 2024-11-19 | End: 2024-11-29

## 2024-11-19 RX ORDER — SODIUM CHLORIDE 0.9 % (FLUSH) 0.9 %
10 SYRINGE (ML) INJECTION AS NEEDED
Status: DISCONTINUED | OUTPATIENT
Start: 2024-11-19 | End: 2024-12-04 | Stop reason: HOSPADM

## 2024-11-19 RX ORDER — AMOXICILLIN 250 MG
2 CAPSULE ORAL 2 TIMES DAILY PRN
Status: DISCONTINUED | OUTPATIENT
Start: 2024-11-19 | End: 2024-12-04 | Stop reason: HOSPADM

## 2024-11-19 RX ORDER — POLYETHYLENE GLYCOL 3350 17 G/17G
17 POWDER, FOR SOLUTION ORAL DAILY PRN
Status: DISCONTINUED | OUTPATIENT
Start: 2024-11-19 | End: 2024-12-04 | Stop reason: HOSPADM

## 2024-11-19 RX ORDER — ATORVASTATIN CALCIUM 40 MG/1
40 TABLET, FILM COATED ORAL NIGHTLY
Status: DISCONTINUED | OUTPATIENT
Start: 2024-11-19 | End: 2024-12-04 | Stop reason: HOSPADM

## 2024-11-19 RX ORDER — SODIUM CHLORIDE 0.9 % (FLUSH) 0.9 %
10 SYRINGE (ML) INJECTION EVERY 12 HOURS SCHEDULED
Status: DISCONTINUED | OUTPATIENT
Start: 2024-11-19 | End: 2024-12-04 | Stop reason: HOSPADM

## 2024-11-19 RX ORDER — NITROGLYCERIN 0.4 MG/1
0.4 TABLET SUBLINGUAL
Status: DISCONTINUED | OUTPATIENT
Start: 2024-11-19 | End: 2024-11-21

## 2024-11-19 RX ORDER — BISACODYL 5 MG/1
5 TABLET, DELAYED RELEASE ORAL DAILY PRN
Status: DISCONTINUED | OUTPATIENT
Start: 2024-11-19 | End: 2024-12-04 | Stop reason: HOSPADM

## 2024-11-19 RX ORDER — METOPROLOL TARTRATE 50 MG
50 TABLET ORAL 2 TIMES DAILY
COMMUNITY
End: 2024-12-04 | Stop reason: HOSPADM

## 2024-11-19 RX ORDER — LEVOTHYROXINE SODIUM 50 UG/1
50 TABLET ORAL DAILY
Status: DISCONTINUED | OUTPATIENT
Start: 2024-11-19 | End: 2024-11-29

## 2024-11-19 RX ORDER — FOLIC ACID 1 MG/1
1000 TABLET ORAL DAILY
Status: DISCONTINUED | OUTPATIENT
Start: 2024-11-19 | End: 2024-12-04 | Stop reason: HOSPADM

## 2024-11-19 RX ORDER — AMLODIPINE BESYLATE 10 MG/1
10 TABLET ORAL DAILY
COMMUNITY
End: 2024-12-17 | Stop reason: HOSPADM

## 2024-11-19 RX ORDER — PANTOPRAZOLE SODIUM 40 MG/1
40 TABLET, DELAYED RELEASE ORAL DAILY
Status: DISCONTINUED | OUTPATIENT
Start: 2024-11-19 | End: 2024-12-04 | Stop reason: HOSPADM

## 2024-11-19 RX ORDER — BISACODYL 10 MG
10 SUPPOSITORY, RECTAL RECTAL DAILY PRN
Status: DISCONTINUED | OUTPATIENT
Start: 2024-11-19 | End: 2024-12-04 | Stop reason: HOSPADM

## 2024-11-19 RX ORDER — MIDODRINE HYDROCHLORIDE 5 MG/1
10 TABLET ORAL
Status: DISCONTINUED | OUTPATIENT
Start: 2024-11-19 | End: 2024-11-25

## 2024-11-19 RX ORDER — METOPROLOL TARTRATE 25 MG/1
25 TABLET, FILM COATED ORAL 2 TIMES DAILY
Status: DISCONTINUED | OUTPATIENT
Start: 2024-11-19 | End: 2024-11-22

## 2024-11-19 RX ORDER — SODIUM CHLORIDE 9 MG/ML
40 INJECTION, SOLUTION INTRAVENOUS AS NEEDED
Status: DISCONTINUED | OUTPATIENT
Start: 2024-11-19 | End: 2024-12-04 | Stop reason: HOSPADM

## 2024-11-19 RX ADMIN — Medication 10 ML: at 21:55

## 2024-11-19 RX ADMIN — MIDODRINE HYDROCHLORIDE 10 MG: 5 TABLET ORAL at 17:53

## 2024-11-19 RX ADMIN — SERTRALINE HYDROCHLORIDE 50 MG: 50 TABLET ORAL at 17:53

## 2024-11-19 RX ADMIN — METHYLPREDNISOLONE SODIUM SUCCINATE 20 MG: 40 INJECTION, POWDER, FOR SOLUTION INTRAMUSCULAR; INTRAVENOUS at 17:52

## 2024-11-19 RX ADMIN — ATORVASTATIN CALCIUM 40 MG: 40 TABLET, FILM COATED ORAL at 21:55

## 2024-11-19 RX ADMIN — FOLIC ACID 1000 MCG: 1 TABLET ORAL at 17:53

## 2024-11-19 RX ADMIN — METOPROLOL TARTRATE 25 MG: 25 TABLET, FILM COATED ORAL at 21:55

## 2024-11-19 RX ADMIN — PANTOPRAZOLE SODIUM 40 MG: 40 TABLET, DELAYED RELEASE ORAL at 17:53

## 2024-11-19 RX ADMIN — LEVOTHYROXINE SODIUM 50 MCG: 0.05 TABLET ORAL at 17:53

## 2024-11-19 NOTE — PLAN OF CARE
Goal Outcome Evaluation:  Plan of Care Reviewed With: patient, family                Problem: Adult Inpatient Plan of Care  Goal: Plan of Care Review  Outcome: Progressing  Flowsheets (Taken 11/19/2024 1738)  Plan of Care Reviewed With:   patient   family  Goal: Patient-Specific Goal (Individualized)  Outcome: Progressing  Goal: Absence of Hospital-Acquired Illness or Injury  Outcome: Progressing  Intervention: Identify and Manage Fall Risk  Recent Flowsheet Documentation  Taken 11/19/2024 1600 by Maricel Woods RN  Safety Promotion/Fall Prevention:   activity supervised   assistive device/personal items within reach   clutter free environment maintained   fall prevention program maintained   nonskid shoes/slippers when out of bed   room organization consistent   safety round/check completed  Intervention: Prevent Skin Injury  Recent Flowsheet Documentation  Taken 11/19/2024 1600 by Maricel Woods RN  Body Position:   position changed independently   supine   weight shifting  Skin Protection: pulse oximeter probe site changed  Intervention: Prevent and Manage VTE (Venous Thromboembolism) Risk  Recent Flowsheet Documentation  Taken 11/19/2024 1600 by Maricel Woods RN  VTE Prevention/Management:   bilateral   SCDs (sequential compression devices) off  Goal: Optimal Comfort and Wellbeing  Outcome: Progressing  Intervention: Provide Person-Centered Care  Recent Flowsheet Documentation  Taken 11/19/2024 1600 by Maricel Woods RN  Trust Relationship/Rapport:   care explained   choices provided   emotional support provided   empathic listening provided   questions answered   questions encouraged   reassurance provided   thoughts/feelings acknowledged  Goal: Readiness for Transition of Care  Outcome: Progressing  Intervention: Mutually Develop Transition Plan  Recent Flowsheet Documentation  Taken 11/19/2024 1714 by Maricel Woods RN  Transportation Anticipated: family or friend will provide  Patient/Family  Anticipated Services at Transition: none  Patient/Family Anticipates Transition to: home with family  Taken 11/19/2024 1713 by Maricel Woods, RN  Equipment Currently Used at Home:   oxygen   pulse ox   commode      Patient a/ox4, from home where she lives with family for chest pain x 4 days, hgb was 5.7 transfusing blood, having bloody BM and states she was here last week for same and then sent home. Coccyx wound open to air, wound care consulted, reviewed plan of care with patient and family. Call light in reach

## 2024-11-19 NOTE — ED NOTES
Pt brought in by granddaughter for chest pain. Pt reports chest pain for 4 days. Granddaughter states pt did not want to come until today. Pt states pain is constant & she has history of PCI. Pt A&0 x2. Pt granddaughter also reports bright red blood & said pt recently had bleed. Pt placed in gown, applied to monitor. VS stable at this time. Family at bedside.    no

## 2024-11-19 NOTE — H&P
[facility] Medicine Services  History & Physical    Patient Name: Maryann Gaitan  : 1950  MRN: [id number]  Primary Care Physician:  Eduard Little MD  Date of admission: 2024  Date and Time of Service: 2024 at 1630    Subjective      Chief Complaint: Abdominal pain, rectal bleeding, shortness of breath    History of Present Illness: Maryann Gaitan is a 74 y.o. female with a CMH of close disease, CAD, COPD, hypertension, hypothyroidism who presents to the hospital with complaints of abdominal pain, chest pain, shortness of breath and rectal bleeding.  The patient was recently admitted to the hospital on  for mild rectal bleeding and was found to have an acute Crohn's exacerbation.  She was discharged on a prolonged steroid taper however she states that she still has some abdominal pain and was not feeling well on discharge.  She did have a small drop in her hemoglobin prior to discharge during her previous hospital stay but her hemoglobin was 8.4 on the day of discharge.  Over the last few days she has continued to have worsening lower abdominal pain with increased chest pain and shortness of breath and much more rectal bleeding than before.  Of note, the patient did have LHC in 2024 with PCI to ostial lesion and was placed on dual antiplatelet therapy with aspirin and Plavix.  This was continued from her previous hospitalization despite her mild rectal bleeding, given the increased risk for stent thrombosis with sudden discontinuation of DAPT.  She denies any headaches but does complain of dizziness, lightheadedness and nausea.      Review of Systems  10 point ROS negative except for above    Personal History     Past Medical History:   Diagnosis Date    Arthritis     CAD (coronary artery disease)     COPD (chronic obstructive pulmonary disease)     Hypertension     Mood disorder     Thyroid disease        Past Surgical History:   Procedure Laterality Date     BRONCHOSCOPY N/A 10/16/2024    Procedure: BRONCHOSCOPY;  Surgeon: Gallo Pope MD;  Location: [facility] ENDOSCOPY;  Service: Pulmonary;  Laterality: N/A;    CARDIAC CATHETERIZATION N/A 10/15/2024    Procedure: Left Heart Cath, possible pci;  Surgeon: Travis Connor MD;  Location: [facility] CATH INVASIVE LOCATION;  Service: Cardiovascular;  Laterality: N/A;    CARDIAC CATHETERIZATION N/A 10/22/2024    Procedure: Laser Coronary Atherectomy;  Surgeon: Travis Connor MD;  Location: [facility] CATH INVASIVE LOCATION;  Service: Cardiovascular;  Laterality: N/A;    COLONOSCOPY N/A 10/12/2024    Procedure: COLONOSCOPY WITH BIOPSY AND WIRE GUIDED BALLOON DILATION OF TERMINAL ILEUM;  Surgeon: Rob Strong MD;  Location: [facility] ENDOSCOPY;  Service: Gastroenterology;  Laterality: N/A;  Colitis, crohns of terminal ileum, right colon ulcers, diverticulosis, hemorroids    ENDOSCOPY N/A 10/12/2024    Procedure: ESOPHAGOGASTRODUODENOSCOPY WITH BIOPSY X 2 AREA;  Surgeon: Rob Strong MD;  Location: [facility] ENDOSCOPY;  Service: Gastroenterology;  Laterality: N/A;  Chronic gastritis, HH    HYSTERECTOMY      LEFT HEART CATH         Family History: family history includes Dementia in her mother; Heart disease in her father and mother; Hypertension in her father; Stroke in her mother. Otherwise pertinent FHx was reviewed and not pertinent to current issue.    Social History:  reports that she has quit smoking. Her smoking use included cigarettes. She has never used smokeless tobacco. She reports that she does not drink alcohol and does not use drugs.    Home Medications:  Prior to Admission Medications       Prescriptions Last Dose Informant Patient Reported? Taking?    Adalimumab (Humira, 2 Pen,) 40 MG/0.4ML Pen-injector Kit Past Week  Yes Yes    Inject 40 mg under the skin into the appropriate area as directed 1 (One) Time Per Week. Saturdays   Indications: Rheumatoid Arthritis    aspirin 81 MG  EC tablet   No Yes    Take 1 tablet by mouth Daily for 30 days. Indications: Disease involving Lipid Deposits in the Arteries    atorvastatin (LIPITOR) 40 MG tablet   No Yes    Take 1 tablet by mouth Every Night for 30 days.    methotrexate 2.5 MG tablet Past Week  Yes Yes    Take 6 tablets by mouth 1 (One) Time Per Week. Saturdays   Indications: Non-oncologic    midodrine (PROAMATINE) 10 MG tablet   No Yes    Take 1 tablet by mouth 3 (Three) Times a Day Before Meals for 30 days.    pantoprazole (PROTONIX) 20 MG EC tablet   Yes Yes    Take 1 tablet by mouth Daily. Indications: Heartburn    sertraline (ZOLOFT) 50 MG tablet   Yes Yes    Take 1 tablet by mouth Daily. Indications: Major Depressive Disorder    spironolactone (ALDACTONE) 25 MG tablet   Yes Yes    Take 1 tablet by mouth Daily. Indications: Edema    upadacitinib ER (Rinvoq) 45 MG tablet sustained-release 24 hour extended release tablet   Yes Yes    Take 1 tablet by mouth Daily. Indications: Rheumatoid Arthritis    albuterol (PROVENTIL) (2.5 MG/3ML) 0.083% nebulizer solution   Yes Yes    Take 2.5 mg by nebulization Every 6 (Six) Hours As Needed for Wheezing. Indications: Spasm of Lung Air Passages    amLODIPine (NORVASC) 10 MG tablet   Yes Yes    Take 1 tablet by mouth Daily.    cholecalciferol (VITAMIN D3) 1.25 MG (01753 UT) capsule Past Week  Yes Yes    Take 1 capsule by mouth 2 (Two) Times a Week. Wed, Sat  Indications: Vitamin D Deficiency    clopidogrel (PLAVIX) 75 MG tablet   No Yes    Take 1 tablet by mouth Daily for 30 days.    diclofenac (VOLTAREN) 50 MG EC tablet   Yes Yes    Take 1 tablet by mouth 2 (Two) Times a Day.    folic acid (FOLVITE) 1 MG tablet   Yes Yes    Take 1 tablet by mouth Daily. Indications: Anemia From Inadequate Folic Acid    levothyroxine (SYNTHROID, LEVOTHROID) 50 MCG tablet   Yes Yes    Take 1 tablet by mouth Daily. Indications: Underactive Thyroid    Melatonin (Melatonin Extra Strength) 10 MG tablet   Yes Yes    Take 1  tablet by mouth As Needed. Indications: Trouble Sleeping    metoprolol tartrate (LOPRESSOR) 50 MG tablet   Yes Yes    Take 1 tablet by mouth 2 (Two) Times a Day.    predniSONE (DELTASONE) 10 MG tablet   No Yes    Take 4 tablets by mouth Daily for 7 days, THEN 3.5 tablets Daily for 7 days, THEN 3 tablets Daily for 7 days, THEN 2.5 tablets Daily for 7 days, THEN 2 tablets Daily for 7 days, THEN 1.5 tablets Daily for 7 days, THEN 1 tablet Daily for 7 days, THEN 0.5 tablets Daily for 7 days. Indications: Crohn's Disease              Allergies:  Allergies   Allergen Reactions    Codeine Hives    Penicillin G Sodium Hives       Objective      Vitals:   Temp:  [97.7 °F (36.5 °C)-98.3 °F (36.8 °C)] 98 °F (36.7 °C)  Heart Rate:  [58-69] 58  Resp:  [16-20] 16  BP: (111-134)/(55-68) 132/63  Body mass index is 17.13 kg/m².  Physical Exam  General Appearance:  Alert, cooperative, no distress, appears stated age  Head:  Normocephalic, without obvious abnormality, atraumatic  Eyes:  PERRL, conjunctiva/corneas clear, EOM's intact, fundi benign, both eyes  Ears:  Normal TM's and external ear canals, both ears  Nose: Nares normal, septum midline, mucosa normal, no drainage or sinus tenderness  Throat: Lips, mucosa, and tongue normal; teeth and gums normal  Neck: Supple, symmetrical, trachea midline, no adenopathy, thyroid: not enlarged, symmetric, no tenderness/mass/nodules, no carotid bruit or JVD  Lungs:   Clear to auscultation bilaterally, respirations unlabored  Heart:  Regular rate and rhythm, S1, S2 normal, no murmur, rub or gallop  Abdomen:  Soft, non-tender, bowel sounds active all four quadrants,  no masses, no organomegaly  Extremities: Extremities normal, atraumatic, no cyanosis or edema  Pulses: 2+ and symmetric  Skin: Skin color, texture, turgor normal, no rashes or lesions  Neurologic: Normal      Diagnostic Data:  Lab Results (last 24 hours)       Procedure Component Value Units Date/Time    High Sensitivity Troponin T  2Hr [[id number]]  (Abnormal) Collected: 11/19/24 1503    Specimen: Blood Updated: 11/19/24 1550     HS Troponin T 17 ng/L      Troponin T Delta -4 ng/L     Narrative:      High Sensitive Troponin T Reference Range:  <14.0 ng/L- Negative Female for AMI  <22.0 ng/L- Negative Male for AMI  >=14 - Abnormal Female indicating possible myocardial injury.  >=22 - Abnormal Male indicating possible myocardial injury.   Clinicians would have to utilize clinical acumen, EKG, Troponin, and serial changes to determine if it is an Acute Myocardial Infarction or myocardial injury due to an underlying chronic condition.         Comprehensive Metabolic Panel [[id number]]  (Abnormal) Collected: 11/19/24 1246    Specimen: Blood Updated: 11/19/24 1320     Glucose 77 mg/dL      BUN 28 mg/dL      Creatinine 0.74 mg/dL      Sodium 138 mmol/L      Potassium 4.3 mmol/L      Chloride 106 mmol/L      CO2 22.2 mmol/L      Calcium 8.7 mg/dL      Total Protein 5.1 g/dL      Albumin 2.9 g/dL      ALT (SGPT) 35 U/L      AST (SGOT) 19 U/L      Alkaline Phosphatase 64 U/L      Total Bilirubin 0.3 mg/dL      Globulin 2.2 gm/dL      A/G Ratio 1.3 g/dL      BUN/Creatinine Ratio 37.8     Anion Gap 9.8 mmol/L      eGFR 85.0 mL/min/1.73     Narrative:      GFR Normal >60  Chronic Kidney Disease <60  Kidney Failure <15    The GFR formula is only valid for adults with stable renal function between ages 18 and 70.    Lipase [[id number]]  (Normal) Collected: 11/19/24 1246    Specimen: Blood Updated: 11/19/24 1320     Lipase 19 U/L     High Sensitivity Troponin T [[id number]]  (Abnormal) Collected: 11/19/24 1246    Specimen: Blood Updated: 11/19/24 1320     HS Troponin T 21 ng/L     Narrative:      High Sensitive Troponin T Reference Range:  <14.0 ng/L- Negative Female for AMI  <22.0 ng/L- Negative Male for AMI  >=14 - Abnormal Female indicating possible myocardial injury.  >=22 - Abnormal Male indicating possible myocardial injury.   Clinicians would have to  utilize clinical acumen, EKG, Troponin, and serial changes to determine if it is an Acute Myocardial Infarction or myocardial injury due to an underlying chronic condition.         CBC & Differential [[id number]]  (Abnormal) Collected: 11/19/24 1246    Specimen: Blood Updated: 11/19/24 1307    Narrative:      The following orders were created for panel order CBC & Differential.  Procedure                               Abnormality         Status                     ---------                               -----------         ------                     CBC Auto Differential[[id number]]        Abnormal            Final result                 Please view results for these tests on the individual orders.    aPTT [[id number]]  (Abnormal) Collected: 11/19/24 1246    Specimen: Blood Updated: 11/19/24 1307     PTT 21.9 seconds     CBC Auto Differential [[id number]]  (Abnormal) Collected: 11/19/24 1246    Specimen: Blood Updated: 11/19/24 1307     WBC 16.93 10*3/mm3      RBC 1.89 10*6/mm3      Hemoglobin 5.7 g/dL      Hematocrit 18.9 %      .0 fL      MCH 30.2 pg      MCHC 30.2 g/dL      RDW 18.1 %      RDW-SD 66.0 fl      MPV 10.0 fL      Platelets 435 10*3/mm3      Neutrophil % 74.1 %      Lymphocyte % 16.3 %      Monocyte % 8.6 %      Eosinophil % 0.0 %      Basophil % 0.1 %      Immature Grans % 0.9 %      Neutrophils, Absolute 12.56 10*3/mm3      Lymphocytes, Absolute 2.76 10*3/mm3      Monocytes, Absolute 1.45 10*3/mm3      Eosinophils, Absolute 0.00 10*3/mm3      Basophils, Absolute 0.01 10*3/mm3      Immature Grans, Absolute 0.15 10*3/mm3      nRBC 0.0 /100 WBC     Protime-INR [[id number]]  (Normal) Collected: 11/19/24 1246    Specimen: Blood Updated: 11/19/24 1307     Protime 13.2 Seconds      INR 0.99    Extra Tubes [[id number]] Collected: 11/19/24 1246    Specimen: Blood, Venous Line Updated: 11/19/24 1300    Narrative:      The following orders were created for panel order Extra Tubes.  Procedure                                Abnormality         Status                     ---------                               -----------         ------                     Gold Top - [facility][[id number]]                                   Final result                 Please view results for these tests on the individual orders.    Gold Top - [facility] [[id number]] Collected: 11/19/24 1246    Specimen: Blood Updated: 11/19/24 1300     Extra Tube Hold for add-ons.     Comment: Auto resulted.                Imaging Results (Last 24 Hours)       Procedure Component Value Units Date/Time    XR Chest 1 View [[id number]] Collected: 11/19/24 1333     Updated: 11/19/24 1336    Narrative:      XR CHEST 1 VW    Date of Exam: 11/19/2024 1:19 PM EST    Indication: cp    Comparison: CT chest 11/14/2024. AP chest 10/13/2024    Findings:  No acute airspace disease. Normal heart size. No pleural effusion or pneumothorax or acute osseous abnormality. Moderate degenerative changes of the shoulders.      Impression:      Impression:  No acute chest finding.      Electronically Signed: Ute Snider MD    11/19/2024 1:34 PM EST    Workstation ID: [id number]              Assessment & Plan        This is a 74 y.o. female with:    Active and Resolved Problems  Active Hospital Problems    Diagnosis  POA    **Acute lower GI bleeding [K92.2]  Yes      Resolved Hospital Problems   No resolved problems to display.       Acute lower GI bleed  -Concerned that the patient  may have persistent Crohn's exacerbation causing persistent inflammation and lower GI bleeding  -Check CT of the abdomen  -GI consult  -Reinitiate IV steroids  -Patient has significant blood loss related to this and will require PRBC transfusion  -Hold aspirin and Plavix today and consult cardiology for further assistance and management  -Continue PPI    Acute blood loss anemia  -Secondary to GI bleed as above  -Transfused 2 units PRBC  -GI consult  -Holding DAPT as above temporarily    Hypertension  -Resume home  metoprolol at lower dose but hold other medications in the setting of GI bleed with potential for hypotension    CAD  -Patient has previous recent history of PCI with stent placement and was on DAPT although this is likely exacerbating her GI bleed  -Holding DAPT  -Continue statin for now    Hypothyroidism  -Resume home Synthroid            VTE Prophylaxis:  Mechanical VTE prophylaxis orders are signed & held.          The patient desires to be as follows:    CODE STATUS:       Full code        Admission Status:  I believe this patient meets inpatient status.    Expected Length of Stay: Greater than 2 midnights    PDMP and Medication Dispenses via Sidebar reviewed and consistent with patient reported medications.    I discussed the patient's findings and my recommendations with patient.      Signature:     This document has been electronically signed by El Childress MD on November 19, 2024 17:26 [facility]

## 2024-11-19 NOTE — ED PROVIDER NOTES
Subjective   History of Present Illness  Chief complaint per family and patient.  Bloody stool chest pain abdominal pain    History of present illness 74-year-old female who was discharged the hospital on 12th November after having colitis currently on steroids who complains of chest pain today bright red blood in the stool and some abdominal pain.  Has been no fever chills she has been some weakness she denies any vomiting blood but no appetite.  There is no neck arm jaw pain this is pressure in nature without shortness of breath nothing otherwise seem to trigger or make it better or worse.  Patient continues to be on Plavix and aspirin therapy.      Review of Systems   Constitutional:  Negative for chills and fever.   Respiratory:  Positive for chest tightness. Negative for shortness of breath.    Cardiovascular:  Positive for chest pain. Negative for leg swelling.   Gastrointestinal:  Positive for abdominal pain. Negative for blood in stool.   Genitourinary:  Negative for difficulty urinating and dysuria.   Skin:  Negative for rash.   Neurological:  Positive for weakness and light-headedness.   Psychiatric/Behavioral:  Negative for confusion.        Past Medical History:   Diagnosis Date    Arthritis     CAD (coronary artery disease)     COPD (chronic obstructive pulmonary disease)     Hypertension     Mood disorder     Thyroid disease        Allergies   Allergen Reactions    Codeine Hives    Penicillin G Sodium Hives       Past Surgical History:   Procedure Laterality Date    BRONCHOSCOPY N/A 10/16/2024    Procedure: BRONCHOSCOPY;  Surgeon: Gallo Pope MD;  Location: [facility] ENDOSCOPY;  Service: Pulmonary;  Laterality: N/A;    CARDIAC CATHETERIZATION N/A 10/15/2024    Procedure: Left Heart Cath, possible pci;  Surgeon: Travis Connor MD;  Location: [facility] CATH INVASIVE LOCATION;  Service: Cardiovascular;  Laterality: N/A;    CARDIAC CATHETERIZATION N/A 10/22/2024    Procedure: Laser Coronary  Atherectomy;  Surgeon: Travis Connor MD;  Location: [facility] CATH INVASIVE LOCATION;  Service: Cardiovascular;  Laterality: N/A;    COLONOSCOPY N/A 10/12/2024    Procedure: COLONOSCOPY WITH BIOPSY AND WIRE GUIDED BALLOON DILATION OF TERMINAL ILEUM;  Surgeon: Rob Strong MD;  Location: [facility] ENDOSCOPY;  Service: Gastroenterology;  Laterality: N/A;  Colitis, crohns of terminal ileum, right colon ulcers, diverticulosis, hemorroids    ENDOSCOPY N/A 10/12/2024    Procedure: ESOPHAGOGASTRODUODENOSCOPY WITH BIOPSY X 2 AREA;  Surgeon: Rob Storng MD;  Location: [facility] ENDOSCOPY;  Service: Gastroenterology;  Laterality: N/A;  Chronic gastritis, HH    HYSTERECTOMY      LEFT HEART CATH         Family History   Problem Relation Age of Onset    Heart disease Mother     Dementia Mother     Stroke Mother     Heart disease Father     Hypertension Father        Social History     Socioeconomic History    Marital status:    Tobacco Use    Smoking status: Former     Types: Cigarettes    Smokeless tobacco: Never   Vaping Use    Vaping status: Never Used   Substance and Sexual Activity    Alcohol use: Never    Drug use: Never    Sexual activity: Defer     Prior to Admission medications    Medication Sig Start Date End Date Taking? Authorizing Provider   Adalimumab (Humira, 2 Pen,) 40 MG/0.4ML Pen-injector Kit Inject 40 mg under the skin into the appropriate area as directed 1 (One) Time Per Week. Saturdays   Indications: Rheumatoid Arthritis 10/25/24  Yes ProviderDonna MD   albuterol (PROVENTIL) (2.5 MG/3ML) 0.083% nebulizer solution Take 2.5 mg by nebulization Every 6 (Six) Hours As Needed for Wheezing. Indications: Spasm of Lung Air Passages 11/13/24  Yes ProviderDonna MD   amLODIPine (NORVASC) 10 MG tablet Take 1 tablet by mouth Daily.   Yes ProviderDonna MD   aspirin 81 MG EC tablet Take 1 tablet by mouth Daily for 30 days. Indications: Disease involving Lipid  Deposits in the Arteries 11/15/24 12/15/24 Yes Eusebio Montenegro MD   atorvastatin (LIPITOR) 40 MG tablet Take 1 tablet by mouth Every Night for 30 days. 10/23/24 11/22/24 Yes Noreen Nj MD   cholecalciferol (VITAMIN D3) 1.25 MG (69581 UT) capsule Take 1 capsule by mouth 2 (Two) Times a Week. Wed, Sat  Indications: Vitamin D Deficiency 10/25/24  Yes Donna Kraft MD   clopidogrel (PLAVIX) 75 MG tablet Take 1 tablet by mouth Daily for 30 days. 10/23/24 11/22/24 Yes Noreen Nj MD   diclofenac (VOLTAREN) 50 MG EC tablet Take 1 tablet by mouth 2 (Two) Times a Day.   Yes Donna Kraft MD   folic acid (FOLVITE) 1 MG tablet Take 1 tablet by mouth Daily. Indications: Anemia From Inadequate Folic Acid 10/25/24  Yes Donna Kraft MD   levothyroxine (SYNTHROID, LEVOTHROID) 50 MCG tablet Take 1 tablet by mouth Daily. Indications: Underactive Thyroid 10/25/24  Yes Donna Kraft MD   Melatonin (Melatonin Extra Strength) 10 MG tablet Take 1 tablet by mouth As Needed. Indications: Trouble Sleeping 10/30/24  Yes Donna Kraft MD   methotrexate 2.5 MG tablet Take 6 tablets by mouth 1 (One) Time Per Week. Saturdays   Indications: Non-oncologic 10/25/24  Yes Donna Kraft MD   metoprolol tartrate (LOPRESSOR) 50 MG tablet Take 1 tablet by mouth 2 (Two) Times a Day.   Yes Donna Kraft MD   midodrine (PROAMATINE) 10 MG tablet Take 1 tablet by mouth 3 (Three) Times a Day Before Meals for 30 days. 10/23/24 11/22/24 Yes Noreen Nj MD   pantoprazole (PROTONIX) 20 MG EC tablet Take 1 tablet by mouth Daily. Indications: Heartburn 10/25/24  Yes Donna Kraft MD   predniSONE (DELTASONE) 10 MG tablet Take 4 tablets by mouth Daily for 7 days, THEN 3.5 tablets Daily for 7 days, THEN 3 tablets Daily for 7 days, THEN 2.5 tablets Daily for 7 days, THEN 2 tablets Daily for 7 days, THEN 1.5 tablets Daily for 7 days, THEN 1 tablet Daily for 7 days, THEN 0.5  tablets Daily for 7 days. Indications: Crohn's Disease 11/12/24 1/7/25 Yes Eusebio Montenegro MD   sertraline (ZOLOFT) 50 MG tablet Take 1 tablet by mouth Daily. Indications: Major Depressive Disorder 10/25/24  Yes Donna Kraft MD   spironolactone (ALDACTONE) 25 MG tablet Take 1 tablet by mouth Daily. Indications: Edema 11/13/24  Yes Donna Kraft MD   upadacitinib ER (Rinvoq) 45 MG tablet sustained-release 24 hour extended release tablet Take 1 tablet by mouth Daily. Indications: Rheumatoid Arthritis 11/13/24  Yes Donna Kraft MD   ezetimibe (ZETIA) 10 MG tablet Take 1 tablet by mouth Daily. Indications: High Amount of Fats in the Blood 10/25/24 11/19/24  Donna Kraft MD   tiotropium (SPIRIVA) 18 MCG per inhalation capsule Place 1 capsule into inhaler and inhale Daily. Indications: Chronic Obstructive Lung Disease 10/25/24 11/19/24  Donna Kraft MD          Objective   Physical Exam  Constitutional 74-year-old awake alert chronic ill-appearing but nontoxic.  Triage vital signs reviewed.  HEENT unremarkable neck supple no adenopathy no JVD no bruits lungs occasional wheeze but no retraction heart regular without murmur rub abdomen soft with some mild lower abdominal pain but no rebound no guarding good bowel sounds no peritoneal findings or pulsatile masses extremities pulses equal upper and lower extremities no edema cords or Homans' sign no evidence of DVT skin is warm and dry without rashes or cellulitic changes neurologic awake alert follows commands no face asymmetry speech normal without focal weakness.  Procedures           ED Course      Results for orders placed or performed during the hospital encounter of 11/19/24   ECG 12 Lead Chest Pain    Collection Time: 11/19/24 12:13 PM   Result Value Ref Range    QT Interval 406 ms    QTC Interval 429 ms   Comprehensive Metabolic Panel    Collection Time: 11/19/24 12:46 PM    Specimen: Blood   Result Value Ref Range     Glucose 77 65 - 99 mg/dL    BUN 28 (H) 8 - 23 mg/dL    Creatinine 0.74 0.57 - 1.00 mg/dL    Sodium 138 136 - 145 mmol/L    Potassium 4.3 3.5 - 5.2 mmol/L    Chloride 106 98 - 107 mmol/L    CO2 22.2 22.0 - 29.0 mmol/L    Calcium 8.7 8.6 - 10.5 mg/dL    Total Protein 5.1 (L) 6.0 - 8.5 g/dL    Albumin 2.9 (L) 3.5 - 5.2 g/dL    ALT (SGPT) 35 (H) 1 - 33 U/L    AST (SGOT) 19 1 - 32 U/L    Alkaline Phosphatase 64 39 - 117 U/L    Total Bilirubin 0.3 0.0 - 1.2 mg/dL    Globulin 2.2 gm/dL    A/G Ratio 1.3 g/dL    BUN/Creatinine Ratio 37.8 (H) 7.0 - 25.0    Anion Gap 9.8 5.0 - 15.0 mmol/L    eGFR 85.0 >60.0 mL/min/1.73   Protime-INR    Collection Time: 11/19/24 12:46 PM    Specimen: Blood   Result Value Ref Range    Protime 13.2 11.7 - 14.2 Seconds    INR 0.99 0.90 - 1.10   aPTT    Collection Time: 11/19/24 12:46 PM    Specimen: Blood   Result Value Ref Range    PTT 21.9 (L) 22.7 - 35.4 seconds   Lipase    Collection Time: 11/19/24 12:46 PM    Specimen: Blood   Result Value Ref Range    Lipase 19 13 - 60 U/L   High Sensitivity Troponin T    Collection Time: 11/19/24 12:46 PM    Specimen: Blood   Result Value Ref Range    HS Troponin T 21 (H) <14 ng/L   CBC Auto Differential    Collection Time: 11/19/24 12:46 PM    Specimen: Blood   Result Value Ref Range    WBC 16.93 (H) 3.40 - 10.80 10*3/mm3    RBC 1.89 (L) 3.77 - 5.28 10*6/mm3    Hemoglobin 5.7 (C) 12.0 - 15.9 g/dL    Hematocrit 18.9 (C) 34.0 - 46.6 %    .0 (H) 79.0 - 97.0 fL    MCH 30.2 26.6 - 33.0 pg    MCHC 30.2 (L) 31.5 - 35.7 g/dL    RDW 18.1 (H) 12.3 - 15.4 %    RDW-SD 66.0 (H) 37.0 - 54.0 fl    MPV 10.0 6.0 - 12.0 fL    Platelets 435 140 - 450 10*3/mm3    Neutrophil % 74.1 42.7 - 76.0 %    Lymphocyte % 16.3 (L) 19.6 - 45.3 %    Monocyte % 8.6 5.0 - 12.0 %    Eosinophil % 0.0 (L) 0.3 - 6.2 %    Basophil % 0.1 0.0 - 1.5 %    Immature Grans % 0.9 (H) 0.0 - 0.5 %    Neutrophils, Absolute 12.56 (H) 1.70 - 7.00 10*3/mm3    Lymphocytes, Absolute 2.76 0.70 - 3.10  10*3/mm3    Monocytes, Absolute 1.45 (H) 0.10 - 0.90 10*3/mm3    Eosinophils, Absolute 0.00 0.00 - 0.40 10*3/mm3    Basophils, Absolute 0.01 0.00 - 0.20 10*3/mm3    Immature Grans, Absolute 0.15 (H) 0.00 - 0.05 10*3/mm3    nRBC 0.0 0.0 - 0.2 /100 WBC   Gold Top - SST    Collection Time: 11/19/24 12:46 PM   Result Value Ref Range    Extra Tube Hold for add-ons.    High Sensitivity Troponin T 2Hr    Collection Time: 11/19/24  3:03 PM    Specimen: Blood   Result Value Ref Range    HS Troponin T 17 (H) <14 ng/L    Troponin T Delta -4 (L) >=-4 - <+4 ng/L   Type & Screen    Collection Time: 11/19/24  3:03 PM    Specimen: Blood   Result Value Ref Range    ABO Type O     RH type Positive     Antibody Screen Negative     T&S Expiration Date 11/22/2024 11:59:59 PM    Prepare RBC, 2 Units    Collection Time: 11/19/24  4:02 PM   Result Value Ref Range    Product Code U8740P88     Unit Number [id number]     UNIT  ABO O     UNIT  RH POS     Crossmatch Interpretation Compatible     Dispense Status IS     Blood Expiration Date 202412112359     Blood Type Barcode 5100     Product Code H5170O46     Unit Number [id number]     UNIT  ABO O     UNIT  RH POS     Crossmatch Interpretation Compatible     Dispense Status XM     Blood Expiration Date 202412122359     Blood Type Barcode 5100      XR Chest 1 View    Result Date: 11/19/2024  Impression: No acute chest finding. Electronically Signed: Ute Snider MD  11/19/2024 1:34 PM EST  Workstation ID: [id number]   Medications   sodium chloride 0.9 % flush 10 mL (has no administration in time range)                                            EKG my interpretation normal sinus rhythm rate 67 normal axis hypertrophy nonspecific T wave changes noted anterior leads QTc of 4 and 29 no significant change from previous abnormal EKG compared to 11/6/2024            Medical Decision Making  Decision making.  IV established monitor placement review of sinus rhythm EKG obtained my  interpretation normal sinus rhythm rate of 67 normal axis hypertrophy nonspecific T wave changes anterior leads QTc of 429 no change from 11/6/2024.  Chest x-ray my independent review no pneumonia failure pneumothorax radiology review unremarkable.  Labs obtained my independent review comprehensive metabolic profile was unremarkable and a BUN of 28.  The patient had a lipase of 19 CBC unremarkable and a hemoglobin of 5.7 this was over 8 when she left the hospital just recently on the 12th.  Troponin was 21 with a 2-hour repeat of 17.  The patient's recent had a stent placed just within the last month as well.  She is scheduled to have a transcatheter valve replacement as well in a couple weeks.  Patient did have a lot of blood in her stool.  Hemoglobin is down to 5.7 she will be transfused 2 units of blood as well.  Repeat exam she is resting comfortably abdomen soft nontender good bowel sounds no peritoneal findings or pulsatile masses.  In no distress I talked to the family and they are made aware of the findings records reviewed the patient was just discharged on the 12th after having significant colitis and was discharged on prednisone her white count 16,000 I think this is most likely secondary to the steroids.  I do not have any suspicion of a peritonitis or a perforated viscus or ischemic bowel or sepsis based on my history and physical clinical findings I do not see any evidence that suggest acute myocardial infarction DVT pulmonary embolism or dissection by my exam either.  I think her symptoms probably triggered from anemia.  I talked to the hospitalist case discussed and she will be admitted for further care and transfusion stable unremarkable ER course.    Problems Addressed:  Anemia, unspecified type: complicated acute illness or injury  Chest pain, unspecified type: complicated acute illness or injury  Gastrointestinal hemorrhage, unspecified gastrointestinal hemorrhage type: complicated acute illness  or injury    Amount and/or Complexity of Data Reviewed  External Data Reviewed: labs and notes.  Labs: ordered. Decision-making details documented in ED Course.  Radiology: ordered and independent interpretation performed. Decision-making details documented in ED Course.  ECG/medicine tests: ordered and independent interpretation performed. Decision-making details documented in ED Course.    Risk  Decision regarding hospitalization.        Final diagnoses:   Gastrointestinal hemorrhage, unspecified gastrointestinal hemorrhage type   Anemia, unspecified type   Chest pain, unspecified type       ED Disposition  ED Disposition       ED Disposition   Decision to Admit    Condition   --    Comment   Level of Care: Telemetry [5]   Admitting Physician: SHAHIDA NAVA [[id number]]   Attending Physician: SHAHIDA NAVA [[id number]]                 No follow-up provider specified.       Medication List      No changes were made to your prescriptions during this visit.            Lawson Garcia MD  11/19/24 [id number]

## 2024-11-19 NOTE — ED NOTES
CRN notified of pt critical values & need for ER room. Awaiting room placement. VS stable at this time. RN will continue to monitor

## 2024-11-19 NOTE — NURSING NOTE
Per blood bank draw hemoglobin 30 minutes after 1st unit on PRBC infuses. Since HGB was 5.7. before starting second unit of PRBC

## 2024-11-19 NOTE — Clinical Note
Level of Care: Telemetry [5]   Admitting Physician: SHAHIDA NAVA [[id number]]   Attending Physician: SHAHIDA NAVA [[id number]]

## 2024-11-19 NOTE — NURSING NOTE
Report called to JAYNE, patient going to room 262 when clean. Patient and patient daughter updated.

## 2024-11-19 NOTE — Clinical Note
Health Psychology                  Clinic    Department of Medicine  Luann Calhoun, Ph.D., L.P. [phone]                          [facility]  [facility] Bhanu Cagle, Ph.D.,  L.P. [phone]                 3rd Floor  [place] Mail Code 741   Jaylyn Lang, Ph.D., L.P. [phone] [address] Adiel Fuentes, Ph.D., A.B.P.P., L.P. [phone]        [place] [id number]  [place] [address]           Elvia Haro, Ph.D., L.P. [phone]     Referral Source:  Loretta Villeda M.D.    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    History of Presenting Concerns: Dr. Mitchell reports concern for over a year of shortness of breath.  He has undergone pulmonary and cardiac testing which he reports has not revealed a source.  He has wondered whether anxiety might be factor, though has doubt that is all due to anxiety.  He states he generally does not think of himself as anxious and that the symptoms can occur even when he is by himself without experiencing any stress.  He has noticed for example that while doing housework or cooking he can experience it.  While we were meeting, they were couple of instances when he seemed to be having some minor breathing changes.  He states that the symptoms have not worsened since he last saw Dr. Villeda, his primary care physician, in March.  They are not interfering with his activities.  He sometimes notices it when he is walking his dog (usually 45 to 60 minutes).    Dr. Mitchell acknowledges that he tends to have some types of anxiety.  For example, if he feels that he does something that might inconvenience or upset somebody else he becomes anxious.  There was a time when he and his wife were having  removed. difficulties and she would become upset with him about something.  He can be fearful that she might become upset about things.  When experiencing anxiety, it seems to be more in the realm of worry than physical symptoms.  He tends to ruminate.  He does not experience palpitations, sweating, weak knees, tachycardia though does experience a sense of mild fear and at times but has noticed an increase in the rate of breathing.    Medical History:  Stephen is generally in good health.  He recently was evaluated for some muscle aches including an EMG, which was found to be normal and those symptoms have subsided.  Exercise consists of walking with a goal of 10,000 steps and he often will also try to ride an exercise bicycle for 30 minutes, covering 8 miles.  He has had some elevated blood pressure readings in clinic and wonders about whitecoat hypertension.  He doubts he has hypertension.  His alcohol use is fairly rare.  In the past he drank a small glass of red wine per night for health reasons but stopped because he did not think it actually helped.  He is a non-smoker.  Caffeine use is occasional green tea.  He denies use of street drugs.  He acknowledges mild hearing problems.    Past Medical History:   Diagnosis Date     BPH (benign prostatic hyperplasia)      Essential hypertension 02/2018     Hyperlipidemia LDL goal <130      Pemphigus     pemphigus foliaceus     Retinal detachment 2009    left eye- corrected with laser     Vitreous detachment 2009    LE     Past Surgical History:   Procedure Laterality Date     NO HISTORY OF SURGERY  1/28/14    derm     RETINOPEXY LASER PROPHYLAXIS BREAK(S) OS (LEFT EYE)      2009 - Left eye     Current Outpatient Medications   Medication     ARTIFICIAL TEAR SOLUTION OP     Cholecalciferol (VITAMIN D PO)     clobetasol (TEMOVATE) 0.05 % external solution     clobetasol (TEMOVATE) 0.05 % ointment     clobetasol propionate 0.05 % SHAM     COMPOUNDED NON-CONTROLLED SUBSTANCE (CMPD  "RX) - PHARMACY TO MIX COMPOUNDED MEDICATION     desonide (DESOWEN) 0.05 % ointment     finasteride (PROSCAR) 5 MG tablet     finasteride (PROSCAR) 5 MG tablet     hydrocortisone butyrate (LOCOID) 0.1 % SOLN     ketoconazole (NIZORAL) 2 % cream     ketoconazole (NIZORAL) 2 % shampoo     lovastatin (MEVACOR) 20 MG tablet     Multiple Vitamin (MULTIVITAMIN) per tablet     tacrolimus (PROTOPIC) 0.1 % ointment     triamcinolone (KENALOG) 0.1 % external cream     triamcinolone (KENALOG) 0.1 % ointment     No current facility-administered medications for this visit.      Wt Readings from Last 4 Encounters:   19 78.9 kg (174 lb)   06/10/19 77.6 kg (171 lb)   19 77.9 kg (171 lb 12.8 oz)   19 79.8 kg (176 lb)     Estimated body mass index is 25.7 kg/m  as calculated from the following:    Height as of 19: 1.753 m (5' 9\").    Weight as of 19: 78.9 kg (174 lb).    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  Both parents were smokers and  of CHF.  His father  at age 78 and his mother  at age 80.  He reports that it was a loving family without abuse or neglect.  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  He then took a position in  at the [employer], retiring in .    Dr. Mitchell has been  twice.  The first marriage in  ended in .  He has 2 children from that marriage, a son in [place] and a daughter in [place].  In  he started to date his current wife Dunia, marrying in .  He reports that things are currently going well, though there have been some difficult times around the time of her diagnosis with a sarcoma of the buttocks in .  She underwent surgery in .  She continues to experience pain, and has some mobility issues, but there is no evidence of return of disease.  He feels she " can be overly critical of him.    He reports that he has some friends but does not see them very often.  Recreations include walking the dog, watching [employer] or [employer] movies.  He takes on many of the household chores, such as cooking, dishwashing, some laundry.  He likes listening to pod casts.  He goes out once a week with his wife to the [employer] buffet.    Psychiatric History: Dr. Mitchell first sought mental health assistance in the late 1960s when he saw a psychiatrist named Dr. Keys in [facility].  He then saw her Tayler Nino, Ph.D. at [facility] around the time of his divorce.  Subsequently he saw Cristian Alves individually and for group.  He has found counseling to be helpful in the past.  There is no history of psychiatric hospitalizations, chemical dependency treatment, or suicide attempts.  He briefly tried a medication, Asendin, in approximately 1980 around the time of his divorce, and when his father was dying.  He participates in a caregivers' group at [place], where he has been hair  regular member since 2015.  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    Psychological Screening: Dr. Mitchell completed the following four screening measures which are all within normal limits.    CAGE-AID Score:  CAGE-AID Total Score 7/29/2013 5/7/2019   Total Score 0 0   Total Score MyChart - 0 (A total score of 2 or greater is considered clinically significant)     CAGE-AID score  > 1 is a positive screen, suggesting further discussion is needed to determine if evaluation for alcohol or substance abuse is appropriate.  A score > 2 is considered clinically significant, suggesting further evaluation of alcohol or substance-related problems is indicated.    BAYRON-7 Score:  BAYRON-7 SCORE 2/4/2019 5/7/2019   Total Score - 0 (minimal anxiety)   Total Score 1 0     PHQ-9  Score:  PHQ-9 SCORE 5/7/2019   PHQ-9 Total Score MyChart 2 (Minimal depression)   PHQ-9 Total Score 2     WHODAS 2.0 Total Score 5/7/2019   Total Score 14   Total Score MyChart 14     Session:  We discussed his difficulty dealing with other's anger, especially his wife's.  She got good news about her most recent scan h and he had good news about his prostate exam, so they are both less anxious.  We discussed RET.CBT and how he might apply it to the situations that upset him.  I suggested readings by Radhika Montague, and Lane and gave him a handout to work with to help him identify the cognitive basis of the anxiety her sometimes feels.  Overall, he is feeling better.  He wants to apply what we discussed and possibly read the books and then see whether it would be useful to meet.   He plans to establish care with Dr. Telles.    Extended session due to complexity of case and length of interval.    Time in:  3:06       Time out: 4:00        Diagnosis:  Adjustment disorder with anxiety (F43.22)    Recommendations/Plan: Dr. Mitchell will call if wishing return to clinic  for CBT and supportive psychotherapy consistent with treatment plan.    Last treatment plan signed: 6/12/2019  Treatment plan due:6/12/2020                            Adiel Fuentes, Ph.D., A.B.P.P., L.P.  Director, Health Psychology

## 2024-11-19 NOTE — LETTER
EMS Transport Request  For use at [facility], [facility], [place], [place], and [facility] only   Patient Name: Maryann Gaitan : 1950   Weight:57.3 kg (126 lb 5.2 oz) Pick-up Location: 2107 BLS/ALS: BLS/ALS: BLS   Insurance: [employer] MEDICARE REPLACEMENT Auth End Date: 24   Pre-Cert #: D/C Summary complete:    Destination: Other [facility] bed 310a / [address] [facility] [place]   Contact Precautions: None   Equipment (O2, Fluids, etc.): O2, settings 2 L NC   Arrive By Date/Time: 24 will call Stretcher/WC: Stretcher   CM Requesting: Shraddha Lozano RN Ext: will call   Notes/Medical Necessity: Intermittent confusion, 2lpm O2, functional mobility impairments, increased LE weakness, unable to tolerate seated position for duration of transfer, unable to self support, mod assist for bed mobility     ______________________________________________________________________    *Only 2 patient bags OR 1 carry-on size bag are permitted.  Wheelchairs and walkers CANNOT transported with the patient. Acknowledge: Yes

## 2024-11-20 ENCOUNTER — INPATIENT HOSPITAL (AMBULATORY)
Age: 74
End: 2024-11-20
Payer: MEDICARE

## 2024-11-20 ENCOUNTER — DOCUMENTATION (OUTPATIENT)
Dept: HOME HEALTH SERVICES | Facility: HOME HEALTHCARE | Age: 74
End: 2024-11-20
Payer: MEDICARE

## 2024-11-20 ENCOUNTER — DOCUMENTATION (OUTPATIENT)
Dept: CARDIOLOGY | Facility: HOSPITAL | Age: 74
End: 2024-11-20
Payer: MEDICARE

## 2024-11-20 ENCOUNTER — APPOINTMENT (OUTPATIENT)
Dept: CT IMAGING | Facility: HOSPITAL | Age: 74
DRG: 329 | End: 2024-11-20
Payer: MEDICARE

## 2024-11-20 ENCOUNTER — INPATIENT HOSPITAL (AMBULATORY)
Dept: URBAN - METROPOLITAN AREA HOSPITAL 84 | Facility: HOSPITAL | Age: 74
End: 2024-11-20
Payer: MEDICARE

## 2024-11-20 DIAGNOSIS — K50.80 CROHN'S DISEASE OF BOTH SMALL AND LARGE INTESTINE WITHOUT CO: ICD-10-CM

## 2024-11-20 LAB
ANION GAP SERPL CALCULATED.3IONS-SCNC: 7.7 MMOL/L (ref 5–15)
BASOPHILS # BLD AUTO: 0 10*3/MM3 (ref 0–0.2)
BASOPHILS NFR BLD AUTO: 0 % (ref 0–1.5)
BUN SERPL-MCNC: 19 MG/DL (ref 8–23)
BUN/CREAT SERPL: 32.2 (ref 7–25)
CALCIUM SPEC-SCNC: 8.7 MG/DL (ref 8.6–10.5)
CHLORIDE SERPL-SCNC: 103 MMOL/L (ref 98–107)
CO2 SERPL-SCNC: 26.3 MMOL/L (ref 22–29)
CREAT SERPL-MCNC: 0.59 MG/DL (ref 0.57–1)
DEPRECATED RDW RBC AUTO: 61.2 FL (ref 37–54)
EGFRCR SERPLBLD CKD-EPI 2021: 94.7 ML/MIN/1.73
EOSINOPHIL # BLD AUTO: 0.07 10*3/MM3 (ref 0–0.4)
EOSINOPHIL NFR BLD AUTO: 0.6 % (ref 0.3–6.2)
ERYTHROCYTE [DISTWIDTH] IN BLOOD BY AUTOMATED COUNT: 18.3 % (ref 12.3–15.4)
GLUCOSE BLDC GLUCOMTR-MCNC: 79 MG/DL (ref 70–105)
GLUCOSE SERPL-MCNC: 64 MG/DL (ref 65–99)
HCT VFR BLD AUTO: 29.5 % (ref 34–46.6)
HGB BLD-MCNC: 9.1 G/DL (ref 12–15.9)
IMM GRANULOCYTES # BLD AUTO: 0.08 10*3/MM3 (ref 0–0.05)
IMM GRANULOCYTES NFR BLD AUTO: 0.7 % (ref 0–0.5)
LYMPHOCYTES # BLD AUTO: 3.57 10*3/MM3 (ref 0.7–3.1)
LYMPHOCYTES NFR BLD AUTO: 29.3 % (ref 19.6–45.3)
MAGNESIUM SERPL-MCNC: 2 MG/DL (ref 1.6–2.4)
MCH RBC QN AUTO: 28.5 PG (ref 26.6–33)
MCHC RBC AUTO-ENTMCNC: 30.8 G/DL (ref 31.5–35.7)
MCV RBC AUTO: 92.5 FL (ref 79–97)
MONOCYTES # BLD AUTO: 1.17 10*3/MM3 (ref 0.1–0.9)
MONOCYTES NFR BLD AUTO: 9.6 % (ref 5–12)
MYCOBACTERIUM SPEC CULT: NORMAL
NEUTROPHILS NFR BLD AUTO: 59.8 % (ref 42.7–76)
NEUTROPHILS NFR BLD AUTO: 7.29 10*3/MM3 (ref 1.7–7)
NIGHT BLUE STAIN TISS: NORMAL
NRBC BLD AUTO-RTO: 0 /100 WBC (ref 0–0.2)
PLATELET # BLD AUTO: 369 10*3/MM3 (ref 140–450)
PMV BLD AUTO: 9.6 FL (ref 6–12)
POTASSIUM SERPL-SCNC: 3.8 MMOL/L (ref 3.5–5.2)
RBC # BLD AUTO: 3.19 10*6/MM3 (ref 3.77–5.28)
SODIUM SERPL-SCNC: 137 MMOL/L (ref 136–145)
WBC NRBC COR # BLD AUTO: 12.18 10*3/MM3 (ref 3.4–10.8)

## 2024-11-20 PROCEDURE — 99222 1ST HOSP IP/OBS MODERATE 55: CPT | Performed by: NURSE PRACTITIONER

## 2024-11-20 PROCEDURE — 97530 THERAPEUTIC ACTIVITIES: CPT

## 2024-11-20 PROCEDURE — 83735 ASSAY OF MAGNESIUM: CPT | Performed by: INTERNAL MEDICINE

## 2024-11-20 PROCEDURE — 74177 CT ABD & PELVIS W/CONTRAST: CPT

## 2024-11-20 PROCEDURE — 25510000001 IOPAMIDOL PER 1 ML: Performed by: INTERNAL MEDICINE

## 2024-11-20 PROCEDURE — 25010000002 METHYLPREDNISOLONE PER 40 MG: Performed by: INTERNAL MEDICINE

## 2024-11-20 PROCEDURE — 85025 COMPLETE CBC W/AUTO DIFF WBC: CPT | Performed by: INTERNAL MEDICINE

## 2024-11-20 PROCEDURE — 80048 BASIC METABOLIC PNL TOTAL CA: CPT | Performed by: INTERNAL MEDICINE

## 2024-11-20 PROCEDURE — 97166 OT EVAL MOD COMPLEX 45 MIN: CPT | Performed by: OCCUPATIONAL THERAPIST

## 2024-11-20 PROCEDURE — 82948 REAGENT STRIP/BLOOD GLUCOSE: CPT

## 2024-11-20 PROCEDURE — 99222 1ST HOSP IP/OBS MODERATE 55: CPT | Performed by: INTERNAL MEDICINE

## 2024-11-20 PROCEDURE — 97162 PT EVAL MOD COMPLEX 30 MIN: CPT

## 2024-11-20 RX ORDER — IOPAMIDOL 755 MG/ML
100 INJECTION, SOLUTION INTRAVASCULAR
Status: COMPLETED | OUTPATIENT
Start: 2024-11-20 | End: 2024-11-20

## 2024-11-20 RX ADMIN — METHYLPREDNISOLONE SODIUM SUCCINATE 20 MG: 40 INJECTION, POWDER, FOR SOLUTION INTRAMUSCULAR; INTRAVENOUS at 17:10

## 2024-11-20 RX ADMIN — METOPROLOL TARTRATE 25 MG: 25 TABLET, FILM COATED ORAL at 08:26

## 2024-11-20 RX ADMIN — FOLIC ACID 1000 MCG: 1 TABLET ORAL at 08:26

## 2024-11-20 RX ADMIN — IOPAMIDOL 100 ML: 755 INJECTION, SOLUTION INTRAVENOUS at 01:22

## 2024-11-20 RX ADMIN — METOPROLOL TARTRATE 25 MG: 25 TABLET, FILM COATED ORAL at 20:25

## 2024-11-20 RX ADMIN — Medication 10 ML: at 20:25

## 2024-11-20 RX ADMIN — Medication 10 ML: at 08:27

## 2024-11-20 RX ADMIN — ATORVASTATIN CALCIUM 40 MG: 40 TABLET, FILM COATED ORAL at 20:25

## 2024-11-20 RX ADMIN — METHYLPREDNISOLONE SODIUM SUCCINATE 20 MG: 40 INJECTION, POWDER, FOR SOLUTION INTRAMUSCULAR; INTRAVENOUS at 08:27

## 2024-11-20 RX ADMIN — SERTRALINE HYDROCHLORIDE 50 MG: 50 TABLET ORAL at 08:26

## 2024-11-20 RX ADMIN — LEVOTHYROXINE SODIUM 50 MCG: 0.05 TABLET ORAL at 08:26

## 2024-11-20 RX ADMIN — PANTOPRAZOLE SODIUM 40 MG: 40 TABLET, DELAYED RELEASE ORAL at 08:26

## 2024-11-20 RX ADMIN — MIDODRINE HYDROCHLORIDE 10 MG: 5 TABLET ORAL at 17:10

## 2024-11-20 RX ADMIN — METHYLPREDNISOLONE SODIUM SUCCINATE 20 MG: 40 INJECTION, POWDER, FOR SOLUTION INTRAMUSCULAR; INTRAVENOUS at 02:24

## 2024-11-20 NOTE — CONSULTS
GI CONSULT  NOTE:    Referring Provider:  Dr. Benson    Chief complaint: Rectal bleeding    Subjective .     History of present illness:  Patient is a 74 y.o. female with history of Crohn's disease, hypertension, COPD, RA, hysterectomy, and cholecystectomy who presents back to the hospital with worsening and rectal bleeding as well as some chest pain.  Had a recent fall as well.  She has recently been admitted with a Crohn's flare.  At the time of her EGD/colonoscopy on 10/11/2024 she was diagnosed with  Strongyloides and she was treated with ivermectin.  She has been treated with IV Solu-Medrol and was discharged 11/9/2024 on prednisone.  However, she notes recent increase in bright red blood per rectum.  Was originally doing better at discharge.  She complains of having some chest pain and had a recent fall.  She is moving her bowels 4-5 times daily and notes bright red blood with each bowel movement.  No melena.  She has not had much appetite but denies any vomiting.  She also takes Humira and methotrexate for rheumatoid arthritis.  She just recently restarted Dunseith Liu.    10/13/2024 fecal calprotectin - 1590    Endo History:  10/12/2024 EGD/colonoscopy (Dr. Strong) -hiatal hernia, gastritis, TI stricture, TI ulcers, Crohn's disease of the small bowel and colon (biopsy consistent with IBD), diverticulosis, grade 2 internal hemorrhoids  7/2021 colonoscopy by Dr. Castillo-ulceration, granularity, scarring, and stenosis in TI with biopsy showing focal chronic active ileitis with detached fragment of acutely inflamed chronic ulcer.  HP polyp, grade 1 internal and external, benign random colon biopsies.  8/2016 EUS by Dr. Castillo-normal pancreatic parenchyma, and leg grade C esophagitis, hiatal hernia.    Past Medical History:  Past Medical History:   Diagnosis Date    Arthritis     CAD (coronary artery disease)     COPD (chronic obstructive pulmonary disease)     Hypertension     Mood disorder     Thyroid disease         Past Surgical History:  Past Surgical History:   Procedure Laterality Date    BRONCHOSCOPY N/A 10/16/2024    Procedure: BRONCHOSCOPY;  Surgeon: Gallo Pope MD;  Location: [facility] ENDOSCOPY;  Service: Pulmonary;  Laterality: N/A;    CARDIAC CATHETERIZATION N/A 10/15/2024    Procedure: Left Heart Cath, possible pci;  Surgeon: Travis Connor MD;  Location: [facility] CATH INVASIVE LOCATION;  Service: Cardiovascular;  Laterality: N/A;    CARDIAC CATHETERIZATION N/A 10/22/2024    Procedure: Laser Coronary Atherectomy;  Surgeon: Travis Connor MD;  Location: [facility] CATH INVASIVE LOCATION;  Service: Cardiovascular;  Laterality: N/A;    COLONOSCOPY N/A 10/12/2024    Procedure: COLONOSCOPY WITH BIOPSY AND WIRE GUIDED BALLOON DILATION OF TERMINAL ILEUM;  Surgeon: Rob Strong MD;  Location: [facility] ENDOSCOPY;  Service: Gastroenterology;  Laterality: N/A;  Colitis, crohns of terminal ileum, right colon ulcers, diverticulosis, hemorroids    ENDOSCOPY N/A 10/12/2024    Procedure: ESOPHAGOGASTRODUODENOSCOPY WITH BIOPSY X 2 AREA;  Surgeon: Rob Strong MD;  Location: [facility] ENDOSCOPY;  Service: Gastroenterology;  Laterality: N/A;  Chronic gastritis, HH    HYSTERECTOMY      LEFT HEART CATH         Social History:  Social History     Tobacco Use    Smoking status: Former     Types: Cigarettes    Smokeless tobacco: Never   Vaping Use    Vaping status: Never Used   Substance Use Topics    Alcohol use: Never    Drug use: Never       Family History:  Family History   Problem Relation Age of Onset    Heart disease Mother     Dementia Mother     Stroke Mother     Heart disease Father     Hypertension Father        Medications:  Medications Prior to Admission   Medication Sig Dispense Refill Last Dose/Taking    Adalimumab (Humira, 2 Pen,) 40 MG/0.4ML Pen-injector Kit Inject 40 mg under the skin into the appropriate area as directed 1 (One) Time Per Week. Saturdays   Indications:  Rheumatoid Arthritis   Past Week    albuterol (PROVENTIL) (2.5 MG/3ML) 0.083% nebulizer solution Take 2.5 mg by nebulization Every 6 (Six) Hours As Needed for Wheezing. Indications: Spasm of Lung Air Passages   Taking As Needed    amLODIPine (NORVASC) 10 MG tablet Take 1 tablet by mouth Daily.   Taking    aspirin 81 MG EC tablet Take 1 tablet by mouth Daily for 30 days. Indications: Disease involving Lipid Deposits in the Arteries 30 tablet 0 Taking    atorvastatin (LIPITOR) 40 MG tablet Take 1 tablet by mouth Every Night for 30 days. 30 tablet 0 Taking    cholecalciferol (VITAMIN D3) 1.25 MG (83458 UT) capsule Take 1 capsule by mouth 2 (Two) Times a Week. Wed, Sat  Indications: Vitamin D Deficiency   Past Week    clopidogrel (PLAVIX) 75 MG tablet Take 1 tablet by mouth Daily for 30 days. 30 tablet 0 Taking    diclofenac (VOLTAREN) 50 MG EC tablet Take 1 tablet by mouth 2 (Two) Times a Day.   Taking    folic acid (FOLVITE) 1 MG tablet Take 1 tablet by mouth Daily. Indications: Anemia From Inadequate Folic Acid   Taking    levothyroxine (SYNTHROID, LEVOTHROID) 50 MCG tablet Take 1 tablet by mouth Daily. Indications: Underactive Thyroid   Taking    Melatonin (Melatonin Extra Strength) 10 MG tablet Take 1 tablet by mouth As Needed. Indications: Trouble Sleeping   Taking As Needed    methotrexate 2.5 MG tablet Take 6 tablets by mouth 1 (One) Time Per Week. Saturdays   Indications: Non-oncologic   Past Week    metoprolol tartrate (LOPRESSOR) 50 MG tablet Take 1 tablet by mouth 2 (Two) Times a Day.   Taking    midodrine (PROAMATINE) 10 MG tablet Take 1 tablet by mouth 3 (Three) Times a Day Before Meals for 30 days. 90 tablet 0 Taking    pantoprazole (PROTONIX) 20 MG EC tablet Take 1 tablet by mouth Daily. Indications: Heartburn   Taking    predniSONE (DELTASONE) 10 MG tablet Take 4 tablets by mouth Daily for 7 days, THEN 3.5 tablets Daily for 7 days, THEN 3 tablets Daily for 7 days, THEN 2.5 tablets Daily for 7 days, THEN  "2 tablets Daily for 7 days, THEN 1.5 tablets Daily for 7 days, THEN 1 tablet Daily for 7 days, THEN 0.5 tablets Daily for 7 days. Indications: Crohn's Disease 126 tablet 0 Taking    sertraline (ZOLOFT) 50 MG tablet Take 1 tablet by mouth Daily. Indications: Major Depressive Disorder   Taking    spironolactone (ALDACTONE) 25 MG tablet Take 1 tablet by mouth Daily. Indications: Edema   Taking    upadacitinib ER (Rinvoq) 45 MG tablet sustained-release 24 hour extended release tablet Take 1 tablet by mouth Daily. Indications: Rheumatoid Arthritis   Taking       Scheduled Meds:atorvastatin, 40 mg, Oral, Nightly  folic acid, 1,000 mcg, Oral, Daily  levothyroxine, 50 mcg, Oral, Daily  methylPREDNISolone sodium succinate, 20 mg, Intravenous, Q8H  metoprolol tartrate, 25 mg, Oral, BID  midodrine, 10 mg, Oral, TID AC  pantoprazole, 40 mg, Oral, Daily  sertraline, 50 mg, Oral, Daily  sodium chloride, 10 mL, Intravenous, Q12H      Continuous Infusions:   PRN Meds:.  senna-docusate sodium **AND** polyethylene glycol **AND** bisacodyl **AND** bisacodyl    nitroglycerin    [COMPLETED] Insert Peripheral IV **AND** sodium chloride    sodium chloride    sodium chloride    ALLERGIES:  Codeine and Penicillin g sodium    ROS:  Review of Systems   Constitutional:  Negative for chills and fever.   Respiratory:  Negative for cough and shortness of breath.    Cardiovascular:  Positive for chest pain. Negative for palpitations.   Gastrointestinal:  Positive for blood in stool, diarrhea and nausea. Negative for abdominal pain and vomiting.   Musculoskeletal:  Positive for arthralgias and back pain.   Neurological:  Positive for weakness. Negative for dizziness.   Psychiatric/Behavioral:  Positive for decreased concentration. Negative for agitation.        Objective     Vital Signs:   Visit Vitals  /65 (BP Location: Right arm, Patient Position: Lying)   Pulse 62   Temp 97.7 °F (36.5 °C) (Oral)   Resp 26   Ht 162.6 cm (64.02\")   Wt 45.3 " kg (99 lb 13.9 oz)   SpO2 98%   BMI 17.13 kg/m²       Physical Exam:      General Appearance:    Awake and alert, in no acute distress   Head:    Normocephalic, without obvious abnormality, atraumatic   Eyes:            Conjunctivae normal, anicteric sclera   Ears:    Ears appear intact with no abnormalities noted   Throat:   No oral lesions, no thrush, oral mucosa moist       Lungs:     Respirations regular, even and unlabored       Chest Wall:    No abnormalities observed   Abdomen:     Soft, mild generalized tenderness, no rebound or guarding, non-distended, no hepatosplenomegaly   Rectal:     Deferred   Extremities: Weakness of lower extremities, no edema, no cyanosis, no redness   Pulses:   Pulses palpable and equal bilaterally   Skin:   No bleeding, bruising or rash, no jaundice       Neurologic:   Sensation intact       Results Review:   I reviewed the patient's labs and imaging.  CBC  Results from last 7 days   Lab Units 11/20/24  0246 11/19/24  1933 11/19/24  1246   RBC 10*6/mm3 3.19*  --  1.89*   WBC 10*3/mm3 12.18*  --  16.93*   HEMOGLOBIN g/dL 9.1* 7.3* 5.7*   PLATELETS 10*3/mm3 369  --  435       CMP  Results from last 7 days   Lab Units 11/20/24  0246 11/19/24  1246   SODIUM mmol/L 137 138   POTASSIUM mmol/L 3.8 4.3   CHLORIDE mmol/L 103 106   CO2 mmol/L 26.3 22.2   BUN mg/dL 19 28*   CREATININE mg/dL 0.59 0.74   GLUCOSE mg/dL 64* 77   ALBUMIN g/dL  --  2.9*   BILIRUBIN mg/dL  --  0.3   ALK PHOS U/L  --  64   AST (SGOT) U/L  --  19   ALT (SGPT) U/L  --  35*       Amylase and Lipase  Results from last 7 days   Lab Units 11/19/24  1246   LIPASE U/L 19       CRP         Imaging Results (Last 24 Hours)       Procedure Component Value Units Date/Time    CT Abdomen Pelvis With Contrast [[id number]] Collected: 11/20/24 0813     Updated: 11/20/24 0821    Narrative:      CT ABDOMEN PELVIS W CONTRAST    Date of Exam: 11/20/2024 1:00 AM EST    Indication: recurrent GI Bleeding.    Comparison: Noncontrast CT  dated  11/6/2024, CT abdomen pelvis with contrast 10/11/2024    Technique: Axial CT images were obtained of the abdomen and pelvis following the uneventful intravenous administration of iodinated contrast. Sagittal and coronal reconstructions were performed.  Automated exposure control and iterative reconstruction   methods were used.        Findings:  LUNG BASES:  Unremarkable without mass or infiltrate.    LIVER:  Unremarkable parenchyma without focal lesion.  BILIARY/GALLBLADDER: Cholecystectomy  SPLEEN:  Unremarkable  PANCREAS:  Unremarkable  ADRENAL:  Unremarkable  KIDNEYS:  Unremarkable parenchyma with no solid mass identified. No obstruction.  No calculus identified.  GASTROINTESTINAL/MESENTERY: There is circumferential mural thickening and enhancement involving the distal ileum extending to the ileocecal valve consistent with ileitis. There is fairly diffuse fatty mural infiltration of the colon most pronounced   proximally, similar to previous exams which can be seen in the setting of chronic inflammatory bowel disease. No evidence of intestinal obstruction.  MESENTERIC VESSELS:  Patent.  AORTA/IVC:  Normal caliber.    RETROPERITONEUM/LYMPH NODES:  Unremarkable    REPRODUCTIVE: Hysterectomy.  BLADDER:  Unremarkable    OSSEUS STRUCTURES: There is degenerative grade 1 spondylolisthesis at L4/5. No acute osseous process identified.    There is trace free fluid in the pelvis.        Impression:      Impression:  1.Distal/terminal ileitis in keeping with patient's history of Crohn's disease. No definitive active colonic inflammation identified on CT imaging. Trace free fluid in the pelvis is likely related.  2.Diffuse colonic fatty mural infiltration which can be seen in the setting of chronic inflammatory bowel disease.  3.Other incidental nonemergent findings detailed above.            Electronically Signed: Junior Khan MD    11/20/2024 8:19 AM EST    Workstation ID: [id number]    XR Chest 1 View [[id number]]  Collected: 11/19/24 1333     Updated: 11/19/24 1336    Narrative:      XR CHEST 1 VW    Date of Exam: 11/19/2024 1:19 PM EST    Indication: cp    Comparison: CT chest 11/14/2024. AP chest 10/13/2024    Findings:  No acute airspace disease. Normal heart size. No pleural effusion or pneumothorax or acute osseous abnormality. Moderate degenerative changes of the shoulders.      Impression:      Impression:  No acute chest finding.      Electronically Signed: Ute Snider MD    11/19/2024 1:34 PM EST    Workstation ID: [id number]              ASSESSMENT AND PLAN:  -Hematochezia  -Diarrhea   -Normocytic anemia with drop in hemoglobin  -Leukocytosis  -Abnormal CT showing distal/terminal ileitis and diffuse colonic fatty infiltration  -Recent Strongyloides infection in 10/2024 s/p treatment with ivermectin  -Small bowel and colonic Crohn's disease - on Rinvoq (recently restarted)  -Chest pain  -Recent fall  -Hypertension  -COPD  -CAD s/p stent placement on Plavix  -RA - on Humira and methotrexate  -History of hysterectomy  -History of cholecystectomy     PLAN:  Patient is a 74-year-old female with history of small bowel and colonic Crohn's (recently restarted on Rinvoq), rheumatoid arthritis (on Humira and methotrexate), and cholecystectomy who presented on 11/19 with increased hematochezia and found to have drop in hemoglobin to 5.7.  She has recently been admitted and discharged 11/10/2024.  Was also here in October when she was diagnosed with Strongyloides and treated with ivermectin.     Patient notes increase in bright red blood per rectum.  Having 5-6 bowel movements per day containing bright red blood.  Found to have hemoglobin 5.7 on admission.  Has been transfused and hemoglobin up to 9.1.  She originally did better on prednisone which she had been tapering.  She is on Humira and methotrexate.  Also recently restarted Rinvoq just within the past couple days.  Continue to monitor H&H and transfuse as needed.  She  will continue with the Rinvoq which was recently restarted.  She has been started on Solu-Medrol here and this will be continued.  As an outpatient if she is not responding to Rinvoq, there was discussion of starting Skyrizi.  Continue supportive care and advance diet today.       I discussed the patients findings and my recommendations with the patient.  Anabella Pressley, APRN  11/20/24  10:16 EST

## 2024-11-20 NOTE — PLAN OF CARE
Goal Outcome Evaluation:  Plan of Care Reviewed With: patient        Progress: no change  Outcome Evaluation: Pt has multiple loose bloody stools with deep red color and few clots. Pt experiencing abdominal pain and bowl sounds hyperactive. Pt NPO in preparation for GI consult in AM. CT of abdomen pelvis obtained pending interpretation. Confused but easily re-oriented. Call light within reach, care continues.         Problem: Adult Inpatient Plan of Care  Goal: Plan of Care Review  Outcome: Progressing  Flowsheets (Taken 11/20/2024 0359)  Progress: no change  Outcome Evaluation: Pt has multiple loose bloody stools with deep red color and few clots. Pt experiencing abdominal pain and bowl sounds hyperactive. Pt NPO in preparation for GI consult in AM. CT of abdomen pelvis obtained pending interpretation. Confused but easily re-oriented. Call light within reach, care continues.  Plan of Care Reviewed With: patient  Goal: Patient-Specific Goal (Individualized)  Outcome: Progressing  Goal: Absence of Hospital-Acquired Illness or Injury  Outcome: Progressing  Intervention: Identify and Manage Fall Risk  Recent Flowsheet Documentation  Taken 11/20/2024 0200 by Yoli Purcell, RN  Safety Promotion/Fall Prevention:   activity supervised   assistive device/personal items within reach   clutter free environment maintained   fall prevention program maintained   nonskid shoes/slippers when out of bed   room organization consistent   safety round/check completed  Taken 11/20/2024 0000 by Yoli Purcell, RN  Safety Promotion/Fall Prevention:   activity supervised   assistive device/personal items within reach   clutter free environment maintained   fall prevention program maintained   nonskid shoes/slippers when out of bed   room organization consistent   safety round/check completed  Taken 11/19/2024 2200 by Yoli Purcell, RN  Safety Promotion/Fall Prevention:   activity supervised   assistive device/personal items within reach   clutter  free environment maintained   fall prevention program maintained   nonskid shoes/slippers when out of bed   room organization consistent   safety round/check completed  Taken 11/19/2024 2111 by Yoli Purcell RN  Safety Promotion/Fall Prevention:   activity supervised   assistive device/personal items within reach   clutter free environment maintained   fall prevention program maintained   nonskid shoes/slippers when out of bed   room organization consistent   safety round/check completed  Intervention: Prevent Skin Injury  Recent Flowsheet Documentation  Taken 11/19/2024 2300 by Yoli Purcell RN  Body Position:   turned   left  Taken 11/19/2024 2111 by Yoli Purcell RN  Body Position:   turned   right  Intervention: Prevent Infection  Recent Flowsheet Documentation  Taken 11/20/2024 0200 by Yoli Purcell RN  Infection Prevention:   hand hygiene promoted   personal protective equipment utilized   rest/sleep promoted   single patient room provided  Taken 11/20/2024 0000 by Yoli Purcell RN  Infection Prevention:   hand hygiene promoted   personal protective equipment utilized   rest/sleep promoted   single patient room provided  Taken 11/19/2024 2200 by Yoli Purcell RN  Infection Prevention:   hand hygiene promoted   personal protective equipment utilized   rest/sleep promoted   single patient room provided  Taken 11/19/2024 2111 by Yoli Purcell RN  Infection Prevention:   hand hygiene promoted   personal protective equipment utilized   rest/sleep promoted   single patient room provided  Goal: Optimal Comfort and Wellbeing  Outcome: Progressing  Intervention: Provide Person-Centered Care  Recent Flowsheet Documentation  Taken 11/20/2024 0000 by Yoli Purcell RN  Trust Relationship/Rapport:   care explained   thoughts/feelings acknowledged  Taken 11/19/2024 2111 by Yoli Purcell RN  Trust Relationship/Rapport:   care explained   thoughts/feelings acknowledged  Goal: Readiness for Transition of Care  Outcome: Progressing     Problem:  Comorbidity Management  Goal: Maintenance of COPD Symptom Control  Outcome: Progressing  Intervention: Maintain COPD (Chronic Obstructive Pulmonary Disease) Symptom Control  Recent Flowsheet Documentation  Taken 11/20/2024 0200 by Yoli Purcell RN  Medication Review/Management: medications reviewed  Taken 11/20/2024 0000 by Yoli Purcell RN  Medication Review/Management: medications reviewed  Taken 11/19/2024 2200 by Yoli Purcell RN  Medication Review/Management: medications reviewed  Taken 11/19/2024 2111 by Yoli Purcell RN  Medication Review/Management: medications reviewed  Goal: Maintenance of Heart Failure Symptom Control  Outcome: Progressing  Intervention: Maintain Heart Failure Management  Recent Flowsheet Documentation  Taken 11/20/2024 0200 by Yoli Purcell RN  Medication Review/Management: medications reviewed  Taken 11/20/2024 0000 by Yoli Purcell RN  Medication Review/Management: medications reviewed  Taken 11/19/2024 2200 by Yoli Purcell RN  Medication Review/Management: medications reviewed  Taken 11/19/2024 2111 by Yoli Purcell RN  Medication Review/Management: medications reviewed  Goal: Blood Pressure in Desired Range  Outcome: Progressing  Intervention: Maintain Blood Pressure Management  Recent Flowsheet Documentation  Taken 11/20/2024 0200 by Yoli Purcell RN  Medication Review/Management: medications reviewed  Taken 11/20/2024 0000 by Yoli Purcell RN  Medication Review/Management: medications reviewed  Taken 11/19/2024 2200 by Yoli Purcell RN  Medication Review/Management: medications reviewed  Taken 11/19/2024 2111 by Yoli Purcell RN  Medication Review/Management: medications reviewed  Goal: Maintenance of Osteoarthritis Symptom Control  Outcome: Progressing  Intervention: Maintain Osteoarthritis Symptom Control  Recent Flowsheet Documentation  Taken 11/20/2024 0200 by Yoli Purcell RN  Medication Review/Management: medications reviewed  Taken 11/20/2024 0000 by Yoli Purcell RN  Medication Review/Management:  medications reviewed  Taken 11/19/2024 2200 by Yoli Purcell RN  Medication Review/Management: medications reviewed  Taken 11/19/2024 2111 by Yoli Purcell RN  Medication Review/Management: medications reviewed     Problem: Skin Injury Risk Increased  Goal: Skin Health and Integrity  Outcome: Progressing  Intervention: Optimize Skin Protection  Recent Flowsheet Documentation  Taken 11/19/2024 2300 by Yoli Purcell RN  Head of Bed (HOB) Positioning: HOB elevated  Taken 11/19/2024 2111 by Yoli Purcell RN  Head of Bed (HOB) Positioning: HOB at 20-30 degrees     Problem: Gastrointestinal Bleeding  Goal: Optimal Coping with Acute Illness  Outcome: Progressing  Goal: Hemostasis  Outcome: Progressing     Problem: Pain Acute  Goal: Optimal Pain Control and Function  Outcome: Progressing  Intervention: Optimize Psychosocial Wellbeing  Recent Flowsheet Documentation  Taken 11/19/2024 2111 by Yoli Purcell RN  Diversional Activities: television  Intervention: Prevent or Manage Pain  Recent Flowsheet Documentation  Taken 11/20/2024 0200 by Yoli Purcell RN  Medication Review/Management: medications reviewed  Taken 11/20/2024 0000 by Yoli Purcell RN  Medication Review/Management: medications reviewed  Taken 11/19/2024 2200 by Yoli Purcell RN  Medication Review/Management: medications reviewed  Taken 11/19/2024 2111 by Yoli Purcell RN  Medication Review/Management: medications reviewed     Problem: Chest Pain  Goal: Resolution of Chest Pain Symptoms  Outcome: Progressing

## 2024-11-20 NOTE — CASE MANAGEMENT/SOCIAL WORK
Case Management Readmission Assessment Note    Case Management Readmission Assessment (all recorded)       Readmission Interview       [facility] Name 11/20/24 1341 11/19/24 1807          Readmission Indications    Is the patient and/or family able to complete the readmission assessment questions? Yes Yes     Is this hospitalization related to the prior hospital diagnosis? Yes  Pain Yes  GI bleed       [facility] Name 11/20/24 1341 11/19/24 1807          Recommendation for rehospitalization    Did you speak with your physician prior to coming to the hospital No No       [facility] Name 11/20/24 1341 11/19/24 1810 11/19/24 1807       Follow-up Appointments    Do you have a PCP? Yes -- Yes    Did you have an appointment with PCP after your hospitalization? No No --    Did you have an appointment with a Specialist? Yes -- --    When was your appointment scheduled? --  Doesnt Remember. -- --    Did you go to appointment? Yes  [place] was on vacation, Saw Dr. Luna. -- --    Are you current with the Pulmonary Clinic? No -- --    Are you current with the CHF Clinic? No -- --      [facility] Name 11/20/24 1341             Medications    Did you have newly prescribed medications at discharge? Yes  Aspirin and Prednisone      Did you understand the reasons for your medications at discharge and how to take them? Yes      Did you understand the side effects of your medications? Yes      Are you taking all of you prescribed medications? Yes      What pharmacy was used to fill prescription(s)? [facility]      Were medications picked up? Yes        [facility] Name 11/20/24 1341             Discharge Instructions    Did you understand your discharge instructions? Yes      Did your family/caregiver hear your instructions? Yes      Were you told to eat a special diet? Yes  Low Residue      Did you adhere to the diet? Yes      Were you given a number of someone to call if you had questions or concerns? Yes        [facility] Name 11/20/24 1341             Index discharge  location/services    Where did you go upon discharge? Home with services      Do you have supportive family or friends in the home? Yes      What services were arranged at discharge? Home Health      Home Health Service used? [facility]      Have you had a Home Health visit since discharge? Yes        Row Name 11/20/24 1341             Discharge Readiness    On a scale of 1-5 (5 being well prepared), how ready were you for discharge 5      Recommendation based on interview Education on diagnosis/self management        Row Name 11/20/24 1341             Palliative Care/Hospice    Are you current with Palliative Care? No      Are you current with Hospice Care? No        Row Name 11/20/24 1341 11/19/24 1712          Advance Directives (For Healthcare)    Pre-existing AND/MOST/POLST Order No Yes, notify physician for order     Advance Directive Status Patient has advance directive, copy requested Patient has advance directive, copy requested     Type of Advance Directive Durable power of  for health care;Health care directive/Living will Durable power of  for health care;Health care directive/Living will     Have you reviewed your Advance Directive and is it valid for this stay? Not applicable Yes     Literature Provided on Advance Directives No --     Patient Requests Assistance on Advance Directives Patient Declined --       Row Name 11/20/24 1341             Readmission Assessment Final Comments    Final Comments HX: Rheumatoid Arthritis, on Humira, + Chrons.        PREVIOUS ADMIT:  11/6- ED with abdominal pain. Blood in the stool. GI consult Stool occult negative. Low residue diet, Hmg 7.8, Cardiology Consult, not a cadidate for CABG. 11/12- Discharged home with [facility]     CURRENT ADMISSION: 11/19- ED with chest pain. Cardiology consulted Plans on having a TAVR on 11/21.

## 2024-11-20 NOTE — THERAPY EVALUATION
Patient Name: Maryann Gaitan  : 1950    MRN: [id number]                              Today's Date: 2024       Admit Date: 2024    Visit Dx:     ICD-10-CM ICD-9-CM   1. Gastrointestinal hemorrhage, unspecified gastrointestinal hemorrhage type  K92.2 578.9   2. Anemia, unspecified type  D64.9 285.9   3. Chest pain, unspecified type  R07.9 786.50     Patient Active Problem List   Diagnosis    Hyponatremia    Diarrhea    Moderate to severe mitral regurgitation    Chest pain, atypical    Cavitary lesion of lung    Multiple tracheobronchial mucus plugs    CAD, multiple vessel    Acute heart failure with preserved ejection fraction (HFpEF)    Severe mitral regurgitation    Dyslipidemia    CAD S/P percutaneous coronary angioplasty    Blood in stool    Acute kidney injury    Hyperkalemia    Leukocytosis    Acute UTI (urinary tract infection)    Colitis    Inflammatory bowel disease (Crohn's disease)    Hypothyroidism (acquired)    Anxiety associated with depression    COPD (chronic obstructive pulmonary disease)    Rheumatoid arthritis    Moderate protein-calorie malnutrition    Acute lower GI bleeding     Past Medical History:   Diagnosis Date    Arthritis     CAD (coronary artery disease)     COPD (chronic obstructive pulmonary disease)     Hypertension     Mood disorder     Thyroid disease      Past Surgical History:   Procedure Laterality Date    BRONCHOSCOPY N/A 10/16/2024    Procedure: BRONCHOSCOPY;  Surgeon: Gallo Pope MD;  Location: [facility] ENDOSCOPY;  Service: Pulmonary;  Laterality: N/A;    CARDIAC CATHETERIZATION N/A 10/15/2024    Procedure: Left Heart Cath, possible pci;  Surgeon: Travis Connor MD;  Location: [facility] CATH INVASIVE LOCATION;  Service: Cardiovascular;  Laterality: N/A;    CARDIAC CATHETERIZATION N/A 10/22/2024    Procedure: Laser Coronary Atherectomy;  Surgeon: Travis Connor MD;  Location: [facility] CATH INVASIVE LOCATION;  Service: Cardiovascular;   Laterality: N/A;    COLONOSCOPY N/A 10/12/2024    Procedure: COLONOSCOPY WITH BIOPSY AND WIRE GUIDED BALLOON DILATION OF TERMINAL ILEUM;  Surgeon: Rob Strong MD;  Location: [facility] ENDOSCOPY;  Service: Gastroenterology;  Laterality: N/A;  Colitis, crohns of terminal ileum, right colon ulcers, diverticulosis, hemorroids    ENDOSCOPY N/A 10/12/2024    Procedure: ESOPHAGOGASTRODUODENOSCOPY WITH BIOPSY X 2 AREA;  Surgeon: Rob Strong MD;  Location: [facility] ENDOSCOPY;  Service: Gastroenterology;  Laterality: N/A;  Chronic gastritis, HH    HYSTERECTOMY      LEFT HEART CATH        General Information       Row Name 11/20/24 1611          OT Time and Intention    Document Type evaluation  -DT     Mode of Treatment occupational therapy  -DT       Row Name 11/20/24 1611          General Information    Patient Profile Reviewed yes  -DT     Prior Level of Function mod assist:;ADL's;min assist:;all household mobility;community mobility  grand-daughters assist pt with bathing, dressing & toileting tasks. Pt amb using rollator. She also has a shower chair.  -DT     Existing Precautions/Restrictions fall  -DT     Barriers to Rehab medically complex;previous functional deficit;cognitive status;impaired sensation  -DT       Row Name 11/20/24 1611          Living Environment    People in Home grandchild(keegan)  -DT       Row Name 11/20/24 1611          Home Main Entrance    Number of Stairs, Main Entrance three  -DT     Stair Railings, Main Entrance railings safe and in good condition  -DT       Row Name 11/20/24 1611          Stairs Within Home, Primary    Stairs, Within Home, Primary Pt functions on main floor only  -DT     Number of Stairs, Within Home, Primary twelve  -DT       Row Name 11/20/24 1611          Cognition    Orientation Status (Cognition) oriented x 3;verbal cues/prompts needed for orientation  -DT       Row Name 11/20/24 1611          Safety Issues/Impairments Affecting Functional Mobility     Safety Issues Affecting Function (Mobility) judgment;problem-solving  -DT     Impairments Affecting Function (Mobility) cognition;balance;endurance/activity tolerance;strength;pain;sensation/sensory awareness  -DT               User Key  (r) = Recorded By, (t) = Taken By, (c) = Cosigned By      Initials Name Provider Type    DT Tiffany Mckeon, OT Occupational Therapist                     Mobility/ADL's       Row Name 11/20/24 1613          Bed Mobility    Bed Mobility bed mobility (all) activities  -DT     All Activities, [place] (Bed Mobility) minimum assist (75% patient effort)  -DT     Assistive Device (Bed Mobility) bed rails;head of bed elevated  -DT       Row Name 11/20/24 1613          Transfers    Transfers bed-chair transfer  -DT       Row Name 11/20/24 1613          Bed-Chair Transfer    Bed-Chair [place] (Transfers) moderate assist (50% patient effort)  -DT       Row Name 11/20/24 1613          Sit-Stand Transfer    Sit-Stand [place] (Transfers) moderate assist (50% patient effort)  -DT       Row Name 11/20/24 1613          Activities of Daily Living    BADL Assessment/Intervention lower body dressing;toileting  -DT       Row Name 11/20/24 1613          Lower Body Dressing Assessment/Training    [place] Level (Lower Body Dressing) lower body dressing skills;shoes/slippers;dependent (less than 25% patient effort)  -DT     Position (Lower Body Dressing) supine  -DT       Row Name 11/20/24 1613          Toileting Assessment/Training    [place] Level (Toileting) toileting skills;maximum assist (25% patient effort);perform perineal hygiene  -DT     Position (Toileting) supported standing  -DT               User Key  (r) = Recorded By, (t) = Taken By, (c) = Cosigned By      Initials Name Provider Type    DT Tiffany Mckeon, OT Occupational Therapist                   Obj/Interventions       Row Name 11/20/24 1614          Sensory Assessment (Somatosensory)     Sensory Assessment Pt c/o neuropathy on Bilateral feet.  -DT       Row Name 11/20/24 1614          Vision Assessment/Intervention    Visual Impairment/Limitations other (see comments)  Pt hypersensitive to light & wears dark lensed glasses at all times.  -DT       Row Name 11/20/24 1614          Range of Motion Comprehensive    General Range of Motion upper extremity range of motion deficits identified  -DT     Comment, General Range of Motion dec LUE shoulder flex AROM (approx 75% deficit). RUE = WFL.  -DT       Row Name 11/20/24 1614          Strength Comprehensive (MMT)    General Manual Muscle Testing (MMT) Assessment upper extremity strength deficits identified;lower extremity strength deficits identified  -DT     Comment, General Manual Muscle Testing (MMT) Assessment RUE= 3+/5, LUE shoulder = 2/5; elbow & grasp = 3+/5.  -DT       Row Name 11/20/24 1614          Motor Skills    Motor Skills functional endurance  -DT     Functional Endurance fair-  -DT       Row Name 11/20/24 1614          Balance    Balance Assessment sitting static balance;sitting dynamic balance;standing static balance;standing dynamic balance  -DT     Static Sitting Balance standby assist  -DT     Dynamic Sitting Balance contact guard  -DT     Position, Sitting Balance sitting edge of bed  -DT     Static Standing Balance minimal assist  -DT     Dynamic Standing Balance moderate assist  -DT               User Key  (r) = Recorded By, (t) = Taken By, (c) = Cosigned By      Initials Name Provider Type    DT Tiffany Mckeon, OT Occupational Therapist                   Goals/Plan       Row Name 11/20/24 1634          Transfer Goal 1 (OT)    Activity/Assistive Device (Transfer Goal 1, OT) transfers, all  -DT     [place] Level/Cues Needed (Transfer Goal 1, OT) contact guard required  -DT     Time Frame (Transfer Goal 1, OT) 2 weeks  -DT       Row Name 11/20/24 1634          Dressing Goal 1 (OT)    Activity/Device (Dressing Goal 1, OT)  lower body dressing  -DT     [place]/Cues Needed (Dressing Goal 1, OT) moderate assist (50-74% patient effort)  -DT     Time Frame (Dressing Goal 1, OT) 2 weeks  -DT       Row Name 11/20/24 1632          Toileting Goal 1 (OT)    Activity/Device (Toileting Goal 1, OT) toileting skills, all  -DT     [place] Level/Cues Needed (Toileting Goal 1, OT) moderate assist (50-74% patient effort)  -DT     Time Frame (Toileting Goal 1, OT) 2 weeks  -DT       Row Name 11/20/24 1637          Grooming Goal 1 (OT)    Activity/Device (Grooming Goal 1, OT) grooming skills, all  -DT     [place] (Grooming Goal 1, OT) modified independence  -DT     Time Frame (Grooming Goal 1, OT) 2 weeks  -DT       Row Name 11/20/24 1660          Therapy Assessment/Plan (OT)    Planned Therapy Interventions (OT) activity tolerance training;BADL retraining;cognitive/visual perception retraining;functional balance retraining;neuromuscular control/coordination retraining;occupation/activity based interventions;ROM/therapeutic exercise;patient/caregiver education/training;strengthening exercise;transfer/mobility retraining  -DT               User Key  (r) = Recorded By, (t) = Taken By, (c) = Cosigned By      Initials Name Provider Type    DT Tiffany Mckeon, OT Occupational Therapist                   Clinical Impression       Row Name 11/20/24 0499          Pain Assessment    Pretreatment Pain Rating 8/10  -DT     Posttreatment Pain Rating 8/10  -DT     Pain Location buttock;foot  -DT     Pain Side/Orientation bilateral  -DT     Pain Management Interventions nursing notified;activity modification encouraged  -DT     Response to Pain Interventions activity participation with tolerable pain  -DT       Row Name 11/20/24 6296          Plan of Care Review    Plan of Care Reviewed With patient  -DT     Outcome Evaluation Pt is a 74 y.o. female with a CMH of close disease, CAD, COPD, hypertension, hypothyroidism who presents to the hospital  with complaints of abdominal pain, chest pain, shortness of breath and rectal bleeding. Pt had Hgb of 5.7 upon admission. Hgb this date = 9.1 after blood transfusions.The patient was recently admitted to the hospital on 11/6 for mild rectal bleeding and was found to have an acute Crohn's exacerbation. At baseline, pt lives with grand-daughters in home with 3 RICKEY. Her family is home 24/7 with pt & assists her with bathing, dressing & toileting ADLs. She ambulates using a RW with assist at times. Upon eval, pt is A&O x4 with v.c. She is currently requiring max A for LB ADLs, toileting hygiene & mod A for ADL transfers. She presents with dec activity tolerance & dec standing balance & endurance required for ADLs & mobility. She also had s/s of orthostatic hypotension with sit>stand transition.  OT will continue to follow for tx. Anticipate pt will continue to progress to be safe to return home with 24/7 assist & home health from family as before.  -DT       Row Name 11/20/24 1632          Therapy Assessment/Plan (OT)    Rehab Potential (OT) good  -DT     Criteria for Skilled Therapeutic Interventions Met (OT) yes;meets criteria;skilled treatment is necessary  -DT     Therapy Frequency (OT) 3 times/wk  -DT     Predicted Duration of Therapy Intervention (OT) until d/c  -DT       Row Name 11/20/24 1632          Therapy Plan Review/Discharge Plan (OT)    Anticipated Discharge Disposition (OT) home with 24/7 care;home with home health  -DT       Row Name 11/20/24 1632          Vital Signs    Pre Systolic BP Rehab 146  -DT     Pre Treatment Diastolic BP 72  -DT     Intra Systolic BP Rehab 141  -DT     Intra Treatment Diastolic BP 88  -DT     Post Systolic BP Rehab 121  -DT     Post Treatment Diastolic BP 79  -DT     Pretreatment Heart Rate (beats/min) 64  -DT     Intratreatment Heart Rate (beats/min) 76  -DT     Posttreatment Heart Rate (beats/min) 66  -DT     Pre SpO2 (%) 100  -DT     O2 Delivery Pre Treatment room air  -DT      Intra SpO2 (%) 99  -DT     O2 Delivery Intra Treatment room air  -DT       Row Name 11/20/24 1632          Positioning and Restraints    Pre-Treatment Position in bed  -DT     Post Treatment Position chair  -DT     In Chair notified nsg;sitting;call light within reach;encouraged to call for assist;exit alarm on;legs elevated  -DT               User Key  (r) = Recorded By, (t) = Taken By, (c) = Cosigned By      Initials Name Provider Type     Tiffany Mckeon, OT Occupational Therapist                   Outcome Measures       Row Name 11/20/24 1608 11/20/24 0825       How much help from another person do you currently need...    Turning from your back to your side while in flat bed without using bedrails? 4  -AH 4  -HJ    Moving from lying on back to sitting on the side of a flat bed without bedrails? 3  -AH 3  -HJ    Moving to and from a bed to a chair (including a wheelchair)? 3  -AH 3  -HJ    Standing up from a chair using your arms (e.g., wheelchair, bedside chair)? 3  - 3  -HJ    Climbing 3-5 steps with a railing? 2  -AH 2  -HJ    To walk in hospital room? 2  -AH 3  -HJ    AM-PAC 6 Clicks Score (PT) 17  - 18  -HJ    Highest Level of Mobility Goal 5 --> Static standing  - 6 --> Walk 10 steps or more  -      Row Name 11/20/24 1608          Functional Assessment    Outcome Measure Options AM-PAC 6 Clicks Basic Mobility (PT)  -               User Key  (r) = Recorded By, (t) = Taken By, (c) = Cosigned By      Initials Name Provider Type     Britney Perez RN Registered Nurse    Conchita Valle, PT Physical Therapist                    Occupational Therapy Education       Title: PT OT SLP Therapies (Done)       Topic: Occupational Therapy (Done)       Point: ADL training (Done)       Description:   Instruct learner(s) on proper safety adaptation and remediation techniques during self care or transfers.   Instruct in proper use of assistive devices.                  Learning Progress  Summary            Patient Acceptance, E,TB, VU by DT at 11/20/2024 1635    Comment: Role of OT,goals & POC; safety awareness.                      Point: Precautions (Done)       Description:   Instruct learner(s) on prescribed precautions during self-care and functional transfers.                  Learning Progress Summary            Patient Acceptance, E,TB, VU by DT at 11/20/2024 1635    Comment: Role of OT,goals & POC; safety awareness.                      Point: Body mechanics (Done)       Description:   Instruct learner(s) on proper positioning and spine alignment during self-care, functional mobility activities and/or exercises.                  Learning Progress Summary            Patient Acceptance, E,TB, VU by DT at 11/20/2024 1635    Comment: Role of OT,goals & POC; safety awareness.                                      User Key       Initials Effective Dates Name Provider Type Discipline    DT 07/11/23 -  Tiffany Mckeon, OT Occupational Therapist OT                  OT Recommendation and Plan  Planned Therapy Interventions (OT): activity tolerance training, BADL retraining, cognitive/visual perception retraining, functional balance retraining, neuromuscular control/coordination retraining, occupation/activity based interventions, ROM/therapeutic exercise, patient/caregiver education/training, strengthening exercise, transfer/mobility retraining  Therapy Frequency (OT): 3 times/wk  Plan of Care Review  Plan of Care Reviewed With: patient  Outcome Evaluation: Pt is a 74 y.o. female with a CMH of close disease, CAD, COPD, hypertension, hypothyroidism who presents to the hospital with complaints of abdominal pain, chest pain, shortness of breath and rectal bleeding. Pt had Hgb of 5.7 upon admission. Hgb this date = 9.1 after blood transfusions.The patient was recently admitted to the hospital on 11/6 for mild rectal bleeding and was found to have an acute Crohn's exacerbation. At baseline, pt  lives with grand-daughters in home with 3 RICKEY. Her family is home 24/7 with pt & assists her with bathing, dressing & toileting ADLs. She ambulates using a RW with assist at times. Upon eval, pt is A&O x4 with v.c. She is currently requiring max A for LB ADLs, toileting hygiene & mod A for ADL transfers. She presents with dec activity tolerance & dec standing balance & endurance required for ADLs & mobility. She also had s/s of orthostatic hypotension with sit>stand transition.  OT will continue to follow for tx. Anticipate pt will continue to progress to be safe to return home with 24/7 assist & home health from family as before.     Time Calculation:         Time Calculation- OT       Row Name 11/20/24 1635             Time Calculation- OT    OT Start Time 1109  -DT      OT Stop Time 1143  -DT      OT Time Calculation (min) 34 min  -DT      OT Received On 11/20/24  -DT      OT - Next Appointment 11/21/24  -DT      OT Goal Re-Cert Due Date 11/22/24  -DT                User Key  (r) = Recorded By, (t) = Taken By, (c) = Cosigned By      Initials Name Provider Type    Tiffany Alexander, OT Occupational Therapist                           Tiffany Mckeon OT  11/20/2024

## 2024-11-20 NOTE — PROGRESS NOTES
[facility] MEDICINE SERVICE  DAILY PROGRESS NOTE    NAME: Maryann Gaitan  : 1950  MRN: [id number]      LOS: 1 day     PROVIDER OF SERVICE: Foreign Benson MD    Chief Complaint: Acute lower GI bleeding    Subjective:     Interval History:  History taken from: patient    History of Present Illness: Maryann Gaitan is a 74 y.o. female with a CMH of close disease, CAD, COPD, hypertension, hypothyroidism who presents to the hospital with complaints of abdominal pain, chest pain, shortness of breath and rectal bleeding.  The patient was recently admitted to the hospital on  for mild rectal bleeding and was found to have an acute Crohn's exacerbation.  She was discharged on a prolonged steroid taper however she states that she still has some abdominal pain and was not feeling well on discharge.  She did have a small drop in her hemoglobin prior to discharge during her previous hospital stay but her hemoglobin was 8.4 on the day of discharge.  Over the last few days she has continued to have worsening lower abdominal pain with increased chest pain and shortness of breath and much more rectal bleeding than before.  Of note, the patient did have LHC in 2024 with PCI to ostial lesion and was placed on dual antiplatelet therapy with aspirin and Plavix.  This was continued from her previous hospitalization despite her mild rectal bleeding, given the increased risk for stent thrombosis with sudden discontinuation of DAPT.  She denies any headaches but does complain of dizziness, lightheadedness and nausea.     2024 patient seen and examined medical distress, vital signs stable, going for TEVAR in am.  Discussed with RN.      Review of Systems  10 point ROS negative except for above  Objective:     Vital Signs  Temp:  [97.5 °F (36.4 °C)-98.6 °F (37 °C)] 98.4 °F (36.9 °C)  Heart Rate:  [54-68] 60  Resp:  [15-26] 18  BP: (114-150)/(49-82) 150/82   Body mass index is 17.13 kg/m².    Physical  Exam  General Appearance:  Alert, cooperative, no distress, appears stated age  Head:   Normocephalic, without obvious abnormality, atraumatic  Eyes:   PERRL, conjunctiva/corneas clear, EOM's intact, fundi benign, both eyes  Ears:    Normal TM's and external ear canals, both ears  Nose:Nares normal, septum midline, mucosa normal, no drainage or sinus tenderness  Throat:Lips, mucosa, and tongue normal; teeth and gums normal  Neck:Supple, symmetrical, trachea midline, no adenopathy, thyroid: not enlarged, symmetric, no tenderness/mass/nodules, no carotid bruit or JVD  Lungs:             Clear to auscultation bilaterally, respirations unlabored  Heart:  Regular rate and rhythm, S1, S2 normal, no murmur, rub or gallop  Abdomen:  Soft, non-tender, bowel sounds active all four quadrants,  no masses, no organomegaly  Extremities:Extremities normal, atraumatic, no cyanosis or edema  Pulses:2+ and symmetric  Skin:Skin color, texture, turgor normal, no rashes or lesions  Neurologic: Normal        Diagnostic Data    Results from last 7 days   Lab Units 11/20/24  0246 11/19/24  1933 11/19/24  1246   WBC 10*3/mm3 12.18*  --  16.93*   HEMOGLOBIN g/dL 9.1*   < > 5.7*   HEMATOCRIT % 29.5*   < > 18.9*   PLATELETS 10*3/mm3 369  --  435   GLUCOSE mg/dL 64*  --  77   CREATININE mg/dL 0.59  --  0.74   BUN mg/dL 19  --  28*   SODIUM mmol/L 137  --  138   POTASSIUM mmol/L 3.8  --  4.3   AST (SGOT) U/L  --   --  19   ALT (SGPT) U/L  --   --  35*   ALK PHOS U/L  --   --  64   BILIRUBIN mg/dL  --   --  0.3   ANION GAP mmol/L 7.7  --  9.8    < > = values in this interval not displayed.       CT Abdomen Pelvis With Contrast    Result Date: 11/20/2024  Impression: 1.Distal/terminal ileitis in keeping with patient's history of Crohn's disease. No definitive active colonic inflammation identified on CT imaging. Trace free fluid in the pelvis is likely related. 2.Diffuse colonic fatty mural infiltration which can be seen in the setting of chronic  inflammatory bowel disease. 3.Other incidental nonemergent findings detailed above. Electronically Signed: Junior Khan MD  11/20/2024 8:19 AM EST  Workstation ID: [id number]    XR Chest 1 View    Result Date: 11/19/2024  Impression: No acute chest finding. Electronically Signed: Ute Snider MD  11/19/2024 1:34 PM EST  Workstation ID: [id number]       I reviewed the patient's new clinical results.    Assessment/Plan:     Active and Resolved Problems  Active Hospital Problems    Diagnosis  POA    **Acute lower GI bleeding [K92.2]  Yes      Resolved Hospital Problems   No resolved problems to display.       Acute lower GI bleed/abdominal pain and rectal bleed.  -Concerned that the patient  may have persistent Crohn's exacerbation causing persistent inflammation and lower GI bleeding  -Check CT of the abdomen  -Reinitiate IV steroids  -Patient has significant blood loss related to this and will require PRBC transfusion  -Hold aspirin and Plavix today and consult cardiology for further assistance and management  -Continue PPI  GI was recommending holding Plavix.  Patient unfortunately is in a tough situation.  Had drug-eluting stents done 10/22/2024, hardly 2 weeks ago.  Would benefit from Plavix to prevent stent thrombosis.  Patient has been receiving Plavix uninterrupted.  Is without chest pain or shortness of breath.     Acute blood loss anemia  -Secondary to GI bleed as above  -Transfused 2 units PRBC  -GI consult  -Holding DAPT as above temporarily     Hypertension  -Resume home metoprolol at lower dose but hold other medications in the setting of GI bleed with potential for hypotension     CAD  -Patient has previous recent history of PCI with stent placement and was on DAPT although this is likely exacerbating her GI bleed  -Holding DAPT  -Continue statin for now     Hypothyroidism  -Resume home Synthroid     VTE Prophylaxis:  Mechanical VTE prophylaxis orders are present.    Disposition Planning:     Barriers to  Discharge:GI bleed    Anticipated Date of Discharge: 11/22  Place of Discharge: home      Time: 34 minutes     There are no questions and answers to display.       Signature: Electronically signed by Foreign Benson MD, 11/20/24, 13:09 EST.  [facility] Hospitalist Team   How Severe Is It?: severe Is This A New Presentation, Or A Follow-Up?: Follow Up Isotretinoin

## 2024-11-20 NOTE — NURSING NOTE
WOCN note:    74 yr old female admitted 11/19/24 with reports of abdominal pain and bloody stools. Patient has a hx of CAD, COPD, HTN and hypothyroidism. She is known to this service from recent admissions. WOCN consult received for a sacral pressure injury noted upon admission.     Chart and photos reviewed from previous admission on 11/6/24. The sacral wound is smaller in size and there is less slough noted in the wound bed. The wound measures approximately 0.7 x 0.4 cm. It was cleansed with NS and covered with a silicone foam dressing. The patient is incontinent of urine and stool so may need to cover with Calazime zinc barrier paste.   Patient also presents with a healing wound under her left breast. The wound is healed to skin surface but the base remains covered with a thin yellow slough. Scant clear exudate noted on the old dressing. The wound measures about 1.5 x 1 cm. It was cleansed with NS and covered with a silicone foam dressing.   Partial thickness abrasions noted to both knees that patient reports is from a fall at home. Recommend to keep clean and dry.     The patient is able to turn independently in bed but is confused and will need frequently reminders for position changes. Recommend to change left breast dressing every 3 days. Would also benefit from a waffle cushion when up to the chair. We will continue to follow as needed.

## 2024-11-20 NOTE — PLAN OF CARE
Goal Outcome Evaluation:  Plan of Care Reviewed With: patient        Progress: no change  Outcome Evaluation: Patient remains confused at times, easily reoriented. GI seen and no interventions at this time. Patient tolerating diet with no complaints of pain. No stools thus far. NPO at MN for MVR. Patient sitting up in chair. Fall precautions remain in place and call light within reach.

## 2024-11-20 NOTE — PLAN OF CARE
Goal Outcome Evaluation:  Plan of Care Reviewed With: patient           Outcome Evaluation: Pt is a 73 y/o female who presented to [facility] on 11/19/24 with acute lower GI bleed, anemia, and chest pain. CMHx including recurrent anemia, HFpEF, leaky mitral valve, CAD, UTI, IBS, RA, and anxiety.  She has a painful coccyx wound. Pt lives at home with granddtrs in a multilevel home with all needs on main level, with 3 RICKEY. Family assists with most ADLs. pt is ambulatory with RW but reports hx of multiple recent falls. She is pleasant and agreeable today, noted to be slightly confused. She presents with deficits in endurance, transfers, balance, pain, strength, ROM, and sensation. She requires min/modA for mobility with HHA provided for sit to stand and bed to chair. She's only able to take steps from bed to chair today due to orthostatic BP, with pt symptomatic (dizziness). She also c/o dyspnea at rest and with exertion. Pt is on RA with O2 sats %.  Pt needs acute care PT to address deficits. Once she's medically stable, pt will be safe to return home with 24/7 care and home health therapy. PT will follow.    Anticipated Discharge Disposition (PT): home with home health, home with 24/7 care

## 2024-11-20 NOTE — CASE MANAGEMENT/SOCIAL WORK
Continued Stay Note  ERIC Yeung     Patient Name: Maryann Gaitan  MRN: [id number]  Today's Date: 11/20/2024    Admit Date: 11/19/2024    Plan: DC PLAN: From routine home. Current with [facility] (will need LETHA)       Discharge Plan       Row Name 11/20/24 1402       Plan    Plan DC PLAN: From routine home. Current with [facility] (will need LETHA)    Patient/Family in Agreement with Plan yes    Plan Comments Recieved message from [facility], patient is current with home health. Will send DCP report. Will need LETHA order. . Will be getting TAVR tomorrow 11/21.                      Expected Discharge Date and Time       Expected Discharge Date Expected Discharge Time    Nov 21, 2024           Arianne Hendricks RN    Case Management  [phone]

## 2024-11-20 NOTE — DISCHARGE PLACEMENT REQUEST
"Bhargavi Gaitan (74 y.o. Female)       Date of Birth   1950    Social Security Number       Address   [address] [place] [address] [place] [facility]    Home Phone   [phone]    MRN   [id number]       Nondenominational   None    Marital Status                               Admission Date   11/19/24    Admission Type   Emergency    Admitting Provider   El Childress MD    Attending Provider   Foreign Benson MD    Department, Room/Bed   [facility] 2D, 262/1       Discharge Date       Discharge Disposition       Discharge Destination                                 Attending Provider: Foreign Benson MD    Allergies: Codeine, Penicillin G Sodium    Isolation: None   Infection: None   Code Status: Prior    Ht: 162.6 cm (64.02\")   Wt: 45.3 kg (99 lb 13.9 oz)    Admission Cmt: None   Principal Problem: Acute lower GI bleeding [K92.2]                   Active Insurance as of 11/19/2024       Primary Coverage       Payor Plan Insurance Group Employer/Plan Group    HUMANA MEDICARE REPLACEMENT HUMANA MED ADV SNP HMO [id number]       Payor Plan Address Payor Plan Phone Number Payor Plan Fax Number Effective Dates    [address] [address] [phone]  4/1/2024 - None Entered    [facility] [id number]         Subscriber Name Subscriber Birth Date Member ID       BHARGAVI GAITAN 1950 [id number]                     Emergency Contacts        (Rel.) Home Phone Work Phone Mobile Phone    Nadir Gaitan (Son) -- -- [phone]    Sania Celeste (Grandchild) -- -- [phone]    Hector [place] (Grandchild) -- -- [phone]            "

## 2024-11-20 NOTE — CONSULTS
Nutrition Services    Patient Name: Maryann Gaitan  YOB: 1950  MRN: [id number]  Admission date: 11/19/2024    Comment:  -- Continue current diet and encourage good PO intakes    -- Add Boost Original BID (Provides 480 kcals, 20 g protein if consumed)     -- Moderate chronic disease related malnutrition related to diminished appetite and likely malabsorption with diarrhea/Crohns Dz x 3 months as evidenced by weight loss greater than 5% in one month, with intakes meeting less than 75% estimated needs for greater than 1 month and moderate muscle and fat wasting per NFPE.  See MSA below.        CLINICAL NUTRITION ASSESSMENT      Reason for Assessment 11/20: MST of 5, Consult for Nursing Admission Screen, BMI less than 19, history of malnutrition      H&P      Past Medical History:   Diagnosis Date    Arthritis     CAD (coronary artery disease)     COPD (chronic obstructive pulmonary disease)     Hypertension     Mood disorder     Thyroid disease        Past Surgical History:   Procedure Laterality Date    BRONCHOSCOPY N/A 10/16/2024    Procedure: BRONCHOSCOPY;  Surgeon: Gallo Pope MD;  Location: [facility] ENDOSCOPY;  Service: Pulmonary;  Laterality: N/A;    CARDIAC CATHETERIZATION N/A 10/15/2024    Procedure: Left Heart Cath, possible pci;  Surgeon: Travis Connor MD;  Location: [facility] CATH INVASIVE LOCATION;  Service: Cardiovascular;  Laterality: N/A;    CARDIAC CATHETERIZATION N/A 10/22/2024    Procedure: Laser Coronary Atherectomy;  Surgeon: Travis Connor MD;  Location: [facility] CATH INVASIVE LOCATION;  Service: Cardiovascular;  Laterality: N/A;    COLONOSCOPY N/A 10/12/2024    Procedure: COLONOSCOPY WITH BIOPSY AND WIRE GUIDED BALLOON DILATION OF TERMINAL ILEUM;  Surgeon: Rob Strong MD;  Location: [facility] ENDOSCOPY;  Service: Gastroenterology;  Laterality: N/A;  Colitis, crohns of terminal ileum, right colon ulcers, diverticulosis, hemorroids    ENDOSCOPY N/A  "10/12/2024    Procedure: ESOPHAGOGASTRODUODENOSCOPY WITH BIOPSY X 2 AREA;  Surgeon: Rob Strong MD;  Location: [facility] ENDOSCOPY;  Service: Gastroenterology;  Laterality: N/A;  Chronic gastritis, HH    HYSTERECTOMY      LEFT HEART CATH          Current Problems   Acute lower GI bleed  Acute blood loss anemia  -GI following     Hypertension     CAD     Hypothyroidism       Encounter Information        Trending Narrative     11/20: Admitted for abdominal pain, rectal bleeding and SOB.  RD visited patient at bedside.  Patient reports a decrease in appetite, no N/V, no chewing/swallowing issues noted.  NFPE completed, consistent with nutrition diagnosis of malnutrition using AND/ASPEN criteria. See MSA below.         Anthropometrics        Current Height, Weight Height: 162.6 cm (64.02\")  Weight: 45.3 kg (99 lb 13.9 oz) (11/19/24 1551)       Usual Body Weight (UBW) Patient reports 158# but unsure of validity        Trending Weight Hx     This admission: 11/20: 99#             PTA: 18% weight loss x 1.5 months     Wt Readings from Last 30 Encounters:   11/19/24 1551 45.3 kg (99 lb 13.9 oz)   11/19/24 1207 45.4 kg (100 lb)   11/13/24 1312 54.9 kg (121 lb)   11/06/24 1426 52.6 kg (115 lb 15.4 oz)   10/30/24 1346 52.6 kg (116 lb)   10/12/24 1153 57.6 kg (127 lb)   10/12/24 0757 57.6 kg (127 lb)   10/11/24 0420 58 kg (127 lb 13.9 oz)   10/10/24 0503 58.1 kg (128 lb 1.4 oz)   10/08/24 1956 54.9 kg (121 lb 0.5 oz)   10/08/24 0828 54.9 kg (121 lb)      BMI kg/m2 Body mass index is 17.13 kg/m².       Labs        Pertinent Labs    Results from last 7 days   Lab Units 11/20/24  0246 11/19/24  1246   SODIUM mmol/L 137 138   POTASSIUM mmol/L 3.8 4.3   CHLORIDE mmol/L 103 106   CO2 mmol/L 26.3 22.2   BUN mg/dL 19 28*   CREATININE mg/dL 0.59 0.74   CALCIUM mg/dL 8.7 8.7   BILIRUBIN mg/dL  --  0.3   ALK PHOS U/L  --  64   ALT (SGPT) U/L  --  35*   AST (SGOT) U/L  --  19   GLUCOSE mg/dL 64* 77     Results from last 7 days "   Lab Units 11/20/24  0246   MAGNESIUM mg/dL 2.0   HEMOGLOBIN g/dL 9.1*   HEMATOCRIT % 29.5*     Lab Results   Component Value Date    HGBA1C 5.64 (H) 10/13/2024        Medications    Scheduled Medications atorvastatin, 40 mg, Oral, Nightly  folic acid, 1,000 mcg, Oral, Daily  levothyroxine, 50 mcg, Oral, Daily  methylPREDNISolone sodium succinate, 20 mg, Intravenous, Q8H  metoprolol tartrate, 25 mg, Oral, BID  midodrine, 10 mg, Oral, TID AC  pantoprazole, 40 mg, Oral, Daily  sertraline, 50 mg, Oral, Daily  sodium chloride, 10 mL, Intravenous, Q12H        Infusions      PRN Medications   senna-docusate sodium **AND** polyethylene glycol **AND** bisacodyl **AND** bisacodyl    nitroglycerin    [COMPLETED] Insert Peripheral IV **AND** sodium chloride    sodium chloride    sodium chloride     Physical Findings        Trending Physical   Appearance, NFPE 11/20: NFPE completed, consistent with nutrition diagnosis of malnutrition using AND/ASPEN criteria. See MSA below.      --  Edema  1+ legs, ankles, feet      Bowel Function Last documented BM 11/20 (today)     Tubes No feeding tube      Chewing/Swallowing No known issues      Skin Per WOCN note 11/20:   - healing wound under her left breast   - partial thickness abrasions noted to both knees      --  Current Nutrition Orders & Evaluation of Intake       Oral Nutrition     Food Allergies NKFA   Current PO Diet Diet: Gastrointestinal; Fiber-Restricted; Fluid Consistency: Thin (IDDSI 0)   Supplement None ordered    PO Evaluation     Trending % PO Intake 11/20: None documented    --  Nutritional Risk Screening        NRS-2002 Score          Nutrition Diagnosis         Nutrition Dx Problem 1 Moderate chronic disease related malnutrition related to diminished appetite and likely malabsorption with diarrhea/Crohns Dz x 3 months as evidenced by weight loss greater than 5% in one month, with intakes meeting less than 75% estimated needs for greater than 1 month and moderate  muscle and fat wasting per NFPE.       Nutrition Dx Problem 2        Intervention Goal         Intervention Goal(s) Accept ONS  No weight loss  PO intakes at least 60%     Nutrition Intervention        RD Action Add ONS  Completed NFPE     Nutrition Prescription          Diet Prescription Fiber Restricted    Supplement Prescription Boost Plus BID   --  Monitor/Evaluation        Monitor Per protocol, I&O, PO intake, Supplement intake, Pertinent labs, Weight     Malnutrition Severity Assessment      Patient meets criteria for : Moderate (non-severe) Malnutrition  Malnutrition Type (Last 8 Hours)       Malnutrition Severity Assessment       Row Name 11/20/24 1250       Malnutrition Severity Assessment    Malnutrition Type Chronic Disease - Related Malnutrition      Row Name 11/20/24 1250       Insufficient Energy Intake     Insufficient Energy Intake Findings Severe    Insufficient Energy Intake  <75% of est. energy requirement for > or equal to 1 month      Row Name 11/20/24 1250       Unintentional Weight Loss     Unintentional Weight Loss Findings Severe    Unintentional Weight Loss  Weight loss of 7.5% in three months      Row Name 11/20/24 1250       Muscle Loss    Loss of Muscle Mass Findings Moderate    [place] Region Moderate - slight depression    Clavicle Bone Region Moderate - some protrusion in females, visible in males    Acromion Bone Region Moderate - acromion may slightly protrude    Dorsal Hand Region Moderate - slight depression    Patellar Region Moderate - patella more prominent, less muscle definition around patella    Anterior Thigh Region Moderate - mild depression on inner thigh    Posterior Calf Region Moderate - some roundness, slight firmness      Row Name 11/20/24 1250       Fat Loss    Subcutaneous Fat Loss Findings Moderate    Orbital Region  Moderate -  somewhat hollowness, slightly dark circles    Upper Arm Region Moderate - some fat tissue, not ample      Row Name 11/20/24 1250        Criteria Met (Must meet criteria for severity in at least 2 of these categories: M Wasting, Fat Loss, Fluid, Secondary Signs, Wt. Status, Intake)    Patient meets criteria for  Moderate (non-severe) Malnutrition                     Electronically signed by:  Mini Arellano RD  11/20/24 10:44 EST

## 2024-11-20 NOTE — THERAPY EVALUATION
Patient Name: Maryann Gaitan  : 1950    MRN: [id number]                              Today's Date: 2024       Admit Date: 2024    Visit Dx:     ICD-10-CM ICD-9-CM   1. Gastrointestinal hemorrhage, unspecified gastrointestinal hemorrhage type  K92.2 578.9   2. Anemia, unspecified type  D64.9 285.9   3. Chest pain, unspecified type  R07.9 786.50     Patient Active Problem List   Diagnosis    Hyponatremia    Diarrhea    Moderate to severe mitral regurgitation    Chest pain, atypical    Cavitary lesion of lung    Multiple tracheobronchial mucus plugs    CAD, multiple vessel    Acute heart failure with preserved ejection fraction (HFpEF)    Severe mitral regurgitation    Dyslipidemia    CAD S/P percutaneous coronary angioplasty    Blood in stool    Acute kidney injury    Hyperkalemia    Leukocytosis    Acute UTI (urinary tract infection)    Colitis    Inflammatory bowel disease (Crohn's disease)    Hypothyroidism (acquired)    Anxiety associated with depression    COPD (chronic obstructive pulmonary disease)    Rheumatoid arthritis    Moderate protein-calorie malnutrition    Acute lower GI bleeding     Past Medical History:   Diagnosis Date    Arthritis     CAD (coronary artery disease)     COPD (chronic obstructive pulmonary disease)     Hypertension     Mood disorder     Thyroid disease      Past Surgical History:   Procedure Laterality Date    BRONCHOSCOPY N/A 10/16/2024    Procedure: BRONCHOSCOPY;  Surgeon: Gallo Pope MD;  Location: [facility] ENDOSCOPY;  Service: Pulmonary;  Laterality: N/A;    CARDIAC CATHETERIZATION N/A 10/15/2024    Procedure: Left Heart Cath, possible pci;  Surgeon: Travis Connor MD;  Location: [facility] CATH INVASIVE LOCATION;  Service: Cardiovascular;  Laterality: N/A;    CARDIAC CATHETERIZATION N/A 10/22/2024    Procedure: Laser Coronary Atherectomy;  Surgeon: Travis Connor MD;  Location: [facility] CATH INVASIVE LOCATION;  Service: Cardiovascular;   Laterality: N/A;    COLONOSCOPY N/A 10/12/2024    Procedure: COLONOSCOPY WITH BIOPSY AND WIRE GUIDED BALLOON DILATION OF TERMINAL ILEUM;  Surgeon: Rob Strong MD;  Location: [facility] ENDOSCOPY;  Service: Gastroenterology;  Laterality: N/A;  Colitis, crohns of terminal ileum, right colon ulcers, diverticulosis, hemorroids    ENDOSCOPY N/A 10/12/2024    Procedure: ESOPHAGOGASTRODUODENOSCOPY WITH BIOPSY X 2 AREA;  Surgeon: Rob Strong MD;  Location: [facility] ENDOSCOPY;  Service: Gastroenterology;  Laterality: N/A;  Chronic gastritis, HH    HYSTERECTOMY      LEFT HEART CATH        General Information       [facility] Name 11/20/24 1114          Physical Therapy Time and Intention    Document Type evaluation  -     Mode of Treatment physical therapy  -[facility] Name 11/20/24 1114          General Information    Patient Profile Reviewed yes  -     Prior Level of Function mod assist:;min assist:;all household mobility;gait;transfer;ADL's;dressing;bathing  granddtrs due all IADLs. hx of recent falls, unable to get up unassisted.  -     Existing Precautions/Restrictions fall  -     Barriers to Rehab medically complex;previous functional deficit;cognitive status  -       Row Name 11/20/24 1114          Living Environment    People in Home grandchild(keegan)  -       Row Name 11/20/24 1114          Home Main Entrance    Number of Stairs, Main Entrance three  -     Stair Railings, Main Entrance railings safe and in good condition  -       Row Name 11/20/24 1114          Stairs Within Home, Primary    Stairs, Within Home, Primary full flight in the 2 story home but all pt's needs are on the main level .  -       Row Name 11/20/24 1114          Cognition    Orientation Status (Cognition) oriented x 3;verbal cues/prompts needed for orientation  disoriented to year of birth and current month. oriented to all others.  -       Row Name 11/20/24 1114          Safety Issues/Impairments Affecting  Functional Mobility    Safety Issues Affecting Function (Mobility) judgment;problem-solving  -     Impairments Affecting Function (Mobility) cognition;balance;endurance/activity tolerance;strength;pain;sensation/sensory awareness  -     Comment, Safety Issues/Impairments (Mobility) confusion.  -               User Key  (r) = Recorded By, (t) = Taken By, (c) = Cosigned By      Initials Name Provider Type     Conchita iWnston, PT Physical Therapist                   Mobility       Row Name 11/20/24 1546          Bed Mobility    Bed Mobility bed mobility (all) activities  -     All Activities, [place] (Bed Mobility) minimum assist (75% patient effort)  -     Assistive Device (Bed Mobility) bed rails;head of bed elevated  -     Comment, (Bed Mobility) supine to sit and seated scooting.  -       Row Name 11/20/24 1546          Bed-Chair Transfer    Bed-Chair [place] (Transfers) moderate assist (50% patient effort)  -     Comment, (Bed-Chair Transfer) HHA  -       Row Name 11/20/24 1546          Sit-Stand Transfer    Sit-Stand [place] (Transfers) moderate assist (50% patient effort)  -     Assistive Device (Sit-Stand Transfers) --  [facility]  -     Comment, (Sit-Stand Transfer) min lifting assist needed and modA to steady upon standing.  -       Row Name 11/20/24 1546          Gait/Stairs (Locomotion)    [place] Level (Gait) moderate assist (50% patient effort)  -     Assistive Device (Gait) --  [facility]  -     Patient was able to Ambulate yes  -     Distance in Feet (Gait) 2  -     Deviations/Abnormal Patterns (Gait) base of support, narrow;stride length decreased;festinating/shuffling;luis decreased  -     Comment, (Gait/Stairs) gait distance limited due to 20 mmHg drop in systolic BP while standing, pt symptomatic.  -               User Key  (r) = Recorded By, (t) = Taken By, (c) = Cosigned By      Initials Name Provider Type    Conchita Valle, CHINTAN Physical Therapist                    Obj/Interventions       [facility] Name 11/20/24 1548          Range of Motion Comprehensive    General Range of Motion lower extremity range of motion deficits identified  -     Comment, General Range of Motion impaired ankle AROM BLE limited ~50%. Pt defers PROM due to c/o severe B foot pain.  -AH       Row Name 11/20/24 1548          Strength Comprehensive (MMT)    General Manual Muscle Testing (MMT) Assessment lower extremity strength deficits identified  -     Comment, General Manual Muscle Testing (MMT) Assessment Per seated screening grossly 3-/5 hips, 4/5 knees, ankles 3-/5.  -[facility] [address] [facility] Name 11/20/24 1548          Motor Skills    Motor Skills functional endurance  -     Functional Endurance fair-  -AH       Row Name 11/20/24 1548          Balance    Balance Assessment sitting static balance;sitting dynamic balance;standing static balance;standing dynamic balance  -     Static Sitting Balance standby assist  -     Dynamic Sitting Balance standby assist  -     Position, Sitting Balance sitting edge of bed  -     Static Standing Balance minimal assist  -     Dynamic Standing Balance moderate assist  -     Position/Device Used, Standing Balance --  hand held  -AH       Row Name 11/20/24 1548          Sensory Assessment (Somatosensory)    Sensory Assessment (Somatosensory) --  hypersenstive, neuropathy B feet.  -               User Key  (r) = Recorded By, (t) = Taken By, (c) = Cosigned By      Initials Name Provider Type     Conchita Winston, PT Physical Therapist                   Goals/Plan       Row Name 11/20/24 1606          Bed Mobility Goal 1 (PT)    Activity/Assistive Device (Bed Mobility Goal 1, PT) bed mobility activities, all  -     [place] Level/Cues Needed (Bed Mobility Goal 1, PT) standby assist  -     Time Frame (Bed Mobility Goal 1, PT) long term goal (LTG);2 weeks  -AH       Row Name 11/20/24 160          Transfer Goal 1 (PT)    Activity/Assistive  Device (Transfer Goal 1, PT) transfers, all;sit-to-stand/stand-to-sit;bed-to-chair/chair-to-bed;toilet;walker, rolling  -     [place] Level/Cues Needed (Transfer Goal 1, PT) standby assist  -AH     Time Frame (Transfer Goal 1, PT) long term goal (LTG);2 weeks  -[facility] Name 11/20/24 1606          Gait Training Goal 1 (PT)    Activity/Assistive Device (Gait Training Goal 1, PT) gait (walking locomotion);assistive device use;walker, rolling  -     [place] Level (Gait Training Goal 1, PT) standby assist  -     Distance (Gait Training Goal 1, PT) 50'  -     Time Frame (Gait Training Goal 1, PT) long term goal (LTG);2 weeks  -AH       Row Name 11/20/24 1606          Balance Goal 1 (PT)    Activity/Assistive Device (Balance Goal) standing static balance;standing dynamic balance;walker, rolling  -     [place] Level/Cues Needed (Balance Goal 1, PT) supervision required  -     Time Frame (Balance Goal 1, PT) long-term goal (LTG);2 weeks  -AH       Row Name 11/20/24 1606          Stairs Goal 1 (PT)    Activity/Assistive Device (Stairs Goal 1, PT) stairs, all skills  -     [place] Level/Cues Needed (Stairs Goal 1, PT) contact guard required  -     Number of Stairs (Stairs Goal 1, PT) 3  -AH     Time Frame (Stairs Goal 1, PT) long term goal (LTG);2 weeks  -AH       Row Name 11/20/24 1606          Therapy Assessment/Plan (PT)    Planned Therapy Interventions (PT) balance training;bed mobility training;gait training;transfer training;strengthening;stair training;patient/family education;home exercise program  -               User Key  (r) = Recorded By, (t) = Taken By, (c) = Cosigned By      Initials Name Provider Type    Conchita Valle PT Physical Therapist                   Clinical Impression       [facility] Name 11/20/24 4640          Pain    Pretreatment Pain Rating 8/10  -     Posttreatment Pain Rating 8/10  -     Pain Location buttock;foot  -     Pain Side/Orientation  bilateral  -     Pain Management Interventions nursing notified;activity modification encouraged  -     Response to Pain Interventions no change per patient report  -       Row Name 11/20/24 7748          Plan of Care Review    Plan of Care Reviewed With patient  -     Outcome Evaluation Pt is a 73 y/o female who presented to [facility] on 11/19/24 with acute lower GI bleed, anemia, and chest pain. CMHx including recurrent anemia, HFpEF, leaky mitral valve, CAD, UTI, IBS, RA, and anxiety.  She has a painful coccyx wound. Pt lives at home with granddtrs in a multilevel home with all needs on main level, with 3 RICKEY. Family assists with most ADLs. pt is ambulatory with RW but reports hx of multiple recent falls. She is pleasant and agreeable today, noted to be slightly confused. She presents with deficits in endurance, transfers, balance, pain, strength, ROM, and sensation. She requires min/modA for mobility with HHA provided for sit to stand and bed to chair. She's only able to take steps from bed to chair today due to orthostatic BP, with pt symptomatic (dizziness). She also c/o dyspnea at rest and with exertion. Pt is on RA with O2 sats %.  Pt needs acute care PT to address deficits. Once she's medically stable, pt will be safe to return home with 24/7 care and home health therapy. PT will follow.  -       Row Name 11/20/24 7000          Therapy Assessment/Plan (PT)    Patient/Family Therapy Goals Statement (PT) get better and go home.  -     Rehab Potential (PT) good  -     Criteria for Skilled Interventions Met (PT) yes;meets criteria  -     Therapy Frequency (PT) 5 times/wk  -       Row Name 11/20/24 5326          Vital Signs    Pre Systolic BP Rehab 146  -AH     Pre Treatment Diastolic BP 72  -AH     Intra Systolic BP Rehab 141  -AH     Intra Treatment Diastolic BP 88  -AH     Post Systolic BP Rehab 121  -AH     Post Treatment Diastolic BP 79  -AH     Pretreatment Heart Rate (beats/min) 64  -AH      Intratreatment Heart Rate (beats/min) 76  -AH     Posttreatment Heart Rate (beats/min) 66  -AH     Pre SpO2 (%) 99  -AH     O2 Delivery Pre Treatment room air  -AH     Intra SpO2 (%) 99  -AH     O2 Delivery Intra Treatment room air  -AH     Post SpO2 (%) 99  -AH     O2 Delivery Post Treatment room air  -AH     Pre Patient Position Supine  -AH     Intra Patient Position Sitting  -AH     Post Patient Position Standing  -AH       Row Name 11/20/24 1550          Positioning and Restraints    Post Treatment Position chair  -     In Chair notified nsg;reclined;call light within reach;encouraged to call for assist;exit alarm on;with family/caregiver;waffle cushion  -               User Key  (r) = Recorded By, (t) = Taken By, (c) = Cosigned By      Initials Name Provider Type    Conchita Valle, CHINTAN Physical Therapist                   Outcome Measures       Row Name 11/20/24 1608 11/20/24 0825       How much help from another person do you currently need...    Turning from your back to your side while in flat bed without using bedrails? 4  - 4  -HJ    Moving from lying on back to sitting on the side of a flat bed without bedrails? 3  - 3  -HJ    Moving to and from a bed to a chair (including a wheelchair)? 3  - 3  -HJ    Standing up from a chair using your arms (e.g., wheelchair, bedside chair)? 3  - 3  -HJ    Climbing 3-5 steps with a railing? 2  - 2  -HJ    To walk in hospital room? 2  - 3  -HJ    AM-PAC 6 Clicks Score (PT) 17  - 18  -HJ    Highest Level of Mobility Goal 5 --> Static standing  - 6 --> Walk 10 steps or more  -      Row Name 11/20/24 1608          Functional Assessment    Outcome Measure Options AM-PAC 6 Clicks Basic Mobility (PT)  -               User Key  (r) = Recorded By, (t) = Taken By, (c) = Cosigned By      Initials Name Provider Type    Britney Kwan RN Registered Nurse    Conchita Valle, CHINTAN Physical Therapist                                 Physical Therapy  Education       Title: PT OT SLP Therapies (Done)       Topic: Physical Therapy (Done)       Point: Mobility training (Done)       Learning Progress Summary            Patient Acceptance, E, VU,NR by  at 11/20/2024 1608                      Point: Home exercise program (Done)       Learning Progress Summary            Patient Acceptance, E, VU,NR by  at 11/20/2024 1608                      Point: Body mechanics (Done)       Learning Progress Summary            Patient Acceptance, E, VU,NR by  at 11/20/2024 1608                      Point: Precautions (Done)       Learning Progress Summary            Patient Acceptance, E, VU,NR by  at 11/20/2024 1608                                      User Key       Initials Effective Dates Name Provider Type Discipline     12/04/23 -  Conchita Winston, CHINTAN Physical Therapist PT                  PT Recommendation and Plan  Planned Therapy Interventions (PT): balance training, bed mobility training, gait training, transfer training, strengthening, stair training, patient/family education, home exercise program  Outcome Evaluation: Pt is a 75 y/o female who presented to [facility] on 11/19/24 with acute lower GI bleed, anemia, and chest pain. CMHx including recurrent anemia, HFpEF, leaky mitral valve, CAD, UTI, IBS, RA, and anxiety.  She has a painful coccyx wound. Pt lives at home with granddtrs in a multilevel home with all needs on main level, with 3 RICKEY. Family assists with most ADLs. pt is ambulatory with RW but reports hx of multiple recent falls. She is pleasant and agreeable today, noted to be slightly confused. She presents with deficits in endurance, transfers, balance, pain, strength, ROM, and sensation. She requires min/modA for mobility with HHA provided for sit to stand and bed to chair. She's only able to take steps from bed to chair today due to orthostatic BP, with pt symptomatic (dizziness). She also c/o dyspnea at rest and with exertion. Pt is on RA with O2 sats  %.  Pt needs acute care PT to address deficits. Once she's medically stable, pt will be safe to return home with 24/7 care and home health therapy. PT will follow.     Time Calculation:         PT Charges       Row Name 11/20/24 1608             Time Calculation    Start Time 1108  -      Stop Time 1137  -      Time Calculation (min) 29 min  -      PT Non-Billable Time (min) 0 min  -      PT Received On 11/20/24  -      PT - Next Appointment 11/21/24  -      PT Goal Re-Cert Due Date 12/04/24  -         Time Calculation- PT    Total Timed Code Minutes- PT 10 minute(s)  -                User Key  (r) = Recorded By, (t) = Taken By, (c) = Cosigned By      Initials Name Provider Type     Conchita Winston, PT Physical Therapist                  Therapy Charges for Today       Code Description Service Date Service Provider Modifiers Qty    [id number]  PT EVAL MOD COMPLEXITY 4 11/20/2024 Conchita Winston, PT GP 1    [id number]  PT THERAPEUTIC ACT EA 15 MIN 11/20/2024 Conchita Winston, PT GP 1            PT G-Codes  Outcome Measure Options: AM-PAC 6 Clicks Basic Mobility (PT)  AM-PAC 6 Clicks Score (PT): 17  PT Discharge Summary  Anticipated Discharge Disposition (PT): home with home health, home with 24/7 care    Conchita Winston PT  11/20/2024

## 2024-11-20 NOTE — NURSING NOTE
Saw patient at bedside.  Dr. Benson at bedside also.  Patient alert and oriented to person, place, and time at this time.  Patient would like to proceed with MitraClip procedure in AM with Dr. Navarro. Consents signed and [id number] completed.

## 2024-11-20 NOTE — OUTREACH NOTE
Medical Week 2 Survey      Flowsheet Row Responses   [facility] facility patient discharged from? [facility]   Does the patient have one of the following disease processes/diagnoses(primary or secondary)? Other   Week 2 attempt successful? No   Unsuccessful attempts Attempt 2   Revoke Readmitted            BEATA CERVANTES - Registered Nurse

## 2024-11-20 NOTE — PLAN OF CARE
Goal Outcome Evaluation:  Plan of Care Reviewed With: patient           Outcome Evaluation: Pt is a 74 y.o. female with a CMH of close disease, CAD, COPD, hypertension, hypothyroidism who presents to the hospital with complaints of abdominal pain, chest pain, shortness of breath and rectal bleeding. Pt had Hgb of 5.7 upon admission. Hgb this date = 9.1 after blood transfusions.The patient was recently admitted to the hospital on 11/6 for mild rectal bleeding and was found to have an acute Crohn's exacerbation. At baseline, pt lives with grand-daughters in home with 3 RICKEY. Her family is home 24/7 with pt & assists her with bathing, dressing & toileting ADLs. She ambulates using a RW with assist at times. Upon eval, pt is A&O x4 with v.c. She is currently requiring max A for LB ADLs, toileting hygiene & mod A for ADL transfers. She presents with dec activity tolerance & dec standing balance & endurance required for ADLs & mobility. She also had s/s of orthostatic hypotension with sit>stand transition.  OT will continue to follow for tx. Anticipate pt will continue to progress to be safe to return home with 24/7 assist & home health from family as before.    Anticipated Discharge Disposition (OT): home with 24/7 care, home with home health

## 2024-11-20 NOTE — CONSULTS
Cardiology Consult Note    Patient Identification:  Name: Maryann Gaitan  Age: 74 y.o.  Sex: female  :  1950  MRN: [id number]             Requesting Physician :  Dr. El Childress Hospitalist     Reason for Consultation / Chief Complaint :   Antiplatelet management due to recent stent and GI bleeding     History of Present Illness:        Ms. Maryann Gaitan has PMH of     CAD status post cardiac cath with multivessel CAD poor candidate for CABG, underwent PCI to ostial and proximal LAD 10/22/2024  Moderate to severe MR with central jet noted  COPD  Hypertension  Rheumatoid arthritis  Crohn's disease     Presented to the emergency room on 2024 with abdominal pain and chest pain.  Patient reports that she started having bright red blood per rectum again yesterday and has been having constant chest pain since.    Labs in the emergency room was noted to have a hemoglobin of 5.7 high-sensitivity troponin 21--17 BUN 28 creatinine 0.74 total protein 5.1 albumin 2.9 ALT 35 WBC 16.93 chest x-ray without any acute finding EKG sinus rhythm with nonspecific ST abnormalities  CT abdomen and pelvis shows distal terminal ileitis colonic fatty marrow infiltration which can be seen in the setting of chronic inflammatory bowel disease   patient was transfused with 1 unit of PRBC and hemoglobin improved to 7.3 and this morning she is 9.1    Unfortunately patient is in the tough situation as she had stent placed on 10/22/2024 and needs dual antiplatelet therapy to prevent clotting of stent however she has presented for the second time with significant GI bleeding.  Currently her aspirin and Plavix are both on hold.      Further assessment and plan per Dr. Connor  Electronically signed by DRU Rubio, 24, 10:49 AM EST.    Cardiology attending addendum :    I have personally performed a face-to-face diagnostic evaluation, physical exam and reviewed data on this patient.  I have reviewed documentation done by  me and nurse practitioner  and corrected as needed.  And agree with the different components of documentation.Greater than 50% of the time spent in the care of this patient was provided by attending consultant/me.        Recent hospitalization  Presented 11/6/2024 through [facility] with complaint of abdominal pain and dark red stool / rectal bleed.  Patient has close disease and rheumatoid arthritis and is on immunosuppressants methotrexate, Humira, Rinvoq now presented with rectal bleed and abdominal pain.  GI did a scope and biopsies of terminal ileum which were consistent with active Crohn's and biopsies of stomach and duodenum showed strong colitis infection, was treated by ivermectin.  GI started patient on Solu-Medrol and is holding immunosuppressants and wants to hold Plavix.     Patient was recently in the hospital 10/8/2024 with nausea vomiting diarrhea and abnormal labs.  With hyponatremia and hypokalemia and anemia.  Patient suddenly started having chest pain and fast team was called because of sharp left-sided chest pain with shortness of breath dizziness EKG showed sinus tachycardia and cardiac workup revealed severe MR, cath 10/15/2024 revealing severe ostial LAD and outpouching in the descending thoracic aorta probably a penetrating aortic ulcer versus aneurysm.  CT surgery was consulted and she was turned down for CABG therefore patient underwent drug-eluting stent to ostial LAD on 10/22/2024 and was supposed to have clip to mitral valve in follow-up.  In the meantime has been having worsening symptoms and abdominal pain, weight loss.  Patient has been restarted on Plavix.  Will continue for bleeding closely.     Data:  Labs 11/6/2024 - 11/7/2024 revealed sodium 134, BUN/creatinine of 75/2.84, glucose 138, albumin 2.9.  Hemoglobin 9.4 has come down to 8.  MCV 98  EKG done 11/6/2024 reviewed/interpreted by me reveals sinus bradycardia at the rate of 58 bpm.        EGD/colonoscopy 10/12/2024 revealed  small hiatal hernia, nonerosive gastritis, T1 stricture s/p dilatation with 12 mm, T1 ulcer, colon ulcers, sigmoid diverticulosis, grade 2 internal hemorrhoids and strongyloides  Echo 10/12/2024 EF of 51 to 55% with mild RV enlargement, severe left atrial enlargement, moderate to severe MR  Transesophageal echo 10/16/2024: LVEF of 55 to 60% with severe left atrial enlargement, moderate to severe MR and grade 3 descending aortic plaque  Cardiac cath 10/15/2024 reveals total right and severe ostial LAD, descending thoracic aorta had an outpouching consistent with penetrating aortic ulcer versus aneurysm.   Patient was evaluated by CT surgery not a candidate for CABG therefore patient underwent PCI and drug-eluting stent to the ostium proximal LAD.  And she was seen by valve team and will be set up for MitraClip once she recovers from this hospitalization      Assessment:  :     Abdominal pain, rectal bleeding  Anemia requiring blood transfusion  Chest pain  Crohn's disease  CAD, status post PCI  Mitral regurgitation  Chronic HFpEF due to valvular heart disease from mitral regurgitation  Hypertensive cardiovascular disease from hypertension  Hyponatremia  Hypokalemia  Crohn's disease  Normocytic anemia  COPD, chronic steroid therapy  Multifocal pneumonia  Hyperglycemia, prediabetes with A1c of 5.6 from     Recommendations / Plan:        Patient presented 11/19/2024 with abdominal pain and bright red blood per rectum was found to have a hemoglobin of 5.7 was transfused.  Patient chest pain.  HS troponin is normal.  Patient recently had coronary drug-eluting stent placed on 10/22/2024 would benefit from antiplatelet therapy.  Antiplatelet therapy is currently on hold due to GI bleed.  Will follow and restart aspirin when okay with GI.       Review of records  Patient presented 10/9/2024 because of abnormal labs showing low sodium, was complaining of nausea vomiting and diarrhea.    Patient underwent cardiac cath  10/15/2024 which revealed total right and severe ostial LAD disease.  Descending thoracic aorta has an outpouching probably saccular aneurysm versus  penetrating aortic ulcer .  Echocardiogram 10/12/2024 is revealing EF of 51 to 55% with mild RV enlargement severe left atrial enlargement and right atrial enlargement and moderate to severe MR  GONZÁLEZ is revealing moderate to severe MR.  Patient would benefit from CABG and mitral valve surgery.  Patient was seen by .  Patient has been turned down for surgery due to multiple comorbid conditions.  Patient has a cavitary lesion in her lungs had bronchoscopy.  Had multiple mucous plugs.  Patient underwent drug-eluting stent to ostial LAD 10/12/2024.  Patient was sent home on dual antiplatelet therapy with aspirin and Plavix, metoprolol and statins and losartan as tolerated.  Will follow-up mitral regurgitation and follow-up in with structural heart team.  Patient had an EKG done 10/12/2024 which revealed sinus rhythm degenerating to A-fib with RVR.  Patient would benefit from long-term anticoagulation to prevent thromboembolic events.  Given her Crohn's disease and microcytic anemia patient will be a poor candidate for long-term anticoagulation.  Will follow and consider watchman evaluation as outpatient.  Patient was not tolerating losartan and metoprolol was given intermittent midodrine.  Now patient presents with abdominal pain and rectal bleed.  GI was recommending holding Plavix.  Patient unfortunately is in a tough situation.  Had drug-eluting stents done 10/22/2024, hardly 2 weeks ago.  Would benefit from Plavix to prevent stent thrombosis.  Patient has been receiving Plavix uninterrupted.                  Diagnosis Plan   1. Gastrointestinal hemorrhage, unspecified gastrointestinal hemorrhage type        2. Anemia, unspecified type        3. Chest pain, unspecified type                   Past Medical History:  Past Medical History:   Diagnosis Date     Arthritis     CAD (coronary artery disease)     COPD (chronic obstructive pulmonary disease)     Hypertension     Mood disorder     Thyroid disease      Past Surgical History:  Past Surgical History:   Procedure Laterality Date    BRONCHOSCOPY N/A 10/16/2024    Procedure: BRONCHOSCOPY;  Surgeon: Gallo Pope MD;  Location: [facility] ENDOSCOPY;  Service: Pulmonary;  Laterality: N/A;    CARDIAC CATHETERIZATION N/A 10/15/2024    Procedure: Left Heart Cath, possible pci;  Surgeon: Travis Connor MD;  Location: [facility] CATH INVASIVE LOCATION;  Service: Cardiovascular;  Laterality: N/A;    CARDIAC CATHETERIZATION N/A 10/22/2024    Procedure: Laser Coronary Atherectomy;  Surgeon: Travis Connor MD;  Location: [facility] CATH INVASIVE LOCATION;  Service: Cardiovascular;  Laterality: N/A;    COLONOSCOPY N/A 10/12/2024    Procedure: COLONOSCOPY WITH BIOPSY AND WIRE GUIDED BALLOON DILATION OF TERMINAL ILEUM;  Surgeon: Rob Strong MD;  Location: [facility] ENDOSCOPY;  Service: Gastroenterology;  Laterality: N/A;  Colitis, crohns of terminal ileum, right colon ulcers, diverticulosis, hemorroids    ENDOSCOPY N/A 10/12/2024    Procedure: ESOPHAGOGASTRODUODENOSCOPY WITH BIOPSY X 2 AREA;  Surgeon: Rob Strong MD;  Location: [facility] ENDOSCOPY;  Service: Gastroenterology;  Laterality: N/A;  Chronic gastritis, HH    HYSTERECTOMY      LEFT HEART CATH        Allergies:  Allergies   Allergen Reactions    Codeine Hives    Penicillin G Sodium Hives     Home Meds:  Medications Prior to Admission   Medication Sig Dispense Refill Last Dose/Taking    Adalimumab (Humira, 2 Pen,) 40 MG/0.4ML Pen-injector Kit Inject 40 mg under the skin into the appropriate area as directed 1 (One) Time Per Week. Saturdays   Indications: Rheumatoid Arthritis   Past Week    albuterol (PROVENTIL) (2.5 MG/3ML) 0.083% nebulizer solution Take 2.5 mg by nebulization Every 6 (Six) Hours As Needed for Wheezing. Indications: Spasm  of Lung Air Passages   Taking As Needed    amLODIPine (NORVASC) 10 MG tablet Take 1 tablet by mouth Daily.   Taking    aspirin 81 MG EC tablet Take 1 tablet by mouth Daily for 30 days. Indications: Disease involving Lipid Deposits in the Arteries 30 tablet 0 Taking    atorvastatin (LIPITOR) 40 MG tablet Take 1 tablet by mouth Every Night for 30 days. 30 tablet 0 Taking    cholecalciferol (VITAMIN D3) 1.25 MG (86448 UT) capsule Take 1 capsule by mouth 2 (Two) Times a Week. Wed, Sat  Indications: Vitamin D Deficiency   Past Week    clopidogrel (PLAVIX) 75 MG tablet Take 1 tablet by mouth Daily for 30 days. 30 tablet 0 Taking    diclofenac (VOLTAREN) 50 MG EC tablet Take 1 tablet by mouth 2 (Two) Times a Day.   Taking    folic acid (FOLVITE) 1 MG tablet Take 1 tablet by mouth Daily. Indications: Anemia From Inadequate Folic Acid   Taking    levothyroxine (SYNTHROID, LEVOTHROID) 50 MCG tablet Take 1 tablet by mouth Daily. Indications: Underactive Thyroid   Taking    Melatonin (Melatonin Extra Strength) 10 MG tablet Take 1 tablet by mouth As Needed. Indications: Trouble Sleeping   Taking As Needed    methotrexate 2.5 MG tablet Take 6 tablets by mouth 1 (One) Time Per Week. Saturdays   Indications: Non-oncologic   Past Week    metoprolol tartrate (LOPRESSOR) 50 MG tablet Take 1 tablet by mouth 2 (Two) Times a Day.   Taking    midodrine (PROAMATINE) 10 MG tablet Take 1 tablet by mouth 3 (Three) Times a Day Before Meals for 30 days. 90 tablet 0 Taking    pantoprazole (PROTONIX) 20 MG EC tablet Take 1 tablet by mouth Daily. Indications: Heartburn   Taking    predniSONE (DELTASONE) 10 MG tablet Take 4 tablets by mouth Daily for 7 days, THEN 3.5 tablets Daily for 7 days, THEN 3 tablets Daily for 7 days, THEN 2.5 tablets Daily for 7 days, THEN 2 tablets Daily for 7 days, THEN 1.5 tablets Daily for 7 days, THEN 1 tablet Daily for 7 days, THEN 0.5 tablets Daily for 7 days. Indications: Crohn's Disease 126 tablet 0 Taking     sertraline (ZOLOFT) 50 MG tablet Take 1 tablet by mouth Daily. Indications: Major Depressive Disorder   Taking    spironolactone (ALDACTONE) 25 MG tablet Take 1 tablet by mouth Daily. Indications: Edema   Taking    upadacitinib ER (Rinvoq) 45 MG tablet sustained-release 24 hour extended release tablet Take 1 tablet by mouth Daily. Indications: Rheumatoid Arthritis   Taking     Current Meds:     Current Facility-Administered Medications:     atorvastatin (LIPITOR) tablet 40 mg, 40 mg, Oral, Nightly, El Childress MD, 40 mg at 11/19/24 2155    sennosides-docusate (PERICOLACE) 8.6-50 MG per tablet 2 tablet, 2 tablet, Oral, BID PRN **AND** polyethylene glycol (MIRALAX) packet 17 g, 17 g, Oral, Daily PRN **AND** bisacodyl (DULCOLAX) EC tablet 5 mg, 5 mg, Oral, Daily PRN **AND** bisacodyl (DULCOLAX) suppository 10 mg, 10 mg, Rectal, Daily PRN, El Childress MD    folic acid (FOLVITE) tablet 1,000 mcg, 1,000 mcg, Oral, Daily, El Childress MD, 1,000 mcg at 11/20/24 0826    levothyroxine (SYNTHROID, LEVOTHROID) tablet 50 mcg, 50 mcg, Oral, Daily, El Childress MD, 50 mcg at 11/20/24 0826    methylPREDNISolone sodium succinate (SOLU-Medrol) injection 20 mg, 20 mg, Intravenous, Q8H, El Childress MD, 20 mg at 11/20/24 0827    metoprolol tartrate (LOPRESSOR) tablet 25 mg, 25 mg, Oral, BID, El Childress MD, 25 mg at 11/20/24 0826    midodrine (PROAMATINE) tablet 10 mg, 10 mg, Oral, TID AC, El Childress MD, 10 mg at 11/19/24 1753    nitroglycerin (NITROSTAT) SL tablet 0.4 mg, 0.4 mg, Sublingual, Q5 Min PRN, El Childress MD    pantoprazole (PROTONIX) EC tablet 40 mg, 40 mg, Oral, Daily, El Childress MD, 40 mg at 11/20/24 0826    sertraline (ZOLOFT) tablet 50 mg, 50 mg, Oral, Daily, El Childress MD, 50 mg at 11/20/24 0826    [COMPLETED] Insert Peripheral IV, , , Once **AND** sodium chloride 0.9 % flush 10 mL, 10 mL, Intravenous, PRN, Lawson Garcia MD    sodium chloride 0.9 % flush 10 mL, 10 mL, Intravenous, Q12H,  "El Childress MD, 10 mL at 11/20/24 0827    sodium chloride 0.9 % flush 10 mL, 10 mL, Intravenous, PRN, El Childress MD    sodium chloride 0.9 % infusion 40 mL, 40 mL, Intravenous, [facility] T[facility]ar, MD  Social History:   Social History     Tobacco Use    Smoking status: Former     Types: Cigarettes    Smokeless tobacco: Never   Substance Use Topics    Alcohol use: Never      Family History:  Family History   Problem Relation Age of Onset    Heart disease Mother     Dementia Mother     Stroke Mother     Heart disease Father     Hypertension Father         Review of Systems : Review of Systems   Constitutional: Positive for malaise/fatigue. Negative for fever.   Cardiovascular:  Positive for chest pain and dyspnea on exertion. Negative for leg swelling and syncope.   Respiratory:  Positive for shortness of breath. Negative for cough.    Gastrointestinal:  Positive for abdominal pain and hematochezia. Negative for hematemesis and vomiting.   Neurological:  Positive for weakness.          Constitutional:  Temp:  [97.5 °F (36.4 °C)-98.6 °F (37 °C)] 98.4 °F (36.9 °C)  Heart Rate:  [54-68] 60  Resp:  [15-26] 18  BP: (114-150)/(49-82) 150/82    Physical Exam   /82   Pulse 60   Temp 98.4 °F (36.9 °C) (Oral)   Resp 18   Ht 162.6 cm (64.02\")   Wt 45.3 kg (99 lb 13.9 oz)   SpO2 99%   BMI 17.13 kg/m²   Physical Exam  General:  Appears in no acute distress  Eyes: Sclerae are anicteric,  conjunctivae are clear   HEENT:  No JVD. Thyroid not visibly enlarged. No mucosal pallor or cyanosis  Respiratory: Respirations regular and unlabored at rest.  Diminished airway entry   Cardiovascular: S1,S2 Regular rate and rhythm.  2 out of 6 holosystolic murmur  Gastrointestinal: Abdomen soft, flat, nontender. Bowel sounds present.   Musculoskeletal:  No abnormal movements  Extremities: No digital clubbing or cyanosis  Skin: Color pink. Skin warm and dry to touch. No rashes  No xanthoma  Neuro: Alert and awake, no lateralizing " deficits appreciated    Cardiographics  ECG: EKG tracing was  personally reviewed/interpreted by me  ECG 12 Lead Chest Pain   Final Result   HEART RATE=67  bpm   RR Loyvrore=125  ms   MI Emwxeoyd=430  ms   P Horizontal Axis=28  deg   P Front Axis=87  deg   QRSD Interval=83  ms   QT Eoyvgzjl=013  ms   PIkP=244  ms   QRS Axis=39  deg   T Wave Axis=40  deg   - ABNORMAL ECG -   Sinus rhythm   Low voltage, precordial leads   Nonspecific  T abnormalities, inferior leads   When compared with ECG of 06-Nov-2024 15:41:09,   Significant repolarization change   Significant axis, voltage or hypertrophy change   Electronically Signed By: Rajiv Dubon (JOSSY) 2024-11-19 12:47:59   Date and Time of Study:2024-11-19 12:13:34          Telemetry: Sinus rhythm    Echocardiogram:   Results for orders placed during the hospital encounter of 10/08/24    Adult Transesophageal Echo (GONZÁLEZ) W/ Cont if Necessary Per Protocol (Cardiology Department)    Interpretation Summary    Left ventricular systolic function is normal. Left ventricular ejection fraction appears to be 56 - 60%.    The left atrial cavity is severely dilated.    Saline test results are negative.    There is mild, posterior mitral leaflet thickening present.    Moderate to severe mitral valve regurgitation is present with a centrally-directed jet noted.    There is moderate, (grade 3) plaque in the descending aorta present.      Imaging  Chest X-ray:   Imaging Results (Last 24 Hours)       Procedure Component Value Units Date/Time    CT Abdomen Pelvis With Contrast [[id number]] Collected: 11/20/24 0813     Updated: 11/20/24 0821    Narrative:      CT ABDOMEN PELVIS W CONTRAST    Date of Exam: 11/20/2024 1:00 AM EST    Indication: recurrent GI Bleeding.    Comparison: Noncontrast CT  dated 11/6/2024, CT abdomen pelvis with contrast 10/11/2024    Technique: Axial CT images were obtained of the abdomen and pelvis following the uneventful intravenous administration of iodinated  contrast. Sagittal and coronal reconstructions were performed.  Automated exposure control and iterative reconstruction   methods were used.        Findings:  LUNG BASES:  Unremarkable without mass or infiltrate.    LIVER:  Unremarkable parenchyma without focal lesion.  BILIARY/GALLBLADDER: Cholecystectomy  SPLEEN:  Unremarkable  PANCREAS:  Unremarkable  ADRENAL:  Unremarkable  KIDNEYS:  Unremarkable parenchyma with no solid mass identified. No obstruction.  No calculus identified.  GASTROINTESTINAL/MESENTERY: There is circumferential mural thickening and enhancement involving the distal ileum extending to the ileocecal valve consistent with ileitis. There is fairly diffuse fatty mural infiltration of the colon most pronounced   proximally, similar to previous exams which can be seen in the setting of chronic inflammatory bowel disease. No evidence of intestinal obstruction.  MESENTERIC VESSELS:  Patent.  AORTA/IVC:  Normal caliber.    RETROPERITONEUM/LYMPH NODES:  Unremarkable    REPRODUCTIVE: Hysterectomy.  BLADDER:  Unremarkable    OSSEUS STRUCTURES: There is degenerative grade 1 spondylolisthesis at L4/5. No acute osseous process identified.    There is trace free fluid in the pelvis.        Impression:      Impression:  1.Distal/terminal ileitis in keeping with patient's history of Crohn's disease. No definitive active colonic inflammation identified on CT imaging. Trace free fluid in the pelvis is likely related.  2.Diffuse colonic fatty mural infiltration which can be seen in the setting of chronic inflammatory bowel disease.  3.Other incidental nonemergent findings detailed above.            Electronically Signed: Junior Khan MD    11/20/2024 8:19 AM EST    Workstation ID: [id number]            Lab Review: I have reviewed the labs  Results from last 7 days   Lab Units 11/19/24  1503 11/19/24  1246   HSTROP T ng/L 17* 21*     Results from last 7 days   Lab Units 11/20/24  0246   MAGNESIUM mg/dL 2.0      Results from last 7 days   Lab Units 11/20/24  0246   SODIUM mmol/L 137   POTASSIUM mmol/L 3.8   BUN mg/dL 19   CREATININE mg/dL 0.59   CALCIUM mg/dL 8.7             Results from last 7 days   Lab Units 11/20/24  0246 11/19/24  1933 11/19/24  1246   WBC 10*3/mm3 12.18*  --  16.93*   HEMOGLOBIN g/dL 9.1* 7.3* 5.7*   HEMATOCRIT % 29.5* 23.5* 18.9*   PLATELETS 10*3/mm3 369  --  435     Results from last 7 days   Lab Units 11/19/24  1246   INR  0.99   APTT seconds 21.9*             Travis Connor MD  11/20/2024, 15:47 EST      EVERETTE Kelly/Transcription:   Dictated utilizing Dragon dictation

## 2024-11-20 NOTE — CASE MANAGEMENT/SOCIAL WORK
Discharge Planning Assessment   [place]     Patient Name: Maryann Gaitan  MRN: [id number]  Today's Date: 11/19/2024    Admit Date: 11/19/2024    Plan: From Home, PT/OT pending   Discharge Needs Assessment       Row Name 11/19/24 1858       Living Environment    People in Home grandchild(keegan)    Name(s) of People in Home Grand claudio Lui    Current Living Arrangements home    Potentially Unsafe Housing Conditions none    In the past 12 months has the electric, gas, oil, or water company threatened to shut off services in your home? No    Primary Care Provided by --  Grand claudio Lui    Provides Primary Care For no one    Family Caregiver if Needed grandchild(keegan), adult    Family Caregiver Names Grand claudio Lui    Quality of Family Relationships helpful;involved;supportive    Able to Return to Prior Arrangements yes       Resource/Environmental Concerns    Resource/Environmental Concerns none    Transportation Concerns none       Transportation Needs    In the past 12 months, has lack of transportation kept you from medical appointments or from getting medications? no    In the past 12 months, has lack of transportation kept you from meetings, work, or from getting things needed for daily living? No       Food Insecurity    Within the past 12 months, you worried that your food would run out before you got the money to buy more. Never true    Within the past 12 months, the food you bought just didn't last and you didn't have money to get more. Never true       Transition Planning    Patient/Family Anticipates Transition to home with family    Patient/Family Anticipated Services at Transition none    Transportation Anticipated family or friend will provide  Grand claudio Lui       Discharge Needs Assessment    Equipment Currently Used at Home rollator;cane, straight;bp cuff;shower chair;nebulizer    Do you want help finding or keeping work or a job? I do not need or want help    Do you want help with  school or training? For example, starting or completing job training or getting a high school diploma, GED or equivalent No                Discharge Plan       Row Name 11/19/24 1900       Plan    Plan From Home, PT/OT pending    Patient/Family in Agreement with Plan yes    Plan Comments CM spoke to pt. Maryann and grand daughter Sania at bedside. PCP and pharmacy confirmed. Pt. denies financial, transportation, or medication issues. Pt's grand daughter Sania can transport at d/c. Pt. agrees to M2B.                Demographic Summary       Row Name 11/19/24 3403       General Information    Admission Type inpatient    Arrived From home    Required Notices Provided Important Message from Medicare    Referral Source admission list    Reason for Consult discharge planning    Preferred Language English       Contact Information    Permission Granted to Share Info With     Contact Information Obtained for                 Functional Status       Row Name 11/19/24 3666       Functional Status    Usual Activity Tolerance good    Current Activity Tolerance moderate       Physical Activity    On average, how many days per week do you engage in moderate to strenuous exercise (like a brisk walk)? 7 days    On average, how many minutes do you engage in exercise at this level? 90 min    Number of minutes of exercise per week 630       Functional Status, IADL    Medications independent    Meal Preparation assistive person  Grand daughter Sania    Housekeeping assistive person  Grand daughter Sania    Laundry assistive person  Grand daughter Sania    Shopping assistive person  Grand daughter Sania    If for any reason you need help with day-to-day activities such as bathing, preparing meals, shopping, managing finances, etc., do you get the help you need? I don't need any help                  Patient Forms       Row Name 11/19/24 1900       Patient Forms    Important Message from Medicare (IMM) Delivered  Per  registration 11/19/2024                  Lili Mcclure RN    Office: [phone]  Fax: [phone]  [email or web address]

## 2024-11-21 ENCOUNTER — ANESTHESIA (OUTPATIENT)
Dept: PERIOP | Facility: HOSPITAL | Age: 74
End: 2024-11-21
Payer: MEDICARE

## 2024-11-21 ENCOUNTER — HOME CARE VISIT (OUTPATIENT)
Dept: HOME HEALTH SERVICES | Facility: HOME HEALTHCARE | Age: 74
End: 2024-11-21
Payer: MEDICARE

## 2024-11-21 PROBLEM — E55.9 VITAMIN D DEFICIENCY, UNSPECIFIED: Status: ACTIVE | Noted: 2024-11-21

## 2024-11-21 PROBLEM — Z98.61 CAD S/P PERCUTANEOUS CORONARY ANGIOPLASTY: Status: RESOLVED | Noted: 2024-10-30 | Resolved: 2024-11-21

## 2024-11-21 PROBLEM — K64.0 FIRST DEGREE HEMORRHOIDS: Status: ACTIVE | Noted: 2021-07-09

## 2024-11-21 PROBLEM — T17.800A MULTIPLE TRACHEOBRONCHIAL MUCUS PLUGS: Status: RESOLVED | Noted: 2024-10-08 | Resolved: 2024-11-21

## 2024-11-21 PROBLEM — K52.9 COLITIS: Status: RESOLVED | Noted: 2024-11-06 | Resolved: 2024-11-21

## 2024-11-21 PROBLEM — Z86.0100 HISTORY OF COLONIC POLYPS: Status: ACTIVE | Noted: 2021-07-09

## 2024-11-21 PROBLEM — N17.9 ACUTE KIDNEY INJURY: Status: RESOLVED | Noted: 2024-11-06 | Resolved: 2024-11-21

## 2024-11-21 PROBLEM — R19.7 DIARRHEA: Status: RESOLVED | Noted: 2024-10-08 | Resolved: 2024-11-21

## 2024-11-21 PROBLEM — E44.0 MODERATE PROTEIN-CALORIE MALNUTRITION: Chronic | Status: ACTIVE | Noted: 2024-11-09

## 2024-11-21 PROBLEM — K12.0 APHTHAE, ORAL: Status: ACTIVE | Noted: 2024-11-21

## 2024-11-21 PROBLEM — K92.1 BLOOD IN STOOL: Status: RESOLVED | Noted: 2024-11-06 | Resolved: 2024-11-21

## 2024-11-21 PROBLEM — F17.200 NICOTINE DEPENDENCE: Status: ACTIVE | Noted: 2024-11-21

## 2024-11-21 PROBLEM — E06.3 HASHIMOTO'S THYROIDITIS: Status: ACTIVE | Noted: 2024-11-21

## 2024-11-21 PROBLEM — I34.0 MITRAL REGURGITATION: Chronic | Status: ACTIVE | Noted: 2024-10-08

## 2024-11-21 PROBLEM — D72.829 LEUKOCYTOSIS: Status: RESOLVED | Noted: 2024-11-06 | Resolved: 2024-11-21

## 2024-11-21 PROBLEM — E03.9 HYPOTHYROIDISM (ACQUIRED): Chronic | Status: ACTIVE | Noted: 2024-11-06

## 2024-11-21 PROBLEM — R63.4 ABNORMAL WEIGHT LOSS: Status: ACTIVE | Noted: 2024-11-21

## 2024-11-21 PROBLEM — R63.4 ABNORMAL WEIGHT LOSS: Status: RESOLVED | Noted: 2024-11-21 | Resolved: 2024-11-21

## 2024-11-21 PROBLEM — Z87.2: Status: ACTIVE | Noted: 2024-11-21

## 2024-11-21 PROBLEM — I25.10 CAD S/P PERCUTANEOUS CORONARY ANGIOPLASTY: Status: RESOLVED | Noted: 2024-10-30 | Resolved: 2024-11-21

## 2024-11-21 PROBLEM — E87.5 HYPERKALEMIA: Status: RESOLVED | Noted: 2024-11-06 | Resolved: 2024-11-21

## 2024-11-21 PROBLEM — K50.90 CROHN'S DISEASE: Chronic | Status: ACTIVE | Noted: 2024-11-06

## 2024-11-21 PROBLEM — E78.5 DYSLIPIDEMIA: Chronic | Status: ACTIVE | Noted: 2024-10-30

## 2024-11-21 PROBLEM — J44.9 COPD (CHRONIC OBSTRUCTIVE PULMONARY DISEASE): Chronic | Status: ACTIVE | Noted: 2024-11-06

## 2024-11-21 PROBLEM — R07.89 CHEST PAIN, ATYPICAL: Status: RESOLVED | Noted: 2024-10-08 | Resolved: 2024-11-21

## 2024-11-21 PROBLEM — M06.9 RHEUMATOID ARTHRITIS: Chronic | Status: ACTIVE | Noted: 2024-11-06

## 2024-11-21 PROBLEM — N39.3 STRESS INCONTINENCE, FEMALE: Status: ACTIVE | Noted: 2024-11-21

## 2024-11-21 PROBLEM — E87.1 HYPONATREMIA: Status: RESOLVED | Noted: 2024-10-08 | Resolved: 2024-11-21

## 2024-11-21 PROBLEM — R15.2 FECAL URGENCY: Status: ACTIVE | Noted: 2024-11-21

## 2024-11-21 PROBLEM — F17.200 NICOTINE DEPENDENCE: Status: RESOLVED | Noted: 2024-11-21 | Resolved: 2024-11-21

## 2024-11-21 PROBLEM — K21.9 GERD (GASTROESOPHAGEAL REFLUX DISEASE): Status: ACTIVE | Noted: 2024-11-21

## 2024-11-21 PROBLEM — N39.0 ACUTE UTI (URINARY TRACT INFECTION): Status: RESOLVED | Noted: 2024-11-06 | Resolved: 2024-11-21

## 2024-11-21 PROBLEM — F41.8 ANXIETY ASSOCIATED WITH DEPRESSION: Chronic | Status: ACTIVE | Noted: 2024-11-06

## 2024-11-21 PROBLEM — Z95.818 S/P MITRAL VALVE CLIP IMPLANTATION: Status: ACTIVE | Noted: 2024-11-21

## 2024-11-21 PROBLEM — Z98.890 S/P MITRAL VALVE CLIP IMPLANTATION: Status: ACTIVE | Noted: 2024-11-21

## 2024-11-21 LAB
ACT BLD: 112 SECONDS (ref 89–137)
ACT BLD: 228 SECONDS (ref 89–137)
ACT BLD: 256 SECONDS (ref 89–137)
ACT BLD: 285 SECONDS (ref 89–137)
ANION GAP SERPL CALCULATED.3IONS-SCNC: 9.3 MMOL/L (ref 5–15)
BASOPHILS # BLD AUTO: 0.01 10*3/MM3 (ref 0–0.2)
BASOPHILS NFR BLD AUTO: 0.1 % (ref 0–1.5)
BH BB BLOOD EXPIRATION DATE: NORMAL
BH BB BLOOD EXPIRATION DATE: NORMAL
BH BB BLOOD TYPE BARCODE: 5100
BH BB BLOOD TYPE BARCODE: 5100
BH BB DISPENSE STATUS: NORMAL
BH BB DISPENSE STATUS: NORMAL
BH BB PRODUCT CODE: NORMAL
BH BB PRODUCT CODE: NORMAL
BH BB UNIT NUMBER: NORMAL
BH BB UNIT NUMBER: NORMAL
BUN SERPL-MCNC: 16 MG/DL (ref 8–23)
BUN/CREAT SERPL: 21.9 (ref 7–25)
CALCIUM SPEC-SCNC: 8.3 MG/DL (ref 8.6–10.5)
CHLORIDE SERPL-SCNC: 103 MMOL/L (ref 98–107)
CO2 SERPL-SCNC: 23.7 MMOL/L (ref 22–29)
CREAT SERPL-MCNC: 0.73 MG/DL (ref 0.57–1)
CROSSMATCH INTERPRETATION: NORMAL
CROSSMATCH INTERPRETATION: NORMAL
DEPRECATED RDW RBC AUTO: 60.3 FL (ref 37–54)
EGFRCR SERPLBLD CKD-EPI 2021: 86.4 ML/MIN/1.73
EOSINOPHIL # BLD AUTO: 0 10*3/MM3 (ref 0–0.4)
EOSINOPHIL NFR BLD AUTO: 0 % (ref 0.3–6.2)
ERYTHROCYTE [DISTWIDTH] IN BLOOD BY AUTOMATED COUNT: 18.6 % (ref 12.3–15.4)
GLUCOSE SERPL-MCNC: 132 MG/DL (ref 65–99)
HCT VFR BLD AUTO: 27.6 % (ref 34–46.6)
HGB BLD-MCNC: 9 G/DL (ref 12–15.9)
IMM GRANULOCYTES # BLD AUTO: 0.13 10*3/MM3 (ref 0–0.05)
IMM GRANULOCYTES NFR BLD AUTO: 1 % (ref 0–0.5)
LYMPHOCYTES # BLD AUTO: 1.35 10*3/MM3 (ref 0.7–3.1)
LYMPHOCYTES NFR BLD AUTO: 10 % (ref 19.6–45.3)
MAGNESIUM SERPL-MCNC: 2 MG/DL (ref 1.6–2.4)
MCH RBC QN AUTO: 29.7 PG (ref 26.6–33)
MCHC RBC AUTO-ENTMCNC: 32.6 G/DL (ref 31.5–35.7)
MCV RBC AUTO: 91.1 FL (ref 79–97)
MONOCYTES # BLD AUTO: 0.97 10*3/MM3 (ref 0.1–0.9)
MONOCYTES NFR BLD AUTO: 7.2 % (ref 5–12)
NEUTROPHILS NFR BLD AUTO: 11.09 10*3/MM3 (ref 1.7–7)
NEUTROPHILS NFR BLD AUTO: 81.7 % (ref 42.7–76)
NRBC BLD AUTO-RTO: 0 /100 WBC (ref 0–0.2)
PLATELET # BLD AUTO: 368 10*3/MM3 (ref 140–450)
PMV BLD AUTO: 9.4 FL (ref 6–12)
POTASSIUM SERPL-SCNC: 4.1 MMOL/L (ref 3.5–5.2)
RBC # BLD AUTO: 3.03 10*6/MM3 (ref 3.77–5.28)
SODIUM SERPL-SCNC: 136 MMOL/L (ref 136–145)
UNIT  ABO: NORMAL
UNIT  ABO: NORMAL
UNIT  RH: NORMAL
UNIT  RH: NORMAL
WBC NRBC COR # BLD AUTO: 13.55 10*3/MM3 (ref 3.4–10.8)

## 2024-11-21 PROCEDURE — C1889 IMPLANT/INSERT DEVICE, NOC: HCPCS | Performed by: INTERNAL MEDICINE

## 2024-11-21 PROCEDURE — 25010000002 METHYLPREDNISOLONE PER 40 MG: Performed by: INTERNAL MEDICINE

## 2024-11-21 PROCEDURE — 25010000002 ONDANSETRON PER 1 MG: Performed by: NURSE ANESTHETIST, CERTIFIED REGISTERED

## 2024-11-21 PROCEDURE — C1769 GUIDE WIRE: HCPCS | Performed by: INTERNAL MEDICINE

## 2024-11-21 PROCEDURE — 25010000002 PROTAMINE SULFATE PER 10 MG: Performed by: NURSE ANESTHETIST, CERTIFIED REGISTERED

## 2024-11-21 PROCEDURE — 33418 REPAIR TCAT MITRAL VALVE: CPT | Performed by: INTERNAL MEDICINE

## 2024-11-21 PROCEDURE — C1894 INTRO/SHEATH, NON-LASER: HCPCS | Performed by: INTERNAL MEDICINE

## 2024-11-21 PROCEDURE — 25010000002 PHENYLEPHRINE 10 MG/ML SOLUTION 5 ML VIAL: Performed by: NURSE ANESTHETIST, CERTIFIED REGISTERED

## 2024-11-21 PROCEDURE — 25010000002 LIDOCAINE 2% SOLUTION: Performed by: INTERNAL MEDICINE

## 2024-11-21 PROCEDURE — 85025 COMPLETE CBC W/AUTO DIFF WBC: CPT | Performed by: INTERNAL MEDICINE

## 2024-11-21 PROCEDURE — 25810000003 SODIUM CHLORIDE 0.9 % SOLUTION: Performed by: NURSE ANESTHETIST, CERTIFIED REGISTERED

## 2024-11-21 PROCEDURE — 25010000002 HYDROCORTISONE SOD SUC (PF) 100 MG RECONSTITUTED SOLUTION: Performed by: NURSE ANESTHETIST, CERTIFIED REGISTERED

## 2024-11-21 PROCEDURE — C1760 CLOSURE DEV, VASC: HCPCS | Performed by: INTERNAL MEDICINE

## 2024-11-21 PROCEDURE — 25810000003 SODIUM CHLORIDE 0.9 % SOLUTION 250 ML FLEX CONT: Performed by: NURSE ANESTHETIST, CERTIFIED REGISTERED

## 2024-11-21 PROCEDURE — 99232 SBSQ HOSP IP/OBS MODERATE 35: CPT | Performed by: INTERNAL MEDICINE

## 2024-11-21 PROCEDURE — 85347 COAGULATION TIME ACTIVATED: CPT

## 2024-11-21 PROCEDURE — 4A023N7 MEASUREMENT OF CARDIAC SAMPLING AND PRESSURE, LEFT HEART, PERCUTANEOUS APPROACH: ICD-10-PCS | Performed by: INTERNAL MEDICINE

## 2024-11-21 PROCEDURE — 25010000002 FENTANYL CITRATE (PF) 250 MCG/5ML SOLUTION: Performed by: NURSE ANESTHETIST, CERTIFIED REGISTERED

## 2024-11-21 PROCEDURE — 02UG3JZ SUPPLEMENT MITRAL VALVE WITH SYNTHETIC SUBSTITUTE, PERCUTANEOUS APPROACH: ICD-10-PCS | Performed by: INTERNAL MEDICINE

## 2024-11-21 PROCEDURE — 25010000002 PROPOFOL 200 MG/20ML EMULSION: Performed by: NURSE ANESTHETIST, CERTIFIED REGISTERED

## 2024-11-21 PROCEDURE — B24BZZ4 ULTRASONOGRAPHY OF HEART WITH AORTA, TRANSESOPHAGEAL: ICD-10-PCS | Performed by: INTERNAL MEDICINE

## 2024-11-21 PROCEDURE — 25010000002 SUGAMMADEX 200 MG/2ML SOLUTION: Performed by: NURSE ANESTHETIST, CERTIFIED REGISTERED

## 2024-11-21 PROCEDURE — 83735 ASSAY OF MAGNESIUM: CPT | Performed by: INTERNAL MEDICINE

## 2024-11-21 PROCEDURE — 25010000002 HEPARIN (PORCINE) PER 1000 UNITS: Performed by: NURSE ANESTHETIST, CERTIFIED REGISTERED

## 2024-11-21 PROCEDURE — 80048 BASIC METABOLIC PNL TOTAL CA: CPT | Performed by: INTERNAL MEDICINE

## 2024-11-21 PROCEDURE — 25010000002 LIDOCAINE PF 2% 2 % SOLUTION: Performed by: NURSE ANESTHETIST, CERTIFIED REGISTERED

## 2024-11-21 PROCEDURE — 25010000002 ALBUMIN HUMAN 5% PER 50 ML: Performed by: NURSE ANESTHETIST, CERTIFIED REGISTERED

## 2024-11-21 PROCEDURE — 25010000003 LABETALOL 5 MG/ML SOLUTION: Performed by: NURSE ANESTHETIST, CERTIFIED REGISTERED

## 2024-11-21 PROCEDURE — P9041 ALBUMIN (HUMAN),5%, 50ML: HCPCS | Performed by: NURSE ANESTHETIST, CERTIFIED REGISTERED

## 2024-11-21 DEVICE — DEV REPR VLV MITRACLIP/G4 DS NT: Type: IMPLANTABLE DEVICE | Site: HEART | Status: FUNCTIONAL

## 2024-11-21 DEVICE — BUNDLE REPR MITRACLIP/G4 NT/NTW/XT/XTW 3CLIP W/CATH/SGC0701: Type: IMPLANTABLE DEVICE | Site: HEART | Status: FUNCTIONAL

## 2024-11-21 RX ORDER — FENTANYL CITRATE 50 UG/ML
50 INJECTION, SOLUTION INTRAMUSCULAR; INTRAVENOUS
Status: DISCONTINUED | OUTPATIENT
Start: 2024-11-21 | End: 2024-11-21 | Stop reason: HOSPADM

## 2024-11-21 RX ORDER — DIPHENHYDRAMINE HCL 25 MG
25 CAPSULE ORAL EVERY 6 HOURS PRN
Status: DISCONTINUED | OUTPATIENT
Start: 2024-11-21 | End: 2024-12-04 | Stop reason: HOSPADM

## 2024-11-21 RX ORDER — ONDANSETRON 2 MG/ML
4 INJECTION INTRAMUSCULAR; INTRAVENOUS EVERY 6 HOURS PRN
Status: DISCONTINUED | OUTPATIENT
Start: 2024-11-21 | End: 2024-11-28 | Stop reason: SDUPTHER

## 2024-11-21 RX ORDER — SODIUM CHLORIDE 9 MG/ML
INJECTION, SOLUTION INTRAVENOUS CONTINUOUS PRN
Status: DISCONTINUED | OUTPATIENT
Start: 2024-11-21 | End: 2024-11-21 | Stop reason: SURG

## 2024-11-21 RX ORDER — LIDOCAINE HYDROCHLORIDE 20 MG/ML
INJECTION, SOLUTION INFILTRATION; PERINEURAL
Status: DISCONTINUED | OUTPATIENT
Start: 2024-11-21 | End: 2024-11-21 | Stop reason: HOSPADM

## 2024-11-21 RX ORDER — CLOPIDOGREL BISULFATE 75 MG/1
75 TABLET ORAL DAILY
Status: DISCONTINUED | OUTPATIENT
Start: 2024-11-21 | End: 2024-12-04 | Stop reason: HOSPADM

## 2024-11-21 RX ORDER — ONDANSETRON 2 MG/ML
4 INJECTION INTRAMUSCULAR; INTRAVENOUS ONCE AS NEEDED
Status: DISCONTINUED | OUTPATIENT
Start: 2024-11-21 | End: 2024-11-21 | Stop reason: HOSPADM

## 2024-11-21 RX ORDER — HEPARIN SODIUM 1000 [USP'U]/ML
INJECTION, SOLUTION INTRAVENOUS; SUBCUTANEOUS AS NEEDED
Status: DISCONTINUED | OUTPATIENT
Start: 2024-11-21 | End: 2024-11-21 | Stop reason: SURG

## 2024-11-21 RX ORDER — FLUMAZENIL 0.1 MG/ML
0.2 INJECTION INTRAVENOUS AS NEEDED
Status: DISCONTINUED | OUTPATIENT
Start: 2024-11-21 | End: 2024-11-21 | Stop reason: HOSPADM

## 2024-11-21 RX ORDER — LIDOCAINE HYDROCHLORIDE 20 MG/ML
INJECTION, SOLUTION EPIDURAL; INFILTRATION; INTRACAUDAL; PERINEURAL AS NEEDED
Status: DISCONTINUED | OUTPATIENT
Start: 2024-11-21 | End: 2024-11-21 | Stop reason: SURG

## 2024-11-21 RX ORDER — IPRATROPIUM BROMIDE AND ALBUTEROL SULFATE 2.5; .5 MG/3ML; MG/3ML
3 SOLUTION RESPIRATORY (INHALATION) ONCE AS NEEDED
Status: DISCONTINUED | OUTPATIENT
Start: 2024-11-21 | End: 2024-11-21 | Stop reason: HOSPADM

## 2024-11-21 RX ORDER — LABETALOL HYDROCHLORIDE 5 MG/ML
INJECTION, SOLUTION INTRAVENOUS AS NEEDED
Status: DISCONTINUED | OUTPATIENT
Start: 2024-11-21 | End: 2024-11-21 | Stop reason: SURG

## 2024-11-21 RX ORDER — ACETAMINOPHEN 325 MG/1
650 TABLET ORAL EVERY 4 HOURS PRN
Status: DISCONTINUED | OUTPATIENT
Start: 2024-11-21 | End: 2024-11-28 | Stop reason: SDUPTHER

## 2024-11-21 RX ORDER — NALOXONE HCL 0.4 MG/ML
0.4 VIAL (ML) INJECTION AS NEEDED
Status: DISCONTINUED | OUTPATIENT
Start: 2024-11-21 | End: 2024-11-21 | Stop reason: HOSPADM

## 2024-11-21 RX ORDER — FENTANYL CITRATE 50 UG/ML
INJECTION, SOLUTION INTRAMUSCULAR; INTRAVENOUS AS NEEDED
Status: DISCONTINUED | OUTPATIENT
Start: 2024-11-21 | End: 2024-11-21 | Stop reason: SURG

## 2024-11-21 RX ORDER — PROPOFOL 10 MG/ML
INJECTION, EMULSION INTRAVENOUS AS NEEDED
Status: DISCONTINUED | OUTPATIENT
Start: 2024-11-21 | End: 2024-11-21 | Stop reason: SURG

## 2024-11-21 RX ORDER — LABETALOL HYDROCHLORIDE 5 MG/ML
5 INJECTION, SOLUTION INTRAVENOUS
Status: DISCONTINUED | OUTPATIENT
Start: 2024-11-21 | End: 2024-11-21 | Stop reason: HOSPADM

## 2024-11-21 RX ORDER — ALBUMIN, HUMAN INJ 5% 5 %
SOLUTION INTRAVENOUS CONTINUOUS PRN
Status: DISCONTINUED | OUTPATIENT
Start: 2024-11-21 | End: 2024-11-21 | Stop reason: SURG

## 2024-11-21 RX ORDER — PROTAMINE SULFATE 10 MG/ML
INJECTION, SOLUTION INTRAVENOUS AS NEEDED
Status: DISCONTINUED | OUTPATIENT
Start: 2024-11-21 | End: 2024-11-21 | Stop reason: SURG

## 2024-11-21 RX ORDER — ROCURONIUM BROMIDE 10 MG/ML
INJECTION, SOLUTION INTRAVENOUS AS NEEDED
Status: DISCONTINUED | OUTPATIENT
Start: 2024-11-21 | End: 2024-11-21 | Stop reason: SURG

## 2024-11-21 RX ORDER — NITROGLYCERIN 0.4 MG/1
0.4 TABLET SUBLINGUAL
Status: DISCONTINUED | OUTPATIENT
Start: 2024-11-21 | End: 2024-12-04 | Stop reason: HOSPADM

## 2024-11-21 RX ORDER — ACETAMINOPHEN 325 MG/1
650 TABLET ORAL ONCE AS NEEDED
Status: DISCONTINUED | OUTPATIENT
Start: 2024-11-21 | End: 2024-11-21 | Stop reason: SDUPTHER

## 2024-11-21 RX ORDER — EPHEDRINE SULFATE 5 MG/ML
5 INJECTION INTRAVENOUS ONCE AS NEEDED
Status: DISCONTINUED | OUTPATIENT
Start: 2024-11-21 | End: 2024-11-21 | Stop reason: HOSPADM

## 2024-11-21 RX ORDER — ONDANSETRON 2 MG/ML
INJECTION INTRAMUSCULAR; INTRAVENOUS AS NEEDED
Status: DISCONTINUED | OUTPATIENT
Start: 2024-11-21 | End: 2024-11-21 | Stop reason: SURG

## 2024-11-21 RX ORDER — ONDANSETRON 4 MG/1
4 TABLET, ORALLY DISINTEGRATING ORAL EVERY 6 HOURS PRN
Status: DISCONTINUED | OUTPATIENT
Start: 2024-11-21 | End: 2024-11-28 | Stop reason: SDUPTHER

## 2024-11-21 RX ORDER — HYDRALAZINE HYDROCHLORIDE 20 MG/ML
5 INJECTION INTRAMUSCULAR; INTRAVENOUS
Status: DISCONTINUED | OUTPATIENT
Start: 2024-11-21 | End: 2024-11-21 | Stop reason: HOSPADM

## 2024-11-21 RX ADMIN — SUGAMMADEX 150 MG: 100 INJECTION, SOLUTION INTRAVENOUS at 09:38

## 2024-11-21 RX ADMIN — PROPOFOL 40 MG: 10 INJECTION, EMULSION INTRAVENOUS at 08:25

## 2024-11-21 RX ADMIN — Medication 10 ML: at 09:00

## 2024-11-21 RX ADMIN — METHYLPREDNISOLONE SODIUM SUCCINATE 20 MG: 40 INJECTION, POWDER, FOR SOLUTION INTRAMUSCULAR; INTRAVENOUS at 17:58

## 2024-11-21 RX ADMIN — SODIUM CHLORIDE: 9 INJECTION, SOLUTION INTRAVENOUS at 07:56

## 2024-11-21 RX ADMIN — ROCURONIUM BROMIDE 25 MG: 10 INJECTION, SOLUTION INTRAVENOUS at 08:10

## 2024-11-21 RX ADMIN — LABETALOL 20 MG/4 ML (5 MG/ML) INTRAVENOUS SYRINGE 2.5 MG: at 08:32

## 2024-11-21 RX ADMIN — HEPARIN SODIUM 4000 UNITS: 1000 INJECTION, SOLUTION INTRAVENOUS; SUBCUTANEOUS at 09:00

## 2024-11-21 RX ADMIN — PROTAMINE SULFATE 50 MG: 10 INJECTION, SOLUTION INTRAVENOUS at 09:31

## 2024-11-21 RX ADMIN — SODIUM CHLORIDE: 9 INJECTION, SOLUTION INTRAVENOUS at 09:25

## 2024-11-21 RX ADMIN — HEPARIN SODIUM 5000 UNITS: 1000 INJECTION, SOLUTION INTRAVENOUS; SUBCUTANEOUS at 08:45

## 2024-11-21 RX ADMIN — HYDROCORTISONE SODIUM SUCCINATE 100 MG: 100 INJECTION, POWDER, FOR SOLUTION INTRAMUSCULAR; INTRAVENOUS at 08:05

## 2024-11-21 RX ADMIN — ALBUMIN (HUMAN): 12.5 INJECTION, SOLUTION INTRAVENOUS at 08:40

## 2024-11-21 RX ADMIN — MIDODRINE HYDROCHLORIDE 10 MG: 5 TABLET ORAL at 17:57

## 2024-11-21 RX ADMIN — METOPROLOL TARTRATE 25 MG: 25 TABLET, FILM COATED ORAL at 21:12

## 2024-11-21 RX ADMIN — CLOPIDOGREL BISULFATE 75 MG: 75 TABLET ORAL at 13:09

## 2024-11-21 RX ADMIN — LIDOCAINE HYDROCHLORIDE 80 MG: 20 INJECTION, SOLUTION EPIDURAL; INFILTRATION; INTRACAUDAL; PERINEURAL at 08:25

## 2024-11-21 RX ADMIN — ACETAMINOPHEN 650 MG: 325 TABLET, FILM COATED ORAL at 21:42

## 2024-11-21 RX ADMIN — METHYLPREDNISOLONE SODIUM SUCCINATE 20 MG: 40 INJECTION, POWDER, FOR SOLUTION INTRAMUSCULAR; INTRAVENOUS at 01:22

## 2024-11-21 RX ADMIN — Medication 10 ML: at 21:42

## 2024-11-21 RX ADMIN — FENTANYL CITRATE 25 MCG: 50 INJECTION, SOLUTION INTRAMUSCULAR; INTRAVENOUS at 09:40

## 2024-11-21 RX ADMIN — PHENYLEPHRINE HYDROCHLORIDE 0.4 MCG/KG/MIN: 10 INJECTION INTRAVENOUS at 08:37

## 2024-11-21 RX ADMIN — ATORVASTATIN CALCIUM 40 MG: 40 TABLET, FILM COATED ORAL at 21:12

## 2024-11-21 RX ADMIN — FENTANYL CITRATE 25 MCG: 50 INJECTION, SOLUTION INTRAMUSCULAR; INTRAVENOUS at 08:30

## 2024-11-21 RX ADMIN — HEPARIN SODIUM 5000 UNITS: 1000 INJECTION, SOLUTION INTRAVENOUS; SUBCUTANEOUS at 08:33

## 2024-11-21 RX ADMIN — MIDODRINE HYDROCHLORIDE 10 MG: 5 TABLET ORAL at 13:09

## 2024-11-21 RX ADMIN — ONDANSETRON 4 MG: 2 INJECTION INTRAMUSCULAR; INTRAVENOUS at 08:40

## 2024-11-21 RX ADMIN — HEPARIN SODIUM 4000 UNITS: 1000 INJECTION, SOLUTION INTRAVENOUS; SUBCUTANEOUS at 09:15

## 2024-11-21 RX ADMIN — ROCURONIUM BROMIDE 25 MG: 10 INJECTION, SOLUTION INTRAVENOUS at 08:30

## 2024-11-21 RX ADMIN — PROPOFOL 50 MG: 10 INJECTION, EMULSION INTRAVENOUS at 08:10

## 2024-11-21 NOTE — PROGRESS NOTES
[facility] MEDICINE SERVICE  DAILY PROGRESS NOTE    NAME: Maryann Gaitan  : 1950  MRN: [id number]      LOS: 2 days     PROVIDER OF SERVICE: Foreign Benson MD    Chief Complaint: Acute lower GI bleeding    Subjective:     Interval History:  History taken from: patient    History of Present Illness: Maryann Gaitan is a 74 y.o. female with a CMH of close disease, CAD, COPD, hypertension, hypothyroidism who presents to the hospital with complaints of abdominal pain, chest pain, shortness of breath and rectal bleeding.  The patient was recently admitted to the hospital on  for mild rectal bleeding and was found to have an acute Crohn's exacerbation.  She was discharged on a prolonged steroid taper however she states that she still has some abdominal pain and was not feeling well on discharge.  She did have a small drop in her hemoglobin prior to discharge during her previous hospital stay but her hemoglobin was 8.4 on the day of discharge.  Over the last few days she has continued to have worsening lower abdominal pain with increased chest pain and shortness of breath and much more rectal bleeding than before.  Of note, the patient did have LHC in 2024 with PCI to ostial lesion and was placed on dual antiplatelet therapy with aspirin and Plavix.  This was continued from her previous hospitalization despite her mild rectal bleeding, given the increased risk for stent thrombosis with sudden discontinuation of DAPT.  She denies any headaches but does complain of dizziness, lightheadedness and nausea.     2024 patient seen and examined medical distress, vital signs stable, going for TEVAR in am.  Discussed with RN.  24 Patient seen and examined postoperatively; vital signs stable; SpO2 greater to 90%; cardiopulmonary status stable; pain adequately controlled underwent Ultrasound guided venous access right common femoral vein  Transseptal left heart catheterization  Transcatheter  [id number] repair of mitral valve MitraClip NT      Review of Systems  10 point ROS negative except for above  Objective:     Vital Signs  Temp:  [97.3 °F (36.3 °C)-98.4 °F (36.9 °C)] 97.3 °F (36.3 °C)  Heart Rate:  [66-86] 68  Resp:  [12-22] 18  BP: ()/(58-82) 135/70  Flow (L/min) (Oxygen Therapy):  [2-6] 2   Body mass index is 20.46 kg/m².    Physical Exam  General Appearance:  Alert, cooperative, no distress, appears stated age  Head:   Normocephalic, without obvious abnormality, atraumatic  Eyes:   PERRL, conjunctiva/corneas clear, EOM's intact, fundi benign, both eyes  Ears:    Normal TM's and external ear canals, both ears  Nose:Nares normal, septum midline, mucosa normal, no drainage or sinus tenderness  Throat:Lips, mucosa, and tongue normal; teeth and gums normal  Neck:Supple, symmetrical, trachea midline, no adenopathy, thyroid: not enlarged, symmetric, no tenderness/mass/nodules, no carotid bruit or JVD  Lungs:             Clear to auscultation bilaterally, respirations unlabored  Heart:  Regular rate and rhythm, S1, S2 normal, no murmur, rub or gallop  Abdomen:  Soft, non-tender, bowel sounds active all four quadrants,  no masses, no organomegaly  Extremities:Extremities normal, atraumatic, no cyanosis or edema  Pulses:2+ and symmetric  Skin:Skin color, texture, turgor normal, no rashes or lesions  Neurologic: Normal        Diagnostic Data    Results from last 7 days   Lab Units 11/21/24  0129 11/19/24  1933 11/19/24  1246   WBC 10*3/mm3 13.55*   < > 16.93*   HEMOGLOBIN g/dL 9.0*   < > 5.7*   HEMATOCRIT % 27.6*   < > 18.9*   PLATELETS 10*3/mm3 368   < > 435   GLUCOSE mg/dL 132*   < > 77   CREATININE mg/dL 0.73   < > 0.74   BUN mg/dL 16   < > 28*   SODIUM mmol/L 136   < > 138   POTASSIUM mmol/L 4.1   < > 4.3   AST (SGOT) U/L  --   --  19   ALT (SGPT) U/L  --   --  35*   ALK PHOS U/L  --   --  64   BILIRUBIN mg/dL  --   --  0.3   ANION GAP mmol/L 9.3   < > 9.8    < > = values in this interval not  displayed.       CT Abdomen Pelvis With Contrast    Result Date: 11/20/2024  Impression: 1.Distal/terminal ileitis in keeping with patient's history of Crohn's disease. No definitive active colonic inflammation identified on CT imaging. Trace free fluid in the pelvis is likely related. 2.Diffuse colonic fatty mural infiltration which can be seen in the setting of chronic inflammatory bowel disease. 3.Other incidental nonemergent findings detailed above. Electronically Signed: Junior Khan MD  11/20/2024 8:19 AM EST  Workstation ID: [id number]       I reviewed the patient's new clinical results.    Assessment/Plan:     Active and Resolved Problems  Active Hospital Problems    Diagnosis  POA    **Acute lower GI bleeding [K92.2]  Yes    S/P mitral valve clip implantation [Z98.890, Z95.818]  Not Applicable    Primary hypertension [I10]  Yes    Moderate protein-calorie malnutrition [E44.0]  Yes    Hypothyroidism (acquired) [E03.9]  Yes    COPD (chronic obstructive pulmonary disease) [J44.9]  Yes    Rheumatoid arthritis [M06.9]  Yes    Crohn's disease [K50.90]  Yes    Anxiety associated with depression [F41.8]  Yes    Dyslipidemia [E78.5]  Yes    Severe mitral regurgitation [I34.0]  Yes    Acute heart failure with preserved ejection fraction (HFpEF) [I50.31]  Yes    CAD, multiple vessel [I25.10]  Yes      Resolved Hospital Problems   No resolved problems to display.       Acute lower GI bleed/abdominal pain and rectal bleed.  -Concerned that the patient  may have persistent Crohn's exacerbation causing persistent inflammation and lower GI bleeding  -Check CT of the abdomen  -Reinitiate IV steroids  -Patient has significant blood loss related to this and will require PRBC transfusion  -Held aspirin and Plavix today and consulted cardiology for further assistance and management  -Continue PPI  GI was recommending holding Plavix.  Patient unfortunately is in a tough situation.  Had drug-eluting stents done 10/22/2024, hardly  2 weeks ago.  Patient has been receiving Plavix uninterrupted.  Is without chest pain or shortness of breath.  Per cardio:Patient unfortunately is in a tough situation.  Had drug-eluting stents done 10/22/2024, hardly 2 weeks ago.  Would benefit from Plavix to prevent stent thrombosis.  Patient has been receiving Plavix uninterrupted.     S/p-Ultrasound guided venous access right common femoral vein  Transseptal left heart catheterization  Transcatheter [id number] repair of mitral valve MitraClip NT      Acute blood loss anemia  -Secondary to GI bleed as above  -Transfused 2 units PRBC  -GI consult  -Holding DAPT as above temporarily     Hypertension  -Resume home metoprolol at lower dose but hold other medications in the setting of GI bleed with potential for hypotension     CAD  -Patient has previous recent history of PCI with stent placement and was on DAPT although this is likely exacerbating her GI bleed  -Holding DAPT  -Continue statin for now     Hypothyroidism  -Resume home Synthroid     VTE Prophylaxis:  Mechanical VTE prophylaxis orders are present.    Disposition Planning:     Barriers to Discharge:GI bleed    Anticipated Date of Discharge: 11/22  Place of Discharge: home      Time: 34 minutes     Code Status and Medical Interventions: CPR (Attempt to Resuscitate); Full Support   Ordered at: 11/21/24 1014     Level Of Support Discussed With:    Patient     Code Status (Patient has no pulse and is not breathing):    CPR (Attempt to Resuscitate)     Medical Interventions (Patient has pulse or is breathing):    Full Support       Signature: Electronically signed by Foreign Benson MD, 11/21/24, 15:11 EST.  [facility] Hospitalist Team

## 2024-11-21 NOTE — ANESTHESIA PREPROCEDURE EVALUATION
Anesthesia Evaluation     Patient summary reviewed and Nursing notes reviewed   NPO Solid Status: > 8 hours  NPO Liquid Status: > 8 hours           Airway   Mallampati: I  TM distance: >3 FB  Neck ROM: full  No difficulty expected  Dental - normal exam     Pulmonary - normal exam   (+) COPD,  Cardiovascular - normal exam    ECG reviewed  PT is on anticoagulation therapy    (+) hypertension, valvular problems/murmurs MR, CAD      Neuro/Psych  GI/Hepatic/Renal/Endo    (+) GI bleeding lower resolved, renal disease- CRI, thyroid problem hypothyroidism    Musculoskeletal     Abdominal  - normal exam    Bowel sounds: normal.   Substance History      OB/GYN          Other   arthritis,     ROS/Med Hx Other: Mitral clip for CHF, mod-severe MR  Recent GI bleed Crohn's Colitis  Recent Prednisone taper--rec 50mg Hydrocortisone stress dose  COPD    ECHO 10/16/24    Left ventricular systolic function is normal. Left ventricular ejection fraction appears to be 56 - 60%.  ·  The left atrial cavity is severely dilated.  ·  Saline test results are negative.  ·  There is mild, posterior mitral leaflet thickening present.  ·  Moderate to severe mitral valve regurgitation is present with a centrally-directed jet noted.  ·  There is moderate, (grade 3) plaque in the descending aorta present.                       Anesthesia Plan    ASA 4     general, [place] and CVL     intravenous induction     Anesthetic plan, risks, benefits, and alternatives have been provided, discussed and informed consent has been obtained with: patient.    Plan discussed with CRNA.      CODE STATUS:

## 2024-11-21 NOTE — ANESTHESIA POSTPROCEDURE EVALUATION
Patient: Maryann Gaitan    Procedure Summary       Date: 11/21/24 Room / Location: [facility] OR  / [facility] HYBRID OR    Anesthesia Start: 0756 Anesthesia Stop: 0956    Procedures:       Transcatheter Mitral Valve Repair      Transcatheter Mitral Valve Repair Diagnosis:       Severe mitral regurgitation      Chronic heart failure with preserved ejection fraction (HFpEF)      (Severe mitral regurgitation [I34.0])      (Chronic heart failure with preserved ejection fraction (HFpEF) [I50.32])    Surgeons: Dmitri Navarro MD; Marciano Gurrola MD Provider: Chuck Cordova MD    Anesthesia Type: general, [place], CVL ASA Status: 4            Anesthesia Type: general, [place], CVL    Vitals  Vitals Value Taken Time   /71 11/21/24 1052   Temp 97.7 °F (36.5 °C) 11/21/24 1052   Pulse 72 11/21/24 1052   Resp 18 11/21/24 1052   SpO2 99 % 11/21/24 1052           Post Anesthesia Care and Evaluation    Patient location during evaluation: PACU  Patient participation: complete - patient participated  Level of consciousness: awake  Pain scale: See nurse's notes for pain score.  Pain management: adequate    Airway patency: patent  Anesthetic complications: No anesthetic complications  PONV Status: none  Cardiovascular status: acceptable  Respiratory status: acceptable and spontaneous ventilation  Hydration status: acceptable    Comments: Patient seen and examined postoperatively; vital signs stable; SpO2 greater than or equal to 90%; cardiopulmonary status stable; nausea/vomiting adequately controlled; pain adequately controlled; no apparent anesthesia complications; patient discharged from anesthesia care when discharge criteria were met

## 2024-11-21 NOTE — PLAN OF CARE
Goal Outcome Evaluation:  Plan of Care Reviewed With: patient        Progress: improving  Outcome Evaluation: Pt Hgb sustaining and no bloody stools this shift. Plan for mitral valve repair in AM, pt NPO, call light within reach, care continues.               Problem: Adult Inpatient Plan of Care  Goal: Plan of Care Review  Outcome: Progressing  Flowsheets (Taken 11/21/2024 0340)  Progress: improving  Outcome Evaluation: Pt Hgb sustaining and no bloody stools this shift. Plan for mitral valve repair in AM, pt NPO, call light within reach, care continues.  Goal: Patient-Specific Goal (Individualized)  Outcome: Progressing  Goal: Absence of Hospital-Acquired Illness or Injury  Outcome: Progressing  Intervention: Identify and Manage Fall Risk  Recent Flowsheet Documentation  Taken 11/21/2024 0200 by Yoli Purcell, RN  Safety Promotion/Fall Prevention:   activity supervised   assistive device/personal items within reach   clutter free environment maintained   fall prevention program maintained   nonskid shoes/slippers when out of bed   room organization consistent   safety round/check completed  Taken 11/21/2024 0000 by Yoli Purcell, RN  Safety Promotion/Fall Prevention:   activity supervised   assistive device/personal items within reach   clutter free environment maintained   fall prevention program maintained   nonskid shoes/slippers when out of bed   room organization consistent   safety round/check completed  Taken 11/20/2024 2200 by Yoli Purcell, RN  Safety Promotion/Fall Prevention:   activity supervised   assistive device/personal items within reach   clutter free environment maintained   fall prevention program maintained   nonskid shoes/slippers when out of bed   room organization consistent   safety round/check completed  Taken 11/20/2024 2000 by Yoli Purcell, RN  Safety Promotion/Fall Prevention:   activity supervised   assistive device/personal items within reach   clutter free environment maintained   fall  prevention program maintained   nonskid shoes/slippers when out of bed   room organization consistent   safety round/check completed  Intervention: Prevent Skin Injury  Recent Flowsheet Documentation  Taken 11/20/2024 2300 by Yoli Purcell RN  Body Position:   turned   left  Taken 11/20/2024 1900 by Yoli Purcell RN  Body Position: weight shifting  Intervention: Prevent and Manage VTE (Venous Thromboembolism) Risk  Recent Flowsheet Documentation  Taken 11/20/2024 2000 by Yoli Purcell RN  VTE Prevention/Management:   bilateral   patient refused intervention  Intervention: Prevent Infection  Recent Flowsheet Documentation  Taken 11/21/2024 0200 by Yoli Purcell RN  Infection Prevention:   hand hygiene promoted   personal protective equipment utilized   rest/sleep promoted   single patient room provided  Taken 11/21/2024 0000 by Yoli Purcell RN  Infection Prevention:   hand hygiene promoted   personal protective equipment utilized   rest/sleep promoted   single patient room provided  Taken 11/20/2024 2200 by Yoli Purcell RN  Infection Prevention:   hand hygiene promoted   personal protective equipment utilized   rest/sleep promoted   single patient room provided  Taken 11/20/2024 2000 by Yoli Purcell RN  Infection Prevention:   hand hygiene promoted   personal protective equipment utilized   rest/sleep promoted   single patient room provided  Goal: Optimal Comfort and Wellbeing  Outcome: Progressing  Intervention: Provide Person-Centered Care  Recent Flowsheet Documentation  Taken 11/21/2024 0000 by Yoli Purcell RN  Trust Relationship/Rapport:   care explained   thoughts/feelings acknowledged  Goal: Readiness for Transition of Care  Outcome: Progressing     Problem: Comorbidity Management  Goal: Maintenance of COPD Symptom Control  Outcome: Progressing  Intervention: Maintain COPD (Chronic Obstructive Pulmonary Disease) Symptom Control  Recent Flowsheet Documentation  Taken 11/21/2024 0200 by Yoli Purcell RN  Medication  Review/Management: medications reviewed  Taken 11/21/2024 0000 by Yoli Purcell RN  Medication Review/Management: medications reviewed  Taken 11/20/2024 2200 by Yoli Purcell RN  Medication Review/Management: medications reviewed  Taken 11/20/2024 2000 by Yoli Purcell RN  Medication Review/Management: medications reviewed  Goal: Maintenance of Heart Failure Symptom Control  Outcome: Progressing  Intervention: Maintain Heart Failure Management  Recent Flowsheet Documentation  Taken 11/21/2024 0200 by Yoli Purcell RN  Medication Review/Management: medications reviewed  Taken 11/21/2024 0000 by Yoli Purcell RN  Medication Review/Management: medications reviewed  Taken 11/20/2024 2200 by Yoli Purcell RN  Medication Review/Management: medications reviewed  Taken 11/20/2024 2000 by Yoli Purcell RN  Medication Review/Management: medications reviewed  Goal: Blood Pressure in Desired Range  Outcome: Progressing  Intervention: Maintain Blood Pressure Management  Recent Flowsheet Documentation  Taken 11/21/2024 0200 by Yoli Purcell RN  Medication Review/Management: medications reviewed  Taken 11/21/2024 0000 by Yoli Purcell RN  Medication Review/Management: medications reviewed  Taken 11/20/2024 2200 by Yoli Purcell RN  Medication Review/Management: medications reviewed  Taken 11/20/2024 2000 by Yoli Purcell RN  Medication Review/Management: medications reviewed  Goal: Maintenance of Osteoarthritis Symptom Control  Outcome: Progressing  Intervention: Maintain Osteoarthritis Symptom Control  Recent Flowsheet Documentation  Taken 11/21/2024 0200 by Yoli Purcell RN  Medication Review/Management: medications reviewed  Taken 11/21/2024 0000 by Yoli Purcell RN  Medication Review/Management: medications reviewed  Taken 11/20/2024 2200 by Yoli Purcell RN  Medication Review/Management: medications reviewed  Taken 11/20/2024 2000 by Yoli Purcell RN  Medication Review/Management: medications reviewed     Problem: Skin Injury Risk Increased  Goal: Skin  Health and Integrity  Outcome: Progressing  Intervention: Optimize Skin Protection  Recent Flowsheet Documentation  Taken 11/20/2024 2300 by Yoli Purcell RN  Head of Bed ([facility]) Positioning: HOB elevated  Taken 11/20/2024 1900 by Yoli Purcell RN  Head of Bed ([facility]) Positioning: HOB elevated     Problem: Gastrointestinal Bleeding  Goal: Optimal Coping with Acute Illness  Outcome: Progressing  Goal: Hemostasis  Outcome: Progressing     Problem: Pain Acute  Goal: Optimal Pain Control and Function  Outcome: Progressing  Intervention: Prevent or Manage Pain  Recent Flowsheet Documentation  Taken 11/21/2024 0200 by Yoli Purcell RN  Medication Review/Management: medications reviewed  Taken 11/21/2024 0000 by Yoli Purcell RN  Medication Review/Management: medications reviewed  Taken 11/20/2024 2200 by Yoli Purcell RN  Medication Review/Management: medications reviewed  Taken 11/20/2024 2000 by Yoli Purcell RN  Medication Review/Management: medications reviewed     Problem: Chest Pain  Goal: Resolution of Chest Pain Symptoms  Outcome: Progressing  Intervention: Manage Acute Chest Pain  Recent Flowsheet Documentation  Taken 11/20/2024 2000 by Yoli Purcell RN  Chest Pain Intervention: (procedure planned for AM)   other (see comments)   activity minimized

## 2024-11-21 NOTE — HOME HEALTH
Patient is current with [facility]. Patient was admitted to [facility] on 11/19/2024. We will continue to follow and resume care once discharged home. Thank you.

## 2024-11-21 NOTE — Clinical Note
Transfer Summary:     Patient transferred to: [facility]  Reason for transfer: Inpatient hospital admission for lower GI bleed.   Report called to: Hospital   Summary of care, treatments, or services provided to the patient including disciplines seeing the patient: Skilled nursing, Physical Therapy, and Occupational Therapy services for wound care, hypo-osmolality and hyponatremia.  Patients progress toward goals: Ongoing, recent hospitalization  Communicable Disease: None noted this admission

## 2024-11-21 NOTE — ANESTHESIA PROCEDURE NOTES
Arterial Line      Patient reassessed immediately prior to procedure    Patient location during procedure: OR  Start time: 11/21/2024 8:25 AM  Stop Time:11/21/2024 8:15 AM       Line placed for hemodynamic monitoring, respiratory failure and MD/Surgeon request.  Performed By   Anesthesiologist: Chuck Cordova MD   Preanesthetic Checklist  Completed: patient identified, IV checked, site marked, risks and benefits discussed, surgical consent, monitors and equipment checked, pre-op evaluation and timeout performed  Arterial Line Prep    Sterile Tech: cap, mask, sterile barriers and gloves  Prep: ChloraPrep  Patient monitoring: blood pressure monitoring, EKG and continuous pulse oximetry  Arterial Line Procedure   Laterality:right  Location:  radial artery  Catheter size: 20 G   Guidance: landmark technique and palpation technique  Number of attempts: 2  Successful placement: yes Images: still images not obtained  Post Assessment   Dressing Type: occlusive dressing applied, secured with tape and wrist guard applied.   Complications no  Circ/Move/Sens Assessment: unchanged and normal.   Patient Tolerance: patient tolerated the procedure well with no apparent complications  Additional Notes  Left radial attempt successful cannulation and great waveform but expanding hematoma apparent so catheter removed and pressure dressing applied  Right radial cannulated on first attempt w/o difficulty

## 2024-11-21 NOTE — DISCHARGE INSTRUCTIONS
MITRACLIP DISCHARGE INSTRUCTIONS    MEDICATIONS  Be sure to follow the discharge medication list provided to you by the hospital.  You may be prescribed different medications than what you were taking at home prior to your procedure.  Anticoagulant or antiplatelet medications are usually prescribed to prevent blood clots from forming on mitraclip which could cause a stroke. These medications increase your chance of bleeding. Please notify your cardiologist of any of the following signs of bleeding: black, tarry stools, red/pink urine, black or dark vomit, nose bleeds, increased bruising, or bleeding gums.    MITRACLIP INCISION SITE CARE  Keep incision sites clean and dry. You may remove your dressings and take a shower when you are home.  Clean your incisions with normal soap and water. Pat your incisions dry with a clean towel. Do not rub them. Do not soak or lay in water until all incisions are completely healed. Do not apply lotion, cream, powder, or ointment to the incision until the scab has fallen off.  If drainage occurs, cover the incision with dry gauze and change the gauze at least twice a day, or more often if needed.  Bruising is common around the incision site, and you may notice a pea-sized lump. This is normal and usually disappears within a few weeks.  Look at your incision every day. Call your doctor's office right away if you notice any of the following signs of infection: pain, redness, swelling, drainage, bleeding, chills, or a fever of 100.4° or higher.    DISCOMFORT  Mild amounts of discomfort after your procedure is expected and may continue for a few weeks. Often, patients can obtain adequate pain relief from taking acetaminophen (Tylenol).    WEIGH DAILY  Weigh yourself every day and record it. Weigh yourself at the same time of day wearing same amount of clothing and call your doctor's office with weight gain of 3 pounds per day or 5 pounds in a week.    ACTIVITY & CARDIAC REHAB  Walk 3-4  times per day. Pace your activities, increase gradually. Daily exercise and adequate rest are important.  Climb stairs slowly as tolerated.  No lifting, pushing, or pulling objects heavier than 10 pounds for 1 week after your procedure.  Sexual activity can be resumed in 2-3 weeks after being discharged or when you can climb a flight of stairs without becoming short of breath.  Do not drive for one week, unless directed otherwise.  You may return to work as directed by cardiologist.  Cardiac rehab is recommended.  You will receive a phone call within 2 weeks of discharge.  If not, call [phone]    DIET   A cardiac (low-fat, low-salt) diet is recommended. Avoid a higher sodium (salt) diet; high levels of sodium can lead to fluid retention and may cause congestive heart failure.    DENTAL VISITS & INVASIVE PROCEDURES  Avoid routine cleanings or non-emergent work on your teeth for 3 months following your procedure. An infection known as bacterial endocarditis is an ongoing potential complication after heart valve surgery. Bacterial endocarditis can damage your heart valve.  To prevent serious infection, antibiotics need to be taken prior to invasive procedures including dental work/cleaning (ask your dentist or family physician to prescribe).   It is equally important to visit the dentist regularly, about every 6 months, because teeth in bad repair can lead to valve infections. Please inform your dentist of your mitraclip prior to any dental work.      WHEN TO SEEK MEDICAL ATTENTION  Seek care immediately, call [phone], or go to the emergency department if you experience  Sudden numbness or weakness in your face, arms, or legs  Sudden onset of chest, back, abdominal pain, or trouble breathing  A change in skin color or temperature (coolness to touch) of the legs or arms  Sudden swelling, bleeding, and/or acute pain in one of your groins  Heart rate faster than 120 beats per minute with shortness of breath  Heart rate  lower than 50 beats per minute or a new irregular heart rate    Call cardiology office [phone] or the valve coordinator [phone]  If you are hospitalized within the first year of your procedure  Chills or fever of 100.4F (38C) or higher  Weight gain of 3 pounds in 24 hours or 5 pounds in 1 week  Increased swelling in feet, ankles, or abdomen  Increase shortness of breath  Prolonged increase in fatigue, weakness, nausea or vomiting  Redness, swelling, bleeding, warmth, or fluid draining at the incision site  Dizziness or fainting spells  Irregular or rapid heart rate  If you have questions regarding your cardiac medications

## 2024-11-21 NOTE — ANESTHESIA PROCEDURE NOTES
Airway  Urgency: elective    Date/Time: 11/21/2024 8:12 AM  Airway not difficult    General Information and Staff    Patient location during procedure: OR  Anesthesiologist: Chuck Cordova MD    Indications and Patient Condition  Indications for airway management: airway protection    Preoxygenated: yes (pt pre-O2 with 100% O2)  Mask difficulty assessment: 2 - vent by mask + OA or adjuvant +/- NMBA (easy BMV )    Final Airway Details  Final airway type: endotracheal airway      Successful airway: ETT  Cuffed: yes   Successful intubation technique: video laryngoscopy  Facilitating devices/methods: intubating stylet  Endotracheal tube insertion site: oral  Blade: Anderson  Blade size: 3  ETT size (mm): 7.0  Cormack-Lehane Classification: grade I - full view of glottis  Placement verified by: chest auscultation and capnometry   Cuff volume (mL): 7  Measured from: lips  ETT/EBT  to lips (cm): 21  Number of attempts at approach: 1  Assessment: lips, teeth, and gum same as pre-op and atraumatic intubation    Additional Comments  ATOETx1. No change in dentition.

## 2024-11-21 NOTE — PROGRESS NOTES
Cardiology Progress Note    Patient Identification:  Name: Maryann Gaitan  Age: 74 y.o.  Sex: female  :  1950  MRN: [id number]                 Follow Up / Chief Complaint:   Chief Complaint   Patient presents with    Chest Pain       Interval History: Patient presented with chest pain and abdominal pain.  Has been having bright red blood per rectum.  Was seen by structural heart for mitral regurgitation was scheduled for outpatient procedure.       Subjective: Patient seen and examined.  Chart reviewed.  Labs reviewed.      Objective:  2024: Glucose elevated.  CBC with white count of 13.5, hemoglobin 9    History of present illness:    Ms. Maryann Gaitan has PMH of     CAD status post cardiac cath with multivessel CAD poor candidate for CABG, underwent PCI to ostial and proximal LAD 10/22/2024  Moderate to severe MR with central jet noted  COPD  Hypertension  Rheumatoid arthritis  Crohn's disease     Presented to the emergency room on 2024 with abdominal pain and chest pain.  Patient reports that she started having bright red blood per rectum again yesterday and has been having constant chest pain since.     Labs in the emergency room was noted to have a hemoglobin of 5.7 high-sensitivity troponin 21--17 BUN 28 creatinine 0.74 total protein 5.1 albumin 2.9 ALT 35 WBC 16.93 chest x-ray without any acute finding EKG sinus rhythm with nonspecific ST abnormalities  CT abdomen and pelvis shows distal terminal ileitis colonic fatty marrow infiltration which can be seen in the setting of chronic inflammatory bowel disease   patient was transfused with 1 unit of PRBC and hemoglobin improved to 7.3 and this morning she is 9.1     Unfortunately patient is in the tough situation as she had stent placed on 10/22/2024 and needs dual antiplatelet therapy to prevent clotting of stent however she has presented for the second time with significant GI bleeding.  Currently her aspirin and Plavix are both on hold.         Recent hospitalization  Presented 11/6/2024 through [facility] with complaint of abdominal pain and dark red stool / rectal bleed.  Patient has close disease and rheumatoid arthritis and is on immunosuppressants methotrexate, Humira, Rinvoq now presented with rectal bleed and abdominal pain.  GI did a scope and biopsies of terminal ileum which were consistent with active Crohn's and biopsies of stomach and duodenum showed strong colitis infection, was treated by ivermectin.  GI started patient on Solu-Medrol and is holding immunosuppressants and wants to hold Plavix.     Patient was recently in the hospital 10/8/2024 with nausea vomiting diarrhea and abnormal labs.  With hyponatremia and hypokalemia and anemia.  Patient suddenly started having chest pain and fast team was called because of sharp left-sided chest pain with shortness of breath dizziness EKG showed sinus tachycardia and cardiac workup revealed severe MR, cath 10/15/2024 revealing severe ostial LAD and outpouching in the descending thoracic aorta probably a penetrating aortic ulcer versus aneurysm.  CT surgery was consulted and she was turned down for CABG therefore patient underwent drug-eluting stent to ostial LAD on 10/22/2024 and was supposed to have clip to mitral valve in follow-up.  In the meantime has been having worsening symptoms and abdominal pain, weight loss.  Patient has been restarted on Plavix.  Will continue for bleeding closely.     Data:  Labs 11/6/2024 - 11/7/2024 revealed sodium 134, BUN/creatinine of 75/2.84, glucose 138, albumin 2.9.  Hemoglobin 9.4 has come down to 8.  MCV 98  EKG done 11/6/2024 reviewed/interpreted by me reveals sinus bradycardia at the rate of 58 bpm.        EGD/colonoscopy 10/12/2024 revealed small hiatal hernia, nonerosive gastritis, T1 stricture s/p dilatation with 12 mm, T1 ulcer, colon ulcers, sigmoid diverticulosis, grade 2 internal hemorrhoids and strongyloides  Echo 10/12/2024 EF of 51 to 55% with  mild RV enlargement, severe left atrial enlargement, moderate to severe MR  Transesophageal echo 10/16/2024: LVEF of 55 to 60% with severe left atrial enlargement, moderate to severe MR and grade 3 descending aortic plaque  Cardiac cath 10/15/2024 reveals total right and severe ostial LAD, descending thoracic aorta had an outpouching consistent with penetrating aortic ulcer versus aneurysm.   Patient was evaluated by CT surgery not a candidate for CABG therefore patient underwent PCI and drug-eluting stent to the ostium proximal LAD.  And she was seen by valve team and will be set up for MitraClip once she recovers from this hospitalization      Assessment:  :     Abdominal pain, rectal bleeding  Anemia requiring blood transfusion  Chest pain  Crohn's disease  CAD, status post PCI  Mitral regurgitation  Chronic HFpEF due to valvular heart disease from mitral regurgitation  Hypertensive cardiovascular disease from hypertension  Hyponatremia  Hypokalemia  Crohn's disease  Normocytic anemia  COPD, chronic steroid therapy  Multifocal pneumonia  Hyperglycemia, prediabetes with A1c of 5.6 from     Recommendations / Plan:         Patient presented 11/19/2024 with abdominal pain and bright red blood per rectum was found to have a hemoglobin of 5.7 was transfused.  Patient chest pain.  HS troponin is normal.  Patient recently had coronary drug-eluting stent placed on 10/22/2024 would benefit from antiplatelet therapy.  Antiplatelet therapy is currently on hold due to GI bleed.  Will follow and restart Plavix when okay with GI.  Patient was seen by structural heart underwent mitral valve clip 11/21/2024.        Review of records  Patient presented 10/9/2024 because of abnormal labs showing low sodium, was complaining of nausea vomiting and diarrhea.    Patient underwent cardiac cath 10/15/2024 which revealed total right and severe ostial LAD disease.  Descending thoracic aorta has an outpouching probably saccular aneurysm  versus  penetrating aortic ulcer .  Echocardiogram 10/12/2024 is revealing EF of 51 to 55% with mild RV enlargement severe left atrial enlargement and right atrial enlargement and moderate to severe MR  GONZÁLEZ is revealing moderate to severe MR.  Patient would benefit from CABG and mitral valve surgery.  Patient was seen by .  Patient has been turned down for surgery due to multiple comorbid conditions.  Patient has a cavitary lesion in her lungs had bronchoscopy.  Had multiple mucous plugs.  Patient underwent drug-eluting stent to ostial LAD 10/12/2024.  Patient was sent home on dual antiplatelet therapy with aspirin and Plavix, metoprolol and statins and losartan as tolerated.  Will follow-up mitral regurgitation and follow-up in with structural heart team.  Patient had an EKG done 10/12/2024 which revealed sinus rhythm degenerating to A-fib with RVR.  Patient would benefit from long-term anticoagulation to prevent thromboembolic events.  Given her Crohn's disease and microcytic anemia patient will be a poor candidate for long-term anticoagulation.  Will follow and consider watchman evaluation as outpatient.  Patient was not tolerating losartan and metoprolol was given intermittent midodrine.  Now patient presents with abdominal pain and rectal bleed.  GI was recommending holding Plavix.  Patient unfortunately is in a tough situation.  Had drug-eluting stents done 10/22/2024, hardly 2 weeks ago.  Would benefit from Plavix to prevent stent thrombosis.  Patient has been receiving Plavix uninterrupted.       Copied text in this portion of the note has been reviewed and is accurate as of 11/21/2024    Past Medical History:  Past Medical History:   Diagnosis Date    Abnormal weight loss 11/21/2024    Acute kidney injury 11/06/2024    Acute UTI (urinary tract infection) 11/06/2024    Anxiety associated with depression 11/06/2024    Arthritis     Benign neoplasm of cecum 07/05/2016    CAD (coronary artery  disease)     CAD, multiple vessel 10/08/2024    Cavitary lesion of lung 10/08/2024    COPD (chronic obstructive pulmonary disease)     Crohn's disease 11/06/2024    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 07/05/2016    Dyslipidemia 10/30/2024    Fecal urgency 11/21/2024    First degree hemorrhoids 07/09/2021    GERD (gastroesophageal reflux disease) 11/21/2024    Hashimoto's thyroiditis 11/21/2024    History of colonic polyps 07/09/2021    Hypertension     Hypothyroidism (acquired) 11/06/2024    Mitral regurgitation 10/08/2024    Moderate protein-calorie malnutrition 11/09/2024    Mood disorder     Multiple tracheobronchial mucus plugs 10/08/2024    Nicotine dependence 11/21/2024    Rheumatoid arthritis 11/06/2024    Second degree hemorrhoids 07/05/2016    Stress incontinence, female 11/21/2024    Thyroid disease     Vitamin D deficiency, unspecified 11/21/2024     Past Surgical History:  Past Surgical History:   Procedure Laterality Date    BRONCHOSCOPY N/A 10/16/2024    Procedure: BRONCHOSCOPY;  Surgeon: Gallo Pope MD;  Location: [facility] ENDOSCOPY;  Service: Pulmonary;  Laterality: N/A;    CARDIAC CATHETERIZATION N/A 10/15/2024    Procedure: Left Heart Cath, possible pci;  Surgeon: Travis Connor MD;  Location: [facility] CATH INVASIVE LOCATION;  Service: Cardiovascular;  Laterality: N/A;    CARDIAC CATHETERIZATION N/A 10/22/2024    Procedure: Laser Coronary Atherectomy;  Surgeon: Travis Connor MD;  Location: [facility] CATH INVASIVE LOCATION;  Service: Cardiovascular;  Laterality: N/A;    COLONOSCOPY N/A 10/12/2024    Procedure: COLONOSCOPY WITH BIOPSY AND WIRE GUIDED BALLOON DILATION OF TERMINAL ILEUM;  Surgeon: Rob Strong MD;  Location: [facility] ENDOSCOPY;  Service: Gastroenterology;  Laterality: N/A;  Colitis, crohns of terminal ileum, right colon ulcers, diverticulosis, hemorroids    ENDOSCOPY N/A 10/12/2024    Procedure: ESOPHAGOGASTRODUODENOSCOPY WITH  "BIOPSY X 2 AREA;  Surgeon: Rob Strong MD;  Location: [facility] ENDOSCOPY;  Service: Gastroenterology;  Laterality: N/A;  Chronic gastritis, HH    HYSTERECTOMY      LEFT HEART CATH          Social History:   Social History     Tobacco Use    Smoking status: Former     Types: Cigarettes    Smokeless tobacco: Never   Substance Use Topics    Alcohol use: Never      Family History:  Family History   Problem Relation Age of Onset    Heart disease Mother     Dementia Mother     Stroke Mother     Heart disease Father     Hypertension Father           Allergies:  Allergies   Allergen Reactions    Codeine Hives    Penicillin G Sodium Hives     Scheduled Meds:  [Transfer Hold] atorvastatin, 40 mg, Nightly  [Transfer Hold] folic acid, 1,000 mcg, Daily  [Transfer Hold] levothyroxine, 50 mcg, Daily  [Transfer Hold] methylPREDNISolone sodium succinate, 20 mg, Q8H  metoprolol tartrate, 25 mg, BID  [Transfer Hold] midodrine, 10 mg, TID AC  [Transfer Hold] pantoprazole, 40 mg, Daily  [Transfer Hold] sertraline, 50 mg, Daily  [Transfer Hold] sodium chloride, 10 mL, Q12H          Review of Systems:   ROS        Constitutional:  Temp:  [97.4 °F (36.3 °C)-98.4 °F (36.9 °C)] 98.4 °F (36.9 °C)  Heart Rate:  [60-86] 73  Resp:  [18-22] 20  BP: ()/(58-82) 132/72    Physical Exam   /72 (BP Location: Left arm, Patient Position: Lying)   Pulse 73   Temp 98.4 °F (36.9 °C) (Oral) Comment: 98.4  Resp 20   Ht 162.6 cm (64.02\")   Wt 45.9 kg (101 lb 3.1 oz)   SpO2 100%   BMI 17.36 kg/m²   General:  Appears in no acute distress  Eyes: Sclera is anicteric,  conjunctiva is clear   HEENT:  No JVD. Thyroid not visibly enlarged. No mucosal pallor or cyanosis  Respiratory: Respirations regular and unlabored at rest.  Clear to auscultation  Cardiovascular: S1,S2 Regular rate and rhythm.  2/6 holosystolic murmur  Gastrointestinal: Abdomen nondistended.  Musculoskeletal:  No abnormal movements  Extremities: No digital clubbing or " cyanosis  Skin: Color pink.   Neuro: Alert and awake.    INTAKE AND OUTPUT:    Intake/Output Summary (Last 24 hours) at 11/21/2024 0907  Last data filed at 11/20/2024 1300  Gross per 24 hour   Intake 240 ml   Output --   Net 240 ml       Cardiographics  Telemetry: Sinus    ECG:   ECG 12 Lead Chest Pain   Final Result   HEART RATE=67  bpm   RR Vntaokdy=691  ms   OR Nynbemzn=246  ms   P Horizontal Axis=28  deg   P Front Axis=87  deg   QRSD Interval=83  ms   QT Cyvbpqbz=032  ms   IPaG=116  ms   QRS Axis=39  deg   T Wave Axis=40  deg   - ABNORMAL ECG -   Sinus rhythm   Low voltage, precordial leads   Nonspecific  T abnormalities, inferior leads   When compared with ECG of 06-Nov-2024 15:41:09,   Significant repolarization change   Significant axis, voltage or hypertrophy change   Electronically Signed By: Rajiv Dubon ([facility]) 2024-11-19 12:47:59   Date and Time of Study:2024-11-19 12:13:34      Telemetry Scan   Final Result      Telemetry Scan   Final Result      Telemetry Scan   Final Result        I have personally reviewed EKG    Echocardiogram: Results for orders placed during the hospital encounter of 10/08/24    Adult Transesophageal Echo (GONZÁLEZ) W/ Cont if Necessary Per Protocol (Cardiology Department)    Interpretation Summary    Left ventricular systolic function is normal. Left ventricular ejection fraction appears to be 56 - 60%.    The left atrial cavity is severely dilated.    Saline test results are negative.    There is mild, posterior mitral leaflet thickening present.    Moderate to severe mitral valve regurgitation is present with a centrally-directed jet noted.    There is moderate, (grade 3) plaque in the descending aorta present.      Lab Review   I have reviewed the labs  Results from last 7 days   Lab Units 11/19/24  1503 11/19/24  1246   HSTROP T ng/L 17* 21*     Results from last 7 days   Lab Units 11/21/24  0129   MAGNESIUM mg/dL 2.0     Results from last 7 days   Lab Units 11/21/24  0129   SODIUM  "mmol/L 136   POTASSIUM mmol/L 4.1   BUN mg/dL 16   CREATININE mg/dL 0.73   CALCIUM mg/dL 8.3*         Results from last 7 days   Lab Units 11/21/24  0129 11/20/24  0246 11/19/24  1933 11/19/24  1246   WBC 10*3/mm3 13.55* 12.18*  --  16.93*   HEMOGLOBIN g/dL 9.0* 9.1* 7.3* 5.7*   HEMATOCRIT % 27.6* 29.5* 23.5* 18.9*   PLATELETS 10*3/mm3 368 369  --  435     Results from last 7 days   Lab Units 11/19/24  1246   INR  0.99   APTT seconds 21.9*       RADIOLOGY:  Imaging Results (Last 24 Hours)       ** No results found for the last 24 hours. **                  )11/21/2024  MD EVERETTE Araujo/Transcription:   \"Dictated utilizing Dragon dictation\".   "

## 2024-11-21 NOTE — ANESTHESIA PROCEDURE NOTES
Arterial Line      Patient reassessed immediately prior to procedure    Patient location during procedure: OR   Performed By   Anesthesiologist: Chuck Cordova MD  CRNA/CAA: Dede Phipps CRNA   Preanesthetic Checklist  Completed: patient identified, IV checked, site marked, risks and benefits discussed, surgical consent, monitors and equipment checked, pre-op evaluation and timeout performed  Arterial Line Prep    Sterile Tech: cap, gloves, sterile barriers and mask  Prep: ChloraPrep  Patient monitoring: EKG, continuous pulse oximetry and blood pressure monitoring  Arterial Line Procedure   Laterality:right  Location:  radial artery  Catheter size: 20 G   Guidance: landmark technique and palpation technique  Number of attempts: 1  Successful placement: yes   Post Assessment   Dressing Type: wrist guard applied, secured with tape and occlusive dressing applied.   Complications no  Circ/Move/Sens Assessment: normal and unchanged.   Patient Tolerance: patient tolerated the procedure well with no apparent complications  Additional Notes  Attempted x3 on left side radial. X2 per SRNA and X1 per CRNA. A-line achieved easily on left radial with a good waveform, however a hematoma appeared to develop near puncture site and catheter was removed and pressure dressing applied. Right radial placed per MDA X1.

## 2024-11-21 NOTE — SIGNIFICANT NOTE
11/21/24 1508   OTHER   Discipline physical therapist   Rehab Time/Intention   Session Not Performed patient unavailable for treatment  (OR for MVR today. Will f/u tomorrow.)   Therapy Assessment/Plan (PT)   Criteria for Skilled Interventions Met (PT) yes;meets criteria   Recommendation   PT - Next Appointment 11/22/24

## 2024-11-21 NOTE — OP NOTE
Dmitri Navarro MD     Procedures performed:  Ultrasound guided venous access right common femoral vein  Transseptal left heart catheterization  Transcatheter [id number] repair of mitral valve MitraClip NT      Procedure in details  Under US guidance and using a micropuncture access kit, a 7F introducer was placed into the right femoral vein. The venotomy was preclosed using 2 Perclose Proglide devices. An 16F introducer sheath was placed in the right femoral vein.  Under ultrasound guidance and using a micropuncture access kit a 5 Faroese introducer was placed in the left common femoral  artery.     The Ardara sheath was advanced into the superior vena cava over an 0.035 wire. Under GONZÁLEZ and fluoroscopic guidance, successful transseptal puncture was performed using a [employer] cross system. Correct position in the left atrium was confirmed by pressure tracing.     Heparin was administered and ACTs were followed with additional heparin being given to keep the ACT > 300.  [employer] curved wire was advanced through the Ardara sheath into the left upper pulmonary vein. The sheath was then replaced with the MitraClip Delivery System/Sheath (CDS). Under fluoroscopic and GONZÁLEZ guidance, a MitraClip (G4 NT) was positioned across the mitral valve and deployed per protocol at A2/P2 position. After the first clip, there was significant reduction in the degree of mitral regurgitation. MR reduced from 4+ to 1+.  There was residual regurgitation jet on the lateral aspect of the clip.  However there was no posterior valve length to appropriately grasp the leaflet in that location.  Overall very challenging case due to significant amount of calcification around the posterior valve leaflet.  Multiple attempts were made during the procedure to grasp the leaflet.  Final gradient across the mitral valve was 4-5 mmHg.  All pulmonic veins were assessed and no longer showed flow reversal.    The delivery sheath was removed  and the pre-positioned Perclose devices were placed with successful hemostasis.     The patient was extubated and transferred to the recovery area in stable condition.      Recommendations:  Resume anticoagulation.  Uptitrate GDMT     Electronically signed by Dmitri Navarro MD, 11/21/24, 10:09 AM EST.

## 2024-11-22 ENCOUNTER — INPATIENT HOSPITAL (AMBULATORY)
Age: 74
End: 2024-11-22
Payer: MEDICARE

## 2024-11-22 ENCOUNTER — TRANSCRIBE ORDERS (OUTPATIENT)
Dept: HOME HEALTH SERVICES | Facility: HOME HEALTHCARE | Age: 74
End: 2024-11-22
Payer: MEDICARE

## 2024-11-22 ENCOUNTER — INPATIENT HOSPITAL (AMBULATORY)
Dept: URBAN - METROPOLITAN AREA HOSPITAL 84 | Facility: HOSPITAL | Age: 74
End: 2024-11-22
Payer: MEDICARE

## 2024-11-22 ENCOUNTER — APPOINTMENT (OUTPATIENT)
Dept: CARDIOLOGY | Facility: HOSPITAL | Age: 74
DRG: 329 | End: 2024-11-22
Payer: MEDICARE

## 2024-11-22 DIAGNOSIS — K50.80 CROHN'S DISEASE OF BOTH SMALL AND LARGE INTESTINE WITHOUT CO: ICD-10-CM

## 2024-11-22 DIAGNOSIS — Z98.890 S/P MITRAL VALVE REPAIR: Primary | ICD-10-CM

## 2024-11-22 LAB
ANION GAP SERPL CALCULATED.3IONS-SCNC: 7 MMOL/L (ref 5–15)
BASOPHILS # BLD AUTO: 0.01 10*3/MM3 (ref 0–0.2)
BASOPHILS NFR BLD AUTO: 0.1 % (ref 0–1.5)
BH CV ECHO MEAS - MV DEC SLOPE: 482.2 CM/SEC2
BH CV ECHO MEAS - MV MAX PG: 9.3 MMHG
BH CV ECHO MEAS - MV MEAN PG: 4.6 MMHG
BH CV ECHO MEAS - MV P1/2T: 95.7 MSEC
BH CV ECHO MEAS - MV V2 VTI: 47.1 CM
BH CV ECHO MEAS - MVA(P1/2T): 2.3 CM2
BUN SERPL-MCNC: 15 MG/DL (ref 8–23)
BUN/CREAT SERPL: 30.6 (ref 7–25)
CALCIUM SPEC-SCNC: 8 MG/DL (ref 8.6–10.5)
CHLORIDE SERPL-SCNC: 107 MMOL/L (ref 98–107)
CO2 SERPL-SCNC: 25 MMOL/L (ref 22–29)
CREAT SERPL-MCNC: 0.49 MG/DL (ref 0.57–1)
DEPRECATED RDW RBC AUTO: 62.1 FL (ref 37–54)
EGFRCR SERPLBLD CKD-EPI 2021: 99 ML/MIN/1.73
EOSINOPHIL # BLD AUTO: 0 10*3/MM3 (ref 0–0.4)
EOSINOPHIL NFR BLD AUTO: 0 % (ref 0.3–6.2)
ERYTHROCYTE [DISTWIDTH] IN BLOOD BY AUTOMATED COUNT: 18.3 % (ref 12.3–15.4)
GLUCOSE SERPL-MCNC: 116 MG/DL (ref 65–99)
HCT VFR BLD AUTO: 23.2 % (ref 34–46.6)
HCT VFR BLD AUTO: 27.3 % (ref 34–46.6)
HCT VFR BLD AUTO: 27.3 % (ref 34–46.6)
HGB BLD-MCNC: 7.4 G/DL (ref 12–15.9)
HGB BLD-MCNC: 8.6 G/DL (ref 12–15.9)
HGB BLD-MCNC: 8.9 G/DL (ref 12–15.9)
IMM GRANULOCYTES # BLD AUTO: 0.1 10*3/MM3 (ref 0–0.05)
IMM GRANULOCYTES NFR BLD AUTO: 0.7 % (ref 0–0.5)
LYMPHOCYTES # BLD AUTO: 0.74 10*3/MM3 (ref 0.7–3.1)
LYMPHOCYTES NFR BLD AUTO: 5.2 % (ref 19.6–45.3)
MAGNESIUM SERPL-MCNC: 1.8 MG/DL (ref 1.6–2.4)
MCH RBC QN AUTO: 30.1 PG (ref 26.6–33)
MCHC RBC AUTO-ENTMCNC: 31.9 G/DL (ref 31.5–35.7)
MCV RBC AUTO: 94.3 FL (ref 79–97)
MONOCYTES # BLD AUTO: 0.58 10*3/MM3 (ref 0.1–0.9)
MONOCYTES NFR BLD AUTO: 4.1 % (ref 5–12)
NEUTROPHILS NFR BLD AUTO: 12.7 10*3/MM3 (ref 1.7–7)
NEUTROPHILS NFR BLD AUTO: 89.9 % (ref 42.7–76)
NRBC BLD AUTO-RTO: 0 /100 WBC (ref 0–0.2)
PLATELET # BLD AUTO: 310 10*3/MM3 (ref 140–450)
PMV BLD AUTO: 9.5 FL (ref 6–12)
POTASSIUM SERPL-SCNC: 3.9 MMOL/L (ref 3.5–5.2)
QT INTERVAL: 383 MS
QTC INTERVAL: 423 MS
RBC # BLD AUTO: 2.46 10*6/MM3 (ref 3.77–5.28)
SODIUM SERPL-SCNC: 139 MMOL/L (ref 136–145)
WBC NRBC COR # BLD AUTO: 14.13 10*3/MM3 (ref 3.4–10.8)

## 2024-11-22 PROCEDURE — 93010 ELECTROCARDIOGRAM REPORT: CPT | Performed by: INTERNAL MEDICINE

## 2024-11-22 PROCEDURE — 85014 HEMATOCRIT: CPT | Performed by: INTERNAL MEDICINE

## 2024-11-22 PROCEDURE — 36430 TRANSFUSION BLD/BLD COMPNT: CPT

## 2024-11-22 PROCEDURE — 93325 DOPPLER ECHO COLOR FLOW MAPG: CPT

## 2024-11-22 PROCEDURE — 99232 SBSQ HOSP IP/OBS MODERATE 35: CPT | Performed by: INTERNAL MEDICINE

## 2024-11-22 PROCEDURE — 85018 HEMOGLOBIN: CPT | Performed by: INTERNAL MEDICINE

## 2024-11-22 PROCEDURE — 85018 HEMOGLOBIN: CPT

## 2024-11-22 PROCEDURE — 93321 DOPPLER ECHO F-UP/LMTD STD: CPT

## 2024-11-22 PROCEDURE — 97110 THERAPEUTIC EXERCISES: CPT

## 2024-11-22 PROCEDURE — 93005 ELECTROCARDIOGRAM TRACING: CPT | Performed by: NURSE PRACTITIONER

## 2024-11-22 PROCEDURE — 86900 BLOOD TYPING SEROLOGIC ABO: CPT

## 2024-11-22 PROCEDURE — 93308 TTE F-UP OR LMTD: CPT | Performed by: INTERNAL MEDICINE

## 2024-11-22 PROCEDURE — 85025 COMPLETE CBC W/AUTO DIFF WBC: CPT | Performed by: INTERNAL MEDICINE

## 2024-11-22 PROCEDURE — 93308 TTE F-UP OR LMTD: CPT

## 2024-11-22 PROCEDURE — 97530 THERAPEUTIC ACTIVITIES: CPT

## 2024-11-22 PROCEDURE — 63710000001 DIPHENHYDRAMINE PER 50 MG: Performed by: INTERNAL MEDICINE

## 2024-11-22 PROCEDURE — 93325 DOPPLER ECHO COLOR FLOW MAPG: CPT | Performed by: INTERNAL MEDICINE

## 2024-11-22 PROCEDURE — 86923 COMPATIBILITY TEST ELECTRIC: CPT

## 2024-11-22 PROCEDURE — 93321 DOPPLER ECHO F-UP/LMTD STD: CPT | Performed by: INTERNAL MEDICINE

## 2024-11-22 PROCEDURE — P9016 RBC LEUKOCYTES REDUCED: HCPCS

## 2024-11-22 PROCEDURE — 25010000002 METHYLPREDNISOLONE PER 40 MG: Performed by: INTERNAL MEDICINE

## 2024-11-22 PROCEDURE — 83735 ASSAY OF MAGNESIUM: CPT | Performed by: INTERNAL MEDICINE

## 2024-11-22 PROCEDURE — 80048 BASIC METABOLIC PNL TOTAL CA: CPT | Performed by: INTERNAL MEDICINE

## 2024-11-22 PROCEDURE — 85014 HEMATOCRIT: CPT

## 2024-11-22 PROCEDURE — 97112 NEUROMUSCULAR REEDUCATION: CPT

## 2024-11-22 RX ORDER — ACETAMINOPHEN 325 MG/1
650 TABLET ORAL ONCE
Status: DISCONTINUED | OUTPATIENT
Start: 2024-11-22 | End: 2024-11-29

## 2024-11-22 RX ORDER — DIPHENHYDRAMINE HYDROCHLORIDE 50 MG/ML
25 INJECTION INTRAMUSCULAR; INTRAVENOUS ONCE
Status: DISCONTINUED | OUTPATIENT
Start: 2024-11-22 | End: 2024-11-29

## 2024-11-22 RX ORDER — ACETAMINOPHEN 650 MG/1
650 SUPPOSITORY RECTAL ONCE
Status: DISCONTINUED | OUTPATIENT
Start: 2024-11-22 | End: 2024-11-29

## 2024-11-22 RX ORDER — DIPHENHYDRAMINE HCL 25 MG
25 CAPSULE ORAL ONCE
Status: DISCONTINUED | OUTPATIENT
Start: 2024-11-22 | End: 2024-11-29

## 2024-11-22 RX ORDER — ACETAMINOPHEN 160 MG/5ML
650 SOLUTION ORAL ONCE
Status: DISCONTINUED | OUTPATIENT
Start: 2024-11-22 | End: 2024-11-29

## 2024-11-22 RX ORDER — METOPROLOL SUCCINATE 25 MG/1
25 TABLET, EXTENDED RELEASE ORAL
Status: DISCONTINUED | OUTPATIENT
Start: 2024-11-23 | End: 2024-12-04 | Stop reason: HOSPADM

## 2024-11-22 RX ADMIN — METHYLPREDNISOLONE SODIUM SUCCINATE 20 MG: 40 INJECTION, POWDER, FOR SOLUTION INTRAMUSCULAR; INTRAVENOUS at 01:33

## 2024-11-22 RX ADMIN — ATORVASTATIN CALCIUM 40 MG: 40 TABLET, FILM COATED ORAL at 21:07

## 2024-11-22 RX ADMIN — CLOPIDOGREL BISULFATE 75 MG: 75 TABLET ORAL at 09:41

## 2024-11-22 RX ADMIN — DIPHENHYDRAMINE HYDROCHLORIDE 25 MG: 25 CAPSULE ORAL at 09:41

## 2024-11-22 RX ADMIN — Medication 10 ML: at 21:07

## 2024-11-22 RX ADMIN — METHYLPREDNISOLONE SODIUM SUCCINATE 20 MG: 40 INJECTION, POWDER, FOR SOLUTION INTRAMUSCULAR; INTRAVENOUS at 09:40

## 2024-11-22 RX ADMIN — MIDODRINE HYDROCHLORIDE 10 MG: 5 TABLET ORAL at 17:49

## 2024-11-22 RX ADMIN — ACETAMINOPHEN 650 MG: 325 TABLET, FILM COATED ORAL at 08:33

## 2024-11-22 RX ADMIN — FOLIC ACID 1000 MCG: 1 TABLET ORAL at 09:41

## 2024-11-22 RX ADMIN — MIDODRINE HYDROCHLORIDE 10 MG: 5 TABLET ORAL at 08:34

## 2024-11-22 RX ADMIN — PANTOPRAZOLE SODIUM 40 MG: 40 TABLET, DELAYED RELEASE ORAL at 09:42

## 2024-11-22 RX ADMIN — METHYLPREDNISOLONE SODIUM SUCCINATE 20 MG: 40 INJECTION, POWDER, FOR SOLUTION INTRAMUSCULAR; INTRAVENOUS at 17:49

## 2024-11-22 RX ADMIN — LEVOTHYROXINE SODIUM 50 MCG: 0.05 TABLET ORAL at 09:41

## 2024-11-22 RX ADMIN — METOPROLOL TARTRATE 25 MG: 25 TABLET, FILM COATED ORAL at 09:41

## 2024-11-22 RX ADMIN — SERTRALINE HYDROCHLORIDE 50 MG: 50 TABLET ORAL at 09:41

## 2024-11-22 RX ADMIN — Medication 10 ML: at 09:44

## 2024-11-22 NOTE — PLAN OF CARE
Goal Outcome Evaluation:                        Problem: Adult Inpatient Plan of Care  Goal: Plan of Care Review  11/21/2024 1934 by Shraddha Rodriguez RN  Outcome: Progressing  Flowsheets (Taken 11/21/2024 1834)  Progress: improving  Plan of Care Reviewed With: patient  11/21/2024 1933 by Shraddha Rodriguez RN  Outcome: Progressing  Flowsheets (Taken 11/21/2024 1834)  Progress: improving  Plan of Care Reviewed With: patient  Goal: Patient-Specific Goal (Individualized)  11/21/2024 1934 by Shraddha Rodriguez RN  Outcome: Progressing  11/21/2024 1933 by Shraddha Rodriguez RN  Outcome: Progressing  Goal: Absence of Hospital-Acquired Illness or Injury  11/21/2024 1934 by Shraddha Rodriguez RN  Outcome: Progressing  11/21/2024 1933 by Shraddha Rodriguez RN  Outcome: Progressing  Intervention: Identify and Manage Fall Risk  Recent Flowsheet Documentation  Taken 11/21/2024 1900 by Shraddha Rodriguez RN  Safety Promotion/Fall Prevention: safety round/check completed  Taken 11/21/2024 1800 by Shraddha Rodriguez RN  Safety Promotion/Fall Prevention:   safety round/check completed   fall prevention program maintained  Taken 11/21/2024 1700 by Shraddha Rodriguez RN  Safety Promotion/Fall Prevention:   safety round/check completed   fall prevention program maintained  Taken 11/21/2024 1600 by Shraddha Rodriguez RN  Safety Promotion/Fall Prevention:   safety round/check completed   fall prevention program maintained  Taken 11/21/2024 1500 by Shraddha Rodriguez RN  Safety Promotion/Fall Prevention: safety round/check completed  Taken 11/21/2024 1400 by Shraddha Rodriguez RN  Safety Promotion/Fall Prevention: safety round/check completed  Taken 11/21/2024 1300 by Shraddha Rodriguez RN  Safety Promotion/Fall Prevention: safety round/check completed  Taken 11/21/2024 1200 by Shraddha Rodriguez RN  Safety Promotion/Fall Prevention:   safety round/check completed   fall prevention program maintained  Taken 11/21/2024 1130 by Shraddha Rodriguez RN  Safety Promotion/Fall Prevention: safety  round/check completed  Intervention: Prevent Skin Injury  Recent Flowsheet Documentation  Taken 11/21/2024 1600 by Shraddha Rodriguez RN  Body Position:   supine   weight shifting  Skin Protection: incontinence pads utilized  Taken 11/21/2024 1200 by Shraddha Rodriguez RN  Body Position: supine  Skin Protection: incontinence pads utilized  Intervention: Prevent and Manage VTE (Venous Thromboembolism) Risk  Recent Flowsheet Documentation  Taken 11/21/2024 1600 by Shraddha Rodriguez RN  VTE Prevention/Management:   bilateral   SCDs (sequential compression devices) off   patient refused intervention  Taken 11/21/2024 1200 by Shraddha Rodriguez RN  VTE Prevention/Management:   bilateral   SCDs (sequential compression devices) off   patient refused intervention  Intervention: Prevent Infection  Recent Flowsheet Documentation  Taken 11/21/2024 1600 by Shraddha Rodriguez RN  Infection Prevention:   hand hygiene promoted   rest/sleep promoted   single patient room provided  Taken 11/21/2024 1200 by Shraddha Rodriguez RN  Infection Prevention:   hand hygiene promoted   rest/sleep promoted   single patient room provided  Goal: Optimal Comfort and Wellbeing  11/21/2024 1934 by Shraddha Rodriguez RN  Outcome: Progressing  11/21/2024 1933 by Shraddha Rodriguez RN  Outcome: Progressing  Intervention: Provide Person-Centered Care  Recent Flowsheet Documentation  Taken 11/21/2024 1600 by Shraddha Rodriguez RN  Trust Relationship/Rapport:   care explained   questions answered   thoughts/feelings acknowledged  Taken 11/21/2024 1200 by Shraddha Rodriguez RN  Trust Relationship/Rapport:   care explained   questions answered   thoughts/feelings acknowledged  Goal: Readiness for Transition of Care  11/21/2024 1934 by Shraddha Rodriguez RN  Outcome: Progressing  11/21/2024 1933 by Shraddha Rodriguez RN  Outcome: Progressing     Problem: Comorbidity Management  Goal: Maintenance of COPD Symptom Control  11/21/2024 1934 by Shraddha Rodriguez RN  Outcome: Progressing  11/21/2024 1933 by  Shraddha Rodriguez RN  Outcome: Progressing  Intervention: Maintain COPD (Chronic Obstructive Pulmonary Disease) Symptom Control  Recent Flowsheet Documentation  Taken 11/21/2024 1600 by Shraddha Rodriguez RN  Medication Review/Management: medications reviewed  Taken 11/21/2024 1200 by Shraddha Rodriguez RN  Medication Review/Management: medications reviewed  Goal: Maintenance of Heart Failure Symptom Control  11/21/2024 1934 by Shraddha Rodriguez RN  Outcome: Progressing  11/21/2024 1933 by Shraddha Rodriguez RN  Outcome: Progressing  Intervention: Maintain Heart Failure Management  Recent Flowsheet Documentation  Taken 11/21/2024 1600 by Shraddha Rodriguez RN  Medication Review/Management: medications reviewed  Taken 11/21/2024 1200 by Shraddha Rodriguez RN  Medication Review/Management: medications reviewed  Goal: Blood Pressure in Desired Range  11/21/2024 1934 by Shraddha Rodriguez RN  Outcome: Progressing  11/21/2024 1933 by Shraddha Rodriguez RN  Outcome: Progressing  Intervention: Maintain Blood Pressure Management  Recent Flowsheet Documentation  Taken 11/21/2024 1600 by Shraddha Rodriguez RN  Medication Review/Management: medications reviewed  Taken 11/21/2024 1200 by Shraddha Rodriguez RN  Medication Review/Management: medications reviewed  Goal: Maintenance of Osteoarthritis Symptom Control  11/21/2024 1934 by Shraddha Rodriguez RN  Outcome: Progressing  11/21/2024 1933 by Shraddha Rodriguez RN  Outcome: Progressing  Intervention: Maintain Osteoarthritis Symptom Control  Recent Flowsheet Documentation  Taken 11/21/2024 1600 by Shraddha Rodriguez RN  Activity Management: activity encouraged  Medication Review/Management: medications reviewed  Taken 11/21/2024 1200 by Shraddha Rodriguez RN  Activity Management: bedrest  Medication Review/Management: medications reviewed     Problem: Skin Injury Risk Increased  Goal: Skin Health and Integrity  11/21/2024 1934 by Shraddha Rodriguez RN  Outcome: Progressing  11/21/2024 1933 by Shraddha Rodriguez RN  Outcome:  Progressing  Intervention: Optimize Skin Protection  Recent Flowsheet Documentation  Taken 11/21/2024 1600 by Shraddha Rodriguez RN  Activity Management: activity encouraged  Pressure Reduction Techniques:   frequent weight shift encouraged   weight shift assistance provided  Head of Bed (HOB) Positioning: HOB at 30-45 degrees  Pressure Reduction Devices:   pressure-redistributing mattress utilized   elbow protectors utilized  Skin Protection: incontinence pads utilized  Taken 11/21/2024 1200 by Shraddha Rodriguez RN  Activity Management: bedrest  Pressure Reduction Techniques:   frequent weight shift encouraged   weight shift assistance provided  Head of Bed (HOB) Positioning: HOB at 20-30 degrees  Pressure Reduction Devices: pressure-redistributing mattress utilized  Skin Protection: incontinence pads utilized     Problem: Gastrointestinal Bleeding  Goal: Optimal Coping with Acute Illness  11/21/2024 1934 by Shraddha Rodriguez RN  Outcome: Progressing  11/21/2024 1933 by Shraddha Rodriguez RN  Outcome: Progressing  Intervention: Optimize Psychosocial Response  Recent Flowsheet Documentation  Taken 11/21/2024 1600 by Shraddha Rodriguez RN  Supportive Measures: active listening utilized  Taken 11/21/2024 1200 by Shraddha Rodriguez RN  Supportive Measures: active listening utilized  Goal: Hemostasis  11/21/2024 1934 by Shraddha Rodriguez RN  Outcome: Progressing  11/21/2024 1933 by Shraddha Rodriguez RN  Outcome: Progressing     Problem: Pain Acute  Goal: Optimal Pain Control and Function  11/21/2024 1934 by Shraddha Rodriguez RN  Outcome: Progressing  11/21/2024 1933 by Shraddha Rodriguez RN  Outcome: Progressing  Intervention: Optimize Psychosocial Wellbeing  Recent Flowsheet Documentation  Taken 11/21/2024 1600 by Shraddha Rodriguez RN  Supportive Measures: active listening utilized  Diversional Activities: television  Taken 11/21/2024 1200 by Shraddha Rodriguez RN  Supportive Measures: active listening utilized  Diversional Activities:  television  Intervention: Prevent or Manage Pain  Recent Flowsheet Documentation  Taken 11/21/2024 1600 by Shraddha Rodriguez, RN  Medication Review/Management: medications reviewed  Taken 11/21/2024 1200 by Shraddha Rodriguez, RN  Sensory Stimulation Regulation:   care clustered   television on  Medication Review/Management: medications reviewed     Problem: Chest Pain  Goal: Resolution of Chest Pain Symptoms  11/21/2024 1934 by Shraddha Rodriguez, RN  Outcome: Progressing  11/21/2024 1933 by Shraddha Rodriguez, RN  Outcome: Progressing      Patient refused to get out of bed, repositioned.

## 2024-11-22 NOTE — PROGRESS NOTES
Nutrition Services  Patient Name: Maryann Gaitan  YOB: 1950  MRN: [id number]  Admission date: 11/19/2024    PROGRESS NOTE      Nutrition Intervention Updates: Order Boost Original BID (Provides 480 kcals, 20 g protein if consumed)         Encounter Information: Checking in on PO intake. I am not sure if pt ever got the boost supplements, will order. S/p Transcatheter [id number] repair of mitral valve MitraClip NT on 11/21.       PO Diet: Diet: Gastrointestinal; Fiber-Restricted; Fluid Consistency: Thin (IDDSI 0)   PO Supplements: None ordered    PO Intake:  100%        Current nutrition support: -   Nutrition support review: -       Labs (reviewed below): Reviewed.        GI Function:  BM 11/20, pt has stool softeners ordered PRN        Brief weight review Reviewed. Variable weight's are documented   Wt Readings from Last 10 Encounters:   11/22/24 0724 54 kg (119 lb)   11/21/24 1129 54.1 kg (119 lb 4.3 oz)   11/21/24 0320 45.9 kg (101 lb 3.1 oz)   11/19/24 1551 45.3 kg (99 lb 13.9 oz)   11/19/24 1207 45.4 kg (100 lb)   11/13/24 1312 54.9 kg (121 lb)   11/06/24 1426 52.6 kg (115 lb 15.4 oz)   10/30/24 1346 52.6 kg (116 lb)   10/12/24 1153 57.6 kg (127 lb)   10/12/24 0757 57.6 kg (127 lb)   10/11/24 0420 58 kg (127 lb 13.9 oz)   10/10/24 0503 58.1 kg (128 lb 1.4 oz)   10/08/24 1956 54.9 kg (121 lb 0.5 oz)   10/08/24 0828 54.9 kg (121 lb)        Results from last 7 days   Lab Units 11/22/24  0439 11/21/24  0129 11/20/24  0246 11/19/24  1246   SODIUM mmol/L 139 136 137 138   POTASSIUM mmol/L 3.9 4.1 3.8 4.3   CHLORIDE mmol/L 107 103 103 106   CO2 mmol/L 25.0 23.7 26.3 22.2   BUN mg/dL 15 16 19 28*   CREATININE mg/dL 0.49* 0.73 0.59 0.74   CALCIUM mg/dL 8.0* 8.3* 8.7 8.7   BILIRUBIN mg/dL  --   --   --  0.3   ALK PHOS U/L  --   --   --  64   ALT (SGPT) U/L  --   --   --  35*   AST (SGOT) U/L  --   --   --  19   GLUCOSE mg/dL 116* 132* 64* 77     Results from last 7 days   Lab Units 11/22/24  0439  11/21/24  0129 11/20/24  0246   MAGNESIUM mg/dL 1.8 2.0 2.0   HEMOGLOBIN g/dL 7.4* 9.0* 9.1*   HEMATOCRIT % 23.2* 27.6* 29.5*         RD to follow up per protocol.    Electronically signed by:  Laurie Mercado RD  11/22/24 12:22 EST

## 2024-11-22 NOTE — CASE MANAGEMENT/SOCIAL WORK
Continued Stay Note  [facility]     Patient Name: Maryann Gaitan  MRN: [id number]  Today's Date: 11/22/2024    Admit Date: 11/19/2024    Plan: DC Plan: From home. Current with [facility]   Discharge Plan       Row Name 11/22/24 1329       Plan    Plan DC Plan: From home. Current with [facility]    Patient/Family in Agreement with Plan yes    Plan Comments CM reviewed chart documentation for clinical updates. Verified with Ilene at [facility] that LETHA order was placed. Mitral valve clip 11/21. DC Barriers: GONZÁLEZ and echo pending, increased wbc, decreased hgb-2units PRBC, GI consult.             Ashlie Pastor RN    Office: [phone]

## 2024-11-22 NOTE — PLAN OF CARE
"Goal Outcome Evaluation:  Assessment: Maryann Gaitan presents with functional mobility impairments which indicate the need for skilled intervention. Pt is POD #1 for TMVR. Pt has Hgb of 7.4 and she is receiving blood. Pt wants to sit up in chair to eat her meal. Pt reports she has supportive family that she lives with at home. Tolerating session today without incident. Will continue to follow and progress as tolerated.     Plan/Recommendations:   If medically appropriate, Low Intensity Therapy recommended post-acute care - This is recommended as therapy feels this patient would require 2-3 visits per week. OP or HH would be the best option depending on patient's home bound status. Consider, if the patient has other  \"skilled\" needs such as wounds, IV antibiotics, etc. Combined with \"low intensity\" could also equate to a SNF. If patient is medically complex, consider LTAC. Pt requires no DME at discharge.     Pt desires Home with family assist and Home Health at discharge. Pt cooperative; agreeable to therapeutic recommendations and plan of care.                                             "

## 2024-11-22 NOTE — PROGRESS NOTES
[facility] MEDICINE SERVICE  DAILY PROGRESS NOTE    NAME: Maryann Gaitan  : 1950  MRN: [id number]      LOS: 3 days     PROVIDER OF SERVICE: Yesenia Tariq MD    Chief Complaint: Acute lower GI bleeding    Subjective:     Interval History:  History taken from: patient    Seen and examined at bedside, resting comfortably.  Voices no concerns at this time.        Review of Systems:   Review of Systems negative except as mentioned above    Objective:     Vital Signs  Temp:  [97.6 °F (36.4 °C)-98.1 °F (36.7 °C)] 98.1 °F (36.7 °C)  Heart Rate:  [64-96] 72  Resp:  [18-25] 18  BP: ()/(48-98) 94/51  Flow (L/min) (Oxygen Therapy):  [0-3] 0   Body mass index is 20.43 kg/m².    Physical Exam  Physical Exam  General Appearance:  Alert, cooperative, no distress, appears stated age  Head:   Normocephalic, without obvious abnormality, atraumatic  Eyes:   PER  Ears:    Normal   Nose:Nares normal, septum midline, mucosa normal, no drainage or sinus tenderness  Throat:Lips, mucosa, and tongue normal; teeth and gums normal  Neck:Supple, symmetrical  Lungs:             Clear to auscultation bilaterally, respirations unlabored  Heart:  Regular rate and rhythm, S1, S2 normal, no murmur, rub or gallop  Abdomen:  Soft, non-tender, bowel sounds active all four quadrants,  no masses, no organomegaly  Extremities:Extremities normal, atraumatic, no cyanosis or edema  Pulses:2+ and symmetric  Skin:no rashes or lesions  Neurologic: Normal     Diagnostic Data    Results from last 7 days   Lab Units 24  0439 24  1933 24  1246   WBC 10*3/mm3 14.13*   < > 16.93*   HEMOGLOBIN g/dL 7.4*   < > 5.7*   HEMATOCRIT % 23.2*   < > 18.9*   PLATELETS 10*3/mm3 310   < > 435   GLUCOSE mg/dL 116*   < > 77   CREATININE mg/dL 0.49*   < > 0.74   BUN mg/dL 15   < > 28*   SODIUM mmol/L 139   < > 138   POTASSIUM mmol/L 3.9   < > 4.3   AST (SGOT) U/L  --   --  19   ALT (SGPT) U/L  --   --  35*   ALK PHOS U/L  --   --  64    BILIRUBIN mg/dL  --   --  0.3   ANION GAP mmol/L 7.0   < > 9.8    < > = values in this interval not displayed.       No radiology results for the last day      I reviewed the patient's new clinical results.    Assessment/Plan:     Active and Resolved Problems  Active Hospital Problems    Diagnosis  POA    **Acute lower GI bleeding [K92.2]  Yes    S/P mitral valve clip implantation [Z98.890, Z95.818]  Not Applicable    Primary hypertension [I10]  Yes    Moderate protein-calorie malnutrition [E44.0]  Yes    Hypothyroidism (acquired) [E03.9]  Yes    COPD (chronic obstructive pulmonary disease) [J44.9]  Yes    Rheumatoid arthritis [M06.9]  Yes    Crohn's disease [K50.90]  Yes    Anxiety associated with depression [F41.8]  Yes    Dyslipidemia [E78.5]  Yes    Severe mitral regurgitation [I34.0]  Yes    Acute heart failure with preserved ejection fraction (HFpEF) [I50.31]  Yes    CAD, multiple vessel [I25.10]  Yes      Resolved Hospital Problems   No resolved problems to display.       Acute lower GI bleed/abdominal pain and rectal bleed.  -Concerned that the patient  may have persistent Crohn's exacerbation causing persistent inflammation and lower GI bleeding  -Checked CT of the abdomen  -IV steroids  -Patient has significant blood loss related to this and will require PRBC transfusion  -GI following, plan is to trend H&H and decide next steps depending on results.  -Continue PPI  Per cardio:Patient unfortunately is in a tough situation.  Had drug-eluting stents done 10/22/2024, hardly 2 weeks ago.  Would benefit from Plavix to prevent stent thrombosis.  Patient has been receiving Plavix uninterrupted.       Mitral regurgitation  Transseptal left heart catheterization  Transcatheter [id number] repair of mitral valve MitraClip NT on 11/21       Hypertension; currently with low blood pressures     CAD  -Patient has previous recent history of PCI with stent placement and was on DAPT although this is likely exacerbating  her GI bleed  -Holding aspirin in setting of bleeding, cardiology restarted Plavix.  -Continue statin for now     Hypothyroidism  -Resume home Synthroid    VTE Prophylaxis:  Mechanical VTE prophylaxis orders are present.             Disposition Planning:     Barriers to Discharge: Ongoing care  Anticipated Date of Discharge: Pending clinical course  Place of Discharge: Home with family      Time: > 40 minutes     Code Status and Medical Interventions: CPR (Attempt to Resuscitate); Full Support   Ordered at: 11/21/24 1014     Level Of Support Discussed With:    Patient     Code Status (Patient has no pulse and is not breathing):    CPR (Attempt to Resuscitate)     Medical Interventions (Patient has pulse or is breathing):    Full Support       Signature: Electronically signed by Yesenia Tariq MD, 11/22/24, 11:47 EST.  [facility] Hospitalist Team

## 2024-11-22 NOTE — THERAPY TREATMENT NOTE
"Subjective: Pt agreeable to therapeutic plan of care.  Cognition: oriented to Person, Place, Time, and Situation    Objective:     Precautions - falls    Bed Mobility: Sitting at EOB eating    Functional Transfers: CGA     Balance: supported CGA  Functional Ambulation: N/A or Not attempted.    Feeding: Independent  ADL Position: edge of bed sitting  ADL Comments: pt able to eat sitting EOB    Vitals: WNL    Pain: 0 VAS  Location:   Interventions for pain: N/A  Education: Verbal/Tactile Cues, ADL training, and Transfer Training      Assessment: Maryann Gaitan presents with ADL impairments affecting function including endurance / activity tolerance and strength. Pt educated on getting up to the chair for meals 3x/day. Demonstrated functioning below baseline abilities indicate the need for continued skilled intervention while inpatient. Tolerating session today without incident. Will continue to follow and progress as tolerated.     Plan/Recommendations:   Low Intensity Therapy recommended post-acute care - This is recommended as therapy feels this patient would require 2-3 visits per week. OP or HH would be the best option depending on patient's home bound status. Consider, if the patient has other  \"skilled\" needs such as wounds, IV antibiotics, etc. Combined with \"low intensity\" could also equate to a SNF. If patient is medically complex, consider LTAC.. Pt requires no DME at discharge.     Pt desires Home with Home Health and Home with family assist at discharge. Pt cooperative; agreeable to therapeutic recommendations and plan of care.     Modified Doug: N/A = No pre-op stroke/TIA    Post-Tx Position: Up in Chair, Alarms activated, and Call light and personal items within reach  PPE: gloves         "

## 2024-11-22 NOTE — THERAPY TREATMENT NOTE
"Subjective: Pt agreeable to therapeutic plan of care.    Objective:     Precautions - falls    Bed mobility - SBAfor bed to recliner  Transfers - CGA and Assist x 2    Vitals: WNL    Pain: N/A VAS   Location: N/A  Intervention for pain: N/A    Education: Provided education on the importance of mobility in the acute care setting, Verbal/Tactile Cues, and Transfer Training    Assessment: Maryann Gaitan presents with functional mobility impairments which indicate the need for skilled intervention. Pt is POD #1 for TMVR. Pt has Hgb of 7.4 and she is receiving blood. Pt wants to sit up in chair to eat her meal. Pt reports she has supportive family that she lives with at home. Tolerating session today without incident. Will continue to follow and progress as tolerated.     Plan/Recommendations:   If medically appropriate, Low Intensity Therapy recommended post-acute care - This is recommended as therapy feels this patient would require 2-3 visits per week. OP or HH would be the best option depending on patient's home bound status. Consider, if the patient has other  \"skilled\" needs such as wounds, IV antibiotics, etc. Combined with \"low intensity\" could also equate to a SNF. If patient is medically complex, consider LTAC. Pt requires no DME at discharge.     Pt desires Home with family assist and Home Health at discharge. Pt cooperative; agreeable to therapeutic recommendations and plan of care.         Basic Mobility 6-click:  Rollin = Total, A lot = 2, A little = 3; 4 = None  Supine>Sit:   1 = Total, A lot = 2, A little = 3; 4 = None   Sit>Stand with arms:  1 = Total, A lot = 2, A little = 3; 4 = None  Bed>Chair:   1 = Total, A lot = 2, A little = 3; 4 = None  Ambulate in room:  1 = Total, A lot = 2, A little = 3; 4 = None  3-5 Steps with railin = Total, A lot = 2, A little = 3; 4 = None  Score: 19    Modified [place]: N/A = No pre-op stroke/TIA    Post-Tx Position: Up in Chair, Alarms activated, and " Call light and personal items within reach  PPE: gloves    Therapy Charges for Today       Code Description Service Date Service Provider Modifiers Qty    38337211538 HC PT THER PROC EA 15 MIN 11/22/2024 Sobia Vides, PT GP 1    30055785793 HC PT NEUROMUSC RE EDUCATION EA 15 MIN 11/22/2024 Sobia Vides, PT GP 1           PT Charges       Row Name 11/22/24 1421             Time Calculation    Start Time 1342  -BR      Stop Time 1400  -BR      Time Calculation (min) 18 min  -BR      PT Received On 11/22/24  -BR      PT - Next Appointment 11/23/24  -BR         Time Calculation- PT    Total Timed Code Minutes- PT 18 minute(s)  -BR                User Key  (r) = Recorded By, (t) = Taken By, (c) = Cosigned By      Initials Name Provider Type    BR Sobia Vides, PT Physical Therapist

## 2024-11-22 NOTE — NURSING NOTE
Cardiac Rehab evaluation s/p TMVR. Explained program and benefits. [facility] would be closer. Brochure given. Will fax facesheet there.

## 2024-11-22 NOTE — PLAN OF CARE
"Assessment: Maryann Gaitan presents with ADL impairments affecting function including endurance / activity tolerance and strength. Pt educated on getting up to the chair for meals 3x/day. Demonstrated functioning below baseline abilities indicate the need for continued skilled intervention while inpatient. Tolerating session today without incident. Will continue to follow and progress as tolerated.     Plan/Recommendations:   Low Intensity Therapy recommended post-acute care - This is recommended as therapy feels this patient would require 2-3 visits per week. OP or HH would be the best option depending on patient's home bound status. Consider, if the patient has other  \"skilled\" needs such as wounds, IV antibiotics, etc. Combined with \"low intensity\" could also equate to a SNF. If patient is medically complex, consider LTAC.. Pt requires no DME at discharge.       "

## 2024-11-22 NOTE — PROGRESS NOTES
LOS: 3 days   Patient Care Team:  Eduard Little MD as PCP - General (Family Medicine)  Niya Mendoza APRN as Nurse Practitioner (Cardiology)      Subjective     Interval History:     Subjective: She reports feeling better today.  She is tolerating her diet.  She is having less rectal bleeding although according to the nurse did pass a copious amount of blood overnight.      ROS:   No chest pain, shortness of breath, or cough.        Medication Review:     Current Facility-Administered Medications:     acetaminophen (TYLENOL) tablet 650 mg, 650 mg, Oral, Once **OR** acetaminophen (TYLENOL) 160 MG/5ML oral solution 650 mg, 650 mg, Oral, Once **OR** acetaminophen (TYLENOL) suppository 650 mg, 650 mg, Rectal, Once, Niya Mendoza APRN    acetaminophen (TYLENOL) tablet 650 mg, 650 mg, Oral, Q4H PRN, Dmitri Navarro MD, 650 mg at 11/22/24 0833    atorvastatin (LIPITOR) tablet 40 mg, 40 mg, Oral, Nightly, Dmitri Navarro MD, 40 mg at 11/21/24 2112    sennosides-docusate (PERICOLACE) 8.6-50 MG per tablet 2 tablet, 2 tablet, Oral, BID PRN **AND** polyethylene glycol (MIRALAX) packet 17 g, 17 g, Oral, Daily PRN **AND** bisacodyl (DULCOLAX) EC tablet 5 mg, 5 mg, Oral, Daily PRN **AND** bisacodyl (DULCOLAX) suppository 10 mg, 10 mg, Rectal, Daily PRN, Dmitri Navarro MD    clopidogrel (PLAVIX) tablet 75 mg, 75 mg, Oral, Daily, Dmitri Navarro MD, 75 mg at 11/22/24 0941    diphenhydrAMINE (BENADRYL) capsule 25 mg, 25 mg, Oral, Q6H PRN, Dmitri Navarro MD, 25 mg at 11/22/24 0941    diphenhydrAMINE (BENADRYL) capsule 25 mg, 25 mg, Oral, Once **OR** diphenhydrAMINE (BENADRYL) injection 25 mg, 25 mg, Intravenous, Once, Niya Mendoza APRN    folic acid (FOLVITE) tablet 1,000 mcg, 1,000 mcg, Oral, Daily, Dmitri Navarro MD, 1,000 mcg at 11/22/24 0941    levothyroxine (SYNTHROID, LEVOTHROID) tablet 50 mcg, 50 mcg, Oral, Daily, Dmitri Navarro MD, 50 mcg at 11/22/24 0941    methylPREDNISolone sodium succinate (SOLU-Medrol) injection 20  mg, 20 mg, Intravenous, Q8H, Dmitri Navarro MD, 20 mg at 11/22/24 0940    metoprolol tartrate (LOPRESSOR) tablet 25 mg, 25 mg, Oral, BID, Dmitri Navarro MD, 25 mg at 11/22/24 0941    midodrine (PROAMATINE) tablet 10 mg, 10 mg, Oral, TID AC, Dmitri Navarro MD, 10 mg at 11/22/24 0834    nitroglycerin (NITROSTAT) SL tablet 0.4 mg, 0.4 mg, Sublingual, Q5 Min PRN, Dmitri Navarro MD    ondansetron ODT (ZOFRAN-ODT) disintegrating tablet 4 mg, 4 mg, Oral, Q6H PRN **OR** ondansetron (ZOFRAN) injection 4 mg, 4 mg, Intravenous, Q6H PRN, Dmitri Navarro MD    pantoprazole (PROTONIX) EC tablet 40 mg, 40 mg, Oral, Daily, Dmitri Navarro MD, 40 mg at 11/22/24 0942    sertraline (ZOLOFT) tablet 50 mg, 50 mg, Oral, Daily, Dmitri Navarro MD, 50 mg at 11/22/24 0941    [COMPLETED] Insert Peripheral IV, , , Once **AND** sodium chloride 0.9 % flush 10 mL, 10 mL, Intravenous, PRN, Dmitri Navarro MD    sodium chloride 0.9 % flush 10 mL, 10 mL, Intravenous, Q12H, Dmitri Navarro MD, 10 mL at 11/22/24 0944    sodium chloride 0.9 % flush 10 mL, 10 mL, Intravenous, PRN, Dmitri Navarro MD    sodium chloride 0.9 % infusion 40 mL, 40 mL, Intravenous, PRN, Dmitri Navarro MD      Objective     Vital Signs  Temp:  [97.6 °F (36.4 °C)-98.6 °F (37 °C)] 97.9 °F (36.6 °C)  Heart Rate:  [64-96] 80  Resp:  [16-25] 18  BP: ()/(48-98) 120/58  Physical Exam:    General Appearance:    Awake and alert, in no acute distress   Head:    Normocephalic, without obvious abnormality   Eyes:          Conjunctivae normal, anicteric sclera   Ears:    Hearing intact   Lungs:     Clear to auscultation bilaterally, respirations regular, even and unlabored    Heart:    Regular rhythm and normal rate, normal S1 and S2, no            murmur, no gallop, no rub   Abdomen:     Normal bowel sounds, soft, non-tender, no rebound or guarding, non-distended, no hepatosplenomegaly    Rectal:     Deferred   Extremities:   No edema, no cyanosis, no redness        Results Review:    Lab Results  (last 24 hours)       Procedure Component Value Units Date/Time    Basic Metabolic Panel [[id number]]  (Abnormal) Collected: 11/22/24 0439    Specimen: Blood Updated: 11/22/24 0519     Glucose 116 mg/dL      BUN 15 mg/dL      Creatinine 0.49 mg/dL      Sodium 139 mmol/L      Potassium 3.9 mmol/L      Chloride 107 mmol/L      CO2 25.0 mmol/L      Calcium 8.0 mg/dL      BUN/Creatinine Ratio 30.6     Anion Gap 7.0 mmol/L      eGFR 99.0 mL/min/1.73     Narrative:      GFR Normal >60  Chronic Kidney Disease <60  Kidney Failure <15    The GFR formula is only valid for adults with stable renal function between ages 18 and 70.    Magnesium [[id number]]  (Normal) Collected: 11/22/24 0439    Specimen: Blood Updated: 11/22/24 0519     Magnesium 1.8 mg/dL     CBC & Differential [[id number]]  (Abnormal) Collected: 11/22/24 0439    Specimen: Blood Updated: 11/22/24 0451    Narrative:      The following orders were created for panel order CBC & Differential.  Procedure                               Abnormality         Status                     ---------                               -----------         ------                     CBC Auto Differential[[id number]]        Abnormal            Final result                 Please view results for these tests on the individual orders.    CBC Auto Differential [[id number]]  (Abnormal) Collected: 11/22/24 0439    Specimen: Blood Updated: 11/22/24 0451     WBC 14.13 10*3/mm3      RBC 2.46 10*6/mm3      Hemoglobin 7.4 g/dL      Hematocrit 23.2 %      MCV 94.3 fL      MCH 30.1 pg      MCHC 31.9 g/dL      RDW 18.3 %      RDW-SD 62.1 fl      MPV 9.5 fL      Platelets 310 10*3/mm3      Neutrophil % 89.9 %      Lymphocyte % 5.2 %      Monocyte % 4.1 %      Eosinophil % 0.0 %      Basophil % 0.1 %      Immature Grans % 0.7 %      Neutrophils, Absolute 12.70 10*3/mm3      Lymphocytes, Absolute 0.74 10*3/mm3      Monocytes, Absolute 0.58 10*3/mm3      Eosinophils, Absolute 0.00 10*3/mm3      Basophils,  Absolute 0.01 10*3/mm3      Immature Grans, Absolute 0.10 10*3/mm3      nRBC 0.0 /100 WBC     POC Activated Clotting Time [[id number]]  (Abnormal) Collected: 11/21/24 0859    Specimen: Arterial Blood Updated: 11/21/24 1329     Activated Clotting Time  228 Seconds      Comment: Serial Number: [id number]:  [id number]       POC Activated Clotting Time [[id number]]  (Normal) Collected: 11/21/24 0829    Specimen: Arterial Blood Updated: 11/21/24 1329     Activated Clotting Time  112 Seconds      Comment: Serial Number: [id number]:  [id number]       POC Activated Clotting Time [[id number]]  (Abnormal) Collected: 11/21/24 0932    Specimen: Arterial Blood Updated: 11/21/24 1320     Activated Clotting Time  285 Seconds      Comment: Serial Number: [id number]:  [id number]       POC Activated Clotting Time [[id number]]  (Abnormal) Collected: 11/21/24 0914    Specimen: Arterial Blood Updated: 11/21/24 1319     Activated Clotting Time  256 Seconds      Comment: Serial Number: [id number]:  [id number]               Imaging Results (Last 24 Hours)       ** No results found for the last 24 hours. **              ASSESSMENT:  74-year-old woman with a Crohn's disease and rectal bleeding.  She underwent mitral valve clipping yesterday.  She reports feeling better.  She is not having abdominal pain and she is tolerating her diet.  She did pass some bright red blood per rectum according to the nursing staff.    Acute lower GI bleeding    CAD, multiple vessel    Acute heart failure with preserved ejection fraction (HFpEF)    Severe mitral regurgitation    Dyslipidemia    Crohn's disease    Hypothyroidism (acquired)    Anxiety associated with depression    COPD (chronic obstructive pulmonary disease)    Rheumatoid arthritis    Moderate protein-calorie malnutrition    Primary hypertension    S/P mitral valve clip implantation      PLAN:  1.  Continue IV Solu-Medrol  2.  Continue Rinvoq  3.  Regular diet as tolerated  4.  When discharged, she  may begin a prednisone taper with 40 mg daily for 1 week followed by 30 mg daily for 1 week followed by 20 mg daily for 1 week followed by 10 mg daily for 1 week  5.  Nothing to add at this time.  We will see as needed.  Best Marin MD  11/22/24  13:09 EST

## 2024-11-22 NOTE — PROGRESS NOTES
Cardiology Progress Note    Patient Identification:  Name: Maryann Gaitan  Age: 74 y.o.  Sex: female  :  1950  MRN: [id number]                 Follow Up / Chief Complaint: CAD/PCI, GI Bleed, MR status post Azeb Clip   Chief Complaint   Patient presents with    Chest Pain       Interval History: Patient presented with chest pain and abdominal pain.  Has been having bright red blood per rectum.  Was seen by structural heart for mitral regurgitation was scheduled for outpatient procedure.  Underwent MitraClip 2024    NP note: Patient seen with Dr. Connor this morning.  Patient reports feeling okay this morning.  Plavix restarted status post azeb clip placement.  Hgb did drop overnight to 7.4   1 unit of PRBC has been ordered.  We will consult hematology to evaluate for outpatient ambulatory needs for PRBC transfusions.     Electronically signed by DRU Rubio, 24, 1:08 PM EST.    Cardiology attending addendum :    I have personally performed a face-to-face diagnostic evaluation, physical exam and reviewed data on this patient.  I have reviewed documentation done by me and nurse practitioner  and corrected as needed.  And agree with the different components of documentation.Greater than 50% of the time spent in the care of this patient was provided by attending consultant/me.         Subjective: Patient seen and examined.  Chart reviewed.  Labs reviewed.  Patient without chest pain or shortness of breath.      Objective:  2024: Glucose elevated.  CBC with white count of 13.5, hemoglobin 9  2024: WBC 14.13, hemoglobin 7.4 EKG normal sinus rhythm Limited echo shows EF at 56 to 60% MitraClip in place with trace residual MR mean creatinine of 4.6    History of present illness:    Ms. Maryann Gaitan has PMH of     CAD status post cardiac cath with multivessel CAD poor candidate for CABG, underwent PCI to ostial and proximal LAD 10/22/2024  Moderate to severe MR with central jet  noted  COPD  Hypertension  Rheumatoid arthritis  Crohn's disease     Presented to the emergency room on 11/19/2024 with abdominal pain and chest pain.  Patient reports that she started having bright red blood per rectum again yesterday and has been having constant chest pain since.     Labs in the emergency room was noted to have a hemoglobin of 5.7 high-sensitivity troponin 21--17 BUN 28 creatinine 0.74 total protein 5.1 albumin 2.9 ALT 35 WBC 16.93 chest x-ray without any acute finding EKG sinus rhythm with nonspecific ST abnormalities  CT abdomen and pelvis shows distal terminal ileitis colonic fatty marrow infiltration which can be seen in the setting of chronic inflammatory bowel disease   patient was transfused with 1 unit of PRBC and hemoglobin improved to 7.3 and this morning she is 9.1     Unfortunately patient is in the tough situation as she had stent placed on 10/22/2024 and needs dual antiplatelet therapy to prevent clotting of stent however she has presented for the second time with significant GI bleeding.  Currently her aspirin and Plavix are both on hold.        Recent hospitalization  Presented 11/6/2024 through [facility] with complaint of abdominal pain and dark red stool / rectal bleed.  Patient has close disease and rheumatoid arthritis and is on immunosuppressants methotrexate, Humira, Rinvoq now presented with rectal bleed and abdominal pain.  GI did a scope and biopsies of terminal ileum which were consistent with active Crohn's and biopsies of stomach and duodenum showed strong colitis infection, was treated by ivermectin.  GI started patient on Solu-Medrol and is holding immunosuppressants and wants to hold Plavix.     Patient was recently in the hospital 10/8/2024 with nausea vomiting diarrhea and abnormal labs.  With hyponatremia and hypokalemia and anemia.  Patient suddenly started having chest pain and fast team was called because of sharp left-sided chest pain with shortness of breath  dizziness EKG showed sinus tachycardia and cardiac workup revealed severe MR, cath 10/15/2024 revealing severe ostial LAD and outpouching in the descending thoracic aorta probably a penetrating aortic ulcer versus aneurysm.  CT surgery was consulted and she was turned down for CABG therefore patient underwent drug-eluting stent to ostial LAD on 10/22/2024 and was supposed to have clip to mitral valve in follow-up.  In the meantime has been having worsening symptoms and abdominal pain, weight loss.  Patient has been restarted on Plavix.  Will continue for bleeding closely.     Data:  Labs 11/6/2024 - 11/7/2024 revealed sodium 134, BUN/creatinine of 75/2.84, glucose 138, albumin 2.9.  Hemoglobin 9.4 has come down to 8.  MCV 98  EKG done 11/6/2024 reviewed/interpreted by me reveals sinus bradycardia at the rate of 58 bpm.        EGD/colonoscopy 10/12/2024 revealed small hiatal hernia, nonerosive gastritis, T1 stricture s/p dilatation with 12 mm, T1 ulcer, colon ulcers, sigmoid diverticulosis, grade 2 internal hemorrhoids and strongyloides  Echo 10/12/2024 EF of 51 to 55% with mild RV enlargement, severe left atrial enlargement, moderate to severe MR  Transesophageal echo 10/16/2024: LVEF of 55 to 60% with severe left atrial enlargement, moderate to severe MR and grade 3 descending aortic plaque  Cardiac cath 10/15/2024 reveals total right and severe ostial LAD, descending thoracic aorta had an outpouching consistent with penetrating aortic ulcer versus aneurysm.   Patient was evaluated by CT surgery not a candidate for CABG therefore patient underwent PCI and drug-eluting stent to the ostium proximal LAD.  And she was seen by valve team and will be set up for MitraClip once she recovers from this hospitalization      Assessment:  :     Abdominal pain, rectal bleeding  Anemia requiring blood transfusion  Chest pain  Crohn's disease  CAD, status post PCI  Mitral regurgitation  Chronic HFpEF due to valvular heart disease  from mitral regurgitation  Hypertensive cardiovascular disease from hypertension  Hyponatremia  Hypokalemia  Crohn's disease  Normocytic anemia  COPD, chronic steroid therapy  Multifocal pneumonia  Hyperglycemia, prediabetes with A1c of 5.6 from     Recommendations / Plan:         Patient presented 11/19/2024 with abdominal pain and bright red blood per rectum was found to have a hemoglobin of 5.7 was transfused.  Patient chest pain.  HS troponin is normal.  Patient recently had coronary drug-eluting stent placed on 10/22/2024 would benefit from antiplatelet therapy.  Antiplatelet therapy is currently on hold due to GI bleed.  Will follow and restart Plavix when okay with GI.  Patient was seen by structural heart underwent mitral valve clip 11/21/2024.  Patient continues to have blood loss anemia requiring transfusion.  Will hold off on aspirin.  Patient might become transfusion dependent.  Will consult hematology for the same.     Review of records  Patient presented 10/9/2024 because of abnormal labs showing low sodium, was complaining of nausea vomiting and diarrhea.    Patient underwent cardiac cath 10/15/2024 which revealed total right and severe ostial LAD disease.  Descending thoracic aorta has an outpouching probably saccular aneurysm versus  penetrating aortic ulcer .  Echocardiogram 10/12/2024 is revealing EF of 51 to 55% with mild RV enlargement severe left atrial enlargement and right atrial enlargement and moderate to severe MR  GONZÁLEZ is revealing moderate to severe MR.  Patient would benefit from CABG and mitral valve surgery.  Patient was seen by .  Patient has been turned down for surgery due to multiple comorbid conditions.  Patient has a cavitary lesion in her lungs had bronchoscopy.  Had multiple mucous plugs.  Patient underwent drug-eluting stent to ostial LAD 10/12/2024.  Patient was sent home on dual antiplatelet therapy with aspirin and Plavix, metoprolol and statins and losartan  as tolerated.  Will follow-up mitral regurgitation and follow-up in with structural heart team.  Patient had an EKG done 10/12/2024 which revealed sinus rhythm degenerating to A-fib with RVR.  Patient would benefit from long-term anticoagulation to prevent thromboembolic events.  Given her Crohn's disease and microcytic anemia patient will be a poor candidate for long-term anticoagulation.  Will follow and consider watchman evaluation as outpatient.  Patient was not tolerating losartan and metoprolol was given intermittent midodrine.  Now patient presents with abdominal pain and rectal bleed.  GI was recommending holding Plavix.  Patient unfortunately is in a tough situation.  Had drug-eluting stents done 10/22/2024, hardly 2 weeks ago.  Would benefit from Plavix to prevent stent thrombosis.  Patient has been receiving Plavix uninterrupted.       Copied text in this portion of the note has been reviewed and is accurate as of 11/22/2024    Past Medical 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 07/05/2016    Dyslipidemia 10/30/2024    Fecal urgency 11/21/2024    First degree hemorrhoids 07/09/2021    GERD (gastroesophageal reflux disease) 11/21/2024    Hashimoto's thyroiditis 11/21/2024    History of colonic polyps 07/09/2021    Hypertension     Hypothyroidism (acquired) 11/06/2024    Mitral regurgitation 10/08/2024    Moderate protein-calorie malnutrition 11/09/2024    Multiple tracheobronchial mucus plugs 10/08/2024    Nicotine dependence 11/21/2024    Rheumatoid arthritis 11/06/2024    S/P  mitral valve clip implantation 11/21/2024    Second degree hemorrhoids 07/05/2016    Stress incontinence, female 11/21/2024    Vitamin D deficiency, unspecified 11/21/2024     Past Surgical History:  Past Surgical History:   Procedure Laterality Date    BRONCHOSCOPY N/A 10/16/2024    Procedure: BRONCHOSCOPY;  Surgeon: Gallo Pope MD;  Location: [facility] ENDOSCOPY;  Service: Pulmonary;  Laterality: N/A;    CARDIAC CATHETERIZATION N/A 10/15/2024    Procedure: Left Heart Cath, possible pci;  Surgeon: Travis Connor MD;  Location: [facility] CATH INVASIVE LOCATION;  Service: Cardiovascular;  Laterality: N/A;    CARDIAC CATHETERIZATION N/A 10/22/2024    Procedure: Laser Coronary Atherectomy;  Surgeon: Travis Connor MD;  Location: [facility] CATH INVASIVE LOCATION;  Service: Cardiovascular;  Laterality: N/A;    COLONOSCOPY N/A 10/12/2024    Procedure: COLONOSCOPY WITH BIOPSY AND WIRE GUIDED BALLOON DILATION OF TERMINAL ILEUM;  Surgeon: Rob Strong MD;  Location: [facility] ENDOSCOPY;  Service: Gastroenterology;  Laterality: N/A;  Colitis, crohns of terminal ileum, right colon ulcers, diverticulosis, hemorroids    ENDOSCOPY N/A 10/12/2024    Procedure: ESOPHAGOGASTRODUODENOSCOPY WITH BIOPSY X 2 AREA;  Surgeon: Rob Strong MD;  Location: [facility] ENDOSCOPY;  Service: Gastroenterology;  Laterality: N/A;  Chronic gastritis, HH    HYSTERECTOMY      LEFT HEART CATH          Social History:   Social History     Tobacco Use    Smoking status: Former     Types: Cigarettes    Smokeless tobacco: Never   Substance Use Topics    Alcohol use: Never      Family History:  Family History   Problem Relation Age of Onset    Heart disease Mother     Dementia Mother     Stroke Mother     Heart disease Father     Hypertension Father           Allergies:  Allergies   Allergen Reactions    Codeine Hives    Penicillin G Sodium Hives     Scheduled Meds:  acetaminophen, 650 mg, Once   Or  acetaminophen, 650  "mg, Once   Or  acetaminophen, 650 mg, Once  atorvastatin, 40 mg, Nightly  clopidogrel, 75 mg, Daily  diphenhydrAMINE, 25 mg, Once   Or  diphenhydrAMINE, 25 mg, Once  folic acid, 1,000 mcg, Daily  levothyroxine, 50 mcg, Daily  methylPREDNISolone sodium succinate, 20 mg, Q8H  [START ON 11/23/2024] metoprolol succinate XL, 25 mg, Q24H  midodrine, 10 mg, TID AC  pantoprazole, 40 mg, Daily  sertraline, 50 mg, Daily  sodium chloride, 10 mL, Q12H          Review of Systems:   ROS        Constitutional:  Temp:  [97.6 °F (36.4 °C)-98.6 °F (37 °C)] 97.9 °F (36.6 °C)  Heart Rate:  [64-96] 71  Resp:  [16-25] 18  BP: ()/(48-98) 110/57    Physical Exam   /57   Pulse 71   Temp 97.9 °F (36.6 °C) (Oral)   Resp 18   Ht 162.6 cm (64\")   Wt 54 kg (119 lb)   SpO2 99%   BMI 20.43 kg/m²   General:  Appears in no acute distress  Eyes: Sclera is anicteric,  conjunctiva is clear   HEENT:  No JVD. Thyroid not visibly enlarged. No mucosal pallor or cyanosis  Respiratory: Respirations regular and unlabored at rest.  Clear to auscultation  Cardiovascular: S1,S2 Regular rate and rhythm.  2/6 holosystolic murmur  Gastrointestinal: Abdomen nondistended.  Musculoskeletal:  No abnormal movements  Extremities: No digital clubbing or cyanosis  Skin: Color pink.   Neuro: Alert and awake.    INTAKE AND OUTPUT:    Intake/Output Summary (Last 24 hours) at 11/22/2024 1415  Last data filed at 11/22/2024 0900  Gross per 24 hour   Intake 1760 ml   Output 450 ml   Net 1310 ml       Cardiographics  Telemetry: Sinus    ECG:   ECG 12 Lead Other; post-MitraClip   Final Result   HEART RATE=73  bpm   RR Fuxmmczo=784  ms   WV Mcmehqcg=761  ms   P Horizontal Axis=1  deg   P Front Axis=84  deg   QRSD Interval=80  ms   QT Operjljg=942  ms   QOvJ=135  ms   QRS Axis=39  deg   T Wave Axis=70  deg   - NORMAL ECG -   Sinus rhythm   When compared with ECG of 19-Nov-2024 12:13:34,   Significant repolarization change   Significant axis, voltage or hypertrophy " change   Electronically Signed By: Dmitri Navarro ([facility]) 2024-11-22 13:38:55   Date and Time of Study:2024-11-22 06:15:06      ECG 12 Lead Chest Pain   Final Result   HEART RATE=67  bpm   RR Iezcylzh=803  ms   WA Wghmdsrq=474  ms   P Horizontal Axis=28  deg   P Front Axis=87  deg   QRSD Interval=83  ms   QT Uvgpzthj=250  ms   WUdN=363  ms   QRS Axis=39  deg   T Wave Axis=40  deg   - ABNORMAL ECG -   Sinus rhythm   Low voltage, precordial leads   Nonspecific  T abnormalities, inferior leads   When compared with ECG of 06-Nov-2024 15:41:09,   Significant repolarization change   Significant axis, voltage or hypertrophy change   Electronically Signed By: Rajiv Dubon ([facility]) 2024-11-19 12:47:59   Date and Time of Study:2024-11-19 12:13:34      Telemetry Scan   Final Result      Telemetry Scan   Final Result      Telemetry Scan   Final Result      Telemetry Scan   Final Result      Telemetry Scan   Final Result      Telemetry Scan   Final Result      Telemetry Scan   Final Result      Telemetry Scan   Final Result      Telemetry Scan   Final Result      Telemetry Scan   Final Result      Telemetry Scan   Final Result      Telemetry Scan   Final Result        I have personally reviewed EKG    Echocardiogram: Results for orders placed during the hospital encounter of 11/19/24    Adult Transthoracic Echo Limited W/ Cont if Necessary Per Protocol    Interpretation Summary    Left ventricular ejection fraction appears to be 56 - 60%.    There is a MitraClip mitral valve repair present.    There is trace residual MR.  Mean gradient is 4.6 mmHg      Lab Review   I have reviewed the labs  Results from last 7 days   Lab Units 11/19/24  1503 11/19/24  1246   HSTROP T ng/L 17* 21*     Results from last 7 days   Lab Units 11/22/24  0439   MAGNESIUM mg/dL 1.8     Results from last 7 days   Lab Units 11/22/24  0439   SODIUM mmol/L 139   POTASSIUM mmol/L 3.9   BUN mg/dL 15   CREATININE mg/dL 0.49*   CALCIUM mg/dL 8.0*         Results from  "last 7 days   Lab Units 11/22/24  0439 11/21/24  0129 11/20/24  0246   WBC 10*3/mm3 14.13* 13.55* 12.18*   HEMOGLOBIN g/dL 7.4* 9.0* 9.1*   HEMATOCRIT % 23.2* 27.6* 29.5*   PLATELETS 10*3/mm3 310 368 369     Results from last 7 days   Lab Units 11/19/24  1246   INR  0.99   APTT seconds 21.9*       RADIOLOGY:  Imaging Results (Last 24 Hours)       ** No results found for the last 24 hours. **                  )11/22/2024  MD EVERETTE Araujoon/Transcription:   \"Dictated utilizing Dragon dictation\".   "

## 2024-11-22 NOTE — PLAN OF CARE
Goal Outcome Evaluation:         Pt received 1 unit PRBC. Pt continues to have bloody bowel movements

## 2024-11-23 ENCOUNTER — INPATIENT HOSPITAL (AMBULATORY)
Age: 74
End: 2024-11-23
Payer: MEDICARE

## 2024-11-23 ENCOUNTER — INPATIENT HOSPITAL (AMBULATORY)
Dept: URBAN - METROPOLITAN AREA HOSPITAL 84 | Facility: HOSPITAL | Age: 74
End: 2024-11-23
Payer: MEDICARE

## 2024-11-23 DIAGNOSIS — K50.80 CROHN'S DISEASE OF BOTH SMALL AND LARGE INTESTINE WITHOUT CO: ICD-10-CM

## 2024-11-23 LAB
ALBUMIN SERPL-MCNC: 2.8 G/DL (ref 3.5–5.2)
ALBUMIN/GLOB SERPL: 1.6 G/DL
ALP SERPL-CCNC: 76 U/L (ref 39–117)
ALT SERPL W P-5'-P-CCNC: 25 U/L (ref 1–33)
ANION GAP SERPL CALCULATED.3IONS-SCNC: 10.7 MMOL/L (ref 5–15)
AST SERPL-CCNC: 18 U/L (ref 1–32)
BH BB BLOOD EXPIRATION DATE: NORMAL
BH BB BLOOD TYPE BARCODE: 5100
BH BB DISPENSE STATUS: NORMAL
BH BB PRODUCT CODE: NORMAL
BH BB UNIT NUMBER: NORMAL
BILIRUB SERPL-MCNC: 0.2 MG/DL (ref 0–1.2)
BUN SERPL-MCNC: 19 MG/DL (ref 8–23)
BUN/CREAT SERPL: 34.5 (ref 7–25)
CALCIUM SPEC-SCNC: 8.3 MG/DL (ref 8.6–10.5)
CHLORIDE SERPL-SCNC: 108 MMOL/L (ref 98–107)
CO2 SERPL-SCNC: 22.3 MMOL/L (ref 22–29)
CREAT SERPL-MCNC: 0.55 MG/DL (ref 0.57–1)
CROSSMATCH INTERPRETATION: NORMAL
CRP SERPL-MCNC: 1.25 MG/DL (ref 0–0.5)
DEPRECATED RDW RBC AUTO: 64.4 FL (ref 37–54)
EGFRCR SERPLBLD CKD-EPI 2021: 96.3 ML/MIN/1.73
ERYTHROCYTE [DISTWIDTH] IN BLOOD BY AUTOMATED COUNT: 19 % (ref 12.3–15.4)
GLOBULIN UR ELPH-MCNC: 1.7 GM/DL
GLUCOSE SERPL-MCNC: 128 MG/DL (ref 65–99)
HCT VFR BLD AUTO: 27.6 % (ref 34–46.6)
HGB BLD-MCNC: 8.7 G/DL (ref 12–15.9)
MCH RBC QN AUTO: 29.8 PG (ref 26.6–33)
MCHC RBC AUTO-ENTMCNC: 31.5 G/DL (ref 31.5–35.7)
MCV RBC AUTO: 94.5 FL (ref 79–97)
PLATELET # BLD AUTO: 284 10*3/MM3 (ref 140–450)
PMV BLD AUTO: 9.5 FL (ref 6–12)
POTASSIUM SERPL-SCNC: 3.9 MMOL/L (ref 3.5–5.2)
PROT SERPL-MCNC: 4.5 G/DL (ref 6–8.5)
RBC # BLD AUTO: 2.92 10*6/MM3 (ref 3.77–5.28)
SODIUM SERPL-SCNC: 141 MMOL/L (ref 136–145)
UNIT  ABO: NORMAL
UNIT  RH: NORMAL
WBC NRBC COR # BLD AUTO: 12.82 10*3/MM3 (ref 3.4–10.8)

## 2024-11-23 PROCEDURE — 85027 COMPLETE CBC AUTOMATED: CPT | Performed by: NURSE PRACTITIONER

## 2024-11-23 PROCEDURE — 99233 SBSQ HOSP IP/OBS HIGH 50: CPT | Performed by: NURSE PRACTITIONER

## 2024-11-23 PROCEDURE — 80053 COMPREHEN METABOLIC PANEL: CPT | Performed by: NURSE PRACTITIONER

## 2024-11-23 PROCEDURE — 25010000002 METHYLPREDNISOLONE PER 40 MG: Performed by: INTERNAL MEDICINE

## 2024-11-23 PROCEDURE — 86140 C-REACTIVE PROTEIN: CPT | Performed by: NURSE PRACTITIONER

## 2024-11-23 PROCEDURE — 99232 SBSQ HOSP IP/OBS MODERATE 35: CPT | Performed by: INTERNAL MEDICINE

## 2024-11-23 PROCEDURE — 63710000001 METHOTREXATE 2.5 MG TABLET: Performed by: NURSE PRACTITIONER

## 2024-11-23 PROCEDURE — 87177 OVA AND PARASITES SMEARS: CPT | Performed by: STUDENT IN AN ORGANIZED HEALTH CARE EDUCATION/TRAINING PROGRAM

## 2024-11-23 PROCEDURE — 87209 SMEAR COMPLEX STAIN: CPT | Performed by: STUDENT IN AN ORGANIZED HEALTH CARE EDUCATION/TRAINING PROGRAM

## 2024-11-23 PROCEDURE — 82728 ASSAY OF FERRITIN: CPT | Performed by: STUDENT IN AN ORGANIZED HEALTH CARE EDUCATION/TRAINING PROGRAM

## 2024-11-23 RX ORDER — MESALAMINE 1.2 G/1
4.8 TABLET, DELAYED RELEASE ORAL DAILY
Status: DISCONTINUED | OUTPATIENT
Start: 2024-11-23 | End: 2024-12-04 | Stop reason: HOSPADM

## 2024-11-23 RX ORDER — METHOTREXATE 2.5 MG/1
15 TABLET ORAL WEEKLY
Status: DISCONTINUED | OUTPATIENT
Start: 2024-11-23 | End: 2024-12-04 | Stop reason: HOSPADM

## 2024-11-23 RX ADMIN — ATORVASTATIN CALCIUM 40 MG: 40 TABLET, FILM COATED ORAL at 20:58

## 2024-11-23 RX ADMIN — CLOPIDOGREL BISULFATE 75 MG: 75 TABLET ORAL at 08:52

## 2024-11-23 RX ADMIN — METOPROLOL SUCCINATE 25 MG: 25 TABLET, FILM COATED, EXTENDED RELEASE ORAL at 08:52

## 2024-11-23 RX ADMIN — FOLIC ACID 1000 MCG: 1 TABLET ORAL at 08:52

## 2024-11-23 RX ADMIN — SERTRALINE HYDROCHLORIDE 50 MG: 50 TABLET ORAL at 08:52

## 2024-11-23 RX ADMIN — MESALAMINE 4.8 G: 800 TABLET, DELAYED RELEASE ORAL at 17:32

## 2024-11-23 RX ADMIN — METHYLPREDNISOLONE SODIUM SUCCINATE 20 MG: 40 INJECTION, POWDER, FOR SOLUTION INTRAMUSCULAR; INTRAVENOUS at 17:32

## 2024-11-23 RX ADMIN — METHOTREXATE 15 MG: 2.5 TABLET ORAL at 20:58

## 2024-11-23 RX ADMIN — MIDODRINE HYDROCHLORIDE 10 MG: 5 TABLET ORAL at 08:52

## 2024-11-23 RX ADMIN — METHYLPREDNISOLONE SODIUM SUCCINATE 20 MG: 40 INJECTION, POWDER, FOR SOLUTION INTRAMUSCULAR; INTRAVENOUS at 00:54

## 2024-11-23 RX ADMIN — LEVOTHYROXINE SODIUM 50 MCG: 0.05 TABLET ORAL at 08:52

## 2024-11-23 RX ADMIN — MIDODRINE HYDROCHLORIDE 10 MG: 5 TABLET ORAL at 17:32

## 2024-11-23 RX ADMIN — MIDODRINE HYDROCHLORIDE 10 MG: 5 TABLET ORAL at 13:04

## 2024-11-23 RX ADMIN — Medication 10 ML: at 08:54

## 2024-11-23 RX ADMIN — METHYLPREDNISOLONE SODIUM SUCCINATE 20 MG: 40 INJECTION, POWDER, FOR SOLUTION INTRAMUSCULAR; INTRAVENOUS at 08:51

## 2024-11-23 RX ADMIN — Medication 10 ML: at 22:01

## 2024-11-23 RX ADMIN — PANTOPRAZOLE SODIUM 40 MG: 40 TABLET, DELAYED RELEASE ORAL at 08:52

## 2024-11-23 NOTE — PROGRESS NOTES
.    [facility] MEDICINE SERVICE  DAILY PROGRESS NOTE    NAME: Maryann Gaitan  : 1950  MRN: [id number]      LOS: 4 days     PROVIDER OF SERVICE: Jonathan Mackay MD    Chief Complaint: Acute lower GI bleeding    Subjective:     Interval History:  History taken from: patient    Patient had 3-4 episodes blood in stools. As per RN, they noticed some worms/?Maggots in the stools. Hb is stable. Discussed with RN to send stool sample for ova and parasite. Updated GI about suspected worms in the stools.        Review of Systems:   Review of Systems negative except as mentioned above    Objective:     Vital Signs  Temp:  [97.8 °F (36.6 °C)-98.2 °F (36.8 °C)] 98.2 °F (36.8 °C)  Heart Rate:  [68-88] 68  Resp:  [19-23] 19  BP: ()/(43-73) 107/47   Body mass index is 20.43 kg/m².    Physical Exam  Physical Exam  General: Alert and oriented, no acute distress.  HENT: Normocephalic, moist oral mucosa, no scleral icterus.  Neck: Supple, nontender, no carotid bruits, no JVD, no LAD.  Lungs: Clear to auscultation, nonlabored respiration.  Heart: RRR, no murmur, gallop or edema.  Abdomen: Soft, nontender, nondistended, + bowel sounds.  Musculoskeletal: Normal range of motion and strength, no tenderness or swelling.  Neuro: alert and awake, moving all 4 extremities   Skin: Skin is warm, dry and pink, no rashes or lesions.  Psychiatric: Cooperative, appropriate mood and affect.        Diagnostic Data    Results from last 7 days   Lab Units 24  0411   WBC 10*3/mm3 12.82*   HEMOGLOBIN g/dL 8.7*   HEMATOCRIT % 27.6*   PLATELETS 10*3/mm3 284   GLUCOSE mg/dL 128*   CREATININE mg/dL 0.55*   BUN mg/dL 19   SODIUM mmol/L 141   POTASSIUM mmol/L 3.9   AST (SGOT) U/L 18   ALT (SGPT) U/L 25   ALK PHOS U/L 76   BILIRUBIN mg/dL 0.2   ANION GAP mmol/L 10.7       No radiology results for the last day      I reviewed the patient's new clinical results.    Assessment/Plan:     Active and Resolved Problems  Active Hospital  Problems    Diagnosis  POA    **Acute lower GI bleeding [K92.2]  Yes    S/P mitral valve clip implantation [Z98.890, Z95.818]  Not Applicable    Primary hypertension [I10]  Yes    Moderate protein-calorie malnutrition [E44.0]  Yes    Hypothyroidism (acquired) [E03.9]  Yes    COPD (chronic obstructive pulmonary disease) [J44.9]  Yes    Rheumatoid arthritis [M06.9]  Yes    Crohn's disease [K50.90]  Yes    Anxiety associated with depression [F41.8]  Yes    Dyslipidemia [E78.5]  Yes    Severe mitral regurgitation [I34.0]  Yes    Acute heart failure with preserved ejection fraction (HFpEF) [I50.31]  Yes    CAD, multiple vessel [I25.10]  Yes      Resolved Hospital Problems   No resolved problems to display.       Acute lower GI bleed/abdominal pain and rectal bleed.  -Concerned that the patient  may have persistent Crohn's exacerbation causing persistent inflammation and lower GI bleeding  -Patient has significant blood loss related to this and will require PRBC transfusion  -GI following, plan is to trend H&H and decide next steps -Continue IV Solu-Medrol. Will plan prednisone taper at discharge of 40 mg daily for 1 week followed by 30 mg daily for 1 week followed by 20 mg daily for 1 week followed by 10 mg daily for 1 week.  -OK to continue Rinvoq for now.  Can consider Skyrizi if she does not respond to Rinvoq.  Per cardio:Patient unfortunately is in a tough situation.  Had drug-eluting stents done 10/22/2024, hardly 2 weeks ago.  Would benefit from Plavix to prevent stent thrombosis.  Patient has been receiving Plavix uninterrupted.    Send stool ova and parasite     Mitral regurgitation  Transseptal left heart catheterization  Transcatheter [id number] repair of mitral valve MitraClip NT on 11/21       Hypertension; currently with low blood pressures     CAD  -Patient has previous recent history of PCI with stent placement and was on DAPT although this is likely exacerbating her GI bleed  -Holding aspirin in setting  of bleeding, cardiology restarted Plavix.  -Continue statin for now  -cardiology is following     Hypothyroidism  -continue home Synthroid    VTE Prophylaxis:  Mechanical VTE prophylaxis orders are present.             Disposition Planning:     Barriers to Discharge: Ongoing care  Anticipated Date of Discharge: Pending clinical course  Place of Discharge: Home with family      Time: > 40 minutes     Code Status and Medical Interventions: CPR (Attempt to Resuscitate); Full Support   Ordered at: 11/21/24 1014     Level Of Support Discussed With:    Patient     Code Status (Patient has no pulse and is not breathing):    CPR (Attempt to Resuscitate)     Medical Interventions (Patient has pulse or is breathing):    Full Support       Signature: Electronically signed by Jonathan Mackay MD, 11/23/24, 13:03 EST.  [facility]ist Team

## 2024-11-23 NOTE — PROGRESS NOTES
LOS: 4 days   Admitting Physician- Jonathan Mackay,*    Reason For Followup:    Chest pain  S/p coronary artery stenting  Mitral regurgitation  S/p MitraClip  GI bleed    Subjective     Patient doing well.    Objective     Blood pressure is stable    Review of Systems:   Review of Systems   Constitutional: Negative for chills and fever.   HENT:  Negative for ear discharge and nosebleeds.    Eyes:  Negative for discharge and redness.   Cardiovascular:  Negative for chest pain, orthopnea, palpitations, paroxysmal nocturnal dyspnea and syncope.   Respiratory:  Negative for cough, shortness of breath and wheezing.    Endocrine: Negative for heat intolerance.   Skin:  Negative for rash.   Musculoskeletal:  Negative for arthritis and myalgias.   Gastrointestinal:  Negative for abdominal pain, melena, nausea and vomiting.   Genitourinary:  Negative for dysuria and hematuria.   Neurological:  Negative for dizziness, light-headedness, numbness and tremors.   Psychiatric/Behavioral:  Negative for depression. The patient is not nervous/anxious.          Vital Signs  Vitals:    11/23/24 0900 11/23/24 1000 11/23/24 1100 11/23/24 1200   BP: 110/52 116/59 124/57 107/47   BP Location:   Right arm    Patient Position:   Lying    Pulse: 83 75 69 68   Resp:       Temp:   98.2 °F (36.8 °C)    TempSrc:   Oral    SpO2: 99% 99% 99% 99%   Weight:       Height:         Wt Readings from Last 1 Encounters:   11/22/24 54 kg (119 lb)       Intake/Output Summary (Last 24 hours) at 11/23/2024 1430  Last data filed at 11/23/2024 1400  Gross per 24 hour   Intake 840 ml   Output 0 ml   Net 840 ml     Physical Exam:  Constitutional:       Appearance: Well-developed.   Eyes:      General: No scleral icterus.        Right eye: No discharge.   HENT:      Head: Normocephalic and atraumatic.   Neck:      Thyroid: No thyromegaly.      Lymphadenopathy: No cervical adenopathy.   Pulmonary:      Effort: Pulmonary effort is normal. No respiratory  distress.      Breath sounds: Normal breath sounds. No wheezing. No rales.   Cardiovascular:      Normal rate. Regular rhythm.      No gallop.    Edema:     Peripheral edema absent.   Abdominal:      Tenderness: There is no abdominal tenderness.   Skin:     Findings: No erythema or rash.   Neurological:      Mental Status: Alert and oriented to person, place, and time.         Results Review:   Lab Results (last 24 hours)       Procedure Component Value Units Date/Time    Ova & Parasite Examination - Stool, Per Rectum [[id number]] Collected: 11/23/24 1401    Specimen: Stool from Per Rectum Updated: 11/23/24 1411    Comprehensive Metabolic Panel [[id number]]  (Abnormal) Collected: 11/23/24 0411    Specimen: Blood from Arm, Right Updated: 11/23/24 0447     Glucose 128 mg/dL      BUN 19 mg/dL      Creatinine 0.55 mg/dL      Sodium 141 mmol/L      Potassium 3.9 mmol/L      Chloride 108 mmol/L      CO2 22.3 mmol/L      Calcium 8.3 mg/dL      Total Protein 4.5 g/dL      Albumin 2.8 g/dL      ALT (SGPT) 25 U/L      AST (SGOT) 18 U/L      Alkaline Phosphatase 76 U/L      Total Bilirubin 0.2 mg/dL      Globulin 1.7 gm/dL      A/G Ratio 1.6 g/dL      BUN/Creatinine Ratio 34.5     Anion Gap 10.7 mmol/L      eGFR 96.3 mL/min/1.73     Narrative:      GFR Normal >60  Chronic Kidney Disease <60  Kidney Failure <15    The GFR formula is only valid for adults with stable renal function between ages 18 and 70.    C-reactive Protein [[id number]]  (Abnormal) Collected: 11/23/24 0411    Specimen: Blood from Arm, Right Updated: 11/23/24 0447     C-Reactive Protein 1.25 mg/dL     CBC (No Diff) [[id number]]  (Abnormal) Collected: 11/23/24 0411    Specimen: Blood from Arm, Right Updated: 11/23/24 0418     WBC 12.82 10*3/mm3      RBC 2.92 10*6/mm3      Hemoglobin 8.7 g/dL      Hematocrit 27.6 %      MCV 94.5 fL      MCH 29.8 pg      MCHC 31.5 g/dL      RDW 19.0 %      RDW-SD 64.4 fl      MPV 9.5 fL      Platelets 284 10*3/mm3     Hemoglobin &  Hematocrit, Blood [[id number]]  (Abnormal) Collected: 11/22/24 2323    Specimen: Blood Updated: 11/22/24 2328     Hemoglobin 8.6 g/dL      Hematocrit 27.3 %     Hemoglobin & Hematocrit, Blood [[id number]]  (Abnormal) Collected: 11/22/24 1758    Specimen: Blood Updated: 11/22/24 1810     Hemoglobin 8.9 g/dL      Hematocrit 27.3 %           Imaging Results (Last 72 Hours)       ** No results found for the last 72 hours. **          ECG/EMG Results (most recent)       Procedure Component Value Units Date/Time    ECG 12 Lead Chest Pain [[id number]] Collected: 11/19/24 1213     Updated: 11/19/24 1248     QT Interval 406 ms      QTC Interval 429 ms     Narrative:      HEART RATE=67  bpm  RR Ofmqppkz=859  ms  MA Tuagnvqh=116  ms  P Horizontal Axis=28  deg  P Front Axis=87  deg  QRSD Interval=83  ms  QT Uxclyhzv=051  ms  NDfZ=375  ms  QRS Axis=39  deg  T Wave Axis=40  deg  - ABNORMAL ECG -  Sinus rhythm  Low voltage, precordial leads  Nonspecific  T abnormalities, inferior leads  When compared with ECG of 06-Nov-2024 15:41:09,  Significant repolarization change  Significant axis, voltage or hypertrophy change  Electronically Signed By: Rajiv Dubon ([facility]) 2024-11-19 12:47:59  Date and Time of Study:2024-11-19 12:13:34    Telemetry Scan [[id number]] Resulted: 11/19/24     Updated: 11/20/24 1652    Telemetry Scan [[id number]] Resulted: 11/19/24     Updated: 11/20/24 1656    Telemetry Scan [[id number]] Resulted: 11/19/24     Updated: 11/20/24 1715    Intra-Op Structural Heart GONZÁLEZ (Cardiology Read) [[id number]] Resulted: 11/21/24 1022     Updated: 11/21/24 1022     MV max PG 9.9 mmHg      MV mean PG 5.4 mmHg      MV V2 VTI 45.7 cm     Telemetry Scan [[id number]] Resulted: 11/19/24     Updated: 11/22/24 0016    Telemetry Scan [[id number]] Resulted: 11/19/24     Updated: 11/22/24 0020    Telemetry Scan [[id number]] Resulted: 11/19/24     Updated: 11/22/24 0041    Telemetry Scan [[id number]] Resulted: 11/19/24     Updated: 11/22/24 0247     Telemetry Scan [[id number]] Resulted: 11/19/24     Updated: 11/22/24 0458    Telemetry Scan [[id number]] Resulted: 11/19/24     Updated: 11/22/24 0614    Telemetry Scan [[id number]] Resulted: 11/19/24     Updated: 11/22/24 0656    Adult Transthoracic Echo Limited W/ Cont if Necessary Per Protocol [[id number]] Resulted: 11/22/24 0903     Updated: 11/22/24 0903     MV max PG 9.3 mmHg      MV mean PG 4.6 mmHg      MV V2 VTI 47.1 cm      MV P1/2t 95.7 msec      MVA(P1/2t) 2.30 cm2      MV dec slope 482.2 cm/sec2     Narrative:        Left ventricular ejection fraction appears to be 56 - 60%.    There is a MitraClip mitral valve repair present.    There is trace residual MR.  Mean gradient is 4.6 mmHg      Telemetry Scan [[id number]] Resulted: 11/19/24     Updated: 11/22/24 1317    Telemetry Scan [[id number]] Resulted: 11/19/24     Updated: 11/22/24 1331    ECG 12 Lead Other; post-MitraClip [[id number]] Collected: 11/22/24 0615     Updated: 11/22/24 1340     QT Interval 383 ms      QTC Interval 423 ms     Narrative:      HEART RATE=73  bpm  RR Jsozatfx=833  ms  [place] Cxmbrias=760  ms  P Horizontal Axis=1  deg  P Front Axis=84  deg  QRSD Interval=80  ms  QT Zqvffkxx=197  ms  QQmE=547  ms  QRS Axis=39  deg  T Wave Axis=70  deg  - NORMAL ECG -  Sinus rhythm  When compared with ECG of 19-Nov-2024 12:13:34,  Significant repolarization change  Significant axis, voltage or hypertrophy change  Electronically Signed By: Dmitri Navarro ([facility]) 2024-11-22 13:38:55  Date and Time of Study:2024-11-22 06:15:06    Telemetry Scan [[id number]] Resulted: 11/19/24     Updated: 11/22/24 2041    Telemetry Scan [[id number]] Resulted: 11/19/24     Updated: 11/22/24 2119    Telemetry Scan [[id number]] Resulted: 11/19/24     Updated: 11/23/24 0042    Telemetry Scan [[id number]] Resulted: 11/19/24     Updated: 11/23/24 0746    Telemetry Scan [[id number]] Resulted: 11/19/24     Updated: 11/23/24 0810    Telemetry Scan [[id number]] Resulted: 11/19/24     Updated:  11/23/24 1319          CBC    Results from last 7 days   Lab Units 11/23/24 0411 11/22/24  2323 11/22/24  1758 11/22/24 0439 11/21/24 0129 11/20/24  0246 11/19/24  1933 11/19/24  1246   WBC 10*3/mm3 12.82*  --   --  14.13* 13.55* 12.18*  --  16.93*   HEMOGLOBIN g/dL 8.7* 8.6* 8.9* 7.4* 9.0* 9.1* 7.3* 5.7*   PLATELETS 10*3/mm3 284  --   --  310 368 369  --  435     BMP   Results from last 7 days   Lab Units 11/23/24 0411 11/22/24 0439 11/21/24 0129 11/20/24  0246 11/19/24  1246   SODIUM mmol/L 141 139 136 137 138   POTASSIUM mmol/L 3.9 3.9 4.1 3.8 4.3   CHLORIDE mmol/L 108* 107 103 103 106   CO2 mmol/L 22.3 25.0 23.7 26.3 22.2   BUN mg/dL 19 15 16 19 28*   CREATININE mg/dL 0.55* 0.49* 0.73 0.59 0.74   GLUCOSE mg/dL 128* 116* 132* 64* 77   MAGNESIUM mg/dL  --  1.8 2.0 2.0  --      CMP   Results from last 7 days   Lab Units 11/23/24 0411 11/22/24 0439 11/21/24 0129 11/20/24 0246 11/19/24  1246   SODIUM mmol/L 141 139 136 137 138   POTASSIUM mmol/L 3.9 3.9 4.1 3.8 4.3   CHLORIDE mmol/L 108* 107 103 103 106   CO2 mmol/L 22.3 25.0 23.7 26.3 22.2   BUN mg/dL 19 15 16 19 28*   CREATININE mg/dL 0.55* 0.49* 0.73 0.59 0.74   GLUCOSE mg/dL 128* 116* 132* 64* 77   ALBUMIN g/dL 2.8*  --   --   --  2.9*   BILIRUBIN mg/dL 0.2  --   --   --  0.3   ALK PHOS U/L 76  --   --   --  64   AST (SGOT) U/L 18  --   --   --  19   ALT (SGPT) U/L 25  --   --   --  35*   LIPASE U/L  --   --   --   --  19     Cardiac Studies:  Echo- Results for orders placed during the hospital encounter of 11/19/24    Adult Transthoracic Echo Limited W/ Cont if Necessary Per Protocol    Interpretation Summary    Left ventricular ejection fraction appears to be 56 - 60%.    There is a MitraClip mitral valve repair present.    There is trace residual MR.  Mean gradient is 4.6 mmHg    Stress Myoview-  Cath-      Medication Review:   Scheduled Meds:acetaminophen, 650 mg, Oral, Once   Or  acetaminophen, 650 mg, Oral, Once   Or  acetaminophen, 650 mg,  Rectal, Once  atorvastatin, 40 mg, Oral, Nightly  clopidogrel, 75 mg, Oral, Daily  diphenhydrAMINE, 25 mg, Oral, Once   Or  diphenhydrAMINE, 25 mg, Intravenous, Once  folic acid, 1,000 mcg, Oral, Daily  levothyroxine, 50 mcg, Oral, Daily  mesalamine, 4.8 g, Oral, Daily  methylPREDNISolone sodium succinate, 20 mg, Intravenous, Q8H  metoprolol succinate XL, 25 mg, Oral, Q24H  midodrine, 10 mg, Oral, TID AC  Non-Formulary / Patient Supplied Medication, 15 mg, Oral, Daily  pantoprazole, 40 mg, Oral, Daily  sertraline, 50 mg, Oral, Daily  sodium chloride, 10 mL, Intravenous, Q12H      Continuous Infusions:   PRN Meds:.  acetaminophen    senna-docusate sodium **AND** polyethylene glycol **AND** bisacodyl **AND** bisacodyl    diphenhydrAMINE    nitroglycerin    ondansetron ODT **OR** ondansetron    [COMPLETED] Insert Peripheral IV **AND** sodium chloride    sodium chloride    sodium chloride      Assessment & Plan     Acute lower GI bleeding    CAD, multiple vessel    Acute heart failure with preserved ejection fraction (HFpEF)    Severe mitral regurgitation    Dyslipidemia    Crohn's disease    Hypothyroidism (acquired)    Anxiety associated with depression    COPD (chronic obstructive pulmonary disease)    Rheumatoid arthritis    Moderate protein-calorie malnutrition    Primary hypertension    S/P mitral valve clip implantation    MDM:    1.  Severe mitral regurgitation status post MitraClip:    Patient is doing well.    2.  CAD/status post coronary artery stenting    Continue Plavix    3.  Hypertension:    Blood pressure is controlled continue Lopressor    4.  Hypotension:    Patient is on midodrine    5.  GI bleeding/anemia:        Marciano Gurrola MD  11/23/24  14:30 EST

## 2024-11-23 NOTE — PROGRESS NOTES
" LOS: 4 days   Patient Care Team:  Eduard Little MD as PCP - General (Family Medicine)  Niya Mendoza APRN as Nurse Practitioner (Cardiology)      Subjective   \" I have had 3 bowel movements with blood in it since midnight\"    Interval History:    LABS: Sodium potassium are normal.  Creatinine 0.55.  LFTs remain normal.  CRP 1.25.  WBCs 12.82, hemoglobin 8.7 (8.6), platelets 284.  Patient received 1 unit of packed red blood cells yesterday.  About 75% of her breakfast.  Complains of dizziness worse with ambulation.    ROS:   No chest pain, shortness of breath, or cough.        Medication Review:     Current Facility-Administered Medications:     acetaminophen (TYLENOL) tablet 650 mg, 650 mg, Oral, Once **OR** acetaminophen (TYLENOL) 160 MG/5ML oral solution 650 mg, 650 mg, Oral, Once **OR** acetaminophen (TYLENOL) suppository 650 mg, 650 mg, Rectal, Once, Niya Mendoza APRN    acetaminophen (TYLENOL) tablet 650 mg, 650 mg, Oral, Q4H PRN, Dmitri Navarro MD, 650 mg at 11/22/24 0833    atorvastatin (LIPITOR) tablet 40 mg, 40 mg, Oral, Nightly, Dmitri Navarro MD, 40 mg at 11/22/24 2107    sennosides-docusate (PERICOLACE) 8.6-50 MG per tablet 2 tablet, 2 tablet, Oral, BID PRN **AND** polyethylene glycol (MIRALAX) packet 17 g, 17 g, Oral, Daily PRN **AND** bisacodyl (DULCOLAX) EC tablet 5 mg, 5 mg, Oral, Daily PRN **AND** bisacodyl (DULCOLAX) suppository 10 mg, 10 mg, Rectal, Daily PRN, Dmitri Navarro MD    clopidogrel (PLAVIX) tablet 75 mg, 75 mg, Oral, Daily, Dmitri Navarro MD, 75 mg at 11/22/24 0941    diphenhydrAMINE (BENADRYL) capsule 25 mg, 25 mg, Oral, Q6H PRN, Dmitri Navarro MD, 25 mg at 11/22/24 0941    diphenhydrAMINE (BENADRYL) capsule 25 mg, 25 mg, Oral, Once **OR** diphenhydrAMINE (BENADRYL) injection 25 mg, 25 mg, Intravenous, Once, Niya Mendoza APRN    folic acid (FOLVITE) tablet 1,000 mcg, 1,000 mcg, Oral, Daily, Dmitri Navarro MD, 1,000 mcg at 11/22/24 0941    levothyroxine (SYNTHROID, " LEVOTHROID) tablet 50 mcg, 50 mcg, Oral, Daily, Dmitri Navarro MD, 50 mcg at 11/22/24 0941    methylPREDNISolone sodium succinate (SOLU-Medrol) injection 20 mg, 20 mg, Intravenous, Q8H, Dmitri Navarro MD, 20 mg at 11/23/24 0054    metoprolol succinate XL (TOPROL-XL) 24 hr tablet 25 mg, 25 mg, Oral, Q24H, Corina Murcia APRN    midodrine (PROAMATINE) tablet 10 mg, 10 mg, Oral, TID AC, Dmitri Navarro MD, 10 mg at 11/22/24 1749    nitroglycerin (NITROSTAT) SL tablet 0.4 mg, 0.4 mg, Sublingual, Q5 Min PRN, Dmitri Navarro MD    ondansetron ODT (ZOFRAN-ODT) disintegrating tablet 4 mg, 4 mg, Oral, Q6H PRN **OR** ondansetron (ZOFRAN) injection 4 mg, 4 mg, Intravenous, Q6H PRN, Dmitri Navarro MD    pantoprazole (PROTONIX) EC tablet 40 mg, 40 mg, Oral, Daily, Dmtiri Navarro MD, 40 mg at 11/22/24 0942    sertraline (ZOLOFT) tablet 50 mg, 50 mg, Oral, Daily, Dmitri Navarro MD, 50 mg at 11/22/24 0941    [COMPLETED] Insert Peripheral IV, , , Once **AND** sodium chloride 0.9 % flush 10 mL, 10 mL, Intravenous, PRN, Dmitri Navarro MD    sodium chloride 0.9 % flush 10 mL, 10 mL, Intravenous, Q12H, Dmitri Navarro MD, 10 mL at 11/22/24 2107    sodium chloride 0.9 % flush 10 mL, 10 mL, Intravenous, PRN, Dmitri Navarro MD    sodium chloride 0.9 % infusion 40 mL, 40 mL, Intravenous, PRN, Dmitri Navarro MD      Objective resting in the hospital bed.  NAD.  No family present.  Room 3113.    Vital Signs  Temp:  [97.7 °F (36.5 °C)-98.6 °F (37 °C)] 98 °F (36.7 °C)  Heart Rate:  [67-88] 69  Resp:  [16-23] 19  BP: ()/(43-73) 97/43  Physical Exam:  General Appearance:    Awake and alert, in no acute distress   Head:    Normocephalic, without obvious abnormality   Eyes:          Conjunctivae normal, anicteric sclera   Ears:    Hearing intact   Lungs:      respirations regular, even and unlabored        Abdomen:     soft, non-tender, no rebound or guarding, non-distended, no hepatosplenomegaly    Rectal:     Deferred   Extremities:   No edema, no  cyanosis, no redness        Results Review:    Lab Results (last 24 hours)       Procedure Component Value Units Date/Time    Comprehensive Metabolic Panel [[id number]]  (Abnormal) Collected: 11/23/24 0411    Specimen: Blood from Arm, Right Updated: 11/23/24 0447     Glucose 128 mg/dL      BUN 19 mg/dL      Creatinine 0.55 mg/dL      Sodium 141 mmol/L      Potassium 3.9 mmol/L      Chloride 108 mmol/L      CO2 22.3 mmol/L      Calcium 8.3 mg/dL      Total Protein 4.5 g/dL      Albumin 2.8 g/dL      ALT (SGPT) 25 U/L      AST (SGOT) 18 U/L      Alkaline Phosphatase 76 U/L      Total Bilirubin 0.2 mg/dL      Globulin 1.7 gm/dL      A/G Ratio 1.6 g/dL      BUN/Creatinine Ratio 34.5     Anion Gap 10.7 mmol/L      eGFR 96.3 mL/min/1.73     Narrative:      GFR Normal >60  Chronic Kidney Disease <60  Kidney Failure <15    The GFR formula is only valid for adults with stable renal function between ages 18 and 70.    C-reactive Protein [[id number]]  (Abnormal) Collected: 11/23/24 0411    Specimen: Blood from Arm, Right Updated: 11/23/24 0447     C-Reactive Protein 1.25 mg/dL     CBC (No Diff) [[id number]]  (Abnormal) Collected: 11/23/24 0411    Specimen: Blood from Arm, Right Updated: 11/23/24 0418     WBC 12.82 10*3/mm3      RBC 2.92 10*6/mm3      Hemoglobin 8.7 g/dL      Hematocrit 27.6 %      MCV 94.5 fL      MCH 29.8 pg      MCHC 31.5 g/dL      RDW 19.0 %      RDW-SD 64.4 fl      MPV 9.5 fL      Platelets 284 10*3/mm3     Hemoglobin & Hematocrit, Blood [[id number]]  (Abnormal) Collected: 11/22/24 2323    Specimen: Blood Updated: 11/22/24 2328     Hemoglobin 8.6 g/dL      Hematocrit 27.3 %     Hemoglobin & Hematocrit, Blood [[id number]]  (Abnormal) Collected: 11/22/24 1758    Specimen: Blood Updated: 11/22/24 1810     Hemoglobin 8.9 g/dL      Hematocrit 27.3 %             Imaging Results (Last 24 Hours)       ** No results found for the last 24 hours. **              ASSESSMENT:  -Hematochezia  -Diarrhea   -Normocytic anemia  "with drop in hemoglobin  -Leukocytosis  -Abnormal CT showing distal/terminal ileitis and diffuse colonic fatty infiltration  -Recent Strongyloides infection in 10/2024 s/p treatment with ivermectin  -Small bowel and colonic Crohn's disease - on Rinvoq (recently restarted)  -Chest pain  -Recent fall  -Hypertension  -COPD  -CAD s/p stent placement on Plavix  -RA - on Humira and methotrexate  -History of hysterectomy  -History of cholecystectomy    PLAN:   74-year-old woman with a Crohn's disease and rectal bleeding.  Recently transitioned to Rinvoq. Already on Humira & methotrexate for RA. She underwent mitral valve clipping 11/21/24.      Continue IV Solu-Medrol. Will plan prednisone taper at discharge of 40 mg daily for 1 week followed by 30 mg daily for 1 week followed by 20 mg daily for 1 week followed by 10 mg daily for 1 week.  OK to continue Rinvoq for now.  Can consider Skyrizi if she does not respond to Rinvoq.  Regular diet as tolerated  Continues to pass bloody stools, 3 since midnight. Hgb 7.4 yesterday & transfused 1U. Hgb is 8.7 today. Unfortunately every time we restart Plavix, her bloody stool returns.   Patient states \"Sania\" has been bringing in her Rinvoq for her to take. We have asked that she get [facility] to bring in medication TODAY & nursing staff is to document when patient takes it. Rinvoq is going to help her the most.   Tech reported she say a worm in her stool overnight but specimen was not sent. O&P has been ordered. Previous specimens have been negative.       Mikayla Kc, APRN  11/23/24  08:23 EST       "

## 2024-11-24 ENCOUNTER — INPATIENT HOSPITAL (AMBULATORY)
Age: 74
End: 2024-11-24
Payer: MEDICARE

## 2024-11-24 ENCOUNTER — INPATIENT HOSPITAL (AMBULATORY)
Dept: URBAN - METROPOLITAN AREA HOSPITAL 84 | Facility: HOSPITAL | Age: 74
End: 2024-11-24
Payer: MEDICARE

## 2024-11-24 DIAGNOSIS — Z90.49 ACQUIRED ABSENCE OF OTHER SPECIFIED PARTS OF DIGESTIVE TRACT: ICD-10-CM

## 2024-11-24 DIAGNOSIS — R19.7 DIARRHEA, UNSPECIFIED: ICD-10-CM

## 2024-11-24 DIAGNOSIS — K62.5 HEMORRHAGE OF ANUS AND RECTUM: ICD-10-CM

## 2024-11-24 DIAGNOSIS — R93.3 ABNORMAL FINDINGS ON DIAGNOSTIC IMAGING OF OTHER PARTS OF DI: ICD-10-CM

## 2024-11-24 DIAGNOSIS — K50.80 CROHN'S DISEASE OF BOTH SMALL AND LARGE INTESTINE WITHOUT CO: ICD-10-CM

## 2024-11-24 DIAGNOSIS — D72.829 ELEVATED WHITE BLOOD CELL COUNT, UNSPECIFIED: ICD-10-CM

## 2024-11-24 DIAGNOSIS — D64.9 ANEMIA, UNSPECIFIED: ICD-10-CM

## 2024-11-24 LAB
ABO GROUP BLD: NORMAL
ALBUMIN SERPL-MCNC: 2.5 G/DL (ref 3.5–5.2)
ALBUMIN/GLOB SERPL: 1.8 G/DL
ALP SERPL-CCNC: 57 U/L (ref 39–117)
ALT SERPL W P-5'-P-CCNC: 30 U/L (ref 1–33)
ANION GAP SERPL CALCULATED.3IONS-SCNC: 12.9 MMOL/L (ref 5–15)
AST SERPL-CCNC: 23 U/L (ref 1–32)
BILIRUB SERPL-MCNC: 0.2 MG/DL (ref 0–1.2)
BLD GP AB SCN SERPL QL: NEGATIVE
BUN SERPL-MCNC: 20 MG/DL (ref 8–23)
BUN/CREAT SERPL: 31.7 (ref 7–25)
CALCIUM SPEC-SCNC: 7.8 MG/DL (ref 8.6–10.5)
CHLORIDE SERPL-SCNC: 108 MMOL/L (ref 98–107)
CO2 SERPL-SCNC: 18.1 MMOL/L (ref 22–29)
CREAT SERPL-MCNC: 0.63 MG/DL (ref 0.57–1)
CRP SERPL-MCNC: 0.4 MG/DL (ref 0–0.5)
DEPRECATED RDW RBC AUTO: 63.2 FL (ref 37–54)
EGFRCR SERPLBLD CKD-EPI 2021: 93.2 ML/MIN/1.73
ERYTHROCYTE [DISTWIDTH] IN BLOOD BY AUTOMATED COUNT: 18.7 % (ref 12.3–15.4)
FERRITIN SERPL-MCNC: 13 NG/ML (ref 13–150)
GLOBULIN UR ELPH-MCNC: 1.4 GM/DL
GLUCOSE SERPL-MCNC: 122 MG/DL (ref 65–99)
HCT VFR BLD AUTO: 17.6 % (ref 34–46.6)
HCT VFR BLD AUTO: 24 % (ref 34–46.6)
HGB BLD-MCNC: 5.5 G/DL (ref 12–15.9)
HGB BLD-MCNC: 7.7 G/DL (ref 12–15.9)
MCH RBC QN AUTO: 29.9 PG (ref 26.6–33)
MCHC RBC AUTO-ENTMCNC: 31.3 G/DL (ref 31.5–35.7)
MCV RBC AUTO: 95.7 FL (ref 79–97)
PLATELET # BLD AUTO: 197 10*3/MM3 (ref 140–450)
PMV BLD AUTO: 9.7 FL (ref 6–12)
POTASSIUM SERPL-SCNC: 4.6 MMOL/L (ref 3.5–5.2)
PROT SERPL-MCNC: 3.9 G/DL (ref 6–8.5)
RBC # BLD AUTO: 1.84 10*6/MM3 (ref 3.77–5.28)
RH BLD: POSITIVE
SODIUM SERPL-SCNC: 139 MMOL/L (ref 136–145)
T&S EXPIRATION DATE: NORMAL
WBC NRBC COR # BLD AUTO: 14.9 10*3/MM3 (ref 3.4–10.8)

## 2024-11-24 PROCEDURE — 86900 BLOOD TYPING SEROLOGIC ABO: CPT

## 2024-11-24 PROCEDURE — 99233 SBSQ HOSP IP/OBS HIGH 50: CPT | Performed by: NURSE PRACTITIONER

## 2024-11-24 PROCEDURE — 86923 COMPATIBILITY TEST ELECTRIC: CPT

## 2024-11-24 PROCEDURE — 86140 C-REACTIVE PROTEIN: CPT | Performed by: NURSE PRACTITIONER

## 2024-11-24 PROCEDURE — 86850 RBC ANTIBODY SCREEN: CPT | Performed by: NURSE PRACTITIONER

## 2024-11-24 PROCEDURE — 99222 1ST HOSP IP/OBS MODERATE 55: CPT | Performed by: STUDENT IN AN ORGANIZED HEALTH CARE EDUCATION/TRAINING PROGRAM

## 2024-11-24 PROCEDURE — 80053 COMPREHEN METABOLIC PANEL: CPT | Performed by: NURSE PRACTITIONER

## 2024-11-24 PROCEDURE — 25810000003 SODIUM CHLORIDE 0.9 % SOLUTION: Performed by: NURSE PRACTITIONER

## 2024-11-24 PROCEDURE — 25010000002 NA FERRIC GLUC CPLX PER 12.5 MG: Performed by: NURSE PRACTITIONER

## 2024-11-24 PROCEDURE — 36430 TRANSFUSION BLD/BLD COMPNT: CPT

## 2024-11-24 PROCEDURE — P9016 RBC LEUKOCYTES REDUCED: HCPCS

## 2024-11-24 PROCEDURE — 85027 COMPLETE CBC AUTOMATED: CPT | Performed by: NURSE PRACTITIONER

## 2024-11-24 PROCEDURE — 99232 SBSQ HOSP IP/OBS MODERATE 35: CPT | Performed by: INTERNAL MEDICINE

## 2024-11-24 PROCEDURE — 82607 VITAMIN B-12: CPT | Performed by: NURSE PRACTITIONER

## 2024-11-24 PROCEDURE — 25010000002 METHYLPREDNISOLONE PER 40 MG: Performed by: INTERNAL MEDICINE

## 2024-11-24 PROCEDURE — 85018 HEMOGLOBIN: CPT | Performed by: STUDENT IN AN ORGANIZED HEALTH CARE EDUCATION/TRAINING PROGRAM

## 2024-11-24 PROCEDURE — 86901 BLOOD TYPING SEROLOGIC RH(D): CPT | Performed by: NURSE PRACTITIONER

## 2024-11-24 PROCEDURE — 85014 HEMATOCRIT: CPT | Performed by: STUDENT IN AN ORGANIZED HEALTH CARE EDUCATION/TRAINING PROGRAM

## 2024-11-24 PROCEDURE — 86900 BLOOD TYPING SEROLOGIC ABO: CPT | Performed by: NURSE PRACTITIONER

## 2024-11-24 RX ADMIN — FOLIC ACID 1000 MCG: 1 TABLET ORAL at 08:25

## 2024-11-24 RX ADMIN — MIDODRINE HYDROCHLORIDE 10 MG: 5 TABLET ORAL at 11:23

## 2024-11-24 RX ADMIN — MESALAMINE 4.8 G: 800 TABLET, DELAYED RELEASE ORAL at 08:26

## 2024-11-24 RX ADMIN — MIDODRINE HYDROCHLORIDE 10 MG: 5 TABLET ORAL at 08:25

## 2024-11-24 RX ADMIN — Medication 10 ML: at 08:27

## 2024-11-24 RX ADMIN — ATORVASTATIN CALCIUM 40 MG: 40 TABLET, FILM COATED ORAL at 20:24

## 2024-11-24 RX ADMIN — METHYLPREDNISOLONE SODIUM SUCCINATE 20 MG: 40 INJECTION, POWDER, FOR SOLUTION INTRAMUSCULAR; INTRAVENOUS at 02:18

## 2024-11-24 RX ADMIN — SERTRALINE HYDROCHLORIDE 50 MG: 50 TABLET ORAL at 08:26

## 2024-11-24 RX ADMIN — LEVOTHYROXINE SODIUM 50 MCG: 0.05 TABLET ORAL at 08:26

## 2024-11-24 RX ADMIN — PANTOPRAZOLE SODIUM 40 MG: 40 TABLET, DELAYED RELEASE ORAL at 08:25

## 2024-11-24 RX ADMIN — METHYLPREDNISOLONE SODIUM SUCCINATE 20 MG: 40 INJECTION, POWDER, FOR SOLUTION INTRAMUSCULAR; INTRAVENOUS at 17:21

## 2024-11-24 RX ADMIN — CLOPIDOGREL BISULFATE 75 MG: 75 TABLET ORAL at 08:25

## 2024-11-24 RX ADMIN — METOPROLOL SUCCINATE 25 MG: 25 TABLET, FILM COATED, EXTENDED RELEASE ORAL at 08:26

## 2024-11-24 RX ADMIN — METHYLPREDNISOLONE SODIUM SUCCINATE 20 MG: 40 INJECTION, POWDER, FOR SOLUTION INTRAMUSCULAR; INTRAVENOUS at 08:56

## 2024-11-24 RX ADMIN — SODIUM CHLORIDE 250 MG: 9 INJECTION, SOLUTION INTRAVENOUS at 11:42

## 2024-11-24 NOTE — CONSULTS
Infectious Diseases Consult Note    Referring Provider: Jonathan Mackay,*    Reason for Consultation: Possible Strongyloides reinfection    Patient Care Team:  Eduard Little MD as PCP - General (Family Medicine)  Niya Mendoza APRN as Nurse Practitioner (Cardiology)    Chief complaint abdominal pain, nausea, bloody diarrhea    Subjective     History of present illness:      This is a 74-year-old female presents to the hospital on 11/19/2024 with worsening rectal bleeding, abdominal pain, nausea and a recent fall at home.  Patient has a history of being diagnosed with Strongyloides after an EGD/colonoscopy on 10/11/2024 and was treated with ivermectin.  Was seen earlier in November 2024 for similar symptoms and the parasite and ova examination from 11/10/2024 was negative.  Night shift reports that they saw spaghetti like mucus in the patient's stool.  Patient denies fever and chills but continues to have severe abdominal pain, bleeding and nausea.  Denies urine systems are significant shortness of breath or cough.  Patient is currently receiving packed red blood cells    Review of Systems   Review of Systems   Constitutional:  Positive for fatigue.   HENT: Negative.     Eyes: Negative.    Respiratory: Negative.     Cardiovascular: Negative.    Gastrointestinal:  Positive for abdominal pain, blood in stool and nausea.   Endocrine: Negative.    Genitourinary: Negative.    Musculoskeletal: Negative.    Skin: Negative.    Neurological: Negative.    Psychiatric/Behavioral: Negative.     All other systems reviewed and are negative.      Medications  Medications Prior to Admission   Medication Sig Dispense Refill Last Dose/Taking    Adalimumab (Humira, 2 Pen,) 40 MG/0.4ML Pen-injector Kit Inject 40 mg under the skin into the appropriate area as directed 1 (One) Time Per Week. Saturdays   Indications: Rheumatoid Arthritis   Past Week    albuterol (PROVENTIL) (2.5 MG/3ML) 0.083% nebulizer solution Take  2.5 mg by nebulization Every 6 (Six) Hours As Needed for Wheezing. Indications: Spasm of Lung Air Passages   Taking As Needed    amLODIPine (NORVASC) 10 MG tablet Take 1 tablet by mouth Daily.   Taking    aspirin 81 MG EC tablet Take 1 tablet by mouth Daily for 30 days. Indications: Disease involving Lipid Deposits in the Arteries 30 tablet 0 Taking    [] atorvastatin (LIPITOR) 40 MG tablet Take 1 tablet by mouth Every Night for 30 days. 30 tablet 0 Taking    cholecalciferol (VITAMIN D3) 1.25 MG (97843 UT) capsule Take 1 capsule by mouth 2 (Two) Times a Week. Wed, Sat  Indications: Vitamin D Deficiency   Past Week    [] clopidogrel (PLAVIX) 75 MG tablet Take 1 tablet by mouth Daily for 30 days. 30 tablet 0 Taking    diclofenac (VOLTAREN) 50 MG EC tablet Take 1 tablet by mouth 2 (Two) Times a Day.   Taking    folic acid (FOLVITE) 1 MG tablet Take 1 tablet by mouth Daily. Indications: Anemia From Inadequate Folic Acid   Taking    levothyroxine (SYNTHROID, LEVOTHROID) 50 MCG tablet Take 1 tablet by mouth Daily. Indications: Underactive Thyroid   Taking    Melatonin (Melatonin Extra Strength) 10 MG tablet Take 1 tablet by mouth As Needed. Indications: Trouble Sleeping   Taking As Needed    methotrexate 2.5 MG tablet Take 6 tablets by mouth 1 (One) Time Per Week.    Indications: Non-oncologic   Past Week    metoprolol tartrate (LOPRESSOR) 50 MG tablet Take 1 tablet by mouth 2 (Two) Times a Day.   Taking    midodrine (PROAMATINE) 10 MG tablet Take 1 tablet by mouth 3 (Three) Times a Day Before Meals for 30 days. 90 tablet 0 Taking    pantoprazole (PROTONIX) 20 MG EC tablet Take 1 tablet by mouth Daily. Indications: Heartburn   Taking    predniSONE (DELTASONE) 10 MG tablet Take 4 tablets by mouth Daily for 7 days, THEN 3.5 tablets Daily for 7 days, THEN 3 tablets Daily for 7 days, THEN 2.5 tablets Daily for 7 days, THEN 2 tablets Daily for 7 days, THEN 1.5 tablets Daily for 7 days, THEN 1 tablet  Daily for 7 days, THEN 0.5 tablets Daily for 7 days. Indications: Crohn's Disease 126 tablet 0 Taking    sertraline (ZOLOFT) 50 MG tablet Take 1 tablet by mouth Daily. Indications: Major Depressive Disorder   Taking    spironolactone (ALDACTONE) 25 MG tablet Take 1 tablet by mouth Daily. Indications: Edema   Taking    upadacitinib ER (Rinvoq) 45 MG tablet sustained-release 24 hour extended release tablet Take 1 tablet by mouth Daily. Indications: Rheumatoid Arthritis   Taking       History  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 07/05/2016    Dyslipidemia 10/30/2024    Fecal urgency 11/21/2024    First degree hemorrhoids 07/09/2021    GERD (gastroesophageal reflux disease) 11/21/2024    Hashimoto's thyroiditis 11/21/2024    History of colonic polyps 07/09/2021    Hypertension     Hypothyroidism (acquired) 11/06/2024    Mitral regurgitation 10/08/2024    Moderate protein-calorie malnutrition 11/09/2024    Multiple tracheobronchial mucus plugs 10/08/2024    Nicotine dependence 11/21/2024    Rheumatoid arthritis 11/06/2024    S/P mitral valve clip implantation 11/21/2024    Second degree hemorrhoids 07/05/2016    Stress incontinence, female 11/21/2024    Vitamin D deficiency, unspecified 11/21/2024     Past Surgical History:   Procedure Laterality Date    BRONCHOSCOPY N/A 10/16/2024    Procedure: BRONCHOSCOPY;  Surgeon: Gallo Pope MD;  Location: [facility] ENDOSCOPY;  Service: Pulmonary;  Laterality: N/A;    CARDIAC CATHETERIZATION N/A 10/15/2024    Procedure: Left Heart Cath, possible pci;  Surgeon:  Travis Connor MD;  Location: [facility] CATH INVASIVE LOCATION;  Service: Cardiovascular;  Laterality: N/A;    CARDIAC CATHETERIZATION N/A 10/22/2024    Procedure: Laser Coronary Atherectomy;  Surgeon: Travis Connor MD;  Location: [facility] CATH INVASIVE LOCATION;  Service: Cardiovascular;  Laterality: N/A;    COLONOSCOPY N/A 10/12/2024    Procedure: COLONOSCOPY WITH BIOPSY AND WIRE GUIDED BALLOON DILATION OF TERMINAL ILEUM;  Surgeon: Rob Strong MD;  Location: [facility] ENDOSCOPY;  Service: Gastroenterology;  Laterality: N/A;  Colitis, crohns of terminal ileum, right colon ulcers, diverticulosis, hemorroids    ENDOSCOPY N/A 10/12/2024    Procedure: ESOPHAGOGASTRODUODENOSCOPY WITH BIOPSY X 2 AREA;  Surgeon: Rob Strong MD;  Location: [facility] ENDOSCOPY;  Service: Gastroenterology;  Laterality: N/A;  Chronic gastritis, HH    HYSTERECTOMY      LEFT HEART CATH         Family History  Family History   Problem Relation Age of Onset    Heart disease Mother     Dementia Mother     Stroke Mother     Heart disease Father     Hypertension Father        Social History   reports that she has quit smoking. Her smoking use included cigarettes. She has never used smokeless tobacco. She reports that she does not drink alcohol and does not use drugs.    Allergies  Codeine and Penicillin g sodium    Objective     Vital Signs   Vital Signs (last 24 hours)         11/23 0700  11/24 0659 11/24 0700  11/24 1505   Most Recent      Temp (°F) 98 -  98.2    97.8 -  98.1     98 (36.7) 11/24 1158    Heart Rate 61 -  95    62 -  90     63 11/24 1430    Resp   17    18 -  22     22 11/24 1158    BP 87/39 -  137/63    99/50 -  157/72     157/72 11/24 1430    SpO2 (%) 91 -  100    96 -  100     100 11/24 1430    Flow (L/min) (Oxygen Therapy)     1     1 11/24 1050            Physical Exam:  Physical Exam  Vitals and nursing note reviewed.   Constitutional:       General: She is not in acute distress.      Appearance: She is well-developed and normal weight. She is ill-appearing. She is not diaphoretic.   HENT:      Head: Normocephalic and atraumatic.   Eyes:      General: No scleral icterus.     Extraocular Movements: Extraocular movements intact.      Conjunctiva/sclera: Conjunctivae normal.      Pupils: Pupils are equal, round, and reactive to light.   Cardiovascular:      Rate and Rhythm: Normal rate and regular rhythm.      Heart sounds: Normal heart sounds, S1 normal and S2 normal. No murmur heard.  Pulmonary:      Effort: Pulmonary effort is normal. No respiratory distress.      Breath sounds: Normal breath sounds. No stridor. No wheezing or rales.   Chest:      Chest wall: No tenderness.   Abdominal:      General: Bowel sounds are normal. There is no distension.      Palpations: Abdomen is soft. There is no mass.      Tenderness: There is abdominal tenderness. There is no guarding.      Comments: Diffuse tenderness   Musculoskeletal:         General: No swelling, tenderness or deformity.      Cervical back: Neck supple.   Skin:     General: Skin is warm and dry.      Coloration: Skin is not pale.      Findings: No bruising, erythema or rash.   Neurological:      Mental Status: She is alert and oriented to person, place, and time.      Motor: Weakness present.         Microbiology  Microbiology Results (last 10 days)       ** No results found for the last 240 hours. **            Laboratory  Results from last 7 days   Lab Units 11/24/24  0241   WBC 10*3/mm3 14.90*   HEMOGLOBIN g/dL 5.5*   HEMATOCRIT % 17.6*   PLATELETS 10*3/mm3 197     Results from last 7 days   Lab Units 11/24/24  0211   SODIUM mmol/L 139   POTASSIUM mmol/L 4.6   CHLORIDE mmol/L 108*   CO2 mmol/L 18.1*   BUN mg/dL 20   CREATININE mg/dL 0.63   GLUCOSE mg/dL 122*   CALCIUM mg/dL 7.8*     Results from last 7 days   Lab Units 11/24/24  0211   SODIUM mmol/L 139   POTASSIUM mmol/L 4.6   CHLORIDE mmol/L 108*   CO2 mmol/L 18.1*   BUN mg/dL 20    CREATININE mg/dL 0.63   GLUCOSE mg/dL 122*   CALCIUM mg/dL 7.8*                   Radiology  Imaging Results (Last 72 Hours)       ** No results found for the last 72 hours. **            Cardiology      Results Review:  I have reviewed all clinical data, test, lab, and imaging results.       Schedule Meds  acetaminophen, 650 mg, Oral, Once   Or  acetaminophen, 650 mg, Oral, Once   Or  acetaminophen, 650 mg, Rectal, Once  atorvastatin, 40 mg, Oral, Nightly  clopidogrel, 75 mg, Oral, Daily  diphenhydrAMINE, 25 mg, Oral, Once   Or  diphenhydrAMINE, 25 mg, Intravenous, Once  folic acid, 1,000 mcg, Oral, Daily  levothyroxine, 50 mcg, Oral, Daily  mesalamine, 4.8 g, Oral, Daily  methotrexate, 15 mg, Oral, Weekly  methylPREDNISolone sodium succinate, 20 mg, Intravenous, Q8H  metoprolol succinate XL, 25 mg, Oral, Q24H  midodrine, 10 mg, Oral, TID AC  pantoprazole, 40 mg, Oral, Daily  sertraline, 50 mg, Oral, Daily  sodium chloride, 10 mL, Intravenous, Q12H  upadacitinib ER, 45 mg, Oral, Daily        Infusion Meds       PRN Meds    acetaminophen    senna-docusate sodium **AND** polyethylene glycol **AND** bisacodyl **AND** bisacodyl    diphenhydrAMINE    nitroglycerin    ondansetron ODT **OR** ondansetron    [COMPLETED] Insert Peripheral IV **AND** sodium chloride    sodium chloride    sodium chloride      Assessment & Plan       Assessment    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  Patient was treated recently.  Nursing staff reported unusual looking stool.  Stool for ova and parasite collected on November 23 and is pending    Crohn's disease flareup.  Patient  admitted for severe abdominal pain, nausea and melena.  CT of the abdomen pelvis this admission did not show any definitive active colonic inflammation.  GI following    Reactive leukocytosis secondary to above and steroid    Acute anemia-hematology following    Recent diagnosis for strongyloidiasis based on a positive duodenal biopsy on October 12, 2024.   Treated with p.o. ivermectin by GI service.  There was no clinical suspicion for hyperinfection     Crohn's disease and rheumatoid arthritis.  Patient states that she has been on Humira since June but has lost over 40 pounds since that time.  She also states that she is on methotrexate, prednisone and Rinvoq.     COPD-chronically on 2 L of oxygen by nasal cannula.  Currently on room air     Plan     Monitor off antimicrobial therapy  Waiting on ova and parasite screen  If stool is positive for strongyloidiasis then recommend sputum for ova and parasite to rule out disseminated disease and try to avoid long-term steroids since increase the risk of hyperinfection  Continue supportive care  A.m. labs   Case discussed with patient and family member at bedside  Case discussed with ULYSSES Chirinos, DRU  11/24/24  15:05 EST    Note is dictated utilizing voice recognition software/Dragon

## 2024-11-24 NOTE — PROGRESS NOTES
LOS: 5 days   Admitting Physician- Jonathan Mackay,*    Reason For Followup:    Chest pain  S/p coronary artery stenting  Mitral regurgitation  S/p MitraClip  GI bleed    Subjective     Patient doing well.    Objective     Blood pressure is stable    Review of Systems:   Review of Systems   Constitutional: Negative for chills and fever.   HENT:  Negative for ear discharge and nosebleeds.    Eyes:  Negative for discharge and redness.   Cardiovascular:  Negative for chest pain, orthopnea, palpitations, paroxysmal nocturnal dyspnea and syncope.   Respiratory:  Negative for cough, shortness of breath and wheezing.    Endocrine: Negative for heat intolerance.   Skin:  Negative for rash.   Musculoskeletal:  Negative for arthritis and myalgias.   Gastrointestinal:  Negative for abdominal pain, melena, nausea and vomiting.   Genitourinary:  Negative for dysuria and hematuria.   Neurological:  Negative for dizziness, light-headedness, numbness and tremors.   Psychiatric/Behavioral:  Negative for depression. The patient is not nervous/anxious.          Vital Signs  Vitals:    11/24/24 1050 11/24/24 1130 11/24/24 1146 11/24/24 1158   BP: 119/59 110/68 116/63 122/59   BP Location:  Right arm     Patient Position:  Lying     Pulse: 74 80 75 74   Resp: 18 20  22   Temp: 97.8 °F (36.6 °C) 97.9 °F (36.6 °C)  98 °F (36.7 °C)   TempSrc: Oral Oral  Oral   SpO2: 100% 100% 97% 99%   Weight:       Height:         Wt Readings from Last 1 Encounters:   11/22/24 54 kg (119 lb)       Intake/Output Summary (Last 24 hours) at 11/24/2024 1236  Last data filed at 11/24/2024 1050  Gross per 24 hour   Intake 780 ml   Output --   Net 780 ml     Physical Exam:  Constitutional:       Appearance: Well-developed.   Eyes:      General: No scleral icterus.        Right eye: No discharge.   HENT:      Head: Normocephalic and atraumatic.   Neck:      Thyroid: No thyromegaly.      Lymphadenopathy: No cervical adenopathy.   Pulmonary:      Effort:  Pulmonary effort is normal. No respiratory distress.      Breath sounds: Normal breath sounds. No wheezing. No rales.   Cardiovascular:      Normal rate. Regular rhythm.      No gallop.    Edema:     Peripheral edema absent.   Abdominal:      Tenderness: There is no abdominal tenderness.   Skin:     Findings: No erythema or rash.   Neurological:      Mental Status: Alert and oriented to person, place, and time.         Results Review:   Lab Results (last 24 hours)       Procedure Component Value Units Date/Time    Ferritin [[id number]]  (Normal) Collected: 11/23/24 0411    Specimen: Blood from Arm, Right Updated: 11/24/24 1038     Ferritin 13.00 ng/mL     Narrative:      Results may be falsely decreased if patient taking Biotin.      CBC (No Diff) [[id number]]  (Abnormal) Collected: 11/24/24 0241    Specimen: Blood Updated: 11/24/24 0316     WBC 14.90 10*3/mm3      RBC 1.84 10*6/mm3      Hemoglobin 5.5 g/dL      Hematocrit 17.6 %      MCV 95.7 fL      MCH 29.9 pg      MCHC 31.3 g/dL      RDW 18.7 %      RDW-SD 63.2 fl      MPV 9.7 fL      Platelets 197 10*3/mm3     Comprehensive Metabolic Panel [[id number]]  (Abnormal) Collected: 11/24/24 0211    Specimen: Blood Updated: 11/24/24 0251     Glucose 122 mg/dL      BUN 20 mg/dL      Creatinine 0.63 mg/dL      Sodium 139 mmol/L      Potassium 4.6 mmol/L      Chloride 108 mmol/L      CO2 18.1 mmol/L      Calcium 7.8 mg/dL      Total Protein 3.9 g/dL      Albumin 2.5 g/dL      ALT (SGPT) 30 U/L      AST (SGOT) 23 U/L      Alkaline Phosphatase 57 U/L      Total Bilirubin 0.2 mg/dL      Globulin 1.4 gm/dL      A/G Ratio 1.8 g/dL      BUN/Creatinine Ratio 31.7     Anion Gap 12.9 mmol/L      eGFR 93.2 mL/min/1.73     Narrative:      GFR Normal >60  Chronic Kidney Disease <60  Kidney Failure <15    The GFR formula is only valid for adults with stable renal function between ages 18 and 70.    C-reactive Protein [[id number]]  (Normal) Collected: 11/24/24 0211    Specimen: Blood  Updated: 11/24/24 0251     C-Reactive Protein 0.40 mg/dL     Ova & Parasite Examination - Stool, Per Rectum [[id number]] Collected: 11/23/24 1401    Specimen: Stool from Per Rectum Updated: 11/23/24 1411          Imaging Results (Last 72 Hours)       ** No results found for the last 72 hours. **          ECG/EMG Results (most recent)       Procedure Component Value Units Date/Time    ECG 12 Lead Chest Pain [[id number]] Collected: 11/19/24 1213     Updated: 11/19/24 1248     QT Interval 406 ms      QTC Interval 429 ms     Narrative:      HEART RATE=67  bpm  RR Yifjlwhx=234  ms  TN Tsapwukc=244  ms  P Horizontal Axis=28  deg  P Front Axis=87  deg  QRSD Interval=83  ms  QT Qvcpbpyg=580  ms  ZUoR=358  ms  QRS Axis=39  deg  T Wave Axis=40  deg  - ABNORMAL ECG -  Sinus rhythm  Low voltage, precordial leads  Nonspecific  T abnormalities, inferior leads  When compared with ECG of 06-Nov-2024 15:41:09,  Significant repolarization change  Significant axis, voltage or hypertrophy change  Electronically Signed By: Rajiv Dubon ([facility]) 2024-11-19 12:47:59  Date and Time of Study:2024-11-19 12:13:34    Telemetry Scan [[id number]] Resulted: 11/19/24     Updated: 11/20/24 1652    Telemetry Scan [[id number]] Resulted: 11/19/24     Updated: 11/20/24 1656    Telemetry Scan [[id number]] Resulted: 11/19/24     Updated: 11/20/24 1715    Intra-Op Structural Heart GONZÁLEZ (Cardiology Read) [[id number]] Resulted: 11/21/24 1022     Updated: 11/21/24 1022     MV max PG 9.9 mmHg      MV mean PG 5.4 mmHg      MV V2 VTI 45.7 cm     Telemetry Scan [[id number]] Resulted: 11/19/24     Updated: 11/22/24 0016    Telemetry Scan [[id number]] Resulted: 11/19/24     Updated: 11/22/24 0020    Telemetry Scan [[id number]] Resulted: 11/19/24     Updated: 11/22/24 0041    Telemetry Scan [[id number]] Resulted: 11/19/24     Updated: 11/22/24 0247    Telemetry Scan [[id number]] Resulted: 11/19/24     Updated: 11/22/24 0458    Telemetry Scan [[id number]] Resulted: 11/19/24      Updated: 11/22/24 0614    Telemetry Scan [[id number]] Resulted: 11/19/24     Updated: 11/22/24 0656    Adult Transthoracic Echo Limited W/ Cont if Necessary Per Protocol [[id number]] Resulted: 11/22/24 0903     Updated: 11/22/24 0903     MV max PG 9.3 mmHg      MV mean PG 4.6 mmHg      MV V2 VTI 47.1 cm      MV P1/2t 95.7 msec      MVA(P1/2t) 2.30 cm2      MV dec slope 482.2 cm/sec2     Narrative:        Left ventricular ejection fraction appears to be 56 - 60%.    There is a MitraClip mitral valve repair present.    There is trace residual MR.  Mean gradient is 4.6 mmHg      Telemetry Scan [[id number]] Resulted: 11/19/24     Updated: 11/22/24 1317    Telemetry Scan [[id number]] Resulted: 11/19/24     Updated: 11/22/24 1331    ECG 12 Lead Other; post-MitraClip [[id number]] Collected: 11/22/24 0615     Updated: 11/22/24 1340     QT Interval 383 ms      QTC Interval 423 ms     Narrative:      HEART RATE=73  bpm  RR Tuwzcfyv=912  ms  FL Uuzyqcdw=502  ms  P Horizontal Axis=1  deg  P Front Axis=84  deg  QRSD Interval=80  ms  QT Ccvzitvl=636  ms  XVgK=605  ms  QRS Axis=39  deg  T Wave Axis=70  deg  - NORMAL ECG -  Sinus rhythm  When compared with ECG of 19-Nov-2024 12:13:34,  Significant repolarization change  Significant axis, voltage or hypertrophy change  Electronically Signed By: Dmitri Navarro ([facility]) 2024-11-22 13:38:55  Date and Time of Study:2024-11-22 06:15:06    Telemetry Scan [[id number]] Resulted: 11/19/24     Updated: 11/22/24 2041    Telemetry Scan [[id number]] Resulted: 11/19/24     Updated: 11/22/24 2119    Telemetry Scan [[id number]] Resulted: 11/19/24     Updated: 11/23/24 0042    Telemetry Scan [[id number]] Resulted: 11/19/24     Updated: 11/23/24 0746    Telemetry Scan [[id number]] Resulted: 11/19/24     Updated: 11/23/24 0810    Telemetry Scan [[id number]] Resulted: 11/19/24     Updated: 11/23/24 1319    Telemetry Scan [[id number]] Resulted: 11/19/24     Updated: 11/23/24 1558    Telemetry Scan [[id number]] Resulted:  11/19/24     Updated: 11/23/24 1606    Telemetry Scan [[id number]] Resulted: 11/19/24     Updated: 11/23/24 1805    Telemetry Scan [[id number]] Resulted: 11/19/24     Updated: 11/23/24 1816    Telemetry Scan [[id number]] Resulted: 11/19/24     Updated: 11/23/24 1821    Telemetry Scan [[id number]] Resulted: 11/19/24     Updated: 11/23/24 2151    Telemetry Scan [[id number]] Resulted: 11/19/24     Updated: 11/24/24 1208          CBC    Results from last 7 days   Lab Units 11/24/24  0241 11/23/24  0411 11/22/24  2323 11/22/24  1758 11/22/24  0439 11/21/24  0129 11/20/24  0246 11/19/24  1933 11/19/24  1246   WBC 10*3/mm3 14.90* 12.82*  --   --  14.13* 13.55* 12.18*  --  16.93*   HEMOGLOBIN g/dL 5.5* 8.7* 8.6* 8.9* 7.4* 9.0* 9.1*   < > 5.7*   PLATELETS 10*3/mm3 197 284  --   --  310 368 369  --  435    < > = values in this interval not displayed.     BMP   Results from last 7 days   Lab Units 11/24/24  0211 11/23/24  0411 11/22/24  0439 11/21/24  0129 11/20/24  0246 11/19/24  1246   SODIUM mmol/L 139 141 139 136 137 138   POTASSIUM mmol/L 4.6 3.9 3.9 4.1 3.8 4.3   CHLORIDE mmol/L 108* 108* 107 103 103 106   CO2 mmol/L 18.1* 22.3 25.0 23.7 26.3 22.2   BUN mg/dL 20 19 15 16 19 28*   CREATININE mg/dL 0.63 0.55* 0.49* 0.73 0.59 0.74   GLUCOSE mg/dL 122* 128* 116* 132* 64* 77   MAGNESIUM mg/dL  --   --  1.8 2.0 2.0  --      CMP   Results from last 7 days   Lab Units 11/24/24  0211 11/23/24  0411 11/22/24  0439 11/21/24  0129 11/20/24  0246 11/19/24  1246   SODIUM mmol/L 139 141 139 136 137 138   POTASSIUM mmol/L 4.6 3.9 3.9 4.1 3.8 4.3   CHLORIDE mmol/L 108* 108* 107 103 103 106   CO2 mmol/L 18.1* 22.3 25.0 23.7 26.3 22.2   BUN mg/dL 20 19 15 16 19 28*   CREATININE mg/dL 0.63 0.55* 0.49* 0.73 0.59 0.74   GLUCOSE mg/dL 122* 128* 116* 132* 64* 77   ALBUMIN g/dL 2.5* 2.8*  --   --   --  2.9*   BILIRUBIN mg/dL 0.2 0.2  --   --   --  0.3   ALK PHOS U/L 57 76  --   --   --  64   AST (SGOT) U/L 23 18  --   --   --  19   ALT (SGPT) U/L 30  25  --   --   --  35*   LIPASE U/L  --   --   --   --   --  19     Cardiac Studies:  Echo- Results for orders placed during the hospital encounter of 11/19/24    Adult Transthoracic Echo Limited W/ Cont if Necessary Per Protocol    Interpretation Summary    Left ventricular ejection fraction appears to be 56 - 60%.    There is a MitraClip mitral valve repair present.    There is trace residual MR.  Mean gradient is 4.6 mmHg    Stress Myoview-  Cath-      Medication Review:   Scheduled Meds:acetaminophen, 650 mg, Oral, Once   Or  acetaminophen, 650 mg, Oral, Once   Or  acetaminophen, 650 mg, Rectal, Once  atorvastatin, 40 mg, Oral, Nightly  clopidogrel, 75 mg, Oral, Daily  diphenhydrAMINE, 25 mg, Oral, Once   Or  diphenhydrAMINE, 25 mg, Intravenous, Once  ferric gluconate, 250 mg, Intravenous, Once  folic acid, 1,000 mcg, Oral, Daily  levothyroxine, 50 mcg, Oral, Daily  mesalamine, 4.8 g, Oral, Daily  methotrexate, 15 mg, Oral, Weekly  methylPREDNISolone sodium succinate, 20 mg, Intravenous, Q8H  metoprolol succinate XL, 25 mg, Oral, Q24H  midodrine, 10 mg, Oral, TID AC  pantoprazole, 40 mg, Oral, Daily  sertraline, 50 mg, Oral, Daily  sodium chloride, 10 mL, Intravenous, Q12H  upadacitinib ER, 45 mg, Oral, Daily      Continuous Infusions:   PRN Meds:.  acetaminophen    senna-docusate sodium **AND** polyethylene glycol **AND** bisacodyl **AND** bisacodyl    diphenhydrAMINE    nitroglycerin    ondansetron ODT **OR** ondansetron    [COMPLETED] Insert Peripheral IV **AND** sodium chloride    sodium chloride    sodium chloride      Assessment & Plan     Acute lower GI bleeding    CAD, multiple vessel    Acute heart failure with preserved ejection fraction (HFpEF)    Severe mitral regurgitation    Dyslipidemia    Crohn's disease    Hypothyroidism (acquired)    Anxiety associated with depression    COPD (chronic obstructive pulmonary disease)    Rheumatoid arthritis    Moderate protein-calorie malnutrition    Primary  hypertension    S/P mitral valve clip implantation    MDM:    1.  Severe mitral regurgitation status post MitraClip:    Patient is doing well.  Patient denies any chest pain or shortness of breath.  No leg edema noted    2.  CAD/status post coronary artery stenting    Continue Plavix    3.  Hypertension:    Blood pressure is controlled continue Lopressor    4.  Hypotension:    Patient is on midodrine.  Blood pressure is much better.    5.  GI bleeding/anemia:    Patient is getting blood transfusion today.    Marciano Gurrola MD  11/24/24  12:36 EST

## 2024-11-24 NOTE — CONSULTS
Hematology/Oncology Inpatient Consultation    Patient name: Maryann Gaitan  : 1950  MRN: [id number]  Referring Provider: DRU Escalante  Reason for Consultation: Blood loss anemia and need for antiplatelet therapy, need for outpatient transfusion options    Chief complaint: Abdominal pain, rectal bleeding, shortness of breath    History of present illness:    Maryann Gaitan is a 74 y.o. female who presented to [facility] on 2024 with complaints of abdominal pain, rectal bleeding, shortness of breath.  Past medical history of CAD, COPD, hypertension, hypothyroidism, Crohn's disease.  Was recently admitted to the hospital on 2024 for mild rectal bleeding and was found to have an acute Crohn's exacerbation.  She was discharged on prolonged steroid taper however she states that she still has some abdominal pain and has not felt well since discharge.  She did have a drop in her hemoglobin prior to discharge during her previous hospital stay but her hemoglobin was 8.4 g/dL on the day of discharge.  Over the last few days she has continued to have worsening lower abdominal pain with increased chest pain and shortness of breath and much more rectal bleeding than prior.  Of note she did have PCI with stent placement in 2024 and was placed on dual antiplatelet therapy.  This admission she underwent a transcatheter [id number] repair of her mitral valve with MitraClip placed on 2024.  The patient has continued to have gastrointestinal bleeding throughout the admission.    24  Hematology/Oncology was consulted for anemia in the setting of GI bleeding in a patient requiring antiplatelet therapy.    10/11/24  Hematology/Oncology was consulted for anemia and leukopenia  On consult, the patient reports fatigue, chronic abdominal pain/cramping and non-bloody diarrhea but denies unintentional weight loss, fevers, chills, drenching night sweats, lymphadenopathy, BRBPR,  melena, bruising. She denies having any IV iron or blood transfusions.  Per RN pt has not received budesonide yet.    Anemia workup from last admission:  -10/11/2022 reticulocyte numbers 0.72 (normal) however absolute reticulocyte decreased at 0.0182 suggesting either not enough building blocks to make blood or bone marrow dysfunction  -B12 312 (normal), folate 18.3 (normal), iron studies (iron 25 low, iron saturation 13% low) and ferritin (427 high, but is an acute phase reactant) -appears there could still be some component of iron deficiency involved which is not surprising as she has a disease that could cause on again off again bleeding; will likely have to deal with IV iron as what was seen on EGD means she will be unlikely to tolerate oral iron or be able to absorb it as she will likely be on chronic PPIs  -Flow cytometry WNL     10/17/2024: Ferrlecit 250 mg IV x 1 dose    He/She  has a past medical history of Abnormal weight loss (11/21/2024), Acute kidney injury (11/06/2024), Acute UTI (urinary tract infection) (11/06/2024), Anxiety associated with depression (11/06/2024), Benign neoplasm of cecum (07/05/2016), CAD, multiple vessel (10/08/2024), Cavitary lesion of lung (10/08/2024), COPD (chronic obstructive pulmonary disease), Crohn's disease (11/06/2024), 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 (07/05/2016), Dyslipidemia (10/30/2024), Fecal urgency (11/21/2024), First degree hemorrhoids (07/09/2021), GERD (gastroesophageal reflux disease) (11/21/2024), Hashimoto's thyroiditis (11/21/2024), History of colonic polyps (07/09/2021), Hypertension, Hypothyroidism (acquired) (11/06/2024), Mitral regurgitation (10/08/2024), Moderate protein-calorie malnutrition (11/09/2024), Multiple tracheobronchial mucus plugs (10/08/2024), Nicotine dependence (11/21/2024), Rheumatoid arthritis (11/06/2024), S/P mitral valve clip implantation (11/21/2024), Second degree hemorrhoids (07/05/2016), Stress  incontinence, female (11/21/2024), and Vitamin D deficiency, unspecified (11/21/2024).    PCP: Eduard Little MD    INTERVAL HISTORY:  11/24/24: pt seen today for initial evaluation. She has ongoing GI bleeding and is receiving PRBC transfusions. She reported having fatigue. Also complained of having some 'worms In stool' and is being worked up for Stool parasites. Had strongyloides infestation noted on EGD/Colonoscopy 1 month ago and was reportedly treated with ivermectin. She is being followed by GI and Cardiology.    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 07/05/2016    Dyslipidemia 10/30/2024    Fecal urgency 11/21/2024    First degree hemorrhoids 07/09/2021    GERD (gastroesophageal reflux disease) 11/21/2024    Hashimoto's thyroiditis 11/21/2024    History of colonic polyps 07/09/2021    Hypertension     Hypothyroidism (acquired) 11/06/2024    Mitral regurgitation 10/08/2024    Moderate protein-calorie malnutrition 11/09/2024    Multiple tracheobronchial mucus plugs 10/08/2024    Nicotine dependence 11/21/2024    Rheumatoid arthritis 11/06/2024    S/P mitral valve clip implantation 11/21/2024    Second degree hemorrhoids 07/05/2016    Stress incontinence, female 11/21/2024    Vitamin D deficiency, unspecified 11/21/2024   ,   Past Surgical History:   Procedure Laterality Date    BRONCHOSCOPY N/A 10/16/2024    Procedure: BRONCHOSCOPY;  Surgeon: Gallo Pope MD;  Location: [facility] ENDOSCOPY;  Service: Pulmonary;  Laterality: N/A;    CARDIAC CATHETERIZATION N/A 10/15/2024     Procedure: Left Heart Cath, possible pci;  Surgeon: Travis Connor MD;  Location: [facility] CATH INVASIVE LOCATION;  Service: Cardiovascular;  Laterality: N/A;    CARDIAC CATHETERIZATION N/A 10/22/2024    Procedure: Laser Coronary Atherectomy;  Surgeon: Travis Connor MD;  Location: [facility] CATH INVASIVE LOCATION;  Service: Cardiovascular;  Laterality: N/A;    COLONOSCOPY N/A 10/12/2024    Procedure: COLONOSCOPY WITH BIOPSY AND WIRE GUIDED BALLOON DILATION OF TERMINAL ILEUM;  Surgeon: Rob Strong MD;  Location: [facility] ENDOSCOPY;  Service: Gastroenterology;  Laterality: N/A;  Colitis, crohns of terminal ileum, right colon ulcers, diverticulosis, hemorroids    ENDOSCOPY N/A 10/12/2024    Procedure: ESOPHAGOGASTRODUODENOSCOPY WITH BIOPSY X 2 AREA;  Surgeon: Rob Strong MD;  Location: [facility] ENDOSCOPY;  Service: Gastroenterology;  Laterality: N/A;  Chronic gastritis, HH    HYSTERECTOMY      LEFT HEART CATH     ,   Family History   Problem Relation Age of Onset    Heart disease Mother     Dementia Mother     Stroke Mother     Heart disease Father     Hypertension Father    ,   Social History     Tobacco Use    Smoking status: Former     Types: Cigarettes    Smokeless tobacco: Never   Vaping Use    Vaping status: Never Used   Substance Use Topics    Alcohol use: Never    Drug use: Never   ,   Medications Prior to Admission   Medication Sig Dispense Refill Last Dose/Taking    Adalimumab (Humira, 2 Pen,) 40 MG/0.4ML Pen-injector Kit Inject 40 mg under the skin into the appropriate area as directed 1 (One) Time Per Week. Saturdays   Indications: Rheumatoid Arthritis   Past Week    albuterol (PROVENTIL) (2.5 MG/3ML) 0.083% nebulizer solution Take 2.5 mg by nebulization Every 6 (Six) Hours As Needed for Wheezing. Indications: Spasm of Lung Air Passages   Taking As Needed    amLODIPine (NORVASC) 10 MG tablet Take 1 tablet by mouth Daily.   Taking    aspirin 81 MG EC tablet Take 1  tablet by mouth Daily for 30 days. Indications: Disease involving Lipid Deposits in the Arteries 30 tablet 0 Taking    [] atorvastatin (LIPITOR) 40 MG tablet Take 1 tablet by mouth Every Night for 30 days. 30 tablet 0 Taking    cholecalciferol (VITAMIN D3) 1.25 MG (10693 UT) capsule Take 1 capsule by mouth 2 (Two) Times a Week. Wed, Sat  Indications: Vitamin D Deficiency   Past Week    [] clopidogrel (PLAVIX) 75 MG tablet Take 1 tablet by mouth Daily for 30 days. 30 tablet 0 Taking    diclofenac (VOLTAREN) 50 MG EC tablet Take 1 tablet by mouth 2 (Two) Times a Day.   Taking    folic acid (FOLVITE) 1 MG tablet Take 1 tablet by mouth Daily. Indications: Anemia From Inadequate Folic Acid   Taking    levothyroxine (SYNTHROID, LEVOTHROID) 50 MCG tablet Take 1 tablet by mouth Daily. Indications: Underactive Thyroid   Taking    Melatonin (Melatonin Extra Strength) 10 MG tablet Take 1 tablet by mouth As Needed. Indications: Trouble Sleeping   Taking As Needed    methotrexate 2.5 MG tablet Take 6 tablets by mouth 1 (One) Time Per Week.    Indications: Non-oncologic   Past Week    metoprolol tartrate (LOPRESSOR) 50 MG tablet Take 1 tablet by mouth 2 (Two) Times a Day.   Taking    midodrine (PROAMATINE) 10 MG tablet Take 1 tablet by mouth 3 (Three) Times a Day Before Meals for 30 days. 90 tablet 0 Taking    pantoprazole (PROTONIX) 20 MG EC tablet Take 1 tablet by mouth Daily. Indications: Heartburn   Taking    predniSONE (DELTASONE) 10 MG tablet Take 4 tablets by mouth Daily for 7 days, THEN 3.5 tablets Daily for 7 days, THEN 3 tablets Daily for 7 days, THEN 2.5 tablets Daily for 7 days, THEN 2 tablets Daily for 7 days, THEN 1.5 tablets Daily for 7 days, THEN 1 tablet Daily for 7 days, THEN 0.5 tablets Daily for 7 days. Indications: Crohn's Disease 126 tablet 0 Taking    sertraline (ZOLOFT) 50 MG tablet Take 1 tablet by mouth Daily. Indications: Major Depressive Disorder   Taking    spironolactone  "(ALDACTONE) 25 MG tablet Take 1 tablet by mouth Daily. Indications: Edema   Taking    upadacitinib ER (Rinvoq) 45 MG tablet sustained-release 24 hour extended release tablet Take 1 tablet by mouth Daily. Indications: Rheumatoid Arthritis   Taking   , Scheduled Meds:  acetaminophen, 650 mg, Oral, Once   Or  acetaminophen, 650 mg, Oral, Once   Or  acetaminophen, 650 mg, Rectal, Once  atorvastatin, 40 mg, Oral, Nightly  clopidogrel, 75 mg, Oral, Daily  diphenhydrAMINE, 25 mg, Oral, Once   Or  diphenhydrAMINE, 25 mg, Intravenous, Once  ferric gluconate, 250 mg, Intravenous, Once  folic acid, 1,000 mcg, Oral, Daily  levothyroxine, 50 mcg, Oral, Daily  mesalamine, 4.8 g, Oral, Daily  methotrexate, 15 mg, Oral, Weekly  methylPREDNISolone sodium succinate, 20 mg, Intravenous, Q8H  metoprolol succinate XL, 25 mg, Oral, Q24H  midodrine, 10 mg, Oral, TID AC  pantoprazole, 40 mg, Oral, Daily  sertraline, 50 mg, Oral, Daily  sodium chloride, 10 mL, Intravenous, Q12H  upadacitinib ER, 45 mg, Oral, Daily    , Continuous Infusions:   , PRN Meds:    acetaminophen    senna-docusate sodium **AND** polyethylene glycol **AND** bisacodyl **AND** bisacodyl    diphenhydrAMINE    nitroglycerin    ondansetron ODT **OR** ondansetron    [COMPLETED] Insert Peripheral IV **AND** sodium chloride    sodium chloride    sodium chloride   Allergies:  Codeine and Penicillin g sodium    Subjective     ROS:  Review of Systems     Objective   Vital Signs:   /49   Pulse 78   Temp 98 °F (36.7 °C) (Oral)   Resp 18   Ht 162.6 cm (64\")   Wt 54 kg (119 lb)   SpO2 100%   BMI 20.43 kg/m²     Physical Exam: (performed by MD)  Physical Exam    Results Review:  Lab Results (last 48 hours)       Procedure Component Value Units Date/Time    CBC (No Diff) [[id number]]  (Abnormal) Collected: 11/24/24 0241    Specimen: Blood Updated: 11/24/24 0316     WBC 14.90 10*3/mm3      RBC 1.84 10*6/mm3      Hemoglobin 5.5 g/dL      Hematocrit 17.6 %      MCV 95.7 fL  "     MCH 29.9 pg      MCHC 31.3 g/dL      RDW 18.7 %      RDW-SD 63.2 fl      MPV 9.7 fL      Platelets 197 10*3/mm3     Comprehensive Metabolic Panel [[id number]]  (Abnormal) Collected: 11/24/24 0211    Specimen: Blood Updated: 11/24/24 0251     Glucose 122 mg/dL      BUN 20 mg/dL      Creatinine 0.63 mg/dL      Sodium 139 mmol/L      Potassium 4.6 mmol/L      Chloride 108 mmol/L      CO2 18.1 mmol/L      Calcium 7.8 mg/dL      Total Protein 3.9 g/dL      Albumin 2.5 g/dL      ALT (SGPT) 30 U/L      AST (SGOT) 23 U/L      Alkaline Phosphatase 57 U/L      Total Bilirubin 0.2 mg/dL      Globulin 1.4 gm/dL      A/G Ratio 1.8 g/dL      BUN/Creatinine Ratio 31.7     Anion Gap 12.9 mmol/L      eGFR 93.2 mL/min/1.73     Narrative:      GFR Normal >60  Chronic Kidney Disease <60  Kidney Failure <15    The GFR formula is only valid for adults with stable renal function between ages 18 and 70.    C-reactive Protein [[id number]]  (Normal) Collected: 11/24/24 0211    Specimen: Blood Updated: 11/24/24 0251     C-Reactive Protein 0.40 mg/dL     Ova & Parasite Examination - Stool, Per Rectum [[id number]] Collected: 11/23/24 1401    Specimen: Stool from Per Rectum Updated: 11/23/24 1411    Comprehensive Metabolic Panel [[id number]]  (Abnormal) Collected: 11/23/24 0411    Specimen: Blood from Arm, Right Updated: 11/23/24 0447     Glucose 128 mg/dL      BUN 19 mg/dL      Creatinine 0.55 mg/dL      Sodium 141 mmol/L      Potassium 3.9 mmol/L      Chloride 108 mmol/L      CO2 22.3 mmol/L      Calcium 8.3 mg/dL      Total Protein 4.5 g/dL      Albumin 2.8 g/dL      ALT (SGPT) 25 U/L      AST (SGOT) 18 U/L      Alkaline Phosphatase 76 U/L      Total Bilirubin 0.2 mg/dL      Globulin 1.7 gm/dL      A/G Ratio 1.6 g/dL      BUN/Creatinine Ratio 34.5     Anion Gap 10.7 mmol/L      eGFR 96.3 mL/min/1.73     Narrative:      GFR Normal >60  Chronic Kidney Disease <60  Kidney Failure <15    The GFR formula is only valid for adults with stable renal  function between ages 18 and 70.    C-reactive Protein [[id number]]  (Abnormal) Collected: 11/23/24 0411    Specimen: Blood from Arm, Right Updated: 11/23/24 0447     C-Reactive Protein 1.25 mg/dL     CBC (No Diff) [[id number]]  (Abnormal) Collected: 11/23/24 0411    Specimen: Blood from Arm, Right Updated: 11/23/24 0418     WBC 12.82 10*3/mm3      RBC 2.92 10*6/mm3      Hemoglobin 8.7 g/dL      Hematocrit 27.6 %      MCV 94.5 fL      MCH 29.8 pg      MCHC 31.5 g/dL      RDW 19.0 %      RDW-SD 64.4 fl      MPV 9.5 fL      Platelets 284 10*3/mm3     Hemoglobin & Hematocrit, Blood [[id number]]  (Abnormal) Collected: 11/22/24 2323    Specimen: Blood Updated: 11/22/24 2328     Hemoglobin 8.6 g/dL      Hematocrit 27.3 %     Hemoglobin & Hematocrit, Blood [[id number]]  (Abnormal) Collected: 11/22/24 1758    Specimen: Blood Updated: 11/22/24 1810     Hemoglobin 8.9 g/dL      Hematocrit 27.3 %              Pending Results:     Imaging Reviewed:   CT Abdomen Pelvis With Contrast    Result Date: 11/20/2024  Impression: 1.Distal/terminal ileitis in keeping with patient's history of Crohn's disease. No definitive active colonic inflammation identified on CT imaging. Trace free fluid in the pelvis is likely related. 2.Diffuse colonic fatty mural infiltration which can be seen in the setting of chronic inflammatory bowel disease. 3.Other incidental nonemergent findings detailed above. Electronically Signed: Junior Khan MD  11/20/2024 8:19 AM EST  Workstation ID: [id number]    XR Chest 1 View    Result Date: 11/19/2024  Impression: No acute chest finding. Electronically Signed: Ute Snider MD  11/19/2024 1:34 PM EST  Workstation ID: [id number]          Assessment & Plan     Normocytic anemia  -Hemoglobin 5.5 g/dL, MCV 95.7  -Patient with an acute drop in her hemoglobin overnight from 8.7 g/dL.  Reported experiencing significant gastrointestinal bleeding  -Has received 3 units of PRBC this admission and 2 units ordered today due to  hemoglobin of 5.5 g/dL  -Recent PCI with stent in October 2024.  Her aspirin has been held but she has been continued on Plavix  -Received Ferrlecit 250 mg IV x 1 dose during her prior admission, continue IV iron replacement  -Continue IV iron replacement with Ferrlecit 250 mg IV  -On daily folic acid due to methotrexate use  -Check vitamin B12 level  -Monitor CBC and recommend transfuse for hemoglobin less than 7.0 g/dL  Defer to Cardiology regarding antiplatelet therapy management.    Acute GI bleed  -In the setting of Crohn's disease, possible exacerbation  -Gastroenterology following and patient being treated with IV steroids, Rinvoq  -Recent strongyloides of the stomach and duodenum treated with ivermectin.  Now with reported worms visualized in stools and stool O&P pending  -Recommend infectious disease consult     Electronically signed by DRU Jimenez, 11/24/24, 8:37 AM EST.    I have reviewed and confirmed the accuracy of the patient's history: Chief complaint, HPI, ROS, Subjective, and Past Family Social History as entered by the APRN. Pertinent changes have been made to these sections to reflect my personal evaluation and exam findings.     Carlos Nur MD 11/24/24         Thank you for this consult. We will be happy to follow along with you.

## 2024-11-24 NOTE — PROGRESS NOTES
LOS: 5 days   Patient Care Team:  Eduard Little MD as PCP - General (Family Medicine)  Niya Mendoza APRN as Nurse Practitioner (Cardiology)      Subjective   None    Interval History:    LABS: Sodium 139, potassium 4.6, creatinine 0.63.  LFTs remain normal.  CRP 0.4.  WBCs 5.9, hemoglobin dropped to 5.9 overnight from 8.7, platelets 197.  Patient has had no bowel movement since 7 AM had 4-5 bloody stools last night with stringy spaghetti like mucus in her stool.  O&P was sent.  Ate about 25% of her meal today.      ROS:   No chest pain, shortness of breath, or cough.        Medication Review:     Current Facility-Administered Medications:     acetaminophen (TYLENOL) tablet 650 mg, 650 mg, Oral, Once **OR** acetaminophen (TYLENOL) 160 MG/5ML oral solution 650 mg, 650 mg, Oral, Once **OR** acetaminophen (TYLENOL) suppository 650 mg, 650 mg, Rectal, Once, Niya Mendoza APRN    acetaminophen (TYLENOL) tablet 650 mg, 650 mg, Oral, Q4H PRN, Dmitri Navarro MD, 650 mg at 11/22/24 0833    atorvastatin (LIPITOR) tablet 40 mg, 40 mg, Oral, Nightly, Dmitri Navarro MD, 40 mg at 11/23/24 2058    sennosides-docusate (PERICOLACE) 8.6-50 MG per tablet 2 tablet, 2 tablet, Oral, BID PRN **AND** polyethylene glycol (MIRALAX) packet 17 g, 17 g, Oral, Daily PRN **AND** bisacodyl (DULCOLAX) EC tablet 5 mg, 5 mg, Oral, Daily PRN **AND** bisacodyl (DULCOLAX) suppository 10 mg, 10 mg, Rectal, Daily PRN, Dmitri Navarro MD    clopidogrel (PLAVIX) tablet 75 mg, 75 mg, Oral, Daily, Dmitri Navarro MD, 75 mg at 11/24/24 0825    diphenhydrAMINE (BENADRYL) capsule 25 mg, 25 mg, Oral, Q6H PRN, Dmitri Navarro MD, 25 mg at 11/22/24 0941    diphenhydrAMINE (BENADRYL) capsule 25 mg, 25 mg, Oral, Once **OR** diphenhydrAMINE (BENADRYL) injection 25 mg, 25 mg, Intravenous, Once, Niya Mendoza APRN    ferric gluconate (FERRLECIT) 250 MG in sodium chloride 0.9% 250 mL IVPB, 250 mg, Intravenous, Once, Conchita Gutierrez APRN    folic acid  (FOLVITE) tablet 1,000 mcg, 1,000 mcg, Oral, Daily, Dmitri Navarro MD, 1,000 mcg at 11/24/24 0825    levothyroxine (SYNTHROID, LEVOTHROID) tablet 50 mcg, 50 mcg, Oral, Daily, Dmitri Navarro MD, 50 mcg at 11/24/24 0826    mesalamine (LIALDA) EC tablet 4.8 g, 4.8 g, Oral, Daily, Mikayla Kc APRN, 4.8 g at 11/24/24 0826    methotrexate tablet 15 mg, 15 mg, Oral, Weekly, Mikayla Kc APRN, 15 mg at 11/23/24 2058    methylPREDNISolone sodium succinate (SOLU-Medrol) injection 20 mg, 20 mg, Intravenous, Q8H, Dmitri Navarro MD, 20 mg at 11/24/24 0856    metoprolol succinate XL (TOPROL-XL) 24 hr tablet 25 mg, 25 mg, Oral, Q24H, Corina Murcia APRN, 25 mg at 11/24/24 0826    midodrine (PROAMATINE) tablet 10 mg, 10 mg, Oral, TID AC, Dmitri Navarro MD, 10 mg at 11/24/24 0825    nitroglycerin (NITROSTAT) SL tablet 0.4 mg, 0.4 mg, Sublingual, Q5 Min PRN, Dmitri Navarro MD    ondansetron ODT (ZOFRAN-ODT) disintegrating tablet 4 mg, 4 mg, Oral, Q6H PRN **OR** ondansetron (ZOFRAN) injection 4 mg, 4 mg, Intravenous, Q6H PRN, Dmitri Navarro MD    pantoprazole (PROTONIX) EC tablet 40 mg, 40 mg, Oral, Daily, Dmitri Navarro MD, 40 mg at 11/24/24 0825    sertraline (ZOLOFT) tablet 50 mg, 50 mg, Oral, Daily, Dmitri Navarro MD, 50 mg at 11/24/24 0826    [COMPLETED] Insert Peripheral IV, , , Once **AND** sodium chloride 0.9 % flush 10 mL, 10 mL, Intravenous, PRN, Dmitri Navarro MD    sodium chloride 0.9 % flush 10 mL, 10 mL, Intravenous, Q12H, Dmitri Navarro MD, 10 mL at 11/24/24 0827    sodium chloride 0.9 % flush 10 mL, 10 mL, Intravenous, PRN, Dmitri Navarro MD    sodium chloride 0.9 % infusion 40 mL, 40 mL, Intravenous, PRN, Dmitri Navarro MD    upadacitinib ER (RINVOQ) extended release tablet 45 mg, 45 mg, Oral, Daily, Mikayla Kc APRN, 45 mg at 11/24/24 0826      Objective resting in the hospital bed.  NAD.  No family present.  Room 3113.  Nursing staff at bedside infusing blood.    Vital Signs  Temp:  [98 °F (36.7 °C)-98.2 °F (36.8  °C)] 98 °F (36.7 °C)  Heart Rate:  [61-95] 78  Resp:  [17-18] 18  BP: ()/(33-66) 102/49  Physical Exam:  General Appearance:    Awake and alert, in no acute distress.  Pale.  Chronically ill-appearing.   Head:    Normocephalic, without obvious abnormality   Eyes:          Conjunctivae normal, anicteric sclera   Ears:    Hearing intact   Lungs:      respirations regular, even and unlabored        Abdomen:     soft, non-tender, no rebound or guarding, non-distended, no hepatosplenomegaly    Rectal:     Deferred   Extremities:   No edema, no cyanosis, no redness        Results Review:    Lab Results (last 24 hours)       Procedure Component Value Units Date/Time    CBC (No Diff) [[id number]]  (Abnormal) Collected: 11/24/24 0241    Specimen: Blood Updated: 11/24/24 0316     WBC 14.90 10*3/mm3      RBC 1.84 10*6/mm3      Hemoglobin 5.5 g/dL      Hematocrit 17.6 %      MCV 95.7 fL      MCH 29.9 pg      MCHC 31.3 g/dL      RDW 18.7 %      RDW-SD 63.2 fl      MPV 9.7 fL      Platelets 197 10*3/mm3     Comprehensive Metabolic Panel [[id number]]  (Abnormal) Collected: 11/24/24 0211    Specimen: Blood Updated: 11/24/24 0251     Glucose 122 mg/dL      BUN 20 mg/dL      Creatinine 0.63 mg/dL      Sodium 139 mmol/L      Potassium 4.6 mmol/L      Chloride 108 mmol/L      CO2 18.1 mmol/L      Calcium 7.8 mg/dL      Total Protein 3.9 g/dL      Albumin 2.5 g/dL      ALT (SGPT) 30 U/L      AST (SGOT) 23 U/L      Alkaline Phosphatase 57 U/L      Total Bilirubin 0.2 mg/dL      Globulin 1.4 gm/dL      A/G Ratio 1.8 g/dL      BUN/Creatinine Ratio 31.7     Anion Gap 12.9 mmol/L      eGFR 93.2 mL/min/1.73     Narrative:      GFR Normal >60  Chronic Kidney Disease <60  Kidney Failure <15    The GFR formula is only valid for adults with stable renal function between ages 18 and 70.    C-reactive Protein [[id number]]  (Normal) Collected: 11/24/24 0211    Specimen: Blood Updated: 11/24/24 0251     C-Reactive Protein 0.40 mg/dL     Ova &  Parasite Examination - Stool, Per Rectum [[id number]] Collected: 11/23/24 1401    Specimen: Stool from Per Rectum Updated: 11/23/24 1411            Imaging Results (Last 24 Hours)       ** No results found for the last 24 hours. **              ASSESSMENT:  -Hematochezia  -Diarrhea   -Normocytic anemia with drop in hemoglobin  -Leukocytosis  -Abnormal CT showing distal/terminal ileitis and diffuse colonic fatty infiltration  -Recent Strongyloides infection in 10/2024 s/p treatment with ivermectin  -Small bowel and colonic Crohn's disease - on Rinvoq (recently restarted)  -Chest pain  -Recent fall  -Hypertension  -COPD  -CAD s/p stent placement on Plavix  -RA - on Humira and methotrexate  -History of hysterectomy  -History of cholecystectomy  -Severe mitral regurgitation status post mitral clipping 11/21/2024    PLAN:   74-year-old woman with a Crohn's disease and rectal bleeding.  Recently transitioned to Rinvoq. Already on Humira & methotrexate for RA. She underwent mitral valve clipping 11/21/24.      Continue IV Solu-Medrol. Will plan prednisone taper at discharge of 40 mg daily for 1 week followed by 30 mg daily for 1 week followed by 20 mg daily for 1 week followed by 10 mg daily for 1 week.  Patient had not been taking her Rinvoq since admission.  Sania her caretakerstates /family did bring in Rinvoq yesterday and she restarted that last night.  Methotrexate has been reordered.  Patient has not had her Humira for 3 to 4 weeks states she has had difficulty getting it from the pharmacy.    Mesalamine was started yesterday.  Continues to pass bloody stools with spaghetti like matter.  Pending O&P.  Hemoglobin down to 5.5.  She has 2 units of packed red blood cells ordered.  Unfortunately blood thinner needs to be continued due to recent stent placement.  Hopefully she will start to improve with restarting Rinvoq, and mesalamine.  If she does not improve then suggest referral to Dr Acevedo for possible total  colectomy.       Mikayla Kc, APRN  11/24/24  10:15 EST

## 2024-11-25 ENCOUNTER — INPATIENT HOSPITAL (AMBULATORY)
Age: 74
End: 2024-11-25
Payer: MEDICARE

## 2024-11-25 ENCOUNTER — INPATIENT HOSPITAL (AMBULATORY)
Dept: URBAN - METROPOLITAN AREA HOSPITAL 84 | Facility: HOSPITAL | Age: 74
End: 2024-11-25
Payer: MEDICARE

## 2024-11-25 ENCOUNTER — APPOINTMENT (OUTPATIENT)
Dept: GENERAL RADIOLOGY | Facility: HOSPITAL | Age: 74
DRG: 329 | End: 2024-11-25
Payer: MEDICARE

## 2024-11-25 DIAGNOSIS — Z90.49 ACQUIRED ABSENCE OF OTHER SPECIFIED PARTS OF DIGESTIVE TRACT: ICD-10-CM

## 2024-11-25 DIAGNOSIS — K50.80 CROHN'S DISEASE OF BOTH SMALL AND LARGE INTESTINE WITHOUT CO: ICD-10-CM

## 2024-11-25 DIAGNOSIS — D72.829 ELEVATED WHITE BLOOD CELL COUNT, UNSPECIFIED: ICD-10-CM

## 2024-11-25 DIAGNOSIS — D64.9 ANEMIA, UNSPECIFIED: ICD-10-CM

## 2024-11-25 DIAGNOSIS — K92.1 MELENA: ICD-10-CM

## 2024-11-25 DIAGNOSIS — R93.3 ABNORMAL FINDINGS ON DIAGNOSTIC IMAGING OF OTHER PARTS OF DI: ICD-10-CM

## 2024-11-25 DIAGNOSIS — R19.7 DIARRHEA, UNSPECIFIED: ICD-10-CM

## 2024-11-25 LAB
ALBUMIN SERPL-MCNC: 2.4 G/DL (ref 3.5–5.2)
ALBUMIN/GLOB SERPL: 2.7 G/DL
ALP SERPL-CCNC: 43 U/L (ref 39–117)
ALT SERPL W P-5'-P-CCNC: 24 U/L (ref 1–33)
ANION GAP SERPL CALCULATED.3IONS-SCNC: 12.2 MMOL/L (ref 5–15)
AST SERPL-CCNC: 18 U/L (ref 1–32)
BH BB BLOOD EXPIRATION DATE: NORMAL
BH BB BLOOD EXPIRATION DATE: NORMAL
BH BB BLOOD TYPE BARCODE: 5100
BH BB BLOOD TYPE BARCODE: 5100
BH BB DISPENSE STATUS: NORMAL
BH BB DISPENSE STATUS: NORMAL
BH BB PRODUCT CODE: NORMAL
BH BB PRODUCT CODE: NORMAL
BH BB UNIT NUMBER: NORMAL
BH BB UNIT NUMBER: NORMAL
BILIRUB SERPL-MCNC: 0.4 MG/DL (ref 0–1.2)
BUN SERPL-MCNC: 23 MG/DL (ref 8–23)
BUN/CREAT SERPL: 33.3 (ref 7–25)
BURR CELLS BLD QL SMEAR: ABNORMAL
CALCIUM SPEC-SCNC: 7.6 MG/DL (ref 8.6–10.5)
CHLORIDE SERPL-SCNC: 109 MMOL/L (ref 98–107)
CO2 SERPL-SCNC: 16.8 MMOL/L (ref 22–29)
CREAT SERPL-MCNC: 0.69 MG/DL (ref 0.57–1)
CROSSMATCH INTERPRETATION: NORMAL
CROSSMATCH INTERPRETATION: NORMAL
CRP SERPL-MCNC: <0.3 MG/DL (ref 0–0.5)
DEPRECATED RDW RBC AUTO: 55.4 FL (ref 37–54)
EGFRCR SERPLBLD CKD-EPI 2021: 91.2 ML/MIN/1.73
ERYTHROCYTE [DISTWIDTH] IN BLOOD BY AUTOMATED COUNT: 16.9 % (ref 12.3–15.4)
GLOBULIN UR ELPH-MCNC: 0.9 GM/DL
GLUCOSE SERPL-MCNC: 172 MG/DL (ref 65–99)
HCT VFR BLD AUTO: 20.2 % (ref 34–46.6)
HGB BLD-MCNC: 6.6 G/DL (ref 12–15.9)
LARGE PLATELETS: ABNORMAL
LYMPHOCYTES # BLD MANUAL: 3.8 10*3/MM3 (ref 0.7–3.1)
LYMPHOCYTES NFR BLD MANUAL: 2 % (ref 5–12)
MCH RBC QN AUTO: 30.4 PG (ref 26.6–33)
MCHC RBC AUTO-ENTMCNC: 32.7 G/DL (ref 31.5–35.7)
MCV RBC AUTO: 93.1 FL (ref 79–97)
METAMYELOCYTES NFR BLD MANUAL: 1 % (ref 0–0)
MONOCYTES # BLD: 0.69 10*3/MM3 (ref 0.1–0.9)
NEUTROPHILS # BLD AUTO: 29.69 10*3/MM3 (ref 1.7–7)
NEUTROPHILS NFR BLD MANUAL: 80 % (ref 42.7–76)
NEUTS BAND NFR BLD MANUAL: 6 % (ref 0–5)
OVALOCYTES BLD QL SMEAR: ABNORMAL
PLATELET # BLD AUTO: 164 10*3/MM3 (ref 140–450)
PMV BLD AUTO: 10.1 FL (ref 6–12)
POIKILOCYTOSIS BLD QL SMEAR: ABNORMAL
POTASSIUM SERPL-SCNC: 4 MMOL/L (ref 3.5–5.2)
PROT SERPL-MCNC: 3.3 G/DL (ref 6–8.5)
RBC # BLD AUTO: 2.17 10*6/MM3 (ref 3.77–5.28)
SCAN SLIDE: NORMAL
SMALL PLATELETS BLD QL SMEAR: ADEQUATE
SODIUM SERPL-SCNC: 138 MMOL/L (ref 136–145)
UNIT  ABO: NORMAL
UNIT  ABO: NORMAL
UNIT  RH: NORMAL
UNIT  RH: NORMAL
VARIANT LYMPHS NFR BLD MANUAL: 11 % (ref 19.6–45.3)
VIT B12 BLD-MCNC: 227 PG/ML (ref 211–946)
WBC MORPH BLD: NORMAL
WBC NRBC COR # BLD AUTO: 34.52 10*3/MM3 (ref 3.4–10.8)

## 2024-11-25 PROCEDURE — 86140 C-REACTIVE PROTEIN: CPT | Performed by: NURSE PRACTITIONER

## 2024-11-25 PROCEDURE — 99232 SBSQ HOSP IP/OBS MODERATE 35: CPT | Performed by: NURSE PRACTITIONER

## 2024-11-25 PROCEDURE — 87040 BLOOD CULTURE FOR BACTERIA: CPT | Performed by: NURSE PRACTITIONER

## 2024-11-25 PROCEDURE — 99232 SBSQ HOSP IP/OBS MODERATE 35: CPT | Performed by: INTERNAL MEDICINE

## 2024-11-25 PROCEDURE — 97530 THERAPEUTIC ACTIVITIES: CPT

## 2024-11-25 PROCEDURE — 97535 SELF CARE MNGMENT TRAINING: CPT

## 2024-11-25 PROCEDURE — 71045 X-RAY EXAM CHEST 1 VIEW: CPT

## 2024-11-25 PROCEDURE — 85007 BL SMEAR W/DIFF WBC COUNT: CPT | Performed by: NURSE PRACTITIONER

## 2024-11-25 PROCEDURE — 86900 BLOOD TYPING SEROLOGIC ABO: CPT

## 2024-11-25 PROCEDURE — 36430 TRANSFUSION BLD/BLD COMPNT: CPT

## 2024-11-25 PROCEDURE — 25010000002 ONDANSETRON PER 1 MG: Performed by: INTERNAL MEDICINE

## 2024-11-25 PROCEDURE — 85025 COMPLETE CBC W/AUTO DIFF WBC: CPT | Performed by: NURSE PRACTITIONER

## 2024-11-25 PROCEDURE — 80053 COMPREHEN METABOLIC PANEL: CPT | Performed by: NURSE PRACTITIONER

## 2024-11-25 PROCEDURE — 25010000002 METHYLPREDNISOLONE PER 40 MG: Performed by: STUDENT IN AN ORGANIZED HEALTH CARE EDUCATION/TRAINING PROGRAM

## 2024-11-25 PROCEDURE — P9016 RBC LEUKOCYTES REDUCED: HCPCS

## 2024-11-25 RX ORDER — MIDODRINE HYDROCHLORIDE 5 MG/1
5 TABLET ORAL
Status: DISCONTINUED | OUTPATIENT
Start: 2024-11-25 | End: 2024-12-04 | Stop reason: HOSPADM

## 2024-11-25 RX ADMIN — METHYLPREDNISOLONE SODIUM SUCCINATE 20 MG: 40 INJECTION, POWDER, FOR SOLUTION INTRAMUSCULAR; INTRAVENOUS at 01:08

## 2024-11-25 RX ADMIN — ATORVASTATIN CALCIUM 40 MG: 40 TABLET, FILM COATED ORAL at 20:10

## 2024-11-25 RX ADMIN — METOPROLOL SUCCINATE 25 MG: 25 TABLET, FILM COATED, EXTENDED RELEASE ORAL at 08:32

## 2024-11-25 RX ADMIN — SERTRALINE HYDROCHLORIDE 50 MG: 50 TABLET ORAL at 08:32

## 2024-11-25 RX ADMIN — MESALAMINE 4.8 G: 800 TABLET, DELAYED RELEASE ORAL at 17:08

## 2024-11-25 RX ADMIN — METHYLPREDNISOLONE SODIUM SUCCINATE 20 MG: 40 INJECTION, POWDER, FOR SOLUTION INTRAMUSCULAR; INTRAVENOUS at 17:08

## 2024-11-25 RX ADMIN — PANTOPRAZOLE SODIUM 40 MG: 40 TABLET, DELAYED RELEASE ORAL at 08:32

## 2024-11-25 RX ADMIN — LEVOTHYROXINE SODIUM 50 MCG: 0.05 TABLET ORAL at 08:32

## 2024-11-25 RX ADMIN — ACETAMINOPHEN 650 MG: 325 TABLET, FILM COATED ORAL at 20:10

## 2024-11-25 RX ADMIN — ONDANSETRON 4 MG: 2 INJECTION, SOLUTION INTRAMUSCULAR; INTRAVENOUS at 03:04

## 2024-11-25 RX ADMIN — CLOPIDOGREL BISULFATE 75 MG: 75 TABLET ORAL at 08:32

## 2024-11-25 RX ADMIN — Medication 10 ML: at 20:10

## 2024-11-25 RX ADMIN — METHYLPREDNISOLONE SODIUM SUCCINATE 20 MG: 40 INJECTION, POWDER, FOR SOLUTION INTRAMUSCULAR; INTRAVENOUS at 08:32

## 2024-11-25 RX ADMIN — FOLIC ACID 1000 MCG: 1 TABLET ORAL at 08:32

## 2024-11-25 NOTE — PLAN OF CARE
Problem: Adult Inpatient Plan of Care  Goal: Plan of Care Review  Outcome: Progressing  Goal: Patient-Specific Goal (Individualized)  Outcome: Progressing  Goal: Absence of Hospital-Acquired Illness or Injury  Outcome: Progressing  Intervention: Identify and Manage Fall Risk  Recent Flowsheet Documentation  Taken 11/25/2024 1657 by Rob Kc RN  Safety Promotion/Fall Prevention: safety round/check completed  Taken 11/25/2024 1255 by Rob Kc RN  Safety Promotion/Fall Prevention: safety round/check completed  Taken 11/25/2024 1000 by Rob Kc RN  Safety Promotion/Fall Prevention: safety round/check completed  Taken 11/25/2024 0750 by Rob Kc RN  Safety Promotion/Fall Prevention: safety round/check completed  Intervention: Prevent Skin Injury  Recent Flowsheet Documentation  Taken 11/25/2024 1657 by Rob Kc RN  Body Position: position changed independently  Taken 11/25/2024 1255 by Rob Kc RN  Body Position: position changed independently  Taken 11/25/2024 0750 by Rob Kc RN  Body Position: position changed independently  Intervention: Prevent and Manage VTE (Venous Thromboembolism) Risk  Recent Flowsheet Documentation  Taken 11/25/2024 1657 by Rob Kc RN  VTE Prevention/Management:   patient refused intervention   SCDs (sequential compression devices) off  Taken 11/25/2024 0750 by Rob Kc RN  VTE Prevention/Management:   patient refused intervention   SCDs (sequential compression devices) off  Goal: Optimal Comfort and Wellbeing  Outcome: Progressing  Intervention: Monitor Pain and Promote Comfort  Recent Flowsheet Documentation  Taken 11/25/2024 1255 by Rob Kc RN  Pain Management Interventions:   quiet environment facilitated   position adjusted  Taken 11/25/2024 0750 by Rob Kc RN  Pain Management Interventions: quiet environment facilitated  Intervention: Provide Person-Centered Care  Recent Flowsheet Documentation  Taken 11/25/2024 1657  by Rob Kc RN  Trust Relationship/Rapport:   care explained   choices provided   questions answered   questions encouraged  Taken 11/25/2024 1255 by Rob Kc RN  Trust Relationship/Rapport:   care explained   choices provided   questions answered   questions encouraged  Taken 11/25/2024 0750 by oRb Kc RN  Trust Relationship/Rapport:   care explained   choices provided   questions answered   questions encouraged  Goal: Readiness for Transition of Care  Outcome: Progressing     Problem: Comorbidity Management  Goal: Maintenance of COPD Symptom Control  Outcome: Progressing  Intervention: Maintain COPD (Chronic Obstructive Pulmonary Disease) Symptom Control  Recent Flowsheet Documentation  Taken 11/25/2024 0750 by Rob Kc RN  Breathing Techniques/Airway Clearance: deep/controlled cough encouraged  Goal: Maintenance of Heart Failure Symptom Control  Outcome: Progressing  Goal: Blood Pressure in Desired Range  Outcome: Progressing  Goal: Maintenance of Osteoarthritis Symptom Control  Outcome: Progressing  Intervention: Maintain Osteoarthritis Symptom Control  Recent Flowsheet Documentation  Taken 11/25/2024 1657 by Rob Kc RN  Activity Management: up ad sarah  Taken 11/25/2024 1255 by Rob Kc RN  Activity Management: up ad sarah     Problem: Skin Injury Risk Increased  Goal: Skin Health and Integrity  Outcome: Progressing  Intervention: Optimize Skin Protection  Recent Flowsheet Documentation  Taken 11/25/2024 1657 by Rob Kc RN  Activity Management: up ad sarah  Head of Bed (HOB) Positioning: HOB elevated  Taken 11/25/2024 1255 by Rob Kc RN  Activity Management: up ad sarah  Head of Bed (HOB) Positioning: HOB elevated  Pressure Reduction Devices: pressure-redistributing mattress utilized  Taken 11/25/2024 0750 by Rob Kc RN  Pressure Reduction Techniques: frequent weight shift encouraged  Head of Bed (HOB) Positioning: HOB elevated     Problem: Gastrointestinal  Bleeding  Goal: Optimal Coping with Acute Illness  Outcome: Progressing  Intervention: Optimize Psychosocial Response  Recent Flowsheet Documentation  Taken 11/25/2024 1657 by Rob Kc RN  Supportive Measures: active listening utilized  Taken 11/25/2024 1255 by Rob Kc RN  Supportive Measures: active listening utilized  Taken 11/25/2024 0750 by Rob Kc RN  Supportive Measures: active listening utilized  Goal: Hemostasis  Outcome: Progressing  Intervention: Manage Gastrointestinal Bleeding  Recent Flowsheet Documentation  Taken 11/25/2024 0750 by Rob Kc RN  Environmental Support: calm environment promoted     Problem: Pain Acute  Goal: Optimal Pain Control and Function  Outcome: Progressing  Intervention: Optimize Psychosocial Wellbeing  Recent Flowsheet Documentation  Taken 11/25/2024 1657 by Rob Kc RN  Supportive Measures: active listening utilized  Diversional Activities: television  Taken 11/25/2024 1255 by Rob Kc RN  Supportive Measures: active listening utilized  Diversional Activities: television  Taken 11/25/2024 0750 by Rob Kc RN  Supportive Measures: active listening utilized  Diversional Activities: television  Intervention: Develop Pain Management Plan  Recent Flowsheet Documentation  Taken 11/25/2024 1255 by Rob Kc RN  Pain Management Interventions:   quiet environment facilitated   position adjusted  Taken 11/25/2024 0750 by Rob Kc RN  Pain Management Interventions: quiet environment facilitated  Intervention: Prevent or Manage Pain  Recent Flowsheet Documentation  Taken 11/25/2024 1657 by Rob Kc RN  Sensory Stimulation Regulation: television on  Taken 11/25/2024 1255 by Rob Kc RN  Sensory Stimulation Regulation: television on  Sleep/Rest Enhancement: awakenings minimized  Taken 11/25/2024 0750 by Rob Kc RN  Sensory Stimulation Regulation: television on  Sleep/Rest Enhancement: awakenings minimized     Problem:  Chest Pain  Goal: Resolution of Chest Pain Symptoms  Outcome: Progressing     Problem: Sepsis/Septic Shock  Goal: Optimal Coping  Outcome: Progressing  Intervention: Support Patient and Family Response  Recent Flowsheet Documentation  Taken 11/25/2024 1657 by Rob Kc RN  Supportive Measures: active listening utilized  Family/Support System Care:   support provided   self-care encouraged  Taken 11/25/2024 1255 by Rob Kc RN  Supportive Measures: active listening utilized  Family/Support System Care:   support provided   self-care encouraged  Taken 11/25/2024 0750 by Rob Kc RN  Supportive Measures: active listening utilized  Family/Support System Care: support provided  Goal: Absence of Bleeding  Outcome: Progressing  Goal: Blood Glucose Level Within Target Range  Outcome: Progressing  Goal: Absence of Infection Signs and Symptoms  Outcome: Progressing  Intervention: Promote Recovery  Recent Flowsheet Documentation  Taken 11/25/2024 1657 by Rob Kc, RN  Activity Management: up ad sarah  Taken 11/25/2024 1255 by Rob Kc, RN  Activity Management: up ad sarah  Sleep/Rest Enhancement: awakenings minimized  Taken 11/25/2024 0750 by Rob Kc RN  Sleep/Rest Enhancement: awakenings minimized  Goal: Optimal Nutrition Delivery  Outcome: Progressing   Goal Outcome Evaluation:

## 2024-11-25 NOTE — SIGNIFICANT NOTE
11/25/24 1322   OTHER   Discipline physical therapist   Rehab Time/Intention   Session Not Performed patient/family declined, not feeling well   Therapy Assessment/Plan (PT)   Criteria for Skilled Interventions Met (PT) yes;skilled treatment is necessary   Recommendation   PT - Next Appointment 11/26/24

## 2024-11-25 NOTE — PLAN OF CARE
Goal Outcome Evaluation:  Plan of Care Reviewed With: patient           Outcome Evaluation: Patient with several bloody stools, nausea, vomiting - hemoglobin 6.6 and getting a unit of packed red blood cells.

## 2024-11-25 NOTE — PROGRESS NOTES
Infectious Diseases Progress Note      LOS: 6 days   Patient Care Team:  Eduard Little MD as PCP - General (Family Medicine)  Niya Mendoza APRN as Nurse Practitioner (Cardiology)    Chief Complaint: Abdominal pain, nausea, bloody diarrhea    Subjective       The patient has been afebrile for the last 24 hours.  The patient is on room air, hemodynamically stable, and is tolerating antimicrobial therapy.  Patient continues to have abdominal pain, nausea and bloody diarrhea      Review of Systems:   Review of Systems   Constitutional:  Positive for fatigue.   HENT: Negative.     Eyes: Negative.    Respiratory: Negative.     Cardiovascular: Negative.    Gastrointestinal:  Positive for abdominal pain, blood in stool, diarrhea and nausea.   Endocrine: Negative.    Genitourinary: Negative.    Musculoskeletal: Negative.    Skin: Negative.    Neurological: Negative.    Psychiatric/Behavioral: Negative.     All other systems reviewed and are negative.       Objective     Vital Signs  Temp:  [97.4 °F (36.3 °C)-98.2 °F (36.8 °C)] 97.9 °F (36.6 °C)  Heart Rate:  [] 76  Resp:  [5-28] 20  BP: ()/(25-86) 140/84    Physical Exam:  Physical Exam  Vitals and nursing note reviewed.   Constitutional:       General: She is not in acute distress.     Appearance: Normal appearance. She is well-developed and normal weight. She is ill-appearing. She is not diaphoretic.   HENT:      Head: Normocephalic and atraumatic.   Eyes:      General: No scleral icterus.     Extraocular Movements: Extraocular movements intact.      Conjunctiva/sclera: Conjunctivae normal.      Pupils: Pupils are equal, round, and reactive to light.   Cardiovascular:      Rate and Rhythm: Normal rate and regular rhythm.      Heart sounds: Normal heart sounds, S1 normal and S2 normal. No murmur heard.  Pulmonary:      Effort: Pulmonary effort is normal. No respiratory distress.      Breath sounds: Normal breath sounds. No stridor. No wheezing or  rales.   Chest:      Chest wall: No tenderness.   Abdominal:      General: Bowel sounds are normal.      Palpations: Abdomen is soft. There is no mass.      Tenderness: There is abdominal tenderness. There is no guarding.      Comments: Diffuse tenderness   Musculoskeletal:         General: No swelling, tenderness or deformity. Normal range of motion.      Cervical back: Neck supple.   Skin:     General: Skin is warm and dry.      Coloration: Skin is not pale.      Findings: No bruising, erythema or rash.   Neurological:      Mental Status: She is alert and oriented to person, place, and time.      Motor: Weakness present.   Psychiatric:         Mood and Affect: Mood normal.          Results Review:    I have reviewed all clinical data, test, lab, and imaging results.     Radiology  XR Chest 1 View    Result Date: 11/25/2024  XR CHEST 1 VW Date of Exam: 11/25/2024 8:18 AM EST Indication: Right sided chest pain with breathing Comparison: Chest radiograph 11/19/2024 Findings: Stable cardiomediastinal silhouette. Aortic atherosclerotic disease. Coronary atherosclerotic disease. Negative for lobar consolidation, edema, effusion or pneumothorax. Degenerative related osseous change.     Impression: No active pulmonary process. Electronically Signed: Jameson Rainey MD  11/25/2024 9:25 AM EST  Workstation ID: [id number]     Cardiology    Laboratory    Results from last 7 days   Lab Units 11/25/24  0303 11/24/24  1755 11/24/24  0241 11/23/24  0411 11/22/24  2323 11/22/24  1758 11/22/24  0439 11/21/24  0129 11/20/24  0246 11/19/24  1933 11/19/24  1246   WBC 10*3/mm3 34.52*  --  14.90* 12.82*  --   --  14.13* 13.55* 12.18*  --  16.93*   HEMOGLOBIN g/dL 6.6* 7.7* 5.5* 8.7* 8.6* 8.9* 7.4* 9.0* 9.1*   < > 5.7*   HEMATOCRIT % 20.2* 24.0* 17.6* 27.6* 27.3* 27.3* 23.2* 27.6* 29.5*   < > 18.9*   PLATELETS 10*3/mm3 164  --  197 284  --   --  310 368 369  --  435    < > = values in this interval not displayed.     Results from last 7  days   Lab Units 11/25/24  0303 11/24/24  0211 11/23/24  0411 11/22/24  0439 11/21/24  0129 11/20/24  0246 11/19/24  1246   SODIUM mmol/L 138 139 141 139 136 137 138   POTASSIUM mmol/L 4.0 4.6 3.9 3.9 4.1 3.8 4.3   CHLORIDE mmol/L 109* 108* 108* 107 103 103 106   CO2 mmol/L 16.8* 18.1* 22.3 25.0 23.7 26.3 22.2   BUN mg/dL 23 20 19 15 16 19 28*   CREATININE mg/dL 0.69 0.63 0.55* 0.49* 0.73 0.59 0.74   GLUCOSE mg/dL 172* 122* 128* 116* 132* 64* 77   ALBUMIN g/dL 2.4* 2.5* 2.8*  --   --   --  2.9*   BILIRUBIN mg/dL 0.4 0.2 0.2  --   --   --  0.3   ALK PHOS U/L 43 57 76  --   --   --  64   AST (SGOT) U/L 18 23 18  --   --   --  19   ALT (SGPT) U/L 24 30 25  --   --   --  35*   LIPASE U/L  --   --   --   --   --   --  19   CALCIUM mg/dL 7.6* 7.8* 8.3* 8.0* 8.3* 8.7 8.7                 Microbiology   Microbiology Results (last 10 days)       ** No results found for the last 240 hours. **            Medication Review:       Schedule Meds  acetaminophen, 650 mg, Oral, Once   Or  acetaminophen, 650 mg, Oral, Once   Or  acetaminophen, 650 mg, Rectal, Once  atorvastatin, 40 mg, Oral, Nightly  clopidogrel, 75 mg, Oral, Daily  diphenhydrAMINE, 25 mg, Oral, Once   Or  diphenhydrAMINE, 25 mg, Intravenous, Once  folic acid, 1,000 mcg, Oral, Daily  levothyroxine, 50 mcg, Oral, Daily  mesalamine, 4.8 g, Oral, Daily  methotrexate, 15 mg, Oral, Weekly  methylPREDNISolone sodium succinate, 20 mg, Intravenous, Q8H  metoprolol succinate XL, 25 mg, Oral, Q24H  midodrine, 5 mg, Oral, TID AC  pantoprazole, 40 mg, Oral, Daily  sertraline, 50 mg, Oral, Daily  sodium chloride, 10 mL, Intravenous, Q12H  upadacitinib ER, 45 mg, Oral, Daily        Infusion Meds       PRN Meds    acetaminophen    senna-docusate sodium **AND** polyethylene glycol **AND** bisacodyl **AND** bisacodyl    diphenhydrAMINE    nitroglycerin    ondansetron ODT **OR** ondansetron    [COMPLETED] Insert Peripheral IV **AND** sodium chloride    sodium chloride    sodium  chloride        Assessment & Plan       Antimicrobial Therapy   1.         2.        3.        4.        5.          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  Patient was treated recently.  Nursing staff reported unusual looking stool.  Stool for ova and parasite collected on November 23 and is pending     Crohn's disease flareup.  Patient  admitted for severe abdominal pain, nausea and melena.  CT of the abdomen pelvis this admission did not show any definitive active colonic inflammation.  GI following     Reactive leukocytosis secondary to above and steroid     Acute anemia-hematology following     Recent diagnosis for strongyloidiasis based on a positive duodenal biopsy on October 12, 2024.  Treated with p.o. ivermectin by GI service.  There was no clinical suspicion for hyperinfection     Crohn's disease and rheumatoid arthritis.  Patient states that she has been on Humira since June but has lost over 40 pounds since that time.  She also states that she is on methotrexate, prednisone and Rinvoq.     COPD-chronically on 2 L of oxygen by nasal cannula.  Currently on room air     Plan     Monitor off antimicrobial therapy  Waiting on ova and parasite screen  If stool is positive for strongyloidiasis then recommend sputum for ova and parasite to rule out disseminated disease and try to avoid long-term steroids since increase the risk of hyperinfection  Continue supportive care  A.m. labs     The above note was transcribed for Dr. Pond-physical exam and review of systems were performed by him         DRU Alfonso  11/25/24  15:01 EST    Note is dictated utilizing voice recognition software/Dragon

## 2024-11-25 NOTE — THERAPY TREATMENT NOTE
"Subjective: Pt agreeable to therapeutic plan of care.  Cognition: arousal/alertness: Alert, pt becomes confused near end of treatment sesion    Objective:     Precautions - High Fall risk    Bed Mobility: Assist x 2 and Dependent, scoot/reposition towards HOB. Pt demos ability to roll supine in bed with verbal cueing and mod I with bed rails   Functional Transfers: N/A or Not attempted.  Functional Ambulation: N/A or Not attempted.    Grooming: Independent with set-up  ADL Position:  Fowlers  ADL Comments: Face hygiene     Toileting: Dependent  ADL Position: supine  ADL Comments: Karine-care with gown and linen change    Vitals: WNL    Pain: 8 VAS  Location: Chest  Interventions for pain: Repositioned and Therapeutic Presence  Education: Provided education on the importance of mobility in the acute care setting, Verbal/Tactile Cues, and ADL training    Assessment: Maryann Gaitan presents with ADL impairments affecting function including balance, cognition, endurance / activity tolerance, pain, and strength. Pt alert and attentive, however noted with increased confusion near end of treatment session. Per RN, pt has been in/out of confusion. Pt dependent assist for karine-care and demos ability to roll supine with verbal cueing. Dependent assist to scoot HOB to reposition. Demonstrated functioning below baseline abilities indicate the need for continued skilled intervention while inpatient. Tolerating session today without incident. Will continue to follow and progress as tolerated.     Plan/Recommendations:   Low Intensity Therapy recommended post-acute care - This is recommended as therapy feels this patient would require 2-3 visits per week. OP or HH would be the best option depending on patient's home bound status. Consider, if the patient has other  \"skilled\" needs such as wounds, IV antibiotics, etc. Combined with \"low intensity\" could also equate to a SNF. If patient is medically complex, consider LTAC.    Pt " desires Home with Home Health at discharge. Pt cooperative; agreeable to therapeutic recommendations and plan of care.     Modified Doug: N/A = No pre-op stroke/TIA    Post-Tx Position: Supine with HOB Elevated, Alarms activated, and Call light and personal items within reach  PPE: gloves and gown    Therapy Charges for Today       Code Description Service Date Service Provider Modifiers Qty    82879346370 HC OT SELF CARE/MGMT/TRAIN EA 15 MIN 11/25/2024 Andriy Guevara OT GO 1    19614781076 HC OT THERAPEUTIC ACT EA 15 MIN 11/25/2024 Andriy Guevara OT GO 1           Time Calculation- OT       Row Name 11/25/24 1351             Time Calculation- OT    OT Start Time 1332  -SP      OT Stop Time 1350  -SP      OT Time Calculation (min) 18 min  -SP      Total Timed Code Minutes- OT 18 minute(s)  -SP      OT Received On 11/25/24  -SP      OT - Next Appointment 11/27/24  -SP                User Key  (r) = Recorded By, (t) = Taken By, (c) = Cosigned By      Initials Name Provider Type    SP Andriy Guevraa, OT Occupational Therapist

## 2024-11-25 NOTE — PROGRESS NOTES
LOS: 6 days   Patient Care Team:  Eduard Little MD as PCP - General (Family Medicine)  Niya Mendoza APRN as Nurse Practitioner (Cardiology)      Subjective     Interval History:     Subjective: Patient continues to report loose stool with rectal bleeding.  Did receive PRBC transfusion this morning.  Continues to complain of abdominal pain and nausea/vomiting.      ROS:   No chest pain, shortness of breath, or cough.        Medication Review:     Current Facility-Administered Medications:     acetaminophen (TYLENOL) tablet 650 mg, 650 mg, Oral, Once **OR** acetaminophen (TYLENOL) 160 MG/5ML oral solution 650 mg, 650 mg, Oral, Once **OR** acetaminophen (TYLENOL) suppository 650 mg, 650 mg, Rectal, Once, Niya Mendoza APRN    acetaminophen (TYLENOL) tablet 650 mg, 650 mg, Oral, Q4H PRN, Dmitri Navarro MD, 650 mg at 11/22/24 0833    atorvastatin (LIPITOR) tablet 40 mg, 40 mg, Oral, Nightly, Dmitri Navarro MD, 40 mg at 11/24/24 2024    sennosides-docusate (PERICOLACE) 8.6-50 MG per tablet 2 tablet, 2 tablet, Oral, BID PRN **AND** polyethylene glycol (MIRALAX) packet 17 g, 17 g, Oral, Daily PRN **AND** bisacodyl (DULCOLAX) EC tablet 5 mg, 5 mg, Oral, Daily PRN **AND** bisacodyl (DULCOLAX) suppository 10 mg, 10 mg, Rectal, Daily PRN, Dmitri Navarro MD    clopidogrel (PLAVIX) tablet 75 mg, 75 mg, Oral, Daily, Dmitri Navarro MD, 75 mg at 11/25/24 0832    diphenhydrAMINE (BENADRYL) capsule 25 mg, 25 mg, Oral, Q6H PRN, Dmitri Navarro MD, 25 mg at 11/22/24 0941    diphenhydrAMINE (BENADRYL) capsule 25 mg, 25 mg, Oral, Once **OR** diphenhydrAMINE (BENADRYL) injection 25 mg, 25 mg, Intravenous, Once, Mendoza, Niya, APRN    folic acid (FOLVITE) tablet 1,000 mcg, 1,000 mcg, Oral, Daily, Dmitri Navarro MD, 1,000 mcg at 11/25/24 0832    levothyroxine (SYNTHROID, LEVOTHROID) tablet 50 mcg, 50 mcg, Oral, Daily, Dmitri Navarro MD, 50 mcg at 11/25/24 0832    mesalamine (LIALDA) EC tablet 4.8 g, 4.8 g, Oral, Daily, Mikayla Kc  DRU Brewster, 4.8 g at 11/24/24 0826    methotrexate tablet 15 mg, 15 mg, Oral, Weekly, Mikayla Kc APRN, 15 mg at 11/23/24 2058    methylPREDNISolone sodium succinate (SOLU-Medrol) injection 20 mg, 20 mg, Intravenous, Q8H, Jonathan Mackay MD, 20 mg at 11/25/24 0832    metoprolol succinate XL (TOPROL-XL) 24 hr tablet 25 mg, 25 mg, Oral, Q24H, Corina Murcia APRN, 25 mg at 11/25/24 0832    midodrine (PROAMATINE) tablet 5 mg, 5 mg, Oral, TID Cinthia AGUILERA Akasha, MD    nitroglycerin (NITROSTAT) SL tablet 0.4 mg, 0.4 mg, Sublingual, Q5 Min PRN, Dmitri Navarro MD    ondansetron ODT (ZOFRAN-ODT) disintegrating tablet 4 mg, 4 mg, Oral, Q6H PRN **OR** ondansetron (ZOFRAN) injection 4 mg, 4 mg, Intravenous, Q6H PRN, Dmitri Navarro MD, 4 mg at 11/25/24 0304    pantoprazole (PROTONIX) EC tablet 40 mg, 40 mg, Oral, Daily, Dmitri Navarro MD, 40 mg at 11/25/24 0832    sertraline (ZOLOFT) tablet 50 mg, 50 mg, Oral, Daily, Dmitri Navarro MD, 50 mg at 11/25/24 0832    [COMPLETED] Insert Peripheral IV, , , Once **AND** sodium chloride 0.9 % flush 10 mL, 10 mL, Intravenous, PRN, Dmitri Navarro MD    sodium chloride 0.9 % flush 10 mL, 10 mL, Intravenous, Q12H, Dmitri Navarro MD, 10 mL at 11/24/24 0827    sodium chloride 0.9 % flush 10 mL, 10 mL, Intravenous, PRN, Dmitri Navarro MD    sodium chloride 0.9 % infusion 40 mL, 40 mL, Intravenous, PRN, Dmitri Navarro MD    upadacitinib ER (RINVOQ) extended release tablet 45 mg, 45 mg, Oral, Daily, Mikayla Kc APRN, 45 mg at 11/25/24 0833      Objective     Vital Signs  Vitals:    11/25/24 0530 11/25/24 0600 11/25/24 0630 11/25/24 0749   BP: 102/43 (!) 80/58 132/55 158/66   BP Location:       Patient Position:       Pulse: 88 71 67 103   Resp:    25   Temp:    98.1 °F (36.7 °C)   TempSrc:       SpO2: 97% 100% 97% 100%   Weight:       Height:           Physical Exam:     General Appearance:    Awake and alert, in no acute distress   Head:    Normocephalic, without obvious abnormality  Time-based billing (NON-critical care)   Eyes:          Conjunctivae normal, anicteric sclera   Throat:   No oral lesions, no thrush, oral mucosa moist   Neck:   No adenopathy, supple, no JVD   Lungs:     respirations regular, even and unlabored   Abdomen:     Soft, generalized tenderness, no rebound or guarding, non-distended   Rectal:     Deferred   Extremities:   No edema, no cyanosis   Skin:   No bruising or rash, no jaundice        Results Review:    Results from last 7 days   Lab Units 11/25/24  0303 11/24/24  1755 11/24/24  0241 11/23/24  0411 11/22/24  2323 11/22/24  1758 11/22/24  0439 11/21/24  0129 11/20/24  0246 11/19/24  1933 11/19/24  1246   WBC 10*3/mm3 34.52*  --  14.90* 12.82*  --   --  14.13* 13.55* 12.18*  --  16.93*   HEMOGLOBIN g/dL 6.6* 7.7* 5.5* 8.7* 8.6* 8.9* 7.4* 9.0* 9.1*   < > 5.7*   PLATELETS 10*3/mm3 164  --  197 284  --   --  310 368 369  --  435    < > = values in this interval not displayed.     Results from last 7 days   Lab Units 11/25/24  0303 11/24/24  0211 11/23/24 0411 11/22/24  0439 11/21/24  0129 11/20/24  0246 11/19/24  1246   SODIUM mmol/L 138 139 141 139 136 137 138   POTASSIUM mmol/L 4.0 4.6 3.9 3.9 4.1 3.8 4.3   CHLORIDE mmol/L 109* 108* 108* 107 103 103 106   CO2 mmol/L 16.8* 18.1* 22.3 25.0 23.7 26.3 22.2   BUN mg/dL 23 20 19 15 16 19 28*   CREATININE mg/dL 0.69 0.63 0.55* 0.49* 0.73 0.59 0.74   GLUCOSE mg/dL 172* 122* 128* 116* 132* 64* 77   ALBUMIN g/dL 2.4* 2.5* 2.8*  --   --   --  2.9*   BILIRUBIN mg/dL 0.4 0.2 0.2  --   --   --  0.3   ALK PHOS U/L 43 57 76  --   --   --  64   AST (SGOT) U/L 18 23 18  --   --   --  19   ALT (SGPT) U/L 24 30 25  --   --   --  35*   INR   --   --   --   --   --   --  0.99   APTT seconds  --   --   --   --   --   --  21.9*   MAGNESIUM mg/dL  --   --   --  1.8 2.0 2.0  --    LIPASE U/L  --   --   --   --   --   --  19   CRP mg/dL <0.30 0.40 1.25*  --   --   --   --      Estimated Creatinine Clearance: 61 mL/min (by C-G formula based on SCr of 0.69 mg/dL).  Lab Results    Component Value Date    HGBA1C 5.64 (H) 10/13/2024     Results from last 7 days   Lab Units 11/19/24  1503 11/19/24  1246   HSTROP T ng/L 17* 21*     Infection     UA      Microbiology Results (last 10 days)       ** No results found for the last 240 hours. **          Imaging Results (Last 72 Hours)       Procedure Component Value Units Date/Time    XR Chest 1 View [[id number]] Collected: 11/25/24 0924     Updated: 11/25/24 0927    Narrative:      XR CHEST 1 VW    Date of Exam: 11/25/2024 8:18 AM EST    Indication: Right sided chest pain with breathing    Comparison: Chest radiograph 11/19/2024    Findings:  Stable cardiomediastinal silhouette. Aortic atherosclerotic disease. Coronary atherosclerotic disease. Negative for lobar consolidation, edema, effusion or pneumothorax. Degenerative related osseous change.      Impression:      Impression:  No active pulmonary process.      Electronically Signed: Jameson Rainey MD    11/25/2024 9:25 AM EST    Workstation ID: [id number]            Assessment & Plan     ASSESSMENT:  -Hematochezia  -Diarrhea   -Normocytic anemia with drop in hemoglobin  -Leukocytosis  -Abnormal CT showing distal/terminal ileitis and diffuse colonic fatty infiltration  -Recent Strongyloides infection in 10/2024 s/p treatment with ivermectin  -Small bowel and colonic Crohn's disease - on Rinvoq (recently restarted)  -Chest pain  -Recent fall  -Hypertension  -COPD  -CAD s/p stent placement on Plavix  -RA - on Humira and methotrexate  -History of hysterectomy  -History of cholecystectomy  -Severe mitral regurgitation status post mitral clipping 11/21/2024     PLAN:   Patient is a 74-year-old female with history of small bowel and colonic Crohn's (recently restarted on Rinvoq), rheumatoid arthritis (on Humira and methotrexate), and cholecystectomy who presented on 11/19 with increased hematochezia and found to have drop in hemoglobin to 5.7.  She has recently been admitted and discharged on  11/10/2024.  Was also here in October when she was diagnosed with Strongyloides and treated with ivermectin.     Patient continues to complain of diarrhea with rectal bleeding, abdominal pain, and nausea/vomiting.  Stool for O&P pending. Await results. Patient with history of strongyloidiasis s/p ivermectin in 10/2024. ID following.  Continue IV solu-medrol. Will plan prednisone taper at discharge of 40 mg daily for 1 week followed by 30 mg daily for 1 week followed by 20 mg daily for 1 week followed by 10 mg daily for 1 week.    Continue Rinvoq 45 mg daily while in the hospital for a total of 12 weeks and then 30 mg daily maintenance dosing.  Patient has not had Humira for 3 to 4 weeks and had not been taking Rinvoq in the hospital until 11/23/2024.   Hemoglobin 6.6 from 7.2. Patient has received PRBC transfusion this AM. Continue to monitor H/H and transfuse as needed.   If stool studies are unremarkable, may need to consider colorectal surgery evaluation for colectomy with end ileostomy.   Cardiology and hematology also following.  Continue supportive care.       Electronically signed by DRU Lew, 11/25/24, 9:41 AM EST.            Time-based billing (NON-critical care)

## 2024-11-25 NOTE — PROGRESS NOTES
Cardiology Progress Note    Patient Identification:  Name: Maryann Gaitan  Age: 74 y.o.  Sex: female  :  1950  MRN: [id number]                 Follow Up / Chief Complaint: CAD/PCI, GI Bleed, MR status post Azeb Clip   Chief Complaint   Patient presents with    Chest Pain       Interval History: Patient presented with chest pain and abdominal pain.  Has been having bright red blood per rectum.  Was seen by structural heart for mitral regurgitation was scheduled for outpatient procedure.  Underwent MitraClip 2024  Patient is having issues with rectal bleed and anemia requiring transfusions.         Subjective: Patient seen and examined.  Chart reviewed.  Labs reviewed.  Patient without chest pain or shortness of breath.  Is complaining of bloody diarrhea.      Objective:  2024: Glucose elevated.  CBC with white count of 13.5, hemoglobin 9  2024: WBC 14.13, hemoglobin 7.4  EKG normal sinus rhythm  Limited echo shows EF at 56 to 60% MitraClip in place with trace residual MR mean creatinine of 4.6  2024: Hemoglobin 6.6    History of present illness:    Ms. Maryann Gaitan has PMH of     CAD status post cardiac cath with multivessel CAD poor candidate for CABG, underwent PCI to ostial and proximal LAD 10/22/2024  Moderate to severe MR with central jet noted  COPD  Hypertension  Rheumatoid arthritis  Crohn's disease     Presented to the emergency room on 2024 with abdominal pain and chest pain.  Patient reports that she started having bright red blood per rectum again yesterday and has been having constant chest pain since.     Labs in the emergency room was noted to have a hemoglobin of 5.7 high-sensitivity troponin 21--17 BUN 28 creatinine 0.74 total protein 5.1 albumin 2.9 ALT 35 WBC 16.93 chest x-ray without any acute finding EKG sinus rhythm with nonspecific ST abnormalities  CT abdomen and pelvis shows distal terminal ileitis colonic fatty marrow infiltration which can be seen in  the setting of chronic inflammatory bowel disease   patient was transfused with 1 unit of PRBC and hemoglobin improved to 7.3 and this morning she is 9.1     Unfortunately patient is in the tough situation as she had stent placed on 10/22/2024 and needs dual antiplatelet therapy to prevent clotting of stent however she has presented for the second time with significant GI bleeding.  Currently her aspirin and Plavix are both on hold.        Recent hospitalization  Presented 11/6/2024 through [facility] with complaint of abdominal pain and dark red stool / rectal bleed.  Patient has close disease and rheumatoid arthritis and is on immunosuppressants methotrexate, Humira, Rinvoq now presented with rectal bleed and abdominal pain.  GI did a scope and biopsies of terminal ileum which were consistent with active Crohn's and biopsies of stomach and duodenum showed strong colitis infection, was treated by ivermectin.  GI started patient on Solu-Medrol and is holding immunosuppressants and wants to hold Plavix.     Patient was recently in the hospital 10/8/2024 with nausea vomiting diarrhea and abnormal labs.  With hyponatremia and hypokalemia and anemia.  Patient suddenly started having chest pain and fast team was called because of sharp left-sided chest pain with shortness of breath dizziness EKG showed sinus tachycardia and cardiac workup revealed severe MR, cath 10/15/2024 revealing severe ostial LAD and outpouching in the descending thoracic aorta probably a penetrating aortic ulcer versus aneurysm.  CT surgery was consulted and she was turned down for CABG therefore patient underwent drug-eluting stent to ostial LAD on 10/22/2024 and was supposed to have clip to mitral valve in follow-up.  In the meantime has been having worsening symptoms and abdominal pain, weight loss.  Patient has been restarted on Plavix.  Will continue for bleeding closely.     Data:  Labs 11/6/2024 - 11/7/2024 revealed sodium 134, BUN/creatinine  of 75/2.84, glucose 138, albumin 2.9.  Hemoglobin 9.4 has come down to 8.  MCV 98  EKG done 11/6/2024 reviewed/interpreted by me reveals sinus bradycardia at the rate of 58 bpm.        EGD/colonoscopy 10/12/2024 revealed small hiatal hernia, nonerosive gastritis, T1 stricture s/p dilatation with 12 mm, T1 ulcer, colon ulcers, sigmoid diverticulosis, grade 2 internal hemorrhoids and strongyloides  Echo 10/12/2024 EF of 51 to 55% with mild RV enlargement, severe left atrial enlargement, moderate to severe MR  Transesophageal echo 10/16/2024: LVEF of 55 to 60% with severe left atrial enlargement, moderate to severe MR and grade 3 descending aortic plaque  Cardiac cath 10/15/2024 reveals total right and severe ostial LAD, descending thoracic aorta had an outpouching consistent with penetrating aortic ulcer versus aneurysm.   Patient was evaluated by CT surgery not a candidate for CABG therefore patient underwent PCI and drug-eluting stent to the ostium proximal LAD.  And she was seen by valve team and will be set up for MitraClip once she recovers from this hospitalization      Assessment:  :     Abdominal pain, rectal bleeding  Anemia requiring blood transfusion  Chest pain  Crohn's disease  CAD, status post PCI  Mitral regurgitation  Chronic HFpEF due to valvular heart disease from mitral regurgitation  Hypertensive cardiovascular disease from hypertension  Hyponatremia  Hypokalemia  Crohn's disease  Normocytic anemia  COPD, chronic steroid therapy  Multifocal pneumonia  Hyperglycemia, prediabetes with A1c of 5.6 from     Recommendations / Plan:         Patient presented 11/19/2024 with abdominal pain and bright red blood per rectum was found to have a hemoglobin of 5.7 was transfused.  Patient chest pain.  HS troponin is normal.  Patient recently had coronary drug-eluting stent placed on 10/22/2024 would benefit from antiplatelet therapy.  Antiplatelet therapy is currently on hold due to GI bleed.  Will follow and  restart Plavix when okay with GI.  Patient was seen by structural heart underwent mitral valve clip 11/21/2024.  Patient continues to have blood loss anemia requiring transfusion.  Will hold off on aspirin.  Patient might become transfusion dependent.  Hematology oncology is following.  Patient is being transfused intermittently.          Review of records  Patient presented 10/9/2024 because of abnormal labs showing low sodium, was complaining of nausea vomiting and diarrhea.    Patient underwent cardiac cath 10/15/2024 which revealed total right and severe ostial LAD disease.  Descending thoracic aorta has an outpouching probably saccular aneurysm versus  penetrating aortic ulcer .  Echocardiogram 10/12/2024 is revealing EF of 51 to 55% with mild RV enlargement severe left atrial enlargement and right atrial enlargement and moderate to severe MR  GONZÁLEZ is revealing moderate to severe MR.  Patient would benefit from CABG and mitral valve surgery.  Patient was seen by .  Patient has been turned down for surgery due to multiple comorbid conditions.  Patient has a cavitary lesion in her lungs had bronchoscopy.  Had multiple mucous plugs.  Patient underwent drug-eluting stent to ostial LAD 10/12/2024.  Patient was sent home on dual antiplatelet therapy with aspirin and Plavix, metoprolol and statins and losartan as tolerated.  Will follow-up mitral regurgitation and follow-up in with structural heart team.  Patient had an EKG done 10/12/2024 which revealed sinus rhythm degenerating to A-fib with RVR.  Patient would benefit from long-term anticoagulation to prevent thromboembolic events.  Given her Crohn's disease and microcytic anemia patient will be a poor candidate for long-term anticoagulation.  Will follow and consider watchman evaluation as outpatient.  Patient was not tolerating losartan and metoprolol was given intermittent midodrine.  Now patient presents with abdominal pain and rectal bleed.  GI  was recommending holding Plavix.  Patient unfortunately is in a tough situation.  Had drug-eluting stents done 10/22/2024, hardly 2 weeks ago.  Would benefit from Plavix to prevent stent thrombosis.  Patient has been receiving Plavix uninterrupted.       Copied text in this portion of the note has been reviewed and is accurate as of 11/25/2024    Past Medical 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 07/05/2016    Dyslipidemia 10/30/2024    Fecal urgency 11/21/2024    First degree hemorrhoids 07/09/2021    GERD (gastroesophageal reflux disease) 11/21/2024    Hashimoto's thyroiditis 11/21/2024    History of colonic polyps 07/09/2021    Hypertension     Hypothyroidism (acquired) 11/06/2024    Mitral regurgitation 10/08/2024    Moderate protein-calorie malnutrition 11/09/2024    Multiple tracheobronchial mucus plugs 10/08/2024    Nicotine dependence 11/21/2024    Rheumatoid arthritis 11/06/2024    S/P mitral valve clip implantation 11/21/2024    Second degree hemorrhoids 07/05/2016    Stress incontinence, female 11/21/2024    Vitamin D deficiency, unspecified 11/21/2024     Past Surgical History:  Past Surgical History:   Procedure Laterality Date    BRONCHOSCOPY N/A 10/16/2024    Procedure: BRONCHOSCOPY;  Surgeon: Gallo Pope MD;  Location: [facility] ENDOSCOPY;  Service: Pulmonary;  Laterality: N/A;    CARDIAC CATHETERIZATION N/A 10/15/2024    Procedure: Left Heart Cath, possible pci;  Surgeon: Travis Connor MD;  Location: [facility] CATH INVASIVE LOCATION;  Service: Cardiovascular;   Laterality: N/A;    CARDIAC CATHETERIZATION N/A 10/22/2024    Procedure: Laser Coronary Atherectomy;  Surgeon: Travis Connor MD;  Location: [facility] CATH INVASIVE LOCATION;  Service: Cardiovascular;  Laterality: N/A;    COLONOSCOPY N/A 10/12/2024    Procedure: COLONOSCOPY WITH BIOPSY AND WIRE GUIDED BALLOON DILATION OF TERMINAL ILEUM;  Surgeon: Rob Strong MD;  Location: [facility] ENDOSCOPY;  Service: Gastroenterology;  Laterality: N/A;  Colitis, crohns of terminal ileum, right colon ulcers, diverticulosis, hemorroids    ENDOSCOPY N/A 10/12/2024    Procedure: ESOPHAGOGASTRODUODENOSCOPY WITH BIOPSY X 2 AREA;  Surgeon: Rob Strong MD;  Location: [facility] ENDOSCOPY;  Service: Gastroenterology;  Laterality: N/A;  Chronic gastritis, HH    HYSTERECTOMY      LEFT HEART CATH          Social History:   Social History     Tobacco Use    Smoking status: Former     Types: Cigarettes    Smokeless tobacco: Never   Substance Use Topics    Alcohol use: Never      Family History:  Family History   Problem Relation Age of Onset    Heart disease Mother     Dementia Mother     Stroke Mother     Heart disease Father     Hypertension Father           Allergies:  Allergies   Allergen Reactions    Codeine Hives    Penicillin G Sodium Hives     Scheduled Meds:  acetaminophen, 650 mg, Once   Or  acetaminophen, 650 mg, Once   Or  acetaminophen, 650 mg, Once  atorvastatin, 40 mg, Nightly  clopidogrel, 75 mg, Daily  diphenhydrAMINE, 25 mg, Once   Or  diphenhydrAMINE, 25 mg, Once  folic acid, 1,000 mcg, Daily  levothyroxine, 50 mcg, Daily  mesalamine, 4.8 g, Daily  methotrexate, 15 mg, Weekly  methylPREDNISolone sodium succinate, 20 mg, Q8H  metoprolol succinate XL, 25 mg, Q24H  midodrine, 5 mg, TID AC  pantoprazole, 40 mg, Daily  sertraline, 50 mg, Daily  sodium chloride, 10 mL, Q12H  upadacitinib ER, 45 mg, Daily          Review of Systems:   ROS        Constitutional:  Temp:  [97.4 °F (36.3 °C)-98.2 °F (36.8  "°C)] 98.1 °F (36.7 °C)  Heart Rate:  [] 71  Resp:  [5-28] 20  BP: ()/(25-86) 141/61    Physical Exam   /61 (BP Location: Right arm, Patient Position: Lying)   Pulse 71   Temp 98.1 °F (36.7 °C) (Oral)   Resp 20   Ht 162.6 cm (64\")   Wt 54 kg (119 lb)   SpO2 100%   BMI 20.43 kg/m²   General:  Appears in no acute distress  Eyes: Sclera is anicteric,  conjunctiva is clear   HEENT:  No JVD. Thyroid not visibly enlarged. No mucosal pallor or cyanosis  Respiratory: Respirations regular and unlabored at rest.  Clear to auscultation  Cardiovascular: S1,S2 Regular rate and rhythm.  2/6 holosystolic murmur  Gastrointestinal: Abdomen nondistended.  Musculoskeletal:  No abnormal movements  Extremities: No digital clubbing or cyanosis  Skin: Color pink.   Neuro: Alert and awake.    INTAKE AND OUTPUT:    Intake/Output Summary (Last 24 hours) at 11/25/2024 1729  Last data filed at 11/25/2024 1657  Gross per 24 hour   Intake 1596.25 ml   Output --   Net 1596.25 ml       Cardiographics  Telemetry: Sinus    ECG:   ECG 12 Lead Other; post-MitraClip   Final Result   HEART RATE=73  bpm   RR Kfyhkhzh=306  ms   MD Iecktikn=958  ms   P Horizontal Axis=1  deg   P Front Axis=84  deg   QRSD Interval=80  ms   QT Wenvzisx=860  ms   AAcU=104  ms   QRS Axis=39  deg   T Wave Axis=70  deg   - NORMAL ECG -   Sinus rhythm   When compared with ECG of 19-Nov-2024 12:13:34,   Significant repolarization change   Significant axis, voltage or hypertrophy change   Electronically Signed By: Dmitri Navarro ([facility]) 2024-11-22 13:38:55   Date and Time of Study:2024-11-22 06:15:06      ECG 12 Lead Chest Pain   Final Result   HEART RATE=67  bpm   RR Zszgdgym=141  ms   MD Oxwfrptj=130  ms   P Horizontal Axis=28  deg   P Front Axis=87  deg   QRSD Interval=83  ms   QT Qkfwsdqk=452  ms   YHmP=336  ms   QRS Axis=39  deg   T Wave Axis=40  deg   - ABNORMAL ECG -   Sinus rhythm   Low voltage, precordial leads   Nonspecific  T abnormalities, inferior " leads   When compared with ECG of 06-Nov-2024 15:41:09,   Significant repolarization change   Significant axis, voltage or hypertrophy change   Electronically Signed By: Rajiv Dubon ([facility]) 2024-11-19 12:47:59   Date and Time of Study:2024-11-19 12:13:34      Telemetry Scan   Final Result      Telemetry Scan   Final Result      Telemetry Scan   Final Result      Telemetry Scan   Final Result      Telemetry Scan   Final Result      Telemetry Scan   Final Result      Telemetry Scan   Final Result      Telemetry Scan   Final Result      Telemetry Scan   Final Result      Telemetry Scan   Final Result      Telemetry Scan   Final Result      Telemetry Scan   Final Result      Telemetry Scan   Final Result      Telemetry Scan   Final Result      Telemetry Scan   Final Result      Telemetry Scan   Final Result      Telemetry Scan   Final Result      Telemetry Scan   Final Result      Telemetry Scan   Final Result      Telemetry Scan   Final Result      Telemetry Scan   Final Result      Telemetry Scan   Final Result      Telemetry Scan   Final Result      Telemetry Scan   Final Result      Telemetry Scan   Final Result      Telemetry Scan   Final Result      Telemetry Scan   Final Result      Telemetry Scan   Final Result      Telemetry Scan   Final Result      Telemetry Scan   Final Result      Telemetry Scan   Final Result        I have personally reviewed EKG    Echocardiogram: Results for orders placed during the hospital encounter of 11/19/24    Adult Transthoracic Echo Limited W/ Cont if Necessary Per Protocol    Interpretation Summary    Left ventricular ejection fraction appears to be 56 - 60%.    There is a MitraClip mitral valve repair present.    There is trace residual MR.  Mean gradient is 4.6 mmHg      Lab Review   I have reviewed the labs  Results from last 7 days   Lab Units 11/19/24  1503 11/19/24  1246   HSTROP T ng/L 17* 21*     Results from last 7 days   Lab Units 11/22/24  0439   MAGNESIUM mg/dL 1.8  "    Results from last 7 days   Lab Units 11/25/24  0303   SODIUM mmol/L 138   POTASSIUM mmol/L 4.0   BUN mg/dL 23   CREATININE mg/dL 0.69   CALCIUM mg/dL 7.6*         Results from last 7 days   Lab Units 11/25/24  0303 11/24/24  1755 11/24/24  0241 11/23/24  0411   WBC 10*3/mm3 34.52*  --  14.90* 12.82*   HEMOGLOBIN g/dL 6.6* 7.7* 5.5* 8.7*   HEMATOCRIT % 20.2* 24.0* 17.6* 27.6*   PLATELETS 10*3/mm3 164  --  197 284     Results from last 7 days   Lab Units 11/19/24  1246   INR  0.99   APTT seconds 21.9*       RADIOLOGY:  Imaging Results (Last 24 Hours)       Procedure Component Value Units Date/Time    XR Chest 1 View [[id number]] Collected: 11/25/24 0924     Updated: 11/25/24 0927    Narrative:      XR CHEST 1 VW    Date of Exam: 11/25/2024 8:18 AM EST    Indication: Right sided chest pain with breathing    Comparison: Chest radiograph 11/19/2024    Findings:  Stable cardiomediastinal silhouette. Aortic atherosclerotic disease. Coronary atherosclerotic disease. Negative for lobar consolidation, edema, effusion or pneumothorax. Degenerative related osseous change.      Impression:      Impression:  No active pulmonary process.      Electronically Signed: Jameson Rainey MD    11/25/2024 9:25 AM EST    Workstation ID: [id number]                  )11/25/2024  Travis Connor MD      EMR Dragon/Transcription:   \"Dictated utilizing Dragon dictation\".   "

## 2024-11-25 NOTE — PROGRESS NOTES
[facility] MEDICINE SERVICE  DAILY PROGRESS NOTE    NAME: Maryann Gaitan  : 1950  MRN: [id number]      LOS: 6 days     PROVIDER OF SERVICE: Phyllis Vicente MD    Chief Complaint: Acute lower GI bleeding    Subjective:     Interval History:    Patient seen and evaluated at bedside.   H&P, const, labs and imaging reviewed.  Treatment plan discussed with patient. All questions addressed.     Review of Systems:   All 21 ROS were negative except mentioned above.    Objective:     Vital Signs  Temp:  [97.4 °F (36.3 °C)-98.2 °F (36.8 °C)] 97.9 °F (36.6 °C)  Heart Rate:  [] 76  Resp:  [5-28] 20  BP: ()/(25-86) 140/84  Flow (L/min) (Oxygen Therapy):  [4-6] 4   Body mass index is 20.43 kg/m².    Physical Exam   General: No acute distress, appears stated age  Neuro: Awake and alert, oriented x3, no focal deficits appreciated  Head: atraumatic, normocephalic  HEENT: EOMI, anicteric, normal sclera and conjunctivae, moist mucus membranes  Neck: supple, no lymphadenopathy  CV: RRR, soft heart sounds, no murmurs appreciated, no peripheral edema  Pulm: Decreased breath sounds, no increased work of breathing, no adventitious sounds  Abd: Soft, tenderness on left lower quadrant, nondistended  Skin: Warm, dry and intact  Psych: Appropriate mood and affect    Scheduled Meds   acetaminophen, 650 mg, Oral, Once   Or  acetaminophen, 650 mg, Oral, Once   Or  acetaminophen, 650 mg, Rectal, Once  atorvastatin, 40 mg, Oral, Nightly  clopidogrel, 75 mg, Oral, Daily  diphenhydrAMINE, 25 mg, Oral, Once   Or  diphenhydrAMINE, 25 mg, Intravenous, Once  folic acid, 1,000 mcg, Oral, Daily  levothyroxine, 50 mcg, Oral, Daily  mesalamine, 4.8 g, Oral, Daily  methotrexate, 15 mg, Oral, Weekly  methylPREDNISolone sodium succinate, 20 mg, Intravenous, Q8H  metoprolol succinate XL, 25 mg, Oral, Q24H  midodrine, 5 mg, Oral, TID AC  pantoprazole, 40 mg, Oral, Daily  sertraline, 50 mg, Oral, Daily  sodium chloride, 10 mL, Intravenous,  Q12H  upadacitinib ER, 45 mg, Oral, Daily       PRN Meds     acetaminophen    senna-docusate sodium **AND** polyethylene glycol **AND** bisacodyl **AND** bisacodyl    diphenhydrAMINE    nitroglycerin    ondansetron ODT **OR** ondansetron    [COMPLETED] Insert Peripheral IV **AND** sodium chloride    sodium chloride    sodium chloride   Infusions         Diagnostic Data    Results from last 7 days   Lab Units 11/25/24  0303   WBC 10*3/mm3 34.52*   HEMOGLOBIN g/dL 6.6*   HEMATOCRIT % 20.2*   PLATELETS 10*3/mm3 164   GLUCOSE mg/dL 172*   CREATININE mg/dL 0.69   BUN mg/dL 23   SODIUM mmol/L 138   POTASSIUM mmol/L 4.0   AST (SGOT) U/L 18   ALT (SGPT) U/L 24   ALK PHOS U/L 43   BILIRUBIN mg/dL 0.4   ANION GAP mmol/L 12.2       XR Chest 1 View    Result Date: 11/25/2024  Impression: No active pulmonary process. Electronically Signed: Jameson Rainey MD  11/25/2024 9:25 AM EST  Workstation ID: [id number]     Interval results reviewed.    Assessment/Plan:   Patient is status post transfusion of 1 unit packed red blood cells, repeat hemoglobin 7.2.  Monitor H&H  Continue IV steroids.  GI evaluated the patient for Crohn's disease exacerbation.  Recommended to continue IV Solu-Medrol, continue Rinvoq 45 mg.  Continue to monitor H&H  Cardiology and hematology following the patient  Patient's status post MitraClip.  Continue Plavix patient is on midodrine increase dose to 10 mg 3 times daily as blood pressure still low.  ID was consulted for recurrent [place] infestation.  White blood count has jumped to 34,000, chest x-ray negative blood culture pending,  stool ova and parasite pending    Treatment plan discussed with RN.     VTE Prophylaxis:  Mechanical VTE prophylaxis orders are present.         Code status is   Code Status and Medical Interventions: CPR (Attempt to Resuscitate); Full Support   Ordered at: 11/21/24 1014     Level Of Support Discussed With:    Patient     Code Status (Patient has no pulse and is not breathing):     CPR (Attempt to Resuscitate)     Medical Interventions (Patient has pulse or is breathing):    Full Support       Plan for disposition:     Barriers to Discharge: On IV steroids   Anticipated Date of Discharge: 11/27/2024   Place of Discharge: home      Time: 40 minutes     Signature: Electronically signed by Phyllis Vicente MD, 11/25/24, 13:01 EST.  [facility] Hospitalist Team

## 2024-11-25 NOTE — THERAPY TREATMENT NOTE
"Assessment: Maryann Gaitan presents with ADL impairments affecting function including balance, cognition, endurance / activity tolerance, pain, and strength. Pt alert and attentive, however noted with increased confusion near end of treatment session. Per RN, pt has been in/out of confusion. Pt dependent assist for karine-care and demos ability to roll supine with verbal cueing. Dependent assist to scoot HOB to reposition. Demonstrated functioning below baseline abilities indicate the need for continued skilled intervention while inpatient. Tolerating session today without incident. Will continue to follow and progress as tolerated.      Plan/Recommendations:   Low Intensity Therapy recommended post-acute care - This is recommended as therapy feels this patient would require 2-3 visits per week. OP or HH would be the best option depending on patient's home bound status. Consider, if the patient has other  \"skilled\" needs such as wounds, IV antibiotics, etc. Combined with \"low intensity\" could also equate to a SNF. If patient is medically complex, consider LTAC.  "

## 2024-11-25 NOTE — CASE MANAGEMENT/SOCIAL WORK
Continued Stay Note  [facility]     Patient Name: Maryann Gaitan  MRN: [id number]  Today's Date: 11/25/2024    Admit Date: 11/19/2024    Plan: Return home w [facility] (current, need order)   Discharge Plan       Row Name 11/25/24 0835       Plan    Plan Return home w [facility] (current, need order)    Plan Comments DC Barriers: Hgb 6.6, transfusing PRBC. N/V, bloody stools, GI following. ID awaiting ova and parasite screen, elevated WBC (34.52)               Shraddha Lozano RN      [facility]  Office: [phone]  Cell: [phone]  Fax # [phone]

## 2024-11-25 NOTE — PROGRESS NOTES
Nutrition Services  Patient Name: Maryann Gaitan  YOB: 1950  MRN: [id number]  Admission date: 11/19/2024    PROGRESS NOTE      Nutrition Intervention Updates: Continue to encourage good po intake and ONS consumption as tolerated/clinically feasible.       Encounter Information: Checking in to monitor PO diet tolerance and intake. Pt continues to report loose stools with rectal bleeding with N/V reported as well. Pt is consuming > 75% of meals per EMR documentation.        PO Diet: Diet: Gastrointestinal; Fiber-Restricted; Fluid Consistency: Thin (IDDSI 0)   PO Supplements: Boost Original BID (Provides 480 kcals, 20 g protein if consumed)   Boost Original may be substituted for Boost Plus at this time, due to national shortage of many ONS products. If substituted, each Boost Original will provide 240 kcal and 10g PRO.     PO Intake:  86% average po intake x last 11 documented meals        Current nutrition support: -   Nutrition support review: -       Labs (reviewed below): Reviewed. Management per attending        GI Function:  Last documented BM 11/24       Brief weight review   Wt Readings from Last 10 Encounters:   11/22/24 0724 54 kg (119 lb)   11/21/24 1129 54.1 kg (119 lb 4.3 oz)   11/21/24 0320 45.9 kg (101 lb 3.1 oz)   11/19/24 1551 45.3 kg (99 lb 13.9 oz)   11/19/24 1207 45.4 kg (100 lb)   11/13/24 1312 54.9 kg (121 lb)   11/06/24 1426 52.6 kg (115 lb 15.4 oz)   10/30/24 1346 52.6 kg (116 lb)   10/12/24 1153 57.6 kg (127 lb)   10/12/24 0757 57.6 kg (127 lb)   10/11/24 0420 58 kg (127 lb 13.9 oz)   10/10/24 0503 58.1 kg (128 lb 1.4 oz)   10/08/24 1956 54.9 kg (121 lb 0.5 oz)   10/08/24 0828 54.9 kg (121 lb)        Results from last 7 days   Lab Units 11/25/24  0303 11/24/24  0211 11/23/24  0411   SODIUM mmol/L 138 139 141   POTASSIUM mmol/L 4.0 4.6 3.9   CHLORIDE mmol/L 109* 108* 108*   CO2 mmol/L 16.8* 18.1* 22.3   BUN mg/dL 23 20 19   CREATININE mg/dL 0.69 0.63 0.55*   CALCIUM mg/dL 7.6*  7.8* 8.3*   BILIRUBIN mg/dL 0.4 0.2 0.2   ALK PHOS U/L 43 57 76   ALT (SGPT) U/L 24 30 25   AST (SGOT) U/L 18 23 18   GLUCOSE mg/dL 172* 122* 128*     Results from last 7 days   Lab Units 11/25/24  0303 11/22/24  1758 11/22/24  0439 11/21/24  0129 11/20/24  0246   MAGNESIUM mg/dL  --   --  1.8 2.0 2.0   HEMOGLOBIN g/dL 6.6*   < > 7.4* 9.0* 9.1*   HEMATOCRIT % 20.2*   < > 23.2* 27.6* 29.5*    < > = values in this interval not displayed.         RD to follow up per protocol.    Electronically signed by:  Renetta Arriaga RD  11/25/24 12:23 EST

## 2024-11-26 LAB
ALBUMIN SERPL-MCNC: 2.4 G/DL (ref 3.5–5.2)
ALBUMIN/GLOB SERPL: 2.2 G/DL
ALP SERPL-CCNC: 40 U/L (ref 39–117)
ALT SERPL W P-5'-P-CCNC: 19 U/L (ref 1–33)
ANION GAP SERPL CALCULATED.3IONS-SCNC: 7.2 MMOL/L (ref 5–15)
AST SERPL-CCNC: 14 U/L (ref 1–32)
BASOPHILS # BLD AUTO: 0.01 10*3/MM3 (ref 0–0.2)
BASOPHILS NFR BLD AUTO: 0 % (ref 0–1.5)
BH BB BLOOD EXPIRATION DATE: NORMAL
BH BB BLOOD TYPE BARCODE: 5100
BH BB DISPENSE STATUS: NORMAL
BH BB PRODUCT CODE: NORMAL
BH BB UNIT NUMBER: NORMAL
BILIRUB SERPL-MCNC: 0.5 MG/DL (ref 0–1.2)
BUN SERPL-MCNC: 27 MG/DL (ref 8–23)
BUN/CREAT SERPL: 39.1 (ref 7–25)
CALCIUM SPEC-SCNC: 7.6 MG/DL (ref 8.6–10.5)
CHLORIDE SERPL-SCNC: 109 MMOL/L (ref 98–107)
CO2 SERPL-SCNC: 19.8 MMOL/L (ref 22–29)
CREAT SERPL-MCNC: 0.69 MG/DL (ref 0.57–1)
CROSSMATCH INTERPRETATION: NORMAL
CRP SERPL-MCNC: 1.14 MG/DL (ref 0–0.5)
D DIMER PPP FEU-MCNC: 0.97 MCGFEU/ML (ref 0–0.74)
DEPRECATED RDW RBC AUTO: 49.6 FL (ref 37–54)
EGFRCR SERPLBLD CKD-EPI 2021: 91.2 ML/MIN/1.73
EOSINOPHIL # BLD AUTO: 0 10*3/MM3 (ref 0–0.4)
EOSINOPHIL NFR BLD AUTO: 0 % (ref 0.3–6.2)
ERYTHROCYTE [DISTWIDTH] IN BLOOD BY AUTOMATED COUNT: 15.1 % (ref 12.3–15.4)
GLOBULIN UR ELPH-MCNC: 1.1 GM/DL
GLUCOSE SERPL-MCNC: 85 MG/DL (ref 65–99)
HCT VFR BLD AUTO: 21.4 % (ref 34–46.6)
HGB BLD-MCNC: 7.4 G/DL (ref 12–15.9)
IMM GRANULOCYTES # BLD AUTO: 0.14 10*3/MM3 (ref 0–0.05)
IMM GRANULOCYTES NFR BLD AUTO: 0.7 % (ref 0–0.5)
LYMPHOCYTES # BLD AUTO: 1.25 10*3/MM3 (ref 0.7–3.1)
LYMPHOCYTES NFR BLD AUTO: 6.2 % (ref 19.6–45.3)
MCH RBC QN AUTO: 31.6 PG (ref 26.6–33)
MCHC RBC AUTO-ENTMCNC: 34.6 G/DL (ref 31.5–35.7)
MCV RBC AUTO: 91.5 FL (ref 79–97)
MONOCYTES # BLD AUTO: 0.25 10*3/MM3 (ref 0.1–0.9)
MONOCYTES NFR BLD AUTO: 1.2 % (ref 5–12)
NEUTROPHILS NFR BLD AUTO: 18.52 10*3/MM3 (ref 1.7–7)
NEUTROPHILS NFR BLD AUTO: 91.9 % (ref 42.7–76)
NRBC BLD AUTO-RTO: 0 /100 WBC (ref 0–0.2)
O+P SPEC MICRO: NORMAL
O+P STL CONC: NORMAL
PLATELET # BLD AUTO: 101 10*3/MM3 (ref 140–450)
PMV BLD AUTO: 9.9 FL (ref 6–12)
POTASSIUM SERPL-SCNC: 3.8 MMOL/L (ref 3.5–5.2)
PROT SERPL-MCNC: 3.5 G/DL (ref 6–8.5)
RBC # BLD AUTO: 2.34 10*6/MM3 (ref 3.77–5.28)
SODIUM SERPL-SCNC: 136 MMOL/L (ref 136–145)
TROPONIN T SERPL HS-MCNC: 36 NG/L
UNIT  ABO: NORMAL
UNIT  RH: NORMAL
WBC NRBC COR # BLD AUTO: 20.17 10*3/MM3 (ref 3.4–10.8)

## 2024-11-26 PROCEDURE — 97116 GAIT TRAINING THERAPY: CPT

## 2024-11-26 PROCEDURE — 25010000002 ONDANSETRON PER 1 MG: Performed by: INTERNAL MEDICINE

## 2024-11-26 PROCEDURE — 86140 C-REACTIVE PROTEIN: CPT | Performed by: NURSE PRACTITIONER

## 2024-11-26 PROCEDURE — 99232 SBSQ HOSP IP/OBS MODERATE 35: CPT | Performed by: NURSE PRACTITIONER

## 2024-11-26 PROCEDURE — 99232 SBSQ HOSP IP/OBS MODERATE 35: CPT | Performed by: INTERNAL MEDICINE

## 2024-11-26 PROCEDURE — 80053 COMPREHEN METABOLIC PANEL: CPT | Performed by: NURSE PRACTITIONER

## 2024-11-26 PROCEDURE — 97530 THERAPEUTIC ACTIVITIES: CPT

## 2024-11-26 PROCEDURE — 97535 SELF CARE MNGMENT TRAINING: CPT

## 2024-11-26 PROCEDURE — 84484 ASSAY OF TROPONIN QUANT: CPT | Performed by: NURSE PRACTITIONER

## 2024-11-26 PROCEDURE — 97110 THERAPEUTIC EXERCISES: CPT

## 2024-11-26 PROCEDURE — 99232 SBSQ HOSP IP/OBS MODERATE 35: CPT | Performed by: STUDENT IN AN ORGANIZED HEALTH CARE EDUCATION/TRAINING PROGRAM

## 2024-11-26 PROCEDURE — 25010000002 CYANOCOBALAMIN PER 1000 MCG: Performed by: NURSE PRACTITIONER

## 2024-11-26 PROCEDURE — 85025 COMPLETE CBC W/AUTO DIFF WBC: CPT | Performed by: INTERNAL MEDICINE

## 2024-11-26 PROCEDURE — 85379 FIBRIN DEGRADATION QUANT: CPT | Performed by: NURSE PRACTITIONER

## 2024-11-26 PROCEDURE — 25010000002 METHYLPREDNISOLONE PER 40 MG: Performed by: STUDENT IN AN ORGANIZED HEALTH CARE EDUCATION/TRAINING PROGRAM

## 2024-11-26 RX ORDER — CYANOCOBALAMIN 1000 UG/ML
1000 INJECTION, SOLUTION INTRAMUSCULAR; SUBCUTANEOUS DAILY
Status: COMPLETED | OUTPATIENT
Start: 2024-11-26 | End: 2024-12-02

## 2024-11-26 RX ADMIN — SERTRALINE HYDROCHLORIDE 50 MG: 50 TABLET ORAL at 08:32

## 2024-11-26 RX ADMIN — Medication 10 ML: at 20:19

## 2024-11-26 RX ADMIN — ONDANSETRON 4 MG: 2 INJECTION, SOLUTION INTRAMUSCULAR; INTRAVENOUS at 17:35

## 2024-11-26 RX ADMIN — METHYLPREDNISOLONE SODIUM SUCCINATE 20 MG: 40 INJECTION, POWDER, FOR SOLUTION INTRAMUSCULAR; INTRAVENOUS at 08:32

## 2024-11-26 RX ADMIN — MESALAMINE 4.8 G: 800 TABLET, DELAYED RELEASE ORAL at 08:32

## 2024-11-26 RX ADMIN — CLOPIDOGREL BISULFATE 75 MG: 75 TABLET ORAL at 08:32

## 2024-11-26 RX ADMIN — LEVOTHYROXINE SODIUM 50 MCG: 0.05 TABLET ORAL at 08:32

## 2024-11-26 RX ADMIN — PANTOPRAZOLE SODIUM 40 MG: 40 TABLET, DELAYED RELEASE ORAL at 08:32

## 2024-11-26 RX ADMIN — ATORVASTATIN CALCIUM 40 MG: 40 TABLET, FILM COATED ORAL at 20:18

## 2024-11-26 RX ADMIN — METOPROLOL SUCCINATE 25 MG: 25 TABLET, FILM COATED, EXTENDED RELEASE ORAL at 08:32

## 2024-11-26 RX ADMIN — CYANOCOBALAMIN 1000 MCG: 1000 INJECTION, SOLUTION INTRAMUSCULAR; SUBCUTANEOUS at 13:59

## 2024-11-26 RX ADMIN — METHYLPREDNISOLONE SODIUM SUCCINATE 20 MG: 40 INJECTION, POWDER, FOR SOLUTION INTRAMUSCULAR; INTRAVENOUS at 02:30

## 2024-11-26 RX ADMIN — METHYLPREDNISOLONE SODIUM SUCCINATE 20 MG: 40 INJECTION, POWDER, FOR SOLUTION INTRAMUSCULAR; INTRAVENOUS at 17:08

## 2024-11-26 RX ADMIN — FOLIC ACID 1000 MCG: 1 TABLET ORAL at 08:32

## 2024-11-26 NOTE — THERAPY TREATMENT NOTE
"Subjective: Pt agreeable to therapeutic plan of care. Upon greeting patient, pt states \"I'm going to die!\", and \"my dad's a pedophile\". Pt unable to elaborate with either statement when asked. Alert and oriented to person, place, time (with cues), but disoriented to situation. Per RN, pt's cognition has been waxing and waning.     Objective:     Bed mobility - Supine to sitting SBA using bedrails, HOB elevated.     Transfers - STS from EOB Min A using RW  STS from recliner chair mod A using RW  STS from low-surface commode Max A using RW.     Ambulation - 3 feet + 5 feet + 3 feet Min-A and with rolling walker    ADLs - Max A for brief management, pericare while standing using RW for support.     Therapeutic Exercise - 20 Reps B LE AROM supported sitting / chair    Vitals: Hypotensive and Orthostatic  Supine /53  Sitting /52  Sitting BP on commode 89/55  Sitting BP on commode after ~5 min with sitting therex 116/62    Pain: 4 VAS   Location: abdomen  Intervention for pain: Repositioned, Increased Activity, and Therapeutic Presence    Education: Provided education on the importance of mobility in the acute care setting, Verbal/Tactile Cues, ADL training, Transfer Training, and Gait Training    Assessment: Maryann Gaitan presents with functional mobility impairments which indicate the need for skilled intervention. Tolerating session today without incident. Pt notably confused at beginning of session. Pt quickly fatigues with functional mobility tasks, initially requiring min A to stand from EOB, and requires max A to stand from commode towards end of session. Gait distance limited by generalized weakness and poor activity tolerance. Also noted to be hypotensive. Pt is not safe to d/c home and would benefit from SNF at discharge to address functional and cognitive deficits. Will continue to follow and progress as tolerated.     Plan/Recommendations:   If medically appropriate, Moderate Intensity Therapy " "recommended post-acute care. This is recommended as therapy feels the patient would require 3-4 days per week and wouldn't tolerate \"3 hour daily\" rehab intensity. SNF would be the preferred choice. If the patient does not agree to SNF, arrange HH or OP depending on home bound status. If patient is medically complex, consider LTACH. Pt requires no DME at discharge.     Pt desires Skilled Rehab placement at discharge. Pt cooperative; agreeable to therapeutic recommendations and plan of care.         Basic Mobility 6-click:  Rollin = Total, A lot = 2, A little = 3; 4 = None  Supine>Sit:   1 = Total, A lot = 2, A little = 3; 4 = None   Sit>Stand with arms:  1 = Total, A lot = 2, A little = 3; 4 = None  Bed>Chair:   1 = Total, A lot = 2, A little = 3; 4 = None  Ambulate in room:  1 = Total, A lot = 2, A little = 3; 4 = None  3-5 Steps with railin = Total, A lot = 2, A little = 3; 4 = None  Score: 15    Modified Doug: N/A = No pre-op stroke/TIA    Post-Tx Position: Up in Chair, Alarms activated, and Call light and personal items within reach  PPE: gloves    Therapy Charges for Today       Code Description Service Date Service Provider Modifiers Qty    82992907730 HC GAIT TRAINING EA 15 MIN 2024 Kris Grigsby, PEPPER GP 1    82401847950 HC PT THERAPEUTIC ACT EA 15 MIN 2024 Kris Grigsby PTA GP 1    64236129290 HC PT SELF CARE/MGMT/TRAIN EA 15 MIN 2024 Kris Grigsby PTA GP 1    26660691542 HC PT THER PROC EA 15 MIN 2024 Kris Grigsby PTA GP 1           PT Charges       Row Name 24 1314             Time Calculation    Start Time 0933  -UN      Stop Time 1006  -UN      Time Calculation (min) 33 min  -UN      PT Received On 24  -UN      PT - Next Appointment 24  -UN         Time Calculation- PT    Total Timed Code Minutes- PT 33 minute(s)  -UN                User Key  (r) = Recorded By, (t) = Taken By, (c) = Cosigned " By      Initials Name Provider Type    UN Kris Grigsby, PTA Physical Therapist Assistant

## 2024-11-26 NOTE — PROGRESS NOTES
[facility] MEDICINE SERVICE  DAILY PROGRESS NOTE    NAME: Maryann Gaitan  : 1950  MRN: [id number]      LOS: 7 days     PROVIDER OF SERVICE: Ron Denton MD    Chief Complaint: Acute lower GI bleeding    Subjective:     Interval History:  History taken from: patient  Patient Complaints:   Feeling frustrated that she unfortunately continued to have lower GI bleed, her hemoglobin again did drop yesterday to 6.6 and required blood transfusion, denied any abdominal pain this morning she was eating her breakfast, we touched base about the possible need for surgery going forward which was recommended by GI, patient understand this part and she is seems to be on board with this plan going forward      Objective:     Vital Signs  Temp:  [97.9 °F (36.6 °C)-98.3 °F (36.8 °C)] 98.2 °F (36.8 °C)  Heart Rate:  [61-77] 69  Resp:  [19-22] 22  BP: (104-141)/(50-84) 110/55  Flow (L/min) (Oxygen Therapy):  [4] 4   Body mass index is 20.43 kg/m².    Physical Exam  General Appearance:  Alert, cooperative, no distress, appears stated age  Head:   Normocephalic, without obvious abnormality, atraumatic  Eyes:   PERRL, conjunctiva/corneas clear, EOM's intact, fundi benign, both eyes  Ears:    Normal TM's and external ear canals, both ears  Nose:   Nares normal, septum midline, mucosa normal, no drainage or sinus tenderness  Throat: Lips, mucosa, and tongue normal; teeth and gums normal  Neck:   Supple, symmetrical, trachea midline, no adenopathy, thyroid: not enlarged, symmetric, no tenderness/mass/nodules, no carotid bruit or JVD  Lungs:             Clear to auscultation bilaterally, respirations unlabored  Heart:  Regular rate and rhythm, S1, S2 normal, no murmur, rub or gallop  Abdomen:  Soft, non-tender, bowel sounds active all four quadrants,  no masses, no organomegaly  Extremities:     Extremities normal, atraumatic, no cyanosis or edema  Pulses:            2+ and symmetric  Skin:    Skin color, texture, turgor  normal, no rashes or lesions  Neurologic: Normal    Scheduled Meds   acetaminophen, 650 mg, Oral, Once   Or  acetaminophen, 650 mg, Oral, Once   Or  acetaminophen, 650 mg, Rectal, Once  atorvastatin, 40 mg, Oral, Nightly  clopidogrel, 75 mg, Oral, Daily  diphenhydrAMINE, 25 mg, Oral, Once   Or  diphenhydrAMINE, 25 mg, Intravenous, Once  folic acid, 1,000 mcg, Oral, Daily  levothyroxine, 50 mcg, Oral, Daily  mesalamine, 4.8 g, Oral, Daily  methotrexate, 15 mg, Oral, Weekly  methylPREDNISolone sodium succinate, 20 mg, Intravenous, Q8H  metoprolol succinate XL, 25 mg, Oral, Q24H  midodrine, 5 mg, Oral, TID AC  pantoprazole, 40 mg, Oral, Daily  sertraline, 50 mg, Oral, Daily  sodium chloride, 10 mL, Intravenous, Q12H  upadacitinib ER, 45 mg, Oral, Daily       PRN Meds     acetaminophen    senna-docusate sodium **AND** polyethylene glycol **AND** bisacodyl **AND** bisacodyl    diphenhydrAMINE    nitroglycerin    ondansetron ODT **OR** ondansetron    [COMPLETED] Insert Peripheral IV **AND** sodium chloride    sodium chloride    sodium chloride   Infusions         Diagnostic Data    Results from last 7 days   Lab Units 11/26/24  0424   WBC 10*3/mm3 20.17*   HEMOGLOBIN g/dL 7.4*   HEMATOCRIT % 21.4*   PLATELETS 10*3/mm3 101*   GLUCOSE mg/dL 85   CREATININE mg/dL 0.69   BUN mg/dL 27*   SODIUM mmol/L 136   POTASSIUM mmol/L 3.8   AST (SGOT) U/L 14   ALT (SGPT) U/L 19   ALK PHOS U/L 40   BILIRUBIN mg/dL 0.5   ANION GAP mmol/L 7.2       XR Chest 1 View    Result Date: 11/25/2024  Impression: No active pulmonary process. Electronically Signed: Jameson Rainey MD  11/25/2024 9:25 AM EST  Workstation ID: [id number]       I reviewed the patient's new clinical results.    Assessment/Plan:     Active and Resolved Problems  Active Hospital Problems    Diagnosis  POA    **Acute lower GI bleeding [K92.2]  Yes    S/P mitral valve clip implantation [Z98.890, Z95.818]  Not Applicable    Primary hypertension [I10]  Yes    Moderate  protein-calorie malnutrition [E44.0]  Yes    Hypothyroidism (acquired) [E03.9]  Yes    COPD (chronic obstructive pulmonary disease) [J44.9]  Yes    Rheumatoid arthritis [M06.9]  Yes    Crohn's disease [K50.90]  Yes    Anxiety associated with depression [F41.8]  Yes    Dyslipidemia [E78.5]  Yes    Severe mitral regurgitation [I34.0]  Yes    Acute heart failure with preserved ejection fraction (HFpEF) [I50.31]  Yes    CAD, multiple vessel [I25.10]  Yes      Resolved Hospital Problems   No resolved problems to display.       Acute lower GI bleed/abdominal pain and rectal bleed.  -In the settings of active Crohn exacerbation while the patient is unfortunately on Plavix secondary to her recent PCI, risk seems to be more than benefit to stop it  -I have already discussed the case with GI this morning with recommendation to keep Plavix on board  -Patient hemoglobin did drop again to 6.6 and required another blood transfusion  -Goal to keep hemoglobin above 7  -GI has been followed the patient she is currently on IV Solu-Medrol along with Lialda  -OK to continue Rinvoq for now.  Can consider Skyrizi if she does not respond to Rinvoq.  Per cardio:Patient unfortunately is in a tough situation.  Had drug-eluting stents done 10/22/2024, hardly 2 weeks ago.  Would benefit from Plavix to prevent stent thrombosis.  Patient has been receiving Plavix uninterrupted.    -follow stool ova and parasite   -ID consulted for recurrent infestations  -might need CRS for consideration of colectomy per GI rec         Mitral regurgitation  Transseptal left heart catheterization  Transcatheter [id number] repair of mitral valve MitraMauriceip NT on 11/21        Hypertension; currently with low blood pressures     CAD  -Patient has previous recent history of PCI with stent placement and was on DAPT although this is likely exacerbating her GI bleed  -Holding aspirin in setting of bleeding  -Was get on Plavix  -Continue statin for now  -cardiology is  following     Hypothyroidism  -continue home Synthroid      VTE Prophylaxis:  Mechanical VTE prophylaxis orders are present.         Code status is   Code Status and Medical Interventions: CPR (Attempt to Resuscitate); Full Support   Ordered at: 11/21/24 1014     Level Of Support Discussed With:    Patient     Code Status (Patient has no pulse and is not breathing):    CPR (Attempt to Resuscitate)     Medical Interventions (Patient has pulse or is breathing):    Full Support       Plan for disposition: Depends on clinical course    Time: 30 minutes    Signature: Electronically signed by Ron Denton MD, 11/26/24, 10:08 [facility].  [facility] Hospitalist Team

## 2024-11-26 NOTE — PLAN OF CARE
Problem: Adult Inpatient Plan of Care  Goal: Absence of Hospital-Acquired Illness or Injury  Intervention: Identify and Manage Fall Risk  Description: Perform standard risk assessment on admission using a validated tool or comprehensive approach appropriate to the patient; reassess fall risk frequently, with change in status or transfer to another level of care.  Communicate risk to interprofessional healthcare team; ensure fall risk visible cue.  Determine need for increased observation, equipment and environmental modification, as well as use of supportive, nonskid footwear.  Adjust safety measures to individual needs and identified risk factors.  Reinforce the importance of active participation with fall risk prevention, safety, and physical activity with the patient and family.  Perform regular intentional rounding to assess need for position change, pain assessment and personal needs, including assistance with toileting.  Recent Flowsheet Documentation  Taken 11/26/2024 0200 by Enrique Ku LPN  Safety Promotion/Fall Prevention:   safety round/check completed   room organization consistent   muscle strengthening facilitated   nonskid shoes/slippers when out of bed   mobility aid in reach   lighting adjusted   fall prevention program maintained   clutter free environment maintained   assistive device/personal items within reach   activity supervised  Taken 11/26/2024 0000 by Enrique Ku LPN  Safety Promotion/Fall Prevention:   safety round/check completed   room organization consistent   nonskid shoes/slippers when out of bed   muscle strengthening facilitated   mobility aid in reach   lighting adjusted   fall prevention program maintained   clutter free environment maintained   assistive device/personal items within reach   activity supervised  Taken 11/25/2024 2300 by Enrique Ku LPN  Safety Promotion/Fall Prevention:   safety round/check completed   room organization consistent    nonskid shoes/slippers when out of bed   muscle strengthening facilitated   mobility aid in reach   lighting adjusted   fall prevention program maintained   clutter free environment maintained   assistive device/personal items within reach   activity supervised  Taken 11/25/2024 2040 by Enrique Ku LPN  Safety Promotion/Fall Prevention:   safety round/check completed   room organization consistent   nonskid shoes/slippers when out of bed   muscle strengthening facilitated   mobility aid in reach   lighting adjusted   fall prevention program maintained   clutter free environment maintained   assistive device/personal items within reach   activity supervised  Taken 11/25/2024 1923 by Enrique Ku LPN  Safety Promotion/Fall Prevention:   safety round/check completed   room organization consistent   nonskid shoes/slippers when out of bed   muscle strengthening facilitated   mobility aid in reach   lighting adjusted   fall prevention program maintained   clutter free environment maintained   assistive device/personal items within reach   activity supervised  Intervention: Prevent Skin Injury  Description: Perform a screening for skin injury risk, such as pressure or moisture-associated skin damage on admission and at regular intervals throughout hospital stay.  Keep all areas of skin (especially folds) clean and dry.  Maintain adequate skin hydration.  Relieve and redistribute pressure and protect bony prominences and skin at risk for injury; implement measures based on patient-specific risk factors.  Match turning and repositioning schedule to clinical condition.  Encourage weight shift frequently; assist with reposition if unable to complete independently.  Float heels off bed; avoid pressure on the Achilles tendon.  Keep skin free from extended contact with medical devices.  Optimize nutrition and hydration.  Encourage functional activity and mobility, as early as tolerated.  Use aids (e.g., slide  boards, mechanical lift) during transfer.  Recent Flowsheet Documentation  Taken 11/26/2024 0200 by Enrique Ku LPN  Body Position: position changed independently  Taken 11/26/2024 0000 by Enrique Ku LPN  Body Position: position changed independently  Taken 11/25/2024 2300 by Enrique Ku LPN  Body Position: position changed independently  Taken 11/25/2024 2040 by Enrique Ku LPN  Body Position: position changed independently  Taken 11/25/2024 1923 by Enrique Ku LPN  Body Position: position changed independently  Intervention: Prevent and Manage VTE (Venous Thromboembolism) Risk  Description: Assess for VTE (venous thromboembolism) risk.  Promote early mobilization; encourage both active and passive leg exercises, if unable to ambulate.  Initiate and maintain compression or other therapy, as indicated, based on identified risk in accordance with organizational protocol and provider order.  Recognize the patient's individual risk for bleeding before initiating pharmacologic thromboprophylaxis.  Recent Flowsheet Documentation  Taken 11/25/2024 1923 by Enrique Ku LPN  VTE Prevention/Management: patient refused intervention  Intervention: Prevent Infection  Description: Maintain skin and mucous membrane integrity; promote hand, oral and pulmonary hygiene.  Optimize fluid balance, nutrition, sleep and glycemic control to maximize infection resistance.  Identify potential sources of infection early to prevent or mitigate progression of infection (e.g., wound, lines, devices).  Evaluate ongoing need for invasive devices; remove promptly when no longer indicated.  Review vaccination status.  Recent Flowsheet Documentation  Taken 11/26/2024 0200 by Enrique Ku LPN  Infection Prevention:   visitors restricted/screened   single patient room provided   rest/sleep promoted   personal protective equipment utilized   hand hygiene promoted  Taken 11/26/2024 0000 by  Enrique Ku LPN  Infection Prevention:   visitors restricted/screened   single patient room provided   rest/sleep promoted   personal protective equipment utilized   hand hygiene promoted  Taken 11/25/2024 2300 by Enrique Ku LPN  Infection Prevention:   visitors restricted/screened   single patient room provided   rest/sleep promoted   personal protective equipment utilized   hand hygiene promoted  Taken 11/25/2024 2040 by Enrique Ku LPN  Infection Prevention:   visitors restricted/screened   single patient room provided   personal protective equipment utilized   rest/sleep promoted   hand hygiene promoted  Taken 11/25/2024 1923 by Enrique Ku LPN  Infection Prevention: single patient room provided  Goal: Optimal Comfort and Wellbeing  Intervention: Monitor Pain and Promote Comfort  Description: Assess pain level, treatment efficacy and patient response at regular intervals using a consistent pain scale.  Consider the presence and impact of preexisting chronic pain.  Encourage patient and caregiver involvement in pain assessment, interventions and safety measures.  Promote activity; balance with sleep and rest to enhance healing.  Recent Flowsheet Documentation  Taken 11/26/2024 0000 by Enrique Ku LPN  Pain Management Interventions:   position adjusted   pain medication given   pain management plan reviewed with patient/caregiver  Intervention: Provide Person-Centered Care  Description: Use a family-focused approach to care; encourage support system presence and participation.  Develop trust and rapport by proactively providing information, encouraging questions, addressing concerns and offering reassurance.  Acknowledge emotional response to hospitalization.  Recognize and utilize personal coping strategies and strengths; develop goals via shared decision-making.  Honor spiritual and cultural preferences.  Recent Flowsheet Documentation  Taken 11/26/2024 0000 by  Enrique Ku LPN  Trust Relationship/Rapport: care explained  Taken 11/25/2024 1923 by Enrique Ku LPN  Trust Relationship/Rapport: care explained   Goal Outcome Evaluation:progress toward goal ongoing cont to monitor H&H and VS, awaiting on stool culture , cont to monitor

## 2024-11-26 NOTE — PROGRESS NOTES
Cardiology Progress Note    Patient Identification:  Name: Maryann Gaitan  Age: 74 y.o.  Sex: female  :  1950  MRN: [id number]                 Follow Up / Chief Complaint: CAD/PCI, GI Bleed, MR status post Azeb Clip   Chief Complaint   Patient presents with    Chest Pain       Interval History: Patient presented with chest pain and abdominal pain.  Has been having bright red blood per rectum.  Was seen by structural heart for mitral regurgitation was scheduled for outpatient procedure.  Underwent MitraClip 2024  Patient is having issues with rectal bleed and anemia requiring transfusions.         Subjective: Patient seen and examined.  Chart reviewed.  Labs reviewed.  Patient without chest pain or shortness of breath.  Did not have any bloody diarrhea today.      Objective:  2024: Glucose elevated.  CBC with white count of 13.5, hemoglobin 9  2024: WBC 14.13, hemoglobin 7.4  EKG normal sinus rhythm  Limited echo shows EF at 56 to 60% MitraClip in place with trace residual MR mean creatinine of 4.6  2024: Hemoglobin 6.6  2024: Hemoglobin is 7.4.    History of present illness:    Ms. Maryann Gaitan has PMH of     CAD status post cardiac cath with multivessel CAD poor candidate for CABG, underwent PCI to ostial and proximal LAD 10/22/2024  Moderate to severe MR with central jet noted  COPD  Hypertension  Rheumatoid arthritis  Crohn's disease     Presented to the emergency room on 2024 with abdominal pain and chest pain.  Patient reports that she started having bright red blood per rectum again yesterday and has been having constant chest pain since.     Labs in the emergency room was noted to have a hemoglobin of 5.7 high-sensitivity troponin 21--17 BUN 28 creatinine 0.74 total protein 5.1 albumin 2.9 ALT 35 WBC 16.93 chest x-ray without any acute finding EKG sinus rhythm with nonspecific ST abnormalities  CT abdomen and pelvis shows distal terminal ileitis colonic fatty  marrow infiltration which can be seen in the setting of chronic inflammatory bowel disease   patient was transfused with 1 unit of PRBC and hemoglobin improved to 7.3 and this morning she is 9.1     Unfortunately patient is in the tough situation as she had stent placed on 10/22/2024 and needs dual antiplatelet therapy to prevent clotting of stent however she has presented for the second time with significant GI bleeding.  Currently her aspirin and Plavix are both on hold.        Recent hospitalization  Presented 11/6/2024 through [place] [facility] with complaint of abdominal pain and dark red stool / rectal bleed.  Patient has close disease and rheumatoid arthritis and is on immunosuppressants methotrexate, Humira, Rinvoq now presented with rectal bleed and abdominal pain.  GI did a scope and biopsies of terminal ileum which were consistent with active Crohn's and biopsies of stomach and duodenum showed strong colitis infection, was treated by ivermectin.  GI started patient on Solu-Medrol and is holding immunosuppressants and wants to hold Plavix.     Patient was recently in the hospital 10/8/2024 with nausea vomiting diarrhea and abnormal labs.  With hyponatremia and hypokalemia and anemia.  Patient suddenly started having chest pain and fast team was called because of sharp left-sided chest pain with shortness of breath dizziness EKG showed sinus tachycardia and cardiac workup revealed severe MR, cath 10/15/2024 revealing severe ostial LAD and outpouching in the descending thoracic aorta probably a penetrating aortic ulcer versus aneurysm.  CT surgery was consulted and she was turned down for CABG therefore patient underwent drug-eluting stent to ostial LAD on 10/22/2024 and was supposed to have clip to mitral valve in follow-up.  In the meantime has been having worsening symptoms and abdominal pain, weight loss.  Patient has been restarted on Plavix.  Will continue for bleeding closely.     Data:  Labs 11/6/2024 -  11/7/2024 revealed sodium 134, BUN/creatinine of 75/2.84, glucose 138, albumin 2.9.  Hemoglobin 9.4 has come down to 8.  MCV 98  EKG done 11/6/2024 reviewed/interpreted by me reveals sinus bradycardia at the rate of 58 bpm.        EGD/colonoscopy 10/12/2024 revealed small hiatal hernia, nonerosive gastritis, T1 stricture s/p dilatation with 12 mm, T1 ulcer, colon ulcers, sigmoid diverticulosis, grade 2 internal hemorrhoids and strongyloides  Echo 10/12/2024 EF of 51 to 55% with mild RV enlargement, severe left atrial enlargement, moderate to severe MR  Transesophageal echo 10/16/2024: LVEF of 55 to 60% with severe left atrial enlargement, moderate to severe MR and grade 3 descending aortic plaque  Cardiac cath 10/15/2024 reveals total right and severe ostial LAD, descending thoracic aorta had an outpouching consistent with penetrating aortic ulcer versus aneurysm.   Patient was evaluated by CT surgery not a candidate for CABG therefore patient underwent PCI and drug-eluting stent to the ostium proximal LAD.  And she was seen by valve team and will be set up for MitraClip once she recovers from this hospitalization      Assessment:  :     Abdominal pain, rectal bleeding  Anemia requiring blood transfusion  Chest pain  Crohn's disease  CAD, status post PCI  Mitral regurgitation  Chronic HFpEF due to valvular heart disease from mitral regurgitation  Hypertensive cardiovascular disease from hypertension  Hyponatremia  Hypokalemia  Crohn's disease  Normocytic anemia  COPD, chronic steroid therapy  Multifocal pneumonia  Hyperglycemia, prediabetes with A1c of 5.6 from     Recommendations / Plan:         Patient presented 11/19/2024 with abdominal pain and bright red blood per rectum was found to have a hemoglobin of 5.7 was transfused.  Patient chest pain.  HS troponin is normal.  Patient recently had coronary drug-eluting stent placed on 10/22/2024 would benefit from antiplatelet therapy.  Antiplatelet therapy is  currently on hold due to GI bleed.  Will follow and restart Plavix when okay with GI.  Patient was seen by structural heart underwent mitral valve clip 11/21/2024.  Patient unfortunately is having Crohn's flareup with bloody diarrhea and requiring transfusions.  Will continue to monitor hemoglobin  Follow-up with GI and heme-onc and admitting team.            Review of records  Patient presented 10/9/2024 because of abnormal labs showing low sodium, was complaining of nausea vomiting and diarrhea.    Patient underwent cardiac cath 10/15/2024 which revealed total right and severe ostial LAD disease.  Descending thoracic aorta has an outpouching probably saccular aneurysm versus  penetrating aortic ulcer .  Echocardiogram 10/12/2024 is revealing EF of 51 to 55% with mild RV enlargement severe left atrial enlargement and right atrial enlargement and moderate to severe MR  GONZÁLEZ is revealing moderate to severe MR.  Patient would benefit from CABG and mitral valve surgery.  Patient was seen by .  Patient has been turned down for surgery due to multiple comorbid conditions.  Patient has a cavitary lesion in her lungs had bronchoscopy.  Had multiple mucous plugs.  Patient underwent drug-eluting stent to ostial LAD 10/12/2024.  Patient was sent home on dual antiplatelet therapy with aspirin and Plavix, metoprolol and statins and losartan as tolerated.  Will follow-up mitral regurgitation and follow-up in with structural heart team.  Patient had an EKG done 10/12/2024 which revealed sinus rhythm degenerating to A-fib with RVR.  Patient would benefit from long-term anticoagulation to prevent thromboembolic events.  Given her Crohn's disease and microcytic anemia patient will be a poor candidate for long-term anticoagulation.  Will follow and consider watchman evaluation as outpatient.  Patient was not tolerating losartan and metoprolol was given intermittent midodrine.  Now patient presents with abdominal pain  and rectal bleed.  GI was recommending holding Plavix.  Patient unfortunately is in a tough situation.  Had drug-eluting stents done 10/22/2024, hardly 2 weeks ago.  Would benefit from Plavix to prevent stent thrombosis.  Patient has been receiving Plavix uninterrupted.       Copied text in this portion of the note has been reviewed and is accurate as of 11/26/2024    Past Medical 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 07/05/2016    Dyslipidemia 10/30/2024    Fecal urgency 11/21/2024    First degree hemorrhoids 07/09/2021    GERD (gastroesophageal reflux disease) 11/21/2024    Hashimoto's thyroiditis 11/21/2024    History of colonic polyps 07/09/2021    Hypertension     Hypothyroidism (acquired) 11/06/2024    Mitral regurgitation 10/08/2024    Moderate protein-calorie malnutrition 11/09/2024    Multiple tracheobronchial mucus plugs 10/08/2024    Nicotine dependence 11/21/2024    Rheumatoid arthritis 11/06/2024    S/P mitral valve clip implantation 11/21/2024    Second degree hemorrhoids 07/05/2016    Stress incontinence, female 11/21/2024    Vitamin D deficiency, unspecified 11/21/2024     Past Surgical History:  Past Surgical History:   Procedure Laterality Date    BRONCHOSCOPY N/A 10/16/2024    Procedure: BRONCHOSCOPY;  Surgeon: Gallo Pope MD;  Location: [facility] ENDOSCOPY;  Service: Pulmonary;  Laterality: N/A;    CARDIAC CATHETERIZATION N/A 10/15/2024    Procedure: Left Heart Cath, possible pci;  Surgeon: Travis Connor MD;  Location: [facility] CATH INVASIVE LOCATION;  Service:  Cardiovascular;  Laterality: N/A;    CARDIAC CATHETERIZATION N/A 10/22/2024    Procedure: Laser Coronary Atherectomy;  Surgeon: Travis Connor MD;  Location: [facility] CATH INVASIVE LOCATION;  Service: Cardiovascular;  Laterality: N/A;    COLONOSCOPY N/A 10/12/2024    Procedure: COLONOSCOPY WITH BIOPSY AND WIRE GUIDED BALLOON DILATION OF TERMINAL ILEUM;  Surgeon: Rob Strong MD;  Location: [facility] ENDOSCOPY;  Service: Gastroenterology;  Laterality: N/A;  Colitis, crohns of terminal ileum, right colon ulcers, diverticulosis, hemorroids    ENDOSCOPY N/A 10/12/2024    Procedure: ESOPHAGOGASTRODUODENOSCOPY WITH BIOPSY X 2 AREA;  Surgeon: Rob Strong MD;  Location: [facility] ENDOSCOPY;  Service: Gastroenterology;  Laterality: N/A;  Chronic gastritis, HH    HYSTERECTOMY      LEFT HEART CATH          Social History:   Social History     Tobacco Use    Smoking status: Former     Types: Cigarettes    Smokeless tobacco: Never   Substance Use Topics    Alcohol use: Never      Family History:  Family History   Problem Relation Age of Onset    Heart disease Mother     Dementia Mother     Stroke Mother     Heart disease Father     Hypertension Father           Allergies:  Allergies   Allergen Reactions    Codeine Hives    Penicillin G Sodium Hives     Scheduled Meds:  acetaminophen, 650 mg, Once   Or  acetaminophen, 650 mg, Once   Or  acetaminophen, 650 mg, Once  atorvastatin, 40 mg, Nightly  clopidogrel, 75 mg, Daily  cyanocobalamin, 1,000 mcg, Daily  diphenhydrAMINE, 25 mg, Once   Or  diphenhydrAMINE, 25 mg, Once  folic acid, 1,000 mcg, Daily  levothyroxine, 50 mcg, Daily  mesalamine, 4.8 g, Daily  methotrexate, 15 mg, Weekly  methylPREDNISolone sodium succinate, 20 mg, Q8H  metoprolol succinate XL, 25 mg, Q24H  midodrine, 5 mg, TID AC  pantoprazole, 40 mg, Daily  sertraline, 50 mg, Daily  sodium chloride, 10 mL, Q12H  upadacitinib ER, 45 mg, Daily          Review of Systems:  "  ROS        Constitutional:  Temp:  [98 °F (36.7 °C)-98.3 °F (36.8 °C)] 98.2 °F (36.8 °C)  Heart Rate:  [61-90] 90  Resp:  [19-23] 19  BP: (104-137)/(50-68) 137/63    Physical Exam   /63 (BP Location: Left leg, Patient Position: Sitting)   Pulse 90   Temp 98.2 °F (36.8 °C) (Oral)   Resp 19   Ht 162.6 cm (64\")   Wt 54 kg (119 lb)   SpO2 100%   BMI 20.43 kg/m²   General:  Appears in no acute distress  Eyes: Sclera is anicteric,  conjunctiva is clear   HEENT:  No JVD. Thyroid not visibly enlarged. No mucosal pallor or cyanosis  Respiratory: Respirations regular and unlabored at rest.  Clear to auscultation  Cardiovascular: S1,S2 Regular rate and rhythm.  2/6 holosystolic murmur  Gastrointestinal: Abdomen nondistended.  Musculoskeletal:  No abnormal movements  Extremities: No digital clubbing or cyanosis  Skin: Color pink.   Neuro: Alert and awake.    INTAKE AND OUTPUT:    Intake/Output Summary (Last 24 hours) at 11/26/2024 1726  Last data filed at 11/26/2024 1405  Gross per 24 hour   Intake 960 ml   Output --   Net 960 ml       Cardiographics  Telemetry: Sinus    ECG:   ECG 12 Lead Other; post-MitraClip   Final Result   HEART RATE=73  bpm   RR Tpzvivmf=841  ms   [place] Cefqnjpq=190  ms   P Horizontal Axis=1  deg   P Front Axis=84  deg   QRSD Interval=80  ms   QT Hlcffmif=094  ms   RBgZ=751  ms   QRS Axis=39  deg   T Wave Axis=70  deg   - NORMAL ECG -   Sinus rhythm   When compared with ECG of 19-Nov-2024 12:13:34,   Significant repolarization change   Significant axis, voltage or hypertrophy change   Electronically Signed By: Dmitri Navarro ([facility]) 2024-11-22 13:38:55   Date and Time of Study:2024-11-22 06:15:06      ECG 12 Lead Chest Pain   Final Result   HEART RATE=67  bpm   RR Leppgpui=567  ms   KY Omsepvye=309  ms   P Horizontal Axis=28  deg   P Front Axis=87  deg   QRSD Interval=83  ms   QT Thfuhofi=211  ms   SGgK=650  ms   QRS Axis=39  deg   T Wave Axis=40  deg   - ABNORMAL ECG -   Sinus rhythm   Low " voltage, precordial leads   Nonspecific  T abnormalities, inferior leads   When compared with ECG of 06-Nov-2024 15:41:09,   Significant repolarization change   Significant axis, voltage or hypertrophy change   Electronically Signed By: Rajiv Dubon ([facility]) 2024-11-19 12:47:59   Date and Time of Study:2024-11-19 12:13:34      Telemetry Scan   Final Result      Telemetry Scan   Final Result      Telemetry Scan   Final Result      Telemetry Scan   Final Result      Telemetry Scan   Final Result      Telemetry Scan   Final Result      Telemetry Scan   Final Result      Telemetry Scan   Final Result      Telemetry Scan   Final Result      Telemetry Scan   Final Result      Telemetry Scan   Final Result      Telemetry Scan   Final Result      Telemetry Scan   Final Result      Telemetry Scan   Final Result      Telemetry Scan   Final Result      Telemetry Scan   Final Result      Telemetry Scan   Final Result      Telemetry Scan   Final Result      Telemetry Scan   Final Result      Telemetry Scan   Final Result      Telemetry Scan   Final Result      Telemetry Scan   Final Result      Telemetry Scan   Final Result      Telemetry Scan   Final Result      Telemetry Scan   Final Result      Telemetry Scan   Final Result      Telemetry Scan   Final Result      Telemetry Scan   Final Result      Telemetry Scan   Final Result      Telemetry Scan   Final Result      Telemetry Scan   Final Result      Telemetry Scan   Final Result      Telemetry Scan   Final Result      Telemetry Scan   Final Result      Telemetry Scan   Final Result      Telemetry Scan   Final Result      Telemetry Scan   Final Result      Telemetry Scan   Final Result        I have personally reviewed EKG    Echocardiogram: Results for orders placed during the hospital encounter of 11/19/24    Adult Transthoracic Echo Limited W/ Cont if Necessary Per Protocol    Interpretation Summary    Left ventricular ejection fraction appears to be 56 - 60%.    There is a  "MitraClip mitral valve repair present.    There is trace residual MR.  Mean gradient is 4.6 mmHg      Lab Review   I have reviewed the labs  Results from last 7 days   Lab Units 11/26/24  0424   HSTROP T ng/L 36*     Results from last 7 days   Lab Units 11/22/24  0439   MAGNESIUM mg/dL 1.8     Results from last 7 days   Lab Units 11/26/24  0424   SODIUM mmol/L 136   POTASSIUM mmol/L 3.8   BUN mg/dL 27*   CREATININE mg/dL 0.69   CALCIUM mg/dL 7.6*         Results from last 7 days   Lab Units 11/26/24  0424 11/25/24  0303 11/24/24  1755 11/24/24  0241   WBC 10*3/mm3 20.17* 34.52*  --  14.90*   HEMOGLOBIN g/dL 7.4* 6.6* 7.7* 5.5*   HEMATOCRIT % 21.4* 20.2* 24.0* 17.6*   PLATELETS 10*3/mm3 101* 164  --  197             RADIOLOGY:  Imaging Results (Last 24 Hours)       ** No results found for the last 24 hours. **                  )11/26/2024  MD EVERETTE Araujo/Transcription:   \"Dictated utilizing Dragon dictation\".   "

## 2024-11-26 NOTE — PLAN OF CARE
"Assessment: Maryann Gaitan presents with functional mobility impairments which indicate the need for skilled intervention. Tolerating session today without incident. Pt notably confused at beginning of session. Pt quickly fatigues with functional mobility tasks, initially requiring min A to stand from EOB, and requires max A to stand from commode towards end of session. Gait distance limited by generalized weakness and poor activity tolerance. Also noted to be hypotensive. Pt is not safe to d/c home and would benefit from SNF at discharge to address functional and cognitive deficits. Will continue to follow and progress as tolerated.     Plan/Recommendations:   If medically appropriate, Moderate Intensity Therapy recommended post-acute care. This is recommended as therapy feels the patient would require 3-4 days per week and wouldn't tolerate \"3 hour daily\" rehab intensity. SNF would be the preferred choice. If the patient does not agree to SNF, arrange HH or OP depending on home bound status. If patient is medically complex, consider LTACH. Pt requires no DME at discharge.     Pt desires Skilled Rehab placement at discharge. Pt cooperative; agreeable to therapeutic recommendations and plan of care.     "

## 2024-11-26 NOTE — PROGRESS NOTES
LOS: 7 days   Patient Care Team:  Eduard Little MD as PCP - General (Family Medicine)  Niya Mendoza APRN as Nurse Practitioner (Cardiology)      Subjective     Interval History:     Subjective: Patient reports chest pain.  Continues to complain of diarrhea, but bedside RN reports no bowel movement so far this morning.  Patient complains of nausea/vomiting, but bedside RN denies any episodes of emesis overnight or so far this morning.      ROS:   No chest pain, shortness of breath, or cough.        Medication Review:     Current Facility-Administered Medications:     acetaminophen (TYLENOL) tablet 650 mg, 650 mg, Oral, Once **OR** acetaminophen (TYLENOL) 160 MG/5ML oral solution 650 mg, 650 mg, Oral, Once **OR** acetaminophen (TYLENOL) suppository 650 mg, 650 mg, Rectal, Once, Niya Mendoza APRN    acetaminophen (TYLENOL) tablet 650 mg, 650 mg, Oral, Q4H PRN, Dmitri Navarro MD, 650 mg at 11/25/24 2010    atorvastatin (LIPITOR) tablet 40 mg, 40 mg, Oral, Nightly, Dmitri Navarro MD, 40 mg at 11/25/24 2010    sennosides-docusate (PERICOLACE) 8.6-50 MG per tablet 2 tablet, 2 tablet, Oral, BID PRN **AND** polyethylene glycol (MIRALAX) packet 17 g, 17 g, Oral, Daily PRN **AND** bisacodyl (DULCOLAX) EC tablet 5 mg, 5 mg, Oral, Daily PRN **AND** bisacodyl (DULCOLAX) suppository 10 mg, 10 mg, Rectal, Daily PRN, Dmitri Navarro MD    clopidogrel (PLAVIX) tablet 75 mg, 75 mg, Oral, Daily, Dmitri Navarro MD, 75 mg at 11/26/24 0832    diphenhydrAMINE (BENADRYL) capsule 25 mg, 25 mg, Oral, Q6H PRN, Dmitri Navarro MD, 25 mg at 11/22/24 0941    diphenhydrAMINE (BENADRYL) capsule 25 mg, 25 mg, Oral, Once **OR** diphenhydrAMINE (BENADRYL) injection 25 mg, 25 mg, Intravenous, Once, Niya Mendoza APRN    folic acid (FOLVITE) tablet 1,000 mcg, 1,000 mcg, Oral, Daily, Dmitri Navarro MD, 1,000 mcg at 11/26/24 0832    levothyroxine (SYNTHROID, LEVOTHROID) tablet 50 mcg, 50 mcg, Oral, Daily, Dmitri Navarro MD, 50 mcg at 11/26/24  0832    mesalamine (LIALDA) EC tablet 4.8 g, 4.8 g, Oral, Daily, Mikayla Kc APRN, 4.8 g at 11/26/24 0832    methotrexate tablet 15 mg, 15 mg, Oral, Weekly, Mikayla Kc APRN, 15 mg at 11/23/24 2058    methylPREDNISolone sodium succinate (SOLU-Medrol) injection 20 mg, 20 mg, Intravenous, Q8H, Jonathan Mackay MD, 20 mg at 11/26/24 0832    metoprolol succinate XL (TOPROL-XL) 24 hr tablet 25 mg, 25 mg, Oral, Q24H, Corina Murcia APRROBBIE, 25 mg at 11/26/24 0832    midodrine (PROAMATINE) tablet 5 mg, 5 mg, Oral, TID AC, CinthiaPhyllis kyle MD    nitroglycerin (NITROSTAT) SL tablet 0.4 mg, 0.4 mg, Sublingual, Q5 Min PRN, Dmitri Navarro MD    ondansetron ODT (ZOFRAN-ODT) disintegrating tablet 4 mg, 4 mg, Oral, Q6H PRN **OR** ondansetron (ZOFRAN) injection 4 mg, 4 mg, Intravenous, Q6H PRN, Dmitri Navarro MD, 4 mg at 11/25/24 0304    pantoprazole (PROTONIX) EC tablet 40 mg, 40 mg, Oral, Daily, Dmitri Navarro MD, 40 mg at 11/26/24 0832    sertraline (ZOLOFT) tablet 50 mg, 50 mg, Oral, Daily, Dmitri Navarro MD, 50 mg at 11/26/24 0832    [COMPLETED] Insert Peripheral IV, , , Once **AND** sodium chloride 0.9 % flush 10 mL, 10 mL, Intravenous, PRN, Dmitri Navarro MD    sodium chloride 0.9 % flush 10 mL, 10 mL, Intravenous, Q12H, Dmitri Navarro MD, 10 mL at 11/25/24 2010    sodium chloride 0.9 % flush 10 mL, 10 mL, Intravenous, PRN, Dmitri Navarro MD    sodium chloride 0.9 % infusion 40 mL, 40 mL, Intravenous, PRN, Dmitri Navarro MD    upadacitinib ER (RINVOQ) extended release tablet 45 mg, 45 mg, Oral, Daily, Mikayla Kc APRN, 45 mg at 11/26/24 0832      Objective     Vital Signs  Vitals:    11/26/24 0300 11/26/24 0400 11/26/24 0756 11/26/24 1127   BP: 104/54 112/50 110/55 107/63   BP Location: Right arm  Left leg Left leg   Patient Position: Lying  Lying Lying   Pulse: 67 67 69 75   Resp: 19 22 23   Temp: 98.3 °F (36.8 °C)  98.2 °F (36.8 °C) 98.1 °F (36.7 °C)   TempSrc: Oral  Oral Oral   SpO2:  99% 100% 99%   Weight:        Height:           Physical Exam:     General Appearance:    Awake and alert, in no acute distress   Head:    Normocephalic, without obvious abnormality   Eyes:          Conjunctivae normal, anicteric sclera   Throat:   No oral lesions, no thrush, oral mucosa moist   Neck:   No adenopathy, supple, no JVD   Lungs:     respirations regular, even and unlabored   Abdomen:     Soft, generalized tenderness, no rebound or guarding, non-distended   Rectal:     Deferred   Extremities:   No edema, no cyanosis   Skin:   No bruising or rash, no jaundice        Results Review:    Results from last 7 days   Lab Units 11/26/24  0424 11/25/24  0303 11/24/24  1755 11/24/24  0241 11/23/24  0411 11/22/24  2323 11/22/24  1758 11/22/24  0439 11/21/24  0129 11/20/24  0246   WBC 10*3/mm3 20.17* 34.52*  --  14.90* 12.82*  --   --  14.13* 13.55* 12.18*   HEMOGLOBIN g/dL 7.4* 6.6* 7.7* 5.5* 8.7* 8.6* 8.9* 7.4* 9.0* 9.1*   PLATELETS 10*3/mm3 101* 164  --  197 284  --   --  310 368 369     Results from last 7 days   Lab Units 11/26/24  0424 11/25/24  0303 11/24/24  0211 11/23/24  0411 11/22/24  0439 11/21/24  0129 11/20/24  0246 11/19/24  1246   SODIUM mmol/L 136 138 139 141 139 136 137 138   POTASSIUM mmol/L 3.8 4.0 4.6 3.9 3.9 4.1 3.8 4.3   CHLORIDE mmol/L 109* 109* 108* 108* 107 103 103 106   CO2 mmol/L 19.8* 16.8* 18.1* 22.3 25.0 23.7 26.3 22.2   BUN mg/dL 27* 23 20 19 15 16 19 28*   CREATININE mg/dL 0.69 0.69 0.63 0.55* 0.49* 0.73 0.59 0.74   GLUCOSE mg/dL 85 172* 122* 128* 116* 132* 64* 77   ALBUMIN g/dL 2.4* 2.4* 2.5* 2.8*  --   --   --  2.9*   BILIRUBIN mg/dL 0.5 0.4 0.2 0.2  --   --   --  0.3   ALK PHOS U/L 40 43 57 76  --   --   --  64   AST (SGOT) U/L 14 18 23 18  --   --   --  19   ALT (SGPT) U/L 19 24 30 25  --   --   --  35*   INR   --   --   --   --   --   --   --  0.99   APTT seconds  --   --   --   --   --   --   --  21.9*   MAGNESIUM mg/dL  --   --   --   --  1.8 2.0 2.0  --    LIPASE U/L  --   --   --   --   --   --   --   19   CRP mg/dL 1.14* <0.30 0.40 1.25*  --   --   --   --      Estimated Creatinine Clearance: 61 mL/min (by C-G formula based on SCr of 0.69 mg/dL).  Lab Results   Component Value Date    HGBA1C 5.64 (H) 10/13/2024     Results from last 7 days   Lab Units 11/19/24  1503 11/19/24  1246   HSTROP T ng/L 17* 21*     Infection   Results from last 7 days   Lab Units 11/25/24  0437   BLOODCX  No growth at 24 hours     UA      Microbiology Results (last 10 days)       Procedure Component Value - Date/Time    Blood Culture - Blood, Arm, Left [[id number]]  (Normal) Collected: 11/25/24 0437    Lab Status: Preliminary result Specimen: Blood from Arm, Left Updated: 11/26/24 0445     Blood Culture No growth at 24 hours    Narrative:      Less than seven (7) mL's of blood was collected.  Insufficient quantity may yield false negative results.          Imaging Results (Last 72 Hours)       Procedure Component Value Units Date/Time    XR Chest 1 View [[id number]] Collected: 11/25/24 0924     Updated: 11/25/24 0927    Narrative:      XR CHEST 1 VW    Date of Exam: 11/25/2024 8:18 AM EST    Indication: Right sided chest pain with breathing    Comparison: Chest radiograph 11/19/2024    Findings:  Stable cardiomediastinal silhouette. Aortic atherosclerotic disease. Coronary atherosclerotic disease. Negative for lobar consolidation, edema, effusion or pneumothorax. Degenerative related osseous change.      Impression:      Impression:  No active pulmonary process.      Electronically Signed: Jameson Rainey MD    11/25/2024 9:25 AM EST    Workstation ID: [id number]            Assessment & Plan     ASSESSMENT:  -Hematochezia  -Diarrhea   -Normocytic anemia with drop in hemoglobin  -Leukocytosis  -Abnormal CT showing distal/terminal ileitis and diffuse colonic fatty infiltration  -Recent Strongyloides infection in 10/2024 s/p treatment with ivermectin  -Small bowel and colonic Crohn's disease - on Rinvoq (recently restarted)  -Chest  pain  -Recent fall  -Hypertension  -COPD  -CAD s/p stent placement on Plavix  -RA - on Humira and methotrexate  -History of hysterectomy  -History of cholecystectomy  -Severe mitral regurgitation status post mitral clipping 11/21/2024     PLAN:   Patient is a 74-year-old female with history of small bowel and colonic Crohn's (recently restarted on Rinvoq), rheumatoid arthritis (on Humira and methotrexate), and cholecystectomy who presented on 11/19 with increased hematochezia and found to have drop in hemoglobin to 5.7.  She has recently been admitted and discharged on 11/10/2024.  Was also here in October when she was diagnosed with Strongyloides and treated with ivermectin.     Patient continues to report diarrhea and nausea/vomiting.  However, bedside RN denies any diarrhea or emesis so far this morning.  Stool for O&P pending. Await results. Patient with history of strongyloidiasis s/p ivermectin in 10/2024. ID following.    Continue IV solu-medrol. Will plan prednisone taper at discharge of 40 mg daily for 1 week followed by 30 mg daily for 1 week followed by 20 mg daily for 1 week followed by 10 mg daily for 1 week.    Continue Rinvoq 45 mg daily while in the hospital for a total of 12 weeks and then 30 mg daily maintenance dosing.  Patient has not had Humira for 3 to 4 weeks and had not been taking Rinvoq in the hospital until 11/23/2024.   Hemoglobin 7.4 from 6.6 following PRBC transfusion yesterday.  Continue to monitor H/H and transfuse as needed.  If stool studies are unremarkable, may need to consider colorectal surgery evaluation for colectomy with end ileostomy.   Cardiology and hematology also following.  Does complain of some chest pain this morning.  Will check D-dimer and troponin.  Continue supportive care.    Electronically signed by DRU Lew, 11/26/24, 12:10 PM EST.

## 2024-11-26 NOTE — PROGRESS NOTES
Infectious Diseases Progress Note      LOS: 7 days   Patient Care Team:  Eduard Little MD as PCP - General (Family Medicine)  Niya Mendoza APRN as Nurse Practitioner (Cardiology)    Chief Complaint: Abdominal pain, nausea, bloody diarrhea    Subjective       The patient has been afebrile for the last 24 hours.  The patient is on room air, hemodynamically stable, and is tolerating antimicrobial therapy.  Patient continues to have abdominal pain, nausea and bloody diarrhea however the nursing staff said she has not had any vomiting or diarrhea for over 24-hour      Review of Systems:   Review of Systems   Constitutional:  Positive for fatigue.   HENT: Negative.     Eyes: Negative.    Respiratory: Negative.     Cardiovascular: Negative.    Gastrointestinal:  Positive for abdominal pain, blood in stool, diarrhea and nausea.   Endocrine: Negative.    Genitourinary: Negative.    Musculoskeletal: Negative.    Skin: Negative.    Neurological: Negative.    Psychiatric/Behavioral: Negative.     All other systems reviewed and are negative.       Objective     Vital Signs  Temp:  [98 °F (36.7 °C)-98.3 °F (36.8 °C)] 98.1 °F (36.7 °C)  Heart Rate:  [61-77] 75  Resp:  [19-23] 23  BP: (104-141)/(50-68) 107/63    Physical Exam:  Physical Exam  Vitals and nursing note reviewed.   Constitutional:       General: She is not in acute distress.     Appearance: Normal appearance. She is well-developed and normal weight. She is ill-appearing. She is not diaphoretic.   HENT:      Head: Normocephalic and atraumatic.   Eyes:      General: No scleral icterus.     Extraocular Movements: Extraocular movements intact.      Conjunctiva/sclera: Conjunctivae normal.      Pupils: Pupils are equal, round, and reactive to light.   Cardiovascular:      Rate and Rhythm: Normal rate and regular rhythm.      Heart sounds: Normal heart sounds, S1 normal and S2 normal. No murmur heard.  Pulmonary:      Effort: Pulmonary effort is normal. No  respiratory distress.      Breath sounds: Normal breath sounds. No stridor. No wheezing or rales.   Chest:      Chest wall: No tenderness.   Abdominal:      General: Bowel sounds are normal.      Palpations: Abdomen is soft. There is no mass.      Tenderness: There is abdominal tenderness. There is no guarding.      Comments: Diffuse tenderness   Musculoskeletal:         General: No swelling, tenderness or deformity. Normal range of motion.      Cervical back: Neck supple.   Skin:     General: Skin is warm and dry.      Coloration: Skin is not pale.      Findings: No bruising, erythema or rash.   Neurological:      Mental Status: She is alert and oriented to person, place, and time.      Motor: Weakness present.   Psychiatric:         Mood and Affect: Mood normal.          Results Review:    I have reviewed all clinical data, test, lab, and imaging results.     Radiology  No Radiology Exams Resulted Within Past 24 Hours    Cardiology    Laboratory    Results from last 7 days   Lab Units 11/26/24 0424 11/25/24  0303 11/24/24  1755 11/24/24 0241 11/23/24 0411 11/22/24 2323 11/22/24 1758 11/22/24 0439 11/21/24 0129 11/20/24  0246   WBC 10*3/mm3 20.17* 34.52*  --  14.90* 12.82*  --   --  14.13* 13.55* 12.18*   HEMOGLOBIN g/dL 7.4* 6.6* 7.7* 5.5* 8.7* 8.6* 8.9* 7.4* 9.0* 9.1*   HEMATOCRIT % 21.4* 20.2* 24.0* 17.6* 27.6* 27.3* 27.3* 23.2* 27.6* 29.5*   PLATELETS 10*3/mm3 101* 164  --  197 284  --   --  310 368 369     Results from last 7 days   Lab Units 11/26/24  0424 11/25/24  0303 11/24/24  0211 11/23/24  0411 11/22/24  0439 11/21/24  0129 11/20/24  0246   SODIUM mmol/L 136 138 139 141 139 136 137   POTASSIUM mmol/L 3.8 4.0 4.6 3.9 3.9 4.1 3.8   CHLORIDE mmol/L 109* 109* 108* 108* 107 103 103   CO2 mmol/L 19.8* 16.8* 18.1* 22.3 25.0 23.7 26.3   BUN mg/dL 27* 23 20 19 15 16 19   CREATININE mg/dL 0.69 0.69 0.63 0.55* 0.49* 0.73 0.59   GLUCOSE mg/dL 85 172* 122* 128* 116* 132* 64*   ALBUMIN g/dL 2.4* 2.4* 2.5* 2.8*   --   --   --    BILIRUBIN mg/dL 0.5 0.4 0.2 0.2  --   --   --    ALK PHOS U/L 40 43 57 76  --   --   --    AST (SGOT) U/L 14 18 23 18  --   --   --    ALT (SGPT) U/L 19 24 30 25  --   --   --    CALCIUM mg/dL 7.6* 7.6* 7.8* 8.3* 8.0* 8.3* 8.7                 Microbiology   Microbiology Results (last 10 days)       Procedure Component Value - Date/Time    Blood Culture - Blood, Arm, Left [[id number]]  (Normal) Collected: 11/25/24 0437    Lab Status: Preliminary result Specimen: Blood from Arm, Left Updated: 11/26/24 0445     Blood Culture No growth at 24 hours    Narrative:      Less than seven (7) mL's of blood was collected.  Insufficient quantity may yield false negative results.    Ova & Parasite Examination - Stool, Per Rectum [[id number]] Collected: 11/23/24 1401    Lab Status: Final result Specimen: Stool from Per Rectum Updated: 11/26/24 1511     Ova + Parasite Exam Final report     Comment: These results were obtained using wet preparation(s) and trichrome  stained smear. This test does not include testing for Cryptosporidium  parvum, Cyclospora, or Microsporidia.        Ova + Parasite Result 1 Comment     Comment: No ova, cysts, or parasites seen.  One negative specimen does not rule out the possibility of a  parasitic infection.       Narrative:      Performed at:  [address] [place] [address]  [id number]  : Shola Hill PhD, Phone:  [phone]            Medication Review:       Schedule Meds  acetaminophen, 650 mg, Oral, Once   Or  acetaminophen, 650 mg, Oral, Once   Or  acetaminophen, 650 mg, Rectal, Once  atorvastatin, 40 mg, Oral, Nightly  clopidogrel, 75 mg, Oral, Daily  cyanocobalamin, 1,000 mcg, Intramuscular, Daily  diphenhydrAMINE, 25 mg, Oral, Once   Or  diphenhydrAMINE, 25 mg, Intravenous, Once  folic acid, 1,000 mcg, Oral, Daily  levothyroxine, 50 mcg, Oral, Daily  mesalamine, 4.8 g, Oral, Daily  methotrexate, 15 mg, Oral, Weekly  methylPREDNISolone sodium  succinate, 20 mg, Intravenous, Q8H  metoprolol succinate XL, 25 mg, Oral, Q24H  midodrine, 5 mg, Oral, TID AC  pantoprazole, 40 mg, Oral, Daily  sertraline, 50 mg, Oral, Daily  sodium chloride, 10 mL, Intravenous, Q12H  upadacitinib ER, 45 mg, Oral, Daily        Infusion Meds       PRN Meds    acetaminophen    senna-docusate sodium **AND** polyethylene glycol **AND** bisacodyl **AND** bisacodyl    diphenhydrAMINE    nitroglycerin    ondansetron ODT **OR** ondansetron    [COMPLETED] Insert Peripheral IV **AND** sodium chloride    sodium chloride    sodium chloride        Assessment & Plan       Antimicrobial Therapy   1.         2.        3.        4.        5.          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  Patient was treated recently.  Nursing staff reported unusual looking stool.  Stool for ova and parasite collected on November 23 and also negative     Crohn's disease flareup.  Patient  admitted for severe abdominal pain, nausea and melena.  CT of the abdomen pelvis this admission did not show any definitive active colonic inflammation.  GI following     Reactive leukocytosis secondary to above and steroid     Acute anemia-hematology following     Recent diagnosis for strongyloidiasis based on a positive duodenal biopsy on October 12, 2024.  Treated with p.o. ivermectin by GI service.  There was no clinical suspicion for hyperinfection     Crohn's disease and rheumatoid arthritis.  Patient states that she has been on Humira since June but has lost over 40 pounds since that time.  She also states that she is on methotrexate, prednisone and Rinvoq.     COPD-chronically on 2 L of oxygen by nasal cannula.  Currently on room air     Plan     Monitor off antimicrobial therapy  Continue supportive care  Not much more to add from infectious disease standpoint-we will sign off at this time-please call with any questions.  Case discussed with RN    The above note was transcribed for Dr. Pond-physical  exam and review of systems were performed by him         Kiah Chirinos, DRU  11/26/24  15:17 EST    Note is dictated utilizing voice recognition software/Dragon

## 2024-11-27 ENCOUNTER — INPATIENT HOSPITAL (AMBULATORY)
Dept: URBAN - METROPOLITAN AREA HOSPITAL 84 | Facility: HOSPITAL | Age: 74
End: 2024-11-27
Payer: MEDICARE

## 2024-11-27 ENCOUNTER — INPATIENT HOSPITAL (AMBULATORY)
Age: 74
End: 2024-11-27
Payer: MEDICARE

## 2024-11-27 DIAGNOSIS — D64.9 ANEMIA, UNSPECIFIED: ICD-10-CM

## 2024-11-27 DIAGNOSIS — E53.8 DEFICIENCY OF OTHER SPECIFIED B GROUP VITAMINS: ICD-10-CM

## 2024-11-27 DIAGNOSIS — D72.829 ELEVATED WHITE BLOOD CELL COUNT, UNSPECIFIED: ICD-10-CM

## 2024-11-27 DIAGNOSIS — K50.80 CROHN'S DISEASE OF BOTH SMALL AND LARGE INTESTINE WITHOUT CO: ICD-10-CM

## 2024-11-27 DIAGNOSIS — Z90.49 ACQUIRED ABSENCE OF OTHER SPECIFIED PARTS OF DIGESTIVE TRACT: ICD-10-CM

## 2024-11-27 DIAGNOSIS — R19.7 DIARRHEA, UNSPECIFIED: ICD-10-CM

## 2024-11-27 DIAGNOSIS — R93.3 ABNORMAL FINDINGS ON DIAGNOSTIC IMAGING OF OTHER PARTS OF DI: ICD-10-CM

## 2024-11-27 LAB
ALBUMIN SERPL-MCNC: 2.3 G/DL (ref 3.5–5.2)
ALBUMIN/GLOB SERPL: 1.5 G/DL
ALP SERPL-CCNC: 48 U/L (ref 39–117)
ALT SERPL W P-5'-P-CCNC: 21 U/L (ref 1–33)
ANION GAP SERPL CALCULATED.3IONS-SCNC: 7.4 MMOL/L (ref 5–15)
AST SERPL-CCNC: 21 U/L (ref 1–32)
BASOPHILS # BLD AUTO: 0.01 10*3/MM3 (ref 0–0.2)
BASOPHILS NFR BLD AUTO: 0.1 % (ref 0–1.5)
BILIRUB SERPL-MCNC: 0.3 MG/DL (ref 0–1.2)
BUN SERPL-MCNC: 18 MG/DL (ref 8–23)
BUN/CREAT SERPL: 36 (ref 7–25)
CALCIUM SPEC-SCNC: 7.9 MG/DL (ref 8.6–10.5)
CHLORIDE SERPL-SCNC: 110 MMOL/L (ref 98–107)
CO2 SERPL-SCNC: 18.6 MMOL/L (ref 22–29)
CREAT SERPL-MCNC: 0.5 MG/DL (ref 0.57–1)
CRP SERPL-MCNC: 2.74 MG/DL (ref 0–0.5)
DEPRECATED RDW RBC AUTO: 48.3 FL (ref 37–54)
DEPRECATED RDW RBC AUTO: 52 FL (ref 37–54)
EGFRCR SERPLBLD CKD-EPI 2021: 98.6 ML/MIN/1.73
EOSINOPHIL # BLD AUTO: 0 10*3/MM3 (ref 0–0.4)
EOSINOPHIL NFR BLD AUTO: 0 % (ref 0.3–6.2)
ERYTHROCYTE [DISTWIDTH] IN BLOOD BY AUTOMATED COUNT: 14.7 % (ref 12.3–15.4)
ERYTHROCYTE [DISTWIDTH] IN BLOOD BY AUTOMATED COUNT: 15.3 % (ref 12.3–15.4)
GEN 5 1HR TROPONIN T REFLEX: 29 NG/L
GLOBULIN UR ELPH-MCNC: 1.5 GM/DL
GLUCOSE SERPL-MCNC: 98 MG/DL (ref 65–99)
HCT VFR BLD AUTO: 19.2 % (ref 34–46.6)
HCT VFR BLD AUTO: 27.9 % (ref 34–46.6)
HGB BLD-MCNC: 6.4 G/DL (ref 12–15.9)
HGB BLD-MCNC: 9.3 G/DL (ref 12–15.9)
IMM GRANULOCYTES # BLD AUTO: 0.09 10*3/MM3 (ref 0–0.05)
IMM GRANULOCYTES NFR BLD AUTO: 0.7 % (ref 0–0.5)
LYMPHOCYTES # BLD AUTO: 1.03 10*3/MM3 (ref 0.7–3.1)
LYMPHOCYTES NFR BLD AUTO: 7.8 % (ref 19.6–45.3)
MCH RBC QN AUTO: 30.6 PG (ref 26.6–33)
MCH RBC QN AUTO: 31.4 PG (ref 26.6–33)
MCHC RBC AUTO-ENTMCNC: 33.3 G/DL (ref 31.5–35.7)
MCHC RBC AUTO-ENTMCNC: 33.3 G/DL (ref 31.5–35.7)
MCV RBC AUTO: 91.8 FL (ref 79–97)
MCV RBC AUTO: 94.1 FL (ref 79–97)
MONOCYTES # BLD AUTO: 0.11 10*3/MM3 (ref 0.1–0.9)
MONOCYTES NFR BLD AUTO: 0.8 % (ref 5–12)
MYCOBACTERIUM SPEC CULT: NORMAL
NEUTROPHILS NFR BLD AUTO: 11.9 10*3/MM3 (ref 1.7–7)
NEUTROPHILS NFR BLD AUTO: 90.6 % (ref 42.7–76)
NIGHT BLUE STAIN TISS: NORMAL
NRBC BLD AUTO-RTO: 0 /100 WBC (ref 0–0.2)
PLATELET # BLD AUTO: 110 10*3/MM3 (ref 140–450)
PLATELET # BLD AUTO: 91 10*3/MM3 (ref 140–450)
PMV BLD AUTO: 10.1 FL (ref 6–12)
PMV BLD AUTO: 10.1 FL (ref 6–12)
POTASSIUM SERPL-SCNC: 4.7 MMOL/L (ref 3.5–5.2)
PROT SERPL-MCNC: 3.8 G/DL (ref 6–8.5)
RBC # BLD AUTO: 2.04 10*6/MM3 (ref 3.77–5.28)
RBC # BLD AUTO: 3.04 10*6/MM3 (ref 3.77–5.28)
SODIUM SERPL-SCNC: 136 MMOL/L (ref 136–145)
TROPONIN T NUMERIC DELTA: -7 NG/L
WBC NRBC COR # BLD AUTO: 10.11 10*3/MM3 (ref 3.4–10.8)
WBC NRBC COR # BLD AUTO: 13.14 10*3/MM3 (ref 3.4–10.8)

## 2024-11-27 PROCEDURE — 84484 ASSAY OF TROPONIN QUANT: CPT | Performed by: NURSE PRACTITIONER

## 2024-11-27 PROCEDURE — 99232 SBSQ HOSP IP/OBS MODERATE 35: CPT | Performed by: NURSE PRACTITIONER

## 2024-11-27 PROCEDURE — 97116 GAIT TRAINING THERAPY: CPT

## 2024-11-27 PROCEDURE — 86900 BLOOD TYPING SEROLOGIC ABO: CPT

## 2024-11-27 PROCEDURE — 97530 THERAPEUTIC ACTIVITIES: CPT

## 2024-11-27 PROCEDURE — P9016 RBC LEUKOCYTES REDUCED: HCPCS

## 2024-11-27 PROCEDURE — 99232 SBSQ HOSP IP/OBS MODERATE 35: CPT | Performed by: INTERNAL MEDICINE

## 2024-11-27 PROCEDURE — 25010000002 NA FERRIC GLUC CPLX PER 12.5 MG: Performed by: STUDENT IN AN ORGANIZED HEALTH CARE EDUCATION/TRAINING PROGRAM

## 2024-11-27 PROCEDURE — 85025 COMPLETE CBC W/AUTO DIFF WBC: CPT | Performed by: INTERNAL MEDICINE

## 2024-11-27 PROCEDURE — 86140 C-REACTIVE PROTEIN: CPT | Performed by: NURSE PRACTITIONER

## 2024-11-27 PROCEDURE — 63710000001 ONDANSETRON ODT 4 MG TABLET DISPERSIBLE: Performed by: INTERNAL MEDICINE

## 2024-11-27 PROCEDURE — 36430 TRANSFUSION BLD/BLD COMPNT: CPT

## 2024-11-27 PROCEDURE — 25810000003 SODIUM CHLORIDE 0.9 % SOLUTION: Performed by: STUDENT IN AN ORGANIZED HEALTH CARE EDUCATION/TRAINING PROGRAM

## 2024-11-27 PROCEDURE — 80053 COMPREHEN METABOLIC PANEL: CPT | Performed by: INTERNAL MEDICINE

## 2024-11-27 PROCEDURE — 86923 COMPATIBILITY TEST ELECTRIC: CPT

## 2024-11-27 PROCEDURE — 97112 NEUROMUSCULAR REEDUCATION: CPT

## 2024-11-27 PROCEDURE — 25010000002 METHYLPREDNISOLONE PER 40 MG: Performed by: STUDENT IN AN ORGANIZED HEALTH CARE EDUCATION/TRAINING PROGRAM

## 2024-11-27 PROCEDURE — 97535 SELF CARE MNGMENT TRAINING: CPT

## 2024-11-27 PROCEDURE — 25010000002 CYANOCOBALAMIN PER 1000 MCG: Performed by: NURSE PRACTITIONER

## 2024-11-27 PROCEDURE — 83010 ASSAY OF HAPTOGLOBIN QUANT: CPT | Performed by: INTERNAL MEDICINE

## 2024-11-27 PROCEDURE — 85027 COMPLETE CBC AUTOMATED: CPT | Performed by: INTERNAL MEDICINE

## 2024-11-27 RX ORDER — UREA 10 %
1000 LOTION (ML) TOPICAL DAILY
Status: DISCONTINUED | OUTPATIENT
Start: 2024-11-27 | End: 2024-12-04 | Stop reason: HOSPADM

## 2024-11-27 RX ADMIN — MIDODRINE HYDROCHLORIDE 5 MG: 5 TABLET ORAL at 08:49

## 2024-11-27 RX ADMIN — FOLIC ACID 1000 MCG: 1 TABLET ORAL at 08:49

## 2024-11-27 RX ADMIN — Medication 10 ML: at 19:38

## 2024-11-27 RX ADMIN — CYANOCOBALAMIN 1000 MCG: 1000 INJECTION, SOLUTION INTRAMUSCULAR; SUBCUTANEOUS at 08:51

## 2024-11-27 RX ADMIN — METHYLPREDNISOLONE SODIUM SUCCINATE 20 MG: 40 INJECTION, POWDER, FOR SOLUTION INTRAMUSCULAR; INTRAVENOUS at 08:48

## 2024-11-27 RX ADMIN — ATORVASTATIN CALCIUM 40 MG: 40 TABLET, FILM COATED ORAL at 19:36

## 2024-11-27 RX ADMIN — MESALAMINE 4.8 G: 800 TABLET, DELAYED RELEASE ORAL at 08:49

## 2024-11-27 RX ADMIN — ONDANSETRON 4 MG: 4 TABLET, ORALLY DISINTEGRATING ORAL at 19:38

## 2024-11-27 RX ADMIN — Medication 10 ML: at 08:50

## 2024-11-27 RX ADMIN — MIDODRINE HYDROCHLORIDE 5 MG: 5 TABLET ORAL at 17:40

## 2024-11-27 RX ADMIN — ACETAMINOPHEN 650 MG: 325 TABLET, FILM COATED ORAL at 19:36

## 2024-11-27 RX ADMIN — METHYLPREDNISOLONE SODIUM SUCCINATE 20 MG: 40 INJECTION, POWDER, FOR SOLUTION INTRAMUSCULAR; INTRAVENOUS at 17:40

## 2024-11-27 RX ADMIN — CYANOCOBALAMIN TAB 500 MCG 1000 MCG: 500 TAB at 08:49

## 2024-11-27 RX ADMIN — METOPROLOL SUCCINATE 25 MG: 25 TABLET, FILM COATED, EXTENDED RELEASE ORAL at 08:49

## 2024-11-27 RX ADMIN — PANTOPRAZOLE SODIUM 40 MG: 40 TABLET, DELAYED RELEASE ORAL at 08:49

## 2024-11-27 RX ADMIN — SODIUM CHLORIDE 250 MG: 9 INJECTION, SOLUTION INTRAVENOUS at 08:49

## 2024-11-27 RX ADMIN — SERTRALINE HYDROCHLORIDE 50 MG: 50 TABLET ORAL at 08:49

## 2024-11-27 RX ADMIN — METHYLPREDNISOLONE SODIUM SUCCINATE 20 MG: 40 INJECTION, POWDER, FOR SOLUTION INTRAMUSCULAR; INTRAVENOUS at 02:30

## 2024-11-27 RX ADMIN — LEVOTHYROXINE SODIUM 50 MCG: 0.05 TABLET ORAL at 08:49

## 2024-11-27 RX ADMIN — CLOPIDOGREL BISULFATE 75 MG: 75 TABLET ORAL at 08:49

## 2024-11-27 NOTE — THERAPY TREATMENT NOTE
"Subjective: Pt agreeable to therapeutic plan of care.  Cognition: oriented to Person, Place, Time, and Situation, pt follows all simple one-step commands, however has some bouts of confusion during treatment session verbalizing \"my dad's a pedophile\", appears to be perseverating on father and childhood. Verbal cueing to redirect attention.     Objective:     Precautions - High Fall risk    Bed Mobility: Mod-A to assist trunk  Functional Transfers: Min-A, Assist x 2, and with rolling walker, STS with bed elevated  Balance: with UE support and standing CGA and with rolling walker  Functional Ambulation: CGA and with rolling walker    Upper Body Dressing and Lower Body Dressing: Dependent  ADL Position: edge of bed sitting  ADL Comments: Doff/don socks and gown    Vitals: WNL    Pain: FACES 6 Location: generalized  Interventions for pain: Repositioned, Increased Activity, and Therapeutic Presence  Education: Provided education on the importance of mobility in the acute care setting, Verbal/Tactile Cues, and ADL training    Assessment: Maryann Gaitan presents with ADL impairments affecting function including balance, cognition, endurance / activity tolerance, pain, and strength. Pt A&O x4, generalized pain and she calls out in pain though does not rate. Pt mod assist to complete bed mobility 2/2 decreased core strength, min assist x2 with FWW to come to standing, and CGA with FWW to ambulate to chair. Pt dependent assist to doff/don socks and gown. Demonstrated functioning below baseline abilities indicate the need for continued skilled intervention while inpatient. Tolerating session today without incident. Will continue to follow and progress as tolerated.     Plan/Recommendations:   Low Intensity Therapy recommended post-acute care - This is recommended as therapy feels this patient would require 2-3 visits per week. OP or HH would be the best option depending on patient's home bound status. Consider, if the patient " "has other  \"skilled\" needs such as wounds, IV antibiotics, etc. Combined with \"low intensity\" could also equate to a SNF. If patient is medically complex, consider LTAC.    Pt desires Home with Home Health at discharge. Pt cooperative; agreeable to therapeutic recommendations and plan of care.     Modified Doug: N/A = No pre-op stroke/TIA    Post-Tx Position: Up in Chair, Alarms activated, and Call light and personal items within reach  PPE: gloves    Therapy Charges for Today       Code Description Service Date Service Provider Modifiers Qty    [id number]  OT SELF CARE/MGMT/TRAIN EA 15 MIN 11/27/2024 Andriy Guevara OT GO 1    [id number]  OT THERAPEUTIC ACT EA 15 MIN 11/27/2024 Andriy Guevara OT GO 1    [id number]  OT NEUROMUSC RE EDUCATION EA 15 MIN 11/27/2024 Andriy Guevara, OT GO 1           Time Calculation- OT       Row Name 11/27/24 1123             Time Calculation- OT    OT Start Time 1040  -SP      OT Stop Time 1107  -SP      OT Time Calculation (min) 27 min  -SP      Total Timed Code Minutes- OT 27 minute(s)  -SP      OT Received On 11/27/24  -SP      OT - Next Appointment 11/29/24  -SP                User Key  (r) = Recorded By, (t) = Taken By, (c) = Cosigned By      Initials Name Provider Type    SP Andriy Guevara, OT Occupational Therapist                   "

## 2024-11-27 NOTE — PROGRESS NOTES
Hematology/Oncology Inpatient Progress Note    Patient name: Maryann Gaitan  : 1950  MRN: [id number]  Referring Provider: DRU Escalante  Reason for Consultation: Blood loss anemia and need for antiplatelet therapy, need for outpatient transfusion options    Chief complaint: Abdominal pain, rectal bleeding, shortness of breath    History of present illness:    Maryann Gaitan is a 74 y.o. female who presented to [facility] on 2024 with complaints of abdominal pain, rectal bleeding, shortness of breath.  Past medical history of CAD, COPD, hypertension, hypothyroidism, Crohn's disease.  Was recently admitted to the hospital on 2024 for mild rectal bleeding and was found to have an acute Crohn's exacerbation.  She was discharged on prolonged steroid taper however she states that she still has some abdominal pain and has not felt well since discharge.  She did have a drop in her hemoglobin prior to discharge during her previous hospital stay but her hemoglobin was 8.4 g/dL on the day of discharge.  Over the last few days she has continued to have worsening lower abdominal pain with increased chest pain and shortness of breath and much more rectal bleeding than prior.  Of note she did have PCI with stent placement in 2024 and was placed on dual antiplatelet therapy.  This admission she underwent a transcatheter [id number] repair of her mitral valve with MitraClip placed on 2024.  The patient has continued to have gastrointestinal bleeding throughout the admission.    24  Hematology/Oncology was consulted for anemia in the setting of GI bleeding in a patient requiring antiplatelet therapy.    10/11/24  Hematology/Oncology was consulted for anemia and leukopenia  On consult, the patient reports fatigue, chronic abdominal pain/cramping and non-bloody diarrhea but denies unintentional weight loss, fevers, chills, drenching night sweats, lymphadenopathy, BRBPR,  melena, bruising. She denies having any IV iron or blood transfusions.  Per RN pt has not received budesonide yet.    Anemia workup from last admission:  -10/11/2022 reticulocyte numbers 0.72 (normal) however absolute reticulocyte decreased at 0.0182 suggesting either not enough building blocks to make blood or bone marrow dysfunction  -B12 312 (normal), folate 18.3 (normal), iron studies (iron 25 low, iron saturation 13% low) and ferritin (427 high, but is an acute phase reactant) -appears there could still be some component of iron deficiency involved which is not surprising as she has a disease that could cause on again off again bleeding; will likely have to deal with IV iron as what was seen on EGD means she will be unlikely to tolerate oral iron or be able to absorb it as she will likely be on chronic PPIs  -Flow cytometry WNL     10/17/2024: Ferrlecit 250 mg IV x 1 dose    He/She  has a past medical history of Abnormal weight loss (11/21/2024), Acute kidney injury (11/06/2024), Acute UTI (urinary tract infection) (11/06/2024), Anxiety associated with depression (11/06/2024), Benign neoplasm of cecum (07/05/2016), CAD, multiple vessel (10/08/2024), Cavitary lesion of lung (10/08/2024), COPD (chronic obstructive pulmonary disease), Crohn's disease (11/06/2024), 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 (07/05/2016), Dyslipidemia (10/30/2024), Fecal urgency (11/21/2024), First degree hemorrhoids (07/09/2021), GERD (gastroesophageal reflux disease) (11/21/2024), Hashimoto's thyroiditis (11/21/2024), History of colonic polyps (07/09/2021), Hypertension, Hypothyroidism (acquired) (11/06/2024), Mitral regurgitation (10/08/2024), Moderate protein-calorie malnutrition (11/09/2024), Multiple tracheobronchial mucus plugs (10/08/2024), Nicotine dependence (11/21/2024), Rheumatoid arthritis (11/06/2024), S/P mitral valve clip implantation (11/21/2024), Second degree hemorrhoids (07/05/2016), Stress  incontinence, female (11/21/2024), and Vitamin D deficiency, unspecified (11/21/2024).    PCP: Eduard Little MD    11/24/24: pt seen today for initial evaluation. She has ongoing GI bleeding and is receiving PRBC transfusions. She reported having fatigue. Also complained of having some 'worms In stool' and is being worked up for Stool parasites. Had strongyloides infestation noted on EGD/Colonoscopy 1 month ago and was reportedly treated with ivermectin. She is being followed by GI and Cardiology.    INTERVAL HISTORY:  11/26/24: pt seen today for follow up. She is s/p multiple PRBC unit transfusion since admission but has not needed transfusions since yesterday. Hb stable at this time. She however reported having ongoing GI bleeding, this has improved somewhat. being followed by GI team.    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 07/05/2016    Dyslipidemia 10/30/2024    Fecal urgency 11/21/2024    First degree hemorrhoids 07/09/2021    GERD (gastroesophageal reflux disease) 11/21/2024    Hashimoto's thyroiditis 11/21/2024    History of colonic polyps 07/09/2021    Hypertension     Hypothyroidism (acquired) 11/06/2024    Mitral regurgitation 10/08/2024    Moderate protein-calorie malnutrition 11/09/2024    Multiple tracheobronchial mucus plugs 10/08/2024    Nicotine dependence 11/21/2024    Rheumatoid arthritis 11/06/2024    S/P mitral valve clip implantation 11/21/2024    Second degree hemorrhoids 07/05/2016    Stress incontinence, female 11/21/2024    Vitamin D deficiency,  unspecified 11/21/2024   ,   Past Surgical History:   Procedure Laterality Date    BRONCHOSCOPY N/A 10/16/2024    Procedure: BRONCHOSCOPY;  Surgeon: Gallo Pope MD;  Location: [facility] ENDOSCOPY;  Service: Pulmonary;  Laterality: N/A;    CARDIAC CATHETERIZATION N/A 10/15/2024    Procedure: Left Heart Cath, possible pci;  Surgeon: Travis Connor MD;  Location: [facility] CATH INVASIVE LOCATION;  Service: Cardiovascular;  Laterality: N/A;    CARDIAC CATHETERIZATION N/A 10/22/2024    Procedure: Laser Coronary Atherectomy;  Surgeon: Travis Connor MD;  Location: [facility] CATH INVASIVE LOCATION;  Service: Cardiovascular;  Laterality: N/A;    COLONOSCOPY N/A 10/12/2024    Procedure: COLONOSCOPY WITH BIOPSY AND WIRE GUIDED BALLOON DILATION OF TERMINAL ILEUM;  Surgeon: Rob Strong MD;  Location: [facility] ENDOSCOPY;  Service: Gastroenterology;  Laterality: N/A;  Colitis, crohns of terminal ileum, right colon ulcers, diverticulosis, hemorroids    ENDOSCOPY N/A 10/12/2024    Procedure: ESOPHAGOGASTRODUODENOSCOPY WITH BIOPSY X 2 AREA;  Surgeon: Rob Strong MD;  Location: [facility] ENDOSCOPY;  Service: Gastroenterology;  Laterality: N/A;  Chronic gastritis, HH    HYSTERECTOMY      LEFT HEART CATH     ,   Family History   Problem Relation Age of Onset    Heart disease Mother     Dementia Mother     Stroke Mother     Heart disease Father     Hypertension Father    ,   Social History     Tobacco Use    Smoking status: Former     Types: Cigarettes    Smokeless tobacco: Never   Vaping Use    Vaping status: Never Used   Substance Use Topics    Alcohol use: Never    Drug use: Never   ,   Medications Prior to Admission   Medication Sig Dispense Refill Last Dose/Taking    Adalimumab (Humira, 2 Pen,) 40 MG/0.4ML Pen-injector Kit Inject 40 mg under the skin into the appropriate area as directed 1 (One) Time Per Week. Saturdays   Indications: Rheumatoid Arthritis   Past Week    albuterol  (PROVENTIL) (2.5 MG/3ML) 0.083% nebulizer solution Take 2.5 mg by nebulization Every 6 (Six) Hours As Needed for Wheezing. Indications: Spasm of Lung Air Passages   Taking As Needed    amLODIPine (NORVASC) 10 MG tablet Take 1 tablet by mouth Daily.   Taking    aspirin 81 MG EC tablet Take 1 tablet by mouth Daily for 30 days. Indications: Disease involving Lipid Deposits in the Arteries 30 tablet 0 Taking    [] atorvastatin (LIPITOR) 40 MG tablet Take 1 tablet by mouth Every Night for 30 days. 30 tablet 0 Taking    cholecalciferol (VITAMIN D3) 1.25 MG (26725 UT) capsule Take 1 capsule by mouth 2 (Two) Times a Week. Wed, Sat  Indications: Vitamin D Deficiency   Past Week    [] clopidogrel (PLAVIX) 75 MG tablet Take 1 tablet by mouth Daily for 30 days. 30 tablet 0 Taking    diclofenac (VOLTAREN) 50 MG EC tablet Take 1 tablet by mouth 2 (Two) Times a Day.   Taking    folic acid (FOLVITE) 1 MG tablet Take 1 tablet by mouth Daily. Indications: Anemia From Inadequate Folic Acid   Taking    levothyroxine (SYNTHROID, LEVOTHROID) 50 MCG tablet Take 1 tablet by mouth Daily. Indications: Underactive Thyroid   Taking    Melatonin (Melatonin Extra Strength) 10 MG tablet Take 1 tablet by mouth As Needed. Indications: Trouble Sleeping   Taking As Needed    methotrexate 2.5 MG tablet Take 6 tablets by mouth 1 (One) Time Per Week.    Indications: Non-oncologic   Past Week    metoprolol tartrate (LOPRESSOR) 50 MG tablet Take 1 tablet by mouth 2 (Two) Times a Day.   Taking    midodrine (PROAMATINE) 10 MG tablet Take 1 tablet by mouth 3 (Three) Times a Day Before Meals for 30 days. 90 tablet 0 Taking    pantoprazole (PROTONIX) 20 MG EC tablet Take 1 tablet by mouth Daily. Indications: Heartburn   Taking    predniSONE (DELTASONE) 10 MG tablet Take 4 tablets by mouth Daily for 7 days, THEN 3.5 tablets Daily for 7 days, THEN 3 tablets Daily for 7 days, THEN 2.5 tablets Daily for 7 days, THEN 2 tablets Daily for 7  "days, THEN 1.5 tablets Daily for 7 days, THEN 1 tablet Daily for 7 days, THEN 0.5 tablets Daily for 7 days. Indications: Crohn's Disease 126 tablet 0 Taking    sertraline (ZOLOFT) 50 MG tablet Take 1 tablet by mouth Daily. Indications: Major Depressive Disorder   Taking    spironolactone (ALDACTONE) 25 MG tablet Take 1 tablet by mouth Daily. Indications: Edema   Taking    upadacitinib ER (Rinvoq) 45 MG tablet sustained-release 24 hour extended release tablet Take 1 tablet by mouth Daily. Indications: Rheumatoid Arthritis   Taking   , Scheduled Meds:  acetaminophen, 650 mg, Oral, Once   Or  acetaminophen, 650 mg, Oral, Once   Or  acetaminophen, 650 mg, Rectal, Once  atorvastatin, 40 mg, Oral, Nightly  clopidogrel, 75 mg, Oral, Daily  cyanocobalamin, 1,000 mcg, Intramuscular, Daily  diphenhydrAMINE, 25 mg, Oral, Once   Or  diphenhydrAMINE, 25 mg, Intravenous, Once  folic acid, 1,000 mcg, Oral, Daily  levothyroxine, 50 mcg, Oral, Daily  mesalamine, 4.8 g, Oral, Daily  methotrexate, 15 mg, Oral, Weekly  methylPREDNISolone sodium succinate, 20 mg, Intravenous, Q8H  metoprolol succinate XL, 25 mg, Oral, Q24H  midodrine, 5 mg, Oral, TID AC  pantoprazole, 40 mg, Oral, Daily  sertraline, 50 mg, Oral, Daily  sodium chloride, 10 mL, Intravenous, Q12H  upadacitinib ER, 45 mg, Oral, Daily    , Continuous Infusions:   , PRN Meds:    acetaminophen    senna-docusate sodium **AND** polyethylene glycol **AND** bisacodyl **AND** bisacodyl    diphenhydrAMINE    nitroglycerin    ondansetron ODT **OR** ondansetron    [COMPLETED] Insert Peripheral IV **AND** sodium chloride    sodium chloride    sodium chloride   Allergies:  Codeine and Penicillin g sodium    Subjective     ROS:  Review of Systems     Objective   Vital Signs:   BP 98/54 (BP Location: Left leg, Patient Position: Lying)   Pulse 69   Temp 97.8 °F (36.6 °C) (Oral)   Resp 16   Ht 162.6 cm (64\")   Wt 54 kg (119 lb)   SpO2 99%   BMI 20.43 kg/m²     Physical Exam: " (performed by MD)  Physical Exam    Results Review:  Lab Results (last 48 hours)       Procedure Component Value Units Date/Time    CBC (No Diff) [[id number]]  (Abnormal) Collected: 11/24/24 0241    Specimen: Blood Updated: 11/24/24 0316     WBC 14.90 10*3/mm3      RBC 1.84 10*6/mm3      Hemoglobin 5.5 g/dL      Hematocrit 17.6 %      MCV 95.7 fL      MCH 29.9 pg      MCHC 31.3 g/dL      RDW 18.7 %      RDW-SD 63.2 fl      MPV 9.7 fL      Platelets 197 10*3/mm3     Comprehensive Metabolic Panel [[id number]]  (Abnormal) Collected: 11/24/24 0211    Specimen: Blood Updated: 11/24/24 0251     Glucose 122 mg/dL      BUN 20 mg/dL      Creatinine 0.63 mg/dL      Sodium 139 mmol/L      Potassium 4.6 mmol/L      Chloride 108 mmol/L      CO2 18.1 mmol/L      Calcium 7.8 mg/dL      Total Protein 3.9 g/dL      Albumin 2.5 g/dL      ALT (SGPT) 30 U/L      AST (SGOT) 23 U/L      Alkaline Phosphatase 57 U/L      Total Bilirubin 0.2 mg/dL      Globulin 1.4 gm/dL      A/G Ratio 1.8 g/dL      BUN/Creatinine Ratio 31.7     Anion Gap 12.9 mmol/L      eGFR 93.2 mL/min/1.73     Narrative:      GFR Normal >60  Chronic Kidney Disease <60  Kidney Failure <15    The GFR formula is only valid for adults with stable renal function between ages 18 and 70.    C-reactive Protein [[id number]]  (Normal) Collected: 11/24/24 0211    Specimen: Blood Updated: 11/24/24 0251     C-Reactive Protein 0.40 mg/dL     Ova & Parasite Examination - Stool, Per Rectum [[id number]] Collected: 11/23/24 1401    Specimen: Stool from Per Rectum Updated: 11/23/24 1411    Comprehensive Metabolic Panel [[id number]]  (Abnormal) Collected: 11/23/24 0411    Specimen: Blood from Arm, Right Updated: 11/23/24 0447     Glucose 128 mg/dL      BUN 19 mg/dL      Creatinine 0.55 mg/dL      Sodium 141 mmol/L      Potassium 3.9 mmol/L      Chloride 108 mmol/L      CO2 22.3 mmol/L      Calcium 8.3 mg/dL      Total Protein 4.5 g/dL      Albumin 2.8 g/dL      ALT (SGPT) 25 U/L      AST (SGOT)  18 U/L      Alkaline Phosphatase 76 U/L      Total Bilirubin 0.2 mg/dL      Globulin 1.7 gm/dL      A/G Ratio 1.6 g/dL      BUN/Creatinine Ratio 34.5     Anion Gap 10.7 mmol/L      eGFR 96.3 mL/min/1.73     Narrative:      GFR Normal >60  Chronic Kidney Disease <60  Kidney Failure <15    The GFR formula is only valid for adults with stable renal function between ages 18 and 70.    C-reactive Protein [[id number]]  (Abnormal) Collected: 11/23/24 0411    Specimen: Blood from Arm, Right Updated: 11/23/24 0447     C-Reactive Protein 1.25 mg/dL     CBC (No Diff) [[id number]]  (Abnormal) Collected: 11/23/24 0411    Specimen: Blood from Arm, Right Updated: 11/23/24 0418     WBC 12.82 10*3/mm3      RBC 2.92 10*6/mm3      Hemoglobin 8.7 g/dL      Hematocrit 27.6 %      MCV 94.5 fL      MCH 29.8 pg      MCHC 31.5 g/dL      RDW 19.0 %      RDW-SD 64.4 fl      MPV 9.5 fL      Platelets 284 10*3/mm3     Hemoglobin & Hematocrit, Blood [[id number]]  (Abnormal) Collected: 11/22/24 2323    Specimen: Blood Updated: 11/22/24 2328     Hemoglobin 8.6 g/dL      Hematocrit 27.3 %     Hemoglobin & Hematocrit, Blood [[id number]]  (Abnormal) Collected: 11/22/24 1758    Specimen: Blood Updated: 11/22/24 1810     Hemoglobin 8.9 g/dL      Hematocrit 27.3 %              Pending Results:     Imaging Reviewed:   XR Chest 1 View    Result Date: 11/25/2024  Impression: No active pulmonary process. Electronically Signed: Jameson Rainey MD  11/25/2024 9:25 AM EST  Workstation ID: [id number]    CT Abdomen Pelvis With Contrast    Result Date: 11/20/2024  Impression: 1.Distal/terminal ileitis in keeping with patient's history of Crohn's disease. No definitive active colonic inflammation identified on CT imaging. Trace free fluid in the pelvis is likely related. 2.Diffuse colonic fatty mural infiltration which can be seen in the setting of chronic inflammatory bowel disease. 3.Other incidental nonemergent findings detailed above. Electronically Signed: Junior  MD Bill  11/20/2024 8:19 AM EST  Workstation ID: [id number]    XR Chest 1 View    Result Date: 11/19/2024  Impression: No acute chest finding. Electronically Signed: Ute Snider MD  11/19/2024 1:34 PM EST  Workstation ID: [id number]          Assessment & Plan     Normocytic anemia  -Hemoglobin 5.5 g/dL, MCV 95.7  -Patient with an acute drop in her hemoglobin overnight from 8.7 g/dL.  Reported experiencing significant gastrointestinal bleeding  -Has received 3 units of PRBC this admission and 2 units ordered today due to hemoglobin of 5.5 g/dL  -Recent PCI with stent in October 2024.  Her aspirin has been held but she has been continued on Plavix  -Received Ferrlecit 250 mg IV x 1 dose during her prior admission, continue IV iron replacement  -Continue IV iron replacement with Ferrlecit 250 mg IV X 3 additional doses.  -On daily folic acid due to methotrexate use  -Check vitamin B12 level, reported low (227), s/p 1 dose of IM B12 supplementation, continue oral B12  -Monitor CBC and recommend transfuse for hemoglobin less than 7.0 g/dL  Defer to Cardiology regarding antiplatelet therapy management.    Acute GI bleed  -In the setting of Crohn's disease, possible exacerbation  -Gastroenterology following and patient being treated with IV steroids, Rinvoq  -Recent strongyloides of the stomach and duodenum treated with ivermectin.  Now with reported worms visualized in stools and stool O&P sent, this was however reported negative.  -ID team is following the patient.       Carlos Nur MD 11/26/24         Thank you for this consult. We will be happy to follow along with you.

## 2024-11-27 NOTE — PROGRESS NOTES
LOS: 8 days   Patient Care Team:  Eduard Little MD as PCP - General (Family Medicine)  Niya Mendoza APRN as Nurse Practitioner (Cardiology)      Subjective     Interval History:     Subjective: Patient continues to report bloody bowel movements and nausea/vomiting, but bedside RN denies both of these claims.  The patient tolerated her entire breakfast tray this morning without difficulty.      ROS:   No chest pain, shortness of breath, or cough.        Medication Review:     Current Facility-Administered Medications:     acetaminophen (TYLENOL) tablet 650 mg, 650 mg, Oral, Once **OR** acetaminophen (TYLENOL) 160 MG/5ML oral solution 650 mg, 650 mg, Oral, Once **OR** acetaminophen (TYLENOL) suppository 650 mg, 650 mg, Rectal, Once, Niya Mendoza APRN    acetaminophen (TYLENOL) tablet 650 mg, 650 mg, Oral, Q4H PRN, Dmitri Navarro MD, 650 mg at 11/25/24 2010    atorvastatin (LIPITOR) tablet 40 mg, 40 mg, Oral, Nightly, Dmitri Navarro MD, 40 mg at 11/26/24 2018    sennosides-docusate (PERICOLACE) 8.6-50 MG per tablet 2 tablet, 2 tablet, Oral, BID PRN **AND** polyethylene glycol (MIRALAX) packet 17 g, 17 g, Oral, Daily PRN **AND** bisacodyl (DULCOLAX) EC tablet 5 mg, 5 mg, Oral, Daily PRN **AND** bisacodyl (DULCOLAX) suppository 10 mg, 10 mg, Rectal, Daily PRN, Dmitri Navarro MD    clopidogrel (PLAVIX) tablet 75 mg, 75 mg, Oral, Daily, Dmitri Navarro MD, 75 mg at 11/27/24 0849    cyanocobalamin injection 1,000 mcg, 1,000 mcg, Intramuscular, Daily, Annalee Anderson APRN, 1,000 mcg at 11/27/24 0851    diphenhydrAMINE (BENADRYL) capsule 25 mg, 25 mg, Oral, Q6H PRN, Dmitri Navarro MD, 25 mg at 11/22/24 0941    diphenhydrAMINE (BENADRYL) capsule 25 mg, 25 mg, Oral, Once **OR** diphenhydrAMINE (BENADRYL) injection 25 mg, 25 mg, Intravenous, Once, Niya Mendoza APRN    ferric gluconate (FERRLECIT) 250 MG in sodium chloride 0.9% 250 mL IVPB, 250 mg, Intravenous, Daily, Carlos Nur MD, Last Rate: 125 mL/hr at  11/27/24 0849, 250 mg at 11/27/24 0849    folic acid (FOLVITE) tablet 1,000 mcg, 1,000 mcg, Oral, Daily, Dmitri Navarro MD, 1,000 mcg at 11/27/24 0849    levothyroxine (SYNTHROID, LEVOTHROID) tablet 50 mcg, 50 mcg, Oral, Daily, Dmitri Navarro MD, 50 mcg at 11/27/24 0849    mesalamine (LIALDA) EC tablet 4.8 g, 4.8 g, Oral, Daily, Mikayla Kc APRN, 4.8 g at 11/27/24 0849    methotrexate tablet 15 mg, 15 mg, Oral, Weekly, Mikayla Kc APRN, 15 mg at 11/23/24 2058    methylPREDNISolone sodium succinate (SOLU-Medrol) injection 20 mg, 20 mg, Intravenous, Q8H, Jonathan Mackay MD, 20 mg at 11/27/24 0848    metoprolol succinate XL (TOPROL-XL) 24 hr tablet 25 mg, 25 mg, Oral, Q24H, Corina Murcia, APRN, 25 mg at 11/27/24 0849    midodrine (PROAMATINE) tablet 5 mg, 5 mg, Oral, TID AC, Phyllis Vicente MD, 5 mg at 11/27/24 0849    nitroglycerin (NITROSTAT) SL tablet 0.4 mg, 0.4 mg, Sublingual, Q5 Min PRN, Dmitri Navarro MD    ondansetron ODT (ZOFRAN-ODT) disintegrating tablet 4 mg, 4 mg, Oral, Q6H PRN **OR** ondansetron (ZOFRAN) injection 4 mg, 4 mg, Intravenous, Q6H PRN, Dmitri Navarro MD, 4 mg at 11/26/24 1735    pantoprazole (PROTONIX) EC tablet 40 mg, 40 mg, Oral, Daily, Dmitri Navarro MD, 40 mg at 11/27/24 0849    sertraline (ZOLOFT) tablet 50 mg, 50 mg, Oral, Daily, Dmitri Navarro MD, 50 mg at 11/27/24 0849    [COMPLETED] Insert Peripheral IV, , , Once **AND** sodium chloride 0.9 % flush 10 mL, 10 mL, Intravenous, PRN, Dmitri Navarro MD    sodium chloride 0.9 % flush 10 mL, 10 mL, Intravenous, Q12H, Dmitri Navarro MD, 10 mL at 11/27/24 0850    sodium chloride 0.9 % flush 10 mL, 10 mL, Intravenous, PRN, Dmitri Navarro MD    sodium chloride 0.9 % infusion 40 mL, 40 mL, Intravenous, PRN, Dmitri Navarro MD    upadacitinib ER (RINVOQ) extended release tablet 45 mg, 45 mg, Oral, Daily, Mikayla Kc, APRN, 45 mg at 11/27/24 0849    vitamin B-12 (CYANOCOBALAMIN) tablet 1,000 mcg, 1,000 mcg, Oral, Daily, Carlos Nur  MD Ramon, 1,000 mcg at 11/27/24 0849      Objective     Vital Signs  Vitals:    11/27/24 0500 11/27/24 0600 11/27/24 0734 11/27/24 0809   BP:   104/54 104/52   BP Location:   Left leg    Patient Position:   Lying    Pulse: 69 66 78 75   Resp:   20 21   Temp:   97.9 °F (36.6 °C) 97.6 °F (36.4 °C)   TempSrc:   Oral Oral   SpO2: 99% 98% 99% 100%   Weight:       Height:           Physical Exam:     General Appearance:    Awake and alert, in no acute distress   Head:    Normocephalic, without obvious abnormality   Eyes:          Conjunctivae normal, anicteric sclera   Throat:   No oral lesions, no thrush, oral mucosa moist   Neck:   No adenopathy, supple, no JVD   Lungs:     respirations regular, even and unlabored   Abdomen:     Soft, non-tender, no rebound or guarding, non-distended   Rectal:     Deferred   Extremities:   No edema, no cyanosis   Skin:   No bruising or rash, no jaundice        Results Review:    Results from last 7 days   Lab Units 11/27/24  0618 11/26/24  0424 11/25/24  0303 11/24/24  1755 11/24/24  0241 11/23/24  0411 11/22/24  2323 11/22/24  1758 11/22/24  0439 11/21/24  0129   WBC 10*3/mm3 13.14* 20.17* 34.52*  --  14.90* 12.82*  --   --  14.13* 13.55*   HEMOGLOBIN g/dL 6.4* 7.4* 6.6* 7.7* 5.5* 8.7* 8.6*   < > 7.4* 9.0*   PLATELETS 10*3/mm3 91* 101* 164  --  197 284  --   --  310 368    < > = values in this interval not displayed.     Results from last 7 days   Lab Units 11/27/24  0532 11/26/24  0424 11/25/24  0303 11/24/24  0211 11/23/24  0411 11/22/24  0439 11/21/24  0129   SODIUM mmol/L 136 136 138 139 141 139 136   POTASSIUM mmol/L 4.7 3.8 4.0 4.6 3.9 3.9 4.1   CHLORIDE mmol/L 110* 109* 109* 108* 108* 107 103   CO2 mmol/L 18.6* 19.8* 16.8* 18.1* 22.3 25.0 23.7   BUN mg/dL 18 27* 23 20 19 15 16   CREATININE mg/dL 0.50* 0.69 0.69 0.63 0.55* 0.49* 0.73   GLUCOSE mg/dL 98 85 172* 122* 128* 116* 132*   ALBUMIN g/dL 2.3* 2.4* 2.4* 2.5* 2.8*  --   --    BILIRUBIN mg/dL 0.3 0.5 0.4 0.2 0.2  --   --     ALK PHOS U/L 48 40 43 57 76  --   --    AST (SGOT) U/L 21 14 18 23 18  --   --    ALT (SGPT) U/L 21 19 24 30 25  --   --    MAGNESIUM mg/dL  --   --   --   --   --  1.8 2.0   CRP mg/dL 2.74* 1.14* <0.30 0.40 1.25*  --   --      Estimated Creatinine Clearance: 84.2 mL/min (A) (by C-G formula based on SCr of 0.5 mg/dL (L)).  Lab Results   Component Value Date    HGBA1C 5.64 (H) 10/13/2024     Results from last 7 days   Lab Units 11/27/24  0532 11/26/24 0424   HSTROP T ng/L 29* 36*     Infection   Results from last 7 days   Lab Units 11/25/24  0437   BLOODCX  No growth at 2 days     UA      Microbiology Results (last 10 days)       Procedure Component Value - Date/Time    Blood Culture - Blood, Arm, Left [[id number]]  (Normal) Collected: 11/25/24 0437    Lab Status: Preliminary result Specimen: Blood from Arm, Left Updated: 11/27/24 0445     Blood Culture No growth at 2 days    Narrative:      Less than seven (7) mL's of blood was collected.  Insufficient quantity may yield false negative results.    Ova & Parasite Examination - Stool, Per Rectum [[id number]] Collected: 11/23/24 1401    Lab Status: Final result Specimen: Stool from Per Rectum Updated: 11/26/24 1511     Ova + Parasite Exam Final report     Comment: These results were obtained using wet preparation(s) and trichrome  stained smear. This test does not include testing for Cryptosporidium  parvum, Cyclospora, or Microsporidia.        Ova + Parasite Result 1 Comment     Comment: No ova, cysts, or parasites seen.  One negative specimen does not rule out the possibility of a  parasitic infection.       Narrative:      Performed at:  [address] [place] [address]  [id number]  : Shola Hill PhD, Phone:  [phone]          Imaging Results (Last 72 Hours)       Procedure Component Value Units Date/Time    XR Chest 1 View [[id number]] Collected: 11/25/24 0924     Updated: 11/25/24 0927    Narrative:      XR CHEST 1 VW    Date of  Exam: 11/25/2024 8:18 AM EST    Indication: Right sided chest pain with breathing    Comparison: Chest radiograph 11/19/2024    Findings:  Stable cardiomediastinal silhouette. Aortic atherosclerotic disease. Coronary atherosclerotic disease. Negative for lobar consolidation, edema, effusion or pneumothorax. Degenerative related osseous change.      Impression:      Impression:  No active pulmonary process.      Electronically Signed: Jameson Rainey MD    11/25/2024 9:25 AM EST    Workstation ID: [id number]            Assessment & Plan     ASSESSMENT:  -Hematochezia  -Diarrhea   -Normocytic anemia with drop in hemoglobin  -Leukocytosis  -Abnormal CT showing distal/terminal ileitis and diffuse colonic fatty infiltration  -Recent Strongyloides infection in 10/2024 s/p treatment with ivermectin  -Small bowel and colonic Crohn's disease - on Rinvoq (recently restarted)  -Chest pain  -Recent fall  -Hypertension  -COPD  -CAD s/p stent placement on Plavix  -RA - on Humira and methotrexate  -History of hysterectomy  -History of cholecystectomy  -Severe mitral regurgitation status post mitral clipping 11/21/2024     PLAN:   Patient is a 74-year-old female with history of small bowel and colonic Crohn's (recently restarted on Rinvoq), rheumatoid arthritis (on Humira and methotrexate), and cholecystectomy who presented on 11/19 with increased hematochezia and found to have drop in hemoglobin to 5.7.  She has recently been admitted and discharged on 11/10/2024.  Was also here in October when she was diagnosed with Strongyloides and treated with ivermectin.     Patient continues to report bloody diarrhea and nausea/vomiting.  However, bedside RN continues to deny both of these.  Patient tolerated her entire breakfast tray this morning without difficulty.  Stool for O&P negative.  Continue IV solu-medrol. Will plan prednisone taper at discharge of 40 mg daily for 1 week followed by 30 mg daily for 1 week followed by 20 mg daily for  1 week followed by 10 mg daily for 1 week.    Continue Rinvoq 45 mg daily while in the hospital for a total of 12 weeks and then 30 mg daily maintenance dosing.  Hemoglobin down to 6.4 this morning from 7.4.  PRBC transfusion ordered.  B12 being replaced.  Hematology, cardiology, and ID following.  May need to consider colorectal surgery evaluation for colectomy with end ileostomy.  Continue supportive care.    Electronically signed by DRU Lew, 11/27/24, 10:18 AM EST.

## 2024-11-27 NOTE — CONSULTS
Nutrition Services    Patient Name: Maryann Gaitan  YOB: 1950  MRN: [id number]  Admission date: 11/19/2024    Comment:  -- Continue current diet and encourage good PO intakes    -- Continue Boost Original BID (Provides 480 kcals, 20 g protein if consumed)     -- Moderate chronic disease related malnutrition related to diminished appetite and likely malabsorption with diarrhea/Crohns Dz x 3 months as evidenced by weight loss greater than 5% in one month, with intakes meeting less than 75% estimated needs for greater than 1 month and moderate muscle and fat wasting per NFPE.  See MSA 11/20/24.        CLINICAL NUTRITION ASSESSMENT      Reason for Assessment Follow up protocol   11/20: MST of 5, Consult for Nursing Admission Screen, BMI less than 19, history of malnutrition      H&P      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 07/05/2016    Dyslipidemia 10/30/2024    Fecal urgency 11/21/2024    First degree hemorrhoids 07/09/2021    GERD (gastroesophageal reflux disease) 11/21/2024    Hashimoto's thyroiditis 11/21/2024    History of colonic polyps 07/09/2021    Hypertension     Hypothyroidism (acquired) 11/06/2024    Mitral regurgitation 10/08/2024    Moderate protein-calorie malnutrition 11/09/2024    Multiple tracheobronchial mucus plugs 10/08/2024    Nicotine dependence 11/21/2024    Rheumatoid arthritis 11/06/2024    S/P mitral valve clip implantation 11/21/2024    Second degree hemorrhoids 07/05/2016    Stress incontinence, female 11/21/2024    Vitamin D deficiency, unspecified 11/21/2024        Past Surgical History:   Procedure Laterality Date    BRONCHOSCOPY N/A 10/16/2024    Procedure: BRONCHOSCOPY;  Surgeon: Gallo Pope MD;  Location: [facility] ENDOSCOPY;  Service: Pulmonary;  Laterality: N/A;    CARDIAC CATHETERIZATION N/A 10/15/2024    Procedure: Left Heart Cath, possible pci;  Surgeon: Travis Connor MD;  Location: [facility] CATH INVASIVE LOCATION;  Service: Cardiovascular;  Laterality: N/A;    CARDIAC CATHETERIZATION N/A 10/22/2024    Procedure: Laser Coronary Atherectomy;  Surgeon: Travis Connor MD;  Location: [facility] CATH INVASIVE LOCATION;  Service: Cardiovascular;  Laterality: N/A;    COLONOSCOPY N/A 10/12/2024    Procedure: COLONOSCOPY WITH BIOPSY AND WIRE GUIDED BALLOON DILATION OF TERMINAL ILEUM;  Surgeon: Rob Strong MD;  Location: [facility] ENDOSCOPY;  Service: Gastroenterology;  Laterality: N/A;  Colitis, crohns of terminal ileum, right colon ulcers, diverticulosis, hemorroids    ENDOSCOPY N/A 10/12/2024    Procedure: ESOPHAGOGASTRODUODENOSCOPY WITH BIOPSY X 2 AREA;  Surgeon: Rob Strong MD;  Location: [facility] ENDOSCOPY;  Service: Gastroenterology;  Laterality: N/A;  Chronic gastritis, HH    HYSTERECTOMY      LEFT HEART CATH          Current Problems   Acute lower GI bleed  Acute blood loss anemia  -GI following  - working with PT/OT  - oncology following    Hypertension     CAD  - cardiology following    Hypothyroidism       Encounter Information        Trending Narrative     11/27: Since last RD review, patient continues to recover.  RD visited patient at bedside.  Patient sound asleep and did not awaken to RD voice.  Small lunch at bedside (soup).      11/20: Admitted for abdominal pain, rectal bleeding and SOB.  RD visited patient at bedside.  Patient reports a decrease in appetite, no N/V, no chewing/swallowing issues noted.  NFPE completed, consistent with nutrition diagnosis of malnutrition using AND/ASPEN criteria. See MSA below.  "        Anthropometrics        Current Height, Weight Height: 162.6 cm (64\")  Weight: 54 kg (119 lb) (11/22/24 0724)       Usual Body Weight (UBW) Patient reports 158# but unsure of validity        Trending Weight Hx     This admission: 11/27: 119 (11/22), large variation since admission, RD ordered new weight to be obtained   11/20: 99#             PTA: 18% weight loss x 1.5 months     Wt Readings from Last 30 Encounters:   11/22/24 0724 54 kg (119 lb)   11/21/24 1129 54.1 kg (119 lb 4.3 oz)   11/21/24 0320 45.9 kg (101 lb 3.1 oz)   11/19/24 1551 45.3 kg (99 lb 13.9 oz)   11/19/24 1207 45.4 kg (100 lb)   11/13/24 1312 54.9 kg (121 lb)   11/06/24 1426 52.6 kg (115 lb 15.4 oz)   10/30/24 1346 52.6 kg (116 lb)   10/12/24 1153 57.6 kg (127 lb)   10/12/24 0757 57.6 kg (127 lb)   10/11/24 0420 58 kg (127 lb 13.9 oz)   10/10/24 0503 58.1 kg (128 lb 1.4 oz)   10/08/24 1956 54.9 kg (121 lb 0.5 oz)   10/08/24 0828 54.9 kg (121 lb)      BMI kg/m2 Body mass index is 20.43 kg/m².       Labs        Pertinent Labs    Results from last 7 days   Lab Units 11/27/24  0532 11/26/24  0424 11/25/24  0303   SODIUM mmol/L 136 136 138   POTASSIUM mmol/L 4.7 3.8 4.0   CHLORIDE mmol/L 110* 109* 109*   CO2 mmol/L 18.6* 19.8* 16.8*   BUN mg/dL 18 27* 23   CREATININE mg/dL 0.50* 0.69 0.69   CALCIUM mg/dL 7.9* 7.6* 7.6*   BILIRUBIN mg/dL 0.3 0.5 0.4   ALK PHOS U/L 48 40 43   ALT (SGPT) U/L 21 19 24   AST (SGOT) U/L 21 14 18   GLUCOSE mg/dL 98 85 172*     Results from last 7 days   Lab Units 11/27/24  0618 11/22/24  1758 11/22/24  0439 11/21/24  0129   MAGNESIUM mg/dL  --   --  1.8 2.0   HEMOGLOBIN g/dL 6.4*   < > 7.4* 9.0*   HEMATOCRIT % 19.2*   < > 23.2* 27.6*    < > = values in this interval not displayed.     Lab Results   Component Value Date    HGBA1C 5.64 (H) 10/13/2024        Medications    Scheduled Medications acetaminophen, 650 mg, Oral, Once   Or  acetaminophen, 650 mg, Oral, Once   Or  acetaminophen, 650 mg, Rectal, " Once  atorvastatin, 40 mg, Oral, Nightly  clopidogrel, 75 mg, Oral, Daily  cyanocobalamin, 1,000 mcg, Intramuscular, Daily  diphenhydrAMINE, 25 mg, Oral, Once   Or  diphenhydrAMINE, 25 mg, Intravenous, Once  ferric gluconate, 250 mg, Intravenous, Daily  folic acid, 1,000 mcg, Oral, Daily  levothyroxine, 50 mcg, Oral, Daily  mesalamine, 4.8 g, Oral, Daily  methotrexate, 15 mg, Oral, Weekly  methylPREDNISolone sodium succinate, 20 mg, Intravenous, Q8H  metoprolol succinate XL, 25 mg, Oral, Q24H  midodrine, 5 mg, Oral, TID AC  pantoprazole, 40 mg, Oral, Daily  sertraline, 50 mg, Oral, Daily  sodium chloride, 10 mL, Intravenous, Q12H  upadacitinib ER, 45 mg, Oral, Daily  vitamin B-12, 1,000 mcg, Oral, Daily        Infusions      PRN Medications   acetaminophen    senna-docusate sodium **AND** polyethylene glycol **AND** bisacodyl **AND** bisacodyl    diphenhydrAMINE    nitroglycerin    ondansetron ODT **OR** ondansetron    [COMPLETED] Insert Peripheral IV **AND** sodium chloride    sodium chloride    sodium chloride     Physical Findings        Trending Physical   Appearance, NFPE 11/27: Agree with previous assessment     11/20: NFPE completed, consistent with nutrition diagnosis of malnutrition using AND/ASPEN criteria. See MSA below.      --  Edema  1+ legs, ankles, feet      Bowel Function Last documented BM 11/26 (yesterday)     Tubes No feeding tube      Chewing/Swallowing No known issues      Skin Per WOCN note 11/20:   - healing wound under her left breast   - partial thickness abrasions noted to both knees      --  Current Nutrition Orders & Evaluation of Intake       Oral Nutrition     Food Allergies NKFA   Current PO Diet Diet: Gastrointestinal; Fiber-Restricted; Fluid Consistency: Thin (IDDSI 0)   Supplement Boost Plus BID   PO Evaluation     Trending % PO Intake 11/27: 100% x last 3 meals (small meals ordered)  11/20: None documented    --  Nutritional Risk Screening        NRS-2002 Score          Nutrition  Diagnosis         Nutrition Dx Problem 1 Moderate chronic disease related malnutrition related to diminished appetite and likely malabsorption with diarrhea/Crohns Dz x 3 months as evidenced by weight loss greater than 5% in one month, with intakes meeting less than 75% estimated needs for greater than 1 month and moderate muscle and fat wasting per NFPE.       Nutrition Dx Problem 2        Intervention Goal         Intervention Goal(s) Accept ONS  No weight loss  PO intakes at least 60%     Nutrition Intervention        RD Action Continue ONS     Nutrition Prescription          Diet Prescription Fiber Restricted    Supplement Prescription Boost Plus BID   --  Monitor/Evaluation        Monitor Per protocol, I&O, PO intake, Supplement intake, Pertinent labs, Weight       Electronically signed by:  Mini Arellano, EMILY  11/27/24 14:19 EST

## 2024-11-27 NOTE — CASE MANAGEMENT/SOCIAL WORK
Continued Stay Note  [facility]     Patient Name: Maryann Gaitan  MRN: [id number]  Today's Date: 11/27/2024    Admit Date: 11/19/2024    Plan: Return home w [facility] (current, need order)   Discharge Plan       Row Name 11/27/24 0918       Plan    Plan Comments DC Barriers: hem/onc, cardio, GI following, monitoring H&H, Hgb 6.4 this AM, PRBC ordered, stool ova & parasite neg however worms visualized in stools, treated with ivermectin, continues IV steroids, Waldemar Lozano RN      [facility]  Office: [phone]  Cell: [phone]  Fax # [phone]

## 2024-11-27 NOTE — PROGRESS NOTES
Cardiology Progress Note    Patient Identification:  Name: Maryann Gaitan  Age: 74 y.o.  Sex: female  :  1950  MRN: [id number]                 Follow Up / Chief Complaint: CAD/PCI, GI Bleed, MR status post Azeb Clip   Chief Complaint   Patient presents with    Chest Pain       Interval History: Patient presented with chest pain and abdominal pain.  Has been having bright red blood per rectum.  Was seen by structural heart for mitral regurgitation was scheduled for outpatient procedure.  Underwent MitraClip 2024  Patient is having issues with rectal bleed and anemia requiring transfusions.         Subjective: Patient seen and examined.  Chart reviewed.  Labs reviewed.  Patient without chest pain or shortness of breath.  Patient continues to have bloody diarrhea.      Objective:  2024: Glucose elevated.  CBC with white count of 13.5, hemoglobin 9  2024: WBC 14.13, hemoglobin 7.4  EKG normal sinus rhythm  Limited echo shows EF at 56 to 60% MitraClip in place with trace residual MR mean creatinine of 4.6  2024: Hemoglobin 6.6  2024: Hemoglobin is 7.4.  2024: Hemoglobin is 6.4.    History of present illness:    Ms. Maryann Gaitan has PMH of     CAD status post cardiac cath with multivessel CAD poor candidate for CABG, underwent PCI to ostial and proximal LAD 10/22/2024  Moderate to severe MR with central jet noted  COPD  Hypertension  Rheumatoid arthritis  Crohn's disease     Presented to the emergency room on 2024 with abdominal pain and chest pain.  Patient reports that she started having bright red blood per rectum again yesterday and has been having constant chest pain since.     Labs in the emergency room was noted to have a hemoglobin of 5.7 high-sensitivity troponin 21--17 BUN 28 creatinine 0.74 total protein 5.1 albumin 2.9 ALT 35 WBC 16.93 chest x-ray without any acute finding EKG sinus rhythm with nonspecific ST abnormalities  CT abdomen and pelvis shows distal  terminal ileitis colonic fatty marrow infiltration which can be seen in the setting of chronic inflammatory bowel disease   patient was transfused with 1 unit of PRBC and hemoglobin improved to 7.3 and this morning she is 9.1     Unfortunately patient is in the tough situation as she had stent placed on 10/22/2024 and needs dual antiplatelet therapy to prevent clotting of stent however she has presented for the second time with significant GI bleeding.  Currently her aspirin and Plavix are both on hold.        Recent hospitalization  Presented 11/6/2024 through [facility] with complaint of abdominal pain and dark red stool / rectal bleed.  Patient has close disease and rheumatoid arthritis and is on immunosuppressants methotrexate, Humira, Rinvoq now presented with rectal bleed and abdominal pain.  GI did a scope and biopsies of terminal ileum which were consistent with active Crohn's and biopsies of stomach and duodenum showed strong colitis infection, was treated by ivermectin.  GI started patient on Solu-Medrol and is holding immunosuppressants and wants to hold Plavix.     Patient was recently in the hospital 10/8/2024 with nausea vomiting diarrhea and abnormal labs.  With hyponatremia and hypokalemia and anemia.  Patient suddenly started having chest pain and fast team was called because of sharp left-sided chest pain with shortness of breath dizziness EKG showed sinus tachycardia and cardiac workup revealed severe MR, cath 10/15/2024 revealing severe ostial LAD and outpouching in the descending thoracic aorta probably a penetrating aortic ulcer versus aneurysm.  CT surgery was consulted and she was turned down for CABG therefore patient underwent drug-eluting stent to ostial LAD on 10/22/2024 and was supposed to have clip to mitral valve in follow-up.  In the meantime has been having worsening symptoms and abdominal pain, weight loss.  Patient has been restarted on Plavix.  Will continue for bleeding closely.      Data:  Labs 11/6/2024 - 11/7/2024 revealed sodium 134, BUN/creatinine of 75/2.84, glucose 138, albumin 2.9.  Hemoglobin 9.4 has come down to 8.  MCV 98  EKG done 11/6/2024 reviewed/interpreted by me reveals sinus bradycardia at the rate of 58 bpm.        EGD/colonoscopy 10/12/2024 revealed small hiatal hernia, nonerosive gastritis, T1 stricture s/p dilatation with 12 mm, T1 ulcer, colon ulcers, sigmoid diverticulosis, grade 2 internal hemorrhoids and strongyloides  Echo 10/12/2024 EF of 51 to 55% with mild RV enlargement, severe left atrial enlargement, moderate to severe MR  Transesophageal echo 10/16/2024: LVEF of 55 to 60% with severe left atrial enlargement, moderate to severe MR and grade 3 descending aortic plaque  Cardiac cath 10/15/2024 reveals total right and severe ostial LAD, descending thoracic aorta had an outpouching consistent with penetrating aortic ulcer versus aneurysm.   Patient was evaluated by CT surgery not a candidate for CABG therefore patient underwent PCI and drug-eluting stent to the ostium proximal LAD.  And she was seen by valve team and will be set up for MitraClip once she recovers from this hospitalization      Assessment:  :     Abdominal pain, rectal bleeding  Anemia requiring blood transfusion  Chest pain  Crohn's disease  CAD, status post PCI  Mitral regurgitation  Chronic HFpEF due to valvular heart disease from mitral regurgitation  Hypertensive cardiovascular disease from hypertension  Hyponatremia  Hypokalemia  Crohn's disease  Normocytic anemia  COPD, chronic steroid therapy  Multifocal pneumonia  Hyperglycemia, prediabetes with A1c of 5.6 from     Recommendations / Plan:         Patient presented 11/19/2024 with abdominal pain and bright red blood per rectum was found to have a hemoglobin of 5.7 was transfused.  Patient chest pain.  HS troponin is normal.  Patient recently had coronary drug-eluting stent placed on 10/22/2024 would benefit from antiplatelet therapy.     Patient was seen by structural heart underwent mitral valve clip 11/21/2024.  Patient unfortunately is having Crohn's flareup with bloody diarrhea and requiring transfusions, is between rock and a hard place.  Patient is being followed for GI for Crohn's disease and hematology oncology for anemia..  Will follow as needed.  Please call me back if I can be of any further assistance.            Review of records  Patient presented 10/9/2024 because of abnormal labs showing low sodium, was complaining of nausea vomiting and diarrhea.    Patient underwent cardiac cath 10/15/2024 which revealed total right and severe ostial LAD disease.  Descending thoracic aorta has an outpouching probably saccular aneurysm versus  penetrating aortic ulcer .  Echocardiogram 10/12/2024 is revealing EF of 51 to 55% with mild RV enlargement severe left atrial enlargement and right atrial enlargement and moderate to severe MR  GONZÁLEZ is revealing moderate to severe MR.  Patient would benefit from CABG and mitral valve surgery.  Patient was seen by .  Patient has been turned down for surgery due to multiple comorbid conditions.  Patient has a cavitary lesion in her lungs had bronchoscopy.  Had multiple mucous plugs.  Patient underwent drug-eluting stent to ostial LAD 10/12/2024.  Patient was sent home on dual antiplatelet therapy with aspirin and Plavix, metoprolol and statins and losartan as tolerated.  Will follow-up mitral regurgitation and follow-up in with structural heart team.  Patient had an EKG done 10/12/2024 which revealed sinus rhythm degenerating to A-fib with RVR.  Patient would benefit from long-term anticoagulation to prevent thromboembolic events.  Given her Crohn's disease and microcytic anemia patient will be a poor candidate for long-term anticoagulation.  Will follow and consider watchman evaluation as outpatient.  Patient was not tolerating losartan and metoprolol was given intermittent midodrine.  Now  patient presents with abdominal pain and rectal bleed.  GI was recommending holding Plavix.  Patient unfortunately is in a tough situation.  Had drug-eluting stents done 10/22/2024, hardly 2 weeks ago.  Would benefit from Plavix to prevent stent thrombosis.  Patient has been receiving Plavix uninterrupted.       Copied text in this portion of the note has been reviewed and is accurate as of 11/27/2024    Past Medical 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 07/05/2016    Dyslipidemia 10/30/2024    Fecal urgency 11/21/2024    First degree hemorrhoids 07/09/2021    GERD (gastroesophageal reflux disease) 11/21/2024    Hashimoto's thyroiditis 11/21/2024    History of colonic polyps 07/09/2021    Hypertension     Hypothyroidism (acquired) 11/06/2024    Mitral regurgitation 10/08/2024    Moderate protein-calorie malnutrition 11/09/2024    Multiple tracheobronchial mucus plugs 10/08/2024    Nicotine dependence 11/21/2024    Rheumatoid arthritis 11/06/2024    S/P mitral valve clip implantation 11/21/2024    Second degree hemorrhoids 07/05/2016    Stress incontinence, female 11/21/2024    Vitamin D deficiency, unspecified 11/21/2024     Past Surgical History:  Past Surgical History:   Procedure Laterality Date    BRONCHOSCOPY N/A 10/16/2024    Procedure: BRONCHOSCOPY;  Surgeon: Gallo Pope MD;  Location: [facility] ENDOSCOPY;  Service: Pulmonary;  Laterality: N/A;    CARDIAC CATHETERIZATION N/A 10/15/2024    Procedure: Left Heart Cath, possible pci;  Surgeon: Travis Connor MD;  Location:   [facility] CATH INVASIVE LOCATION;  Service: Cardiovascular;  Laterality: N/A;    CARDIAC CATHETERIZATION N/A 10/22/2024    Procedure: Laser Coronary Atherectomy;  Surgeon: Travis Connor MD;  Location: [facility] CATH INVASIVE LOCATION;  Service: Cardiovascular;  Laterality: N/A;    COLONOSCOPY N/A 10/12/2024    Procedure: COLONOSCOPY WITH BIOPSY AND WIRE GUIDED BALLOON DILATION OF TERMINAL ILEUM;  Surgeon: Rob Strong MD;  Location: [facility] ENDOSCOPY;  Service: Gastroenterology;  Laterality: N/A;  Colitis, crohns of terminal ileum, right colon ulcers, diverticulosis, hemorroids    ENDOSCOPY N/A 10/12/2024    Procedure: ESOPHAGOGASTRODUODENOSCOPY WITH BIOPSY X 2 AREA;  Surgeon: Rob Strong MD;  Location: [facility] ENDOSCOPY;  Service: Gastroenterology;  Laterality: N/A;  Chronic gastritis, HH    HYSTERECTOMY      LEFT HEART CATH          Social History:   Social History     Tobacco Use    Smoking status: Former     Types: Cigarettes    Smokeless tobacco: Never   Substance Use Topics    Alcohol use: Never      Family History:  Family History   Problem Relation Age of Onset    Heart disease Mother     Dementia Mother     Stroke Mother     Heart disease Father     Hypertension Father           Allergies:  Allergies   Allergen Reactions    Codeine Hives    Penicillin G Sodium Hives     Scheduled Meds:  acetaminophen, 650 mg, Once   Or  acetaminophen, 650 mg, Once   Or  acetaminophen, 650 mg, Once  atorvastatin, 40 mg, Nightly  clopidogrel, 75 mg, Daily  cyanocobalamin, 1,000 mcg, Daily  diphenhydrAMINE, 25 mg, Once   Or  diphenhydrAMINE, 25 mg, Once  ferric gluconate, 250 mg, Daily  folic acid, 1,000 mcg, Daily  levothyroxine, 50 mcg, Daily  mesalamine, 4.8 g, Daily  methotrexate, 15 mg, Weekly  methylPREDNISolone sodium succinate, 20 mg, Q8H  metoprolol succinate XL, 25 mg, Q24H  midodrine, 5 mg, TID AC  pantoprazole, 40 mg, Daily  sertraline, 50 mg, Daily  sodium chloride, 10 mL,  "Q12H  upadacitinib ER, 45 mg, Daily  vitamin B-12, 1,000 mcg, Daily          Review of Systems:   ROS        Constitutional:  Temp:  [97.8 °F (36.6 °C)-98.4 °F (36.9 °C)] 97.9 °F (36.6 °C)  Heart Rate:  [66-99] 78  Resp:  [16-24] 20  BP: ()/(50-66) 104/54    Physical Exam   /54 (BP Location: Left leg, Patient Position: Lying)   Pulse 78   Temp 97.9 °F (36.6 °C) (Oral)   Resp 20   Ht 162.6 cm (64\")   Wt 54 kg (119 lb)   SpO2 99%   BMI 20.43 kg/m²   General:  Appears in no acute distress  Eyes: Sclera is anicteric,  conjunctiva is clear   HEENT:  No JVD. Thyroid not visibly enlarged. No mucosal pallor or cyanosis  Respiratory: Respirations regular and unlabored at rest.  Clear to auscultation  Cardiovascular: S1,S2 Regular rate and rhythm.  2/6 holosystolic murmur  Gastrointestinal: Abdomen nondistended.  Musculoskeletal:  No abnormal movements  Extremities: No digital clubbing or cyanosis  Skin: Color pink.   Neuro: Alert and awake.    INTAKE AND OUTPUT:    Intake/Output Summary (Last 24 hours) at 11/27/2024 0739  Last data filed at 11/26/2024 2340  Gross per 24 hour   Intake 720 ml   Output 650 ml   Net 70 ml       Cardiographics  Telemetry: Sinus    ECG:   ECG 12 Lead Other; post-MitraClip   Final Result   HEART RATE=73  bpm   RR Gelzfler=972  ms   MD Nhyatoiw=658  ms   P Horizontal Axis=1  deg   P Front Axis=84  deg   QRSD Interval=80  ms   QT Bucsarkm=604  ms   PMbT=068  ms   QRS Axis=39  deg   T Wave Axis=70  deg   - NORMAL ECG -   Sinus rhythm   When compared with ECG of 19-Nov-2024 12:13:34,   Significant repolarization change   Significant axis, voltage or hypertrophy change   Electronically Signed By: Dmitri Navarro ([facility]) 2024-11-22 13:38:55   Date and Time of Study:2024-11-22 06:15:06      ECG 12 Lead Chest Pain   Final Result   HEART RATE=67  bpm   RR Efogxagj=728  ms   MD Rztlcqhn=456  ms   P Horizontal Axis=28  deg   P Front Axis=87  deg   QRSD Interval=83  ms   QT Yzoaelte=313  ms "   KAsF=148  ms   QRS Axis=39  deg   T Wave Axis=40  deg   - ABNORMAL ECG -   Sinus rhythm   Low voltage, precordial leads   Nonspecific  T abnormalities, inferior leads   When compared with ECG of 06-Nov-2024 15:41:09,   Significant repolarization change   Significant axis, voltage or hypertrophy change   Electronically Signed By: Rajiv Dubon ([facility]) 2024-11-19 12:47:59   Date and Time of Study:2024-11-19 12:13:34      Telemetry Scan   Final Result      Telemetry Scan   Final Result      Telemetry Scan   Final Result      Telemetry Scan   Final Result      Telemetry Scan   Final Result      Telemetry Scan   Final Result      Telemetry Scan   Final Result      Telemetry Scan   Final Result      Telemetry Scan   Final Result      Telemetry Scan   Final Result      Telemetry Scan   Final Result      Telemetry Scan   Final Result      Telemetry Scan   Final Result      Telemetry Scan   Final Result      Telemetry Scan   Final Result      Telemetry Scan   Final Result      Telemetry Scan   Final Result      Telemetry Scan   Final Result      Telemetry Scan   Final Result      Telemetry Scan   Final Result      Telemetry Scan   Final Result      Telemetry Scan   Final Result      Telemetry Scan   Final Result      Telemetry Scan   Final Result      Telemetry Scan   Final Result      Telemetry Scan   Final Result      Telemetry Scan   Final Result      Telemetry Scan   Final Result      Telemetry Scan   Final Result      Telemetry Scan   Final Result      Telemetry Scan   Final Result      Telemetry Scan   Final Result      Telemetry Scan   Final Result      Telemetry Scan   Final Result      Telemetry Scan   Final Result      Telemetry Scan   Final Result      Telemetry Scan   Final Result      Telemetry Scan   Final Result      Telemetry Scan   Final Result      Telemetry Scan   Final Result        I have personally reviewed EKG    Echocardiogram: Results for orders placed during the hospital encounter of 11/19/24    Adult  "Transthoracic Echo Limited W/ Cont if Necessary Per Protocol    Interpretation Summary    Left ventricular ejection fraction appears to be 56 - 60%.    There is a MitraClip mitral valve repair present.    There is trace residual MR.  Mean gradient is 4.6 mmHg      Lab Review   I have reviewed the labs  Results from last 7 days   Lab Units 11/27/24  0532 11/26/24 0424   HSTROP T ng/L 29* 36*     Results from last 7 days   Lab Units 11/22/24  0439   MAGNESIUM mg/dL 1.8     Results from last 7 days   Lab Units 11/27/24  0532   SODIUM mmol/L 136   POTASSIUM mmol/L 4.7   BUN mg/dL 18   CREATININE mg/dL 0.50*   CALCIUM mg/dL 7.9*         Results from last 7 days   Lab Units 11/27/24  0618 11/26/24  0424 11/25/24  0303   WBC 10*3/mm3 13.14* 20.17* 34.52*   HEMOGLOBIN g/dL 6.4* 7.4* 6.6*   HEMATOCRIT % 19.2* 21.4* 20.2*   PLATELETS 10*3/mm3 91* 101* 164             RADIOLOGY:  Imaging Results (Last 24 Hours)       ** No results found for the last 24 hours. **                  )11/27/2024  MD EVERETTE Araujo Dragon/Transcription:   \"Dictated utilizing Dragon dictation\".   "

## 2024-11-27 NOTE — PROGRESS NOTES
.    [facility] MEDICINE SERVICE  DAILY PROGRESS NOTE    NAME: Maryann Gaitan  : 1950  MRN: [id number]      LOS: 8 days     PROVIDER OF SERVICE: Yesenia Tariq MD    Chief Complaint: Acute lower GI bleeding    Subjective:     Interval History:  History taken from: patient    Seen and examined at bedside, voices no concerns.        Review of Systems:   Review of Systems negative except as mentioned above    Objective:     Vital Signs  Temp:  [97.6 °F (36.4 °C)-98.4 °F (36.9 °C)] 98 °F (36.7 °C)  Heart Rate:  [66-99] 72  Resp:  [16-24] 21  BP: ()/(50-66) 111/52   Body mass index is 20.43 kg/m².    Physical Exam  Physical Exam  General Appearance:    Awake and alert, in no acute distress   Head:    Normocephalic, without obvious abnormality   Eyes:          Conjunctivae normal, anicteric sclera   Throat:   No oral lesions, no thrush, oral mucosa moist   Neck:   No adenopathy, supple, no JVD   Lungs:     respirations regular, even and unlabored   Abdomen:     Soft, generalized tenderness, no rebound or guarding, non-distended   Rectal:     Deferred   Extremities:   No edema, no cyanosis   Skin:   No bruising or rash, no jaundice        Diagnostic Data    Results from last 7 days   Lab Units 24  0618 24  0532   WBC 10*3/mm3 13.14*  --    HEMOGLOBIN g/dL 6.4*  --    HEMATOCRIT % 19.2*  --    PLATELETS 10*3/mm3 91*  --    GLUCOSE mg/dL  --  98   CREATININE mg/dL  --  0.50*   BUN mg/dL  --  18   SODIUM mmol/L  --  136   POTASSIUM mmol/L  --  4.7   AST (SGOT) U/L  --  21   ALT (SGPT) U/L  --  21   ALK PHOS U/L  --  48   BILIRUBIN mg/dL  --  0.3   ANION GAP mmol/L  --  7.4       No radiology results for the last day      I reviewed the patient's new clinical results.    Assessment/Plan:     Active and Resolved Problems  Active Hospital Problems    Diagnosis  POA    **Acute lower GI bleeding [K92.2]  Yes    S/P mitral valve clip implantation [Z98.890, Z95.818]  Not Applicable    Primary  hypertension [I10]  Yes    Moderate protein-calorie malnutrition [E44.0]  Yes    Hypothyroidism (acquired) [E03.9]  Yes    COPD (chronic obstructive pulmonary disease) [J44.9]  Yes    Rheumatoid arthritis [M06.9]  Yes    Crohn's disease [K50.90]  Yes    Anxiety associated with depression [F41.8]  Yes    Dyslipidemia [E78.5]  Yes    Severe mitral regurgitation [I34.0]  Yes    Acute heart failure with preserved ejection fraction (HFpEF) [I50.31]  Yes    CAD, multiple vessel [I25.10]  Yes      Resolved Hospital Problems   No resolved problems to display.       Acute lower GI bleed/abdominal pain and rectal bleed.  -In the settings of active Crohn exacerbation while the patient is unfortunately on Plavix secondary to her recent PCI, risk seems to be more than benefit to stop it  -GI recommendation to keep Plavix on board  -Goal to keep hemoglobin above 7  -GI has been followed the patient she is currently on IV Solu-Medrol along with Lialda  -OK to continue Rinvoq for now.  Can consider Skyrizi if she does not respond to Rinvoq.  Per cardio:Patient unfortunately is in a tough situation.  Had drug-eluting stents done 10/22/2024, hardly 2 weeks ago.  Would benefit from Plavix to prevent stent thrombosis.  Patient has been receiving Plavix uninterrupted.    -follow stool ova and parasite   -ID consulted for recurrent infestations, recommend monitoring off antimicrobial therapy for now and have signed off.  -might need CRS for consideration of colectomy per GI rec         Mitral regurgitation  Transseptal left heart catheterization  Transcatheter [id number] repair of mitral valve MitraClip NT on 11/21        Hypertension; currently with low blood pressures     CAD  -Patient has previous recent history of PCI with stent placement and was on DAPT although this is likely exacerbating her GI bleed  -Holding aspirin in setting of bleeding  -Was get on Plavix  -Continue statin for now  -cardiology is following      Hypothyroidism  -continue home Synthroid       VTE Prophylaxis:  Mechanical VTE prophylaxis orders are present.             Disposition Planning:     Barriers to Discharge: Ongoing care  Anticipated Date of Discharge: Pending clinical course  Place of Discharge: Pending clinical course      Time: > 35 minutes     Code Status and Medical Interventions: CPR (Attempt to Resuscitate); Full Support   Ordered at: 11/21/24 1014     Level Of Support Discussed With:    Patient     Code Status (Patient has no pulse and is not breathing):    CPR (Attempt to Resuscitate)     Medical Interventions (Patient has pulse or is breathing):    Full Support       Signature: Electronically signed by Yesenia Tariq MD, 11/27/24, 11:06 EST.  [facility] Hospitalist Team

## 2024-11-27 NOTE — PLAN OF CARE
Goal Outcome Evaluation:       Maryann Gaitan presents with functional mobility impairments which indicate the need for skilled intervention. Pt with decreased activity tolerance, still requires min/mod A for bed mobility and transfers.  Pt is below her functional baseline, was able to take some steps today with RW which is an improvement. Tolerating session today without incident. Will continue to follow and progress as tolerated.

## 2024-11-27 NOTE — PLAN OF CARE
Goal Outcome Evaluation:                 Pt resting abed no s/s of distress noted. Aox2-4 with periods of confusion noted. Pt on RA incont of B&B. Q2T. Cont on solumedrol.

## 2024-11-27 NOTE — PLAN OF CARE
Problem: Adult Inpatient Plan of Care  Goal: Plan of Care Review  Outcome: Progressing  Goal: Patient-Specific Goal (Individualized)  Outcome: Progressing  Goal: Absence of Hospital-Acquired Illness or Injury  Outcome: Progressing  Intervention: Identify and Manage Fall Risk  Recent Flowsheet Documentation  Taken 11/27/2024 0800 by Bernie Kurtz RN  Safety Promotion/Fall Prevention: safety round/check completed  Goal: Optimal Comfort and Wellbeing  Outcome: Progressing  Goal: Readiness for Transition of Care  Outcome: Progressing     Problem: Comorbidity Management  Goal: Maintenance of COPD Symptom Control  Outcome: Progressing  Goal: Maintenance of Heart Failure Symptom Control  Outcome: Progressing  Goal: Blood Pressure in Desired Range  Outcome: Progressing  Goal: Maintenance of Osteoarthritis Symptom Control  Outcome: Progressing     Problem: Skin Injury Risk Increased  Goal: Skin Health and Integrity  Outcome: Progressing     Problem: Gastrointestinal Bleeding  Goal: Optimal Coping with Acute Illness  Outcome: Progressing  Goal: Hemostasis  Outcome: Progressing     Problem: Pain Acute  Goal: Optimal Pain Control and Function  Outcome: Progressing     Problem: Chest Pain  Goal: Resolution of Chest Pain Symptoms  Outcome: Progressing     Problem: Sepsis/Septic Shock  Goal: Optimal Coping  Outcome: Progressing  Goal: Absence of Bleeding  Outcome: Progressing  Goal: Blood Glucose Level Within Target Range  Outcome: Progressing  Goal: Absence of Infection Signs and Symptoms  Outcome: Progressing  Goal: Optimal Nutrition Delivery  Outcome: Progressing   Goal Outcome Evaluation:

## 2024-11-27 NOTE — THERAPY TREATMENT NOTE
"Subjective: Pt agreeable to therapeutic plan of care.    Objective:   Pt A & O x 4 but confused conversation throughout tx requiring frequent redirection to task.     Precautions - falls    Bed mobility - Mod-A, supine to sit  EOB sitting activity x 10 minutes, supervision for balance.     Transfers - Min-A and Assist x 2, sit to stand from EOB, cues for hand placement/safety    Ambulation - 8 feet Min-A and with rolling walker, short step length, shuffled gait, cues for safety with stand to sit       Vitals: WNL  /90  HR 73  O2 99%    Pain: 5 VAS   Location: B feet  Intervention for pain: Repositioned and Therapeutic Presence    Education: Provided education on the importance of mobility in the acute care setting and Transfer Training    Assessment: Maryann Gaitan presents with functional mobility impairments which indicate the need for skilled intervention. Pt with decreased activity tolerance, still requires min/mod A for bed mobility and transfers.  Pt is below her functional baseline, was able to take some steps today with RW which is an improvement. Tolerating session today without incident. Will continue to follow and progress as tolerated.     Plan/Recommendations:   If medically appropriate, Moderate Intensity Therapy recommended post-acute care. This is recommended as therapy feels the patient would require 3-4 days per week and wouldn't tolerate \"3 hour daily\" rehab intensity. SNF would be the preferred choice. If the patient does not agree to SNF, arrange HH or OP depending on home bound status. If patient is medically complex, consider LTACH. Pt requires no DME at discharge.     Pt desires Home with family assist and Home Health at discharge. Pt cooperative; agreeable to therapeutic recommendations and plan of care.         Basic Mobility 6-click:  Rollin = Total, A lot = 2, A little = 3; 4 = None  Supine>Sit:   1 = Total, A lot = 2, A little = 3; 4 = None   Sit>Stand with arms:  1 = " Total, A lot = 2, A little = 3; 4 = None  Bed>Chair:   1 = Total, A lot = 2, A little = 3; 4 = None  Ambulate in room:  1 = Total, A lot = 2, A little = 3; 4 = None  3-5 Steps with railin = Total, A lot = 2, A little = 3; 4 = None  Score: 15    Modified Doug: N/A = No pre-op stroke/TIA    Post-Tx Position: Up in Chair, Alarms activated, and Call light and personal items within reach  PPE: gloves and surgical mask    Therapy Charges for Today       Code Description Service Date Service Provider Modifiers Qty    [id number]  GAIT TRAINING EA 15 MIN 2024 Hilary Mcmillan, PT GP 1    [id number]  PT THERAPEUTIC ACT EA 15 MIN 2024 Hilary Mcmillan, PT GP 1           PT Charges       Row Name 24 1254             Time Calculation    Start Time 1044  -      Stop Time 1107  -      Time Calculation (min) 23 min  -      PT Received On 24  -      PT - Next Appointment 24  -         Time Calculation- PT    Total Timed Code Minutes- PT 23 minute(s)  -                User Key  (r) = Recorded By, (t) = Taken By, (c) = Cosigned By      Initials Name Provider Type    Hilary Jacobsen, PT Physical Therapist

## 2024-11-27 NOTE — PLAN OF CARE
"Assessment: Maryann Gaitan presents with ADL impairments affecting function including balance, cognition, endurance / activity tolerance, pain, and strength. Pt A&O x4, generalized pain and she calls out in pain though does not rate. Pt mod assist to complete bed mobility 2/2 decreased core strength, min assist x2 with FWW to come to standing, and CGA with FWW to ambulate to chair. Pt dependent assist to doff/don socks and gown. Demonstrated functioning below baseline abilities indicate the need for continued skilled intervention while inpatient. Tolerating session today without incident. Will continue to follow and progress as tolerated.      Plan/Recommendations:   Low Intensity Therapy recommended post-acute care - This is recommended as therapy feels this patient would require 2-3 visits per week. OP or HH would be the best option depending on patient's home bound status. Consider, if the patient has other  \"skilled\" needs such as wounds, IV antibiotics, etc. Combined with \"low intensity\" could also equate to a SNF. If patient is medically complex, consider LTAC.  "

## 2024-11-28 ENCOUNTER — INPATIENT HOSPITAL (AMBULATORY)
Age: 74
End: 2024-11-28
Payer: MEDICARE

## 2024-11-28 ENCOUNTER — ANESTHESIA (OUTPATIENT)
Dept: PERIOP | Facility: HOSPITAL | Age: 74
End: 2024-11-28
Payer: MEDICARE

## 2024-11-28 ENCOUNTER — INPATIENT HOSPITAL (AMBULATORY)
Dept: URBAN - METROPOLITAN AREA HOSPITAL 84 | Facility: HOSPITAL | Age: 74
End: 2024-11-28
Payer: MEDICARE

## 2024-11-28 ENCOUNTER — ANESTHESIA EVENT (OUTPATIENT)
Dept: PERIOP | Facility: HOSPITAL | Age: 74
End: 2024-11-28
Payer: MEDICARE

## 2024-11-28 ENCOUNTER — ANESTHESIA EVENT (OUTPATIENT)
Dept: GASTROENTEROLOGY | Facility: HOSPITAL | Age: 74
End: 2024-11-28
Payer: MEDICARE

## 2024-11-28 ENCOUNTER — ANESTHESIA (OUTPATIENT)
Dept: GASTROENTEROLOGY | Facility: HOSPITAL | Age: 74
End: 2024-11-28
Payer: MEDICARE

## 2024-11-28 DIAGNOSIS — K52.89 OTHER SPECIFIED NONINFECTIVE GASTROENTERITIS AND COLITIS: ICD-10-CM

## 2024-11-28 DIAGNOSIS — D64.9 ANEMIA, UNSPECIFIED: ICD-10-CM

## 2024-11-28 DIAGNOSIS — K92.2 GASTROINTESTINAL HEMORRHAGE, UNSPECIFIED: ICD-10-CM

## 2024-11-28 DIAGNOSIS — K44.9 DIAPHRAGMATIC HERNIA WITHOUT OBSTRUCTION OR GANGRENE: ICD-10-CM

## 2024-11-28 LAB
ABO GROUP BLD: NORMAL
ALBUMIN SERPL-MCNC: 2.4 G/DL (ref 3.5–5.2)
ALBUMIN/GLOB SERPL: 2 G/DL
ALP SERPL-CCNC: 51 U/L (ref 39–117)
ALT SERPL W P-5'-P-CCNC: 38 U/L (ref 1–33)
ANION GAP SERPL CALCULATED.3IONS-SCNC: 7 MMOL/L (ref 5–15)
AST SERPL-CCNC: 30 U/L (ref 1–32)
BASE DEFICIT: ABNORMAL
BASE EXCESS BLDA CALC-SCNC: <0 MMOL/L (ref 0–3)
BASOPHILS # BLD AUTO: 0.01 10*3/MM3 (ref 0–0.2)
BASOPHILS # BLD AUTO: 0.02 10*3/MM3 (ref 0–0.2)
BASOPHILS NFR BLD AUTO: 0.1 % (ref 0–1.5)
BASOPHILS NFR BLD AUTO: 0.1 % (ref 0–1.5)
BH BB BLOOD EXPIRATION DATE: NORMAL
BH BB BLOOD TYPE BARCODE: 5100
BH BB DISPENSE STATUS: NORMAL
BH BB PRODUCT CODE: NORMAL
BH BB UNIT NUMBER: NORMAL
BILIRUB SERPL-MCNC: 0.2 MG/DL (ref 0–1.2)
BLD GP AB SCN SERPL QL: NEGATIVE
BUN SERPL-MCNC: 16 MG/DL (ref 8–23)
BUN/CREAT SERPL: 31.4 (ref 7–25)
CA-I BLDA-SCNC: 1.18 MMOL/L (ref 1.12–1.32)
CALCIUM SPEC-SCNC: 7.6 MG/DL (ref 8.6–10.5)
CHLORIDE SERPL-SCNC: 109 MMOL/L (ref 98–107)
CO2 BLDA-SCNC: 22 MMOL/L (ref 23–27)
CO2 SERPL-SCNC: 23 MMOL/L (ref 22–29)
CREAT SERPL-MCNC: 0.51 MG/DL (ref 0.57–1)
CROSSMATCH INTERPRETATION: NORMAL
CRP SERPL-MCNC: 1.25 MG/DL (ref 0–0.5)
DEPRECATED RDW RBC AUTO: 49.5 FL (ref 37–54)
DEPRECATED RDW RBC AUTO: 50.6 FL (ref 37–54)
EGFRCR SERPLBLD CKD-EPI 2021: 98.1 ML/MIN/1.73
EOSINOPHIL # BLD AUTO: 0.01 10*3/MM3 (ref 0–0.4)
EOSINOPHIL # BLD AUTO: 0.01 10*3/MM3 (ref 0–0.4)
EOSINOPHIL NFR BLD AUTO: 0.1 % (ref 0.3–6.2)
EOSINOPHIL NFR BLD AUTO: 0.1 % (ref 0.3–6.2)
ERYTHROCYTE [DISTWIDTH] IN BLOOD BY AUTOMATED COUNT: 14.9 % (ref 12.3–15.4)
ERYTHROCYTE [DISTWIDTH] IN BLOOD BY AUTOMATED COUNT: 14.9 % (ref 12.3–15.4)
GLOBULIN UR ELPH-MCNC: 1.2 GM/DL
GLUCOSE BLDC GLUCOMTR-MCNC: 120 MG/DL (ref 70–105)
GLUCOSE BLDC GLUCOMTR-MCNC: 95 MG/DL (ref 70–105)
GLUCOSE SERPL-MCNC: 95 MG/DL (ref 65–99)
HAPTOGLOB SERPL-MCNC: 188 MG/DL (ref 30–200)
HCO3 BLDA-SCNC: 20.7 MMOL/L (ref 22–26)
HCT VFR BLD AUTO: 20.3 % (ref 34–46.6)
HCT VFR BLD AUTO: 21 % (ref 34–46.6)
HCT VFR BLD AUTO: 23.9 % (ref 34–46.6)
HCT VFR BLD AUTO: 34.1 % (ref 34–46.6)
HCT VFR BLDA CALC: 28 % (ref 38–51)
HGB BLD-MCNC: 11.1 G/DL (ref 12–15.9)
HGB BLD-MCNC: 6.7 G/DL (ref 12–15.9)
HGB BLD-MCNC: 6.8 G/DL (ref 12–15.9)
HGB BLD-MCNC: 7.9 G/DL (ref 12–15.9)
HGB BLDA-MCNC: 9.5 G/DL (ref 12–17)
IMM GRANULOCYTES # BLD AUTO: 0.09 10*3/MM3 (ref 0–0.05)
IMM GRANULOCYTES # BLD AUTO: 0.16 10*3/MM3 (ref 0–0.05)
IMM GRANULOCYTES NFR BLD AUTO: 0.7 % (ref 0–0.5)
IMM GRANULOCYTES NFR BLD AUTO: 1 % (ref 0–0.5)
LDH SERPL-CCNC: 190 U/L (ref 135–214)
LYMPHOCYTES # BLD AUTO: 1.94 10*3/MM3 (ref 0.7–3.1)
LYMPHOCYTES # BLD AUTO: 2.16 10*3/MM3 (ref 0.7–3.1)
LYMPHOCYTES NFR BLD AUTO: 12.1 % (ref 19.6–45.3)
LYMPHOCYTES NFR BLD AUTO: 17.8 % (ref 19.6–45.3)
MCH RBC QN AUTO: 30.5 PG (ref 26.6–33)
MCH RBC QN AUTO: 31.3 PG (ref 26.6–33)
MCHC RBC AUTO-ENTMCNC: 33.1 G/DL (ref 31.5–35.7)
MCHC RBC AUTO-ENTMCNC: 33.5 G/DL (ref 31.5–35.7)
MCV RBC AUTO: 92.3 FL (ref 79–97)
MCV RBC AUTO: 93.5 FL (ref 79–97)
MONOCYTES # BLD AUTO: 0.21 10*3/MM3 (ref 0.1–0.9)
MONOCYTES # BLD AUTO: 0.33 10*3/MM3 (ref 0.1–0.9)
MONOCYTES NFR BLD AUTO: 1.7 % (ref 5–12)
MONOCYTES NFR BLD AUTO: 2.1 % (ref 5–12)
NEUTROPHILS NFR BLD AUTO: 13.61 10*3/MM3 (ref 1.7–7)
NEUTROPHILS NFR BLD AUTO: 79.6 % (ref 42.7–76)
NEUTROPHILS NFR BLD AUTO: 84.6 % (ref 42.7–76)
NEUTROPHILS NFR BLD AUTO: 9.67 10*3/MM3 (ref 1.7–7)
NRBC BLD AUTO-RTO: 0.2 /100 WBC (ref 0–0.2)
NRBC BLD AUTO-RTO: 0.3 /100 WBC (ref 0–0.2)
PCO2 BLDA: 43.2 MM HG (ref 35–45)
PH BLDA: 7.29 PH UNITS (ref 7.35–7.45)
PLATELET # BLD AUTO: 102 10*3/MM3 (ref 140–450)
PLATELET # BLD AUTO: 112 10*3/MM3 (ref 140–450)
PMV BLD AUTO: 10.1 FL (ref 6–12)
PMV BLD AUTO: 10.1 FL (ref 6–12)
PO2 BLDA: 362 MM HG (ref 80–105)
POTASSIUM BLDA-SCNC: 3.4 MMOL/L (ref 3.5–4.9)
POTASSIUM SERPL-SCNC: 3.9 MMOL/L (ref 3.5–5.2)
PROT SERPL-MCNC: 3.6 G/DL (ref 6–8.5)
RBC # BLD AUTO: 2.17 10*6/MM3 (ref 3.77–5.28)
RBC # BLD AUTO: 2.59 10*6/MM3 (ref 3.77–5.28)
RH BLD: POSITIVE
SAO2 % BLDCOA: 100 % (ref 95–98)
SODIUM BLD-SCNC: 138 MMOL/L (ref 138–146)
SODIUM SERPL-SCNC: 139 MMOL/L (ref 136–145)
T&S EXPIRATION DATE: NORMAL
UNIT  ABO: NORMAL
UNIT  RH: NORMAL
WBC NRBC COR # BLD AUTO: 12.15 10*3/MM3 (ref 3.4–10.8)
WBC NRBC COR # BLD AUTO: 16.07 10*3/MM3 (ref 3.4–10.8)

## 2024-11-28 PROCEDURE — P9016 RBC LEUKOCYTES REDUCED: HCPCS

## 2024-11-28 PROCEDURE — 43239 EGD BIOPSY SINGLE/MULTIPLE: CPT | Performed by: INTERNAL MEDICINE

## 2024-11-28 PROCEDURE — 86900 BLOOD TYPING SEROLOGIC ABO: CPT

## 2024-11-28 PROCEDURE — 25010000002 METOCLOPRAMIDE PER 10 MG

## 2024-11-28 PROCEDURE — 25810000003 SODIUM CHLORIDE 0.9 % SOLUTION: Performed by: INTERNAL MEDICINE

## 2024-11-28 PROCEDURE — 86900 BLOOD TYPING SEROLOGIC ABO: CPT | Performed by: INTERNAL MEDICINE

## 2024-11-28 PROCEDURE — 99222 1ST HOSP IP/OBS MODERATE 55: CPT | Performed by: STUDENT IN AN ORGANIZED HEALTH CARE EDUCATION/TRAINING PROGRAM

## 2024-11-28 PROCEDURE — 25010000002 METOCLOPRAMIDE PER 10 MG: Performed by: STUDENT IN AN ORGANIZED HEALTH CARE EDUCATION/TRAINING PROGRAM

## 2024-11-28 PROCEDURE — 86901 BLOOD TYPING SEROLOGIC RH(D): CPT | Performed by: INTERNAL MEDICINE

## 2024-11-28 PROCEDURE — 25010000002 PROPOFOL 10 MG/ML EMULSION

## 2024-11-28 PROCEDURE — 82330 ASSAY OF CALCIUM: CPT

## 2024-11-28 PROCEDURE — 0DJ08ZZ INSPECTION OF UPPER INTESTINAL TRACT, VIA NATURAL OR ARTIFICIAL OPENING ENDOSCOPIC: ICD-10-PCS | Performed by: INTERNAL MEDICINE

## 2024-11-28 PROCEDURE — 84132 ASSAY OF SERUM POTASSIUM: CPT

## 2024-11-28 PROCEDURE — P9035 PLATELET PHERES LEUKOREDUCED: HCPCS

## 2024-11-28 PROCEDURE — 86140 C-REACTIVE PROTEIN: CPT | Performed by: NURSE PRACTITIONER

## 2024-11-28 PROCEDURE — 99232 SBSQ HOSP IP/OBS MODERATE 35: CPT | Performed by: INTERNAL MEDICINE

## 2024-11-28 PROCEDURE — 25010000002 HYDROCORTISONE SOD SUC (PF) 100 MG RECONSTITUTED SOLUTION: Performed by: ANESTHESIOLOGY

## 2024-11-28 PROCEDURE — 85014 HEMATOCRIT: CPT | Performed by: STUDENT IN AN ORGANIZED HEALTH CARE EDUCATION/TRAINING PROGRAM

## 2024-11-28 PROCEDURE — 25810000003 SODIUM CHLORIDE 0.9 % SOLUTION: Performed by: ANESTHESIOLOGY

## 2024-11-28 PROCEDURE — 80053 COMPREHEN METABOLIC PANEL: CPT | Performed by: NURSE PRACTITIONER

## 2024-11-28 PROCEDURE — 25010000002 PHENYLEPHRINE 10 MG/ML SOLUTION 5 ML VIAL: Performed by: ANESTHESIOLOGY

## 2024-11-28 PROCEDURE — 25010000002 PROPOFOL 200 MG/20ML EMULSION: Performed by: ANESTHESIOLOGY

## 2024-11-28 PROCEDURE — 25010000002 SUGAMMADEX 200 MG/2ML SOLUTION: Performed by: ANESTHESIOLOGY

## 2024-11-28 PROCEDURE — P9100 PATHOGEN TEST FOR PLATELETS: HCPCS

## 2024-11-28 PROCEDURE — C1751 CATH, INF, PER/CENT/MIDLINE: HCPCS

## 2024-11-28 PROCEDURE — 25010000002 LIDOCAINE PF 1% 1 % SOLUTION: Performed by: ANESTHESIOLOGY

## 2024-11-28 PROCEDURE — 94799 UNLISTED PULMONARY SVC/PX: CPT

## 2024-11-28 PROCEDURE — 85014 HEMATOCRIT: CPT

## 2024-11-28 PROCEDURE — 85018 HEMOGLOBIN: CPT | Performed by: INTERNAL MEDICINE

## 2024-11-28 PROCEDURE — 88307 TISSUE EXAM BY PATHOLOGIST: CPT | Performed by: STUDENT IN AN ORGANIZED HEALTH CARE EDUCATION/TRAINING PROGRAM

## 2024-11-28 PROCEDURE — 25010000002 NA FERRIC GLUC CPLX PER 12.5 MG: Performed by: STUDENT IN AN ORGANIZED HEALTH CARE EDUCATION/TRAINING PROGRAM

## 2024-11-28 PROCEDURE — 25010000002 NICARDIPINE 2.5 MG/ML SOLUTION: Performed by: ANESTHESIOLOGY

## 2024-11-28 PROCEDURE — 25010000002 LIDOCAINE PF 2% 2 % SOLUTION

## 2024-11-28 PROCEDURE — 44144 PARTIAL REMOVAL OF COLON: CPT | Performed by: SPECIALIST/TECHNOLOGIST, OTHER

## 2024-11-28 PROCEDURE — 85018 HEMOGLOBIN: CPT | Performed by: STUDENT IN AN ORGANIZED HEALTH CARE EDUCATION/TRAINING PROGRAM

## 2024-11-28 PROCEDURE — 25010000002 SUCCINYLCHOLINE PER 20 MG: Performed by: ANESTHESIOLOGY

## 2024-11-28 PROCEDURE — 25810000003 SODIUM CHLORIDE 0.9 % SOLUTION: Performed by: STUDENT IN AN ORGANIZED HEALTH CARE EDUCATION/TRAINING PROGRAM

## 2024-11-28 PROCEDURE — 86850 RBC ANTIBODY SCREEN: CPT | Performed by: INTERNAL MEDICINE

## 2024-11-28 PROCEDURE — 85025 COMPLETE CBC W/AUTO DIFF WBC: CPT | Performed by: INTERNAL MEDICINE

## 2024-11-28 PROCEDURE — 82803 BLOOD GASES ANY COMBINATION: CPT

## 2024-11-28 PROCEDURE — 44144 PARTIAL REMOVAL OF COLON: CPT | Performed by: STUDENT IN AN ORGANIZED HEALTH CARE EDUCATION/TRAINING PROGRAM

## 2024-11-28 PROCEDURE — 86923 COMPATIBILITY TEST ELECTRIC: CPT

## 2024-11-28 PROCEDURE — 25810000003 SODIUM CHLORIDE 0.9 % SOLUTION 250 ML FLEX CONT: Performed by: ANESTHESIOLOGY

## 2024-11-28 PROCEDURE — 82948 REAGENT STRIP/BLOOD GLUCOSE: CPT

## 2024-11-28 PROCEDURE — 83615 LACTATE (LD) (LDH) ENZYME: CPT | Performed by: INTERNAL MEDICINE

## 2024-11-28 PROCEDURE — 25010000002 CEFOXITIN PER 1 G: Performed by: STUDENT IN AN ORGANIZED HEALTH CARE EDUCATION/TRAINING PROGRAM

## 2024-11-28 PROCEDURE — 85014 HEMATOCRIT: CPT | Performed by: INTERNAL MEDICINE

## 2024-11-28 PROCEDURE — 25010000002 METHYLPREDNISOLONE PER 40 MG: Performed by: STUDENT IN AN ORGANIZED HEALTH CARE EDUCATION/TRAINING PROGRAM

## 2024-11-28 PROCEDURE — 25010000002 MIDAZOLAM PER 1 MG: Performed by: ANESTHESIOLOGY

## 2024-11-28 PROCEDURE — 25010000002 ONDANSETRON PER 1 MG: Performed by: ANESTHESIOLOGY

## 2024-11-28 PROCEDURE — 25010000002 CALCIUM GLUCONATE PER 10 ML: Performed by: ANESTHESIOLOGY

## 2024-11-28 PROCEDURE — 25010000002 GLYCOPYRROLATE 0.2 MG/ML SOLUTION: Performed by: ANESTHESIOLOGY

## 2024-11-28 PROCEDURE — 36410 VNPNXR 3YR/> PHY/QHP DX/THER: CPT

## 2024-11-28 PROCEDURE — 25010000002 HYDROMORPHONE PER 4 MG: Performed by: ANESTHESIOLOGY

## 2024-11-28 PROCEDURE — 25010000002 FENTANYL CITRATE (PF) 100 MCG/2ML SOLUTION: Performed by: ANESTHESIOLOGY

## 2024-11-28 PROCEDURE — 25010000002 CYANOCOBALAMIN PER 1000 MCG: Performed by: NURSE PRACTITIONER

## 2024-11-28 PROCEDURE — 84295 ASSAY OF SERUM SODIUM: CPT

## 2024-11-28 PROCEDURE — 82947 ASSAY GLUCOSE BLOOD QUANT: CPT

## 2024-11-28 PROCEDURE — 36430 TRANSFUSION BLD/BLD COMPNT: CPT

## 2024-11-28 DEVICE — PROXIMATE LINEAR CUTTER RELOAD, BLUE, 75MM
Type: IMPLANTABLE DEVICE | Site: ABDOMEN | Status: FUNCTIONAL
Brand: PROXIMATE

## 2024-11-28 DEVICE — PROXIMATE RELOADABLE LINEAR CUTTER WITH SAFETY LOCK-OUT, 75MM
Type: IMPLANTABLE DEVICE | Site: ABDOMEN | Status: FUNCTIONAL
Brand: PROXIMATE

## 2024-11-28 RX ORDER — MORPHINE SULFATE 2 MG/ML
2 INJECTION, SOLUTION INTRAMUSCULAR; INTRAVENOUS
Status: DISCONTINUED | OUTPATIENT
Start: 2024-11-28 | End: 2024-11-28 | Stop reason: HOSPADM

## 2024-11-28 RX ORDER — IPRATROPIUM BROMIDE AND ALBUTEROL SULFATE 2.5; .5 MG/3ML; MG/3ML
3 SOLUTION RESPIRATORY (INHALATION) ONCE AS NEEDED
Status: DISCONTINUED | OUTPATIENT
Start: 2024-11-28 | End: 2024-11-28 | Stop reason: HOSPADM

## 2024-11-28 RX ORDER — HYDROMORPHONE HYDROCHLORIDE 1 MG/ML
0.5 INJECTION, SOLUTION INTRAMUSCULAR; INTRAVENOUS; SUBCUTANEOUS
Status: DISCONTINUED | OUTPATIENT
Start: 2024-11-28 | End: 2024-11-28 | Stop reason: HOSPADM

## 2024-11-28 RX ORDER — HYDROCORTISONE SODIUM SUCCINATE 100 MG/2ML
INJECTION INTRAMUSCULAR; INTRAVENOUS AS NEEDED
Status: DISCONTINUED | OUTPATIENT
Start: 2024-11-28 | End: 2024-11-28 | Stop reason: SURG

## 2024-11-28 RX ORDER — PHENYLEPHRINE HCL IN 0.9% NACL 1 MG/10 ML
SYRINGE (ML) INTRAVENOUS AS NEEDED
Status: DISCONTINUED | OUTPATIENT
Start: 2024-11-28 | End: 2024-11-28 | Stop reason: SURG

## 2024-11-28 RX ORDER — LIDOCAINE HYDROCHLORIDE 10 MG/ML
INJECTION, SOLUTION EPIDURAL; INFILTRATION; INTRACAUDAL; PERINEURAL AS NEEDED
Status: DISCONTINUED | OUTPATIENT
Start: 2024-11-28 | End: 2024-11-28 | Stop reason: SURG

## 2024-11-28 RX ORDER — ONDANSETRON 4 MG/1
4 TABLET, ORALLY DISINTEGRATING ORAL EVERY 6 HOURS PRN
Status: DISCONTINUED | OUTPATIENT
Start: 2024-11-28 | End: 2024-12-04 | Stop reason: HOSPADM

## 2024-11-28 RX ORDER — SUCCINYLCHOLINE CHLORIDE 20 MG/ML
INJECTION INTRAMUSCULAR; INTRAVENOUS AS NEEDED
Status: DISCONTINUED | OUTPATIENT
Start: 2024-11-28 | End: 2024-11-28 | Stop reason: SURG

## 2024-11-28 RX ORDER — SODIUM CHLORIDE 9 MG/ML
40 INJECTION, SOLUTION INTRAVENOUS AS NEEDED
Status: DISCONTINUED | OUTPATIENT
Start: 2024-11-28 | End: 2024-12-04 | Stop reason: HOSPADM

## 2024-11-28 RX ORDER — ACETAMINOPHEN 500 MG
1000 TABLET ORAL EVERY 8 HOURS
Status: DISCONTINUED | OUTPATIENT
Start: 2024-11-28 | End: 2024-12-04 | Stop reason: HOSPADM

## 2024-11-28 RX ORDER — PROPOFOL 10 MG/ML
VIAL (ML) INTRAVENOUS AS NEEDED
Status: DISCONTINUED | OUTPATIENT
Start: 2024-11-28 | End: 2024-11-28 | Stop reason: SURG

## 2024-11-28 RX ORDER — ONDANSETRON 2 MG/ML
INJECTION INTRAMUSCULAR; INTRAVENOUS AS NEEDED
Status: DISCONTINUED | OUTPATIENT
Start: 2024-11-28 | End: 2024-11-28 | Stop reason: SURG

## 2024-11-28 RX ORDER — HEPARIN SODIUM 200 [USP'U]/100ML
INJECTION, SOLUTION INTRAVENOUS
Status: DISPENSED
Start: 2024-11-28 | End: 2024-11-29

## 2024-11-28 RX ORDER — METOCLOPRAMIDE HYDROCHLORIDE 5 MG/ML
10 INJECTION INTRAMUSCULAR; INTRAVENOUS EVERY 6 HOURS
Status: DISCONTINUED | OUTPATIENT
Start: 2024-11-28 | End: 2024-12-04 | Stop reason: HOSPADM

## 2024-11-28 RX ORDER — LIDOCAINE HYDROCHLORIDE 20 MG/ML
INJECTION, SOLUTION EPIDURAL; INFILTRATION; INTRACAUDAL; PERINEURAL AS NEEDED
Status: DISCONTINUED | OUTPATIENT
Start: 2024-11-28 | End: 2024-11-28 | Stop reason: SURG

## 2024-11-28 RX ORDER — CALCIUM GLUCONATE 94 MG/ML
INJECTION, SOLUTION INTRAVENOUS AS NEEDED
Status: DISCONTINUED | OUTPATIENT
Start: 2024-11-28 | End: 2024-11-28 | Stop reason: SURG

## 2024-11-28 RX ORDER — SODIUM CHLORIDE 9 MG/ML
50 INJECTION, SOLUTION INTRAVENOUS CONTINUOUS
Status: DISPENSED | OUTPATIENT
Start: 2024-11-28 | End: 2024-11-28

## 2024-11-28 RX ORDER — EPHEDRINE SULFATE 5 MG/ML
5 INJECTION INTRAVENOUS ONCE AS NEEDED
Status: DISCONTINUED | OUTPATIENT
Start: 2024-11-28 | End: 2024-11-28 | Stop reason: HOSPADM

## 2024-11-28 RX ORDER — ONDANSETRON 2 MG/ML
4 INJECTION INTRAMUSCULAR; INTRAVENOUS EVERY 6 HOURS PRN
Status: DISCONTINUED | OUTPATIENT
Start: 2024-11-28 | End: 2024-12-04 | Stop reason: HOSPADM

## 2024-11-28 RX ORDER — PROPOFOL 10 MG/ML
INJECTION, EMULSION INTRAVENOUS AS NEEDED
Status: DISCONTINUED | OUTPATIENT
Start: 2024-11-28 | End: 2024-11-28 | Stop reason: SURG

## 2024-11-28 RX ORDER — HYDROMORPHONE HYDROCHLORIDE 1 MG/ML
0.5 INJECTION, SOLUTION INTRAMUSCULAR; INTRAVENOUS; SUBCUTANEOUS EVERY 4 HOURS PRN
Status: DISCONTINUED | OUTPATIENT
Start: 2024-11-28 | End: 2024-12-04 | Stop reason: HOSPADM

## 2024-11-28 RX ORDER — SODIUM CHLORIDE 9 MG/ML
50 INJECTION, SOLUTION INTRAVENOUS ONCE
Status: DISCONTINUED | OUTPATIENT
Start: 2024-11-28 | End: 2024-11-28 | Stop reason: HOSPADM

## 2024-11-28 RX ORDER — LABETALOL HYDROCHLORIDE 5 MG/ML
5 INJECTION, SOLUTION INTRAVENOUS
Status: DISCONTINUED | OUTPATIENT
Start: 2024-11-28 | End: 2024-11-28 | Stop reason: HOSPADM

## 2024-11-28 RX ORDER — ROCURONIUM BROMIDE 10 MG/ML
INJECTION, SOLUTION INTRAVENOUS AS NEEDED
Status: DISCONTINUED | OUTPATIENT
Start: 2024-11-28 | End: 2024-11-28 | Stop reason: SURG

## 2024-11-28 RX ORDER — FENTANYL CITRATE 50 UG/ML
INJECTION, SOLUTION INTRAMUSCULAR; INTRAVENOUS AS NEEDED
Status: DISCONTINUED | OUTPATIENT
Start: 2024-11-28 | End: 2024-11-28 | Stop reason: SURG

## 2024-11-28 RX ORDER — HYDRALAZINE HYDROCHLORIDE 20 MG/ML
5 INJECTION INTRAMUSCULAR; INTRAVENOUS
Status: DISCONTINUED | OUTPATIENT
Start: 2024-11-28 | End: 2024-11-28 | Stop reason: HOSPADM

## 2024-11-28 RX ORDER — CEFOXITIN 1 G/1
INJECTION, POWDER, FOR SOLUTION INTRAVENOUS
Status: DISPENSED
Start: 2024-11-28 | End: 2024-11-29

## 2024-11-28 RX ORDER — NICARDIPINE HYDROCHLORIDE 2.5 MG/ML
INJECTION INTRAVENOUS AS NEEDED
Status: DISCONTINUED | OUTPATIENT
Start: 2024-11-28 | End: 2024-11-28 | Stop reason: SURG

## 2024-11-28 RX ORDER — GLYCOPYRROLATE 0.2 MG/ML
INJECTION INTRAMUSCULAR; INTRAVENOUS AS NEEDED
Status: DISCONTINUED | OUTPATIENT
Start: 2024-11-28 | End: 2024-11-28 | Stop reason: SURG

## 2024-11-28 RX ORDER — SODIUM CHLORIDE 0.9 % (FLUSH) 0.9 %
10 SYRINGE (ML) INJECTION EVERY 12 HOURS SCHEDULED
Status: DISCONTINUED | OUTPATIENT
Start: 2024-11-28 | End: 2024-12-04 | Stop reason: HOSPADM

## 2024-11-28 RX ORDER — OXYCODONE HYDROCHLORIDE 5 MG/1
5 TABLET ORAL EVERY 4 HOURS PRN
Status: DISCONTINUED | OUTPATIENT
Start: 2024-11-28 | End: 2024-12-04 | Stop reason: HOSPADM

## 2024-11-28 RX ORDER — ONDANSETRON 2 MG/ML
4 INJECTION INTRAMUSCULAR; INTRAVENOUS ONCE AS NEEDED
Status: DISCONTINUED | OUTPATIENT
Start: 2024-11-28 | End: 2024-11-28 | Stop reason: HOSPADM

## 2024-11-28 RX ORDER — SODIUM CHLORIDE 9 MG/ML
INJECTION, SOLUTION INTRAVENOUS CONTINUOUS PRN
Status: DISCONTINUED | OUTPATIENT
Start: 2024-11-28 | End: 2024-11-28 | Stop reason: SURG

## 2024-11-28 RX ORDER — SODIUM CHLORIDE 0.9 % (FLUSH) 0.9 %
10 SYRINGE (ML) INJECTION AS NEEDED
Status: DISCONTINUED | OUTPATIENT
Start: 2024-11-28 | End: 2024-12-04 | Stop reason: HOSPADM

## 2024-11-28 RX ORDER — MIDAZOLAM HYDROCHLORIDE 1 MG/ML
INJECTION, SOLUTION INTRAMUSCULAR; INTRAVENOUS AS NEEDED
Status: DISCONTINUED | OUTPATIENT
Start: 2024-11-28 | End: 2024-11-28 | Stop reason: SURG

## 2024-11-28 RX ADMIN — SODIUM CHLORIDE 250 MG: 9 INJECTION, SOLUTION INTRAVENOUS at 09:03

## 2024-11-28 RX ADMIN — NICARDIPINE HYDROCHLORIDE 0.5 MG: 25 INJECTION, SOLUTION INTRAVENOUS at 17:41

## 2024-11-28 RX ADMIN — Medication 200 MCG: at 16:16

## 2024-11-28 RX ADMIN — ATORVASTATIN CALCIUM 40 MG: 40 TABLET, FILM COATED ORAL at 20:57

## 2024-11-28 RX ADMIN — LIDOCAINE HYDROCHLORIDE 50 MG: 10 INJECTION, SOLUTION EPIDURAL; INFILTRATION; INTRACAUDAL; PERINEURAL at 16:16

## 2024-11-28 RX ADMIN — ONDANSETRON 4 MG: 2 INJECTION, SOLUTION INTRAMUSCULAR; INTRAVENOUS at 17:44

## 2024-11-28 RX ADMIN — CEFOXITIN 2 G: 2 INJECTION, POWDER, FOR SOLUTION INTRAVENOUS at 16:30

## 2024-11-28 RX ADMIN — CYANOCOBALAMIN TAB 500 MCG 1000 MCG: 500 TAB at 09:00

## 2024-11-28 RX ADMIN — Medication 10 ML: at 09:03

## 2024-11-28 RX ADMIN — SERTRALINE HYDROCHLORIDE 50 MG: 50 TABLET ORAL at 09:00

## 2024-11-28 RX ADMIN — CYANOCOBALAMIN 1000 MCG: 1000 INJECTION, SOLUTION INTRAMUSCULAR; SUBCUTANEOUS at 09:00

## 2024-11-28 RX ADMIN — PROPOFOL 80 MG: 10 INJECTION, EMULSION INTRAVENOUS at 16:16

## 2024-11-28 RX ADMIN — PROPOFOL 50 MG: 10 INJECTION, EMULSION INTRAVENOUS at 13:40

## 2024-11-28 RX ADMIN — OXYCODONE 5 MG: 5 TABLET ORAL at 20:56

## 2024-11-28 RX ADMIN — ACETAMINOPHEN 1000 MG: 500 TABLET, FILM COATED ORAL at 20:57

## 2024-11-28 RX ADMIN — ROCURONIUM BROMIDE 40 MG: 10 INJECTION, SOLUTION INTRAVENOUS at 16:35

## 2024-11-28 RX ADMIN — SUCCINYLCHOLINE CHLORIDE 100 MG: 20 INJECTION, SOLUTION INTRAMUSCULAR; INTRAVENOUS at 16:16

## 2024-11-28 RX ADMIN — SUGAMMADEX 200 MG: 100 INJECTION, SOLUTION INTRAVENOUS at 18:04

## 2024-11-28 RX ADMIN — MESALAMINE 4.8 G: 800 TABLET, DELAYED RELEASE ORAL at 09:00

## 2024-11-28 RX ADMIN — FOLIC ACID 1000 MCG: 1 TABLET ORAL at 09:00

## 2024-11-28 RX ADMIN — Medication 10 ML: at 11:37

## 2024-11-28 RX ADMIN — METHYLPREDNISOLONE SODIUM SUCCINATE 20 MG: 40 INJECTION, POWDER, FOR SOLUTION INTRAMUSCULAR; INTRAVENOUS at 09:00

## 2024-11-28 RX ADMIN — METHYLPREDNISOLONE SODIUM SUCCINATE 20 MG: 40 INJECTION, POWDER, FOR SOLUTION INTRAMUSCULAR; INTRAVENOUS at 20:57

## 2024-11-28 RX ADMIN — MIDODRINE HYDROCHLORIDE 5 MG: 5 TABLET ORAL at 11:06

## 2024-11-28 RX ADMIN — GLYCOPYRROLATE 0.1 MG: 0.2 INJECTION, SOLUTION INTRAMUSCULAR; INTRAVENOUS at 16:15

## 2024-11-28 RX ADMIN — FENTANYL CITRATE 50 MCG: 50 INJECTION, SOLUTION INTRAMUSCULAR; INTRAVENOUS at 17:07

## 2024-11-28 RX ADMIN — Medication 10 ML: at 20:58

## 2024-11-28 RX ADMIN — PROPOFOL 20 MG: 10 INJECTION, EMULSION INTRAVENOUS at 13:49

## 2024-11-28 RX ADMIN — HYDROMORPHONE HYDROCHLORIDE 0.5 MG: 1 INJECTION, SOLUTION INTRAMUSCULAR; INTRAVENOUS; SUBCUTANEOUS at 18:35

## 2024-11-28 RX ADMIN — MIDAZOLAM 2 MG: 1 INJECTION INTRAMUSCULAR; INTRAVENOUS at 16:58

## 2024-11-28 RX ADMIN — SODIUM CHLORIDE: 9 INJECTION, SOLUTION INTRAVENOUS at 13:33

## 2024-11-28 RX ADMIN — Medication 200 MCG: at 16:24

## 2024-11-28 RX ADMIN — PANTOPRAZOLE SODIUM 40 MG: 40 TABLET, DELAYED RELEASE ORAL at 09:00

## 2024-11-28 RX ADMIN — METHYLPREDNISOLONE SODIUM SUCCINATE 20 MG: 40 INJECTION, POWDER, FOR SOLUTION INTRAMUSCULAR; INTRAVENOUS at 01:54

## 2024-11-28 RX ADMIN — LIDOCAINE HYDROCHLORIDE 60 MG: 20 INJECTION, SOLUTION EPIDURAL; INFILTRATION; INTRACAUDAL; PERINEURAL at 13:40

## 2024-11-28 RX ADMIN — SODIUM CHLORIDE: 9 INJECTION, SOLUTION INTRAVENOUS at 16:14

## 2024-11-28 RX ADMIN — Medication 200 MCG: at 16:34

## 2024-11-28 RX ADMIN — HYDROCORTISONE SODIUM SUCCINATE 100 MG: 100 INJECTION, POWDER, FOR SOLUTION INTRAMUSCULAR; INTRAVENOUS at 16:32

## 2024-11-28 RX ADMIN — CALCIUM GLUCONATE 1 G: 98 INJECTION, SOLUTION INTRAVENOUS at 16:57

## 2024-11-28 RX ADMIN — LEVOTHYROXINE SODIUM 50 MCG: 0.05 TABLET ORAL at 09:00

## 2024-11-28 RX ADMIN — ROCURONIUM BROMIDE 40 MG: 10 INJECTION, SOLUTION INTRAVENOUS at 16:53

## 2024-11-28 RX ADMIN — FENTANYL CITRATE 50 MCG: 50 INJECTION, SOLUTION INTRAMUSCULAR; INTRAVENOUS at 17:34

## 2024-11-28 RX ADMIN — PHENYLEPHRINE HYDROCHLORIDE 1 MCG/KG/MIN: 10 INJECTION INTRAVENOUS at 16:43

## 2024-11-28 RX ADMIN — ROCURONIUM BROMIDE 10 MG: 10 INJECTION, SOLUTION INTRAVENOUS at 16:16

## 2024-11-28 RX ADMIN — METOCLOPRAMIDE HYDROCHLORIDE 10 MG: 5 INJECTION INTRAMUSCULAR; INTRAVENOUS at 11:05

## 2024-11-28 RX ADMIN — METOCLOPRAMIDE HYDROCHLORIDE 10 MG: 5 INJECTION INTRAMUSCULAR; INTRAVENOUS at 23:12

## 2024-11-28 RX ADMIN — MIDODRINE HYDROCHLORIDE 5 MG: 5 TABLET ORAL at 06:56

## 2024-11-28 NOTE — ANESTHESIA PROCEDURE NOTES
Airway  Urgency: elective    Date/Time: 11/28/2024 4:17 PM  End Time:11/28/2024 4:17 PM  Airway not difficult    General Information and Staff    Patient location during procedure: OR  Anesthesiologist: Maximiliano Jasmine MD    Indications and Patient Condition  Indications for airway management: airway protection    Preoxygenated: yes  MILS maintained throughout  Mask difficulty assessment: 1 - vent by mask    Final Airway Details  Final airway type: endotracheal airway      Successful airway: ETT  Cuffed: yes   Successful intubation technique: video laryngoscopy and RSI  Facilitating devices/methods: cricoid pressure  Endotracheal tube insertion site: oral  Blade: Anderson  Blade size: 3  ETT size (mm): 7.5  Cormack-Lehane Classification: grade I - full view of glottis  Placement verified by: chest auscultation and capnometry   Measured from: gums  ETT/EBT to gums (cm): 22  Number of attempts at approach: 1  Assessment: lips, teeth, and gum same as pre-op and atraumatic intubation    Additional Comments  X1 USING GARCIA 3 RSI WITH CP. ATRAUMATIC.  TEETH IN PREOP CONDITION.  CUFF TO MINIMUM OCCLUSIVE CUFF PRESSURE. POS ETCO2. BS=BS. GAUZE BITE BLOCK

## 2024-11-28 NOTE — NURSING NOTE
Rapid response called at 0803 for hypotension. On arrival BP was 83/59(62). Primary RN noted patient is having multiple bright red blood stools. Hgb has decreased from 7.9 to 6.8 now with hypotension. Call placed to GI MD regarding patient status. Waiting for call back to determine intervention at this time. 1 unit of PRBC released per standing order. Primary RN will transfuse PRBC. BP improved during rapid responsive, no need to transferring to high level of care at this time.

## 2024-11-28 NOTE — ADDENDUM NOTE
Addendum  created 11/28/24 1415 by Anabella Gonzalez, CRNA    Review and Sign - Ready for Procedure

## 2024-11-28 NOTE — PLAN OF CARE
Goal Outcome Evaluation:      Pt resting comfortably this shift. PRN tylenol and zofran given at beginning of shift for abd pain and nausea, with no further complaints. Monitoring BP closely and s/s of bleeding. Hgb with morning labs drop from 9.3 to 7.9. On-call provider contacted regarding Hgb drop with no additional labs ordered. Timed H&H added for today at 1000. VSS. No complaints at this time.            Problem: Adult Inpatient Plan of Care  Goal: Plan of Care Review  Outcome: Progressing  Goal: Patient-Specific Goal (Individualized)  Outcome: Progressing  Goal: Absence of Hospital-Acquired Illness or Injury  Outcome: Progressing  Intervention: Identify and Manage Fall Risk  Recent Flowsheet Documentation  Taken 11/28/2024 0400 by Maria Del Rosario Dominique, RN  Safety Promotion/Fall Prevention:   safety round/check completed   room organization consistent   nonskid shoes/slippers when out of bed   fall prevention program maintained   clutter free environment maintained   assistive device/personal items within reach  Taken 11/28/2024 0200 by Maria Del Rosario Dominique, RN  Safety Promotion/Fall Prevention:   safety round/check completed   room organization consistent   nonskid shoes/slippers when out of bed   fall prevention program maintained   clutter free environment maintained   assistive device/personal items within reach  Taken 11/28/2024 0015 by Maria Del Rosario Dominique, RN  Safety Promotion/Fall Prevention:   safety round/check completed   room organization consistent   nonskid shoes/slippers when out of bed   fall prevention program maintained   clutter free environment maintained   assistive device/personal items within reach  Taken 11/27/2024 2200 by Maria Del Rosario Dominique, RN  Safety Promotion/Fall Prevention:   safety round/check completed   room organization consistent   nonskid shoes/slippers when out of bed   fall prevention program maintained   clutter free environment maintained   assistive device/personal  items within reach  Taken 11/27/2024 2000 by Maria Del Rosario Dominique RN  Safety Promotion/Fall Prevention:   safety round/check completed   room organization consistent   nonskid shoes/slippers when out of bed   fall prevention program maintained   clutter free environment maintained   assistive device/personal items within reach  Intervention: Prevent Skin Injury  Recent Flowsheet Documentation  Taken 11/28/2024 0400 by Maria Del Rosario Dominique RN  Body Position:   supine   position changed independently   weight shifting  Skin Protection:   incontinence pads utilized   silicone foam dressing in place   skin sealant/moisture barrier applied   transparent dressing maintained  Taken 11/28/2024 0200 by Maria Del Rosario Dominique RN  Body Position:   tilted   left  Taken 11/28/2024 0015 by Maria Del Rosario Dominique RN  Body Position:   supine   position changed independently  Skin Protection:   incontinence pads utilized   silicone foam dressing in place   skin sealant/moisture barrier applied   transparent dressing maintained  Taken 11/27/2024 2200 by Maria Del Rosario Dominique RN  Body Position:   tilted   weight shifting   position changed independently  Taken 11/27/2024 2000 by Maria Del Rosario Dominique RN  Body Position:   supine   weight shifting  Skin Protection:   incontinence pads utilized   silicone foam dressing in place   skin sealant/moisture barrier applied   transparent dressing maintained  Intervention: Prevent and Manage VTE (Venous Thromboembolism) Risk  Recent Flowsheet Documentation  Taken 11/27/2024 2000 by Maria Del Rosario Dominique RN  VTE Prevention/Management:   bilateral   SCDs (sequential compression devices) on  Intervention: Prevent Infection  Recent Flowsheet Documentation  Taken 11/28/2024 0400 by Maria Del Rosario Dominique RN  Infection Prevention:   single patient room provided   rest/sleep promoted   personal protective equipment utilized   hand hygiene promoted   equipment surfaces disinfected  Taken 11/28/2024 0200  by Maria Del Rosario Dominique RN  Infection Prevention:   single patient room provided   rest/sleep promoted   personal protective equipment utilized   hand hygiene promoted   equipment surfaces disinfected  Taken 11/28/2024 0015 by Maria Del Rosario Dominique RN  Infection Prevention:   single patient room provided   rest/sleep promoted   personal protective equipment utilized   hand hygiene promoted   equipment surfaces disinfected  Taken 11/27/2024 2200 by Maria Del Rosario Dominique RN  Infection Prevention:   single patient room provided   rest/sleep promoted   personal protective equipment utilized   hand hygiene promoted   equipment surfaces disinfected  Taken 11/27/2024 2000 by Maria Del Rosario Dominique RN  Infection Prevention:   single patient room provided   rest/sleep promoted   personal protective equipment utilized   hand hygiene promoted   equipment surfaces disinfected  Goal: Optimal Comfort and Wellbeing  Outcome: Progressing  Intervention: Monitor Pain and Promote Comfort  Recent Flowsheet Documentation  Taken 11/28/2024 0400 by Maria Del Rosario Dominique RN  Pain Management Interventions:   care clustered   pain management plan reviewed with patient/caregiver   pillow support provided   position adjusted   quiet environment facilitated   relaxation techniques promoted  Taken 11/28/2024 0015 by Maria Del Rosario Dominique RN  Pain Management Interventions:   care clustered   pain management plan reviewed with patient/caregiver   pillow support provided   position adjusted   quiet environment facilitated   relaxation techniques promoted  Taken 11/27/2024 2000 by Maria Del Rosario Dominique RN  Pain Management Interventions:   care clustered   pain management plan reviewed with patient/caregiver   pain medication given   pillow support provided   position adjusted   quiet environment facilitated   relaxation techniques promoted  Intervention: Provide Person-Centered Care  Recent Flowsheet Documentation  Taken 11/27/2024 2000 by  Maria Del Rosario Dominique, RN  Trust Relationship/Rapport:   care explained   questions answered   questions encouraged  Goal: Readiness for Transition of Care  Outcome: Progressing

## 2024-11-28 NOTE — PLAN OF CARE
Goal Outcome Evaluation:           Progress: no change  Outcome Evaluation: Pt had several bloody BMs on shift, hypotensive and required 1 unit of blood this morning. Went for EGD, no signs of bleeding. Surgery consulted and pt took to colon resection. Hmg 6.7 prior to going to surgery, blood to be given during surgery

## 2024-11-28 NOTE — ANESTHESIA PREPROCEDURE EVALUATION
Anesthesia Evaluation     NPO Solid Status: > 8 hours  NPO Liquid Status: > 2 hours           Airway   Mallampati: II  TM distance: >3 FB  Neck ROM: full  No difficulty expected  Dental    (+) edentulous    Pulmonary     breath sounds clear to auscultation  (+) a smoker Former, COPD,home oxygen  Cardiovascular   Exercise tolerance: poor (<4 METS)    ECG reviewed  PT is on anticoagulation therapy  Rhythm: regular    (+) hypertension, valvular problems/murmurs, CAD, cardiac stents Drug eluting stent within the past 12 months , angina, CHF       Neuro/Psych  (+) psychiatric history Anxiety and Depression, poor historian.  GI/Hepatic/Renal/Endo    (+) GERD, GI bleeding lower , renal disease-, thyroid problem hypothyroidism    Musculoskeletal     Abdominal     Abdomen: soft.  Bowel sounds: normal.   Substance History      OB/GYN          Other   arthritis, autoimmune disease rheumatoid arthritis,       Other Comment: Crohn's              Anesthesia Plan    ASA 3 - emergent     MAC and general     intravenous induction     Anesthetic plan, risks, benefits, and alternatives have been provided, discussed and informed consent has been obtained with: patient.  Pre-procedure education provided  Plan discussed with Surgeon.    CODE STATUS:    Level Of Support Discussed With: Patient  Code Status (Patient has no pulse and is not breathing): CPR (Attempt to Resuscitate)  Medical Interventions (Patient has pulse or is breathing): Full Support

## 2024-11-28 NOTE — NURSING NOTE
RRT called at 0803 for pt passing multiple bloody Bms with clots and symptomatic anemia. BP 83/59 and pt saying she would going to pass out. At time of RRT waiting for unit of blood to be ready. BP improved during rapid. GI called and pt going for EGD

## 2024-11-28 NOTE — PROGRESS NOTES
Hematology/Oncology Inpatient Progress Note    Patient name: Maryann Gaitan  : 1950  MRN: [id number]  Referring Provider: DRU Escalante  Reason for Consultation: Blood loss anemia and need for antiplatelet therapy, need for outpatient transfusion options    Chief complaint: Abdominal pain, rectal bleeding, shortness of breath    History of present illness:    Maryann Gaitan is a 74 y.o. female who presented to [facility] on 2024 with complaints of abdominal pain, rectal bleeding, shortness of breath.  Past medical history of CAD, COPD, hypertension, hypothyroidism, Crohn's disease.  Was recently admitted to the hospital on 2024 for mild rectal bleeding and was found to have an acute Crohn's exacerbation.  She was discharged on prolonged steroid taper however she states that she still has some abdominal pain and has not felt well since discharge.  She did have a drop in her hemoglobin prior to discharge during her previous hospital stay but her hemoglobin was 8.4 g/dL on the day of discharge.  Over the last few days she has continued to have worsening lower abdominal pain with increased chest pain and shortness of breath and much more rectal bleeding than prior.  Of note she did have PCI with stent placement in 2024 and was placed on dual antiplatelet therapy.  This admission she underwent a transcatheter [id number] repair of her mitral valve with MitraClip placed on 2024.  The patient has continued to have gastrointestinal bleeding throughout the admission.    24  Hematology/Oncology was consulted for anemia in the setting of GI bleeding in a patient requiring antiplatelet therapy.    10/11/24  Hematology/Oncology was consulted for anemia and leukopenia  On consult, the patient reports fatigue, chronic abdominal pain/cramping and non-bloody diarrhea but denies unintentional weight loss, fevers, chills, drenching night sweats, lymphadenopathy, BRBPR,  melena, bruising. She denies having any IV iron or blood transfusions.  Per RN pt has not received budesonide yet.    Anemia workup from last admission:  -10/11/2022 reticulocyte numbers 0.72 (normal) however absolute reticulocyte decreased at 0.0182 suggesting either not enough building blocks to make blood or bone marrow dysfunction  -B12 312 (normal), folate 18.3 (normal), iron studies (iron 25 low, iron saturation 13% low) and ferritin (427 high, but is an acute phase reactant) -appears there could still be some component of iron deficiency involved which is not surprising as she has a disease that could cause on again off again bleeding; will likely have to deal with IV iron as what was seen on EGD means she will be unlikely to tolerate oral iron or be able to absorb it as she will likely be on chronic PPIs  -Flow cytometry WNL     10/17/2024: Ferrlecit 250 mg IV x 1 dose    He/She  has a past medical history of Abnormal weight loss (11/21/2024), Acute kidney injury (11/06/2024), Acute UTI (urinary tract infection) (11/06/2024), Anxiety associated with depression (11/06/2024), Benign neoplasm of cecum (07/05/2016), CAD, multiple vessel (10/08/2024), Cavitary lesion of lung (10/08/2024), COPD (chronic obstructive pulmonary disease), Crohn's disease (11/06/2024), 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 (07/05/2016), Dyslipidemia (10/30/2024), Fecal urgency (11/21/2024), First degree hemorrhoids (07/09/2021), GERD (gastroesophageal reflux disease) (11/21/2024), Hashimoto's thyroiditis (11/21/2024), History of colonic polyps (07/09/2021), Hypertension, Hypothyroidism (acquired) (11/06/2024), Mitral regurgitation (10/08/2024), Moderate protein-calorie malnutrition (11/09/2024), Multiple tracheobronchial mucus plugs (10/08/2024), Nicotine dependence (11/21/2024), Rheumatoid arthritis (11/06/2024), S/P mitral valve clip implantation (11/21/2024), Second degree hemorrhoids (07/05/2016), Stress  incontinence, female (11/21/2024), and Vitamin D deficiency, unspecified (11/21/2024).    PCP: Eduard Little MD    11/24/24: pt seen today for initial evaluation. She has ongoing GI bleeding and is receiving PRBC transfusions. She reported having fatigue. Also complained of having some 'worms In stool' and is being worked up for Stool parasites. Had strongyloides infestation noted on EGD/Colonoscopy 1 month ago and was reportedly treated with ivermectin. She is being followed by GI and Cardiology.    INTERVAL HISTORY:  11/26/24: pt seen today for follow up. She is s/p multiple PRBC unit transfusion since admission but has not needed transfusions since yesterday. Hb stable at this time. She however reported having ongoing GI bleeding, this has improved somewhat. being followed by GI team.    Is having active rectal bleeding she is receiving blood and platelets..  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 07/05/2016    Dyslipidemia 10/30/2024    Fecal urgency 11/21/2024    First degree hemorrhoids 07/09/2021    GERD (gastroesophageal reflux disease) 11/21/2024    Hashimoto's thyroiditis 11/21/2024    History of colonic polyps 07/09/2021    Hypertension     Hypothyroidism (acquired) 11/06/2024    Mitral regurgitation 10/08/2024    Moderate protein-calorie malnutrition 11/09/2024    Multiple tracheobronchial mucus plugs 10/08/2024    Nicotine dependence 11/21/2024    Rheumatoid arthritis 11/06/2024    S/P mitral valve clip implantation 11/21/2024     Second degree hemorrhoids 07/05/2016    Stress incontinence, female 11/21/2024    Vitamin D deficiency, unspecified 11/21/2024   ,   Past Surgical History:   Procedure Laterality Date    BRONCHOSCOPY N/A 10/16/2024    Procedure: BRONCHOSCOPY;  Surgeon: Gallo Pope MD;  Location: [facility] ENDOSCOPY;  Service: Pulmonary;  Laterality: N/A;    CARDIAC CATHETERIZATION N/A 10/15/2024    Procedure: Left Heart Cath, possible pci;  Surgeon: Travis Connor MD;  Location: [facility] CATH INVASIVE LOCATION;  Service: Cardiovascular;  Laterality: N/A;    CARDIAC CATHETERIZATION N/A 10/22/2024    Procedure: Laser Coronary Atherectomy;  Surgeon: Travis Connor MD;  Location: [facility] CATH INVASIVE LOCATION;  Service: Cardiovascular;  Laterality: N/A;    COLONOSCOPY N/A 10/12/2024    Procedure: COLONOSCOPY WITH BIOPSY AND WIRE GUIDED BALLOON DILATION OF TERMINAL ILEUM;  Surgeon: Rob Strong MD;  Location: [facility] ENDOSCOPY;  Service: Gastroenterology;  Laterality: N/A;  Colitis, crohns of terminal ileum, right colon ulcers, diverticulosis, hemorroids    ENDOSCOPY N/A 10/12/2024    Procedure: ESOPHAGOGASTRODUODENOSCOPY WITH BIOPSY X 2 AREA;  Surgeon: Rob Strong MD;  Location: [facility] ENDOSCOPY;  Service: Gastroenterology;  Laterality: N/A;  Chronic gastritis, HH    HYSTERECTOMY      LEFT HEART CATH     ,   Family History   Problem Relation Age of Onset    Heart disease Mother     Dementia Mother     Stroke Mother     Heart disease Father     Hypertension Father    ,   Social History     Tobacco Use    Smoking status: Former     Types: Cigarettes    Smokeless tobacco: Never   Vaping Use    Vaping status: Never Used   Substance Use Topics    Alcohol use: Never    Drug use: Never   ,   Medications Prior to Admission   Medication Sig Dispense Refill Last Dose/Taking    Adalimumab (Humira, 2 Pen,) 40 MG/0.4ML Pen-injector Kit Inject 40 mg under the skin into the appropriate area as directed  1 (One) Time Per Week.    Indications: Rheumatoid Arthritis   Past Week    albuterol (PROVENTIL) (2.5 MG/3ML) 0.083% nebulizer solution Take 2.5 mg by nebulization Every 6 (Six) Hours As Needed for Wheezing. Indications: Spasm of Lung Air Passages   Taking As Needed    amLODIPine (NORVASC) 10 MG tablet Take 1 tablet by mouth Daily.   Taking    aspirin 81 MG EC tablet Take 1 tablet by mouth Daily for 30 days. Indications: Disease involving Lipid Deposits in the Arteries 30 tablet 0 Taking    [] atorvastatin (LIPITOR) 40 MG tablet Take 1 tablet by mouth Every Night for 30 days. 30 tablet 0 Taking    cholecalciferol (VITAMIN D3) 1.25 MG (03491 UT) capsule Take 1 capsule by mouth 2 (Two) Times a Week. Wed, Sat  Indications: Vitamin D Deficiency   Past Week    [] clopidogrel (PLAVIX) 75 MG tablet Take 1 tablet by mouth Daily for 30 days. 30 tablet 0 Taking    diclofenac (VOLTAREN) 50 MG EC tablet Take 1 tablet by mouth 2 (Two) Times a Day.   Taking    folic acid (FOLVITE) 1 MG tablet Take 1 tablet by mouth Daily. Indications: Anemia From Inadequate Folic Acid   Taking    levothyroxine (SYNTHROID, LEVOTHROID) 50 MCG tablet Take 1 tablet by mouth Daily. Indications: Underactive Thyroid   Taking    Melatonin (Melatonin Extra Strength) 10 MG tablet Take 1 tablet by mouth As Needed. Indications: Trouble Sleeping   Taking As Needed    methotrexate 2.5 MG tablet Take 6 tablets by mouth 1 (One) Time Per Week.    Indications: Non-oncologic   Past Week    metoprolol tartrate (LOPRESSOR) 50 MG tablet Take 1 tablet by mouth 2 (Two) Times a Day.   Taking    midodrine (PROAMATINE) 10 MG tablet Take 1 tablet by mouth 3 (Three) Times a Day Before Meals for 30 days. 90 tablet 0 Taking    pantoprazole (PROTONIX) 20 MG EC tablet Take 1 tablet by mouth Daily. Indications: Heartburn   Taking    predniSONE (DELTASONE) 10 MG tablet Take 4 tablets by mouth Daily for 7 days, THEN 3.5 tablets Daily for 7 days, THEN  3 tablets Daily for 7 days, THEN 2.5 tablets Daily for 7 days, THEN 2 tablets Daily for 7 days, THEN 1.5 tablets Daily for 7 days, THEN 1 tablet Daily for 7 days, THEN 0.5 tablets Daily for 7 days. Indications: Crohn's Disease 126 tablet 0 Taking    sertraline (ZOLOFT) 50 MG tablet Take 1 tablet by mouth Daily. Indications: Major Depressive Disorder   Taking    spironolactone (ALDACTONE) 25 MG tablet Take 1 tablet by mouth Daily. Indications: Edema   Taking    upadacitinib ER (Rinvoq) 45 MG tablet sustained-release 24 hour extended release tablet Take 1 tablet by mouth Daily. Indications: Rheumatoid Arthritis   Taking   , Scheduled Meds:  acetaminophen, 650 mg, Oral, Once   Or  acetaminophen, 650 mg, Oral, Once   Or  acetaminophen, 650 mg, Rectal, Once  atorvastatin, 40 mg, Oral, Nightly  clopidogrel, 75 mg, Oral, Daily  cyanocobalamin, 1,000 mcg, Intramuscular, Daily  diphenhydrAMINE, 25 mg, Oral, Once   Or  diphenhydrAMINE, 25 mg, Intravenous, Once  folic acid, 1,000 mcg, Oral, Daily  levothyroxine, 50 mcg, Oral, Daily  mesalamine, 4.8 g, Oral, Daily  methotrexate, 15 mg, Oral, Weekly  methylPREDNISolone sodium succinate, 20 mg, Intravenous, Q8H  metoprolol succinate XL, 25 mg, Oral, Q24H  midodrine, 5 mg, Oral, TID AC  pantoprazole, 40 mg, Oral, Daily  sertraline, 50 mg, Oral, Daily  sodium chloride, 10 mL, Intravenous, Q12H  upadacitinib ER, 45 mg, Oral, Daily    , Continuous Infusions:   , PRN Meds:    acetaminophen    senna-docusate sodium **AND** polyethylene glycol **AND** bisacodyl **AND** bisacodyl    diphenhydrAMINE    nitroglycerin    ondansetron ODT **OR** ondansetron    [COMPLETED] Insert Peripheral IV **AND** sodium chloride    sodium chloride    sodium chloride   Allergies:  Codeine and Penicillin g sodium    Subjective     ROS:  Review of Systems     Objective   Vital Signs:   BP 98/54 (BP Location: Left leg, Patient Position: Lying)   Pulse 69   Temp 97.8 °F (36.6 °C) (Oral)   Resp 16   Ht  "162.6 cm (64\")   Wt 54 kg (119 lb)   SpO2 99%   BMI 20.43 kg/m²     Physical Exam: (performed by MD)  Physical Exam    Results Review:  Lab Results (last 48 hours)       Procedure Component Value Units Date/Time    CBC (No Diff) [[id number]]  (Abnormal) Collected: 11/24/24 0241    Specimen: Blood Updated: 11/24/24 0316     WBC 14.90 10*3/mm3      RBC 1.84 10*6/mm3      Hemoglobin 5.5 g/dL      Hematocrit 17.6 %      MCV 95.7 fL      MCH 29.9 pg      MCHC 31.3 g/dL      RDW 18.7 %      RDW-SD 63.2 fl      MPV 9.7 fL      Platelets 197 10*3/mm3     Comprehensive Metabolic Panel [[id number]]  (Abnormal) Collected: 11/24/24 0211    Specimen: Blood Updated: 11/24/24 0251     Glucose 122 mg/dL      BUN 20 mg/dL      Creatinine 0.63 mg/dL      Sodium 139 mmol/L      Potassium 4.6 mmol/L      Chloride 108 mmol/L      CO2 18.1 mmol/L      Calcium 7.8 mg/dL      Total Protein 3.9 g/dL      Albumin 2.5 g/dL      ALT (SGPT) 30 U/L      AST (SGOT) 23 U/L      Alkaline Phosphatase 57 U/L      Total Bilirubin 0.2 mg/dL      Globulin 1.4 gm/dL      A/G Ratio 1.8 g/dL      BUN/Creatinine Ratio 31.7     Anion Gap 12.9 mmol/L      eGFR 93.2 mL/min/1.73     Narrative:      GFR Normal >60  Chronic Kidney Disease <60  Kidney Failure <15    The GFR formula is only valid for adults with stable renal function between ages 18 and 70.    C-reactive Protein [[id number]]  (Normal) Collected: 11/24/24 0211    Specimen: Blood Updated: 11/24/24 0251     C-Reactive Protein 0.40 mg/dL     Ova & Parasite Examination - Stool, Per Rectum [[id number]] Collected: 11/23/24 1401    Specimen: Stool from Per Rectum Updated: 11/23/24 1411    Comprehensive Metabolic Panel [[id number]]  (Abnormal) Collected: 11/23/24 0411    Specimen: Blood from Arm, Right Updated: 11/23/24 0447     Glucose 128 mg/dL      BUN 19 mg/dL      Creatinine 0.55 mg/dL      Sodium 141 mmol/L      Potassium 3.9 mmol/L      Chloride 108 mmol/L      CO2 22.3 mmol/L      Calcium 8.3 mg/dL  "     Total Protein 4.5 g/dL      Albumin 2.8 g/dL      ALT (SGPT) 25 U/L      AST (SGOT) 18 U/L      Alkaline Phosphatase 76 U/L      Total Bilirubin 0.2 mg/dL      Globulin 1.7 gm/dL      A/G Ratio 1.6 g/dL      BUN/Creatinine Ratio 34.5     Anion Gap 10.7 mmol/L      eGFR 96.3 mL/min/1.73     Narrative:      GFR Normal >60  Chronic Kidney Disease <60  Kidney Failure <15    The GFR formula is only valid for adults with stable renal function between ages 18 and 70.    C-reactive Protein [[id number]]  (Abnormal) Collected: 11/23/24 0411    Specimen: Blood from Arm, Right Updated: 11/23/24 0447     C-Reactive Protein 1.25 mg/dL     CBC (No Diff) [[id number]]  (Abnormal) Collected: 11/23/24 0411    Specimen: Blood from Arm, Right Updated: 11/23/24 0418     WBC 12.82 10*3/mm3      RBC 2.92 10*6/mm3      Hemoglobin 8.7 g/dL      Hematocrit 27.6 %      MCV 94.5 fL      MCH 29.8 pg      MCHC 31.5 g/dL      RDW 19.0 %      RDW-SD 64.4 fl      MPV 9.5 fL      Platelets 284 10*3/mm3     Hemoglobin & Hematocrit, Blood [[id number]]  (Abnormal) Collected: 11/22/24 2323    Specimen: Blood Updated: 11/22/24 2328     Hemoglobin 8.6 g/dL      Hematocrit 27.3 %     Hemoglobin & Hematocrit, Blood [[id number]]  (Abnormal) Collected: 11/22/24 1758    Specimen: Blood Updated: 11/22/24 1810     Hemoglobin 8.9 g/dL      Hematocrit 27.3 %              Pending Results:     Imaging Reviewed:   XR Chest 1 View    Result Date: 11/25/2024  Impression: No active pulmonary process. Electronically Signed: Jameson Rainey MD  11/25/2024 9:25 AM EST  Workstation ID: [id number]    CT Abdomen Pelvis With Contrast    Result Date: 11/20/2024  Impression: 1.Distal/terminal ileitis in keeping with patient's history of Crohn's disease. No definitive active colonic inflammation identified on CT imaging. Trace free fluid in the pelvis is likely related. 2.Diffuse colonic fatty mural infiltration which can be seen in the setting of chronic inflammatory bowel disease.  3.Other incidental nonemergent findings detailed above. Electronically Signed: Junior Khan MD  11/20/2024 8:19 AM EST  Workstation ID: [id number]    XR Chest 1 View    Result Date: 11/19/2024  Impression: No acute chest finding. Electronically Signed: Ute Snider MD  11/19/2024 1:34 PM EST  Workstation ID: [id number]          Assessment & Plan     Normocytic anemia  -Hemoglobin 5.5 g/dL, MCV 95.7  -Patient with an acute drop in her hemoglobin overnight from 8.7 g/dL.  Reported experiencing significant gastrointestinal bleeding  -Has received 3 units of PRBC this admission and 2 units ordered today due to hemoglobin of 5.5 g/dL  -Recent PCI with stent in October 2024.  Her aspirin has been held but she has been continued on Plavix  -Received Ferrlecit 250 mg IV x 1 dose during her prior admission, continue IV iron replacement  -Continue IV iron replacement with Ferrlecit 250 mg IV X 3 additional doses.  -On daily folic acid due to methotrexate use  -Check vitamin B12 level, reported low (227), s/p 1 dose of IM B12 supplementation, continue oral B12  -Monitor CBC and recommend transfuse for hemoglobin less than 7.0 g/dL    Hemoglobin today 6.8 receiving blood platelets due to recent Plavix    For emergency endoscopy today 11/28/24    Defer to Cardiology regarding antiplatelet therapy management.    Acute GI bleed  -In the setting of Crohn's disease, possible exacerbation  -Gastroenterology following and patient being treated with IV steroids, Rinvoq  -Recent strongyloides of the stomach and duodenum treated with ivermectin.  Now with reported worms visualized in stools and stool O&P sent, this was however reported negative.  -ID team is following the patient.      Discussed with nursing        Thank you for this consult. We will be happy to follow along with you.         Electronically signed by Nicolle Mitchell MD, 11/28/24, 12:43 PM EST.

## 2024-11-28 NOTE — PROGRESS NOTES
[facility] MEDICINE SERVICE  DAILY PROGRESS NOTE    NAME: Maryann Gaitan  : 1950  MRN: [id number]      LOS: 9 days     PROVIDER OF SERVICE: Ron Denton MD    Chief Complaint: Acute lower GI bleeding    Subjective:     Interval History:  History taken from: patient  Patient Complaints:     -Patient had massive bloody bowel movement this morning with clots, signs rapid response was called, initially blood pressure drop however improved, repeat H&H down to 6.8, yesterday morning was 9.3      Objective:     Vital Signs  Temp:  [97.3 °F (36.3 °C)-98 °F (36.7 °C)] 97.4 °F (36.3 °C)  Heart Rate:  [62-86] 80  Resp:  [19-23] 22  BP: ()/(38-90) 110/61  Flow (L/min) (Oxygen Therapy):  [2] 2   Body mass index is 22.97 kg/m².    Physical Exam  General Appearance:  Alert, cooperative, no distress, appears stated age  Head:   Normocephalic, without obvious abnormality, atraumatic  Eyes:   PERRL, conjunctiva/corneas clear, EOM's intact, fundi benign, both eyes  Ears:    Normal TM's and external ear canals, both ears  Nose:   Nares normal, septum midline, mucosa normal, no drainage or sinus tenderness  Throat: Lips, mucosa, and tongue normal; teeth and gums normal  Neck:   Supple, symmetrical, trachea midline, no adenopathy, thyroid: not enlarged, symmetric, no tenderness/mass/nodules, no carotid bruit or JVD  Lungs:             Clear to auscultation bilaterally, respirations unlabored  Heart:  Regular rate and rhythm, S1, S2 normal, no murmur, rub or gallop  Abdomen:  Soft, non-tender, bowel sounds active all four quadrants,  no masses, no organomegaly  Extremities:     Extremities normal, atraumatic, no cyanosis or edema  Pulses:            2+ and symmetric  Skin:    Skin color, texture, turgor normal, no rashes or lesions  Neurologic: Normal    Scheduled Meds   acetaminophen, 650 mg, Oral, Once   Or  acetaminophen, 650 mg, Oral, Once   Or  acetaminophen, 650 mg, Rectal, Once  atorvastatin, 40 mg, Oral,  Nightly  clopidogrel, 75 mg, Oral, Daily  cyanocobalamin, 1,000 mcg, Intramuscular, Daily  diphenhydrAMINE, 25 mg, Oral, Once   Or  diphenhydrAMINE, 25 mg, Intravenous, Once  ferric gluconate, 250 mg, Intravenous, Daily  folic acid, 1,000 mcg, Oral, Daily  levothyroxine, 50 mcg, Oral, Daily  mesalamine, 4.8 g, Oral, Daily  methotrexate, 15 mg, Oral, Weekly  methylPREDNISolone sodium succinate, 20 mg, Intravenous, Q8H  metoprolol succinate XL, 25 mg, Oral, Q24H  midodrine, 5 mg, Oral, TID AC  pantoprazole, 40 mg, Oral, Daily  sertraline, 50 mg, Oral, Daily  sodium chloride, 10 mL, Intravenous, Q12H  upadacitinib ER, 45 mg, Oral, Daily  vitamin B-12, 1,000 mcg, Oral, Daily       PRN Meds     acetaminophen    senna-docusate sodium **AND** polyethylene glycol **AND** bisacodyl **AND** bisacodyl    diphenhydrAMINE    nitroglycerin    ondansetron ODT **OR** ondansetron    [COMPLETED] Insert Peripheral IV **AND** sodium chloride    sodium chloride    sodium chloride   Infusions         Diagnostic Data    Results from last 7 days   Lab Units 11/28/24  0753 11/28/24  0206   WBC 10*3/mm3 16.07* 12.15*   HEMOGLOBIN g/dL 6.8* 7.9*   HEMATOCRIT % 20.3* 23.9*   PLATELETS 10*3/mm3 112* 102*   GLUCOSE mg/dL  --  95   CREATININE mg/dL  --  0.51*   BUN mg/dL  --  16   SODIUM mmol/L  --  139   POTASSIUM mmol/L  --  3.9   AST (SGOT) U/L  --  30   ALT (SGPT) U/L  --  38*   ALK PHOS U/L  --  51   BILIRUBIN mg/dL  --  0.2   ANION GAP mmol/L  --  7.0       No radiology results for the last day      I reviewed the patient's new clinical results.    Assessment/Plan:     Active and Resolved Problems  Active Hospital Problems    Diagnosis  POA    **Acute lower GI bleeding [K92.2]  Yes    S/P mitral valve clip implantation [Z98.890, Z95.818]  Not Applicable    Primary hypertension [I10]  Yes    Moderate protein-calorie malnutrition [E44.0]  Yes    Hypothyroidism (acquired) [E03.9]  Yes    COPD (chronic obstructive pulmonary disease) [J44.9]   Yes    Rheumatoid arthritis [M06.9]  Yes    Crohn's disease [K50.90]  Yes    Anxiety associated with depression [F41.8]  Yes    Dyslipidemia [E78.5]  Yes    Severe mitral regurgitation [I34.0]  Yes    Acute heart failure with preserved ejection fraction (HFpEF) [I50.31]  Yes    CAD, multiple vessel [I25.10]  Yes      Resolved Hospital Problems   No resolved problems to display.       Acute lower GI bleed/abdominal pain and rectal bleed.  -In the settings of active Crohn exacerbation while the patient is unfortunately on Plavix secondary to her recent PCI, risk seems to be more than benefit to stop it  -Keep on Plavix  -Unfortunately patient had massive  bloody bowel movement with clots, over the last few days she continued to drop her hemoglobin and required several blood transfusion, son interpret response was called earlier today, will reach out to GI again, seems that medical management is not achieving the goal of controlling patient's bleeding and likely she is going to require surgery  -Goal to keep hemoglobin above 7  -On IV iron  -GI has been followed the patient she is currently on IV Solu-Medrol along with Lialda  -OK to continue Rinvoq for now.  Can consider Skyrizi if she does not respond to Rinvoq.  Per cardio:Patient unfortunately is in a tough situation.  Had drug-eluting stents done 10/22/2024, hardly 2 weeks ago.  Would benefit from Plavix to prevent stent thrombosis.  Patient has been receiving Plavix uninterrupted.    -follow stool ova and parasite   -ID consulted for recurrent infestations       Mitral regurgitation  Transseptal left heart catheterization  Transcatheter [id number] repair of mitral valve MitraClip NT on 11/21        Hypertension; currently with low blood pressures     CAD  -Patient has previous recent history of PCI with stent placement and was on DAPT although this is likely exacerbating her GI bleed  -Holding aspirin in setting of bleeding  -Was get on Plavix  -Continue statin  for now  -cardiology is following     Hypothyroidism  -continue home Synthroid       VTE Prophylaxis:  Mechanical VTE prophylaxis orders are present.         Code status is   Code Status and Medical Interventions: CPR (Attempt to Resuscitate); Full Support   Ordered at: 11/21/24 1014     Level Of Support Discussed With:    Patient     Code Status (Patient has no pulse and is not breathing):    CPR (Attempt to Resuscitate)     Medical Interventions (Patient has pulse or is breathing):    Full Support       Plan for disposition: Few days depends on clinical course    Time: 30 minutes    Signature: Electronically signed by Ron Denton MD, 11/28/24, 10:05 [facility].  [facility] Hospitalist Team

## 2024-11-28 NOTE — ANESTHESIA POSTPROCEDURE EVALUATION
Patient: Maryann Gaitan    Procedure Summary       Date: 11/28/24 Room / Location: [facility] ENDOSCOPY 2 [facility]    Anesthesia Start: 1333 Anesthesia Stop: 1359    Procedure: ESOPHAGOGASTRODUODENOSCOPY (Left) Diagnosis:       Gastrointestinal hemorrhage, unspecified gastrointestinal hemorrhage type      Anemia, unspecified type      (Gastrointestinal hemorrhage, unspecified gastrointestinal hemorrhage type [K92.2])      (Anemia, unspecified type [D64.9])    Surgeons: Dallin Delgado MD Provider: Anabella Gonzalez CRNA    Anesthesia Type: MAC ASA Status: 3 - Emergent            Anesthesia Type: MAC    Vitals  Vitals Value Taken Time   /54 11/28/24 1405   Temp     Pulse 60 11/28/24 1405   Resp 18 11/28/24 1405   SpO2 100 % 11/28/24 1405           Post Anesthesia Care and Evaluation    Patient location during evaluation: PACU  Patient participation: complete - patient participated  Level of consciousness: awake  Pain score: 0  Pain management: adequate    Airway patency: patent  Anesthetic complications: No anesthetic complications  PONV Status: none  Cardiovascular status: acceptable  Respiratory status: acceptable  Hydration status: acceptable

## 2024-11-28 NOTE — ANESTHESIA PREPROCEDURE EVALUATION
Anesthesia Evaluation     NPO Solid Status: > 8 hours  NPO Liquid Status: > 8 hours           Airway   Mallampati: I  TM distance: >3 FB  Neck ROM: full  No difficulty expected  Dental - normal exam     Pulmonary - normal exam   (+) COPD,    ROS comment: Acute lower GI bleed/abdominal pain and rectal bleed.  -In the settings of active Crohn exacerbation while the patient is unfortunately on Plavix secondary to her recent PCI, risk seems to be more than benefit to stop it  -Keep on Plavix  -Unfortunately patient had massive  bloody bowel movement with clots, over the last few days she continued to drop her hemoglobin and required several blood transfusion, son interpret response was called earlier today, will reach out to GI again, seems that medical management is not achieving the goal of controlling patient's bleeding and likely she is going to require surgery  -Goal to keep hemoglobin above 7  -On IV iron  -GI has been followed the patient she is currently on IV Solu-Medrol along with Lialda  -OK to continue Rinvoq for now.  Can consider Skyrizi if she does not respond to Rinvoq.  Per cardio:Patient unfortunately is in a tough situation.  Had drug-eluting stents done 10/22/2024, hardly 2 weeks ago.  Would benefit from Plavix to prevent stent thrombosis.  Patient has been receiving Plavix uninterrupted.    -follow stool ova and parasite   -ID consulted for recurrent infestations        Mitral regurgitation  Transseptal left heart catheterization  Transcatheter [id number] repair of mitral valve MitraClip NT on 11/21        Hypertension; currently with low blood pressures     CAD  -Patient has previous recent history of PCI with stent placement and was on DAPT although this is likely exacerbating her GI bleed  -Holding aspirin in setting of bleeding  -Was get on Plavix  -Continue statin for now  -cardiology is following    Cardiovascular - normal exam    (+) hypertension, valvular problems/murmurs, CAD    ROS  comment: Left Ventricle Left ventricular ejection fraction appears to be 56 - 60%.     Normal left ventricular cavity size and wall thickness noted. All left ventricular wall segments contract normally.  Right Ventricle Normal right ventricular cavity size, wall thickness, systolic function and septal motion noted.  Mitral Valve Trace mitral valve regurgitation is present. There is a MitraClip mitral valve repair of unknown size present. MitraClip in place.  There is trace residual MR.  Mean gradient is 4.6 mmHg  Pericardium There is no evidence of pericardial effusion. .    11/21/2024    Neuro/Psych  (+) psychiatric history  GI/Hepatic/Renal/Endo    (+) GERD, GI bleeding , renal disease-, thyroid problem hypothyroidism    Musculoskeletal     Abdominal  - normal exam    Bowel sounds: normal.   Substance History      OB/GYN          Other   arthritis,       Other Comment:      COPD (chronic obstructive pulmonary disease)  Hypertension   Mitral regurgitation  Multiple tracheobronchial mucus plugs   Nicotine dependence  Second degree hemorrhoids   Stress incontinence, female  Vitamin D deficiency, unspecified   CAD, multiple vessel  Crohn's disease   Dyslipidemia  Hypothyroidism (acquired)   Moderate protein-calorie malnutrition  Rheumatoid arthritis   History of colonic polyps  Hashimoto's thyroiditis   GERD (gastroesophageal reflux disease)  First degree hemorrhoids   Fecal urgency  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   Cavitary lesion of lung  Benign neoplasm of cecum   Anxiety associated with depression  Acute kidney injury   Acute UTI (urinary tract infection)  Abnormal weight loss   S/P mitral valve clip implantation           ROS/Med Hx Other: CROHNS DS ON PREDNISONE STRESS STEROIDS  [facility]CI LAD MARIAN 10/21/2024 DUAL ANTIPLATELET THERAPY--ASA ON HOLD PLAVIX YESTERDAY.  SEVERE MR MITRACLIP 11/21/2024  CAROTIDS LESS THAN 50 BILAT              Anesthesia Plan    ASA 4 - emergent     general and  [place]     (POSSIBLE POST OP VENT.   CONSIDER CENTRAL LINE--DID HAVE MEDLINE PLACED TODAY)  intravenous induction     Anesthetic plan, risks, benefits, and alternatives have been provided, discussed and informed consent has been obtained with: patient.  Pre-procedure education provided    CODE STATUS:    Level Of Support Discussed With: Patient  Code Status (Patient has no pulse and is not breathing): CPR (Attempt to Resuscitate)  Medical Interventions (Patient has pulse or is breathing): Full Support

## 2024-11-28 NOTE — ANESTHESIA POSTPROCEDURE EVALUATION
Patient: Maryann Gaitan    Procedure Summary       Date: 11/28/24 Room / Location: [facility] OR 03 / [facility] MAIN OR    Anesthesia Start: 1614 Anesthesia Stop: 1816    Procedure: RIGHT HEMICOLECTOMY WITH ILEOSTOMY (Right: Abdomen) Diagnosis:     Surgeons: Todd Babin MD Provider: Maximiliano Jasmine MD    Anesthesia Type: general, [place] ASA Status: 4 - Emergent            Anesthesia Type: general, [place]    Vitals  Vitals Value Taken Time   /62 11/28/24 1831   Temp 97.3 °F (36.3 °C) 11/28/24 1817   Pulse 78 11/28/24 1831   Resp 23 11/28/24 1827   SpO2 100 % 11/28/24 1831   Vitals shown include unfiled device data.        Post Anesthesia Care and Evaluation    Patient location during evaluation: PACU  Patient participation: complete - patient participated  Level of consciousness: awake  Pain scale: See nurse's notes for pain score.  Pain management: adequate    Airway patency: patent  Anesthetic complications: No anesthetic complications  PONV Status: none  Cardiovascular status: acceptable  Respiratory status: acceptable and spontaneous ventilation  Hydration status: acceptable    Comments: Patient seen and examined postoperatively; vital signs stable; SpO2 greater than or equal to 90%; cardiopulmonary status stable; nausea/vomiting adequately controlled; pain adequately controlled; no apparent anesthesia complications; patient discharged from anesthesia care when discharge criteria were met

## 2024-11-28 NOTE — ANESTHESIA PROCEDURE NOTES
Arterial Line      Patient location during procedure: OR  Start time: 11/28/2024 4:23 PM  Stop Time:11/28/2024 4:20 PM       Line placed for hemodynamic monitoring, ABGs/Labs/ISTAT and MD/Surgeon request.  Performed By   Anesthesiologist: Maximiliano Jasmine MD   Preanesthetic Checklist  Completed: patient identified, IV checked, site marked, risks and benefits discussed, surgical consent, monitors and equipment checked, pre-op evaluation and timeout performed  Arterial Line Prep    Sterile Tech: cap, gloves, sterile barriers and mask  Prep: ChloraPrep  Patient monitoring: EKG, continuous pulse oximetry and blood pressure monitoring  Arterial Line Procedure   Laterality:right  Location:  radial artery  Catheter size: 20 G   Guidance: ultrasound guided  PROCEDURE NOTE/ULTRASOUND INTERPRETATION.  Using ultrasound guidance the potential vascular sites for insertion of the catheter were visualized to determine the patency of the vessel to be used for vascular access.  After selecting the appropriate site for insertion, the needle was visualized under ultrasound being inserted into the radial artery, followed by ultrasound confirmation of wire and catheter placement. There were no abnormalities seen on ultrasound; an image was taken; and the patient tolerated the procedure with no complications.   Number of attempts: 1  Successful placement: yes   Post Assessment   Dressing Type: wrist guard applied, secured with tape and occlusive dressing applied.   Complications no  Circ/Move/Sens Assessment: normal and unchanged.   Patient Tolerance: patient tolerated the procedure well with no apparent complications  Additional Notes  R RADIAL ART LINE VIA ASEPTIC SELDINGER TECH US GUIDANCE X1 POS MORAIMA

## 2024-11-28 NOTE — H&P
General Surgery History and Physical Exam      Name: Maryann Gaitan ADMIT: 2024   : 1950  PCP: Eduard Little MD    MRN: [id number] LOS: 9 days   AGE/SEX: 74 y.o. female  ROOM: /[address] [place] [address]      Patient Care Team:  Eduard Little MD as PCP - General (Family Medicine)  Niya Mendoza APRN as Nurse Practitioner (Cardiology)  Chief Complaint   Patient presents with    Chest Pain     Consulted by Dr. Delgado for lower GI bleed    Subjective   74-year-old lady with history of CAD status post recent stent on dual antiplatelet therapy, COPD, rheumatoid arthritis, Crohn's disease on biologic therapy recently discharged on steroid taper.  Developed significant GI bleed.  EGD done today with no upper source.  She received 8 units of blood since admission, multiple units in the last 2 days hemoglobin remains below 7.  Still having large-volume bloody bowel movements.  Has developed hypotension over the last 24 hours.  Last colonoscopy with severe ulceration and stricture of the TI, ulceration of the right colon, more mild ulceration in the sigmoid and rectum.  CT on admission with significant thickening of her right colon and TI.  No localization studies.  Dr. Delgado discussed with Dr. Rene the possibility of stopping Plavix however given brand-new stent she is at high risk for thrombosis.  Given her hemodynamic instability and ongoing large-volume bleeding hemoglobin still below 7 despite multiple units of blood discussed with patient and her son that she likely needs surgery.    Past Medical History:   Diagnosis Date    Abnormal weight loss 2024    Acute kidney injury 2024    Acute UTI (urinary tract infection) 2024    Anxiety associated with depression 2024    Benign neoplasm of cecum 2016    CAD, multiple vessel 10/08/2024    Cavitary lesion of lung 10/08/2024    COPD (chronic obstructive pulmonary disease)     Crohn's disease 2024    Dvrtclos  of lg int w/o perforation or 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 07/05/2016    Dyslipidemia 10/30/2024    Fecal urgency 11/21/2024    First degree hemorrhoids 07/09/2021    GERD (gastroesophageal reflux disease) 11/21/2024    Hashimoto's thyroiditis 11/21/2024    History of colonic polyps 07/09/2021    Hypertension     Hypothyroidism (acquired) 11/06/2024    Mitral regurgitation 10/08/2024    Moderate protein-calorie malnutrition 11/09/2024    Multiple tracheobronchial mucus plugs 10/08/2024    Nicotine dependence 11/21/2024    Rheumatoid arthritis 11/06/2024    S/P mitral valve clip implantation 11/21/2024    Second degree hemorrhoids 07/05/2016    Stress incontinence, female 11/21/2024    Vitamin D deficiency, unspecified 11/21/2024     Past Surgical History:   Procedure Laterality Date    BRONCHOSCOPY N/A 10/16/2024    Procedure: BRONCHOSCOPY;  Surgeon: Gallo Pope MD;  Location: [facility] ENDOSCOPY;  Service: Pulmonary;  Laterality: N/A;    CARDIAC CATHETERIZATION N/A 10/15/2024    Procedure: Left Heart Cath, possible pci;  Surgeon: Travis Connor MD;  Location: [facility] CATH INVASIVE LOCATION;  Service: Cardiovascular;  Laterality: N/A;    CARDIAC CATHETERIZATION N/A 10/22/2024    Procedure: Laser Coronary Atherectomy;  Surgeon: Travis Connor MD;  Location: [facility] CATH INVASIVE LOCATION;  Service: Cardiovascular;  Laterality: N/A;    COLONOSCOPY N/A 10/12/2024    Procedure: COLONOSCOPY WITH BIOPSY AND WIRE GUIDED BALLOON DILATION OF TERMINAL ILEUM;  Surgeon: Rob Strong MD;  Location: [facility] ENDOSCOPY;  Service: Gastroenterology;  Laterality: N/A;  Colitis, crohns of terminal ileum, right colon ulcers, diverticulosis, hemorroids    ENDOSCOPY N/A 10/12/2024    Procedure: ESOPHAGOGASTRODUODENOSCOPY WITH BIOPSY X 2 AREA;  Surgeon: Rob Strong MD;  Location: [facility] ENDOSCOPY;  Service: Gastroenterology;  Laterality: N/A;  Chronic gastritis, HH    HYSTERECTOMY      LEFT HEART  CATH       Family History   Problem Relation Age of Onset    Heart disease Mother     Dementia Mother     Stroke Mother     Heart disease Father     Hypertension Father        Social History     Tobacco Use    Smoking status: Former     Types: Cigarettes    Smokeless tobacco: Never   Vaping Use    Vaping status: Never Used   Substance Use Topics    Alcohol use: Never    Drug use: Never     Medications Prior to Admission   Medication Sig Dispense Refill Last Dose/Taking    Adalimumab (Humira, 2 Pen,) 40 MG/0.4ML Pen-injector Kit Inject 40 mg under the skin into the appropriate area as directed 1 (One) Time Per Week.    Indications: Rheumatoid Arthritis   Past Week    albuterol (PROVENTIL) (2.5 MG/3ML) 0.083% nebulizer solution Take 2.5 mg by nebulization Every 6 (Six) Hours As Needed for Wheezing. Indications: Spasm of Lung Air Passages   Taking As Needed    amLODIPine (NORVASC) 10 MG tablet Take 1 tablet by mouth Daily.   Taking    aspirin 81 MG EC tablet Take 1 tablet by mouth Daily for 30 days. Indications: Disease involving Lipid Deposits in the Arteries 30 tablet 0 Taking    [] atorvastatin (LIPITOR) 40 MG tablet Take 1 tablet by mouth Every Night for 30 days. 30 tablet 0 Taking    cholecalciferol (VITAMIN D3) 1.25 MG (29042 UT) capsule Take 1 capsule by mouth 2 (Two) Times a Week. Wed, Sat  Indications: Vitamin D Deficiency   Past Week    [] clopidogrel (PLAVIX) 75 MG tablet Take 1 tablet by mouth Daily for 30 days. 30 tablet 0 Taking    diclofenac (VOLTAREN) 50 MG EC tablet Take 1 tablet by mouth 2 (Two) Times a Day.   Taking    folic acid (FOLVITE) 1 MG tablet Take 1 tablet by mouth Daily. Indications: Anemia From Inadequate Folic Acid   Taking    levothyroxine (SYNTHROID, LEVOTHROID) 50 MCG tablet Take 1 tablet by mouth Daily. Indications: Underactive Thyroid   Taking    Melatonin (Melatonin Extra Strength) 10 MG tablet Take 1 tablet by mouth As Needed. Indications: Trouble Sleeping    Taking As Needed    methotrexate 2.5 MG tablet Take 6 tablets by mouth 1 (One) Time Per Week. Saturdays   Indications: Non-oncologic   Past Week    metoprolol tartrate (LOPRESSOR) 50 MG tablet Take 1 tablet by mouth 2 (Two) Times a Day.   Taking    midodrine (PROAMATINE) 10 MG tablet Take 1 tablet by mouth 3 (Three) Times a Day Before Meals for 30 days. 90 tablet 0 Taking    pantoprazole (PROTONIX) 20 MG EC tablet Take 1 tablet by mouth Daily. Indications: Heartburn   Taking    predniSONE (DELTASONE) 10 MG tablet Take 4 tablets by mouth Daily for 7 days, THEN 3.5 tablets Daily for 7 days, THEN 3 tablets Daily for 7 days, THEN 2.5 tablets Daily for 7 days, THEN 2 tablets Daily for 7 days, THEN 1.5 tablets Daily for 7 days, THEN 1 tablet Daily for 7 days, THEN 0.5 tablets Daily for 7 days. Indications: Crohn's Disease 126 tablet 0 Taking    sertraline (ZOLOFT) 50 MG tablet Take 1 tablet by mouth Daily. Indications: Major Depressive Disorder   Taking    spironolactone (ALDACTONE) 25 MG tablet Take 1 tablet by mouth Daily. Indications: Edema   Taking    upadacitinib ER (Rinvoq) 45 MG tablet sustained-release 24 hour extended release tablet Take 1 tablet by mouth Daily. Indications: Rheumatoid Arthritis   Taking     acetaminophen, 650 mg, Oral, Once   Or  acetaminophen, 650 mg, Oral, Once   Or  acetaminophen, 650 mg, Rectal, Once  atorvastatin, 40 mg, Oral, Nightly  clopidogrel, 75 mg, Oral, Daily  cyanocobalamin, 1,000 mcg, Intramuscular, Daily  diphenhydrAMINE, 25 mg, Oral, Once   Or  diphenhydrAMINE, 25 mg, Intravenous, Once  ferric gluconate, 250 mg, Intravenous, Daily  folic acid, 1,000 mcg, Oral, Daily  levothyroxine, 50 mcg, Oral, Daily  mesalamine, 4.8 g, Oral, Daily  methotrexate, 15 mg, Oral, Weekly  methylPREDNISolone sodium succinate, 20 mg, Intravenous, Q8H  metoclopramide, 10 mg, Intravenous, Q6H  metoprolol succinate XL, 25 mg, Oral, Q24H  midodrine, 5 mg, Oral, TID AC  pantoprazole, 40 mg, Oral,  Daily  sertraline, 50 mg, Oral, Daily  sodium chloride, 10 mL, Intravenous, Q12H  sodium chloride, 10 mL, Intravenous, Q12H  upadacitinib ER, 45 mg, Oral, Daily  vitamin B-12, 1,000 mcg, Oral, Daily           acetaminophen    senna-docusate sodium **AND** polyethylene glycol **AND** bisacodyl **AND** bisacodyl    diphenhydrAMINE    nitroglycerin    ondansetron ODT **OR** ondansetron    [COMPLETED] Insert Peripheral IV **AND** sodium chloride    sodium chloride    sodium chloride    sodium chloride    sodium chloride  Codeine and Penicillin g sodium    Review of Systems   Constitutional:  Negative for chills and fever.   HENT:  Negative for rhinorrhea and trouble swallowing.    Eyes:  Negative for blurred vision and double vision.   Respiratory:  Negative for cough and shortness of breath.    Cardiovascular:  Negative for chest pain and palpitations.   Gastrointestinal:  Negative for abdominal distention and abdominal pain.   Musculoskeletal:  Negative for back pain and myalgias.   Skin:  Negative for color change and dry skin.   Neurological:  Negative for headache and confusion.   Psychiatric/Behavioral:  Negative for agitation and hallucinations.         Objective     Vital Signs and Labs:  Vital Signs Patient Vitals for the past 24 hrs:   BP Temp Temp src Pulse Resp SpO2 Weight   11/28/24 1525 132/58 97.8 °F (36.6 °C) Oral -- 18 97 % --   11/28/24 1430 143/58 -- -- 65 17 100 % --   11/28/24 1415 131/54 -- -- 59 20 100 % --   11/28/24 1410 123/54 -- -- 60 19 100 % --   11/28/24 1405 109/54 -- -- 60 18 100 % --   11/28/24 1247 123/56 -- -- 67 19 100 % --   11/28/24 1152 136/64 97.8 °F (36.6 °C) Oral 77 20 99 % --   11/28/24 1133 141/68 97.4 °F (36.3 °C) Oral 76 22 98 % --   11/28/24 1119 110/69 97.4 °F (36.3 °C) Oral 87 21 100 % --   11/28/24 1055 105/61 97.4 °F (36.3 °C) Axillary 77 22 100 % --   11/28/24 1007 (!) 89/48 97.4 °F (36.3 °C) Axillary 78 18 97 % --   11/28/24 0944 110/61 97.4 °F (36.3 °C) Oral 80 22 99  % --   11/28/24 0830 -- -- -- 77 -- 99 % --   11/28/24 0827 -- -- -- 74 -- 100 % --   11/28/24 0825 109/61 -- -- -- -- -- --   11/28/24 0824 -- -- -- 70 -- 100 % --   11/28/24 0821 -- -- -- 76 -- 100 % --   11/28/24 0818 -- -- -- 77 -- 100 % --   11/28/24 0817 91/54 -- -- 83 -- -- --   11/28/24 0815 -- -- -- -- -- (!) 86 % --   11/28/24 0812 -- -- -- 77 -- 100 % --   11/28/24 0810 113/63 -- -- -- -- -- --   11/28/24 0809 -- -- -- 75 -- 100 % --   11/28/24 0808 122/62 -- -- -- -- -- --   11/28/24 0806 -- -- -- 78 -- 98 % --   11/28/24 0803 -- -- -- 86 -- 100 % --   11/28/24 0722 95/54 97.4 °F (36.3 °C) Oral -- 19 -- --   11/28/24 0656 (!) 93/38 -- -- -- -- -- --   11/28/24 0350 118/58 97.4 °F (36.3 °C) Oral 62 19 100 % --   11/27/24 2316 124/67 97.5 °F (36.4 °C) Oral 65 19 100 % --   11/27/24 1940 136/66 -- -- 68 19 98 % --   11/27/24 1900 -- -- -- -- -- -- 60.7 kg (133 lb 13.1 oz)   11/27/24 1618 120/64 97.9 °F (36.6 °C) Oral 67 23 99 % --     I/O:  I/O last 3 completed shifts:  In: 1413.8 [P.O.:1080; Blood:333.8]  Out: 1750 [Urine:1750]    Physical Exam:  Physical Exam  Constitutional:       General: She is not in acute distress.     Appearance: Normal appearance. She is not ill-appearing.   HENT:      Head: Normocephalic and atraumatic.      Right Ear: External ear normal.      Left Ear: External ear normal.   Eyes:      Extraocular Movements: Extraocular movements intact.      Conjunctiva/sclera: Conjunctivae normal.   Cardiovascular:      Rate and Rhythm: Normal rate and regular rhythm.   Pulmonary:      Effort: Pulmonary effort is normal. No respiratory distress.   Abdominal:      General: There is no distension.      Palpations: Abdomen is soft.      Tenderness: There is no abdominal tenderness.   Musculoskeletal:         General: No swelling or deformity.   Skin:     General: Skin is warm and dry.   Neurological:      Mental Status: She is alert and oriented to person, place, and time. Mental status is at  baseline.         CBC    Results from last 7 days   Lab Units 11/28/24  1519 11/28/24  0753 11/28/24  0206 11/27/24  1756 11/27/24  0618 11/26/24  0424 11/25/24  0303 11/24/24  1755 11/24/24  0241   WBC 10*3/mm3  --  16.07* 12.15* 10.11 13.14* 20.17* 34.52*  --  14.90*   HEMOGLOBIN g/dL 6.7* 6.8* 7.9* 9.3* 6.4* 7.4* 6.6*   < > 5.5*   PLATELETS 10*3/mm3  --  112* 102* 110* 91* 101* 164  --  197    < > = values in this interval not displayed.     BMP   Results from last 7 days   Lab Units 11/28/24  0206 11/27/24  0532 11/26/24  0424 11/25/24  0303 11/24/24  0211 11/23/24  0411 11/22/24  0439   SODIUM mmol/L 139 136 136 138 139 141 139   POTASSIUM mmol/L 3.9 4.7 3.8 4.0 4.6 3.9 3.9   CHLORIDE mmol/L 109* 110* 109* 109* 108* 108* 107   CO2 mmol/L 23.0 18.6* 19.8* 16.8* 18.1* 22.3 25.0   BUN mg/dL 16 18 27* 23 20 19 15   CREATININE mg/dL 0.51* 0.50* 0.69 0.69 0.63 0.55* 0.49*   GLUCOSE mg/dL 95 98 85 172* 122* 128* 116*   MAGNESIUM mg/dL  --   --   --   --   --   --  1.8     Radiology(recent) No radiology results for the last day    I reviewed the patient's new clinical results.    Assessment & Plan       Acute lower GI bleeding    CAD, multiple vessel    Acute heart failure with preserved ejection fraction (HFpEF)    Severe mitral regurgitation    Dyslipidemia    Crohn's disease    Hypothyroidism (acquired)    Anxiety associated with depression    COPD (chronic obstructive pulmonary disease)    Rheumatoid arthritis    Moderate protein-calorie malnutrition    Primary hypertension    S/P mitral valve clip implantation    Gastrointestinal hemorrhage    Anemia      74-year-old lady with history of CAD status post recent stent on dual antiplatelet therapy, COPD, rheumatoid arthritis, Crohn's disease on biologic therapy recently discharged on steroid taper.  Developed significant GI bleed.  EGD done today with no upper source.  She received 8 units of blood since admission, multiple units in the last 2 days hemoglobin remains  below 7.  Still having large-volume bloody bowel movements.  Has developed hypotension over the last 24 hours.  Last colonoscopy with severe ulceration and stricture of the TI, ulceration of the right colon, more mild ulceration in the sigmoid and rectum.  CT on admission with significant thickening of her right colon and TI.  No localization studies.  Dr. Delgado discussed with Dr. Rene the possibility of stopping Plavix however given brand-new stent she is at high risk for thrombosis.  Given her hemodynamic instability and ongoing large-volume bleeding hemoglobin still below 7 despite multiple units of blood discussed with patient and her son that she likely needs surgery.  Plan for open right hemicolectomy with ileostomy and mucous fistula.  Will open ileum and if blood present in the ileum then this is likely the source.  If bleeding not clearly coming from the ileum or right colon patient will need total colectomy.  Discussed this with patient and her son including risk benefits and alternatives and patient and son elected for me to proceed.      This note was created using Dragon Voice Recognition software.    Todd Babin MD  11/28/24  15:42 EST

## 2024-11-28 NOTE — CONSULTS
Powerglide midline placed in right upper arm under ultrasound guidance. Flushes easily and blood return noted. Patient tolerated well. RN notified ok to use.

## 2024-11-28 NOTE — OP NOTE
ESOPHAGOGASTRODUODENOSCOPY Procedure Report    Patient Name:  Maryann Gaitan  YOB: 1950    Date of Surgery:  11/28/2024     Pre-Op Diagnosis:  Gastrointestinal hemorrhage, unspecified gastrointestinal hemorrhage type [K92.2]  Anemia, unspecified type [D64.9]       Post Op Diagnosis:  Small hiatal hernia      Procedure/CPT® Codes:      Procedure(s):  ESOPHAGOGASTRODUODENOSCOPY    Staff:  Surgeon(s):  Dallin Delgado MD         Anesthesia: Monitored Anesthesia Care    Implants:    Nothing was implanted during the procedure    Specimen:        See Below    No blood loss    Complications:  None     Description of Procedure:  Informed consent was obtained for the procedure, including sedation.  Risks of perforation, hemorrhage, adverse drug reaction and aspiration were discussed.  The patient was brought into the endoscopy suite. Continuous cardiopulmonary monitoring was performed. The patient was placed in the left lateral decubitus position.  The bite block was inserted into the patient's mouth. After adequate sedation was attained, the Olympus gastroscope was inserted into the patient's mouth and advanced to the second portion of the duodenum without difficulty.  Circumferential examination was performed. A retroflex exam was performed in the patient's stomach.  On completion of the exam, the bowel was decompressed, the scope was removed from the patient, the patient tolerated the procedure well, there were no immediate post-operative complications.     Examination of the esophagus showed normal mucosa, small hiatal hernia  Examination of the stomach showed normal mucosa  Retroflex examination of the stomach was normal   Examination of the duodenum showed normal mucosa  Bilious fluid present in the upper GI tract.  No blood in the upper GI tract.      Impression:  Small hiatal hernia  Active GI bleeding with hemodynamic compromise with source felt to be ileitis related to Crohn's disease in the  setting of Plavix due to recent PCI. She is s/p tx plt and PRBC this morning prior to endoscopy.     Recommendations:  I have discussed with Dr. Cruz.  She is high risk for stent thrombosis if we hold Plavix, but due to hemodynamic compromise with GIB today, we will hold Plavix today and obtain surgical consultation.  She is failing medical therapy for Crohn's.        Dallin Delgado MD     Date: 11/28/2024  Time: 13:56 EST

## 2024-11-29 ENCOUNTER — INPATIENT HOSPITAL (AMBULATORY)
Age: 74
End: 2024-11-29
Payer: MEDICARE

## 2024-11-29 ENCOUNTER — INPATIENT HOSPITAL (AMBULATORY)
Dept: URBAN - METROPOLITAN AREA HOSPITAL 84 | Facility: HOSPITAL | Age: 74
End: 2024-11-29
Payer: MEDICARE

## 2024-11-29 DIAGNOSIS — D64.9 ANEMIA, UNSPECIFIED: ICD-10-CM

## 2024-11-29 DIAGNOSIS — K92.2 GASTROINTESTINAL HEMORRHAGE, UNSPECIFIED: ICD-10-CM

## 2024-11-29 DIAGNOSIS — R19.7 DIARRHEA, UNSPECIFIED: ICD-10-CM

## 2024-11-29 DIAGNOSIS — K50.80 CROHN'S DISEASE OF BOTH SMALL AND LARGE INTESTINE WITHOUT CO: ICD-10-CM

## 2024-11-29 DIAGNOSIS — D72.829 ELEVATED WHITE BLOOD CELL COUNT, UNSPECIFIED: ICD-10-CM

## 2024-11-29 DIAGNOSIS — E53.8 DEFICIENCY OF OTHER SPECIFIED B GROUP VITAMINS: ICD-10-CM

## 2024-11-29 LAB
ALBUMIN SERPL-MCNC: 2.6 G/DL (ref 3.5–5.2)
ALBUMIN/GLOB SERPL: 1.9 G/DL
ALP SERPL-CCNC: 64 U/L (ref 39–117)
ALT SERPL W P-5'-P-CCNC: 31 U/L (ref 1–33)
ANION GAP SERPL CALCULATED.3IONS-SCNC: 7.1 MMOL/L (ref 5–15)
AST SERPL-CCNC: 21 U/L (ref 1–32)
BASOPHILS # BLD AUTO: 0.01 10*3/MM3 (ref 0–0.2)
BASOPHILS NFR BLD AUTO: 0.1 % (ref 0–1.5)
BH BB BLOOD EXPIRATION DATE: NORMAL
BH BB BLOOD EXPIRATION DATE: NORMAL
BH BB BLOOD TYPE BARCODE: 5100
BH BB BLOOD TYPE BARCODE: 6200
BH BB DISPENSE STATUS: NORMAL
BH BB DISPENSE STATUS: NORMAL
BH BB PRODUCT CODE: NORMAL
BH BB PRODUCT CODE: NORMAL
BH BB UNIT NUMBER: NORMAL
BH BB UNIT NUMBER: NORMAL
BILIRUB SERPL-MCNC: 0.5 MG/DL (ref 0–1.2)
BUN SERPL-MCNC: 20 MG/DL (ref 8–23)
BUN/CREAT SERPL: 30.3 (ref 7–25)
CALCIUM SPEC-SCNC: 8 MG/DL (ref 8.6–10.5)
CHLORIDE SERPL-SCNC: 108 MMOL/L (ref 98–107)
CO2 SERPL-SCNC: 20.9 MMOL/L (ref 22–29)
CREAT SERPL-MCNC: 0.66 MG/DL (ref 0.57–1)
CROSSMATCH INTERPRETATION: NORMAL
CRP SERPL-MCNC: 6.26 MG/DL (ref 0–0.5)
DEPRECATED RDW RBC AUTO: 48.6 FL (ref 37–54)
EGFRCR SERPLBLD CKD-EPI 2021: 92.2 ML/MIN/1.73
EOSINOPHIL # BLD AUTO: 0 10*3/MM3 (ref 0–0.4)
EOSINOPHIL NFR BLD AUTO: 0 % (ref 0.3–6.2)
ERYTHROCYTE [DISTWIDTH] IN BLOOD BY AUTOMATED COUNT: 15.4 % (ref 12.3–15.4)
GLOBULIN UR ELPH-MCNC: 1.4 GM/DL
GLUCOSE SERPL-MCNC: 96 MG/DL (ref 65–99)
HCT VFR BLD AUTO: 30.1 % (ref 34–46.6)
HCT VFR BLD AUTO: 31.1 % (ref 34–46.6)
HCT VFR BLD AUTO: 32.3 % (ref 34–46.6)
HCT VFR BLD AUTO: 32.6 % (ref 34–46.6)
HGB BLD-MCNC: 10.1 G/DL (ref 12–15.9)
HGB BLD-MCNC: 10.1 G/DL (ref 12–15.9)
HGB BLD-MCNC: 10.5 G/DL (ref 12–15.9)
HGB BLD-MCNC: 10.7 G/DL (ref 12–15.9)
IMM GRANULOCYTES # BLD AUTO: 0.09 10*3/MM3 (ref 0–0.05)
IMM GRANULOCYTES NFR BLD AUTO: 0.7 % (ref 0–0.5)
LYMPHOCYTES # BLD AUTO: 0.86 10*3/MM3 (ref 0.7–3.1)
LYMPHOCYTES NFR BLD AUTO: 6.9 % (ref 19.6–45.3)
MCH RBC QN AUTO: 28.7 PG (ref 26.6–33)
MCHC RBC AUTO-ENTMCNC: 32.8 G/DL (ref 31.5–35.7)
MCV RBC AUTO: 87.4 FL (ref 79–97)
MONOCYTES # BLD AUTO: 0.23 10*3/MM3 (ref 0.1–0.9)
MONOCYTES NFR BLD AUTO: 1.9 % (ref 5–12)
NEUTROPHILS NFR BLD AUTO: 11.23 10*3/MM3 (ref 1.7–7)
NEUTROPHILS NFR BLD AUTO: 90.4 % (ref 42.7–76)
NRBC BLD AUTO-RTO: 0.2 /100 WBC (ref 0–0.2)
PLATELET # BLD AUTO: 101 10*3/MM3 (ref 140–450)
PMV BLD AUTO: 10.4 FL (ref 6–12)
POTASSIUM SERPL-SCNC: 4 MMOL/L (ref 3.5–5.2)
PROT SERPL-MCNC: 4 G/DL (ref 6–8.5)
RBC # BLD AUTO: 3.73 10*6/MM3 (ref 3.77–5.28)
SODIUM SERPL-SCNC: 136 MMOL/L (ref 136–145)
UNIT  ABO: NORMAL
UNIT  ABO: NORMAL
UNIT  RH: NORMAL
UNIT  RH: NORMAL
WBC NRBC COR # BLD AUTO: 12.42 10*3/MM3 (ref 3.4–10.8)

## 2024-11-29 PROCEDURE — 99232 SBSQ HOSP IP/OBS MODERATE 35: CPT | Performed by: NURSE PRACTITIONER

## 2024-11-29 PROCEDURE — 99232 SBSQ HOSP IP/OBS MODERATE 35: CPT | Performed by: INTERNAL MEDICINE

## 2024-11-29 PROCEDURE — 85025 COMPLETE CBC W/AUTO DIFF WBC: CPT | Performed by: STUDENT IN AN ORGANIZED HEALTH CARE EDUCATION/TRAINING PROGRAM

## 2024-11-29 PROCEDURE — 86140 C-REACTIVE PROTEIN: CPT | Performed by: STUDENT IN AN ORGANIZED HEALTH CARE EDUCATION/TRAINING PROGRAM

## 2024-11-29 PROCEDURE — 85014 HEMATOCRIT: CPT | Performed by: STUDENT IN AN ORGANIZED HEALTH CARE EDUCATION/TRAINING PROGRAM

## 2024-11-29 PROCEDURE — 80053 COMPREHEN METABOLIC PANEL: CPT | Performed by: STUDENT IN AN ORGANIZED HEALTH CARE EDUCATION/TRAINING PROGRAM

## 2024-11-29 PROCEDURE — 25010000002 NA FERRIC GLUC CPLX PER 12.5 MG: Performed by: STUDENT IN AN ORGANIZED HEALTH CARE EDUCATION/TRAINING PROGRAM

## 2024-11-29 PROCEDURE — 25010000002 METHYLPREDNISOLONE PER 40 MG: Performed by: STUDENT IN AN ORGANIZED HEALTH CARE EDUCATION/TRAINING PROGRAM

## 2024-11-29 PROCEDURE — 85018 HEMOGLOBIN: CPT | Performed by: STUDENT IN AN ORGANIZED HEALTH CARE EDUCATION/TRAINING PROGRAM

## 2024-11-29 PROCEDURE — 25010000002 METOCLOPRAMIDE PER 10 MG: Performed by: STUDENT IN AN ORGANIZED HEALTH CARE EDUCATION/TRAINING PROGRAM

## 2024-11-29 PROCEDURE — 99222 1ST HOSP IP/OBS MODERATE 55: CPT | Performed by: INTERNAL MEDICINE

## 2024-11-29 PROCEDURE — 25010000002 CYANOCOBALAMIN PER 1000 MCG: Performed by: STUDENT IN AN ORGANIZED HEALTH CARE EDUCATION/TRAINING PROGRAM

## 2024-11-29 PROCEDURE — 25810000003 SODIUM CHLORIDE 0.9 % SOLUTION: Performed by: STUDENT IN AN ORGANIZED HEALTH CARE EDUCATION/TRAINING PROGRAM

## 2024-11-29 PROCEDURE — 25010000002 METHYLPREDNISOLONE PER 40 MG: Performed by: INTERNAL MEDICINE

## 2024-11-29 RX ORDER — METHYLPREDNISOLONE SODIUM SUCCINATE 40 MG/ML
10 INJECTION, POWDER, LYOPHILIZED, FOR SOLUTION INTRAMUSCULAR; INTRAVENOUS EVERY 8 HOURS
Status: COMPLETED | OUTPATIENT
Start: 2024-11-29 | End: 2024-11-29

## 2024-11-29 RX ORDER — ASPIRIN 81 MG/1
81 TABLET ORAL DAILY
Status: DISCONTINUED | OUTPATIENT
Start: 2024-11-29 | End: 2024-12-04 | Stop reason: HOSPADM

## 2024-11-29 RX ORDER — LEVOTHYROXINE SODIUM 50 UG/1
50 TABLET ORAL
Status: DISCONTINUED | OUTPATIENT
Start: 2024-11-30 | End: 2024-12-04 | Stop reason: HOSPADM

## 2024-11-29 RX ORDER — PREDNISONE 20 MG/1
40 TABLET ORAL
Status: DISCONTINUED | OUTPATIENT
Start: 2024-11-30 | End: 2024-12-04 | Stop reason: HOSPADM

## 2024-11-29 RX ADMIN — MESALAMINE 4.8 G: 800 TABLET, DELAYED RELEASE ORAL at 09:59

## 2024-11-29 RX ADMIN — OXYCODONE 5 MG: 5 TABLET ORAL at 09:59

## 2024-11-29 RX ADMIN — PANTOPRAZOLE SODIUM 40 MG: 40 TABLET, DELAYED RELEASE ORAL at 09:59

## 2024-11-29 RX ADMIN — Medication 10 ML: at 21:17

## 2024-11-29 RX ADMIN — ACETAMINOPHEN 1000 MG: 500 TABLET, FILM COATED ORAL at 21:16

## 2024-11-29 RX ADMIN — MIDODRINE HYDROCHLORIDE 5 MG: 5 TABLET ORAL at 11:51

## 2024-11-29 RX ADMIN — LEVOTHYROXINE SODIUM 50 MCG: 0.05 TABLET ORAL at 09:59

## 2024-11-29 RX ADMIN — OXYCODONE 5 MG: 5 TABLET ORAL at 15:16

## 2024-11-29 RX ADMIN — ATORVASTATIN CALCIUM 40 MG: 40 TABLET, FILM COATED ORAL at 21:16

## 2024-11-29 RX ADMIN — ACETAMINOPHEN 1000 MG: 500 TABLET, FILM COATED ORAL at 14:12

## 2024-11-29 RX ADMIN — CYANOCOBALAMIN 1000 MCG: 1000 INJECTION, SOLUTION INTRAMUSCULAR; SUBCUTANEOUS at 09:59

## 2024-11-29 RX ADMIN — METHYLPREDNISOLONE SODIUM SUCCINATE 20 MG: 40 INJECTION, POWDER, FOR SOLUTION INTRAMUSCULAR; INTRAVENOUS at 01:10

## 2024-11-29 RX ADMIN — SODIUM CHLORIDE 250 MG: 9 INJECTION, SOLUTION INTRAVENOUS at 11:51

## 2024-11-29 RX ADMIN — METOCLOPRAMIDE HYDROCHLORIDE 10 MG: 5 INJECTION INTRAMUSCULAR; INTRAVENOUS at 22:31

## 2024-11-29 RX ADMIN — METHYLPREDNISOLONE SODIUM SUCCINATE 20 MG: 40 INJECTION, POWDER, FOR SOLUTION INTRAMUSCULAR; INTRAVENOUS at 09:59

## 2024-11-29 RX ADMIN — CYANOCOBALAMIN TAB 500 MCG 1000 MCG: 500 TAB at 09:59

## 2024-11-29 RX ADMIN — METOPROLOL SUCCINATE 25 MG: 25 TABLET, FILM COATED, EXTENDED RELEASE ORAL at 09:59

## 2024-11-29 RX ADMIN — METOCLOPRAMIDE HYDROCHLORIDE 10 MG: 5 INJECTION INTRAMUSCULAR; INTRAVENOUS at 05:02

## 2024-11-29 RX ADMIN — METOCLOPRAMIDE HYDROCHLORIDE 10 MG: 5 INJECTION INTRAMUSCULAR; INTRAVENOUS at 17:42

## 2024-11-29 RX ADMIN — ASPIRIN 81 MG: 81 TABLET, COATED ORAL at 09:59

## 2024-11-29 RX ADMIN — ACETAMINOPHEN 1000 MG: 500 TABLET, FILM COATED ORAL at 05:02

## 2024-11-29 RX ADMIN — METHYLPREDNISOLONE SODIUM SUCCINATE 10 MG: 40 INJECTION, POWDER, FOR SOLUTION INTRAMUSCULAR; INTRAVENOUS at 17:42

## 2024-11-29 RX ADMIN — MIDODRINE HYDROCHLORIDE 5 MG: 5 TABLET ORAL at 17:42

## 2024-11-29 RX ADMIN — CLOPIDOGREL BISULFATE 75 MG: 75 TABLET ORAL at 09:59

## 2024-11-29 RX ADMIN — METOCLOPRAMIDE HYDROCHLORIDE 10 MG: 5 INJECTION INTRAMUSCULAR; INTRAVENOUS at 11:51

## 2024-11-29 RX ADMIN — MUPIROCIN 1 APPLICATION: 20 OINTMENT TOPICAL at 02:54

## 2024-11-29 RX ADMIN — SERTRALINE HYDROCHLORIDE 50 MG: 50 TABLET ORAL at 09:59

## 2024-11-29 RX ADMIN — Medication 10 ML: at 21:16

## 2024-11-29 RX ADMIN — FOLIC ACID 1000 MCG: 1 TABLET ORAL at 09:59

## 2024-11-29 NOTE — CONSULTS
CARDIOLOGY CONSULT:    Maryann Gaitan  1950  female  [id number]      Referring Provider: Hospital  Reason for Consultation: CAD and GI bleed    Patient Care Team:  Eduard Little MD as PCP - General (Family Medicine)  Niya Mendoza APRN as Nurse Practitioner (Cardiology)    Chief complaint abdominal pain or rectal bleeding    Subjective .     History of present illness:  Maryann Gaitan is a 74 y.o. female with history of coronary disease status post recent stent placements history of COPD presented to the hospital with complaints of abdominal pain and rectal bleeding and also has some shortness of breath.  No complaint of any chest pain.  Patient has history of Crohn's disease and was in the hospital for minute exacerbations and was placed on steroids also.  Patient recent had stent placements and went home without any complications.  No complaints of any chest pain PND orthopnea.  No palpitations dizziness syncope.  Patient presented with acute anemia and hence underwent EGD which showed no upper GI source and general surgery was consulted.  Patient underwent right hemicolectomy with ileostomy and is doing well at this time.    Review of Systems   Constitutional: Negative for fever and malaise/fatigue.   HENT:  Negative for ear pain and nosebleeds.    Eyes:  Negative for blurred vision and double vision.   Cardiovascular:  Negative for chest pain, dyspnea on exertion and palpitations.   Respiratory:  Positive for shortness of breath. Negative for cough.    Skin:  Negative for rash.   Musculoskeletal:  Negative for joint pain.   Gastrointestinal:  Positive for hematochezia. Negative for abdominal pain, nausea and vomiting.   Neurological:  Negative for focal weakness and headaches.   Psychiatric/Behavioral:  Negative for depression. The patient is not nervous/anxious.    All other systems reviewed and are negative.      History  Past Medical History:   Diagnosis Date    Abnormal weight loss  11/21/2024    Acute kidney injury 11/06/2024    Acute UTI (urinary tract infection) 11/06/2024    Anxiety associated with depression 11/06/2024    Benign neoplasm of cecum 07/05/2016    CAD, multiple vessel 10/08/2024    Cavitary lesion of lung 10/08/2024    COPD (chronic obstructive pulmonary disease)     Crohn's disease 11/06/2024    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 07/05/2016    Dyslipidemia 10/30/2024    Fecal urgency 11/21/2024    First degree hemorrhoids 07/09/2021    GERD (gastroesophageal reflux disease) 11/21/2024    Hashimoto's thyroiditis 11/21/2024    History of colonic polyps 07/09/2021    Hypertension     Hypothyroidism (acquired) 11/06/2024    Mitral regurgitation 10/08/2024    Moderate protein-calorie malnutrition 11/09/2024    Multiple tracheobronchial mucus plugs 10/08/2024    Nicotine dependence 11/21/2024    Rheumatoid arthritis 11/06/2024    S/P mitral valve clip implantation 11/21/2024    Second degree hemorrhoids 07/05/2016    Stress incontinence, female 11/21/2024    Vitamin D deficiency, unspecified 11/21/2024       Past Surgical History:   Procedure Laterality Date    BRONCHOSCOPY N/A 10/16/2024    Procedure: BRONCHOSCOPY;  Surgeon: Gallo Pope MD;  Location: [facility] ENDOSCOPY;  Service: Pulmonary;  Laterality: N/A;    CARDIAC CATHETERIZATION N/A 10/15/2024    Procedure: Left Heart Cath, possible pci;  Surgeon: Travis Connor MD;  Location: [facility] CATH INVASIVE LOCATION;  Service: Cardiovascular;  Laterality: N/A;    CARDIAC CATHETERIZATION N/A 10/22/2024    Procedure: Laser Coronary Atherectomy;  Surgeon: Travis Connor MD;  Location: [facility] CATH INVASIVE LOCATION;  Service: Cardiovascular;  Laterality: N/A;    COLON RESECTION Right 11/28/2024    Procedure: RIGHT HEMICOLECTOMY WITH ILEOSTOMY;  Surgeon: Todd Babin MD;  Location: [facility] MAIN OR;  Service: General;  Laterality: Right;    COLONOSCOPY N/A 10/12/2024    Procedure:  COLONOSCOPY WITH BIOPSY AND WIRE GUIDED BALLOON DILATION OF TERMINAL ILEUM;  Surgeon: Rob Strong MD;  Location: [facility] ENDOSCOPY;  Service: Gastroenterology;  Laterality: N/A;  Colitis, crohns of terminal ileum, right colon ulcers, diverticulosis, hemorroids    ENDOSCOPY N/A 10/12/2024    Procedure: ESOPHAGOGASTRODUODENOSCOPY WITH BIOPSY X 2 AREA;  Surgeon: Rob Strong MD;  Location: [facility] ENDOSCOPY;  Service: Gastroenterology;  Laterality: N/A;  Chronic gastritis, HH    ENDOSCOPY Left 2024    Procedure: ESOPHAGOGASTRODUODENOSCOPY;  Surgeon: Dallin Delgado MD;  Location: [facility] ENDOSCOPY;  Service: Gastroenterology;  Laterality: Left;  small hiatal hernia    HYSTERECTOMY      LEFT HEART CATH         Family History   Problem Relation Age of Onset    Heart disease Mother     Dementia Mother     Stroke Mother     Heart disease Father     Hypertension Father        Social History     Tobacco Use    Smoking status: Former     Types: Cigarettes    Smokeless tobacco: Never   Vaping Use    Vaping status: Never Used   Substance Use Topics    Alcohol use: Never    Drug use: Never        Medications Prior to Admission   Medication Sig Dispense Refill Last Dose/Taking    Adalimumab (Humira, 2 Pen,) 40 MG/0.4ML Pen-injector Kit Inject 40 mg under the skin into the appropriate area as directed 1 (One) Time Per Week.    Indications: Rheumatoid Arthritis   Past Week    albuterol (PROVENTIL) (2.5 MG/3ML) 0.083% nebulizer solution Take 2.5 mg by nebulization Every 6 (Six) Hours As Needed for Wheezing. Indications: Spasm of Lung Air Passages   Taking As Needed    amLODIPine (NORVASC) 10 MG tablet Take 1 tablet by mouth Daily.   Taking    aspirin 81 MG EC tablet Take 1 tablet by mouth Daily for 30 days. Indications: Disease involving Lipid Deposits in the Arteries 30 tablet 0 Taking    [] atorvastatin (LIPITOR) 40 MG tablet Take 1 tablet by mouth Every Night for 30 days. 30  tablet 0 Taking    cholecalciferol (VITAMIN D3) 1.25 MG (54515 UT) capsule Take 1 capsule by mouth 2 (Two) Times a Week. Wed, Sat  Indications: Vitamin D Deficiency   Past Week    [] clopidogrel (PLAVIX) 75 MG tablet Take 1 tablet by mouth Daily for 30 days. 30 tablet 0 Taking    diclofenac (VOLTAREN) 50 MG EC tablet Take 1 tablet by mouth 2 (Two) Times a Day.   Taking    folic acid (FOLVITE) 1 MG tablet Take 1 tablet by mouth Daily. Indications: Anemia From Inadequate Folic Acid   Taking    levothyroxine (SYNTHROID, LEVOTHROID) 50 MCG tablet Take 1 tablet by mouth Daily. Indications: Underactive Thyroid   Taking    Melatonin (Melatonin Extra Strength) 10 MG tablet Take 1 tablet by mouth As Needed. Indications: Trouble Sleeping   Taking As Needed    methotrexate 2.5 MG tablet Take 6 tablets by mouth 1 (One) Time Per Week.    Indications: Non-oncologic   Past Week    metoprolol tartrate (LOPRESSOR) 50 MG tablet Take 1 tablet by mouth 2 (Two) Times a Day.   Taking    midodrine (PROAMATINE) 10 MG tablet Take 1 tablet by mouth 3 (Three) Times a Day Before Meals for 30 days. 90 tablet 0 Taking    pantoprazole (PROTONIX) 20 MG EC tablet Take 1 tablet by mouth Daily. Indications: Heartburn   Taking    predniSONE (DELTASONE) 10 MG tablet Take 4 tablets by mouth Daily for 7 days, THEN 3.5 tablets Daily for 7 days, THEN 3 tablets Daily for 7 days, THEN 2.5 tablets Daily for 7 days, THEN 2 tablets Daily for 7 days, THEN 1.5 tablets Daily for 7 days, THEN 1 tablet Daily for 7 days, THEN 0.5 tablets Daily for 7 days. Indications: Crohn's Disease 126 tablet 0 Taking    sertraline (ZOLOFT) 50 MG tablet Take 1 tablet by mouth Daily. Indications: Major Depressive Disorder   Taking    spironolactone (ALDACTONE) 25 MG tablet Take 1 tablet by mouth Daily. Indications: Edema   Taking    upadacitinib ER (Rinvoq) 45 MG tablet sustained-release 24 hour extended release tablet Take 1 tablet by mouth Daily. Indications:  "Rheumatoid Arthritis   Taking       Allergies: Codeine and Penicillin g sodium    Scheduled Meds:acetaminophen, 1,000 mg, Oral, Q8H  aspirin, 81 mg, Oral, Daily  atorvastatin, 40 mg, Oral, Nightly  clopidogrel, 75 mg, Oral, Daily  cyanocobalamin, 1,000 mcg, Intramuscular, Daily  folic acid, 1,000 mcg, Oral, Daily  [START ON 11/30/2024] levothyroxine, 50 mcg, Oral, Q AM  mesalamine, 4.8 g, Oral, Daily  methotrexate, 15 mg, Oral, Weekly  metoclopramide, 10 mg, Intravenous, Q6H  metoprolol succinate XL, 25 mg, Oral, Q24H  midodrine, 5 mg, Oral, TID AC  mupirocin, 1 Application, Each Nare, BID  pantoprazole, 40 mg, Oral, Daily  [START ON 11/30/2024] predniSONE, 40 mg, Oral, Daily With Breakfast  sertraline, 50 mg, Oral, Daily  sodium chloride, 10 mL, Intravenous, Q12H  sodium chloride, 10 mL, Intravenous, Q12H  upadacitinib ER, 45 mg, Oral, Daily  vitamin B-12, 1,000 mcg, Oral, Daily      Continuous Infusions:Pharmacy Consult - Pharmacy to dose,       PRN Meds:.  senna-docusate sodium **AND** polyethylene glycol **AND** bisacodyl **AND** bisacodyl    diphenhydrAMINE    HYDROmorphone    nitroglycerin    ondansetron ODT **OR** ondansetron    oxyCODONE    Pharmacy Consult - Pharmacy to dose    [COMPLETED] Insert Peripheral IV **AND** sodium chloride    sodium chloride    sodium chloride    sodium chloride    sodium chloride    Objective     VITAL SIGNS  Vitals:    11/29/24 1500 11/29/24 1515 11/29/24 1616 11/29/24 1742   BP: 138/70  137/69 132/65   BP Location:   Left arm    Patient Position:   Lying    Pulse: 75 71 72 73   Resp: 12 10 24    Temp:   98.1 °F (36.7 °C)    TempSrc:   Oral    SpO2: 98% 100% 98%    Weight:   60.8 kg (134 lb 0.6 oz)    Height:   162.6 cm (64.02\")        Flowsheet Rows      Flowsheet Row First Filed Value   Admission Height 162.6 cm (64\") Documented at 11/19/2024 1207   Admission Weight 45.4 kg (100 lb) Documented at 11/19/2024 1207             TELEMETRY: Sinus rhythm    Physical " Exam:  Constitutional:       Appearance: Well-developed.   Eyes:      General: No scleral icterus.     Conjunctiva/sclera: Conjunctivae normal.      Pupils: Pupils are equal, round, and reactive to light.   HENT:      Head: Normocephalic and atraumatic.   Neck:      Vascular: No carotid bruit or JVD.   Pulmonary:      Effort: Pulmonary effort is normal.      Breath sounds: Normal breath sounds. No wheezing. No rales.   Cardiovascular:      Normal rate. Regular rhythm.   Pulses:     Intact distal pulses.   Abdominal:      General: Bowel sounds are normal.      Palpations: Abdomen is soft.   Musculoskeletal: Normal range of motion.      Cervical back: Normal range of motion and neck supple. Skin:     General: Skin is warm and dry.      Findings: No rash.   Neurological:      Mental Status: Alert.      Comments: No focal deficits          Results Review:   I reviewed the patient's new clinical results.  Lab Results (last 24 hours)       Procedure Component Value Units Date/Time    Hemoglobin & Hematocrit, Blood [[id number]]  (Abnormal) Collected: 11/29/24 1749    Specimen: Blood Updated: 11/29/24 1754     Hemoglobin 10.5 g/dL      Hematocrit 32.3 %     Hemoglobin & Hematocrit, Blood [[id number]]  (Abnormal) Collected: 11/29/24 1155    Specimen: Blood from Arm, Right Updated: 11/29/24 1159     Hemoglobin 10.1 g/dL      Hematocrit 31.1 %     C-reactive Protein [[id number]]  (Abnormal) Collected: 11/29/24 0510    Specimen: Blood Updated: 11/29/24 0546     C-Reactive Protein 6.26 mg/dL     Comprehensive Metabolic Panel [[id number]]  (Abnormal) Collected: 11/29/24 0510    Specimen: Blood Updated: 11/29/24 0546     Glucose 96 mg/dL      BUN 20 mg/dL      Creatinine 0.66 mg/dL      Sodium 136 mmol/L      Potassium 4.0 mmol/L      Chloride 108 mmol/L      CO2 20.9 mmol/L      Calcium 8.0 mg/dL      Total Protein 4.0 g/dL      Albumin 2.6 g/dL      ALT (SGPT) 31 U/L      AST (SGOT) 21 U/L      Alkaline Phosphatase 64 U/L      Total  Bilirubin 0.5 mg/dL      Globulin 1.4 gm/dL      A/G Ratio 1.9 g/dL      BUN/Creatinine Ratio 30.3     Anion Gap 7.1 mmol/L      eGFR 92.2 mL/min/1.73     Narrative:      GFR Normal >60  Chronic Kidney Disease <60  Kidney Failure <15    The GFR formula is only valid for adults with stable renal function between ages 18 and 70.    CBC & Differential [[id number]]  (Abnormal) Collected: 11/29/24 0510    Specimen: Blood Updated: 11/29/24 0526    Narrative:      The following orders were created for panel order CBC & Differential.  Procedure                               Abnormality         Status                     ---------                               -----------         ------                     CBC Auto Differential[[id number]]        Abnormal            Final result                 Please view results for these tests on the individual orders.    CBC Auto Differential [[id number]]  (Abnormal) Collected: 11/29/24 0510    Specimen: Blood Updated: 11/29/24 0526     WBC 12.42 10*3/mm3      RBC 3.73 10*6/mm3      Hemoglobin 10.7 g/dL      Hematocrit 32.6 %      MCV 87.4 fL      MCH 28.7 pg      MCHC 32.8 g/dL      RDW 15.4 %      RDW-SD 48.6 fl      MPV 10.4 fL      Platelets 101 10*3/mm3      Neutrophil % 90.4 %      Lymphocyte % 6.9 %      Monocyte % 1.9 %      Eosinophil % 0.0 %      Basophil % 0.1 %      Immature Grans % 0.7 %      Neutrophils, Absolute 11.23 10*3/mm3      Lymphocytes, Absolute 0.86 10*3/mm3      Monocytes, Absolute 0.23 10*3/mm3      Eosinophils, Absolute 0.00 10*3/mm3      Basophils, Absolute 0.01 10*3/mm3      Immature Grans, Absolute 0.09 10*3/mm3      nRBC 0.2 /100 WBC     Blood Culture - Blood, Arm, Left [[id number]]  (Normal) Collected: 11/25/24 0437    Specimen: Blood from Arm, Left Updated: 11/29/24 0445     Blood Culture No growth at 4 days    Narrative:      Less than seven (7) mL's of blood was collected.  Insufficient quantity may yield false negative results.    Hemoglobin & Hematocrit,  Blood [[id number]]  (Abnormal) Collected: 11/28/24 2315    Specimen: Blood Updated: 11/28/24 2330     Hemoglobin 11.1 g/dL      Hematocrit 34.1 %     POC Chem 8, arterial (ISTAT) [[id number]]  (Abnormal) Collected: 11/28/24 1753    Specimen: Arterial Blood Updated: 11/28/24 1830     Ionized Calcium 1.18 mmol/L      pH, Arterial 7.290 pH units      pCO2, Arterial 43.2 mm Hg      pO2, Arterial 362.0 mm Hg      HCO3, Arterial 20.7 mmol/L      Base Excess, Arterial <0.0 mmol/L      Base Deficit --     O2 Saturation, Arterial 100.0 %      Glucose 120 mg/dL      Comment: Serial Number: [id number]:  [id number]        Sodium 138 mmol/L      POC Potassium 3.4 mmol/L      Hematocrit 28 %      Hemoglobin 9.5 g/dL      CO2 Content 22 mmol/L             Imaging Results (Last 24 Hours)       ** No results found for the last 24 hours. **            EKG      I personally viewed and interpreted the patient's EKG/Telemetry data:    ECHOCARDIOGRAM:  Results for orders placed during the hospital encounter of 11/19/24    Adult Transthoracic Echo Limited W/ Cont if Necessary Per Protocol    Interpretation Summary    Left ventricular ejection fraction appears to be 56 - 60%.    There is a MitraClip mitral valve repair present.    There is trace residual MR.  Mean gradient is 4.6 mmHg         STRESS MYOVIEW:       CARDIAC CATHETERIZATION:    Results for orders placed during the hospital encounter of 11/19/24    Cardiac Catheterization/Vascular Study    Conclusion    Dmitri Navarro MD    Procedures performed:  Ultrasound guided venous access right common femoral vein  Transseptal left heart catheterization  Transcatheter [id number] repair of mitral valve MitraClip NT    Procedure in details  Under US guidance and using a micropuncture access kit, a 7F introducer was placed into the right femoral vein. The venotomy was preclosed using 2 Perclose Proglide devices. An 16F introducer sheath was placed in the right femoral vein.   Under ultrasound guidance and using a micropuncture access kit a 5 [place] introducer was placed in the left common femoral  artery.    The Montrose sheath was advanced into the superior vena cava over an 0.035 wire. Under GONZÁLEZ and fluoroscopic guidance, successful transseptal puncture was performed using a [employer] versa cross system. Correct position in the left atrium was confirmed by pressure tracing.    GONZÁLEZ will be dictated separately by Dr. Gurrola    Heparin was administered and ACTs were followed with additional heparin being given to keep the ACT > 300.  Montrose curved wire was advanced through the Montrose sheath into the left upper pulmonary vein. The sheath was then replaced with the MitraClip Delivery System/Sheath (CDS). Under fluoroscopic and GONZÁLEZ guidance, a MitraClip (G4 NT) was positioned across the mitral valve and deployed per protocol at A2/P2 position. After the first clip, there was significant reduction in the degree of mitral regurgitation. MR reduced from 4+ to 1+.  There was residual regurgitation jet on the lateral aspect of the clip.  However there was no posterior valve length to appropriately grasp the leaflet in that location.  Overall very challenging case due to significant amount of calcification around the posterior valve leaflet.  Multiple attempts were made during the procedure to grasp the leaflet.  Final gradient across the mitral valve was 4-5 mmHg.  All pulmonic veins were assessed and no longer showed flow reversal.  The delivery sheath was removed and the pre-positioned Perclose devices were placed with successful hemostasis.  The patient was extubated and transferred to the recovery area in stable condition.    Recommendations:  Resume anticoagulation.  Uptitrate GDMT    Electronically signed by Dmitri Navarro MD, 11/21/24, 10:09 AM EST.       OTHER:         MDM      Acute lower GI bleeding  Patient presented with blood in stools and had significant anemia and had upper GI which showed  no evidence of bleeding  Patient was seen by surgeons and underwent right hemicolectomy with ileostomy  Patient's Plavix was held which will be restarted once surgeons are okay.  Discussed with patient about the risk of holding Plavix including stent thrombosis.    Acute blood loss anemia  Patient had received blood but now she will be monitored for her hemoglobin and hematocrit.    Coronary disease  Patient had recent stent placement to the LAD and hence she needs to be on aspirin Plavix for at least Plavix for now until she stabilizes and will continue her other medical therapy including beta-blockers and statins.    Mitral regurgitation  Patient had a mitral valve clip procedure and is currently stable with normal LV systolic function    I discussed the patients findings and my recommendations with patient and nurse    Hunter Cruz MD  11/29/24  18:28 EST

## 2024-11-29 NOTE — PROGRESS NOTES
" LOS: 10 days   Patient Care Team:  Eduard Little MD as PCP - General (Family Medicine)  Niya Mendoza APRN as Nurse Practitioner (Cardiology)      Subjective   \"OK\"    Interval History:   11/28/2024 EGD (Dr. Delgado) small hiatal hernia.  Patient underwent right hemicolectomy with ileostomy and mucous fistula 11/28/2024 with Dr. Babin.  LABS: Sodium potassium are normal.  Creatinine 0.66.  LFTs are normal.  CRP 6.26 (1.25).  WBCs 12.42, hemoglobin 10.7 (11.1), platelets 101.    ROS:   No chest pain, shortness of breath, or cough.        Medication Review:     Current Facility-Administered Medications:     acetaminophen (TYLENOL) tablet 650 mg, 650 mg, Oral, Once **OR** acetaminophen (TYLENOL) 160 MG/5ML oral solution 650 mg, 650 mg, Oral, Once **OR** acetaminophen (TYLENOL) suppository 650 mg, 650 mg, Rectal, Once, Todd Babin MD    acetaminophen (TYLENOL) tablet 1,000 mg, 1,000 mg, Oral, Q8H, Todd Babin MD, 1,000 mg at 11/29/24 0502    aspirin EC tablet 81 mg, 81 mg, Oral, Daily, Ramana Musa MD, 81 mg at 11/29/24 0959    atorvastatin (LIPITOR) tablet 40 mg, 40 mg, Oral, Nightly, Todd Babin MD, 40 mg at 11/28/24 2057    sennosides-docusate (PERICOLACE) 8.6-50 MG per tablet 2 tablet, 2 tablet, Oral, BID PRN **AND** polyethylene glycol (MIRALAX) packet 17 g, 17 g, Oral, Daily PRN **AND** bisacodyl (DULCOLAX) EC tablet 5 mg, 5 mg, Oral, Daily PRN **AND** bisacodyl (DULCOLAX) suppository 10 mg, 10 mg, Rectal, Daily PRN, Todd Babin MD    clopidogrel (PLAVIX) tablet 75 mg, 75 mg, Oral, Daily, Todd Babin MD, 75 mg at 11/29/24 0959    cyanocobalamin injection 1,000 mcg, 1,000 mcg, Intramuscular, Daily, Todd Babin MD, 1,000 mcg at 11/29/24 0959    diphenhydrAMINE (BENADRYL) capsule 25 mg, 25 mg, Oral, Q6H PRN, Todd Babin MD, 25 mg at 11/22/24 0941    diphenhydrAMINE (BENADRYL) capsule 25 mg, 25 mg, Oral, Once **OR** diphenhydrAMINE (BENADRYL) injection 25 mg, 25 mg, " Intravenous, Once, Todd Babin MD    ferric gluconate (FERRLECIT) 250 MG in sodium chloride 0.9% 250 mL IVPB, 250 mg, Intravenous, Daily, Todd Babin MD, Last Rate: 125 mL/hr at 11/28/24 0903, 250 mg at 11/28/24 0903    folic acid (FOLVITE) tablet 1,000 mcg, 1,000 mcg, Oral, Daily, Todd Babin MD, 1,000 mcg at 11/29/24 0959    HYDROmorphone (DILAUDID) injection 0.5 mg, 0.5 mg, Intravenous, Q4H PRN, Todd Babin MD    levothyroxine (SYNTHROID, LEVOTHROID) tablet 50 mcg, 50 mcg, Oral, Daily, Todd Babin MD, 50 mcg at 11/29/24 0959    mesalamine (LIALDA) EC tablet 4.8 g, 4.8 g, Oral, Daily, Todd Babin MD, 4.8 g at 11/29/24 0959    methotrexate tablet 15 mg, 15 mg, Oral, Weekly, Todd Babin MD, 15 mg at 11/23/24 2058    methylPREDNISolone sodium succinate (SOLU-Medrol) injection 20 mg, 20 mg, Intravenous, Q8H, Todd Babin MD, 20 mg at 11/29/24 0959    metoclopramide (REGLAN) injection 10 mg, 10 mg, Intravenous, Q6H, Todd Babin MD, 10 mg at 11/29/24 0502    metoprolol succinate XL (TOPROL-XL) 24 hr tablet 25 mg, 25 mg, Oral, Q24H, Todd Babin MD, 25 mg at 11/29/24 0959    midodrine (PROAMATINE) tablet 5 mg, 5 mg, Oral, TID AC, Todd Babin MD, 5 mg at 11/28/24 1106    mupirocin (BACTROBAN) 2 % nasal ointment 1 Application, 1 Application, Each Nare, BID, Radha Carrillo, APRN, 1 Application at 11/29/24 0254    nitroglycerin (NITROSTAT) SL tablet 0.4 mg, 0.4 mg, Sublingual, Q5 Min PRN, Todd Babin MD    ondansetron ODT (ZOFRAN-ODT) disintegrating tablet 4 mg, 4 mg, Oral, Q6H PRN **OR** ondansetron (ZOFRAN) injection 4 mg, 4 mg, Intravenous, Q6H PRN, Todd Babin MD    oxyCODONE (ROXICODONE) immediate release tablet 5 mg, 5 mg, Oral, Q4H PRN, Todd Babin MD, 5 mg at 11/29/24 0959    pantoprazole (PROTONIX) EC tablet 40 mg, 40 mg, Oral, Daily, Todd Babin MD, 40 mg at 11/29/24 0959    sertraline (ZOLOFT) tablet 50 mg, 50 mg, Oral, Daily, Olaf  MD Todd, 50 mg at 11/29/24 0959    [COMPLETED] Insert Peripheral IV, , , Once **AND** sodium chloride 0.9 % flush 10 mL, 10 mL, Intravenous, PRN, Todd Baibn MD    sodium chloride 0.9 % flush 10 mL, 10 mL, Intravenous, Q12H, Todd Babin MD, 10 mL at 11/28/24 2058    sodium chloride 0.9 % flush 10 mL, 10 mL, Intravenous, PRN, Todd Babin MD    sodium chloride 0.9 % flush 10 mL, 10 mL, Intravenous, Q12H, Todd Babin MD, 10 mL at 11/28/24 2058    sodium chloride 0.9 % flush 10 mL, 10 mL, Intravenous, PRN, Todd Babin MD    sodium chloride 0.9 % infusion 40 mL, 40 mL, Intravenous, PRN, Todd Babin MD    sodium chloride 0.9 % infusion 40 mL, 40 mL, Intravenous, PRN, Todd Babin MD    upadacitinib ER (RINVOQ) extended release tablet 45 mg, 45 mg, Oral, Daily, Todd Babin MD, 45 mg at 11/29/24 1000    vitamin B-12 (CYANOCOBALAMIN) tablet 1,000 mcg, 1,000 mcg, Oral, Daily, Todd Babin MD, 1,000 mcg at 11/29/24 0959       Objective  Resting in bed. Drinking liquid diet. NAD. No family present.     Vital Signs  Vitals:    11/29/24 0500 11/29/24 0600 11/29/24 0700 11/29/24 0800   BP: 146/76 130/65 135/65 143/64   BP Location:    Left arm   Patient Position:    Lying   Pulse: 69 66 71 61   Resp:    16   Temp:    98.1 °F (36.7 °C)   TempSrc:    Oral   SpO2: 98% 99% 98% 99%   Weight:       Height:           Physical Exam: Site 1 in RUQ has serosanguinous drainage. 2nd site below 1st contains thin green liquid.   General Appearance:    Awake and alert, in no acute distress   Head:    Normocephalic, without obvious abnormality   Eyes:          Conjunctivae normal, anicteric sclera   Throat:   No oral lesions, no thrush, oral mucosa moist   Neck:   No adenopathy, supple, no JVD   Lungs:     respirations regular, even and unlabored   Abdomen:     Soft, non-tender, no rebound or guarding, non-distended.    Rectal:     Deferred   Extremities:   No edema, no cyanosis   Skin:   No bruising  or rash, no jaundice        Results Review:    Results from last 7 days   Lab Units 11/29/24  0510 11/28/24  2315 11/28/24  1753 11/28/24  1519 11/28/24  0753 11/28/24  0206 11/27/24  1756 11/27/24  0618 11/26/24  0424 11/25/24  0303   WBC 10*3/mm3 12.42*  --   --   --  16.07* 12.15* 10.11 13.14* 20.17* 34.52*   HEMOGLOBIN g/dL 10.7* 11.1*  --  6.7* 6.8* 7.9* 9.3* 6.4* 7.4* 6.6*   HEMOGLOBIN, POC g/dL  --   --  9.5*  --   --   --   --   --   --   --    PLATELETS 10*3/mm3 101*  --   --   --  112* 102* 110* 91* 101* 164     Results from last 7 days   Lab Units 11/29/24  0510 11/28/24  0206 11/27/24  0532 11/26/24  0424 11/25/24  0303 11/24/24  0211 11/23/24  0411   SODIUM mmol/L 136 139 136 136 138 139 141   POTASSIUM mmol/L 4.0 3.9 4.7 3.8 4.0 4.6 3.9   CHLORIDE mmol/L 108* 109* 110* 109* 109* 108* 108*   CO2 mmol/L 20.9* 23.0 18.6* 19.8* 16.8* 18.1* 22.3   BUN mg/dL 20 16 18 27* 23 20 19   CREATININE mg/dL 0.66 0.51* 0.50* 0.69 0.69 0.63 0.55*   GLUCOSE mg/dL 96 95 98 85 172* 122* 128*   ALBUMIN g/dL 2.6* 2.4* 2.3* 2.4* 2.4* 2.5* 2.8*   BILIRUBIN mg/dL 0.5 0.2 0.3 0.5 0.4 0.2 0.2   ALK PHOS U/L 64 51 48 40 43 57 76   AST (SGOT) U/L 21 30 21 14 18 23 18   ALT (SGPT) U/L 31 38* 21 19 24 30 25   CRP mg/dL 6.26* 1.25* 2.74* 1.14* <0.30 0.40 1.25*     Estimated Creatinine Clearance: 71.7 mL/min (by C-G formula based on SCr of 0.66 mg/dL).  Lab Results   Component Value Date    HGBA1C 5.64 (H) 10/13/2024     Results from last 7 days   Lab Units 11/27/24  0532 11/26/24 0424   HSTROP T ng/L 29* 36*     Infection   Results from last 7 days   Lab Units 11/25/24 0437   BLOODCX  No growth at 4 days     UA      Microbiology Results (last 10 days)       Procedure Component Value - Date/Time    Blood Culture - Blood, Arm, Left [[id number]]  (Normal) Collected: 11/25/24 0437    Lab Status: Preliminary result Specimen: Blood from Arm, Left Updated: 11/29/24 0445     Blood Culture No growth at 4 days    Narrative:      Less than  seven (7) mL's of blood was collected.  Insufficient quantity may yield false negative results.    Ova & Parasite Examination - Stool, Per Rectum [[id number]] Collected: 11/23/24 1401    Lab Status: Final result Specimen: Stool from Per Rectum Updated: 11/26/24 1511     Ova + Parasite Exam Final report     Comment: These results were obtained using wet preparation(s) and trichrome  stained smear. This test does not include testing for Cryptosporidium  parvum, Cyclospora, or Microsporidia.        Ova + Parasite Result 1 Comment     Comment: No ova, cysts, or parasites seen.  One negative specimen does not rule out the possibility of a  parasitic infection.       Narrative:      Performed at:  [address]  [id number]  : Shola Hill PhD, Phone:  [phone]          Imaging Results (Last 72 Hours)       ** No results found for the last 72 hours. **            Assessment & Plan     ASSESSMENT:  -Hematochezia  -Diarrhea   -Normocytic anemia with drop in hemoglobin  -Leukocytosis  -Abnormal CT showing distal/terminal ileitis and diffuse colonic fatty infiltration  -Recent Strongyloides infection in 10/2024 s/p treatment with ivermectin  -Small bowel and colonic Crohn's disease - on Rinvoq (recently restarted)  -Chest pain  -Recent fall  -Hypertension  -COPD  -CAD s/p stent placement on Plavix  -RA - on Humira and methotrexate  -History of hysterectomy  -History of cholecystectomy  -Severe mitral regurgitation status post mitral clipping 11/21/2024     PLAN:   Patient is a 74-year-old female with history of small bowel and colonic Crohn's (recently restarted on Rinvoq), rheumatoid arthritis (on Humira and methotrexate), and cholecystectomy who presented on 11/19 with increased hematochezia and found to have drop in hemoglobin to 5.7.  She has recently been admitted and discharged on 11/10/2024.  Was also here in October when she was diagnosed with Strongyloides and  treated with ivermectin.     Patient underwent EGD and right hemicolectomy with ileostomy and mucous fistula 11/28/2024.  Patient is admitted into the ICU postop.  Hemoglobin is 10.7 today.  Received 3 units of packed red blood cells and 2 of platelets yesterday.  Patient is scheduled for 3 bags of IV iron.  Continue.  Continue folic acid, mesalamine, Reglan, midodrine, PPI, Rinvoq and vitamin B12.  Will decrease solumedrol in half.   Diet per general surgery.     Electronically signed by DRU Warner, 11/29/24, 10:49 AM EST.

## 2024-11-29 NOTE — CONSULTS
Critical Care Consult Note   Maryann Gaitan : 1950 MRN:[id number] LOS:9 ROOM: 2309/1     Reason for admission: Acute lower GI bleeding     Assessment / Plan     Acute lower GI bleeding   2/2 Crohn's exacerbation  GI following --> EGD 2024 negative for upper GI source  8 units PRBC since admission  Status post colonoscopy 10/12/2024 --> Crohn's disease of small bowel and colon with complication new sigmoid colon diverticulosis without diverticulitis or bleeding, grade 2 internal hemorrhoids, colon ulcers, terminal ileum stricture, terminal ileum ulcers normal duodenum, nonerosive chronic gastritis  Status post right hemicolectomy with ileostomy and mucous fistula  Monitoring overnight in ICU    Acute blood loss anemia  Monitor hemoglobin every 6 hours  Heme-onc following  Continue Ferrlecit to 50 mg x 3 doses  Continue folic acid, cyanocobalamin tablet and injection,      CAD  Chronic HFpEF due to valvular heart disease from mitral regurgitation  Status post PCI 10/22/2024  Status post atrial valve clip 2024  DAP therapy per cardiology    Crohn's disease  Continue Rinvoq mesalamine  GI following    Rheumatoid arthritis with history of significant immunosuppression with Humira and methotrexate    Level Of Support Discussed With: Patient  Code Status (Patient has no pulse and is not breathing): CPR (Attempt to Resuscitate)  Medical Interventions (Patient has pulse or is breathing): Full Support       Nutrition: Diet: Liquid; Clear Liquid; Fluid Consistency: Thin (IDDSI 0) Patient isn't on Tube Feeding     VTE Prophylaxis:  Pharmacologic & mechanical VTE prophylaxis orders are present.    History of Present illness     A 74 y.o. old female patient with PMH of CAD status post recent stents on dual antiplatelet therapy, COPD, rheumatoid arthritis, Crohn's disease presents to the hospital 2024 with complaints of abdominal pain, rectal bleeding, shortness of breath. Patient had a recent admission  on 11/6/2024 for mild rectal bleeding and was found to have an acute Crohn's exacerbation.  At that time she was discharged with steroid taper.  Following discharge patient reported little improvement in her symptoms and eventually abdominal pain continued to worsen.  Patient is currently on dual antiplatelet therapy following PCI in October 2024.  Heme-onc has been following patient during admission.  Since admission patient has had multiple transfusions of packed red blood cells, however hemoglobin has stayed below 7.  Today patient underwent an EGD with GI which showed no upper GI source.  General surgery was consulted.  Patient was taken to surgery and admitted to ICU following for close monitoring.    ACP: CPR, Full Intervention. ACP docs on file. Patient's son is listed as his decision maker in the event he is unable.     Patient was seen and examined on 11/28/24 at 21:16 EST .    Past Medical/Surgical/Social/Family History & Allergies     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 07/05/2016    Dyslipidemia 10/30/2024    Fecal urgency 11/21/2024    First degree hemorrhoids 07/09/2021    GERD (gastroesophageal reflux disease) 11/21/2024    Hashimoto's thyroiditis 11/21/2024    History of colonic polyps 07/09/2021    Hypertension     Hypothyroidism (acquired) 11/06/2024    Mitral regurgitation 10/08/2024    Moderate protein-calorie malnutrition 11/09/2024    Multiple tracheobronchial mucus plugs 10/08/2024    Nicotine dependence 11/21/2024    Rheumatoid arthritis 11/06/2024    S/P mitral valve clip  implantation 11/21/2024    Second degree hemorrhoids 07/05/2016    Stress incontinence, female 11/21/2024    Vitamin D deficiency, unspecified 11/21/2024      Past Surgical History:   Procedure Laterality Date    BRONCHOSCOPY N/A 10/16/2024    Procedure: BRONCHOSCOPY;  Surgeon: Gallo Pope MD;  Location: [facility] ENDOSCOPY;  Service: Pulmonary;  Laterality: N/A;    CARDIAC CATHETERIZATION N/A 10/15/2024    Procedure: Left Heart Cath, possible pci;  Surgeon: Travis Connor MD;  Location: [facility] CATH INVASIVE LOCATION;  Service: Cardiovascular;  Laterality: N/A;    CARDIAC CATHETERIZATION N/A 10/22/2024    Procedure: Laser Coronary Atherectomy;  Surgeon: Travis Connor MD;  Location: [facility] CATH INVASIVE LOCATION;  Service: Cardiovascular;  Laterality: N/A;    COLONOSCOPY N/A 10/12/2024    Procedure: COLONOSCOPY WITH BIOPSY AND WIRE GUIDED BALLOON DILATION OF TERMINAL ILEUM;  Surgeon: Rob tSrong MD;  Location: [facility] ENDOSCOPY;  Service: Gastroenterology;  Laterality: N/A;  Colitis, crohns of terminal ileum, right colon ulcers, diverticulosis, hemorroids    ENDOSCOPY N/A 10/12/2024    Procedure: ESOPHAGOGASTRODUODENOSCOPY WITH BIOPSY X 2 AREA;  Surgeon: Rob Strong MD;  Location: [facility] ENDOSCOPY;  Service: Gastroenterology;  Laterality: N/A;  Chronic gastritis, HH    HYSTERECTOMY      LEFT HEART CATH        Social History     Socioeconomic History    Marital status:    Tobacco Use    Smoking status: Former     Types: Cigarettes    Smokeless tobacco: Never   Vaping Use    Vaping status: Never Used   Substance and Sexual Activity    Alcohol use: Never    Drug use: Never    Sexual activity: Defer      Family History   Problem Relation Age of Onset    Heart disease Mother     Dementia Mother     Stroke Mother     Heart disease Father     Hypertension Father       Allergies   Allergen Reactions    Codeine Hives    Penicillin G Sodium Hives        Home  Medications     Prior to Admission medications    Medication Sig Start Date End Date Taking? Authorizing Provider   Adalimumab (Humira, 2 Pen,) 40 MG/0.4ML Pen-injector Kit Inject 40 mg under the skin into the appropriate area as directed 1 (One) Time Per Week. Saturdays   Indications: Rheumatoid Arthritis 10/25/24  Yes Donna Kraft MD   albuterol (PROVENTIL) (2.5 MG/3ML) 0.083% nebulizer solution Take 2.5 mg by nebulization Every 6 (Six) Hours As Needed for Wheezing. Indications: Spasm of Lung Air Passages 11/13/24  Yes Donna Kraft MD   amLODIPine (NORVASC) 10 MG tablet Take 1 tablet by mouth Daily.   Yes Donna Kraft MD   aspirin 81 MG EC tablet Take 1 tablet by mouth Daily for 30 days. Indications: Disease involving Lipid Deposits in the Arteries 11/15/24 12/15/24 Yes Eusebio Montenegro MD   cholecalciferol (VITAMIN D3) 1.25 MG (34870 UT) capsule Take 1 capsule by mouth 2 (Two) Times a Week. Wed, Sat  Indications: Vitamin D Deficiency 10/25/24  Yes Donna Kraft MD   diclofenac (VOLTAREN) 50 MG EC tablet Take 1 tablet by mouth 2 (Two) Times a Day.   Yes Donna Kraft MD   folic acid (FOLVITE) 1 MG tablet Take 1 tablet by mouth Daily. Indications: Anemia From Inadequate Folic Acid 10/25/24  Yes Donna Kraft MD   levothyroxine (SYNTHROID, LEVOTHROID) 50 MCG tablet Take 1 tablet by mouth Daily. Indications: Underactive Thyroid 10/25/24  Yes Donna Kraft MD   Melatonin (Melatonin Extra Strength) 10 MG tablet Take 1 tablet by mouth As Needed. Indications: Trouble Sleeping 10/30/24  Yes Donna Kraft MD   methotrexate 2.5 MG tablet Take 6 tablets by mouth 1 (One) Time Per Week. Saturdays   Indications: Non-oncologic 10/25/24  Yes Donna Kraft MD   metoprolol tartrate (LOPRESSOR) 50 MG tablet Take 1 tablet by mouth 2 (Two) Times a Day.   Yes Donna Kraft MD   midodrine (PROAMATINE) 10 MG tablet Take 1 tablet by mouth 3 (Three) Times a  Day Before Meals for 30 days. 10/23/24 11/22/24 Yes Noreen Nj MD   pantoprazole (PROTONIX) 20 MG EC tablet Take 1 tablet by mouth Daily. Indications: Heartburn 10/25/24  Yes Donna Kraft MD   predniSONE (DELTASONE) 10 MG tablet Take 4 tablets by mouth Daily for 7 days, THEN 3.5 tablets Daily for 7 days, THEN 3 tablets Daily for 7 days, THEN 2.5 tablets Daily for 7 days, THEN 2 tablets Daily for 7 days, THEN 1.5 tablets Daily for 7 days, THEN 1 tablet Daily for 7 days, THEN 0.5 tablets Daily for 7 days. Indications: Crohn's Disease 11/12/24 1/7/25 Yes Eusebio Montenegro MD   sertraline (ZOLOFT) 50 MG tablet Take 1 tablet by mouth Daily. Indications: Major Depressive Disorder 10/25/24  Yes Donna Krfat MD   spironolactone (ALDACTONE) 25 MG tablet Take 1 tablet by mouth Daily. Indications: Edema 11/13/24  Yes Donna Kraft MD   upadacitinib ER (Rinvoq) 45 MG tablet sustained-release 24 hour extended release tablet Take 1 tablet by mouth Daily. Indications: Rheumatoid Arthritis 11/13/24  Yes Donna Kraft MD   CALCIUM ALGINATE EX Apply 1 dose topically to the appropriate area as directed Daily. Indications: Wound 11/14/24   Donna Kraft MD        Objective / Physical Exam     Vital signs:  Temp: 97.8 °F (36.6 °C)  BP: 115/62  Heart Rate: 69  Resp: 20  SpO2: 97 %  Weight: 60.7 kg (133 lb 13.1 oz)    Admission Weight: Weight: 45.4 kg (100 lb)    Physical Exam  Vitals and nursing note reviewed.   Constitutional:       General: She is not in acute distress.     Appearance: She is not ill-appearing.   HENT:      Head: Normocephalic.      Mouth/Throat:      Mouth: Mucous membranes are moist.      Pharynx: Oropharynx is clear.   Eyes:      Extraocular Movements: Extraocular movements intact.      Conjunctiva/sclera: Conjunctivae normal.      Pupils: Pupils are equal, round, and reactive to light.   Cardiovascular:      Rate and Rhythm: Normal rate and regular rhythm.       Pulses: Normal pulses.      Heart sounds: Normal heart sounds.   Pulmonary:      Effort: Pulmonary effort is normal.      Breath sounds: Normal breath sounds.   Abdominal:      General: Bowel sounds are normal.      Palpations: Abdomen is soft.   Musculoskeletal:      Right lower leg: No edema.      Left lower leg: No edema.   Skin:     General: Skin is warm and dry.      Findings: No rash.   Neurological:      Mental Status: She is alert and oriented to person, place, and time.   Psychiatric:         Mood and Affect: Mood normal.         Behavior: Behavior normal.          Labs     Results from last 7 days   Lab Units 11/28/24  1753 11/28/24  1519 11/28/24  0753 11/28/24  0206 11/27/24  1756 11/27/24  0618 11/26/24  0424   WBC 10*3/mm3  --   --  16.07* 12.15* 10.11 13.14* 20.17*   HEMATOCRIT %  --  21.0* 20.3* 23.9* 27.9* 19.2* 21.4*   HEMATOCRIT POC % 28*  --   --   --   --   --   --    PLATELETS 10*3/mm3  --   --  112* 102* 110* 91* 101*      Results from last 7 days   Lab Units 11/28/24  0206 11/27/24  0532 11/26/24  0424 11/25/24  0303 11/24/24  0211   SODIUM mmol/L 139 136 136 138 139   POTASSIUM mmol/L 3.9 4.7 3.8 4.0 4.6   CHLORIDE mmol/L 109* 110* 109* 109* 108*   CO2 mmol/L 23.0 18.6* 19.8* 16.8* 18.1*   BUN mg/dL 16 18 27* 23 20   CREATININE mg/dL 0.51* 0.50* 0.69 0.69 0.63        Imaging     No radiology results for the last day     Current Medications     Scheduled Meds:  acetaminophen, 650 mg, Oral, Once   Or  acetaminophen, 650 mg, Oral, Once   Or  acetaminophen, 650 mg, Rectal, Once  acetaminophen, 1,000 mg, Oral, Q8H  atorvastatin, 40 mg, Oral, Nightly  ceFOXitin, , ,   clopidogrel, 75 mg, Oral, Daily  cyanocobalamin, 1,000 mcg, Intramuscular, Daily  diphenhydrAMINE, 25 mg, Oral, Once   Or  diphenhydrAMINE, 25 mg, Intravenous, Once  ferric gluconate, 250 mg, Intravenous, Daily  folic acid, 1,000 mcg, Oral, Daily  heparin (porcine), , ,   levothyroxine, 50 mcg, Oral, Daily  mesalamine, 4.8 g, Oral,  Daily  methotrexate, 15 mg, Oral, Weekly  methylPREDNISolone sodium succinate, 20 mg, Intravenous, Q8H  metoclopramide, 10 mg, Intravenous, Q6H  metoprolol succinate XL, 25 mg, Oral, Q24H  midodrine, 5 mg, Oral, TID AC  pantoprazole, 40 mg, Oral, Daily  sertraline, 50 mg, Oral, Daily  sodium chloride, 10 mL, Intravenous, Q12H  sodium chloride, 10 mL, Intravenous, Q12H  upadacitinib ER, 45 mg, Oral, Daily  vitamin B-12, 1,000 mcg, Oral, Daily         Continuous Infusions:        DRU Durham   Critical Care  11/28/24   21:16 EST

## 2024-11-29 NOTE — PROGRESS NOTES
Hematology/Oncology Inpatient Progress Note    Patient name: Maryann Gaitan  : 1950  MRN: [id number]  Referring Provider: DRU Escalante  Reason for Consultation: Blood loss anemia and need for antiplatelet therapy, need for outpatient transfusion options    Chief complaint: Abdominal pain, rectal bleeding, shortness of breath    History of present illness:    Maryann Gaitan is a 74 y.o. female who presented to [facility] on 2024 with complaints of abdominal pain, rectal bleeding, shortness of breath.  Past medical history of CAD, COPD, hypertension, hypothyroidism, Crohn's disease.  Was recently admitted to the hospital on 2024 for mild rectal bleeding and was found to have an acute Crohn's exacerbation.  She was discharged on prolonged steroid taper however she states that she still has some abdominal pain and has not felt well since discharge.  She did have a drop in her hemoglobin prior to discharge during her previous hospital stay but her hemoglobin was 8.4 g/dL on the day of discharge.  Over the last few days she has continued to have worsening lower abdominal pain with increased chest pain and shortness of breath and much more rectal bleeding than prior.  Of note she did have PCI with stent placement in 2024 and was placed on dual antiplatelet therapy.  This admission she underwent a transcatheter [id number] repair of her mitral valve with MitraClip placed on 2024.  The patient has continued to have gastrointestinal bleeding throughout the admission.    24  Hematology/Oncology was consulted for anemia in the setting of GI bleeding in a patient requiring antiplatelet therapy.    10/11/24  Hematology/Oncology was consulted for anemia and leukopenia  On consult, the patient reports fatigue, chronic abdominal pain/cramping and non-bloody diarrhea but denies unintentional weight loss, fevers, chills, drenching night sweats, lymphadenopathy, BRBPR,  melena, bruising. She denies having any IV iron or blood transfusions.  Per RN pt has not received budesonide yet.    Anemia workup from last admission:  -10/11/2022 reticulocyte numbers 0.72 (normal) however absolute reticulocyte decreased at 0.0182 suggesting either not enough building blocks to make blood or bone marrow dysfunction  -B12 312 (normal), folate 18.3 (normal), iron studies (iron 25 low, iron saturation 13% low) and ferritin (427 high, but is an acute phase reactant) -appears there could still be some component of iron deficiency involved which is not surprising as she has a disease that could cause on again off again bleeding; will likely have to deal with IV iron as what was seen on EGD means she will be unlikely to tolerate oral iron or be able to absorb it as she will likely be on chronic PPIs  -Flow cytometry WNL     10/17/2024: Ferrlecit 250 mg IV x 1 dose    He/She  has a past medical history of Abnormal weight loss (11/21/2024), Acute kidney injury (11/06/2024), Acute UTI (urinary tract infection) (11/06/2024), Anxiety associated with depression (11/06/2024), Benign neoplasm of cecum (07/05/2016), CAD, multiple vessel (10/08/2024), Cavitary lesion of lung (10/08/2024), COPD (chronic obstructive pulmonary disease), Crohn's disease (11/06/2024), 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 (07/05/2016), Dyslipidemia (10/30/2024), Fecal urgency (11/21/2024), First degree hemorrhoids (07/09/2021), GERD (gastroesophageal reflux disease) (11/21/2024), Hashimoto's thyroiditis (11/21/2024), History of colonic polyps (07/09/2021), Hypertension, Hypothyroidism (acquired) (11/06/2024), Mitral regurgitation (10/08/2024), Moderate protein-calorie malnutrition (11/09/2024), Multiple tracheobronchial mucus plugs (10/08/2024), Nicotine dependence (11/21/2024), Rheumatoid arthritis (11/06/2024), S/P mitral valve clip implantation (11/21/2024), Second degree hemorrhoids (07/05/2016), Stress  incontinence, female (11/21/2024), and Vitamin D deficiency, unspecified (11/21/2024).    PCP: Eduard Little MD    11/24/24: pt seen today for initial evaluation. She has ongoing GI bleeding and is receiving PRBC transfusions. She reported having fatigue. Also complained of having some 'worms In stool' and is being worked up for Stool parasites. Had strongyloides infestation noted on EGD/Colonoscopy 1 month ago and was reportedly treated with ivermectin. She is being followed by GI and Cardiology.    INTERVAL HISTORY:  11/26/24: pt seen today for follow up. She is s/p multiple PRBC unit transfusion since admission but has not needed transfusions since yesterday. Hb stable at this time. She however reported having ongoing GI bleeding, this has improved somewhat. being followed by GI team.    She is having active rectal bleeding she is receiving blood and platelets..  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 07/05/2016    Dyslipidemia 10/30/2024    Fecal urgency 11/21/2024    First degree hemorrhoids 07/09/2021    GERD (gastroesophageal reflux disease) 11/21/2024    Hashimoto's thyroiditis 11/21/2024    History of colonic polyps 07/09/2021    Hypertension     Hypothyroidism (acquired) 11/06/2024    Mitral regurgitation 10/08/2024    Moderate protein-calorie malnutrition 11/09/2024    Multiple tracheobronchial mucus plugs 10/08/2024    Nicotine dependence 11/21/2024    Rheumatoid arthritis 11/06/2024    S/P  mitral valve clip implantation 11/21/2024    Second degree hemorrhoids 07/05/2016    Stress incontinence, female 11/21/2024    Vitamin D deficiency, unspecified 11/21/2024   ,   Past Surgical History:   Procedure Laterality Date    BRONCHOSCOPY N/A 10/16/2024    Procedure: BRONCHOSCOPY;  Surgeon: Gallo Pope MD;  Location: [facility] ENDOSCOPY;  Service: Pulmonary;  Laterality: N/A;    CARDIAC CATHETERIZATION N/A 10/15/2024    Procedure: Left Heart Cath, possible pci;  Surgeon: Travis Connor MD;  Location: [facility] CATH INVASIVE LOCATION;  Service: Cardiovascular;  Laterality: N/A;    CARDIAC CATHETERIZATION N/A 10/22/2024    Procedure: Laser Coronary Atherectomy;  Surgeon: Travis Connor MD;  Location: [facility] CATH INVASIVE LOCATION;  Service: Cardiovascular;  Laterality: N/A;    COLONOSCOPY N/A 10/12/2024    Procedure: COLONOSCOPY WITH BIOPSY AND WIRE GUIDED BALLOON DILATION OF TERMINAL ILEUM;  Surgeon: Rob Strong MD;  Location: [facility] ENDOSCOPY;  Service: Gastroenterology;  Laterality: N/A;  Colitis, crohns of terminal ileum, right colon ulcers, diverticulosis, hemorroids    ENDOSCOPY N/A 10/12/2024    Procedure: ESOPHAGOGASTRODUODENOSCOPY WITH BIOPSY X 2 AREA;  Surgeon: Rob Strong MD;  Location: [facility] ENDOSCOPY;  Service: Gastroenterology;  Laterality: N/A;  Chronic gastritis, HH    HYSTERECTOMY      LEFT HEART CATH     ,   Family History   Problem Relation Age of Onset    Heart disease Mother     Dementia Mother     Stroke Mother     Heart disease Father     Hypertension Father    ,   Social History     Tobacco Use    Smoking status: Former     Types: Cigarettes    Smokeless tobacco: Never   Vaping Use    Vaping status: Never Used   Substance Use Topics    Alcohol use: Never    Drug use: Never   ,   Medications Prior to Admission   Medication Sig Dispense Refill Last Dose/Taking    Adalimumab (Humira, 2 Pen,) 40 MG/0.4ML Pen-injector Kit Inject 40 mg under  the skin into the appropriate area as directed 1 (One) Time Per Week.    Indications: Rheumatoid Arthritis   Past Week    albuterol (PROVENTIL) (2.5 MG/3ML) 0.083% nebulizer solution Take 2.5 mg by nebulization Every 6 (Six) Hours As Needed for Wheezing. Indications: Spasm of Lung Air Passages   Taking As Needed    amLODIPine (NORVASC) 10 MG tablet Take 1 tablet by mouth Daily.   Taking    aspirin 81 MG EC tablet Take 1 tablet by mouth Daily for 30 days. Indications: Disease involving Lipid Deposits in the Arteries 30 tablet 0 Taking    [] atorvastatin (LIPITOR) 40 MG tablet Take 1 tablet by mouth Every Night for 30 days. 30 tablet 0 Taking    cholecalciferol (VITAMIN D3) 1.25 MG (67821 UT) capsule Take 1 capsule by mouth 2 (Two) Times a Week. Wed, Sat  Indications: Vitamin D Deficiency   Past Week    [] clopidogrel (PLAVIX) 75 MG tablet Take 1 tablet by mouth Daily for 30 days. 30 tablet 0 Taking    diclofenac (VOLTAREN) 50 MG EC tablet Take 1 tablet by mouth 2 (Two) Times a Day.   Taking    folic acid (FOLVITE) 1 MG tablet Take 1 tablet by mouth Daily. Indications: Anemia From Inadequate Folic Acid   Taking    levothyroxine (SYNTHROID, LEVOTHROID) 50 MCG tablet Take 1 tablet by mouth Daily. Indications: Underactive Thyroid   Taking    Melatonin (Melatonin Extra Strength) 10 MG tablet Take 1 tablet by mouth As Needed. Indications: Trouble Sleeping   Taking As Needed    methotrexate 2.5 MG tablet Take 6 tablets by mouth 1 (One) Time Per Week.    Indications: Non-oncologic   Past Week    metoprolol tartrate (LOPRESSOR) 50 MG tablet Take 1 tablet by mouth 2 (Two) Times a Day.   Taking    midodrine (PROAMATINE) 10 MG tablet Take 1 tablet by mouth 3 (Three) Times a Day Before Meals for 30 days. 90 tablet 0 Taking    pantoprazole (PROTONIX) 20 MG EC tablet Take 1 tablet by mouth Daily. Indications: Heartburn   Taking    predniSONE (DELTASONE) 10 MG tablet Take 4 tablets by mouth Daily for  7 days, THEN 3.5 tablets Daily for 7 days, THEN 3 tablets Daily for 7 days, THEN 2.5 tablets Daily for 7 days, THEN 2 tablets Daily for 7 days, THEN 1.5 tablets Daily for 7 days, THEN 1 tablet Daily for 7 days, THEN 0.5 tablets Daily for 7 days. Indications: Crohn's Disease 126 tablet 0 Taking    sertraline (ZOLOFT) 50 MG tablet Take 1 tablet by mouth Daily. Indications: Major Depressive Disorder   Taking    spironolactone (ALDACTONE) 25 MG tablet Take 1 tablet by mouth Daily. Indications: Edema   Taking    upadacitinib ER (Rinvoq) 45 MG tablet sustained-release 24 hour extended release tablet Take 1 tablet by mouth Daily. Indications: Rheumatoid Arthritis   Taking   , Scheduled Meds:  acetaminophen, 650 mg, Oral, Once   Or  acetaminophen, 650 mg, Oral, Once   Or  acetaminophen, 650 mg, Rectal, Once  atorvastatin, 40 mg, Oral, Nightly  clopidogrel, 75 mg, Oral, Daily  cyanocobalamin, 1,000 mcg, Intramuscular, Daily  diphenhydrAMINE, 25 mg, Oral, Once   Or  diphenhydrAMINE, 25 mg, Intravenous, Once  folic acid, 1,000 mcg, Oral, Daily  levothyroxine, 50 mcg, Oral, Daily  mesalamine, 4.8 g, Oral, Daily  methotrexate, 15 mg, Oral, Weekly  methylPREDNISolone sodium succinate, 20 mg, Intravenous, Q8H  metoprolol succinate XL, 25 mg, Oral, Q24H  midodrine, 5 mg, Oral, TID AC  pantoprazole, 40 mg, Oral, Daily  sertraline, 50 mg, Oral, Daily  sodium chloride, 10 mL, Intravenous, Q12H  upadacitinib ER, 45 mg, Oral, Daily    , Continuous Infusions:   , PRN Meds:    acetaminophen    senna-docusate sodium **AND** polyethylene glycol **AND** bisacodyl **AND** bisacodyl    diphenhydrAMINE    nitroglycerin    ondansetron ODT **OR** ondansetron    [COMPLETED] Insert Peripheral IV **AND** sodium chloride    sodium chloride    sodium chloride   Allergies:  Codeine and Penicillin g sodium    Subjective     ROS:  Review of Systems     Objective   Vital Signs:   BP 98/54 (BP Location: Left leg, Patient Position: Lying)   Pulse 69    "Temp 97.8 °F (36.6 °C) (Oral)   Resp 16   Ht 162.6 cm (64\")   Wt 54 kg (119 lb)   SpO2 99%   BMI 20.43 kg/m²     Physical Exam: (performed by MD)  Physical Exam    Results Review:  Lab Results (last 48 hours)       Procedure Component Value Units Date/Time    CBC (No Diff) [[id number]]  (Abnormal) Collected: 11/24/24 0241    Specimen: Blood Updated: 11/24/24 0316     WBC 14.90 10*3/mm3      RBC 1.84 10*6/mm3      Hemoglobin 5.5 g/dL      Hematocrit 17.6 %      MCV 95.7 fL      MCH 29.9 pg      MCHC 31.3 g/dL      RDW 18.7 %      RDW-SD 63.2 fl      MPV 9.7 fL      Platelets 197 10*3/mm3     Comprehensive Metabolic Panel [[id number]]  (Abnormal) Collected: 11/24/24 0211    Specimen: Blood Updated: 11/24/24 0251     Glucose 122 mg/dL      BUN 20 mg/dL      Creatinine 0.63 mg/dL      Sodium 139 mmol/L      Potassium 4.6 mmol/L      Chloride 108 mmol/L      CO2 18.1 mmol/L      Calcium 7.8 mg/dL      Total Protein 3.9 g/dL      Albumin 2.5 g/dL      ALT (SGPT) 30 U/L      AST (SGOT) 23 U/L      Alkaline Phosphatase 57 U/L      Total Bilirubin 0.2 mg/dL      Globulin 1.4 gm/dL      A/G Ratio 1.8 g/dL      BUN/Creatinine Ratio 31.7     Anion Gap 12.9 mmol/L      eGFR 93.2 mL/min/1.73     Narrative:      GFR Normal >60  Chronic Kidney Disease <60  Kidney Failure <15    The GFR formula is only valid for adults with stable renal function between ages 18 and 70.    C-reactive Protein [[id number]]  (Normal) Collected: 11/24/24 0211    Specimen: Blood Updated: 11/24/24 0251     C-Reactive Protein 0.40 mg/dL     Ova & Parasite Examination - Stool, Per Rectum [[id number]] Collected: 11/23/24 1401    Specimen: Stool from Per Rectum Updated: 11/23/24 1411    Comprehensive Metabolic Panel [[id number]]  (Abnormal) Collected: 11/23/24 0411    Specimen: Blood from Arm, Right Updated: 11/23/24 0447     Glucose 128 mg/dL      BUN 19 mg/dL      Creatinine 0.55 mg/dL      Sodium 141 mmol/L      Potassium 3.9 mmol/L      Chloride 108 mmol/L "      CO2 22.3 mmol/L      Calcium 8.3 mg/dL      Total Protein 4.5 g/dL      Albumin 2.8 g/dL      ALT (SGPT) 25 U/L      AST (SGOT) 18 U/L      Alkaline Phosphatase 76 U/L      Total Bilirubin 0.2 mg/dL      Globulin 1.7 gm/dL      A/G Ratio 1.6 g/dL      BUN/Creatinine Ratio 34.5     Anion Gap 10.7 mmol/L      eGFR 96.3 mL/min/1.73     Narrative:      GFR Normal >60  Chronic Kidney Disease <60  Kidney Failure <15    The GFR formula is only valid for adults with stable renal function between ages 18 and 70.    C-reactive Protein [[id number]]  (Abnormal) Collected: 11/23/24 0411    Specimen: Blood from Arm, Right Updated: 11/23/24 0447     C-Reactive Protein 1.25 mg/dL     CBC (No Diff) [[id number]]  (Abnormal) Collected: 11/23/24 0411    Specimen: Blood from Arm, Right Updated: 11/23/24 0418     WBC 12.82 10*3/mm3      RBC 2.92 10*6/mm3      Hemoglobin 8.7 g/dL      Hematocrit 27.6 %      MCV 94.5 fL      MCH 29.8 pg      MCHC 31.5 g/dL      RDW 19.0 %      RDW-SD 64.4 fl      MPV 9.5 fL      Platelets 284 10*3/mm3     Hemoglobin & Hematocrit, Blood [[id number]]  (Abnormal) Collected: 11/22/24 2323    Specimen: Blood Updated: 11/22/24 2328     Hemoglobin 8.6 g/dL      Hematocrit 27.3 %     Hemoglobin & Hematocrit, Blood [[id number]]  (Abnormal) Collected: 11/22/24 1758    Specimen: Blood Updated: 11/22/24 1810     Hemoglobin 8.9 g/dL      Hematocrit 27.3 %              Pending Results:     Imaging Reviewed:   XR Chest 1 View    Result Date: 11/25/2024  Impression: No active pulmonary process. Electronically Signed: Jameson Rainey MD  11/25/2024 9:25 AM EST  Workstation ID: [id number]    CT Abdomen Pelvis With Contrast    Result Date: 11/20/2024  Impression: 1.Distal/terminal ileitis in keeping with patient's history of Crohn's disease. No definitive active colonic inflammation identified on CT imaging. Trace free fluid in the pelvis is likely related. 2.Diffuse colonic fatty mural infiltration which can be seen in the  setting of chronic inflammatory bowel disease. 3.Other incidental nonemergent findings detailed above. Electronically Signed: Junior Khan MD  11/20/2024 8:19 AM EST  Workstation ID: [id number]    XR Chest 1 View    Result Date: 11/19/2024  Impression: No acute chest finding. Electronically Signed: Ute Snider MD  11/19/2024 1:34 PM EST  Workstation ID: [id number]          Assessment & Plan     Normocytic anemia  -Hemoglobin 5.5 g/dL, MCV 95.7  -Patient with an acute drop in her hemoglobin overnight from 8.7 g/dL.  Reported experiencing significant gastrointestinal bleeding  -Has received 3 units of PRBC this admission and 2 units ordered today due to hemoglobin of 5.5 g/dL  -Recent PCI with stent in October 2024.  Her aspirin has been held but she has been continued on Plavix  -Received Ferrlecit 250 mg IV x 1 dose during her prior admission, continue IV iron replacement  -Continue IV iron replacement with Ferrlecit 250 mg IV X 3 additional doses.  -On daily folic acid due to methotrexate use  -Check vitamin B12 level, reported low (227), s/p 1 dose of IM B12 supplementation, continue oral B12  -Monitor CBC and recommend transfuse for hemoglobin less than 7.0 g/dL    Hemoglobin today 6.8 receiving blood platelets due to recent Plavix    She had emergency EGD for rectal bleeding 11/28/24    Status post  colon resection 11/28/24    Defer to Cardiology regarding antiplatelet therapy management.    Acute GI bleed  -In the setting of Crohn's disease, possible exacerbation  -Gastroenterology following and patient being treated with IV steroids, Rinvoq  -Recent strongyloides of the stomach and duodenum treated with ivermectin.  Now with reported worms visualized in stools and stool O&P sent, this was however reported negative.  -ID team is following the patient.      Discussed with patient    Counts are stable    Continue post op care        Thank you for this consult. We will be happy to follow along with you.        Electronically signed by Nicolle Mitchell MD, 11/29/24, 12:08 PM EST.

## 2024-11-29 NOTE — PROGRESS NOTES
General Surgery Progress Note    Name: Maryann Gaitan ADMIT: 2024   : 1950  PCP: Eduard Little MD    MRN: [id number] LOS: 10 days   AGE/SEX: 74 y.o. female  ROOM: [address] [place] [address]    Chief Complaint   Patient presents with    Chest Pain     Subjective     Patient seen examined.  Vital signs stable, afebrile.  Appears pleasantly confused this morning.  No complaints of pain at time of exam.  Ostomy with 450 mL of recorded output this morning.    Objective     Scheduled Medications:   acetaminophen, 650 mg, Oral, Once   Or  acetaminophen, 650 mg, Oral, Once   Or  acetaminophen, 650 mg, Rectal, Once  acetaminophen, 1,000 mg, Oral, Q8H  aspirin, 81 mg, Oral, Daily  atorvastatin, 40 mg, Oral, Nightly  clopidogrel, 75 mg, Oral, Daily  cyanocobalamin, 1,000 mcg, Intramuscular, Daily  diphenhydrAMINE, 25 mg, Oral, Once   Or  diphenhydrAMINE, 25 mg, Intravenous, Once  folic acid, 1,000 mcg, Oral, Daily  [START ON 2024] levothyroxine, 50 mcg, Oral, Q AM  mesalamine, 4.8 g, Oral, Daily  methotrexate, 15 mg, Oral, Weekly  methylPREDNISolone sodium succinate, 10 mg, Intravenous, Q8H  metoclopramide, 10 mg, Intravenous, Q6H  metoprolol succinate XL, 25 mg, Oral, Q24H  midodrine, 5 mg, Oral, TID AC  mupirocin, 1 Application, Each Nare, BID  pantoprazole, 40 mg, Oral, Daily  sertraline, 50 mg, Oral, Daily  sodium chloride, 10 mL, Intravenous, Q12H  sodium chloride, 10 mL, Intravenous, Q12H  upadacitinib ER, 45 mg, Oral, Daily  vitamin B-12, 1,000 mcg, Oral, Daily        Active Infusions:       As Needed Medications:    senna-docusate sodium **AND** polyethylene glycol **AND** bisacodyl **AND** bisacodyl    diphenhydrAMINE    HYDROmorphone    nitroglycerin    ondansetron ODT **OR** ondansetron    oxyCODONE    [COMPLETED] Insert Peripheral IV **AND** sodium chloride    sodium chloride    sodium chloride    sodium chloride    sodium chloride    Vital Signs  Vital Signs Patient Vitals for the past 24  hrs:   BP Temp Temp src Pulse Resp SpO2   11/29/24 1400 128/70 -- -- 66 10 99 %   11/29/24 1300 138/66 -- -- 75 12 97 %   11/29/24 1200 141/67 98.2 °F (36.8 °C) Oral 72 12 99 %   11/29/24 1145 -- -- -- 76 10 98 %   11/29/24 1130 -- -- -- 68 12 99 %   11/29/24 1115 -- -- -- 73 -- 98 %   11/29/24 1100 124/60 -- -- 75 16 99 %   11/29/24 1000 132/66 -- -- 75 15 98 %   11/29/24 0900 136/72 -- -- 85 14 99 %   11/29/24 0800 143/64 98.1 °F (36.7 °C) Oral 61 16 99 %   11/29/24 0700 135/65 -- -- 71 -- 98 %   11/29/24 0600 130/65 -- -- 66 -- 99 %   11/29/24 0500 146/76 -- -- 69 -- 98 %   11/29/24 0400 120/53 -- -- 60 -- 99 %   11/29/24 0300 121/59 -- -- 58 -- 98 %   11/29/24 0200 108/60 -- -- 63 -- 98 %   11/29/24 0100 108/50 -- -- 62 -- 100 %   11/29/24 0000 114/53 -- -- 62 -- 100 %   11/28/24 2300 120/56 -- -- 62 -- 99 %   11/28/24 2200 -- -- -- 63 -- 98 %   11/28/24 2100 125/63 -- -- 76 -- 98 %   11/28/24 2000 115/62 -- -- 69 -- 97 %   11/28/24 1937 -- 97.8 °F (36.6 °C) Oral -- 20 --   11/28/24 1902 121/54 97.7 °F (36.5 °C) Oral 71 18 97 %   11/28/24 1847 126/62 -- -- 66 19 97 %   11/28/24 1832 143/62 -- -- 73 24 99 %   11/28/24 1827 137/61 -- -- 72 23 98 %   11/28/24 1822 125/66 -- -- 77 22 98 %   11/28/24 1817 136/61 97.3 °F (36.3 °C) Oral 79 15 96 %   11/28/24 1525 132/58 97.8 °F (36.6 °C) Oral -- 18 97 %     I/O:  I/O last 3 completed shifts:  In: 2438.5 [P.O.:360; I.V.:900; Blood:1178.5]  Out: 1450 [Urine:1450]    Physical Exam:  Physical Exam  Constitutional:       General: She is not in acute distress.  Cardiovascular:      Rate and Rhythm: Normal rate.   Pulmonary:      Effort: Pulmonary effort is normal. No respiratory distress.   Abdominal:      General: There is no distension.      Palpations: Abdomen is soft.      Tenderness: There is abdominal tenderness. There is no guarding.      Comments: Stomas with appliance in place.  Ileostomy with stool in appliance.  No bleeding observed in the mucous fistula    Neurological:      Mental Status: She is alert.      Comments: Pleasantly confused   Psychiatric:         Mood and Affect: Mood normal.         Behavior: Behavior normal.         Results Review:     CBC    Results from last 7 days   Lab Units 11/29/24  1155 11/29/24  0510 11/28/24  2315 11/28/24  1753 11/28/24  1519 11/28/24  0753 11/28/24  0206 11/27/24  1756 11/27/24  0618 11/26/24  0424 11/25/24  0303   WBC 10*3/mm3  --  12.42*  --   --   --  16.07* 12.15* 10.11 13.14* 20.17* 34.52*   HEMOGLOBIN g/dL 10.1* 10.7* 11.1*  --  6.7* 6.8* 7.9* 9.3* 6.4* 7.4* 6.6*   HEMOGLOBIN, POC g/dL  --   --   --  9.5*  --   --   --   --   --   --   --    PLATELETS 10*3/mm3  --  101*  --   --   --  112* 102* 110* 91* 101* 164     BMP   Results from last 7 days   Lab Units 11/29/24  0510 11/28/24  0206 11/27/24  0532 11/26/24  0424 11/25/24  0303 11/24/24  0211 11/23/24  0411   SODIUM mmol/L 136 139 136 136 138 139 141   POTASSIUM mmol/L 4.0 3.9 4.7 3.8 4.0 4.6 3.9   CHLORIDE mmol/L 108* 109* 110* 109* 109* 108* 108*   CO2 mmol/L 20.9* 23.0 18.6* 19.8* 16.8* 18.1* 22.3   BUN mg/dL 20 16 18 27* 23 20 19   CREATININE mg/dL 0.66 0.51* 0.50* 0.69 0.69 0.63 0.55*   GLUCOSE mg/dL 96 95 98 85 172* 122* 128*     Radiology(recent) No radiology results for the last day    I reviewed the patient's new clinical results.    Assessment & Plan       Acute lower GI bleeding    CAD, multiple vessel    Acute heart failure with preserved ejection fraction (HFpEF)    Severe mitral regurgitation    Dyslipidemia    Crohn's disease    Hypothyroidism (acquired)    Anxiety associated with depression    COPD (chronic obstructive pulmonary disease)    Rheumatoid arthritis    Moderate protein-calorie malnutrition    Primary hypertension    S/P mitral valve clip implantation    Gastrointestinal hemorrhage    Anemia      74 y.o. female POD 1 status post right hemicolectomy with ileostomy    Advance diet as tolerated to regular  Antiemetics and pain medication  as needed  Encourage ambulation, out of bed to chair  Will consult PT  Use incentive spirometer bedside 10 times an hour while awake  Continue to monitor stomas for bleeding  Okay to resume aspirin and Plavix          This note was created using Dragon Voice Recognition software.    JENNIFER Morales  11/29/24  15:09 EST

## 2024-11-29 NOTE — PROGRESS NOTES
Critical Care Progress Note     Maryann Gaitan : 1950 MRN:[id number] LOS:10  Rm: 2309/1     Principal Problem: Acute lower GI bleeding     Reason for follow up: All the medical problems listed below    Summary     Maryann Gaitan is a 74 y.o. female with PMH of CAD status post recent stents on dual antiplatelet therapy, COPD, rheumatoid arthritis, Crohn's disease presents to the hospital 2024 with complaints of abdominal pain, rectal bleeding, shortness of breath. Patient had a recent admission on 2024 for mild rectal bleeding and was found to have an acute Crohn's exacerbation.  At that time she was discharged with steroid taper.  Following discharge patient reported little improvement in her symptoms and eventually abdominal pain continued to worsen.  Patient is currently on dual antiplatelet therapy following PCI in 2024.  Heme-onc has been following patient during admission.  Since admission patient has had multiple transfusions of packed red blood cells, however hemoglobin has stayed below 7.  Today patient underwent an EGD with GI which showed no upper GI source.  General surgery was consulted.  Patient was taken to surgery and admitted to ICU following for close monitoring.     Significant Events / Subjective     24 : Hemoglobin stabilized. Patient having ostomy output. Has abdominal pain but feels well.     Assessment / Plan     Acute lower GI bleeding   2/2 Crohn's exacerbation  GI following --> EGD 2024 negative for upper GI source  8 units PRBC since admission  Status post colonoscopy 10/12/2024 --> Crohn's disease of small bowel and colon with complication new sigmoid colon diverticulosis without diverticulitis or bleeding, grade 2 internal hemorrhoids, colon ulcers, terminal ileum stricture, terminal ileum ulcers normal duodenum, nonerosive chronic gastritis  Status post right hemicolectomy with ileostomy and mucous fistula  Hemoglobin stable     Acute blood loss  anemia  Monitor hemoglobin every 12 hours  Heme-onc following  Continue Ferrlecit to 50 mg x 3 doses  Continue folic acid, cyanocobalamin tablet and injection,       CAD  Chronic HFpEF due to valvular heart disease from mitral regurgitation  Status post PCI 10/22/2024  Status post atrial valve clip 11/21/2024  DAP therapy per cardiology     Crohn's disease  Continue Rinvoq mesalamine  GI following     Rheumatoid arthritis with history of significant immunosuppression with Humira and methotrexate       Disposition:  Downgrade out of ICU    Code status:   Level Of Support Discussed With: Patient  Code Status (Patient has no pulse and is not breathing): CPR (Attempt to Resuscitate)  Medical Interventions (Patient has pulse or is breathing): Full Support       Nutrition:   Diet: Liquid; Clear Liquid; Fluid Consistency: Thin (IDDSI 0)   Patient isn't on Tube Feeding     VTE Prophylaxis:  Mechanical VTE prophylaxis orders are present.           Objective / Physical Exam     Vital signs:  Temp: 98.2 °F (36.8 °C)  BP: 128/70  Heart Rate: 66  Resp: 10  SpO2: 99 %  Weight: 60.7 kg (133 lb 13.1 oz)    Admission Weight: Weight: 45.4 kg (100 lb)  Current Weight: Weight: 60.7 kg (133 lb 13.1 oz)    Input/Output in last 24 hours:    Intake/Output Summary (Last 24 hours) at 11/29/2024 1459  Last data filed at 11/29/2024 1400  Gross per 24 hour   Intake 1861 ml   Output 1200 ml   Net 661 ml      Net IO Since Admission: 9,724.75 mL [11/29/24 1459]     Physical Exam  Vitals and nursing note reviewed.   Constitutional:       Appearance: Normal appearance.   HENT:      Head: Normocephalic and atraumatic.      Mouth/Throat:      Mouth: Mucous membranes are moist.   Eyes:      Conjunctiva/sclera: Conjunctivae normal.   Cardiovascular:      Rate and Rhythm: Normal rate and regular rhythm.      Pulses: Normal pulses.   Pulmonary:      Effort: Pulmonary effort is normal. No respiratory distress.   Abdominal:      Tenderness: There is  abdominal tenderness. There is no guarding or rebound.      Comments: Exlap site with dressing on CDI.  2 ostomies with serous fluid.    Musculoskeletal:         General: Normal range of motion.   Skin:     General: Skin is warm.      Capillary Refill: Capillary refill takes less than 2 seconds.   Neurological:      General: No focal deficit present.      Mental Status: She is alert.   Psychiatric:         Mood and Affect: Mood normal.          Radiology and Labs     Results from last 7 days   Lab Units 11/29/24  1155 11/29/24  0510 11/28/24  2315 11/28/24  1753 11/28/24  1519 11/28/24  0753 11/28/24  0206 11/27/24  1756 11/27/24  0618   WBC 10*3/mm3  --  12.42*  --   --   --  16.07* 12.15* 10.11 13.14*   HEMOGLOBIN g/dL 10.1* 10.7* 11.1*  --  6.7* 6.8* 7.9* 9.3* 6.4*   HEMOGLOBIN, POC g/dL  --   --   --  9.5*  --   --   --   --   --    HEMATOCRIT % 31.1* 32.6* 34.1  --  21.0* 20.3* 23.9* 27.9* 19.2*   HEMATOCRIT POC %  --   --   --  28*  --   --   --   --   --    PLATELETS 10*3/mm3  --  101*  --   --   --  112* 102* 110* 91*           Results from last 7 days   Lab Units 11/29/24  0510 11/28/24  0206 11/27/24  0532 11/26/24  0424 11/25/24  0303   SODIUM mmol/L 136 139 136 136 138   POTASSIUM mmol/L 4.0 3.9 4.7 3.8 4.0   CHLORIDE mmol/L 108* 109* 110* 109* 109*   CO2 mmol/L 20.9* 23.0 18.6* 19.8* 16.8*   BUN mg/dL 20 16 18 27* 23   CREATININE mg/dL 0.66 0.51* 0.50* 0.69 0.69   GLUCOSE mg/dL 96 95 98 85 172*      Results from last 7 days   Lab Units 11/29/24  0510 11/28/24  0206 11/27/24  0532 11/26/24  0424 11/25/24  0303   ALK PHOS U/L 64 51 48 40 43   AST (SGOT) U/L 21 30 21 14 18   ALT (SGPT) U/L 31 38* 21 19 24     Results from last 7 days   Lab Units 11/28/24  1753   PH, ARTERIAL pH units 7.290*   PCO2, ARTERIAL mm Hg 43.2   PO2 ART mm Hg 362.0*   O2 SATURATION ART % 100.0*   HCO3 ART mmol/L 20.7*   BASE EXCESS ART mmol/L <0.0*       No radiology results for the last day    Current medications     Scheduled  Meds:   acetaminophen, 650 mg, Oral, Once   Or  acetaminophen, 650 mg, Oral, Once   Or  acetaminophen, 650 mg, Rectal, Once  acetaminophen, 1,000 mg, Oral, Q8H  aspirin, 81 mg, Oral, Daily  atorvastatin, 40 mg, Oral, Nightly  clopidogrel, 75 mg, Oral, Daily  cyanocobalamin, 1,000 mcg, Intramuscular, Daily  diphenhydrAMINE, 25 mg, Oral, Once   Or  diphenhydrAMINE, 25 mg, Intravenous, Once  folic acid, 1,000 mcg, Oral, Daily  [START ON 11/30/2024] levothyroxine, 50 mcg, Oral, Q AM  mesalamine, 4.8 g, Oral, Daily  methotrexate, 15 mg, Oral, Weekly  methylPREDNISolone sodium succinate, 10 mg, Intravenous, Q8H  metoclopramide, 10 mg, Intravenous, Q6H  metoprolol succinate XL, 25 mg, Oral, Q24H  midodrine, 5 mg, Oral, TID AC  mupirocin, 1 Application, Each Nare, BID  pantoprazole, 40 mg, Oral, Daily  sertraline, 50 mg, Oral, Daily  sodium chloride, 10 mL, Intravenous, Q12H  sodium chloride, 10 mL, Intravenous, Q12H  upadacitinib ER, 45 mg, Oral, Daily  vitamin B-12, 1,000 mcg, Oral, Daily        Continuous Infusions:          Plan discussed with RN. Reviewed all other data in the last 24 hours, including but not limited to vitals, labs, microbiology, imaging and pertinent notes from other providers.  Plan also discussed with patient and her family at the bedside.      Ramana Musa MD   Critical Care  11/29/24   14:59 EST

## 2024-11-29 NOTE — SIGNIFICANT NOTE
11/29/24 1600   OTHER   Discipline occupational therapist   Rehab Time/Intention   Session Not Performed other (see comments)  (Pt getting transfered from ICU to PCU at this time, OT will follow-up as appropriate for re-evaluation)   Therapy Assessment/Plan (PT)   Criteria for Skilled Interventions Met (PT) yes;skilled treatment is necessary   Recommendation   OT - Next Appointment 12/01/24

## 2024-11-29 NOTE — CASE MANAGEMENT/SOCIAL WORK
Continued Stay Note  ERIC Yeung     Patient Name: Maryann Gaitan  MRN: [id number]  Today's Date: 11/29/2024    Admit Date: 11/19/2024    Plan: Return home w [facility] (current, need order)   Discharge Plan       Row Name 11/29/24 0940       Plan    Patient/Family in Agreement with Plan yes    Plan Comments D/C Barriers: Heme/Onc, Colorectal Sx, ID. IV steroids, A-line               Expected Discharge Date and Time       Expected Discharge Date Expected Discharge Time    Nov 29, 2024         Mariah Welch RN    phone [phone]  fax [phone]

## 2024-11-29 NOTE — PROGRESS NOTES
Nonbillable transfer care progress note:        General Appearance:  Alert, cooperative, no distress, appears stated age  Head:  Normocephalic, without obvious abnormality, atraumatic  Eyes:  PERRL, conjunctiva/corneas clear, EOM's intact, fundi benign, both eyes  Ears:  Normal TM's and external ear canals, both ears  Nose: Nares normal, septum midline, mucosa normal, no drainage or sinus tenderness  Throat: Lips, mucosa, and tongue normal; teeth and gums normal  Neck: Supple, symmetrical, trachea midline, no adenopathy, thyroid: not enlarged, symmetric, no tenderness/mass/nodules, no carotid bruit or JVD  Lungs:   Clear to auscultation bilaterally, respirations unlabored  Heart:  Regular rate and rhythm, S1, S2 normal, no murmur, rub or gallop  Abdomen:  Soft, non-tender, bowel sounds active all four quadrants,  no masses, no organomegaly  Extremities: Extremities normal, atraumatic, no cyanosis or edema  Pulses: 2+ and symmetric  Skin: Skin color, texture, turgor normal, no rashes or lesions  Neurologic: Normal      Patient initially presented with lower GI bleeding related to Crohn's exacerbation.  She was evaluated by general surgery and underwent partial colectomy on 11/28 due to having recurrent exacerbations of her Crohn's with recurrent GI bleeding.  She has received a total of 10 units PRBC during her hospital stay.  She was advanced to a regular diet today and is tolerating this well.  PT/OT evaluation pending postoperatively.    El Childress MD  [facility] Hospitalist Team  11/29/24  17:48 EST

## 2024-11-29 NOTE — CASE MANAGEMENT/SOCIAL WORK
Continued Stay Note  ERIC Yeung     Patient Name: Maryann Gaitan  MRN: [id number]  Today's Date: 11/29/2024    Admit Date: 11/19/2024    Plan: Return home w [facility] (current, need order)   Discharge Plan       Row Name 11/29/24 0940       Plan    Patient/Family in Agreement with Plan yes    Plan Comments D/C Barriers: Heme/Onc, Colorectal Sx, ID. IV steroids, A-line               Expected Discharge Date and Time       Expected Discharge Date Expected Discharge Time    Nov 29, 2024           Mariah Welch RN    phone [phone]  fax [phone]

## 2024-11-30 ENCOUNTER — APPOINTMENT (OUTPATIENT)
Dept: CT IMAGING | Facility: HOSPITAL | Age: 74
DRG: 329 | End: 2024-11-30
Payer: MEDICARE

## 2024-11-30 LAB
BACTERIA SPEC AEROBE CULT: NORMAL
BASOPHILS # BLD AUTO: 0.01 10*3/MM3 (ref 0–0.2)
BASOPHILS NFR BLD AUTO: 0.1 % (ref 0–1.5)
BH BB BLOOD EXPIRATION DATE: NORMAL
BH BB BLOOD TYPE BARCODE: 5100
BH BB BLOOD TYPE BARCODE: 5100
BH BB BLOOD TYPE BARCODE: 6200
BH BB DISPENSE STATUS: NORMAL
BH BB PRODUCT CODE: NORMAL
BH BB UNIT NUMBER: NORMAL
CROSSMATCH INTERPRETATION: NORMAL
CROSSMATCH INTERPRETATION: NORMAL
CRP SERPL-MCNC: 10.3 MG/DL (ref 0–0.5)
DEPRECATED RDW RBC AUTO: 49.8 FL (ref 37–54)
EOSINOPHIL # BLD AUTO: 0 10*3/MM3 (ref 0–0.4)
EOSINOPHIL NFR BLD AUTO: 0 % (ref 0.3–6.2)
ERYTHROCYTE [DISTWIDTH] IN BLOOD BY AUTOMATED COUNT: 15.7 % (ref 12.3–15.4)
HCT VFR BLD AUTO: 28.1 % (ref 34–46.6)
HGB BLD-MCNC: 9.3 G/DL (ref 12–15.9)
IMM GRANULOCYTES # BLD AUTO: 0.14 10*3/MM3 (ref 0–0.05)
IMM GRANULOCYTES NFR BLD AUTO: 0.9 % (ref 0–0.5)
LYMPHOCYTES # BLD AUTO: 1.63 10*3/MM3 (ref 0.7–3.1)
LYMPHOCYTES NFR BLD AUTO: 10.4 % (ref 19.6–45.3)
MCH RBC QN AUTO: 29.2 PG (ref 26.6–33)
MCHC RBC AUTO-ENTMCNC: 33.1 G/DL (ref 31.5–35.7)
MCV RBC AUTO: 88.1 FL (ref 79–97)
MONOCYTES # BLD AUTO: 0.49 10*3/MM3 (ref 0.1–0.9)
MONOCYTES NFR BLD AUTO: 3.1 % (ref 5–12)
NEUTROPHILS NFR BLD AUTO: 13.44 10*3/MM3 (ref 1.7–7)
NEUTROPHILS NFR BLD AUTO: 85.5 % (ref 42.7–76)
NRBC BLD AUTO-RTO: 0.1 /100 WBC (ref 0–0.2)
PLATELET # BLD AUTO: 88 10*3/MM3 (ref 140–450)
PMV BLD AUTO: 9.9 FL (ref 6–12)
RBC # BLD AUTO: 3.19 10*6/MM3 (ref 3.77–5.28)
UNIT  ABO: NORMAL
UNIT  RH: NORMAL
WBC NRBC COR # BLD AUTO: 15.71 10*3/MM3 (ref 3.4–10.8)

## 2024-11-30 PROCEDURE — 99232 SBSQ HOSP IP/OBS MODERATE 35: CPT | Performed by: INTERNAL MEDICINE

## 2024-11-30 PROCEDURE — 63710000001 PREDNISONE PER 1 MG: Performed by: INTERNAL MEDICINE

## 2024-11-30 PROCEDURE — 99233 SBSQ HOSP IP/OBS HIGH 50: CPT | Performed by: INTERNAL MEDICINE

## 2024-11-30 PROCEDURE — 97530 THERAPEUTIC ACTIVITIES: CPT

## 2024-11-30 PROCEDURE — 63710000001 METHOTREXATE 2.5 MG TABLET: Performed by: STUDENT IN AN ORGANIZED HEALTH CARE EDUCATION/TRAINING PROGRAM

## 2024-11-30 PROCEDURE — 86140 C-REACTIVE PROTEIN: CPT | Performed by: STUDENT IN AN ORGANIZED HEALTH CARE EDUCATION/TRAINING PROGRAM

## 2024-11-30 PROCEDURE — 85025 COMPLETE CBC W/AUTO DIFF WBC: CPT | Performed by: STUDENT IN AN ORGANIZED HEALTH CARE EDUCATION/TRAINING PROGRAM

## 2024-11-30 PROCEDURE — 25010000002 METOCLOPRAMIDE PER 10 MG: Performed by: STUDENT IN AN ORGANIZED HEALTH CARE EDUCATION/TRAINING PROGRAM

## 2024-11-30 PROCEDURE — 97164 PT RE-EVAL EST PLAN CARE: CPT

## 2024-11-30 PROCEDURE — 25010000002 CYANOCOBALAMIN PER 1000 MCG: Performed by: STUDENT IN AN ORGANIZED HEALTH CARE EDUCATION/TRAINING PROGRAM

## 2024-11-30 RX ADMIN — Medication 10 ML: at 22:30

## 2024-11-30 RX ADMIN — METOCLOPRAMIDE HYDROCHLORIDE 10 MG: 5 INJECTION INTRAMUSCULAR; INTRAVENOUS at 17:19

## 2024-11-30 RX ADMIN — ACETAMINOPHEN 1000 MG: 500 TABLET, FILM COATED ORAL at 05:13

## 2024-11-30 RX ADMIN — ATORVASTATIN CALCIUM 40 MG: 40 TABLET, FILM COATED ORAL at 22:14

## 2024-11-30 RX ADMIN — FOLIC ACID 1000 MCG: 1 TABLET ORAL at 08:23

## 2024-11-30 RX ADMIN — METOCLOPRAMIDE HYDROCHLORIDE 10 MG: 5 INJECTION INTRAMUSCULAR; INTRAVENOUS at 10:34

## 2024-11-30 RX ADMIN — MESALAMINE 4.8 G: 800 TABLET, DELAYED RELEASE ORAL at 08:23

## 2024-11-30 RX ADMIN — MIDODRINE HYDROCHLORIDE 5 MG: 5 TABLET ORAL at 10:37

## 2024-11-30 RX ADMIN — CLOPIDOGREL BISULFATE 75 MG: 75 TABLET ORAL at 08:23

## 2024-11-30 RX ADMIN — ACETAMINOPHEN 1000 MG: 500 TABLET, FILM COATED ORAL at 22:14

## 2024-11-30 RX ADMIN — SERTRALINE HYDROCHLORIDE 50 MG: 50 TABLET ORAL at 08:23

## 2024-11-30 RX ADMIN — CYANOCOBALAMIN 1000 MCG: 1000 INJECTION, SOLUTION INTRAMUSCULAR; SUBCUTANEOUS at 08:24

## 2024-11-30 RX ADMIN — Medication 10 ML: at 08:35

## 2024-11-30 RX ADMIN — METOCLOPRAMIDE HYDROCHLORIDE 10 MG: 5 INJECTION INTRAMUSCULAR; INTRAVENOUS at 05:13

## 2024-11-30 RX ADMIN — MUPIROCIN 1 APPLICATION: 20 OINTMENT TOPICAL at 10:34

## 2024-11-30 RX ADMIN — METOPROLOL SUCCINATE 25 MG: 25 TABLET, FILM COATED, EXTENDED RELEASE ORAL at 08:23

## 2024-11-30 RX ADMIN — METHOTREXATE 15 MG: 2.5 TABLET ORAL at 11:49

## 2024-11-30 RX ADMIN — CYANOCOBALAMIN TAB 500 MCG 1000 MCG: 500 TAB at 08:23

## 2024-11-30 RX ADMIN — MIDODRINE HYDROCHLORIDE 5 MG: 5 TABLET ORAL at 17:19

## 2024-11-30 RX ADMIN — ACETAMINOPHEN 1000 MG: 500 TABLET, FILM COATED ORAL at 12:09

## 2024-11-30 RX ADMIN — MIDODRINE HYDROCHLORIDE 5 MG: 5 TABLET ORAL at 08:23

## 2024-11-30 RX ADMIN — MUPIROCIN 1 APPLICATION: 20 OINTMENT TOPICAL at 22:30

## 2024-11-30 RX ADMIN — PREDNISONE 40 MG: 20 TABLET ORAL at 08:23

## 2024-11-30 RX ADMIN — LEVOTHYROXINE SODIUM 50 MCG: 0.05 TABLET ORAL at 05:13

## 2024-11-30 RX ADMIN — METOCLOPRAMIDE HYDROCHLORIDE 10 MG: 5 INJECTION INTRAMUSCULAR; INTRAVENOUS at 22:31

## 2024-11-30 RX ADMIN — PANTOPRAZOLE SODIUM 40 MG: 40 TABLET, DELAYED RELEASE ORAL at 08:23

## 2024-11-30 RX ADMIN — ASPIRIN 81 MG: 81 TABLET, COATED ORAL at 08:23

## 2024-11-30 RX ADMIN — Medication 10 ML: at 08:24

## 2024-11-30 NOTE — PROGRESS NOTES
[facility] MEDICINE SERVICE  DAILY PROGRESS NOTE    NAME: Maryann Gaitan  : 1950  MRN: [id number]      LOS: 11 days     PROVIDER OF SERVICE: Colt Franklin MD    Chief Complaint: Acute lower GI bleeding    Subjective:     Interval History:  History taken from: patient            Review of Systems:   Review of Systems   Constitutional:  Negative for chills and fever.   Respiratory:  Negative for shortness of breath.    Cardiovascular:  Negative for chest pain.   Gastrointestinal:  Positive for abdominal pain.       Objective:     Vital Signs  Temp:  [97.6 °F (36.4 °C)-99.1 °F (37.3 °C)] 97.6 °F (36.4 °C)  Heart Rate:  [62-75] 71  Resp:  [10-24] 22  BP: (120-158)/(51-77) 120/58  Flow (L/min) (Oxygen Therapy):  [2] 2   Body mass index is 23.03 kg/m².    Physical Exam  Physical Exam  Constitutional:       General: She is not in acute distress.  Cardiovascular:      Rate and Rhythm: Normal rate.      Heart sounds: No murmur heard.  Pulmonary:      Effort: Pulmonary effort is normal. No respiratory distress.   Abdominal:      General: Bowel sounds are normal.      Palpations: Abdomen is soft.   Neurological:      Mental Status: She is alert.            Diagnostic Data    Results from last 7 days   Lab Units 24  0115 24  1155 24  0510   WBC 10*3/mm3 15.71*  --  12.42*   HEMOGLOBIN g/dL 9.3*   < > 10.7*   HEMATOCRIT % 28.1*   < > 32.6*   PLATELETS 10*3/mm3 88*  --  101*   GLUCOSE mg/dL  --   --  96   CREATININE mg/dL  --   --  0.66   BUN mg/dL  --   --  20   SODIUM mmol/L  --   --  136   POTASSIUM mmol/L  --   --  4.0   AST (SGOT) U/L  --   --  21   ALT (SGPT) U/L  --   --  31   ALK PHOS U/L  --   --  64   BILIRUBIN mg/dL  --   --  0.5   ANION GAP mmol/L  --   --  7.1    < > = values in this interval not displayed.       No radiology results for the last day      I reviewed the patient's new clinical results.    Assessment/Plan:     Active and Resolved Problems  Active Hospital  Problems    Diagnosis  POA    **Acute lower GI bleeding [K92.2]  Yes    S/P mitral valve clip implantation [Z98.890, Z95.818]  Not Applicable    Primary hypertension [I10]  Yes    Gastrointestinal hemorrhage [K92.2]  Unknown    Anemia [D64.9]  Unknown    Moderate protein-calorie malnutrition [E44.0]  Yes    Hypothyroidism (acquired) [E03.9]  Yes    COPD (chronic obstructive pulmonary disease) [J44.9]  Yes    Rheumatoid arthritis [M06.9]  Yes    Crohn's disease [K50.90]  Yes    Anxiety associated with depression [F41.8]  Yes    Dyslipidemia [E78.5]  Yes    Severe mitral regurgitation [I34.0]  Yes    Acute heart failure with preserved ejection fraction (HFpEF) [I50.31]  Yes    CAD, multiple vessel [I25.10]  Yes      Resolved Hospital Problems   No resolved problems to display.       Diagnoses  Recurrent exacerbations of Crohn's disease status post right hemicolectomy with ileostomy on 11/28  Acute lower GI bleed  Acute blood loss anemia  CAD status post recent ostial/proximal LAD PCI on 10/22/2024  Chronic HFpEF  Valvular heart disease from mitral regurgitation status post mitral valve clip implantation on 11/21/2024  Rheumatoid arthritis with history of significant immunosuppression with Humira and methotrexate  COPD  Dyslipidemia  Hypertension    //PLAN  In reference to recurrent exacerbations of Crohn disease status post right hemicolectomy with ileostomy, surgery following appreciate recs.  Antiemetics and pain control, monitor for bleeding/trend hemoglobin, encourage ambulation/PT.    In reference to acute blood loss anemia/GI bleed, will follow and trend hemoglobin per surgery recs. Currently on DAPT due to recent LAD stent, okay per surgery recs.  Hemoglobin stable today.    In reference to CAD status post recent LAD PCI > Cardiology following, appreciate recs.  Patient on DAPT    VTE Prophylaxis:  Mechanical VTE prophylaxis orders are present.         Disposition Planning:     Barriers to Discharge: Not  medically cleared  Place of Discharge: Pending      Time: 30 minutes     Code Status and Medical Interventions: CPR (Attempt to Resuscitate); Full Support   Ordered at: 11/21/24 1014     Level Of Support Discussed With:    Patient     Code Status (Patient has no pulse and is not breathing):    CPR (Attempt to Resuscitate)     Medical Interventions (Patient has pulse or is breathing):    Full Support       Signature: Electronically signed by Colt Franklin MD, 11/30/24, 14:50 EST.  [facility] Hospitalist Team

## 2024-11-30 NOTE — PLAN OF CARE
Goal Outcome Evaluation:  Plan of Care Reviewed With: patient           Outcome Evaluation: Completed re-eval as pt is now s/p R hemicolectomy with ileostomy on 11/28.  Pt is supine in bed, has not been OOB per nsg report.  Pt is awake and alert to self, confused to time and situation.  Pt required mod A for bed mobility, attempted to stand x 2 with walker and mod A with inability to stand fully upright, required mod/max A for squat pivot bed to chair.  Pt is weaker since her surgery and has overall functional decline from initial eval.  Will follow pt 5x/week and recommend SNF for rehab at d/c.    Anticipated Discharge Disposition (PT): skilled nursing facility

## 2024-11-30 NOTE — PLAN OF CARE
Goal Outcome Evaluation:   Pt transferred from ICU. VSS, A&Ox4. Red, blanchable pressure injury with opening on coccyx, mepilex applied. RLQ Ileostomy dressing changed due to leakage upon arrival. Abdominal incision dressing changed due to copious amounts of drainage. Surgeon notified. Pt resting between care. Pt able to voice concerns. Call light within reach.

## 2024-11-30 NOTE — PROGRESS NOTES
General Surgery Progress Note    Name: Maryann Gaitan ADMIT: 2024   : 1950  PCP: Eduard Little MD    MRN: [id number] LOS: 11 days   AGE/SEX: 74 y.o. female  ROOM:    [facility]    Chief Complaint   Patient presents with    Chest Pain     Subjective     Patient seen examined.  Vital signs stable, afebrile.  Appears pleasantly confused this morning.  Having minimal pain at time of exam.  Ostomy with 950 mL of output x 24 hours.    Objective     Scheduled Medications:   acetaminophen, 1,000 mg, Oral, Q8H  aspirin, 81 mg, Oral, Daily  atorvastatin, 40 mg, Oral, Nightly  clopidogrel, 75 mg, Oral, Daily  cyanocobalamin, 1,000 mcg, Intramuscular, Daily  folic acid, 1,000 mcg, Oral, Daily  levothyroxine, 50 mcg, Oral, Q AM  mesalamine, 4.8 g, Oral, Daily  methotrexate, 15 mg, Oral, Weekly  metoclopramide, 10 mg, Intravenous, Q6H  metoprolol succinate XL, 25 mg, Oral, Q24H  midodrine, 5 mg, Oral, TID AC  mupirocin, 1 Application, Each Nare, BID  pantoprazole, 40 mg, Oral, Daily  predniSONE, 40 mg, Oral, Daily With Breakfast  sertraline, 50 mg, Oral, Daily  sodium chloride, 10 mL, Intravenous, Q12H  sodium chloride, 10 mL, Intravenous, Q12H  upadacitinib ER, 45 mg, Oral, Daily  vitamin B-12, 1,000 mcg, Oral, Daily        Active Infusions:       As Needed Medications:    senna-docusate sodium **AND** polyethylene glycol **AND** bisacodyl **AND** bisacodyl    diphenhydrAMINE    HYDROmorphone    nitroglycerin    ondansetron ODT **OR** ondansetron    oxyCODONE    [COMPLETED] Insert Peripheral IV **AND** sodium chloride    sodium chloride    sodium chloride    sodium chloride    sodium chloride    Vital Signs  Vital Signs Patient Vitals for the past 24 hrs:   BP Temp Temp src Pulse Resp SpO2 Height Weight   24 0911 122/67 97.6 °F (36.4 °C) Oral 72 22 100 % -- --   24 0612 -- -- -- 62 -- 97 % -- 60.9 kg (134 lb 4.2 oz)   24 0604 124/63 97.7 °F (36.5 °C) Oral 72 18 98 % -- --   24  "0209 124/51 98.1 °F (36.7 °C) Oral 63 22 97 % -- --   11/29/24 2138 158/77 99.1 °F (37.3 °C) Oral 75 24 96 % -- --   11/29/24 1742 132/65 -- -- 73 -- -- -- --   11/29/24 1616 137/69 98.1 °F (36.7 °C) Oral 72 24 98 % 162.6 cm (64.02\") 60.8 kg (134 lb 0.6 oz)   11/29/24 1515 -- -- -- 71 10 100 % -- --   11/29/24 1500 138/70 -- -- 75 12 98 % -- --   11/29/24 1400 128/70 -- -- 66 10 99 % -- --   11/29/24 1300 138/66 -- -- 75 12 97 % -- --   11/29/24 1200 141/67 98.2 °F (36.8 °C) Oral 72 12 99 % -- --   11/29/24 1145 -- -- -- 76 10 98 % -- --   11/29/24 1130 -- -- -- 68 12 99 % -- --   11/29/24 1115 -- -- -- 73 -- 98 % -- --   11/29/24 1100 124/60 -- -- 75 16 99 % -- --     I/O:  I/O last 3 completed shifts:  In: 740 [P.O.:740]  Out: 1500 [Urine:550; Stool:950]    Physical Exam:  Physical Exam  Constitutional:       General: She is not in acute distress.  Cardiovascular:      Rate and Rhythm: Normal rate.   Pulmonary:      Effort: Pulmonary effort is normal. No respiratory distress.   Abdominal:      General: There is no distension.      Palpations: Abdomen is soft.      Tenderness: There is abdominal tenderness. There is no guarding.      Comments: Stomas with appliance in place.  Ileostomy with stool in appliance.  Scant amount of blood noted in mucous fistula   Neurological:      Mental Status: She is alert. Mental status is at baseline.      Comments: Pleasantly confused   Psychiatric:         Mood and Affect: Mood normal.         Behavior: Behavior normal.         Results Review:     CBC    Results from last 7 days   Lab Units 11/30/24  0115 11/29/24  2235 11/29/24  1749 11/29/24  1155 11/29/24  0510 11/28/24  2315 11/28/24  1753 11/28/24  1519 11/28/24  0753 11/28/24  0206 11/27/24  1756 11/27/24  0618 11/26/24  0424   WBC 10*3/mm3 15.71*  --   --   --  12.42*  --   --   --  16.07* 12.15* 10.11 13.14* 20.17*   HEMOGLOBIN g/dL 9.3* 10.1* 10.5* 10.1* 10.7* 11.1*  --    < > 6.8* 7.9* 9.3* 6.4* 7.4*   HEMOGLOBIN, POC " g/dL  --   --   --   --   --   --  9.5*  --   --   --   --   --   --    PLATELETS 10*3/mm3 88*  --   --   --  101*  --   --   --  112* 102* 110* 91* 101*    < > = values in this interval not displayed.     BMP   Results from last 7 days   Lab Units 11/29/24  0510 11/28/24  0206 11/27/24  0532 11/26/24  0424 11/25/24  0303 11/24/24  0211   SODIUM mmol/L 136 139 136 136 138 139   POTASSIUM mmol/L 4.0 3.9 4.7 3.8 4.0 4.6   CHLORIDE mmol/L 108* 109* 110* 109* 109* 108*   CO2 mmol/L 20.9* 23.0 18.6* 19.8* 16.8* 18.1*   BUN mg/dL 20 16 18 27* 23 20   CREATININE mg/dL 0.66 0.51* 0.50* 0.69 0.69 0.63   GLUCOSE mg/dL 96 95 98 85 172* 122*     Radiology(recent) No radiology results for the last day    I reviewed the patient's new clinical results.    Assessment & Plan       Acute lower GI bleeding    CAD, multiple vessel    Acute heart failure with preserved ejection fraction (HFpEF)    Severe mitral regurgitation    Dyslipidemia    Crohn's disease    Hypothyroidism (acquired)    Anxiety associated with depression    COPD (chronic obstructive pulmonary disease)    Rheumatoid arthritis    Moderate protein-calorie malnutrition    Primary hypertension    S/P mitral valve clip implantation    Gastrointestinal hemorrhage    Anemia      74 y.o. female POD 2 status post right hemicolectomy with ileostomy    Will advance diet to high-fiber  Antiemetics and pain medication as needed  Encourage ambulation, out of bed to chair  PT consulted  Use incentive spirometer bedside 10 times an hour while awake  Continue to monitor stomas for bleeding  Trend hemoglobin          This note was created using Dragon Voice Recognition software.    JENNIFER Morales  11/30/24  10:48 EST

## 2024-11-30 NOTE — THERAPY RE-EVALUATION
Patient Name: Maryann Gaitan  : 1950    MRN: [id number]                              Today's Date: 2024       Admit Date: 2024    Visit Dx:     ICD-10-CM ICD-9-CM   1. Gastrointestinal hemorrhage, unspecified gastrointestinal hemorrhage type  K92.2 578.9   2. Anemia, unspecified type  D64.9 285.9   3. Chest pain, unspecified type  R07.9 786.50   4. Severe mitral regurgitation  I34.0 424.0   5. Chronic heart failure with preserved ejection fraction (HFpEF)  I50.32 428.9   6. S/P mitral valve clip implantation  Z98.890 V45.89    Z95.818 V45.09   7. Crohn disease  K50.90 555.9     Patient Active Problem List   Diagnosis    Mitral regurgitation    Cavitary lesion of lung    CAD, multiple vessel    Acute heart failure with preserved ejection fraction (HFpEF)    Severe mitral regurgitation    Dyslipidemia    Crohn's disease    Hypothyroidism (acquired)    Anxiety associated with depression    COPD (chronic obstructive pulmonary disease)    Rheumatoid arthritis    Moderate protein-calorie malnutrition    Acute lower GI bleeding    First degree hemorrhoids    Benign neoplasm of cecum    GERD (gastroesophageal reflux disease)    Hashimoto's thyroiditis    History of colonic polyps    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    Fecal urgency    Second degree hemorrhoids    Vitamin D deficiency, unspecified    Stress incontinence, female    Aphthae, oral    Hx of seborrheic keratosis    Primary hypertension    S/P mitral valve clip implantation    Gastrointestinal hemorrhage    Anemia     Past Medical History:   Diagnosis Date    Abnormal weight loss 2024    Acute kidney injury 2024    Acute UTI (urinary tract infection) 2024    Anxiety associated with depression 2024    Benign neoplasm of cecum 2016    CAD, multiple vessel 10/08/2024    Cavitary lesion of lung 10/08/2024    COPD (chronic obstructive pulmonary disease)     Crohn's disease 2024     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 07/05/2016    Dyslipidemia 10/30/2024    Fecal urgency 11/21/2024    First degree hemorrhoids 07/09/2021    GERD (gastroesophageal reflux disease) 11/21/2024    Hashimoto's thyroiditis 11/21/2024    History of colonic polyps 07/09/2021    Hypertension     Hypothyroidism (acquired) 11/06/2024    Mitral regurgitation 10/08/2024    Moderate protein-calorie malnutrition 11/09/2024    Multiple tracheobronchial mucus plugs 10/08/2024    Nicotine dependence 11/21/2024    Rheumatoid arthritis 11/06/2024    S/P mitral valve clip implantation 11/21/2024    Second degree hemorrhoids 07/05/2016    Stress incontinence, female 11/21/2024    Vitamin D deficiency, unspecified 11/21/2024     Past Surgical History:   Procedure Laterality Date    BRONCHOSCOPY N/A 10/16/2024    Procedure: BRONCHOSCOPY;  Surgeon: Gallo Pope MD;  Location: [facility] ENDOSCOPY;  Service: Pulmonary;  Laterality: N/A;    CARDIAC CATHETERIZATION N/A 10/15/2024    Procedure: Left Heart Cath, possible pci;  Surgeon: Travis Connor MD;  Location: [facility] CATH INVASIVE LOCATION;  Service: Cardiovascular;  Laterality: N/A;    CARDIAC CATHETERIZATION N/A 10/22/2024    Procedure: Laser Coronary Atherectomy;  Surgeon: Travis Connor MD;  Location: [facility] CATH INVASIVE LOCATION;  Service: Cardiovascular;  Laterality: N/A;    COLON RESECTION Right 11/28/2024    Procedure: RIGHT HEMICOLECTOMY WITH ILEOSTOMY;  Surgeon: Todd Babin MD;  Location: [facility] MAIN OR;  Service: General;  Laterality: Right;    COLONOSCOPY N/A 10/12/2024    Procedure: COLONOSCOPY WITH BIOPSY AND WIRE GUIDED BALLOON DILATION OF TERMINAL ILEUM;  Surgeon: Rob Strong MD;  Location: [facility] ENDOSCOPY;  Service: Gastroenterology;  Laterality: N/A;  Colitis, crohns of terminal ileum, right colon ulcers, diverticulosis, hemorroids    ENDOSCOPY N/A 10/12/2024    Procedure: ESOPHAGOGASTRODUODENOSCOPY WITH  BIOPSY X 2 AREA;  Surgeon: Rob Strong MD;  Location: [facility] ENDOSCOPY;  Service: Gastroenterology;  Laterality: N/A;  Chronic gastritis,     ENDOSCOPY Left 11/28/2024    Procedure: ESOPHAGOGASTRODUODENOSCOPY;  Surgeon: Dallin Delgado MD;  Location: [facility] ENDOSCOPY;  Service: Gastroenterology;  Laterality: Left;  small hiatal hernia    HYSTERECTOMY      LEFT HEART CATH        General Information       Row Name 11/30/24 1604          Physical Therapy Time and Intention    Document Type re-evaluation  -     Mode of Treatment physical therapy  -       Row Name 11/30/24 1604          General Information    Patient Profile Reviewed yes  -     Existing Precautions/Restrictions fall  -     Barriers to Rehab medically complex;previous functional deficit;cognitive status  -       Row Name 11/30/24 1604          Living Environment    People in Home grandchild(keegan)  -       Row Name 11/30/24 1604          Home Main Entrance    Number of Stairs, Main Entrance three  -       Row Name 11/30/24 1604          Cognition    Orientation Status (Cognition) oriented to;person;place;disoriented to;situation;time;verbal cues/prompts needed for orientation  -       Row Name 11/30/24 1604          Safety Issues/Impairments Affecting Functional Mobility    Safety Issues Affecting Function (Mobility) insight into deficits/self-awareness;judgment;problem-solving  -     Impairments Affecting Function (Mobility) balance;cognition;endurance/activity tolerance;pain;strength;sensation/sensory awareness  -               User Key  (r) = Recorded By, (t) = Taken By, (c) = Cosigned By      Initials Name Provider Type     Hilary Mcmillan PT Physical Therapist                   Mobility       Row Name 11/30/24 1605          Bed Mobility    Bed Mobility supine-sit  -     Supine-Sit [place] (Bed Mobility) moderate assist (50% patient effort)  -     Assistive Device (Bed Mobility) head of bed  elevated;repositioning sheet;bed rails  -[facility] Name 11/30/24 1605          Bed-Chair Transfer    Bed-Chair [place] (Transfers) moderate assist (50% patient effort);maximum assist (25% patient effort)  -     Comment, (Bed-Chair Transfer) stand pivot bed to chair  -[facility] Name 11/30/24 1605          Sit-Stand Transfer    Sit-Stand [place] (Transfers) maximum assist (25% patient effort)  -     Assistive Device (Sit-Stand Transfers) walker, front-wheeled  -     Comment, (Sit-Stand Transfer) attempted x 2 to stand from EOB with walker, could not come to full stand  -[facility] Name 11/30/24 1605          Gait/Stairs (Locomotion)    [place] Level (Gait) unable to assess  -               User Key  (r) = Recorded By, (t) = Taken By, (c) = Cosigned By      Initials Name Provider Type     Hilary Mcmillan PT Physical Therapist                   Obj/Interventions       Row Name 11/30/24 1606          Range of Motion Comprehensive    Comment, General Range of Motion BLE AROM WFLs  -[facility] 11/30/24 1606          Strength Comprehensive (MMT)    Comment, General Manual Muscle Testing (MMT) Assessment diffuse weakness, BLEs 3/5  -[facility] Name 11/30/24 1606          Motor Skills    Therapeutic Exercise --  seated EOB marches, [place], AP x 10  -[facility]       Row Name 11/30/24 1606          Balance    Balance Assessment sitting static balance;sitting dynamic balance;standing static balance  -     Static Sitting Balance standby assist  -     Dynamic Sitting Balance standby assist  -     Position, Sitting Balance sitting edge of bed  -     Static Standing Balance moderate assist  -JH       Row Name 11/30/24 1606          Sensory Assessment (Somatosensory)    Sensory Assessment (Somatosensory) --  neuropathy  B feet, hypersensitivity  -               User Key  (r) = Recorded By, (t) = Taken By, (c) = Cosigned By      Initials Name Provider Type    Hilary Jacobsen PT Physical  Therapist                   Goals/Plan       [facility] Name 11/30/24 1609          Bed Mobility Goal 1 (PT)    Activity/Assistive Device (Bed Mobility Goal 1, PT) bed mobility activities, all  -     [place] Level/Cues Needed (Bed Mobility Goal 1, PT) modified independence  -     Time Frame (Bed Mobility Goal 1, PT) 2 weeks  -     Progress/Outcomes (Bed Mobility Goal 1, PT) goal revised this date  -[facility] Name 11/30/24 1609          Transfer Goal 1 (PT)    Activity/Assistive Device (Transfer Goal 1, PT) sit-to-stand/stand-to-sit;bed-to-chair/chair-to-bed;walker, rolling  -     [place] Level/Cues Needed (Transfer Goal 1, PT) minimum assist (75% or more patient effort)  -     Time Frame (Transfer Goal 1, PT) 2 weeks  -     Progress/Outcome (Transfer Goal 1, PT) goal revised this date  -[facility] Name 11/30/24 1609          Gait Training Goal 1 (PT)    Activity/Assistive Device (Gait Training Goal 1, PT) gait (walking locomotion);assistive device use;walker, rolling  -     [place] Level (Gait Training Goal 1, PT) minimum assist (75% or more patient effort)  -     Distance (Gait Training Goal 1, PT) 50'  -     Time Frame (Gait Training Goal 1, PT) 2 weeks  -     Progress/Outcome (Gait Training Goal 1, PT) goal revised this date  -[facility] Name 11/30/24 1609          Balance Goal 1 (PT)    Progress/Outcomes (Balance Goal 1, PT) goal no longer appropriate  -[facility] Name 11/30/24 1609          Stairs Goal 1 (PT)    Progress/Outcome (Stairs Goal 1, PT) goal no longer appropriate  -[facility] Name 11/30/24 1609          Therapy Assessment/Plan (PT)    Planned Therapy Interventions (PT) balance training;bed mobility training;gait training;transfer training;strengthening;patient/family education  -               User Key  (r) = Recorded By, (t) = Taken By, (c) = Cosigned By      Initials Name Provider Type    Hilary Jacobsen, PT Physical Therapist                   Clinical  Impression       [facility] Name 11/30/24 1607          Pain    Pain Location abdomen  -     Additional Documentation Pain Scale: FACES Pre/Post-Treatment (Group)  -       Row Name 11/30/24 1607          Pain Scale: FACES Pre/Post-Treatment    Pain: FACES Scale, Pretreatment 4-->hurts little more  -     Posttreatment Pain Rating 4-->hurts little more  -[facility] Name 11/30/24 1607          Plan of Care Review    Plan of Care Reviewed With patient  -     Outcome Evaluation Completed re-eval as pt is now s/p R hemicolectomy with ileostomy on 11/28.  Pt is supine in bed, has not been OOB per [facility] report.  Pt is awake and alert to self, confused to time and situation.  Pt required mod A for bed mobility, attempted to stand x 2 with walker and mod A with inability to stand fully upright, required mod/max A for squat pivot bed to chair.  Pt is weaker since her surgery and has overall functional decline from initial eval.  Will follow pt 5x/week and recommend SNF for rehab at d/c.  -[facility] Name 11/30/24 1607          Therapy Assessment/Plan (PT)    Rehab Potential (PT) good  -     Criteria for Skilled Interventions Met (PT) yes;skilled treatment is necessary  -     Therapy Frequency (PT) 5 times/wk  -[facility] Name 11/30/24 1607          Vital Signs    O2 Delivery Pre Treatment nasal cannula  2L  -     O2 Delivery Intra Treatment room air  -     Post SpO2 (%) 96  -     O2 Delivery Post Treatment nasal cannula  -[facility] Name 11/30/24 1607          Positioning and Restraints    Pre-Treatment Position in bed  -     Post Treatment Position chair  -     In Chair reclined;call light within reach;encouraged to call for assist;exit alarm on;notified [facility]               User Key  (r) = Recorded By, (t) = Taken By, (c) = Cosigned By      Initials Name Provider Type     Hilary Mcmillan, PT Physical Therapist                   Outcome Measures       Row Name 11/30/24 1610 11/30/24 0800       How much  help from another person do you currently need...    Turning from your back to your side while in flat bed without using bedrails? 3  - 2  -PS    Moving from lying on back to sitting on the side of a flat bed without bedrails? 2  - 2  -PS    Moving to and from a bed to a chair (including a wheelchair)? 2  - 2  -PS    Standing up from a chair using your arms (e.g., wheelchair, bedside chair)? 2  - 2  -PS    Climbing 3-5 steps with a railing? 1  - 1  -PS    To walk in hospital room? 1  - 1  -PS    AM-PAC 6 Clicks Score (PT) 11  - 10  -PS    Highest Level of Mobility Goal 4 --> Transfer to chair/commode  - 4 --> Transfer to chair/commode  -PS      Row Name 11/30/24 1610          Functional Assessment    Outcome Measure Options AM-PAC 6 Clicks Basic Mobility (PT)  -               User Key  (r) = Recorded By, (t) = Taken By, (c) = Cosigned By      Initials Name Provider Type    Hilary Jacobsen, PT Physical Therapist    Jose Mello, RN Registered Nurse                                 Physical Therapy Education       Title: PT OT SLP Therapies (Done)       Topic: Physical Therapy (Done)       Point: Mobility training (Done)       Learning Progress Summary            Patient Acceptance, E,TB,D, VU,DU by SO at 11/27/2024 1001    Acceptance, E,TB, VU by MM at 11/26/2024 1057    Acceptance, E,TB,D, VU,DU,NR by  at 11/26/2024 0350    Acceptance, E,TB, NR,Bed HQ by MM at 11/25/2024 0807    Acceptance, E, VU,NR by [facility] at 11/20/2024 1608                      Point: Home exercise program (Done)       Learning Progress Summary            Patient Acceptance, E,TB,D, VU,DU by SO at 11/27/2024 1001    Acceptance, E,TB, VU by MM at 11/26/2024 1057    Acceptance, E,TB,D, VU,DU,NR by  at 11/26/2024 0350    Acceptance, E,TB, NR,Bed HQ by MM at 11/25/2024 0807    Acceptance, E, VU,NR by [facility] at 11/20/2024 1608                      Point: Body mechanics (Done)       Learning Progress Summary            Patient  Acceptance, E,TB,D, VU,DU by SO at 11/27/2024 1001    Acceptance, E,TB, VU by  at 11/26/2024 1057    Acceptance, E,TB,D, VU,DU,NR by  at 11/26/2024 0350    Acceptance, E,TB, NR,Bed HQ by  at 11/25/2024 0807    Acceptance, E, VU,NR by [facility] at 11/20/2024 1608                      Point: Precautions (Done)       Learning Progress Summary            Patient Acceptance, E,TB,D, VU,DU by  at 11/27/2024 1001    Acceptance, E,TB, VU by  at 11/26/2024 1057    Acceptance, E,TB,D, VU,DU,NR by  at 11/26/2024 0350    Acceptance, E,TB, NR,Bed HQ by  at 11/25/2024 0807    Acceptance, E, VU,NR by [facility] at 11/20/2024 1608                                      User Key       Initials Effective Dates Name Provider Type Discipline     06/16/21 -  Enrique Ku LPN Licensed Nurse Nurse     12/27/23 -  Rob Kc, RN Registered Nurse DIALYSIS USER     03/02/23 -  Jason-Bernie Snyder, RN Registered Nurse Nurse    [facility] 12/04/23 -  Conchita Winston PT Physical Therapist PT                  PT Recommendation and Plan  Planned Therapy Interventions (PT): balance training, bed mobility training, gait training, transfer training, strengthening, patient/family education  Outcome Evaluation: Completed re-eval as pt is now s/p R hemicolectomy with ileostomy on 11/28.  Pt is supine in bed, has not been OOB per nsg report.  Pt is awake and alert to self, confused to time and situation.  Pt required mod A for bed mobility, attempted to stand x 2 with walker and mod A with inability to stand fully upright, required mod/max A for squat pivot bed to chair.  Pt is weaker since her surgery and has overall functional decline from initial eval.  Will follow pt 5x/week and recommend SNF for rehab at d/c.     Time Calculation:         PT Charges       Row Name 11/30/24 1610             Time Calculation    Start Time 1545  -JH      Stop Time 1604  -      Time Calculation (min) 19 min  -JH      PT Received On 11/30/24  -NANCY      PT -  Next Appointment 12/02/24  -      PT Goal Re-Cert Due Date 12/14/24  -         Time Calculation- PT    Total Timed Code Minutes- PT 8 minute(s)  -                User Key  (r) = Recorded By, (t) = Taken By, (c) = Cosigned By      Initials Name Provider Type    Hilary Jacobsen, CHINTAN Physical Therapist                  Therapy Charges for Today       Code Description Service Date Service Provider Modifiers Qty    [id number]  PT RE-EVAL ESTABLISHED PLAN 2 11/30/2024 Hilary Mcmillan, PT GP 1    [id number]  PT THERAPEUTIC ACT EA 15 MIN 11/30/2024 Hilary Mcmillan, PT GP 1            PT G-Codes  Outcome Measure Options: AM-PAC 6 Clicks Basic Mobility (PT)  AM-PAC 6 Clicks Score (PT): 11  PT Discharge Summary  Anticipated Discharge Disposition (PT): skilled nursing facility    Hilary Mcmillan PT  11/30/2024

## 2024-11-30 NOTE — PROGRESS NOTES
CARDIOLOGY PROGRESS NOTE:    Maryann Gaitan  74 y.o.  female  1950  [id number]      Referring Provider: Hospitalist    Reason for follow-up: CAD and GI bleed     Patient Care Team:  Eduard Little MD as PCP - General (Family Medicine)  Niya Mendoza APRN as Nurse Practitioner (Cardiology)    Subjective .  Patient doing much better this morning without any symptoms    Objective lying in bed comfortable     Review of Systems   Constitutional: Negative for fever and malaise/fatigue.   HENT:  Negative for ear pain and nosebleeds.    Eyes:  Negative for blurred vision and double vision.   Cardiovascular:  Negative for chest pain, dyspnea on exertion and palpitations.   Respiratory:  Negative for cough and shortness of breath.    Skin:  Negative for rash.   Musculoskeletal:  Negative for joint pain.   Gastrointestinal:  Negative for abdominal pain, nausea and vomiting.   Neurological:  Negative for focal weakness and headaches.   Psychiatric/Behavioral:  Negative for depression. The patient is not nervous/anxious.    All other systems reviewed and are negative.      Allergies: Codeine and Penicillin g sodium    Scheduled Meds:acetaminophen, 1,000 mg, Oral, Q8H  aspirin, 81 mg, Oral, Daily  atorvastatin, 40 mg, Oral, Nightly  clopidogrel, 75 mg, Oral, Daily  cyanocobalamin, 1,000 mcg, Intramuscular, Daily  folic acid, 1,000 mcg, Oral, Daily  levothyroxine, 50 mcg, Oral, Q AM  mesalamine, 4.8 g, Oral, Daily  methotrexate, 15 mg, Oral, Weekly  metoclopramide, 10 mg, Intravenous, Q6H  metoprolol succinate XL, 25 mg, Oral, Q24H  midodrine, 5 mg, Oral, TID AC  mupirocin, 1 Application, Each Nare, BID  pantoprazole, 40 mg, Oral, Daily  predniSONE, 40 mg, Oral, Daily With Breakfast  sertraline, 50 mg, Oral, Daily  sodium chloride, 10 mL, Intravenous, Q12H  sodium chloride, 10 mL, Intravenous, Q12H  upadacitinib ER, 45 mg, Oral, Daily  vitamin B-12, 1,000 mcg, Oral, Daily      Continuous Infusions:   PRN Meds:.   "senna-docusate sodium **AND** polyethylene glycol **AND** bisacodyl **AND** bisacodyl    diphenhydrAMINE    HYDROmorphone    nitroglycerin    ondansetron ODT **OR** ondansetron    oxyCODONE    [COMPLETED] Insert Peripheral IV **AND** sodium chloride    sodium chloride    sodium chloride    sodium chloride    sodium chloride        VITAL SIGNS  Vitals:    11/30/24 0209 11/30/24 0604 11/30/24 0612 11/30/24 0911   BP: 124/51 124/63  122/67   BP Location: Left arm Left arm  Left arm   Patient Position: Lying Lying  Lying   Pulse: 63 72 62 72   Resp: 22 18 22   Temp: 98.1 °F (36.7 °C) 97.7 °F (36.5 °C)  97.6 °F (36.4 °C)   TempSrc: Oral Oral  Oral   SpO2: 97% 98% 97% 100%   Weight:   60.9 kg (134 lb 4.2 oz)    Height:           Flowsheet Rows      Flowsheet Row First Filed Value   Admission Height 162.6 cm (64\") Documented at 11/19/2024 1207   Admission Weight 45.4 kg (100 lb) Documented at 11/19/2024 1207             TELEMETRY: Sinus rhythm    Physical Exam:  Constitutional:       Appearance: Well-developed.   Eyes:      General: No scleral icterus.     Conjunctiva/sclera: Conjunctivae normal.      Pupils: Pupils are equal, round, and reactive to light.   HENT:      Head: Normocephalic and atraumatic.   Neck:      Vascular: No carotid bruit or JVD.   Pulmonary:      Effort: Pulmonary effort is normal.      Breath sounds: Normal breath sounds. No wheezing. No rales.   Cardiovascular:      Normal rate. Regular rhythm.   Pulses:     Intact distal pulses.   Abdominal:      General: Bowel sounds are normal.      Palpations: Abdomen is soft.   Musculoskeletal: Normal range of motion.      Cervical back: Normal range of motion and neck supple. Skin:     General: Skin is warm and dry.      Findings: No rash.   Neurological:      Mental Status: Alert.      Comments: No focal deficits          Results Review:   I reviewed the patient's new clinical results.  Lab Results (last 24 hours)       Procedure Component Value Units " Date/Time    Blood Culture - Blood, Arm, Left [[id number]]  (Normal) Collected: 11/25/24 0437    Specimen: Blood from Arm, Left Updated: 11/30/24 0445     Blood Culture No growth at 5 days    Narrative:      Less than seven (7) mL's of blood was collected.  Insufficient quantity may yield false negative results.    C-reactive Protein [[id number]]  (Abnormal) Collected: 11/30/24 0115    Specimen: Blood Updated: 11/30/24 0159     C-Reactive Protein 10.30 mg/dL     CBC & Differential [[id number]]  (Abnormal) Collected: 11/30/24 0115    Specimen: Blood Updated: 11/30/24 0128    Narrative:      The following orders were created for panel order CBC & Differential.  Procedure                               Abnormality         Status                     ---------                               -----------         ------                     CBC Auto Differential[[id number]]        Abnormal            Final result                 Please view results for these tests on the individual orders.    CBC Auto Differential [[id number]]  (Abnormal) Collected: 11/30/24 0115    Specimen: Blood Updated: 11/30/24 0128     WBC 15.71 10*3/mm3      RBC 3.19 10*6/mm3      Hemoglobin 9.3 g/dL      Hematocrit 28.1 %      MCV 88.1 fL      MCH 29.2 pg      MCHC 33.1 g/dL      RDW 15.7 %      RDW-SD 49.8 fl      MPV 9.9 fL      Platelets 88 10*3/mm3      Neutrophil % 85.5 %      Lymphocyte % 10.4 %      Monocyte % 3.1 %      Eosinophil % 0.0 %      Basophil % 0.1 %      Immature Grans % 0.9 %      Neutrophils, Absolute 13.44 10*3/mm3      Lymphocytes, Absolute 1.63 10*3/mm3      Monocytes, Absolute 0.49 10*3/mm3      Eosinophils, Absolute 0.00 10*3/mm3      Basophils, Absolute 0.01 10*3/mm3      Immature Grans, Absolute 0.14 10*3/mm3      nRBC 0.1 /100 WBC     Hemoglobin & Hematocrit, Blood [[id number]]  (Abnormal) Collected: 11/29/24 2235    Specimen: Blood Updated: 11/29/24 2240     Hemoglobin 10.1 g/dL      Hematocrit 30.1 %     Hemoglobin &  Hematocrit, Blood [[id number]]  (Abnormal) Collected: 11/29/24 1749    Specimen: Blood Updated: 11/29/24 1754     Hemoglobin 10.5 g/dL      Hematocrit 32.3 %     Hemoglobin & Hematocrit, Blood [[id number]]  (Abnormal) Collected: 11/29/24 1155    Specimen: Blood from Arm, Right Updated: 11/29/24 1159     Hemoglobin 10.1 g/dL      Hematocrit 31.1 %             Imaging Results (Last 24 Hours)       ** No results found for the last 24 hours. **            EKG      I personally viewed and interpreted the patient's EKG/Telemetry data:    ECHOCARDIOGRAM:  Results for orders placed during the hospital encounter of 11/19/24    Adult Transthoracic Echo Limited W/ Cont if Necessary Per Protocol    Interpretation Summary    Left ventricular ejection fraction appears to be 56 - 60%.    There is a MitraClip mitral valve repair present.    There is trace residual MR.  Mean gradient is 4.6 mmHg       STRESS MYOVIEW:       CARDIAC CATHETERIZATION:  Results for orders placed during the hospital encounter of 11/19/24    Cardiac Catheterization/Vascular Study    Conclusion    Dmitri Navarro MD    Procedures performed:  Ultrasound guided venous access right common femoral vein  Transseptal left heart catheterization  Transcatheter [id number] repair of mitral valve MitraClip NT    Procedure in details  Under US guidance and using a micropuncture access kit, a 7F introducer was placed into the right femoral vein. The venotomy was preclosed using 2 Perclose Proglide devices. An 16F introducer sheath was placed in the right femoral vein.  Under ultrasound guidance and using a micropuncture access kit a 5 [place] introducer was placed in the left common femoral  artery.    The Faith sheath was advanced into the superior vena cava over an 0.035 wire. Under GONZÁLEZ and fluoroscopic guidance, successful transseptal puncture was performed using a [employer] cross system. Correct position in the left atrium was confirmed by pressure  tracing.    GONZÁLEZ will be dictated separately by Dr. Gurrola    Heparin was administered and ACTs were followed with additional heparin being given to keep the ACT > 300.  [place] curved wire was advanced through the [place] sheath into the left upper pulmonary vein. The sheath was then replaced with the MitraClip Delivery System/Sheath (CDS). Under fluoroscopic and GONZÁLEZ guidance, a MitraClip (G4 NT) was positioned across the mitral valve and deployed per protocol at A2/P2 position. After the first clip, there was significant reduction in the degree of mitral regurgitation. MR reduced from 4+ to 1+.  There was residual regurgitation jet on the lateral aspect of the clip.  However there was no posterior valve length to appropriately grasp the leaflet in that location.  Overall very challenging case due to significant amount of calcification around the posterior valve leaflet.  Multiple attempts were made during the procedure to grasp the leaflet.  Final gradient across the mitral valve was 4-5 mmHg.  All pulmonic veins were assessed and no longer showed flow reversal.  The delivery sheath was removed and the pre-positioned Perclose devices were placed with successful hemostasis.  The patient was extubated and transferred to the recovery area in stable condition.    Recommendations:  Resume anticoagulation.  Uptitrate GDMT    Electronically signed by Dmitri Navarro MD, 11/21/24, 10:09 AM EST.       OTHER:         Assessment & Plan     Acute lower GI bleeding  Patient presented with blood in stools and had significant anemia and had upper GI which showed no evidence of bleeding  Patient was seen by surgeons and underwent right hemicolectomy with ileostomy  Patient's Plavix was held which will be restarted once surgeons are okay.  Discussed with patient about the risk of holding Plavix including stent thrombosis.  Surgeons to decide on when to restart her Plavix because of high risk of stent thrombosis.     Acute blood loss  anemia  Patient had received blood but now she will be monitored for her hemoglobin and hematocrit.     Coronary disease  Patient had recent stent placement to the LAD and hence she needs to be on aspirin Plavix for at least Plavix for now until she stabilizes and will continue her other medical therapy including beta-blockers and statins.  Patient is doing well without any symptoms for now.     Mitral regurgitation  Patient had a mitral valve clip procedure and is currently stable with normal LV systolic function    COPD  Patient is history of COPD but is oxygenating very well.    Hyperlipidemia  Patient is currently on statins and the lipid levels are followed by primary care doctor    Hypertension  Patient blood pressure currently stable on medical therapy    I discussed the patients findings and my recommendations with patient and nurse    Hunter Cruz MD  11/30/24  11:12 EST

## 2024-11-30 NOTE — PROGRESS NOTES
Hematology/Oncology Inpatient Progress Note    Patient name: Maryann Gaitan  : 1950  MRN: [id number]  Referring Provider: DRU Escalante  Reason for Consultation: Blood loss anemia and need for antiplatelet therapy, need for outpatient transfusion options    Chief complaint: Abdominal pain, rectal bleeding, shortness of breath    History of present illness:    Maryann Gaitan is a 74 y.o. female who presented to [facility] on 2024 with complaints of abdominal pain, rectal bleeding, shortness of breath.  Past medical history of CAD, COPD, hypertension, hypothyroidism, Crohn's disease.  Was recently admitted to the hospital on 2024 for mild rectal bleeding and was found to have an acute Crohn's exacerbation.  She was discharged on prolonged steroid taper however she states that she still has some abdominal pain and has not felt well since discharge.  She did have a drop in her hemoglobin prior to discharge during her previous hospital stay but her hemoglobin was 8.4 g/dL on the day of discharge.  Over the last few days she has continued to have worsening lower abdominal pain with increased chest pain and shortness of breath and much more rectal bleeding than prior.  Of note she did have PCI with stent placement in 2024 and was placed on dual antiplatelet therapy.  This admission she underwent a transcatheter [id number]-ucyt repair of her mitral valve with MitraClip placed on 2024.  The patient has continued to have gastrointestinal bleeding throughout the admission.    24  Hematology/Oncology was consulted for anemia in the setting of GI bleeding in a patient requiring antiplatelet therapy.    10/11/24  Hematology/Oncology was consulted for anemia and leukopenia  On consult, the patient reports fatigue, chronic abdominal pain/cramping and non-bloody diarrhea but denies unintentional weight loss, fevers, chills, drenching night sweats, lymphadenopathy, BRBPR,  melena, bruising. She denies having any IV iron or blood transfusions.  Per RN pt has not received budesonide yet.    Anemia workup from last admission:  -10/11/2022 reticulocyte numbers 0.72 (normal) however absolute reticulocyte decreased at 0.0182 suggesting either not enough building blocks to make blood or bone marrow dysfunction  -B12 312 (normal), folate 18.3 (normal), iron studies (iron 25 low, iron saturation 13% low) and ferritin (427 high, but is an acute phase reactant) -appears there could still be some component of iron deficiency involved which is not surprising as she has a disease that could cause on again off again bleeding; will likely have to deal with IV iron as what was seen on EGD means she will be unlikely to tolerate oral iron or be able to absorb it as she will likely be on chronic PPIs  -Flow cytometry WNL     10/17/2024: Ferrlecit 250 mg IV x 1 dose    He/She  has a past medical history of Abnormal weight loss (11/21/2024), Acute kidney injury (11/06/2024), Acute UTI (urinary tract infection) (11/06/2024), Anxiety associated with depression (11/06/2024), Benign neoplasm of cecum (07/05/2016), CAD, multiple vessel (10/08/2024), Cavitary lesion of lung (10/08/2024), COPD (chronic obstructive pulmonary disease), Crohn's disease (11/06/2024), 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 (07/05/2016), Dyslipidemia (10/30/2024), Fecal urgency (11/21/2024), First degree hemorrhoids (07/09/2021), GERD (gastroesophageal reflux disease) (11/21/2024), Hashimoto's thyroiditis (11/21/2024), History of colonic polyps (07/09/2021), Hypertension, Hypothyroidism (acquired) (11/06/2024), Mitral regurgitation (10/08/2024), Moderate protein-calorie malnutrition (11/09/2024), Multiple tracheobronchial mucus plugs (10/08/2024), Nicotine dependence (11/21/2024), Rheumatoid arthritis (11/06/2024), S/P mitral valve clip implantation (11/21/2024), Second degree hemorrhoids (07/05/2016), Stress  incontinence, female (11/21/2024), and Vitamin D deficiency, unspecified (11/21/2024).    PCP: Eduard Little MD    11/24/24: pt seen today for initial evaluation. She has ongoing GI bleeding and is receiving PRBC transfusions. She reported having fatigue. Also complained of having some 'worms In stool' and is being worked up for Stool parasites. Had strongyloides infestation noted on EGD/Colonoscopy 1 month ago and was reportedly treated with ivermectin. She is being followed by GI and Cardiology.    INTERVAL HISTORY:  11/26/24: pt seen today for follow up. She is s/p multiple PRBC unit transfusion since admission but has not needed transfusions since yesterday. Hb stable at this time. She however reported having ongoing GI bleeding, this has improved somewhat. being followed by GI team.    She is having active rectal bleeding she is receiving blood and platelets..  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 07/05/2016    Dyslipidemia 10/30/2024    Fecal urgency 11/21/2024    First degree hemorrhoids 07/09/2021    GERD (gastroesophageal reflux disease) 11/21/2024    Hashimoto's thyroiditis 11/21/2024    History of colonic polyps 07/09/2021    Hypertension     Hypothyroidism (acquired) 11/06/2024    Mitral regurgitation 10/08/2024    Moderate protein-calorie malnutrition 11/09/2024    Multiple tracheobronchial mucus plugs 10/08/2024    Nicotine dependence 11/21/2024     Rheumatoid arthritis 11/06/2024    S/P mitral valve clip implantation 11/21/2024    Second degree hemorrhoids 07/05/2016    Stress incontinence, female 11/21/2024    Vitamin D deficiency, unspecified 11/21/2024   ,   Past Surgical History:   Procedure Laterality Date    BRONCHOSCOPY N/A 10/16/2024    Procedure: BRONCHOSCOPY;  Surgeon: Gallo Pope MD;  Location: [facility] ENDOSCOPY;  Service: Pulmonary;  Laterality: N/A;    CARDIAC CATHETERIZATION N/A 10/15/2024    Procedure: Left Heart Cath, possible pci;  Surgeon: Travis Connor MD;  Location: [facility] CATH INVASIVE LOCATION;  Service: Cardiovascular;  Laterality: N/A;    CARDIAC CATHETERIZATION N/A 10/22/2024    Procedure: Laser Coronary Atherectomy;  Surgeon: Travis Connor MD;  Location: [facility] CATH INVASIVE LOCATION;  Service: Cardiovascular;  Laterality: N/A;    COLONOSCOPY N/A 10/12/2024    Procedure: COLONOSCOPY WITH BIOPSY AND WIRE GUIDED BALLOON DILATION OF TERMINAL ILEUM;  Surgeon: Rob Strong MD;  Location: [facility] ENDOSCOPY;  Service: Gastroenterology;  Laterality: N/A;  Colitis, crohns of terminal ileum, right colon ulcers, diverticulosis, hemorroids    ENDOSCOPY N/A 10/12/2024    Procedure: ESOPHAGOGASTRODUODENOSCOPY WITH BIOPSY X 2 AREA;  Surgeon: Rob Strong MD;  Location: [facility] ENDOSCOPY;  Service: Gastroenterology;  Laterality: N/A;  Chronic gastritis, HH    HYSTERECTOMY      LEFT HEART CATH     ,   Family History   Problem Relation Age of Onset    Heart disease Mother     Dementia Mother     Stroke Mother     Heart disease Father     Hypertension Father    ,   Social History     Tobacco Use    Smoking status: Former     Types: Cigarettes    Smokeless tobacco: Never   Vaping Use    Vaping status: Never Used   Substance Use Topics    Alcohol use: Never    Drug use: Never   ,   Medications Prior to Admission   Medication Sig Dispense Refill Last Dose/Taking    Adalimumab (Humira, 2 Pen,) 40 MG/0.4ML  Pen-injector Kit Inject 40 mg under the skin into the appropriate area as directed 1 (One) Time Per Week.    Indications: Rheumatoid Arthritis   Past Week    albuterol (PROVENTIL) (2.5 MG/3ML) 0.083% nebulizer solution Take 2.5 mg by nebulization Every 6 (Six) Hours As Needed for Wheezing. Indications: Spasm of Lung Air Passages   Taking As Needed    amLODIPine (NORVASC) 10 MG tablet Take 1 tablet by mouth Daily.   Taking    aspirin 81 MG EC tablet Take 1 tablet by mouth Daily for 30 days. Indications: Disease involving Lipid Deposits in the Arteries 30 tablet 0 Taking    [] atorvastatin (LIPITOR) 40 MG tablet Take 1 tablet by mouth Every Night for 30 days. 30 tablet 0 Taking    cholecalciferol (VITAMIN D3) 1.25 MG (56660 UT) capsule Take 1 capsule by mouth 2 (Two) Times a Week. Wed, Sat  Indications: Vitamin D Deficiency   Past Week    [] clopidogrel (PLAVIX) 75 MG tablet Take 1 tablet by mouth Daily for 30 days. 30 tablet 0 Taking    diclofenac (VOLTAREN) 50 MG EC tablet Take 1 tablet by mouth 2 (Two) Times a Day.   Taking    folic acid (FOLVITE) 1 MG tablet Take 1 tablet by mouth Daily. Indications: Anemia From Inadequate Folic Acid   Taking    levothyroxine (SYNTHROID, LEVOTHROID) 50 MCG tablet Take 1 tablet by mouth Daily. Indications: Underactive Thyroid   Taking    Melatonin (Melatonin Extra Strength) 10 MG tablet Take 1 tablet by mouth As Needed. Indications: Trouble Sleeping   Taking As Needed    methotrexate 2.5 MG tablet Take 6 tablets by mouth 1 (One) Time Per Week.    Indications: Non-oncologic   Past Week    metoprolol tartrate (LOPRESSOR) 50 MG tablet Take 1 tablet by mouth 2 (Two) Times a Day.   Taking    midodrine (PROAMATINE) 10 MG tablet Take 1 tablet by mouth 3 (Three) Times a Day Before Meals for 30 days. 90 tablet 0 Taking    pantoprazole (PROTONIX) 20 MG EC tablet Take 1 tablet by mouth Daily. Indications: Heartburn   Taking    predniSONE (DELTASONE) 10 MG  tablet Take 4 tablets by mouth Daily for 7 days, THEN 3.5 tablets Daily for 7 days, THEN 3 tablets Daily for 7 days, THEN 2.5 tablets Daily for 7 days, THEN 2 tablets Daily for 7 days, THEN 1.5 tablets Daily for 7 days, THEN 1 tablet Daily for 7 days, THEN 0.5 tablets Daily for 7 days. Indications: Crohn's Disease 126 tablet 0 Taking    sertraline (ZOLOFT) 50 MG tablet Take 1 tablet by mouth Daily. Indications: Major Depressive Disorder   Taking    spironolactone (ALDACTONE) 25 MG tablet Take 1 tablet by mouth Daily. Indications: Edema   Taking    upadacitinib ER (Rinvoq) 45 MG tablet sustained-release 24 hour extended release tablet Take 1 tablet by mouth Daily. Indications: Rheumatoid Arthritis   Taking   , Scheduled Meds:  acetaminophen, 650 mg, Oral, Once   Or  acetaminophen, 650 mg, Oral, Once   Or  acetaminophen, 650 mg, Rectal, Once  atorvastatin, 40 mg, Oral, Nightly  clopidogrel, 75 mg, Oral, Daily  cyanocobalamin, 1,000 mcg, Intramuscular, Daily  diphenhydrAMINE, 25 mg, Oral, Once   Or  diphenhydrAMINE, 25 mg, Intravenous, Once  folic acid, 1,000 mcg, Oral, Daily  levothyroxine, 50 mcg, Oral, Daily  mesalamine, 4.8 g, Oral, Daily  methotrexate, 15 mg, Oral, Weekly  methylPREDNISolone sodium succinate, 20 mg, Intravenous, Q8H  metoprolol succinate XL, 25 mg, Oral, Q24H  midodrine, 5 mg, Oral, TID AC  pantoprazole, 40 mg, Oral, Daily  sertraline, 50 mg, Oral, Daily  sodium chloride, 10 mL, Intravenous, Q12H  upadacitinib ER, 45 mg, Oral, Daily    , Continuous Infusions:   , PRN Meds:    acetaminophen    senna-docusate sodium **AND** polyethylene glycol **AND** bisacodyl **AND** bisacodyl    diphenhydrAMINE    nitroglycerin    ondansetron ODT **OR** ondansetron    [COMPLETED] Insert Peripheral IV **AND** sodium chloride    sodium chloride    sodium chloride   Allergies:  Codeine and Penicillin g sodium    Subjective     ROS:  Review of Systems     Objective   Vital Signs:   BP 98/54 (BP Location: Left leg,  "Patient Position: Lying)   Pulse 69   Temp 97.8 °F (36.6 °C) (Oral)   Resp 16   Ht 162.6 cm (64\")   Wt 54 kg (119 lb)   SpO2 99%   BMI 20.43 kg/m²     Physical Exam: (performed by MD)  Physical Exam    Post op abdomen noted    Results Review:  Lab Results (last 48 hours)       Procedure Component Value Units Date/Time    CBC (No Diff) [[id number]]  (Abnormal) Collected: 11/24/24 0241    Specimen: Blood Updated: 11/24/24 0316     WBC 14.90 10*3/mm3      RBC 1.84 10*6/mm3      Hemoglobin 5.5 g/dL      Hematocrit 17.6 %      MCV 95.7 fL      MCH 29.9 pg      MCHC 31.3 g/dL      RDW 18.7 %      RDW-SD 63.2 fl      MPV 9.7 fL      Platelets 197 10*3/mm3     Comprehensive Metabolic Panel [[id number]]  (Abnormal) Collected: 11/24/24 0211    Specimen: Blood Updated: 11/24/24 0251     Glucose 122 mg/dL      BUN 20 mg/dL      Creatinine 0.63 mg/dL      Sodium 139 mmol/L      Potassium 4.6 mmol/L      Chloride 108 mmol/L      CO2 18.1 mmol/L      Calcium 7.8 mg/dL      Total Protein 3.9 g/dL      Albumin 2.5 g/dL      ALT (SGPT) 30 U/L      AST (SGOT) 23 U/L      Alkaline Phosphatase 57 U/L      Total Bilirubin 0.2 mg/dL      Globulin 1.4 gm/dL      A/G Ratio 1.8 g/dL      BUN/Creatinine Ratio 31.7     Anion Gap 12.9 mmol/L      eGFR 93.2 mL/min/1.73     Narrative:      GFR Normal >60  Chronic Kidney Disease <60  Kidney Failure <15    The GFR formula is only valid for adults with stable renal function between ages 18 and 70.    C-reactive Protein [[id number]]  (Normal) Collected: 11/24/24 0211    Specimen: Blood Updated: 11/24/24 0251     C-Reactive Protein 0.40 mg/dL     Ova & Parasite Examination - Stool, Per Rectum [[id number]] Collected: 11/23/24 1401    Specimen: Stool from Per Rectum Updated: 11/23/24 1411    Comprehensive Metabolic Panel [[id number]]  (Abnormal) Collected: 11/23/24 0411    Specimen: Blood from Arm, Right Updated: 11/23/24 0447     Glucose 128 mg/dL      BUN 19 mg/dL      Creatinine 0.55 mg/dL      " Sodium 141 mmol/L      Potassium 3.9 mmol/L      Chloride 108 mmol/L      CO2 22.3 mmol/L      Calcium 8.3 mg/dL      Total Protein 4.5 g/dL      Albumin 2.8 g/dL      ALT (SGPT) 25 U/L      AST (SGOT) 18 U/L      Alkaline Phosphatase 76 U/L      Total Bilirubin 0.2 mg/dL      Globulin 1.7 gm/dL      A/G Ratio 1.6 g/dL      BUN/Creatinine Ratio 34.5     Anion Gap 10.7 mmol/L      eGFR 96.3 mL/min/1.73     Narrative:      GFR Normal >60  Chronic Kidney Disease <60  Kidney Failure <15    The GFR formula is only valid for adults with stable renal function between ages 18 and 70.    C-reactive Protein [[id number]]  (Abnormal) Collected: 11/23/24 0411    Specimen: Blood from Arm, Right Updated: 11/23/24 0447     C-Reactive Protein 1.25 mg/dL     CBC (No Diff) [[id number]]  (Abnormal) Collected: 11/23/24 0411    Specimen: Blood from Arm, Right Updated: 11/23/24 0418     WBC 12.82 10*3/mm3      RBC 2.92 10*6/mm3      Hemoglobin 8.7 g/dL      Hematocrit 27.6 %      MCV 94.5 fL      MCH 29.8 pg      MCHC 31.5 g/dL      RDW 19.0 %      RDW-SD 64.4 fl      MPV 9.5 fL      Platelets 284 10*3/mm3     Hemoglobin & Hematocrit, Blood [[id number]]  (Abnormal) Collected: 11/22/24 2323    Specimen: Blood Updated: 11/22/24 2328     Hemoglobin 8.6 g/dL      Hematocrit 27.3 %     Hemoglobin & Hematocrit, Blood [[id number]]  (Abnormal) Collected: 11/22/24 1758    Specimen: Blood Updated: 11/22/24 1810     Hemoglobin 8.9 g/dL      Hematocrit 27.3 %              Pending Results:     Imaging Reviewed:   XR Chest 1 View    Result Date: 11/25/2024  Impression: No active pulmonary process. Electronically Signed: Jameson Rainey MD  11/25/2024 9:25 AM EST  Workstation ID: [id number]    CT Abdomen Pelvis With Contrast    Result Date: 11/20/2024  Impression: 1.Distal/terminal ileitis in keeping with patient's history of Crohn's disease. No definitive active colonic inflammation identified on CT imaging. Trace free fluid in the pelvis is likely related.  2.Diffuse colonic fatty mural infiltration which can be seen in the setting of chronic inflammatory bowel disease. 3.Other incidental nonemergent findings detailed above. Electronically Signed: Junior Khan MD  11/20/2024 8:19 AM EST  Workstation ID: [id number]    XR Chest 1 View    Result Date: 11/19/2024  Impression: No acute chest finding. Electronically Signed: Ute Snider MD  11/19/2024 1:34 PM EST  Workstation ID: [id number]          Assessment & Plan     Normocytic anemia  -Hemoglobin 5.5 g/dL, MCV 95.7  -Patient with an acute drop in her hemoglobin overnight from 8.7 g/dL.  Reported experiencing significant gastrointestinal bleeding  -Has received 3 units of PRBC this admission and 2 units ordered today due to hemoglobin of 5.5 g/dL  -Recent PCI with stent in October 2024.  Her aspirin has been held but she has been continued on Plavix  -Received Ferrlecit 250 mg IV x 1 dose during her prior admission, continue IV iron replacement  -Continue IV iron replacement with Ferrlecit 250 mg IV X 3 additional doses.  -On daily folic acid due to methotrexate use  -Check vitamin B12 level, reported low (227), s/p 1 dose of IM B12 supplementation, continue oral B12  -Monitor CBC and recommend transfuse for hemoglobin less than 7.0 g/dL    Hemoglobin today 6.8 receiving blood platelets due to recent Plavix    She had emergency EGD for rectal bleeding 11/28/24    Status post  colon resection 11/28/24    Continue post op care    Defer to Cardiology regarding antiplatelet therapy management.    Acute GI bleed  -In the setting of Crohn's disease, possible exacerbation  -Gastroenterology following and patient being treated with IV steroids, Rinvoq  -Recent strongyloides of the stomach and duodenum treated with ivermectin.  Now with reported worms visualized in stools and stool O&P sent, this was however reported negative.  -ID team is following the patient.    Hemoglobin is stable      Discussed with patient    Counts are  stable    Will continue to follow.        Thank you for this consult. We will be happy to follow along with you.         Electronically signed by Nicolle Mitchell MD, 11/30/24, 12:00 PM EST.

## 2024-11-30 NOTE — PLAN OF CARE
Goal Outcome Evaluation:      Patient vital signs stable. Patient worked with physical therapy. Patient very week. Stand and pivot with one person to chair. Patients diet increased to GI high fiber. Plan of care ongoing.

## 2024-12-01 LAB
ANION GAP SERPL CALCULATED.3IONS-SCNC: 6.7 MMOL/L (ref 5–15)
BASOPHILS # BLD AUTO: 0.02 10*3/MM3 (ref 0–0.2)
BASOPHILS NFR BLD AUTO: 0.1 % (ref 0–1.5)
BUN SERPL-MCNC: 17 MG/DL (ref 8–23)
BUN/CREAT SERPL: 32.1 (ref 7–25)
CALCIUM SPEC-SCNC: 7.8 MG/DL (ref 8.6–10.5)
CHLORIDE SERPL-SCNC: 108 MMOL/L (ref 98–107)
CO2 SERPL-SCNC: 23.3 MMOL/L (ref 22–29)
CREAT SERPL-MCNC: 0.53 MG/DL (ref 0.57–1)
CRP SERPL-MCNC: 10.9 MG/DL (ref 0–0.5)
DEPRECATED RDW RBC AUTO: 51.8 FL (ref 37–54)
EGFRCR SERPLBLD CKD-EPI 2021: 97.2 ML/MIN/1.73
EOSINOPHIL # BLD AUTO: 0.04 10*3/MM3 (ref 0–0.4)
EOSINOPHIL NFR BLD AUTO: 0.2 % (ref 0.3–6.2)
ERYTHROCYTE [DISTWIDTH] IN BLOOD BY AUTOMATED COUNT: 16 % (ref 12.3–15.4)
GLUCOSE SERPL-MCNC: 72 MG/DL (ref 65–99)
HCT VFR BLD AUTO: 28.4 % (ref 34–46.6)
HGB BLD-MCNC: 8.8 G/DL (ref 12–15.9)
IMM GRANULOCYTES # BLD AUTO: 0.13 10*3/MM3 (ref 0–0.05)
IMM GRANULOCYTES NFR BLD AUTO: 0.8 % (ref 0–0.5)
LYMPHOCYTES # BLD AUTO: 1.46 10*3/MM3 (ref 0.7–3.1)
LYMPHOCYTES NFR BLD AUTO: 8.5 % (ref 19.6–45.3)
MAGNESIUM SERPL-MCNC: 1.4 MG/DL (ref 1.6–2.4)
MCH RBC QN AUTO: 28.4 PG (ref 26.6–33)
MCHC RBC AUTO-ENTMCNC: 31 G/DL (ref 31.5–35.7)
MCV RBC AUTO: 91.6 FL (ref 79–97)
MONOCYTES # BLD AUTO: 0.43 10*3/MM3 (ref 0.1–0.9)
MONOCYTES NFR BLD AUTO: 2.5 % (ref 5–12)
NEUTROPHILS NFR BLD AUTO: 15.05 10*3/MM3 (ref 1.7–7)
NEUTROPHILS NFR BLD AUTO: 87.9 % (ref 42.7–76)
NRBC BLD AUTO-RTO: 0 /100 WBC (ref 0–0.2)
PLATELET # BLD AUTO: 91 10*3/MM3 (ref 140–450)
PMV BLD AUTO: 11 FL (ref 6–12)
POTASSIUM SERPL-SCNC: 3.5 MMOL/L (ref 3.5–5.2)
POTASSIUM SERPL-SCNC: 4.3 MMOL/L (ref 3.5–5.2)
RBC # BLD AUTO: 3.1 10*6/MM3 (ref 3.77–5.28)
SODIUM SERPL-SCNC: 138 MMOL/L (ref 136–145)
WBC NRBC COR # BLD AUTO: 17.13 10*3/MM3 (ref 3.4–10.8)

## 2024-12-01 PROCEDURE — 0DTF0ZZ RESECTION OF RIGHT LARGE INTESTINE, OPEN APPROACH: ICD-10-PCS | Performed by: STUDENT IN AN ORGANIZED HEALTH CARE EDUCATION/TRAINING PROGRAM

## 2024-12-01 PROCEDURE — 84132 ASSAY OF SERUM POTASSIUM: CPT | Performed by: INTERNAL MEDICINE

## 2024-12-01 PROCEDURE — 97530 THERAPEUTIC ACTIVITIES: CPT

## 2024-12-01 PROCEDURE — 25010000002 MAGNESIUM SULFATE 2 GM/50ML SOLUTION: Performed by: INTERNAL MEDICINE

## 2024-12-01 PROCEDURE — 86140 C-REACTIVE PROTEIN: CPT | Performed by: STUDENT IN AN ORGANIZED HEALTH CARE EDUCATION/TRAINING PROGRAM

## 2024-12-01 PROCEDURE — 97168 OT RE-EVAL EST PLAN CARE: CPT

## 2024-12-01 PROCEDURE — 85025 COMPLETE CBC W/AUTO DIFF WBC: CPT | Performed by: INTERNAL MEDICINE

## 2024-12-01 PROCEDURE — 99232 SBSQ HOSP IP/OBS MODERATE 35: CPT | Performed by: INTERNAL MEDICINE

## 2024-12-01 PROCEDURE — 25010000002 CYANOCOBALAMIN PER 1000 MCG: Performed by: STUDENT IN AN ORGANIZED HEALTH CARE EDUCATION/TRAINING PROGRAM

## 2024-12-01 PROCEDURE — 0D1B0Z4 BYPASS ILEUM TO CUTANEOUS, OPEN APPROACH: ICD-10-PCS | Performed by: STUDENT IN AN ORGANIZED HEALTH CARE EDUCATION/TRAINING PROGRAM

## 2024-12-01 PROCEDURE — 80048 BASIC METABOLIC PNL TOTAL CA: CPT | Performed by: INTERNAL MEDICINE

## 2024-12-01 PROCEDURE — 25010000002 METOCLOPRAMIDE PER 10 MG: Performed by: STUDENT IN AN ORGANIZED HEALTH CARE EDUCATION/TRAINING PROGRAM

## 2024-12-01 PROCEDURE — 83735 ASSAY OF MAGNESIUM: CPT | Performed by: INTERNAL MEDICINE

## 2024-12-01 PROCEDURE — 63710000001 PREDNISONE PER 1 MG: Performed by: INTERNAL MEDICINE

## 2024-12-01 RX ORDER — LOPERAMIDE HYDROCHLORIDE 2 MG/1
2 CAPSULE ORAL 4 TIMES DAILY
Status: DISCONTINUED | OUTPATIENT
Start: 2024-12-01 | End: 2024-12-04 | Stop reason: HOSPADM

## 2024-12-01 RX ORDER — POTASSIUM CHLORIDE 1500 MG/1
40 TABLET, EXTENDED RELEASE ORAL EVERY 4 HOURS
Status: COMPLETED | OUTPATIENT
Start: 2024-12-01 | End: 2024-12-01

## 2024-12-01 RX ORDER — MAGNESIUM SULFATE HEPTAHYDRATE 40 MG/ML
2 INJECTION, SOLUTION INTRAVENOUS
Status: COMPLETED | OUTPATIENT
Start: 2024-12-01 | End: 2024-12-01

## 2024-12-01 RX ADMIN — Medication 10 ML: at 21:23

## 2024-12-01 RX ADMIN — PSYLLIUM HUSK 1 PACKET: 3.4 POWDER ORAL at 12:28

## 2024-12-01 RX ADMIN — ASPIRIN 81 MG: 81 TABLET, COATED ORAL at 09:08

## 2024-12-01 RX ADMIN — METOCLOPRAMIDE HYDROCHLORIDE 10 MG: 5 INJECTION INTRAMUSCULAR; INTRAVENOUS at 16:32

## 2024-12-01 RX ADMIN — MAGNESIUM SULFATE HEPTAHYDRATE 2 G: 40 INJECTION, SOLUTION INTRAVENOUS at 15:19

## 2024-12-01 RX ADMIN — MAGNESIUM SULFATE HEPTAHYDRATE 2 G: 40 INJECTION, SOLUTION INTRAVENOUS at 16:13

## 2024-12-01 RX ADMIN — LOPERAMIDE HYDROCHLORIDE 2 MG: 2 CAPSULE ORAL at 17:46

## 2024-12-01 RX ADMIN — MUPIROCIN 1 APPLICATION: 20 OINTMENT TOPICAL at 20:39

## 2024-12-01 RX ADMIN — Medication 10 ML: at 09:14

## 2024-12-01 RX ADMIN — LEVOTHYROXINE SODIUM 50 MCG: 0.05 TABLET ORAL at 05:48

## 2024-12-01 RX ADMIN — CYANOCOBALAMIN 1000 MCG: 1000 INJECTION, SOLUTION INTRAMUSCULAR; SUBCUTANEOUS at 09:07

## 2024-12-01 RX ADMIN — CYANOCOBALAMIN TAB 500 MCG 1000 MCG: 500 TAB at 09:07

## 2024-12-01 RX ADMIN — Medication 10 ML: at 21:24

## 2024-12-01 RX ADMIN — ATORVASTATIN CALCIUM 40 MG: 40 TABLET, FILM COATED ORAL at 20:39

## 2024-12-01 RX ADMIN — CLOPIDOGREL BISULFATE 75 MG: 75 TABLET ORAL at 09:07

## 2024-12-01 RX ADMIN — FOLIC ACID 1000 MCG: 1 TABLET ORAL at 09:07

## 2024-12-01 RX ADMIN — POTASSIUM CHLORIDE 40 MEQ: 1500 TABLET, EXTENDED RELEASE ORAL at 16:09

## 2024-12-01 RX ADMIN — METOPROLOL SUCCINATE 25 MG: 25 TABLET, FILM COATED, EXTENDED RELEASE ORAL at 09:08

## 2024-12-01 RX ADMIN — PREDNISONE 40 MG: 20 TABLET ORAL at 09:07

## 2024-12-01 RX ADMIN — ACETAMINOPHEN 1000 MG: 500 TABLET, FILM COATED ORAL at 12:28

## 2024-12-01 RX ADMIN — PANTOPRAZOLE SODIUM 40 MG: 40 TABLET, DELAYED RELEASE ORAL at 09:07

## 2024-12-01 RX ADMIN — MAGNESIUM SULFATE HEPTAHYDRATE 2 G: 40 INJECTION, SOLUTION INTRAVENOUS at 12:28

## 2024-12-01 RX ADMIN — METOCLOPRAMIDE HYDROCHLORIDE 10 MG: 5 INJECTION INTRAMUSCULAR; INTRAVENOUS at 05:48

## 2024-12-01 RX ADMIN — MIDODRINE HYDROCHLORIDE 5 MG: 5 TABLET ORAL at 16:32

## 2024-12-01 RX ADMIN — METOCLOPRAMIDE HYDROCHLORIDE 10 MG: 5 INJECTION INTRAMUSCULAR; INTRAVENOUS at 10:50

## 2024-12-01 RX ADMIN — LOPERAMIDE HYDROCHLORIDE 2 MG: 2 CAPSULE ORAL at 20:39

## 2024-12-01 RX ADMIN — MUPIROCIN 1 APPLICATION: 20 OINTMENT TOPICAL at 09:07

## 2024-12-01 RX ADMIN — MESALAMINE 4.8 G: 800 TABLET, DELAYED RELEASE ORAL at 09:06

## 2024-12-01 RX ADMIN — ACETAMINOPHEN 1000 MG: 500 TABLET, FILM COATED ORAL at 05:48

## 2024-12-01 RX ADMIN — SERTRALINE HYDROCHLORIDE 50 MG: 50 TABLET ORAL at 09:06

## 2024-12-01 RX ADMIN — MIDODRINE HYDROCHLORIDE 5 MG: 5 TABLET ORAL at 09:07

## 2024-12-01 RX ADMIN — AVOBENZONE, HOMOSALATE, OCTISALATE, OCTOCRYLENE, AND OXYBENZONE 1 PACKET: 29.4; 147; 49; 25.4; 58.8 LOTION TOPICAL at 20:39

## 2024-12-01 RX ADMIN — POTASSIUM CHLORIDE 40 MEQ: 1500 TABLET, EXTENDED RELEASE ORAL at 12:28

## 2024-12-01 RX ADMIN — MIDODRINE HYDROCHLORIDE 5 MG: 5 TABLET ORAL at 10:50

## 2024-12-01 RX ADMIN — ACETAMINOPHEN 1000 MG: 500 TABLET, FILM COATED ORAL at 20:40

## 2024-12-01 NOTE — PROGRESS NOTES
Hematology/Oncology Inpatient Progress Note    Patient name: Maryann Gaitan  : 1950  MRN: [id number]  Referring Provider: DRU Escalante  Reason for Consultation: Blood loss anemia and need for antiplatelet therapy, need for outpatient transfusion options    Chief complaint: Abdominal pain, rectal bleeding, shortness of breath    History of present illness:    Maryann Gaitan is a 74 y.o. female who presented to [facility]24 with complaints of abdominal pain, rectal bleeding, shortness of breath.  Past medical history of CAD, COPD, hypertension, hypothyroidism, Crohn's disease.  Was recently admitted to the hospital on 2024 for mild rectal bleeding and was found to have an acute Crohn's exacerbation.  She was discharged on prolonged steroid taper however she states that she still has some abdominal pain and has not felt well since discharge.  She did have a drop in her hemoglobin prior to discharge during her previous hospital stay but her hemoglobin was 8.4 g/dL on the day of discharge.  Over the last few days she has continued to have worsening lower abdominal pain with increased chest pain and shortness of breath and much more rectal bleeding than prior.  Of note she did have PCI with stent placement in 2024 and was placed on dual antiplatelet therapy.  This admission she underwent a transcatheter [id number] repair of her mitral valve with MitraClip placed on 2024.  The patient has continued to have gastrointestinal bleeding throughout the admission.    24  Hematology/Oncology was consulted for anemia in the setting of GI bleeding in a patient requiring antiplatelet therapy.    10/11/24  Hematology/Oncology was consulted for anemia and leukopenia  On consult, the patient reports fatigue, chronic abdominal pain/cramping and non-bloody diarrhea but denies unintentional weight loss, fevers, chills, drenching night sweats, lymphadenopathy, BRBPR,  melena, bruising. She denies having any IV iron or blood transfusions.  Per RN pt has not received budesonide yet.    Anemia workup from last admission:  -10/11/2022 reticulocyte numbers 0.72 (normal) however absolute reticulocyte decreased at 0.0182 suggesting either not enough building blocks to make blood or bone marrow dysfunction  -B12 312 (normal), folate 18.3 (normal), iron studies (iron 25 low, iron saturation 13% low) and ferritin (427 high, but is an acute phase reactant) -appears there could still be some component of iron deficiency involved which is not surprising as she has a disease that could cause on again off again bleeding; will likely have to deal with IV iron as what was seen on EGD means she will be unlikely to tolerate oral iron or be able to absorb it as she will likely be on chronic PPIs  -Flow cytometry WNL     10/17/2024: Ferrlecit 250 mg IV x 1 dose    He/She  has a past medical history of Abnormal weight loss (11/21/2024), Acute kidney injury (11/06/2024), Acute UTI (urinary tract infection) (11/06/2024), Anxiety associated with depression (11/06/2024), Benign neoplasm of cecum (07/05/2016), CAD, multiple vessel (10/08/2024), Cavitary lesion of lung (10/08/2024), COPD (chronic obstructive pulmonary disease), Crohn's disease (11/06/2024), 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 (07/05/2016), Dyslipidemia (10/30/2024), Fecal urgency (11/21/2024), First degree hemorrhoids (07/09/2021), GERD (gastroesophageal reflux disease) (11/21/2024), Hashimoto's thyroiditis (11/21/2024), History of colonic polyps (07/09/2021), Hypertension, Hypothyroidism (acquired) (11/06/2024), Mitral regurgitation (10/08/2024), Moderate protein-calorie malnutrition (11/09/2024), Multiple tracheobronchial mucus plugs (10/08/2024), Nicotine dependence (11/21/2024), Rheumatoid arthritis (11/06/2024), S/P mitral valve clip implantation (11/21/2024), Second degree hemorrhoids (07/05/2016), Stress  incontinence, female (11/21/2024), and Vitamin D deficiency, unspecified (11/21/2024).    PCP: Eduard Little MD    11/24/24: pt seen today for initial evaluation. She has ongoing GI bleeding and is receiving PRBC transfusions. She reported having fatigue. Also complained of having some 'worms In stool' and is being worked up for Stool parasites. Had strongyloides infestation noted on EGD/Colonoscopy 1 month ago and was reportedly treated with ivermectin. She is being followed by GI and Cardiology.    INTERVAL HISTORY:  11/26/24: pt seen today for follow up. She is s/p multiple PRBC unit transfusion since admission but has not needed transfusions since yesterday. Hb stable at this time. She however reported having ongoing GI bleeding, this has improved somewhat. being followed by GI team.    She is having active rectal bleeding she is receiving blood and platelets..  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 07/05/2016    Dyslipidemia 10/30/2024    Fecal urgency 11/21/2024    First degree hemorrhoids 07/09/2021    GERD (gastroesophageal reflux disease) 11/21/2024    Hashimoto's thyroiditis 11/21/2024    History of colonic polyps 07/09/2021    Hypertension     Hypothyroidism (acquired) 11/06/2024    Mitral regurgitation 10/08/2024    Moderate protein-calorie malnutrition 11/09/2024    Multiple tracheobronchial mucus plugs  10/08/2024    Nicotine dependence 11/21/2024    Rheumatoid arthritis 11/06/2024    S/P mitral valve clip implantation 11/21/2024    Second degree hemorrhoids 07/05/2016    Stress incontinence, female 11/21/2024    Vitamin D deficiency, unspecified 11/21/2024   ,   Past Surgical History:   Procedure Laterality Date    BRONCHOSCOPY N/A 10/16/2024    Procedure: BRONCHOSCOPY;  Surgeon: Gallo Pope MD;  Location: [facility] ENDOSCOPY;  Service: Pulmonary;  Laterality: N/A;    CARDIAC CATHETERIZATION N/A 10/15/2024    Procedure: Left Heart Cath, possible pci;  Surgeon: Travis Connor MD;  Location: [facility] CATH INVASIVE LOCATION;  Service: Cardiovascular;  Laterality: N/A;    CARDIAC CATHETERIZATION N/A 10/22/2024    Procedure: Laser Coronary Atherectomy;  Surgeon: Travis Connor MD;  Location: [facility] CATH INVASIVE LOCATION;  Service: Cardiovascular;  Laterality: N/A;    COLONOSCOPY N/A 10/12/2024    Procedure: COLONOSCOPY WITH BIOPSY AND WIRE GUIDED BALLOON DILATION OF TERMINAL ILEUM;  Surgeon: Rob Strong MD;  Location: [facility] ENDOSCOPY;  Service: Gastroenterology;  Laterality: N/A;  Colitis, crohns of terminal ileum, right colon ulcers, diverticulosis, hemorroids    ENDOSCOPY N/A 10/12/2024    Procedure: ESOPHAGOGASTRODUODENOSCOPY WITH BIOPSY X 2 AREA;  Surgeon: Rob Strong MD;  Location: [facility] ENDOSCOPY;  Service: Gastroenterology;  Laterality: N/A;  Chronic gastritis, HH    HYSTERECTOMY      LEFT HEART CATH     ,   Family History   Problem Relation Age of Onset    Heart disease Mother     Dementia Mother     Stroke Mother     Heart disease Father     Hypertension Father    ,   Social History     Tobacco Use    Smoking status: Former     Types: Cigarettes    Smokeless tobacco: Never   Vaping Use    Vaping status: Never Used   Substance Use Topics    Alcohol use: Never    Drug use: Never   ,   Medications Prior to Admission   Medication Sig Dispense Refill Last  Dose/Taking    Adalimumab (Humira, 2 Pen,) 40 MG/0.4ML Pen-injector Kit Inject 40 mg under the skin into the appropriate area as directed 1 (One) Time Per Week.    Indications: Rheumatoid Arthritis   Past Week    albuterol (PROVENTIL) (2.5 MG/3ML) 0.083% nebulizer solution Take 2.5 mg by nebulization Every 6 (Six) Hours As Needed for Wheezing. Indications: Spasm of Lung Air Passages   Taking As Needed    amLODIPine (NORVASC) 10 MG tablet Take 1 tablet by mouth Daily.   Taking    aspirin 81 MG EC tablet Take 1 tablet by mouth Daily for 30 days. Indications: Disease involving Lipid Deposits in the Arteries 30 tablet 0 Taking    [] atorvastatin (LIPITOR) 40 MG tablet Take 1 tablet by mouth Every Night for 30 days. 30 tablet 0 Taking    cholecalciferol (VITAMIN D3) 1.25 MG (03742 UT) capsule Take 1 capsule by mouth 2 (Two) Times a Week. Wed, Sat  Indications: Vitamin D Deficiency   Past Week    [] clopidogrel (PLAVIX) 75 MG tablet Take 1 tablet by mouth Daily for 30 days. 30 tablet 0 Taking    diclofenac (VOLTAREN) 50 MG EC tablet Take 1 tablet by mouth 2 (Two) Times a Day.   Taking    folic acid (FOLVITE) 1 MG tablet Take 1 tablet by mouth Daily. Indications: Anemia From Inadequate Folic Acid   Taking    levothyroxine (SYNTHROID, LEVOTHROID) 50 MCG tablet Take 1 tablet by mouth Daily. Indications: Underactive Thyroid   Taking    Melatonin (Melatonin Extra Strength) 10 MG tablet Take 1 tablet by mouth As Needed. Indications: Trouble Sleeping   Taking As Needed    methotrexate 2.5 MG tablet Take 6 tablets by mouth 1 (One) Time Per Week.    Indications: Non-oncologic   Past Week    metoprolol tartrate (LOPRESSOR) 50 MG tablet Take 1 tablet by mouth 2 (Two) Times a Day.   Taking    midodrine (PROAMATINE) 10 MG tablet Take 1 tablet by mouth 3 (Three) Times a Day Before Meals for 30 days. 90 tablet 0 Taking    pantoprazole (PROTONIX) 20 MG EC tablet Take 1 tablet by mouth Daily. Indications:  Heartburn   Taking    predniSONE (DELTASONE) 10 MG tablet Take 4 tablets by mouth Daily for 7 days, THEN 3.5 tablets Daily for 7 days, THEN 3 tablets Daily for 7 days, THEN 2.5 tablets Daily for 7 days, THEN 2 tablets Daily for 7 days, THEN 1.5 tablets Daily for 7 days, THEN 1 tablet Daily for 7 days, THEN 0.5 tablets Daily for 7 days. Indications: Crohn's Disease 126 tablet 0 Taking    sertraline (ZOLOFT) 50 MG tablet Take 1 tablet by mouth Daily. Indications: Major Depressive Disorder   Taking    spironolactone (ALDACTONE) 25 MG tablet Take 1 tablet by mouth Daily. Indications: Edema   Taking    upadacitinib ER (Rinvoq) 45 MG tablet sustained-release 24 hour extended release tablet Take 1 tablet by mouth Daily. Indications: Rheumatoid Arthritis   Taking   , Scheduled Meds:  acetaminophen, 650 mg, Oral, Once   Or  acetaminophen, 650 mg, Oral, Once   Or  acetaminophen, 650 mg, Rectal, Once  atorvastatin, 40 mg, Oral, Nightly  clopidogrel, 75 mg, Oral, Daily  cyanocobalamin, 1,000 mcg, Intramuscular, Daily  diphenhydrAMINE, 25 mg, Oral, Once   Or  diphenhydrAMINE, 25 mg, Intravenous, Once  folic acid, 1,000 mcg, Oral, Daily  levothyroxine, 50 mcg, Oral, Daily  mesalamine, 4.8 g, Oral, Daily  methotrexate, 15 mg, Oral, Weekly  methylPREDNISolone sodium succinate, 20 mg, Intravenous, Q8H  metoprolol succinate XL, 25 mg, Oral, Q24H  midodrine, 5 mg, Oral, TID AC  pantoprazole, 40 mg, Oral, Daily  sertraline, 50 mg, Oral, Daily  sodium chloride, 10 mL, Intravenous, Q12H  upadacitinib ER, 45 mg, Oral, Daily    , Continuous Infusions:   , PRN Meds:    acetaminophen    senna-docusate sodium **AND** polyethylene glycol **AND** bisacodyl **AND** bisacodyl    diphenhydrAMINE    nitroglycerin    ondansetron ODT **OR** ondansetron    [COMPLETED] Insert Peripheral IV **AND** sodium chloride    sodium chloride    sodium chloride   Allergies:  Codeine and Penicillin g sodium    Subjective     ROS:  Review of Systems     Objective  "  Vital Signs:   BP 98/54 (BP Location: Left leg, Patient Position: Lying)   Pulse 69   Temp 97.8 °F (36.6 °C) (Oral)   Resp 16   Ht 162.6 cm (64\")   Wt 54 kg (119 lb)   SpO2 99%   BMI 20.43 kg/m²     Physical Exam: (performed by MD)  Physical Exam    Post op abdomen noted    Results Review:  Lab Results (last 48 hours)       Procedure Component Value Units Date/Time    CBC (No Diff) [[id number]]  (Abnormal) Collected: 11/24/24 0241    Specimen: Blood Updated: 11/24/24 0316     WBC 14.90 10*3/mm3      RBC 1.84 10*6/mm3      Hemoglobin 5.5 g/dL      Hematocrit 17.6 %      MCV 95.7 fL      MCH 29.9 pg      MCHC 31.3 g/dL      RDW 18.7 %      RDW-SD 63.2 fl      MPV 9.7 fL      Platelets 197 10*3/mm3     Comprehensive Metabolic Panel [[id number]]  (Abnormal) Collected: 11/24/24 0211    Specimen: Blood Updated: 11/24/24 0251     Glucose 122 mg/dL      BUN 20 mg/dL      Creatinine 0.63 mg/dL      Sodium 139 mmol/L      Potassium 4.6 mmol/L      Chloride 108 mmol/L      CO2 18.1 mmol/L      Calcium 7.8 mg/dL      Total Protein 3.9 g/dL      Albumin 2.5 g/dL      ALT (SGPT) 30 U/L      AST (SGOT) 23 U/L      Alkaline Phosphatase 57 U/L      Total Bilirubin 0.2 mg/dL      Globulin 1.4 gm/dL      A/G Ratio 1.8 g/dL      BUN/Creatinine Ratio 31.7     Anion Gap 12.9 mmol/L      eGFR 93.2 mL/min/1.73     Narrative:      GFR Normal >60  Chronic Kidney Disease <60  Kidney Failure <15    The GFR formula is only valid for adults with stable renal function between ages 18 and 70.    C-reactive Protein [[id number]]  (Normal) Collected: 11/24/24 0211    Specimen: Blood Updated: 11/24/24 0251     C-Reactive Protein 0.40 mg/dL     Ova & Parasite Examination - Stool, Per Rectum [[id number]] Collected: 11/23/24 1401    Specimen: Stool from Per Rectum Updated: 11/23/24 1411    Comprehensive Metabolic Panel [[id number]]  (Abnormal) Collected: 11/23/24 0411    Specimen: Blood from Arm, Right Updated: 11/23/24 0447     Glucose 128 mg/dL     "  BUN 19 mg/dL      Creatinine 0.55 mg/dL      Sodium 141 mmol/L      Potassium 3.9 mmol/L      Chloride 108 mmol/L      CO2 22.3 mmol/L      Calcium 8.3 mg/dL      Total Protein 4.5 g/dL      Albumin 2.8 g/dL      ALT (SGPT) 25 U/L      AST (SGOT) 18 U/L      Alkaline Phosphatase 76 U/L      Total Bilirubin 0.2 mg/dL      Globulin 1.7 gm/dL      A/G Ratio 1.6 g/dL      BUN/Creatinine Ratio 34.5     Anion Gap 10.7 mmol/L      eGFR 96.3 mL/min/1.73     Narrative:      GFR Normal >60  Chronic Kidney Disease <60  Kidney Failure <15    The GFR formula is only valid for adults with stable renal function between ages 18 and 70.    C-reactive Protein [[id number]]  (Abnormal) Collected: 11/23/24 0411    Specimen: Blood from Arm, Right Updated: 11/23/24 0447     C-Reactive Protein 1.25 mg/dL     CBC (No Diff) [[id number]]  (Abnormal) Collected: 11/23/24 0411    Specimen: Blood from Arm, Right Updated: 11/23/24 0418     WBC 12.82 10*3/mm3      RBC 2.92 10*6/mm3      Hemoglobin 8.7 g/dL      Hematocrit 27.6 %      MCV 94.5 fL      MCH 29.8 pg      MCHC 31.5 g/dL      RDW 19.0 %      RDW-SD 64.4 fl      MPV 9.5 fL      Platelets 284 10*3/mm3     Hemoglobin & Hematocrit, Blood [[id number]]  (Abnormal) Collected: 11/22/24 2323    Specimen: Blood Updated: 11/22/24 2328     Hemoglobin 8.6 g/dL      Hematocrit 27.3 %     Hemoglobin & Hematocrit, Blood [[id number]]  (Abnormal) Collected: 11/22/24 1758    Specimen: Blood Updated: 11/22/24 1810     Hemoglobin 8.9 g/dL      Hematocrit 27.3 %              Pending Results:     Imaging Reviewed:   XR Chest 1 View    Result Date: 11/25/2024  Impression: No active pulmonary process. Electronically Signed: Jameson Rainey MD  11/25/2024 9:25 AM EST  Workstation ID: [id number]    CT Abdomen Pelvis With Contrast    Result Date: 11/20/2024  Impression: 1.Distal/terminal ileitis in keeping with patient's history of Crohn's disease. No definitive active colonic inflammation identified on CT imaging.  Trace free fluid in the pelvis is likely related. 2.Diffuse colonic fatty mural infiltration which can be seen in the setting of chronic inflammatory bowel disease. 3.Other incidental nonemergent findings detailed above. Electronically Signed: Junior Khan MD  11/20/2024 8:19 AM EST  Workstation ID: [id number]    XR Chest 1 View    Result Date: 11/19/2024  Impression: No acute chest finding. Electronically Signed: Ute Snider MD  11/19/2024 1:34 PM EST  Workstation ID: [id number]          Assessment & Plan     Normocytic anemia  -Hemoglobin 5.5 g/dL, MCV 95.7  -Patient with an acute drop in her hemoglobin overnight from 8.7 g/dL.  Reported experiencing significant gastrointestinal bleeding  -Has received 3 units of PRBC this admission and 2 units ordered today due to hemoglobin of 5.5 g/dL  -Recent PCI with stent in October 2024.  Her aspirin has been held but she has been continued on Plavix  -Received Ferrlecit 250 mg IV x 1 dose during her prior admission, continue IV iron replacement  -Continue IV iron replacement with Ferrlecit 250 mg IV X 3 additional doses.  -On daily folic acid due to methotrexate use  -Check vitamin B12 level, reported low (227), s/p 1 dose of IM B12 supplementation, continue oral B12  -Monitor CBC and recommend transfuse for hemoglobin less than 7.0 g/dL    Hemoglobin today 6.8 receiving blood platelets due to recent Plavix    She had emergency EGD for rectal bleeding 11/28/24    Status post  colon resection 11/28/24    Continue post op care    Daily CBCs    Defer to Cardiology regarding antiplatelet therapy management.    Acute GI bleed  -In the setting of Crohn's disease, possible exacerbation  -Gastroenterology following and patient being treated with IV steroids, Rinvoq  -Recent strongyloides of the stomach and duodenum treated with ivermectin.  Now with reported worms visualized in stools and stool O&P sent, this was however reported negative.  -ID team is following the  patient.    Hemoglobin is 8.8g/dl      Discussed with patient    Counts are stable    Will continue to follow.        Thank you for this consult. We will be happy to follow along with you.         Electronically signed by Nicolle Mitchell MD, 12/01/24, 12:52 PM EST.

## 2024-12-01 NOTE — PLAN OF CARE
Goal Outcome Evaluation:      Mild pain reported in abdomen, tylenol scheduled with relief. No sob, RA. Small amount of dark red output from RUQ ostomy, 1000ml of green liquid output from RLQ ostomy, whole undigested pills noted with ostomy care. 500 ml of tea colored urine, pt states color is normal for her at home, cr 0.66 from previous labs. Call light in reach, can make own needs known.

## 2024-12-01 NOTE — PROGRESS NOTES
General Surgery Progress Note    Name: Maryann Gaitan ADMIT: 2024   : 1950  PCP: Eduard Little MD    MRN: [id number] LOS: 12 days   AGE/SEX: 74 y.o. female  ROOM:    [facility]    Chief Complaint   Patient presents with    Chest Pain     Subjective     Patient seen examined.  Vital signs stable, afebrile.  Appears more oriented this morning.  Reports feeling well, no complaints.  Pain is controlled.  Having minimal pain at time of exam.  Ostomy with 1050 mL of output x 24 hours.    Objective     Scheduled Medications:   acetaminophen, 1,000 mg, Oral, Q8H  aspirin, 81 mg, Oral, Daily  atorvastatin, 40 mg, Oral, Nightly  clopidogrel, 75 mg, Oral, Daily  cyanocobalamin, 1,000 mcg, Intramuscular, Daily  folic acid, 1,000 mcg, Oral, Daily  levothyroxine, 50 mcg, Oral, Q AM  mesalamine, 4.8 g, Oral, Daily  methotrexate, 15 mg, Oral, Weekly  metoclopramide, 10 mg, Intravenous, Q6H  metoprolol succinate XL, 25 mg, Oral, Q24H  midodrine, 5 mg, Oral, TID AC  mupirocin, 1 Application, Each Nare, BID  pantoprazole, 40 mg, Oral, Daily  predniSONE, 40 mg, Oral, Daily With Breakfast  sertraline, 50 mg, Oral, Daily  sodium chloride, 10 mL, Intravenous, Q12H  sodium chloride, 10 mL, Intravenous, Q12H  upadacitinib ER, 45 mg, Oral, Daily  vitamin B-12, 1,000 mcg, Oral, Daily        Active Infusions:       As Needed Medications:    senna-docusate sodium **AND** polyethylene glycol **AND** bisacodyl **AND** bisacodyl    Calcium Replacement - Follow Nurse / BPA Driven Protocol    diphenhydrAMINE    HYDROmorphone    Magnesium Standard Dose Replacement - Follow Nurse / BPA Driven Protocol    nitroglycerin    ondansetron ODT **OR** ondansetron    oxyCODONE    Phosphorus Replacement - Follow Nurse / BPA Driven Protocol    Potassium Replacement - Follow Nurse / BPA Driven Protocol    [COMPLETED] Insert Peripheral IV **AND** sodium chloride    sodium chloride    sodium chloride    sodium chloride    sodium  chloride    Vital Signs  Vital Signs Patient Vitals for the past 24 hrs:   BP Temp Temp src Pulse Resp SpO2 Weight   12/01/24 0907 124/55 -- -- 83 -- -- --   12/01/24 0900 124/55 98.4 °F (36.9 °C) Oral 84 21 97 % --   12/01/24 0504 123/61 98.6 °F (37 °C) Oral 74 21 97 % 57.7 kg (127 lb 3.3 oz)   12/01/24 0150 117/67 98.3 °F (36.8 °C) Oral 69 22 98 % --   11/30/24 2115 124/51 97.7 °F (36.5 °C) Oral 66 12 99 % --   11/30/24 1723 116/61 97.8 °F (36.6 °C) Oral 64 20 99 % --   11/30/24 1719 120/58 -- -- 66 -- -- --   11/30/24 1317 120/58 97.6 °F (36.4 °C) Oral 71 22 98 % --     I/O:  I/O last 3 completed shifts:  In: 720 [P.O.:720]  Out: 1550 [Urine:200; Stool:1350]    Physical Exam:  Physical Exam  Constitutional:       General: She is not in acute distress.  Cardiovascular:      Rate and Rhythm: Normal rate.   Pulmonary:      Effort: Pulmonary effort is normal. No respiratory distress.   Abdominal:      General: There is no distension.      Palpations: Abdomen is soft.      Tenderness: There is abdominal tenderness. There is no guarding.      Comments: Stomas with appliance in place.  Ileostomy with stool in appliance.  Scant amount of blood noted in mucous fistula   Neurological:      Mental Status: She is alert. Mental status is at baseline.      Comments: Pleasantly confused   Psychiatric:         Mood and Affect: Mood normal.         Behavior: Behavior normal.         Results Review:     CBC    Results from last 7 days   Lab Units 12/01/24  0436 11/30/24  0115 11/29/24  2235 11/29/24  1749 11/29/24  1155 11/29/24  0510 11/28/24  2315 11/28/24  1519 11/28/24  0753 11/28/24  0206 11/27/24  1756 11/27/24  0618   WBC 10*3/mm3 17.13* 15.71*  --   --   --  12.42*  --   --  16.07* 12.15* 10.11 13.14*   HEMOGLOBIN g/dL 8.8* 9.3* 10.1* 10.5* 10.1* 10.7* 11.1*   < > 6.8* 7.9* 9.3* 6.4*   HEMOGLOBIN, POC   --   --   --   --   --   --   --    < >  --   --   --   --    PLATELETS 10*3/mm3 91* 88*  --   --   --  101*  --   --   112* 102* 110* 91*    < > = values in this interval not displayed.     BMP   Results from last 7 days   Lab Units 12/01/24  0436 11/29/24  0510 11/28/24  0206 11/27/24  0532 11/26/24  0424 11/25/24  0303   SODIUM mmol/L 138 136 139 136 136 138   POTASSIUM mmol/L 3.5 4.0 3.9 4.7 3.8 4.0   CHLORIDE mmol/L 108* 108* 109* 110* 109* 109*   CO2 mmol/L 23.3 20.9* 23.0 18.6* 19.8* 16.8*   BUN mg/dL 17 20 16 18 27* 23   CREATININE mg/dL 0.53* 0.66 0.51* 0.50* 0.69 0.69   GLUCOSE mg/dL 72 96 95 98 85 172*   MAGNESIUM mg/dL 1.4*  --   --   --   --   --      Radiology(recent) No radiology results for the last day    I reviewed the patient's new clinical results.    Assessment & Plan       Acute lower GI bleeding    CAD, multiple vessel    Acute heart failure with preserved ejection fraction (HFpEF)    Severe mitral regurgitation    Dyslipidemia    Crohn's disease    Hypothyroidism (acquired)    Anxiety associated with depression    COPD (chronic obstructive pulmonary disease)    Rheumatoid arthritis    Moderate protein-calorie malnutrition    Primary hypertension    S/P mitral valve clip implantation    Gastrointestinal hemorrhage    Anemia      74 y.o. female POD 3 status post right hemicolectomy with ileostomy    -Diet as tolerated  -Antiemetics and pain medication as needed  -Encourage ambulation, out of bed to chair  -PT consulted  -Use incentive spirometer bedside 10 times an hour while awake  -Continue to monitor stomas for bleeding.  If large volume of bleeding is observed from mucous fistula or rectum, she will need a stat CTA and Dr. Babin will need to be immediately notified.  -Continue to trend hemoglobin  -Monitor ileostomy output, will add daily fiber          This note was created using Dragon Voice Recognition software.    JENNIFER Morales  12/01/24  12:06 EST

## 2024-12-01 NOTE — PLAN OF CARE
Goal Outcome Evaluation:  Plan of Care Reviewed With: patient        Progress: no change  Outcome Evaluation: Pt is a 73 y/o F admitted to [facility] 11/19/24 with abdominal pain, chest pain, dyspnea and rectal bleeding. Pt recently admitted 11/6 with rectal bleeding, found to have acute Crohn's exacerbation. During initial OT evaluation on 11/20/24, pt required max A for LB ADLS and toileting, mod A for ADL transfers and demo decreased activity tolerance needed for ADL routine. Pt was recommended home with 24/7 assist and HHOT. Pt is re-evaluated this date, POD#3 R hemicolectomy with ileostomy by Dr. Babin, also underwent MVR by Dr. Navarro 11/21/24. Pt oriented x3, requires cues for time, c/o 8/10 abdominal pain. Pt requires education on log roll, requiring mod A for bed mobility. Pt comes to standing with mod A and requires min A to transfer from bed to chair. Anticipate pt to require increased assist with ADL routine and mobility, limited by trunk flexion and secondary to abdominal incision. OT changing dc recommendation from home to SNF when medically appropriate for dc, will follow and progress as appropriate.    Anticipated Discharge Disposition (OT): skilled nursing facility

## 2024-12-01 NOTE — PROGRESS NOTES
CARDIOLOGY PROGRESS NOTE:    Maryann Gaitan  74 y.o.  female  1950  [id number]      Referring Provider: Hospitalist    Reason for follow-up: CAD and GI bleed     Patient Care Team:  Eduard Little MD as PCP - General (Family Medicine)  Niya Mendoza APRN as Nurse Practitioner (Cardiology)    Subjective .  Patient doing much better this morning without any symptoms    Objective lying in bed comfortable     Review of Systems   Constitutional: Negative for fever and malaise/fatigue.   HENT:  Negative for ear pain and nosebleeds.    Eyes:  Negative for blurred vision and double vision.   Cardiovascular:  Negative for chest pain, dyspnea on exertion and palpitations.   Respiratory:  Negative for cough and shortness of breath.    Skin:  Negative for rash.   Musculoskeletal:  Negative for joint pain.   Gastrointestinal:  Negative for abdominal pain, nausea and vomiting.   Neurological:  Negative for focal weakness and headaches.   Psychiatric/Behavioral:  Negative for depression. The patient is not nervous/anxious.    All other systems reviewed and are negative.      Allergies: Codeine and Penicillin g sodium    Scheduled Meds:acetaminophen, 1,000 mg, Oral, Q8H  aspirin, 81 mg, Oral, Daily  atorvastatin, 40 mg, Oral, Nightly  clopidogrel, 75 mg, Oral, Daily  cyanocobalamin, 1,000 mcg, Intramuscular, Daily  folic acid, 1,000 mcg, Oral, Daily  levothyroxine, 50 mcg, Oral, Q AM  mesalamine, 4.8 g, Oral, Daily  methotrexate, 15 mg, Oral, Weekly  metoclopramide, 10 mg, Intravenous, Q6H  metoprolol succinate XL, 25 mg, Oral, Q24H  midodrine, 5 mg, Oral, TID AC  mupirocin, 1 Application, Each Nare, BID  pantoprazole, 40 mg, Oral, Daily  predniSONE, 40 mg, Oral, Daily With Breakfast  sertraline, 50 mg, Oral, Daily  sodium chloride, 10 mL, Intravenous, Q12H  sodium chloride, 10 mL, Intravenous, Q12H  upadacitinib ER, 45 mg, Oral, Daily  vitamin B-12, 1,000 mcg, Oral, Daily      Continuous Infusions:   PRN Meds:.   "senna-docusate sodium **AND** polyethylene glycol **AND** bisacodyl **AND** bisacodyl    diphenhydrAMINE    HYDROmorphone    nitroglycerin    ondansetron ODT **OR** ondansetron    oxyCODONE    [COMPLETED] Insert Peripheral IV **AND** sodium chloride    sodium chloride    sodium chloride    sodium chloride    sodium chloride        VITAL SIGNS  Vitals:    12/01/24 0150 12/01/24 0504 12/01/24 0900 12/01/24 0907   BP: 117/67 123/61 124/55 124/55   BP Location: Right arm Right arm Right arm    Patient Position: Lying Lying Lying    Pulse: 69 74 84 83   Resp: 22 21 21    Temp: 98.3 °F (36.8 °C) 98.6 °F (37 °C) 98.4 °F (36.9 °C)    TempSrc: Oral Oral Oral    SpO2: 98% 97% 97%    Weight:  57.7 kg (127 lb 3.3 oz)     Height:           Flowsheet Rows      Flowsheet Row First Filed Value   Admission Height 162.6 cm (64\") Documented at 11/19/2024 1207   Admission Weight 45.4 kg (100 lb) Documented at 11/19/2024 1207             TELEMETRY: Sinus rhythm    Physical Exam:  Constitutional:       Appearance: Well-developed.   Eyes:      General: No scleral icterus.     Conjunctiva/sclera: Conjunctivae normal.      Pupils: Pupils are equal, round, and reactive to light.   HENT:      Head: Normocephalic and atraumatic.   Neck:      Vascular: No carotid bruit or JVD.   Pulmonary:      Effort: Pulmonary effort is normal.      Breath sounds: Normal breath sounds. No wheezing. No rales.   Cardiovascular:      Normal rate. Regular rhythm.   Pulses:     Intact distal pulses.   Abdominal:      General: Bowel sounds are normal.      Palpations: Abdomen is soft.   Musculoskeletal: Normal range of motion.      Cervical back: Normal range of motion and neck supple. Skin:     General: Skin is warm and dry.      Findings: No rash.   Neurological:      Mental Status: Alert.      Comments: No focal deficits          Results Review:   I reviewed the patient's new clinical results.  Lab Results (last 24 hours)       Procedure Component Value Units " Date/Time    C-reactive Protein [[id number]]  (Abnormal) Collected: 12/01/24 0436    Specimen: Blood Updated: 12/01/24 0519     C-Reactive Protein 10.90 mg/dL     Basic Metabolic Panel [[id number]]  (Abnormal) Collected: 12/01/24 0436    Specimen: Blood Updated: 12/01/24 0519     Glucose 72 mg/dL      BUN 17 mg/dL      Creatinine 0.53 mg/dL      Sodium 138 mmol/L      Potassium 3.5 mmol/L      Chloride 108 mmol/L      CO2 23.3 mmol/L      Calcium 7.8 mg/dL      BUN/Creatinine Ratio 32.1     Anion Gap 6.7 mmol/L      eGFR 97.2 mL/min/1.73     Narrative:      GFR Normal >60  Chronic Kidney Disease <60  Kidney Failure <15    The GFR formula is only valid for adults with stable renal function between ages 18 and 70.    Magnesium [[id number]]  (Abnormal) Collected: 12/01/24 0436    Specimen: Blood Updated: 12/01/24 0519     Magnesium 1.4 mg/dL     CBC & Differential [[id number]]  (Abnormal) Collected: 12/01/24 0436    Specimen: Blood Updated: 12/01/24 0504    Narrative:      The following orders were created for panel order CBC & Differential.  Procedure                               Abnormality         Status                     ---------                               -----------         ------                     CBC Auto Differential[[id number]]        Abnormal            Final result                 Please view results for these tests on the individual orders.    CBC Auto Differential [[id number]]  (Abnormal) Collected: 12/01/24 0436    Specimen: Blood Updated: 12/01/24 0504     WBC 17.13 10*3/mm3      RBC 3.10 10*6/mm3      Hemoglobin 8.8 g/dL      Hematocrit 28.4 %      MCV 91.6 fL      MCH 28.4 pg      MCHC 31.0 g/dL      RDW 16.0 %      RDW-SD 51.8 fl      MPV 11.0 fL      Platelets 91 10*3/mm3      Neutrophil % 87.9 %      Lymphocyte % 8.5 %      Monocyte % 2.5 %      Eosinophil % 0.2 %      Basophil % 0.1 %      Immature Grans % 0.8 %      Neutrophils, Absolute 15.05 10*3/mm3      Lymphocytes, Absolute 1.46 10*3/mm3       Monocytes, Absolute 0.43 10*3/mm3      Eosinophils, Absolute 0.04 10*3/mm3      Basophils, Absolute 0.02 10*3/mm3      Immature Grans, Absolute 0.13 10*3/mm3      nRBC 0.0 /100 WBC             Imaging Results (Last 24 Hours)       ** No results found for the last 24 hours. **            EKG      I personally viewed and interpreted the patient's EKG/Telemetry data:    ECHOCARDIOGRAM:  Results for orders placed during the hospital encounter of 11/19/24    Adult Transthoracic Echo Limited W/ Cont if Necessary Per Protocol    Interpretation Summary    Left ventricular ejection fraction appears to be 56 - 60%.    There is a MitraClip mitral valve repair present.    There is trace residual MR.  Mean gradient is 4.6 mmHg       STRESS MYOVIEW:       CARDIAC CATHETERIZATION:  Results for orders placed during the hospital encounter of 11/19/24    Cardiac Catheterization/Vascular Study    Conclusion    Dmitri Navarro MD    Procedures performed:  Ultrasound guided venous access right common femoral vein  Transseptal left heart catheterization  Transcatheter [id number] repair of mitral valve MitraClip NT    Procedure in details  Under US guidance and using a micropuncture access kit, a 7F introducer was placed into the right femoral vein. The venotomy was preclosed using 2 Perclose Proglide devices. An 16F introducer sheath was placed in the right femoral vein.  Under ultrasound guidance and using a micropuncture access kit a 5 [place] citizen introducer was placed in the left common femoral  artery.    The Sylva sheath was advanced into the superior vena cava over an 0.035 wire. Under GONZÁLEZ and fluoroscopic guidance, successful transseptal puncture was performed using a [employer] versa cross system. Correct position in the left atrium was confirmed by pressure tracing.    GONZÁLEZ will be dictated separately by Dr. Gurrola    Heparin was administered and ACTs were followed with additional heparin being given to keep the ACT >  300.  [place] curved wire was advanced through the [place] sheath into the left upper pulmonary vein. The sheath was then replaced with the MitraClip Delivery System/Sheath (CDS). Under fluoroscopic and GONZÁLEZ guidance, a MitraClip (G4 NT) was positioned across the mitral valve and deployed per protocol at A2/P2 position. After the first clip, there was significant reduction in the degree of mitral regurgitation. MR reduced from 4+ to 1+.  There was residual regurgitation jet on the lateral aspect of the clip.  However there was no posterior valve length to appropriately grasp the leaflet in that location.  Overall very challenging case due to significant amount of calcification around the posterior valve leaflet.  Multiple attempts were made during the procedure to grasp the leaflet.  Final gradient across the mitral valve was 4-5 mmHg.  All pulmonic veins were assessed and no longer showed flow reversal.  The delivery sheath was removed and the pre-positioned Perclose devices were placed with successful hemostasis.  The patient was extubated and transferred to the recovery area in stable condition.    Recommendations:  Resume anticoagulation.  Uptitrate GDMT    Electronically signed by Dmitri Navarro MD, 11/21/24, 10:09 AM EST.       OTHER:         Assessment & Plan     Acute lower GI bleeding  Patient presented with blood in stools and had significant anemia and had upper GI which showed no evidence of bleeding  Patient was seen by surgeons and underwent right hemicolectomy with ileostomy  Patient's Plavix was held which will be restarted once surgeons are okay.  Discussed with patient about the risk of holding Plavix including stent thrombosis.  Surgeons to decide on when to restart her Plavix because of high risk of stent thrombosis.     Acute blood loss anemia  Patient had received blood but now she will be monitored for her hemoglobin and hematocrit.     Coronary disease  Patient had recent stent placement to the  LAD and hence she needs to be on aspirin Plavix for at least Plavix for now until she stabilizes and will continue her other medical therapy including beta-blockers and statins.  Patient is doing well without any symptoms for now.  Patient started on aspirin Plavix and is doing well without any bleeding problems.     Mitral regurgitation  Patient had a mitral valve clip procedure and is currently stable with normal LV systolic function    COPD  Patient is history of COPD but is oxygenating very well.    Hyperlipidemia  Patient is currently on statins and the lipid levels are followed by primary care doctor    Hypertension  Patient blood pressure currently stable on medical therapy    I discussed the patients findings and my recommendations with patient and nurse    Hunter Cruz MD  12/01/24  10:59 EST

## 2024-12-01 NOTE — THERAPY RE-EVALUATION
Patient Name: Maryann Gaitan  : 1950    MRN: [id number]                              Today's Date: 2024       Admit Date: 2024    Visit Dx:     ICD-10-CM ICD-9-CM   1. Gastrointestinal hemorrhage, unspecified gastrointestinal hemorrhage type  K92.2 578.9   2. Anemia, unspecified type  D64.9 285.9   3. Chest pain, unspecified type  R07.9 786.50   4. Severe mitral regurgitation  I34.0 424.0   5. Chronic heart failure with preserved ejection fraction (HFpEF)  I50.32 428.9   6. S/P mitral valve clip implantation  Z98.890 V45.89    Z95.818 V45.09   7. Crohn disease  K50.90 555.9     Patient Active Problem List   Diagnosis    Mitral regurgitation    Cavitary lesion of lung    CAD, multiple vessel    Acute heart failure with preserved ejection fraction (HFpEF)    Severe mitral regurgitation    Dyslipidemia    Crohn's disease    Hypothyroidism (acquired)    Anxiety associated with depression    COPD (chronic obstructive pulmonary disease)    Rheumatoid arthritis    Moderate protein-calorie malnutrition    Acute lower GI bleeding    First degree hemorrhoids    Benign neoplasm of cecum    GERD (gastroesophageal reflux disease)    Hashimoto's thyroiditis    History of colonic polyps    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    Fecal urgency    Second degree hemorrhoids    Vitamin D deficiency, unspecified    Stress incontinence, female    Aphthae, oral    Hx of seborrheic keratosis    Primary hypertension    S/P mitral valve clip implantation    Gastrointestinal hemorrhage    Anemia     Past Medical History:   Diagnosis Date    Abnormal weight loss 2024    Acute kidney injury 2024    Acute UTI (urinary tract infection) 2024    Anxiety associated with depression 2024    Benign neoplasm of cecum 2016    CAD, multiple vessel 10/08/2024    Cavitary lesion of lung 10/08/2024    COPD (chronic obstructive pulmonary disease)     Crohn's disease 2024    Dvrtclos  of lg int w/o perforation or 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 07/05/2016    Dyslipidemia 10/30/2024    Fecal urgency 11/21/2024    First degree hemorrhoids 07/09/2021    GERD (gastroesophageal reflux disease) 11/21/2024    Hashimoto's thyroiditis 11/21/2024    History of colonic polyps 07/09/2021    Hypertension     Hypothyroidism (acquired) 11/06/2024    Mitral regurgitation 10/08/2024    Moderate protein-calorie malnutrition 11/09/2024    Multiple tracheobronchial mucus plugs 10/08/2024    Nicotine dependence 11/21/2024    Rheumatoid arthritis 11/06/2024    S/P mitral valve clip implantation 11/21/2024    Second degree hemorrhoids 07/05/2016    Stress incontinence, female 11/21/2024    Vitamin D deficiency, unspecified 11/21/2024     Past Surgical History:   Procedure Laterality Date    BRONCHOSCOPY N/A 10/16/2024    Procedure: BRONCHOSCOPY;  Surgeon: Gallo Pope MD;  Location: [facility] ENDOSCOPY;  Service: Pulmonary;  Laterality: N/A;    CARDIAC CATHETERIZATION N/A 10/15/2024    Procedure: Left Heart Cath, possible pci;  Surgeon: Travis Connor MD;  Location: [facility] CATH INVASIVE LOCATION;  Service: Cardiovascular;  Laterality: N/A;    CARDIAC CATHETERIZATION N/A 10/22/2024    Procedure: Laser Coronary Atherectomy;  Surgeon: Travis Connor MD;  Location: [facility] CATH INVASIVE LOCATION;  Service: Cardiovascular;  Laterality: N/A;    COLON RESECTION Right 11/28/2024    Procedure: RIGHT HEMICOLECTOMY WITH ILEOSTOMY;  Surgeon: Todd Babin MD;  Location: [facility] MAIN OR;  Service: General;  Laterality: Right;    COLONOSCOPY N/A 10/12/2024    Procedure: COLONOSCOPY WITH BIOPSY AND WIRE GUIDED BALLOON DILATION OF TERMINAL ILEUM;  Surgeon: Rob Strong MD;  Location: [facility] ENDOSCOPY;  Service: Gastroenterology;  Laterality: N/A;  Colitis, crohns of terminal ileum, right colon ulcers, diverticulosis, hemorroids    ENDOSCOPY N/A 10/12/2024    Procedure: ESOPHAGOGASTRODUODENOSCOPY WITH BIOPSY X  2 AREA;  Surgeon: Rob Strong MD;  Location: [facility] ENDOSCOPY;  Service: Gastroenterology;  Laterality: N/A;  Chronic gastritis, HH    ENDOSCOPY Left 11/28/2024    Procedure: ESOPHAGOGASTRODUODENOSCOPY;  Surgeon: Dallin Delgado MD;  Location: [facility] ENDOSCOPY;  Service: Gastroenterology;  Laterality: Left;  small hiatal hernia    HYSTERECTOMY      LEFT HEART CATH        General Information       Row Name 12/01/24 1401          OT Time and Intention    Document Type re-evaluation  -MS     Mode of Treatment occupational therapy  -MS     Patient Effort good  -MS       Row Name 12/01/24 1401          General Information    Patient Profile Reviewed yes  -MS     Prior Level of Function mod assist:;max assist:;ADL's;all household mobility  -MS     Existing Precautions/Restrictions fall;oxygen therapy device and L/min  abdominal sparing, colostomy  -MS     Barriers to Rehab medically complex;previous functional deficit;cognitive status  -MS       Row Name 12/01/24 1401          Occupational Profile    Reason for Services/Referral (Occupational Profile) Pt is a 73 y/o F admitted to [facility] 11/19/24 with abdominal pain, chest pain, dyspnea and rectal bleeding. Pt recently admitted 11/6 with rectal bleeding, found to have acute Crohn's exacerbation. PMHx significant for CAD, COPD and HTN. At baseline pt resides in granddaughter's home with 3 RICKEY, family available 24/7, assist with bathing, dressing, toileting. Pt ambulates with RW, requiring occasional assist.  -MS     Environmental Supports and Barriers (Occupational Profile) supportive family  -MS       Row Name 12/01/24 1401          Living Environment    People in Home grandchild(keegan)  -MS       Row Name 12/01/24 1401          Home Main Entrance    Number of Stairs, Main Entrance three  -MS     Stair Railings, Main Entrance railings safe and in good condition  -MS       Row Name 12/01/24 1401          Stairs Within Home, Primary    Number of Stairs,  Within Home, Primary twelve  -MS       Row Name 12/01/24 1401          Cognition    Orientation Status (Cognition) oriented to;person;place;situation  requires cues for time  -MS       Row Name 12/01/24 1401          Safety Issues/Impairments Affecting Functional Mobility    Safety Issues Affecting Function (Mobility) friction/shear risk;insight into deficits/self-awareness;judgment;problem-solving;positioning of assistive device;sequencing abilities  -MS     Impairments Affecting Function (Mobility) balance;endurance/activity tolerance;cognition;postural/trunk control;range of motion (ROM);pain;strength  -MS     Cognitive Impairments, Mobility Safety/Performance insight into deficits/self-awareness;judgment;problem-solving/reasoning;safety precaution awareness;sequencing abilities  -MS               User Key  (r) = Recorded By, (t) = Taken By, (c) = Cosigned By      Initials Name Provider Type    MS Radha Mercado OT Occupational Therapist                     Mobility/ADL's       Row Name 12/01/24 1403          Bed Mobility    Bed Mobility supine-sit  -MS     Supine-Sit [place] (Bed Mobility) moderate assist (50% patient effort)  -MS     Bed Mobility, Safety Issues decreased use of arms for pushing/pulling;decreased use of legs for bridging/pushing;impaired trunk control for bed mobility  -MS     Assistive Device (Bed Mobility) bed rails;head of bed elevated;repositioning sheet  -MS     Comment, (Bed Mobility) log roll  -MS       Row Name 12/01/24 1403          Transfers    Transfers sit-stand transfer;bed-chair transfer  -MS       Row Name 12/01/24 1403          Bed-Chair Transfer    Bed-Chair [place] (Transfers) minimum assist (75% patient effort)  -MS     Assistive Device (Bed-Chair Transfers) walker, front-wheeled  -MS       Row Name 12/01/24 1403          Sit-Stand Transfer    Sit-Stand [place] (Transfers) moderate assist (50% patient effort)  -MS     Assistive Device (Sit-Stand Transfers)  walker, front-wheeled  -MS       Row Name 12/01/24 1403          Functional Mobility    Patient was able to Ambulate yes  -MS               User Key  (r) = Recorded By, (t) = Taken By, (c) = Cosigned By      Initials Name Provider Type    Radha Garcia OT Occupational Therapist                   Obj/Interventions       Row Name 12/01/24 1405          Range of Motion Comprehensive    General Range of Motion bilateral upper extremity ROM WNL  -MS       Row Name 12/01/24 1405          Strength Comprehensive (MMT)    Comment, General Manual Muscle Testing (MMT) Assessment BUE grossly 3+/5  -MS       Row Name 12/01/24 1405          Balance    Balance Assessment sitting static balance;sitting dynamic balance;standing static balance;standing dynamic balance  -MS     Static Sitting Balance standby assist  -MS     Dynamic Sitting Balance contact guard  -MS     Position, Sitting Balance unsupported;sitting edge of bed  -MS     Static Standing Balance minimal assist  -MS     Dynamic Standing Balance moderate assist  -MS     Position/Device Used, Standing Balance supported;walker, front-wheeled  -MS               User Key  (r) = Recorded By, (t) = Taken By, (c) = Cosigned By      Initials Name Provider Type    Radha Garcia OT Occupational Therapist                   Goals/Plan       Row Name 12/01/24 1411          Transfer Goal 1 (OT)    Activity/Assistive Device (Transfer Goal 1, OT) transfers, all  -MS     [place] Level/Cues Needed (Transfer Goal 1, OT) contact guard required  -MS     Time Frame (Transfer Goal 1, OT) long term goal (LTG);2 weeks  -MS     Progress/Outcome (Transfer Goal 1, OT) progress slower than expected;medical status inhibiting progress;goal ongoing  -MS       Row Name 12/01/24 1411          Dressing Goal 1 (OT)    Activity/Device (Dressing Goal 1, OT) dressing skills, all  -MS     [place]/Cues Needed (Dressing Goal 1, OT) moderate assist (50-74% patient effort)  -MS     Time Frame  (Dressing Goal 1, OT) long term goal (LTG);2 weeks  -MS     Progress/Outcome (Dressing Goal 1, OT) progress slower than expected;medical status inhibiting progress;goal ongoing  -MS       Row Name 12/01/24 1411          Toileting Goal 1 (OT)    Activity/Device (Toileting Goal 1, OT) toileting skills, all  -MS     [place] Level/Cues Needed (Toileting Goal 1, OT) moderate assist (50-74% patient effort)  -MS     Time Frame (Toileting Goal 1, OT) long term goal (LTG);2 weeks  -MS     Progress/Outcome (Toileting Goal 1, OT) progress slower than expected;medical status inhibiting progress;goal ongoing  -MS       Row Name 12/01/24 1411          Grooming Goal 1 (OT)    Activity/Device (Grooming Goal 1, OT) grooming skills, all  -MS     [place] (Grooming Goal 1, OT) modified independence  -MS     Time Frame (Grooming Goal 1, OT) long term goal (LTG);2 weeks  -MS     Progress/Outcome (Grooming Goal 1, OT) goal ongoing;medical status inhibiting progress;progress slower than expected  -MS       Row Name 12/01/24 1411          Therapy Assessment/Plan (OT)    Planned Therapy Interventions (OT) adaptive equipment training;BADL retraining;activity tolerance training;functional balance retraining;IADL retraining;neuromuscular control/coordination retraining;occupation/activity based interventions;patient/caregiver education/training;passive ROM/stretching;ROM/therapeutic exercise;transfer/mobility retraining;strengthening exercise  -MS               User Key  (r) = Recorded By, (t) = Taken By, (c) = Cosigned By      Initials Name Provider Type    Radha Garcia OT Occupational Therapist                   Clinical Impression       Row Name 12/01/24 1407          Pain Assessment    Pretreatment Pain Rating 8/10  -MS     Posttreatment Pain Rating 8/10  -MS     Pain Location abdomen  -MS     Pain Management Interventions positioning techniques utilized  -MS       Row Name 12/01/24 1402          Plan of Care Review    Plan  of Care Reviewed With patient  -MS     Progress no change  -MS     Outcome Evaluation Pt is a 73 y/o F admitted to [facility] 11/19/24 with abdominal pain, chest pain, dyspnea and rectal bleeding. Pt recently admitted 11/6 with rectal bleeding, found to have acute Crohn's exacerbation. During initial OT evaluation on 11/20/24, pt required max A for LB ADLS and toileting, mod A for ADL transfers and demo decreased activity tolerance needed for ADL routine. Pt was recommended home with 24/7 assist and HHOT. Pt is re-evaluated this date, POD#3 R hemicolectomy with ileostomy by Dr. Babin, also underwent MVR by Dr. Navarro 11/21/24. Pt oriented x3, requires cues for time, c/o 8/10 abdominal pain. Pt requires education on log roll, requiring mod A for bed mobility. Pt comes to standing with mod A and requires min A to transfer from bed to chair. Anticipate pt to require increased assist with ADL routine and mobility, limited by trunk flexion and secondary to abdominal incision. OT changing dc recommendation from home to SNF when medically appropriate for dc, will follow and progress as appropriate.  -MS       Row Name 12/01/24 1407          Therapy Assessment/Plan (OT)    Rehab Potential (OT) good  -MS     Criteria for Skilled Therapeutic Interventions Met (OT) yes;meets criteria;skilled treatment is necessary  -MS     Therapy Frequency (OT) 5 times/wk  -MS     Predicted Duration of Therapy Intervention (OT) until d/c  -MS       Row Name 12/01/24 1407          Therapy Plan Review/Discharge Plan (OT)    Anticipated Discharge Disposition (OT) skilled nursing facility  -MS       Row Name 12/01/24 1407          Vital Signs    Pre Systolic BP Rehab 124  -MS     Pre Treatment Diastolic BP 55  -MS     Pretreatment Heart Rate (beats/min) 87  -MS     Pretreatment Resp Rate (breaths/min) 23  -MS     Pre SpO2 (%) 98  -MS     O2 Delivery Pre Treatment nasal cannula  -MS     O2 Delivery Intra Treatment nasal cannula  -MS     O2 Delivery Post  Treatment nasal cannula  -MS     Pre Patient Position Supine  -MS     Intra Patient Position Standing  -MS     Post Patient Position Sitting  -MS       Row Name 12/01/24 1407          Positioning and Restraints    Pre-Treatment Position in bed  -MS     In Chair notified nsg;reclined;call light within reach;encouraged to call for assist;exit alarm on;waffle cushion;heels elevated  ostomy with poor seal, leaking, nsg notified  -MS               User Key  (r) = Recorded By, (t) = Taken By, (c) = Cosigned By      Initials Name Provider Type    MS Radha Mercado, OT Occupational Therapist                   Outcome Measures       Row Name 12/01/24 1413          How much help from another is currently needed...    Putting on and taking off regular lower body clothing? 2  -MS     Bathing (including washing, rinsing, and drying) 2  -MS     Toileting (which includes using toilet bed pan or urinal) 2  -MS     Putting on and taking off regular upper body clothing 3  -MS     Taking care of personal grooming (such as brushing teeth) 3  -MS     Eating meals 4  -MS     AM-PAC 6 Clicks Score (OT) 16  -MS       Row Name 12/01/24 0800          How much help from another person do you currently need...    Turning from your back to your side while in flat bed without using bedrails? 3  -PS     Moving from lying on back to sitting on the side of a flat bed without bedrails? 2  -PS     Moving to and from a bed to a chair (including a wheelchair)? 2  -PS     Standing up from a chair using your arms (e.g., wheelchair, bedside chair)? 2  -PS     Climbing 3-5 steps with a railing? 1  -PS     To walk in hospital room? 1  -PS     AM-PAC 6 Clicks Score (PT) 11  -PS     Highest Level of Mobility Goal 4 --> Transfer to chair/commode  -PS       Row Name 12/01/24 1413          Functional Assessment    Outcome Measure Options AM-PAC 6 Clicks Daily Activity (OT)  -MS               User Key  (r) = Recorded By, (t) = Taken By, (c) = Cosigned By       Initials Name Provider Type    Jose Mello RN Registered Nurse    Radha Garcia, [place] Occupational Therapist                    Occupational Therapy Education       Title: PT OT SLP Therapies (Done)       Topic: Occupational Therapy (Done)       Point: ADL training (Done)       Description:   Instruct learner(s) on proper safety adaptation and remediation techniques during self care or transfers.   Instruct in proper use of assistive devices.                  Learning Progress Summary            Patient Acceptance, E,TB, VU by MS at 12/1/2024 1413    Acceptance, E,TB,D, VU,DU by SO at 11/27/2024 1001    Acceptance, E,TB, VU by MM at 11/26/2024 1057    Acceptance, E,TB,D, VU,DU,NR by  at 11/26/2024 0350    Acceptance, E,TB, NR,Bed HQ by MM at 11/25/2024 0807    Acceptance, E,TB, VU by  at 11/20/2024 1635    Comment: Role of OT,goals & POC; safety awareness.                      Point: Precautions (Done)       Description:   Instruct learner(s) on prescribed precautions during self-care and functional transfers.                  Learning Progress Summary            Patient Acceptance, E,TB, VU by MS at 12/1/2024 1413    Acceptance, E,TB,D, VU,DU by SO at 11/27/2024 1001    Acceptance, E,TB, VU by MM at 11/26/2024 1057    Acceptance, E,TB,D, VU,DU,NR by  at 11/26/2024 0350    Acceptance, E,TB, NR,Bed HQ by  at 11/25/2024 0807    Acceptance, E,TB, VU by  at 11/20/2024 1635    Comment: Role of OT,goals & POC; safety awareness.                      Point: Body mechanics (Done)       Description:   Instruct learner(s) on proper positioning and spine alignment during self-care, functional mobility activities and/or exercises.                  Learning Progress Summary            Patient Acceptance, E,TB, VU by MS at 12/1/2024 1413    Acceptance, E,TB,D, VU,DU by SO at 11/27/2024 1001    Acceptance, E,TB, VU by MM at 11/26/2024 1057    Acceptance, E,TB,D, VU,DU,NR by  at 11/26/2024 0350     Acceptance, E,TB, NR,Bed HQ by MM at 11/25/2024 0807    Acceptance, E,TB, VU by DT at 11/20/2024 1635    Comment: Role of OT,goals & POC; safety awareness.                                      User Key       Initials Effective Dates Name Provider Type Discipline     06/16/21 -  Enrique Ku LPN Licensed Nurse Nurse    DT 07/11/23 -  Tiffany Mckeon, OT Occupational Therapist OT    MM 12/27/23 -  Rob Kc, ULYSSES Registered Nurse DIALYSIS USER    MS 07/13/22 -  Radha Mercado OT Occupational Therapist OT    SO 03/02/23 -  Jason-Bernie Snyder RN Registered Nurse Nurse                  OT Recommendation and Plan  Planned Therapy Interventions (OT): adaptive equipment training, BADL retraining, activity tolerance training, functional balance retraining, IADL retraining, neuromuscular control/coordination retraining, occupation/activity based interventions, patient/caregiver education/training, passive ROM/stretching, ROM/therapeutic exercise, transfer/mobility retraining, strengthening exercise  Therapy Frequency (OT): 5 times/wk  Plan of Care Review  Plan of Care Reviewed With: patient  Progress: no change  Outcome Evaluation: Pt is a 75 y/o F admitted to [facility] 11/19/24 with abdominal pain, chest pain, dyspnea and rectal bleeding. Pt recently admitted 11/6 with rectal bleeding, found to have acute Crohn's exacerbation. During initial OT evaluation on 11/20/24, pt required max A for LB ADLS and toileting, mod A for ADL transfers and demo decreased activity tolerance needed for ADL routine. Pt was recommended home with 24/7 assist and HHOT. Pt is re-evaluated this date, POD#3 R hemicolectomy with ileostomy by Dr. Babin, also underwent MVR by Dr. Navarro 11/21/24. Pt oriented x3, requires cues for time, c/o 8/10 abdominal pain. Pt requires education on log roll, requiring mod A for bed mobility. Pt comes to standing with mod A and requires min A to transfer from bed to chair. Anticipate pt to require  increased assist with ADL routine and mobility, limited by trunk flexion and secondary to abdominal incision. OT changing dc recommendation from home to SNF when medically appropriate for dc, will follow and progress as appropriate.     Time Calculation:                   Radha Mercado, [place]  12/1/2024

## 2024-12-01 NOTE — PLAN OF CARE
Goal Outcome Evaluation:      Patient worked with OT today and got up to chair. Notified physician of undigested pills in ostomy. Physician ordered imodium. Potassium and magnesium replaced.

## 2024-12-01 NOTE — PROGRESS NOTES
[facility] MEDICINE SERVICE  DAILY PROGRESS NOTE    NAME: Maryann Gaitan  : 1950  MRN: [id number]      LOS: 12 days     PROVIDER OF SERVICE: Colt Franklin MD    Chief Complaint: Acute lower GI bleeding    Subjective:     Interval History:  History taken from: patient    Patient seen evaluated bedside.  Patient doing well, no complaints today.  Treatment plan updated with the patient.        Review of Systems:   Review of Systems   Constitutional:  Negative for chills and fever.   Respiratory:  Negative for shortness of breath.    Cardiovascular:  Negative for chest pain.   Gastrointestinal:  Negative for abdominal pain.       Objective:     Vital Signs  Temp:  [97.7 °F (36.5 °C)-98.6 °F (37 °C)] 97.9 °F (36.6 °C)  Heart Rate:  [64-84] 75  Resp:  [12-22] 18  BP: (116-124)/(51-67) 123/61  Flow (L/min) (Oxygen Therapy):  [2] 2   Body mass index is 21.82 kg/m².    Physical Exam  Physical Exam  Constitutional:       General: She is not in acute distress.  Cardiovascular:      Rate and Rhythm: Normal rate.      Heart sounds: No murmur heard.  Pulmonary:      Effort: Pulmonary effort is normal. No respiratory distress.   Abdominal:      General: Bowel sounds are normal.      Palpations: Abdomen is soft.   Neurological:      Mental Status: She is alert.            Diagnostic Data    Results from last 7 days   Lab Units 24  0436 24  1155 24  0510   WBC 10*3/mm3 17.13*   < > 12.42*   HEMOGLOBIN g/dL 8.8*   < > 10.7*   HEMATOCRIT % 28.4*   < > 32.6*   PLATELETS 10*3/mm3 91*   < > 101*   GLUCOSE mg/dL 72  --  96   CREATININE mg/dL 0.53*  --  0.66   BUN mg/dL 17  --  20   SODIUM mmol/L 138  --  136   POTASSIUM mmol/L 3.5  --  4.0   AST (SGOT) U/L  --   --  21   ALT (SGPT) U/L  --   --  31   ALK PHOS U/L  --   --  64   BILIRUBIN mg/dL  --   --  0.5   ANION GAP mmol/L 6.7  --  7.1    < > = values in this interval not displayed.       No radiology results for the last day      I reviewed the  patient's new clinical results.    Assessment/Plan:     Active and Resolved Problems  Active Hospital Problems    Diagnosis  POA    **Acute lower GI bleeding [K92.2]  Yes    S/P mitral valve clip implantation [Z98.890, Z95.818]  Not Applicable    Primary hypertension [I10]  Yes    Gastrointestinal hemorrhage [K92.2]  Unknown    Anemia [D64.9]  Unknown    Moderate protein-calorie malnutrition [E44.0]  Yes    Hypothyroidism (acquired) [E03.9]  Yes    COPD (chronic obstructive pulmonary disease) [J44.9]  Yes    Rheumatoid arthritis [M06.9]  Yes    Crohn's disease [K50.90]  Yes    Anxiety associated with depression [F41.8]  Yes    Dyslipidemia [E78.5]  Yes    Severe mitral regurgitation [I34.0]  Yes    Acute heart failure with preserved ejection fraction (HFpEF) [I50.31]  Yes    CAD, multiple vessel [I25.10]  Yes      Resolved Hospital Problems   No resolved problems to display.     Diagnoses  Recurrent exacerbations of Crohn's disease status post right hemicolectomy with ileostomy on 11/28  Acute lower GI bleed  Acute blood loss anemia  CAD status post recent ostial/proximal LAD PCI on 10/22/2024  Chronic HFpEF  Valvular heart disease from mitral regurgitation status post mitral valve clip implantation on 11/21/2024  Rheumatoid arthritis with history of significant immunosuppression with Humira and methotrexate  COPD  Dyslipidemia  Hypertension     //PLAN  In reference to recurrent exacerbations of Crohn disease status post right hemicolectomy with ileostomy, surgery following appreciate recs.  Antiemetics and pain control, monitor for bleeding/trend hemoglobin, encourage ambulation/PT. Patient with undissolved pills coming out of ostomy per nursing staff, will have GI evaluate.     In reference to acute blood loss anemia/GI bleed, will follow and trend hemoglobin. Currently on DAPT due to recent LAD stent, okay per surgery recs.  Hemoglobin slowly downtrending.  Will transfuse Hgb greater than 7.     In reference to  CAD status post recent LAD PCI > Cardiology following, appreciate recs.  Patient on DAPT    Ordered electrolyte protocol for magnesium as it was low today.  Creatinine is stable.      Patient with leukocytosis is likely reactive.  Blood culture no growth to date.  Patient is afebrile with normal resting heart rate.  Patient also on daily steroids.    VTE Prophylaxis:  Mechanical VTE prophylaxis orders are present.           Disposition Planning:      Barriers to Discharge: Not medically cleared, consultation clearance  Place of Discharge: Likely SNF    Code Status and Medical Interventions: CPR (Attempt to Resuscitate); Full Support   Ordered at: 11/21/24 1014     Level Of Support Discussed With:    Patient     Code Status (Patient has no pulse and is not breathing):    CPR (Attempt to Resuscitate)     Medical Interventions (Patient has pulse or is breathing):    Full Support       Signature: Electronically signed by Colt Franklin MD, 12/01/24, 15:27 EST.  [facility] Hospitalist Team

## 2024-12-01 NOTE — OP NOTE
Operative Report:    Patient Name:  Maryann Gaitan  YOB: 1950    Date of Surgery:  11/28/2024     Indications:   74-year-old lady with history of CAD status post recent stent on dual antiplatelet therapy, COPD, rheumatoid arthritis, Crohn's disease on biologic therapy recently discharged on steroid taper. Developed significant GI bleed. EGD done today with no upper source. She received 8 units of blood since admission, multiple units in the last 2 days hemoglobin remains below 7. Still having large-volume bloody bowel movements. Has developed hypotension over the last 24 hours. Last colonoscopy with severe ulceration and stricture of the TI, ulceration of the right colon, more mild ulceration in the sigmoid and rectum. CT on admission with significant thickening of her right colon and TI. No localization studies. Dr. Delgado discussed with Dr. Rene the possibility of stopping Plavix however given brand-new stent she is at high risk for thrombosis. Given her hemodynamic instability and ongoing large-volume bleeding hemoglobin still below 7 despite multiple units of blood discussed with patient and her son that she likely needs surgery. Plan for open right hemicolectomy with ileostomy and mucous fistula. Will open ileum and if blood present in the ileum then this is likely the source. If bleeding not clearly coming from the ileum or right colon patient will need total colectomy. Discussed this with patient and her son including risk benefits and alternatives and patient and son elected for me to proceed.     Pre-op Diagnosis:   Lower GI bleed       Post-Op Diagnosis Codes:  Same    Procedure/CPT® Codes:      Procedure(s):  RIGHT HEMICOLECTOMY WITH ILEOSTOMY and mucous fistula creation    Staff:  Surgeon(s):  Todd Babin MD    Circulator: Maikel Jamil RN  Scrub Person: Adelia Bauer  Assistant: Tyler Vogt CSA  was responsible for performing the following activities: Retraction,  Suction, Irrigation, Suturing, Closing, and Placing Dressing and their skilled assistance was necessary for the success of this case.        Anesthesia: General    Estimated Blood Loss: 200ml    Implants:    Implant Name Type Inv. Item Serial No.  Lot No. LRB No. Used Action   STPLR LNR CUT PROX 75MM ANNY [id number] - [id number] Implant STPLR LNR CUT PROX 75MM ANNY TLC75  [employer]  DIV [employer] J [id number] Right 1 Implanted   RELOAD STPLR LNR CUT PROX 75MM ANNY [id number] - [id number] Implant RELOAD STPLR LNR CUT PROX 75MM ANNY TCR75  [employer]  DIV [employer] J [employer] J [id number] Right 1 Implanted       Specimen:          Specimens       ID Source Type Tests Collected By Collected At Frozen?    A Large Intestine, Right / Ascending Colon Tissue TISSUE PATHOLOGY EXAM   Todd Babin MD 11/28/24 4215     Description: RIGHT COLON FRESH FOR PERMANENT                Findings: Severe inflammation of the terminal ileum, few mild strictures in the proximal ileum, no other evidence of colitis in the transverse descending or sigmoid colon    Complications: None    Description of Procedure:   After risk-benefit and alternatives were discussed with patient informed sent was obtained.  Patient was transported the operating room placed supine operating table and underwent general anesthesia.  Her abdomen is prepped draped sterile fashion surgical timeout completed.  Made a midline incision with a 10 blade scalpel dissected down to the fascia electrocautery entered the peritoneum sharp with a pair of Metzenbaum scissors.  Performed a finger sweep she had no anterior abdominal adhesions open the peritoneum along the length of my incision.  I inserted a Bookwalter retractor for fixed retraction.  I ran the small bowel from the ligament of Treitz to the ileocecal valve.  The majority of her small bowel was unaffected by Crohn's she had a few mild short segment strictures in her mid ileum.  Her terminal ileum was extremely  inflamed with creeping fat was very thickened.  Her right colon was boggy but her transverse descending and sigmoid colon all appeared normal in the outside.    I made a window in the terminal ileum mesentery just proximal to the area of active disease.  I divided the ileum with a CLAUDETTE 75 stapler with a blue load.  I opened up the terminal ileum and identified severe stricture which was long segment with multiple ulcerations I inserted the suction into the cecum and she had a large volume of blood in her cecum.  At this point it appears that the bleeding was coming from the ileum or right colon.  I elected to perform right hemicolectomy.    I mobilized the cecum and ascending colon along the white line of Toldt from lateral to medial with electrocautery.  I took down the hepatic flexure attachments with electrocautery.  Identified an area of the proximal transverse colon that appeared healthy as a side my transection point.  I made a window in the mesentery and divided this with a CLAUDETTE 75 stapler with a blue load.  Using 0 Vicryl ties and LigaSure impact device I divided the right colon mesentery.  I doubly ligated the ileocolic pedicle with 0 Vicryl suture and divided about this with the LigaSure device.  Once all the mesentery was divided I removed the right colon and sent it to pathology.  Given her duodenum platelet patient was very oozy throughout the case from all cut edges I controlled this with laparotomy sponges initially.  Once these were removed there was no further surgical bleeding is able to be controlled.    Elected perform an ileostomy and mucous fistula.  I cut a Hughes skin in the right lower quadrant use electrocautery to dissect down to the anterior fascia by the rectus muscle and made a cruciate incision through the fascia spread the rectus muscle and opened the posterior fascia vertically.  The cut edge of the ileum was pulled through the right lower quadrant in appropriate orientation.  I  repeated this process in the right upper quadrant and pulled the staple line of the transverse colon through the abdominal wall.  I irrigated the wound closed the fascia with running 0 PDS suture.  Skin was closed with staples.  I cut the staple line of the ileum and matured this to the dermis in a Stephanie fashion with interrupted 3-0 Vicryl sutures.  Cut the staple line off the transverse colon and matured this to the dermis with interrupted 3-0 Vicryl sutures.  Sponge needle and instrument counts protamines correct x 2 before closure.  Sterile dressing was applied to the midline incision and ostomy appliances were placed over the ileostomy and mucous fistula.  Patient was awoken general anesthesia transported to the recovery unit without incident.      Todd Babin MD     Date: 12/1/2024  Time: 13:32 EST    This note was created using Dragon Voice Recognition software.

## 2024-12-02 LAB
ANION GAP SERPL CALCULATED.3IONS-SCNC: 5.3 MMOL/L (ref 5–15)
ANION GAP SERPL CALCULATED.3IONS-SCNC: 6.5 MMOL/L (ref 5–15)
BACTERIA UR QL AUTO: ABNORMAL /HPF
BASOPHILS # BLD AUTO: 0.01 10*3/MM3 (ref 0–0.2)
BASOPHILS # BLD AUTO: 0.01 10*3/MM3 (ref 0–0.2)
BASOPHILS NFR BLD AUTO: 0.1 % (ref 0–1.5)
BASOPHILS NFR BLD AUTO: 0.1 % (ref 0–1.5)
BILIRUB UR QL STRIP: NEGATIVE
BUN SERPL-MCNC: 12 MG/DL (ref 8–23)
BUN SERPL-MCNC: 13 MG/DL (ref 8–23)
BUN/CREAT SERPL: 26 (ref 7–25)
BUN/CREAT SERPL: 28.6 (ref 7–25)
CALCIUM SPEC-SCNC: 7.7 MG/DL (ref 8.6–10.5)
CALCIUM SPEC-SCNC: 7.9 MG/DL (ref 8.6–10.5)
CHLORIDE SERPL-SCNC: 108 MMOL/L (ref 98–107)
CHLORIDE SERPL-SCNC: 110 MMOL/L (ref 98–107)
CLARITY UR: ABNORMAL
CO2 SERPL-SCNC: 22.5 MMOL/L (ref 22–29)
CO2 SERPL-SCNC: 23.7 MMOL/L (ref 22–29)
COLOR UR: YELLOW
CREAT SERPL-MCNC: 0.42 MG/DL (ref 0.57–1)
CREAT SERPL-MCNC: 0.5 MG/DL (ref 0.57–1)
CRP SERPL-MCNC: 12.2 MG/DL (ref 0–0.5)
CRP SERPL-MCNC: 15.6 MG/DL (ref 0–0.5)
DEPRECATED RDW RBC AUTO: 54.9 FL (ref 37–54)
DEPRECATED RDW RBC AUTO: 56.2 FL (ref 37–54)
EGFRCR SERPLBLD CKD-EPI 2021: 102.8 ML/MIN/1.73
EGFRCR SERPLBLD CKD-EPI 2021: 98.6 ML/MIN/1.73
EOSINOPHIL # BLD AUTO: 0.01 10*3/MM3 (ref 0–0.4)
EOSINOPHIL # BLD AUTO: 0.08 10*3/MM3 (ref 0–0.4)
EOSINOPHIL NFR BLD AUTO: 0.1 % (ref 0.3–6.2)
EOSINOPHIL NFR BLD AUTO: 0.6 % (ref 0.3–6.2)
ERYTHROCYTE [DISTWIDTH] IN BLOOD BY AUTOMATED COUNT: 17 % (ref 12.3–15.4)
ERYTHROCYTE [DISTWIDTH] IN BLOOD BY AUTOMATED COUNT: 17.1 % (ref 12.3–15.4)
GLUCOSE SERPL-MCNC: 104 MG/DL (ref 65–99)
GLUCOSE SERPL-MCNC: 72 MG/DL (ref 65–99)
GLUCOSE UR STRIP-MCNC: NEGATIVE MG/DL
HCT VFR BLD AUTO: 26.3 % (ref 34–46.6)
HCT VFR BLD AUTO: 28.5 % (ref 34–46.6)
HGB BLD-MCNC: 8.2 G/DL (ref 12–15.9)
HGB BLD-MCNC: 9.1 G/DL (ref 12–15.9)
HGB UR QL STRIP.AUTO: NEGATIVE
HYALINE CASTS UR QL AUTO: ABNORMAL /LPF
IMM GRANULOCYTES # BLD AUTO: 0.05 10*3/MM3 (ref 0–0.05)
IMM GRANULOCYTES # BLD AUTO: 0.08 10*3/MM3 (ref 0–0.05)
IMM GRANULOCYTES NFR BLD AUTO: 0.5 % (ref 0–0.5)
IMM GRANULOCYTES NFR BLD AUTO: 0.6 % (ref 0–0.5)
KETONES UR QL STRIP: NEGATIVE
LEUKOCYTE ESTERASE UR QL STRIP.AUTO: ABNORMAL
LYMPHOCYTES # BLD AUTO: 1 10*3/MM3 (ref 0.7–3.1)
LYMPHOCYTES # BLD AUTO: 1.35 10*3/MM3 (ref 0.7–3.1)
LYMPHOCYTES NFR BLD AUTO: 10.4 % (ref 19.6–45.3)
LYMPHOCYTES NFR BLD AUTO: 9.3 % (ref 19.6–45.3)
MAGNESIUM SERPL-MCNC: 2.2 MG/DL (ref 1.6–2.4)
MAGNESIUM SERPL-MCNC: 2.6 MG/DL (ref 1.6–2.4)
MCH RBC QN AUTO: 29 PG (ref 26.6–33)
MCH RBC QN AUTO: 29.9 PG (ref 26.6–33)
MCHC RBC AUTO-ENTMCNC: 31.2 G/DL (ref 31.5–35.7)
MCHC RBC AUTO-ENTMCNC: 31.9 G/DL (ref 31.5–35.7)
MCV RBC AUTO: 92.9 FL (ref 79–97)
MCV RBC AUTO: 93.8 FL (ref 79–97)
MONOCYTES # BLD AUTO: 0.28 10*3/MM3 (ref 0.1–0.9)
MONOCYTES # BLD AUTO: 0.35 10*3/MM3 (ref 0.1–0.9)
MONOCYTES NFR BLD AUTO: 2.1 % (ref 5–12)
MONOCYTES NFR BLD AUTO: 3.2 % (ref 5–12)
NEUTROPHILS NFR BLD AUTO: 11.24 10*3/MM3 (ref 1.7–7)
NEUTROPHILS NFR BLD AUTO: 86.2 % (ref 42.7–76)
NEUTROPHILS NFR BLD AUTO: 86.8 % (ref 42.7–76)
NEUTROPHILS NFR BLD AUTO: 9.39 10*3/MM3 (ref 1.7–7)
NITRITE UR QL STRIP: NEGATIVE
NRBC BLD AUTO-RTO: 0 /100 WBC (ref 0–0.2)
NRBC BLD AUTO-RTO: 0 /100 WBC (ref 0–0.2)
PH UR STRIP.AUTO: <=5 [PH] (ref 5–8)
PLATELET # BLD AUTO: 108 10*3/MM3 (ref 140–450)
PLATELET # BLD AUTO: 116 10*3/MM3 (ref 140–450)
PMV BLD AUTO: 10.7 FL (ref 6–12)
PMV BLD AUTO: 11.1 FL (ref 6–12)
POTASSIUM SERPL-SCNC: 4.7 MMOL/L (ref 3.5–5.2)
POTASSIUM SERPL-SCNC: 4.9 MMOL/L (ref 3.5–5.2)
PROT UR QL STRIP: NEGATIVE
RBC # BLD AUTO: 2.83 10*6/MM3 (ref 3.77–5.28)
RBC # BLD AUTO: 3.04 10*6/MM3 (ref 3.77–5.28)
RBC # UR STRIP: ABNORMAL /HPF
REF LAB TEST METHOD: ABNORMAL
SODIUM SERPL-SCNC: 137 MMOL/L (ref 136–145)
SODIUM SERPL-SCNC: 139 MMOL/L (ref 136–145)
SP GR UR STRIP: 1.03 (ref 1–1.03)
SQUAMOUS #/AREA URNS HPF: ABNORMAL /HPF
UROBILINOGEN UR QL STRIP: ABNORMAL
WBC # UR STRIP: ABNORMAL /HPF
WBC NRBC COR # BLD AUTO: 10.81 10*3/MM3 (ref 3.4–10.8)
WBC NRBC COR # BLD AUTO: 13.04 10*3/MM3 (ref 3.4–10.8)
YEAST URNS QL MICRO: ABNORMAL /HPF

## 2024-12-02 PROCEDURE — 97112 NEUROMUSCULAR REEDUCATION: CPT | Performed by: PHYSICAL THERAPIST

## 2024-12-02 PROCEDURE — 99232 SBSQ HOSP IP/OBS MODERATE 35: CPT | Performed by: INTERNAL MEDICINE

## 2024-12-02 PROCEDURE — 85025 COMPLETE CBC W/AUTO DIFF WBC: CPT | Performed by: INTERNAL MEDICINE

## 2024-12-02 PROCEDURE — 86140 C-REACTIVE PROTEIN: CPT | Performed by: STUDENT IN AN ORGANIZED HEALTH CARE EDUCATION/TRAINING PROGRAM

## 2024-12-02 PROCEDURE — 99024 POSTOP FOLLOW-UP VISIT: CPT | Performed by: STUDENT IN AN ORGANIZED HEALTH CARE EDUCATION/TRAINING PROGRAM

## 2024-12-02 PROCEDURE — 25010000002 CYANOCOBALAMIN PER 1000 MCG: Performed by: STUDENT IN AN ORGANIZED HEALTH CARE EDUCATION/TRAINING PROGRAM

## 2024-12-02 PROCEDURE — 25010000002 METOCLOPRAMIDE PER 10 MG: Performed by: STUDENT IN AN ORGANIZED HEALTH CARE EDUCATION/TRAINING PROGRAM

## 2024-12-02 PROCEDURE — 83735 ASSAY OF MAGNESIUM: CPT | Performed by: INTERNAL MEDICINE

## 2024-12-02 PROCEDURE — 80048 BASIC METABOLIC PNL TOTAL CA: CPT | Performed by: INTERNAL MEDICINE

## 2024-12-02 PROCEDURE — 81001 URINALYSIS AUTO W/SCOPE: CPT

## 2024-12-02 PROCEDURE — 97530 THERAPEUTIC ACTIVITIES: CPT | Performed by: PHYSICAL THERAPIST

## 2024-12-02 PROCEDURE — 63710000001 PREDNISONE PER 1 MG: Performed by: INTERNAL MEDICINE

## 2024-12-02 RX ORDER — ATORVASTATIN CALCIUM 40 MG/1
40 TABLET, FILM COATED ORAL NIGHTLY
Qty: 30 TABLET | Refills: 0 | Status: SHIPPED | OUTPATIENT
Start: 2024-12-02 | End: 2025-01-01

## 2024-12-02 RX ORDER — OXYCODONE HYDROCHLORIDE 5 MG/1
10 TABLET ORAL EVERY 6 HOURS PRN
Qty: 12 TABLET | Refills: 0 | Status: SHIPPED | OUTPATIENT
Start: 2024-12-02 | End: 2024-12-05

## 2024-12-02 RX ORDER — CLOPIDOGREL BISULFATE 75 MG/1
75 TABLET ORAL DAILY
Qty: 30 TABLET | Refills: 0 | Status: SHIPPED | OUTPATIENT
Start: 2024-12-02 | End: 2025-01-01

## 2024-12-02 RX ORDER — METOPROLOL SUCCINATE 25 MG/1
25 TABLET, EXTENDED RELEASE ORAL
Qty: 30 TABLET | Refills: 0 | Status: SHIPPED | OUTPATIENT
Start: 2024-12-03 | End: 2024-12-17 | Stop reason: HOSPADM

## 2024-12-02 RX ORDER — ONDANSETRON 4 MG/1
4 TABLET, ORALLY DISINTEGRATING ORAL EVERY 6 HOURS PRN
Qty: 30 TABLET | Refills: 0 | Status: SHIPPED | OUTPATIENT
Start: 2024-12-02

## 2024-12-02 RX ORDER — MESALAMINE 1.2 G/1
1200 TABLET, DELAYED RELEASE ORAL DAILY
Qty: 60 TABLET | Refills: 0 | Status: SHIPPED | OUTPATIENT
Start: 2024-12-03 | End: 2024-12-17 | Stop reason: HOSPADM

## 2024-12-02 RX ORDER — MIDODRINE HYDROCHLORIDE 5 MG/1
5 TABLET ORAL
Qty: 90 TABLET | Refills: 0 | Status: SHIPPED | OUTPATIENT
Start: 2024-12-02

## 2024-12-02 RX ADMIN — AVOBENZONE, HOMOSALATE, OCTISALATE, OCTOCRYLENE, AND OXYBENZONE 1 PACKET: 29.4; 147; 49; 25.4; 58.8 LOTION TOPICAL at 08:33

## 2024-12-02 RX ADMIN — CLOPIDOGREL BISULFATE 75 MG: 75 TABLET ORAL at 08:33

## 2024-12-02 RX ADMIN — SERTRALINE HYDROCHLORIDE 50 MG: 50 TABLET ORAL at 08:34

## 2024-12-02 RX ADMIN — ACETAMINOPHEN 1000 MG: 500 TABLET, FILM COATED ORAL at 05:01

## 2024-12-02 RX ADMIN — METOPROLOL SUCCINATE 25 MG: 25 TABLET, FILM COATED, EXTENDED RELEASE ORAL at 08:33

## 2024-12-02 RX ADMIN — METOCLOPRAMIDE HYDROCHLORIDE 10 MG: 5 INJECTION INTRAMUSCULAR; INTRAVENOUS at 18:10

## 2024-12-02 RX ADMIN — MUPIROCIN 1 APPLICATION: 20 OINTMENT TOPICAL at 20:55

## 2024-12-02 RX ADMIN — LOPERAMIDE HYDROCHLORIDE 2 MG: 2 CAPSULE ORAL at 08:36

## 2024-12-02 RX ADMIN — Medication 10 ML: at 20:56

## 2024-12-02 RX ADMIN — METOCLOPRAMIDE HYDROCHLORIDE 10 MG: 5 INJECTION INTRAMUSCULAR; INTRAVENOUS at 00:20

## 2024-12-02 RX ADMIN — LOPERAMIDE HYDROCHLORIDE 2 MG: 2 CAPSULE ORAL at 12:02

## 2024-12-02 RX ADMIN — ACETAMINOPHEN 1000 MG: 500 TABLET, FILM COATED ORAL at 20:55

## 2024-12-02 RX ADMIN — AVOBENZONE, HOMOSALATE, OCTISALATE, OCTOCRYLENE, AND OXYBENZONE 1 PACKET: 29.4; 147; 49; 25.4; 58.8 LOTION TOPICAL at 20:55

## 2024-12-02 RX ADMIN — Medication 10 ML: at 20:55

## 2024-12-02 RX ADMIN — METOCLOPRAMIDE HYDROCHLORIDE 10 MG: 5 INJECTION INTRAMUSCULAR; INTRAVENOUS at 05:01

## 2024-12-02 RX ADMIN — CYANOCOBALAMIN 1000 MCG: 1000 INJECTION, SOLUTION INTRAMUSCULAR; SUBCUTANEOUS at 08:33

## 2024-12-02 RX ADMIN — ATORVASTATIN CALCIUM 40 MG: 40 TABLET, FILM COATED ORAL at 20:55

## 2024-12-02 RX ADMIN — MIDODRINE HYDROCHLORIDE 5 MG: 5 TABLET ORAL at 18:10

## 2024-12-02 RX ADMIN — METOCLOPRAMIDE HYDROCHLORIDE 10 MG: 5 INJECTION INTRAMUSCULAR; INTRAVENOUS at 12:03

## 2024-12-02 RX ADMIN — Medication 10 ML: at 08:34

## 2024-12-02 RX ADMIN — PREDNISONE 40 MG: 20 TABLET ORAL at 08:33

## 2024-12-02 RX ADMIN — PANTOPRAZOLE SODIUM 40 MG: 40 TABLET, DELAYED RELEASE ORAL at 08:34

## 2024-12-02 RX ADMIN — MESALAMINE 4.8 G: 800 TABLET, DELAYED RELEASE ORAL at 08:33

## 2024-12-02 RX ADMIN — LEVOTHYROXINE SODIUM 50 MCG: 0.05 TABLET ORAL at 05:01

## 2024-12-02 RX ADMIN — ASPIRIN 81 MG: 81 TABLET, COATED ORAL at 08:33

## 2024-12-02 RX ADMIN — ACETAMINOPHEN 1000 MG: 500 TABLET, FILM COATED ORAL at 12:03

## 2024-12-02 RX ADMIN — LOPERAMIDE HYDROCHLORIDE 2 MG: 2 CAPSULE ORAL at 18:10

## 2024-12-02 RX ADMIN — METOCLOPRAMIDE HYDROCHLORIDE 10 MG: 5 INJECTION INTRAMUSCULAR; INTRAVENOUS at 23:18

## 2024-12-02 RX ADMIN — LOPERAMIDE HYDROCHLORIDE 2 MG: 2 CAPSULE ORAL at 20:55

## 2024-12-02 RX ADMIN — FOLIC ACID 1000 MCG: 1 TABLET ORAL at 08:34

## 2024-12-02 RX ADMIN — MUPIROCIN 1 APPLICATION: 20 OINTMENT TOPICAL at 08:35

## 2024-12-02 RX ADMIN — MIDODRINE HYDROCHLORIDE 5 MG: 5 TABLET ORAL at 08:33

## 2024-12-02 RX ADMIN — CYANOCOBALAMIN TAB 500 MCG 1000 MCG: 500 TAB at 08:33

## 2024-12-02 NOTE — PROGRESS NOTES
Nutrition Services  Patient Name: Maryann Gaitan  YOB: 1950  MRN: [id number]  Admission date: 11/19/2024    PROGRESS NOTE      Nutrition Intervention Updates: Clarify diet order to Regular   -- pt may then order high fiber selections within this diet, as this facility utilizes room-service operation     Continue Boost Plus as tolerated     Continue fiber supplement per surgery orders        Encounter Information: Checking in to monitor intakes. Intake remains variable - averaging about 50% at meals. Weight remains similar to admission weight with slight net increase. Metamucil has been added for fiber supplementation by surgery service.        PO Diet: Diet: Gastrointestinal; High Fiber; Fluid Consistency: Thin (IDDSI 0)   PO Supplements: Boost Plus BID  Boost Original may be substituted for Boost Plus at this time, due to national shortage of many ONS products. If substituted, each Boost Original will provide 240 kcal and 10g PRO.   PO Intake:  Variable intake; averaging about 50% at meals        Current nutrition support: -   Nutrition support review: -       Labs (reviewed below): Reviewed and C/W clinical course        GI Function:  Functioning ostomy - 200mL out        Brief weight review   Wt Readings from Last 10 Encounters:   12/02/24 0500 56.1 kg (123 lb 10.9 oz)   12/01/24 0504 57.7 kg (127 lb 3.3 oz)   11/30/24 0612 60.9 kg (134 lb 4.2 oz)   11/29/24 1616 60.8 kg (134 lb 0.6 oz)   11/27/24 1900 60.7 kg (133 lb 13.1 oz)   11/22/24 0724 54 kg (119 lb)   11/21/24 1129 54.1 kg (119 lb 4.3 oz)   11/21/24 0320 45.9 kg (101 lb 3.1 oz)   11/19/24 1551 45.3 kg (99 lb 13.9 oz)   11/19/24 1207 45.4 kg (100 lb)   11/13/24 1312 54.9 kg (121 lb)   11/06/24 1426 52.6 kg (115 lb 15.4 oz)   10/30/24 1346 52.6 kg (116 lb)   10/12/24 1153 57.6 kg (127 lb)   10/12/24 0757 57.6 kg (127 lb)   10/11/24 0420 58 kg (127 lb 13.9 oz)   10/10/24 0503 58.1 kg (128 lb 1.4 oz)   10/08/24 1956 54.9 kg (121 lb 0.5 oz)    10/08/24 0828 54.9 kg (121 lb)        Results from last 7 days   Lab Units 12/02/24  0504 12/01/24  2046 12/01/24  0436 11/29/24  0510 11/28/24  0206 11/27/24  0532   SODIUM mmol/L 139  --  138 136 139 136   POTASSIUM mmol/L 4.9 4.3 3.5 4.0 3.9 4.7   CHLORIDE mmol/L 110*  --  108* 108* 109* 110*   CO2 mmol/L 22.5  --  23.3 20.9* 23.0 18.6*   BUN mg/dL 12  --  17 20 16 18   CREATININE mg/dL 0.42*  --  0.53* 0.66 0.51* 0.50*   CALCIUM mg/dL 7.9*  --  7.8* 8.0* 7.6* 7.9*   BILIRUBIN mg/dL  --   --   --  0.5 0.2 0.3   ALK PHOS U/L  --   --   --  64 51 48   ALT (SGPT) U/L  --   --   --  31 38* 21   AST (SGOT) U/L  --   --   --  21 30 21   GLUCOSE mg/dL 72  --  72 96 95 98     Results from last 7 days   Lab Units 12/02/24  0504 12/01/24 0436   MAGNESIUM mg/dL 2.6* 1.4*   HEMOGLOBIN g/dL 9.1* 8.8*   HEMATOCRIT % 28.5* 28.4*         RD to follow up per protocol.    Electronically signed by:  Jessica Caal, EMILY  12/02/24 13:00 EST

## 2024-12-02 NOTE — NURSING NOTE
WOCN note:    74 yr old female admitted 11/19/24 with lower GI bleeding. Patient underwent a right hemicolectomy with ileostomy and mucous fistula on 11/28/24. WOCN consult received for ostomy teaching.   Patient is not fully oriented at this time and her son is requesting rehab placement. She presents with a RLQ ileostomy and RUQ mucous fistula. The pouches to both sites were changed today but patient was not fully engaged in the teaching.   There was a moderate amount of brown mucous and liquid effluent with several undigested pills in the ileostomy pouch. The mucous fistula had a small amount of old sanguinous drainage. The skin to both sites is intact and the stomas are pink, moist and budded.   The skin was cleansed and skin prep applied. Adapt barrier rings and Satnley one piece cut to fit pouches were placed over each stoma. Patient was instructed on pouch closure and was able to independently return demonstrate. Supplies and written materials were left at the bedside. Will continue to follow.

## 2024-12-02 NOTE — PLAN OF CARE
Goal Outcome Evaluation:            Pleasant and cooperative with care. Colostomy bag changed this am due to leakage. Wound consult placed for patient and family education. Pre-cert started for placement at [facility]. Psych consult called and message left on recording. Continuing to monitor.

## 2024-12-02 NOTE — PROGRESS NOTES
General Surgery Progress Note    Name: Maryann Gaitan ADMIT: 2024   : 1950  PCP: Eduard Little MD    MRN: [id number] LOS: 13 days   AGE/SEX: 74 y.o. female  ROOM:    [facility]    Chief Complaint   Patient presents with    Chest Pain     Subjective   Tolerating diet, having ileostomy function, no severe bleeding no transfusions in the last couple days    Objective     Scheduled Medications:   acetaminophen, 1,000 mg, Oral, Q8H  aspirin, 81 mg, Oral, Daily  atorvastatin, 40 mg, Oral, Nightly  clopidogrel, 75 mg, Oral, Daily  folic acid, 1,000 mcg, Oral, Daily  levothyroxine, 50 mcg, Oral, Q AM  loperamide, 2 mg, Oral, 4x Daily  mesalamine, 4.8 g, Oral, Daily  methotrexate, 15 mg, Oral, Weekly  metoclopramide, 10 mg, Intravenous, Q6H  metoprolol succinate XL, 25 mg, Oral, Q24H  midodrine, 5 mg, Oral, TID AC  mupirocin, 1 Application, Each Nare, BID  pantoprazole, 40 mg, Oral, Daily  predniSONE, 40 mg, Oral, Daily With Breakfast  Psyllium, 1 packet, Oral, BID  sertraline, 50 mg, Oral, Daily  sodium chloride, 10 mL, Intravenous, Q12H  sodium chloride, 10 mL, Intravenous, Q12H  upadacitinib ER, 45 mg, Oral, Daily  vitamin B-12, 1,000 mcg, Oral, Daily        Active Infusions:       As Needed Medications:    senna-docusate sodium **AND** polyethylene glycol **AND** bisacodyl **AND** bisacodyl    Calcium Replacement - Follow Nurse / BPA Driven Protocol    diphenhydrAMINE    HYDROmorphone    Magnesium Standard Dose Replacement - Follow Nurse / BPA Driven Protocol    nitroglycerin    ondansetron ODT **OR** ondansetron    oxyCODONE    Phosphorus Replacement - Follow Nurse / BPA Driven Protocol    Potassium Replacement - Follow Nurse / BPA Driven Protocol    [COMPLETED] Insert Peripheral IV **AND** sodium chloride    sodium chloride    sodium chloride    sodium chloride    sodium chloride    Vital Signs  Vital Signs Patient Vitals for the past 24 hrs:   BP Temp Temp src Pulse Resp SpO2 Weight    12/02/24 1623 138/67 97.8 °F (36.6 °C) Oral 69 21 100 % --   12/02/24 1247 122/65 97.8 °F (36.6 °C) Oral 69 24 100 % --   12/02/24 1202 151/70 -- -- 84 -- -- --   12/02/24 0847 146/63 97.6 °F (36.4 °C) Oral 75 24 99 % --   12/02/24 0500 -- -- -- -- -- -- 56.1 kg (123 lb 10.9 oz)   12/02/24 0440 139/76 98.4 °F (36.9 °C) Oral 75 20 98 % --   12/02/24 0139 132/64 97.9 °F (36.6 °C) Oral 79 20 99 % --   12/01/24 2204 126/66 98.2 °F (36.8 °C) Oral 70 18 97 % --     I/O:  I/O last 3 completed shifts:  In: 700 [P.O.:700]  Out: 2395 [Urine:400; Stool:1995]    Physical Exam:  Physical Exam  Constitutional:       General: She is not in acute distress.     Appearance: Normal appearance. She is not ill-appearing.   HENT:      Head: Normocephalic and atraumatic.      Right Ear: External ear normal.      Left Ear: External ear normal.   Eyes:      Extraocular Movements: Extraocular movements intact.      Conjunctiva/sclera: Conjunctivae normal.   Cardiovascular:      Rate and Rhythm: Normal rate and regular rhythm.   Pulmonary:      Effort: Pulmonary effort is normal. No respiratory distress.   Abdominal:      General: There is no distension.      Palpations: Abdomen is soft.      Tenderness: There is no abdominal tenderness.   Musculoskeletal:         General: No swelling or deformity.   Skin:     General: Skin is warm and dry.   Neurological:      Mental Status: She is alert and oriented to person, place, and time. Mental status is at baseline.         Results Review:     CBC    Results from last 7 days   Lab Units 12/02/24  0504 12/01/24  0436 11/30/24  0115 11/29/24  2235 11/29/24  1749 11/29/24  1155 11/29/24  0510 11/28/24  1519 11/28/24  0753 11/28/24  0206 11/27/24  1756   WBC 10*3/mm3 13.04* 17.13* 15.71*  --   --   --  12.42*  --  16.07* 12.15* 10.11   HEMOGLOBIN g/dL 9.1* 8.8* 9.3* 10.1* 10.5* 10.1* 10.7*   < > 6.8* 7.9* 9.3*   HEMOGLOBIN, POC   --   --   --   --   --   --   --    < >  --   --   --    PLATELETS  10*3/mm3 108* 91* 88*  --   --   --  101*  --  112* 102* 110*    < > = values in this interval not displayed.     BMP   Results from last 7 days   Lab Units 12/02/24  0504 12/01/24 2046 12/01/24  0436 11/29/24  0510 11/28/24  0206 11/27/24  0532 11/26/24  0424   SODIUM mmol/L 139  --  138 136 139 136 136   POTASSIUM mmol/L 4.9 4.3 3.5 4.0 3.9 4.7 3.8   CHLORIDE mmol/L 110*  --  108* 108* 109* 110* 109*   CO2 mmol/L 22.5  --  23.3 20.9* 23.0 18.6* 19.8*   BUN mg/dL 12  --  17 20 16 18 27*   CREATININE mg/dL 0.42*  --  0.53* 0.66 0.51* 0.50* 0.69   GLUCOSE mg/dL 72  --  72 96 95 98 85   MAGNESIUM mg/dL 2.6*  --  1.4*  --   --   --   --      Radiology(recent) No radiology results for the last day    I reviewed the patient's new clinical results.    Assessment & Plan       Acute lower GI bleeding    CAD, multiple vessel    Acute heart failure with preserved ejection fraction (HFpEF)    Severe mitral regurgitation    Dyslipidemia    Crohn's disease    Hypothyroidism (acquired)    Anxiety associated with depression    COPD (chronic obstructive pulmonary disease)    Rheumatoid arthritis    Moderate protein-calorie malnutrition    Primary hypertension    S/P mitral valve clip implantation    Gastrointestinal hemorrhage    Anemia    74 y.o. female POD 3 status post right hemicolectomy with ileostomy     Continue high-fiber diet  Antiemetics and pain medication as needed  Encourage ambulation, out of bed to chair  PT consulted  Use incentive spirometer bedside 10 times an hour while awake  Okay for discharge from my standpoint follow-up in the office in 2 weeks.  Will be available inpatient as needed please call with change questions or concerns.        This note was created using Dragon Voice Recognition software.    Todd Babin MD  12/02/24  17:13 EST

## 2024-12-02 NOTE — PLAN OF CARE
"Goal Outcome Evaluation:         Assessment: Maryann Gaitan presents with functional mobility impairments which indicate the need for skilled intervention. Tolerating session today without incident. Patient able to ambulate short distance bed to chair.  Very weak with sit to stand requiring mod A and bed to be elevated.  Will continue to follow and progress as tolerated.     Plan/Recommendations:   If medically appropriate, Moderate Intensity Therapy recommended post-acute care. This is recommended as therapy feels the patient would require 3-4 days per week and wouldn't tolerate \"3 hour daily\" rehab intensity. SNF would be the preferred choice. If the patient does not agree to SNF, arrange HH or OP depending on home bound status. If patient is medically complex, consider LTACH. Pt requires no DME at discharge.     Pt desires Skilled Rehab placement at discharge. Pt cooperative; agreeable to therapeutic recommendations and plan of care.                                    "

## 2024-12-02 NOTE — DISCHARGE SUMMARY
"             [facility] Medicine Services  Discharge Summary    Date of Service: 2024  Patient Name: Maryann Gaitan  : 1950  MRN: [id number]    Date of Admission: 2024  Discharge Diagnosis: Acute lower GI bleeding  Date of Discharge: 2024  Primary Care Physician: Eduard Little MD      Presenting Problem:   Acute lower GI bleeding [K92.2]  Anemia, unspecified type [D64.9]  Chest pain, unspecified type [R07.9]  Gastrointestinal hemorrhage, unspecified gastrointestinal hemorrhage type [K92.2]    Active and Resolved Hospital Problems:  Active Hospital Problems    Diagnosis POA    **Acute lower GI bleeding [K92.2] Yes    S/P mitral valve clip implantation [Z98.890, Z95.818] Not Applicable    Primary hypertension [I10] Yes    Gastrointestinal hemorrhage [K92.2] Unknown    Anemia [D64.9] Unknown    Moderate protein-calorie malnutrition [E44.0] Yes    Hypothyroidism (acquired) [E03.9] Yes    COPD (chronic obstructive pulmonary disease) [J44.9] Yes    Rheumatoid arthritis [M06.9] Yes    Crohn's disease [K50.90] Yes    Anxiety associated with depression [F41.8] Yes    Dyslipidemia [E78.5] Yes    Severe mitral regurgitation [I34.0] Yes    Acute heart failure with preserved ejection fraction (HFpEF) [I50.31] Yes    CAD, multiple vessel [I25.10] Yes      Resolved Hospital Problems   No resolved problems to display.         Hospital Course     HPI 24 :    \" Maryann Gaitan is a 74 y.o. female with a CMH of close disease, CAD, COPD, hypertension, hypothyroidism who presents to the hospital with complaints of abdominal pain, chest pain, shortness of breath and rectal bleeding.  The patient was recently admitted to the hospital on  for mild rectal bleeding and was found to have an acute Crohn's exacerbation.  She was discharged on a prolonged steroid taper however she states that she still has some abdominal pain and was not feeling well on discharge.  She did have a small drop in her " "hemoglobin prior to discharge during her previous hospital stay but her hemoglobin was 8.4 on the day of discharge.  Over the last few days she has continued to have worsening lower abdominal pain with increased chest pain and shortness of breath and much more rectal bleeding than before.  Of note, the patient did have LHC in October 2024 with PCI to ostial lesion and was placed on dual antiplatelet therapy with aspirin and Plavix.  This was continued from her previous hospitalization despite her mild rectal bleeding, given the increased risk for stent thrombosis with sudden discontinuation of DAPT.  She denies any headaches but does complain of dizziness, lightheadedness and nausea.\"    Hospital Course:  11/20/2024: Patient evaluated by cardiology. Patient previously deemed poor candidate for CABG. DAPT was held setting of GI bleed.  GI evaluated patient, recommended IV Solu-Medrol.  11/21/2024: Patient underwent mitral valve repair with MitraClip with cardiology (Dr Navarro).  11/22/2024: Patient was noted to have drop in hemoglobin.  Patient received 1 unit PRBCs.  11/23/2024: Nursing reported \"worms \", all testing for parasites has been negative.  11/24/2024: Heme-onc were consulted for anemia in the setting of GI bleeding in a patient requiring antiplatelet therapy.  Hemoglobin was noted down to 5.5, patient received 2 units of packed red blood cells.  Mesalamine was started per GI.  11/25/2024: ID was consulted for possible parasite in stool.  Patient was noted to have history of Strongyloides infection status post ivermectin treatment.  11/26/2024: Patient had another hemoglobin drop, patient received PRBCs.  11/28/2024: Patient had another hemoglobin drop and received PRBCs and platelets.  Patient underwent EGD which revealed active GI bleeding with hemodynamic, minus. General surgery was consulted.  Patient underwent right hemicolectomy with ileostomy and mucous fistula creation with Dr. Babin.  Patient was " taken to ICU postop for additional monitoring.  11/29/2024: Patient was stable for transfer to PCU.  11/30/2024-12/2/2024: Patient is doing well, hemoglobin has remained stable.  General surgery cleared patient for discharge this morning.  Patient denies SNF placement and desires to be back at home as daughter is currently in hospice and actively dying.  Patient is stable for discharge home.        DISCHARGE Follow Up Recommendations for labs and diagnostics  -Follow-up with cardiology  -Follow-up with general surgery 2 weeks  -Follow-up with PCP        Day of Discharge     Vital Signs:  Temp:  [97.5 °F (36.4 °C)-98.4 °F (36.9 °C)] 97.8 °F (36.6 °C)  Heart Rate:  [69-84] 69  Resp:  [17-24] 24  BP: (122-151)/(61-76) 122/65  Flow (L/min) (Oxygen Therapy):  [2] 2    Physical Exam:  Physical Exam     General: awake, alert, in NAD  Neck: Supple  Chest: CTA bilat.  CV: S1S2 nl. RRR  Abd: Soft, NTND. +BS  Ext: No c/c/e.        Pertinent  and/or Most Recent Results     LAB RESULTS:      Lab 12/02/24  0504 12/01/24  0436 11/30/24  0115 11/29/24  2235 11/29/24  1749 11/29/24  1155 11/29/24  0510 11/28/24  1519 11/28/24  0753 11/28/24  0206 11/27/24  0532 11/26/24  1305   WBC 13.04* 17.13* 15.71*  --   --   --  12.42*  --  16.07* 12.15*   < >  --    HEMOGLOBIN 9.1* 8.8* 9.3* 10.1* 10.5*   < > 10.7*   < > 6.8* 7.9*   < >  --    HEMOGLOBIN, POC  --   --   --   --   --   --   --    < >  --   --   --   --    HEMATOCRIT 28.5* 28.4* 28.1* 30.1* 32.3*   < > 32.6*   < > 20.3* 23.9*   < >  --    HEMATOCRIT POC  --   --   --   --   --   --   --    < >  --   --   --   --    PLATELETS 108* 91* 88*  --   --   --  101*  --  112* 102*   < >  --    NEUTROS ABS 11.24* 15.05* 13.44*  --   --   --  11.23*  --  13.61* 9.67*   < >  --    IMMATURE GRANS (ABS) 0.08* 0.13* 0.14*  --   --   --  0.09*  --  0.16* 0.09*   < >  --    LYMPHS ABS 1.35 1.46 1.63  --   --   --  0.86  --  1.94 2.16   < >  --    MONOS ABS 0.28 0.43 0.49  --   --   --  0.23  --   0.33 0.21   < >  --    EOS ABS 0.08 0.04 0.00  --   --   --  0.00  --  0.01 0.01   < >  --    MCV 93.8 91.6 88.1  --   --   --  87.4  --  93.5 92.3   < >  --    CRP 15.60* 10.90* 10.30*  --   --   --  6.26*  --   --  1.25*   < >  --    LDH  --   --   --   --   --   --   --   --   --  190  --   --    D DIMER QUANT  --   --   --   --   --   --   --   --   --   --   --  0.97*    < > = values in this interval not displayed.         Lab 12/02/24  0504 12/01/24 2046 12/01/24  0436 11/29/24  0510 11/28/24  0206 11/27/24  0532   SODIUM 139  --  138 136 139 136   POTASSIUM 4.9 4.3 3.5 4.0 3.9 4.7   CHLORIDE 110*  --  108* 108* 109* 110*   CO2 22.5  --  23.3 20.9* 23.0 18.6*   ANION GAP 6.5  --  6.7 7.1 7.0 7.4   BUN 12  --  17 20 16 18   CREATININE 0.42*  --  0.53* 0.66 0.51* 0.50*   EGFR 102.8  --  97.2 92.2 98.1 98.6   GLUCOSE 72  --  72 96 95 98   CALCIUM 7.9*  --  7.8* 8.0* 7.6* 7.9*   MAGNESIUM 2.6*  --  1.4*  --   --   --          Lab 11/29/24  0510 11/28/24  0206 11/27/24  0532 11/26/24  0424   TOTAL PROTEIN 4.0* 3.6* 3.8* 3.5*   ALBUMIN 2.6* 2.4* 2.3* 2.4*   GLOBULIN 1.4 1.2 1.5 1.1   ALT (SGPT) 31 38* 21 19   AST (SGOT) 21 30 21 14   BILIRUBIN 0.5 0.2 0.3 0.5   ALK PHOS 64 51 48 40         Lab 11/27/24  0532 11/26/24  0424   HSTROP T 29* 36*             Lab 11/28/24  0833   ABO TYPING O   RH TYPING Positive   ANTIBODY SCREEN Negative         Lab 11/28/24  1753   PH, ARTERIAL 7.290*   PCO2, ARTERIAL 43.2   PO2 .0*   O2 SATURATION .0*   HCO3 ART 20.7*   BASE EXCESS ART <0.0*     Brief Urine Lab Results  (Last result in the past 365 days)        Color   Clarity   Blood   Leuk Est   Nitrite   Protein   CREAT   Urine HCG        11/06/24 1539 Dark Yellow   Slightly Cloudy   Negative   Small (1+)   Negative   30 mg/dL (1+)                 Microbiology Results (last 10 days)       Procedure Component Value - Date/Time    Blood Culture - Blood, Arm, Left [[id number]]  (Normal) Collected: 11/25/24 0150    Lab  Status: Final result Specimen: Blood from Arm, Left Updated: 11/30/24 0445     Blood Culture No growth at 5 days    Narrative:      Less than seven (7) mL's of blood was collected.  Insufficient quantity may yield false negative results.    Ova & Parasite Examination - Stool, Per Rectum [[id number]] Collected: 11/23/24 1401    Lab Status: Final result Specimen: Stool from Per Rectum Updated: 11/26/24 1511     Ova + Parasite Exam Final report     Comment: These results were obtained using wet preparation(s) and trichrome  stained smear. This test does not include testing for Cryptosporidium  parvum, Cyclospora, or Microsporidia.        Ova + Parasite Result 1 Comment     Comment: No ova, cysts, or parasites seen.  One negative specimen does not rule out the possibility of a  parasitic infection.       Narrative:      Performed at:  [address] [place] [address]  [id number]  : Shola Hill PhD, Phone:  [phone]            XR Chest 1 View    Result Date: 11/25/2024  Impression: Impression: No active pulmonary process. Electronically Signed: Jameson Rainey MD  11/25/2024 9:25 AM EST  Workstation ID: [id number]    CT Abdomen Pelvis With Contrast    Result Date: 11/20/2024  Impression: Impression: 1.Distal/terminal ileitis in keeping with patient's history of Crohn's disease. No definitive active colonic inflammation identified on CT imaging. Trace free fluid in the pelvis is likely related. 2.Diffuse colonic fatty mural infiltration which can be seen in the setting of chronic inflammatory bowel disease. 3.Other incidental nonemergent findings detailed above. Electronically Signed: Junior Khan MD  11/20/2024 8:19 AM EST  Workstation ID: [id number]    XR Chest 1 View    Result Date: 11/19/2024  Impression: Impression: No acute chest finding. Electronically Signed: Ute Snider MD  11/19/2024 1:34 PM EST  Workstation ID: [id number]    CT Abdomen Pelvis Without Contrast    Result Date:  11/6/2024  Impression: Impression: 1. Segmental thickening and inflammatory change of the descending colon-sigmoid colon junction. Correlate for infectious or inflammatory colitis. 2. Incompletely imaged 3.8 cm mid ascending thoracic aortic aneurysm. Electronically Signed: Ute Snider MD  11/6/2024 4:23 PM EST  Workstation ID: [id number]     Results for orders placed during the hospital encounter of 10/08/24    Duplex Venous Upper Extremity - Left CAR    Interpretation Summary    Acute left upper extremity superficial thrombophlebitis noted in the basilic (upper arm).    All other left sided vessels appear normal.      Results for orders placed during the hospital encounter of 10/08/24    Duplex Venous Upper Extremity - Left CAR    Interpretation Summary    Acute left upper extremity superficial thrombophlebitis noted in the basilic (upper arm).    All other left sided vessels appear normal.      Results for orders placed during the hospital encounter of 11/19/24    Adult Transthoracic Echo Limited W/ Cont if Necessary Per Protocol    Interpretation Summary    Left ventricular ejection fraction appears to be 56 - 60%.    There is a MitraClip mitral valve repair present.    There is trace residual MR.  Mean gradient is 4.6 mmHg      Labs Pending at Discharge:  Pending Results       Procedure [Order ID] Specimen - Date/Time    Cardiac Catheterization/Vascular Study [[id number]] Resulted: 11/21/24 0831     Updated: 11/21/24 0952    Intra-Op Structural Heart GONZÁLEZ (Cardiology Read) [[id number]] Resulted: 11/21/24 1022     Updated: 11/21/24 1022     MV max PG 9.9 mmHg      MV mean PG 5.4 mmHg      MV V2 VTI 45.7 cm             Procedures Performed  Procedure(s):  RIGHT HEMICOLECTOMY WITH ILEOSTOMY         Consults:   Consults       Date and Time Order Name Status Description    11/29/2024  9:21 AM Inpatient Hospitalist Consult      11/28/2024  2:01 PM Inpatient Colorectal Surgery Consult      11/24/2024 10:44 AM Inpatient  Infectious Diseases Consult Completed     11/22/2024 11:39 AM Hematology & Oncology Inpatient Consult Completed     11/19/2024  5:31 PM Inpatient Cardiology Consult Completed     11/19/2024  2:56 PM Gastroenterology (on-call MD unless specified) Completed     11/7/2024  1:20 PM Inpatient Cardiology Consult Completed     11/7/2024  5:48 AM Inpatient Infectious Diseases Consult Completed     11/6/2024  7:25 PM Inpatient Gastroenterology Consult Completed     10/14/2024  5:03 PM Inpatient Pulmonology Consult Completed     10/13/2024 10:38 AM Inpatient Cardiology Consult Completed     10/11/2024 10:12 AM Hematology & Oncology Inpatient Consult Completed     10/11/2024  8:11 AM Inpatient Gastroenterology Consult Completed               Discharge Details        Discharge Medications        New Medications        Instructions Start Date   mesalamine 1.2 g EC tablet  Commonly known as: LIALDA   1.2 g, Oral, Daily   Start Date: December 3, 2024     metoprolol succinate XL 25 MG 24 hr tablet  Commonly known as: TOPROL-XL   25 mg, Oral, Every 24 Hours Scheduled   Start Date: December 3, 2024     naloxone 4 MG/0.1ML nasal spray  Commonly known as: NARCAN   Call 911. Don't prime. Irwin in 1 nostril for overdose. Repeat in 2-3 minutes in other nostril if no or minimal breathing/responsiveness.      ondansetron ODT 4 MG disintegrating tablet  Commonly known as: ZOFRAN-ODT   4 mg, Oral, Every 6 Hours PRN      oxyCODONE 5 MG immediate release tablet  Commonly known as: ROXICODONE   10 mg, Oral, Every 6 Hours PRN             Changes to Medications        Instructions Start Date   midodrine 5 MG tablet  Commonly known as: PROAMATINE  What changed:   medication strength  how much to take   5 mg, Oral, 3 Times Daily Before Meals             Continue These Medications        Instructions Start Date   albuterol (2.5 MG/3ML) 0.083% nebulizer solution  Commonly known as: PROVENTIL   2.5 mg, Every 6 Hours PRN      amLODIPine 10 MG  tablet  Commonly known as: NORVASC   10 mg, Daily      aspirin 81 MG EC tablet   81 mg, Oral, Daily      atorvastatin 40 MG tablet  Commonly known as: LIPITOR   40 mg, Oral, Nightly      cholecalciferol 1.25 MG (19491 UT) capsule  Commonly known as: VITAMIN D3   1 capsule, 2 Times Weekly      clopidogrel 75 MG tablet  Commonly known as: PLAVIX   75 mg, Oral, Daily      diclofenac 50 MG EC tablet  Commonly known as: VOLTAREN   50 mg, 2 Times Daily      folic acid 1 MG tablet  Commonly known as: FOLVITE   1 tablet, Daily      Humira (2 Pen) 40 MG/0.4ML Pen-injector Kit  Generic drug: Adalimumab   40 mg, Weekly      levothyroxine 50 MCG tablet  Commonly known as: SYNTHROID, LEVOTHROID   1 tablet, Daily      Melatonin Extra Strength 10 MG tablet  Generic drug: Melatonin   10 mg, As Needed      methotrexate 2.5 MG tablet   6 tablets, Weekly      pantoprazole 20 MG EC tablet  Commonly known as: PROTONIX   1 tablet, Daily      predniSONE 10 MG tablet  Commonly known as: DELTASONE   Take 4 tablets by mouth Daily for 7 days, THEN 3.5 tablets Daily for 7 days, THEN 3 tablets Daily for 7 days, THEN 2.5 tablets Daily for 7 days, THEN 2 tablets Daily for 7 days, THEN 1.5 tablets Daily for 7 days, THEN 1 tablet Daily for 7 days, THEN 0.5 tablets Daily for 7 days. Indications: Crohn's Disease   Start Date: November 12, 2024     Rinvoq 45 MG tablet sustained-release 24 hour extended release tablet  Generic drug: upadacitinib ER   1 tablet, Daily      sertraline 50 MG tablet  Commonly known as: ZOLOFT   1 tablet, Daily      spironolactone 25 MG tablet  Commonly known as: ALDACTONE   1 tablet, Daily             Stop These Medications      metoprolol tartrate 50 MG tablet  Commonly known as: LOPRESSOR              Allergies   Allergen Reactions    Codeine Hives    Penicillin G Sodium Hives         Discharge Disposition: 55 minutes  Home-Health Care [facility]    Diet:  Hospital:  Diet Order   Procedures    Diet: Regular/House; Fluid  Consistency: Thin (IDDSI 0)         Discharge Activity:         CODE STATUS:  Code Status and Medical Interventions: CPR (Attempt to Resuscitate); Full Support   Ordered at: 11/21/24 1014     Level Of Support Discussed With:    Patient     Code Status (Patient has no pulse and is not breathing):    CPR (Attempt to Resuscitate)     Medical Interventions (Patient has pulse or is breathing):    Full Support         Future Appointments   Date Time Provider Department Center   12/5/2024 11:00 AM Niya Mendoza APRN [facility]   1/6/2025  8:30 AM [facility]   1/6/2025  9:45 AM Travis Connor MD [facility]   9/24/2025 10:15 AM [facility] NA   9/24/2025 11:45 AM Travis Connor MD [facility]       Additional Instructions for the Follow-ups that You Need to Schedule       Ambulatory Referral to Cardiac Rehab   As directed              Time spent on Discharge including face to face service:  30 minutes    Signature: Electronically signed by Fco Figureoa PA-C, 12/02/24, 13:31 EST.  [facility] Hospitalist Team

## 2024-12-02 NOTE — CASE MANAGEMENT/SOCIAL WORK
Continued Stay Note  [facility]     Patient Name: Maryann Gaitan  MRN: [id number]  Today's Date: 12/2/2024    Admit Date: 11/19/2024    Plan: Return home w [facility] (current, need order)   Discharge Plan       Row Name 12/02/24 0905       Plan    Plan Comments DC Barriers: s/p right hemicolectomy with ileostomy, monitoring H&H, PT/OT, cardio, onc, gen sx, GI following                 Shraddha Lozano RN     [facility]  Office: [phone]  Cell: [phone]  Fax # [phone]

## 2024-12-02 NOTE — PROGRESS NOTES
Cardiology Progress Note    Patient Identification:  Name: Maryann Gaitan  Age: 74 y.o.  Sex: female  :  1950  MRN: [id number]                 Follow Up / Chief Complaint: CAD/PCI, GI Bleed, MR status post Azeb Clip   Chief Complaint   Patient presents with    Chest Pain       Interval History: Patient presented with chest pain and abdominal pain.  Has been having bright red blood per rectum.  Was seen by structural heart for mitral regurgitation was scheduled for outpatient procedure.  Underwent MitraClip 2024  Patient is having issues with rectal bleed and anemia requiring transfusions.  Patient underwent right hemicolectomy with ileostomy placement on 2024        NP note: Patient seen and examined this morning.  She denies any chest pain but does report continued abdominal pain and shortness of breath.  Ileostomy with output noted.  She reports continued bleeding but none noted in ileostomy bag at this time.  Hemoglobin is stable patient is back on aspirin and Plavix    Electronically signed by DRU Rubio, 24, 11:45 AM EST.    Cardiology attending addendum :    I have personally performed a face-to-face diagnostic evaluation, physical exam and reviewed data on this patient.  I have reviewed documentation done by me and nurse practitioner  and corrected as needed.  And agree with the different components of documentation.Greater than 50% of the time spent in the care of this patient was provided by attending consultant/me.          Subjective: Patient seen and examined.  Chart reviewed.  Labs reviewed.  Patient without chest pain or shortness of breath.  Events noted.      Objective:  2024: Glucose elevated.  CBC with white count of 13.5, hemoglobin 9  2024: WBC 14.13, hemoglobin 7.4  EKG normal sinus rhythm  Limited echo shows EF at 56 to 60% MitraClip in place with trace residual MR mean creatinine of 4.6  2024: Hemoglobin 6.6  2024: Hemoglobin is  7.4.  11/27/2024: Hemoglobin is 6.4.    12/2/2024: CRP 15.6 WBC 13.04 hemoglobin 9.1 platelets 108    History of present illness:    Ms. Maryann Gaitan has PMH of     CAD status post cardiac cath with multivessel CAD poor candidate for CABG, underwent PCI to ostial and proximal LAD 10/22/2024  Moderate to severe MR with central jet noted  COPD  Hypertension  Rheumatoid arthritis  Crohn's disease     Presented to the emergency room on 11/19/2024 with abdominal pain and chest pain.  Patient reports that she started having bright red blood per rectum again yesterday and has been having constant chest pain since.     Labs in the emergency room was noted to have a hemoglobin of 5.7 high-sensitivity troponin 21--17 BUN 28 creatinine 0.74 total protein 5.1 albumin 2.9 ALT 35 WBC 16.93 chest x-ray without any acute finding EKG sinus rhythm with nonspecific ST abnormalities  CT abdomen and pelvis shows distal terminal ileitis colonic fatty marrow infiltration which can be seen in the setting of chronic inflammatory bowel disease   patient was transfused with 1 unit of PRBC and hemoglobin improved to 7.3 and this morning she is 9.1     Unfortunately patient is in the tough situation as she had stent placed on 10/22/2024 and needs dual antiplatelet therapy to prevent clotting of stent however she has presented for the second time with significant GI bleeding.  Currently her aspirin and Plavix are both on hold.        Recent hospitalization  Presented 11/6/2024 through [facility] with complaint of abdominal pain and dark red stool / rectal bleed.  Patient has close disease and rheumatoid arthritis and is on immunosuppressants methotrexate, Humira, Rinvoq now presented with rectal bleed and abdominal pain.  GI did a scope and biopsies of terminal ileum which were consistent with active Crohn's and biopsies of stomach and duodenum showed strong colitis infection, was treated by ivermectin.  GI started patient on Solu-Medrol and is  holding immunosuppressants and wants to hold Plavix.     Patient was recently in the hospital 10/8/2024 with nausea vomiting diarrhea and abnormal labs.  With hyponatremia and hypokalemia and anemia.  Patient suddenly started having chest pain and fast team was called because of sharp left-sided chest pain with shortness of breath dizziness EKG showed sinus tachycardia and cardiac workup revealed severe MR, cath 10/15/2024 revealing severe ostial LAD and outpouching in the descending thoracic aorta probably a penetrating aortic ulcer versus aneurysm.  CT surgery was consulted and she was turned down for CABG therefore patient underwent drug-eluting stent to ostial LAD on 10/22/2024 and was supposed to have clip to mitral valve in follow-up.  In the meantime has been having worsening symptoms and abdominal pain, weight loss.  Patient has been restarted on Plavix.  Will continue for bleeding closely.     Data:  Labs 11/6/2024 - 11/7/2024 revealed sodium 134, BUN/creatinine of 75/2.84, glucose 138, albumin 2.9.  Hemoglobin 9.4 has come down to 8.  MCV 98  EKG done 11/6/2024 reviewed/interpreted by me reveals sinus bradycardia at the rate of 58 bpm.        EGD/colonoscopy 10/12/2024 revealed small hiatal hernia, nonerosive gastritis, T1 stricture s/p dilatation with 12 mm, T1 ulcer, colon ulcers, sigmoid diverticulosis, grade 2 internal hemorrhoids and strongyloides  Echo 10/12/2024 EF of 51 to 55% with mild RV enlargement, severe left atrial enlargement, moderate to severe MR  Transesophageal echo 10/16/2024: LVEF of 55 to 60% with severe left atrial enlargement, moderate to severe MR and grade 3 descending aortic plaque  Cardiac cath 10/15/2024 reveals total right and severe ostial LAD, descending thoracic aorta had an outpouching consistent with penetrating aortic ulcer versus aneurysm.   Patient was evaluated by CT surgery not a candidate for CABG therefore patient underwent PCI and drug-eluting stent to the ostium  proximal LAD.  And she was seen by valve team and will be set up for MitraClip once she recovers from this hospitalization      Assessment:  :     Abdominal pain, rectal bleeding  Anemia requiring blood transfusion  Chest pain  Crohn's disease  CAD, status post PCI  Mitral regurgitation  Chronic HFpEF due to valvular heart disease from mitral regurgitation  Hypertensive cardiovascular disease from hypertension  Hyponatremia  Hypokalemia  Crohn's disease  Normocytic anemia  COPD, chronic steroid therapy  Multifocal pneumonia  Hyperglycemia, prediabetes with A1c of 5.6 from     Recommendations / Plan:         Patient presented 11/19/2024 with abdominal pain and bright red blood per rectum was found to have a hemoglobin of 5.7 was transfused.  Patient chest pain.  HS troponin is normal.  Patient recently had coronary drug-eluting stent placed on 10/22/2024 would benefit from antiplatelet therapy.    Patient was seen by structural heart underwent mitral valve clip 11/21/2024.  Patient unfortunately is having Crohn's flareup with bloody diarrhea and requiring transfusions, is between rock and a hard place.  Patient underwent right hemicolectomy end ileostomy placement on 11/28/2024.  Will continue to monitor.     Discussed with RN taking care of patient to coordinate care            Review of records  Patient presented 10/9/2024 because of abnormal labs showing low sodium, was complaining of nausea vomiting and diarrhea.    Patient underwent cardiac cath 10/15/2024 which revealed total right and severe ostial LAD disease.  Descending thoracic aorta has an outpouching probably saccular aneurysm versus  penetrating aortic ulcer .  Echocardiogram 10/12/2024 is revealing EF of 51 to 55% with mild RV enlargement severe left atrial enlargement and right atrial enlargement and moderate to severe MR  GONZÁLEZ is revealing moderate to severe MR.       Copied text in this portion of the note has been reviewed and is accurate as of  12/2/2024    Past Medical 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 07/05/2016    Dyslipidemia 10/30/2024    Fecal urgency 11/21/2024    First degree hemorrhoids 07/09/2021    GERD (gastroesophageal reflux disease) 11/21/2024    Hashimoto's thyroiditis 11/21/2024    History of colonic polyps 07/09/2021    Hypertension     Hypothyroidism (acquired) 11/06/2024    Mitral regurgitation 10/08/2024    Moderate protein-calorie malnutrition 11/09/2024    Multiple tracheobronchial mucus plugs 10/08/2024    Nicotine dependence 11/21/2024    Rheumatoid arthritis 11/06/2024    S/P mitral valve clip implantation 11/21/2024    Second degree hemorrhoids 07/05/2016    Stress incontinence, female 11/21/2024    Vitamin D deficiency, unspecified 11/21/2024     Past Surgical History:  Past Surgical History:   Procedure Laterality Date    BRONCHOSCOPY N/A 10/16/2024    Procedure: BRONCHOSCOPY;  Surgeon: Gallo Pope MD;  Location: [facility] ENDOSCOPY;  Service: Pulmonary;  Laterality: N/A;    CARDIAC CATHETERIZATION N/A 10/15/2024    Procedure: Left Heart Cath, possible pci;  Surgeon: Travis Connor MD;  Location: [facility] CATH INVASIVE LOCATION;  Service: Cardiovascular;  Laterality: N/A;    CARDIAC CATHETERIZATION N/A 10/22/2024    Procedure: Laser Coronary Atherectomy;  Surgeon: Travis Connor MD;  Location: [facility] CATH INVASIVE LOCATION;  Service: Cardiovascular;  Laterality: N/A;    COLON RESECTION Right 11/28/2024    Procedure: RIGHT HEMICOLECTOMY WITH ILEOSTOMY;  Surgeon: Todd Babin  MD;  Location: [facility] MAIN OR;  Service: General;  Laterality: Right;    COLONOSCOPY N/A 10/12/2024    Procedure: COLONOSCOPY WITH BIOPSY AND WIRE GUIDED BALLOON DILATION OF TERMINAL ILEUM;  Surgeon: Rob Strong MD;  Location: [facility] ENDOSCOPY;  Service: Gastroenterology;  Laterality: N/A;  Colitis, crohns of terminal ileum, right colon ulcers, diverticulosis, hemorroids    ENDOSCOPY N/A 10/12/2024    Procedure: ESOPHAGOGASTRODUODENOSCOPY WITH BIOPSY X 2 AREA;  Surgeon: Rob Strong MD;  Location: [facility] ENDOSCOPY;  Service: Gastroenterology;  Laterality: N/A;  Chronic gastritis, HH    ENDOSCOPY Left 11/28/2024    Procedure: ESOPHAGOGASTRODUODENOSCOPY;  Surgeon: Dallin Delgado MD;  Location: [facility] ENDOSCOPY;  Service: Gastroenterology;  Laterality: Left;  small hiatal hernia    HYSTERECTOMY      LEFT HEART CATH          Social History:   Social History     Tobacco Use    Smoking status: Former     Types: Cigarettes    Smokeless tobacco: Never   Substance Use Topics    Alcohol use: Never      Family History:  Family History   Problem Relation Age of Onset    Heart disease Mother     Dementia Mother     Stroke Mother     Heart disease Father     Hypertension Father           Allergies:  Allergies   Allergen Reactions    Codeine Hives    Penicillin G Sodium Hives     Scheduled Meds:  acetaminophen, 1,000 mg, Q8H  aspirin, 81 mg, Daily  atorvastatin, 40 mg, Nightly  clopidogrel, 75 mg, Daily  folic acid, 1,000 mcg, Daily  levothyroxine, 50 mcg, Q AM  loperamide, 2 mg, 4x Daily  mesalamine, 4.8 g, Daily  methotrexate, 15 mg, Weekly  metoclopramide, 10 mg, Q6H  metoprolol succinate XL, 25 mg, Q24H  midodrine, 5 mg, TID AC  mupirocin, 1 Application, BID  pantoprazole, 40 mg, Daily  predniSONE, 40 mg, Daily With Breakfast  Psyllium, 1 packet, BID  sertraline, 50 mg, Daily  sodium chloride, 10 mL, Q12H  sodium chloride, 10 mL, Q12H  upadacitinib ER, 45 mg, Daily  vitamin B-12, 1,000 mcg,  "Daily          Review of Systems:   ROS        Constitutional:  Temp:  [97.5 °F (36.4 °C)-98.4 °F (36.9 °C)] 97.8 °F (36.6 °C)  Heart Rate:  [69-84] 69  Resp:  [17-24] 21  BP: (122-151)/(63-76) 138/67    Physical Exam   /67 (BP Location: Right arm, Patient Position: Lying)   Pulse 69   Temp 97.8 °F (36.6 °C) (Oral)   Resp 21   Ht 162.6 cm (64.02\")   Wt 56.1 kg (123 lb 10.9 oz)   SpO2 100%   BMI 21.22 kg/m²   General:  Appears in no acute distress  Eyes: Sclera is anicteric,  conjunctiva is clear   HEENT:  No JVD. Thyroid not visibly enlarged. No mucosal pallor or cyanosis  Respiratory: Respirations regular and unlabored at rest.  Clear to auscultation  Cardiovascular: S1,S2 Regular rate and rhythm.  2/6 holosystolic murmur  Gastrointestinal: Abdomen nondistended.  Ileostomy noted  Musculoskeletal:  No abnormal movements  Extremities: No digital clubbing or cyanosis  Skin: Color pink.   Neuro: Alert and awake.    INTAKE AND OUTPUT:    Intake/Output Summary (Last 24 hours) at 12/2/2024 1707  Last data filed at 12/2/2024 0847  Gross per 24 hour   Intake 100 ml   Output 720 ml   Net -620 ml       Cardiographics  Telemetry: Sinus    ECG:   ECG 12 Lead Other; post-MitraClip   Final Result   HEART RATE=73  bpm   RR Sttbbiuf=717  ms   OR Nzdfkbyg=982  ms   P Horizontal Axis=1  deg   P Front Axis=84  deg   QRSD Interval=80  ms   QT Culcfwfs=204  ms   ZZbR=906  ms   QRS Axis=39  deg   T Wave Axis=70  deg   - NORMAL ECG -   Sinus rhythm   When compared with ECG of 19-Nov-2024 12:13:34,   Significant repolarization change   Significant axis, voltage or hypertrophy change   Electronically Signed By: Dmitri Navarro ([facility]) 2024-11-22 13:38:55   Date and Time of Study:2024-11-22 06:15:06      ECG 12 Lead Chest Pain   Final Result   HEART RATE=67  bpm   RR Wmfrxark=781  ms   OR Kpjjywcf=338  ms   P Horizontal Axis=28  deg   P Front Axis=87  deg   QRSD Interval=83  ms   QT Kprthmqo=514  ms   HVhT=629  ms   QRS Axis=39  deg "   T Wave Axis=40  deg   - ABNORMAL ECG -   Sinus rhythm   Low voltage, precordial leads   Nonspecific  T abnormalities, inferior leads   When compared with ECG of 06-Nov-2024 15:41:09,   Significant repolarization change   Significant axis, voltage or hypertrophy change   Electronically Signed By: Rajiv Dubon ([facility]) 2024-11-19 12:47:59   Date and Time of Study:2024-11-19 12:13:34      Telemetry Scan   Final Result      Telemetry Scan   Final Result      Telemetry Scan   Final Result      Telemetry Scan   Final Result      Telemetry Scan   Final Result      Telemetry Scan   Final Result      Telemetry Scan   Final Result      Telemetry Scan   Final Result      Telemetry Scan   Final Result      Telemetry Scan   Final Result      Telemetry Scan   Final Result      Telemetry Scan   Final Result      Telemetry Scan   Final Result      Telemetry Scan   Final Result      Telemetry Scan   Final Result      Telemetry Scan   Final Result      Telemetry Scan   Final Result      Telemetry Scan   Final Result      Telemetry Scan   Final Result      Telemetry Scan   Final Result      Telemetry Scan   Final Result      Telemetry Scan   Final Result      Telemetry Scan   Final Result      Telemetry Scan   Final Result      Telemetry Scan   Final Result      Telemetry Scan   Final Result      Telemetry Scan   Final Result      Telemetry Scan   Final Result      Telemetry Scan   Final Result      Telemetry Scan   Final Result      Telemetry Scan   Final Result      Telemetry Scan   Final Result      Telemetry Scan   Final Result      Telemetry Scan   Final Result      Telemetry Scan   Final Result      Telemetry Scan   Final Result      Telemetry Scan   Final Result      Telemetry Scan   Final Result      Telemetry Scan   Final Result      Telemetry Scan   Final Result      Telemetry Scan   Final Result      Telemetry Scan   Final Result      Telemetry Scan   Final Result      Telemetry Scan   Final Result      Telemetry Scan   Final  Result      Telemetry Scan   Final Result      Telemetry Scan   Final Result      Telemetry Scan   Final Result      Telemetry Scan   Final Result      Telemetry Scan   Final Result      Telemetry Scan   Final Result      Telemetry Scan   Final Result      Telemetry Scan   Final Result      Telemetry Scan   Final Result      Telemetry Scan   Final Result      Telemetry Scan   Final Result      Telemetry Scan   Final Result      Telemetry Scan   Final Result      Telemetry Scan   Final Result      Telemetry Scan   Final Result      Telemetry Scan   Final Result      Telemetry Scan   Final Result      Telemetry Scan   Final Result      Telemetry Scan   Final Result      Telemetry Scan   Final Result      Telemetry Scan   Final Result      Telemetry Scan   Final Result      Telemetry Scan   Final Result      Telemetry Scan   Final Result      Telemetry Scan   Final Result        I have personally reviewed EKG    Echocardiogram: Results for orders placed during the hospital encounter of 11/19/24    Adult Transthoracic Echo Limited W/ Cont if Necessary Per Protocol    Interpretation Summary    Left ventricular ejection fraction appears to be 56 - 60%.    There is a MitraClip mitral valve repair present.    There is trace residual MR.  Mean gradient is 4.6 mmHg      Lab Review   I have reviewed the labs  Results from last 7 days   Lab Units 11/27/24  0532 11/26/24  0424   HSTROP T ng/L 29* 36*     Results from last 7 days   Lab Units 12/02/24  0504   MAGNESIUM mg/dL 2.6*     Results from last 7 days   Lab Units 12/02/24  0504   SODIUM mmol/L 139   POTASSIUM mmol/L 4.9   BUN mg/dL 12   CREATININE mg/dL 0.42*   CALCIUM mg/dL 7.9*         Results from last 7 days   Lab Units 12/02/24  0504 12/01/24  0436 11/30/24  0115   WBC 10*3/mm3 13.04* 17.13* 15.71*   HEMOGLOBIN g/dL 9.1* 8.8* 9.3*   HEMATOCRIT % 28.5* 28.4* 28.1*   PLATELETS 10*3/mm3 108* 91* 88*             RADIOLOGY:  Imaging Results (Last 24 Hours)       ** No  "results found for the last 24 hours. **                  )12/2/2024  MD EVERETTE Araujo/Transcription:   \"Dictated utilizing Dragon dictation\".   "

## 2024-12-02 NOTE — CASE MANAGEMENT/SOCIAL WORK
Social Work Assessment  [facility]     Patient Name: Maryann Gaitan  MRN: [id number]  Today's Date: 12/2/2024    Admit Date: 11/19/2024     Discharge Plan       Row Name 12/02/24 1622       Plan    Plan [facility] (PASRR approved, will require pre-cert) vs home with [facility].    Plan Comments CM updated on PASRR status. LSW and CM spoke to patient's son/POA (Nadir) regarding POA vs. guardianship. Encouraged guardianship for future needs. Patient reporting need to get home to daughter, but LSW/CM learned this was a historic event and not recent. Psych consulted for decision-making capacity and provided update on current scenario with reference to desire to return home d/t historic events.                  [place], FREEMAN, LSW, [facility]    Phone: [phone]  Cell: [phone]  Fax: [phone]  [email or web address]

## 2024-12-02 NOTE — CASE MANAGEMENT/SOCIAL WORK
Continued Stay Note  [facility]     Patient Name: Maryann Gaitan  MRN: [id number]  Today's Date: 12/2/2024    Admit Date: 11/19/2024    Plan: Return home w Merged with [facility] (current, need order)   Discharge Plan       Row Name 12/02/24 1148       Plan    Plan Comments Met with patient bedside to discuss rehab facilities. Patient declines SNF at this time and wants to proceed with DC home with [facility].               Shraddha Lozano RN      [facility]  Office: [phone]  Cell: [phone]  Fax # [phone]

## 2024-12-02 NOTE — THERAPY TREATMENT NOTE
"Subjective: Pt agreeable to therapeutic plan of care.  Reports she is fatigued today.      Objective:     Precautions - fall risk, hemicolectomy with ileostomy on      Bed mobility - Mod-A  Transfers - Mod-A with rolling walker.  STS performed x2 with elevated bed.   Ambulation - 3 feet Min-A and with rolling walker  Static standing balance: min A   Dynamic standing balance: Mod A   *Gait belt applied and non-skid socks worn during treatment.       Vitals: WNL  BP in sittin/66  BP in sitting post transfer to chair: 151/70    Pain: 6 VAS   Location: abdomen   Intervention for pain: Repositioned and Therapeutic Presence    Education: Provided education on the importance of mobility in the acute care setting and Verbal/Tactile Cues    Assessment: Maryann Gaitan presents with functional mobility impairments which indicate the need for skilled intervention. Tolerating session today without incident. Patient able to ambulate short distance bed to chair.  Very weak with sit to stand requiring mod A and bed to be elevated.  Will continue to follow and progress as tolerated.     Plan/Recommendations:   If medically appropriate, Moderate Intensity Therapy recommended post-acute care. This is recommended as therapy feels the patient would require 3-4 days per week and wouldn't tolerate \"3 hour daily\" rehab intensity. SNF would be the preferred choice. If the patient does not agree to SNF, arrange HH or OP depending on home bound status. If patient is medically complex, consider LTACH. Pt requires no DME at discharge.     Pt desires Skilled Rehab placement at discharge. Pt cooperative; agreeable to therapeutic recommendations and plan of care.       Basic Mobility 6-click:  Rollin = Total, A lot = 2, A little = 3; 4 = None  Supine>Sit:   1 = Total, A lot = 2, A little = 3; 4 = None   Sit>Stand with arms:  1 = Total, A lot = 2, A little = 3; 4 = None  Bed>Chair:   1 = Total, A lot = 2, A little = 3; 4 = " None  Ambulate in room:  1 = Total, A lot = 2, A little = 3; 4 = None  3-5 Steps with railin = Total, A lot = 2, A little = 3; 4 = None  Score: 13    Post-Tx Position: Up in Chair, Alarms activated, and Call light and personal items within reach  PPE: gloves    Therapy Charges for Today       Code Description Service Date Service Provider Modifiers Qty    72760428844 HC PT THERAPEUTIC ACT EA 15 MIN 2024 May Ramires, PT GP 1    74984317991 HC PT NEUROMUSC RE EDUCATION EA 15 MIN 2024 May Ramires, PT GP 1           PT Charges       Row Name 24 1340             Time Calculation    Start Time 1050  -EJ      Stop Time 1110  -EJ      Time Calculation (min) 20 min  -EJ      PT Received On 24  -EJ      PT - Next Appointment 24  -EJ         Time Calculation- PT    Total Timed Code Minutes- PT 20 minute(s)  -EJ                User Key  (r) = Recorded By, (t) = Taken By, (c) = Cosigned By      Initials Name Provider Type    EJ May Ramires, PT Physical Therapist

## 2024-12-02 NOTE — CASE MANAGEMENT/SOCIAL WORK
Continued Stay Note  [facility]     Patient Name: Maryann Gaitan  MRN: [id number]  Today's Date: 2024    Admit Date: 2024    Plan: [facility] (pending SNF, [facility] accepted), PASRR QFR, Precert required   Discharge Plan       Row Name 24 1547       Plan    Plan [facility] (pending SNF, [facility] accepted), PASRR QFR, Precert required    Plan Comments Met with POA and patient to discuss rehab at POA's request. POA states the daughter that she is saying is at home with hospice dying actually  4-5 years ago and her memory has been getting worse the last few years. He wants patent to go to [facility]F, primary nurse, SW and MDs updated of need for psych consult to establish decisional making capacities. [facility] referral sent and liaison Teal notified.               Shraddha Lozano RN      [facility]  Office: [phone]  Cell: [phone]  Fax # [phone]

## 2024-12-03 LAB
ANION GAP SERPL CALCULATED.3IONS-SCNC: 6.3 MMOL/L (ref 5–15)
BASOPHILS # BLD AUTO: 0.01 10*3/MM3 (ref 0–0.2)
BASOPHILS NFR BLD AUTO: 0.1 % (ref 0–1.5)
BH CV ECHO MEAS - MV MAX PG: 9.9 MMHG
BH CV ECHO MEAS - MV MEAN PG: 5.4 MMHG
BH CV ECHO MEAS - MV V2 VTI: 45.7 CM
BUN SERPL-MCNC: 12 MG/DL (ref 8–23)
BUN/CREAT SERPL: 30.8 (ref 7–25)
CALCIUM SPEC-SCNC: 8.1 MG/DL (ref 8.6–10.5)
CHLORIDE SERPL-SCNC: 105 MMOL/L (ref 98–107)
CO2 SERPL-SCNC: 25.7 MMOL/L (ref 22–29)
CREAT SERPL-MCNC: 0.39 MG/DL (ref 0.57–1)
CRP SERPL-MCNC: 9.2 MG/DL (ref 0–0.5)
DEPRECATED RDW RBC AUTO: 55.8 FL (ref 37–54)
EGFRCR SERPLBLD CKD-EPI 2021: 104.6 ML/MIN/1.73
EOSINOPHIL # BLD AUTO: 0.03 10*3/MM3 (ref 0–0.4)
EOSINOPHIL NFR BLD AUTO: 0.3 % (ref 0.3–6.2)
ERYTHROCYTE [DISTWIDTH] IN BLOOD BY AUTOMATED COUNT: 16.9 % (ref 12.3–15.4)
GLUCOSE SERPL-MCNC: 97 MG/DL (ref 65–99)
HCT VFR BLD AUTO: 29.3 % (ref 34–46.6)
HGB BLD-MCNC: 9 G/DL (ref 12–15.9)
IMM GRANULOCYTES # BLD AUTO: 0.07 10*3/MM3 (ref 0–0.05)
IMM GRANULOCYTES NFR BLD AUTO: 0.6 % (ref 0–0.5)
LAB AP CASE REPORT: NORMAL
LYMPHOCYTES # BLD AUTO: 1.18 10*3/MM3 (ref 0.7–3.1)
LYMPHOCYTES NFR BLD AUTO: 10.4 % (ref 19.6–45.3)
MAGNESIUM SERPL-MCNC: 2 MG/DL (ref 1.6–2.4)
MCH RBC QN AUTO: 28.8 PG (ref 26.6–33)
MCHC RBC AUTO-ENTMCNC: 30.7 G/DL (ref 31.5–35.7)
MCV RBC AUTO: 93.9 FL (ref 79–97)
MONOCYTES # BLD AUTO: 0.43 10*3/MM3 (ref 0.1–0.9)
MONOCYTES NFR BLD AUTO: 3.8 % (ref 5–12)
NEUTROPHILS NFR BLD AUTO: 84.8 % (ref 42.7–76)
NEUTROPHILS NFR BLD AUTO: 9.64 10*3/MM3 (ref 1.7–7)
NRBC BLD AUTO-RTO: 0 /100 WBC (ref 0–0.2)
PATH REPORT.FINAL DX SPEC: NORMAL
PATH REPORT.GROSS SPEC: NORMAL
PLATELET # BLD AUTO: 163 10*3/MM3 (ref 140–450)
PMV BLD AUTO: 10.5 FL (ref 6–12)
POTASSIUM SERPL-SCNC: 4.8 MMOL/L (ref 3.5–5.2)
RBC # BLD AUTO: 3.12 10*6/MM3 (ref 3.77–5.28)
SODIUM SERPL-SCNC: 137 MMOL/L (ref 136–145)
WBC NRBC COR # BLD AUTO: 11.36 10*3/MM3 (ref 3.4–10.8)

## 2024-12-03 PROCEDURE — 97535 SELF CARE MNGMENT TRAINING: CPT

## 2024-12-03 PROCEDURE — 25010000002 METOCLOPRAMIDE PER 10 MG: Performed by: STUDENT IN AN ORGANIZED HEALTH CARE EDUCATION/TRAINING PROGRAM

## 2024-12-03 PROCEDURE — 97530 THERAPEUTIC ACTIVITIES: CPT

## 2024-12-03 PROCEDURE — 86140 C-REACTIVE PROTEIN: CPT | Performed by: STUDENT IN AN ORGANIZED HEALTH CARE EDUCATION/TRAINING PROGRAM

## 2024-12-03 PROCEDURE — 97129 THER IVNTJ 1ST 15 MIN: CPT

## 2024-12-03 PROCEDURE — 99232 SBSQ HOSP IP/OBS MODERATE 35: CPT | Performed by: INTERNAL MEDICINE

## 2024-12-03 PROCEDURE — 85025 COMPLETE CBC W/AUTO DIFF WBC: CPT | Performed by: INTERNAL MEDICINE

## 2024-12-03 PROCEDURE — 97112 NEUROMUSCULAR REEDUCATION: CPT

## 2024-12-03 PROCEDURE — 83735 ASSAY OF MAGNESIUM: CPT | Performed by: INTERNAL MEDICINE

## 2024-12-03 PROCEDURE — 99222 1ST HOSP IP/OBS MODERATE 55: CPT

## 2024-12-03 PROCEDURE — 80048 BASIC METABOLIC PNL TOTAL CA: CPT | Performed by: INTERNAL MEDICINE

## 2024-12-03 PROCEDURE — 63710000001 PREDNISONE PER 1 MG: Performed by: INTERNAL MEDICINE

## 2024-12-03 RX ADMIN — AVOBENZONE, HOMOSALATE, OCTISALATE, OCTOCRYLENE, AND OXYBENZONE 1 PACKET: 29.4; 147; 49; 25.4; 58.8 LOTION TOPICAL at 20:04

## 2024-12-03 RX ADMIN — LOPERAMIDE HYDROCHLORIDE 2 MG: 2 CAPSULE ORAL at 08:56

## 2024-12-03 RX ADMIN — ACETAMINOPHEN 1000 MG: 500 TABLET, FILM COATED ORAL at 12:04

## 2024-12-03 RX ADMIN — AVOBENZONE, HOMOSALATE, OCTISALATE, OCTOCRYLENE, AND OXYBENZONE 1 PACKET: 29.4; 147; 49; 25.4; 58.8 LOTION TOPICAL at 08:56

## 2024-12-03 RX ADMIN — CYANOCOBALAMIN TAB 500 MCG 1000 MCG: 500 TAB at 08:56

## 2024-12-03 RX ADMIN — Medication 10 ML: at 20:04

## 2024-12-03 RX ADMIN — LEVOTHYROXINE SODIUM 50 MCG: 0.05 TABLET ORAL at 04:52

## 2024-12-03 RX ADMIN — PANTOPRAZOLE SODIUM 40 MG: 40 TABLET, DELAYED RELEASE ORAL at 08:55

## 2024-12-03 RX ADMIN — LOPERAMIDE HYDROCHLORIDE 2 MG: 2 CAPSULE ORAL at 12:04

## 2024-12-03 RX ADMIN — SERTRALINE HYDROCHLORIDE 50 MG: 50 TABLET ORAL at 08:56

## 2024-12-03 RX ADMIN — LOPERAMIDE HYDROCHLORIDE 2 MG: 2 CAPSULE ORAL at 17:10

## 2024-12-03 RX ADMIN — Medication 10 ML: at 08:57

## 2024-12-03 RX ADMIN — METOPROLOL SUCCINATE 25 MG: 25 TABLET, FILM COATED, EXTENDED RELEASE ORAL at 08:56

## 2024-12-03 RX ADMIN — CLOPIDOGREL BISULFATE 75 MG: 75 TABLET ORAL at 08:56

## 2024-12-03 RX ADMIN — LOPERAMIDE HYDROCHLORIDE 2 MG: 2 CAPSULE ORAL at 20:04

## 2024-12-03 RX ADMIN — FOLIC ACID 1000 MCG: 1 TABLET ORAL at 08:56

## 2024-12-03 RX ADMIN — ATORVASTATIN CALCIUM 40 MG: 40 TABLET, FILM COATED ORAL at 20:04

## 2024-12-03 RX ADMIN — ACETAMINOPHEN 1000 MG: 500 TABLET, FILM COATED ORAL at 04:51

## 2024-12-03 RX ADMIN — MUPIROCIN 1 APPLICATION: 20 OINTMENT TOPICAL at 08:55

## 2024-12-03 RX ADMIN — METOCLOPRAMIDE HYDROCHLORIDE 10 MG: 5 INJECTION INTRAMUSCULAR; INTRAVENOUS at 12:05

## 2024-12-03 RX ADMIN — METOCLOPRAMIDE HYDROCHLORIDE 10 MG: 5 INJECTION INTRAMUSCULAR; INTRAVENOUS at 17:10

## 2024-12-03 RX ADMIN — MESALAMINE 4.8 G: 800 TABLET, DELAYED RELEASE ORAL at 08:55

## 2024-12-03 RX ADMIN — PREDNISONE 40 MG: 20 TABLET ORAL at 08:55

## 2024-12-03 RX ADMIN — ACETAMINOPHEN 1000 MG: 500 TABLET, FILM COATED ORAL at 20:04

## 2024-12-03 RX ADMIN — MIDODRINE HYDROCHLORIDE 5 MG: 5 TABLET ORAL at 17:10

## 2024-12-03 RX ADMIN — ASPIRIN 81 MG: 81 TABLET, COATED ORAL at 08:56

## 2024-12-03 RX ADMIN — METOCLOPRAMIDE HYDROCHLORIDE 10 MG: 5 INJECTION INTRAMUSCULAR; INTRAVENOUS at 04:51

## 2024-12-03 RX ADMIN — MIDODRINE HYDROCHLORIDE 5 MG: 5 TABLET ORAL at 08:56

## 2024-12-03 RX ADMIN — METOCLOPRAMIDE HYDROCHLORIDE 10 MG: 5 INJECTION INTRAMUSCULAR; INTRAVENOUS at 20:04

## 2024-12-03 RX ADMIN — MIDODRINE HYDROCHLORIDE 5 MG: 5 TABLET ORAL at 12:05

## 2024-12-03 NOTE — PROGRESS NOTES
"[facility] MEDICINE SERVICE  DAILY PROGRESS NOTE    NAME: Maryann Gaitan  : 1950  MRN: [id number]      LOS: 13 days     PROVIDER OF SERVICE: Fco Figueroa PA-C    Chief Complaint: Acute lower GI bleeding    Subjective:     Interval History:  History taken from: patient    Patient currently resting in bed. She is refusing to go to SNF and is adamant on going home to be with daughter \"who is dying\". Patient is A&O x4 on exam. However after nursing staff discussion with family (attempted to call family x3 with no answer), said daughter has been  for several years now. She currently endorses abdominal pain that she states is intemrmittently alleviated with pain medication but denies nausea/vomiting. Denies chest pain, shortness of breath. She reports that she is concerned that there is blood from ostomy.     Review of Systems:   Review of Systems   Constitutional:  Negative for chills and fever.   Respiratory:  Negative for shortness of breath.    Cardiovascular:  Negative for chest pain and palpitations.   Gastrointestinal:  Positive for abdominal pain. Negative for nausea and vomiting.       Objective:     Vital Signs  Temp:  [97.6 °F (36.4 °C)-98.4 °F (36.9 °C)] 97.8 °F (36.6 °C)  Heart Rate:  [69-84] 69  Resp:  [18-24] 21  BP: (122-151)/(63-76) 138/67  Flow (L/min) (Oxygen Therapy):  [2] 2   Body mass index is 21.22 kg/m².    Physical Exam  Physical Exam  General: awake, alert, in NAD  Neck: Supple  Chest: CTA bilat.  CV: S1S2 nl. RRR  Abd: Colostomy, ileostomy in place. Soft, NTND. +BS  Ext: No c/c/e.      Diagnostic Data    Results from last 7 days   Lab Units 24  0504 24  1155 24  0510   WBC 10*3/mm3 13.04*   < > 12.42*   HEMOGLOBIN g/dL 9.1*   < > 10.7*   HEMATOCRIT % 28.5*   < > 32.6*   PLATELETS 10*3/mm3 108*   < > 101*   GLUCOSE mg/dL 72   < > 96   CREATININE mg/dL 0.42*   < > 0.66   BUN mg/dL 12   < > 20   SODIUM mmol/L 139   < > 136   POTASSIUM mmol/L 4.9   < > " "4.0   AST (SGOT) U/L  --   --  21   ALT (SGPT) U/L  --   --  31   ALK PHOS U/L  --   --  64   BILIRUBIN mg/dL  --   --  0.5   ANION GAP mmol/L 6.5   < > 7.1    < > = values in this interval not displayed.       No radiology results for the last day      I reviewed the patient's new clinical results.    Assessment/Plan:     Active and Resolved Problems  Active Hospital Problems    Diagnosis  POA    **Acute lower GI bleeding [K92.2]  Yes    S/P mitral valve clip implantation [Z98.890, Z95.818]  Not Applicable    Primary hypertension [I10]  Yes    Gastrointestinal hemorrhage [K92.2]  Unknown    Anemia [D64.9]  Unknown    Moderate protein-calorie malnutrition [E44.0]  Yes    Hypothyroidism (acquired) [E03.9]  Yes    COPD (chronic obstructive pulmonary disease) [J44.9]  Yes    Rheumatoid arthritis [M06.9]  Yes    Crohn's disease [K50.90]  Yes    Anxiety associated with depression [F41.8]  Yes    Dyslipidemia [E78.5]  Yes    Severe mitral regurgitation [I34.0]  Yes    Acute heart failure with preserved ejection fraction (HFpEF) [I50.31]  Yes    CAD, multiple vessel [I25.10]  Yes      Resolved Hospital Problems   No resolved problems to display.       Diagnoses  Recurrent exacerbations of Crohn's disease status post right hemicolectomy with ileostomy on 11/28  Acute lower GI bleed  Acute blood loss anemia  CAD status post recent ostial/proximal LAD PCI on 10/22/2024  Chronic HFpEF  Valvular heart disease from mitral regurgitation status post mitral valve clip implantation on 11/21/2024  Rheumatoid arthritis with history of significant immunosuppression with Humira and methotrexate  COPD  Dyslipidemia  Hypertension      Patient is POD3 status post R hemicolectomy with ileostomy. Tolerating high fiber diet. Hgb has remained stable. Ostomy with good output, minimal bleeding noted. Surgery cleared patient for discharge. PT/OT recommend SNF, however patient declining d/t \"daughter in hospice\". However, this is noted to be a " historic event and daughter has been  for several years. Psych consulted to assess decision making capacity. Will add on UA to r/o UTI. NOK prefer SNF placement. Plan for SNF placement ([facility]) pending psych evaluation.         VTE Prophylaxis:  Mechanical VTE prophylaxis orders are present.             Disposition Planning:     Barriers to Discharge: psych consult, precert for  [facility]  Anticipated Date of Discharge: 12/3/24  Place of Discharge: SNF      Time: 45 minutes     Code Status and Medical Interventions: CPR (Attempt to Resuscitate); Full Support   Ordered at: 24 1014     Level Of Support Discussed With:    Patient     Code Status (Patient has no pulse and is not breathing):    CPR (Attempt to Resuscitate)     Medical Interventions (Patient has pulse or is breathing):    Full Support       Signature: Electronically signed by Fco Figueroa PA-C, 24, 19:37 EST.  [facility] Hospitalist Team

## 2024-12-03 NOTE — CASE MANAGEMENT/SOCIAL WORK
Continued Stay Note  [facility]     Patient Name: Maryann Gaitan  MRN: [id number]  Today's Date: 12/3/2024    Admit Date: 11/19/2024    Plan: [facility] (PASRR approved, will require pre-cert) vs home with [facility].   Discharge Plan       Row Name 12/03/24 0824       Plan    Plan Comments CM updated by [facility] liaison that they can accept patient. Awaiting OT notes to start precert. OT notified via secure chat.               Shraddha Lozano RN      [facility]  Office: [phone]  Cell: [phone]  Fax # [phone]

## 2024-12-03 NOTE — THERAPY TREATMENT NOTE
"Subjective: Pt agreeable to therapeutic plan of care.    Objective:     Precautions - falls    Bed mobility - Min-A and Mod-A, supine to sit  Transfers - Mod-A, used sera steady  to work on sit to stand and transfer to chair.    Mod x 2 sit to stand with bed height elevated  Ambulation -  feet N/A or Not attempted.    Vitals: WNL    Pain: 3 VAS   Location: BLE  Intervention for pain: Repositioned    Education: Provided education on the importance of mobility in the acute care setting and Transfer Training    Assessment: Maryann Gaitan presents with functional mobility impairments which indicate the need for skilled intervention. Pt with increased LE weakness and overall functional decline with standing and transfers.  Used sera steady this date to work on sit to stand transition and transfer to chair. Tolerating session today without incident. Will continue to follow and progress as tolerated.     Plan/Recommendations:   If medically appropriate, Moderate Intensity Therapy recommended post-acute care. This is recommended as therapy feels the patient would require 3-4 days per week and wouldn't tolerate \"3 hour daily\" rehab intensity. SNF would be the preferred choice. If the patient does not agree to SNF, arrange HH or OP depending on home bound status. If patient is medically complex, consider LTACH. Pt requires no DME at discharge.     Pt desires Skilled Rehab placement at discharge. Pt cooperative; agreeable to therapeutic recommendations and plan of care.         Basic Mobility 6-click:  Rollin = Total, A lot = 2, A little = 3; 4 = None  Supine>Sit:   1 = Total, A lot = 2, A little = 3; 4 = None   Sit>Stand with arms:  1 = Total, A lot = 2, A little = 3; 4 = None  Bed>Chair:   1 = Total, A lot = 2, A little = 3; 4 = None  Ambulate in room:  1 = Total, A lot = 2, A little = 3; 4 = None  3-5 Steps with railin = Total, A lot = 2, A little = 3; 4 = None  Score: 10    Modified [place]: N/A = No pre-op " stroke/TIA    Post-Tx Position: Up in Chair, Alarms activated, and Call light and personal items within reach  PPE: gloves    Therapy Charges for Today       Code Description Service Date Service Provider Modifiers Qty    [id number]  PT THERAPEUTIC ACT EA 15 MIN 12/3/2024 Hilary Mcmillan PT GP 1           PT Charges       Row Name 12/03/24 1501             Time Calculation    Start Time 1348  -      Stop Time 1403  -      Time Calculation (min) 15 min  -      PT Received On 12/03/24  -      PT - Next Appointment 12/04/24  -         Time Calculation- PT    Total Timed Code Minutes- PT 15 minute(s)  -                User Key  (r) = Recorded By, (t) = Taken By, (c) = Cosigned By      Initials Name Provider Type     Hilary Mcmillan, PT Physical Therapist

## 2024-12-03 NOTE — PROGRESS NOTES
"[facility] MEDICINE SERVICE  DAILY PROGRESS NOTE    NAME: Maryann Gaitan  : 1950  MRN: [id number]      LOS: 14 days     PROVIDER OF SERVICE: Fco Figueroa PA-C    Chief Complaint: Acute lower GI bleeding    Subjective:     Interval History:  History taken from: patient    Patient doing well this morning.  She is alert and oriented x 4.  She is still stating she wants to go home to be with \"dying daughter \"but is amenable for SNF.  She currently denies chest pain, abdominal pain, nausea, vomiting.  She reports she is tolerating diet.        Review of Systems:   Review of Systems  12-point review of systems was reviewed and is negative except as listed above      Objective:     Vital Signs  Temp:  [97.7 °F (36.5 °C)-98.1 °F (36.7 °C)] 98.1 °F (36.7 °C)  Heart Rate:  [64-84] 77  Resp:  [21-24] 21  BP: (122-151)/(65-84) 135/67  Flow (L/min) (Oxygen Therapy):  [2] 2   Body mass index is 21.45 kg/m².    Physical Exam  Physical Exam  General: awake, alert, in NAD  Neck: Supple  Chest: CTA bilat.  CV: S1S2 nl. RRR  Abd: Soft, NTND. +BS  Ext: No c/c/e.      Diagnostic Data    Results from last 7 days   Lab Units 24  2225 24  1155 24  0510   WBC 10*3/mm3 10.81*   < > 12.42*   HEMOGLOBIN g/dL 8.2*   < > 10.7*   HEMATOCRIT % 26.3*   < > 32.6*   PLATELETS 10*3/mm3 116*   < > 101*   GLUCOSE mg/dL 104*   < > 96   CREATININE mg/dL 0.50*   < > 0.66   BUN mg/dL 13   < > 20   SODIUM mmol/L 137   < > 136   POTASSIUM mmol/L 4.7   < > 4.0   AST (SGOT) U/L  --   --  21   ALT (SGPT) U/L  --   --  31   ALK PHOS U/L  --   --  64   BILIRUBIN mg/dL  --   --  0.5   ANION GAP mmol/L 5.3   < > 7.1    < > = values in this interval not displayed.       No radiology results for the last day      I reviewed the patient's new clinical results.    Assessment/Plan:     Active and Resolved Problems  Active Hospital Problems    Diagnosis  POA    **Acute lower GI bleeding [K92.2]  Yes    S/P mitral valve clip " implantation [Z98.890, Z95.818]  Not Applicable    Primary hypertension [I10]  Yes    Gastrointestinal hemorrhage [K92.2]  Unknown    Anemia [D64.9]  Unknown    Moderate protein-calorie malnutrition [E44.0]  Yes    Hypothyroidism (acquired) [E03.9]  Yes    COPD (chronic obstructive pulmonary disease) [J44.9]  Yes    Rheumatoid arthritis [M06.9]  Yes    Crohn's disease [K50.90]  Yes    Anxiety associated with depression [F41.8]  Yes    Dyslipidemia [E78.5]  Yes    Severe mitral regurgitation [I34.0]  Yes    Acute heart failure with preserved ejection fraction (HFpEF) [I50.31]  Yes    CAD, multiple vessel [I25.10]  Yes      Resolved Hospital Problems   No resolved problems to display.       Diagnoses  Recurrent exacerbations of Crohn's disease status post right hemicolectomy with ileostomy on 11/28  Acute lower GI bleed  Acute blood loss anemia  CAD status post recent ostial/proximal LAD PCI on 10/22/2024  Chronic HFpEF  Valvular heart disease from mitral regurgitation status post mitral valve clip implantation on 11/21/2024  Rheumatoid arthritis with history of significant immunosuppression with Humira and methotrexate  COPD  Dyslipidemia  Hypertension        Patient is POD4 status post R hemicolectomy with ileostomy. Tolerating high fiber diet. Hgb has remained stable. Ostomy still with good output. Surgery cleared patient for discharge yesterday.  Still pending psych evaluation.    PT/OT recommended SNF, Plan for SNF placement ([facility]), pending pre-CERT.    VTE Prophylaxis:  Mechanical VTE prophylaxis orders are present.       Disposition Planning:     Barriers to Discharge: Precertification pending  Anticipated Date of Discharge: 1 to 2 days  Place of Discharge: SNF-[facility]      Time: 45 minutes     Code Status and Medical Interventions: CPR (Attempt to Resuscitate); Full Support   Ordered at: 11/21/24 1014     Level Of Support Discussed With:    Patient     Code Status (Patient has no pulse and is not  breathing):    CPR (Attempt to Resuscitate)     Medical Interventions (Patient has pulse or is breathing):    Full Support       Signature: Electronically signed by Fco Figueroa PA-C, 12/03/24, 10:03 EST.  [facility] Hospitalist Team

## 2024-12-03 NOTE — PLAN OF CARE
Goal Outcome Evaluation:  Plan of Care Reviewed With: patient      Maryann Gaitan presents with functional mobility impairments which indicate the need for skilled intervention. Pt with increased LE weakness and overall functional decline with standing and transfers.  Used sera steady this date to work on sit to stand transition and transfer to chair. Tolerating session today without incident. Will continue to follow and progress as tolerated.          Anticipated Discharge Disposition (PT): skilled nursing facility

## 2024-12-03 NOTE — CASE MANAGEMENT/SOCIAL WORK
Continued Stay Note  [facility]     Patient Name: Maryann Gaitan  MRN: [id number]  Today's Date: 12/3/2024    Admit Date: 11/19/2024    Plan: [facility] (accepted, bed ready Wed 12/4), PASRR approved. Precert approved Valid 12/3-12/5   Discharge Plan       Row Name 12/03/24 1515       Plan    Plan [facility] (accepted, bed ready Wed 12/4), PASRR approved. Precert approved Valid 12/3-12/5    Plan Comments [facility] liaison Teaanjali confirmed bed will be ready on Wednesday 12/4      Row Name 12/03/24 1422       Plan    Plan [facility] (accepted). PASRR approved. Precert approved Valid 12/3-12/5               Shraddha Lozano RN     [facility]  Office: [phone]  Cell: [phone]  Fax # [phone]

## 2024-12-03 NOTE — SIGNIFICANT NOTE
Case Management/Social Work    Patient Name:  Maryann Gaitan  YOB: 1950  MRN: [id number]  Admit Date:  11/19/2024 12/03/24 1113   Post Acute Pre-Cert Documentation   Request Submitted by Facility - Type: Hospital   Post-Acute Authorization Type Submitted: SNF   Date Post Acute Pre-Cert Inititated per Facility 12/03/24   Verification from Payer Yes   Date Post Acute Pre-Cert Completed 12/03/24   Accepting Facility [facility]   Hospital Discharge Date Requested 12/03/24   All Clinicals Submitted? Yes   Had Accepting Facility at Time of Submission Yes   Response Received from Insurance? Approval   Response Communicated to:    Authorization Number: [id number]   Post Acute Pre-Cert Initiated Comment SANDI submitted SNF precert for [facility] via [facility] portal. Portal auth ID [id number]. SNF precert auto approved. Valid 12/3-12/5. CM made aware.           Electronically signed by:  Tavon West CMA  12/03/24 11:17 EST    Tavon West  Case Management Associate  [address] [address]  P: [phone]  F: [phone]

## 2024-12-03 NOTE — THERAPY TREATMENT NOTE
"Subjective: Pt agreeable to therapeutic plan of care.  Cognition: oriented to Person, Place, and Situation; pt scored a 15/28 on the Short Blessed Test indicating poor cognition    Objective:     Precautions - falls; ostomy;confusion    Bed Mobility: Min-A push up from bed   Functional Transfers: Max-A to get up from bed with therapist in front      Balance: supported Mod-A standing for side steps  Functional Ambulation: Mod-A side steps towards HOB    Toileting: Max-A  ADL Position: supported standing  ADL Comments: max A to change pads secondary to pt purewick     Vitals: WFL 2L NC    Pain: 4 VAS  Location: BLE  Interventions for pain: Repositioned  Education: Provided education on the importance of mobility in the acute care setting, Verbal/Tactile Cues, ADL training, and Transfer Training      Assessment: Maryann Gaitan presents with ADL impairments affecting function including balance, cognition, coordination, endurance / activity tolerance, pain, and strength. Pt far from baseline and is at high risk for falls. She required max A to stand and max A for toileting. Pt continues to present with cognitive decline. Demonstrated functioning below baseline abilities indicate the need for continued skilled intervention while inpatient. Tolerating session today without incident. Will continue to follow and progress as tolerated.     Plan/Recommendations:   Moderate Intensity Therapy recommended post-acute care. This is recommended as therapy feels the patient would require 3-4 days per week and wouldn't tolerate \"3 hour daily\" rehab intensity. SNF would be the preferred choice. If the patient does not agree to SNF, arrange HH or OP depending on home bound status. If patient is medically complex, consider LTACH.. Pt requires no DME at discharge.     Pt desires Skilled Rehab placement at discharge. Pt cooperative; agreeable to therapeutic recommendations and plan of care.     Modified [place]: N/A = No pre-op " stroke/TIA    Post-Tx Position: Supine with HOB Elevated, Alarms activated, and Call light and personal items within reach  PPE: gloves and surgical mask

## 2024-12-03 NOTE — CONSULTS
"  Referring Provider:     Fco Figueroa PA-C     Reason for Consultation: Capacity    Chief complaint : Irritability, confusion    Subjective .     History of present illness:  The patient is a 74 y.o. female who was admitted secondary to abdominal pain, GI bleed.  Past medical history includes Crohn's disease, COPD, rheumatoid arthritis, CAD.    Patient presents alert oriented to self, year, place situation.  Reported incorrect month and date.  Patient expressed intent to go to SNF after hospital discharge.  Expressed desire to go home, but agreeable to SNF as recommended by hospitalist secondary to generalized weakness.  Patient reported history of depression and anxiety, established on Zoloft, reported taking for several years.  Denied feeling depressed hopeless or helpless.  Denied feeling anxious. Endorsing frustration about going to SNF, but expressed intent to go.   Patient somewhat irritable, becoming agitated with interview.  Declined to continue interview and assessment.   Nursing staff reports intermittent confusion, disorientation.  Patient's baseline is unknown  No family at bedside     Review Of Systems:     Medical Review Of Systems:  Behavioral/Psych: positive for irritability, negative for aggressive behavior, anxiety, depression, and sleep disturbance      Denies previous psychiatric hospitalizations   Denies symptoms of psychosis, joselo, hypomania  Denies symptoms of depression  Denies symptoms of anxiety and panic  Denies substance abuse including alcohol and illicit and prescription drugs       The following portions of the patient's history were reviewed and updated as appropriate: allergies, current medications, past family history, past medical history, past social history, past surgical history and problem list.    History    Objective     Vital Signs   /74 (BP Location: Right arm, Patient Position: Lying)   Pulse 67   Temp 98 °F (36.7 °C) (Oral)   Resp 20   Ht 162.6 cm (64.02\") "   Wt 56.7 kg (125 lb)   SpO2 98%   BMI 21.45 kg/m²     MENTAL STATUS EXAM   General Appearance:  Cleanly groomed and dressed  Eye Contact:  Fair  Attitude:  Other  Other Comment:  Somewhat cooperative  Motor Activity:  Normal gait, posture  Muscle Strength:  Other  Other Comment:  Generalized weakness  Speech:  Minimal spontaneity  Mood and affect:  Frustrated and irritable  Hopelessness:  Denies  Loneliness: Denies  Thought Process:  Goal-directed and logical  Associations/ Thought Content:  No delusions  Hallucinations:  None  Suicidal Ideations:  Not present  Homicidal Ideation:  Not present  Sensorium:  Alert and confused  Orientation:  Person, place and situation  Attention Span/ Concentration:  Other  Other Comment:  Fair  Insight:  Fair  Judgement:  Fair  Reliability:  Poor and other  Other Comment:  Secondary to intermittent confusion  Impulse Control:  Fair         Assessment & Plan       Acute lower GI bleeding    CAD, multiple vessel    Acute heart failure with preserved ejection fraction (HFpEF)    Severe mitral regurgitation    Dyslipidemia    Crohn's disease    Hypothyroidism (acquired)    Anxiety associated with depression    COPD (chronic obstructive pulmonary disease)    Rheumatoid arthritis    Moderate protein-calorie malnutrition    Primary hypertension    S/P mitral valve clip implantation    Gastrointestinal hemorrhage    Anemia       Assessment: Evaluation for decisional capacity Irritability, confusion     Treatment Plan: Psychiatry consulted to evaluate capacity.  Patient displayed intermittent confusion, reported incorrect month and day. Reportedly pt has been disoriented, with memory impairment.    Interview somewhat limited secondary to irritability, patient cooperation, and confusion. Did displayed fair reasoning skills, agreeable to SNF as medically necessary.   Baseline unknown, no family at bedside.  Unable to determine if confusion is chronic, or acute.  Patient is at increased  risk for delirium episodes secondary to age, acute medical issues.  Nonpharmacological delirium interventions recommended.   Nursing staff did not express concerns for aggressive behavior, patient is cooperative with care at this time.  Decision making capacity is not static, capacity should be evaluated with all patient interactions.  At this time patient presented confused, but agreeable to necessary medical treatment.  It would likely benefit the patient to have additional support with next-of-kin for medical decisions, secondary to intermittent confusion and disorientation.  We will follow as needed  Treatment Plan discussed with: Patient    I discussed the patients findings and my recommendations with patient and nursing staff    I have reviewed and approved the behavioral health treatment plans and problem list. Yes  Thank you for the consult   Referring MD has access to consult report and progress notes in EMR     Jolynn Agustin DNP, APRN  12/03/24  15:08 EST

## 2024-12-03 NOTE — PLAN OF CARE
Goal Outcome Evaluation:      Patient awaiting SNF placement acceptance, lab reviewed, no replacement needed at this time. Urine specimen sent to lab per order, WBC continues to trend down. No c/o pain or soa noted at this time.

## 2024-12-03 NOTE — PLAN OF CARE
"Assessment: Maryann Gaitan presents with ADL impairments affecting function including balance, cognition, coordination, endurance / activity tolerance, pain, and strength. Pt far from baseline and is at high risk for falls. She required max A to stand and max A for toileting. Pt continues to present with cognitive decline. Demonstrated functioning below baseline abilities indicate the need for continued skilled intervention while inpatient. Tolerating session today without incident. Will continue to follow and progress as tolerated.      Plan/Recommendations:   Moderate Intensity Therapy recommended post-acute care. This is recommended as therapy feels the patient would require 3-4 days per week and wouldn't tolerate \"3 hour daily\" rehab intensity. SNF would be the preferred choice. If the patient does not agree to SNF, arrange HH or OP depending on home bound status. If patient is medically complex, consider LTACH.. Pt                   "

## 2024-12-03 NOTE — CASE MANAGEMENT/SOCIAL WORK
Continued Stay Note  [facility]     Patient Name: Maryann Gaitan  MRN: [id number]  Today's Date: 12/3/2024    Admit Date: 11/19/2024    Plan: [facility] (acceptet). PASRR approved. Precert approved Valid 12/3-12/5   Discharge Plan       Row Name 12/03/24 1137       Plan    Plan [facility] (acceptet). PASRR approved. Precert approved Valid 12/3-12/5    Plan Comments Precert approved Valid 12/3-12/5. CM confirming with facility they are ready for patient to DC, awaiting response.               Shraddha Lozano RN     [facility]  Office: [phone]  Cell: [phone]  Fax # [phone]

## 2024-12-03 NOTE — PLAN OF CARE
Problem: Adult Inpatient Plan of Care  Goal: Plan of Care Review  Outcome: Progressing  Goal: Patient-Specific Goal (Individualized)  Outcome: Progressing  Goal: Absence of Hospital-Acquired Illness or Injury  Outcome: Progressing  Intervention: Identify and Manage Fall Risk  Recent Flowsheet Documentation  Taken 12/3/2024 1622 by Jazmin Carballo RN  Safety Promotion/Fall Prevention:   activity supervised   assistive device/personal items within reach   clutter free environment maintained   fall prevention program maintained   nonskid shoes/slippers when out of bed   room organization consistent   safety round/check completed  Taken 12/3/2024 1400 by Jazmin Carballo RN  Safety Promotion/Fall Prevention:   activity supervised   assistive device/personal items within reach   clutter free environment maintained   fall prevention program maintained   nonskid shoes/slippers when out of bed   room organization consistent   safety round/check completed  Taken 12/3/2024 0800 by Jazmin Carballo RN  Safety Promotion/Fall Prevention:   activity supervised   assistive device/personal items within reach   clutter free environment maintained   fall prevention program maintained   nonskid shoes/slippers when out of bed   room organization consistent   safety round/check completed  Intervention: Prevent Skin Injury  Recent Flowsheet Documentation  Taken 12/3/2024 1622 by Jazmin Carballo RN  Body Position: (up in chair) other (see comments)  Skin Protection:   incontinence pads utilized   transparent dressing maintained  Taken 12/3/2024 1400 by Jazmin Carballo RN  Body Position: weight shifting  Taken 12/3/2024 1200 by Jazmin Carballo RN  Body Position: weight shifting  Skin Protection:   incontinence pads utilized   transparent dressing maintained  Taken 12/3/2024 0800 by Jazmin Carballo RN  Body Position: weight shifting  Skin Protection:   incontinence pads utilized   transparent dressing maintained  Intervention: Prevent and  Manage VTE (Venous Thromboembolism) Risk  Recent Flowsheet Documentation  Taken 12/3/2024 0800 by Jazmin Carballo RN  VTE Prevention/Management: (plavix) other (see comments)  Intervention: Prevent Infection  Recent Flowsheet Documentation  Taken 12/3/2024 1622 by Jazmin Carballo RN  Infection Prevention:   hand hygiene promoted   personal protective equipment utilized  Taken 12/3/2024 1400 by Jazmin Carballo RN  Infection Prevention:   hand hygiene promoted   personal protective equipment utilized   single patient room provided  Taken 12/3/2024 0800 by Jazmin Carballo RN  Infection Prevention:   hand hygiene promoted   personal protective equipment utilized   single patient room provided  Goal: Optimal Comfort and Wellbeing  Outcome: Progressing  Intervention: Provide Person-Centered Care  Recent Flowsheet Documentation  Taken 12/3/2024 1622 by Jazmin Carballo RN  Trust Relationship/Rapport:   care explained   choices provided   questions answered   questions encouraged  Taken 12/3/2024 1200 by Jazmin Cabrallo RN  Trust Relationship/Rapport:   care explained   choices provided   questions encouraged   questions answered  Taken 12/3/2024 0800 by Jazmin Carballo RN  Trust Relationship/Rapport:   choices provided   care explained   questions answered   questions encouraged  Goal: Readiness for Transition of Care  Outcome: Progressing     Problem: Comorbidity Management  Goal: Maintenance of COPD Symptom Control  Outcome: Progressing  Goal: Maintenance of Heart Failure Symptom Control  Outcome: Progressing  Goal: Blood Pressure in Desired Range  Outcome: Progressing  Goal: Maintenance of Osteoarthritis Symptom Control  Outcome: Progressing  Intervention: Maintain Osteoarthritis Symptom Control  Recent Flowsheet Documentation  Taken 12/3/2024 1622 by Jazmin Carballo RN  Activity Management: up in chair  Taken 12/3/2024 1400 by Jazmin Carballo RN  Activity Management: activity encouraged  Taken 12/3/2024 1200 by Haris  ULYSSES Wu  Activity Management: activity encouraged  Taken 12/3/2024 0800 by Jazmin Carballo RN  Activity Management: activity encouraged     Problem: Skin Injury Risk Increased  Goal: Skin Health and Integrity  Outcome: Progressing  Intervention: Optimize Skin Protection  Recent Flowsheet Documentation  Taken 12/3/2024 1622 by Jazmin Carballo RN  Activity Management: up in chair  Pressure Reduction Techniques:   frequent weight shift encouraged   weight shift assistance provided  Pressure Reduction Devices: pressure-redistributing mattress utilized  Skin Protection:   incontinence pads utilized   transparent dressing maintained  Taken 12/3/2024 1400 by Jazmin Carballo RN  Activity Management: activity encouraged  Taken 12/3/2024 1200 by Jazmin Carballo RN  Activity Management: activity encouraged  Pressure Reduction Techniques:   frequent weight shift encouraged   weight shift assistance provided  Pressure Reduction Devices:   pressure-redistributing mattress utilized   positioning supports utilized  Skin Protection:   incontinence pads utilized   transparent dressing maintained  Taken 12/3/2024 0800 by Jazmin Carballo RN  Activity Management: activity encouraged  Pressure Reduction Techniques:   frequent weight shift encouraged   weight shift assistance provided  Head of Bed (HOB) Positioning: HOB elevated  Pressure Reduction Devices:   pressure-redistributing mattress utilized   positioning supports utilized  Skin Protection:   incontinence pads utilized   transparent dressing maintained     Problem: Gastrointestinal Bleeding  Goal: Optimal Coping with Acute Illness  Outcome: Progressing  Goal: Hemostasis  Outcome: Progressing     Problem: Pain Acute  Goal: Optimal Pain Control and Function  Outcome: Progressing  Intervention: Optimize Psychosocial Wellbeing  Recent Flowsheet Documentation  Taken 12/3/2024 1622 by Jazmin Carballo RN  Diversional Activities:   smartphone   television  Taken 12/3/2024 1200 by  Jazmin Carballo RN  Diversional Activities:   smartphone   television  Taken 12/3/2024 0800 by Jazmin Carballo RN  Diversional Activities:   smartphone   television     Problem: Chest Pain  Goal: Resolution of Chest Pain Symptoms  Outcome: Progressing     Problem: Sepsis/Septic Shock  Goal: Optimal Coping  Outcome: Progressing  Goal: Absence of Bleeding  Outcome: Progressing  Goal: Blood Glucose Level Within Target Range  Outcome: Progressing  Goal: Absence of Infection Signs and Symptoms  Outcome: Progressing  Intervention: Initiate Sepsis Management  Recent Flowsheet Documentation  Taken 12/3/2024 1622 by Jazmin Carballo RN  Infection Prevention:   hand hygiene promoted   personal protective equipment utilized  Taken 12/3/2024 1400 by Jazmin Carballo RN  Infection Prevention:   hand hygiene promoted   personal protective equipment utilized   single patient room provided  Taken 12/3/2024 0800 by Jazmin Carballo RN  Infection Prevention:   hand hygiene promoted   personal protective equipment utilized   single patient room provided  Intervention: Promote Recovery  Recent Flowsheet Documentation  Taken 12/3/2024 1622 by Jazmin Carballo RN  Activity Management: up in chair  Taken 12/3/2024 1400 by Jazmin Carballo RN  Activity Management: activity encouraged  Taken 12/3/2024 1200 by Jazmin Carballo RN  Activity Management: activity encouraged  Taken 12/3/2024 0800 by Jazmin Carballo RN  Activity Management: activity encouraged  Goal: Optimal Nutrition Delivery  Outcome: Progressing   Goal Outcome Evaluation: improving. Pt tolerated working with PT/OT this shift. Up in chair for lunch and dinner. Plan to discharge tomorrow when bed at [facility] becomes available. Call light within reach.

## 2024-12-03 NOTE — PROGRESS NOTES
Cardiology Progress Note    Patient Identification:  Name: Maryann Gaitan  Age: 74 y.o.  Sex: female  :  1950  MRN: [id number]                 Follow Up / Chief Complaint: CAD/PCI, GI Bleed, MR status post Azeb Clip   Chief Complaint   Patient presents with    Chest Pain       Interval History: Patient presented with chest pain and abdominal pain.  Has been having bright red blood per rectum.  Was seen by structural heart for mitral regurgitation was scheduled for outpatient procedure.  Underwent MitraClip 2024  Patient is having issues with rectal bleed and anemia requiring transfusions.  Patient underwent right hemicolectomy with ileostomy placement on 2024        NP note: Patient seen and examined this morning.  Patient sleeping awakens easily.  Seems mildly confused.  She reports that there is still blood in her ostomy however on examination there is not.  Hemoglobin is low but stable.  She denies any chest pain but does report chronic shortness of breath any worse    Electronically signed by DRU Rubio, 24, 1:48 PM EST.    Cardiology attending addendum :    I have personally performed a face-to-face diagnostic evaluation, physical exam and reviewed data on this patient.  I have reviewed documentation done by me and nurse practitioner  and corrected as needed.  And agree with the different components of documentation.Greater than 50% of the time spent in the care of this patient was provided by attending consultant/me.          Subjective: Patient seen and examined.  Chart reviewed.  Labs reviewed.  Patient without chest pain or shortness of breath.      Objective:  2024: Glucose elevated.  CBC with white count of 13.5, hemoglobin 9  2024: WBC 14.13, hemoglobin 7.4  EKG normal sinus rhythm  Limited echo shows EF at 56 to 60% MitraClip in place with trace residual MR mean creatinine of 4.6  2024: Hemoglobin 6.6  2024: Hemoglobin is 7.4.  2024:  Hemoglobin is 6.4.    12/2/2024: CRP 15.6 WBC 13.04 hemoglobin 9.1 platelets 108  12/3/2024: CRP 12.2 WBC 10.81 hemoglobin 8.2 platelets 116        History of present illness:    Ms. Maryann Gaitan has PMH of     CAD status post cardiac cath with multivessel CAD poor candidate for CABG, underwent PCI to ostial and proximal LAD 10/22/2024  Moderate to severe MR with central jet noted  COPD  Hypertension  Rheumatoid arthritis  Crohn's disease     Presented to the emergency room on 11/19/2024 with abdominal pain and chest pain.  Patient reports that she started having bright red blood per rectum again yesterday and has been having constant chest pain since.     Labs in the emergency room was noted to have a hemoglobin of 5.7 high-sensitivity troponin 21--17 BUN 28 creatinine 0.74 total protein 5.1 albumin 2.9 ALT 35 WBC 16.93 chest x-ray without any acute finding EKG sinus rhythm with nonspecific ST abnormalities  CT abdomen and pelvis shows distal terminal ileitis colonic fatty marrow infiltration which can be seen in the setting of chronic inflammatory bowel disease   patient was transfused with 1 unit of PRBC and hemoglobin improved to 7.3 and this morning she is 9.1     Unfortunately patient is in the tough situation as she had stent placed on 10/22/2024 and needs dual antiplatelet therapy to prevent clotting of stent however she has presented for the second time with significant GI bleeding.  Currently her aspirin and Plavix are both on hold.        Recent hospitalization  Presented 11/6/2024 through [facility] with complaint of abdominal pain and dark red stool / rectal bleed.  Patient has close disease and rheumatoid arthritis and is on immunosuppressants methotrexate, Humira, Rinvoq now presented with rectal bleed and abdominal pain.  GI did a scope and biopsies of terminal ileum which were consistent with active Crohn's and biopsies of stomach and duodenum showed strong colitis infection, was treated by  ivermectin.  GI started patient on Solu-Medrol and is holding immunosuppressants and wants to hold Plavix.     Patient was recently in the hospital 10/8/2024 with nausea vomiting diarrhea and abnormal labs.  With hyponatremia and hypokalemia and anemia.  Patient suddenly started having chest pain and fast team was called because of sharp left-sided chest pain with shortness of breath dizziness EKG showed sinus tachycardia and cardiac workup revealed severe MR, cath 10/15/2024 revealing severe ostial LAD and outpouching in the descending thoracic aorta probably a penetrating aortic ulcer versus aneurysm.  CT surgery was consulted and she was turned down for CABG therefore patient underwent drug-eluting stent to ostial LAD on 10/22/2024 and was supposed to have clip to mitral valve in follow-up.  In the meantime has been having worsening symptoms and abdominal pain, weight loss.  Patient has been restarted on Plavix.  Will continue for bleeding closely.     Data:  Labs 11/6/2024 - 11/7/2024 revealed sodium 134, BUN/creatinine of 75/2.84, glucose 138, albumin 2.9.  Hemoglobin 9.4 has come down to 8.  MCV 98  EKG done 11/6/2024 reviewed/interpreted by me reveals sinus bradycardia at the rate of 58 bpm.        EGD/colonoscopy 10/12/2024 revealed small hiatal hernia, nonerosive gastritis, T1 stricture s/p dilatation with 12 mm, T1 ulcer, colon ulcers, sigmoid diverticulosis, grade 2 internal hemorrhoids and strongyloides  Echo 10/12/2024 EF of 51 to 55% with mild RV enlargement, severe left atrial enlargement, moderate to severe MR  Transesophageal echo 10/16/2024: LVEF of 55 to 60% with severe left atrial enlargement, moderate to severe MR and grade 3 descending aortic plaque  Cardiac cath 10/15/2024 reveals total right and severe ostial LAD, descending thoracic aorta had an outpouching consistent with penetrating aortic ulcer versus aneurysm.   Patient was evaluated by CT surgery not a candidate for CABG therefore  patient underwent PCI and drug-eluting stent to the ostium proximal LAD.  And she was seen by valve team and will be set up for MitraClip once she recovers from this hospitalization      Assessment:  :     Abdominal pain, rectal bleeding  Anemia requiring blood transfusion  Chest pain  Crohn's disease  CAD, status post PCI  Mitral regurgitation  Chronic HFpEF due to valvular heart disease from mitral regurgitation  Hypertensive cardiovascular disease from hypertension  Hyponatremia  Hypokalemia  Crohn's disease  Normocytic anemia  COPD, chronic steroid therapy  Multifocal pneumonia  Hyperglycemia, prediabetes with A1c of 5.6 from     Recommendations / Plan:         Patient presented 11/19/2024 with abdominal pain and bright red blood per rectum was found to have a hemoglobin of 5.7 was transfused.  Patient chest pain.  HS troponin is normal.  Patient recently had coronary drug-eluting stent placed on 10/22/2024 would benefit from antiplatelet therapy.    Patient was seen by structural heart underwent mitral valve clip 11/21/2024.  Patient unfortunately is having Crohn's flareup with bloody diarrhea and requiring transfusions, is between rock and a hard place.  Patient underwent right hemicolectomy end ileostomy placement on 11/28/2024.  Will continue to monitor hemoglobin and platelets.  Are stable at the current time.              Review of records  Patient presented 10/9/2024 because of abnormal labs showing low sodium, was complaining of nausea vomiting and diarrhea.    Patient underwent cardiac cath 10/15/2024 which revealed total right and severe ostial LAD disease.  Descending thoracic aorta has an outpouching probably saccular aneurysm versus  penetrating aortic ulcer .  Echocardiogram 10/12/2024 is revealing EF of 51 to 55% with mild RV enlargement severe left atrial enlargement and right atrial enlargement and moderate to severe MR  GONZÁLEZ is revealing moderate to severe MR.       Copied text in this portion  of the note has been reviewed and is accurate as of 12/3/2024    Past Medical 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 07/05/2016    Dyslipidemia 10/30/2024    Fecal urgency 11/21/2024    First degree hemorrhoids 07/09/2021    GERD (gastroesophageal reflux disease) 11/21/2024    Hashimoto's thyroiditis 11/21/2024    History of colonic polyps 07/09/2021    Hypertension     Hypothyroidism (acquired) 11/06/2024    Mitral regurgitation 10/08/2024    Moderate protein-calorie malnutrition 11/09/2024    Multiple tracheobronchial mucus plugs 10/08/2024    Nicotine dependence 11/21/2024    Rheumatoid arthritis 11/06/2024    S/P mitral valve clip implantation 11/21/2024    Second degree hemorrhoids 07/05/2016    Stress incontinence, female 11/21/2024    Vitamin D deficiency, unspecified 11/21/2024     Past Surgical History:  Past Surgical History:   Procedure Laterality Date    BRONCHOSCOPY N/A 10/16/2024    Procedure: BRONCHOSCOPY;  Surgeon: Gallo Pope MD;  Location: [facility] ENDOSCOPY;  Service: Pulmonary;  Laterality: N/A;    CARDIAC CATHETERIZATION N/A 10/15/2024    Procedure: Left Heart Cath, possible pci;  Surgeon: Travis Connor MD;  Location: [facility] CATH INVASIVE LOCATION;  Service: Cardiovascular;  Laterality: N/A;    CARDIAC CATHETERIZATION N/A 10/22/2024    Procedure: Laser Coronary Atherectomy;  Surgeon: Travis Connor MD;  Location: [facility] CATH INVASIVE LOCATION;  Service: Cardiovascular;  Laterality: N/A;    COLON RESECTION Right 11/28/2024    Procedure: RIGHT  HEMICOLECTOMY WITH ILEOSTOMY;  Surgeon: Todd Babin MD;  Location: [facility] MAIN OR;  Service: General;  Laterality: Right;    COLONOSCOPY N/A 10/12/2024    Procedure: COLONOSCOPY WITH BIOPSY AND WIRE GUIDED BALLOON DILATION OF TERMINAL ILEUM;  Surgeon: Rob Strong MD;  Location: [facility] ENDOSCOPY;  Service: Gastroenterology;  Laterality: N/A;  Colitis, crohns of terminal ileum, right colon ulcers, diverticulosis, hemorroids    ENDOSCOPY N/A 10/12/2024    Procedure: ESOPHAGOGASTRODUODENOSCOPY WITH BIOPSY X 2 AREA;  Surgeon: Rob Strong MD;  Location: [facility] ENDOSCOPY;  Service: Gastroenterology;  Laterality: N/A;  Chronic gastritis, HH    ENDOSCOPY Left 11/28/2024    Procedure: ESOPHAGOGASTRODUODENOSCOPY;  Surgeon: Dallin Delgado MD;  Location: [facility] ENDOSCOPY;  Service: Gastroenterology;  Laterality: Left;  small hiatal hernia    HYSTERECTOMY      LEFT HEART CATH          Social History:   Social History     Tobacco Use    Smoking status: Former     Types: Cigarettes    Smokeless tobacco: Never   Substance Use Topics    Alcohol use: Never      Family History:  Family History   Problem Relation Age of Onset    Heart disease Mother     Dementia Mother     Stroke Mother     Heart disease Father     Hypertension Father           Allergies:  Allergies   Allergen Reactions    Codeine Hives    Penicillin G Sodium Hives     Scheduled Meds:  acetaminophen, 1,000 mg, Q8H  aspirin, 81 mg, Daily  atorvastatin, 40 mg, Nightly  clopidogrel, 75 mg, Daily  folic acid, 1,000 mcg, Daily  levothyroxine, 50 mcg, Q AM  loperamide, 2 mg, 4x Daily  mesalamine, 4.8 g, Daily  methotrexate, 15 mg, Weekly  metoclopramide, 10 mg, Q6H  metoprolol succinate XL, 25 mg, Q24H  midodrine, 5 mg, TID AC  mupirocin, 1 Application, BID  pantoprazole, 40 mg, Daily  predniSONE, 40 mg, Daily With Breakfast  Psyllium, 1 packet, BID  sertraline, 50 mg, Daily  sodium chloride, 10 mL, Q12H  sodium chloride, 10 mL,  "Q12H  upadacitinib ER, 45 mg, Daily  vitamin B-12, 1,000 mcg, Daily          Review of Systems:   ROS        Constitutional:  Temp:  [97.7 °F (36.5 °C)-98.1 °F (36.7 °C)] 98 °F (36.7 °C)  Heart Rate:  [] 69  Resp:  [17-24] 17  BP: (132-151)/(66-84) 144/77    Physical Exam   /77   Pulse 69   Temp 98 °F (36.7 °C) (Oral)   Resp 17   Ht 162.6 cm (64.02\")   Wt 56.7 kg (125 lb)   SpO2 100%   BMI 21.45 kg/m²   General:  Appears in no acute distress  Eyes: Sclera is anicteric,  conjunctiva is clear   HEENT:  No JVD. Thyroid not visibly enlarged. No mucosal pallor or cyanosis  Respiratory: Respirations regular and unlabored at rest.  Clear to auscultation  Cardiovascular: S1,S2 Regular rate and rhythm.  2/6 holosystolic murmur  Gastrointestinal: Abdomen nondistended.  Ileostomy noted  Musculoskeletal:  No abnormal movements  Extremities: No digital clubbing or cyanosis  Skin: Color pink.  Ecchymosis to chest wall and upper extremities  Neuro: Alert and awake.    INTAKE AND OUTPUT:    Intake/Output Summary (Last 24 hours) at 12/3/2024 1714  Last data filed at 12/3/2024 0510  Gross per 24 hour   Intake 240 ml   Output 650 ml   Net -410 ml       Cardiographics  Telemetry: Sinus rhythm    ECG:   ECG 12 Lead Other; post-MitraClip   Final Result   HEART RATE=73  bpm   RR Xstzgjsx=900  ms   TN Scmdogho=286  ms   P Horizontal Axis=1  deg   P Front Axis=84  deg   QRSD Interval=80  ms   QT Ahdzyval=793  ms   INuG=172  ms   QRS Axis=39  deg   T Wave Axis=70  deg   - NORMAL ECG -   Sinus rhythm   When compared with ECG of 19-Nov-2024 12:13:34,   Significant repolarization change   Significant axis, voltage or hypertrophy change   Electronically Signed By: Dmitri Navarro ([facility]) 2024-11-22 13:38:55   Date and Time of Study:2024-11-22 06:15:06      ECG 12 Lead Chest Pain   Final Result   HEART RATE=67  bpm   RR Kcvhekcm=838  ms   TN Gjsursxl=475  ms   P Horizontal Axis=28  deg   P Front Axis=87  deg   QRSD Interval=83  ms "   QT Njlylafm=937  ms   XQkM=691  ms   QRS Axis=39  deg   T Wave Axis=40  deg   - ABNORMAL ECG -   Sinus rhythm   Low voltage, precordial leads   Nonspecific  T abnormalities, inferior leads   When compared with ECG of 06-Nov-2024 15:41:09,   Significant repolarization change   Significant axis, voltage or hypertrophy change   Electronically Signed By: Rajiv Dubon ([facility]) 2024-11-19 12:47:59   Date and Time of Study:2024-11-19 12:13:34      Telemetry Scan   Final Result      Telemetry Scan   Final Result      Telemetry Scan   Final Result      Telemetry Scan   Final Result      Telemetry Scan   Final Result      Telemetry Scan   Final Result      Telemetry Scan   Final Result      Telemetry Scan   Final Result      Telemetry Scan   Final Result      Telemetry Scan   Final Result      Telemetry Scan   Final Result      Telemetry Scan   Final Result      Telemetry Scan   Final Result      Telemetry Scan   Final Result      Telemetry Scan   Final Result      Telemetry Scan   Final Result      Telemetry Scan   Final Result      Telemetry Scan   Final Result      Telemetry Scan   Final Result      Telemetry Scan   Final Result      Telemetry Scan   Final Result      Telemetry Scan   Final Result      Telemetry Scan   Final Result      Telemetry Scan   Final Result      Telemetry Scan   Final Result      Telemetry Scan   Final Result      Telemetry Scan   Final Result      Telemetry Scan   Final Result      Telemetry Scan   Final Result      Telemetry Scan   Final Result      Telemetry Scan   Final Result      Telemetry Scan   Final Result      Telemetry Scan   Final Result      Telemetry Scan   Final Result      Telemetry Scan   Final Result      Telemetry Scan   Final Result      Telemetry Scan   Final Result      Telemetry Scan   Final Result      Telemetry Scan   Final Result      Telemetry Scan   Final Result      Telemetry Scan   Final Result      Telemetry Scan   Final Result      Telemetry Scan   Final Result       Telemetry Scan   Final Result      Telemetry Scan   Final Result      Telemetry Scan   Final Result      Telemetry Scan   Final Result      Telemetry Scan   Final Result      Telemetry Scan   Final Result      Telemetry Scan   Final Result      Telemetry Scan   Final Result      Telemetry Scan   Final Result      Telemetry Scan   Final Result      Telemetry Scan   Final Result      Telemetry Scan   Final Result      Telemetry Scan   Final Result      Telemetry Scan   Final Result      Telemetry Scan   Final Result      Telemetry Scan   Final Result      Telemetry Scan   Final Result      Telemetry Scan   Final Result      Telemetry Scan   Final Result      Telemetry Scan   Final Result      Telemetry Scan   Final Result      Telemetry Scan   Final Result      Telemetry Scan   Final Result      Telemetry Scan   Final Result      Telemetry Scan   Final Result      Telemetry Scan   Final Result      Telemetry Scan   Final Result      Telemetry Scan   Final Result      Telemetry Scan   Final Result      Telemetry Scan   Final Result      Telemetry Scan   Final Result        I have personally reviewed EKG    Echocardiogram: Results for orders placed during the hospital encounter of 11/19/24    Adult Transthoracic Echo Limited W/ Cont if Necessary Per Protocol    Interpretation Summary    Left ventricular ejection fraction appears to be 56 - 60%.    There is a MitraClip mitral valve repair present.    There is trace residual MR.  Mean gradient is 4.6 mmHg      Lab Review   I have reviewed the labs  Results from last 7 days   Lab Units 11/27/24  0532   HSTROP T ng/L 29*     Results from last 7 days   Lab Units 12/02/24  2225   MAGNESIUM mg/dL 2.2     Results from last 7 days   Lab Units 12/02/24  2225   SODIUM mmol/L 137   POTASSIUM mmol/L 4.7   BUN mg/dL 13   CREATININE mg/dL 0.50*   CALCIUM mg/dL 7.7*         Results from last 7 days   Lab Units 12/02/24  2225 12/02/24  0504 12/01/24  0436   WBC 10*3/mm3 10.81* 13.04*  "17.13*   HEMOGLOBIN g/dL 8.2* 9.1* 8.8*   HEMATOCRIT % 26.3* 28.5* 28.4*   PLATELETS 10*3/mm3 116* 108* 91*             RADIOLOGY:  Imaging Results (Last 24 Hours)       ** No results found for the last 24 hours. **                  )12/3/2024  MD EVERETTE Araujo/Transcription:   \"Dictated utilizing Dragon dictation\".   "

## 2024-12-04 VITALS
HEIGHT: 64 IN | HEART RATE: 64 BPM | BODY MASS INDEX: 21.57 KG/M2 | OXYGEN SATURATION: 97 % | RESPIRATION RATE: 20 BRPM | DIASTOLIC BLOOD PRESSURE: 60 MMHG | TEMPERATURE: 98.1 F | SYSTOLIC BLOOD PRESSURE: 133 MMHG | WEIGHT: 126.32 LBS

## 2024-12-04 PROCEDURE — 63710000001 PREDNISONE PER 1 MG: Performed by: INTERNAL MEDICINE

## 2024-12-04 PROCEDURE — 99232 SBSQ HOSP IP/OBS MODERATE 35: CPT | Performed by: INTERNAL MEDICINE

## 2024-12-04 PROCEDURE — 25010000002 METOCLOPRAMIDE PER 10 MG: Performed by: STUDENT IN AN ORGANIZED HEALTH CARE EDUCATION/TRAINING PROGRAM

## 2024-12-04 RX ADMIN — FOLIC ACID 1000 MCG: 1 TABLET ORAL at 08:24

## 2024-12-04 RX ADMIN — ACETAMINOPHEN 1000 MG: 500 TABLET, FILM COATED ORAL at 05:48

## 2024-12-04 RX ADMIN — METOCLOPRAMIDE HYDROCHLORIDE 10 MG: 5 INJECTION INTRAMUSCULAR; INTRAVENOUS at 05:48

## 2024-12-04 RX ADMIN — METOCLOPRAMIDE HYDROCHLORIDE 10 MG: 5 INJECTION INTRAMUSCULAR; INTRAVENOUS at 12:23

## 2024-12-04 RX ADMIN — LOPERAMIDE HYDROCHLORIDE 2 MG: 2 CAPSULE ORAL at 12:23

## 2024-12-04 RX ADMIN — LOPERAMIDE HYDROCHLORIDE 2 MG: 2 CAPSULE ORAL at 08:24

## 2024-12-04 RX ADMIN — LOPERAMIDE HYDROCHLORIDE 2 MG: 2 CAPSULE ORAL at 18:02

## 2024-12-04 RX ADMIN — MIDODRINE HYDROCHLORIDE 5 MG: 5 TABLET ORAL at 08:25

## 2024-12-04 RX ADMIN — PANTOPRAZOLE SODIUM 40 MG: 40 TABLET, DELAYED RELEASE ORAL at 08:24

## 2024-12-04 RX ADMIN — ACETAMINOPHEN 1000 MG: 500 TABLET, FILM COATED ORAL at 12:23

## 2024-12-04 RX ADMIN — SERTRALINE HYDROCHLORIDE 50 MG: 50 TABLET ORAL at 08:24

## 2024-12-04 RX ADMIN — Medication 10 ML: at 08:23

## 2024-12-04 RX ADMIN — AVOBENZONE, HOMOSALATE, OCTISALATE, OCTOCRYLENE, AND OXYBENZONE 1 PACKET: 29.4; 147; 49; 25.4; 58.8 LOTION TOPICAL at 08:24

## 2024-12-04 RX ADMIN — ASPIRIN 81 MG: 81 TABLET, COATED ORAL at 08:24

## 2024-12-04 RX ADMIN — MESALAMINE 4.8 G: 800 TABLET, DELAYED RELEASE ORAL at 08:23

## 2024-12-04 RX ADMIN — CYANOCOBALAMIN TAB 500 MCG 1000 MCG: 500 TAB at 08:24

## 2024-12-04 RX ADMIN — LEVOTHYROXINE SODIUM 50 MCG: 0.05 TABLET ORAL at 05:48

## 2024-12-04 RX ADMIN — METOPROLOL SUCCINATE 25 MG: 25 TABLET, FILM COATED, EXTENDED RELEASE ORAL at 08:24

## 2024-12-04 RX ADMIN — CLOPIDOGREL BISULFATE 75 MG: 75 TABLET ORAL at 08:24

## 2024-12-04 RX ADMIN — MIDODRINE HYDROCHLORIDE 5 MG: 5 TABLET ORAL at 12:23

## 2024-12-04 RX ADMIN — MIDODRINE HYDROCHLORIDE 5 MG: 5 TABLET ORAL at 17:52

## 2024-12-04 RX ADMIN — PREDNISONE 40 MG: 20 TABLET ORAL at 08:24

## 2024-12-04 NOTE — PLAN OF CARE
Goal Outcome Evaluation:      Patient anticipating discharge to SNF on 12/4, no c/o pain or soa at this time, vs-wnl.

## 2024-12-04 NOTE — CASE MANAGEMENT/SOCIAL WORK
"Physicians Statement of Medical Necessity for  Ambulance Transportation    GENERAL INFORMATION     Name: Maryann Gaitan  YOB: 1950  Medicare #: [id number]   Transport Date: 12/4/24 (Valid for round trips this date, or for scheduled repetitive trips for 60 days from the date signed below.)  Origin: [facility]  Destination: [facility] bed [address], [address] [place]  Is the Patient's stay covered under Medicare Part A (PPS/DRG?)Yes  Closest appropriate facility? Yes  If this a hosp-hosp transfer? No  Is this a hospice patient? No    MEDICAL NECESSITY QUESTIONAIRE    Ambulance Transportation is medically necessary only if other means of transportation are contraindicated or would be potentially harmful to the patient.  To meet this requirement, the patient must be either \"bed confined\" or suffer from a condition such that transport by means other than an ambulance is contraindicated by the patient's condition.  The following questions must be answered by the healthcare professional signing below for this form to be valid:     1) Describe the MEDICAL CONDITION (physical and/or mental) of this patient AT THE TIME OF AMBULANCE TRANSPORT that requires the patient to be transported in an ambulance, and why transport by other means is contraindicated by the patient's condition:     Intermittent confusion, 2lpm O2, functional mobility impairments, increased LE weakness, unable to tolerate seated position for duration of transfer, unable to self support, mod assist for bed mobility    Past Medical History:   Diagnosis Date    Abnormal weight loss 11/21/2024    Acute kidney injury 11/06/2024    Acute UTI (urinary tract infection) 11/06/2024    Anxiety associated with depression 11/06/2024    Benign neoplasm of cecum 07/05/2016    CAD, multiple vessel 10/08/2024    Cavitary lesion of lung 10/08/2024    COPD (chronic obstructive pulmonary disease)     Crohn's disease 11/06/2024    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 " perforation or 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 07/05/2016    Dyslipidemia 10/30/2024    Fecal urgency 11/21/2024    First degree hemorrhoids 07/09/2021    GERD (gastroesophageal reflux disease) 11/21/2024    Hashimoto's thyroiditis 11/21/2024    History of colonic polyps 07/09/2021    Hypertension     Hypothyroidism (acquired) 11/06/2024    Mitral regurgitation 10/08/2024    Moderate protein-calorie malnutrition 11/09/2024    Multiple tracheobronchial mucus plugs 10/08/2024    Nicotine dependence 11/21/2024    Rheumatoid arthritis 11/06/2024    S/P mitral valve clip implantation 11/21/2024    Second degree hemorrhoids 07/05/2016    Stress incontinence, female 11/21/2024    Vitamin D deficiency, unspecified 11/21/2024      Past Surgical History:   Procedure Laterality Date    BRONCHOSCOPY N/A 10/16/2024    Procedure: BRONCHOSCOPY;  Surgeon: Gallo Pope MD;  Location: [facility] ENDOSCOPY;  Service: Pulmonary;  Laterality: N/A;    CARDIAC CATHETERIZATION N/A 10/15/2024    Procedure: Left Heart Cath, possible pci;  Surgeon: Travis Connor MD;  Location: [facility] CATH INVASIVE LOCATION;  Service: Cardiovascular;  Laterality: N/A;    CARDIAC CATHETERIZATION N/A 10/22/2024    Procedure: Laser Coronary Atherectomy;  Surgeon: Travis Connor MD;  Location: [facility] CATH INVASIVE LOCATION;  Service: Cardiovascular;  Laterality: N/A;    COLON RESECTION Right 11/28/2024    Procedure: RIGHT HEMICOLECTOMY WITH ILEOSTOMY;  Surgeon: Todd Babin MD;  Location: [facility] MAIN OR;  Service: General;  Laterality: Right;    COLONOSCOPY N/A 10/12/2024    Procedure: COLONOSCOPY WITH BIOPSY AND WIRE GUIDED BALLOON DILATION OF TERMINAL ILEUM;  Surgeon: Rob Strong MD;  Location: [facility] ENDOSCOPY;  Service: Gastroenterology;  Laterality: N/A;  Colitis, crohns of terminal ileum, right colon ulcers, diverticulosis, hemorroids    ENDOSCOPY N/A 10/12/2024    Procedure: ESOPHAGOGASTRODUODENOSCOPY WITH BIOPSY X 2 AREA;   "Surgeon: Rob Strong MD;  Location: [facility] ENDOSCOPY;  Service: Gastroenterology;  Laterality: N/A;  Chronic gastritis, HH    ENDOSCOPY Left 11/28/2024    Procedure: ESOPHAGOGASTRODUODENOSCOPY;  Surgeon: Dallin Delgado MD;  Location: [facility] ENDOSCOPY;  Service: Gastroenterology;  Laterality: Left;  small hiatal hernia    HYSTERECTOMY      LEFT HEART CATH        2) Is this patient \"bed confined\" as defined below?Yes   To be \"bed confined\" the patient must satisfy all three of the following criteria:  (1) unable to get up from bed without assistance; AND (2) unable to ambulate;  AND (3) unable to sit in a chair or wheelchair.  3) Can this patient safely be transported by car or wheelchair van (I.e., may safely sit during transport, without an attendant or monitoring?)No   4. In addition to completing questions 1-3 above, please check any of the following conditions that apply*:          *Note: supporting documentation for any boxes checked must be maintained in the patient's medical records Patient is confused, Moderate/severe pain on movement, Medical attendant required, Requires oxygen - unable to self administer, and Unable to tolerate seated position for time needed to transport      SIGNATURE OF PHYSICIAN OR OTHER AUTHORIZED HEALTHCARE PROFESSIONAL    I certify that the above information is true and correct based on my evaluation of this patient, and represent that the patient requires transport by ambulance and that other forms of transport are contraindicated.  I understand that this information will be used by the Centers for Medicare and Medicaid Services (CMS) to support the determiniation of medical necessity for ambulance services, and I represent that I have personal knowledge of the patient's condition at the time of transport.    x   If this box is checked, I also certify that the patient is physically or mentally incapable of signing the ambulance service's claim form and that the " institution with which I am affiliated has furnished care, services or assistance to the patient.  My signature below is made on behalf of the patient pursuant to 42 .36(b)(4). In accordance with 42 .37, the specific reason(s) that the patient is physically or mentally incapable of signing the claim for is as follows:     Signature of Physician or Healthcare Professional     Shraddha Lozano RN Date/Time:   12/4/24 949 am     (For Scheduled repetitive transport, this form is not valid for transports performed more than 60 days after this date).                                                                                                                                            ----Dr Luma Ramirez------------------------------------------------------------------  Printed Name and Credentials of Physician or Authorized Healthcare Professional     *Form must be signed by patient's attending physician for scheduled, repetitive transports,.  For non-repetitive ambulance transports, if unable to obtain the signature of the attending physician, any of the following may sign (please select below):     Physician  Clinical Nurse Specialist x Registered Nurse     Physician Assistant  Discharge Planner  Licensed Practical Nurse     Nurse Practitioner x

## 2024-12-04 NOTE — DISCHARGE SUMMARY
"             [facility]  Discharge Summary    Date of Service: 2024  Patient Name: Maryann Gaitan  : 1950  MRN: [id number]    Date of Admission: 2024  Discharge Diagnosis: Acute lower GI bleeding  Date of Discharge: 2024  Primary Care Physician: Eduard Little MD      Presenting Problem:   Acute lower GI bleeding [K92.2]  Anemia, unspecified type [D64.9]  Chest pain, unspecified type [R07.9]  Gastrointestinal hemorrhage, unspecified gastrointestinal hemorrhage type [K92.2]    Active and Resolved Hospital Problems:  Active Hospital Problems    Diagnosis POA    **Acute lower GI bleeding [K92.2] Yes    S/P mitral valve clip implantation [Z98.890, Z95.818] Not Applicable    Primary hypertension [I10] Yes    Gastrointestinal hemorrhage [K92.2] Unknown    Anemia [D64.9] Unknown    Moderate protein-calorie malnutrition [E44.0] Yes    Hypothyroidism (acquired) [E03.9] Yes    COPD (chronic obstructive pulmonary disease) [J44.9] Yes    Rheumatoid arthritis [M06.9] Yes    Crohn's disease [K50.90] Yes    Anxiety associated with depression [F41.8] Yes    Dyslipidemia [E78.5] Yes    Severe mitral regurgitation [I34.0] Yes    Acute heart failure with preserved ejection fraction (HFpEF) [I50.31] Yes    CAD, multiple vessel [I25.10] Yes      Resolved Hospital Problems   No resolved problems to display.         Hospital Course     HPI 24 :    \" Maryann Gaitan is a 74 y.o. female with a CMH of close disease, CAD, COPD, hypertension, hypothyroidism who presents to the hospital with complaints of abdominal pain, chest pain, shortness of breath and rectal bleeding.  The patient was recently admitted to the hospital on  for mild rectal bleeding and was found to have an acute Crohn's exacerbation.  She was discharged on a prolonged steroid taper however she states that she still has some abdominal pain and was not feeling well on discharge.  She did have a small drop in her " "hemoglobin prior to discharge during her previous hospital stay but her hemoglobin was 8.4 on the day of discharge.  Over the last few days she has continued to have worsening lower abdominal pain with increased chest pain and shortness of breath and much more rectal bleeding than before.  Of note, the patient did have LHC in October 2024 with PCI to ostial lesion and was placed on dual antiplatelet therapy with aspirin and Plavix.  This was continued from her previous hospitalization despite her mild rectal bleeding, given the increased risk for stent thrombosis with sudden discontinuation of DAPT.  She denies any headaches but does complain of dizziness, lightheadedness and nausea.\"    Hospital Course:  11/20/2024: Patient evaluated by cardiology. Patient previously deemed poor candidate for CABG. DAPT was held setting of GI bleed.  GI evaluated patient, recommended IV Solu-Medrol.  11/21/2024: Patient underwent mitral valve repair with MitraClip with cardiology (Dr Navarro).  11/22/2024: Patient was noted to have drop in hemoglobin.  Patient received 1 unit PRBCs.  11/23/2024: Nursing reported \"worms \", all testing for parasites has been negative.  11/24/2024: Heme-onc were consulted for anemia in the setting of GI bleeding in a patient requiring antiplatelet therapy.  Hemoglobin was noted down to 5.5, patient received 2 units of packed red blood cells.  Mesalamine was started per GI.  11/25/2024: ID was consulted for possible parasite in stool.  Patient was noted to have history of Strongyloides infection status post ivermectin treatment.  11/26/2024: Patient had another hemoglobin drop, patient received PRBCs.  11/28/2024: Patient had another hemoglobin drop and received PRBCs and platelets.  Patient underwent EGD which revealed active GI bleeding with hemodynamic, minus. General surgery was consulted.  Patient underwent right hemicolectomy with ileostomy and mucous fistula creation with Dr. Babin.  Patient was " taken to ICU postop for additional monitoring.  11/29/2024: Patient was stable for transfer to PCU.  11/30/2024-12/2/2024: Patient is doing well, hemoglobin has remained stable.  General surgery cleared patient for discharge this morning.  Patient denies SNF placement and desires to be back at home as daughter is currently in hospice and actively dying. However, after further investigation, this happens to be a remote event. Psych was consulted for evaluation.   12/2/24-12/4/24: Patient awaited pre-cert for [facility], now approved. Cardiology cleared for discharge as well. Patient is stable for discharge to rehab.        DISCHARGE Follow Up Recommendations for labs and diagnostics  -Follow-up with cardiology  -Follow-up with general surgery 2 weeks  -Follow-up with PCP        Day of Discharge     Vital Signs:  Temp:  [97.7 °F (36.5 °C)-99.2 °F (37.3 °C)] 98.4 °F (36.9 °C)  Heart Rate:  [69-85] 80  Resp:  [20-23] 20  BP: (128-158)/(65-88) 128/77  Flow (L/min) (Oxygen Therapy):  [2] 2    Physical Exam:  Physical Exam   General: awake, alert, in NAD  Neck: Supple  Chest: CTA bilat.  CV: S1S2 nl. RRR  Abd: Ostomy with good output. Soft, NTND. +BS  Ext: No c/c/e.        Pertinent  and/or Most Recent Results     LAB RESULTS:      Lab 12/03/24  2210 12/02/24  2225 12/02/24  0504 12/01/24  0436 11/30/24  0115 11/28/24  0753 11/28/24  0206   WBC 11.36* 10.81* 13.04* 17.13* 15.71*   < > 12.15*   HEMOGLOBIN 9.0* 8.2* 9.1* 8.8* 9.3*   < > 7.9*   HEMOGLOBIN, POC  --   --   --   --   --    < >  --    HEMATOCRIT 29.3* 26.3* 28.5* 28.4* 28.1*   < > 23.9*   HEMATOCRIT POC  --   --   --   --   --    < >  --    PLATELETS 163 116* 108* 91* 88*   < > 102*   NEUTROS ABS 9.64* 9.39* 11.24* 15.05* 13.44*   < > 9.67*   IMMATURE GRANS (ABS) 0.07* 0.05 0.08* 0.13* 0.14*   < > 0.09*   LYMPHS ABS 1.18 1.00 1.35 1.46 1.63   < > 2.16   MONOS ABS 0.43 0.35 0.28 0.43 0.49   < > 0.21   EOS ABS 0.03 0.01 0.08 0.04 0.00   < > 0.01   MCV 93.9 92.9  93.8 91.6 88.1   < > 92.3   CRP 9.20* 12.20* 15.60* 10.90* 10.30*   < > 1.25*   LDH  --   --   --   --   --   --  190    < > = values in this interval not displayed.         Lab 12/03/24  2210 12/02/24  2225 12/02/24  0504 12/01/24  2046 12/01/24  0436 11/29/24  0510   SODIUM 137 137 139  --  138 136   POTASSIUM 4.8 4.7 4.9 4.3 3.5 4.0   CHLORIDE 105 108* 110*  --  108* 108*   CO2 25.7 23.7 22.5  --  23.3 20.9*   ANION GAP 6.3 5.3 6.5  --  6.7 7.1   BUN 12 13 12  --  17 20   CREATININE 0.39* 0.50* 0.42*  --  0.53* 0.66   EGFR 104.6 98.6 102.8  --  97.2 92.2   GLUCOSE 97 104* 72  --  72 96   CALCIUM 8.1* 7.7* 7.9*  --  7.8* 8.0*   MAGNESIUM 2.0 2.2 2.6*  --  1.4*  --          Lab 11/29/24  0510 11/28/24  0206   TOTAL PROTEIN 4.0* 3.6*   ALBUMIN 2.6* 2.4*   GLOBULIN 1.4 1.2   ALT (SGPT) 31 38*   AST (SGOT) 21 30   BILIRUBIN 0.5 0.2   ALK PHOS 64 51                   Lab 11/28/24  0833   ABO TYPING O   RH TYPING Positive   ANTIBODY SCREEN Negative         Lab 11/28/24  1753   PH, ARTERIAL 7.290*   PCO2, ARTERIAL 43.2   PO2 .0*   O2 SATURATION .0*   HCO3 ART 20.7*   BASE EXCESS ART <0.0*     Brief Urine Lab Results  (Last result in the past 365 days)        Color   Clarity   Blood   Leuk Est   Nitrite   Protein   CREAT   Urine HCG        12/02/24 2056 Yellow   Cloudy   Negative   Moderate (2+)   Negative   Negative                 Microbiology Results (last 10 days)       Procedure Component Value - Date/Time    Blood Culture - Blood, Arm, Left [[id number]]  (Normal) Collected: 11/25/24 0437    Lab Status: Final result Specimen: Blood from Arm, Left Updated: 11/30/24 0445     Blood Culture No growth at 5 days    Narrative:      Less than seven (7) mL's of blood was collected.  Insufficient quantity may yield false negative results.            XR Chest 1 View    Result Date: 11/25/2024  Impression: Impression: No active pulmonary process. Electronically Signed: Jameson Rainey MD  11/25/2024 9:25 AM EST   Workstation ID: [id number]    CT Abdomen Pelvis With Contrast    Result Date: 11/20/2024  Impression: Impression: 1.Distal/terminal ileitis in keeping with patient's history of Crohn's disease. No definitive active colonic inflammation identified on CT imaging. Trace free fluid in the pelvis is likely related. 2.Diffuse colonic fatty mural infiltration which can be seen in the setting of chronic inflammatory bowel disease. 3.Other incidental nonemergent findings detailed above. Electronically Signed: Junior Khan MD  11/20/2024 8:19 AM EST  Workstation ID: [id number]    XR Chest 1 View    Result Date: 11/19/2024  Impression: Impression: No acute chest finding. Electronically Signed: Ute Snider MD  11/19/2024 1:34 PM EST  Workstation ID: [id number]    CT Abdomen Pelvis Without Contrast    Result Date: 11/6/2024  Impression: Impression: 1. Segmental thickening and inflammatory change of the descending colon-sigmoid colon junction. Correlate for infectious or inflammatory colitis. 2. Incompletely imaged 3.8 cm mid ascending thoracic aortic aneurysm. Electronically Signed: Ute Snider MD  11/6/2024 4:23 PM EST  Workstation ID: [id number]     Results for orders placed during the hospital encounter of 10/08/24    Duplex Venous Upper Extremity - Left CAR    Interpretation Summary    Acute left upper extremity superficial thrombophlebitis noted in the basilic (upper arm).    All other left sided vessels appear normal.      Results for orders placed during the hospital encounter of 10/08/24    Duplex Venous Upper Extremity - Left CAR    Interpretation Summary    Acute left upper extremity superficial thrombophlebitis noted in the basilic (upper arm).    All other left sided vessels appear normal.      Results for orders placed during the hospital encounter of 11/19/24    Adult Transthoracic Echo Limited W/ Cont if Necessary Per Protocol    Interpretation Summary    Left ventricular ejection fraction appears to be 56 - 60%.     There is a MitraClip mitral valve repair present.    There is trace residual MR.  Mean gradient is 4.6 mmHg      Labs Pending at Discharge:  Pending Results       Procedure [Order ID] Specimen - Date/Time    Cardiac Catheterization/Vascular Study [[id number]] Resulted: 11/21/24 0831     Updated: 11/21/24 0952            Procedures Performed  Procedure(s):  RIGHT HEMICOLECTOMY WITH ILEOSTOMY         Consults:   Consults       Date and Time Order Name Status Description    12/2/2024  3:47 PM Inpatient Psychiatrist Consult Completed     11/29/2024  9:21 AM Inpatient Hospitalist Consult      11/28/2024  2:01 PM Inpatient Colorectal Surgery Consult      11/24/2024 10:44 AM Inpatient Infectious Diseases Consult Completed     11/22/2024 11:39 AM Hematology & Oncology Inpatient Consult Completed     11/19/2024  5:31 PM Inpatient Cardiology Consult Completed     11/19/2024  2:56 PM Gastroenterology (on-call MD unless specified) Completed     11/7/2024  1:20 PM Inpatient Cardiology Consult Completed     11/7/2024  5:48 AM Inpatient Infectious Diseases Consult Completed     11/6/2024  7:25 PM Inpatient Gastroenterology Consult Completed     10/14/2024  5:03 PM Inpatient Pulmonology Consult Completed     10/13/2024 10:38 AM Inpatient Cardiology Consult Completed     10/11/2024 10:12 AM Hematology & Oncology Inpatient Consult Completed     10/11/2024  8:11 AM Inpatient Gastroenterology Consult Completed               Discharge Details        Discharge Medications        New Medications        Instructions Start Date   mesalamine 1.2 g EC tablet  Commonly known as: LIALDA   1.2 g, Oral, Daily      metoprolol succinate XL 25 MG 24 hr tablet  Commonly known as: TOPROL-XL   25 mg, Oral, Every 24 Hours Scheduled      naloxone 4 MG/0.1ML nasal spray  Commonly known as: NARCAN   Call 911. Don't prime. [place] in 1 nostril for overdose. Repeat in 2-3 minutes in other nostril if no or minimal breathing/responsiveness.      ondansetron ODT  4 MG disintegrating tablet  Commonly known as: ZOFRAN-ODT   4 mg, Oral, Every 6 Hours PRN      oxyCODONE 5 MG immediate release tablet  Commonly known as: ROXICODONE   10 mg, Oral, Every 6 Hours PRN             Changes to Medications        Instructions Start Date   midodrine 5 MG tablet  Commonly known as: PROAMATINE  What changed:   medication strength  how much to take   5 mg, Oral, 3 Times Daily Before Meals             Continue These Medications        Instructions Start Date   albuterol (2.5 MG/3ML) 0.083% nebulizer solution  Commonly known as: PROVENTIL   2.5 mg, Every 6 Hours PRN      amLODIPine 10 MG tablet  Commonly known as: NORVASC   10 mg, Daily      aspirin 81 MG EC tablet   81 mg, Oral, Daily      atorvastatin 40 MG tablet  Commonly known as: LIPITOR   40 mg, Oral, Nightly      cholecalciferol 1.25 MG (10694 UT) capsule  Commonly known as: VITAMIN D3   1 capsule, 2 Times Weekly      clopidogrel 75 MG tablet  Commonly known as: PLAVIX   75 mg, Oral, Daily      diclofenac 50 MG EC tablet  Commonly known as: VOLTAREN   50 mg, 2 Times Daily      folic acid 1 MG tablet  Commonly known as: FOLVITE   1 tablet, Daily      Humira (2 Pen) 40 MG/0.4ML Pen-injector Kit  Generic drug: Adalimumab   40 mg, Weekly      levothyroxine 50 MCG tablet  Commonly known as: SYNTHROID, LEVOTHROID   1 tablet, Daily      Melatonin Extra Strength 10 MG tablet  Generic drug: Melatonin   10 mg, As Needed      methotrexate 2.5 MG tablet   6 tablets, Weekly      pantoprazole 20 MG EC tablet  Commonly known as: PROTONIX   1 tablet, Daily      Rinvoq 45 MG tablet sustained-release 24 hour extended release tablet  Generic drug: upadacitinib ER   1 tablet, Daily      sertraline 50 MG tablet  Commonly known as: ZOLOFT   1 tablet, Daily      spironolactone 25 MG tablet  Commonly known as: ALDACTONE   1 tablet, Daily             Stop These Medications      metoprolol tartrate 50 MG tablet  Commonly known as: LOPRESSOR     predniSONE 10 MG  tablet  Commonly known as: DELTASONE              Allergies   Allergen Reactions    Codeine Hives    Penicillin G Sodium Hives         Discharge Disposition: 55 minutes  Rehab Facility or Unit (DC - External)    Diet:  Hospital:  Diet Order   Procedures    Diet: Regular/House; Fluid Consistency: Thin (IDDSI 0)         Discharge Activity:   as tolerated      CODE STATUS:  Code Status and Medical Interventions: CPR (Attempt to Resuscitate); Full Support   Ordered at: 11/21/24 1014     Level Of Support Discussed With:    Patient     Code Status (Patient has no pulse and is not breathing):    CPR (Attempt to Resuscitate)     Medical Interventions (Patient has pulse or is breathing):    Full Support         Future Appointments   Date Time Provider Department Center   12/5/2024 To Be Determined Corina Pierre, RN  [facility]   12/6/2024 To Be Determined Akhil Scott, [facility]   12/9/2024 To Be Determined Jackelyn Galeana OT  [facility]   1/6/2025  8:30 AM [facility]   1/6/2025  9:45 AM Travis Connor MD [facility]   9/24/2025 10:15 AM [facility] NA   9/24/2025 11:45 AM Travis Connor MD [facility]       Additional Instructions for the Follow-ups that You Need to Schedule       Ambulatory Referral to Cardiac Rehab   As directed      Discharge Follow-up with PCP   As directed       Currently Documented PCP:    Eduard Little MD    PCP Phone Number:    [phone]     Follow Up Details: 1-2 weeks                Time spent on Discharge including face to face service:  30 minutes    Signature: Electronically signed by Fco Figueroa PA-C, 12/04/24, 16:15 EST.  [facility] Hospitalist Team

## 2024-12-04 NOTE — PLAN OF CARE
Goal Outcome Evaluation:              Outcome Evaluation: patient being discharge and transported to [facility] today.

## 2024-12-04 NOTE — PLAN OF CARE
Goal Outcome Evaluation:              Outcome Evaluation: patient being discharge and transported to [facility] today.

## 2024-12-04 NOTE — PROGRESS NOTES
Cardiology Progress Note    Patient Identification:  Name: Maryann Gaitan  Age: 74 y.o.  Sex: female  :  1950  MRN: [id number]                 Follow Up / Chief Complaint: CAD/PCI, GI Bleed, MR status post Azeb Clip   Chief Complaint   Patient presents with    Chest Pain       Interval History: Patient presented with chest pain and abdominal pain.  Has been having bright red blood per rectum.  Was seen by structural heart for mitral regurgitation was scheduled for outpatient procedure.  Underwent MitraClip 2024  Patient is having issues with rectal bleed and anemia requiring transfusions.  Patient underwent right hemicolectomy with ileostomy placement on 2024        NP note: Patient seen and examined this morning.  Denies any complaints continues to have chronic shortness of breath some mild wheezing noted this morning upper airways.  Patient continues to be mildly confused reports that she was up in the chair however nursing staff reports that she has not been up today.  Okay to discharge to rehab    Hemoglobin has been stable on aspirin and Plavix.     Will follow-up after rehab    Electronically signed by DRU Rubio, 24, 11:23 AM EST.      Cardiology attending addendum :    I have personally performed a face-to-face diagnostic evaluation, physical exam and reviewed data on this patient.  I have reviewed documentation done by me and nurse practitioner  and corrected as needed.  And agree with the different components of documentation.Greater than 50% of the time spent in the care of this patient was provided by attending consultant/me.          Subjective: Patient seen and examined.  Chart reviewed.  Labs reviewed.  Patient without chest pain or shortness of breath.      Objective:  2024: Glucose elevated.  CBC with white count of 13.5, hemoglobin 9  2024: WBC 14.13, hemoglobin 7.4  EKG normal sinus rhythm  Limited echo shows EF at 56 to 60% MitraClip in place with  trace residual MR mean creatinine of 4.6  11/25/2024: Hemoglobin 6.6  11/26/2024: Hemoglobin is 7.4.  11/27/2024: Hemoglobin is 6.4.    12/2/2024: CRP 15.6 WBC 13.04 hemoglobin 9.1 platelets 108  12/3/2024: CRP 12.2 WBC 10.81 hemoglobin 8.2 platelets 116  12/4/2024: CRP 9.2 WBC 11.36 hemoglobin 9.0 platelets 163      History of present illness:    Ms. Maryann Gaitan has PMH of     CAD status post cardiac cath with multivessel CAD poor candidate for CABG, underwent PCI to ostial and proximal LAD 10/22/2024  Moderate to severe MR with central jet noted  COPD  Hypertension  Rheumatoid arthritis  Crohn's disease     Presented to the emergency room on 11/19/2024 with abdominal pain and chest pain.  Patient reports that she started having bright red blood per rectum again yesterday and has been having constant chest pain since.     Labs in the emergency room was noted to have a hemoglobin of 5.7 high-sensitivity troponin 21--17 BUN 28 creatinine 0.74 total protein 5.1 albumin 2.9 ALT 35 WBC 16.93 chest x-ray without any acute finding EKG sinus rhythm with nonspecific ST abnormalities  CT abdomen and pelvis shows distal terminal ileitis colonic fatty marrow infiltration which can be seen in the setting of chronic inflammatory bowel disease   patient was transfused with 1 unit of PRBC and hemoglobin improved to 7.3 and this morning she is 9.1     Unfortunately patient is in the tough situation as she had stent placed on 10/22/2024 and needs dual antiplatelet therapy to prevent clotting of stent however she has presented for the second time with significant GI bleeding.  Currently her aspirin and Plavix are both on hold.        Recent hospitalization  Presented 11/6/2024 through [facility] with complaint of abdominal pain and dark red stool / rectal bleed.  Patient has close disease and rheumatoid arthritis and is on immunosuppressants methotrexate, Humira, Rinvoq now presented with rectal bleed and abdominal pain.  GI did a  scope and biopsies of terminal ileum which were consistent with active Crohn's and biopsies of stomach and duodenum showed strong colitis infection, was treated by ivermectin.  GI started patient on Solu-Medrol and is holding immunosuppressants and wants to hold Plavix.     Patient was recently in the hospital 10/8/2024 with nausea vomiting diarrhea and abnormal labs.  With hyponatremia and hypokalemia and anemia.  Patient suddenly started having chest pain and fast team was called because of sharp left-sided chest pain with shortness of breath dizziness EKG showed sinus tachycardia and cardiac workup revealed severe MR, cath 10/15/2024 revealing severe ostial LAD and outpouching in the descending thoracic aorta probably a penetrating aortic ulcer versus aneurysm.  CT surgery was consulted and she was turned down for CABG therefore patient underwent drug-eluting stent to ostial LAD on 10/22/2024 and was supposed to have clip to mitral valve in follow-up.  In the meantime has been having worsening symptoms and abdominal pain, weight loss.  Patient has been restarted on Plavix.  Will continue for bleeding closely.     Data:  Labs 11/6/2024 - 11/7/2024 revealed sodium 134, BUN/creatinine of 75/2.84, glucose 138, albumin 2.9.  Hemoglobin 9.4 has come down to 8.  MCV 98  EKG done 11/6/2024 reviewed/interpreted by me reveals sinus bradycardia at the rate of 58 bpm.        EGD/colonoscopy 10/12/2024 revealed small hiatal hernia, nonerosive gastritis, T1 stricture s/p dilatation with 12 mm, T1 ulcer, colon ulcers, sigmoid diverticulosis, grade 2 internal hemorrhoids and strongyloides  Echo 10/12/2024 EF of 51 to 55% with mild RV enlargement, severe left atrial enlargement, moderate to severe MR  Transesophageal echo 10/16/2024: LVEF of 55 to 60% with severe left atrial enlargement, moderate to severe MR and grade 3 descending aortic plaque  Cardiac cath 10/15/2024 reveals total right and severe ostial LAD, descending  thoracic aorta had an outpouching consistent with penetrating aortic ulcer versus aneurysm.   Patient was evaluated by CT surgery not a candidate for CABG therefore patient underwent PCI and drug-eluting stent to the ostium proximal LAD.  And she was seen by valve team and will be set up for MitraClip once she recovers from this hospitalization      Assessment:  :     Abdominal pain, rectal bleeding  Anemia requiring blood transfusion  Chest pain  Crohn's disease  CAD, status post PCI  Mitral regurgitation  Chronic HFpEF due to valvular heart disease from mitral regurgitation  Hypertensive cardiovascular disease from hypertension  Hyponatremia  Hypokalemia  Crohn's disease  Normocytic anemia  COPD, chronic steroid therapy  Multifocal pneumonia  Hyperglycemia, prediabetes with A1c of 5.6 from     Recommendations / Plan:         Patient presented 11/19/2024 with abdominal pain and bright red blood per rectum was found to have a hemoglobin of 5.7 was transfused.  Patient chest pain.  HS troponin is normal.  Patient recently had coronary drug-eluting stent placed on 10/22/2024 would benefit from antiplatelet therapy.    Patient was seen by structural heart underwent mitral valve clip 11/21/2024.  Patient unfortunately is having Crohn's flareup with bloody diarrhea and requiring transfusions, is between rock and a hard place.  Patient underwent right hemicolectomy end ileostomy placement on 11/28/2024.  Continue oral antiplatelet therapy as tolerated  Will monitor closely by monitoring hemodynamics, hemoglobin and platelets.                Review of records  Patient presented 10/9/2024 because of abnormal labs showing low sodium, was complaining of nausea vomiting and diarrhea.    Patient underwent cardiac cath 10/15/2024 which revealed total right and severe ostial LAD disease.  Descending thoracic aorta has an outpouching probably saccular aneurysm versus  penetrating aortic ulcer .  Echocardiogram 10/12/2024 is  revealing EF of 51 to 55% with mild RV enlargement severe left atrial enlargement and right atrial enlargement and moderate to severe MR  GONZÁLEZ is revealing moderate to severe MR.       Copied text in this portion of the note has been reviewed and is accurate as of 12/4/2024    Past Medical 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 07/05/2016    Dyslipidemia 10/30/2024    Fecal urgency 11/21/2024    First degree hemorrhoids 07/09/2021    GERD (gastroesophageal reflux disease) 11/21/2024    Hashimoto's thyroiditis 11/21/2024    History of colonic polyps 07/09/2021    Hypertension     Hypothyroidism (acquired) 11/06/2024    Mitral regurgitation 10/08/2024    Moderate protein-calorie malnutrition 11/09/2024    Multiple tracheobronchial mucus plugs 10/08/2024    Nicotine dependence 11/21/2024    Rheumatoid arthritis 11/06/2024    S/P mitral valve clip implantation 11/21/2024    Second degree hemorrhoids 07/05/2016    Stress incontinence, female 11/21/2024    Vitamin D deficiency, unspecified 11/21/2024     Past Surgical History:  Past Surgical History:   Procedure Laterality Date    BRONCHOSCOPY N/A 10/16/2024    Procedure: BRONCHOSCOPY;  Surgeon: Gallo Pope MD;  Location: [facility] ENDOSCOPY;  Service: Pulmonary;  Laterality: N/A;    CARDIAC CATHETERIZATION N/A 10/15/2024    Procedure: Left Heart Cath, possible pci;  Surgeon: Travis Connor MD;  Location: [facility] CATH INVASIVE LOCATION;  Service: Cardiovascular;  Laterality: N/A;    CARDIAC CATHETERIZATION N/A 10/22/2024    Procedure:  Laser Coronary Atherectomy;  Surgeon: Travis Connor MD;  Location: [facility] CATH INVASIVE LOCATION;  Service: Cardiovascular;  Laterality: N/A;    COLON RESECTION Right 11/28/2024    Procedure: RIGHT HEMICOLECTOMY WITH ILEOSTOMY;  Surgeon: Todd Babin MD;  Location: [facility] MAIN OR;  Service: General;  Laterality: Right;    COLONOSCOPY N/A 10/12/2024    Procedure: COLONOSCOPY WITH BIOPSY AND WIRE GUIDED BALLOON DILATION OF TERMINAL ILEUM;  Surgeon: Rob Strong MD;  Location: [facility] ENDOSCOPY;  Service: Gastroenterology;  Laterality: N/A;  Colitis, crohns of terminal ileum, right colon ulcers, diverticulosis, hemorroids    ENDOSCOPY N/A 10/12/2024    Procedure: ESOPHAGOGASTRODUODENOSCOPY WITH BIOPSY X 2 AREA;  Surgeon: Rob Strong MD;  Location: [facility] ENDOSCOPY;  Service: Gastroenterology;  Laterality: N/A;  Chronic gastritis, HH    ENDOSCOPY Left 11/28/2024    Procedure: ESOPHAGOGASTRODUODENOSCOPY;  Surgeon: Dallin Delgado MD;  Location: [facility] ENDOSCOPY;  Service: Gastroenterology;  Laterality: Left;  small hiatal hernia    HYSTERECTOMY      LEFT HEART CATH          Social History:   Social History     Tobacco Use    Smoking status: Former     Types: Cigarettes    Smokeless tobacco: Never   Substance Use Topics    Alcohol use: Never      Family History:  Family History   Problem Relation Age of Onset    Heart disease Mother     Dementia Mother     Stroke Mother     Heart disease Father     Hypertension Father           Allergies:  Allergies   Allergen Reactions    Codeine Hives    Penicillin G Sodium Hives     Scheduled Meds:  acetaminophen, 1,000 mg, Q8H  aspirin, 81 mg, Daily  atorvastatin, 40 mg, Nightly  clopidogrel, 75 mg, Daily  folic acid, 1,000 mcg, Daily  levothyroxine, 50 mcg, Q AM  loperamide, 2 mg, 4x Daily  mesalamine, 4.8 g, Daily  methotrexate, 15 mg, Weekly  metoclopramide, 10 mg, Q6H  metoprolol succinate XL, 25 mg, Q24H  midodrine, 5 mg, TID  "AC  pantoprazole, 40 mg, Daily  predniSONE, 40 mg, Daily With Breakfast  Psyllium, 1 packet, BID  sertraline, 50 mg, Daily  sodium chloride, 10 mL, Q12H  sodium chloride, 10 mL, Q12H  upadacitinib ER, 45 mg, Daily  vitamin B-12, 1,000 mcg, Daily          Review of Systems:   Review of Systems   Respiratory:  Positive for cough, shortness of breath (Chronic) and wheezing.            Constitutional:  Temp:  [97.7 °F (36.5 °C)-99.2 °F (37.3 °C)] 98.4 °F (36.9 °C)  Heart Rate:  [] 80  Resp:  [17-23] 20  BP: (128-158)/(65-88) 128/77    Physical Exam   /77   Pulse 80   Temp 98.4 °F (36.9 °C) (Oral)   Resp 20   Ht 162.6 cm (64.02\")   Wt 57.3 kg (126 lb 5.2 oz)   SpO2 97%   BMI 21.67 kg/m²   General:  Appears in no acute distress  Eyes: Sclera is anicteric,  conjunctiva is clear   HEENT:  No JVD. Thyroid not visibly enlarged. No mucosal pallor or cyanosis  Respiratory: Respirations regular and unlabored at rest.  mild upper airway wheezing  Cardiovascular: S1,S2 Regular rate and rhythm.  2/6 holosystolic murmur  Gastrointestinal: Abdomen nondistended.  Ileostomy noted  Musculoskeletal:  No abnormal movements  Extremities: No digital clubbing or cyanosis  Skin: Color pink.  Ecchymosis to chest wall and upper extremities  Neuro: Alert and awake.    INTAKE AND OUTPUT:    Intake/Output Summary (Last 24 hours) at 12/4/2024 0850  Last data filed at 12/4/2024 0543  Gross per 24 hour   Intake 240 ml   Output 910 ml   Net -670 ml       Cardiographics  Telemetry: Sinus rhythm    ECG:   ECG 12 Lead Other; post-MitraClip   Final Result   HEART RATE=73  bpm   RR Wxqnlxkc=929  ms   MT Wxsdxdwx=709  ms   P Horizontal Axis=1  deg   P Front Axis=84  deg   QRSD Interval=80  ms   QT Ucsjihmk=676  ms   EGwE=494  ms   QRS Axis=39  deg   T Wave Axis=70  deg   - NORMAL ECG -   Sinus rhythm   When compared with ECG of 19-Nov-2024 12:13:34,   Significant repolarization change   Significant axis, voltage or hypertrophy change "   Electronically Signed By: Dmitri Navarro ([facility]) 2024-11-22 13:38:55   Date and Time of Study:2024-11-22 06:15:06      ECG 12 Lead Chest Pain   Final Result   HEART RATE=67  bpm   RR Harvzaat=158  ms   VA Rdoxnrcj=037  ms   P Horizontal Axis=28  deg   P Front Axis=87  deg   QRSD Interval=83  ms   QT Rvffenvn=689  ms   MQxG=506  ms   QRS Axis=39  deg   T Wave Axis=40  deg   - ABNORMAL ECG -   Sinus rhythm   Low voltage, precordial leads   Nonspecific  T abnormalities, inferior leads   When compared with ECG of 06-Nov-2024 15:41:09,   Significant repolarization change   Significant axis, voltage or hypertrophy change   Electronically Signed By: Rajiv Dubon ([facility]) 2024-11-19 12:47:59   Date and Time of Study:2024-11-19 12:13:34      Telemetry Scan   Final Result      Telemetry Scan   Final Result      Telemetry Scan   Final Result      Telemetry Scan   Final Result      Telemetry Scan   Final Result      Telemetry Scan   Final Result      Telemetry Scan   Final Result      Telemetry Scan   Final Result      Telemetry Scan   Final Result      Telemetry Scan   Final Result      Telemetry Scan   Final Result      Telemetry Scan   Final Result      Telemetry Scan   Final Result      Telemetry Scan   Final Result      Telemetry Scan   Final Result      Telemetry Scan   Final Result      Telemetry Scan   Final Result      Telemetry Scan   Final Result      Telemetry Scan   Final Result      Telemetry Scan   Final Result      Telemetry Scan   Final Result      Telemetry Scan   Final Result      Telemetry Scan   Final Result      Telemetry Scan   Final Result      Telemetry Scan   Final Result      Telemetry Scan   Final Result      Telemetry Scan   Final Result      Telemetry Scan   Final Result      Telemetry Scan   Final Result      Telemetry Scan   Final Result      Telemetry Scan   Final Result      Telemetry Scan   Final Result      Telemetry Scan   Final Result      Telemetry Scan   Final Result      Telemetry Scan   Final  Result      Telemetry Scan   Final Result      Telemetry Scan   Final Result      Telemetry Scan   Final Result      Telemetry Scan   Final Result      Telemetry Scan   Final Result      Telemetry Scan   Final Result      Telemetry Scan   Final Result      Telemetry Scan   Final Result      Telemetry Scan   Final Result      Telemetry Scan   Final Result      Telemetry Scan   Final Result      Telemetry Scan   Final Result      Telemetry Scan   Final Result      Telemetry Scan   Final Result      Telemetry Scan   Final Result      Telemetry Scan   Final Result      Telemetry Scan   Final Result      Telemetry Scan   Final Result      Telemetry Scan   Final Result      Telemetry Scan   Final Result      Telemetry Scan   Final Result      Telemetry Scan   Final Result      Telemetry Scan   Final Result      Telemetry Scan   Final Result      Telemetry Scan   Final Result      Telemetry Scan   Final Result      Telemetry Scan   Final Result      Telemetry Scan   Final Result      Telemetry Scan   Final Result      Telemetry Scan   Final Result      Telemetry Scan   Final Result      Telemetry Scan   Final Result      Telemetry Scan   Final Result      Telemetry Scan   Final Result      Telemetry Scan   Final Result      Telemetry Scan   Final Result      Telemetry Scan   Final Result      Telemetry Scan   Final Result      Telemetry Scan   Final Result      Telemetry Scan   Final Result      Telemetry Scan   Final Result        I have personally reviewed EKG    Echocardiogram: Results for orders placed during the hospital encounter of 11/19/24    Adult Transthoracic Echo Limited W/ Cont if Necessary Per Protocol    Interpretation Summary    Left ventricular ejection fraction appears to be 56 - 60%.    There is a MitraClip mitral valve repair present.    There is trace residual MR.  Mean gradient is 4.6 mmHg      Lab Review   I have reviewed the labs        Results from last 7 days   Lab Units 12/03/24  2210   MAGNESIUM  "mg/dL 2.0     Results from last 7 days   Lab Units 12/03/24  2210   SODIUM mmol/L 137   POTASSIUM mmol/L 4.8   BUN mg/dL 12   CREATININE mg/dL 0.39*   CALCIUM mg/dL 8.1*         Results from last 7 days   Lab Units 12/03/24  2210 12/02/24  2225 12/02/24  0504   WBC 10*3/mm3 11.36* 10.81* 13.04*   HEMOGLOBIN g/dL 9.0* 8.2* 9.1*   HEMATOCRIT % 29.3* 26.3* 28.5*   PLATELETS 10*3/mm3 163 116* 108*             RADIOLOGY:  Imaging Results (Last 24 Hours)       ** No results found for the last 24 hours. **                  )12/4/2024  MD EVERETTE Araujo/Transcription:   \"Dictated utilizing Dragon dictation\".   "

## 2024-12-04 NOTE — NURSING NOTE
[facility] note:    74 yr old female admitted 11/19/24 with lower GI bleeding. Patient underwent a right hemicolectomy with ileostomy and mucous fistula on 11/28/24. [facility] follow up for ostomy teaching. Patient scheduled to be discharged to [place] today. There is no family at the bedside.     The pouches to the RLQ ileostomy and RUQ mucous fistula are intact. There is a moderate amount of liquid brown effluent in the ileostomy pouch. Patient was able to open and close the pouch with verbal and tactile cuing. Instructed on diet and hydration concerns with an ileostomy. Patient verbalized understanding. Supplies and written materials are at the bedside.

## 2024-12-04 NOTE — CASE MANAGEMENT/SOCIAL WORK
Continued Stay Note  [facility]     Patient Name: Maryann Gaitan  MRN: [id number]  Today's Date: 12/4/2024    Admit Date: 11/19/2024    Plan: [facility] (accepted, bed ready Wed 12/4), PASRR approved. Precert approved Valid 12/3-12/5   Discharge Plan       Row Name 12/04/24 0938       Plan    Plan Comments CM confirmed with [facility] liaison Teal that bed is ready today, patient will go to [facility]. Primary nurse and MD updated and confirmed DC today. CM submitted EMS request and provided medical necessity paperwork to primary nurse.               Shraddha Lozano RN      [facility]  Office: [phone]  Cell: [phone]  Fax # [phone]

## 2024-12-04 NOTE — SIGNIFICANT NOTE
12/04/24 1248   OTHER   Discipline physical therapist   Rehab Time/Intention   Session Not Performed other (see comments)  (Pt with discharge orders and pending placement at SNF today)

## 2024-12-05 ENCOUNTER — HOME CARE VISIT (OUTPATIENT)
Dept: HOME HEALTH SERVICES | Facility: HOME HEALTHCARE | Age: 74
End: 2024-12-05
Payer: MEDICARE

## 2024-12-05 ENCOUNTER — TELEPHONE (OUTPATIENT)
Dept: CARDIAC REHAB | Facility: HOSPITAL | Age: 74
End: 2024-12-05
Payer: MEDICARE

## 2024-12-05 NOTE — TELEPHONE ENCOUNTER
Cover sheet requests facility to contact patient to see if interested in cardiac rehab. Facesheet lists patient contact information and qualifying procedure for cardiac rehab program.

## 2024-12-05 NOTE — CASE MANAGEMENT/SOCIAL WORK
Case Management Discharge Note      Final Note: [facility]         Selected Continued Care - Discharged on 12/4/2024 Admission date: 11/19/2024 - Discharge disposition: Rehab Facility or Unit (DC - External)      Destination Coordination complete.      Service Provider Services Address Phone Fax Patient Preferred    [facility] [address] [place] [id number] [phone] --               Transportation Services  Private: Car    Final Discharge Disposition Code: 03 - skilled nursing facility (SNF)

## 2024-12-06 ENCOUNTER — HOME CARE VISIT (OUTPATIENT)
Dept: HOME HEALTH SERVICES | Facility: HOME HEALTHCARE | Age: 74
End: 2024-12-06
Payer: MEDICARE

## 2024-12-10 ENCOUNTER — HOSPITAL ENCOUNTER (EMERGENCY)
Facility: HOSPITAL | Age: 74
Discharge: NURSING, REHAB OR LONG-TERM CARE | End: 2024-12-10
Attending: EMERGENCY MEDICINE | Admitting: EMERGENCY MEDICINE
Payer: MEDICARE

## 2024-12-10 VITALS
HEART RATE: 75 BPM | SYSTOLIC BLOOD PRESSURE: 121 MMHG | OXYGEN SATURATION: 98 % | TEMPERATURE: 97 F | DIASTOLIC BLOOD PRESSURE: 61 MMHG | RESPIRATION RATE: 18 BRPM

## 2024-12-10 DIAGNOSIS — R04.0 EPISTAXIS: Primary | ICD-10-CM

## 2024-12-10 LAB
BASOPHILS # BLD AUTO: 0.02 10*3/MM3 (ref 0–0.2)
BASOPHILS NFR BLD AUTO: 0.3 % (ref 0–1.5)
DEPRECATED RDW RBC AUTO: 56.4 FL (ref 37–54)
EOSINOPHIL # BLD AUTO: 0.07 10*3/MM3 (ref 0–0.4)
EOSINOPHIL NFR BLD AUTO: 1.1 % (ref 0.3–6.2)
ERYTHROCYTE [DISTWIDTH] IN BLOOD BY AUTOMATED COUNT: 16.7 % (ref 12.3–15.4)
HCT VFR BLD AUTO: 27.5 % (ref 34–46.6)
HGB BLD-MCNC: 8.7 G/DL (ref 12–15.9)
IMM GRANULOCYTES # BLD AUTO: 0.28 10*3/MM3 (ref 0–0.05)
IMM GRANULOCYTES NFR BLD AUTO: 4.5 % (ref 0–0.5)
LYMPHOCYTES # BLD AUTO: 1.35 10*3/MM3 (ref 0.7–3.1)
LYMPHOCYTES NFR BLD AUTO: 21.8 % (ref 19.6–45.3)
MCH RBC QN AUTO: 29.9 PG (ref 26.6–33)
MCHC RBC AUTO-ENTMCNC: 31.6 G/DL (ref 31.5–35.7)
MCV RBC AUTO: 94.5 FL (ref 79–97)
MONOCYTES # BLD AUTO: 0.39 10*3/MM3 (ref 0.1–0.9)
MONOCYTES NFR BLD AUTO: 6.3 % (ref 5–12)
NEUTROPHILS NFR BLD AUTO: 4.07 10*3/MM3 (ref 1.7–7)
NEUTROPHILS NFR BLD AUTO: 66 % (ref 42.7–76)
NRBC BLD AUTO-RTO: 0 /100 WBC (ref 0–0.2)
PLATELET # BLD AUTO: 230 10*3/MM3 (ref 140–450)
PMV BLD AUTO: 9.8 FL (ref 6–12)
RBC # BLD AUTO: 2.91 10*6/MM3 (ref 3.77–5.28)
WBC NRBC COR # BLD AUTO: 6.18 10*3/MM3 (ref 3.4–10.8)

## 2024-12-10 PROCEDURE — 99283 EMERGENCY DEPT VISIT LOW MDM: CPT

## 2024-12-10 PROCEDURE — 85025 COMPLETE CBC W/AUTO DIFF WBC: CPT | Performed by: EMERGENCY MEDICINE

## 2024-12-10 PROCEDURE — 36415 COLL VENOUS BLD VENIPUNCTURE: CPT

## 2024-12-10 NOTE — ED NOTES
Pt to ED via EMS from [place]. Pt having a nose bleed that the facility couldn't get to stop. Upon arrival seems to be controlled.     EMS also has an Ileostomy that isn't sealed and its weeping/red/inflamed and mucous fistula.

## 2024-12-11 ENCOUNTER — APPOINTMENT (OUTPATIENT)
Dept: GENERAL RADIOLOGY | Facility: HOSPITAL | Age: 74
End: 2024-12-11
Payer: MEDICARE

## 2024-12-11 ENCOUNTER — HOSPITAL ENCOUNTER (INPATIENT)
Facility: HOSPITAL | Age: 74
LOS: 6 days | Discharge: HOME | End: 2024-12-17
Attending: EMERGENCY MEDICINE | Admitting: INTERNAL MEDICINE
Payer: MEDICARE

## 2024-12-11 DIAGNOSIS — R07.9 CHEST PAIN, UNSPECIFIED TYPE: Primary | ICD-10-CM

## 2024-12-11 DIAGNOSIS — J18.9 PNEUMONIA DUE TO INFECTIOUS ORGANISM, UNSPECIFIED LATERALITY, UNSPECIFIED PART OF LUNG: ICD-10-CM

## 2024-12-11 LAB
ALBUMIN SERPL-MCNC: 2.7 G/DL (ref 3.5–5.2)
ALBUMIN/GLOB SERPL: 1.1 G/DL
ALP SERPL-CCNC: 132 U/L (ref 39–117)
ALT SERPL W P-5'-P-CCNC: 31 U/L (ref 1–33)
ANION GAP SERPL CALCULATED.3IONS-SCNC: 10.3 MMOL/L (ref 5–15)
APTT PPP: 34 SECONDS (ref 22.7–35.4)
AST SERPL-CCNC: 34 U/L (ref 1–32)
BASOPHILS # BLD MANUAL: 0.1 10*3/MM3 (ref 0–0.2)
BASOPHILS NFR BLD MANUAL: 2 % (ref 0–1.5)
BILIRUB SERPL-MCNC: 0.2 MG/DL (ref 0–1.2)
BUN SERPL-MCNC: 12 MG/DL (ref 8–23)
BUN/CREAT SERPL: 13.2 (ref 7–25)
CALCIUM SPEC-SCNC: 8.4 MG/DL (ref 8.6–10.5)
CHLORIDE SERPL-SCNC: 102 MMOL/L (ref 98–107)
CO2 SERPL-SCNC: 22.7 MMOL/L (ref 22–29)
CREAT SERPL-MCNC: 0.91 MG/DL (ref 0.57–1)
DEPRECATED RDW RBC AUTO: 57.2 FL (ref 37–54)
EGFRCR SERPLBLD CKD-EPI 2021: 66.3 ML/MIN/1.73
ERYTHROCYTE [DISTWIDTH] IN BLOOD BY AUTOMATED COUNT: 17 % (ref 12.3–15.4)
GEN 5 1HR TROPONIN T REFLEX: 33 NG/L
GLOBULIN UR ELPH-MCNC: 2.5 GM/DL
GLUCOSE SERPL-MCNC: 96 MG/DL (ref 65–99)
HCT VFR BLD AUTO: 26.8 % (ref 34–46.6)
HGB BLD-MCNC: 8 G/DL (ref 12–15.9)
INR PPP: 1.09 (ref 0.9–1.1)
LYMPHOCYTES # BLD MANUAL: 1.33 10*3/MM3 (ref 0.7–3.1)
LYMPHOCYTES NFR BLD MANUAL: 3 % (ref 5–12)
MCH RBC QN AUTO: 28.3 PG (ref 26.6–33)
MCHC RBC AUTO-ENTMCNC: 29.9 G/DL (ref 31.5–35.7)
MCV RBC AUTO: 94.7 FL (ref 79–97)
METAMYELOCYTES NFR BLD MANUAL: 2 % (ref 0–0)
MONOCYTES # BLD: 0.15 10*3/MM3 (ref 0.1–0.9)
MYELOCYTES NFR BLD MANUAL: 2 % (ref 0–0)
NEUTROPHILS # BLD AUTO: 3.33 10*3/MM3 (ref 1.7–7)
NEUTROPHILS NFR BLD MANUAL: 46 % (ref 42.7–76)
NEUTS BAND NFR BLD MANUAL: 19 % (ref 0–5)
NT-PROBNP SERPL-MCNC: 2790 PG/ML (ref 0–900)
PLATELET # BLD AUTO: 255 10*3/MM3 (ref 140–450)
PMV BLD AUTO: 10 FL (ref 6–12)
POTASSIUM SERPL-SCNC: 4.4 MMOL/L (ref 3.5–5.2)
PROCALCITONIN SERPL-MCNC: 0.1 NG/ML (ref 0–0.25)
PROT SERPL-MCNC: 5.2 G/DL (ref 6–8.5)
PROTHROMBIN TIME: 14.1 SECONDS (ref 11.7–14.2)
RBC # BLD AUTO: 2.83 10*6/MM3 (ref 3.77–5.28)
RBC MORPH BLD: NORMAL
SCAN SLIDE: NORMAL
SMALL PLATELETS BLD QL SMEAR: ADEQUATE
SODIUM SERPL-SCNC: 135 MMOL/L (ref 136–145)
TROPONIN T % DELTA: -11 %
TROPONIN T NUMERIC DELTA: -4 NG/L
TROPONIN T SERPL HS-MCNC: 37 NG/L
VARIANT LYMPHS NFR BLD MANUAL: 1 % (ref 0–5)
VARIANT LYMPHS NFR BLD MANUAL: 25 % (ref 19.6–45.3)
WBC MORPH BLD: NORMAL
WBC NRBC COR # BLD AUTO: 5.13 10*3/MM3 (ref 3.4–10.8)

## 2024-12-11 PROCEDURE — 93005 ELECTROCARDIOGRAM TRACING: CPT | Performed by: EMERGENCY MEDICINE

## 2024-12-11 PROCEDURE — 71045 X-RAY EXAM CHEST 1 VIEW: CPT

## 2024-12-11 PROCEDURE — 99285 EMERGENCY DEPT VISIT HI MDM: CPT

## 2024-12-11 PROCEDURE — 85610 PROTHROMBIN TIME: CPT | Performed by: EMERGENCY MEDICINE

## 2024-12-11 PROCEDURE — 25010000002 CEFTRIAXONE PER 250 MG: Performed by: EMERGENCY MEDICINE

## 2024-12-11 PROCEDURE — 83880 ASSAY OF NATRIURETIC PEPTIDE: CPT | Performed by: EMERGENCY MEDICINE

## 2024-12-11 PROCEDURE — 85730 THROMBOPLASTIN TIME PARTIAL: CPT | Performed by: EMERGENCY MEDICINE

## 2024-12-11 PROCEDURE — 85025 COMPLETE CBC W/AUTO DIFF WBC: CPT | Performed by: EMERGENCY MEDICINE

## 2024-12-11 PROCEDURE — 84145 PROCALCITONIN (PCT): CPT

## 2024-12-11 PROCEDURE — 85007 BL SMEAR W/DIFF WBC COUNT: CPT | Performed by: EMERGENCY MEDICINE

## 2024-12-11 PROCEDURE — 36415 COLL VENOUS BLD VENIPUNCTURE: CPT

## 2024-12-11 PROCEDURE — 87040 BLOOD CULTURE FOR BACTERIA: CPT | Performed by: EMERGENCY MEDICINE

## 2024-12-11 PROCEDURE — 80053 COMPREHEN METABOLIC PANEL: CPT | Performed by: EMERGENCY MEDICINE

## 2024-12-11 PROCEDURE — 84484 ASSAY OF TROPONIN QUANT: CPT | Performed by: EMERGENCY MEDICINE

## 2024-12-11 RX ORDER — ACETAMINOPHEN 325 MG/1
650 TABLET ORAL EVERY 4 HOURS PRN
Status: DISCONTINUED | OUTPATIENT
Start: 2024-12-11 | End: 2024-12-17 | Stop reason: HOSPADM

## 2024-12-11 RX ORDER — SPIRONOLACTONE 25 MG/1
25 TABLET ORAL DAILY
Status: DISCONTINUED | OUTPATIENT
Start: 2024-12-12 | End: 2024-12-17 | Stop reason: HOSPADM

## 2024-12-11 RX ORDER — ONDANSETRON 4 MG/1
4 TABLET, ORALLY DISINTEGRATING ORAL EVERY 6 HOURS PRN
Status: DISCONTINUED | OUTPATIENT
Start: 2024-12-11 | End: 2024-12-17 | Stop reason: HOSPADM

## 2024-12-11 RX ORDER — CALCIUM CARBONATE 500 MG/1
2 TABLET, CHEWABLE ORAL 2 TIMES DAILY PRN
Status: DISCONTINUED | OUTPATIENT
Start: 2024-12-12 | End: 2024-12-17 | Stop reason: HOSPADM

## 2024-12-11 RX ORDER — LEVOTHYROXINE SODIUM 50 UG/1
50 TABLET ORAL
Status: DISCONTINUED | OUTPATIENT
Start: 2024-12-12 | End: 2024-12-17 | Stop reason: HOSPADM

## 2024-12-11 RX ORDER — METOPROLOL SUCCINATE 25 MG/1
25 TABLET, EXTENDED RELEASE ORAL
Status: DISCONTINUED | OUTPATIENT
Start: 2024-12-12 | End: 2024-12-16

## 2024-12-11 RX ORDER — AMLODIPINE BESYLATE 5 MG/1
10 TABLET ORAL DAILY
Status: DISCONTINUED | OUTPATIENT
Start: 2024-12-12 | End: 2024-12-17

## 2024-12-11 RX ORDER — FOLIC ACID 1 MG/1
1000 TABLET ORAL DAILY
Status: DISCONTINUED | OUTPATIENT
Start: 2024-12-12 | End: 2024-12-17 | Stop reason: HOSPADM

## 2024-12-11 RX ORDER — ALBUTEROL SULFATE 0.83 MG/ML
2.5 SOLUTION RESPIRATORY (INHALATION) EVERY 6 HOURS PRN
Status: DISCONTINUED | OUTPATIENT
Start: 2024-12-11 | End: 2024-12-17 | Stop reason: HOSPADM

## 2024-12-11 RX ORDER — IPRATROPIUM BROMIDE AND ALBUTEROL SULFATE 2.5; .5 MG/3ML; MG/3ML
3 SOLUTION RESPIRATORY (INHALATION) EVERY 6 HOURS PRN
Status: DISCONTINUED | OUTPATIENT
Start: 2024-12-11 | End: 2024-12-17 | Stop reason: HOSPADM

## 2024-12-11 RX ORDER — ASPIRIN 81 MG/1
81 TABLET ORAL DAILY
Status: COMPLETED | OUTPATIENT
Start: 2024-12-12 | End: 2024-12-15

## 2024-12-11 RX ORDER — MESALAMINE 1.2 G/1
1200 TABLET, DELAYED RELEASE ORAL
Status: DISCONTINUED | OUTPATIENT
Start: 2024-12-12 | End: 2024-12-16

## 2024-12-11 RX ORDER — ACETAMINOPHEN 160 MG/5ML
650 SOLUTION ORAL EVERY 4 HOURS PRN
Status: DISCONTINUED | OUTPATIENT
Start: 2024-12-11 | End: 2024-12-17 | Stop reason: HOSPADM

## 2024-12-11 RX ORDER — GUAIFENESIN 600 MG/1
1200 TABLET, EXTENDED RELEASE ORAL EVERY 12 HOURS SCHEDULED
Status: DISCONTINUED | OUTPATIENT
Start: 2024-12-11 | End: 2024-12-16

## 2024-12-11 RX ORDER — SODIUM CHLORIDE 0.9 % (FLUSH) 0.9 %
10 SYRINGE (ML) INJECTION AS NEEDED
Status: DISCONTINUED | OUTPATIENT
Start: 2024-12-11 | End: 2024-12-17 | Stop reason: HOSPADM

## 2024-12-11 RX ORDER — ALBUTEROL SULFATE 0.83 MG/ML
2.5 SOLUTION RESPIRATORY (INHALATION) EVERY 6 HOURS PRN
Status: DISCONTINUED | OUTPATIENT
Start: 2024-12-11 | End: 2024-12-11

## 2024-12-11 RX ORDER — BISACODYL 10 MG
10 SUPPOSITORY, RECTAL RECTAL DAILY PRN
Status: DISCONTINUED | OUTPATIENT
Start: 2024-12-11 | End: 2024-12-17 | Stop reason: HOSPADM

## 2024-12-11 RX ORDER — SODIUM CHLORIDE 0.9 % (FLUSH) 0.9 %
10 SYRINGE (ML) INJECTION EVERY 12 HOURS SCHEDULED
Status: DISCONTINUED | OUTPATIENT
Start: 2024-12-11 | End: 2024-12-17 | Stop reason: HOSPADM

## 2024-12-11 RX ORDER — BISACODYL 5 MG/1
5 TABLET, DELAYED RELEASE ORAL DAILY PRN
Status: DISCONTINUED | OUTPATIENT
Start: 2024-12-11 | End: 2024-12-17 | Stop reason: HOSPADM

## 2024-12-11 RX ORDER — ATORVASTATIN CALCIUM 40 MG/1
40 TABLET, FILM COATED ORAL NIGHTLY
Status: DISCONTINUED | OUTPATIENT
Start: 2024-12-11 | End: 2024-12-17 | Stop reason: HOSPADM

## 2024-12-11 RX ORDER — PHENOL 1.4 %
600 AEROSOL, SPRAY (ML) MUCOUS MEMBRANE 2 TIMES DAILY WITH MEALS
Status: ON HOLD | COMMUNITY

## 2024-12-11 RX ORDER — ONDANSETRON 2 MG/ML
4 INJECTION INTRAMUSCULAR; INTRAVENOUS EVERY 6 HOURS PRN
Status: DISCONTINUED | OUTPATIENT
Start: 2024-12-11 | End: 2024-12-17 | Stop reason: HOSPADM

## 2024-12-11 RX ORDER — ACETAMINOPHEN 650 MG/1
650 SUPPOSITORY RECTAL EVERY 4 HOURS PRN
Status: DISCONTINUED | OUTPATIENT
Start: 2024-12-11 | End: 2024-12-17 | Stop reason: HOSPADM

## 2024-12-11 RX ORDER — POLYETHYLENE GLYCOL 3350 17 G/17G
17 POWDER, FOR SOLUTION ORAL DAILY PRN
Status: DISCONTINUED | OUTPATIENT
Start: 2024-12-11 | End: 2024-12-17 | Stop reason: HOSPADM

## 2024-12-11 RX ORDER — AMOXICILLIN 250 MG
2 CAPSULE ORAL 2 TIMES DAILY PRN
Status: DISCONTINUED | OUTPATIENT
Start: 2024-12-11 | End: 2024-12-17 | Stop reason: HOSPADM

## 2024-12-11 RX ORDER — CLOPIDOGREL BISULFATE 75 MG/1
75 TABLET ORAL DAILY
Status: DISCONTINUED | OUTPATIENT
Start: 2024-12-12 | End: 2024-12-17 | Stop reason: HOSPADM

## 2024-12-11 RX ORDER — PANTOPRAZOLE SODIUM 40 MG/1
40 TABLET, DELAYED RELEASE ORAL NIGHTLY
Status: DISCONTINUED | OUTPATIENT
Start: 2024-12-11 | End: 2024-12-16

## 2024-12-11 RX ORDER — SODIUM CHLORIDE 9 MG/ML
40 INJECTION, SOLUTION INTRAVENOUS AS NEEDED
Status: DISCONTINUED | OUTPATIENT
Start: 2024-12-11 | End: 2024-12-17 | Stop reason: HOSPADM

## 2024-12-11 RX ORDER — MIDODRINE HYDROCHLORIDE 5 MG/1
5 TABLET ORAL EVERY 8 HOURS SCHEDULED
Status: DISCONTINUED | OUTPATIENT
Start: 2024-12-11 | End: 2024-12-17 | Stop reason: HOSPADM

## 2024-12-11 RX ORDER — NITROGLYCERIN 0.4 MG/1
0.4 TABLET SUBLINGUAL
Status: DISCONTINUED | OUTPATIENT
Start: 2024-12-11 | End: 2024-12-17 | Stop reason: HOSPADM

## 2024-12-11 RX ADMIN — Medication 10 ML: at 21:18

## 2024-12-11 RX ADMIN — ATORVASTATIN CALCIUM 40 MG: 40 TABLET, FILM COATED ORAL at 21:17

## 2024-12-11 RX ADMIN — CEFTRIAXONE 2000 MG: 2 INJECTION, POWDER, FOR SOLUTION INTRAMUSCULAR; INTRAVENOUS at 15:03

## 2024-12-11 RX ADMIN — GUAIFENESIN 1200 MG: 600 TABLET, MULTILAYER, EXTENDED RELEASE ORAL at 21:17

## 2024-12-11 RX ADMIN — MIDODRINE HYDROCHLORIDE 5 MG: 5 TABLET ORAL at 21:17

## 2024-12-11 RX ADMIN — PANTOPRAZOLE SODIUM 40 MG: 40 TABLET, DELAYED RELEASE ORAL at 21:17

## 2024-12-11 NOTE — ED PROVIDER NOTES
Subjective   History of Present Illness  74-year-old female presents from the extended care facility reports of some bleeding from the right side of her nose today off and on.  She states it did stop prior to arrival here.  She denies trauma or any other bleeding.    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 07/05/2016    Dyslipidemia 10/30/2024    Fecal urgency 11/21/2024    First degree hemorrhoids 07/09/2021    GERD (gastroesophageal reflux disease) 11/21/2024    Hashimoto's thyroiditis 11/21/2024    History of colonic polyps 07/09/2021    Hypertension     Hypothyroidism (acquired) 11/06/2024    Mitral regurgitation 10/08/2024    Moderate protein-calorie malnutrition 11/09/2024    Multiple tracheobronchial mucus plugs 10/08/2024    Nicotine dependence 11/21/2024    Rheumatoid arthritis 11/06/2024    S/P mitral valve clip implantation 11/21/2024    Second degree hemorrhoids 07/05/2016    Stress incontinence, female 11/21/2024    Vitamin D deficiency, unspecified 11/21/2024       Allergies   Allergen Reactions    Codeine Hives    Penicillin G Sodium Hives       Past Surgical History:   Procedure Laterality Date    BRONCHOSCOPY N/A 10/16/2024    Procedure: BRONCHOSCOPY;  Surgeon: Gallo Pope MD;  Location: [facility] ENDOSCOPY;  Service: Pulmonary;  Laterality: N/A;    CARDIAC CATHETERIZATION N/A 10/15/2024    Procedure: Left Heart Cath, possible pci;  Surgeon: Travis Connor MD;  Location: [facility] CATH INVASIVE LOCATION;  Service: Cardiovascular;  Laterality: N/A;    CARDIAC  CATHETERIZATION N/A 10/22/2024    Procedure: Laser Coronary Atherectomy;  Surgeon: Travis Connor MD;  Location: [facility] CATH INVASIVE LOCATION;  Service: Cardiovascular;  Laterality: N/A;    COLON RESECTION Right 11/28/2024    Procedure: RIGHT HEMICOLECTOMY WITH ILEOSTOMY;  Surgeon: Todd Babin MD;  Location: [facility] MAIN OR;  Service: General;  Laterality: Right;    COLONOSCOPY N/A 10/12/2024    Procedure: COLONOSCOPY WITH BIOPSY AND WIRE GUIDED BALLOON DILATION OF TERMINAL ILEUM;  Surgeon: Rob Strong MD;  Location: [facility] ENDOSCOPY;  Service: Gastroenterology;  Laterality: N/A;  Colitis, crohns of terminal ileum, right colon ulcers, diverticulosis, hemorroids    ENDOSCOPY N/A 10/12/2024    Procedure: ESOPHAGOGASTRODUODENOSCOPY WITH BIOPSY X 2 AREA;  Surgeon: Rob Strong MD;  Location: [facility] ENDOSCOPY;  Service: Gastroenterology;  Laterality: N/A;  Chronic gastritis, HH    ENDOSCOPY Left 11/28/2024    Procedure: ESOPHAGOGASTRODUODENOSCOPY;  Surgeon: Dallin Delgado MD;  Location: [facility] ENDOSCOPY;  Service: Gastroenterology;  Laterality: Left;  small hiatal hernia    HYSTERECTOMY      LEFT HEART CATH      MITRAL VALVE REPAIR/REPLACEMENT N/A 11/21/2024    Procedure: Transcatheter Mitral Valve Repair;  Surgeon: Dmitri Navarro MD;  Location: [facility] HYBRID OR;  Service: Cardiovascular;  Laterality: N/A;       Family History   Problem Relation Age of Onset    Heart disease Mother     Dementia Mother     Stroke Mother     Heart disease Father     Hypertension Father        Social History     Socioeconomic History    Marital status:    Tobacco Use    Smoking status: Former     Types: Cigarettes    Smokeless tobacco: Never   Vaping Use    Vaping status: Never Used   Substance and Sexual Activity    Alcohol use: Never    Drug use: Never    Sexual activity: Defer       Prior to Admission medications    Medication Sig Start Date End Date Taking? Authorizing Provider    Adalimumab (Humira, 2 Pen,) 40 MG/0.4ML Pen-injector Kit Inject 40 mg under the skin into the appropriate area as directed 1 (One) Time Per Week. Saturdays   Indications: Rheumatoid Arthritis 10/25/24   Donna Kraft MD   albuterol (PROVENTIL) (2.5 MG/3ML) 0.083% nebulizer solution Take 2.5 mg by nebulization Every 6 (Six) Hours As Needed for Wheezing. Indications: Spasm of Lung Air Passages 11/13/24   Donna Kraft MD   amLODIPine (NORVASC) 10 MG tablet Take 1 tablet by mouth Daily.    oDnna Kraft MD   aspirin 81 MG EC tablet Take 1 tablet by mouth Daily for 30 days. Indications: Disease involving Lipid Deposits in the Arteries 11/15/24 12/15/24  Eusebio Montenegro MD   atorvastatin (LIPITOR) 40 MG tablet Take 1 tablet by mouth Every Night for 30 days. Indications: High Amount of Fats in the Blood 12/2/24 1/1/25  Fco Figueroa PA-C   cholecalciferol (VITAMIN D3) 1.25 MG (76592 UT) capsule Take 1 capsule by mouth 2 (Two) Times a Week. Wed, Sat  Indications: Vitamin D Deficiency 10/25/24   Donna Kraft MD   clopidogrel (PLAVIX) 75 MG tablet Take 1 tablet by mouth Daily for 30 days. Indications: Ischemic Heart Disease 12/2/24 1/1/25  Fco Figueroa PA-C   diclofenac (VOLTAREN) 50 MG EC tablet Take 1 tablet by mouth 2 (Two) Times a Day.    Donna Kraft MD   folic acid (FOLVITE) 1 MG tablet Take 1 tablet by mouth Daily. Indications: Anemia From Inadequate Folic Acid 10/25/24   Donna Kraft MD   levothyroxine (SYNTHROID, LEVOTHROID) 50 MCG tablet Take 1 tablet by mouth Daily. Indications: Underactive Thyroid 10/25/24   Donna Kraft MD   Melatonin (Melatonin Extra Strength) 10 MG tablet Take 1 tablet by mouth As Needed. Indications: Trouble Sleeping 10/30/24   Donna Kraft MD   mesalamine (LIALDA) 1.2 g EC tablet Take 1 tablet by mouth Daily. Indications: Crohn's Disease 12/3/24   Fco Figueroa PA-C   methotrexate 2.5 MG tablet Take 6 tablets by  mouth 1 (One) Time Per Week. Saturdays   Indications: Non-oncologic 10/25/24   Donna Kraft MD   metoprolol succinate XL (TOPROL-XL) 25 MG 24 hr tablet Take 1 tablet by mouth Daily. Indications: Atrial Fibrillation 12/3/24   Fco Figueroa PA-C   midodrine (PROAMATINE) 5 MG tablet Take 1 tablet by mouth 3 (Three) Times a Day Before Meals. Indications: Disorder of Low Blood Pressure 12/2/24   Fco Figueroa PA-C   naloxone (NARCAN) 4 MG/0.1ML nasal spray Call 911. Don't prime. [place] in 1 nostril for overdose. Repeat in 2-3 minutes in other nostril if no or minimal breathing/responsiveness.  Indications: Opioid Overdose 12/2/24   Fco Figueroa PA-C   ondansetron ODT (ZOFRAN-ODT) 4 MG disintegrating tablet Take 1 tablet by mouth Every 6 (Six) Hours As Needed for Nausea or Vomiting. Indications: Nausea and Vomiting Following an Operation 12/2/24   Fco Figueroa PA-C   pantoprazole (PROTONIX) 20 MG EC tablet Take 1 tablet by mouth Daily. Indications: Heartburn 10/25/24   Donna Kraft MD   phenylephrine (YAYA-SYNEPHRINE) 1 % nasal spray Administer 1 spray into the nostril(s) as directed by provider 3 (Three) Times a Day for 3 days. 12/10/24 12/13/24  Byron Bernal MD   sertraline (ZOLOFT) 50 MG tablet Take 1 tablet by mouth Daily. Indications: Major Depressive Disorder 10/25/24   Donna Kraft MD   spironolactone (ALDACTONE) 25 MG tablet Take 1 tablet by mouth Daily. Indications: Edema 11/13/24   Donna Kraft MD   upadacitinib ER (Rinvoq) 45 MG tablet sustained-release 24 hour extended release tablet Take 1 tablet by mouth Daily. Indications: Rheumatoid Arthritis 11/13/24   Donna Kraft MD     /72   Pulse 70   Temp 97.7 °F (36.5 °C) (Oral)   Resp 18   SpO2 98%       Objective   Physical Exam  General: Well-appearing, no acute distress  Psych: Oriented, pleasant affect  Respirations: Clear, nonlabored respirations  HEENT: There is no active bleeding  currently there is some dried blood in the right nare, there is no posterior bleeding  Abdomen: She does have colostomy, there is some irritated skin on the lateral aspect of her abdominal wall but no cellulitis, no skin breakdown  Procedures           ED Course      Results for orders placed or performed during the hospital encounter of 12/10/24   CBC Auto Differential    Collection Time: 12/10/24  7:58 PM    Specimen: Blood   Result Value Ref Range    WBC 6.18 3.40 - 10.80 10*3/mm3    RBC 2.91 (L) 3.77 - 5.28 10*6/mm3    Hemoglobin 8.7 (L) 12.0 - 15.9 g/dL    Hematocrit 27.5 (L) 34.0 - 46.6 %    MCV 94.5 79.0 - 97.0 fL    MCH 29.9 26.6 - 33.0 pg    MCHC 31.6 31.5 - 35.7 g/dL    RDW 16.7 (H) 12.3 - 15.4 %    RDW-SD 56.4 (H) 37.0 - 54.0 fl    MPV 9.8 6.0 - 12.0 fL    Platelets 230 140 - 450 10*3/mm3    Neutrophil % 66.0 42.7 - 76.0 %    Lymphocyte % 21.8 19.6 - 45.3 %    Monocyte % 6.3 5.0 - 12.0 %    Eosinophil % 1.1 0.3 - 6.2 %    Basophil % 0.3 0.0 - 1.5 %    Immature Grans % 4.5 (H) 0.0 - 0.5 %    Neutrophils, Absolute 4.07 1.70 - 7.00 10*3/mm3    Lymphocytes, Absolute 1.35 0.70 - 3.10 10*3/mm3    Monocytes, Absolute 0.39 0.10 - 0.90 10*3/mm3    Eosinophils, Absolute 0.07 0.00 - 0.40 10*3/mm3    Basophils, Absolute 0.02 0.00 - 0.20 10*3/mm3    Immature Grans, Absolute 0.28 (H) 0.00 - 0.05 10*3/mm3    nRBC 0.0 0.0 - 0.2 /100 WBC                                                      Medical Decision Making  Patient had stoma care performed by the nursing staff.  She had no further bleeding to the emergency room course and was discharged in good condition she was prescribed Rakesh-Synephrine nasal spray and we discussed some nosebleed supportive care measures and she was given warning signs for return    Problems Addressed:  Epistaxis: complicated acute illness or injury    Amount and/or Complexity of Data Reviewed  Labs: ordered. Decision-making details documented in ED Course.     Details: CBC shows no leukocytosis,  platelets normal, she does have anemia but in review of the records she does have chronic anemia in this range    Risk  OTC drugs.        Final diagnoses:   Epistaxis       ED Disposition  ED Disposition       ED Disposition   Discharge    Condition   Stable    Comment   --               Azam Calhoun MD  [address] [place] [address] [phone]    Schedule an appointment as soon as possible for a visit in 3 days           Medication List        New Prescriptions      phenylephrine 1 % nasal spray  Commonly known as: YAYA-SYNEPHRINE  Administer 1 spray into the nostril(s) as directed by provider 3 (Three) Times a Day for 3 days.               Where to Get Your Medications        These medications were sent to [facility] [address] [place] [address]      Hours: Monday to Friday 7 AM to 7 PM Phone: [phone]   phenylephrine 1 % nasal spray            Byron Bernal MD  12/10/24 [id number]

## 2024-12-11 NOTE — ED PROVIDER NOTES
"Subjective   History of Present Illness  Chief complaint: Patient is 74-year-old who had developed chest pain.  She cannot provide much history or description.  She states she did develop chest pain.  She was on a bike exerting herself and physical therapy at her home facility where she resides.  She states that is sharp.  She states it has been constant.  EMS states that she received a nitro and it was gone briefly.  However they stated did hurt when she moved her arms as well.  When asked if she feels short of breath she states \"yes\".  Does not elaborate on any of her symptoms.    Context:    Duration:    Timing:    Severity:    Associated Symptoms:        PCP:  LMP:      Review of Systems   Respiratory:  Positive for shortness of breath.    Cardiovascular:  Positive for chest pain.   Gastrointestinal: Negative.    Genitourinary: Negative.    Musculoskeletal: Negative.        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 07/05/2016    Dyslipidemia 10/30/2024    Fecal urgency 11/21/2024    First degree hemorrhoids 07/09/2021    GERD (gastroesophageal reflux disease) 11/21/2024    Hashimoto's thyroiditis 11/21/2024    History of colonic polyps 07/09/2021    Hypertension     Hypothyroidism (acquired) 11/06/2024    Mitral regurgitation 10/08/2024    Moderate protein-calorie malnutrition 11/09/2024    Multiple tracheobronchial mucus plugs 10/08/2024    Nicotine dependence 11/21/2024    Rheumatoid arthritis 11/06/2024    S/P mitral valve clip implantation 11/21/2024    Second degree hemorrhoids 07/05/2016    " Stress incontinence, female 11/21/2024    Vitamin D deficiency, unspecified 11/21/2024       Allergies   Allergen Reactions    Codeine Hives    Penicillin G Sodium Hives       Past Surgical History:   Procedure Laterality Date    BRONCHOSCOPY N/A 10/16/2024    Procedure: BRONCHOSCOPY;  Surgeon: Gallo Pope MD;  Location: [facility] ENDOSCOPY;  Service: Pulmonary;  Laterality: N/A;    CARDIAC CATHETERIZATION N/A 10/15/2024    Procedure: Left Heart Cath, possible pci;  Surgeon: Travis Connor MD;  Location: [facility] CATH INVASIVE LOCATION;  Service: Cardiovascular;  Laterality: N/A;    CARDIAC CATHETERIZATION N/A 10/22/2024    Procedure: Laser Coronary Atherectomy;  Surgeon: Travis Connor MD;  Location: [facility] CATH INVASIVE LOCATION;  Service: Cardiovascular;  Laterality: N/A;    COLON RESECTION Right 11/28/2024    Procedure: RIGHT HEMICOLECTOMY WITH ILEOSTOMY;  Surgeon: Todd Babin MD;  Location: [facility] MAIN OR;  Service: General;  Laterality: Right;    COLONOSCOPY N/A 10/12/2024    Procedure: COLONOSCOPY WITH BIOPSY AND WIRE GUIDED BALLOON DILATION OF TERMINAL ILEUM;  Surgeon: Rob Strong MD;  Location: [facility] ENDOSCOPY;  Service: Gastroenterology;  Laterality: N/A;  Colitis, crohns of terminal ileum, right colon ulcers, diverticulosis, hemorroids    ENDOSCOPY N/A 10/12/2024    Procedure: ESOPHAGOGASTRODUODENOSCOPY WITH BIOPSY X 2 AREA;  Surgeon: Rob Strong MD;  Location: [facility] ENDOSCOPY;  Service: Gastroenterology;  Laterality: N/A;  Chronic gastritis, HH    ENDOSCOPY Left 11/28/2024    Procedure: ESOPHAGOGASTRODUODENOSCOPY;  Surgeon: Dallin Delgado MD;  Location: [facility] ENDOSCOPY;  Service: Gastroenterology;  Laterality: Left;  small hiatal hernia    HYSTERECTOMY      LEFT HEART CATH      MITRAL VALVE REPAIR/REPLACEMENT N/A 11/21/2024    Procedure: Transcatheter Mitral Valve Repair;  Surgeon: Dmitri Navarro MD;  Location: [facility] HYBRID OR;  Service:  Cardiovascular;  Laterality: N/A;       Family History   Problem Relation Age of Onset    Heart disease Mother     Dementia Mother     Stroke Mother     Heart disease Father     Hypertension Father        Social History     Socioeconomic History    Marital status:    Tobacco Use    Smoking status: Former     Types: Cigarettes    Smokeless tobacco: Never   Vaping Use    Vaping status: Never Used   Substance and Sexual Activity    Alcohol use: Never    Drug use: Never    Sexual activity: Defer           Objective   Physical Exam  Vitals and nursing note reviewed.   Cardiovascular:      Rate and Rhythm: Normal rate and regular rhythm.      Heart sounds: Normal heart sounds.   Pulmonary:      Effort: Pulmonary effort is normal.      Breath sounds: Normal breath sounds.   Abdominal:      Palpations: Abdomen is soft.      Tenderness: There is no abdominal tenderness.   Musculoskeletal:         General: Normal range of motion.   Skin:     General: Skin is warm and dry.   Neurological:      General: No focal deficit present.      Mental Status: She is alert.       Procedures           ED Course        Results for orders placed or performed during the hospital encounter of 12/11/24   ECG 12 Lead Chest Pain    Collection Time: 12/11/24  1:32 PM   Result Value Ref Range    QT Interval 422 ms    QTC Interval 432 ms   Comprehensive Metabolic Panel    Collection Time: 12/11/24  1:43 PM    Specimen: Arm, Left; Blood   Result Value Ref Range    Glucose 96 65 - 99 mg/dL    BUN 12 8 - 23 mg/dL    Creatinine 0.91 0.57 - 1.00 mg/dL    Sodium 135 (L) 136 - 145 mmol/L    Potassium 4.4 3.5 - 5.2 mmol/L    Chloride 102 98 - 107 mmol/L    CO2 22.7 22.0 - 29.0 mmol/L    Calcium 8.4 (L) 8.6 - 10.5 mg/dL    Total Protein 5.2 (L) 6.0 - 8.5 g/dL    Albumin 2.7 (L) 3.5 - 5.2 g/dL    ALT (SGPT) 31 1 - 33 U/L    AST (SGOT) 34 (H) 1 - 32 U/L    Alkaline Phosphatase 132 (H) 39 - 117 U/L    Total Bilirubin 0.2 0.0 - 1.2 mg/dL    Globulin 2.5  gm/dL    A/G Ratio 1.1 g/dL    BUN/Creatinine Ratio 13.2 7.0 - 25.0    Anion Gap 10.3 5.0 - 15.0 mmol/L    eGFR 66.3 >60.0 mL/min/1.73   Protime-INR    Collection Time: 12/11/24  1:43 PM    Specimen: Arm, Left; Blood   Result Value Ref Range    Protime 14.1 11.7 - 14.2 Seconds    INR 1.09 0.90 - 1.10   aPTT    Collection Time: 12/11/24  1:43 PM    Specimen: Arm, Left; Blood   Result Value Ref Range    PTT 34.0 22.7 - 35.4 seconds   BNP    Collection Time: 12/11/24  1:43 PM    Specimen: Arm, Left; Blood   Result Value Ref Range    proBNP 2,790.0 (H) 0.0 - 900.0 pg/mL   High Sensitivity Troponin T    Collection Time: 12/11/24  1:43 PM    Specimen: Arm, Left; Blood   Result Value Ref Range    HS Troponin T 37 (H) <14 ng/L   CBC Auto Differential    Collection Time: 12/11/24  1:43 PM    Specimen: Arm, Left; Blood   Result Value Ref Range    WBC 5.13 3.40 - 10.80 10*3/mm3    RBC 2.83 (L) 3.77 - 5.28 10*6/mm3    Hemoglobin 8.0 (L) 12.0 - 15.9 g/dL    Hematocrit 26.8 (L) 34.0 - 46.6 %    MCV 94.7 79.0 - 97.0 fL    MCH 28.3 26.6 - 33.0 pg    MCHC 29.9 (L) 31.5 - 35.7 g/dL    RDW 17.0 (H) 12.3 - 15.4 %    RDW-SD 57.2 (H) 37.0 - 54.0 fl    MPV 10.0 6.0 - 12.0 fL    Platelets 255 140 - 450 10*3/mm3   Scan Slide    Collection Time: 12/11/24  1:43 PM    Specimen: Arm, Left; Blood   Result Value Ref Range    Scan Slide     Manual Differential    Collection Time: 12/11/24  1:43 PM    Specimen: Arm, Left; Blood   Result Value Ref Range    Neutrophil % 46.0 42.7 - 76.0 %    Lymphocyte % 25.0 19.6 - 45.3 %    Monocyte % 3.0 (L) 5.0 - 12.0 %    Basophil % 2.0 (H) 0.0 - 1.5 %    Bands %  19.0 (H) 0.0 - 5.0 %    Metamyelocyte % 2.0 (H) 0.0 - 0.0 %    Myelocyte % 2.0 (H) 0.0 - 0.0 %    Atypical Lymphocyte % 1.0 0.0 - 5.0 %    Neutrophils Absolute 3.33 1.70 - 7.00 10*3/mm3    Lymphocytes Absolute 1.33 0.70 - 3.10 10*3/mm3    Monocytes Absolute 0.15 0.10 - 0.90 10*3/mm3    Basophils Absolute 0.10 0.00 - 0.20 10*3/mm3    RBC Morphology  Normal Normal    WBC Morphology Normal Normal    Platelet Estimate Adequate Normal                                          XR Chest 1 View    Result Date: 12/11/2024  Impression: 1.Patchy consolidation in the upper lobes felt to represent bilateral pneumonia. 2.Right upper lobe subsegmental atelectasis. 3.Questionable trace right pleural effusion. Electronically Signed: Michi Taylor MD  12/11/2024 2:02 PM EST  Workstation ID: [id number]             Medical Decision Making  Patient was seen evaluated for the above problem    Differential diagnosis includes but is not limited to ACS, PE, pneumonia, symptomatic anemia    Patient was here yesterday with a nosebleed.  Her hemoglobin is down from 8.8-8 today.  She developed chest pain while exerting herself on a bike.  Her EKG interpreted by myself sinus rhythm PAC rate of 63.  Troponin is mildly elevated and repeat is pending.  She does have bilateral upper lobe infiltrates and a bandemia.  She was started on IV antibiotics at this point in time for pneumonia.  Spoke with Sierra on-call for the hospital staff who agrees to admit the patient.    Problems Addressed:  Chest pain, unspecified type: complicated acute illness or injury  Pneumonia due to infectious organism, unspecified laterality, unspecified part of lung: complicated acute illness or injury    Amount and/or Complexity of Data Reviewed  Labs: ordered. Decision-making details documented in ED Course.     Details: Labs reviewed  Radiology: ordered and independent interpretation performed.     Details: X-ray reviewed by myself as above  ECG/medicine tests: ordered.    Risk  Prescription drug management.  Decision regarding hospitalization.        Final diagnoses:   None   Chest pain  Pneumonia    ED Disposition  ED Disposition       None            No follow-up provider specified.       Medication List      No changes were made to your prescriptions during this visit.            Sandro Santizo,   12/11/24  [id number]

## 2024-12-11 NOTE — DISCHARGE INSTRUCTIONS
Use the Rakesh-Synephrine nasal spray 3 times daily for the next 3 days and then discontinue.  Do not blow your nose, do not place any objects in your nose.  If bleeding returns hold pressure.  Return for any uncontrolled bleeding or any other concerns

## 2024-12-11 NOTE — H&P
[facility] Medicine Services  History & Physical    Patient Name: Maryann Gaitan  : 1950  MRN: [id number]  Primary Care Physician:  Azam Calhoun MD  Date of admission: 2024  Date and Time of Service: 2024 at 1520    Subjective      Chief Complaint: chest pain    History of Present Illness: Maryann Gaitan is a 74 y.o. female with a CMH of CAD, hypertension, hyperlipidemia, COPD, hypothyroidism, crohns disease who presented to [facility] on 2024 with chest pain. Patient lives at a nursing facility and developed chest pain while riding on the exercise bike. Patient reported chest pain was gone by the time she arrived to the emergency department. Patient currently denies chest pain, does endorse pain with palpation. Patient endorses a nonproductive cough. Patient currently denies shortness of breath, fever, chills, nausea, vomiting, or any other acute issue. Of note, patient was seen in the ED yesterday for nosebleed and discharged home. Patient was also discharged from [facility] on 2024 after being treated for a lower GI bleed with right hemicolectomy with ileostomy and mucous fistula creation, required several units of PRBCs; and patient underwent a mitral valve clip on 2024.    In the ED: troponin 37, proBNP 2,790.9, Hb 8.0. Blood cultures drawn. EKG shows SR with PACs. CXR consistent with bilateral upper lobe pneumonia. Patient received ceftriaxone 2 g. Hospitalist team to admit at this time for further management.    Review of Systems   Constitutional:  Negative for chills and fever.   Respiratory:  Positive for cough. Negative for chest tightness and shortness of breath.    Cardiovascular:  Negative for chest pain and palpitations.   Gastrointestinal:  Negative for nausea and vomiting.   Genitourinary:  Negative for dysuria and hematuria.       Personal History     Past Medical History:   Diagnosis Date    Abnormal weight loss 2024    Acute kidney injury  11/06/2024    Acute UTI (urinary tract infection) 11/06/2024    Anxiety associated with depression 11/06/2024    Benign neoplasm of cecum 07/05/2016    CAD, multiple vessel 10/08/2024    Cavitary lesion of lung 10/08/2024    COPD (chronic obstructive pulmonary disease)     Crohn's disease 11/06/2024    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 07/05/2016    Dyslipidemia 10/30/2024    Fecal urgency 11/21/2024    First degree hemorrhoids 07/09/2021    GERD (gastroesophageal reflux disease) 11/21/2024    Hashimoto's thyroiditis 11/21/2024    History of colonic polyps 07/09/2021    Hypertension     Hypothyroidism (acquired) 11/06/2024    Mitral regurgitation 10/08/2024    Moderate protein-calorie malnutrition 11/09/2024    Multiple tracheobronchial mucus plugs 10/08/2024    Nicotine dependence 11/21/2024    Rheumatoid arthritis 11/06/2024    S/P mitral valve clip implantation 11/21/2024    Second degree hemorrhoids 07/05/2016    Stress incontinence, female 11/21/2024    Vitamin D deficiency, unspecified 11/21/2024       Past Surgical History:   Procedure Laterality Date    BRONCHOSCOPY N/A 10/16/2024    Procedure: BRONCHOSCOPY;  Surgeon: Gallo Pope MD;  Location: [facility] ENDOSCOPY;  Service: Pulmonary;  Laterality: N/A;    CARDIAC CATHETERIZATION N/A 10/15/2024    Procedure: Left Heart Cath, possible pci;  Surgeon: Travis Connor MD;  Location: [facility] CATH INVASIVE LOCATION;  Service: Cardiovascular;  Laterality: N/A;    CARDIAC CATHETERIZATION N/A 10/22/2024    Procedure: Laser Coronary Atherectomy;  Surgeon: Travis Connor MD;  Location: [facility] CATH INVASIVE LOCATION;  Service: Cardiovascular;  Laterality: N/A;    COLON RESECTION Right 11/28/2024    Procedure: RIGHT HEMICOLECTOMY WITH ILEOSTOMY;  Surgeon: Todd Babin MD;  Location: [facility] MAIN OR;  Service: General;  Laterality: Right;    COLONOSCOPY N/A 10/12/2024    Procedure: COLONOSCOPY WITH BIOPSY AND WIRE GUIDED  BALLOON DILATION OF TERMINAL ILEUM;  Surgeon: Rob Strong MD;  Location: [facility] ENDOSCOPY;  Service: Gastroenterology;  Laterality: N/A;  Colitis, crohns of terminal ileum, right colon ulcers, diverticulosis, hemorroids    ENDOSCOPY N/A 10/12/2024    Procedure: ESOPHAGOGASTRODUODENOSCOPY WITH BIOPSY X 2 AREA;  Surgeon: Rob Strong MD;  Location: [facility] ENDOSCOPY;  Service: Gastroenterology;  Laterality: N/A;  Chronic gastritis, HH    ENDOSCOPY Left 11/28/2024    Procedure: ESOPHAGOGASTRODUODENOSCOPY;  Surgeon: Dallin Delgado MD;  Location: [facility] ENDOSCOPY;  Service: Gastroenterology;  Laterality: Left;  small hiatal hernia    HYSTERECTOMY      LEFT HEART CATH      MITRAL VALVE REPAIR/REPLACEMENT N/A 11/21/2024    Procedure: Transcatheter Mitral Valve Repair;  Surgeon: Dmitri Navarro MD;  Location: [facility] HYBRID OR;  Service: Cardiovascular;  Laterality: N/A;       Family History: family history includes Dementia in her mother; Heart disease in her father and mother; Hypertension in her father; Stroke in her mother. Otherwise pertinent FHx was reviewed and not pertinent to current issue.    Social History:  reports that she has quit smoking. Her smoking use included cigarettes. She has never used smokeless tobacco. She reports that she does not drink alcohol and does not use drugs.    Home Medications:  Prior to Admission Medications       Prescriptions Last Dose Informant Patient Reported? Taking?    Adalimumab (Humira, 2 Pen,) 40 MG/0.4ML Pen-injector Kit   Yes No    Inject 40 mg under the skin into the appropriate area as directed 1 (One) Time Per Week. Saturdays   Indications: Rheumatoid Arthritis    albuterol (PROVENTIL) (2.5 MG/3ML) 0.083% nebulizer solution   Yes No    Take 2.5 mg by nebulization Every 6 (Six) Hours As Needed for Wheezing. Indications: Spasm of Lung Air Passages    amLODIPine (NORVASC) 10 MG tablet   Yes No    Take 1 tablet by mouth Daily.    aspirin 81 MG  EC tablet   No No    Take 1 tablet by mouth Daily for 30 days. Indications: Disease involving Lipid Deposits in the Arteries    atorvastatin (LIPITOR) 40 MG tablet   No No    Take 1 tablet by mouth Every Night for 30 days. Indications: High Amount of Fats in the Blood    cholecalciferol (VITAMIN D3) 1.25 MG (98256 UT) capsule   Yes No    Take 1 capsule by mouth 2 (Two) Times a Week. Wed, Sat  Indications: Vitamin D Deficiency    clopidogrel (PLAVIX) 75 MG tablet   No No    Take 1 tablet by mouth Daily for 30 days. Indications: Ischemic Heart Disease    diclofenac (VOLTAREN) 50 MG EC tablet   Yes No    Take 1 tablet by mouth 2 (Two) Times a Day.    folic acid (FOLVITE) 1 MG tablet   Yes No    Take 1 tablet by mouth Daily. Indications: Anemia From Inadequate Folic Acid    levothyroxine (SYNTHROID, LEVOTHROID) 50 MCG tablet   Yes No    Take 1 tablet by mouth Daily. Indications: Underactive Thyroid    Melatonin (Melatonin Extra Strength) 10 MG tablet   Yes No    Take 1 tablet by mouth As Needed. Indications: Trouble Sleeping    mesalamine (LIALDA) 1.2 g EC tablet   No No    Take 1 tablet by mouth Daily. Indications: Crohn's Disease    methotrexate 2.5 MG tablet   Yes No    Take 6 tablets by mouth 1 (One) Time Per Week. Saturdays   Indications: Non-oncologic    metoprolol succinate XL (TOPROL-XL) 25 MG 24 hr tablet   No No    Take 1 tablet by mouth Daily. Indications: Atrial Fibrillation    midodrine (PROAMATINE) 5 MG tablet   No No    Take 1 tablet by mouth 3 (Three) Times a Day Before Meals. Indications: Disorder of Low Blood Pressure    naloxone (NARCAN) 4 MG/0.1ML nasal spray   No No    Call 911. Don't prime. [place] in 1 nostril for overdose. Repeat in 2-3 minutes in other nostril if no or minimal breathing/responsiveness.  Indications: Opioid Overdose    ondansetron ODT (ZOFRAN-ODT) 4 MG disintegrating tablet   No No    Take 1 tablet by mouth Every 6 (Six) Hours As Needed for Nausea or Vomiting. Indications: Nausea  and Vomiting Following an Operation    pantoprazole (PROTONIX) 20 MG EC tablet   Yes No    Take 1 tablet by mouth Daily. Indications: Heartburn    phenylephrine (YAYA-SYNEPHRINE) 1 % nasal spray   No No    Administer 1 spray into the nostril(s) as directed by provider 3 (Three) Times a Day for 3 days.    sertraline (ZOLOFT) 50 MG tablet   Yes No    Take 1 tablet by mouth Daily. Indications: Major Depressive Disorder    spironolactone (ALDACTONE) 25 MG tablet   Yes No    Take 1 tablet by mouth Daily. Indications: Edema    upadacitinib ER (Rinvoq) 45 MG tablet sustained-release 24 hour extended release tablet   Yes No    Take 1 tablet by mouth Daily. Indications: Rheumatoid Arthritis              Allergies:  Allergies   Allergen Reactions    Codeine Hives    Penicillin G Sodium Hives       Objective      Vitals:   Temp:  [98 °F (36.7 °C)] 98 °F (36.7 °C)  Heart Rate:  [57-64] 57  Resp:  [17-22] 17  BP: (109-129)/(60-72) 109/60  Body mass index is 21.68 kg/m².  Physical Exam  Constitutional:       Appearance: She is normal weight.   HENT:      Head: Normocephalic and atraumatic.      Nose: Nose normal.      Mouth/Throat:      Mouth: Mucous membranes are moist.      Pharynx: Oropharynx is clear.   Eyes:      Extraocular Movements: Extraocular movements intact.      Conjunctiva/sclera: Conjunctivae normal.      Pupils: Pupils are equal, round, and reactive to light.   Cardiovascular:      Rate and Rhythm: Normal rate and regular rhythm.      Pulses: Normal pulses.      Heart sounds: Normal heart sounds.   Pulmonary:      Effort: Pulmonary effort is normal.      Breath sounds: Normal breath sounds.   Abdominal:      General: Bowel sounds are normal.      Palpations: Abdomen is soft.   Musculoskeletal:         General: Normal range of motion.      Cervical back: Neck supple.   Skin:     General: Skin is warm and dry.   Neurological:      General: No focal deficit present.      Mental Status: She is alert and oriented to  "person, place, and time.   Psychiatric:         Mood and Affect: Mood normal.         Behavior: Behavior normal.         Thought Content: Thought content normal.         Judgment: Judgment normal.         Diagnostic Data:  Lab Results (last 24 hours)       Procedure Component Value Units Date/Time    Procalcitonin [[id number]]  (Normal) Collected: 12/11/24 1504    Specimen: Blood from Arm, Left Updated: 12/11/24 1613     Procalcitonin 0.10 ng/mL     Narrative:      As a Marker for Sepsis (Non-Neonates):    1. <0.5 ng/mL represents a low risk of severe sepsis and/or septic shock.  2. >2 ng/mL represents a high risk of severe sepsis and/or septic shock.    As a Marker for Lower Respiratory Tract Infections that require antibiotic therapy:    PCT on Admission    Antibiotic Therapy       6-12 Hrs later    >0.5                Strongly Recommended  >0.25 - <0.5        Recommended   0.1 - 0.25          Discouraged              Remeasure/reassess PCT  <0.1                Strongly Discouraged     Remeasure/reassess PCT    As 28 day mortality risk marker: \"Change in Procalcitonin Result\" (>80% or <=80%) if Day 0 (or Day 1) and Day 4 values are available. Refer to http://www.[facility]-pct-calculator.com    Change in PCT <=80%  A decrease of PCT levels below or equal to 80% defines a positive change in PCT test result representing a higher risk for 28-day all-cause mortality of patients diagnosed with severe sepsis for septic shock.    Change in PCT >80%  A decrease of PCT levels of more than 80% defines a negative change in PCT result representing a lower risk for 28-day all-cause mortality of patients diagnosed with severe sepsis or septic shock.       High Sensitivity Troponin T 1Hr [[id number]]  (Abnormal) Collected: 12/11/24 1504    Specimen: Blood from Arm, Left Updated: 12/11/24 1532     HS Troponin T 33 ng/L      Troponin T Delta -4 ng/L      Troponin T % Change -11 %     Narrative:      High Sensitive Troponin T Reference " Range:  <14.0 ng/L- Negative Female for AMI  <22.0 ng/L- Negative Male for AMI  >=14 - Abnormal Female indicating possible myocardial injury.  >=22 - Abnormal Male indicating possible myocardial injury.   Clinicians would have to utilize clinical acumen, EKG, Troponin, and serial changes to determine if it is an Acute Myocardial Infarction or myocardial injury due to an underlying chronic condition.         Blood Culture - Blood, Arm, Left [[id number]] Collected: 12/11/24 1453    Specimen: Blood from Arm, Left Updated: 12/11/24 1456    Blood Culture - Blood, Arm, Left [[id number]] Collected: 12/11/24 1448    Specimen: Blood from Arm, Left Updated: 12/11/24 1450    Comprehensive Metabolic Panel [[id number]]  (Abnormal) Collected: 12/11/24 1343    Specimen: Blood from Arm, Left Updated: 12/11/24 1434     Glucose 96 mg/dL      BUN 12 mg/dL      Creatinine 0.91 mg/dL      Sodium 135 mmol/L      Potassium 4.4 mmol/L      Comment: Slight hemolysis detected by analyzer. Result may be falsely elevated.        Chloride 102 mmol/L      CO2 22.7 mmol/L      Calcium 8.4 mg/dL      Total Protein 5.2 g/dL      Albumin 2.7 g/dL      ALT (SGPT) 31 U/L      AST (SGOT) 34 U/L      Comment: Slight hemolysis detected by analyzer. Result may be falsely elevated.        Alkaline Phosphatase 132 U/L      Total Bilirubin 0.2 mg/dL      Globulin 2.5 gm/dL      A/G Ratio 1.1 g/dL      BUN/Creatinine Ratio 13.2     Anion Gap 10.3 mmol/L      eGFR 66.3 mL/min/1.73     Narrative:      GFR Categories in Chronic Kidney Disease (CKD)      GFR Category          GFR (mL/min/1.73)    Interpretation  G1                     90 or greater         Normal or high (1)  G2                      60-89                Mild decrease (1)  G3a                   45-59                Mild to moderate decrease  G3b                   30-44                Moderate to severe decrease  G4                    15-29                Severe decrease  G5                    14 or  less           Kidney failure          (1)In the absence of evidence of kidney disease, neither GFR category G1 or G2 fulfill the criteria for CKD.    eGFR calculation 2021 CKD-EPI creatinine equation, which does not include race as a factor    BNP [[id number]]  (Abnormal) Collected: 12/11/24 1343    Specimen: Blood from Arm, Left Updated: 12/11/24 1431     proBNP 2,790.0 pg/mL     Narrative:      This assay is used as an aid in the diagnosis of individuals suspected of having heart failure. It can be used as an aid in the diagnosis of acute decompensated heart failure (ADHF) in patients presenting with signs and symptoms of ADHF to the emergency department (ED). In addition, NT-proBNP of <300 pg/mL indicates ADHF is not likely.    Age Range Result Interpretation  NT-proBNP Concentration (pg/mL:      <50             Positive            >450                   Gray                 300-450                    Negative             <300    50-75           Positive            >900                  Gray                300-900                  Negative            <300      >75             Positive            >1800                  Gray                300-1800                  Negative            <300    High Sensitivity Troponin T [[id number]]  (Abnormal) Collected: 12/11/24 1343    Specimen: Blood from Arm, Left Updated: 12/11/24 1431     HS Troponin T 37 ng/L     Narrative:      High Sensitive Troponin T Reference Range:  <14.0 ng/L- Negative Female for AMI  <22.0 ng/L- Negative Male for AMI  >=14 - Abnormal Female indicating possible myocardial injury.  >=22 - Abnormal Male indicating possible myocardial injury.   Clinicians would have to utilize clinical acumen, EKG, Troponin, and serial changes to determine if it is an Acute Myocardial Infarction or myocardial injury due to an underlying chronic condition.         Manual Differential [[id number]]  (Abnormal) Collected: 12/11/24 1343    Specimen: Blood from Arm, Left  Updated: 12/11/24 1411     Neutrophil % 46.0 %      Lymphocyte % 25.0 %      Monocyte % 3.0 %      Basophil % 2.0 %      Bands %  19.0 %      Metamyelocyte % 2.0 %      Myelocyte % 2.0 %      Atypical Lymphocyte % 1.0 %      Neutrophils Absolute 3.33 10*3/mm3      Lymphocytes Absolute 1.33 10*3/mm3      Monocytes Absolute 0.15 10*3/mm3      Basophils Absolute 0.10 10*3/mm3      RBC Morphology Normal     WBC Morphology Normal     Platelet Estimate Adequate    CBC & Differential [[id number]]  (Abnormal) Collected: 12/11/24 1343    Specimen: Blood from Arm, Left Updated: 12/11/24 1411    Narrative:      The following orders were created for panel order CBC & Differential.  Procedure                               Abnormality         Status                     ---------                               -----------         ------                     CBC Auto Differential[[id number]]        Abnormal            Final result               Scan Slide[[id number]]                                       Final result                 Please view results for these tests on the individual orders.    CBC Auto Differential [[id number]]  (Abnormal) Collected: 12/11/24 1343    Specimen: Blood from Arm, Left Updated: 12/11/24 1411     WBC 5.13 10*3/mm3      RBC 2.83 10*6/mm3      Hemoglobin 8.0 g/dL      Hematocrit 26.8 %      MCV 94.7 fL      MCH 28.3 pg      MCHC 29.9 g/dL      RDW 17.0 %      RDW-SD 57.2 fl      MPV 10.0 fL      Platelets 255 10*3/mm3     Narrative:      The previously reported component NRBC is no longer being reported. Previous result was 0.0 /100 WBC (Reference Range: 0.0-0.2 /100 WBC) on 12/11/2024 at 1352 EST.    Scan Slide [[id number]] Collected: 12/11/24 1343    Specimen: Blood from Arm, Left Updated: 12/11/24 1411     Scan Slide --     Comment: See Manual Differential Results       aPTT [[id number]]  (Normal) Collected: 12/11/24 1343    Specimen: Blood from Arm, Left Updated: 12/11/24 1402     PTT 34.0 seconds      "Protime-INR [[id number]]  (Normal) Collected: 12/11/24 1343    Specimen: Blood from Arm, Left Updated: 12/11/24 1359     Protime 14.1 Seconds      INR 1.09             Imaging Results (Last 24 Hours)       Procedure Component Value Units Date/Time    XR Chest 1 View [[id number]] Collected: 12/11/24 1400     Updated: 12/11/24 1404    Narrative:      XR CHEST 1 VW    Date of Exam: 12/11/2024 1:45 PM EST    Indication: Chest pain, shortness of breath, hypoxia.    Comparison: 11/25/2024.    Findings:  There is patchy consolidation in the upper lobes felt to represent bilateral pneumonia. There is a density in the right upper lobe adjacent to the minor fissure felt to represent subsegmental atelectasis. There may be a trace right pleural effusion. The   left pleural space is clear. The heart size is normal. The pulmonary vascular markings are normal. There are chronic age-related changes involving the bony thorax and thoracic aorta.      Impression:      Impression:  1.Patchy consolidation in the upper lobes felt to represent bilateral pneumonia.  2.Right upper lobe subsegmental atelectasis.  3.Questionable trace right pleural effusion.        Electronically Signed: Michi Taylor MD    12/11/2024 2:02 PM EST    Workstation ID: [id number]              Assessment & Plan        This is a 74 y.o. female with:    Active and Resolved Problems  Active Hospital Problems    Diagnosis  POA    **PNA (pneumonia) [J18.9]  Yes      Resolved Hospital Problems   No resolved problems to display.       Bilateral upper lobe pneumonia   Bandemia  - CXR: \"Patchy consolidation in the upper lobes felt to represent bilateral pneumonia\", see full report for additional findings  - WBC 5.13  - Procal pending  - Blood cultures pending  - Abx: rocephin 2 g given in ED, will continue  - Start guaifenesin 1200 mg BID  - Titrate O2 to keep O2 sat 90-95%  - Start duonebs Q6H RT scheduled, albuterol Q4H PRN  - Encourage IS usage every 4 hours while awake "     Chest pain, resolved  - Exertional while on exercise bike today, now resolved  - Trop 33, down from 37  - Trop delta -4  - proBNP 2.790.0; baseline 1,780.0 - 2,909.0  - Continuous cardiac monitor    HFpEF  CAD  Mitral regurgitation  - s/p MitraClip 11/21/24  - EF 56- 60%  - Continue home meds    Crohns  - s/p right hemicolectomy with ileostomy on 11/28/24  - Hb 8.0, down from 8.7 on 12/10/24  - Follow CBC  - Continue home med    Hypertension  - Continue home medications  - Monitor BP per order    Hyperlipidemia  - Continue statin     Hypothyroidism  - Continue home dose of levothyroxine    GERD  - Continue PPI    VTE Prophylaxis:  Mechanical VTE prophylaxis orders are present.        The patient desires to be as follows:    CODE STATUS:    Level Of Support Discussed With: Patient  Code Status (Patient has no pulse and is not breathing): CPR (Attempt to Resuscitate)  Medical Interventions (Patient has pulse or is breathing): Full Support        Nadir Gaitan, son, who can be contacted at [phone], is the designated person to make medical decisions on the patient's behalf if She is incapable of doing so. This was clarified with patient and/or next of kin on 12/11/2024 during the course of this H&P.    Admission Status:  I believe this patient meets inpatient status.    Expected Length of Stay: 1-2 days    PDMP and Medication Dispenses via Sidebar reviewed and consistent with patient reported medications.    I discussed the patient's findings and my recommendations with patient.      Signature:     This document has been electronically signed by DRU Park on December 11, 2024 20:45 [facility] Hospitalist Team

## 2024-12-11 NOTE — Clinical Note
Level of Care: Telemetry [5]   Diagnosis: PNA (pneumonia) [[id number]]   Admitting Physician: DOMINIQUE GILLIAM [[id number]]   Certification: I Certify That Inpatient Hospital Services Are Medically Necessary For Greater Than 2 Midnights

## 2024-12-11 NOTE — ED NOTES
Pt came in from [place] complains of nose bleeding that is controlled at this time. and irritation from her ileostomy site. Pt had emergency surgery and was put on double ileostomy for bleeding colon at relayed by family last thanksgiving. Pt site looks red and swollen with dark watery output. Pt is on plavix.

## 2024-12-12 ENCOUNTER — READMISSION MANAGEMENT (OUTPATIENT)
Dept: CALL CENTER | Facility: HOSPITAL | Age: 74
End: 2024-12-12
Payer: MEDICARE

## 2024-12-12 LAB
ANION GAP SERPL CALCULATED.3IONS-SCNC: 9.8 MMOL/L (ref 5–15)
BASOPHILS # BLD AUTO: 0.01 10*3/MM3 (ref 0–0.2)
BASOPHILS NFR BLD AUTO: 0.2 % (ref 0–1.5)
BUN SERPL-MCNC: 11 MG/DL (ref 8–23)
BUN/CREAT SERPL: 18.3 (ref 7–25)
CALCIUM SPEC-SCNC: 8.3 MG/DL (ref 8.6–10.5)
CHLORIDE SERPL-SCNC: 105 MMOL/L (ref 98–107)
CO2 SERPL-SCNC: 21.2 MMOL/L (ref 22–29)
CREAT SERPL-MCNC: 0.6 MG/DL (ref 0.57–1)
DEPRECATED RDW RBC AUTO: 59.1 FL (ref 37–54)
EGFRCR SERPLBLD CKD-EPI 2021: 94.3 ML/MIN/1.73
EOSINOPHIL # BLD AUTO: 0.05 10*3/MM3 (ref 0–0.4)
EOSINOPHIL NFR BLD AUTO: 0.9 % (ref 0.3–6.2)
ERYTHROCYTE [DISTWIDTH] IN BLOOD BY AUTOMATED COUNT: 17.3 % (ref 12.3–15.4)
GLUCOSE SERPL-MCNC: 76 MG/DL (ref 65–99)
HCT VFR BLD AUTO: 22.9 % (ref 34–46.6)
HGB BLD-MCNC: 7.2 G/DL (ref 12–15.9)
IMM GRANULOCYTES # BLD AUTO: 0.29 10*3/MM3 (ref 0–0.05)
IMM GRANULOCYTES NFR BLD AUTO: 5.4 % (ref 0–0.5)
LYMPHOCYTES # BLD AUTO: 0.56 10*3/MM3 (ref 0.7–3.1)
LYMPHOCYTES NFR BLD AUTO: 10.5 % (ref 19.6–45.3)
MAGNESIUM SERPL-MCNC: 1.5 MG/DL (ref 1.6–2.4)
MCH RBC QN AUTO: 30 PG (ref 26.6–33)
MCHC RBC AUTO-ENTMCNC: 31.4 G/DL (ref 31.5–35.7)
MCV RBC AUTO: 95.4 FL (ref 79–97)
MONOCYTES # BLD AUTO: 0.41 10*3/MM3 (ref 0.1–0.9)
MONOCYTES NFR BLD AUTO: 7.7 % (ref 5–12)
NEUTROPHILS NFR BLD AUTO: 4.03 10*3/MM3 (ref 1.7–7)
NEUTROPHILS NFR BLD AUTO: 75.3 % (ref 42.7–76)
NRBC BLD AUTO-RTO: 0 /100 WBC (ref 0–0.2)
PHOSPHATE SERPL-MCNC: 3.1 MG/DL (ref 2.5–4.5)
PLAT MORPH BLD: NORMAL
PLATELET # BLD AUTO: 176 10*3/MM3 (ref 140–450)
PMV BLD AUTO: 10 FL (ref 6–12)
POTASSIUM SERPL-SCNC: 4.2 MMOL/L (ref 3.5–5.2)
QT INTERVAL: 422 MS
QTC INTERVAL: 432 MS
RBC # BLD AUTO: 2.4 10*6/MM3 (ref 3.77–5.28)
RBC MORPH BLD: NORMAL
SODIUM SERPL-SCNC: 136 MMOL/L (ref 136–145)
WBC MORPH BLD: NORMAL
WBC NRBC COR # BLD AUTO: 5.35 10*3/MM3 (ref 3.4–10.8)

## 2024-12-12 PROCEDURE — 85007 BL SMEAR W/DIFF WBC COUNT: CPT

## 2024-12-12 PROCEDURE — 85025 COMPLETE CBC W/AUTO DIFF WBC: CPT

## 2024-12-12 PROCEDURE — 97162 PT EVAL MOD COMPLEX 30 MIN: CPT

## 2024-12-12 PROCEDURE — 99024 POSTOP FOLLOW-UP VISIT: CPT | Performed by: STUDENT IN AN ORGANIZED HEALTH CARE EDUCATION/TRAINING PROGRAM

## 2024-12-12 PROCEDURE — 97166 OT EVAL MOD COMPLEX 45 MIN: CPT

## 2024-12-12 PROCEDURE — 83735 ASSAY OF MAGNESIUM: CPT

## 2024-12-12 PROCEDURE — 25010000002 MAGNESIUM SULFATE 4 GM/100ML SOLUTION: Performed by: INTERNAL MEDICINE

## 2024-12-12 PROCEDURE — 25010000002 ONDANSETRON PER 1 MG

## 2024-12-12 PROCEDURE — 84100 ASSAY OF PHOSPHORUS: CPT

## 2024-12-12 PROCEDURE — 80048 BASIC METABOLIC PNL TOTAL CA: CPT

## 2024-12-12 PROCEDURE — 25010000002 CEFTRIAXONE PER 250 MG: Performed by: INTERNAL MEDICINE

## 2024-12-12 RX ORDER — MAGNESIUM SULFATE HEPTAHYDRATE 40 MG/ML
4 INJECTION, SOLUTION INTRAVENOUS ONCE
Status: DISCONTINUED | OUTPATIENT
Start: 2024-12-12 | End: 2024-12-12

## 2024-12-12 RX ORDER — MAGNESIUM SULFATE HEPTAHYDRATE 40 MG/ML
4 INJECTION, SOLUTION INTRAVENOUS ONCE
Status: COMPLETED | OUTPATIENT
Start: 2024-12-12 | End: 2024-12-12

## 2024-12-12 RX ADMIN — Medication 10 ML: at 09:13

## 2024-12-12 RX ADMIN — CLOPIDOGREL BISULFATE 75 MG: 75 TABLET ORAL at 09:13

## 2024-12-12 RX ADMIN — PANTOPRAZOLE SODIUM 40 MG: 40 TABLET, DELAYED RELEASE ORAL at 21:44

## 2024-12-12 RX ADMIN — GUAIFENESIN 1200 MG: 600 TABLET, MULTILAYER, EXTENDED RELEASE ORAL at 09:13

## 2024-12-12 RX ADMIN — SPIRONOLACTONE 25 MG: 25 TABLET ORAL at 09:13

## 2024-12-12 RX ADMIN — ONDANSETRON 4 MG: 2 INJECTION, SOLUTION INTRAMUSCULAR; INTRAVENOUS at 09:13

## 2024-12-12 RX ADMIN — CEFTRIAXONE 2000 MG: 2 INJECTION, POWDER, FOR SOLUTION INTRAMUSCULAR; INTRAVENOUS at 16:20

## 2024-12-12 RX ADMIN — ATORVASTATIN CALCIUM 40 MG: 40 TABLET, FILM COATED ORAL at 21:44

## 2024-12-12 RX ADMIN — MESALAMINE 1.2 G: 800 TABLET, DELAYED RELEASE ORAL at 09:13

## 2024-12-12 RX ADMIN — FOLIC ACID 1000 MCG: 1 TABLET ORAL at 09:13

## 2024-12-12 RX ADMIN — MIDODRINE HYDROCHLORIDE 5 MG: 5 TABLET ORAL at 21:44

## 2024-12-12 RX ADMIN — MIDODRINE HYDROCHLORIDE 5 MG: 5 TABLET ORAL at 06:45

## 2024-12-12 RX ADMIN — Medication 10 ML: at 21:44

## 2024-12-12 RX ADMIN — GUAIFENESIN 1200 MG: 600 TABLET, MULTILAYER, EXTENDED RELEASE ORAL at 21:44

## 2024-12-12 RX ADMIN — MIDODRINE HYDROCHLORIDE 5 MG: 5 TABLET ORAL at 16:21

## 2024-12-12 RX ADMIN — ASPIRIN 81 MG: 81 TABLET, COATED ORAL at 09:13

## 2024-12-12 RX ADMIN — AMLODIPINE BESYLATE 10 MG: 5 TABLET ORAL at 09:13

## 2024-12-12 RX ADMIN — MAGNESIUM SULFATE HEPTAHYDRATE 4 G: 40 INJECTION, SOLUTION INTRAVENOUS at 18:20

## 2024-12-12 RX ADMIN — LEVOTHYROXINE SODIUM 50 MCG: 50 TABLET ORAL at 06:45

## 2024-12-12 RX ADMIN — METOPROLOL SUCCINATE 25 MG: 25 TABLET, EXTENDED RELEASE ORAL at 09:13

## 2024-12-12 RX ADMIN — SERTRALINE HYDROCHLORIDE 50 MG: 50 TABLET ORAL at 09:13

## 2024-12-12 NOTE — PROGRESS NOTES
General Surgery Progress Note    Name: Maryann Gaitan ADMIT: 2024   : 1950  PCP: Azam Calhoun MD    MRN: [id number] LOS: 1 days   AGE/SEX: 74 y.o. female  ROOM: [address] [place] [address]    Chief Complaint   Patient presents with    Chest Pain     Patient BIB ems from [facility], [facility] reports CP given 1 nitro at facility. Patient has no current CP. Patient did mention that she had just gotten out of physcial therapy,and the CP was with movement.     Subjective     No blood in ostomy or mucous fistula, no bloody bowel movements, tolerating diet    Objective     Scheduled Medications:   amLODIPine, 10 mg, Oral, Daily  aspirin, 81 mg, Oral, Daily  atorvastatin, 40 mg, Oral, Nightly  cefTRIAXone, 2,000 mg, Intravenous, Q24H  clopidogrel, 75 mg, Oral, Daily  folic acid, 1,000 mcg, Oral, Daily  guaiFENesin, 1,200 mg, Oral, Q12H  levothyroxine, 50 mcg, Oral, Q AM  mesalamine, 1,200 mg, Oral, Daily With Breakfast  metoprolol succinate XL, 25 mg, Oral, Q24H  midodrine, 5 mg, Oral, Q8H  pantoprazole, 40 mg, Oral, Nightly  sertraline, 50 mg, Oral, Daily  sodium chloride, 10 mL, Intravenous, Q12H  spironolactone, 25 mg, Oral, Daily        Active Infusions:       As Needed Medications:    acetaminophen **OR** acetaminophen **OR** acetaminophen    albuterol    senna-docusate sodium **AND** polyethylene glycol **AND** bisacodyl **AND** bisacodyl    calcium carbonate    Calcium Replacement - Follow Nurse / BPA Driven Protocol    ipratropium-albuterol    Magnesium Standard Dose Replacement - Follow Nurse / BPA Driven Protocol    melatonin    nitroglycerin    ondansetron ODT **OR** ondansetron    Phosphorus Replacement - Follow Nurse / BPA Driven Protocol    Potassium Replacement - Follow Nurse / BPA Driven Protocol    [COMPLETED] Insert Peripheral IV **AND** sodium chloride    sodium chloride    Vital Signs  Vital Signs Patient Vitals for the past 24 hrs:   BP Temp Temp src Pulse Resp SpO2   24 1146 110/63  97.9 °F (36.6 °C) Oral 94 27 98 %   12/12/24 0712 129/69 98 °F (36.7 °C) Oral 92 26 97 %   12/12/24 0424 109/62 98.2 °F (36.8 °C) Oral 76 18 --   12/11/24 2248 -- 97.8 °F (36.6 °C) Oral -- 18 --   12/11/24 1927 109/60 -- -- 57 17 99 %   12/11/24 1508 129/72 -- -- 59 20 100 %   12/11/24 1403 116/61 -- -- 64 22 100 %     I/O:  I/O last 3 completed shifts:  In: 340 [P.O.:240; IV Piggyback:100]  Out: -     Physical Exam:  Physical Exam  Constitutional:       General: She is not in acute distress.     Appearance: Normal appearance. She is not ill-appearing.   HENT:      Head: Normocephalic and atraumatic.      Right Ear: External ear normal.      Left Ear: External ear normal.   Eyes:      Extraocular Movements: Extraocular movements intact.      Conjunctiva/sclera: Conjunctivae normal.   Cardiovascular:      Rate and Rhythm: Normal rate and regular rhythm.   Pulmonary:      Effort: Pulmonary effort is normal. No respiratory distress.   Abdominal:      General: There is no distension.      Palpations: Abdomen is soft.      Tenderness: There is no abdominal tenderness.   Musculoskeletal:         General: No swelling or deformity.   Skin:     General: Skin is warm and dry.   Neurological:      Mental Status: She is alert and oriented to person, place, and time. Mental status is at baseline.         Results Review:     CBC    Results from last 7 days   Lab Units 12/12/24  0654 12/11/24  1343 12/10/24  1958   WBC 10*3/mm3 5.35 5.13 6.18   HEMOGLOBIN g/dL 7.2* 8.0* 8.7*   PLATELETS 10*3/mm3 176 255 230     BMP   Results from last 7 days   Lab Units 12/12/24  0747 12/12/24  0654 12/11/24  1343   SODIUM mmol/L 136  --  135*   POTASSIUM mmol/L 4.2  --  4.4   CHLORIDE mmol/L 105  --  102   CO2 mmol/L 21.2*  --  22.7   BUN mg/dL 11  --  12   CREATININE mg/dL 0.60  --  0.91   GLUCOSE mg/dL 76  --  96   MAGNESIUM mg/dL 1.5*  --   --    PHOSPHORUS mg/dL  --  3.1  --      Radiology(recent) XR Chest 1 View    Result Date:  12/11/2024  Impression: 1.Patchy consolidation in the upper lobes felt to represent bilateral pneumonia. 2.Right upper lobe subsegmental atelectasis. 3.Questionable trace right pleural effusion. Electronically Signed: Michi Taylor MD  12/11/2024 2:02 PM EST  Workstation ID: [id number]     I reviewed the patient's new clinical results.    Assessment & Plan       PNA (pneumonia)      74 y.o. female status post right hemicolectomy with ileostomy mucous fistula.  Removed her staples today wound healing well.  No evidence of bleeding from the stoma or mucous fistula.  Okay for diet from my standpoint.  Okay for discharge follow-up in the office with me in 1 month.  I will be available inpatient as needed please call with changes questions or concerns.        This note was created using Dragon Voice Recognition software.    Todd Babin MD  12/12/24  14:02 EST

## 2024-12-12 NOTE — PLAN OF CARE
Goal Outcome Evaluation:  Plan of Care Reviewed With: patient    Outcome Evaluation: Pt is a 75 y/o female admitted to [facility] on 12/11/24 with c/o chest pain. She resides at a nursing facility and developed chest pain while riding on an exercise bike. Of note, pt recently discharged from [facility] on 12/4/2024 after being treated for a lower GI bleed with right hemicolectomy with ileostomy and mucous fistula creation, required several units of PRBCs; and patient underwent a mitral valve clip on 11/21/2024. PMHx significant for CAD, HTN, hyperlipidemia, COPD, hypothyroidism, and crohn's disease. At baseline, pt resides in a [facility] with a basement, however resides on main level, x2 RICKEY, with x2 grandchildren (One works night shifts and the other works days shifts, able to provided 24/7 supervision) with a renter who is a family friend. Pt reports she is IND in ADLs, utilizes walker and rollator though prefers walker, and does not drive. Upon assessment, pt sitting in chair with adult son in room, A&O to person, and current time, disoriented to place. Pt dependent assist to complete ostomy care and don brief this date and she was soiled in urine with max assist to come to standing with FWW and stand-pivot t/f from bed to chair. RN in room for safety and repors she will increase suction to external catheter. Based on assessment, pt remains high falls risk, cognitive processing requiring increased time and verbal cueing, and exhibits global weakness, not safe to return home with family at this time. OT recommending return to SNF with continued OT services when medically appropriate for d/c.    Anticipated Discharge Disposition (OT): skilled nursing facility

## 2024-12-12 NOTE — THERAPY EVALUATION
Patient Name: Maryann Gaitan  : 1950    MRN: [id number]                              Today's Date: 2024       Admit Date: 2024    Visit Dx:     ICD-10-CM ICD-9-CM   1. Chest pain, unspecified type  R07.9 786.50   2. Pneumonia due to infectious organism, unspecified laterality, unspecified part of lung  J18.9 486     Patient Active Problem List   Diagnosis    Mitral regurgitation    Cavitary lesion of lung    CAD, multiple vessel    Acute heart failure with preserved ejection fraction (HFpEF)    Severe mitral regurgitation    Dyslipidemia    Crohn's disease    Hypothyroidism (acquired)    Anxiety associated with depression    COPD (chronic obstructive pulmonary disease)    Rheumatoid arthritis    Moderate protein-calorie malnutrition    Acute lower GI bleeding    First degree hemorrhoids    Benign neoplasm of cecum    GERD (gastroesophageal reflux disease)    Hashimoto's thyroiditis    History of colonic polyps    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    Fecal urgency    Second degree hemorrhoids    Vitamin D deficiency, unspecified    Stress incontinence, female    Aphthae, oral    Hx of seborrheic keratosis    Primary hypertension    S/P mitral valve clip implantation    Gastrointestinal hemorrhage    Anemia    PNA (pneumonia)     Past Medical History:   Diagnosis Date    Abnormal weight loss 2024    Acute kidney injury 2024    Acute UTI (urinary tract infection) 2024    Anxiety associated with depression 2024    Benign neoplasm of cecum 2016    CAD, multiple vessel 10/08/2024    Cavitary lesion of lung 10/08/2024    COPD (chronic obstructive pulmonary disease)     Crohn's disease 2024    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 2016    Dyslipidemia 10/30/2024    Fecal urgency 2024    First degree hemorrhoids 2021    GERD (gastroesophageal reflux disease) 2024    Hashimoto's thyroiditis 2024     History of colonic polyps 07/09/2021    Hypertension     Hypothyroidism (acquired) 11/06/2024    Mitral regurgitation 10/08/2024    Moderate protein-calorie malnutrition 11/09/2024    Multiple tracheobronchial mucus plugs 10/08/2024    Nicotine dependence 11/21/2024    Rheumatoid arthritis 11/06/2024    S/P mitral valve clip implantation 11/21/2024    Second degree hemorrhoids 07/05/2016    Stress incontinence, female 11/21/2024    Vitamin D deficiency, unspecified 11/21/2024     Past Surgical History:   Procedure Laterality Date    BRONCHOSCOPY N/A 10/16/2024    Procedure: BRONCHOSCOPY;  Surgeon: Gallo Pope MD;  Location: [facility] ENDOSCOPY;  Service: Pulmonary;  Laterality: N/A;    CARDIAC CATHETERIZATION N/A 10/15/2024    Procedure: Left Heart Cath, possible pci;  Surgeon: Travis Connor MD;  Location: [facility] CATH INVASIVE LOCATION;  Service: Cardiovascular;  Laterality: N/A;    CARDIAC CATHETERIZATION N/A 10/22/2024    Procedure: Laser Coronary Atherectomy;  Surgeon: Travis Connor MD;  Location: [facility] CATH INVASIVE LOCATION;  Service: Cardiovascular;  Laterality: N/A;    COLON RESECTION Right 11/28/2024    Procedure: RIGHT HEMICOLECTOMY WITH ILEOSTOMY;  Surgeon: Todd Babin MD;  Location: [facility] MAIN OR;  Service: General;  Laterality: Right;    COLONOSCOPY N/A 10/12/2024    Procedure: COLONOSCOPY WITH BIOPSY AND WIRE GUIDED BALLOON DILATION OF TERMINAL ILEUM;  Surgeon: Rob Strong MD;  Location: [facility] ENDOSCOPY;  Service: Gastroenterology;  Laterality: N/A;  Colitis, crohns of terminal ileum, right colon ulcers, diverticulosis, hemorroids    ENDOSCOPY N/A 10/12/2024    Procedure: ESOPHAGOGASTRODUODENOSCOPY WITH BIOPSY X 2 AREA;  Surgeon: Rob Strong MD;  Location: [facility] ENDOSCOPY;  Service: Gastroenterology;  Laterality: N/A;  Chronic gastritis, HH    ENDOSCOPY Left 11/28/2024    Procedure: ESOPHAGOGASTRODUODENOSCOPY;  Surgeon: Dallin Delgado  MD [place];  Location: [facility] ENDOSCOPY;  Service: Gastroenterology;  Laterality: Left;  small hiatal hernia    HYSTERECTOMY      LEFT HEART CATH      MITRAL VALVE REPAIR/REPLACEMENT N/A 11/21/2024    Procedure: Transcatheter Mitral Valve Repair;  Surgeon: Dmitri Navarro MD;  Location: [facility] HYBRID OR;  Service: Cardiovascular;  Laterality: N/A;      General Information       [facility] Name 12/12/24 1317          Physical Therapy Time and Intention    Document Type evaluation  -     Mode of Treatment physical therapy  -[facility] Name 12/12/24 1317          General Information    Patient Profile Reviewed yes  -     Prior Level of Function min assist:;transfer  reports she was walking some at the rehab, getting up in w/c  -     Existing Precautions/Restrictions fall  -     Barriers to Rehab medically complex;previous functional deficit;cognitive status  -[facility] Name 12/12/24 1317          Living Environment    People in Home grandchild(keegan)  -[facility] Name 12/12/24 1317          Cognition    Orientation Status (Cognition) oriented to;person;place;disoriented to;situation;time;verbal cues/prompts needed for orientation  -[facility] Name 12/12/24 1317          Safety Issues/Impairments Affecting Functional Mobility    Safety Issues Affecting Function (Mobility) insight into deficits/self-awareness;judgment;problem-solving  -     Impairments Affecting Function (Mobility) balance;cognition;endurance/activity tolerance;strength  -               User Key  (r) = Recorded By, (t) = Taken By, (c) = Cosigned By      Initials Name Provider Type     Hilary Mcmillan PT Physical Therapist                   Mobility       Row Name 12/12/24 1318          Bed Mobility    Bed Mobility supine-sit  -     Supine-Sit [place] (Bed Mobility) minimum assist (75% patient effort)  -       Row Name 12/12/24 1318          Bed-Chair Transfer    Bed-Chair [place] (Transfers) moderate assist (50% patient effort);2  person assist  -[facility] Name 12/12/24 1318          Sit-Stand Transfer    Sit-Stand [place] (Transfers) moderate assist (50% patient effort);2 person assist  -     Comment, (Sit-Stand Transfer) attempted x 2 with A x2, pt unable to come to full stance.  with 2nd person assist able to stand/pivot to chair  -[facility] Name 12/12/24 1318          Gait/Stairs (Locomotion)    [place] Level (Gait) unable to assess  -               User Key  (r) = Recorded By, (t) = Taken By, (c) = Cosigned By      Initials Name Provider Type    Hilary Jacobsen, CHINTAN Physical Therapist                   Obj/Interventions       [facility] Name 12/12/24 1319          Range of Motion Comprehensive    Comment, General Range of Motion BLE A/AAROM WFLs.  -JH       Row Name 12/12/24 1319          Strength Comprehensive (MMT)    Comment, General Manual Muscle Testing (MMT) Assessment generalized weakness and fatigues with few repetitions.  UEs 3/5,  LEs 3/5.  -JH       Row Name 12/12/24 1319          Balance    Balance Assessment sitting static balance;sitting dynamic balance  -     Static Sitting Balance modified [facility]     Dynamic Sitting Balance standby assist  -     Position, Sitting Balance sitting edge of bed  -               User Key  (r) = Recorded By, (t) = Taken By, (c) = Cosigned By      Initials Name Provider Type    Hilary Jacobsen, CHINTAN Physical Therapist                   Goals/Plan       Row Name 12/12/24 1322          Bed Mobility Goal 1 (PT)    Activity/Assistive Device (Bed Mobility Goal 1, PT) bed mobility activities, all  -     [place] Level/Cues Needed (Bed Mobility Goal 1, PT) modified [facility]     Time Frame (Bed Mobility Goal 1, PT) 1 week  -JH       Row Name 12/12/24 1322          Transfer Goal 1 (PT)    Activity/Assistive Device (Transfer Goal 1, PT) sit-to-stand/stand-to-sit;bed-to-chair/chair-to-bed  -     [place] Level/Cues Needed (Transfer Goal 1, PT) minimum  assist (75% or more patient effort)  -     Time Frame (Transfer Goal 1, PT) 2 weeks  -JH       Row Name 12/12/24 1322          Gait Training Goal 1 (PT)    Activity/Assistive Device (Gait Training Goal 1, PT) gait (walking locomotion);assistive device use;walker, rolling  -     [place] Level (Gait Training Goal 1, PT) minimum assist (75% or more patient effort)  -     Distance (Gait Training Goal 1, PT) 30'  -JH       Row Name 12/12/24 1322          ROM Goal 1 (PT)    Time Frame (ROM Goal 1, PT) 2 weeks  -JH       Row Name 12/12/24 1322          Therapy Assessment/Plan (PT)    Planned Therapy Interventions (PT) balance training;bed mobility training;gait training;transfer training;strengthening;patient/family education;ROM (range of motion)  -               User Key  (r) = Recorded By, (t) = Taken By, (c) = Cosigned By      Initials Name Provider Type     Hilary Mcmillan, PT Physical Therapist                   Clinical Impression       Row Name 12/12/24 1320          Pain    Pain Side/Orientation generalized  -     Additional Documentation Pain Scale: FACES Pre/Post-Treatment (Group)  -JH       Row Name 12/12/24 1320          Pain Scale: FACES Pre/Post-Treatment    Pain: FACES Scale, Pretreatment 2-->hurts little bit  -     Posttreatment Pain Rating 2-->hurts little bit  -[facility] Name 12/12/24 1320          Plan of Care Review    Plan of Care Reviewed With patient  -     Outcome Evaluation 74 y.o. female with a CMH of CAD, hypertension, hyperlipidemia, COPD, hypothyroidism, crohns disease who [facility] with chest pain. Patient lives at a nursing facility and developed chest pain while riding on the exercise bike.Patient was discharged from [facility] on 12/4/2024 after being treated for a lower GI bleed with right hemicolectomy with ileostomy and mucous fistula creation, required several units of PRBCs; and patient underwent a mitral valve clip on 11/21/2024.   This date, pt reports she feels weak, tired.  Pt required min A to sit EOB, weak when attempting to stand with A x1.  Required A x2 to stand and transfer to bedside chair.  Pt was soiled in urine, reports she is incontinent.  RN aware and recommend external catheter.  Pt is not safe for home with family, recommend cont PT and return to SNF for rehab at d/c.  -       Row Name 12/12/24 1320          Therapy Assessment/Plan (PT)    Rehab Potential (PT) fair  -     Criteria for Skilled Interventions Met (PT) yes  -     Therapy Frequency (PT) 5 times/wk  -       Row Name 12/12/24 1320          Vital Signs    O2 Delivery Pre Treatment room air  -     O2 Delivery Intra Treatment room air  -     O2 Delivery Post Treatment room air  -       Row Name 12/12/24 1320          Positioning and Restraints    Pre-Treatment Position in bed  -     Post Treatment Position chair  -     In Chair with nsg;reclined;call light within reach;encouraged to call for assist;exit alarm on  -               User Key  (r) = Recorded By, (t) = Taken By, (c) = Cosigned By      Initials Name Provider Type     Hilary Mcmillan, PT Physical Therapist                   Outcome Measures       Row Name 12/12/24 1322 12/12/24 0850       How much help from another person do you currently need...    Turning from your back to your side while in flat bed without using bedrails? 3  - 3  -AC    Moving from lying on back to sitting on the side of a flat bed without bedrails? 2  - 3  -AC    Moving to and from a bed to a chair (including a wheelchair)? 2  - 3  -AC    Standing up from a chair using your arms (e.g., wheelchair, bedside chair)? 2  - 3  -AC    Climbing 3-5 steps with a railing? 1  - 3  -AC    To walk in hospital room? 2  - 3  -AC    AM-PAC 6 Clicks Score (PT) [facility] 18  -    Highest Level of Mobility Goal 4 --> Transfer to chair/commode  - 6 --> Walk 10 steps or more  -      Row Name 12/12/24 1322          Functional  Assessment    Outcome Measure Options AM-PAC 6 Clicks Basic Mobility (PT)  -               User Key  (r) = Recorded By, (t) = Taken By, (c) = Cosigned By      Initials Name Provider Type     Hilary Mcmillan, CHINTAN Physical Therapist    Nadja Horta RN Registered Nurse                                 Physical Therapy Education       Title: PT OT SLP Therapies (Done)       Topic: Physical Therapy (Done)       Point: Mobility training (Done)       Learning Progress Summary            Patient Acceptance, E,TB, VU by  at 12/12/2024 1323                                      User Key       Initials Effective Dates Name Provider Type Discipline     06/16/21 -  Hilary Mcmillan PT Physical Therapist PT                  PT Recommendation and Plan  Planned Therapy Interventions (PT): balance training, bed mobility training, gait training, transfer training, strengthening, patient/family education, ROM (range of motion)  Outcome Evaluation: 74 y.o. female with a CMH of CAD, hypertension, hyperlipidemia, COPD, hypothyroidism, crohns disease who presented to [facility] on 12/11/2024 with chest pain. Patient lives at a nursing facility and developed chest pain while riding on the exercise bike.Patient was discharged from [facility] on 12/4/2024 after being treated for a lower GI bleed with right hemicolectomy with ileostomy and mucous fistula creation, required several units of PRBCs; and patient underwent a mitral valve clip on 11/21/2024.  This date, pt reports she feels weak, tired.  Pt required min A to sit EOB, weak when attempting to stand with A x1.  Required A x2 to stand and transfer to bedside chair.  Pt was soiled in urine, reports she is incontinent.  RN aware and recommend external catheter.  Pt is not safe for home with family, recommend cont PT and return to SNF for rehab at d/c.     Time Calculation:         PT Charges       Row Name 12/12/24 1323             Time Calculation    Start Time 0836  -       Stop Time 0857  -      Time Calculation (min) 21 min  -      PT Received On 12/12/24  -      PT - Next Appointment 12/13/24  -         Time Calculation- PT    Total Timed Code Minutes- PT 0 minute(s)  -                User Key  (r) = Recorded By, (t) = Taken By, (c) = Cosigned By      Initials Name Provider Type     Hilary Mcmillan, PT Physical Therapist                  Therapy Charges for Today       Code Description Service Date Service Provider Modifiers Qty    [id number] HC PT EVAL MOD COMPLEXITY 4 12/12/2024 Hilary Mcmillan, PT GP 1            PT G-Codes  Outcome Measure Options: AM-PAC 6 Clicks Basic Mobility (PT)  AM-PAC 6 Clicks Score (PT): 12  PT Discharge Summary  Anticipated Discharge Disposition (PT): skilled nursing facility    Hilary Mcmillan PT  12/12/2024

## 2024-12-12 NOTE — PLAN OF CARE
Problem: Skin Injury Risk Increased  Goal: Skin Health and Integrity  Outcome: Not Progressing  Intervention: Optimize Skin Protection  Recent Flowsheet Documentation  Taken 12/12/2024 1600 by Nadja Hills RN  Activity Management: activity encouraged  Taken 12/12/2024 1210 by Nadja Hills RN  Activity Management: bedrest  Taken 12/12/2024 0850 by Nadja Hills RN  Activity Management: bedrest  Head of Bed (HOB) Positioning: HOB elevated     Problem: Fall Injury Risk  Goal: Absence of Fall and Fall-Related Injury  Outcome: Not Progressing  Intervention: Promote Injury-Free Environment  Recent Flowsheet Documentation  Taken 12/12/2024 1800 by Nadja Hills RN  Safety Promotion/Fall Prevention: safety round/check completed  Taken 12/12/2024 1600 by Nadja Hills RN  Safety Promotion/Fall Prevention:   assistive device/personal items within reach   clutter free environment maintained   safety round/check completed   nonskid shoes/slippers when out of bed   fall prevention program maintained  Taken 12/12/2024 1400 by Nadja Hills RN  Safety Promotion/Fall Prevention:   activity supervised   safety round/check completed  Taken 12/12/2024 1210 by Nadja Hills RN  Safety Promotion/Fall Prevention:   assistive device/personal items within reach   clutter free environment maintained   safety round/check completed   nonskid shoes/slippers when out of bed   fall prevention program maintained  Taken 12/12/2024 0850 by Nadja Hills RN  Safety Promotion/Fall Prevention:   assistive device/personal items within reach   clutter free environment maintained   safety round/check completed   nonskid shoes/slippers when out of bed   fall prevention program maintained   Goal Outcome Evaluation:

## 2024-12-12 NOTE — PROGRESS NOTES
[facility] MEDICINE SERVICE  DAILY PROGRESS NOTE    NAME: Maryann Gaitan  : 1950  MRN: [id number]      LOS: 1 day     PROVIDER OF SERVICE: Noreen Nj MD    Chief Complaint: PNA (pneumonia)    Subjective:     Interval History:  History taken from: patient    No new complaint        Review of Systems:   Review of Systems   All other systems reviewed and are negative.      Objective:     Vital Signs  Temp:  [97.8 °F (36.6 °C)-98.2 °F (36.8 °C)] 98 °F (36.7 °C)  Heart Rate:  [57-94] 83  Resp:  [17-27] 24  BP: (109-129)/(60-69) 109/62   Body mass index is 21.68 kg/m².    Physical Exam  Physical Exam  Constitutional:       Appearance: Normal appearance.   HENT:      Head: Normocephalic and atraumatic.      Nose: Nose normal.      Mouth/Throat:      Mouth: Mucous membranes are moist.   Eyes:      Extraocular Movements: Extraocular movements intact.      Pupils: Pupils are equal, round, and reactive to light.   Cardiovascular:      Rate and Rhythm: Normal rate and regular rhythm.   Pulmonary:      Effort: Pulmonary effort is normal.      Breath sounds: Normal breath sounds.   Abdominal:      General: Abdomen is flat. Bowel sounds are normal.      Palpations: Abdomen is soft.   Musculoskeletal:         General: Normal range of motion.      Cervical back: Normal range of motion and neck supple.   Skin:     General: Skin is warm and dry.   Neurological:      General: No focal deficit present.      Mental Status: She is alert and oriented to person, place, and time.   Psychiatric:         Mood and Affect: Mood normal.         Behavior: Behavior normal.         Thought Content: Thought content normal.         Judgment: Judgment normal.         Current Medications:  Scheduled Meds:amLODIPine, 10 mg, Oral, Daily  aspirin, 81 mg, Oral, Daily  atorvastatin, 40 mg, Oral, Nightly  cefTRIAXone, 2,000 mg, Intravenous, Q24H  clopidogrel, 75 mg, Oral, Daily  folic acid, 1,000 mcg, Oral, Daily  guaiFENesin, 1,200  mg, Oral, Q12H  levothyroxine, 50 mcg, Oral, Q AM  magnesium sulfate, 4 g, Intravenous, Once  mesalamine, 1,200 mg, Oral, Daily With Breakfast  metoprolol succinate XL, 25 mg, Oral, Q24H  midodrine, 5 mg, Oral, Q8H  pantoprazole, 40 mg, Oral, Nightly  sertraline, 50 mg, Oral, Daily  sodium chloride, 10 mL, Intravenous, Q12H  spironolactone, 25 mg, Oral, Daily      Continuous Infusions:   PRN Meds:.  acetaminophen **OR** acetaminophen **OR** acetaminophen    albuterol    senna-docusate sodium **AND** polyethylene glycol **AND** bisacodyl **AND** bisacodyl    calcium carbonate    Calcium Replacement - Follow Nurse / BPA Driven Protocol    ipratropium-albuterol    Magnesium Standard Dose Replacement - Follow Nurse / BPA Driven Protocol    melatonin    nitroglycerin    ondansetron ODT **OR** ondansetron    Phosphorus Replacement - Follow Nurse / BPA Driven Protocol    Potassium Replacement - Follow Nurse / BPA Driven Protocol    [COMPLETED] Insert Peripheral IV **AND** sodium chloride    sodium chloride       Diagnostic Data    Results from last 7 days   Lab Units 12/12/24  0747 12/12/24  0654 12/11/24  1343   WBC 10*3/mm3  --  5.35 5.13   HEMOGLOBIN g/dL  --  7.2* 8.0*   HEMATOCRIT %  --  22.9* 26.8*   PLATELETS 10*3/mm3  --  176 255   GLUCOSE mg/dL 76  --  96   CREATININE mg/dL 0.60  --  0.91   BUN mg/dL 11  --  12   SODIUM mmol/L 136  --  135*   POTASSIUM mmol/L 4.2  --  4.4   AST (SGOT) U/L  --   --  34*   ALT (SGPT) U/L  --   --  31   ALK PHOS U/L  --   --  132*   BILIRUBIN mg/dL  --   --  0.2   ANION GAP mmol/L 9.8  --  10.3       XR Chest 1 View    Result Date: 12/11/2024  Impression: 1.Patchy consolidation in the upper lobes felt to represent bilateral pneumonia. 2.Right upper lobe subsegmental atelectasis. 3.Questionable trace right pleural effusion. Electronically Signed: Michi Taylor MD  12/11/2024 2:02 PM EST  Workstation ID: [id number]       I reviewed the patient's new clinical results.    Assessment/Plan:      Active and Resolved Problems  Active Hospital Problems    Diagnosis  POA    **PNA (pneumonia) [J18.9]  Yes      Resolved Hospital Problems   No resolved problems to display.       Pneumonia  - Ceftriaxone, azithromycin and follow-up blood cultures.    Anemia  - Hemoglobin 7.2.  Monitor.  Transfuse blood for hemoglobin of 7 or less.    Crohns  - s/p right hemicolectomy with ileostomy on 11/28/24  - Hb 8.0, down from 8.7 on 12/10/24  - Has been seen by general surgery today.  Staples were removed.  Okay to start diet per general surgery.    Hypertension  - Blood pressure is controlled.  Continue current blood pressure regimen.    Hypothyroidism  - Continue levothyroxine    Pending clinical improvement  - Continue Protonix        VTE Prophylaxis:  Mechanical VTE prophylaxis orders are present.             Disposition Planning:     Barriers to Discharge: Pending clinical improvement  Anticipated Date of Discharge: 12/14/2024  Place of Discharge: Home    \    Code Status and Medical Interventions: CPR (Attempt to Resuscitate); Full Support   Ordered at: 12/11/24 2045     Level Of Support Discussed With:    Patient     Code Status (Patient has no pulse and is not breathing):    CPR (Attempt to Resuscitate)     Medical Interventions (Patient has pulse or is breathing):    Full Support       Signature: Electronically signed by Noreen Nj MD, 12/12/24, 17:49 EST.  [facility] Hospitalist Team

## 2024-12-12 NOTE — PLAN OF CARE
Problem: Adult Inpatient Plan of Care  Goal: Plan of Care Review  Outcome: Unable to Meet  Flowsheets (Taken 12/11/2024 2245)  Outcome Evaluation: plan of care just now established  Goal: Patient-Specific Goal (Individualized)  Outcome: Unable to Meet  Goal: Absence of Hospital-Acquired Illness or Injury  Outcome: Unable to Meet  Intervention: Identify and Manage Fall Risk  Description: Perform standard risk assessment on admission using a validated tool or comprehensive approach appropriate to the patient; reassess fall risk frequently, with change in status or transfer to another level of care.  Communicate risk to interprofessional healthcare team; ensure fall risk visible cue.  Determine need for increased observation, equipment and environmental modification, as well as use of supportive, nonskid footwear.  Adjust safety measures to individual needs and identified risk factors.  Reinforce the importance of active participation with fall risk prevention, safety, and physical activity with the patient and family.  Perform regular intentional rounding to assess need for position change, pain assessment and personal needs, including assistance with toileting.  Recent Flowsheet Documentation  Taken 12/12/2024 0424 by Conchita Swan, RN  Safety Promotion/Fall Prevention: safety round/check completed  Taken 12/12/2024 0229 by Conchita Swan, RN  Safety Promotion/Fall Prevention: safety round/check completed  Taken 12/12/2024 0018 by Conchita Swan, RN  Safety Promotion/Fall Prevention: safety round/check completed  Taken 12/11/2024 2248 by Conchita Swan, RN  Safety Promotion/Fall Prevention:   safety round/check completed   nonskid shoes/slippers when out of bed  Intervention: Prevent Skin Injury  Description: Perform a screening for skin injury risk, such as pressure or moisture-associated skin damage on admission and at regular intervals throughout hospital stay.  Keep all areas of skin (especially folds)  clean and dry.  Maintain adequate skin hydration.  Relieve and redistribute pressure and protect bony prominences and skin at risk for injury; implement measures based on patient-specific risk factors.  Match turning and repositioning schedule to clinical condition.  Encourage weight shift frequently; assist with reposition if unable to complete independently.  Float heels off bed; avoid pressure on the Achilles tendon.  Keep skin free from extended contact with medical devices.  Optimize nutrition and hydration.  Encourage functional activity and mobility, as early as tolerated.  Use aids (e.g., slide boards, mechanical lift) during transfer.  Recent Flowsheet Documentation  Taken 12/12/2024 0424 by Conchita Swan RN  Body Position: position changed independently  Skin Protection:   incontinence pads utilized   hydrocolloids used   pectin skin barriers applied  Taken 12/11/2024 2248 by Conchita Swan RN  Body Position: position changed independently  Intervention: Prevent Infection  Description: Maintain skin and mucous membrane integrity; promote hand, oral and pulmonary hygiene.  Optimize fluid balance, nutrition, sleep and glycemic control to maximize infection resistance.  Identify potential sources of infection early to prevent or mitigate progression of infection (e.g., wound, lines, devices).  Evaluate ongoing need for invasive devices; remove promptly when no longer indicated.  Review vaccination status.  Recent Flowsheet Documentation  Taken 12/12/2024 0424 by Conchita Swan RN  Infection Prevention:   environmental surveillance performed   equipment surfaces disinfected   hand hygiene promoted   rest/sleep promoted   single patient room provided  Taken 12/12/2024 0229 by Conchita Swan RN  Infection Prevention:   hand hygiene promoted   equipment surfaces disinfected   environmental surveillance performed   rest/sleep promoted   single patient room provided  Taken 12/12/2024 0018 by Beny  Conchita, RN  Infection Prevention:   single patient room provided   hand hygiene promoted   equipment surfaces disinfected   environmental surveillance performed  Taken 12/11/2024 2248 by Conchita Swan, RN  Infection Prevention:   single patient room provided   rest/sleep promoted   hand hygiene promoted   equipment surfaces disinfected   environmental surveillance performed  Goal: Optimal Comfort and Wellbeing  Outcome: Unable to Meet  Intervention: Provide Person-Centered Care  Description: Use a family-focused approach to care; encourage support system presence and participation.  Develop trust and rapport by proactively providing information, encouraging questions, addressing concerns and offering reassurance.  Acknowledge emotional response to hospitalization.  Recognize and utilize personal coping strategies and strengths; develop goals via shared decision-making.  Honor spiritual and cultural preferences.  Recent Flowsheet Documentation  Taken 12/11/2024 2248 by Conchita Swan, RN  Trust Relationship/Rapport: care explained  Goal: Readiness for Transition of Care  Outcome: Unable to Meet  Goal: Plan of Care Review  Outcome: Unable to Meet  Flowsheets (Taken 12/11/2024 2245)  Outcome Evaluation: plan of care just now established  Goal: Patient-Specific Goal (Individualized)  Outcome: Unable to Meet  Goal: Absence of Hospital-Acquired Illness or Injury  Outcome: Unable to Meet  Intervention: Identify and Manage Fall Risk  Description: Perform standard risk assessment on admission using a validated tool or comprehensive approach appropriate to the patient; reassess fall risk frequently, with change in status or transfer to another level of care.  Communicate risk to interprofessional healthcare team; ensure fall risk visible cue.  Determine need for increased observation, equipment and environmental modification, as well as use of supportive, nonskid footwear.  Adjust safety measures to individual needs and  identified risk factors.  Reinforce the importance of active participation with fall risk prevention, safety, and physical activity with the patient and family.  Perform regular intentional rounding to assess need for position change, pain assessment and personal needs, including assistance with toileting.  Recent Flowsheet Documentation  Taken 12/12/2024 0424 by Conchita Swan RN  Safety Promotion/Fall Prevention: safety round/check completed  Taken 12/12/2024 0229 by Conchita Swan RN  Safety Promotion/Fall Prevention: safety round/check completed  Taken 12/12/2024 0018 by Conchita Swan RN  Safety Promotion/Fall Prevention: safety round/check completed  Taken 12/11/2024 2248 by Conchita Swan RN  Safety Promotion/Fall Prevention:   safety round/check completed   nonskid shoes/slippers when out of bed  Intervention: Prevent Skin Injury  Description: Perform a screening for skin injury risk, such as pressure or moisture-associated skin damage on admission and at regular intervals throughout hospital stay.  Keep all areas of skin (especially folds) clean and dry.  Maintain adequate skin hydration.  Relieve and redistribute pressure and protect bony prominences and skin at risk for injury; implement measures based on patient-specific risk factors.  Match turning and repositioning schedule to clinical condition.  Encourage weight shift frequently; assist with reposition if unable to complete independently.  Float heels off bed; avoid pressure on the Achilles tendon.  Keep skin free from extended contact with medical devices.  Optimize nutrition and hydration.  Encourage functional activity and mobility, as early as tolerated.  Use aids (e.g., slide boards, mechanical lift) during transfer.  Recent Flowsheet Documentation  Taken 12/12/2024 0424 by Conchita Swan RN  Body Position: position changed independently  Skin Protection:   incontinence pads utilized   hydrocolloids used   pectin skin barriers  applied  Taken 12/11/2024 2248 by Conchita Swan RN  Body Position: position changed independently  Intervention: Prevent Infection  Description: Maintain skin and mucous membrane integrity; promote hand, oral and pulmonary hygiene.  Optimize fluid balance, nutrition, sleep and glycemic control to maximize infection resistance.  Identify potential sources of infection early to prevent or mitigate progression of infection (e.g., wound, lines, devices).  Evaluate ongoing need for invasive devices; remove promptly when no longer indicated.  Review vaccination status.  Recent Flowsheet Documentation  Taken 12/12/2024 0424 by Conchita Swan, RN  Infection Prevention:   environmental surveillance performed   equipment surfaces disinfected   hand hygiene promoted   rest/sleep promoted   single patient room provided  Taken 12/12/2024 0229 by Conchita Swan RN  Infection Prevention:   hand hygiene promoted   equipment surfaces disinfected   environmental surveillance performed   rest/sleep promoted   single patient room provided  Taken 12/12/2024 0018 by Conchita Swan RN  Infection Prevention:   single patient room provided   hand hygiene promoted   equipment surfaces disinfected   environmental surveillance performed  Taken 12/11/2024 2248 by Conchita Swan, RN  Infection Prevention:   single patient room provided   rest/sleep promoted   hand hygiene promoted   equipment surfaces disinfected   environmental surveillance performed  Goal: Optimal Comfort and Wellbeing  Outcome: Unable to Meet  Intervention: Provide Person-Centered Care  Description: Use a family-focused approach to care; encourage support system presence and participation.  Develop trust and rapport by proactively providing information, encouraging questions, addressing concerns and offering reassurance.  Acknowledge emotional response to hospitalization.  Recognize and utilize personal coping strategies and strengths; develop goals via shared  decision-making.  Honor spiritual and cultural preferences.  Recent Flowsheet Documentation  Taken 12/11/2024 2248 by Conchita Swan RN  Trust Relationship/Rapport: care explained  Goal: Readiness for Transition of Care  Outcome: Unable to Meet   Goal Outcome Evaluation:              Outcome Evaluation: plan of care just now established                             Problem: Adult Inpatient Plan of Care  Goal: Plan of Care Review  Outcome: Unable to Meet  Flowsheets (Taken 12/11/2024 2245)  Outcome Evaluation: plan of care just now established  Goal: Patient-Specific Goal (Individualized)  Outcome: Unable to Meet  Goal: Absence of Hospital-Acquired Illness or Injury  Outcome: Unable to Meet  Goal: Optimal Comfort and Wellbeing  Outcome: Unable to Meet  Goal: Readiness for Transition of Care  Outcome: Unable to Meet  Goal: Plan of Care Review  Outcome: Unable to Meet  Flowsheets (Taken 12/11/2024 2245)  Outcome Evaluation: plan of care just now established  Goal: Patient-Specific Goal (Individualized)  Outcome: Unable to Meet  Goal: Absence of Hospital-Acquired Illness or Injury  Outcome: Unable to Meet  Goal: Optimal Comfort and Wellbeing  Outcome: Unable to Meet  Goal: Readiness for Transition of Care  Outcome: Unable to Meet     Problem: Chest Pain  Goal: Resolution of Chest Pain Symptoms  Outcome: Unable to Meet     Problem: Skin Injury Risk Increased  Goal: Skin Health and Integrity  Outcome: Unable to Meet  Goal: Skin Health and Integrity  Outcome: Unable to Meet

## 2024-12-12 NOTE — THERAPY EVALUATION
Patient Name: Maryann Gaitan  : 1950    MRN: [id number]                              Today's Date: 2024       Admit Date: 2024    Visit Dx:     ICD-10-CM ICD-9-CM   1. Chest pain, unspecified type  R07.9 786.50   2. Pneumonia due to infectious organism, unspecified laterality, unspecified part of lung  J18.9 486     Patient Active Problem List   Diagnosis    Mitral regurgitation    Cavitary lesion of lung    CAD, multiple vessel    Acute heart failure with preserved ejection fraction (HFpEF)    Severe mitral regurgitation    Dyslipidemia    Crohn's disease    Hypothyroidism (acquired)    Anxiety associated with depression    COPD (chronic obstructive pulmonary disease)    Rheumatoid arthritis    Moderate protein-calorie malnutrition    Acute lower GI bleeding    First degree hemorrhoids    Benign neoplasm of cecum    GERD (gastroesophageal reflux disease)    Hashimoto's thyroiditis    History of colonic polyps    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    Fecal urgency    Second degree hemorrhoids    Vitamin D deficiency, unspecified    Stress incontinence, female    Aphthae, oral    Hx of seborrheic keratosis    Primary hypertension    S/P mitral valve clip implantation    Gastrointestinal hemorrhage    Anemia    PNA (pneumonia)     Past Medical History:   Diagnosis Date    Abnormal weight loss 2024    Acute kidney injury 2024    Acute UTI (urinary tract infection) 2024    Anxiety associated with depression 2024    Benign neoplasm of cecum 2016    CAD, multiple vessel 10/08/2024    Cavitary lesion of lung 10/08/2024    COPD (chronic obstructive pulmonary disease)     Crohn's disease 2024    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 2016    Dyslipidemia 10/30/2024    Fecal urgency 2024    First degree hemorrhoids 2021    GERD (gastroesophageal reflux disease) 2024    Hashimoto's thyroiditis 2024     History of colonic polyps 07/09/2021    Hypertension     Hypothyroidism (acquired) 11/06/2024    Mitral regurgitation 10/08/2024    Moderate protein-calorie malnutrition 11/09/2024    Multiple tracheobronchial mucus plugs 10/08/2024    Nicotine dependence 11/21/2024    Rheumatoid arthritis 11/06/2024    S/P mitral valve clip implantation 11/21/2024    Second degree hemorrhoids 07/05/2016    Stress incontinence, female 11/21/2024    Vitamin D deficiency, unspecified 11/21/2024     Past Surgical History:   Procedure Laterality Date    BRONCHOSCOPY N/A 10/16/2024    Procedure: BRONCHOSCOPY;  Surgeon: Gallo Pope MD;  Location: [facility] ENDOSCOPY;  Service: Pulmonary;  Laterality: N/A;    CARDIAC CATHETERIZATION N/A 10/15/2024    Procedure: Left Heart Cath, possible pci;  Surgeon: Travis Connor MD;  Location: [facility] CATH INVASIVE LOCATION;  Service: Cardiovascular;  Laterality: N/A;    CARDIAC CATHETERIZATION N/A 10/22/2024    Procedure: Laser Coronary Atherectomy;  Surgeon: Travis Connor MD;  Location: [facility] CATH INVASIVE LOCATION;  Service: Cardiovascular;  Laterality: N/A;    COLON RESECTION Right 11/28/2024    Procedure: RIGHT HEMICOLECTOMY WITH ILEOSTOMY;  Surgeon: Todd Babin MD;  Location: [facility] MAIN OR;  Service: General;  Laterality: Right;    COLONOSCOPY N/A 10/12/2024    Procedure: COLONOSCOPY WITH BIOPSY AND WIRE GUIDED BALLOON DILATION OF TERMINAL ILEUM;  Surgeon: Rob Strong MD;  Location: [facility] ENDOSCOPY;  Service: Gastroenterology;  Laterality: N/A;  Colitis, crohns of terminal ileum, right colon ulcers, diverticulosis, hemorroids    ENDOSCOPY N/A 10/12/2024    Procedure: ESOPHAGOGASTRODUODENOSCOPY WITH BIOPSY X 2 AREA;  Surgeon: Rob Strong MD;  Location: [facility] ENDOSCOPY;  Service: Gastroenterology;  Laterality: N/A;  Chronic gastritis, HH    ENDOSCOPY Left 11/28/2024    Procedure: ESOPHAGOGASTRODUODENOSCOPY;  Surgeon: Dallin Delgado  MD [place];  Location: [facility] ENDOSCOPY;  Service: Gastroenterology;  Laterality: Left;  small hiatal hernia    HYSTERECTOMY      LEFT HEART CATH      MITRAL VALVE REPAIR/REPLACEMENT N/A 11/21/2024    Procedure: Transcatheter Mitral Valve Repair;  Surgeon: Dmitri Navarro MD;  Location: [facility] HYBRID OR;  Service: Cardiovascular;  Laterality: N/A;      General Information       Row Name 12/12/24 1550          OT Time and Intention    Document Type evaluation  -SP     Mode of Treatment occupational therapy  -SP       Row Name 12/12/24 1550          General Information    Patient Profile Reviewed yes  -SP     Prior Level of Function mod assist:;ADL's  -SP     Existing Precautions/Restrictions fall  -SP     Barriers to Rehab medically complex;previous functional deficit;cognitive status  -SP       Row Name 12/12/24 1550          Living Environment    People in Home grandchild(keegan)  -SP       Row Name 12/12/24 1550          Home Main Entrance    Number of Stairs, Main Entrance two  -SP       Row Name 12/12/24 1556          Cognition    Orientation Status (Cognition) oriented to;person;time;disoriented to;place;situation;verbal cues/prompts needed for orientation  -SP       Row Name 12/12/24 1556          Safety Issues/Impairments Affecting Functional Mobility    Safety Issues Affecting Function (Mobility) awareness of need for assistance;insight into deficits/self-awareness;judgment;problem-solving  -SP     Impairments Affecting Function (Mobility) balance;cognition;endurance/activity tolerance;strength  -SP     Cognitive Impairments, Mobility Safety/Performance insight into deficits/self-awareness;awareness, need for assistance  -SP               User Key  (r) = Recorded By, (t) = Taken By, (c) = Cosigned By      Initials Name Provider Type    SP Andriy Guevara OT Occupational Therapist                     Mobility/ADL's       Row Name 12/12/24 155          Bed Mobility    Bed Mobility scooting/bridging  -SP      Scooting/Bridging [place] (Bed Mobility) 2 person assist;dependent (less than 25% patient effort)  -SP     Comment, (Bed Mobility) pain in chest/abdomen  -SP       Row Name 12/12/24 1553          Transfers    Transfers bed-chair transfer;sit-stand transfer  -SP       Row Name 12/12/24 1553          Bed-Chair Transfer    Bed-Chair [place] (Transfers) maximum assist (25% patient effort)  -SP       Row Name 12/12/24 1553          Sit-Stand Transfer    Sit-Stand [place] (Transfers) maximum assist (25% patient effort)  -SP       Row Name 12/12/24 1553          Functional Mobility    Patient was able to Ambulate no, other medical factors prevent ambulation  -SP     Reason Patient was unable to Ambulate Excessive Weakness  -SP       Row Name 12/12/24 1553          Activities of Daily Living    BADL Assessment/Intervention lower body dressing  -[facility] Name 12/12/24 1553          Lower Body Dressing Assessment/Training    [place] Level (Lower Body Dressing) don;pants/bottoms;dependent (less than 25% patient effort)  -SP               User Key  (r) = Recorded By, (t) = Taken By, (c) = Cosigned By      Initials Name Provider Type    SP Andriy Guevara OT Occupational Therapist                   Obj/Interventions       Row Name 12/12/24 1554          Sensory Assessment (Somatosensory)    Sensory Assessment (Somatosensory) UE sensation intact  -       Row Name 12/12/24 1554          Range of Motion Comprehensive    General Range of Motion bilateral upper extremity ROM WFL  -[facility] Name 12/12/24 1554          Strength Comprehensive (MMT)    Comment, General Manual Muscle Testing (MMT) Assessment Global weakness, BUE grossly 3+/5  -SP       Row Name 12/12/24 1554          Balance    Balance Assessment sitting static balance;sit to stand dynamic balance  -SP     Static Sitting Balance standby assist  -SP     Position, Sitting Balance sitting in chair  -SP     Sit to Stand Dynamic Balance maximum  assist  -SP               User Key  (r) = Recorded By, (t) = Taken By, (c) = Cosigned By      Initials Name Provider Type    SP Andriy Guevara, OT Occupational Therapist                   Goals/Plan       Row Name 12/12/24 1553          Bathing Goal 1 (OT)    Activity/Device (Bathing Goal 1, OT) bathing skills, all  -SP     [place] Level/Cues Needed (Bathing Goal 1, OT) moderate assist (50-74% patient effort)  -SP     Time Frame (Bathing Goal 1, OT) 2 weeks  -SP     Strategies/Barriers (Bathing Goal 1, OT) until d/c  -SP       Row Name 12/12/24 2861          Dressing Goal 1 (OT)    Activity/Device (Dressing Goal 1, OT) dressing skills, all  -SP     [place]/Cues Needed (Dressing Goal 1, OT) moderate assist (50-74% patient effort)  -SP     Time Frame (Dressing Goal 1, OT) 2 weeks  -SP     Strategies/Barriers (Dressing Goal 1, OT) until d/c  -SP       Row Name 12/12/24 2911          Toileting Goal 1 (OT)    Activity/Device (Toileting Goal 1, OT) toileting skills, all  -SP     [place] Level/Cues Needed (Toileting Goal 1, OT) moderate assist (50-74% patient effort)  -SP     Time Frame (Toileting Goal 1, OT) 2 weeks  -SP     Strategies/Barriers (Toileting Goal 1, OT) until d/c  -SP       Row Name 12/12/24 0258          Therapy Assessment/Plan (OT)    Planned Therapy Interventions (OT) activity tolerance training;BADL retraining;cognitive/visual perception retraining;IADL retraining;patient/caregiver education/training;occupation/activity based interventions;ROM/therapeutic exercise;strengthening exercise;transfer/mobility retraining  -SP               User Key  (r) = Recorded By, (t) = Taken By, (c) = Cosigned By      Initials Name Provider Type    SP Andriy Guevara OT Occupational Therapist                   Clinical Impression       Row Name 12/12/24 9084          Pain Assessment    Pretreatment Pain Rating 5/10  -SP     Posttreatment Pain Rating 5/10  -SP     Pain Location chest;abdomen  -SP      Response to Pain Interventions no change per patient report  -SP       Row Name 12/12/24 2341          Plan of Care Review    Plan of Care Reviewed With patient  -SP     Outcome Evaluation Pt is a 75 y/o female admitted to [facility] on 12/11/24 with c/o chest pain. She resides at a nursing facility and developed chest pain while riding on an exercise bike. Of note, pt recently discharged from [facility] on 12/4/2024 after being treated for a lower GI bleed with right hemicolectomy with ileostomy and mucous fistula creation, required several units of PRBCs; and patient underwent a mitral valve clip on 11/21/2024. PMHx significant for CAD, HTN, hyperlipidemia, COPD, hypothyroidism, and crohn's disease. At baseline, pt resides in a [facility] with a basement, however resides on main level, x2 [place], with x2 grandchildren (One works night shifts and the other works days shifts, able to provided 24/7 supervision) with a renter who is a family friend. Pt reports she is IND in ADLs, utilizes walker and rollator though prefers walker, and does not drive. Upon assessment, pt sitting in chair with adult son in room, A&O to person, and current time, disoriented to place. Pt dependent assist to complete ostomy care and don brief this date and she was soiled in urine with max assist to come to standing with FWW and stand-pivot t/f from bed to chair. RN in room for safety and repors she will increase suction to external catheter. Based on assessment, pt remains high falls risk, cognitive processing requiring increased time and verbal cueing, and exhibits global weakness, not safe to return home with family at this time. OT recommending return to SNF with continued OT services when medically appropriate for d/c.  -SP       Row Name 12/12/24 3714          Therapy Assessment/Plan (OT)    Criteria for Skilled Therapeutic Interventions Met (OT) yes;meets criteria;skilled treatment is necessary  -SP     Therapy Frequency (OT) 5 times/wk  -SP     Predicted  Duration of Therapy Intervention (OT) until d/c  -SP       Row Name 12/12/24 1552          Therapy Plan Review/Discharge Plan (OT)    Anticipated Discharge Disposition (OT) skilled nursing facility  -SP       Row Name 12/12/24 1559          Vital Signs    O2 Delivery Pre Treatment room air  -SP     O2 Delivery Intra Treatment room air  -SP     Post SpO2 (%) 100  -SP     O2 Delivery Post Treatment room air  -SP     Pre Patient Position Sitting  -SP     Intra Patient Position Standing  -SP     Post Patient Position Supine  -SP       Row Name 12/12/24 6463          Positioning and Restraints    Pre-Treatment Position sitting in chair/recliner  -SP     Post Treatment Position bed  -SP     In Bed with nsg;fowlers;call light within reach;encouraged to call for assist;exit alarm on  -SP               User Key  (r) = Recorded By, (t) = Taken By, (c) = Cosigned By      Initials Name Provider Type    SP Andriy Guevara, AL Occupational Therapist                   Outcome Measures       Row Name 12/12/24 6560          How much help from another is currently needed...    Putting on and taking off regular lower body clothing? 1  -SP     Bathing (including washing, rinsing, and drying) 2  -SP     Toileting (which includes using toilet bed pan or urinal) 1  -SP     Putting on and taking off regular upper body clothing 2  -SP     Taking care of personal grooming (such as brushing teeth) 3  -SP     Eating meals 3  -SP     AM-PAC 6 Clicks Score (OT) 12  -SP       Row Name 12/12/24 1322 12/12/24 0850       How much help from another person do you currently need...    Turning from your back to your side while in flat bed without using bedrails? 3  -JH 3  -AC    Moving from lying on back to sitting on the side of a flat bed without bedrails? 2  -JH 3  -AC    Moving to and from a bed to a chair (including a wheelchair)? 2  -JH 3  -AC    Standing up from a chair using your arms (e.g., wheelchair, bedside chair)? 2  -JH 3  -AC    Climbing  3-5 steps with a railing? 1  - 3  -AC    To walk in hospital room? 2  - 3  -AC    AM-PAC 6 Clicks Score (PT) 12  - 18  -    Highest Level of Mobility Goal 4 --> Transfer to chair/commode  - 6 --> Walk 10 steps or more  -      Row Name 12/12/24 1559 12/12/24 1322       Functional Assessment    Outcome Measure Options AM-PAC 6 Clicks Daily Activity (OT)  -SP AM-PAC 6 Clicks Basic Mobility (PT)  -              User Key  (r) = Recorded By, (t) = Taken By, (c) = Cosigned By      Initials Name Provider Type    Hilary Jacobsen, PT Physical Therapist    Nadja Horta, RN Registered Nurse    Andriy Omer, OT Occupational Therapist                    Occupational Therapy Education       Title: PT OT SLP Therapies (In Progress)       Topic: Occupational Therapy (In Progress)       Point: ADL training (Done)       Description:   Instruct learner(s) on proper safety adaptation and remediation techniques during self care or transfers.   Instruct in proper use of assistive devices.                  Learning Progress Summary            Patient Acceptance, E,TB, VU by SP at 12/12/2024 7359                      Point: Home exercise program (Not Started)       Description:   Instruct learner(s) on appropriate technique for monitoring, assisting and/or progressing therapeutic exercises/activities.                  Learner Progress:  Not documented in this visit.              Point: Precautions (Done)       Description:   Instruct learner(s) on prescribed precautions during self-care and functional transfers.                  Learning Progress Summary            Patient Acceptance, E,TB, VU by SP at 12/12/2024 5948                      Point: Body mechanics (Done)       Description:   Instruct learner(s) on proper positioning and spine alignment during self-care, functional mobility activities and/or exercises.                  Learning Progress Summary            Patient Acceptance, E,TB, VU by SP at  12/12/2024 1559                                      User Key       Initials Effective Dates Name Provider Type Discipline    SP 11/15/23 -  Andriy Guevara, [place] Occupational Therapist OT                  OT Recommendation and Plan  Planned Therapy Interventions (OT): activity tolerance training, BADL retraining, cognitive/visual perception retraining, IADL retraining, patient/caregiver education/training, occupation/activity based interventions, ROM/therapeutic exercise, strengthening exercise, transfer/mobility retraining  Therapy Frequency (OT): 5 times/wk  Plan of Care Review  Plan of Care Reviewed With: patient  Outcome Evaluation: Pt is a 75 y/o female admitted to [facility] on 12/11/24 with c/o chest pain. She resides at a nursing facility and developed chest pain while riding on an exercise bike. Of note, pt recently discharged from [facility] on 12/4/2024 after being treated for a lower GI bleed with right hemicolectomy with ileostomy and mucous fistula creation, required several units of PRBCs; and patient underwent a mitral valve clip on 11/21/2024. PMHx significant for CAD, HTN, hyperlipidemia, COPD, hypothyroidism, and crohn's disease. At baseline, pt resides in a [facility] with a basement, however resides on main level, x2 [facility], with x2 grandchildren (One works night shifts and the other works days shifts, able to provided 24/7 supervision) with a renter who is a family friend. Pt reports she is IND in ADLs, utilizes walker and rollator though prefers walker, and does not drive. Upon assessment, pt sitting in chair with adult son in room, A&O to person, and current time, disoriented to place. Pt dependent assist to complete ostomy care and don brief this date and she was soiled in urine with max assist to come to standing with FWW and stand-pivot t/f from bed to chair. RN in room for safety and repors she will increase suction to external catheter. Based on assessment, pt remains high falls risk, cognitive processing  requiring increased time and verbal cueing, and exhibits global weakness, not safe to return home with family at this time. OT recommending return to SNF with continued OT services when medically appropriate for d/c.     Time Calculation:         Time Calculation- OT       Row Name 12/12/24 1559             Time Calculation- OT    OT Start Time 1410  -SP      OT Stop Time 1431  -SP      OT Time Calculation (min) 21 min  -SP      OT Received On 12/12/24  -SP      OT - Next Appointment 12/13/24  -SP      OT Goal Re-Cert Due Date 12/26/24  -SP                User Key  (r) = Recorded By, (t) = Taken By, (c) = Cosigned By      Initials Name Provider Type    SP Andriy Guevara OT Occupational Therapist                  Therapy Charges for Today       Code Description Service Date Service Provider Modifiers Qty    [id number] HC OT EVAL MOD COMPLEXITY 4 12/12/2024 Andriy Guevara OT GO 1                 Andriy Guevara OT  12/12/2024

## 2024-12-12 NOTE — NURSING NOTE
Two person skin check on admission.  Lower extremities are scattered purple marks.  Right hip has an area of abrasion that appears like it might be from the colostomy bag laying on the area.   Sacrum has a mepilex in place.  Large area of redness that indicates a pressure area.  Small opening in the middle.    Heels are blottable and boggy.  Will consult Wound care and place heel protectors on feet.

## 2024-12-12 NOTE — SIGNIFICANT NOTE
12/12/24 1715   Readmission Indications   Is the patient and/or family able to complete the readmission assessment questions? No  (completed via epic charting only)   Is this hospitalization related to the prior hospital diagnosis? No  (11/19-12-4 acute lower GI bleeding & current for chest pain)   Recommendation for rehospitalization   Did you speak with your physician prior to coming to the hospital No  ([facility] sent patient to ED)   Follow-up Appointments   Do you have a PCP? Yes  (Dr. Eduard Little)   Did you have an appointment with PCP after your hospitalization? Yes   When was your appointment scheduled?   (Patient seen by MD at [facility])   Did you go to appointment? Yes   Did you have an appointment with a Specialist? No   When was your appointment scheduled?  --    Did you go to appointment? No   Are you current with the Pulmonary Clinic? No   Are you current with the CHF Clinic? No   Medications   Did you have newly prescribed medications at discharge? Yes  (Start: mesalamine, metoprolol succinate xl, Narcan, ondansetron. Stop: prednisone, metoprolol tartrate)   Did you understand the reasons for your medications at discharge and how to take them? Yes   Did you understand the side effects of your medications? Yes   Are you taking all of you prescribed medications? Yes   What pharmacy was used to fill prescription(s)? [facility] [place] uses [facility] [place]   Were medications picked up? Yes   Discharge Instructions   Did you understand your discharge instructions? Yes  (son Nadir was present at [place])   Did your family/caregiver hear your instructions? Yes   Were you told to eat a special diet? Yes   Did you adhere to the diet? Yes   Were you given a number of someone to call if you had questions or concerns? Yes   Index discharge location/services   Where did you go upon discharge? Skilled Nursing Facility   Do you have supportive family or friends in the home? Yes   Was the provider seen at the  facility? Yes   What actions were taken to avoid a readmit? patient had c/o chest pain and required evaluation   Which Skilled Nursing Facility were your admitted from? [facility]   Discharge Readiness   On a scale of 1-5 (5 being well prepared), how ready were you for discharge 5  (CM present for DC last admission and patient was ready to discharge)   Recommendation based on interview Education on diagnosis/self management   Palliative Care/Hospice   Are you current with Palliative Care? No   Are you current with Hospice Care? No   Advance Directives (For Healthcare)   Pre-existing AND/MOST/POLST Order Yes, notify physician for order   Advance Directive Status Patient has advance directive, copy in chart   Type of Advance Directive Health care directive/Living will   Have you reviewed your Advance Directive and is it valid for this stay? Yes   Literature Provided on Advance Directives No   Patient Requests Assistance on Advance Directives Patient Declined   Readmission Assessment Final Comments   Final Comments Previous: Adm for acute lower GI bleed. C/O abd pain, sob, rectal bleeding. GI eval, hgb 5.5 and required multiple units of PRBC, nursing reported “worms” in stool but parasite testing neg. Continued hgb drop, EGD revealed active bleeding. Gen sx performed right hemicolectomy w/ ileostomy & mucous fistula creation. Cardio performed mitral valve clip. Pt disoriented, psych eval prior to dc to SNF. Current: Adm for chest pain. Xray reveals bilat PNE. IV Rocephin started, blood cx pending, duonebs, proBNP 2790, Hgb 8.0. LACE 16

## 2024-12-12 NOTE — DISCHARGE PLACEMENT REQUEST
"Bhargavi Gaitan (74 y.o. Female)       Date of Birth   1950    Social Security Number       Address   [address] [place] [facility] [place] [facility]    Home Phone   [phone]    MRN   [id number]       [employer]   None    Marital Status                               Admission Date   12/11/24    Admission Type   Emergency    Admitting Provider   Tip Stone MD    Attending Provider   Noreen Nj MD    Department, Room/Bed   [facility] 2F, 2204/1       Discharge Date       Discharge Disposition       Discharge Destination                                 Attending Provider: Noreen Nj MD    Allergies: Codeine, Penicillin G Sodium    Isolation: None   Infection: None   Code Status: CPR    Ht: 162.6 cm (64\")   Wt: 57.3 kg (126 lb 5.2 oz)    Admission Cmt: None   Principal Problem: PNA (pneumonia) [J18.9]                   Active Insurance as of 12/11/2024       Primary Coverage       Payor Plan Insurance Group Employer/Plan Group    HUMANA MEDICARE REPLACEMENT HUMANA MED ADV SNP HMO [id number]       Payor Plan Address Payor Plan Phone Number Payor Plan Fax Number Effective Dates    [address] [address] [phone]  4/1/2024 - None Entered    [facility] [id number]         Subscriber Name Subscriber Birth Date Member ID       BHARGAVI GAITAN 1950 [id number]                     Emergency Contacts        (Rel.) Home Phone Work Phone Mobile Phone    Nadir Gaitan (Son) -- -- [phone]    Sania Celeste (Grandchild) -- -- [phone]    Hector [place] (Grandchild) -- -- [phone]            "

## 2024-12-12 NOTE — CASE MANAGEMENT/SOCIAL WORK
Discharge Planning Assessment   [place]     Patient Name: Maryann Gaitan  MRN: [id number]  Today's Date: 12/12/2024    Admit Date: 12/11/2024    Plan: Return to [place]. PASRR not needed. Precert required   Discharge Needs Assessment       Row Name 12/12/24 1731       Resource/Environmental Concerns    Resource/Environmental Concerns none    Transportation Concerns none       Transportation Needs    In the past 12 months, has lack of transportation kept you from medical appointments or from getting medications? no    In the past 12 months, has lack of transportation kept you from meetings, work, or from getting things needed for daily living? No       Food Insecurity    Within the past 12 months, you worried that your food would run out before you got the money to buy more. Never true    Within the past 12 months, the food you bought just didn't last and you didn't have money to get more. Never true       Transition Planning    Patient/Family Anticipates Transition to other (see comments)  SNF    Patient/Family Anticipated Services at Transition skilled nursing    Transportation Anticipated health plan transportation       Discharge Needs Assessment    Readmission Within the Last 30 Days current reason for admission unrelated to previous admission    Current Outpatient/Agency/Support Group skilled nursing facility    Equipment Currently Used at Home oxygen;cane, straight;rollator;nebulizer;shower chair;bp cuff    Concerns to be Addressed care coordination/care conferences;discharge planning    Do you want help finding or keeping work or a job? I do not need or want help    Do you want help with school or training? For example, starting or completing job training or getting a high school diploma, GED or equivalent Patient declined    Anticipated Changes Related to Illness none    Equipment Needed After Discharge none    Outpatient/Agency/Support Group Needs skilled nursing facility    Discharge Facility/Level of  Care Needs nursing facility, skilled    Provided Post Acute Provider List? N/A    Provided Post Acute Provider Quality & Resource List? N/A    Offered/Gave Vendor List no      Row Name 12/12/24 1727       Living Environment    People in Home facility resident  SNF [facility]    Current Living Arrangements extended care facility    Potentially Unsafe Housing Conditions none    In the past 12 months has the electric, gas, oil, or water company threatened to shut off services in your home? No    Primary Care Provided by other (see comments)  SNF or family    Provides Primary Care For no one    Family Caregiver if Needed child(keegan), adult    Family Caregiver Names dhruv Schmitz, granddaughter Sania    Quality of Family Relationships helpful;involved;supportive    Able to Return to Prior Arrangements yes       Resource/Environmental Concerns    Resource/Environmental Concerns none                   Discharge Plan       Row Name 12/12/24 6854       Plan    Plan Return to [facility]. PASRR not needed. Precert required    Plan Comments CM met with patient at bedside to discuss discharge planning. A&O x 2, at times poor historian. Patient known to CM from previous admission. Patient came to ED from [facility] ([facility] Previous to that she was living at home with family. PCP and pharmacy verified. No issues affording medications. Used [facility] prior to SNF admission. Has all needed DME and discharged on 2lpm O2 previous admission, currently on room air. CM spoke to POA/dhruv Schmitz to verify discharge plans and he does wish for patient to return to [facility] at discharge. Patient will likely need EMS transportation to facility.                  Continued Care and Services - Admitted Since 12/11/2024       Destination       Service Provider Request Status Services Address Phone Fax Patient Preferred    [facility] Pending - Request Sent -- [address] [place] [address] [phone] --               Demographic Summary       Row Name 12/12/24 1725       General Information    Admission Type inpatient    Arrived From emergency department    Referral Source admission list    Reason for Consult care coordination/care conference;discharge planning    Preferred Language English       Contact Information    Permission Granted to Share Info With                    Functional Status       Row Name 12/12/24 1726       Functional Status    Usual Activity Tolerance moderate    Current Activity Tolerance moderate       Functional Status, IADL    Medications assistive person    Meal Preparation assistive person    Housekeeping assistive person    Laundry assistive person    Shopping assistive person    If for any reason you need help with day-to-day activities such as bathing, preparing meals, shopping, managing finances, etc., do you get the help you need? Patient declined       Mental Status Summary    Recent Changes in Mental Status/Cognitive Functioning no changes                 Shraddha Lozano RN     [facility]  Office: [phone]  Cell: [phone]  Fax # [phone]

## 2024-12-12 NOTE — PLAN OF CARE
Goal Outcome Evaluation:  Plan of Care Reviewed With: patient           Outcome Evaluation: 74 y.o. female with a CMH of CAD, hypertension, hyperlipidemia, COPD, hypothyroidism, crohns disease who presented to [facility] on 12/11/2024 with chest pain. Patient lives at a nursing facility and developed chest pain while riding on the exercise bike.Patient was discharged from [facility] on 12/4/2024 after being treated for a lower GI bleed with right hemicolectomy with ileostomy and mucous fistula creation, required several units of PRBCs; and patient underwent a mitral valve clip on 11/21/2024.  This date, pt reports she feels weak, tired.  Pt required min A to sit EOB, weak when attempting to stand with A x1.  Required A x2 to stand and transfer to bedside chair.  Pt was soiled in urine, reports she is incontinent.  RN aware and recommend external catheter.  Pt is not safe for home with family, recommend cont PT and return to SNF for rehab at d/c.    Anticipated Discharge Disposition (PT): skilled nursing facility

## 2024-12-12 NOTE — PLAN OF CARE
Problem: Adult Inpatient Plan of Care  Goal: Plan of Care Review  Outcome: Unable to Meet  Flowsheets (Taken 12/11/2024 2245)  Outcome Evaluation: plan of care just now established  Goal: Patient-Specific Goal (Individualized)  Outcome: Unable to Meet  Goal: Absence of Hospital-Acquired Illness or Injury  Outcome: Unable to Meet  Goal: Optimal Comfort and Wellbeing  Outcome: Unable to Meet  Goal: Readiness for Transition of Care  Outcome: Unable to Meet  Goal: Plan of Care Review  Outcome: Unable to Meet  Flowsheets (Taken 12/11/2024 2245)  Outcome Evaluation: plan of care just now established  Goal: Patient-Specific Goal (Individualized)  Outcome: Unable to Meet  Goal: Absence of Hospital-Acquired Illness or Injury  Outcome: Unable to Meet  Goal: Optimal Comfort and Wellbeing  Outcome: Unable to Meet  Goal: Readiness for Transition of Care  Outcome: Unable to Meet   Goal Outcome Evaluation:              Outcome Evaluation: plan of care just now established                             Problem: Adult Inpatient Plan of Care  Goal: Plan of Care Review  Outcome: Unable to Meet  Flowsheets (Taken 12/11/2024 2245)  Outcome Evaluation: plan of care just now established  Goal: Patient-Specific Goal (Individualized)  Outcome: Unable to Meet  Goal: Absence of Hospital-Acquired Illness or Injury  Outcome: Unable to Meet  Goal: Optimal Comfort and Wellbeing  Outcome: Unable to Meet  Goal: Readiness for Transition of Care  Outcome: Unable to Meet  Goal: Plan of Care Review  Outcome: Unable to Meet  Flowsheets (Taken 12/11/2024 2245)  Outcome Evaluation: plan of care just now established  Goal: Patient-Specific Goal (Individualized)  Outcome: Unable to Meet  Goal: Absence of Hospital-Acquired Illness or Injury  Outcome: Unable to Meet  Goal: Optimal Comfort and Wellbeing  Outcome: Unable to Meet  Goal: Readiness for Transition of Care  Outcome: Unable to Meet     Problem: Chest Pain  Goal: Resolution of Chest Pain  Symptoms  Outcome: Unable to Meet     Problem: Skin Injury Risk Increased  Goal: Skin Health and Integrity  Outcome: Unable to Meet  Goal: Skin Health and Integrity  Outcome: Unable to Meet

## 2024-12-13 LAB
ANION GAP SERPL CALCULATED.3IONS-SCNC: 8.6 MMOL/L (ref 5–15)
BASOPHILS # BLD AUTO: 0.02 10*3/MM3 (ref 0–0.2)
BASOPHILS NFR BLD AUTO: 0.3 % (ref 0–1.5)
BUN SERPL-MCNC: 8 MG/DL (ref 8–23)
BUN/CREAT SERPL: 13.6 (ref 7–25)
CALCIUM SPEC-SCNC: 8.3 MG/DL (ref 8.6–10.5)
CHLORIDE SERPL-SCNC: 101 MMOL/L (ref 98–107)
CO2 SERPL-SCNC: 23.4 MMOL/L (ref 22–29)
CREAT SERPL-MCNC: 0.59 MG/DL (ref 0.57–1)
DEPRECATED RDW RBC AUTO: 54.2 FL (ref 37–54)
EGFRCR SERPLBLD CKD-EPI 2021: 94.7 ML/MIN/1.73
EOSINOPHIL # BLD AUTO: 0.09 10*3/MM3 (ref 0–0.4)
EOSINOPHIL NFR BLD AUTO: 1.3 % (ref 0.3–6.2)
ERYTHROCYTE [DISTWIDTH] IN BLOOD BY AUTOMATED COUNT: 16.8 % (ref 12.3–15.4)
GLUCOSE SERPL-MCNC: 82 MG/DL (ref 65–99)
HCT VFR BLD AUTO: 24.3 % (ref 34–46.6)
HGB BLD-MCNC: 7.7 G/DL (ref 12–15.9)
IMM GRANULOCYTES # BLD AUTO: 0.33 10*3/MM3 (ref 0–0.05)
IMM GRANULOCYTES NFR BLD AUTO: 4.9 % (ref 0–0.5)
LYMPHOCYTES # BLD AUTO: 0.94 10*3/MM3 (ref 0.7–3.1)
LYMPHOCYTES NFR BLD AUTO: 13.9 % (ref 19.6–45.3)
MAGNESIUM SERPL-MCNC: 2.6 MG/DL (ref 1.6–2.4)
MCH RBC QN AUTO: 28.8 PG (ref 26.6–33)
MCHC RBC AUTO-ENTMCNC: 31.7 G/DL (ref 31.5–35.7)
MCV RBC AUTO: 91 FL (ref 79–97)
MONOCYTES # BLD AUTO: 0.62 10*3/MM3 (ref 0.1–0.9)
MONOCYTES NFR BLD AUTO: 9.2 % (ref 5–12)
NEUTROPHILS NFR BLD AUTO: 4.76 10*3/MM3 (ref 1.7–7)
NEUTROPHILS NFR BLD AUTO: 70.4 % (ref 42.7–76)
NRBC BLD AUTO-RTO: 0 /100 WBC (ref 0–0.2)
PHOSPHATE SERPL-MCNC: 3.2 MG/DL (ref 2.5–4.5)
PLATELET # BLD AUTO: 221 10*3/MM3 (ref 140–450)
PMV BLD AUTO: 9.5 FL (ref 6–12)
POTASSIUM SERPL-SCNC: 3.6 MMOL/L (ref 3.5–5.2)
RBC # BLD AUTO: 2.67 10*6/MM3 (ref 3.77–5.28)
SODIUM SERPL-SCNC: 133 MMOL/L (ref 136–145)
WBC NRBC COR # BLD AUTO: 6.76 10*3/MM3 (ref 3.4–10.8)

## 2024-12-13 PROCEDURE — 87070 CULTURE OTHR SPECIMN AEROBIC: CPT | Performed by: INTERNAL MEDICINE

## 2024-12-13 PROCEDURE — 84100 ASSAY OF PHOSPHORUS: CPT

## 2024-12-13 PROCEDURE — 85025 COMPLETE CBC W/AUTO DIFF WBC: CPT

## 2024-12-13 PROCEDURE — 25010000002 CEFTRIAXONE PER 250 MG: Performed by: INTERNAL MEDICINE

## 2024-12-13 PROCEDURE — 83735 ASSAY OF MAGNESIUM: CPT

## 2024-12-13 PROCEDURE — 80048 BASIC METABOLIC PNL TOTAL CA: CPT

## 2024-12-13 PROCEDURE — 87205 SMEAR GRAM STAIN: CPT | Performed by: INTERNAL MEDICINE

## 2024-12-13 RX ADMIN — MIDODRINE HYDROCHLORIDE 5 MG: 5 TABLET ORAL at 14:26

## 2024-12-13 RX ADMIN — Medication 10 ML: at 20:18

## 2024-12-13 RX ADMIN — LEVOTHYROXINE SODIUM 50 MCG: 50 TABLET ORAL at 05:26

## 2024-12-13 RX ADMIN — Medication 10 MG: at 20:18

## 2024-12-13 RX ADMIN — MIDODRINE HYDROCHLORIDE 5 MG: 5 TABLET ORAL at 21:27

## 2024-12-13 RX ADMIN — SERTRALINE HYDROCHLORIDE 50 MG: 50 TABLET ORAL at 08:49

## 2024-12-13 RX ADMIN — CEFTRIAXONE 2000 MG: 2 INJECTION, POWDER, FOR SOLUTION INTRAMUSCULAR; INTRAVENOUS at 14:26

## 2024-12-13 RX ADMIN — PANTOPRAZOLE SODIUM 40 MG: 40 TABLET, DELAYED RELEASE ORAL at 20:18

## 2024-12-13 RX ADMIN — Medication 10 ML: at 08:52

## 2024-12-13 RX ADMIN — ATORVASTATIN CALCIUM 40 MG: 40 TABLET, FILM COATED ORAL at 20:18

## 2024-12-13 RX ADMIN — MESALAMINE 1.2 G: 800 TABLET, DELAYED RELEASE ORAL at 08:49

## 2024-12-13 RX ADMIN — ASPIRIN 81 MG: 81 TABLET, COATED ORAL at 08:50

## 2024-12-13 RX ADMIN — GUAIFENESIN 1200 MG: 600 TABLET, MULTILAYER, EXTENDED RELEASE ORAL at 20:18

## 2024-12-13 RX ADMIN — CLOPIDOGREL BISULFATE 75 MG: 75 TABLET ORAL at 08:51

## 2024-12-13 RX ADMIN — GUAIFENESIN 1200 MG: 600 TABLET, MULTILAYER, EXTENDED RELEASE ORAL at 08:50

## 2024-12-13 RX ADMIN — MIDODRINE HYDROCHLORIDE 5 MG: 5 TABLET ORAL at 05:26

## 2024-12-13 RX ADMIN — FOLIC ACID 1000 MCG: 1 TABLET ORAL at 08:50

## 2024-12-13 NOTE — DISCHARGE PLACEMENT REQUEST
"Bhargavi Gaitan (74 y.o. Female)       Date of Birth   1950    Social Security Number       Address   [address] [facility] [place] [facility]    Home Phone   [phone]    MRN   [id number]       Mosque   None    Marital Status                               Admission Date   24    Admission Type   Emergency    Admitting Provider   Tip Stone MD    Attending Provider   Noreen Nj MD    Department, Room/Bed   [facility] 2F,        Discharge Date       Discharge Disposition       Discharge Destination                                 Attending Provider: Noreen Nj MD    Allergies: Codeine, Penicillin G Sodium    Isolation: None   Infection: None   Code Status: CPR    Ht: 162.6 cm (64\")   Wt: 57.3 kg (126 lb 5.2 oz)    Admission Cmt: None   Principal Problem: PNA (pneumonia) [J18.9]                   Active Insurance as of 2024       Primary Coverage       Payor Plan Insurance Group Employer/Plan Group    HUMANA MEDICARE REPLACEMENT HUMANA MED ADV SNP HMO [id number]       Payor Plan Address Payor Plan Phone Number Payor Plan Fax Number Effective Dates    [address] [address] [phone]  2024 - None Entered    [facility] [id number]         Subscriber Name Subscriber Birth Date Member ID       BHARGAVI GAITAN 1950 [id number]                     Emergency Contacts        (Rel.) Home Phone Work Phone Mobile Phone    Nadir Gaitan (Son) -- -- [phone]    Sania Celeste (Grandchild) -- -- [phone]    ReinierHector (Grandchild) -- -- [phone]                 History & Physical        Sierra Suazo APRN at 24 1524       Attestation signed by Tip Stone MD at 24 0749    I have reviewed this documentation and agree.                      [facility] Medicine Services  History & Physical    Patient Name: Bhargavi Gaitan  : 1950  MRN: [id number]  Primary Care Physician:  " Azam Calhoun MD  Date of admission: 12/11/2024  Date and Time of Service: 12/11/2024 at 1520    Subjective      Chief Complaint: chest pain    History of Present Illness: Maryann Gaitan is a 74 y.o. female with a CMH of CAD, hypertension, hyperlipidemia, COPD, hypothyroidism, crohns disease who presented to [facility] on 12/11/2024 with chest pain. Patient lives at a nursing facility and developed chest pain while riding on the exercise bike. Patient reported chest pain was gone by the time she arrived to the emergency department. Patient currently denies chest pain, does endorse pain with palpation. Patient endorses a nonproductive cough. Patient currently denies shortness of breath, fever, chills, nausea, vomiting, or any other acute issue. Of note, patient was seen in the ED yesterday for nosebleed and discharged home. Patient was also discharged from [facility] on 12/4/2024 after being treated for a lower GI bleed with right hemicolectomy with ileostomy and mucous fistula creation, required several units of PRBCs; and patient underwent a mitral valve clip on 11/21/2024.    In the ED: troponin 37, proBNP 2,790.9, Hb 8.0. Blood cultures drawn. EKG shows SR with PACs. CXR consistent with bilateral upper lobe pneumonia. Patient received ceftriaxone 2 g. Hospitalist team to admit at this time for further management.    Review of Systems   Constitutional:  Negative for chills and fever.   Respiratory:  Positive for cough. Negative for chest tightness and shortness of breath.    Cardiovascular:  Negative for chest pain and palpitations.   Gastrointestinal:  Negative for nausea and vomiting.   Genitourinary:  Negative for dysuria and hematuria.       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 07/05/2016    Dyslipidemia 10/30/2024    Fecal urgency 11/21/2024    First degree hemorrhoids 07/09/2021    GERD (gastroesophageal reflux disease) 11/21/2024    Hashimoto's thyroiditis 11/21/2024    History of colonic polyps 07/09/2021    Hypertension     Hypothyroidism (acquired) 11/06/2024    Mitral regurgitation 10/08/2024    Moderate protein-calorie malnutrition 11/09/2024    Multiple tracheobronchial mucus plugs 10/08/2024    Nicotine dependence 11/21/2024    Rheumatoid arthritis 11/06/2024    S/P mitral valve clip implantation 11/21/2024    Second degree hemorrhoids 07/05/2016    Stress incontinence, female 11/21/2024    Vitamin D deficiency, unspecified 11/21/2024       Past Surgical History:   Procedure Laterality Date    BRONCHOSCOPY N/A 10/16/2024    Procedure: BRONCHOSCOPY;  Surgeon: Gallo Pope MD;  Location: [facility] ENDOSCOPY;  Service: Pulmonary;  Laterality: N/A;    CARDIAC CATHETERIZATION N/A 10/15/2024    Procedure: Left Heart Cath, possible pci;  Surgeon: Travis Connor MD;  Location: [facility] CATH INVASIVE LOCATION;  Service: Cardiovascular;  Laterality: N/A;    CARDIAC CATHETERIZATION N/A 10/22/2024    Procedure: Laser Coronary Atherectomy;  Surgeon: Travis Connor MD;  Location: [facility] CATH INVASIVE LOCATION;  Service: Cardiovascular;  Laterality: N/A;    COLON RESECTION Right 11/28/2024    Procedure: RIGHT HEMICOLECTOMY WITH ILEOSTOMY;  Surgeon: Todd Babin MD;  Location: [facility] MAIN OR;  Service: General;  Laterality: Right;    COLONOSCOPY N/A 10/12/2024    Procedure: COLONOSCOPY WITH BIOPSY AND WIRE GUIDED BALLOON DILATION OF TERMINAL ILEUM;  Surgeon: Rob Strong MD;  Location: [facility] ENDOSCOPY;  Service: Gastroenterology;  Laterality: N/A;   Colitis, crohns of terminal ileum, right colon ulcers, diverticulosis, hemorroids    ENDOSCOPY N/A 10/12/2024    Procedure: ESOPHAGOGASTRODUODENOSCOPY WITH BIOPSY X 2 AREA;  Surgeon: Rob Strong MD;  Location: [facility] ENDOSCOPY;  Service: Gastroenterology;  Laterality: N/A;  Chronic gastritis, HH    ENDOSCOPY Left 11/28/2024    Procedure: ESOPHAGOGASTRODUODENOSCOPY;  Surgeon: Dallin Delgado MD;  Location: [facility] ENDOSCOPY;  Service: Gastroenterology;  Laterality: Left;  small hiatal hernia    HYSTERECTOMY      LEFT HEART CATH      MITRAL VALVE REPAIR/REPLACEMENT N/A 11/21/2024    Procedure: Transcatheter Mitral Valve Repair;  Surgeon: Dmitri Navarro MD;  Location: [facility] HYBRID OR;  Service: Cardiovascular;  Laterality: N/A;       Family History: family history includes Dementia in her mother; Heart disease in her father and mother; Hypertension in her father; Stroke in her mother. Otherwise pertinent FHx was reviewed and not pertinent to current issue.    Social History:  reports that she has quit smoking. Her smoking use included cigarettes. She has never used smokeless tobacco. She reports that she does not drink alcohol and does not use drugs.    Home Medications:  Prior to Admission Medications       Prescriptions Last Dose Informant Patient Reported? Taking?    Adalimumab (Humira, 2 Pen,) 40 MG/0.4ML Pen-injector Kit   Yes No    Inject 40 mg under the skin into the appropriate area as directed 1 (One) Time Per Week. Saturdays   Indications: Rheumatoid Arthritis    albuterol (PROVENTIL) (2.5 MG/3ML) 0.083% nebulizer solution   Yes No    Take 2.5 mg by nebulization Every 6 (Six) Hours As Needed for Wheezing. Indications: Spasm of Lung Air Passages    amLODIPine (NORVASC) 10 MG tablet   Yes No    Take 1 tablet by mouth Daily.    aspirin 81 MG EC tablet   No No    Take 1 tablet by mouth Daily for 30 days. Indications: Disease involving Lipid Deposits in the Arteries    atorvastatin (LIPITOR)  40 MG tablet   No No    Take 1 tablet by mouth Every Night for 30 days. Indications: High Amount of Fats in the Blood    cholecalciferol (VITAMIN D3) 1.25 MG (74516 UT) capsule   Yes No    Take 1 capsule by mouth 2 (Two) Times a Week. Wed, Sat  Indications: Vitamin D Deficiency    clopidogrel (PLAVIX) 75 MG tablet   No No    Take 1 tablet by mouth Daily for 30 days. Indications: Ischemic Heart Disease    diclofenac (VOLTAREN) 50 MG EC tablet   Yes No    Take 1 tablet by mouth 2 (Two) Times a Day.    folic acid (FOLVITE) 1 MG tablet   Yes No    Take 1 tablet by mouth Daily. Indications: Anemia From Inadequate Folic Acid    levothyroxine (SYNTHROID, LEVOTHROID) 50 MCG tablet   Yes No    Take 1 tablet by mouth Daily. Indications: Underactive Thyroid    Melatonin (Melatonin Extra Strength) 10 MG tablet   Yes No    Take 1 tablet by mouth As Needed. Indications: Trouble Sleeping    mesalamine (LIALDA) 1.2 g EC tablet   No No    Take 1 tablet by mouth Daily. Indications: Crohn's Disease    methotrexate 2.5 MG tablet   Yes No    Take 6 tablets by mouth 1 (One) Time Per Week. Saturdays   Indications: Non-oncologic    metoprolol succinate XL (TOPROL-XL) 25 MG 24 hr tablet   No No    Take 1 tablet by mouth Daily. Indications: Atrial Fibrillation    midodrine (PROAMATINE) 5 MG tablet   No No    Take 1 tablet by mouth 3 (Three) Times a Day Before Meals. Indications: Disorder of Low Blood Pressure    naloxone (NARCAN) 4 MG/0.1ML nasal spray   No No    Call 911. Don't prime. [place] in 1 nostril for overdose. Repeat in 2-3 minutes in other nostril if no or minimal breathing/responsiveness.  Indications: Opioid Overdose    ondansetron ODT (ZOFRAN-ODT) 4 MG disintegrating tablet   No No    Take 1 tablet by mouth Every 6 (Six) Hours As Needed for Nausea or Vomiting. Indications: Nausea and Vomiting Following an Operation    pantoprazole (PROTONIX) 20 MG EC tablet   Yes No    Take 1 tablet by mouth Daily. Indications: Heartburn     phenylephrine (YAYA-SYNEPHRINE) 1 % nasal spray   No No    Administer 1 spray into the nostril(s) as directed by provider 3 (Three) Times a Day for 3 days.    sertraline (ZOLOFT) 50 MG tablet   Yes No    Take 1 tablet by mouth Daily. Indications: Major Depressive Disorder    spironolactone (ALDACTONE) 25 MG tablet   Yes No    Take 1 tablet by mouth Daily. Indications: Edema    upadacitinib ER (Rinvoq) 45 MG tablet sustained-release 24 hour extended release tablet   Yes No    Take 1 tablet by mouth Daily. Indications: Rheumatoid Arthritis              Allergies:  Allergies   Allergen Reactions    Codeine Hives    Penicillin G Sodium Hives       Objective      Vitals:   Temp:  [98 °F (36.7 °C)] 98 °F (36.7 °C)  Heart Rate:  [57-64] 57  Resp:  [17-22] 17  BP: (109-129)/(60-72) 109/60  Body mass index is 21.68 kg/m².  Physical Exam  Constitutional:       Appearance: She is normal weight.   HENT:      Head: Normocephalic and atraumatic.      Nose: Nose normal.      Mouth/Throat:      Mouth: Mucous membranes are moist.      Pharynx: Oropharynx is clear.   Eyes:      Extraocular Movements: Extraocular movements intact.      Conjunctiva/sclera: Conjunctivae normal.      Pupils: Pupils are equal, round, and reactive to light.   Cardiovascular:      Rate and Rhythm: Normal rate and regular rhythm.      Pulses: Normal pulses.      Heart sounds: Normal heart sounds.   Pulmonary:      Effort: Pulmonary effort is normal.      Breath sounds: Normal breath sounds.   Abdominal:      General: Bowel sounds are normal.      Palpations: Abdomen is soft.   Musculoskeletal:         General: Normal range of motion.      Cervical back: Neck supple.   Skin:     General: Skin is warm and dry.   Neurological:      General: No focal deficit present.      Mental Status: She is alert and oriented to person, place, and time.   Psychiatric:         Mood and Affect: Mood normal.         Behavior: Behavior normal.         Thought Content: Thought  "content normal.         Judgment: Judgment normal.         Diagnostic Data:  Lab Results (last 24 hours)       Procedure Component Value Units Date/Time    Procalcitonin [[id number]]  (Normal) Collected: 12/11/24 1504    Specimen: Blood from Arm, Left Updated: 12/11/24 1613     Procalcitonin 0.10 ng/mL     Narrative:      As a Marker for Sepsis (Non-Neonates):    1. <0.5 ng/mL represents a low risk of severe sepsis and/or septic shock.  2. >2 ng/mL represents a high risk of severe sepsis and/or septic shock.    As a Marker for Lower Respiratory Tract Infections that require antibiotic therapy:    PCT on Admission    Antibiotic Therapy       6-12 Hrs later    >0.5                Strongly Recommended  >0.25 - <0.5        Recommended   0.1 - 0.25          Discouraged              Remeasure/reassess PCT  <0.1                Strongly Discouraged     Remeasure/reassess PCT    As 28 day mortality risk marker: \"Change in Procalcitonin Result\" (>80% or <=80%) if Day 0 (or Day 1) and Day 4 values are available. Refer to http://[email or web address]pct-cal[place].com    Change in PCT <=80%  A decrease of PCT levels below or equal to 80% defines a positive change in PCT test result representing a higher risk for 28-day all-cause mortality of patients diagnosed with severe sepsis for septic shock.    Change in PCT >80%  A decrease of PCT levels of more than 80% defines a negative change in PCT result representing a lower risk for 28-day all-cause mortality of patients diagnosed with severe sepsis or septic shock.       High Sensitivity Troponin T 1Hr [[id number]]  (Abnormal) Collected: 12/11/24 1504    Specimen: Blood from Arm, Left Updated: 12/11/24 1532     HS Troponin T 33 ng/L      Troponin T Delta -4 ng/L      Troponin T % Change -11 %     Narrative:      High Sensitive Troponin T Reference Range:  <14.0 ng/L- Negative Female for AMI  <22.0 ng/L- Negative Male for AMI  >=14 - Abnormal Female indicating possible myocardial injury.  >=22 " - Abnormal Male indicating possible myocardial injury.   Clinicians would have to utilize clinical acumen, EKG, Troponin, and serial changes to determine if it is an Acute Myocardial Infarction or myocardial injury due to an underlying chronic condition.         Blood Culture - Blood, Arm, Left [[id number]] Collected: 12/11/24 1453    Specimen: Blood from Arm, Left Updated: 12/11/24 1456    Blood Culture - Blood, Arm, Left [[id number]] Collected: 12/11/24 1448    Specimen: Blood from Arm, Left Updated: 12/11/24 1450    Comprehensive Metabolic Panel [[id number]]  (Abnormal) Collected: 12/11/24 1343    Specimen: Blood from Arm, Left Updated: 12/11/24 1434     Glucose 96 mg/dL      BUN 12 mg/dL      Creatinine 0.91 mg/dL      Sodium 135 mmol/L      Potassium 4.4 mmol/L      Comment: Slight hemolysis detected by analyzer. Result may be falsely elevated.        Chloride 102 mmol/L      CO2 22.7 mmol/L      Calcium 8.4 mg/dL      Total Protein 5.2 g/dL      Albumin 2.7 g/dL      ALT (SGPT) 31 U/L      AST (SGOT) 34 U/L      Comment: Slight hemolysis detected by analyzer. Result may be falsely elevated.        Alkaline Phosphatase 132 U/L      Total Bilirubin 0.2 mg/dL      Globulin 2.5 gm/dL      A/G Ratio 1.1 g/dL      BUN/Creatinine Ratio 13.2     Anion Gap 10.3 mmol/L      eGFR 66.3 mL/min/1.73     Narrative:      GFR Categories in Chronic Kidney Disease (CKD)      GFR Category          GFR (mL/min/1.73)    Interpretation  G1                     90 or greater         Normal or high (1)  G2                      60-89                Mild decrease (1)  G3a                   45-59                Mild to moderate decrease  G3b                   30-44                Moderate to severe decrease  G4                    15-29                Severe decrease  G5                    14 or less           Kidney failure          (1)In the absence of evidence of kidney disease, neither GFR category G1 or G2 fulfill the criteria for  CKD.    eGFR calculation 2021 CKD-EPI creatinine equation, which does not include race as a factor    BNP [[id number]]  (Abnormal) Collected: 12/11/24 1343    Specimen: Blood from Arm, Left Updated: 12/11/24 1431     proBNP 2,790.0 pg/mL     Narrative:      This assay is used as an aid in the diagnosis of individuals suspected of having heart failure. It can be used as an aid in the diagnosis of acute decompensated heart failure (ADHF) in patients presenting with signs and symptoms of ADHF to the emergency department (ED). In addition, NT-proBNP of <300 pg/mL indicates ADHF is not likely.    Age Range Result Interpretation  NT-proBNP Concentration (pg/mL:      <50             Positive            >450                   Gray                 300-450                    Negative             <300    50-75           Positive            >900                  Gray                300-900                  Negative            <300      >75             Positive            >1800                  Gray                300-1800                  Negative            <300    High Sensitivity Troponin T [[id number]]  (Abnormal) Collected: 12/11/24 1343    Specimen: Blood from Arm, Left Updated: 12/11/24 1431     HS Troponin T 37 ng/L     Narrative:      High Sensitive Troponin T Reference Range:  <14.0 ng/L- Negative Female for AMI  <22.0 ng/L- Negative Male for AMI  >=14 - Abnormal Female indicating possible myocardial injury.  >=22 - Abnormal Male indicating possible myocardial injury.   Clinicians would have to utilize clinical acumen, EKG, Troponin, and serial changes to determine if it is an Acute Myocardial Infarction or myocardial injury due to an underlying chronic condition.         Manual Differential [[id number]]  (Abnormal) Collected: 12/11/24 1343    Specimen: Blood from Arm, Left Updated: 12/11/24 1411     Neutrophil % 46.0 %      Lymphocyte % 25.0 %      Monocyte % 3.0 %      Basophil % 2.0 %      Bands %  19.0 %       Metamyelocyte % 2.0 %      Myelocyte % 2.0 %      Atypical Lymphocyte % 1.0 %      Neutrophils Absolute 3.33 10*3/mm3      Lymphocytes Absolute 1.33 10*3/mm3      Monocytes Absolute 0.15 10*3/mm3      Basophils Absolute 0.10 10*3/mm3      RBC Morphology Normal     WBC Morphology Normal     Platelet Estimate Adequate    CBC & Differential [[id number]]  (Abnormal) Collected: 12/11/24 1343    Specimen: Blood from Arm, Left Updated: 12/11/24 1411    Narrative:      The following orders were created for panel order CBC & Differential.  Procedure                               Abnormality         Status                     ---------                               -----------         ------                     CBC Auto Differential[[id number]]        Abnormal            Final result               Scan Slide[[id number]]                                       Final result                 Please view results for these tests on the individual orders.    CBC Auto Differential [[id number]]  (Abnormal) Collected: 12/11/24 1343    Specimen: Blood from Arm, Left Updated: 12/11/24 1411     WBC 5.13 10*3/mm3      RBC 2.83 10*6/mm3      Hemoglobin 8.0 g/dL      Hematocrit 26.8 %      MCV 94.7 fL      MCH 28.3 pg      MCHC 29.9 g/dL      RDW 17.0 %      RDW-SD 57.2 fl      MPV 10.0 fL      Platelets 255 10*3/mm3     Narrative:      The previously reported component NRBC is no longer being reported. Previous result was 0.0 /100 WBC (Reference Range: 0.0-0.2 /100 WBC) on 12/11/2024 at 1352 EST.    Scan Slide [[id number]] Collected: 12/11/24 1343    Specimen: Blood from Arm, Left Updated: 12/11/24 1411     Scan Slide --     Comment: See Manual Differential Results       aPTT [[id number]]  (Normal) Collected: 12/11/24 1343    Specimen: Blood from Arm, Left Updated: 12/11/24 1402     PTT 34.0 seconds     Protime-INR [[id number]]  (Normal) Collected: 12/11/24 1343    Specimen: Blood from Arm, Left Updated: 12/11/24 1359     Protime 14.1 Seconds       "INR 1.09             Imaging Results (Last 24 Hours)       Procedure Component Value Units Date/Time    XR Chest 1 View [[id number]] Collected: 12/11/24 1400     Updated: 12/11/24 1404    Narrative:      XR CHEST 1 VW    Date of Exam: 12/11/2024 1:45 PM EST    Indication: Chest pain, shortness of breath, hypoxia.    Comparison: 11/25/2024.    Findings:  There is patchy consolidation in the upper lobes felt to represent bilateral pneumonia. There is a density in the right upper lobe adjacent to the minor fissure felt to represent subsegmental atelectasis. There may be a trace right pleural effusion. The   left pleural space is clear. The heart size is normal. The pulmonary vascular markings are normal. There are chronic age-related changes involving the bony thorax and thoracic aorta.      Impression:      Impression:  1.Patchy consolidation in the upper lobes felt to represent bilateral pneumonia.  2.Right upper lobe subsegmental atelectasis.  3.Questionable trace right pleural effusion.        Electronically Signed: Michi Taylor MD    12/11/2024 2:02 PM EST    Workstation ID: [id number]              Assessment & Plan        This is a 74 y.o. female with:    Active and Resolved Problems  Active Hospital Problems    Diagnosis  POA    **PNA (pneumonia) [J18.9]  Yes      Resolved Hospital Problems   No resolved problems to display.       Bilateral upper lobe pneumonia   Bandemia  - CXR: \"Patchy consolidation in the upper lobes felt to represent bilateral pneumonia\", see full report for additional findings  - WBC 5.13  - Procal pending  - Blood cultures pending  - Abx: rocephin 2 g given in ED, will continue  - Start guaifenesin 1200 mg BID  - Titrate O2 to keep O2 sat 90-95%  - Start duonebs Q6H RT scheduled, albuterol Q4H PRN  - Encourage IS usage every 4 hours while awake     Chest pain, resolved  - Exertional while on exercise bike today, now resolved  - Trop 33, down from 37  - Trop delta -4  - proBNP 2.790.0; baseline " 1,780.0 - 2,909.0  - Continuous cardiac monitor    HFpEF  CAD  Mitral regurgitation  - s/p MitraClip 24  - EF 56- 60%  - Continue home meds    Crohns  - s/p right hemicolectomy with ileostomy on 24  - Hb 8.0, down from 8.7 on 12/10/24  - Follow CBC  - Continue home med    Hypertension  - Continue home medications  - Monitor BP per order    Hyperlipidemia  - Continue statin     Hypothyroidism  - Continue home dose of levothyroxine    GERD  - Continue PPI    VTE Prophylaxis:  Mechanical VTE prophylaxis orders are present.        The patient desires to be as follows:    CODE STATUS:    Level Of Support Discussed With: Patient  Code Status (Patient has no pulse and is not breathing): CPR (Attempt to Resuscitate)  Medical Interventions (Patient has pulse or is breathing): Full Support        Nadir Gaitan, son, who can be contacted at [phone], is the designated person to make medical decisions on the patient's behalf if She is incapable of doing so. This was clarified with patient and/or next of kin on 2024 during the course of this H&P.    Admission Status:  I believe this patient meets inpatient status.    Expected Length of Stay: 1-2 days    PDMP and Medication Dispenses via Sidebar reviewed and consistent with patient reported medications.    I discussed the patient's findings and my recommendations with patient.      Signature:     This document has been electronically signed by DRU Park on 2024 20:45 EST   [facility]ist Team     Electronically signed by Tip Stone MD at 24 0728       Operative/Procedure Notes (all)    No notes of this type exist for this encounter.          Physician Progress Notes (last 48 hours)        Noreen Nj MD at 24 1701              [facility] MEDICINE SERVICE  DAILY PROGRESS NOTE    NAME: Maryann Gaitan  : 1950  MRN: [id number]      LOS: 1 day     PROVIDER OF SERVICE: Noreen Nj  MD    Chief Complaint: PNA (pneumonia)    Subjective:     Interval History:  History taken from: patient    No new complaint        Review of Systems:   Review of Systems   All other systems reviewed and are negative.      Objective:     Vital Signs  Temp:  [97.8 °F (36.6 °C)-98.2 °F (36.8 °C)] 98 °F (36.7 °C)  Heart Rate:  [57-94] 83  Resp:  [17-27] 24  BP: (109-129)/(60-69) 109/62   Body mass index is 21.68 kg/m².    Physical Exam  Physical Exam  Constitutional:       Appearance: Normal appearance.   HENT:      Head: Normocephalic and atraumatic.      Nose: Nose normal.      Mouth/Throat:      Mouth: Mucous membranes are moist.   Eyes:      Extraocular Movements: Extraocular movements intact.      Pupils: Pupils are equal, round, and reactive to light.   Cardiovascular:      Rate and Rhythm: Normal rate and regular rhythm.   Pulmonary:      Effort: Pulmonary effort is normal.      Breath sounds: Normal breath sounds.   Abdominal:      General: Abdomen is flat. Bowel sounds are normal.      Palpations: Abdomen is soft.   Musculoskeletal:         General: Normal range of motion.      Cervical back: Normal range of motion and neck supple.   Skin:     General: Skin is warm and dry.   Neurological:      General: No focal deficit present.      Mental Status: She is alert and oriented to person, place, and time.   Psychiatric:         Mood and Affect: Mood normal.         Behavior: Behavior normal.         Thought Content: Thought content normal.         Judgment: Judgment normal.         Current Medications:  Scheduled Meds:amLODIPine, 10 mg, Oral, Daily  aspirin, 81 mg, Oral, Daily  atorvastatin, 40 mg, Oral, Nightly  cefTRIAXone, 2,000 mg, Intravenous, Q24H  clopidogrel, 75 mg, Oral, Daily  folic acid, 1,000 mcg, Oral, Daily  guaiFENesin, 1,200 mg, Oral, Q12H  levothyroxine, 50 mcg, Oral, Q AM  magnesium sulfate, 4 g, Intravenous, Once  mesalamine, 1,200 mg, Oral, Daily With Breakfast  metoprolol succinate XL, 25 mg,  Oral, Q24H  midodrine, 5 mg, Oral, Q8H  pantoprazole, 40 mg, Oral, Nightly  sertraline, 50 mg, Oral, Daily  sodium chloride, 10 mL, Intravenous, Q12H  spironolactone, 25 mg, Oral, Daily      Continuous Infusions:   PRN Meds:.  acetaminophen **OR** acetaminophen **OR** acetaminophen    albuterol    senna-docusate sodium **AND** polyethylene glycol **AND** bisacodyl **AND** bisacodyl    calcium carbonate    Calcium Replacement - Follow Nurse / BPA Driven Protocol    ipratropium-albuterol    Magnesium Standard Dose Replacement - Follow Nurse / BPA Driven Protocol    melatonin    nitroglycerin    ondansetron ODT **OR** ondansetron    Phosphorus Replacement - Follow Nurse / BPA Driven Protocol    Potassium Replacement - Follow Nurse / BPA Driven Protocol    [COMPLETED] Insert Peripheral IV **AND** sodium chloride    sodium chloride       Diagnostic Data    Results from last 7 days   Lab Units 12/12/24  0747 12/12/24  0654 12/11/24  1343   WBC 10*3/mm3  --  5.35 5.13   HEMOGLOBIN g/dL  --  7.2* 8.0*   HEMATOCRIT %  --  22.9* 26.8*   PLATELETS 10*3/mm3  --  176 255   GLUCOSE mg/dL 76  --  96   CREATININE mg/dL 0.60  --  0.91   BUN mg/dL 11  --  12   SODIUM mmol/L 136  --  135*   POTASSIUM mmol/L 4.2  --  4.4   AST (SGOT) U/L  --   --  34*   ALT (SGPT) U/L  --   --  31   ALK PHOS U/L  --   --  132*   BILIRUBIN mg/dL  --   --  0.2   ANION GAP mmol/L 9.8  --  10.3       XR Chest 1 View    Result Date: 12/11/2024  Impression: 1.Patchy consolidation in the upper lobes felt to represent bilateral pneumonia. 2.Right upper lobe subsegmental atelectasis. 3.Questionable trace right pleural effusion. Electronically Signed: Michi Taylor MD  12/11/2024 2:02 PM EST  Workstation ID: [id number]       I reviewed the patient's new clinical results.    Assessment/Plan:     Active and Resolved Problems  Active Hospital Problems    Diagnosis  POA    **PNA (pneumonia) [J18.9]  Yes      Resolved Hospital Problems   No resolved problems to display.        Pneumonia  - Ceftriaxone, azithromycin and follow-up blood cultures.    Anemia  - Hemoglobin 7.2.  Monitor.  Transfuse blood for hemoglobin of 7 or less.    Crohns  - s/p right hemicolectomy with ileostomy on 24  - Hb 8.0, down from 8.7 on 12/10/24  - Has been seen by general surgery today.  Staples were removed.  Okay to start diet per general surgery.    Hypertension  - Blood pressure is controlled.  Continue current blood pressure regimen.    Hypothyroidism  - Continue levothyroxine    Pending clinical improvement  - Continue Protonix        VTE Prophylaxis:  Mechanical VTE prophylaxis orders are present.             Disposition Planning:     Barriers to Discharge: Pending clinical improvement  Anticipated Date of Discharge: 2024  Place of Discharge: Home    \    Code Status and Medical Interventions: CPR (Attempt to Resuscitate); Full Support   Ordered at: 24     Level Of Support Discussed With:    Patient     Code Status (Patient has no pulse and is not breathing):    CPR (Attempt to Resuscitate)     Medical Interventions (Patient has pulse or is breathing):    Full Support       Signature: Electronically signed by Noreen Nj MD, 24, 17:49 EST.  [facility] Hospitalist Team     Electronically signed by Noreen Nj MD at 24 [phone]       Todd Babin MD at 24 1402          General Surgery Progress Note    Name: Maryann Gaitan ADMIT: 2024   : 1950  PCP: Azam Calhoun MD    MRN: [id number] LOS: 1 days   AGE/SEX: 74 y.o. female  ROOM: [facility]/[address] [place] [address]    Chief Complaint   Patient presents with    Chest Pain     Patient BIB ems from [facility], [facility] reports CP given 1 nitro at facility. Patient has no current CP. Patient did mention that she had just gotten out of physcial therapy,and the CP was with movement.     Subjective     No blood in ostomy or mucous fistula, no bloody bowel movements, tolerating diet    Objective      Scheduled Medications:   amLODIPine, 10 mg, Oral, Daily  aspirin, 81 mg, Oral, Daily  atorvastatin, 40 mg, Oral, Nightly  cefTRIAXone, 2,000 mg, Intravenous, Q24H  clopidogrel, 75 mg, Oral, Daily  folic acid, 1,000 mcg, Oral, Daily  guaiFENesin, 1,200 mg, Oral, Q12H  levothyroxine, 50 mcg, Oral, Q AM  mesalamine, 1,200 mg, Oral, Daily With Breakfast  metoprolol succinate XL, 25 mg, Oral, Q24H  midodrine, 5 mg, Oral, Q8H  pantoprazole, 40 mg, Oral, Nightly  sertraline, 50 mg, Oral, Daily  sodium chloride, 10 mL, Intravenous, Q12H  spironolactone, 25 mg, Oral, Daily        Active Infusions:       As Needed Medications:    acetaminophen **OR** acetaminophen **OR** acetaminophen    albuterol    senna-docusate sodium **AND** polyethylene glycol **AND** bisacodyl **AND** bisacodyl    calcium carbonate    Calcium Replacement - Follow Nurse / BPA Driven Protocol    ipratropium-albuterol    Magnesium Standard Dose Replacement - Follow Nurse / BPA Driven Protocol    melatonin    nitroglycerin    ondansetron ODT **OR** ondansetron    Phosphorus Replacement - Follow Nurse / BPA Driven Protocol    Potassium Replacement - Follow Nurse / BPA Driven Protocol    [COMPLETED] Insert Peripheral IV **AND** sodium chloride    sodium chloride    Vital Signs  Vital Signs Patient Vitals for the past 24 hrs:   BP Temp Temp src Pulse Resp SpO2   12/12/24 1146 110/63 97.9 °F (36.6 °C) Oral 94 27 98 %   12/12/24 0712 129/69 98 °F (36.7 °C) Oral 92 26 97 %   12/12/24 0424 109/62 98.2 °F (36.8 °C) Oral 76 18 --   12/11/24 2248 -- 97.8 °F (36.6 °C) Oral -- 18 --   12/11/24 1927 109/60 -- -- 57 17 99 %   12/11/24 1508 129/72 -- -- 59 20 100 %   12/11/24 1403 116/61 -- -- 64 22 100 %     I/O:  I/O last 3 completed shifts:  In: 340 [P.O.:240; IV Piggyback:100]  Out: -     Physical Exam:  Physical Exam  Constitutional:       General: She is not in acute distress.     Appearance: Normal appearance. She is not ill-appearing.   HENT:      Head:  Normocephalic and atraumatic.      Right Ear: External ear normal.      Left Ear: External ear normal.   Eyes:      Extraocular Movements: Extraocular movements intact.      Conjunctiva/sclera: Conjunctivae normal.   Cardiovascular:      Rate and Rhythm: Normal rate and regular rhythm.   Pulmonary:      Effort: Pulmonary effort is normal. No respiratory distress.   Abdominal:      General: There is no distension.      Palpations: Abdomen is soft.      Tenderness: There is no abdominal tenderness.   Musculoskeletal:         General: No swelling or deformity.   Skin:     General: Skin is warm and dry.   Neurological:      Mental Status: She is alert and oriented to person, place, and time. Mental status is at baseline.         Results Review:     CBC    Results from last 7 days   Lab Units 12/12/24  0654 12/11/24  1343 12/10/24  1958   WBC 10*3/mm3 5.35 5.13 6.18   HEMOGLOBIN g/dL 7.2* 8.0* 8.7*   PLATELETS 10*3/mm3 176 255 230     BMP   Results from last 7 days   Lab Units 12/12/24  0747 12/12/24  0654 12/11/24  1343   SODIUM mmol/L 136  --  135*   POTASSIUM mmol/L 4.2  --  4.4   CHLORIDE mmol/L 105  --  102   CO2 mmol/L 21.2*  --  22.7   BUN mg/dL 11  --  12   CREATININE mg/dL 0.60  --  0.91   GLUCOSE mg/dL 76  --  96   MAGNESIUM mg/dL 1.5*  --   --    PHOSPHORUS mg/dL  --  3.1  --      Radiology(recent) XR Chest 1 View    Result Date: 12/11/2024  Impression: 1.Patchy consolidation in the upper lobes felt to represent bilateral pneumonia. 2.Right upper lobe subsegmental atelectasis. 3.Questionable trace right pleural effusion. Electronically Signed: Michi Taylor MD  12/11/2024 2:02 PM EST  Workstation ID: [id number]     I reviewed the patient's new clinical results.    Assessment & Plan       PNA (pneumonia)      74 y.o. female status post right hemicolectomy with ileostomy mucous fistula.  Removed her staples today wound healing well.  No evidence of bleeding from the stoma or mucous fistula.  Okay for diet from my  standpoint.  Okay for discharge follow-up in the office with me in 1 month.  I will be available inpatient as needed please call with changes questions or concerns.        This note was created using Dragon Voice Recognition software.    Todd Babin MD  24  14:02 EST     Electronically signed by Todd Babin MD at 24 1403       Consult Notes (last 48 hours)  Notes from 24 1313 through 24 1313   No notes of this type exist for this encounter.       Nutrition Notes (most recent note)    No notes exist for this encounter.       Speech Language Pathology Notes (most recent note)    No notes exist for this encounter.       Hilary Mcmillan, PT   Physical Therapist  Physical Therapy  Therapy Evaluation     Signed  Date of Service:  24 0857  Creation Time:  24 132     Signed        Expand All Collapse All  Patient Name: Maryann Gaitan                     : 1950                        MRN: [id number]                              Today's Date: 2024                                 Admit Date: 2024                      Visit Dx:   Visit Diagnosis       ICD-10-CM ICD-9-CM   1. Chest pain, unspecified type  R07.9 786.50   2. Pneumonia due to infectious organism, unspecified laterality, unspecified part of lung  J18.9 486         Problem List       Patient Active Problem List   Diagnosis    Mitral regurgitation    Cavitary lesion of lung    CAD, multiple vessel    Acute heart failure with preserved ejection fraction (HFpEF)    Severe mitral regurgitation    Dyslipidemia    Crohn's disease    Hypothyroidism (acquired)    Anxiety associated with depression    COPD (chronic obstructive pulmonary disease)    Rheumatoid arthritis    Moderate protein-calorie malnutrition    Acute lower GI bleeding    First degree hemorrhoids    Benign neoplasm of cecum    GERD (gastroesophageal reflux disease)    Hashimoto's thyroiditis    History of colonic polyps    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  perforation or 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    Fecal urgency    Second degree hemorrhoids    Vitamin D deficiency, unspecified    Stress incontinence, female    Aphthae, oral    Hx of seborrheic keratosis    Primary hypertension    S/P mitral valve clip implantation    Gastrointestinal hemorrhage    Anemia    PNA (pneumonia)         Medical History        Past Medical History:   Diagnosis Date    Abnormal weight loss 11/21/2024    Acute kidney injury 11/06/2024    Acute UTI (urinary tract infection) 11/06/2024    Anxiety associated with depression 11/06/2024    Benign neoplasm of cecum 07/05/2016    CAD, multiple vessel 10/08/2024    Cavitary lesion of lung 10/08/2024    COPD (chronic obstructive pulmonary disease)      Crohn's disease 11/06/2024    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 07/05/2016    Dyslipidemia 10/30/2024    Fecal urgency 11/21/2024    First degree hemorrhoids 07/09/2021    GERD (gastroesophageal reflux disease) 11/21/2024    Hashimoto's thyroiditis 11/21/2024    History of colonic polyps 07/09/2021    Hypertension      Hypothyroidism (acquired) 11/06/2024    Mitral regurgitation 10/08/2024    Moderate protein-calorie malnutrition 11/09/2024    Multiple tracheobronchial mucus plugs 10/08/2024    Nicotine dependence 11/21/2024    Rheumatoid arthritis 11/06/2024    S/P mitral valve clip implantation 11/21/2024    Second degree hemorrhoids 07/05/2016    Stress incontinence, female 11/21/2024    Vitamin D deficiency, unspecified 11/21/2024         Surgical History         Past Surgical History:   Procedure Laterality Date    BRONCHOSCOPY N/A 10/16/2024     Procedure: BRONCHOSCOPY;  Surgeon: Gallo Pope MD;  Location: [facility] ENDOSCOPY;  Service: Pulmonary;  Laterality: N/A;    CARDIAC CATHETERIZATION N/A 10/15/2024     Procedure: Left Heart Cath, possible pci;  Surgeon: Travis Connor MD;  Location: [facility] CATH INVASIVE LOCATION;  Service: Cardiovascular;  Laterality: N/A;     CARDIAC CATHETERIZATION N/A 10/22/2024     Procedure: Laser Coronary Atherectomy;  Surgeon: Travis Connor MD;  Location: [facility] CATH INVASIVE LOCATION;  Service: Cardiovascular;  Laterality: N/A;    COLON RESECTION Right 11/28/2024     Procedure: RIGHT HEMICOLECTOMY WITH ILEOSTOMY;  Surgeon: Todd Babin MD;  Location: [facility] MAIN OR;  Service: General;  Laterality: Right;    COLONOSCOPY N/A 10/12/2024     Procedure: COLONOSCOPY WITH BIOPSY AND WIRE GUIDED BALLOON DILATION OF TERMINAL ILEUM;  Surgeon: Rob Strong MD;  Location: [facility] ENDOSCOPY;  Service: Gastroenterology;  Laterality: N/A;  Colitis, crohns of terminal ileum, right colon ulcers, diverticulosis, hemorroids    ENDOSCOPY N/A 10/12/2024     Procedure: ESOPHAGOGASTRODUODENOSCOPY WITH BIOPSY X 2 AREA;  Surgeon: Rob Strong MD;  Location: [facility] ENDOSCOPY;  Service: Gastroenterology;  Laterality: N/A;  Chronic gastritis, HH    ENDOSCOPY Left 11/28/2024     Procedure: ESOPHAGOGASTRODUODENOSCOPY;  Surgeon: Dallin Delgado MD;  Location: [facility] ENDOSCOPY;  Service: Gastroenterology;  Laterality: Left;  small hiatal hernia    HYSTERECTOMY        LEFT HEART CATH        MITRAL VALVE REPAIR/REPLACEMENT N/A 11/21/2024     Procedure: Transcatheter Mitral Valve Repair;  Surgeon: Dmitri Navarro MD;  Location: [facility] HYBRID OR;  Service: Cardiovascular;  Laterality: N/A;           General Information         Row Name 12/12/24 8218                 Physical Therapy Time and Intention     Document Type evaluation  -       Mode of Treatment physical therapy  -          Row Name 12/12/24 9293                 General Information     Patient Profile Reviewed yes  -       Prior Level of Function min assist:;transfer  reports she was walking some at the rehab, getting up in w/c  -       Existing Precautions/Restrictions fall  -       Barriers to Rehab medically complex;previous functional deficit;cognitive status   -[facility] Name 12/12/24 1317                 Living Environment     People in Home grandchild(keegan)  -[facility] Name 12/12/24 1317                 Cognition     Orientation Status (Cognition) oriented to;person;place;disoriented to;situation;time;verbal cues/prompts needed for orientation  -[facility] Name 12/12/24 1317                 Safety Issues/Impairments Affecting Functional Mobility     Safety Issues Affecting Function (Mobility) insight into deficits/self-awareness;judgment;problem-solving  -       Impairments Affecting Function (Mobility) balance;cognition;endurance/activity tolerance;strength  -                       User Key  (r) = Recorded By, (t) = Taken By, (c) = Cosigned By        Initials Name Provider Type     Hilary Jacobsen PT Physical Therapist                            Mobility         [facility] Name 12/12/24 1318                 Bed Mobility     Bed Mobility supine-sit  -       Supine-Sit [place] (Bed Mobility) minimum assist (75% patient effort)  -[facility] Name 12/12/24 1318                 Bed-Chair Transfer     Bed-Chair [place] (Transfers) moderate assist (50% patient effort);2 person assist  -JH          Row Name 12/12/24 1318                 Sit-Stand Transfer     Sit-Stand [place] (Transfers) moderate assist (50% patient effort);2 person assist  -       Comment, (Sit-Stand Transfer) attempted x 2 with A x2, pt unable to come to full stance.  with 2nd person assist able to stand/pivot to chair  -[facility] Name 12/12/24 1318                 Gait/Stairs (Locomotion)     [place] Level (Gait) unable to assess  -                       User Key  (r) = Recorded By, (t) = Taken By, (c) = Cosigned By        Initials Name Provider Type     Hilary Jacobsen PT Physical Therapist                            Obj/Interventions         [facility] Name 12/12/24 1319                 Range of Motion Comprehensive     Comment, General Range of Motion BLE  HELEN/IRISH WFLs.  -[facility] Name 12/12/24 1319                 Strength Comprehensive (MMT)     Comment, General Manual Muscle Testing (MMT) Assessment generalized weakness and fatigues with few repetitions.  UEs 3/5,  LEs 3/5.  -JH          Row Name 12/12/24 1319                 Balance     Balance Assessment sitting static balance;sitting dynamic balance  -       Static Sitting Balance modified independence  -       Dynamic Sitting Balance standby assist  -       Position, Sitting Balance sitting edge of bed  -                       User Key  (r) = Recorded By, (t) = Taken By, (c) = Cosigned By        Initials Name Provider Type      Hilary Mcmillan, PT Physical Therapist                            Goals/Plan         Row Name 12/12/24 1322                 Bed Mobility Goal 1 (PT)     Activity/Assistive Device (Bed Mobility Goal 1, PT) bed mobility activities, all  -       [place] Level/Cues Needed (Bed Mobility Goal 1, PT) modified independence  -       Time Frame (Bed Mobility Goal 1, PT) 1 week  -JH          Row Name 12/12/24 1322                 Transfer Goal 1 (PT)     Activity/Assistive Device (Transfer Goal 1, PT) sit-to-stand/stand-to-sit;bed-to-chair/chair-to-bed  -       [place] Level/Cues Needed (Transfer Goal 1, PT) minimum assist (75% or more patient effort)  -       Time Frame (Transfer Goal 1, PT) 2 weeks  -JH          Row Name 12/12/24 1322                 Gait Training Goal 1 (PT)     Activity/Assistive Device (Gait Training Goal 1, PT) gait (walking locomotion);assistive device use;walker, rolling  -       [place] Level (Gait Training Goal 1, PT) minimum assist (75% or more patient effort)  -       Distance (Gait Training Goal 1, PT) 30'  -JH          Row Name 12/12/24 1322                 ROM Goal 1 (PT)     Time Frame (ROM Goal 1, PT) 2 weeks  -JH          Row Name 12/12/24 1322                 Therapy Assessment/Plan (PT)     Planned Therapy Interventions  (PT) balance training;bed mobility training;gait training;transfer training;strengthening;patient/family education;ROM (range of motion)  -                    User Key  (r) = Recorded By, (t) = Taken By, (c) = Cosigned By        Initials Name Provider Type     Hilary Jacobsen, PT Physical Therapist                         Clinical Impression         Row Name 12/12/24 1322                 Pain     Pain Side/Orientation generalized  -       Additional Documentation Pain Scale: FACES Pre/Post-Treatment (Group)  -          Row Name 12/12/24 1320                 Pain Scale: FACES Pre/Post-Treatment     Pain: FACES Scale, Pretreatment 2-->hurts little bit  -       Posttreatment Pain Rating 2-->hurts little bit  -          Row Name 12/12/24 1320                 Plan of Care Review     Plan of Care Reviewed With patient  -       Outcome Evaluation 74 y.o. female with a CMH of CAD, hypertension, hyperlipidemia, COPD, hypothyroidism, crohns disease who presented to [facility] on [facility]/11/2024 with chest pain. Patient lives at a nursing facility and developed chest pain while riding on the exercise bike.Patient was discharged from [facility] on 12/4/2024 after being treated for a lower GI bleed with right hemicolectomy with ileostomy and mucous fistula creation, required several units of PRBCs; and patient underwent a mitral valve clip on 11/21/2024.  This date, pt reports she feels weak, tired.  Pt required min A to sit EOB, weak when attempting to stand with A x1.  Required A x2 to stand and transfer to bedside chair.  Pt was soiled in urine, reports she is incontinent.  RN aware and recommend external catheter.  Pt is not safe for home with family, recommend cont PT and return to SNF for rehab at d/c.  -          Row Name 12/12/24 1327                 Therapy Assessment/Plan (PT)     Rehab Potential (PT) fair  -       Criteria for Skilled Interventions Met (PT) yes  -       Therapy Frequency (PT) 5  times/wk  -          Row Name 12/12/24 1320                 Vital Signs     O2 Delivery Pre Treatment room air  -       O2 Delivery Intra Treatment room air  -       O2 Delivery Post Treatment room air  -          Row Name 12/12/24 1320                 Positioning and Restraints     Pre-Treatment Position in bed  -       Post Treatment Position chair  -       In Chair with nsg;reclined;call light within reach;encouraged to call for assist;exit alarm on  -                       User Key  (r) = Recorded By, (t) = Taken By, (c) = Cosigned By        Initials Name Provider Type     Hilary Jacobsen, PT Physical Therapist                            Outcome Measures         Row Name 12/12/24 1322 12/12/24 0850            How much help from another person do you currently need...     Turning from your back to your side while in flat bed without using bedrails? 3  - 3  -AC     Moving from lying on back to sitting on the side of a flat bed without bedrails? 2  - 3  -AC     Moving to and from a bed to a chair (including a wheelchair)? 2  - 3  -AC     Standing up from a chair using your arms (e.g., wheelchair, bedside chair)? 2  - 3  -AC     Climbing 3-5 steps with a railing? 1  - 3  -AC     To walk in hospital room? 2  - 3  -AC     AM-PAC 6 Clicks Score (PT) [facility] 18  -     Highest Level of Mobility Goal 4 --> Transfer to chair/commode  - 6 --> Walk 10 steps or more  -        Row Name 12/12/24 1322                 Functional Assessment     Outcome Measure Options AM-PAC 6 Clicks Basic Mobility (PT)  [facility]                       User Key  (r) = Recorded By, (t) = Taken By, (c) = Cosigned By        Initials Name Provider Type     Hilary Jacobsen, PT Physical Therapist     AC Nadja Hills RN Registered Nurse                          Physical Therapy Education            Title: PT OT SLP Therapies (Done)         Topic: Physical Therapy (Done)         Point: Mobility training (Done)          Learning Progress Summary             Patient Acceptance, E,TB, VU by  at 12/12/2024 1323                                                User Key         Initials Effective Dates Name Provider Type Discipline      06/16/21 -  Hilary Mcmillan PT Physical Therapist PT                          PT Recommendation and Plan  Planned Therapy Interventions (PT): balance training, bed mobility training, gait training, transfer training, strengthening, patient/family education, ROM (range of motion)  Outcome Evaluation: 74 y.o. female with a CMH of CAD, hypertension, hyperlipidemia, COPD, hypothyroidism, crohns disease who presented to [facility] on 12/11/2024 with chest pain. Patient lives at a nursing facility and developed chest pain while riding on the exercise bike.Patient was discharged from [facility] on 12/4/2024 after being treated for a lower GI bleed with right hemicolectomy with ileostomy and mucous fistula creation, required several units of PRBCs; and patient underwent a mitral valve clip on 11/21/2024.  This date, pt reports she feels weak, tired.  Pt required min A to sit EOB, weak when attempting to stand with A x1.  Required A x2 to stand and transfer to bedside chair.  Pt was soiled in urine, reports she is incontinent.  RN aware and recommend external catheter.  Pt is not safe for home with family, recommend cont PT and return to SNF for rehab at d/c.      Time Calculation:        PT Charges         Row Name 12/12/24 1323                       Time Calculation     Start Time 0836  -         Stop Time 0857  -         Time Calculation (min) 21 min  -         PT Received On 12/12/24  -         PT - Next Appointment 12/13/24  -                 Time Calculation- PT     Total Timed Code Minutes- PT 0 minute(s)  -                      User Key  (r) = Recorded By, (t) = Taken By, (c) = Cosigned By        Initials Name Provider Type      Hilary Mcmillan, CHINTAN Physical Therapist                        Therapy Charges for Today         Code Description Service Date Service Provider Modifiers Qty     [id number] HC PT EVAL MOD COMPLEXITY 4 2024 Hilary Mcmillan, PT GP 1                PT G-Codes  Outcome Measure Options: AM-PAC 6 Clicks Basic Mobility (PT)  AM-PAC 6 Clicks Score (PT): 12  PT Discharge Summary  Anticipated Discharge Disposition (PT): skilled nursing facility     Hilary Mcmillan, PT                      2024                                  Andriy Guevara OT   Occupational Therapist  Specialty:  Occupational Therapy  Therapy Evaluation     Signed  Date of Service:  24  Creation Time:  24     Signed        Expand All Collapse All  Patient Name: Maryann Gaitan                     : 1950                        MRN: [id number]                              Today's Date: 2024                                 Admit Date: 2024                      Visit Dx:   Visit Diagnosis       ICD-10-CM ICD-9-CM   1. Chest pain, unspecified type  R07.9 786.50   2. Pneumonia due to infectious organism, unspecified laterality, unspecified part of lung  J18.9 486         Problem List       Patient Active Problem List   Diagnosis    Mitral regurgitation    Cavitary lesion of lung    CAD, multiple vessel    Acute heart failure with preserved ejection fraction (HFpEF)    Severe mitral regurgitation    Dyslipidemia    Crohn's disease    Hypothyroidism (acquired)    Anxiety associated with depression    COPD (chronic obstructive pulmonary disease)    Rheumatoid arthritis    Moderate protein-calorie malnutrition    Acute lower GI bleeding    First degree hemorrhoids    Benign neoplasm of cecum    GERD (gastroesophageal reflux disease)    Hashimoto's thyroiditis    History of colonic polyps    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    Fecal urgency    Second degree hemorrhoids    Vitamin D deficiency, unspecified    Stress incontinence, female    Aphthae, oral     Hx of seborrheic keratosis    Primary hypertension    S/P mitral valve clip implantation    Gastrointestinal hemorrhage    Anemia    PNA (pneumonia)         Medical History        Past Medical History:   Diagnosis Date    Abnormal weight loss 11/21/2024    Acute kidney injury 11/06/2024    Acute UTI (urinary tract infection) 11/06/2024    Anxiety associated with depression 11/06/2024    Benign neoplasm of cecum 07/05/2016    CAD, multiple vessel 10/08/2024    Cavitary lesion of lung 10/08/2024    COPD (chronic obstructive pulmonary disease)      Crohn's disease 11/06/2024    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 07/05/2016    Dyslipidemia 10/30/2024    Fecal urgency 11/21/2024    First degree hemorrhoids 07/09/2021    GERD (gastroesophageal reflux disease) 11/21/2024    Hashimoto's thyroiditis 11/21/2024    History of colonic polyps 07/09/2021    Hypertension      Hypothyroidism (acquired) 11/06/2024    Mitral regurgitation 10/08/2024    Moderate protein-calorie malnutrition 11/09/2024    Multiple tracheobronchial mucus plugs 10/08/2024    Nicotine dependence 11/21/2024    Rheumatoid arthritis 11/06/2024    S/P mitral valve clip implantation 11/21/2024    Second degree hemorrhoids 07/05/2016    Stress incontinence, female 11/21/2024    Vitamin D deficiency, unspecified 11/21/2024         Surgical History         Past Surgical History:   Procedure Laterality Date    BRONCHOSCOPY N/A 10/16/2024     Procedure: BRONCHOSCOPY;  Surgeon: Gallo Pope MD;  Location: [facility] ENDOSCOPY;  Service: Pulmonary;  Laterality: N/A;    CARDIAC CATHETERIZATION N/A 10/15/2024     Procedure: Left Heart Cath, possible pci;  Surgeon: Travis Connor MD;  Location: [facility] CATH INVASIVE LOCATION;  Service: Cardiovascular;  Laterality: N/A;    CARDIAC CATHETERIZATION N/A 10/22/2024     Procedure: Laser Coronary Atherectomy;  Surgeon: Travis Connor MD;  Location: [facility] CATH INVASIVE LOCATION;   Service: Cardiovascular;  Laterality: N/A;    COLON RESECTION Right 11/28/2024     Procedure: RIGHT HEMICOLECTOMY WITH ILEOSTOMY;  Surgeon: Todd Babin MD;  Location: [facility] MAIN OR;  Service: General;  Laterality: Right;    COLONOSCOPY N/A 10/12/2024     Procedure: COLONOSCOPY WITH BIOPSY AND WIRE GUIDED BALLOON DILATION OF TERMINAL ILEUM;  Surgeon: Rob Strong MD;  Location: [facility] ENDOSCOPY;  Service: Gastroenterology;  Laterality: N/A;  Colitis, crohns of terminal ileum, right colon ulcers, diverticulosis, hemorroids    ENDOSCOPY N/A 10/12/2024     Procedure: ESOPHAGOGASTRODUODENOSCOPY WITH BIOPSY X 2 AREA;  Surgeon: Rob Strong MD;  Location: [facility] ENDOSCOPY;  Service: Gastroenterology;  Laterality: N/A;  Chronic gastritis, HH    ENDOSCOPY Left 11/28/2024     Procedure: ESOPHAGOGASTRODUODENOSCOPY;  Surgeon: Dallin Delgado MD;  Location: [facility] ENDOSCOPY;  Service: Gastroenterology;  Laterality: Left;  small hiatal hernia    HYSTERECTOMY        LEFT HEART CATH        MITRAL VALVE REPAIR/REPLACEMENT N/A 11/21/2024     Procedure: Transcatheter Mitral Valve Repair;  Surgeon: Dmitri Navarro MD;  Location: [facility] HYBRID OR;  Service: Cardiovascular;  Laterality: N/A;           General Information         Row Name 12/12/24 6403                 OT Time and Intention     Document Type evaluation  -SP       Mode of Treatment occupational therapy  -SP          Row Name 12/12/24 0958                 General Information     Patient Profile Reviewed yes  -SP       Prior Level of Function mod assist:;ADL's  -SP       Existing Precautions/Restrictions fall  -SP       Barriers to Rehab medically complex;previous functional deficit;cognitive status  -SP          Row Name 12/12/24 7809                 Living Environment     People in Home grandchild(keegan)  -SP          Row Name 12/12/24 1233                 Home Main Entrance     Number of Stairs, Main Entrance two  -SP          Row Name  12/12/24 1550                 Cognition     Orientation Status (Cognition) oriented to;person;time;disoriented to;place;situation;verbal cues/prompts needed for orientation  -SP          Row Name 12/12/24 1550                 Safety Issues/Impairments Affecting Functional Mobility     Safety Issues Affecting Function (Mobility) awareness of need for assistance;insight into deficits/self-awareness;judgment;problem-solving  -SP       Impairments Affecting Function (Mobility) balance;cognition;endurance/activity tolerance;strength  -SP       Cognitive Impairments, Mobility Safety/Performance insight into deficits/self-awareness;awareness, need for assistance  -SP                       User Key  (r) = Recorded By, (t) = Taken By, (c) = Cosigned By        Initials Name Provider Type     SP Andriy Guevara OT Occupational Therapist                                     Mobility/ADL's         Row Name 12/12/24 1553                 Bed Mobility     Bed Mobility scooting/bridging  -SP       Scooting/Bridging [place] (Bed Mobility) 2 person assist;dependent (less than 25% patient effort)  -SP       Comment, (Bed Mobility) pain in chest/abdomen  -SP          Row Name 12/12/24 1553                 Transfers     Transfers bed-chair transfer;sit-stand transfer  -SP          Row Name 12/12/24 1553                 Bed-Chair Transfer     Bed-Chair [place] (Transfers) maximum assist (25% patient effort)  -SP          Row Name 12/12/24 1553                 Sit-Stand Transfer     Sit-Stand [place] (Transfers) maximum assist (25% patient effort)  -SP          Row Name 12/12/24 1553                 Functional Mobility     Patient was able to Ambulate no, other medical factors prevent ambulation  -SP       Reason Patient was unable to Ambulate Excessive Weakness  -SP          Row Name 12/12/24 1553                 Activities of Daily Living     BADL Assessment/Intervention lower body dressing  -SP          Row Name 12/12/24  1553                 Lower Body Dressing Assessment/Training     [place] Level (Lower Body Dressing) don;pants/bottoms;dependent (less than 25% patient effort)  -SP                       User Key  (r) = Recorded By, (t) = Taken By, (c) = Cosigned By        Initials Name Provider Type     SP Andriy Guevara OT Occupational Therapist                            Obj/Interventions         Row Name 12/12/24 1554                 Sensory Assessment (Somatosensory)     Sensory Assessment (Somatosensory) UE sensation intact  -SP          Row Name 12/12/24 1554                 Range of Motion Comprehensive     General Range of Motion bilateral upper extremity ROM WFL  -SP          Row Name 12/12/24 1554                 Strength Comprehensive (MMT)     Comment, General Manual Muscle Testing (MMT) Assessment Global weakness, BUE grossly 3+/5  -SP          Row Name 12/12/24 1554                 Balance     Balance Assessment sitting static balance;sit to stand dynamic balance  -SP       Static Sitting Balance standby assist  -SP       Position, Sitting Balance sitting in chair  -SP       Sit to Stand Dynamic Balance maximum assist  -SP                       User Key  (r) = Recorded By, (t) = Taken By, (c) = Cosigned By        Initials Name Provider Type     SP Andriy Guevara OT Occupational Therapist                            Goals/Plan         Row Name 12/12/24 1558                 Bathing Goal 1 (OT)     Activity/Device (Bathing Goal 1, OT) bathing skills, all  -SP       [place] Level/Cues Needed (Bathing Goal 1, OT) moderate assist (50-74% patient effort)  -SP       Time Frame (Bathing Goal 1, OT) 2 weeks  -SP       Strategies/Barriers (Bathing Goal 1, OT) until d/c  -SP          Row Name 12/12/24 1558                 Dressing Goal 1 (OT)     Activity/Device (Dressing Goal 1, OT) dressing skills, all  -SP       [place]/Cues Needed (Dressing Goal 1, OT) moderate assist (50-74% patient effort)  -SP       Time  Frame (Dressing Goal 1, OT) 2 weeks  -SP       Strategies/Barriers (Dressing Goal 1, OT) until d/c  -SP          Row Name 12/12/24 1557                 Toileting Goal 1 (OT)     Activity/Device (Toileting Goal 1, OT) toileting skills, all  -SP       [place] Level/Cues Needed (Toileting Goal 1, OT) moderate assist (50-74% patient effort)  -SP       Time Frame (Toileting Goal 1, OT) 2 weeks  -SP       Strategies/Barriers (Toileting Goal 1, OT) until d/c  -SP          Row Name 12/12/24 1553                 Therapy Assessment/Plan (OT)     Planned Therapy Interventions (OT) activity tolerance training;BADL retraining;cognitive/visual perception retraining;IADL retraining;patient/caregiver education/training;occupation/activity based interventions;ROM/therapeutic exercise;strengthening exercise;transfer/mobility retraining  -SP                    User Key  (r) = Recorded By, (t) = Taken By, (c) = Cosigned By        Initials Name Provider Type     SP Andriy Guevara, OT Occupational Therapist                         Clinical Impression         Row Name 12/12/24 8420                 Pain Assessment     Pretreatment Pain Rating 5/10  -SP       Posttreatment Pain Rating 5/10  -SP       Pain Location chest;abdomen  -SP       Response to Pain Interventions no change per patient report  -SP          Row Name 12/12/24 3431                 Plan of Care Review     Plan of Care Reviewed With patient  -SP       Outcome Evaluation Pt is a 73 y/o female admitted to [facility] on 12/11/24 with c/o chest pain. She resides at a nursing facility and developed chest pain while riding on an exercise bike. Of note, pt recently discharged from [facility] on 12/4/2024 after being treated for a lower GI bleed with right hemicolectomy with ileostomy and mucous fistula creation, required several units of PRBCs; and patient underwent a mitral valve clip on 11/21/2024. PMHx significant for CAD, HTN, hyperlipidemia, COPD, hypothyroidism, and crohn's disease.  At baseline, pt resides in a [facility] with a basement, however resides on main level, x2 RICKEY, with x2 grandchildren (One works night shifts and the other works days shifts, able to provided 24/7 supervision) with a renter who is a family friend. Pt reports she is IND in ADLs, utilizes walker and rollator though prefers walker, and does not drive. Upon assessment, pt sitting in chair with adult son in room, A&O to person, and current time, disoriented to place. Pt dependent assist to complete ostomy care and don brief this date and she was soiled in urine with max assist to come to standing with FWW and stand-pivot t/f from bed to chair. RN in room for safety and repors she will increase suction to external catheter. Based on assessment, pt remains high falls risk, cognitive processing requiring increased time and verbal cueing, and exhibits global weakness, not safe to return home with family at this time. OT recommending return to SNF with continued OT services when medically appropriate for d/c.  -SP          Row Name 12/12/24 6596                 Therapy Assessment/Plan (OT)     Criteria for Skilled Therapeutic Interventions Met (OT) yes;meets criteria;skilled treatment is necessary  -SP       Therapy Frequency (OT) 5 times/wk  -SP       Predicted Duration of Therapy Intervention (OT) until d/c  -SP          Row Name 12/12/24 9271                 Therapy Plan Review/Discharge Plan (OT)     Anticipated Discharge Disposition (OT) skilled nursing facility  -SP          Row Name 12/12/24 8121                 Vital Signs     O2 Delivery Pre Treatment room air  -SP       O2 Delivery Intra Treatment room air  -SP       Post SpO2 (%) 100  -SP       O2 Delivery Post Treatment room air  -SP       Pre Patient Position Sitting  -SP       Intra Patient Position Standing  -SP       Post Patient Position Supine  -SP          Row Name 12/12/24 6684                 Positioning and Restraints     Pre-Treatment Position sitting in  chair/recliner  -SP       Post Treatment Position bed  -SP       In Bed with nsg;fowlers;call light within reach;encouraged to call for assist;exit alarm on  -SP                       User Key  (r) = Recorded By, (t) = Taken By, (c) = Cosigned By        Initials Name Provider Type     Andriy Omer OT Occupational Therapist                            Outcome Measures         Row Name 12/12/24 1559                 How much help from another is currently needed...     Putting on and taking off regular lower body clothing? 1  -SP       Bathing (including washing, rinsing, and drying) 2  -SP       Toileting (which includes using toilet bed pan or urinal) 1  -SP       Putting on and taking off regular upper body clothing 2  -SP       Taking care of personal grooming (such as brushing teeth) 3  -SP       Eating meals 3  -SP       AM-PAC 6 Clicks Score (OT) 12  -SP          Row Name 12/12/24 1322 12/12/24 0850            How much help from another person do you currently need...     Turning from your back to your side while in flat bed without using bedrails? 3  - 3  -AC     Moving from lying on back to sitting on the side of a flat bed without bedrails? 2  - 3  -AC     Moving to and from a bed to a chair (including a wheelchair)? 2  - 3  -AC     Standing up from a chair using your arms (e.g., wheelchair, bedside chair)? 2  - 3  -AC     Climbing 3-5 steps with a railing? 1  - 3  -AC     To walk in hospital room? 2  - 3  -AC     AM-PAC 6 Clicks Score (PT) 12  - 18  -AC     Highest Level of Mobility Goal 4 --> Transfer to chair/commode  - 6 --> Walk 10 steps or more  -        Row Name 12/12/24 1559 12/12/24 1322            Functional Assessment     Outcome Measure Options AM-PAC 6 Clicks Daily Activity (OT)  -SP AM-PAC 6 Clicks Basic Mobility (PT)  -                     User Key  (r) = Recorded By, (t) = Taken By, (c) = Cosigned By        Initials Name Provider Type     Hilary Jacobsen PT Physical  Therapist     Nadja Horta RN Registered Nurse     Andriy Omer OT Occupational Therapist                             Occupational Therapy Education            Title: PT OT SLP Therapies (In Progress)         Topic: Occupational Therapy (In Progress)         Point: ADL training (Done)         Description:   Instruct learner(s) on proper safety adaptation and remediation techniques during self care or transfers.   Instruct in proper use of assistive devices.                       Learning Progress Summary             Patient Acceptance, E,TB, VU by SP at 12/12/2024 3958                            Point: Home exercise program (Not Started)         Description:   Instruct learner(s) on appropriate technique for monitoring, assisting and/or progressing therapeutic exercises/activities.                       Learner Progress:  Not documented in this visit.                  Point: Precautions (Done)         Description:   Instruct learner(s) on prescribed precautions during self-care and functional transfers.                       Learning Progress Summary             Patient Acceptance, E,TB, VU by SP at 12/12/2024 5022                            Point: Body mechanics (Done)         Description:   Instruct learner(s) on proper positioning and spine alignment during self-care, functional mobility activities and/or exercises.                       Learning Progress Summary             Patient Acceptance, E,TB, VU by SP at 12/12/2024 8304                                                User Key         Initials Effective Dates Name Provider Type Discipline     VIRY 11/15/23 -  Andriy Guevara OT Occupational Therapist OT                          OT Recommendation and Plan  Planned Therapy Interventions (OT): activity tolerance training, BADL retraining, cognitive/visual perception retraining, IADL retraining, patient/caregiver education/training, occupation/activity based interventions, ROM/therapeutic  exercise, strengthening exercise, transfer/mobility retraining  Therapy Frequency (OT): 5 times/wk  Plan of Care Review  Plan of Care Reviewed With: patient  Outcome Evaluation: Pt is a 73 y/o female admitted to [facility] on 12/11/24 with c/o chest pain. She resides at a nursing facility and developed chest pain while riding on an exercise bike. Of note, pt recently discharged from [facility] on 12/4/2024 after being treated for a lower GI bleed with right hemicolectomy with ileostomy and mucous fistula creation, required several units of PRBCs; and patient underwent a mitral valve clip on 11/21/2024. PMHx significant for CAD, HTN, hyperlipidemia, COPD, hypothyroidism, and crohn's disease. At baseline, pt resides in a [facility] with a basement, however resides on main level, x2 RICKEY, with x2 grandchildren (One works night shifts and the other works days shifts, able to provided 24/7 supervision) with a renter who is a family friend. Pt reports she is IND in ADLs, utilizes walker and rollator though prefers walker, and does not drive. Upon assessment, pt sitting in chair with adult son in room, A&O to person, and current time, disoriented to place. Pt dependent assist to complete ostomy care and don brief this date and she was soiled in urine with max assist to come to standing with FWW and stand-pivot t/f from bed to chair. RN in room for safety and repors she will increase suction to external catheter. Based on assessment, pt remains high falls risk, cognitive processing requiring increased time and verbal cueing, and exhibits global weakness, not safe to return home with family at this time. OT recommending return to SNF with continued OT services when medically appropriate for d/c.      Time Calculation:        Time Calculation- OT         Row Name 12/12/24 1559                       Time Calculation- OT     OT Start Time 1410  -SP         OT Stop Time 1431  -SP         OT Time Calculation (min) 21 min  -SP         OT Received On  12/12/24  -VIRY         OT - Next Appointment 12/13/24  -VIRY         OT Goal Re-Cert Due Date 12/26/24  -VIRY                      User Key  (r) = Recorded By, (t) = Taken By, (c) = Cosigned By        Initials Name Provider Type     Andriy Omer OT Occupational Therapist                       Therapy Charges for Today         Code Description Service Date Service Provider Modifiers Qty     [id number] HC OT EVAL MOD COMPLEXITY 4 12/12/2024 Andriy Guevara OT GO 1                   Andriy Guevara OT               12/12/2024

## 2024-12-13 NOTE — OUTREACH NOTE
Prep Survey      Flowsheet Row Responses   [facility] facility patient discharged from? Non-BH   Is LACE score < 7 ? Non-BH Discharge   Eligibility Not Eligible   What are the reasons patient is not eligible? Other  [hasn't seen the pcp]   Does the patient have one of the following disease processes/diagnoses(primary or secondary)? Other   Prep survey completed? Yes            BHARGAVI TRINH - Registered Nurse

## 2024-12-13 NOTE — SIGNIFICANT NOTE
12/13/24 1400   OTHER   Discipline physical therapist   Rehab Time/Intention   Session Not Performed patient/family declined treatment  (Pt confused and reported she was told she is to be on bedrest today and that she had blood clots so she cannot get out of bed. Chart review did not show any of what pt reported.  Pt was adamant about not working with therapy.  PT to f/u.)   Therapy Assessment/Plan (PT)   Criteria for Skilled Interventions Met (PT) yes;meets criteria   Recommendation   PT - Next Appointment 12/14/24

## 2024-12-13 NOTE — PLAN OF CARE
Goal Outcome Evaluation:   Pt aox2-3. Room air. VSS. Pt to continue iv antibiotics for pneumonia. Precert pending for discharge.

## 2024-12-13 NOTE — PROGRESS NOTES
[facility] MEDICINE SERVICE  DAILY PROGRESS NOTE    NAME: Maryann Gaitan  : 1950  MRN: [id number]      LOS: 2 days     PROVIDER OF SERVICE: Noreen Nj MD    Chief Complaint: PNA (pneumonia)    Subjective:     Interval History:  History taken from: patient    No new complaint        Review of Systems:   Review of Systems   All other systems reviewed and are negative.      Objective:     Vital Signs  Temp:  [97.8 °F (36.6 °C)-98.4 °F (36.9 °C)] 98.4 °F (36.9 °C)  Heart Rate:  [66-83] 83  Resp:  [21-26] 26  BP: ()/(56-67) 113/67   Body mass index is 21.68 kg/m².    Physical Exam  Physical Exam  Constitutional:       Appearance: Normal appearance.   HENT:      Head: Normocephalic and atraumatic.      Nose: Nose normal.      Mouth/Throat:      Mouth: Mucous membranes are moist.   Eyes:      Extraocular Movements: Extraocular movements intact.      Pupils: Pupils are equal, round, and reactive to light.   Cardiovascular:      Rate and Rhythm: Normal rate and regular rhythm.   Pulmonary:      Effort: Pulmonary effort is normal.      Breath sounds: Normal breath sounds.   Abdominal:      General: Abdomen is flat. Bowel sounds are normal.      Palpations: Abdomen is soft.   Musculoskeletal:         General: Normal range of motion.      Cervical back: Normal range of motion and neck supple.   Skin:     General: Skin is warm and dry.   Neurological:      General: No focal deficit present.      Mental Status: She is alert and oriented to person, place, and time.         Current Medications:  Scheduled Meds:amLODIPine, 10 mg, Oral, Daily  aspirin, 81 mg, Oral, Daily  atorvastatin, 40 mg, Oral, Nightly  cefTRIAXone, 2,000 mg, Intravenous, Q24H  clopidogrel, 75 mg, Oral, Daily  folic acid, 1,000 mcg, Oral, Daily  guaiFENesin, 1,200 mg, Oral, Q12H  levothyroxine, 50 mcg, Oral, Q AM  mesalamine, 1,200 mg, Oral, Daily With Breakfast  metoprolol succinate XL, 25 mg, Oral, Q24H  midodrine, 5 mg, Oral,  Q8H  pantoprazole, 40 mg, Oral, Nightly  sertraline, 50 mg, Oral, Daily  sodium chloride, 10 mL, Intravenous, Q12H  spironolactone, 25 mg, Oral, Daily      Continuous Infusions:   PRN Meds:.  acetaminophen **OR** acetaminophen **OR** acetaminophen    albuterol    senna-docusate sodium **AND** polyethylene glycol **AND** bisacodyl **AND** bisacodyl    calcium carbonate    Calcium Replacement - Follow Nurse / BPA Driven Protocol    ipratropium-albuterol    Magnesium Standard Dose Replacement - Follow Nurse / BPA Driven Protocol    melatonin    nitroglycerin    ondansetron ODT **OR** ondansetron    Phosphorus Replacement - Follow Nurse / BPA Driven Protocol    Potassium Replacement - Follow Nurse / BPA Driven Protocol    [COMPLETED] Insert Peripheral IV **AND** sodium chloride    sodium chloride       Diagnostic Data    Results from last 7 days   Lab Units 12/13/24  0539 12/12/24  0654 12/11/24  1343   WBC 10*3/mm3 6.76   < > 5.13   HEMOGLOBIN g/dL 7.7*   < > 8.0*   HEMATOCRIT % 24.3*   < > 26.8*   PLATELETS 10*3/mm3 221   < > 255   GLUCOSE mg/dL 82   < > 96   CREATININE mg/dL 0.59   < > 0.91   BUN mg/dL 8   < > 12   SODIUM mmol/L 133*   < > 135*   POTASSIUM mmol/L 3.6   < > 4.4   AST (SGOT) U/L  --   --  34*   ALT (SGPT) U/L  --   --  31   ALK PHOS U/L  --   --  132*   BILIRUBIN mg/dL  --   --  0.2   ANION GAP mmol/L 8.6   < > 10.3    < > = values in this interval not displayed.       XR Chest 1 View    Result Date: 12/11/2024  Impression: 1.Patchy consolidation in the upper lobes felt to represent bilateral pneumonia. 2.Right upper lobe subsegmental atelectasis. 3.Questionable trace right pleural effusion. Electronically Signed: Michi Taylor MD  12/11/2024 2:02 PM EST  Workstation ID: [id number]       I reviewed the patient's new clinical results.    Assessment/Plan:     Active and Resolved Problems  Active Hospital Problems    Diagnosis  POA    **PNA (pneumonia) [J18.9]  Yes      Resolved Hospital Problems   No  resolved problems to display.       Pneumonia  - Ceftriaxone, azithromycin and follow-up blood cultures.    Anemia  - Hemoglobin 7.2.  Monitor.  Transfuse blood for hemoglobin of 7 or less.    Crohns  - s/p right hemicolectomy with ileostomy on 11/28/24  - Hb 8.0, down from 8.7 on 12/10/24  - Has been seen by general surgery today.  Staples were removed.  Okay to start diet per general surgery.    Hypertension  - Blood pressure is controlled.  Continue current blood pressure regimen.    Hypothyroidism  - Continue levothyroxine    GERD  - Continue Protonix        VTE Prophylaxis:  Mechanical VTE prophylaxis orders are present.             Disposition Planning:     Barriers to Discharge: Pending clinical improvement  Anticipated Date of Discharge: 12/14/2024 pending SNF approval and bed availability.    Place of Discharge: SNF    \    Code Status and Medical Interventions: CPR (Attempt to Resuscitate); Full Support   Ordered at: 12/11/24 2045     Level Of Support Discussed With:    Patient     Code Status (Patient has no pulse and is not breathing):    CPR (Attempt to Resuscitate)     Medical Interventions (Patient has pulse or is breathing):    Full Support       Signature: Electronically signed by Noreen Nj MD, 12/13/24, 13:24 EST.  [facility] Hospitalist Team

## 2024-12-13 NOTE — CASE MANAGEMENT/SOCIAL WORK
Continued Stay Note  [facility]     Patient Name: Maryann Gaitan  MRN: [id number]  Today's Date: 12/13/2024    Admit Date: 12/11/2024    Plan: Return to [place] (skilled, accepted). PASRR not needed. Precert pending   Discharge Plan       Row Name 12/13/24 1322       Plan    Plan Return to [place] (skilled, accepted). PASRR not needed. Precert pending      Row Name 12/13/24 1053       Plan    Plan Comments DC Barriers: hx right hemicolectomy with ileostomy, IV ceftriaxone, azithromycin and f/u blood cx, Hgb 7.7, Na 133             Shraddha Lozano RN      [facility]  Office: [phone]  Cell: [phone]  Fax # [phone]

## 2024-12-14 LAB
ANION GAP SERPL CALCULATED.3IONS-SCNC: 8.4 MMOL/L (ref 5–15)
ANISOCYTOSIS BLD QL: ABNORMAL
BUN SERPL-MCNC: 10 MG/DL (ref 8–23)
BUN/CREAT SERPL: 19.6 (ref 7–25)
CALCIUM SPEC-SCNC: 8.2 MG/DL (ref 8.6–10.5)
CHLORIDE SERPL-SCNC: 101 MMOL/L (ref 98–107)
CO2 SERPL-SCNC: 22.6 MMOL/L (ref 22–29)
CREAT SERPL-MCNC: 0.51 MG/DL (ref 0.57–1)
DEPRECATED RDW RBC AUTO: 56.7 FL (ref 37–54)
EGFRCR SERPLBLD CKD-EPI 2021: 98.1 ML/MIN/1.73
ERYTHROCYTE [DISTWIDTH] IN BLOOD BY AUTOMATED COUNT: 17 % (ref 12.3–15.4)
GLUCOSE SERPL-MCNC: 85 MG/DL (ref 65–99)
HCT VFR BLD AUTO: 23.2 % (ref 34–46.6)
HGB BLD-MCNC: 7.4 G/DL (ref 12–15.9)
LYMPHOCYTES # BLD MANUAL: 1.21 10*3/MM3 (ref 0.7–3.1)
LYMPHOCYTES NFR BLD MANUAL: 7 % (ref 5–12)
MAGNESIUM SERPL-MCNC: 2 MG/DL (ref 1.6–2.4)
MCH RBC QN AUTO: 29.4 PG (ref 26.6–33)
MCHC RBC AUTO-ENTMCNC: 31.9 G/DL (ref 31.5–35.7)
MCV RBC AUTO: 92.1 FL (ref 79–97)
MONOCYTES # BLD: 0.47 10*3/MM3 (ref 0.1–0.9)
NEUTROPHILS # BLD AUTO: 5.05 10*3/MM3 (ref 1.7–7)
NEUTROPHILS NFR BLD MANUAL: 73 % (ref 42.7–76)
NEUTS BAND NFR BLD MANUAL: 2 % (ref 0–5)
PHOSPHATE SERPL-MCNC: 3.2 MG/DL (ref 2.5–4.5)
PLATELET # BLD AUTO: 217 10*3/MM3 (ref 140–450)
PMV BLD AUTO: 9.4 FL (ref 6–12)
POTASSIUM SERPL-SCNC: 3.7 MMOL/L (ref 3.5–5.2)
RBC # BLD AUTO: 2.52 10*6/MM3 (ref 3.77–5.28)
SCAN SLIDE: NORMAL
SMALL PLATELETS BLD QL SMEAR: ADEQUATE
SODIUM SERPL-SCNC: 132 MMOL/L (ref 136–145)
VARIANT LYMPHS NFR BLD MANUAL: 16 % (ref 19.6–45.3)
VARIANT LYMPHS NFR BLD MANUAL: 2 % (ref 0–5)
WBC MORPH BLD: NORMAL
WBC NRBC COR # BLD AUTO: 6.73 10*3/MM3 (ref 3.4–10.8)

## 2024-12-14 PROCEDURE — 83735 ASSAY OF MAGNESIUM: CPT

## 2024-12-14 PROCEDURE — 25010000002 CEFTRIAXONE PER 250 MG: Performed by: INTERNAL MEDICINE

## 2024-12-14 PROCEDURE — 85007 BL SMEAR W/DIFF WBC COUNT: CPT

## 2024-12-14 PROCEDURE — 85025 COMPLETE CBC W/AUTO DIFF WBC: CPT

## 2024-12-14 PROCEDURE — 80048 BASIC METABOLIC PNL TOTAL CA: CPT

## 2024-12-14 PROCEDURE — 84100 ASSAY OF PHOSPHORUS: CPT

## 2024-12-14 RX ADMIN — MIDODRINE HYDROCHLORIDE 5 MG: 5 TABLET ORAL at 22:27

## 2024-12-14 RX ADMIN — METOPROLOL SUCCINATE 25 MG: 25 TABLET, EXTENDED RELEASE ORAL at 08:03

## 2024-12-14 RX ADMIN — MESALAMINE 1.2 G: 800 TABLET, DELAYED RELEASE ORAL at 08:02

## 2024-12-14 RX ADMIN — CEFTRIAXONE 2000 MG: 2 INJECTION, POWDER, FOR SOLUTION INTRAMUSCULAR; INTRAVENOUS at 14:54

## 2024-12-14 RX ADMIN — LEVOTHYROXINE SODIUM 50 MCG: 50 TABLET ORAL at 06:06

## 2024-12-14 RX ADMIN — GUAIFENESIN 1200 MG: 600 TABLET, MULTILAYER, EXTENDED RELEASE ORAL at 08:02

## 2024-12-14 RX ADMIN — Medication 10 ML: at 22:27

## 2024-12-14 RX ADMIN — AMLODIPINE BESYLATE 10 MG: 5 TABLET ORAL at 08:02

## 2024-12-14 RX ADMIN — ATORVASTATIN CALCIUM 40 MG: 40 TABLET, FILM COATED ORAL at 22:27

## 2024-12-14 RX ADMIN — Medication 10 ML: at 08:03

## 2024-12-14 RX ADMIN — CLOPIDOGREL BISULFATE 75 MG: 75 TABLET ORAL at 08:02

## 2024-12-14 RX ADMIN — ASPIRIN 81 MG: 81 TABLET, COATED ORAL at 08:03

## 2024-12-14 RX ADMIN — MIDODRINE HYDROCHLORIDE 5 MG: 5 TABLET ORAL at 14:55

## 2024-12-14 RX ADMIN — FOLIC ACID 1000 MCG: 1 TABLET ORAL at 08:02

## 2024-12-14 RX ADMIN — MIDODRINE HYDROCHLORIDE 5 MG: 5 TABLET ORAL at 06:06

## 2024-12-14 RX ADMIN — SPIRONOLACTONE 25 MG: 25 TABLET ORAL at 08:02

## 2024-12-14 RX ADMIN — PANTOPRAZOLE SODIUM 40 MG: 40 TABLET, DELAYED RELEASE ORAL at 22:28

## 2024-12-14 RX ADMIN — SERTRALINE HYDROCHLORIDE 50 MG: 50 TABLET ORAL at 08:03

## 2024-12-14 RX ADMIN — GUAIFENESIN 1200 MG: 600 TABLET, MULTILAYER, EXTENDED RELEASE ORAL at 22:27

## 2024-12-14 NOTE — PLAN OF CARE
Problem: Skin Injury Risk Increased  Goal: Skin Health and Integrity  Outcome: Not Progressing  Intervention: Optimize Skin Protection  Recent Flowsheet Documentation  Taken 12/14/2024 1600 by Nadja Hills RN  Activity Management: bedrest  Head of Bed (HOB) Positioning: HOB elevated  Taken 12/14/2024 1400 by Nadja Hills RN  Activity Management: bedrest  Taken 12/14/2024 1200 by Nadja Hills RN  Activity Management: bedrest  Head of Bed (HOB) Positioning: HOB elevated  Taken 12/14/2024 0810 by Nadja Hills RN  Activity Management: bedrest  Head of Bed (HOB) Positioning: HOB elevated     Problem: Fall Injury Risk  Goal: Absence of Fall and Fall-Related Injury  Outcome: Not Progressing  Intervention: Promote Injury-Free Environment  Recent Flowsheet Documentation  Taken 12/14/2024 1000 by Nadja Hills RN  Safety Promotion/Fall Prevention:   assistive device/personal items within reach   clutter free environment maintained   safety round/check completed   nonskid shoes/slippers when out of bed   fall prevention program maintained  Taken 12/14/2024 0810 by Nadja Hills RN  Safety Promotion/Fall Prevention:   assistive device/personal items within reach   clutter free environment maintained   safety round/check completed   nonskid shoes/slippers when out of bed   fall prevention program maintained   Goal Outcome Evaluation:

## 2024-12-14 NOTE — CONSULTS
Nutrition Services    Patient Name: Maryann Gaitan  YOB: 1950  MRN: [id number]  Admission date: 12/11/2024    Comment:    Moderate chronic disease related malnutrition related to prolonged suboptimal intake and suspected hypermetabolism and malabsorption in the setting of chronic Crohn's disease as evidenced by po intake meeting < 75% of est energy requirement for > 1 month and evidence of moderate muscle and fat wasting per NFPE.     RD to add Boost Original BID (Provides 480 kcals, 20 g protein if consumed)       CLINICAL NUTRITION ASSESSMENT      Reason for Assessment 12/14: Malnutrition screening tool score of 2     H&P      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 07/05/2016    Dyslipidemia 10/30/2024    Fecal urgency 11/21/2024    First degree hemorrhoids 07/09/2021    GERD (gastroesophageal reflux disease) 11/21/2024    Hashimoto's thyroiditis 11/21/2024    History of colonic polyps 07/09/2021    Hypertension     Hypothyroidism (acquired) 11/06/2024    Mitral regurgitation 10/08/2024    Moderate protein-calorie malnutrition 11/09/2024    Multiple tracheobronchial mucus plugs 10/08/2024    Nicotine dependence 11/21/2024    Rheumatoid arthritis 11/06/2024    S/P mitral valve clip implantation 11/21/2024    Second degree hemorrhoids 07/05/2016    Stress incontinence, female 11/21/2024    Vitamin D deficiency, unspecified 11/21/2024       Past Surgical History:   Procedure Laterality Date    BRONCHOSCOPY N/A 10/16/2024    Procedure: BRONCHOSCOPY;  Surgeon: Gallo Pope MD;  Location:  "[facility] ENDOSCOPY;  Service: Pulmonary;  Laterality: N/A;    CARDIAC CATHETERIZATION N/A 10/15/2024    Procedure: Left Heart Cath, possible pci;  Surgeon: Travis Connor MD;  Location: [facility] CATH INVASIVE LOCATION;  Service: Cardiovascular;  Laterality: N/A;    CARDIAC CATHETERIZATION N/A 10/22/2024    Procedure: Laser Coronary Atherectomy;  Surgeon: Travis Connor MD;  Location: [facility] CATH INVASIVE LOCATION;  Service: Cardiovascular;  Laterality: N/A;    COLON RESECTION Right 11/28/2024    Procedure: RIGHT HEMICOLECTOMY WITH ILEOSTOMY;  Surgeon: Todd Babin MD;  Location: [facility] MAIN OR;  Service: General;  Laterality: Right;    COLONOSCOPY N/A 10/12/2024    Procedure: COLONOSCOPY WITH BIOPSY AND WIRE GUIDED BALLOON DILATION OF TERMINAL ILEUM;  Surgeon: Rob Strong MD;  Location: [facility] ENDOSCOPY;  Service: Gastroenterology;  Laterality: N/A;  Colitis, crohns of terminal ileum, right colon ulcers, diverticulosis, hemorroids    ENDOSCOPY N/A 10/12/2024    Procedure: ESOPHAGOGASTRODUODENOSCOPY WITH BIOPSY X 2 AREA;  Surgeon: Rob Strong MD;  Location: [facility] ENDOSCOPY;  Service: Gastroenterology;  Laterality: N/A;  Chronic gastritis, HH    ENDOSCOPY Left 11/28/2024    Procedure: ESOPHAGOGASTRODUODENOSCOPY;  Surgeon: Dallin Delgado MD;  Location: [facility] ENDOSCOPY;  Service: Gastroenterology;  Laterality: Left;  small hiatal hernia    HYSTERECTOMY      LEFT HEART CATH      MITRAL VALVE REPAIR/REPLACEMENT N/A 11/21/2024    Procedure: Transcatheter Mitral Valve Repair;  Surgeon: Dmitri Navarro MD;  Location: [facility] HYBRID OR;  Service: Cardiovascular;  Laterality: N/A;        Current Problems     Bilateral upper lobe pneumonia   Bandemia  - CXR: \"Patchy consolidation in the upper lobes felt to represent bilateral pneumonia\"     Chest pain, resolved  - Exertional while on exercise bike today, now resolved     HFpEF  CAD  Mitral regurgitation  - s/p MitraClip " "11/21/24    Crohns  - s/p right hemicolectomy with ileostomy on 11/28/24     Hypertension    Hyperlipidemia     Hypothyroidism     GERD       Encounter Information        Trending Narrative     12/14: Pt presented to ED via EMS on 12/11 with complaints of SOB and chest pain. Pt reports that it started with exertion in physical therapy at her facility. Pt is unable to provide much history. Pt was recently discharged on 12/4 after being treated for GI Bleed. Pt admitted with diagnosis of pneumonia. Pt is tolerating po diet at this time. Plans for discharge back to [facility] when clinically appropriate. NFPE completed, consistent with nutrition diagnosis of malnutrition using AND/ASPEN criteria. See MSA below.        Anthropometrics        Current Height, Weight Height: 162.6 cm (64\")  Weight: 57.3 kg (126 lb 5.2 oz) (12/11/24 1318)       Usual Body Weight (UBW) Unable to obtain from patient       Trending Weight Hx     This admission: 12/14: (last weight 12/11) 126#             PTA: 12/14: current weight stable over the last 3 months, no other history provided.     Wt Readings from Last 30 Encounters:   12/11/24 1318 57.3 kg (126 lb 5.2 oz)   12/04/24 0616 57.3 kg (126 lb 5.2 oz)   12/03/24 0608 56.7 kg (125 lb)   12/02/24 0500 56.1 kg (123 lb 10.9 oz)   12/01/24 0504 57.7 kg (127 lb 3.3 oz)   11/30/24 0612 60.9 kg (134 lb 4.2 oz)   11/29/24 1616 60.8 kg (134 lb 0.6 oz)   11/27/24 1900 60.7 kg (133 lb 13.1 oz)   11/22/24 0724 54 kg (119 lb)   11/21/24 1129 54.1 kg (119 lb 4.3 oz)   11/21/24 0320 45.9 kg (101 lb 3.1 oz)   11/19/24 1551 45.3 kg (99 lb 13.9 oz)   11/19/24 1207 45.4 kg (100 lb)   11/13/24 1312 54.9 kg (121 lb)   11/06/24 1426 52.6 kg (115 lb 15.4 oz)   10/30/24 1346 52.6 kg (116 lb)   10/12/24 1153 57.6 kg (127 lb)   10/12/24 0757 57.6 kg (127 lb)   10/11/24 0420 58 kg (127 lb 13.9 oz)   10/10/24 0503 58.1 kg (128 lb 1.4 oz)   10/08/24 1956 54.9 kg (121 lb 0.5 oz)   10/08/24 0828 54.9 kg (121 lb)    "   BMI kg/m2 Body mass index is 21.68 kg/m².       Labs        Pertinent Labs Hyponatremia - management per attending   Results from last 7 days   Lab Units 12/14/24 0451 12/13/24  0539 12/12/24  0747 12/11/24  1343   SODIUM mmol/L 132* 133* 136 135*   POTASSIUM mmol/L 3.7 3.6 4.2 4.4   CHLORIDE mmol/L 101 101 105 102   CO2 mmol/L 22.6 23.4 21.2* 22.7   BUN mg/dL 10 8 11 12   CREATININE mg/dL 0.51* 0.59 0.60 0.91   CALCIUM mg/dL 8.2* 8.3* 8.3* 8.4*   BILIRUBIN mg/dL  --   --   --  0.2   ALK PHOS U/L  --   --   --  132*   ALT (SGPT) U/L  --   --   --  31   AST (SGOT) U/L  --   --   --  34*   GLUCOSE mg/dL 85 82 76 96     Results from last 7 days   Lab Units 12/14/24 0451 12/13/24 0539 12/12/24  0747   MAGNESIUM mg/dL 2.0 2.6* 1.5*   PHOSPHORUS mg/dL 3.2 3.2  --    HEMOGLOBIN g/dL 7.4* 7.7*  --    HEMATOCRIT % 23.2* 24.3*  --      Lab Results   Component Value Date    HGBA1C 5.64 (H) 10/13/2024        Medications    Scheduled Medications amLODIPine, 10 mg, Oral, Daily  aspirin, 81 mg, Oral, Daily  atorvastatin, 40 mg, Oral, Nightly  cefTRIAXone, 2,000 mg, Intravenous, Q24H  clopidogrel, 75 mg, Oral, Daily  folic acid, 1,000 mcg, Oral, Daily  guaiFENesin, 1,200 mg, Oral, Q12H  levothyroxine, 50 mcg, Oral, Q AM  mesalamine, 1,200 mg, Oral, Daily With Breakfast  metoprolol succinate XL, 25 mg, Oral, Q24H  midodrine, 5 mg, Oral, Q8H  pantoprazole, 40 mg, Oral, Nightly  sertraline, 50 mg, Oral, Daily  sodium chloride, 10 mL, Intravenous, Q12H  spironolactone, 25 mg, Oral, Daily        Infusions      PRN Medications   acetaminophen **OR** acetaminophen **OR** acetaminophen    albuterol    senna-docusate sodium **AND** polyethylene glycol **AND** bisacodyl **AND** bisacodyl    calcium carbonate    Calcium Replacement - Follow Nurse / BPA Driven Protocol    ipratropium-albuterol    Magnesium Standard Dose Replacement - Follow Nurse / BPA Driven Protocol    melatonin    nitroglycerin    ondansetron ODT **OR** ondansetron     Phosphorus Replacement - Follow Nurse / BPA Driven Protocol    Potassium Replacement - Follow Nurse / BPA Driven Protocol    [COMPLETED] Insert Peripheral IV **AND** sodium chloride    sodium chloride     Physical Findings        Trending Physical   Appearance, NFPE 12/14: NFPE completed, consistent with nutrition diagnosis of malnutrition using AND/ASPEN criteria. See MSA below.      --  Edema  No edema documented      Bowel Function ileostomy in place 550 mL output in the last 24 hours      Tubes No feeding tube in place      Chewing/Swallowing No issues reported      Skin Multiple bruising and redness on lower extremities     --  Current Nutrition Orders & Evaluation of Intake       Oral Nutrition     Food Allergies NKFA   Current PO Diet Diet: Cardiac; Healthy Heart (2-3 Na+); Fluid Consistency: Thin (IDDSI 0)   Supplement none   PO Evaluation     Trending % PO Intake 12/14: 66% average po intake x last 3 documented meals    --  Nutritional Risk Screening        NRS-2002 Score          Nutrition Diagnosis         Nutrition Dx Problem 1 Moderate chronic disease related malnutrition related to prolonged suboptimal intake and suspected hypermetabolism and malabsorption in the setting of chronic Crohn's disease as evidenced by po intake meeting < 75% of est energy requirement for > 1 month and evidence of moderate muscle and fat wasting per NFPE.       Nutrition Dx Problem 2        Intervention Goal         Intervention Goal(s) Tolerate po diet  Accept ONS  PO intake > 75%       Nutrition Intervention        RD Action NFPE completed    RD to add Boost Original BID (Provides 480 kcals, 20 g protein if consumed)        Nutrition Prescription          Diet Prescription Cardiac healthy Heart diet   Supplement Prescription Boost Original BID (Provides 480 kcals, 20 g protein if consumed)      --  Monitor/Evaluation        Monitor Per protocol, I&O, PO intake, Supplement intake, Pertinent labs, Weight, Skin status, GI  status, Symptoms, Swallow function, Hemodynamic stability     Malnutrition Severity Assessment      Patient meets criteria for : Moderate (non-severe) Malnutrition  Malnutrition Type (Last 8 Hours)       Malnutrition Severity Assessment       Row Name 12/14/24 1742       Malnutrition Severity Assessment    Malnutrition Type Chronic Disease - Related Malnutrition      Row Name 12/14/24 1742       Insufficient Energy Intake     Insufficient Energy Intake Findings Moderate    Insufficient Energy Intake  <75% of est. energy requirement for > or equal to 1 month      Row Name 12/14/24 1742       Muscle Loss    Loss of Muscle Mass Findings Moderate    [place] Region Moderate - slight depression    Clavicle Bone Region Moderate - some protrusion in females, visible in males    Acromion Bone Region Moderate - acromion may slightly protrude    Dorsal Hand Region Moderate - slight depression    Patellar Region Moderate - patella more prominent, less muscle definition around patella    Anterior Thigh Region Moderate - mild depression on inner thigh    Posterior Calf Region Moderate - some roundness, slight firmness      Row Name 12/14/24 1742       Fat Loss    Subcutaneous Fat Loss Findings Moderate    Orbital Region  Moderate -  somewhat hollowness, slightly dark circles    Upper Arm Region Moderate - some fat tissue, not ample      Row Name 12/14/24 1742       Criteria Met (Must meet criteria for severity in at least 2 of these categories: M Wasting, Fat Loss, Fluid, Secondary Signs, Wt. Status, Intake)    Patient meets criteria for  Moderate (non-severe) Malnutrition                           Electronically signed by:  Renetta Arriaga RD  12/14/24 17:24 EST

## 2024-12-14 NOTE — PROGRESS NOTES
[facility] MEDICINE SERVICE  DAILY PROGRESS NOTE    NAME: Maryann Gaitan  : 1950  MRN: [id number]      LOS: 3 days     PROVIDER OF SERVICE: Noreen Nj MD    Chief Complaint: PNA (pneumonia)    Subjective:     Interval History:  History taken from: patient    No new complaint        Review of Systems:   Review of Systems   All other systems reviewed and are negative.      Objective:     Vital Signs  Temp:  [98 °F (36.7 °C)-98.6 °F (37 °C)] 98 °F (36.7 °C)  Heart Rate:  [78-91] 82  Resp:  [20-28] 28  BP: (108-120)/(57-74) 108/65   Body mass index is 21.68 kg/m².    Physical Exam  Physical Exam  Constitutional:       Appearance: Normal appearance.   HENT:      Head: Normocephalic and atraumatic.      Nose: Nose normal.      Mouth/Throat:      Mouth: Mucous membranes are moist.   Eyes:      Extraocular Movements: Extraocular movements intact.      Pupils: Pupils are equal, round, and reactive to light.   Cardiovascular:      Rate and Rhythm: Normal rate and regular rhythm.   Pulmonary:      Effort: Pulmonary effort is normal.      Breath sounds: Normal breath sounds.   Abdominal:      General: Abdomen is flat. Bowel sounds are normal.      Palpations: Abdomen is soft.   Musculoskeletal:         General: Normal range of motion.      Cervical back: Normal range of motion and neck supple.   Skin:     General: Skin is warm and dry.   Neurological:      General: No focal deficit present.      Mental Status: She is alert and oriented to person, place, and time.         Current Medications:  Scheduled Meds:amLODIPine, 10 mg, Oral, Daily  aspirin, 81 mg, Oral, Daily  atorvastatin, 40 mg, Oral, Nightly  cefTRIAXone, 2,000 mg, Intravenous, Q24H  clopidogrel, 75 mg, Oral, Daily  folic acid, 1,000 mcg, Oral, Daily  guaiFENesin, 1,200 mg, Oral, Q12H  levothyroxine, 50 mcg, Oral, Q AM  mesalamine, 1,200 mg, Oral, Daily With Breakfast  metoprolol succinate XL, 25 mg, Oral, Q24H  midodrine, 5 mg, Oral,  Q8H  pantoprazole, 40 mg, Oral, Nightly  sertraline, 50 mg, Oral, Daily  sodium chloride, 10 mL, Intravenous, Q12H  spironolactone, 25 mg, Oral, Daily      Continuous Infusions:   PRN Meds:.  acetaminophen **OR** acetaminophen **OR** acetaminophen    albuterol    senna-docusate sodium **AND** polyethylene glycol **AND** bisacodyl **AND** bisacodyl    calcium carbonate    Calcium Replacement - Follow Nurse / BPA Driven Protocol    ipratropium-albuterol    Magnesium Standard Dose Replacement - Follow Nurse / BPA Driven Protocol    melatonin    nitroglycerin    ondansetron ODT **OR** ondansetron    Phosphorus Replacement - Follow Nurse / BPA Driven Protocol    Potassium Replacement - Follow Nurse / BPA Driven Protocol    [COMPLETED] Insert Peripheral IV **AND** sodium chloride    sodium chloride       Diagnostic Data    Results from last 7 days   Lab Units 12/14/24  0451 12/12/24  0654 12/11/24  1343   WBC 10*3/mm3 6.73   < > 5.13   HEMOGLOBIN g/dL 7.4*   < > 8.0*   HEMATOCRIT % 23.2*   < > 26.8*   PLATELETS 10*3/mm3 217   < > 255   GLUCOSE mg/dL 85   < > 96   CREATININE mg/dL 0.51*   < > 0.91   BUN mg/dL 10   < > 12   SODIUM mmol/L 132*   < > 135*   POTASSIUM mmol/L 3.7   < > 4.4   AST (SGOT) U/L  --   --  34*   ALT (SGPT) U/L  --   --  31   ALK PHOS U/L  --   --  132*   BILIRUBIN mg/dL  --   --  0.2   ANION GAP mmol/L 8.4   < > 10.3    < > = values in this interval not displayed.       No radiology results for the last day      I reviewed the patient's new clinical results.    Assessment/Plan:     Active and Resolved Problems  Active Hospital Problems    Diagnosis  POA    **PNA (pneumonia) [J18.9]  Yes      Resolved Hospital Problems   No resolved problems to display.       Pneumonia  - Ceftriaxone, azithromycin and follow-up blood cultures.    Anemia  - Hemoglobin 7.2.  Monitor.  Transfuse blood for hemoglobin of 7 or less.    Crohns  - s/p right hemicolectomy with ileostomy on 11/28/24  - Hb 8.0, down from 8.7 on  12/10/24  - Has been seen by general surgery today.  Staples were removed.  Okay to start diet per general surgery.    Hypertension  - Blood pressure is controlled.  Continue current blood pressure regimen.    Hypothyroidism  - Continue levothyroxine    GERD  - Continue Protonix        VTE Prophylaxis:  Mechanical VTE prophylaxis orders are present.             Disposition Planning:     Barriers to Discharge: Pending clinical improvement  Anticipated Date of Discharge: 12/14/2024 pending SNF approval and bed availability.    Place of Discharge: SNF    \    Code Status and Medical Interventions: CPR (Attempt to Resuscitate); Full Support   Ordered at: 12/11/24 2045     Level Of Support Discussed With:    Patient     Code Status (Patient has no pulse and is not breathing):    CPR (Attempt to Resuscitate)     Medical Interventions (Patient has pulse or is breathing):    Full Support       Signature: Electronically signed by Noreen Nj MD, 12/14/24, 18:42 EST.  [facility] Hospitalist Team

## 2024-12-14 NOTE — PLAN OF CARE
Goal Outcome Evaluation:    VSS, pt has situational confusion, but very pleasant this shift. No s/s bleeding this shift. Plans to return to [place] pending precert. Plan of care ongoing.

## 2024-12-15 LAB
BACTERIA SPEC RESP CULT: NORMAL
DEPRECATED RDW RBC AUTO: 55.5 FL (ref 37–54)
ERYTHROCYTE [DISTWIDTH] IN BLOOD BY AUTOMATED COUNT: 16.9 % (ref 12.3–15.4)
GRAM STN SPEC: NORMAL
GRAM STN SPEC: NORMAL
HCT VFR BLD AUTO: 25.3 % (ref 34–46.6)
HGB BLD-MCNC: 8.3 G/DL (ref 12–15.9)
MCH RBC QN AUTO: 29.9 PG (ref 26.6–33)
MCHC RBC AUTO-ENTMCNC: 32.8 G/DL (ref 31.5–35.7)
MCV RBC AUTO: 91 FL (ref 79–97)
PLATELET # BLD AUTO: 269 10*3/MM3 (ref 140–450)
PMV BLD AUTO: 9.4 FL (ref 6–12)
RBC # BLD AUTO: 2.78 10*6/MM3 (ref 3.77–5.28)
WBC NRBC COR # BLD AUTO: 10.37 10*3/MM3 (ref 3.4–10.8)

## 2024-12-15 PROCEDURE — 85027 COMPLETE CBC AUTOMATED: CPT | Performed by: INTERNAL MEDICINE

## 2024-12-15 PROCEDURE — 25010000002 CEFTRIAXONE PER 250 MG: Performed by: INTERNAL MEDICINE

## 2024-12-15 RX ADMIN — MIDODRINE HYDROCHLORIDE 5 MG: 5 TABLET ORAL at 05:24

## 2024-12-15 RX ADMIN — MESALAMINE 1.2 G: 800 TABLET, DELAYED RELEASE ORAL at 08:52

## 2024-12-15 RX ADMIN — MIDODRINE HYDROCHLORIDE 5 MG: 5 TABLET ORAL at 14:26

## 2024-12-15 RX ADMIN — LEVOTHYROXINE SODIUM 50 MCG: 50 TABLET ORAL at 05:24

## 2024-12-15 RX ADMIN — SERTRALINE HYDROCHLORIDE 50 MG: 50 TABLET ORAL at 08:52

## 2024-12-15 RX ADMIN — MIDODRINE HYDROCHLORIDE 5 MG: 5 TABLET ORAL at 20:01

## 2024-12-15 RX ADMIN — FOLIC ACID 1000 MCG: 1 TABLET ORAL at 08:52

## 2024-12-15 RX ADMIN — Medication 10 ML: at 08:52

## 2024-12-15 RX ADMIN — ATORVASTATIN CALCIUM 40 MG: 40 TABLET, FILM COATED ORAL at 20:00

## 2024-12-15 RX ADMIN — AMLODIPINE BESYLATE 10 MG: 5 TABLET ORAL at 08:52

## 2024-12-15 RX ADMIN — GUAIFENESIN 1200 MG: 600 TABLET, MULTILAYER, EXTENDED RELEASE ORAL at 08:51

## 2024-12-15 RX ADMIN — SPIRONOLACTONE 25 MG: 25 TABLET ORAL at 08:52

## 2024-12-15 RX ADMIN — CEFTRIAXONE 2000 MG: 2 INJECTION, POWDER, FOR SOLUTION INTRAMUSCULAR; INTRAVENOUS at 14:26

## 2024-12-15 RX ADMIN — PANTOPRAZOLE SODIUM 40 MG: 40 TABLET, DELAYED RELEASE ORAL at 20:00

## 2024-12-15 RX ADMIN — ASPIRIN 81 MG: 81 TABLET, COATED ORAL at 08:51

## 2024-12-15 RX ADMIN — Medication 10 ML: at 20:00

## 2024-12-15 RX ADMIN — GUAIFENESIN 1200 MG: 600 TABLET, MULTILAYER, EXTENDED RELEASE ORAL at 20:00

## 2024-12-15 RX ADMIN — METOPROLOL SUCCINATE 25 MG: 25 TABLET, EXTENDED RELEASE ORAL at 08:52

## 2024-12-15 RX ADMIN — CLOPIDOGREL BISULFATE 75 MG: 75 TABLET ORAL at 08:52

## 2024-12-15 NOTE — PROGRESS NOTES
[facility] MEDICINE SERVICE  DAILY PROGRESS NOTE    NAME: Maryann Gaitan  : 1950  MRN: [id number]      LOS: 4 days     PROVIDER OF SERVICE: Noreen Nj MD    Chief Complaint: PNA (pneumonia)    Subjective:     Interval History:  History taken from: patient    No new complaint        Review of Systems:   Review of Systems   All other systems reviewed and are negative.      Objective:     Vital Signs  Temp:  [97.8 °F (36.6 °C)-98.2 °F (36.8 °C)] 97.8 °F (36.6 °C)  Heart Rate:  [81-84] 81  Resp:  [22-28] 24  BP: ()/(57-70) 105/57   Body mass index is 21.68 kg/m².    Physical Exam  Physical Exam  Constitutional:       Appearance: Normal appearance.   HENT:      Head: Normocephalic and atraumatic.      Nose: Nose normal.      Mouth/Throat:      Mouth: Mucous membranes are moist.   Eyes:      Extraocular Movements: Extraocular movements intact.      Pupils: Pupils are equal, round, and reactive to light.   Cardiovascular:      Rate and Rhythm: Normal rate and regular rhythm.   Pulmonary:      Effort: Pulmonary effort is normal.      Breath sounds: Normal breath sounds.   Abdominal:      General: Abdomen is flat. Bowel sounds are normal.      Palpations: Abdomen is soft.   Musculoskeletal:         General: Normal range of motion.      Cervical back: Normal range of motion and neck supple.   Skin:     General: Skin is warm and dry.   Neurological:      General: No focal deficit present.      Mental Status: She is alert and oriented to person, place, and time.         Current Medications:  Scheduled Meds:amLODIPine, 10 mg, Oral, Daily  atorvastatin, 40 mg, Oral, Nightly  cefTRIAXone, 2,000 mg, Intravenous, Q24H  clopidogrel, 75 mg, Oral, Daily  folic acid, 1,000 mcg, Oral, Daily  guaiFENesin, 1,200 mg, Oral, Q12H  levothyroxine, 50 mcg, Oral, Q AM  mesalamine, 1,200 mg, Oral, Daily With Breakfast  metoprolol succinate XL, 25 mg, Oral, Q24H  midodrine, 5 mg, Oral, Q8H  pantoprazole, 40 mg, Oral,  Nightly  sertraline, 50 mg, Oral, Daily  sodium chloride, 10 mL, Intravenous, Q12H  spironolactone, 25 mg, Oral, Daily      Continuous Infusions:   PRN Meds:.  acetaminophen **OR** acetaminophen **OR** acetaminophen    albuterol    senna-docusate sodium **AND** polyethylene glycol **AND** bisacodyl **AND** bisacodyl    calcium carbonate    Calcium Replacement - Follow Nurse / BPA Driven Protocol    ipratropium-albuterol    Magnesium Standard Dose Replacement - Follow Nurse / BPA Driven Protocol    melatonin    nitroglycerin    ondansetron ODT **OR** ondansetron    Phosphorus Replacement - Follow Nurse / BPA Driven Protocol    Potassium Replacement - Follow Nurse / BPA Driven Protocol    [COMPLETED] Insert Peripheral IV **AND** sodium chloride    sodium chloride       Diagnostic Data    Results from last 7 days   Lab Units 12/15/24  0524 12/14/24  0451 12/12/24  0654 12/11/24  1343   WBC 10*3/mm3 10.37 6.73   < > 5.13   HEMOGLOBIN g/dL 8.3* 7.4*   < > 8.0*   HEMATOCRIT % 25.3* 23.2*   < > 26.8*   PLATELETS 10*3/mm3 269 217   < > 255   GLUCOSE mg/dL  --  85   < > 96   CREATININE mg/dL  --  0.51*   < > 0.91   BUN mg/dL  --  10   < > 12   SODIUM mmol/L  --  132*   < > 135*   POTASSIUM mmol/L  --  3.7   < > 4.4   AST (SGOT) U/L  --   --   --  34*   ALT (SGPT) U/L  --   --   --  31   ALK PHOS U/L  --   --   --  132*   BILIRUBIN mg/dL  --   --   --  0.2   ANION GAP mmol/L  --  8.4   < > 10.3    < > = values in this interval not displayed.       No radiology results for the last day      I reviewed the patient's new clinical results.    Assessment/Plan:     Active and Resolved Problems  Active Hospital Problems    Diagnosis  POA    **PNA (pneumonia) [J18.9]  Yes      Resolved Hospital Problems   No resolved problems to display.       Pneumonia  - Ceftriaxone, azithromycin.  Cultures are negative so far    Anemia  - Hemoglobin 7.2.  Monitor.  Transfuse blood for hemoglobin of 7 or less.    Crohns  - s/p right hemicolectomy  with ileostomy on 11/28/24  - Hb 8.0, down from 8.7 on 12/10/24  - Has been seen by general surgery today.  Staples were removed.  Okay to start diet per general surgery.    Hypertension  - Blood pressure is controlled.  Continue current blood pressure regimen.    Hypothyroidism  - Continue levothyroxine    GERD  - Continue Protonix        VTE Prophylaxis:  Mechanical VTE prophylaxis orders are present.             Disposition Planning:     Barriers to Discharge: Pending clinical improvement  Anticipated Date of Discharge: 12/17/2024 pending SNF approval and bed availability.    Place of Discharge: SNF    \    Code Status and Medical Interventions: CPR (Attempt to Resuscitate); Full Support   Ordered at: 12/11/24 2045     Level Of Support Discussed With:    Patient     Code Status (Patient has no pulse and is not breathing):    CPR (Attempt to Resuscitate)     Medical Interventions (Patient has pulse or is breathing):    Full Support       Signature: Electronically signed by Noreen Nj MD, 12/15/24, 14:42 EST.  [facility] Hospitalist Team

## 2024-12-15 NOTE — PLAN OF CARE
Problem: Skin Injury Risk Increased  Goal: Skin Health and Integrity  Outcome: Progressing  Intervention: Optimize Skin Protection  Recent Flowsheet Documentation  Taken 12/15/2024 1600 by Nadja Hills RN  Head of Bed (HOB) Positioning: HOB elevated  Taken 12/15/2024 1200 by Nadja Hills RN  Head of Bed (HOB) Positioning: HOB elevated  Taken 12/15/2024 0840 by Nadja Hills RN  Activity Management: bedrest  Head of Bed (HOB) Positioning: HOB elevated     Problem: Fall Injury Risk  Goal: Absence of Fall and Fall-Related Injury  Outcome: Progressing  Intervention: Promote Injury-Free Environment  Recent Flowsheet Documentation  Taken 12/15/2024 1600 by Nadja Hills RN  Safety Promotion/Fall Prevention:   assistive device/personal items within reach   clutter free environment maintained   safety round/check completed   nonskid shoes/slippers when out of bed   fall prevention program maintained  Taken 12/15/2024 1500 by Nadja Hills RN  Safety Promotion/Fall Prevention:   assistive device/personal items within reach   clutter free environment maintained   safety round/check completed   nonskid shoes/slippers when out of bed   fall prevention program maintained  Taken 12/15/2024 1200 by Nadja Hills RN  Safety Promotion/Fall Prevention:   assistive device/personal items within reach   clutter free environment maintained   safety round/check completed   fall prevention program maintained   nonskid shoes/slippers when out of bed  Taken 12/15/2024 1000 by Nadja Hills RN  Safety Promotion/Fall Prevention: safety round/check completed  Taken 12/15/2024 0840 by Nadja Hills RN  Safety Promotion/Fall Prevention:   assistive device/personal items within reach   clutter free environment maintained   safety round/check completed   nonskid shoes/slippers when out of bed   fall prevention program maintained     Problem: Malnutrition  Goal: Improved Nutritional  Intake  Outcome: Progressing   Goal Outcome Evaluation:

## 2024-12-15 NOTE — CASE MANAGEMENT/SOCIAL WORK
Continued Stay Note  ERIC Gamal     Patient Name: Maryann Gaitan  MRN: [id number]  Today's Date: 12/15/2024    Admit Date: 12/11/2024    Plan: Return to [facility] (skilled, accepted). PASRR not needed. Precert approved.   Discharge Plan       Row Name 12/15/24 1814       Plan    Plan Return to [facility] (skilled, accepted). PASRR not needed. Precert approved.    Plan Comments CM notified by [facility] Radha siegel, of precert approval.             Expected Discharge Date and Time       Expected Discharge Date Expected Discharge Time    Dec 14, 2024           Edwina Mcmullen RN      Office Phone: [phone]  Office Cell: [phone]

## 2024-12-16 PROBLEM — J15.69 PNEUMONIA DUE TO OTHER GRAM-NEGATIVE BACTERIA: Status: ACTIVE | Noted: 2024-12-16

## 2024-12-16 LAB
ANION GAP SERPL CALCULATED.3IONS-SCNC: 9.5 MMOL/L (ref 5–15)
BACTERIA SPEC AEROBE CULT: NORMAL
BACTERIA SPEC AEROBE CULT: NORMAL
BUN SERPL-MCNC: 11 MG/DL (ref 8–23)
BUN/CREAT SERPL: 22 (ref 7–25)
CALCIUM SPEC-SCNC: 8.7 MG/DL (ref 8.6–10.5)
CHLORIDE SERPL-SCNC: 100 MMOL/L (ref 98–107)
CO2 SERPL-SCNC: 22.5 MMOL/L (ref 22–29)
CREAT SERPL-MCNC: 0.5 MG/DL (ref 0.57–1)
DEPRECATED RDW RBC AUTO: 55.7 FL (ref 37–54)
EGFRCR SERPLBLD CKD-EPI 2021: 98.6 ML/MIN/1.73
ERYTHROCYTE [DISTWIDTH] IN BLOOD BY AUTOMATED COUNT: 17 % (ref 12.3–15.4)
GLUCOSE SERPL-MCNC: 80 MG/DL (ref 65–99)
HCT VFR BLD AUTO: 28.7 % (ref 34–46.6)
HGB BLD-MCNC: 8.9 G/DL (ref 12–15.9)
MCH RBC QN AUTO: 28.2 PG (ref 26.6–33)
MCHC RBC AUTO-ENTMCNC: 31 G/DL (ref 31.5–35.7)
MCV RBC AUTO: 90.8 FL (ref 79–97)
PLATELET # BLD AUTO: 319 10*3/MM3 (ref 140–450)
PMV BLD AUTO: 9.5 FL (ref 6–12)
POTASSIUM SERPL-SCNC: 4.3 MMOL/L (ref 3.5–5.2)
RBC # BLD AUTO: 3.16 10*6/MM3 (ref 3.77–5.28)
SODIUM SERPL-SCNC: 132 MMOL/L (ref 136–145)
WBC NRBC COR # BLD AUTO: 9.6 10*3/MM3 (ref 3.4–10.8)

## 2024-12-16 PROCEDURE — 99222 1ST HOSP IP/OBS MODERATE 55: CPT

## 2024-12-16 PROCEDURE — 85027 COMPLETE CBC AUTOMATED: CPT | Performed by: INTERNAL MEDICINE

## 2024-12-16 PROCEDURE — 97535 SELF CARE MNGMENT TRAINING: CPT

## 2024-12-16 PROCEDURE — 97530 THERAPEUTIC ACTIVITIES: CPT

## 2024-12-16 PROCEDURE — 97116 GAIT TRAINING THERAPY: CPT

## 2024-12-16 PROCEDURE — 25010000002 CEFTRIAXONE PER 250 MG: Performed by: INTERNAL MEDICINE

## 2024-12-16 PROCEDURE — 80048 BASIC METABOLIC PNL TOTAL CA: CPT | Performed by: INTERNAL MEDICINE

## 2024-12-16 RX ORDER — MESALAMINE 1000 MG/1
1000 SUPPOSITORY RECTAL NIGHTLY
Qty: 30 EACH | Refills: 0 | Status: ON HOLD | OUTPATIENT
Start: 2024-12-16

## 2024-12-16 RX ORDER — GUAIFENESIN 200 MG/10ML
200 LIQUID ORAL EVERY 4 HOURS PRN
Qty: 180 ML | Refills: 0 | Status: ON HOLD | OUTPATIENT
Start: 2024-12-16

## 2024-12-16 RX ORDER — MESALAMINE 1000 MG/1
1000 SUPPOSITORY RECTAL NIGHTLY
Status: DISCONTINUED | OUTPATIENT
Start: 2024-12-16 | End: 2024-12-17 | Stop reason: HOSPADM

## 2024-12-16 RX ORDER — LANSOPRAZOLE 30 MG/1
30 TABLET, ORALLY DISINTEGRATING, DELAYED RELEASE ORAL
Status: DISCONTINUED | OUTPATIENT
Start: 2024-12-16 | End: 2024-12-17 | Stop reason: HOSPADM

## 2024-12-16 RX ORDER — FAMOTIDINE 20 MG/1
20 TABLET, FILM COATED ORAL 2 TIMES DAILY
Qty: 60 TABLET | Refills: 0 | Status: ON HOLD | OUTPATIENT
Start: 2024-12-16

## 2024-12-16 RX ORDER — METOPROLOL TARTRATE 25 MG/1
12.5 TABLET, FILM COATED ORAL EVERY 12 HOURS SCHEDULED
Qty: 30 TABLET | Refills: 0 | Status: ON HOLD | OUTPATIENT
Start: 2024-12-16

## 2024-12-16 RX ORDER — CEFDINIR 300 MG/1
300 CAPSULE ORAL 2 TIMES DAILY
Qty: 4 CAPSULE | Refills: 0 | Status: SHIPPED | OUTPATIENT
Start: 2024-12-16 | End: 2024-12-18

## 2024-12-16 RX ORDER — ASPIRIN 81 MG/1
81 TABLET ORAL DAILY
Qty: 30 TABLET | Refills: 0 | Status: ON HOLD | OUTPATIENT
Start: 2024-12-16 | End: 2025-01-15

## 2024-12-16 RX ORDER — GUAIFENESIN 200 MG/10ML
200 LIQUID ORAL EVERY 4 HOURS PRN
Status: DISCONTINUED | OUTPATIENT
Start: 2024-12-16 | End: 2024-12-17 | Stop reason: HOSPADM

## 2024-12-16 RX ADMIN — FOLIC ACID 1000 MCG: 1 TABLET ORAL at 09:19

## 2024-12-16 RX ADMIN — CEFTRIAXONE 2000 MG: 2 INJECTION, POWDER, FOR SOLUTION INTRAMUSCULAR; INTRAVENOUS at 14:35

## 2024-12-16 RX ADMIN — LANSOPRAZOLE 30 MG: 30 TABLET, ORALLY DISINTEGRATING ORAL at 12:13

## 2024-12-16 RX ADMIN — SPIRONOLACTONE 25 MG: 25 TABLET ORAL at 09:18

## 2024-12-16 RX ADMIN — SERTRALINE HYDROCHLORIDE 50 MG: 50 TABLET ORAL at 09:18

## 2024-12-16 RX ADMIN — Medication 10 ML: at 22:49

## 2024-12-16 RX ADMIN — LEVOTHYROXINE SODIUM 50 MCG: 50 TABLET ORAL at 05:13

## 2024-12-16 RX ADMIN — Medication 12.5 MG: at 12:11

## 2024-12-16 RX ADMIN — AMLODIPINE BESYLATE 10 MG: 5 TABLET ORAL at 09:19

## 2024-12-16 RX ADMIN — MESALAMINE 1000 MG: 1000 SUPPOSITORY RECTAL at 22:48

## 2024-12-16 RX ADMIN — Medication 12.5 MG: at 22:48

## 2024-12-16 RX ADMIN — MIDODRINE HYDROCHLORIDE 5 MG: 5 TABLET ORAL at 14:35

## 2024-12-16 RX ADMIN — Medication 10 ML: at 09:16

## 2024-12-16 RX ADMIN — ATORVASTATIN CALCIUM 40 MG: 40 TABLET, FILM COATED ORAL at 22:48

## 2024-12-16 RX ADMIN — CLOPIDOGREL BISULFATE 75 MG: 75 TABLET ORAL at 09:19

## 2024-12-16 RX ADMIN — MIDODRINE HYDROCHLORIDE 5 MG: 5 TABLET ORAL at 05:13

## 2024-12-16 RX ADMIN — MIDODRINE HYDROCHLORIDE 5 MG: 5 TABLET ORAL at 22:48

## 2024-12-16 NOTE — SIGNIFICANT NOTE
12/16/24 1141   OTHER   Discipline physical therapist   Rehab Time/Intention   Session Not Performed other (see comments)  (pt is discharging to SNF for rehab today)

## 2024-12-16 NOTE — CASE MANAGEMENT/SOCIAL WORK
"Physicians Statement of Medical Necessity for  Ambulance Transportation    GENERAL INFORMATION     Name: Maryann Gaitan  YOB: 1950  Medicare #: [id number]   Transport Date: 12/17/24 (Valid for round trips this date, or for scheduled repetitive trips for 60 days from the date signed below.)  Origin: [facility]  Destination: [address] [place]  Is the Patient's stay covered under Medicare Part A (PPS/DRG?)Yes  Closest appropriate facility? Yes  If this a hosp-hosp transfer? No  Is this a hospice patient? No    MEDICAL NECESSITY QUESTIONAIRE    Ambulance Transportation is medically necessary only if other means of transportation are contraindicated or would be potentially harmful to the patient.  To meet this requirement, the patient must be either \"bed confined\" or suffer from a condition such that transport by means other than an ambulance is contraindicated by the patient's condition.  The following questions must be answered by the healthcare professional signing below for this form to be valid:     1) Describe the MEDICAL CONDITION (physical and/or mental) of this patient AT THE TIME OF AMBULANCE TRANSPORT that requires the patient to be transported in an ambulance, and why transport by other means is contraindicated by the patient's condition:     Intermittent confusion, functional mobility impairments, increased LE weakness, unable to tolerate seated position for duration of transfer, unable to self support, mod assist for bed mobility, sacral pressure injury, mod/severe pain on movement      Past Medical History:   Diagnosis Date    Abnormal weight loss 11/21/2024    Acute kidney injury 11/06/2024    Acute UTI (urinary tract infection) 11/06/2024    Anxiety associated with depression 11/06/2024    Benign neoplasm of cecum 07/05/2016    CAD, multiple vessel 10/08/2024    Cavitary lesion of lung 10/08/2024    COPD (chronic obstructive pulmonary disease)     Crohn's disease " 11/06/2024    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 07/05/2016    Dyslipidemia 10/30/2024    Fecal urgency 11/21/2024    First degree hemorrhoids 07/09/2021    GERD (gastroesophageal reflux disease) 11/21/2024    Hashimoto's thyroiditis 11/21/2024    History of colonic polyps 07/09/2021    Hypertension     Hypothyroidism (acquired) 11/06/2024    Mitral regurgitation 10/08/2024    Moderate protein-calorie malnutrition 11/09/2024    Multiple tracheobronchial mucus plugs 10/08/2024    Nicotine dependence 11/21/2024    Rheumatoid arthritis 11/06/2024    S/P mitral valve clip implantation 11/21/2024    Second degree hemorrhoids 07/05/2016    Stress incontinence, female 11/21/2024    Vitamin D deficiency, unspecified 11/21/2024      Past Surgical History:   Procedure Laterality Date    BRONCHOSCOPY N/A 10/16/2024    Procedure: BRONCHOSCOPY;  Surgeon: Gallo Pope MD;  Location: [facility] ENDOSCOPY;  Service: Pulmonary;  Laterality: N/A;    CARDIAC CATHETERIZATION N/A 10/15/2024    Procedure: Left Heart Cath, possible pci;  Surgeon: Travis Connor MD;  Location: [facility] CATH INVASIVE LOCATION;  Service: Cardiovascular;  Laterality: N/A;    CARDIAC CATHETERIZATION N/A 10/22/2024    Procedure: Laser Coronary Atherectomy;  Surgeon: Travis Connor MD;  Location: [facility] CATH INVASIVE LOCATION;  Service: Cardiovascular;  Laterality: N/A;    COLON RESECTION Right 11/28/2024    Procedure: RIGHT HEMICOLECTOMY WITH ILEOSTOMY;  Surgeon: Todd Babin MD;  Location: [facility] MAIN OR;  Service: General;  Laterality: Right;    COLONOSCOPY N/A 10/12/2024    Procedure: COLONOSCOPY WITH BIOPSY AND WIRE GUIDED BALLOON DILATION OF TERMINAL ILEUM;  Surgeon: Rob Strong MD;  Location: [facility] ENDOSCOPY;  Service: Gastroenterology;  Laterality: N/A;  Colitis, crohns of terminal ileum, right colon ulcers, diverticulosis, hemorroids    ENDOSCOPY N/A 10/12/2024    Procedure:  "ESOPHAGOGASTRODUODENOSCOPY WITH BIOPSY X 2 AREA;  Surgeon: Rob Strong MD;  Location: [facility] ENDOSCOPY;  Service: Gastroenterology;  Laterality: N/A;  Chronic gastritis, HH    ENDOSCOPY Left 11/28/2024    Procedure: ESOPHAGOGASTRODUODENOSCOPY;  Surgeon: Dallin Delgado MD;  Location: [facility] ENDOSCOPY;  Service: Gastroenterology;  Laterality: Left;  small hiatal hernia    HYSTERECTOMY      LEFT HEART CATH      MITRAL VALVE REPAIR/REPLACEMENT N/A 11/21/2024    Procedure: Transcatheter Mitral Valve Repair;  Surgeon: Dmitri Navarro MD;  Location: [facility] HYBRID OR;  Service: Cardiovascular;  Laterality: N/A;      2) Is this patient \"bed confined\" as defined below?Yes   To be \"bed confined\" the patient must satisfy all three of the following criteria:  (1) unable to get up from bed without assistance; AND (2) unable to ambulate;  AND (3) unable to sit in a chair or wheelchair.  3) Can this patient safely be transported by car or wheelchair van (I.e., may safely sit during transport, without an attendant or monitoring?)No   4. In addition to completing questions 1-3 above, please check any of the following conditions that apply*:          *Note: supporting documentation for any boxes checked must be maintained in the patient's medical records Patient is confused, Moderate/severe pain on movement, Medical attendant required, Unable to tolerate seated position for time needed to transport, and Unable to sit in a chair or wheelchair due to decubitus ulcers or other wounds      SIGNATURE OF PHYSICIAN OR OTHER AUTHORIZED HEALTHCARE PROFESSIONAL    I certify that the above information is true and correct based on my evaluation of this patient, and represent that the patient requires transport by ambulance and that other forms of transport are contraindicated.  I understand that this information will be used by the Centers for Medicare and Medicaid Services (CMS) to support the determiniation of medical " necessity for ambulance services, and I represent that I have personal knowledge of the patient's condition at the time of transport.    x   If this box is checked, I also certify that the patient is physically or mentally incapable of signing the ambulance service's claim form and that the institution with which I am affiliated has furnished care, services or assistance to the patient.  My signature below is made on behalf of the patient pursuant to 42 .36(b)(4). In accordance with 42 .37, the specific reason(s) that the patient is physically or mentally incapable of signing the claim for is as follows:     Signature of Physician or Healthcare Professional     Shraddha Lozano RN Date/Time:   12/16/24 4131     (For Scheduled repetitive transport, this form is not valid for transports performed more than 60 days after this date).                                                                                                                                            ----Jordan Lewis DO-----------------------------------------  Printed Name and Credentials of Physician or Authorized Healthcare Professional     *Form must be signed by patient's attending physician for scheduled, repetitive transports,.  For non-repetitive ambulance transports, if unable to obtain the signature of the attending physician, any of the following may sign (please select below):     Physician  Clinical Nurse Specialist x Registered Nurse     Physician Assistant  Discharge Planner  Licensed Practical Nurse     Nurse Practitioner x

## 2024-12-16 NOTE — NURSING NOTE
Contacted EMS for pickup per  request. Will notify patient and son Nadir of plan and prepare for transfer

## 2024-12-16 NOTE — PLAN OF CARE
Goal Outcome Evaluation:  Plan of Care Reviewed With: patient          Maryann Gaitan presents with functional mobility impairments which indicate the need for skilled intervention. Pt is not safe for home, will need 24 hr care and high risk for falls and further skin breakdown.  Pt is has poor awareness of her physical deficits and her cognitive status waxes and wanes during therapy session.  Pt has unrealistic goals of being able to manage at home. This admission, pt is much weaker and only able to transfer to chair and ambulate a few feet with assistance.  Continue to recommend SNF for rehab at d/c.  Discussed with pt but she was not receptive to feedback.   Will continue to follow and progress as tolerated.       Anticipated Discharge Disposition (PT): skilled nursing facility

## 2024-12-16 NOTE — CONSULTS
Referring Provider: Jordan Lewis DO  Reason for Consultation: altered mental status       Chief complaint altered mental status, decision making capacity.     Subjective .     History of present illness:  The patient is a 74 y.o. female who was admitted secondary to chest pain. Patient was sent from [facility] after developing chest pain while riding an exercise bike.     PMH: CAD, hypertension, hyperlipidemia, COPD, hypothyroidism, crohns disease     Psychiatry was consulted due to altered mental status and to assess decision making capacity.     The patient was seen this evening, accompanied by her son. She was offered privacy, but was agreeable to him staying in the room. The patient was alert, oriented to herself, her birth day. She hesitated on telling me what the date was, but glanced at the white board, and told me from looking at that. She was somewhat confused to the situation. She seemed to not remember that she had been hospitalized and discharged once already to [facility]. She told me that she came from home, after a GI bleed, however that admission was in November. She was discharged on 12/4/24 to [facility] and readmitted back to [facility] on 12/11 after she had developed chest pain. She required redirecting to remind her that she had already once discharged since she experienced the GI bleed, and she had been at [facility] previously. Once I reminded her, she did say she didn't have a negative experience there.     I attempted to ask her about who lived with her in the home, and who would be availably to take care of her at home if she were to defer going to SNF. She listed members of her family that lived with her, but also listed several family members who didn't live with her. I asked her again later in the interview who she lived with, and again, she listed extra family members. She required her  to tell her who actually lived with her and who didn't.     She completed a  Mini Mental State Exam today. She scored a 24 out of 30. She had mild impairment in orientation, no deficit in registration, mild deficit in attention and calculation, some deficit in recall, and some deficit in language. A score of 25 or above is considered a normal exam, while 20-14 suggests mid cognitive impairment.     The patient demonstrates poor insight into her physical condition, and what her limitations are. She is unclear who actually lives in the home with her, and therefore not able to  who her resources would be in the event she went home instead of skilled nursing. She is experiencing some memory deficits, and her son at bedside confirms these have been ongoing, at least for the past several months. He says those who lived with her prior to these hospitalizations said they had noticed earlier. Her son is her decision maker in the event she is not able to make decisions.     I explained to the patient and her son, that today, as of the time of my assessment, I would say she does not have capacity to make medical decisions. Advised her and her son that once she is well, and back to her baseline, it would benefit them to have a formal neuro/neuropsych evaluation for dementia.       Review of Systems   All systems were reviewed and negative except for:  Neurological: positive for  memory deficit    The following portions of the patient's history were reviewed and updated as appropriate: allergies, current medications, past family history, past medical history, past social history, past surgical history and problem list.    History    Past psychiatric history : none     Past Medical History:   Diagnosis Date    Abnormal weight loss 11/21/2024    Acute kidney injury 11/06/2024    Acute UTI (urinary tract infection) 11/06/2024    Anxiety associated with depression 11/06/2024    Benign neoplasm of cecum 07/05/2016    CAD, multiple vessel 10/08/2024    Cavitary lesion of lung 10/08/2024    COPD (chronic  obstructive pulmonary disease)     Crohn's disease 11/06/2024    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 07/05/2016    Dyslipidemia 10/30/2024    Fecal urgency 11/21/2024    First degree hemorrhoids 07/09/2021    GERD (gastroesophageal reflux disease) 11/21/2024    Hashimoto's thyroiditis 11/21/2024    History of colonic polyps 07/09/2021    Hypertension     Hypothyroidism (acquired) 11/06/2024    Mitral regurgitation 10/08/2024    Moderate protein-calorie malnutrition 11/09/2024    Multiple tracheobronchial mucus plugs 10/08/2024    Nicotine dependence 11/21/2024    Rheumatoid arthritis 11/06/2024    S/P mitral valve clip implantation 11/21/2024    Second degree hemorrhoids 07/05/2016    Stress incontinence, female 11/21/2024    Vitamin D deficiency, unspecified 11/21/2024          Family History   Problem Relation Age of Onset    Heart disease Mother     Dementia Mother     Stroke Mother     Heart disease Father     Hypertension Father         Social History     Tobacco Use    Smoking status: Former     Types: Cigarettes    Smokeless tobacco: Never   Vaping Use    Vaping status: Never Used   Substance Use Topics    Alcohol use: Never    Drug use: Never          Medications Prior to Admission   Medication Sig Dispense Refill Last Dose/Taking    Adalimumab (Humira, 2 Pen,) 40 MG/0.4ML Pen-injector Kit Inject 40 mg under the skin into the appropriate area as directed 1 (One) Time Per Week. Saturdays   Indications: Rheumatoid Arthritis       albuterol (PROVENTIL) (2.5 MG/3ML) 0.083% nebulizer solution Take 2.5 mg by nebulization Every 6 (Six) Hours As Needed for Wheezing. Indications: Spasm of Lung Air Passages       amLODIPine (NORVASC) 10 MG tablet Take 1 tablet by mouth Daily.       atorvastatin (LIPITOR) 40 MG tablet Take 1 tablet by mouth Every Night for 30 days. Indications: High Amount of Fats in the Blood 30 tablet 0     calcium carbonate (OS-ARABELLA) 600 MG tablet Take 1 tablet by mouth 2 (Two)  Times a Day With Meals.       cholecalciferol (VITAMIN D3) 1.25 MG (52560 UT) capsule Take 1 capsule by mouth 2 (Two) Times a Week. Wed, Sat  Indications: Vitamin D Deficiency       clopidogrel (PLAVIX) 75 MG tablet Take 1 tablet by mouth Daily for 30 days. Indications: Ischemic Heart Disease 30 tablet 0     diclofenac (VOLTAREN) 50 MG EC tablet Take 1 tablet by mouth 2 (Two) Times a Day.       folic acid (FOLVITE) 1 MG tablet Take 1 tablet by mouth Daily. Indications: Anemia From Inadequate Folic Acid       levothyroxine (SYNTHROID, LEVOTHROID) 50 MCG tablet Take 1 tablet by mouth Daily. Indications: Underactive Thyroid       Melatonin (Melatonin Extra Strength) 10 MG tablet Take 1 tablet by mouth As Needed. Indications: Trouble Sleeping       mesalamine (LIALDA) 1.2 g EC tablet Take 1 tablet by mouth Daily. Indications: Crohn's Disease 60 tablet 0     methotrexate 2.5 MG tablet Take 6 tablets by mouth 1 (One) Time Per Week. Saturdays   Indications: Non-oncologic       metoprolol succinate XL (TOPROL-XL) 25 MG 24 hr tablet Take 1 tablet by mouth Daily. Indications: Atrial Fibrillation 30 tablet 0     midodrine (PROAMATINE) 5 MG tablet Take 1 tablet by mouth 3 (Three) Times a Day Before Meals. Indications: Disorder of Low Blood Pressure 90 tablet 0     naloxone (NARCAN) 4 MG/0.1ML nasal spray Call 911. Don't prime. [place] in 1 nostril for overdose. Repeat in 2-3 minutes in other nostril if no or minimal breathing/responsiveness.  Indications: Opioid Overdose 2 each 0     ondansetron ODT (ZOFRAN-ODT) 4 MG disintegrating tablet Take 1 tablet by mouth Every 6 (Six) Hours As Needed for Nausea or Vomiting. Indications: Nausea and Vomiting Following an Operation 30 tablet 0     pantoprazole (PROTONIX) 20 MG EC tablet Take 1 tablet by mouth Daily. Indications: Heartburn       phenylephrine (YAYA-SYNEPHRINE) 1 % nasal spray Administer 1 spray into the nostril(s) as directed by provider 3 (Three) Times a Day for 3 days. 30 mL  "0     sertraline (ZOLOFT) 50 MG tablet Take 1 tablet by mouth Daily. Indications: Major Depressive Disorder       spironolactone (ALDACTONE) 25 MG tablet Take 1 tablet by mouth Daily. Indications: Edema       upadacitinib ER (Rinvoq) 45 MG tablet sustained-release 24 hour extended release tablet Take 1 tablet by mouth Daily. Indications: Rheumatoid Arthritis       [DISCONTINUED] aspirin 81 MG EC tablet Take 1 tablet by mouth Daily for 30 days. Indications: Disease involving Lipid Deposits in the Arteries 30 tablet 0         Scheduled Meds:  amLODIPine, 10 mg, Oral, Daily  atorvastatin, 40 mg, Oral, Nightly  cefTRIAXone, 2,000 mg, Intravenous, Q24H  clopidogrel, 75 mg, Oral, Daily  folic acid, 1,000 mcg, Oral, Daily  lansoprazole, 30 mg, Oral, Q AM  levothyroxine, 50 mcg, Oral, Q AM  mesalamine, 1,000 mg, Rectal, Nightly  metoprolol tartrate, 12.5 mg, Oral, Q12H  midodrine, 5 mg, Oral, Q8H  sertraline, 50 mg, Oral, Daily  sodium chloride, 10 mL, Intravenous, Q12H  spironolactone, 25 mg, Oral, Daily         Continuous Infusions:       PRN Meds:    acetaminophen **OR** acetaminophen **OR** acetaminophen    albuterol    senna-docusate sodium **AND** polyethylene glycol **AND** bisacodyl **AND** bisacodyl    calcium carbonate    Calcium Replacement - Follow Nurse / BPA Driven Protocol    guaifenesin    ipratropium-albuterol    Magnesium Standard Dose Replacement - Follow Nurse / BPA Driven Protocol    melatonin    nitroglycerin    ondansetron ODT **OR** ondansetron    Phosphorus Replacement - Follow Nurse / BPA Driven Protocol    Potassium Replacement - Follow Nurse / BPA Driven Protocol    [COMPLETED] Insert Peripheral IV **AND** sodium chloride    sodium chloride      Allergies:  Codeine and Penicillin g sodium      Objective     Vital Signs   /64 (BP Location: Right arm, Patient Position: Sitting)   Pulse 91   Temp 98 °F (36.7 °C) (Oral)   Resp 27   Ht 162.6 cm (64\")   Wt 57.3 kg (126 lb 5.2 oz)   SpO2 98% "   BMI 21.68 kg/m²     Physical Exam:    Musculoskeletal:   Muscle strength and tone: generalized weakness   Abnormal Movements: None noted.   Gait: ALOK, patient in the chair.      General Appearance:    In the chair, in NAD.      MENTAL STATUS EXAM   General Appearance:  Cleanly groomed and dressed  Eye Contact:  Good eye contact  Attitude:  Guarded  Speech:  Normal rate, tone, volume  Language:  Unspontaneous  Mood and affect:  Irritable  Hopelessness:  Denies  Loneliness: Denies  Thought Process:  Linear  Associations/ Thought Content:  No delusions  Hallucinations:  None  Suicidal Ideations:  Not present  Homicidal Ideation:  Not present  Sensorium:  Alert  Orientation:  Person and place (disoriented to situation, could only tell month, day and year from glancing at white board)  Immediate Recall, Recent, and Remote Memory:  Deficit noted  Attention Span and Concentration: fair.  Fund of Knowledge:  Limited  Intellectual Functioning:  Average range  Insight:  Poor  Judgement:  Poor  Reliability:  Poor  Impulse Control:  Fair         Result Review:  I have personally reviewed the results from the time of this admission to 12/16/2024 17:04 EST and agree with these findings:  [x]  Laboratory  []  Microbiology  []  Radiology  [x]  EKG/Telemetry   []  Cardiology/Vascular   []  Pathology  []  Old records  []  Other:    Assessment & Plan       PNA (pneumonia)    Pneumonia due to other gram-negative bacteria     Assessment: mild cognitive impairment, assessment for decision making capacity.  Treatment Plan: Patient was assessed today and shown to have mild cognitive impairment. She was not completely oriented to situation, and was unable to accurately tell me who lived in the home with her, and who her resources were in the home if she were to go against medical advice and return home, instead of SNF. She completed a Mini Mental State Exam with a score of 24 out of 30, which is suggestive of mild cognitive impairment.      The patient demonstrates poor insight into her physical condition, and what her limitations are. She is unclear who actually lives in the home with her, and therefore not able to  who her resources would be in the event she went home instead of skilled nursing. She is experiencing some memory deficits, and her son at bedside confirms these have been ongoing, at least for the past several months. He says those who lived with her prior to these hospitalizations said they had noticed earlier. Her son is her decision maker in the event she is not able to make decisions.     I explained to the patient and her son, that today, as of the time of my assessment, I would say she does not have capacity to make medical decisions. Medical and legal decisions at this time should be deferred to her surrogate decision maker, who is her son. Advised the patient and her son that once she is well, and back to her baseline, it would benefit them to have a formal neuro/neuropsych evaluation for dementia.     Nothing further needed from psych standpoint.     Will follow as needed.   Treatment Plan discussed with: Patient, Family, and nursing     I discussed the patients findings and my recommendations with patient, family, and nursing staff    I have reviewed and approved the behavioral health treatment plans and problem list. Yes  Thank you for the consult   Referring MD has access to consult report and progress notes in EMR     DRU Dumont  12/16/24  17:04 EST

## 2024-12-16 NOTE — CASE MANAGEMENT/SOCIAL WORK
Continued Stay Note  [facility]     Patient Name: Maryann Gaitan  MRN: [id number]  Today's Date: 12/16/2024    Admit Date: 12/11/2024    Plan: Return to [facility] (skilled, accepted). PASRR not needed. Precert approved valid 12/14-12/17   Discharge Plan       Row Name 12/16/24 1341       Plan    Plan Comments Patient is verbalizing to nurse/MD that she wants to return home at [place]. Patient is Alert and oriented x 3 and disoriented to situation at times. Patient is familiar to this CM and will often recall events in the past and talk about them in present tense (last admission she wanted to leave to go home to her daughter who she reported was dying with hospice, but that daughter passed away five years ago). CM called son Nadir to let him know patient is refusing SNF at this time and he states he will come speak with her after 3 when he gets off work today. CM confirmed with [facility] liaison Teal that paient is okay to return today. EMS request sent as will call and CM entered medical necessity paperwork in note and updated primary nurse in case she agrees and needs transportation. MD updated of patient history. Patient has also not been educated on ostomy care and if returning home would need family and/or herself to be capable of caring for ostomy needs.             Shraddha Lozano RN     [facility]  Office: [phone]  Cell: [phone]  Fax # [phone]

## 2024-12-16 NOTE — PLAN OF CARE
"Assessment: Maryann Gaitan presents with ADL impairments affecting function including balance, cognition, endurance / activity tolerance, shortness of breath, and strength. Pt alert and attentive. Mod I to complete bed mobility and mod assist to come to standing. Pt ambulated with CGA/Min assist and FWW, quickly fatigues exhibiting SOA requesting to return to seated position. +1 for safety and chair management. Demonstrated functioning below baseline abilities indicate the need for continued skilled intervention while inpatient. Tolerating session today without incident. Will continue to follow and progress as tolerated.      Plan/Recommendations:   Moderate Intensity Therapy recommended post-acute care. This is recommended as therapy feels the patient would require 3-4 days per week and wouldn't tolerate \"3 hour daily\" rehab intensity. SNF would be the preferred choice. If the patient does not agree to SNF, arrange HH or OP depending on home bound status. If patient is medically complex, consider LTACH.  "

## 2024-12-16 NOTE — CASE MANAGEMENT/SOCIAL WORK
Continued Stay Note  [facility]     Patient Name: Maryann Gaitan  MRN: [id number]  Today's Date: 12/16/2024    Admit Date: 12/11/2024    Plan: Return to [facility] (skilled, accepted). PASRR not needed. Precert approved valid 12/14-12/17   Discharge Plan       Row Name 12/16/24 1508       Plan    Plan Comments Primary nurse updated CM that patient's son is here and patient is still refusing SNF. CM contacted MD for possible psych consult as son reiterated to primary nurse that his daughter is not able to give care full time due to being in school and they confirmed they do not know how to manage the ostomy. MD placed psych consult. SW aware and contacted psych APRN to update.        Shraddha Lozano RN     [facility]  Office: [phone]  Cell: [phone]  Fax # [phone]

## 2024-12-16 NOTE — DISCHARGE SUMMARY
Hospitalist Service   DISCHARGE SUMMARY    Patient Name: Maryann Gaitan  : 1950  MRN: [id number]    Date of Admission: 2024  Date of Discharge:  24    Primary Care Physician: Azam Calhoun MD      Presenting Problem:   PNA (pneumonia) [J18.9]  Chest pain, unspecified type [R07.9]  Pneumonia due to infectious organism, unspecified laterality, unspecified part of lung [J18.9]    Active and Resolved Hospital Problems:  Active Hospital Problems    Diagnosis POA    **PNA (pneumonia) [J18.9] Yes    Pneumonia due to other gram-negative bacteria [J15.69] Yes      Resolved Hospital Problems   No resolved problems to display.         Hospital Course     Hospital Course:  Maryann Gaitan is a 74 y.o. female  with a CMH of CAD, hypertension, hyperlipidemia, COPD, hypothyroidism, crohns disease who presented to [facility] on 2024 with chest pain. Patient lives at a nursing facility and developed chest pain while riding on the exercise bike. Patient reported chest pain was gone by the time she arrived to the emergency department. Patient currently denies chest pain, does endorse pain with palpation. Patient endorses a nonproductive cough.  ACS ruled out, chest x-ray consistent with bilateral pneumonia.  Started on IV antibiotics.  Cultures remain negative.  Patient has overall improved, respite status stable and remains on room air now.  PT/OT recommending discharge back to rehab facility however patient refusing and wants to go home.  No further inpatient workup needed.  Switch to Omnicef on discharge for antibiotics.  Patient is clinically improved and stable to discharge home with follow-up with PCP as an outpatient.    Addendum: Patient not able to discharge on  as patient wanted to go home however family unable to take care of her at home due to colostomy.  Psych consulted and deemed that patient does not have decision-making capacity.  After further discussion with family  now plan to return to rehab.  DC med rec also adjusted as noted below as patient was having undigested pills in colostomy bag per nursing.  Amlodipine also discontinued.  Overall patient has improved and clinically stable to discharge to rehab on 12/17.    DISCHARGE Follow Up Recommendations for labs and diagnostics:   Follow-up with PCP in 1 week    Day of Discharge     Vital Signs:  Temp:  [97.6 °F (36.4 °C)-98.4 °F (36.9 °C)] 97.7 °F (36.5 °C)  Heart Rate:  [] 79  Resp:  [22-29] 22  BP: (103-121)/(56-67) 114/61    Physical Exam:  General: Awake, alert, NAD  Eyes: PERRL, EOMI, conjunctivae are clear  Cardiovascular: Regular rate and rhythm, no murmurs  Respiratory: Clear to auscultation bilaterally, no wheezing or rales, unlabored breathing  Abdomen: Soft, nontender, positive bowel sounds, no guarding  Neurologic: A&O, CN grossly intact, moves all extremities spontaneously  Musculoskeletal: Normal range of motion, no other gross deformities  Skin: Warm, dry        Pertinent  and/or Most Recent Results     LAB RESULTS:      Lab 12/16/24  0522 12/15/24  0524 12/14/24  0451 12/13/24  0539 12/12/24  0654 12/11/24  1504 12/11/24  1343 12/10/24  1958   WBC 9.60 10.37 6.73 6.76 5.35  --  5.13 6.18   HEMOGLOBIN 8.9* 8.3* 7.4* 7.7* 7.2*  --  8.0* 8.7*   HEMATOCRIT 28.7* 25.3* 23.2* 24.3* 22.9*  --  26.8* 27.5*   PLATELETS 319 269 217 221 176  --  255 230   NEUTROS ABS  --   --  5.05 4.76 4.03  --  3.33 4.07   IMMATURE GRANS (ABS)  --   --   --  0.33* 0.29*  --   --  0.28*   LYMPHS ABS  --   --   --  0.94 0.56*  --   --  1.35   MONOS ABS  --   --   --  0.62 0.41  --   --  0.39   EOS ABS  --   --   --  0.09 0.05  --   --  0.07   MCV 90.8 91.0 92.1 91.0 95.4  --  94.7 94.5   PROCALCITONIN  --   --   --   --   --  0.10  --   --    PROTIME  --   --   --   --   --   --  14.1  --    APTT  --   --   --   --   --   --  34.0  --          Lab 12/16/24  0522 12/14/24  0451 12/13/24  0539 12/12/24  0747 12/12/24  0654  12/11/24  1343   SODIUM 132* 132* 133* 136  --  135*   POTASSIUM 4.3 3.7 3.6 4.2  --  4.4   CHLORIDE 100 101 101 105  --  102   CO2 22.5 22.6 23.4 21.2*  --  22.7   ANION GAP 9.5 8.4 8.6 9.8  --  10.3   BUN 11 10 8 11  --  12   CREATININE 0.50* 0.51* 0.59 0.60  --  0.91   EGFR 98.6 98.1 94.7 94.3  --  66.3   GLUCOSE 80 85 82 76  --  96   CALCIUM 8.7 8.2* 8.3* 8.3*  --  8.4*   MAGNESIUM  --  2.0 2.6* 1.5*  --   --    PHOSPHORUS  --  3.2 3.2  --  3.1  --          Lab 12/11/24  1343   TOTAL PROTEIN 5.2*   ALBUMIN 2.7*   GLOBULIN 2.5   ALT (SGPT) 31   AST (SGOT) 34*   BILIRUBIN 0.2   ALK PHOS 132*         Lab 12/11/24  1504 12/11/24  1343   PROBNP  --  2,790.0*   HSTROP T 33* 37*   PROTIME  --  14.1   INR  --  1.09                 Brief Urine Lab Results  (Last result in the past 365 days)        Color   Clarity   Blood   Leuk Est   Nitrite   Protein   CREAT   Urine HCG        12/02/24 2056 Yellow   Cloudy   Negative   Moderate (2+)   Negative   Negative                 Microbiology Results (last 10 days)       Procedure Component Value - Date/Time    Respiratory Culture - Sputum, Cough [[id number]] Collected: 12/13/24 1032    Lab Status: Final result Specimen: Sputum from Cough Updated: 12/15/24 1425     Respiratory Culture Light growth (2+) Normal respiratory wu. No S. aureus or Pseudomonas aeruginosa detected. Final report.     Gram Stain Moderate (3+) WBCs seen      Few (2+) Mixed bacterial morphotypes seen on Gram Stain    Blood Culture - Blood, Arm, Left [[id number]]  (Normal) Collected: 12/11/24 1453    Lab Status: Final result Specimen: Blood from Arm, Left Updated: 12/16/24 1501     Blood Culture No growth at 5 days    Blood Culture - Blood, Arm, Left [[id number]]  (Normal) Collected: 12/11/24 1448    Lab Status: Final result Specimen: Blood from Arm, Left Updated: 12/16/24 1501     Blood Culture No growth at 5 days            XR Chest 1 View    Result Date: 12/11/2024  Impression: Impression: 1.Patchy  consolidation in the upper lobes felt to represent bilateral pneumonia. 2.Right upper lobe subsegmental atelectasis. 3.Questionable trace right pleural effusion. Electronically Signed: Michi Taylor MD  12/11/2024 2:02 PM EST  Workstation ID: [id number]    XR Chest 1 View    Result Date: 11/25/2024  Impression: Impression: No active pulmonary process. Electronically Signed: Jameson Rainey MD  11/25/2024 9:25 AM EST  Workstation ID: [id number]    CT Abdomen Pelvis With Contrast    Result Date: 11/20/2024  Impression: Impression: 1.Distal/terminal ileitis in keeping with patient's history of Crohn's disease. No definitive active colonic inflammation identified on CT imaging. Trace free fluid in the pelvis is likely related. 2.Diffuse colonic fatty mural infiltration which can be seen in the setting of chronic inflammatory bowel disease. 3.Other incidental nonemergent findings detailed above. Electronically Signed: Junior Khan MD  11/20/2024 8:19 AM EST  Workstation ID: [id number]    XR Chest 1 View    Result Date: 11/19/2024  Impression: Impression: No acute chest finding. Electronically Signed: Ute Snider MD  11/19/2024 1:34 PM EST  Workstation ID: [id number]     Results for orders placed during the hospital encounter of 10/08/24    Duplex Venous Upper Extremity - Left CAR    Interpretation Summary    Acute left upper extremity superficial thrombophlebitis noted in the basilic (upper arm).    All other left sided vessels appear normal.      Results for orders placed during the hospital encounter of 10/08/24    Duplex Venous Upper Extremity - Left CAR    Interpretation Summary    Acute left upper extremity superficial thrombophlebitis noted in the basilic (upper arm).    All other left sided vessels appear normal.      Results for orders placed during the hospital encounter of 11/19/24    Adult Transthoracic Echo Limited W/ Cont if Necessary Per Protocol    Interpretation Summary    Left ventricular ejection fraction  appears to be 56 - 60%.    There is a MitraClip mitral valve repair present.    There is trace residual MR.  Mean gradient is 4.6 mmHg      Labs Pending at Discharge:        Procedures Performed           Consults:   Consults       Date and Time Order Name Status Description    12/16/2024  3:00 PM Inpatient Psychiatrist Consult Completed     12/11/2024  2:38 PM Hospitalist (on-call MD unless specified)      12/2/2024  3:47 PM Inpatient Psychiatrist Consult Completed     11/24/2024 10:44 AM Inpatient Infectious Diseases Consult Completed     11/22/2024 11:39 AM Hematology & Oncology Inpatient Consult Completed     11/19/2024  5:31 PM Inpatient Cardiology Consult Completed     11/19/2024  2:56 PM Gastroenterology (on-call MD unless specified) Completed     11/7/2024  1:20 PM Inpatient Cardiology Consult Completed     11/7/2024  5:48 AM Inpatient Infectious Diseases Consult Completed     11/6/2024  7:25 PM Inpatient Gastroenterology Consult Completed               Discharge Details        Discharge Medications        New Medications        Instructions Start Date   cefdinir 300 MG capsule  Commonly known as: OMNICEF   300 mg, Oral, 2 Times Daily      famotidine 20 MG tablet  Commonly known as: Pepcid   20 mg, Oral, 2 Times Daily      guaifenesin 100 MG/5ML liquid  Commonly known as: ROBITUSSIN   200 mg, Oral, Every 4 Hours PRN      mesalamine 1000 MG suppository  Commonly known as: CANASA  Replaces: mesalamine 1.2 g EC tablet   1,000 mg, Rectal, Nightly      metoprolol tartrate 25 MG tablet  Commonly known as: LOPRESSOR   12.5 mg, Oral, Every 12 Hours Scheduled             Continue These Medications        Instructions Start Date   albuterol (2.5 MG/3ML) 0.083% nebulizer solution  Commonly known as: PROVENTIL   2.5 mg, Every 6 Hours PRN      aspirin 81 MG EC tablet   81 mg, Oral, Daily      atorvastatin 40 MG tablet  Commonly known as: LIPITOR   40 mg, Oral, Nightly      calcium carbonate 600 MG tablet  Commonly known  as: OS-ARABELLA   600 mg, Oral, 2 Times Daily With Meals      cholecalciferol 1.25 MG (49638 UT) capsule  Commonly known as: VITAMIN D3   1 capsule, 2 Times Weekly      clopidogrel 75 MG tablet  Commonly known as: PLAVIX   75 mg, Oral, Daily      diclofenac 50 MG EC tablet  Commonly known as: VOLTAREN   50 mg, 2 Times Daily      folic acid 1 MG tablet  Commonly known as: FOLVITE   1 tablet, Daily      Humira (2 Pen) 40 MG/0.4ML Pen-injector Kit  Generic drug: Adalimumab   40 mg, Weekly      levothyroxine 50 MCG tablet  Commonly known as: SYNTHROID, LEVOTHROID   1 tablet, Daily      Melatonin Extra Strength 10 MG tablet  Generic drug: Melatonin   10 mg, As Needed      methotrexate 2.5 MG tablet   6 tablets, Weekly      midodrine 5 MG tablet  Commonly known as: PROAMATINE   5 mg, Oral, 3 Times Daily Before Meals      naloxone 4 MG/0.1ML nasal spray  Commonly known as: NARCAN   Call 911. Don't prime. [place] in 1 nostril for overdose. Repeat in 2-3 minutes in other nostril if no or minimal breathing/responsiveness.      ondansetron ODT 4 MG disintegrating tablet  Commonly known as: ZOFRAN-ODT   4 mg, Oral, Every 6 Hours PRN      Rinvoq 45 MG tablet sustained-release 24 hour extended release tablet  Generic drug: upadacitinib ER   1 tablet, Daily      sertraline 50 MG tablet  Commonly known as: ZOLOFT   1 tablet, Daily      spironolactone 25 MG tablet  Commonly known as: ALDACTONE   1 tablet, Daily             Stop These Medications      amLODIPine 10 MG tablet  Commonly known as: NORVASC     mesalamine 1.2 g EC tablet  Commonly known as: LIALDA  Replaced by: mesalamine 1000 MG suppository     metoprolol succinate XL 25 MG 24 hr tablet  Commonly known as: TOPROL-XL     pantoprazole 20 MG EC tablet  Commonly known as: PROTONIX            ASK your doctor about these medications        Instructions Start Date   phenylephrine 1 % nasal spray  Commonly known as: YAYA-SYNEPHRINE  Ask about: Should I take this medication?   1 spray,  Nasal, 3 Times Daily               Allergies   Allergen Reactions    Codeine Hives    Penicillin G Sodium Hives         Discharge Disposition:  Home or Self Care    Diet:  Hospital:  Diet Order   Procedures    Diet: Cardiac; Healthy Heart (2-3 Na+); Fluid Consistency: Thin (IDDSI 0)         Discharge Activity:         CODE STATUS:  Code Status and Medical Interventions: CPR (Attempt to Resuscitate); Full Support   Ordered at: 12/11/24 2045     Level Of Support Discussed With:    Patient     Code Status (Patient has no pulse and is not breathing):    CPR (Attempt to Resuscitate)     Medical Interventions (Patient has pulse or is breathing):    Full Support         Future Appointments   Date Time Provider Department Center   1/6/2025  8:30 AM [facility] ECHO [facility] NA   1/6/2025  9:45 AM [place] [facility]   9/24/2025 10:15 AM [facility] NA ECHO [facility] NA   9/24/2025 11:45 AM Travis Connor MD [facility]           Time spent on Discharge including face to face service:  44 minutes    Signature:      Electronically signed by Jordan Lewis DO, 12/17/24, 9:44 AM EST.        Part of this note may be an electronic transcription/translation of spoken language to printed text using the Dragon Dictation System.

## 2024-12-16 NOTE — CASE MANAGEMENT/SOCIAL WORK
Continued Stay Note  [facility]     Patient Name: Maryann Gaitan  MRN: [id number]  Today's Date: 12/16/2024    Admit Date: 12/11/2024    Plan: Return to [facility] (skilled, accepted). PASRR not needed. Precert approved valid 12/14-12/17   Discharge Plan       Row Name 12/16/24 0953       Plan    Plan Return to [facility] (skilled, accepted). PASRR not needed. Precert approved valid 12/14-12/17    Plan Comments DC Barriers: hx right hemicolectomy with ileostomy, IV ceftriaxone, azithromycin and f/u blood cx pending-so far neg, Hgb improving, started diet yesterday             Shraddha Lozano RN     [facility]  Office: [phone]  Cell: [phone]  Fax # [phone]

## 2024-12-16 NOTE — THERAPY TREATMENT NOTE
Subjective: Pt initially refusing to participate with therapy, stating she is going home today.  Talked with pt about d/c orders for her to go to rehab, she said she is refusing to go and just wants to go home.  Pt stating she [place] over the [place], she has not walked with therapy more than a few steps this weekend.  Pt agreed to get up to chair and try to walk with therapy after much encouragement.     Objective:   Pt supine in bed.    Precautions - falls    Bed mobility - Modified-Independent, supine to sit, pt unaware her ostomy has leaked and she is soiled.  Pt requires total A with ostomy care.    Transfers - Mod-A. Attempted to let pt stand from EOB on her own, she is unable and required verbal cues and mod A to come to stand with her walker.      Ambulation - 10 feet Min-A and with rolling walker  Pt needed encouragement to attempt walking, after ~ 10', pt fatigued and weak and requesting to sit down.  Had to bring chair to pt to sit.      Vitals: Hypotensive    Pain: 0 VAS   Location:   Intervention for pain: N/A    Education: Provided education on the importance of mobility in the acute care setting and Transfer Training    Assessment: Maryann Gaitan presents with functional mobility impairments which indicate the need for skilled intervention. Pt is not safe for home, will need 24 hr care and high risk for falls and further skin breakdown.  Pt is has poor awareness of her physical deficits and her cognitive status waxes and wanes during therapy session.  Pt has unrealistic goals of being able to manage at home. This admission, pt is much weaker and only able to transfer to chair and ambulate a few feet with assistance.  Continue to recommend SNF for rehab at d/c.  Discussed with pt but she was not receptive to feedback.   Will continue to follow and progress as tolerated.     Plan/Recommendations:   If medically appropriate, Moderate Intensity Therapy recommended post-acute care. This is  "recommended as therapy feels the patient would require 3-4 days per week and wouldn't tolerate \"3 hour daily\" rehab intensity. SNF would be the preferred choice. If the patient does not agree to SNF, arrange HH or OP depending on home bound status. If patient is medically complex, consider LTACH. Pt requires no DME at discharge.     Pt desires Skilled Rehab placement at discharge. Pt cooperative; agreeable to therapeutic recommendations and plan of care.         Basic Mobility 6-click:  Rollin = Total, A lot = 2, A little = 3; 4 = None  Supine>Sit:   1 = Total, A lot = 2, A little = 3; 4 = None   Sit>Stand with arms:  1 = Total, A lot = 2, A little = 3; 4 = None  Bed>Chair:   1 = Total, A lot = 2, A little = 3; 4 = None  Ambulate in room:  1 = Total, A lot = 2, A little = 3; 4 = None  3-5 Steps with railin = Total, A lot = 2, A little = 3; 4 = None  Score: 17    Modified Doug: N/A = No pre-op stroke/TIA    Post-Tx Position: Up in Chair, Alarms activated, and Call light and personal items within reach  PPE: gloves    Therapy Charges for Today       Code Description Service Date Service Provider Modifiers Qty    [id number]  GAIT TRAINING EA 15 MIN 2024 Hilary Mcmillan, PT GP 1    [id number]  PT THERAPEUTIC ACT EA 15 MIN 2024 Hilary Mcmillan, PT GP 1           PT Charges       Row Name 24 1525             Time Calculation    Start Time 1351  -      Stop Time 1412  -      Time Calculation (min) 21 min  -      PT Received On 24  -      PT - Next Appointment 24  -         Time Calculation- PT    Total Timed Code Minutes- PT 21 minute(s)  -                User Key  (r) = Recorded By, (t) = Taken By, (c) = Cosigned By      Initials Name Provider Type    Hilary Jacobsen, PT Physical Therapist                      "

## 2024-12-16 NOTE — SIGNIFICANT NOTE
Case Management/Social Work    Patient Name:  Maryann Gaitan  YOB: 1950  MRN: [id number]  Admit Date:  12/11/2024 12/16/24 0949   Post Acute Pre-Cert Documentation   Date Post Acute Pre-Cert Completed 12/16/24   All Clinicals Submitted? Yes   Response Received from Insurance? Approval   Post Acute Pre-Cert Initiated Comment SANDI verified SNF precert approval via [facility] portal. Portal auth ID [id number], plan auth ID [id number]. valid 12/14-12/17. CM made aware.           Electronically signed by:  Tavon West CMA  12/16/24 09:49 EST    Tavon West  Case Management Associate  [address] [address]  P: [phone]  F: [phone]

## 2024-12-17 ENCOUNTER — READMISSION MANAGEMENT (OUTPATIENT)
Dept: CALL CENTER | Facility: HOSPITAL | Age: 74
End: 2024-12-17
Payer: MEDICARE

## 2024-12-17 VITALS
HEART RATE: 79 BPM | HEIGHT: 64 IN | WEIGHT: 126.32 LBS | BODY MASS INDEX: 21.57 KG/M2 | OXYGEN SATURATION: 98 % | RESPIRATION RATE: 22 BRPM | TEMPERATURE: 97.7 F | DIASTOLIC BLOOD PRESSURE: 61 MMHG | SYSTOLIC BLOOD PRESSURE: 114 MMHG

## 2024-12-17 RX ADMIN — MIDODRINE HYDROCHLORIDE 5 MG: 5 TABLET ORAL at 07:34

## 2024-12-17 RX ADMIN — SERTRALINE HYDROCHLORIDE 50 MG: 50 TABLET ORAL at 07:34

## 2024-12-17 RX ADMIN — LEVOTHYROXINE SODIUM 50 MCG: 50 TABLET ORAL at 07:33

## 2024-12-17 RX ADMIN — FOLIC ACID 1000 MCG: 1 TABLET ORAL at 07:33

## 2024-12-17 RX ADMIN — SPIRONOLACTONE 25 MG: 25 TABLET ORAL at 07:34

## 2024-12-17 RX ADMIN — CLOPIDOGREL BISULFATE 75 MG: 75 TABLET ORAL at 07:34

## 2024-12-17 RX ADMIN — LANSOPRAZOLE 30 MG: 30 TABLET, ORALLY DISINTEGRATING ORAL at 07:35

## 2024-12-17 RX ADMIN — Medication 12.5 MG: at 07:34

## 2024-12-17 NOTE — OUTREACH NOTE
Prep Survey      Flowsheet Row Responses   Hindu facility patient discharged from? [facility]   Is LACE score < 7 ? No   Eligibility Not Eligible   What are the reasons patient is not eligible? [facility]   Does the patient have one of the following disease processes/diagnoses(primary or secondary)? Pneumonia   Prep survey completed? Yes            BEATA CERVANTES - Registered Nurse

## 2024-12-17 NOTE — SIGNIFICANT NOTE
12/17/24 1010   OTHER   Discipline physical therapist   Rehab Time/Intention   Session Not Performed other (see comments)  (pt is discharging to [facility] this date)

## 2024-12-17 NOTE — PROGRESS NOTES
[facility] MEDICINE SERVICE  DAILY PROGRESS NOTE    NAME: Maryann Gaitan  : 1950  MRN: [id number]      LOS: 6 days     PROVIDER OF SERVICE: Jordan Lewis DO    Chief Complaint: PNA (pneumonia)    Subjective:     Interval History:  History taken from: patient    Patient seen and examined this morning.  Was supposed to be discharged yesterday, see discharge summary for further details.  Overall feeling better and ready to go to rehab today.  No other complaints.        Review of Systems:   Review of Systems   All other systems reviewed and are negative.      Objective:     Vital Signs  Temp:  [97.6 °F (36.4 °C)-98.4 °F (36.9 °C)] 97.7 °F (36.5 °C)  Heart Rate:  [] 79  Resp:  [22-29] 22  BP: (103-121)/(56-67) 114/61   Body mass index is 21.68 kg/m².    Physical Exam  General: Awake, alert, NAD  Eyes: PERRL, EOMI, conjunctivae are clear  Cardiovascular: Regular rate and rhythm, no murmurs  Respiratory: Clear to auscultation bilaterally, no wheezing or rales, unlabored breathing  Abdomen: Soft, nontender, positive bowel sounds, no guarding  Neurologic: A&O, CN grossly intact, moves all extremities spontaneously  Musculoskeletal: Normal range of motion, no other gross deformities  Skin: Warm, dry      Current Medications:  Scheduled Meds:atorvastatin, 40 mg, Oral, Nightly  cefTRIAXone, 2,000 mg, Intravenous, Q24H  clopidogrel, 75 mg, Oral, Daily  folic acid, 1,000 mcg, Oral, Daily  lansoprazole, 30 mg, Oral, Q AM  levothyroxine, 50 mcg, Oral, Q AM  mesalamine, 1,000 mg, Rectal, Nightly  metoprolol tartrate, 12.5 mg, Oral, Q12H  midodrine, 5 mg, Oral, Q8H  sertraline, 50 mg, Oral, Daily  sodium chloride, 10 mL, Intravenous, Q12H  spironolactone, 25 mg, Oral, Daily      Continuous Infusions:   PRN Meds:.  acetaminophen **OR** acetaminophen **OR** acetaminophen    albuterol    senna-docusate sodium **AND** polyethylene glycol **AND** bisacodyl **AND** bisacodyl    calcium carbonate    Calcium  Replacement - Follow Nurse / BPA Driven Protocol    guaifenesin    ipratropium-albuterol    Magnesium Standard Dose Replacement - Follow Nurse / BPA Driven Protocol    melatonin    nitroglycerin    ondansetron ODT **OR** ondansetron    Phosphorus Replacement - Follow Nurse / BPA Driven Protocol    Potassium Replacement - Follow Nurse / BPA Driven Protocol    [COMPLETED] Insert Peripheral IV **AND** sodium chloride    sodium chloride       Diagnostic Data    Results from last 7 days   Lab Units 12/16/24  0522 12/12/24  0654 12/11/24  1343   WBC 10*3/mm3 9.60   < > 5.13   HEMOGLOBIN g/dL 8.9*   < > 8.0*   HEMATOCRIT % 28.7*   < > 26.8*   PLATELETS 10*3/mm3 319   < > 255   GLUCOSE mg/dL 80   < > 96   CREATININE mg/dL 0.50*   < > 0.91   BUN mg/dL 11   < > 12   SODIUM mmol/L 132*   < > 135*   POTASSIUM mmol/L 4.3   < > 4.4   AST (SGOT) U/L  --   --  34*   ALT (SGPT) U/L  --   --  31   ALK PHOS U/L  --   --  132*   BILIRUBIN mg/dL  --   --  0.2   ANION GAP mmol/L 9.5   < > 10.3    < > = values in this interval not displayed.       No radiology results for the last day      I reviewed the patient's new clinical results.    Assessment/Plan:     Active and Resolved Problems  Active Hospital Problems    Diagnosis  POA    **PNA (pneumonia) [J18.9]  Yes    Pneumonia due to other gram-negative bacteria [J15.69]  Yes      Resolved Hospital Problems   No resolved problems to display.       Pneumonia  - Switch to Omnicef on discharge, cultures negative  -Overall stable and okay to discharge    Anemia  - Hemoglobin stable monitor.  Transfuse blood for hemoglobin of 7 or less.    Crohns  - s/p right hemicolectomy with ileostomy on 11/28/24  - Hb 8.0, down from 8.7 on 12/10/24  - Has been seen by general surgery.  Staples were removed.  Okay to start diet per general surgery.  -Rehab placement     Hypertension  - Blood pressure is controlled.  patient also on chronic midodrine, discontinue amlodipine, continue  beta-blocker    Hypothyroidism  - Continue levothyroxine    GERD  - Continue Protonix        VTE Prophylaxis:  Mechanical VTE prophylaxis orders are present.             Disposition Planning:     Barriers to Discharge: None  Anticipated Date of Discharge: Medically stable for discharge   Place of Discharge: SNF    \    Code Status and Medical Interventions: CPR (Attempt to Resuscitate); Full Support   Ordered at: 12/11/24 2045     Level Of Support Discussed With:    Patient     Code Status (Patient has no pulse and is not breathing):    CPR (Attempt to Resuscitate)     Medical Interventions (Patient has pulse or is breathing):    Full Support       Signature: Electronically signed by Jordan Lewis DO, 12/17/24, 09:44 EST.  [facility] Hospitalist Team     Part of this note may be an electronic transcription/translation of spoken language to printed text using the Dragon Dictation System.

## 2024-12-19 NOTE — CASE MANAGEMENT/SOCIAL WORK
Case Management Discharge Note      Final Note: [facility] skilled    Selected Continued Care - Discharged on 12/17/2024 Admission date: 12/11/2024 - Discharge disposition: Home or Self Care      Destination Coordination complete.      Service Provider Services Address Phone Fax Patient Preferred    [facility] [address] [place] [id number] [phone] --           Transportation Services  Ambulance: [facility]  [facility] Ambulance Status: Accepted    Final Discharge Disposition Code: 03 - skilled nursing facility (SNF)

## 2024-12-26 ENCOUNTER — READMISSION MANAGEMENT (OUTPATIENT)
Dept: CALL CENTER | Facility: HOSPITAL | Age: 74
End: 2024-12-26
Payer: MEDICARE

## 2024-12-26 ENCOUNTER — HOSPITAL ENCOUNTER (INPATIENT)
Facility: HOSPITAL | Age: 74
LOS: 5 days | Discharge: HOME HEALTH | End: 2024-12-31
Attending: EMERGENCY MEDICINE | Admitting: FAMILY MEDICINE
Payer: MEDICARE

## 2024-12-26 ENCOUNTER — APPOINTMENT (OUTPATIENT)
Dept: GENERAL RADIOLOGY | Facility: HOSPITAL | Age: 74
End: 2024-12-26
Payer: MEDICARE

## 2024-12-26 DIAGNOSIS — E87.5 HYPERKALEMIA: ICD-10-CM

## 2024-12-26 DIAGNOSIS — N17.9 ACUTE RENAL FAILURE, UNSPECIFIED ACUTE RENAL FAILURE TYPE: Primary | ICD-10-CM

## 2024-12-26 LAB
ALBUMIN SERPL-MCNC: 3.3 G/DL (ref 3.5–5.2)
ALBUMIN/GLOB SERPL: 0.9 G/DL
ALP SERPL-CCNC: 190 U/L (ref 39–117)
ALT SERPL W P-5'-P-CCNC: 20 U/L (ref 1–33)
ANION GAP SERPL CALCULATED.3IONS-SCNC: 20.1 MMOL/L (ref 5–15)
APTT PPP: 32 SECONDS (ref 22.7–35.4)
AST SERPL-CCNC: 26 U/L (ref 1–32)
BASOPHILS # BLD AUTO: 0.11 10*3/MM3 (ref 0–0.2)
BASOPHILS NFR BLD AUTO: 0.7 % (ref 0–1.5)
BILIRUB SERPL-MCNC: 0.2 MG/DL (ref 0–1.2)
BUN SERPL-MCNC: 90 MG/DL (ref 8–23)
BUN/CREAT SERPL: 16.2 (ref 7–25)
CALCIUM SPEC-SCNC: 9.4 MG/DL (ref 8.6–10.5)
CHLORIDE SERPL-SCNC: 93 MMOL/L (ref 98–107)
CHOLEST SERPL-MCNC: 126 MG/DL (ref 0–200)
CO2 SERPL-SCNC: 15.9 MMOL/L (ref 22–29)
CREAT SERPL-MCNC: 5.56 MG/DL (ref 0.57–1)
D-LACTATE SERPL-SCNC: 2.8 MMOL/L (ref 0.3–2)
DEPRECATED RDW RBC AUTO: 62.8 FL (ref 37–54)
EGFRCR SERPLBLD CKD-EPI 2021: 7.6 ML/MIN/1.73
EOSINOPHIL # BLD AUTO: 0.1 10*3/MM3 (ref 0–0.4)
EOSINOPHIL NFR BLD AUTO: 0.6 % (ref 0.3–6.2)
ERYTHROCYTE [DISTWIDTH] IN BLOOD BY AUTOMATED COUNT: 18.4 % (ref 12.3–15.4)
GLOBULIN UR ELPH-MCNC: 3.7 GM/DL
GLUCOSE BLDC GLUCOMTR-MCNC: 205 MG/DL (ref 70–105)
GLUCOSE BLDC GLUCOMTR-MCNC: 90 MG/DL (ref 70–105)
GLUCOSE SERPL-MCNC: 91 MG/DL (ref 65–99)
HCT VFR BLD AUTO: 33.6 % (ref 34–46.6)
HDLC SERPL-MCNC: 57 MG/DL (ref 40–60)
HGB BLD-MCNC: 10.5 G/DL (ref 12–15.9)
HOLD SPECIMEN: NORMAL
IMM GRANULOCYTES # BLD AUTO: 0.1 10*3/MM3 (ref 0–0.05)
IMM GRANULOCYTES NFR BLD AUTO: 0.6 % (ref 0–0.5)
INR PPP: 1.08 (ref 0.9–1.1)
LDLC SERPL CALC-MCNC: 45 MG/DL (ref 0–100)
LDLC/HDLC SERPL: 0.72 {RATIO}
LYMPHOCYTES # BLD AUTO: 4.75 10*3/MM3 (ref 0.7–3.1)
LYMPHOCYTES NFR BLD AUTO: 28.8 % (ref 19.6–45.3)
MAGNESIUM SERPL-MCNC: 1.9 MG/DL (ref 1.6–2.4)
MCH RBC QN AUTO: 29.3 PG (ref 26.6–33)
MCHC RBC AUTO-ENTMCNC: 31.3 G/DL (ref 31.5–35.7)
MCV RBC AUTO: 93.9 FL (ref 79–97)
MONOCYTES # BLD AUTO: 0.56 10*3/MM3 (ref 0.1–0.9)
MONOCYTES NFR BLD AUTO: 3.4 % (ref 5–12)
NEUTROPHILS NFR BLD AUTO: 10.87 10*3/MM3 (ref 1.7–7)
NEUTROPHILS NFR BLD AUTO: 65.9 % (ref 42.7–76)
NRBC BLD AUTO-RTO: 0.1 /100 WBC (ref 0–0.2)
PHOSPHATE SERPL-MCNC: 10.4 MG/DL (ref 2.5–4.5)
PLATELET # BLD AUTO: 723 10*3/MM3 (ref 140–450)
PMV BLD AUTO: 9.5 FL (ref 6–12)
POTASSIUM SERPL-SCNC: 7.2 MMOL/L (ref 3.5–5.2)
PROT SERPL-MCNC: 7 G/DL (ref 6–8.5)
PROTHROMBIN TIME: 14 SECONDS (ref 11.7–14.2)
RBC # BLD AUTO: 3.58 10*6/MM3 (ref 3.77–5.28)
SODIUM SERPL-SCNC: 129 MMOL/L (ref 136–145)
TRIGL SERPL-MCNC: 139 MG/DL (ref 0–150)
TROPONIN T SERPL HS-MCNC: 59 NG/L
VLDLC SERPL-MCNC: 24 MG/DL (ref 5–40)
WBC NRBC COR # BLD AUTO: 16.49 10*3/MM3 (ref 3.4–10.8)

## 2024-12-26 PROCEDURE — 93005 ELECTROCARDIOGRAM TRACING: CPT

## 2024-12-26 PROCEDURE — 63710000001 INSULIN REGULAR HUMAN PER 5 UNITS: Performed by: EMERGENCY MEDICINE

## 2024-12-26 PROCEDURE — 84100 ASSAY OF PHOSPHORUS: CPT | Performed by: EMERGENCY MEDICINE

## 2024-12-26 PROCEDURE — 93005 ELECTROCARDIOGRAM TRACING: CPT | Performed by: EMERGENCY MEDICINE

## 2024-12-26 PROCEDURE — 94799 UNLISTED PULMONARY SVC/PX: CPT

## 2024-12-26 PROCEDURE — 94640 AIRWAY INHALATION TREATMENT: CPT

## 2024-12-26 PROCEDURE — 82948 REAGENT STRIP/BLOOD GLUCOSE: CPT

## 2024-12-26 PROCEDURE — 84550 ASSAY OF BLOOD/URIC ACID: CPT | Performed by: INTERNAL MEDICINE

## 2024-12-26 PROCEDURE — 99285 EMERGENCY DEPT VISIT HI MDM: CPT

## 2024-12-26 PROCEDURE — 82948 REAGENT STRIP/BLOOD GLUCOSE: CPT | Performed by: EMERGENCY MEDICINE

## 2024-12-26 PROCEDURE — 85610 PROTHROMBIN TIME: CPT | Performed by: EMERGENCY MEDICINE

## 2024-12-26 PROCEDURE — 25010000002 CALCIUM GLUCONATE-NACL 1-0.675 GM/50ML-% SOLUTION: Performed by: EMERGENCY MEDICINE

## 2024-12-26 PROCEDURE — 25010000002 HEPARIN (PORCINE) PER 1000 UNITS: Performed by: NURSE PRACTITIONER

## 2024-12-26 PROCEDURE — 71045 X-RAY EXAM CHEST 1 VIEW: CPT

## 2024-12-26 PROCEDURE — 85730 THROMBOPLASTIN TIME PARTIAL: CPT | Performed by: EMERGENCY MEDICINE

## 2024-12-26 PROCEDURE — 25810000003 SODIUM CHLORIDE 0.9 % SOLUTION: Performed by: EMERGENCY MEDICINE

## 2024-12-26 PROCEDURE — 83735 ASSAY OF MAGNESIUM: CPT | Performed by: EMERGENCY MEDICINE

## 2024-12-26 PROCEDURE — 51702 INSERT TEMP BLADDER CATH: CPT

## 2024-12-26 PROCEDURE — 25010000003 DEXTROSE 5 % SOLUTION 1,000 ML FLEX CONT: Performed by: EMERGENCY MEDICINE

## 2024-12-26 PROCEDURE — 84484 ASSAY OF TROPONIN QUANT: CPT | Performed by: EMERGENCY MEDICINE

## 2024-12-26 PROCEDURE — 80061 LIPID PANEL: CPT | Performed by: NURSE PRACTITIONER

## 2024-12-26 PROCEDURE — 36415 COLL VENOUS BLD VENIPUNCTURE: CPT

## 2024-12-26 PROCEDURE — 80053 COMPREHEN METABOLIC PANEL: CPT | Performed by: EMERGENCY MEDICINE

## 2024-12-26 PROCEDURE — 84100 ASSAY OF PHOSPHORUS: CPT | Performed by: INTERNAL MEDICINE

## 2024-12-26 PROCEDURE — 85025 COMPLETE CBC W/AUTO DIFF WBC: CPT | Performed by: EMERGENCY MEDICINE

## 2024-12-26 PROCEDURE — 83605 ASSAY OF LACTIC ACID: CPT | Performed by: EMERGENCY MEDICINE

## 2024-12-26 PROCEDURE — 87040 BLOOD CULTURE FOR BACTERIA: CPT | Performed by: EMERGENCY MEDICINE

## 2024-12-26 RX ORDER — BISACODYL 5 MG/1
5 TABLET, DELAYED RELEASE ORAL DAILY PRN
Status: DISCONTINUED | OUTPATIENT
Start: 2024-12-26 | End: 2024-12-31 | Stop reason: HOSPADM

## 2024-12-26 RX ORDER — HEPARIN SODIUM 5000 [USP'U]/ML
5000 INJECTION, SOLUTION INTRAVENOUS; SUBCUTANEOUS EVERY 12 HOURS SCHEDULED
Status: DISCONTINUED | OUTPATIENT
Start: 2024-12-26 | End: 2024-12-31 | Stop reason: HOSPADM

## 2024-12-26 RX ORDER — NITROGLYCERIN 0.4 MG/1
0.4 TABLET SUBLINGUAL
Status: DISCONTINUED | OUTPATIENT
Start: 2024-12-26 | End: 2024-12-31 | Stop reason: HOSPADM

## 2024-12-26 RX ORDER — SODIUM CHLORIDE 9 MG/ML
150 INJECTION, SOLUTION INTRAVENOUS CONTINUOUS
Status: DISCONTINUED | OUTPATIENT
Start: 2024-12-26 | End: 2024-12-27

## 2024-12-26 RX ORDER — BISACODYL 10 MG
10 SUPPOSITORY, RECTAL RECTAL DAILY PRN
Status: DISCONTINUED | OUTPATIENT
Start: 2024-12-26 | End: 2024-12-31 | Stop reason: HOSPADM

## 2024-12-26 RX ORDER — SODIUM CHLORIDE 0.9 % (FLUSH) 0.9 %
10 SYRINGE (ML) INJECTION AS NEEDED
Status: DISCONTINUED | OUTPATIENT
Start: 2024-12-26 | End: 2024-12-31 | Stop reason: HOSPADM

## 2024-12-26 RX ORDER — SODIUM CHLORIDE 9 MG/ML
40 INJECTION, SOLUTION INTRAVENOUS AS NEEDED
Status: DISCONTINUED | OUTPATIENT
Start: 2024-12-26 | End: 2024-12-31 | Stop reason: HOSPADM

## 2024-12-26 RX ORDER — FLUDROCORTISONE ACETATE 0.1 MG/1
200 TABLET ORAL ONCE
Status: DISCONTINUED | OUTPATIENT
Start: 2024-12-26 | End: 2024-12-31 | Stop reason: HOSPADM

## 2024-12-26 RX ORDER — ALBUTEROL SULFATE 0.63 MG/3ML
10 SOLUTION RESPIRATORY (INHALATION) ONCE
Status: COMPLETED | OUTPATIENT
Start: 2024-12-26 | End: 2024-12-26

## 2024-12-26 RX ORDER — DEXTROSE MONOHYDRATE 25 G/50ML
25 INJECTION, SOLUTION INTRAVENOUS ONCE
Status: COMPLETED | OUTPATIENT
Start: 2024-12-26 | End: 2024-12-26

## 2024-12-26 RX ORDER — ONDANSETRON 2 MG/ML
4 INJECTION INTRAMUSCULAR; INTRAVENOUS EVERY 6 HOURS PRN
Status: DISCONTINUED | OUTPATIENT
Start: 2024-12-26 | End: 2024-12-31 | Stop reason: HOSPADM

## 2024-12-26 RX ORDER — POLYETHYLENE GLYCOL 3350 17 G/17G
17 POWDER, FOR SOLUTION ORAL DAILY PRN
Status: DISCONTINUED | OUTPATIENT
Start: 2024-12-26 | End: 2024-12-31 | Stop reason: HOSPADM

## 2024-12-26 RX ORDER — AMOXICILLIN 250 MG
2 CAPSULE ORAL 2 TIMES DAILY PRN
Status: DISCONTINUED | OUTPATIENT
Start: 2024-12-26 | End: 2024-12-31 | Stop reason: HOSPADM

## 2024-12-26 RX ORDER — SODIUM CHLORIDE 0.9 % (FLUSH) 0.9 %
10 SYRINGE (ML) INJECTION EVERY 12 HOURS SCHEDULED
Status: DISCONTINUED | OUTPATIENT
Start: 2024-12-26 | End: 2024-12-31 | Stop reason: HOSPADM

## 2024-12-26 RX ORDER — CALCIUM GLUCONATE 20 MG/ML
1000 INJECTION, SOLUTION INTRAVENOUS ONCE
Status: COMPLETED | OUTPATIENT
Start: 2024-12-26 | End: 2024-12-26

## 2024-12-26 RX ADMIN — SODIUM CHLORIDE 1000 ML: 9 INJECTION, SOLUTION INTRAVENOUS at 22:10

## 2024-12-26 RX ADMIN — DEXTROSE MONOHYDRATE 25 G: 25 INJECTION, SOLUTION INTRAVENOUS at 21:42

## 2024-12-26 RX ADMIN — SODIUM BICARBONATE 150 MEQ: 84 INJECTION INTRAVENOUS at 22:10

## 2024-12-26 RX ADMIN — Medication 10 ML: at 23:41

## 2024-12-26 RX ADMIN — HEPARIN SODIUM 5000 UNITS: 5000 INJECTION INTRAVENOUS; SUBCUTANEOUS at 23:41

## 2024-12-26 RX ADMIN — SODIUM ZIRCONIUM CYCLOSILICATE 10 G: 10 POWDER, FOR SUSPENSION ORAL at 21:46

## 2024-12-26 RX ADMIN — INSULIN HUMAN 5 UNITS: 100 INJECTION, SOLUTION PARENTERAL at 21:42

## 2024-12-26 RX ADMIN — SODIUM BICARBONATE 100 MEQ: 84 INJECTION INTRAVENOUS at 21:41

## 2024-12-26 RX ADMIN — ALBUTEROL SULFATE 10 MG: 0.63 SOLUTION RESPIRATORY (INHALATION) at 22:35

## 2024-12-26 RX ADMIN — CALCIUM GLUCONATE 1000 MG: 20 INJECTION, SOLUTION INTRAVENOUS at 21:43

## 2024-12-26 NOTE — LETTER
EMS Transport Request  For use at [facility], [place], [place], [place], and [place] only   Patient Name: Maryann Gaitan : 1950   Weight:51.9 kg (114 lb 6.4 oz) Pick-up Location: [facility] BLS/ALS: BLS/ALS: BLS   Insurance: [employer] MEDICARE REPLACEMENT Auth End Date: 2024   Pre-Cert #: D/C Summary complete:    Destination: Other [place]   Contact Precautions: None   Equipment (O2, Fluids, etc.): None   Ostomy   Arrive By Date/Time: 2024 0841 Stretcher/WC: Stretcher   CM Requesting: Arainne Hendricks RN Ext: [phone]   Notes/Medical Necessity: Max assist 2-3, Multiple Wounds, Leaking Ostomy, Orthostatic Hypotension    Cannot safely Transport in private Vehichle     ______________________________________________________________________    *Only 2 patient bags OR 1 carry-on size bag are permitted.  Wheelchairs and walkers CANNOT transported with the patient. Acknowledge: Yes

## 2024-12-26 NOTE — Clinical Note
Level of Care: Telemetry [5]   Diagnosis: Acute renal failure (ARF) [147326]   Admitting Physician: VELMA FREDERICK [[id number]]   Attending Physician: VELMA FREDERICK [[id number]]   Certification: I Certify That Inpatient Hospital Services Are Medically Necessary For Greater Than 2 Midnights

## 2024-12-27 ENCOUNTER — APPOINTMENT (OUTPATIENT)
Dept: ULTRASOUND IMAGING | Facility: HOSPITAL | Age: 74
End: 2024-12-27
Payer: MEDICARE

## 2024-12-27 ENCOUNTER — APPOINTMENT (OUTPATIENT)
Dept: CT IMAGING | Facility: HOSPITAL | Age: 74
End: 2024-12-27
Payer: MEDICARE

## 2024-12-27 ENCOUNTER — INPATIENT HOSPITAL (AMBULATORY)
Dept: URBAN - METROPOLITAN AREA HOSPITAL 84 | Facility: HOSPITAL | Age: 74
End: 2024-12-27
Payer: MEDICARE

## 2024-12-27 DIAGNOSIS — Z93.2 ILEOSTOMY STATUS: ICD-10-CM

## 2024-12-27 DIAGNOSIS — K50.80 CROHN'S DISEASE OF BOTH SMALL AND LARGE INTESTINE WITHOUT CO: ICD-10-CM

## 2024-12-27 DIAGNOSIS — K94.19 OTHER COMPLICATIONS OF ENTEROSTOMY: ICD-10-CM

## 2024-12-27 DIAGNOSIS — Z79.620 LONG TERM (CURRENT) USE OF IMMUNOSUPPRESSIVE BIOLOGIC: ICD-10-CM

## 2024-12-27 DIAGNOSIS — Z90.49 ACQUIRED ABSENCE OF OTHER SPECIFIED PARTS OF DIGESTIVE TRACT: ICD-10-CM

## 2024-12-27 DIAGNOSIS — D50.0 IRON DEFICIENCY ANEMIA SECONDARY TO BLOOD LOSS (CHRONIC): ICD-10-CM

## 2024-12-27 PROBLEM — K92.2 GASTRIC BLEED: Status: ACTIVE | Noted: 2024-12-27

## 2024-12-27 LAB
ABO GROUP BLD: NORMAL
ALBUMIN SERPL-MCNC: 2.1 G/DL (ref 3.5–5.2)
ALBUMIN/GLOB SERPL: 1 G/DL
ALP SERPL-CCNC: 108 U/L (ref 39–117)
ALT SERPL W P-5'-P-CCNC: 13 U/L (ref 1–33)
ANION GAP SERPL CALCULATED.3IONS-SCNC: 13.6 MMOL/L (ref 5–15)
ANION GAP SERPL CALCULATED.3IONS-SCNC: 15.4 MMOL/L (ref 5–15)
ANION GAP SERPL CALCULATED.3IONS-SCNC: 17.1 MMOL/L (ref 5–15)
AST SERPL-CCNC: 19 U/L (ref 1–32)
BASOPHILS # BLD AUTO: 0.04 10*3/MM3 (ref 0–0.2)
BASOPHILS NFR BLD AUTO: 0.3 % (ref 0–1.5)
BILIRUB SERPL-MCNC: 0.2 MG/DL (ref 0–1.2)
BLD GP AB SCN SERPL QL: NEGATIVE
BUN SERPL-MCNC: 61 MG/DL (ref 8–23)
BUN SERPL-MCNC: 71 MG/DL (ref 8–23)
BUN SERPL-MCNC: 86 MG/DL (ref 8–23)
BUN/CREAT SERPL: 16.4 (ref 7–25)
BUN/CREAT SERPL: 17.2 (ref 7–25)
BUN/CREAT SERPL: 17.6 (ref 7–25)
CALCIUM SPEC-SCNC: 7.1 MG/DL (ref 8.6–10.5)
CALCIUM SPEC-SCNC: 7.3 MG/DL (ref 8.6–10.5)
CALCIUM SPEC-SCNC: 8.6 MG/DL (ref 8.6–10.5)
CHLORIDE SERPL-SCNC: 96 MMOL/L (ref 98–107)
CHLORIDE SERPL-SCNC: 96 MMOL/L (ref 98–107)
CHLORIDE SERPL-SCNC: 98 MMOL/L (ref 98–107)
CK SERPL-CCNC: 41 U/L (ref 20–180)
CO2 SERPL-SCNC: 19.9 MMOL/L (ref 22–29)
CO2 SERPL-SCNC: 26.6 MMOL/L (ref 22–29)
CO2 SERPL-SCNC: 29.4 MMOL/L (ref 22–29)
CREAT SERPL-MCNC: 3.47 MG/DL (ref 0.57–1)
CREAT SERPL-MCNC: 4.12 MG/DL (ref 0.57–1)
CREAT SERPL-MCNC: 5.25 MG/DL (ref 0.57–1)
D-LACTATE SERPL-SCNC: 2.5 MMOL/L (ref 0.5–2)
D-LACTATE SERPL-SCNC: 2.9 MMOL/L (ref 0.5–2)
D-LACTATE SERPL-SCNC: 3.2 MMOL/L (ref 0.5–2)
D-LACTATE SERPL-SCNC: 4 MMOL/L (ref 0.5–2)
D-LACTATE SERPL-SCNC: 5 MMOL/L (ref 0.5–2)
DEPRECATED RDW RBC AUTO: 61.9 FL (ref 37–54)
DEPRECATED RDW RBC AUTO: 62 FL (ref 37–54)
EGFRCR SERPLBLD CKD-EPI 2021: 10.8 ML/MIN/1.73
EGFRCR SERPLBLD CKD-EPI 2021: 13.3 ML/MIN/1.73
EGFRCR SERPLBLD CKD-EPI 2021: 8.1 ML/MIN/1.73
EOSINOPHIL # BLD AUTO: 0.09 10*3/MM3 (ref 0–0.4)
EOSINOPHIL NFR BLD AUTO: 0.7 % (ref 0.3–6.2)
ERYTHROCYTE [DISTWIDTH] IN BLOOD BY AUTOMATED COUNT: 18.4 % (ref 12.3–15.4)
ERYTHROCYTE [DISTWIDTH] IN BLOOD BY AUTOMATED COUNT: 18.6 % (ref 12.3–15.4)
GEN 5 1HR TROPONIN T REFLEX: 49 NG/L
GLOBULIN UR ELPH-MCNC: 2.2 GM/DL
GLUCOSE SERPL-MCNC: 117 MG/DL (ref 65–99)
GLUCOSE SERPL-MCNC: 134 MG/DL (ref 65–99)
GLUCOSE SERPL-MCNC: 137 MG/DL (ref 65–99)
HCT VFR BLD AUTO: 21.7 % (ref 34–46.6)
HCT VFR BLD AUTO: 22.6 % (ref 34–46.6)
HGB BLD-MCNC: 6.4 G/DL (ref 12–15.9)
HGB BLD-MCNC: 7 G/DL (ref 12–15.9)
HGB BLD-MCNC: 7.4 G/DL (ref 12–15.9)
HGB BLD-MCNC: 8 G/DL (ref 12–15.9)
HGB BLD-MCNC: 8.5 G/DL (ref 12–15.9)
IMM GRANULOCYTES # BLD AUTO: 0.1 10*3/MM3 (ref 0–0.05)
IMM GRANULOCYTES NFR BLD AUTO: 0.8 % (ref 0–0.5)
LYMPHOCYTES # BLD AUTO: 3.55 10*3/MM3 (ref 0.7–3.1)
LYMPHOCYTES NFR BLD AUTO: 28.9 % (ref 19.6–45.3)
MCH RBC QN AUTO: 29.4 PG (ref 26.6–33)
MCH RBC QN AUTO: 30.1 PG (ref 26.6–33)
MCHC RBC AUTO-ENTMCNC: 32.3 G/DL (ref 31.5–35.7)
MCHC RBC AUTO-ENTMCNC: 32.7 G/DL (ref 31.5–35.7)
MCV RBC AUTO: 91.2 FL (ref 79–97)
MCV RBC AUTO: 91.9 FL (ref 79–97)
MONOCYTES # BLD AUTO: 0.5 10*3/MM3 (ref 0.1–0.9)
MONOCYTES NFR BLD AUTO: 4.1 % (ref 5–12)
NEUTROPHILS NFR BLD AUTO: 65.2 % (ref 42.7–76)
NEUTROPHILS NFR BLD AUTO: 7.99 10*3/MM3 (ref 1.7–7)
NRBC BLD AUTO-RTO: 0 /100 WBC (ref 0–0.2)
PHOSPHATE SERPL-MCNC: 9.2 MG/DL (ref 2.5–4.5)
PLATELET # BLD AUTO: 429 10*3/MM3 (ref 140–450)
PLATELET # BLD AUTO: 460 10*3/MM3 (ref 140–450)
PMV BLD AUTO: 9.5 FL (ref 6–12)
PMV BLD AUTO: 9.7 FL (ref 6–12)
POTASSIUM SERPL-SCNC: 3.6 MMOL/L (ref 3.5–5.2)
POTASSIUM SERPL-SCNC: 4.1 MMOL/L (ref 3.5–5.2)
POTASSIUM SERPL-SCNC: 5.6 MMOL/L (ref 3.5–5.2)
PROT SERPL-MCNC: 4.3 G/DL (ref 6–8.5)
QT INTERVAL: 386 MS
QT INTERVAL: 462 MS
QTC INTERVAL: 450 MS
QTC INTERVAL: 524 MS
RBC # BLD AUTO: 2.38 10*6/MM3 (ref 3.77–5.28)
RBC # BLD AUTO: 2.46 10*6/MM3 (ref 3.77–5.28)
RH BLD: POSITIVE
SODIUM SERPL-SCNC: 135 MMOL/L (ref 136–145)
SODIUM SERPL-SCNC: 138 MMOL/L (ref 136–145)
SODIUM SERPL-SCNC: 139 MMOL/L (ref 136–145)
T&S EXPIRATION DATE: NORMAL
TROPONIN T % DELTA: -17 %
TROPONIN T NUMERIC DELTA: -10 NG/L
URATE SERPL-MCNC: 13.2 MG/DL (ref 2.4–5.7)
WBC NRBC COR # BLD AUTO: 12.27 10*3/MM3 (ref 3.4–10.8)
WBC NRBC COR # BLD AUTO: 9.45 10*3/MM3 (ref 3.4–10.8)

## 2024-12-27 PROCEDURE — 25010000002 CEFTRIAXONE PER 250 MG

## 2024-12-27 PROCEDURE — 83605 ASSAY OF LACTIC ACID: CPT | Performed by: EMERGENCY MEDICINE

## 2024-12-27 PROCEDURE — 94799 UNLISTED PULMONARY SVC/PX: CPT

## 2024-12-27 PROCEDURE — 86900 BLOOD TYPING SEROLOGIC ABO: CPT

## 2024-12-27 PROCEDURE — C1751 CATH, INF, PER/CENT/MIDLINE: HCPCS

## 2024-12-27 PROCEDURE — 82550 ASSAY OF CK (CPK): CPT | Performed by: NURSE PRACTITIONER

## 2024-12-27 PROCEDURE — 36415 COLL VENOUS BLD VENIPUNCTURE: CPT | Performed by: EMERGENCY MEDICINE

## 2024-12-27 PROCEDURE — 05HD33Z INSERTION OF INFUSION DEVICE INTO RIGHT CEPHALIC VEIN, PERCUTANEOUS APPROACH: ICD-10-PCS | Performed by: INTERNAL MEDICINE

## 2024-12-27 PROCEDURE — 93010 ELECTROCARDIOGRAM REPORT: CPT | Performed by: INTERNAL MEDICINE

## 2024-12-27 PROCEDURE — 94664 DEMO&/EVAL PT USE INHALER: CPT

## 2024-12-27 PROCEDURE — 86923 COMPATIBILITY TEST ELECTRIC: CPT

## 2024-12-27 PROCEDURE — 86850 RBC ANTIBODY SCREEN: CPT

## 2024-12-27 PROCEDURE — 86901 BLOOD TYPING SEROLOGIC RH(D): CPT

## 2024-12-27 PROCEDURE — 25010000003 DEXTROSE 5 % SOLUTION 1,000 ML FLEX CONT: Performed by: NURSE PRACTITIONER

## 2024-12-27 PROCEDURE — 85018 HEMOGLOBIN: CPT

## 2024-12-27 PROCEDURE — 74176 CT ABD & PELVIS W/O CONTRAST: CPT

## 2024-12-27 PROCEDURE — 36410 VNPNXR 3YR/> PHY/QHP DX/THER: CPT

## 2024-12-27 PROCEDURE — 94761 N-INVAS EAR/PLS OXIMETRY MLT: CPT

## 2024-12-27 PROCEDURE — 99222 1ST HOSP IP/OBS MODERATE 55: CPT | Performed by: NURSE PRACTITIONER

## 2024-12-27 PROCEDURE — 25810000003 SODIUM CHLORIDE 0.9 % SOLUTION: Performed by: FAMILY MEDICINE

## 2024-12-27 PROCEDURE — 85025 COMPLETE CBC W/AUTO DIFF WBC: CPT | Performed by: NURSE PRACTITIONER

## 2024-12-27 PROCEDURE — 25010000002 HEPARIN (PORCINE) PER 1000 UNITS: Performed by: NURSE PRACTITIONER

## 2024-12-27 PROCEDURE — 99222 1ST HOSP IP/OBS MODERATE 55: CPT | Performed by: SURGERY

## 2024-12-27 PROCEDURE — 36430 TRANSFUSION BLD/BLD COMPNT: CPT

## 2024-12-27 PROCEDURE — 25010000002 METRONIDAZOLE 500 MG/100ML SOLUTION

## 2024-12-27 PROCEDURE — 25810000003 LACTATED RINGERS SOLUTION

## 2024-12-27 PROCEDURE — 25810000003 LACTATED RINGERS PER 1000 ML: Performed by: INTERNAL MEDICINE

## 2024-12-27 PROCEDURE — 93005 ELECTROCARDIOGRAM TRACING: CPT | Performed by: NURSE PRACTITIONER

## 2024-12-27 PROCEDURE — 85027 COMPLETE CBC AUTOMATED: CPT

## 2024-12-27 PROCEDURE — 87040 BLOOD CULTURE FOR BACTERIA: CPT

## 2024-12-27 PROCEDURE — P9016 RBC LEUKOCYTES REDUCED: HCPCS

## 2024-12-27 PROCEDURE — 25810000003 LACTATED RINGERS PER 1000 ML

## 2024-12-27 PROCEDURE — 80053 COMPREHEN METABOLIC PANEL: CPT | Performed by: NURSE PRACTITIONER

## 2024-12-27 PROCEDURE — 76775 US EXAM ABDO BACK WALL LIM: CPT

## 2024-12-27 RX ORDER — PANTOPRAZOLE SODIUM 40 MG/10ML
80 INJECTION, POWDER, LYOPHILIZED, FOR SOLUTION INTRAVENOUS ONCE
Status: COMPLETED | OUTPATIENT
Start: 2024-12-27 | End: 2024-12-27

## 2024-12-27 RX ORDER — IPRATROPIUM BROMIDE AND ALBUTEROL SULFATE 2.5; .5 MG/3ML; MG/3ML
3 SOLUTION RESPIRATORY (INHALATION) EVERY 4 HOURS PRN
Status: DISCONTINUED | OUTPATIENT
Start: 2024-12-27 | End: 2024-12-31 | Stop reason: HOSPADM

## 2024-12-27 RX ORDER — ACETAMINOPHEN 650 MG/1
650 SUPPOSITORY RECTAL EVERY 4 HOURS PRN
Status: DISCONTINUED | OUTPATIENT
Start: 2024-12-27 | End: 2024-12-31 | Stop reason: HOSPADM

## 2024-12-27 RX ORDER — MIDODRINE HYDROCHLORIDE 5 MG/1
5 TABLET ORAL
Status: DISCONTINUED | OUTPATIENT
Start: 2024-12-27 | End: 2024-12-31 | Stop reason: HOSPADM

## 2024-12-27 RX ORDER — ALBUTEROL SULFATE 0.83 MG/ML
2.5 SOLUTION RESPIRATORY (INHALATION) EVERY 6 HOURS PRN
Status: DISCONTINUED | OUTPATIENT
Start: 2024-12-27 | End: 2024-12-31 | Stop reason: HOSPADM

## 2024-12-27 RX ORDER — CHOLESTYRAMINE LIGHT 4 G/5.7G
1 POWDER, FOR SUSPENSION ORAL EVERY 12 HOURS SCHEDULED
Status: DISCONTINUED | OUTPATIENT
Start: 2024-12-27 | End: 2024-12-31 | Stop reason: HOSPADM

## 2024-12-27 RX ORDER — ASPIRIN 81 MG/1
81 TABLET ORAL DAILY
Status: DISCONTINUED | OUTPATIENT
Start: 2024-12-27 | End: 2024-12-31 | Stop reason: HOSPADM

## 2024-12-27 RX ORDER — FOLIC ACID 1 MG/1
1000 TABLET ORAL DAILY
Status: DISCONTINUED | OUTPATIENT
Start: 2024-12-27 | End: 2024-12-31 | Stop reason: HOSPADM

## 2024-12-27 RX ORDER — PANTOPRAZOLE SODIUM 40 MG/10ML
80 INJECTION, POWDER, LYOPHILIZED, FOR SOLUTION INTRAVENOUS ONCE
Status: DISCONTINUED | OUTPATIENT
Start: 2024-12-27 | End: 2024-12-27

## 2024-12-27 RX ORDER — METRONIDAZOLE 500 MG/100ML
500 INJECTION, SOLUTION INTRAVENOUS EVERY 8 HOURS
Status: DISCONTINUED | OUTPATIENT
Start: 2024-12-27 | End: 2024-12-30

## 2024-12-27 RX ORDER — LOPERAMIDE HYDROCHLORIDE 2 MG/1
2 CAPSULE ORAL 4 TIMES DAILY
Status: DISCONTINUED | OUTPATIENT
Start: 2024-12-27 | End: 2024-12-31 | Stop reason: HOSPADM

## 2024-12-27 RX ORDER — ATORVASTATIN CALCIUM 40 MG/1
40 TABLET, FILM COATED ORAL NIGHTLY
Status: DISCONTINUED | OUTPATIENT
Start: 2024-12-27 | End: 2024-12-31 | Stop reason: HOSPADM

## 2024-12-27 RX ORDER — LIDOCAINE HYDROCHLORIDE 10 MG/ML
30 INJECTION, SOLUTION INFILTRATION; PERINEURAL ONCE
Status: DISCONTINUED | OUTPATIENT
Start: 2024-12-27 | End: 2024-12-31 | Stop reason: HOSPADM

## 2024-12-27 RX ORDER — ACETAMINOPHEN 325 MG/1
650 TABLET ORAL EVERY 6 HOURS PRN
Status: DISCONTINUED | OUTPATIENT
Start: 2024-12-27 | End: 2024-12-31 | Stop reason: HOSPADM

## 2024-12-27 RX ORDER — LEVOTHYROXINE SODIUM 50 UG/1
50 TABLET ORAL DAILY
Status: DISCONTINUED | OUTPATIENT
Start: 2024-12-27 | End: 2024-12-31 | Stop reason: HOSPADM

## 2024-12-27 RX ORDER — SODIUM CHLORIDE, SODIUM LACTATE, POTASSIUM CHLORIDE, CALCIUM CHLORIDE 600; 310; 30; 20 MG/100ML; MG/100ML; MG/100ML; MG/100ML
75 INJECTION, SOLUTION INTRAVENOUS CONTINUOUS
Status: DISPENSED | OUTPATIENT
Start: 2024-12-27 | End: 2024-12-28

## 2024-12-27 RX ORDER — CLOPIDOGREL BISULFATE 75 MG/1
75 TABLET ORAL DAILY
Status: DISCONTINUED | OUTPATIENT
Start: 2024-12-27 | End: 2024-12-31 | Stop reason: HOSPADM

## 2024-12-27 RX ORDER — SODIUM CHLORIDE, SODIUM LACTATE, POTASSIUM CHLORIDE, CALCIUM CHLORIDE 600; 310; 30; 20 MG/100ML; MG/100ML; MG/100ML; MG/100ML
150 INJECTION, SOLUTION INTRAVENOUS CONTINUOUS
Status: DISCONTINUED | OUTPATIENT
Start: 2024-12-27 | End: 2024-12-27

## 2024-12-27 RX ORDER — MESALAMINE 1000 MG/1
1000 SUPPOSITORY RECTAL NIGHTLY
Status: DISCONTINUED | OUTPATIENT
Start: 2024-12-27 | End: 2024-12-31 | Stop reason: HOSPADM

## 2024-12-27 RX ORDER — IPRATROPIUM BROMIDE AND ALBUTEROL SULFATE 2.5; .5 MG/3ML; MG/3ML
3 SOLUTION RESPIRATORY (INHALATION)
Status: DISCONTINUED | OUTPATIENT
Start: 2024-12-27 | End: 2024-12-28

## 2024-12-27 RX ORDER — PANTOPRAZOLE SODIUM 40 MG/1
40 TABLET, DELAYED RELEASE ORAL ONCE
Status: DISCONTINUED | OUTPATIENT
Start: 2024-12-27 | End: 2024-12-27

## 2024-12-27 RX ORDER — GUAIFENESIN 200 MG/10ML
200 LIQUID ORAL EVERY 4 HOURS PRN
Status: DISCONTINUED | OUTPATIENT
Start: 2024-12-27 | End: 2024-12-27

## 2024-12-27 RX ORDER — FAMOTIDINE 20 MG/1
20 TABLET, FILM COATED ORAL 2 TIMES DAILY
Status: DISCONTINUED | OUTPATIENT
Start: 2024-12-27 | End: 2024-12-27

## 2024-12-27 RX ADMIN — FOLIC ACID 1000 MCG: 1 TABLET ORAL at 08:40

## 2024-12-27 RX ADMIN — CHOLESTYRAMINE 4 G: 4 POWDER, FOR SUSPENSION ORAL at 21:44

## 2024-12-27 RX ADMIN — ASPIRIN 81 MG: 81 TABLET, COATED ORAL at 08:40

## 2024-12-27 RX ADMIN — LOPERAMIDE HYDROCHLORIDE 2 MG: 2 CAPSULE ORAL at 15:39

## 2024-12-27 RX ADMIN — ACETAMINOPHEN 650 MG: 325 TABLET, FILM COATED ORAL at 22:49

## 2024-12-27 RX ADMIN — HEPARIN SODIUM 5000 UNITS: 5000 INJECTION INTRAVENOUS; SUBCUTANEOUS at 08:41

## 2024-12-27 RX ADMIN — CEFTRIAXONE SODIUM 1000 MG: 1 INJECTION, POWDER, FOR SOLUTION INTRAMUSCULAR; INTRAVENOUS at 14:13

## 2024-12-27 RX ADMIN — METRONIDAZOLE 500 MG: 500 INJECTION, SOLUTION INTRAVENOUS at 18:01

## 2024-12-27 RX ADMIN — MIDODRINE HYDROCHLORIDE 5 MG: 5 TABLET ORAL at 08:41

## 2024-12-27 RX ADMIN — SODIUM CHLORIDE 1000 ML: 0.9 INJECTION, SOLUTION INTRAVENOUS at 01:22

## 2024-12-27 RX ADMIN — Medication 10 ML: at 21:44

## 2024-12-27 RX ADMIN — LEVOTHYROXINE SODIUM 50 MCG: 50 TABLET ORAL at 08:41

## 2024-12-27 RX ADMIN — PANTOPRAZOLE SODIUM 80 MG: 40 INJECTION, POWDER, FOR SOLUTION INTRAVENOUS at 14:06

## 2024-12-27 RX ADMIN — CLOPIDOGREL BISULFATE 75 MG: 75 TABLET ORAL at 08:41

## 2024-12-27 RX ADMIN — SODIUM CHLORIDE, POTASSIUM CHLORIDE, SODIUM LACTATE AND CALCIUM CHLORIDE 75 ML/HR: 600; 310; 30; 20 INJECTION, SOLUTION INTRAVENOUS at 20:00

## 2024-12-27 RX ADMIN — SODIUM CHLORIDE, POTASSIUM CHLORIDE, SODIUM LACTATE AND CALCIUM CHLORIDE 1000 ML: 600; 310; 30; 20 INJECTION, SOLUTION INTRAVENOUS at 08:42

## 2024-12-27 RX ADMIN — SERTRALINE HYDROCHLORIDE 50 MG: 50 TABLET, FILM COATED ORAL at 08:40

## 2024-12-27 RX ADMIN — SODIUM CHLORIDE 8 MG/HR: 900 INJECTION INTRAVENOUS at 14:35

## 2024-12-27 RX ADMIN — MIDODRINE HYDROCHLORIDE 5 MG: 5 TABLET ORAL at 16:57

## 2024-12-27 RX ADMIN — METRONIDAZOLE 500 MG: 500 INJECTION, SOLUTION INTRAVENOUS at 14:19

## 2024-12-27 RX ADMIN — CHOLESTYRAMINE 4 G: 4 POWDER, FOR SUSPENSION ORAL at 15:39

## 2024-12-27 RX ADMIN — SODIUM CHLORIDE, POTASSIUM CHLORIDE, SODIUM LACTATE AND CALCIUM CHLORIDE 75 ML/HR: 600; 310; 30; 20 INJECTION, SOLUTION INTRAVENOUS at 08:47

## 2024-12-27 RX ADMIN — SODIUM CHLORIDE 8 MG/HR: 900 INJECTION INTRAVENOUS at 19:59

## 2024-12-27 RX ADMIN — LOPERAMIDE HYDROCHLORIDE 2 MG: 2 CAPSULE ORAL at 22:49

## 2024-12-27 RX ADMIN — SODIUM BICARBONATE 150 MEQ: 84 INJECTION INTRAVENOUS at 05:45

## 2024-12-27 RX ADMIN — LOPERAMIDE HYDROCHLORIDE 2 MG: 2 CAPSULE ORAL at 18:01

## 2024-12-27 RX ADMIN — FAMOTIDINE 20 MG: 20 TABLET, FILM COATED ORAL at 08:41

## 2024-12-27 RX ADMIN — SODIUM CHLORIDE, POTASSIUM CHLORIDE, SODIUM LACTATE AND CALCIUM CHLORIDE 1000 ML: 600; 310; 30; 20 INJECTION, SOLUTION INTRAVENOUS at 11:10

## 2024-12-27 RX ADMIN — ATORVASTATIN CALCIUM 40 MG: 40 TABLET, FILM COATED ORAL at 22:49

## 2024-12-27 RX ADMIN — IPRATROPIUM BROMIDE AND ALBUTEROL SULFATE 3 ML: .5; 3 SOLUTION RESPIRATORY (INHALATION) at 15:30

## 2024-12-27 RX ADMIN — Medication 12.5 MG: at 08:41

## 2024-12-27 RX ADMIN — SODIUM CHLORIDE 8 MG/HR: 900 INJECTION INTRAVENOUS at 14:06

## 2024-12-27 RX ADMIN — MIDODRINE HYDROCHLORIDE 5 MG: 5 TABLET ORAL at 12:10

## 2024-12-27 RX ADMIN — Medication 10 ML: at 08:41

## 2024-12-27 RX ADMIN — IPRATROPIUM BROMIDE AND ALBUTEROL SULFATE 3 ML: .5; 3 SOLUTION RESPIRATORY (INHALATION) at 21:14

## 2024-12-27 NOTE — OUTREACH NOTE
Prep Survey      Flowsheet Row Responses   [facility] facility patient discharged from? Non-BH   Is LACE score < 7 ? Non-BH Discharge   Eligibility Not Eligible   What are the reasons patient is not eligible? Other  [> 3 yrs since PCP appt]   Does the patient have one of the following disease processes/diagnoses(primary or secondary)? Other   Prep survey completed? Yes            Isabel Peters Registered Nurse

## 2024-12-27 NOTE — CONSULTS
Picc team consult:    Procedure explained to patient and agrees to proceed.  Difficult peripheral vascular observed on US.  Multiple bruises noted bilateral arms.  Time out performed.  Power glide midline catheter placed RUE cephalic vessel utilizing US guidance and sterile technique with easily compressible, non pulsatile vessel without difficulty.  Dark venous blood return noted and line flushes without difficulty.

## 2024-12-27 NOTE — CONSULTS
INITIAL CONSULT NOTE      Patient Name: Maryann Gaitan  : 1950  MRN: [id number]  Primary Care Physician: Azam Calhoun MD  Date of admission: 2024    Patient Care Team:  Azam Calhoun MD as PCP - General (Internal Medicine)  Niya Mendoza APRN as Nurse Practitioner (Cardiology)        Reason for Consult:       BISHNU    Subjective   History of Present Illness:   Chief Complaint:   Chief Complaint   Patient presents with    Abnormal Lab    Chest Pain     HISTORY:  Maryann Gaitan is a 74 y.o. female with PMH significant for CAD s/p recent stent on dual platelet therapy, COPD, RA, Crohn's on biologic therapy, ileostomy with mucous fistula for GI bleed who presented to [facility] on 2024 with chief complaint of abnormal labs at the [facility].  Patient has had high-volume output from her ileostomy with poor adhesion of pouch and excoriation of the skin surrounding the stoma.  The patient is noted to be on a clear liquid diet.   Of note, patient was recently in the hospital from - after she presented for chest pain. ACS was ruled out. CXR at that time was consistent with bilateral pneumonia and she was started on AV abx. Blood cultures remained negative at time of discharge. She was switched to PO Omnicef at time of discharge and recommended rehab. At date of discharge her creatinine was 0.5 mg/dL, which is her baseline.   She was also previously hospitalized in November for acute GI hemorrhage and underwent open ostomy and mucous fistula due to continued GI bleeding despite GI eval without identification of bleeding area, she received total of 8 units or PRBC on that admission. She is still having large volume bloody BMs  In the ED on this visit, patient was noted to have acute renal failure with creatinine of 5.56 (baseline 0.5mg/dL). Potassium was noted to be 7.2, bicarb 15.9.  We were consulted overnight and started patient on a bicarb drip. Patient also received hyperkalemia  protocol including Lokelma, calcium gluconate, 2amp of sodium bicarb. Also dose of Florinef was ordered but doesn't seem to have been given yet due to med availability, and requested perez and strict I/O. Noted that patient was on Meloxicam, Aldactone, and Methotrexate. These are all currently on hold.   Additional pertinent labs showed HS troponin mildly elevated at 49 and 59.  Lactic acid 2.8.  INR 1.08.  Hemoglobin stable at 10.5 (maybe lower after patient is hydrated) with comparison 8.9 on discharge 12/16/2024. CXR showed emphysema with no acute cardiopulmonary processes. She was admitted for further evaluation. Nephrology services have been requested for BISHNU management.          Review of systems:    ROS was otherwise negative except as mentioned in the [place].       Personal History:     Past Medical History:   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 07/05/2016    Dyslipidemia 10/30/2024    Fecal urgency 11/21/2024    First degree hemorrhoids 07/09/2021    GERD (gastroesophageal reflux disease) 11/21/2024    Hashimoto's thyroiditis 11/21/2024    History of colonic polyps 07/09/2021    Hypertension     Hypothyroidism (acquired) 11/06/2024    Mitral regurgitation 10/08/2024    Moderate protein-calorie malnutrition 11/09/2024    Multiple tracheobronchial mucus plugs 10/08/2024    Nicotine dependence 11/21/2024    Rheumatoid arthritis 11/06/2024    S/P mitral valve clip implantation 11/21/2024    Second degree hemorrhoids 07/05/2016    Stress incontinence, female 11/21/2024    Vitamin D deficiency,  unspecified 11/21/2024       Surgical History:      Past Surgical History:   Procedure Laterality Date    BRONCHOSCOPY N/A 10/16/2024    Procedure: BRONCHOSCOPY;  Surgeon: Gallo Pope MD;  Location: [facility] ENDOSCOPY;  Service: Pulmonary;  Laterality: N/A;    CARDIAC CATHETERIZATION N/A 10/15/2024    Procedure: Left Heart Cath, possible pci;  Surgeon: Travis Connor MD;  Location: [facility] CATH INVASIVE LOCATION;  Service: Cardiovascular;  Laterality: N/A;    CARDIAC CATHETERIZATION N/A 10/22/2024    Procedure: Laser Coronary Atherectomy;  Surgeon: Travis Connor MD;  Location: [facility] CATH INVASIVE LOCATION;  Service: Cardiovascular;  Laterality: N/A;    COLON RESECTION Right 11/28/2024    Procedure: RIGHT HEMICOLECTOMY WITH ILEOSTOMY;  Surgeon: Todd Babin MD;  Location: [facility] MAIN OR;  Service: General;  Laterality: Right;    COLONOSCOPY N/A 10/12/2024    Procedure: COLONOSCOPY WITH BIOPSY AND WIRE GUIDED BALLOON DILATION OF TERMINAL ILEUM;  Surgeon: Rob Strong MD;  Location: [facility] ENDOSCOPY;  Service: Gastroenterology;  Laterality: N/A;  Colitis, crohns of terminal ileum, right colon ulcers, diverticulosis, hemorroids    ENDOSCOPY N/A 10/12/2024    Procedure: ESOPHAGOGASTRODUODENOSCOPY WITH BIOPSY X 2 AREA;  Surgeon: Rob Strong MD;  Location: [facility] ENDOSCOPY;  Service: Gastroenterology;  Laterality: N/A;  Chronic gastritis, HH    ENDOSCOPY Left 11/28/2024    Procedure: ESOPHAGOGASTRODUODENOSCOPY;  Surgeon: Dallin Delgado MD;  Location: [facility] ENDOSCOPY;  Service: Gastroenterology;  Laterality: Left;  small hiatal hernia    HYSTERECTOMY      LEFT HEART CATH      MITRAL VALVE REPAIR/REPLACEMENT N/A 11/21/2024    Procedure: Transcatheter Mitral Valve Repair;  Surgeon: Dmitri Navarro MD;  Location: [facility] HYBRID OR;  Service: Cardiovascular;  Laterality: N/A;       Family History: family history includes Dementia in her mother; Heart disease in her  father and mother; Hypertension in her father; Stroke in her mother. Otherwise pertinent FHx was reviewed and unremarkable.     Social History:  reports that she has quit smoking. Her smoking use included cigarettes. She has never used smokeless tobacco. She reports that she does not drink alcohol and does not use drugs.    Medications:  Prior to Admission medications    Medication Sig Start Date End Date Taking? Authorizing Provider   Adalimumab (Humira, 2 Pen,) 40 MG/0.4ML Pen-injector Kit Inject 40 mg under the skin into the appropriate area as directed 1 (One) Time Per Week. Saturdays   Indications: Rheumatoid Arthritis 10/25/24  Yes Donna Kraft MD   albuterol (PROVENTIL) (2.5 MG/3ML) 0.083% nebulizer solution Take 2.5 mg by nebulization Every 6 (Six) Hours As Needed for Wheezing. Indications: Spasm of Lung Air Passages 11/13/24  Yes Donna Kraft MD   aspirin 81 MG EC tablet Take 1 tablet by mouth Daily for 30 days. Indications: Disease involving Lipid Deposits in the Arteries 12/16/24 1/15/25 Yes Jordan Lewis DO   atorvastatin (LIPITOR) 40 MG tablet Take 1 tablet by mouth Every Night for 30 days. Indications: High Amount of Fats in the Blood 12/2/24 1/1/25 Yes Fco Figueroa PA-C   calcium carbonate (OS-ARABELLA) 600 MG tablet Take 1 tablet by mouth 2 (Two) Times a Day With Meals.   Yes Donna Kraft MD   cefTRIAXone Sodium (ROCEPHIN IJ) Inject  as directed.   Yes Donna Kraft MD   cholecalciferol (VITAMIN D3) 1.25 MG (05399 UT) capsule Take 1 capsule by mouth 2 (Two) Times a Week. Wed, Sat  Indications: Vitamin D Deficiency 10/25/24  Yes Donna Kraft MD   clopidogrel (PLAVIX) 75 MG tablet Take 1 tablet by mouth Daily for 30 days. Indications: Ischemic Heart Disease 12/2/24 1/1/25 Yes Fco Figueroa PA-C   diclofenac (VOLTAREN) 50 MG EC tablet Take 1 tablet by mouth 2 (Two) Times a Day.   Yes Donna Kraft MD   famotidine (Pepcid) 20 MG tablet Take 1 tablet  by mouth 2 (Two) Times a Day. 12/16/24  Yes Jordan Lewis DO   folic acid (FOLVITE) 1 MG tablet Take 1 tablet by mouth Daily. Indications: Anemia From Inadequate Folic Acid 10/25/24  Yes Donna Kraft MD   guaifenesin (ROBITUSSIN) 100 MG/5ML liquid Take 10 mL by mouth Every 4 (Four) Hours As Needed for Cough. 12/16/24  Yes Jordan Lewis DO   levothyroxine (SYNTHROID, LEVOTHROID) 50 MCG tablet Take 1 tablet by mouth Daily. Indications: Underactive Thyroid 10/25/24  Yes Donna Kraft MD   Melatonin (Melatonin Extra Strength) 10 MG tablet Take 1 tablet by mouth As Needed. Indications: Trouble Sleeping 10/30/24  Yes Donna Kraft MD   mesalamine (CANASA) 1000 MG suppository Insert 1 suppository into the rectum Every Night. 12/16/24  Yes Jordan Lewis DO   methotrexate 2.5 MG tablet Take 6 tablets by mouth 1 (One) Time Per Week. Saturdays   Indications: Non-oncologic 10/25/24  Yes Donna Kraft MD   metoprolol tartrate (LOPRESSOR) 25 MG tablet Take 0.5 tablets by mouth Every 12 (Twelve) Hours. 12/16/24  Yes Jordan Lewis DO   midodrine (PROAMATINE) 5 MG tablet Take 1 tablet by mouth 3 (Three) Times a Day Before Meals. Indications: Disorder of Low Blood Pressure 12/2/24  Yes Fco Figueroa PA-C   ondansetron ODT (ZOFRAN-ODT) 4 MG disintegrating tablet Take 1 tablet by mouth Every 6 (Six) Hours As Needed for Nausea or Vomiting. Indications: Nausea and Vomiting Following an Operation 12/2/24  Yes Fco Figueroa PA-C   sertraline (ZOLOFT) 50 MG tablet Take 1 tablet by mouth Daily. Indications: Major Depressive Disorder 10/25/24  Yes Donna Kraft MD   spironolactone (ALDACTONE) 25 MG tablet Take 1 tablet by mouth Daily. Indications: Edema 11/13/24  Yes Donna Kraft MD   upadacitinib ER (Rinvoq) 45 MG tablet sustained-release 24 hour extended release tablet Take 1 tablet by mouth Daily. Indications: Rheumatoid Arthritis 11/13/24  Yes Provider, Historical, MD    naloxone (NARCAN) 4 MG/0.1ML nasal spray Call 911. Don't prime. Melber in 1 nostril for overdose. Repeat in 2-3 minutes in other nostril if no or minimal breathing/responsiveness.  Indications: Opioid Overdose 12/2/24   Fco Figueroa PA-C     Scheduled Meds:[Held by provider] aspirin, 81 mg, Oral, Daily  atorvastatin, 40 mg, Oral, Nightly  [Held by provider] clopidogrel, 75 mg, Oral, Daily  famotidine, 20 mg, Oral, BID  fludrocortisone, 200 mcg, Oral, Once  folic acid, 1,000 mcg, Oral, Daily  heparin (porcine), 5,000 Units, Subcutaneous, Q12H  levothyroxine, 50 mcg, Oral, Daily  mesalamine, 1,000 mg, Rectal, Nightly  metoprolol tartrate, 12.5 mg, Oral, Q12H  midodrine, 5 mg, Oral, TID AC  sertraline, 50 mg, Oral, Daily  sodium chloride, 10 mL, Intravenous, Q12H      Continuous Infusions:lactated ringers, 75 mL/hr, Last Rate: 75 mL/hr (12/27/24 0847)  sodium bicarbonate 8.4 % 150 mEq in dextrose (D5W) 5 % 1,000 mL infusion (greater than 100 mEq), 150 mEq, Last Rate: 150 mEq (12/27/24 0545)      PRN Meds:  albuterol    senna-docusate sodium **AND** polyethylene glycol **AND** bisacodyl **AND** bisacodyl    guaifenesin    melatonin    nitroglycerin    ondansetron    [COMPLETED] Insert Peripheral IV **AND** sodium chloride    sodium chloride    sodium chloride  Allergies:    Allergies   Allergen Reactions    Codeine Hives    Penicillin G Sodium Hives       Objective   Exam:     Vital Signs  Temp:  [98.4 °F (36.9 °C)] 98.4 °F (36.9 °C)  Heart Rate:  [67-85] 78  Resp:  [18-23] 18  BP: ()/(51-72) 108/52  SpO2:  [95 %-100 %] 97 %  on   ;   Device (Oxygen Therapy): room air  Body mass index is 21.46 kg/m².  EXAM  General:   ill-appearing female in no acute distress.    Head:      Normocephalic and atraumatic.    Eyes:      PERRL/EOM intact, conjunctivae and sclerae clear without nystagmus.    Neck:      No masses, thyromegaly,  trachea central   Lungs:    +decreased breath sounds    Heart:      Regular rate and  rhythm, no murmur no gallop  Abd:        Soft, nontender, not distended, bowel sounds positive, no shifting dullness.  GI/:     +RUQ and RLQ ileostomy, +F/C   Pulses:   Pulses normal in all 4 extremities.    Extremities:        No cyanosis or clubbing--np edema.    Neuro:    No focal deficits.   alert oriented x3  Skin:       Intact without lesions or rashes.    Psych:    Alert and cooperative; normal mood and affect; normal attention span       Results Review:  I have personally reviewed most recent Data :  [facility]  CBC    Results from last 7 days   Lab Units 12/27/24  0921 12/27/24  0504 12/26/24  2041   WBC 10*3/mm3 9.45 12.27* 16.49*   HEMOGLOBIN g/dL 7.0* 7.4* 10.5*   PLATELETS 10*3/mm3 429 460* 723*     CMP   Results from last 7 days   Lab Units 12/27/24  0504 12/26/24  2335 12/26/24  2041   SODIUM mmol/L 138 135* 129*   POTASSIUM mmol/L 4.1 5.6* 7.2*   CHLORIDE mmol/L 96* 98 93*   CO2 mmol/L 26.6 19.9* 15.9*   BUN mg/dL 71* 86* 90*   CREATININE mg/dL 4.12* 5.25* 5.56*   GLUCOSE mg/dL 117* 137* 91   ALBUMIN g/dL  --   --  3.3*   BILIRUBIN mg/dL  --   --  0.2   ALK PHOS U/L  --   --  190*   AST (SGOT) U/L  --   --  26   ALT (SGPT) U/L  --   --  20     ABG      US Renal Bilateral    Result Date: 12/27/2024  Impression: Kidney ultrasound is within normal limits. Electronically Signed: Juan Dia MD  12/27/2024 6:33 AM EST  Workstation ID: [id number]    XR Chest 1 View    Result Date: 12/26/2024  Impression: Emphysema with no acute cardiopulmonary process demonstrated Electronically Signed: Colt Diana  12/26/2024 9:03 PM EST  Workstation ID: [id number]     Results for orders placed during the hospital encounter of 11/19/24    Adult Transthoracic Echo Limited W/ Cont if Necessary Per Protocol    Interpretation Summary    Left ventricular ejection fraction appears to be 56 - 60%.    There is a MitraClip mitral valve repair present.    There is trace residual MR.  Mean gradient is 4.6  mmHg        Assessment & Plan   Assessment and Plan:         Acute renal failure (ARF)    ASSESSMENT:  BISHNU  Hyperkalemia   Acidosis getting better  Anemia   Hypotension   COPD  Crohn's on biologic therapy   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   Dehydration   RA: Patient noted to be on Humira 40 mg weekly with last dose 12/24/2024, methotrexate 6 mg weekly on Sundays, and on Rinvoq 45 mcg daily which has not formulary at this facility  CAD s/p stent placement on dual platelet therapy       RENAL US done 12/27/24: right kidney 9.3cm, left 9.1cm, no hydro or increased echogenicity   ECHO done 11/22/24: EF 56-60%, MV clip repair present, trace residual mitral valve regurg       PLAN :     Patient with BISHNU, etiology likely multifactorial with prerenal component secondary to dehydration from high volume output from ostomy, diuretic and NSAID use, and maybe some ATN with hypotension and recent hospitalization with severe anemia secondary to GI bleed. Creatinine 5.5 mg/dL on admission.   Renal US as above, negative for hydro or increased echogenicity   Patient receiving IVFs and creatinine is improving, down to 4.1 today.   Acidosis better change IVF to RL dcrease IVF   Continue to hold Aldactone, Meloxicam. On Methotrexate weekly for RA, hold for now due to BISHNU   [place] cath placed, continue strict I/O  Acidosis secondary to high ostomy output, improving and now WNL with bicarb based IVFs, also s/p 2amps of bicarb. Continue IVFs and monitor   Hyperkalemia, resolved s/p Lokelma, calcium gluconate, and bicarb. Continue to monitor   Remaining electrolytes acceptable today   Anemia, hgb low at 7.0, recommend to transfuse for hgb <7.0. Recent GI bleed. Will check iron studies   No history of significant proteinuria, around 100 mg/g back in October. Will check urine electrolytes and UPCR for completeness. No hematuria on UA earlier this month but will recheck.   Blood pressure stable but soft, not on any scheduled  antihypertensives. Florinef ordered but med not available so not given. Continue Midodrine and low dose Metoprolol    Alk phos elevated, also noted elevated lactate, troponin mildly elevated, and noted leukocytosis on admission. Plan for CT abd/pelvis WITHOUT contrast today. Started on Rocephin and Flagyl. Blood cultures ordered   Follow up with CK level   Phos elevated secondary to BISHNU, will monitor for now, continue renal diet. If worsening or no improvement can add Renvela in next day or so   Check CMP at 1100  Strict I/O, avoid nephrotoxins  Thank you for this consultation, we will follow closely     Buddy Pierre MD   [facility]  12/27/2024  10:04 EST

## 2024-12-27 NOTE — SIGNIFICANT NOTE
12/27/24 1031   Readmission Indications   Is the patient and/or family able to complete the readmission assessment questions? Yes  (called son JENNIFER Schmitz)   Is this hospitalization related to the prior hospital diagnosis? No   Recommendation for rehospitalization   Did you speak with your physician prior to coming to the hospital No  (Family unsure)   Follow-up Appointments   Do you have a PCP? Yes  (Dr Michael)   Did you have an appointment with PCP after your hospitalization? No  (Patient hasn't been to any appoitments since going to [facility] per [facility])   Did you have an appointment with a Specialist? Yes   When was your appointment scheduled? 01/06/25  (Dr Connor)   Did you go to appointment? No  (N/A)   Are you current with the Pulmonary Clinic? No   Are you current with the CHF Clinic? No   Medications   Did you have newly prescribed medications at discharge? Yes  (Cefdnir, famotidine, metoprolol, guaifensin, mesalamine)   Did you understand the reasons for your medications at discharge and how to take them? Yes   Did you understand the side effects of your medications? Yes   Are you taking all of you prescribed medications? Yes   What pharmacy was used to fill prescription(s)? [facility] (facility pharmacy)   Were medications picked up? Yes  (Dispensed by facility)   Discharge Instructions   Did you understand your discharge instructions? Yes   Did your family/caregiver hear your instructions? Yes   Were you told to eat a special diet? Yes  (cardiac)   Did you adhere to the diet? Yes   Were you given a number of someone to call if you had questions or concerns? Yes   Index discharge location/services   Where did you go upon discharge? Skilled Nursing Facility   Do you have supportive family or friends in the home? Yes   What services were arranged at discharge? Transportation  (EMS)   Was the provider seen at the facility?   (Family is unsure)   Which Skilled Nursing Facility were your admitted from?  [place]   Discharge Readiness   On a scale of 1-5 (5 being well prepared), how ready were you for discharge 4   Recommendation based on interview Other (comment)  (Frequent Lab monitoring)   Palliative Care/Hospice   Are you current with Palliative Care? No   Are you current with Hospice Care? No   Advance Directives (For Healthcare)   Pre-existing AND/MOST/POLST Order Yes, notify physician for order   Advance Directive Status Patient has advance directive, copy in chart   Type of Advance Directive Health care directive/Living will   Have you reviewed your Advance Directive and is it valid for this stay? Yes   Literature Provided on Advance Directives No   Patient Requests Assistance on Advance Directives Patient Declined   Readmission Assessment Final Comments   Final Comments Previous: Admitted from rehab facility for chest pain, PNA. Elevated BNP, cardiac enzymes. General surgery following post right hemicolectomy. Patient refused rehab. Family unable to care for her due to new ileostomy on 11/28/24. Psych consult deemed patient does not have decision making capacity and discharged to rehab. Discharged on oral cefdinir.  Current: From [place] for abnormal labs (hyperkalemia/acute renal failure) elevated wbc 16.49, K+ 7.2, Bicarb 15.9. Hyperkalemia protocol initiated. Nephology, GI and General surgery consulted. Bicarb gtt and perez for strict urine output.  Repeat BMP q 2 hours. IV Rocephin & Flagyl. LACE 17.

## 2024-12-27 NOTE — H&P
[facility] Medicine Services  History & Physical    Patient Name: Maryann Gaitan  : 1950  MRN: [id number]  Primary Care Physician:  Azam Calhoun MD  Date of admission: 2024  Date and Time of Service: 2024 at 2330    Subjective      Chief Complaint: Abnormal labs    History of Present Illness: Maryann Gaitan is a 74 y.o. female with a PMH of CAD s/p recent stent on dual platelet therapy, COPD, RA, Crohn's on biologic therapy, ileostomy with mucous fistula for GI bleed who presented to [facility] on 2024 with chief complaint of abnormal labs at the [facility].  Patient has had high-volume output from her ileostomy with poor adhesion of pouch and excoriation of the skin surrounding the stoma.  The patient is noted to be on a clear liquid diet.  Patient is alert and oriented at time of interview.  She report intermittent chest pain over the past few days and  reports she feels terrible since having her abdominal surgery.  Denies subjective fever or chills.    In the ER the patient was noted to be in acute Renal failure with creatinine 5.56 with comparison 0.50 on 2024.  Potassium was noted to be 7.2.  Nephrology was consulted and the patient was started on a bicarb drip and given Lokelma. HS troponin mildly elevated at 49 and 59.  Lactic acid 2.8.  INR 1.08.  Hemoglobin stable at 10.5 (may be lower after patient is hydrated) with comparison 8.9 on discharge 2024    Review of records with summary:   Patient was admitted 2024 for acute gastrointestinal hemorrhage, on 2024 patient underwent open ostomy and mucous fistula due to continued gastrointestinal bleeding despite GI evaluation without identification of bleeding area.  Patient on dual antiplatelet therapy secondary to recent stent placement.  On that admission patient received a total of 8 units of PRBCs.   Received total of 8 units of blood.  Still having large volume bloody bowel movements.       Review of Systems   Constitutional:  Negative for chills and fatigue.   Respiratory:  Positive for chest tightness. Negative for shortness of breath.    Cardiovascular:  Positive for chest pain. Negative for palpitations and leg swelling.   All other systems reviewed and are negative.      Personal History     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 07/05/2016    Dyslipidemia 10/30/2024    Fecal urgency 11/21/2024    First degree hemorrhoids 07/09/2021    GERD (gastroesophageal reflux disease) 11/21/2024    Hashimoto's thyroiditis 11/21/2024    History of colonic polyps 07/09/2021    Hypertension     Hypothyroidism (acquired) 11/06/2024    Mitral regurgitation 10/08/2024    Moderate protein-calorie malnutrition 11/09/2024    Multiple tracheobronchial mucus plugs 10/08/2024    Nicotine dependence 11/21/2024    Rheumatoid arthritis 11/06/2024    S/P mitral valve clip implantation 11/21/2024    Second degree hemorrhoids 07/05/2016    Stress incontinence, female 11/21/2024    Vitamin D deficiency, unspecified 11/21/2024       Past Surgical History:   Procedure Laterality Date    BRONCHOSCOPY N/A 10/16/2024    Procedure: BRONCHOSCOPY;  Surgeon: Gallo Pope MD;  Location: [facility] ENDOSCOPY;  Service: Pulmonary;  Laterality: N/A;    CARDIAC CATHETERIZATION N/A 10/15/2024    Procedure: Left Heart Cath, possible pci;  Surgeon: Travis Connor MD;  Location: [facility] CATH INVASIVE LOCATION;  Service: Cardiovascular;  Laterality: N/A;    CARDIAC CATHETERIZATION N/A 10/22/2024    Procedure: Laser  Coronary Atherectomy;  Surgeon: Travis Connor MD;  Location: [facility] CATH INVASIVE LOCATION;  Service: Cardiovascular;  Laterality: N/A;    COLON RESECTION Right 11/28/2024    Procedure: RIGHT HEMICOLECTOMY WITH ILEOSTOMY;  Surgeon: Todd Babin MD;  Location: [facility] MAIN OR;  Service: General;  Laterality: Right;    COLONOSCOPY N/A 10/12/2024    Procedure: COLONOSCOPY WITH BIOPSY AND WIRE GUIDED BALLOON DILATION OF TERMINAL ILEUM;  Surgeon: Rob Strong MD;  Location: [facility] ENDOSCOPY;  Service: Gastroenterology;  Laterality: N/A;  Colitis, crohns of terminal ileum, right colon ulcers, diverticulosis, hemorroids    ENDOSCOPY N/A 10/12/2024    Procedure: ESOPHAGOGASTRODUODENOSCOPY WITH BIOPSY X 2 AREA;  Surgeon: Rob Strong MD;  Location: [facility] ENDOSCOPY;  Service: Gastroenterology;  Laterality: N/A;  Chronic gastritis, HH    ENDOSCOPY Left 11/28/2024    Procedure: ESOPHAGOGASTRODUODENOSCOPY;  Surgeon: Dallin Delgado MD;  Location: [facility] ENDOSCOPY;  Service: Gastroenterology;  Laterality: Left;  small hiatal hernia    HYSTERECTOMY      LEFT HEART CATH      MITRAL VALVE REPAIR/REPLACEMENT N/A 11/21/2024    Procedure: Transcatheter Mitral Valve Repair;  Surgeon: Dmitri Navarro MD;  Location: [facility] HYBRID OR;  Service: Cardiovascular;  Laterality: N/A;       Family History: family history includes Dementia in her mother; Heart disease in her father and mother; Hypertension in her father; Stroke in her mother. Otherwise pertinent FHx was reviewed and not pertinent to current issue.    Social History:  reports that she has quit smoking. Her smoking use included cigarettes. She has never used smokeless tobacco. She reports that she does not drink alcohol and does not use drugs.    Home Medications:  Prior to Admission Medications       Prescriptions Last Dose Informant Patient Reported? Taking?    Adalimumab (Humira, 2 Pen,) 40 MG/0.4ML Pen-injector Kit   Yes No     Inject 40 mg under the skin into the appropriate area as directed 1 (One) Time Per Week. Saturdays   Indications: Rheumatoid Arthritis    albuterol (PROVENTIL) (2.5 MG/3ML) 0.083% nebulizer solution   Yes No    Take 2.5 mg by nebulization Every 6 (Six) Hours As Needed for Wheezing. Indications: Spasm of Lung Air Passages    aspirin 81 MG EC tablet   No No    Take 1 tablet by mouth Daily for 30 days. Indications: Disease involving Lipid Deposits in the Arteries    atorvastatin (LIPITOR) 40 MG tablet   No No    Take 1 tablet by mouth Every Night for 30 days. Indications: High Amount of Fats in the Blood    calcium carbonate (OS-ARABELLA) 600 MG tablet   Yes No    Take 1 tablet by mouth 2 (Two) Times a Day With Meals.    cholecalciferol (VITAMIN D3) 1.25 MG (86224 UT) capsule   Yes No    Take 1 capsule by mouth 2 (Two) Times a Week. Wed, Sat  Indications: Vitamin D Deficiency    clopidogrel (PLAVIX) 75 MG tablet   No No    Take 1 tablet by mouth Daily for 30 days. Indications: Ischemic Heart Disease    diclofenac (VOLTAREN) 50 MG EC tablet   Yes No    Take 1 tablet by mouth 2 (Two) Times a Day.    famotidine (Pepcid) 20 MG tablet   No No    Take 1 tablet by mouth 2 (Two) Times a Day.    folic acid (FOLVITE) 1 MG tablet   Yes No    Take 1 tablet by mouth Daily. Indications: Anemia From Inadequate Folic Acid    guaifenesin (ROBITUSSIN) 100 MG/5ML liquid   No No    Take 10 mL by mouth Every 4 (Four) Hours As Needed for Cough.    levothyroxine (SYNTHROID, LEVOTHROID) 50 MCG tablet   Yes No    Take 1 tablet by mouth Daily. Indications: Underactive Thyroid    Melatonin (Melatonin Extra Strength) 10 MG tablet   Yes No    Take 1 tablet by mouth As Needed. Indications: Trouble Sleeping    mesalamine (CANASA) 1000 MG suppository   No No    Insert 1 suppository into the rectum Every Night.    methotrexate 2.5 MG tablet   Yes No    Take 6 tablets by mouth 1 (One) Time Per Week. Saturdays   Indications: Non-oncologic    metoprolol  tartrate (LOPRESSOR) 25 MG tablet   No No    Take 0.5 tablets by mouth Every 12 (Twelve) Hours.    midodrine (PROAMATINE) 5 MG tablet   No No    Take 1 tablet by mouth 3 (Three) Times a Day Before Meals. Indications: Disorder of Low Blood Pressure    naloxone (NARCAN) 4 MG/0.1ML nasal spray   No No    Call 911. Don't prime. [place] in 1 nostril for overdose. Repeat in 2-3 minutes in other nostril if no or minimal breathing/responsiveness.  Indications: Opioid Overdose    ondansetron ODT (ZOFRAN-ODT) 4 MG disintegrating tablet   No No    Take 1 tablet by mouth Every 6 (Six) Hours As Needed for Nausea or Vomiting. Indications: Nausea and Vomiting Following an Operation    sertraline (ZOLOFT) 50 MG tablet   Yes No    Take 1 tablet by mouth Daily. Indications: Major Depressive Disorder    spironolactone (ALDACTONE) 25 MG tablet   Yes No    Take 1 tablet by mouth Daily. Indications: Edema    upadacitinib ER (Rinvoq) 45 MG tablet sustained-release 24 hour extended release tablet   Yes No    Take 1 tablet by mouth Daily. Indications: Rheumatoid Arthritis              Allergies:  Allergies   Allergen Reactions    Codeine Hives    Penicillin G Sodium Hives       Objective      Vitals:   Temp:  [98.4 °F (36.9 °C)] 98.4 °F (36.9 °C)  Heart Rate:  [77-85] 77  Resp:  [18-23] 23  BP: (115-134)/(55-72) 125/55  Body mass index is 21.46 kg/m².  Physical Exam  Vitals and nursing note reviewed.   Constitutional:       Appearance: Normal appearance. She is underweight. She is ill-appearing.   HENT:      Head: Normocephalic and atraumatic.      Right Ear: External ear normal.      Left Ear: External ear normal.      Nose: Nose normal.      Mouth/Throat:      Mouth: Mucous membranes are dry.   Eyes:      General: No scleral icterus.        Right eye: No discharge.         Left eye: No discharge.      Extraocular Movements: Extraocular movements intact.      Conjunctiva/sclera: Conjunctivae normal.      Pupils: Pupils are equal, round, and  reactive to light.   Cardiovascular:      Rate and Rhythm: Normal rate and regular rhythm.      Pulses: Normal pulses.      Heart sounds: Normal heart sounds. No murmur heard.  Pulmonary:      Effort: Pulmonary effort is normal.      Breath sounds: Examination of the right-lower field reveals decreased breath sounds. Examination of the left-lower field reveals decreased breath sounds. Wheezing present.   Abdominal:      General: Bowel sounds are normal.      Palpations: Abdomen is soft.   Musculoskeletal:      Cervical back: Normal range of motion and neck supple.      Right lower leg: No edema.      Left lower leg: No edema.   Skin:     Findings: Bruising present.      Comments: There is significant bruising to the bilateral upper and lower extremities, the skin   Neurological:      General: No focal deficit present.      Mental Status: She is alert and oriented to person, place, and time.   Psychiatric:         Attention and Perception: Attention normal.         Mood and Affect: Affect is flat.         Behavior: Behavior is cooperative.         Cognition and Memory: Cognition is not impaired. She does not exhibit impaired recent memory.         Judgment: Judgment normal.         Diagnostic Data:  Lab Results (last 24 hours)       Procedure Component Value Units Date/Time    POC Glucose Q1H [[id number]]  (Abnormal) Collected: 12/26/24 2215    Specimen: Blood Updated: 12/26/24 2217     Glucose 205 mg/dL      Comment: Serial Number: [id number]:  [id number]       POC Glucose Once [[id number]]  (Normal) Collected: 12/26/24 2132    Specimen: Blood Updated: 12/26/24 2134     Glucose 90 mg/dL      Comment: Serial Number: [id number]:  [id number]       Protime-INR [[id number]]  (Normal) Collected: 12/26/24 2041    Specimen: Blood Updated: 12/26/24 2114     Protime 14.0 Seconds      INR 1.08    aPTT [[id number]]  (Normal) Collected: 12/26/24 2041    Specimen: Blood Updated: 12/26/24 2114     PTT 32.0 seconds     High  Sensitivity Troponin T [[id number]]  (Abnormal) Collected: 12/26/24 2041    Specimen: Blood Updated: 12/26/24 2113     HS Troponin T 59 ng/L     Narrative:      High Sensitive Troponin T Reference Range:  <14.0 ng/L- Negative Female for AMI  <22.0 ng/L- Negative Male for AMI  >=14 - Abnormal Female indicating possible myocardial injury.  >=22 - Abnormal Male indicating possible myocardial injury.   Clinicians would have to utilize clinical acumen, EKG, Troponin, and serial changes to determine if it is an Acute Myocardial Infarction or myocardial injury due to an underlying chronic condition.         Magnesium [[id number]]  (Normal) Collected: 12/26/24 2041    Specimen: Blood Updated: 12/26/24 2113     Magnesium 1.9 mg/dL     Comprehensive Metabolic Panel [[id number]]  (Abnormal) Collected: 12/26/24 2041    Specimen: Blood Updated: 12/26/24 2113     Glucose 91 mg/dL      BUN 90 mg/dL      Creatinine 5.56 mg/dL      Sodium 129 mmol/L      Potassium 7.2 mmol/L      Comment: Slight hemolysis detected by analyzer. Result may be falsely elevated.        Chloride 93 mmol/L      CO2 15.9 mmol/L      Calcium 9.4 mg/dL      Total Protein 7.0 g/dL      Albumin 3.3 g/dL      ALT (SGPT) 20 U/L      AST (SGOT) 26 U/L      Comment: Slight hemolysis detected by analyzer. Result may be falsely elevated.        Alkaline Phosphatase 190 U/L      Total Bilirubin 0.2 mg/dL      Globulin 3.7 gm/dL      A/G Ratio 0.9 g/dL      BUN/Creatinine Ratio 16.2     Anion Gap 20.1 mmol/L      eGFR 7.6 mL/min/1.73     Narrative:      GFR Categories in Chronic Kidney Disease (CKD)      GFR Category          GFR (mL/min/1.73)    Interpretation  G1                     90 or greater         Normal or high (1)  G2                      60-89                Mild decrease (1)  G3a                   45-59                Mild to moderate decrease  G3b                   30-44                Moderate to severe decrease  G4                    15-29                 Severe decrease  G5                    14 or less           Kidney failure          (1)In the absence of evidence of kidney disease, neither GFR category G1 or G2 fulfill the criteria for CKD.    eGFR calculation 2021 CKD-EPI creatinine equation, which does not include race as a factor    Phosphorus [[id number]]  (Abnormal) Collected: 12/26/24 2041    Specimen: Blood Updated: 12/26/24 2110     Phosphorus 10.4 mg/dL     CBC & Differential [[id number]]  (Abnormal) Collected: 12/26/24 2041    Specimen: Blood Updated: 12/26/24 2046    Narrative:      The following orders were created for panel order CBC & Differential.  Procedure                               Abnormality         Status                     ---------                               -----------         ------                     CBC Auto Differential[[id number]]        Abnormal            Final result                 Please view results for these tests on the individual orders.    CBC Auto Differential [[id number]]  (Abnormal) Collected: 12/26/24 2041    Specimen: Blood Updated: 12/26/24 2046     WBC 16.49 10*3/mm3      RBC 3.58 10*6/mm3      Hemoglobin 10.5 g/dL      Hematocrit 33.6 %      MCV 93.9 fL      MCH 29.3 pg      MCHC 31.3 g/dL      RDW 18.4 %      RDW-SD 62.8 fl      MPV 9.5 fL      Platelets 723 10*3/mm3      Neutrophil % 65.9 %      Lymphocyte % 28.8 %      Monocyte % 3.4 %      Eosinophil % 0.6 %      Basophil % 0.7 %      Immature Grans % 0.6 %      Neutrophils, Absolute 10.87 10*3/mm3      Lymphocytes, Absolute 4.75 10*3/mm3      Monocytes, Absolute 0.56 10*3/mm3      Eosinophils, Absolute 0.10 10*3/mm3      Basophils, Absolute 0.11 10*3/mm3      Immature Grans, Absolute 0.10 10*3/mm3      nRBC 0.1 /100 WBC     Extra Tubes [[id number]] Collected: 12/26/24 2041    Specimen: Blood, Venous Line Updated: 12/26/24 2045    Narrative:      The following orders were created for panel order Extra Tubes.  Procedure                               Abnormality          Status                     ---------                               -----------         ------                     [facility][[id number]]                                   Final result                 Please view results for these tests on the individual orders.    [facility] [[id number]] Collected: 12/26/24 2041    Specimen: Blood Updated: 12/26/24 2045     Extra Tube Hold for add-ons.     Comment: Auto resulted.                Imaging Results (Last 24 Hours)       Procedure Component Value Units Date/Time    XR Chest 1 View [[id number]] Collected: 12/26/24 2057     Updated: 12/26/24 2105    Narrative:      XR CHEST 1 VW    Date of Exam: 12/26/2024 8:41 PM EST    Indication: chest pain    Comparison: 12/11/2024    Findings:  Lungs appear somewhat hyperinflated with emphysematous changes in the upper lobes. Scarring in the peripheral left upper lung. No acute pulmonary abnormality. Heart size and pulmonary vasculature are within normal limits      Impression:      Impression:  Emphysema with no acute cardiopulmonary process demonstrated      Electronically Signed: Colt Diana    12/26/2024 9:03 PM EST    Workstation ID: [id number]              Assessment & Plan        This is a 74 y.o. female with:    Active and Resolved Problems  Active Hospital Problems    Diagnosis  POA    **Acute renal failure (ARF) [N17.9]  Yes      Resolved Hospital Problems   No resolved problems to display.     Acute renal failure, creatinine 5.56 with comparison 0.50 on 12/16/2024-likely secondary to acute volume deficit due to high volume output from ileostomy and diuretic therapy; postrenal obstruction cannot be excluded; patient also noted to be on methotrexate, meloxicam, and spironolactone:   IV bolus x 1 L given in the ER; will add additional 1 L normal saline  Nephrology consulted, Dr. Pierre who add  bicarb drip, 3 amps at 150  And renal ultrasound  Lactic acid 2.8  Hold spironolactone secondary to renal failure         Hyperkalemia, potassium 7.2-secondary renal failure:  given Lokelma with repeat K+ 5.4    HS troponin mildly elevated at 49 and 59:  Add cardiology consult     Hemoglobin stable at 10.5 (may be lower after patient is hydrated) with comparison 8.9 on discharge 12/16/2024:   Daily CBC    Crohn's disease, chronic:   Continue mesalamine suppository nightly     Rheumatoid arthritis, chronic:   Patient noted to be on Humira 40 mg weekly with last dose 12/24/2024  Hold methotrexate 6 mg weekly on Sundays due to renal failure  Patient noted to be on Rinvoq 45 mcg daily which has not formulary at this facility    CAD with recent stent placement less than 6 months, chronic:  Continue aspirin 81 mg daily  Continue Plavix 75 mg daily  Continue metoprolol 12.5 mg twice daily  Continue midodrine 5 mg 3 times daily    HLD, chronic:   Continue atorvastatin 40 mg nightly    Dietary supplementation, chronic:   Hold secondary to acute renal failure    Antibiotic therapy, uncertain etiology:   Patient noted to be on ceftriaxone daily injection      Chronic pain   discontinue diclofenac 50 mg twice daily    GERD, chronic: Continue Pepcid 20 mg twice daily    COPD, chronic with chronic oxygen 2.5 L per nasal cannula: Continue guaifenesin    Hypothyroidism, chronic:   Continue levothyroxine 50 mcg daily  Check TSH    Anxiety, chronic:   Continue Zoloft 50 mg daily    VTE Prophylaxis:  Pharmacologic VTE prophylaxis orders are present.        The patient desires to be as follows:    CODE STATUS:    Code Status (Patient has no pulse and is not breathing): CPR (Attempt to Resuscitate)  Medical Interventions (Patient has pulse or is breathing): Full Support          Admission Status:  I believe this patient meets inpatient status.    Expected Length of Stay: 4 to 5 days    PDMP and Medication Dispenses via Sidebar reviewed and consistent with patient reported medications.    I discussed the patient's findings and my recommendations with  patient and nursing staff.      Signature:     This document has been electronically signed by DRU Moulton on December 26, 2024 23:25 [facility]

## 2024-12-27 NOTE — PROGRESS NOTES
[facility] MEDICINE SERVICE  DAILY PROGRESS NOTE    NAME: Maryann Gaitan  : 1950  MRN: [id number]      LOS: 1 day     PROVIDER OF SERVICE: Joshua Simon MD    Chief Complaint: Acute renal failure (ARF)    Subjective:     Interval History:  History taken from: patient chart    Patient seen and examined at bedside.  Abdominal pain and dark output from ileostomy.  Denies fever, chest pain, shortness of breath, nausea or vomiting.  She is a resident of [facility] facility was sent to from facility due to elevated BUN and creatinine.      Review of Systems:   Review of Systems    Objective:     Vital Signs  Temp:  [98.4 °F (36.9 °C)] 98.4 °F (36.9 °C)  Heart Rate:  [67-85] 78  Resp:  [18-23] 18  BP: ()/(51-72) 108/52   Body mass index is 21.46 kg/m².    Physical Exam  Physical Exam  Constitutional:       Appearance: She is ill-appearing.   Cardiovascular:      Rate and Rhythm: Normal rate and regular rhythm.      Pulses: Normal pulses.      Heart sounds: Normal heart sounds. No murmur heard.  Pulmonary:      Effort: Pulmonary effort is normal. No respiratory distress.      Breath sounds: Normal breath sounds. No wheezing or rales.   Abdominal:      General: There is no distension.      Tenderness: There is no abdominal tenderness.      Comments: Ileostomy with dark output  Hyperactive bowel sounds   Musculoskeletal:         General: No swelling, tenderness, deformity or signs of injury.      Cervical back: Normal range of motion.   Skin:     Findings: Bruising (Bilateral upper extremities) and erythema present.   Neurological:      General: No focal deficit present.      Mental Status: She is alert. Mental status is at baseline.      Cranial Nerves: No cranial nerve deficit.      Sensory: No sensory deficit.      Motor: No weakness.      Coordination: Coordination normal.            Diagnostic Data    Results from last 7 days   Lab Units 24  0504 24  2335 24    WBC 10*3/mm3 12.27*  --  16.49*   HEMOGLOBIN g/dL 7.4*  --  10.5*   HEMATOCRIT % 22.6*  --  33.6*   PLATELETS 10*3/mm3 460*  --  723*   GLUCOSE mg/dL 117*   < > 91   CREATININE mg/dL 4.12*   < > 5.56*   BUN mg/dL 71*   < > 90*   SODIUM mmol/L 138   < > 129*   POTASSIUM mmol/L 4.1   < > 7.2*   AST (SGOT) U/L  --   --  26   ALT (SGPT) U/L  --   --  20   ALK PHOS U/L  --   --  190*   BILIRUBIN mg/dL  --   --  0.2   ANION GAP mmol/L 15.4*   < > 20.1*    < > = values in this interval not displayed.       US Renal Bilateral    Result Date: 12/27/2024  Impression: Kidney ultrasound is within normal limits. Electronically Signed: Juan Dia MD  12/27/2024 6:33 AM EST  Workstation ID: [id number]    XR Chest 1 View    Result Date: 12/26/2024  Impression: Emphysema with no acute cardiopulmonary process demonstrated Electronically Signed: Colt Diana  12/26/2024 9:03 PM EST  Workstation ID: [id number]       I reviewed the patient's new clinical results.    Assessment/Plan:     Active and Resolved Problems  Active Hospital Problems    Diagnosis  POA    **Acute renal failure (ARF) [N17.9]  Yes      Resolved Hospital Problems   No resolved problems to display.       Assessment and plan      BISHNU likely prerenal secondary to dehydration due to high volume output from ileostomy due to Crohn's disease  Anion gap metabolic acidosis likely secondary to above  Lactic acidosis likely secondary to above  Hyperkalemia likely secondary to above  -Baseline creatinine 0.5 December 2024  -Status post IV fluid bolus Lokelma in the ED  -Nephrology consulted in the ED (Dr. Roque).  Started on bicarb drip 3 A at 150 cc/h  -Bilateral renal ultrasound unremarkable  -Trend lactic acidosis until less than 2  -Give 1 additional unit of 1 L LR bolus and increase LR to 150 cc/h  -Abdominal pelvic CT pending  -Hold home dose of methotrexate, meloxicam, spironolactone    Acute blood loss anemia likely due to use of antiplatelet therapy  -10.5 on  presentation, down trended to 6.4  -GI has been consulted  -Transfuse for hemoglobin less than 8 with signs of active bleed  -PPI bolus and drip    Mild troponinemia likely demand  -Troponin peaked at 59     History of Crohn's disease  -Will defer to GI for continuation of continue of mesalamine suppository nightly                History of rheumatoid arthritis  -Patient noted to be on Humira 40 mg weekly with last dose 12/24/2024  -Hold methotrexate 6 mg weekly on Sundays due to renal failure  -Patient noted to be on Rinvoq 45 mcg daily which has not formulary at this facility     CAD with recent stent placement in October 2024  -Will hold aspirin 81 mg daily and Plavix 75 mg daily  -Continue metoprolol 12.5 mg twice daily  -Continue midodrine 5 mg 3 times daily  -Cardiology consult for management of DAPT    History of hyperlipidemia  Continue atorvastatin 40 mg nightly     Dietary supplementation, chronic:   -Hold secondary to acute renal failure     Antibiotic therapy, uncertain etiology:   -Patient noted to be on ceftriaxone daily injection     Chronic pain   -discontinue diclofenac 50 mg twice daily     GERD  -Home dose of famotidine  -Continue on PPI drip for GI bleed     COPD not in exacerbation  -chronic with chronic oxygen 2.5 L per nasal cannula: Continue guaifenesin  -Continue home dose of bronchodilators     History of hypothyroidism  -Continue levothyroxine 50 mcg daily  -Check TSH     History of anxiety  -Continue Zoloft 50 mg daily     VTE Prophylaxis:  Pharmacologic VTE prophylaxis orders are present.       VTE Prophylaxis:  Pharmacologic VTE prophylaxis orders are present.             Disposition Planning:     Barriers to Discharge: GI bleed,renal failure  Anticipated Date of Discharge: 72 hrs  Place of Discharge: skilled nursing facility      Time: 35 minutes     Code Status and Medical Interventions: CPR (Attempt to Resuscitate); Full Support   Ordered at: 12/26/24 3492     Code Status (Patient  has no pulse and is not breathing):    CPR (Attempt to Resuscitate)     Medical Interventions (Patient has pulse or is breathing):    Full Support       Signature: Electronically signed by Joshua Simon MD, 12/27/24, 08:22 EST.  [facility]

## 2024-12-27 NOTE — PLAN OF CARE
Was updated about the patient, recently discharged from hospital, sent to ER for abnormal labs, pt apparently, awake alert, , was found to have significant BISHNU, with hyperkalemia, and acidosis, concern about dehydration, high ostomy output from newly created ileostomy. Also noted patient, was o aldactone on DC,   D/w Dr Bernal.   Got fluid boluses, high K medically treated, including lokelma, calcium gluconate, requested 2 amp of sodium bicarb, and start bicarb drip fluids, will need strict I/o monitroing, requested perez too. Dose of florinef, as well as albuterol.   Requested to repeat BMP in 2 hours, and requested call back with result, and urine output.   Full consult to follow.     Ck Crump  [facility] kidney consultants.

## 2024-12-27 NOTE — NURSING NOTE
WOCN note:    74 yr old female admitted 12/26/24 with acute renal failure. WOCN consult received for ileostomy pouch leakage and karine stomal skin breakdown. Patient is known to this service from a previous admission for a right hemicolectomy with ileostomy and mucus fistula creation.     Patient presents with an overfilled ileostomy pouch that has pulled away from the abdomen. The pouch was removed with approximately 300cc of dark green liquid effluent. The karine-stomal skin and RLQ abdomen is red and denuded from leakage of ileostomy contents.   The skin was cleansed with water and treated with 3 layers of stoma powder and No-sting skin prep spray. An [employer] barrier ring was placed around the stoma and a [place] one piece cut to fit pouch was applied. The edges were secured with PINQ tape.   The pouch remains in place to the mucus fistula. Supplies were left at the bedside with 2 piece high output pouches with [employer] barrier rings and a belt. The high output pouches can be attached to bedside drainage.   Patient instructed to let staff know when the pouch becomes 1/3-1/2 full or leakage is noted for timely emptying or pouch change.    Recommend patient be started on Imodium to thicken the stool and decrease output. Will continue to follow.

## 2024-12-27 NOTE — DISCHARGE PLACEMENT REQUEST
"Bhargavi Gaitan (74 y.o. Female)       Date of Birth   1950    Social Security Number       Address   [address] [place] [address] [place] [facility] [place] [facility]    Home Phone   [phone]    MRN   [id number]       Faith   None    Marital Status                               Admission Date   12/26/24    Admission Type   Emergency    Admitting Provider   Luma Ramirez MD    Attending Provider   Joshua Simon MD    Department, Room/Bed   [facility] EMERGENCY DEPARTMENT, 26/26       Discharge Date       Discharge Disposition       Discharge Destination                                 Attending Provider: Joshua Simon MD    Allergies: Codeine, Penicillin G Sodium    Isolation: None   Infection: None   Code Status: CPR    Ht: 162.6 cm (64\")   Wt: 56.7 kg (125 lb)    Admission Cmt: None   Principal Problem: Acute renal failure (ARF) [N17.9]                   Active Insurance as of 12/26/2024       Primary Coverage       Payor Plan Insurance Group Employer/Plan Group    HUMANA MEDICARE REPLACEMENT HUMANA MED ADV SNP HMO [id number]       Payor Plan Address Payor Plan Phone Number Payor Plan Fax Number Effective Dates    [address] [address] [phone]  4/1/2024 - None Entered    [facility] [id number]         Subscriber Name Subscriber Birth Date Member ID       BHARGAVI GAITAN 1950 [id number]                     Emergency Contacts        (Rel.) Home Phone Work Phone Mobile Phone    Nadir Gaitan (Son) -- -- [phone]    Sania Celeste (Grandchild) -- -- [phone]    Hector [place] (Grandchild) -- -- [phone]                "

## 2024-12-27 NOTE — ED PROVIDER NOTES
Subjective   History of Present Illness  Chief complaint: Abnormal lab    74-year-old female presents from a facility for abnormal labs.  Patient apparently had hyperkalemia and acute renal failure on labs from her facility.  Patient reports some mild chest discomfort.  She denies any other specific complaints.    History provided by:  Patient and EMS personnel      Review of Systems   Constitutional:  Negative for fever.   HENT:  Negative for congestion.    Respiratory:  Negative for cough and shortness of breath.    Cardiovascular:  Positive for chest pain.   Gastrointestinal:  Negative for abdominal pain and vomiting.   Neurological:  Negative for headaches.   Psychiatric/Behavioral:  Negative for confusion.        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 07/05/2016    Dyslipidemia 10/30/2024    Fecal urgency 11/21/2024    First degree hemorrhoids 07/09/2021    GERD (gastroesophageal reflux disease) 11/21/2024    Hashimoto's thyroiditis 11/21/2024    History of colonic polyps 07/09/2021    Hypertension     Hypothyroidism (acquired) 11/06/2024    Mitral regurgitation 10/08/2024    Moderate protein-calorie malnutrition 11/09/2024    Multiple tracheobronchial mucus plugs 10/08/2024    Nicotine dependence 11/21/2024    Rheumatoid arthritis 11/06/2024    S/P mitral valve clip implantation 11/21/2024    Second degree hemorrhoids 07/05/2016    Stress incontinence, female 11/21/2024    Vitamin D deficiency, unspecified 11/21/2024       Allergies   Allergen Reactions    Codeine Hives    Penicillin G Sodium  Hives       Past Surgical History:   Procedure Laterality Date    BRONCHOSCOPY N/A 10/16/2024    Procedure: BRONCHOSCOPY;  Surgeon: Gallo Pope MD;  Location: [facility] ENDOSCOPY;  Service: Pulmonary;  Laterality: N/A;    CARDIAC CATHETERIZATION N/A 10/15/2024    Procedure: Left Heart Cath, possible pci;  Surgeon: Travis Connor MD;  Location: [facility] CATH INVASIVE LOCATION;  Service: Cardiovascular;  Laterality: N/A;    CARDIAC CATHETERIZATION N/A 10/22/2024    Procedure: Laser Coronary Atherectomy;  Surgeon: Travis Connor MD;  Location: [facility] CATH INVASIVE LOCATION;  Service: Cardiovascular;  Laterality: N/A;    COLON RESECTION Right 11/28/2024    Procedure: RIGHT HEMICOLECTOMY WITH ILEOSTOMY;  Surgeon: Todd Babin MD;  Location: [facility] MAIN OR;  Service: General;  Laterality: Right;    COLONOSCOPY N/A 10/12/2024    Procedure: COLONOSCOPY WITH BIOPSY AND WIRE GUIDED BALLOON DILATION OF TERMINAL ILEUM;  Surgeon: Rob Strong MD;  Location: [facility] ENDOSCOPY;  Service: Gastroenterology;  Laterality: N/A;  Colitis, crohns of terminal ileum, right colon ulcers, diverticulosis, hemorroids    ENDOSCOPY N/A 10/12/2024    Procedure: ESOPHAGOGASTRODUODENOSCOPY WITH BIOPSY X 2 AREA;  Surgeon: Rob Strong MD;  Location: [facility] ENDOSCOPY;  Service: Gastroenterology;  Laterality: N/A;  Chronic gastritis, HH    ENDOSCOPY Left 11/28/2024    Procedure: ESOPHAGOGASTRODUODENOSCOPY;  Surgeon: Dallin Delgado MD;  Location: [facility] ENDOSCOPY;  Service: Gastroenterology;  Laterality: Left;  small hiatal hernia    HYSTERECTOMY      LEFT HEART CATH      MITRAL VALVE REPAIR/REPLACEMENT N/A 11/21/2024    Procedure: Transcatheter Mitral Valve Repair;  Surgeon: Dmitri Navarro MD;  Location: [facility] HYBRID OR;  Service: Cardiovascular;  Laterality: N/A;       Family History   Problem Relation Age of Onset    Heart disease Mother     Dementia Mother     Stroke Mother     Heart  "disease Father     Hypertension Father        Social History     Socioeconomic History    Marital status:    Tobacco Use    Smoking status: Former     Types: Cigarettes    Smokeless tobacco: Never   Vaping Use    Vaping status: Never Used   Substance and Sexual Activity    Alcohol use: Never    Drug use: Never    Sexual activity: Defer       /72   Pulse 85   Temp 98.4 °F (36.9 °C) (Oral)   Resp 18   Ht 162.6 cm (64\")   Wt 56.7 kg (125 lb)   SpO2 98%   BMI 21.46 kg/m²       Objective   Physical Exam  Vitals and nursing note reviewed.   Constitutional:       Appearance: She is well-developed.   HENT:      Head: Normocephalic and atraumatic.   Cardiovascular:      Rate and Rhythm: Normal rate and regular rhythm.      Heart sounds: Normal heart sounds.   Pulmonary:      Effort: Pulmonary effort is normal. No respiratory distress.      Breath sounds: Normal breath sounds.   Abdominal:      Palpations: Abdomen is soft.      Tenderness: There is no abdominal tenderness.      Comments: There is an ileostomy in place.  There is skin irritation around the ileostomy.  No evidence of cellulitis.   Musculoskeletal:      Right lower leg: No tenderness. No edema.      Left lower leg: No tenderness. No edema.   Skin:     General: Skin is warm and dry.   Neurological:      Mental Status: She is alert and oriented to person, place, and time.         Procedures           ED Course      My interpretation of EKG shows sinus rhythm, rate of 82, no ST elevation                                     Results for orders placed or performed during the hospital encounter of 12/26/24   ECG 12 Lead Chest Pain    Collection Time: 12/26/24  8:13 PM   Result Value Ref Range    QT Interval 386 ms    QTC Interval 450 ms   Protime-INR    Collection Time: 12/26/24  8:41 PM    Specimen: Blood   Result Value Ref Range    Protime 14.0 11.7 - 14.2 Seconds    INR 1.08 0.90 - 1.10   aPTT    Collection Time: 12/26/24  8:41 PM    Specimen: " Blood   Result Value Ref Range    PTT 32.0 22.7 - 35.4 seconds   High Sensitivity Troponin T    Collection Time: 12/26/24  8:41 PM    Specimen: Blood   Result Value Ref Range    HS Troponin T 59 (C) <14 ng/L   Magnesium    Collection Time: 12/26/24  8:41 PM    Specimen: Blood   Result Value Ref Range    Magnesium 1.9 1.6 - 2.4 mg/dL   Phosphorus    Collection Time: 12/26/24  8:41 PM    Specimen: Blood   Result Value Ref Range    Phosphorus 10.4 (H) 2.5 - 4.5 mg/dL   Comprehensive Metabolic Panel    Collection Time: 12/26/24  8:41 PM    Specimen: Blood   Result Value Ref Range    Glucose 91 65 - 99 mg/dL    BUN 90 (H) 8 - 23 mg/dL    Creatinine 5.56 (H) 0.57 - 1.00 mg/dL    Sodium 129 (L) 136 - 145 mmol/L    Potassium 7.2 (C) 3.5 - 5.2 mmol/L    Chloride 93 (L) 98 - 107 mmol/L    CO2 15.9 (L) 22.0 - 29.0 mmol/L    Calcium 9.4 8.6 - 10.5 mg/dL    Total Protein 7.0 6.0 - 8.5 g/dL    Albumin 3.3 (L) 3.5 - 5.2 g/dL    ALT (SGPT) 20 1 - 33 U/L    AST (SGOT) 26 1 - 32 U/L    Alkaline Phosphatase 190 (H) 39 - 117 U/L    Total Bilirubin 0.2 0.0 - 1.2 mg/dL    Globulin 3.7 gm/dL    A/G Ratio 0.9 g/dL    BUN/Creatinine Ratio 16.2 7.0 - 25.0    Anion Gap 20.1 (H) 5.0 - 15.0 mmol/L    eGFR 7.6 (L) >60.0 mL/min/1.73   CBC Auto Differential    Collection Time: 12/26/24  8:41 PM    Specimen: Blood   Result Value Ref Range    WBC 16.49 (H) 3.40 - 10.80 10*3/mm3    RBC 3.58 (L) 3.77 - 5.28 10*6/mm3    Hemoglobin 10.5 (L) 12.0 - 15.9 g/dL    Hematocrit 33.6 (L) 34.0 - 46.6 %    MCV 93.9 79.0 - 97.0 fL    MCH 29.3 26.6 - 33.0 pg    MCHC 31.3 (L) 31.5 - 35.7 g/dL    RDW 18.4 (H) 12.3 - 15.4 %    RDW-SD 62.8 (H) 37.0 - 54.0 fl    MPV 9.5 6.0 - 12.0 fL    Platelets 723 (H) 140 - 450 10*3/mm3    Neutrophil % 65.9 42.7 - 76.0 %    Lymphocyte % 28.8 19.6 - 45.3 %    Monocyte % 3.4 (L) 5.0 - 12.0 %    Eosinophil % 0.6 0.3 - 6.2 %    Basophil % 0.7 0.0 - 1.5 %    Immature Grans % 0.6 (H) 0.0 - 0.5 %    Neutrophils, Absolute 10.87 (H) 1.70 -  7.00 10*3/mm3    Lymphocytes, Absolute 4.75 (H) 0.70 - 3.10 10*3/mm3    Monocytes, Absolute 0.56 0.10 - 0.90 10*3/mm3    Eosinophils, Absolute 0.10 0.00 - 0.40 10*3/mm3    Basophils, Absolute 0.11 0.00 - 0.20 10*3/mm3    Immature Grans, Absolute 0.10 (H) 0.00 - 0.05 10*3/mm3    nRBC 0.1 0.0 - 0.2 /100 WBC   Gold Top - SST    Collection Time: 12/26/24  8:41 PM   Result Value Ref Range    Extra Tube Hold for add-ons.    POC Glucose Once    Collection Time: 12/26/24  9:32 PM    Specimen: Blood   Result Value Ref Range    Glucose 90 70 - 105 mg/dL     XR Chest 1 View    Result Date: 12/26/2024  Impression: Emphysema with no acute cardiopulmonary process demonstrated Electronically Signed: Colt Diana  12/26/2024 9:03 PM EST  Workstation ID: [id number]                 Medical Decision Making    Patient had the above evaluation.  Results were discussed with the patient.  My interpretation of chest x-ray shows no acute infiltrate or effusion.  White blood cell count was elevated at 16.49.  Hemoglobin is okay at 10.5.  CMP is significant for acute renal failure with BUN of 90 and creatinine 5.56.  Potassium is elevated at 7.2.  Bicarb is 15.9.  Patient was started on hyperkalemia protocol.  I discussed with Dr. Pierre with nephrology who recommends giving 2 amps of bicarb and then starting a bicarb drip.  He also requests that a [place] catheter be placed for strict urine output.  We will repeat a BMP in 2 hours.  Patient has remained hemodynamically stable in the emergency room.  I feel her renal failure is likely related to high liquid output from her new ileostomy.  I discussed with the provider on-call for the hospitalist and the patient will be admitted for further evaluation and management.      Final diagnoses:   Acute renal failure, unspecified acute renal failure type   Hyperkalemia       ED Disposition  ED Disposition       ED Disposition   Decision to Admit    Condition   --    Comment   Level of Care: Progressive  Care [20]   Admitting Physician: VELMA FREDERICK [[id number]]   Attending Physician: VELMA FREDERICK [[id number]]                 No follow-up provider specified.       Medication List      No changes were made to your prescriptions during this visit.            Perfecto Bernal MD  12/26/24 2773

## 2024-12-27 NOTE — CASE MANAGEMENT/SOCIAL WORK
Discharge Planning Assessment  [facility]     Patient Name: Maryann Gaitan  MRN: [id number]  Today's Date: 12/27/2024    Admit Date: 12/26/2024    Plan: Return to [facility] skilled. Precert required. No PASRR required.   Discharge Needs Assessment       Row Name 12/27/24 1018       Living Environment    People in Home other (see comments)  [facility]    Current Living Arrangements other (see comments)  SNF    Duration at Residence Discharged from [facility] to [facility] on 12/17    Potentially Unsafe Housing Conditions none    In the past 12 months has the electric, gas, oil, or water company threatened to shut off services in your home? No    Primary Care Provided by other (see comments)  Staff at rehab    Provides Primary Care For no one, unable/limited ability to care for self    Family Caregiver if Needed child(keegan), adult    Family Caregiver Names Nadir. Son    Quality of Family Relationships helpful;involved    Able to Return to Prior Arrangements yes       Resource/Environmental Concerns    Resource/Environmental Concerns none    Transportation Concerns none       Transportation Needs    In the past 12 months, has lack of transportation kept you from medical appointments or from getting medications? no    In the past 12 months, has lack of transportation kept you from meetings, work, or from getting things needed for daily living? No       Food Insecurity    Within the past 12 months, you worried that your food would run out before you got the money to buy more. Never true    Within the past 12 months, the food you bought just didn't last and you didn't have money to get more. Never true       Transition Planning    Patient/Family Anticipates Transition to other (see comments)  SNF    Patient/Family Anticipated Services at Transition skilled nursing    Transportation Anticipated other (see comments)  facility transport/EMS       Discharge Needs Assessment    Readmission Within the Last 30 Days current reason  for admission unrelated to previous admission    Current Outpatient/Agency/Support Group skilled nursing facility    Equipment Currently Used at Home none    Concerns to be Addressed denies needs/concerns at this time    Do you want help finding or keeping work or a job? Patient declined    Anticipated Changes Related to Illness inability to care for self    Equipment Needed After Discharge none    Outpatient/Agency/Support Group Needs skilled nursing facility                   Discharge Plan       Row Name 12/27/24 1021       Plan    Plan Return to [facility]. Precert required. No PASRR required.    Patient/Family in Agreement with Plan yes    Plan Comments CM called Nadir RODRIGUEZ to discuss CM assessment and readmission questions.  Confirmed PCP, insurance, and pharmacy.  Meds to bed denied.. Patient denies any difficulty affording medications. Patient currently resides at [facility] for rehab. GI, nephrology and general surgery consults. Patient will need precert to return to rehab per tiffanie Garcia. Placed in epic basket. PT/OT evals pending. DC Barriers: bicarb gtt, perez placed, repeat BMP q2 hours, IV abx x2, hyperkalemia protocol, consults pending.                  Continued Care and Services - Admitted Since 12/26/2024       Destination       Service Provider Request Status Services Address Phone Fax Patient Preferred    [facility] Accepted -- [address] [place] [address] [phone] --                     Demographic Summary       Row Name 12/27/24 1016       General Information    Admission Type inpatient    Arrived From emergency department  From [facility]    Referral Source admission list    Reason for Consult discharge planning    Preferred Language English       Contact Information    Permission Granted to Share Info With ;power of  for healthcare                   Functional Status       Row Name 12/27/24 1017       Functional Status    Usual  Activity Tolerance moderate    Current Activity Tolerance fair       Functional Status, IADL    Medications assistive person    Meal Preparation assistive person    Housekeeping assistive person    Laundry assistive person    Shopping assistive person    If for any reason you need help with day-to-day activities such as bathing, preparing meals, shopping, managing finances, etc., do you get the help you need? Patient declined    IADL Comments [place] staff              Patient Forms       Row Name 12/27/24 0818       Patient Forms    Important Message from Medicare (IMM) Delivered  IMM per reg 12/26             Ashlie Pastor RN     Office: [phone]

## 2024-12-27 NOTE — CONSULTS
GI CONSULT  NOTE:    Referring Provider:  Dr. Simon    Chief complaint: GI bleed     Subjective .     History of present illness:  Patient is a 74 y.o. female with history of Crohn's disease, right hemicolectomy with ileostomy 11/28/2024, hypertension, COPD, RA, hysterectomy, and cholecystectomy who was sent in from a facility due to abnormal labs.  Patient was found to have a potassium of 7.2 with a creatinine of 5.56.  When she was discharged on 12/16/2024 creatinine was 0.5.  GI was consulted for GI bleed with ileostomy.  Patient reports she has been residing at a rehabilitation facility since discharge in 11/2024.  Patient reports decreased oral intake of food and fluids with decreased appetite since hospital discharge.  Reports okay high output of brown liquid from stoma.  Reports she has been seeing blood from her mucous fistula.  Reports abdomen is extremely excoriated and painful.  Reports nausea.  Denies vomiting.    Endo History:  11/28/24 EGD (Dr Delgado) small hiatal hernia.  GI bleeding with hemodynamic compromise with source felt to be ileitis related to Crohn's disease in the setting of Plavix due to recent PCI.  10/12/2024 EGD/colonoscopy (Dr. Strong) -hiatal hernia, gastritis, TI stricture, TI ulcers, Crohn's disease of the small bowel and colon (biopsy consistent with IBD), diverticulosis, grade 2 internal hemorrhoids  7/2021 colonoscopy by Dr. Castillo-ulceration, granularity, scarring, and stenosis in TI with biopsy showing focal chronic active ileitis with detached fragment of acutely inflamed chronic ulcer.  HP polyp, grade 1 internal and external, benign random colon biopsies.  8/2016 EUS by Dr. Castillo-normal pancreatic parenchyma, and leg grade C esophagitis, hiatal hernia.    Past Medical History:  Past Medical History:   Diagnosis Date    Abnormal weight loss 11/21/2024    Acute kidney injury 11/06/2024    Acute UTI (urinary tract infection) 11/06/2024    Anxiety associated with  depression 11/06/2024    Benign neoplasm of cecum 07/05/2016    CAD, multiple vessel 10/08/2024    Cavitary lesion of lung 10/08/2024    COPD (chronic obstructive pulmonary disease)     Crohn's disease 11/06/2024    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 07/05/2016    Dyslipidemia 10/30/2024    Fecal urgency 11/21/2024    First degree hemorrhoids 07/09/2021    GERD (gastroesophageal reflux disease) 11/21/2024    Hashimoto's thyroiditis 11/21/2024    History of colonic polyps 07/09/2021    Hypertension     Hypothyroidism (acquired) 11/06/2024    Mitral regurgitation 10/08/2024    Moderate protein-calorie malnutrition 11/09/2024    Multiple tracheobronchial mucus plugs 10/08/2024    Nicotine dependence 11/21/2024    Rheumatoid arthritis 11/06/2024    S/P mitral valve clip implantation 11/21/2024    Second degree hemorrhoids 07/05/2016    Stress incontinence, female 11/21/2024    Vitamin D deficiency, unspecified 11/21/2024       Past Surgical History:  Past Surgical History:   Procedure Laterality Date    BRONCHOSCOPY N/A 10/16/2024    Procedure: BRONCHOSCOPY;  Surgeon: Gallo Pope MD;  Location: [facility] ENDOSCOPY;  Service: Pulmonary;  Laterality: N/A;    CARDIAC CATHETERIZATION N/A 10/15/2024    Procedure: Left Heart Cath, possible pci;  Surgeon: Travis Connor MD;  Location: [facility] CATH INVASIVE LOCATION;  Service: Cardiovascular;  Laterality: N/A;    CARDIAC CATHETERIZATION N/A 10/22/2024    Procedure: Laser Coronary Atherectomy;  Surgeon: Travis Connor MD;  Location: [facility] CATH INVASIVE LOCATION;  Service: Cardiovascular;  Laterality: N/A;    COLON RESECTION Right 11/28/2024    Procedure: RIGHT HEMICOLECTOMY WITH ILEOSTOMY;  Surgeon: Todd Babin MD;  Location: [facility] MAIN OR;  Service: General;  Laterality: Right;    COLONOSCOPY N/A 10/12/2024    Procedure: COLONOSCOPY WITH BIOPSY AND WIRE GUIDED BALLOON DILATION OF TERMINAL ILEUM;  Surgeon: Rob Strong  MD Alex;  Location: [facility] ENDOSCOPY;  Service: Gastroenterology;  Laterality: N/A;  Colitis, crohns of terminal ileum, right colon ulcers, diverticulosis, hemorroids    ENDOSCOPY N/A 10/12/2024    Procedure: ESOPHAGOGASTRODUODENOSCOPY WITH BIOPSY X 2 AREA;  Surgeon: Rob Strong MD;  Location: [facility] ENDOSCOPY;  Service: Gastroenterology;  Laterality: N/A;  Chronic gastritis, HH    ENDOSCOPY Left 11/28/2024    Procedure: ESOPHAGOGASTRODUODENOSCOPY;  Surgeon: Dallin Delgado MD;  Location: [facility] ENDOSCOPY;  Service: Gastroenterology;  Laterality: Left;  small hiatal hernia    HYSTERECTOMY      LEFT HEART CATH      MITRAL VALVE REPAIR/REPLACEMENT N/A 11/21/2024    Procedure: Transcatheter Mitral Valve Repair;  Surgeon: Dmitri Navarro MD;  Location: [facility] HYBRID OR;  Service: Cardiovascular;  Laterality: N/A;       Social History:  Social History     Tobacco Use    Smoking status: Former     Types: Cigarettes    Smokeless tobacco: Never   Vaping Use    Vaping status: Never Used   Substance Use Topics    Alcohol use: Never    Drug use: Never       Family History:  Family History   Problem Relation Age of Onset    Heart disease Mother     Dementia Mother     Stroke Mother     Heart disease Father     Hypertension Father        Medications:  (Not in a hospital admission)      Scheduled Meds:[Held by provider] aspirin, 81 mg, Oral, Daily  atorvastatin, 40 mg, Oral, Nightly  cefTRIAXone, 1,000 mg, Intravenous, Q24H  [Held by provider] clopidogrel, 75 mg, Oral, Daily  fludrocortisone, 200 mcg, Oral, Once  folic acid, 1,000 mcg, Oral, Daily  heparin (porcine), 5,000 Units, Subcutaneous, Q12H  lactated ringers, 1,000 mL, Intravenous, Once  levothyroxine, 50 mcg, Oral, Daily  mesalamine, 1,000 mg, Rectal, Nightly  metoprolol tartrate, 12.5 mg, Oral, Q12H  metroNIDAZOLE, 500 mg, Intravenous, Q8H  midodrine, 5 mg, Oral, TID AC  pantoprazole, 80 mg, Intravenous, Once  sertraline, 50 mg, Oral, Daily  sodium  "chloride, 10 mL, Intravenous, Q12H      Continuous Infusions:lactated ringers, 150 mL/hr, Last Rate: 150 mL/hr (12/27/24 1044)  pantoprazole, 8 mg/hr  sodium bicarbonate 8.4 % 150 mEq in dextrose (D5W) 5 % 1,000 mL infusion (greater than 100 mEq), 150 mEq, Last Rate: 150 mEq (12/27/24 0545)      PRN Meds:.  albuterol    senna-docusate sodium **AND** polyethylene glycol **AND** bisacodyl **AND** bisacodyl    melatonin    nitroglycerin    ondansetron    [COMPLETED] Insert Peripheral IV **AND** sodium chloride    sodium chloride    sodium chloride    ALLERGIES:  Codeine and Penicillin g sodium    ROS:  Review of Systems   Constitutional:  Negative for chills and fever.   Respiratory:  Negative for cough and shortness of breath.    Cardiovascular:  Positive for chest pain. Negative for palpitations.   Gastrointestinal:  Positive for blood in stool, diarrhea and nausea. Negative for abdominal pain and vomiting.   Musculoskeletal:  Positive for arthralgias and back pain.   Neurological:  Positive for weakness. Negative for dizziness.   Psychiatric/Behavioral:  Positive for decreased concentration. Negative for agitation.        Objective     Vital Signs:   Visit Vitals  /52 (BP Location: Right arm, Patient Position: Lying)   Pulse 78   Temp 98.4 °F (36.9 °C) (Oral)   Resp 18   Ht 162.6 cm (64\")   Wt 56.7 kg (125 lb)   SpO2 97%   BMI 21.46 kg/m²       Physical Exam:      General Appearance:  Laying in bed, awake and alert, in no acute distress   Head:    Normocephalic, without obvious abnormality, atraumatic   Eyes:            Conjunctivae normal   Ears:    no abnormalities noted   Throat:   No oral lesions, no thrush, oral mucosa moist       Lungs:     Respirations regular, even and unlabored, on room air       Chest Wall:    No abnormalities observed   Abdomen:     Soft, mild generalized tenderness, no rebound or guarding, non-distended, diffuse erythema and excoriation, right upper quadrant stoma with brown liquid " in bag, right lower quadrant mucous fistula with empty bag.   Rectal:     Deferred   Extremities: Weakness of lower extremities, no edema, no cyanosis, no redness       Skin:   No bleeding,no jaundice, bilateral upper extremity bruising               Results Review:   I reviewed the patient's labs and imaging.  CBC  Results from last 7 days   Lab Units 12/27/24  0921 12/27/24  0504 12/26/24  2041   RBC 10*6/mm3 2.38* 2.46* 3.58*   WBC 10*3/mm3 9.45 12.27* 16.49*   HEMOGLOBIN g/dL 7.0* 7.4* 10.5*   PLATELETS 10*3/mm3 429 460* 723*       CMP  Results from last 7 days   Lab Units 12/27/24  0504 12/26/24  2335 12/26/24  2041   SODIUM mmol/L 138 135* 129*   POTASSIUM mmol/L 4.1 5.6* 7.2*   CHLORIDE mmol/L 96* 98 93*   CO2 mmol/L 26.6 19.9* 15.9*   BUN mg/dL 71* 86* 90*   CREATININE mg/dL 4.12* 5.25* 5.56*   GLUCOSE mg/dL 117* 137* 91   ALBUMIN g/dL  --   --  3.3*   BILIRUBIN mg/dL  --   --  0.2   ALK PHOS U/L  --   --  190*   AST (SGOT) U/L  --   --  26   ALT (SGPT) U/L  --   --  20       Amylase and Lipase          CRP         Imaging Results (Last 24 Hours)       Procedure Component Value Units Date/Time    US Renal Bilateral [[id number]] Collected: 12/27/24 0632     Updated: 12/27/24 0635    Narrative:      US RENAL BILATERAL    Date of Exam: 12/27/2024 5:46 AM EST    Indication: Acute kidney injury.    Comparison: CT abdomen pelvis 11/20/2024.    Technique: Grayscale and color Doppler ultrasound evaluation of the kidneys and urinary bladder was performed.      Findings:  The right kidney measures 9.3 x 5.1 x 4.4 cm and the left kidney measures 9.1 x 4.5 x 4.3 cm.  Kidney echogenicity and vascularity appear within normal limits. There is no solid kidney mass.  No echogenic shadowing stone.  No hydronephrosis.    Patient is status post cystectomy.      Impression:      Impression:  Kidney ultrasound is within normal limits.            Electronically Signed: Juan Dia MD    12/27/2024 6:33 AM EST    Workstation ID:  [id number]    XR Chest 1 View [[id number]] Collected: 12/26/24 2057     Updated: 12/26/24 2105    Narrative:      XR CHEST 1 VW    Date of Exam: 12/26/2024 8:41 PM EST    Indication: chest pain    Comparison: 12/11/2024    Findings:  Lungs appear somewhat hyperinflated with emphysematous changes in the upper lobes. Scarring in the peripheral left upper lung. No acute pulmonary abnormality. Heart size and pulmonary vasculature are within normal limits      Impression:      Impression:  Emphysema with no acute cardiopulmonary process demonstrated      Electronically Signed: Colt Lebron    12/26/2024 9:03 PM EST    Workstation ID: [id number]              ASSESSMENT AND PLAN:  -Normocytic anemia    -Abdominal excoriation  -High output ileostomy   -Recent Strongyloides infection in 10/2024 s/p treatment with ivermectin  -Small bowel and colonic Crohn's disease s/p right hemicolectomy with ileostomy 11/28/2024- previously on Rinvoq   -Chest pain  -Recent fall  -Hypertension  -COPD  -CAD s/p stent placement 10/24 on Plavix, s/p MitraClip  -RA - on Humira and methotrexate  -History of hysterectomy  -History of cholecystectomy  -Vitamin B12 deficiency     PLAN:  Patient is a 74-year-old female with history of small bowel and colonic Crohn's (recently restarted on Rinvoq), rheumatoid arthritis (on Humira and methotrexate), and cholecystectomy is well-known to our service due to extended admission in November.  Patient ended up having a right hemicolectomy with ileostomy 11/28/2024.  She was only discharged on 12/8/2024 to the facility.  Returned on 12/10/2024 epitaxis.  Was found to have pneumonia and ended up being discharged on 12/16/2024.  Return to the ER on 12/26/2024 due to abnormal labs.  GI was consulted today due to bleeding from ostomy.    Creatinine 3.4, BUN 61, potassium 3.6, LFTs unremarkable  Lactate 4.0  Hemoglobin 6.4  Blood cultures pending    -Continue to monitor H&H and transfuse as needed for hemoglobin less  than 7.  -Start cholestyramine 4 g twice daily  -Start Imodium 2 g 4 times daily  -Low residue diet as tolerated with boost supplement  -Wound consult  -Supportive care            I discussed the patients findings and my recommendations with the patient.  Mikayla Kc, APRN  12/27/24  10:48 EST

## 2024-12-28 ENCOUNTER — INPATIENT HOSPITAL (AMBULATORY)
Dept: URBAN - METROPOLITAN AREA HOSPITAL 84 | Facility: HOSPITAL | Age: 74
End: 2024-12-28
Payer: MEDICARE

## 2024-12-28 DIAGNOSIS — Z79.620 LONG TERM (CURRENT) USE OF IMMUNOSUPPRESSIVE BIOLOGIC: ICD-10-CM

## 2024-12-28 DIAGNOSIS — K94.19 OTHER COMPLICATIONS OF ENTEROSTOMY: ICD-10-CM

## 2024-12-28 DIAGNOSIS — Z90.49 ACQUIRED ABSENCE OF OTHER SPECIFIED PARTS OF DIGESTIVE TRACT: ICD-10-CM

## 2024-12-28 DIAGNOSIS — K50.80 CROHN'S DISEASE OF BOTH SMALL AND LARGE INTESTINE WITHOUT CO: ICD-10-CM

## 2024-12-28 LAB
ALBUMIN SERPL-MCNC: 2.1 G/DL (ref 3.5–5.2)
ALBUMIN/GLOB SERPL: 1.1 G/DL
ALP SERPL-CCNC: 109 U/L (ref 39–117)
ALT SERPL W P-5'-P-CCNC: 15 U/L (ref 1–33)
ANION GAP SERPL CALCULATED.3IONS-SCNC: 11.8 MMOL/L (ref 5–15)
AST SERPL-CCNC: 24 U/L (ref 1–32)
BH BB BLOOD EXPIRATION DATE: NORMAL
BH BB BLOOD TYPE BARCODE: 5100
BH BB DISPENSE STATUS: NORMAL
BH BB PRODUCT CODE: NORMAL
BH BB UNIT NUMBER: NORMAL
BILIRUB SERPL-MCNC: 0.2 MG/DL (ref 0–1.2)
BUN SERPL-MCNC: 47 MG/DL (ref 8–23)
BUN/CREAT SERPL: 16.8 (ref 7–25)
CALCIUM SPEC-SCNC: 6.8 MG/DL (ref 8.6–10.5)
CHLORIDE SERPL-SCNC: 99 MMOL/L (ref 98–107)
CO2 SERPL-SCNC: 27.2 MMOL/L (ref 22–29)
CREAT SERPL-MCNC: 2.8 MG/DL (ref 0.57–1)
CROSSMATCH INTERPRETATION: NORMAL
DEPRECATED RDW RBC AUTO: 56.6 FL (ref 37–54)
EGFRCR SERPLBLD CKD-EPI 2021: 17.2 ML/MIN/1.73
ERYTHROCYTE [DISTWIDTH] IN BLOOD BY AUTOMATED COUNT: 17.4 % (ref 12.3–15.4)
GLOBULIN UR ELPH-MCNC: 2 GM/DL
GLUCOSE SERPL-MCNC: 82 MG/DL (ref 65–99)
HCT VFR BLD AUTO: 22.7 % (ref 34–46.6)
HGB BLD-MCNC: 7.4 G/DL (ref 12–15.9)
HGB BLD-MCNC: 7.5 G/DL (ref 12–15.9)
MCH RBC QN AUTO: 29.5 PG (ref 26.6–33)
MCHC RBC AUTO-ENTMCNC: 32.6 G/DL (ref 31.5–35.7)
MCV RBC AUTO: 90.4 FL (ref 79–97)
PHOSPHATE SERPL-MCNC: 5.1 MG/DL (ref 2.5–4.5)
PLATELET # BLD AUTO: 336 10*3/MM3 (ref 140–450)
PMV BLD AUTO: 9.8 FL (ref 6–12)
POTASSIUM SERPL-SCNC: 3.6 MMOL/L (ref 3.5–5.2)
PROT SERPL-MCNC: 4.1 G/DL (ref 6–8.5)
QT INTERVAL: 483 MS
QTC INTERVAL: 493 MS
RBC # BLD AUTO: 2.51 10*6/MM3 (ref 3.77–5.28)
SODIUM SERPL-SCNC: 138 MMOL/L (ref 136–145)
UNIT  ABO: NORMAL
UNIT  RH: NORMAL
WBC NRBC COR # BLD AUTO: 9.43 10*3/MM3 (ref 3.4–10.8)

## 2024-12-28 PROCEDURE — 84100 ASSAY OF PHOSPHORUS: CPT

## 2024-12-28 PROCEDURE — 94761 N-INVAS EAR/PLS OXIMETRY MLT: CPT

## 2024-12-28 PROCEDURE — 93005 ELECTROCARDIOGRAM TRACING: CPT | Performed by: NURSE PRACTITIONER

## 2024-12-28 PROCEDURE — 25810000003 LACTATED RINGERS PER 1000 ML: Performed by: INTERNAL MEDICINE

## 2024-12-28 PROCEDURE — 94799 UNLISTED PULMONARY SVC/PX: CPT

## 2024-12-28 PROCEDURE — 80053 COMPREHEN METABOLIC PANEL: CPT | Performed by: NURSE PRACTITIONER

## 2024-12-28 PROCEDURE — 25010000002 METRONIDAZOLE 500 MG/100ML SOLUTION

## 2024-12-28 PROCEDURE — 94664 DEMO&/EVAL PT USE INHALER: CPT

## 2024-12-28 PROCEDURE — 93010 ELECTROCARDIOGRAM REPORT: CPT | Performed by: INTERNAL MEDICINE

## 2024-12-28 PROCEDURE — C1751 CATH, INF, PER/CENT/MIDLINE: HCPCS

## 2024-12-28 PROCEDURE — 99232 SBSQ HOSP IP/OBS MODERATE 35: CPT | Performed by: NURSE PRACTITIONER

## 2024-12-28 PROCEDURE — 85027 COMPLETE CBC AUTOMATED: CPT | Performed by: NURSE PRACTITIONER

## 2024-12-28 PROCEDURE — 85018 HEMOGLOBIN: CPT

## 2024-12-28 PROCEDURE — 25010000002 CEFTRIAXONE PER 250 MG

## 2024-12-28 RX ADMIN — LOPERAMIDE HYDROCHLORIDE 2 MG: 2 CAPSULE ORAL at 13:02

## 2024-12-28 RX ADMIN — Medication 10 ML: at 20:54

## 2024-12-28 RX ADMIN — SODIUM CHLORIDE 8 MG/HR: 900 INJECTION INTRAVENOUS at 06:07

## 2024-12-28 RX ADMIN — METRONIDAZOLE 500 MG: 500 INJECTION, SOLUTION INTRAVENOUS at 17:48

## 2024-12-28 RX ADMIN — SODIUM CHLORIDE 8 MG/HR: 900 INJECTION INTRAVENOUS at 00:38

## 2024-12-28 RX ADMIN — MIDODRINE HYDROCHLORIDE 5 MG: 5 TABLET ORAL at 07:52

## 2024-12-28 RX ADMIN — FOLIC ACID 1000 MCG: 1 TABLET ORAL at 07:52

## 2024-12-28 RX ADMIN — MESALAMINE 1000 MG: 1000 SUPPOSITORY RECTAL at 20:54

## 2024-12-28 RX ADMIN — IPRATROPIUM BROMIDE AND ALBUTEROL SULFATE 3 ML: .5; 3 SOLUTION RESPIRATORY (INHALATION) at 08:18

## 2024-12-28 RX ADMIN — LOPERAMIDE HYDROCHLORIDE 2 MG: 2 CAPSULE ORAL at 17:48

## 2024-12-28 RX ADMIN — LOPERAMIDE HYDROCHLORIDE 2 MG: 2 CAPSULE ORAL at 07:52

## 2024-12-28 RX ADMIN — MIDODRINE HYDROCHLORIDE 5 MG: 5 TABLET ORAL at 13:02

## 2024-12-28 RX ADMIN — CHOLESTYRAMINE 4 G: 4 POWDER, FOR SUSPENSION ORAL at 20:53

## 2024-12-28 RX ADMIN — SERTRALINE HYDROCHLORIDE 50 MG: 50 TABLET, FILM COATED ORAL at 07:51

## 2024-12-28 RX ADMIN — Medication 10 ML: at 07:53

## 2024-12-28 RX ADMIN — SODIUM CHLORIDE 8 MG/HR: 900 INJECTION INTRAVENOUS at 10:06

## 2024-12-28 RX ADMIN — CEFTRIAXONE SODIUM 1000 MG: 1 INJECTION, POWDER, FOR SOLUTION INTRAMUSCULAR; INTRAVENOUS at 09:50

## 2024-12-28 RX ADMIN — ATORVASTATIN CALCIUM 40 MG: 40 TABLET, FILM COATED ORAL at 20:54

## 2024-12-28 RX ADMIN — METRONIDAZOLE 500 MG: 500 INJECTION, SOLUTION INTRAVENOUS at 10:29

## 2024-12-28 RX ADMIN — SODIUM CHLORIDE, POTASSIUM CHLORIDE, SODIUM LACTATE AND CALCIUM CHLORIDE 75 ML/HR: 600; 310; 30; 20 INJECTION, SOLUTION INTRAVENOUS at 08:02

## 2024-12-28 RX ADMIN — SODIUM CHLORIDE 8 MG/HR: 900 INJECTION INTRAVENOUS at 20:54

## 2024-12-28 RX ADMIN — LEVOTHYROXINE SODIUM 50 MCG: 50 TABLET ORAL at 07:52

## 2024-12-28 RX ADMIN — IPRATROPIUM BROMIDE AND ALBUTEROL SULFATE 3 ML: .5; 3 SOLUTION RESPIRATORY (INHALATION) at 19:58

## 2024-12-28 RX ADMIN — METRONIDAZOLE 500 MG: 500 INJECTION, SOLUTION INTRAVENOUS at 03:17

## 2024-12-28 RX ADMIN — LOPERAMIDE HYDROCHLORIDE 2 MG: 2 CAPSULE ORAL at 20:54

## 2024-12-28 RX ADMIN — MIDODRINE HYDROCHLORIDE 5 MG: 5 TABLET ORAL at 17:48

## 2024-12-28 RX ADMIN — SODIUM CHLORIDE 8 MG/HR: 900 INJECTION INTRAVENOUS at 16:25

## 2024-12-28 RX ADMIN — CHOLESTYRAMINE 4 G: 4 POWDER, FOR SUSPENSION ORAL at 07:51

## 2024-12-28 NOTE — PROGRESS NOTES
LOS: 2 days   Patient Care Team:  Azam Calhoun MD as PCP - General (Internal Medicine)  Niya Mendoza APRN as Nurse Practitioner (Cardiology)      Subjective     Interval History:   LABS:  Cr 2.8. LFT's normal. WBC 9.43, Hgb 7.5, Plts 336.   CT abd/pelvis without: Cholecystectomy.  Interval postoperative changes without any acute findings.  Complaints of abdominal pain & burning.   No blood in ostomy.       ROS:   No chest pain, shortness of breath, or cough.         Medication Review:     Current Facility-Administered Medications:     acetaminophen (TYLENOL) tablet 650 mg, 650 mg, Oral, Q6H PRN, 650 mg at 12/27/24 2249 **OR** acetaminophen (TYLENOL) suppository 650 mg, 650 mg, Rectal, Q4H PRN, Britney Whitten APRN    albuterol (PROVENTIL) nebulizer solution 0.083% 2.5 mg/3mL, 2.5 mg, Nebulization, Q6H PRN, Azeb Weeks APRN    [Held by provider] aspirin EC tablet 81 mg, 81 mg, Oral, Daily, Azeb Weeks APRN, 81 mg at 12/27/24 0840    atorvastatin (LIPITOR) tablet 40 mg, 40 mg, Oral, Nightly, Azeb Weeks APRN, 40 mg at 12/27/24 2249    sennosides-docusate (PERICOLACE) 8.6-50 MG per tablet 2 tablet, 2 tablet, Oral, BID PRN **AND** polyethylene glycol (MIRALAX) packet 17 g, 17 g, Oral, Daily PRN **AND** bisacodyl (DULCOLAX) EC tablet 5 mg, 5 mg, Oral, Daily PRN **AND** bisacodyl (DULCOLAX) suppository 10 mg, 10 mg, Rectal, Daily PRN, Azeb Weeks APRN    cefTRIAXone (ROCEPHIN) 1,000 mg in sodium chloride 0.9 % 100 mL MBP, 1,000 mg, Intravenous, Q24H, Joshua Simon MD, Last Rate: 200 mL/hr at 12/28/24 0950, 1,000 mg at 12/28/24 0950    cholestyramine light packet 4 g, 1 packet, Oral, Q12H, Mikayla Kc APRN, 4 g at 12/28/24 0751    [Held by provider] clopidogrel (PLAVIX) tablet 75 mg, 75 mg, Oral, Daily, Azeb Weeks APRN, 75 mg at 12/27/24 0841    fludrocortisone tablet 200 mcg, 200 mcg, Oral, Once, Azeb Weeks APRN    folic acid (FOLVITE) tablet 1,000 mcg, 1,000 mcg, Oral, Daily, Azeb Weeks,  APRN, 1,000 mcg at 12/28/24 0752    [Held by provider] heparin (porcine) 5000 UNIT/ML injection 5,000 Units, 5,000 Units, Subcutaneous, Q12H, Azeb Weeks APRN, 5,000 Units at 12/27/24 0841    ipratropium-albuterol (DUO-NEB) nebulizer solution 3 mL, 3 mL, Nebulization, 4x Daily - RT, Joshua Simon MD, 3 mL at 12/28/24 0818    ipratropium-albuterol (DUO-NEB) nebulizer solution 3 mL, 3 mL, Nebulization, Q4H PRN, Joshua Simon MD    lactated ringers infusion, 75 mL/hr, Intravenous, Continuous, Buddy Pierre MD, Last Rate: 75 mL/hr at 12/28/24 0802, 75 mL/hr at 12/28/24 0802    levothyroxine (SYNTHROID, LEVOTHROID) tablet 50 mcg, 50 mcg, Oral, Daily, Azeb Weeks APRN, 50 mcg at 12/28/24 0752    lidocaine (XYLOCAINE) 1 % injection 30 mL, 30 mL, Infiltration, Once, Joshua Simon MD    loperamide (IMODIUM) capsule 2 mg, 2 mg, Oral, 4x Daily, Mikayla Kc APRN, 2 mg at 12/28/24 0752    melatonin tablet 10 mg, 10 mg, Oral, Nightly PRN, Azeb Weeks APRN    mesalamine (CANASA) suppository 1,000 mg, 1,000 mg, Rectal, Nightly, Azeb Weeks APRN    metoprolol tartrate (LOPRESSOR) half tablet 12.5 mg, 12.5 mg, Oral, Q12H, Azeb Weeks APRN, 12.5 mg at 12/27/24 0841    metroNIDAZOLE (FLAGYL) IVPB 500 mg, 500 mg, Intravenous, Q8H, Joshua Simon MD, Last Rate: 200 mL/hr at 12/28/24 1029, 500 mg at 12/28/24 1029    midodrine (PROAMATINE) tablet 5 mg, 5 mg, Oral, TID AC, Azeb Weeks APRN, 5 mg at 12/28/24 0752    nitroglycerin (NITROSTAT) SL tablet 0.4 mg, 0.4 mg, Sublingual, Q5 Min PRN, Azeb Weeks APRN    Non-Formulary / Patient Supplied Medication, 45 dose, Oral, Daily, Mikayla Kc APRN    ondansetron (ZOFRAN) injection 4 mg, 4 mg, Intravenous, Q6H PRN, Azeb Weeks APRN    [COMPLETED] pantoprazole (PROTONIX) injection 80 mg, 80 mg, Intravenous, Once, 80 mg at 12/27/24 1406 **AND** pantoprazole (PROTONIX) 40 mg in sodium chloride 0.9 % 100 mL (0.4 mg/mL) MBP, 8 mg/hr, Intravenous, Continuous,  Joshua Simon MD, Last Rate: 20 mL/hr at 12/28/24 1006, 8 mg/hr at 12/28/24 1006    sertraline (ZOLOFT) tablet 50 mg, 50 mg, Oral, Daily, Weeks, Azeb, APRN, 50 mg at 12/28/24 0751    [COMPLETED] Insert Peripheral IV, , , Once **AND** sodium chloride 0.9 % flush 10 mL, 10 mL, Intravenous, PRN, Weeks, Azeb, APRN    sodium chloride 0.9 % flush 10 mL, 10 mL, Intravenous, Q12H, Weeks, Azeb, APRN, 10 mL at 12/28/24 0753    sodium chloride 0.9 % flush 10 mL, 10 mL, Intravenous, PRN, Weeks, Azeb, APRN    sodium chloride 0.9 % infusion 40 mL, 40 mL, Intravenous, PRN, Weeks, Azeb, APRN      Objective  Resting in bed. No family present. Nurse at bedside. Rm 261    Vital Signs  Temp:  [97.3 °F (36.3 °C)-98.3 °F (36.8 °C)] 97.7 °F (36.5 °C)  Heart Rate:  [62-89] 63  Resp:  [15-22] 18  BP: ()/(46-67) 109/48  Physical Exam:    General Appearance:    Awake and alert, in no acute distress   Head:    Normocephalic, without obvious abnormality   Eyes:          Conjunctivae normal, anicteric sclerae   Ears:    Hearing intact   Throat:   No oral lesions, no thrush, oral mucosa moist   Neck:   No adenopathy, supple, no JVD   Lungs:     Respirations regular, even and unlabored        Abdomen:      soft, non-tender, no rebound or guarding, non-distended, no hepatosplenomegaly. Mucous fistula pink with scant drainage. Green drainage in ileostomy. Mid line excoriation noted, barrier cream in place.    Rectal:     Deferred   Extremities:   No edema, no cyanosis, no redness   Skin:   No bleeding, bruising or rash, no jaundice   Neurologic:   Cranial nerves 2 - 12 grossly intact, no asterixis, sensation   intact        Results Review:    CBC    Results from last 7 days   Lab Units 12/28/24  0530 12/28/24  0014 12/27/24  2006 12/27/24  1600 12/27/24  1109 12/27/24  0921 12/27/24  0504 12/26/24  2041   WBC 10*3/mm3  --  9.43  --   --   --  9.45 12.27* 16.49*   HEMOGLOBIN g/dL 7.5* 7.4* 8.0* 8.5* 6.4* 7.0* 7.4* 10.5*   PLATELETS  10*3/mm3  --  336  --   --   --  429 460* 723*     CMP   Results from last 7 days   Lab Units 12/28/24  0014 12/27/24  1109 12/27/24  0504 12/26/24  2335 12/26/24  2041   SODIUM mmol/L 138 139 138 135* 129*   POTASSIUM mmol/L 3.6 3.6 4.1 5.6* 7.2*   CHLORIDE mmol/L 99 96* 96* 98 93*   CO2 mmol/L 27.2 29.4* 26.6 19.9* 15.9*   BUN mg/dL 47* 61* 71* 86* 90*   CREATININE mg/dL 2.80* 3.47* 4.12* 5.25* 5.56*   GLUCOSE mg/dL 82 134* 117* 137* 91   ALBUMIN g/dL 2.1* 2.1*  --   --  3.3*   BILIRUBIN mg/dL 0.2 0.2  --   --  0.2   ALK PHOS U/L 109 108  --   --  190*   AST (SGOT) U/L 24 19  --   --  26   ALT (SGPT) U/L 15 13  --   --  20   MAGNESIUM mg/dL  --   --   --   --  1.9   PHOSPHORUS mg/dL 5.1*  --   --  9.2* 10.4*     Cr Clearance Estimated Creatinine Clearance: 15.8 mL/min (A) (by C-G formula based on SCr of 2.8 mg/dL (H)).  Coag   Results from last 7 days   Lab Units 12/26/24 2041   INR  1.08   APTT seconds 32.0     HbA1C   Lab Results   Component Value Date    HGBA1C 5.64 (H) 10/13/2024     Blood Glucose   Glucose   Date/Time Value Ref Range Status   12/26/2024 2215 205 (H) 70 - 105 mg/dL Final     Comment:     Serial Number: [id number]:  [id number]   12/26/2024 2132 90 70 - 105 mg/dL Final     Comment:     Serial Number: [id number]:  [id number]     Infection   Results from last 7 days   Lab Units 12/26/24  2335   BLOODCX  No growth at 24 hours  No growth at 24 hours     UA      Radiology(recent) CT Abdomen Pelvis Without Contrast    Result Date: 12/27/2024  Impression: Interval postoperative changes without acute findings in the abdomen or pelvis. Electronically Signed: Mansoor Flores  12/27/2024 6:43 PM EST  Workstation ID: [id number]    US Renal Bilateral    Result Date: 12/27/2024  Impression: Kidney ultrasound is within normal limits. Electronically Signed: Juan Dia MD  12/27/2024 6:33 AM EST  Workstation ID: [id number]    XR Chest 1 View    Result Date: 12/26/2024  Impression: Emphysema with no  acute cardiopulmonary process demonstrated Electronically Signed: Colt Diana  12/26/2024 9:03 PM EST  Workstation ID: [id number]           Assessment & Plan   -Normocytic anemia    -Abdominal excoriation  -High output ileostomy   -Recent Strongyloides infection in 10/2024 s/p treatment with ivermectin  -Small bowel and colonic Crohn's disease s/p right hemicolectomy with ileostomy 11/28/2024- previously on Rinvoq   -Chest pain  -Recent fall  -Hypertension  -COPD  -CAD s/p stent placement 10/24 on Plavix, s/p MitraClip  -RA - on Humira and methotrexate  -History of hysterectomy  -History of cholecystectomy  -Vitamin B12 deficiency     PLAN:  Patient is a 74-year-old female with history of small bowel and colonic Crohn's (recently restarted on Rinvoq), rheumatoid arthritis (on Humira and methotrexate), and cholecystectomy is well-known to our service due to extended admission in November.  Patient ended up having a right hemicolectomy with ileostomy 11/28/2024.  She was only discharged on 12/8/2024 to the facility.  Returned on 12/10/2024 epitaxis.  Was found to have pneumonia and ended up being discharged on 12/16/2024.  Return to the ER on 12/26/2024 due to abnormal labs.  GI was consulted today due to bleeding from ostomy.    Hgb stable at 7.5 today after 1uPRBC's yesterday. No blood noted in ostomy.  Continue to monitor H&H and transfuse for hemoglobin less than 7.  Wound/ostomy nurse consult noted.   Her caregiver, Sania, is supposed to bring in her Rinvoq.   Continue folic acid, cholestyramine, Imodium, Canasa suppositories, Protonix, Flagyl and Rocephin         Mikayla Kc, DRU  12/28/24  10:34 EST

## 2024-12-28 NOTE — PLAN OF CARE
Goal Outcome Evaluation:      Pt on iv abx. Resting in bed, on room air. Midline infiltrated. IV team notified. Hemoglobin is 7.5. order to recheck hemoglobin in the AM.

## 2024-12-28 NOTE — PROGRESS NOTES
PROGRESS NOTE      Patient Name: Maryann Gaitan  : 1950  MRN: [id number]  Primary Care Physician: Azam Calhoun MD  Date of admission: 2024    Patient Care Team:  Azam Calhoun MD as PCP - General (Internal Medicine)  Niya Mendoza APRN as Nurse Practitioner (Cardiology)        Subjective   Subjective:     Patient is doing much better and much alert oriented eating and drinking all other review of system unremarkable elderly white  Review of systems:  All review of system unremarkable      Allergies:    Allergies   Allergen Reactions    Codeine Hives    Penicillin G Sodium Hives       Objective   Exam:     Vital Signs  Temp:  [97.3 °F (36.3 °C)-98.3 °F (36.8 °C)] 97.7 °F (36.5 °C)  Heart Rate:  [62-89] 63  Resp:  [15-22] 18  BP: ()/(46-67) 109/48  SpO2:  [94 %-100 %] 100 %  on   ;   Device (Oxygen Therapy): room air  Body mass index is 21.46 kg/m².    General: Elderly white female in no acute distress.    Head:      Normocephalic and atraumatic.    Eyes:      PERRL/EOM intact, conjunctivae and sclerae clear without nystagmus.    Neck:      No masses, thyromegaly,  trachea central with normal respiratory effort   Lungs:    Clear bilaterally to auscultation.    Heart:      Regular rate and rhythm, no murmur no gallop  Abd:         bowel sounds positive previous surgery scars  Pulses:   Pulses palpable  Extr:        No cyanosis or clubbing--no significant edema.    Neuro:    No focal deficits.   alert oriented x3  Skin:       Intact without lesions or rashes.    Psych:    Alert and cooperative; normal mood and affect; .      Results Review:  I have personally reviewed most recent Data :  CBC    Results from last 7 days   Lab Units 24  0530 24  0014 24  1600 24  1109 24  0921 24  0504 24  2041   WBC 10*3/mm3  --  9.43  --   --   --  9.45 12.27* 16.49*   HEMOGLOBIN g/dL 7.5* 7.4* 8.0* 8.5* 6.4* 7.0* 7.4* 10.5*   PLATELETS 10*3/mm3   --  336  --   --   --  429 460* 723*     CMP   Results from last 7 days   Lab Units 12/28/24  0014 12/27/24  1109 12/27/24  0504 12/26/24  2335 12/26/24  2041   SODIUM mmol/L 138 139 138 135* 129*   POTASSIUM mmol/L 3.6 3.6 4.1 5.6* 7.2*   CHLORIDE mmol/L 99 96* 96* 98 93*   CO2 mmol/L 27.2 29.4* 26.6 19.9* 15.9*   BUN mg/dL 47* 61* 71* 86* 90*   CREATININE mg/dL 2.80* 3.47* 4.12* 5.25* 5.56*   GLUCOSE mg/dL 82 134* 117* 137* 91   ALBUMIN g/dL 2.1* 2.1*  --   --  3.3*   BILIRUBIN mg/dL 0.2 0.2  --   --  0.2   ALK PHOS U/L 109 108  --   --  190*   AST (SGOT) U/L 24 19  --   --  26   ALT (SGPT) U/L 15 13  --   --  20     ABG      CT Abdomen Pelvis Without Contrast    Result Date: 12/27/2024  Impression: Interval postoperative changes without acute findings in the abdomen or pelvis. Electronically Signed: Mansoor Flores  12/27/2024 6:43 PM EST  Workstation ID: [id number]    US Renal Bilateral    Result Date: 12/27/2024  Impression: Kidney ultrasound is within normal limits. Electronically Signed: Juan Dia MD  12/27/2024 6:33 AM EST  Workstation ID: [id number]    XR Chest 1 View    Result Date: 12/26/2024  Impression: Emphysema with no acute cardiopulmonary process demonstrated Electronically Signed: Colt Diana  12/26/2024 9:03 PM EST  Workstation ID: [id number]     Results for orders placed during the hospital encounter of 11/19/24    Adult Transthoracic Echo Limited W/ Cont if Necessary Per Protocol    Interpretation Summary    Left ventricular ejection fraction appears to be 56 - 60%.    There is a MitraClip mitral valve repair present.    There is trace residual MR.  Mean gradient is 4.6 mmHg    Scheduled Meds:[Held by provider] aspirin, 81 mg, Oral, Daily  atorvastatin, 40 mg, Oral, Nightly  cefTRIAXone, 1,000 mg, Intravenous, Q24H  cholestyramine light, 1 packet, Oral, Q12H  [Held by provider] clopidogrel, 75 mg, Oral, Daily  fludrocortisone, 200 mcg, Oral, Once  folic acid, 1,000 mcg, Oral, Daily  [Held by  provider] heparin (porcine), 5,000 Units, Subcutaneous, Q12H  ipratropium-albuterol, 3 mL, Nebulization, 4x Daily - RT  levothyroxine, 50 mcg, Oral, Daily  lidocaine, 30 mL, Infiltration, Once  loperamide, 2 mg, Oral, 4x Daily  mesalamine, 1,000 mg, Rectal, Nightly  metoprolol tartrate, 12.5 mg, Oral, Q12H  metroNIDAZOLE, 500 mg, Intravenous, Q8H  midodrine, 5 mg, Oral, TID AC  Non-Formulary / Patient Supplied Medication, 45 dose, Oral, Daily  sertraline, 50 mg, Oral, Daily  sodium chloride, 10 mL, Intravenous, Q12H      Continuous Infusions:lactated ringers, 75 mL/hr, Last Rate: 75 mL/hr (12/28/24 0802)  pantoprazole, 8 mg/hr, Last Rate: 8 mg/hr (12/28/24 0607)      PRN Meds:  acetaminophen **OR** acetaminophen    albuterol    senna-docusate sodium **AND** polyethylene glycol **AND** bisacodyl **AND** bisacodyl    ipratropium-albuterol    melatonin    nitroglycerin    ondansetron    [COMPLETED] Insert Peripheral IV **AND** sodium chloride    sodium chloride    sodium chloride    Assessment & Plan   Assessment and Plan:         Acute renal failure (ARF)    Gastric bleed    ASSESSMENT:  BISHNU  Hyperkalemia   Acidosis getting better  Anemia   Hypotension   COPD  Crohn's on biologic therapy   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   Dehydration   RA: Patient noted to be on Humira 40 mg weekly with last dose 12/24/2024, methotrexate 6 mg weekly on Sundays, and on Rinvoq 45 mcg daily which has not formulary at this facility  CAD s/p stent placement on dual platelet therapy         RENAL US done 12/27/24: right kidney 9.3cm, left 9.1cm, no hydro or increased echogenicity   ECHO done 11/22/24: EF 56-60%, MV clip repair present, trace residual mitral valve regurg         PLAN :      Patient with BISHNU, etiology likely multifactorial with prerenal component secondary to dehydration from high volume output from ostomy, diuretic and NSAID use, and maybe some ATN with hypotension and recent hospitalization with severe  anemia secondary to GI bleed. Creatinine 5.5 mg/dL on admission.   Renal US as above, negative for hydro or increased echogenicity   Patient on low-dose IV fluid renal functions are getting better creatinine is getting better urine output improving continue to hold Aldactone, Meloxicam. On Methotrexate weekly for RA, hold for now due to BISHNU   Loyola cath placed, continue strict I/O  Acidosis secondary to high ostomy output, improving and now WNL with bicarb based IVFs, also s/p 2amps of bicarb. Continue IVFs and monitor   Hyperkalemia, resolved s/p Lokelma, calcium gluconate, and bicarb. Continue to monitor   Continue low-dose IV fluid for tomorrow patient is on Ringer lactate  Anemia, hgb low at 7.0, recommend to transfuse for hgb <7.0. Recent GI bleed. Will check iron studies   No history of significant proteinuria, around 100 mg/g back in October. Will check urine electrolytes and UPCR for completeness. No hematuria on UA earlier this month but will recheck.   Blood pressure stable but soft, not on any scheduled antihypertensives. Florinef ordered but med not available so not given. Continue Midodrine and low dose Metoprolol    Significant lactic acidosis CAT scan of the abdomen showed postoperative changes otherwise unremarkable   Hyperphosphatemia improving  Check CMP at 1100  Strict I/O, avoid nephrotoxins  Thank you for this consultation, we will follow closely           Electronically signed by Buddy Pierre MD,   [facility] kidney consultant  [phone]  12/28/2024  09:15 EST

## 2024-12-28 NOTE — PROGRESS NOTES
[facility] MEDICINE SERVICE  DAILY PROGRESS NOTE    NAME: Maryann Gaitan  : 1950  MRN: [id number]      LOS: 2 days     PROVIDER OF SERVICE: El Childress MD    Chief Complaint: Acute renal failure (ARF)    Subjective:     Interval History:  History taken from: patient chart    Patient seen and examined at bedside.  She does not have any significant pain issues today.  There has been less bloody output from her ostomy.      Review of Systems:   Review of Systems    Objective:     Vital Signs  Temp:  [97.3 °F (36.3 °C)-98.3 °F (36.8 °C)] 98 °F (36.7 °C)  Heart Rate:  [63-89] 64  Resp:  [15-22] 16  BP: ()/(46-67) 108/52   Body mass index is 21.46 kg/m².    Physical Exam  Physical Exam  Constitutional:       Appearance: She is ill-appearing.   Cardiovascular:      Rate and Rhythm: Normal rate and regular rhythm.      Pulses: Normal pulses.      Heart sounds: Normal heart sounds. No murmur heard.  Pulmonary:      Effort: Pulmonary effort is normal. No respiratory distress.      Breath sounds: Normal breath sounds. No wheezing or rales.   Abdominal:      General: There is no distension.      Tenderness: There is no abdominal tenderness.      Comments: Ileostomy with dark output  Hyperactive bowel sounds   Musculoskeletal:         General: No swelling, tenderness, deformity or signs of injury.      Cervical back: Normal range of motion.   Skin:     Findings: Bruising (Bilateral upper extremities) and erythema present.   Neurological:      General: No focal deficit present.      Mental Status: She is alert. Mental status is at baseline.      Cranial Nerves: No cranial nerve deficit.      Sensory: No sensory deficit.      Motor: No weakness.      Coordination: Coordination normal.            Diagnostic Data    Results from last 7 days   Lab Units 24  0530 24  0014   WBC 10*3/mm3  --  9.43   HEMOGLOBIN g/dL 7.5* 7.4*   HEMATOCRIT %  --  22.7*   PLATELETS 10*3/mm3  --  336   GLUCOSE mg/dL  --   82   CREATININE mg/dL  --  2.80*   BUN mg/dL  --  47*   SODIUM mmol/L  --  138   POTASSIUM mmol/L  --  3.6   AST (SGOT) U/L  --  24   ALT (SGPT) U/L  --  15   ALK PHOS U/L  --  109   BILIRUBIN mg/dL  --  0.2   ANION GAP mmol/L  --  11.8       CT Abdomen Pelvis Without Contrast    Result Date: 12/27/2024  Impression: Interval postoperative changes without acute findings in the abdomen or pelvis. Electronically Signed: Mansoor Flores  12/27/2024 6:43 PM EST  Workstation ID: [id number]    US Renal Bilateral    Result Date: 12/27/2024  Impression: Kidney ultrasound is within normal limits. Electronically Signed: Juan Dia MD  12/27/2024 6:33 AM EST  Workstation ID: [id number]    XR Chest 1 View    Result Date: 12/26/2024  Impression: Emphysema with no acute cardiopulmonary process demonstrated Electronically Signed: Colt Diana  12/26/2024 9:03 PM EST  Workstation ID: [id number]       I reviewed the patient's new clinical results.    Assessment/Plan:     Active and Resolved Problems  Active Hospital Problems    Diagnosis  POA    **Acute renal failure (ARF) [N17.9]  Yes    Gastric bleed [K92.2]  Yes      Resolved Hospital Problems   No resolved problems to display.       Assessment and plan      BISHNU likely prerenal secondary to dehydration due to high volume output from ileostomy due to Crohn's disease  Anion gap metabolic acidosis likely secondary to above  Lactic acidosis likely secondary to above  Hyperkalemia likely secondary to above  -Baseline creatinine 0.5 December 2024  -Status post IV fluid bolus Lokelma in the ED  -Nephrology consulted  -Initiated on bicarbonate infusion with significant improvement in her renal function and metabolic acidosis  -Trend lactic acidosis until less than 2  -CT of the abdomen/pelvis does not show any evidence of acute pathology  -Hold home dose of methotrexate, meloxicam, spironolactone    Acute blood loss anemia likely due to use of antiplatelet therapy  - likely secondary to  GI bleed in the setting of antiplatelet therapy  -Transfused PRBC with improvement in hemoglobin  -GI has been consulted; no indication for ileoscopy unless patient has persistent bleeding  -Transfuse for hemoglobin less than 8 with signs of active bleed  -PPI bolus and drip    Mild elevated troponin, type II ischemia  -Troponin peaked at 59  -No further cardiac workup necessary at this time  -Likely secondary to volume depletion and anemia causing mild demand ischemia     History of Crohn's disease  -Will defer to GI for continuation of continue of mesalamine suppository nightly                History of rheumatoid arthritis  -Patient noted to be on Humira 40 mg weekly with last dose 12/24/2024  -Hold methotrexate 6 mg weekly on Sundays due to renal failure  -Patient noted to be on Rinvoq 45 mcg daily which has not formulary at this facility     CAD with recent stent placement in October 2024  -Will hold aspirin 81 mg daily and Plavix 75 mg daily  -Continue metoprolol 12.5 mg twice daily  -Continue midodrine 5 mg 3 times daily  -Cardiology consult for management of DAPT    History of hyperlipidemia  Continue atorvastatin 40 mg nightly     Dietary supplementation, chronic:   -Hold secondary to acute renal failure     Antibiotic therapy, uncertain etiology:   -Patient noted to be on ceftriaxone daily injection     Chronic pain   -discontinue diclofenac 50 mg twice daily     GERD  -Home dose of famotidine  -Continue on PPI drip for GI bleed     COPD not in exacerbation  -chronic with chronic oxygen 2.5 L per nasal cannula: Continue guaifenesin  -Continue home dose of bronchodilators     History of hypothyroidism  -Continue levothyroxine 50 mcg daily  -Check TSH     History of anxiety  -Continue Zoloft 50 mg daily     VTE Prophylaxis:  Pharmacologic VTE prophylaxis orders are present.       VTE Prophylaxis:  Pharmacologic VTE prophylaxis orders are present.             Disposition Planning:     Barriers to Discharge:  GI bleed,renal failure  Anticipated Date of Discharge: 72 hrs  Place of Discharge: skilled nursing facility      Time: 35 minutes     Code Status and Medical Interventions: CPR (Attempt to Resuscitate); Full Support   Ordered at: 12/26/24 0263     Code Status (Patient has no pulse and is not breathing):    CPR (Attempt to Resuscitate)     Medical Interventions (Patient has pulse or is breathing):    Full Support       Signature: Electronically signed by El Childress MD, 12/28/24, 13:40 EST.  [facility] Hospitalist Team

## 2024-12-28 NOTE — PLAN OF CARE
Goal Outcome Evaluation:  Plan of Care Reviewed With: patient        Progress: no change  Outcome Evaluation: Patient admitted with acute renal failure and possible GI bleed with low HGB. Patient has 2 ostomies, RLQ leaking as peristomal skin is very red and excoriated. Bags changed twice and wound care completed on abdomen. Multiple pressure injuries, dressings in place and patient placed on Agility bed and turned every 2 hours. Some complaints of discomfort in area of excoriation. Loyola cath in place with clear yellow output. Patient alert and oriented when asked questions but makes comments that are confusing. So signs of aspiration. HGB stable this shift with labs every 6 hours.

## 2024-12-28 NOTE — CONSULTS
General Surgery Consult Note      Name: Maryann Gaitan ADMIT: 2024   : 1950  PCP: Azam Calhoun MD    MRN: [id number] LOS: 1 days   AGE/SEX: 74 y.o. female  ROOM: [address] [place] [address]      Patient Care Team:  Azam Calhoun MD as PCP - General (Internal Medicine)  Niya Mendoza APRN as Nurse Practitioner (Cardiology)  Chief Complaint   Patient presents with    Abnormal Lab    Chest Pain       HPI  74 y.o. female status post recent right hemicolectomy with ileostomy and mucous fistula creation brought in for abnormal labs.  She has a history of CAD status post recent stent on dual antiplatelet therapy, COPD, rheumatoid arthritis, Crohn's disease on biologic therapy.  Patient reports there has been blood per ileostomy though her history is unreliable.  Patient has had high ileostomy output and with leakage of the ostomy appliance resulting in extreme excoriation of the surrounding skin.  Found to have acute kidney injury.    Past Medical History:   Diagnosis Date    Abnormal weight loss 2024    Acute kidney injury 2024    Acute UTI (urinary tract infection) 2024    Anxiety associated with depression 2024    Benign neoplasm of cecum 2016    CAD, multiple vessel 10/08/2024    Cavitary lesion of lung 10/08/2024    COPD (chronic obstructive pulmonary disease)     Crohn's disease 2024    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 2016    Dyslipidemia 10/30/2024    Fecal urgency 2024    First degree hemorrhoids 2021    GERD (gastroesophageal reflux disease) 2024    Hashimoto's thyroiditis 2024    History of colonic polyps 2021    Hypertension     Hypothyroidism (acquired) 2024    Mitral regurgitation 10/08/2024    Moderate protein-calorie malnutrition 2024    Multiple tracheobronchial mucus plugs 10/08/2024    Nicotine dependence 2024    Rheumatoid arthritis 2024    S/P mitral valve clip  implantation 11/21/2024    Second degree hemorrhoids 07/05/2016    Stress incontinence, female 11/21/2024    Vitamin D deficiency, unspecified 11/21/2024     Past Surgical History:   Procedure Laterality Date    BRONCHOSCOPY N/A 10/16/2024    Procedure: BRONCHOSCOPY;  Surgeon: Gallo Pope MD;  Location: [facility] ENDOSCOPY;  Service: Pulmonary;  Laterality: N/A;    CARDIAC CATHETERIZATION N/A 10/15/2024    Procedure: Left Heart Cath, possible pci;  Surgeon: Travis Connor MD;  Location: [facility] CATH INVASIVE LOCATION;  Service: Cardiovascular;  Laterality: N/A;    CARDIAC CATHETERIZATION N/A 10/22/2024    Procedure: Laser Coronary Atherectomy;  Surgeon: Travis Connor MD;  Location: [facility] CATH INVASIVE LOCATION;  Service: Cardiovascular;  Laterality: N/A;    COLON RESECTION Right 11/28/2024    Procedure: RIGHT HEMICOLECTOMY WITH ILEOSTOMY;  Surgeon: Todd Babin MD;  Location: [facility] MAIN OR;  Service: General;  Laterality: Right;    COLONOSCOPY N/A 10/12/2024    Procedure: COLONOSCOPY WITH BIOPSY AND WIRE GUIDED BALLOON DILATION OF TERMINAL ILEUM;  Surgeon: Rob Strong MD;  Location: [facility] ENDOSCOPY;  Service: Gastroenterology;  Laterality: N/A;  Colitis, crohns of terminal ileum, right colon ulcers, diverticulosis, hemorroids    ENDOSCOPY N/A 10/12/2024    Procedure: ESOPHAGOGASTRODUODENOSCOPY WITH BIOPSY X 2 AREA;  Surgeon: Rob Strong MD;  Location: [facility] ENDOSCOPY;  Service: Gastroenterology;  Laterality: N/A;  Chronic gastritis, HH    ENDOSCOPY Left 11/28/2024    Procedure: ESOPHAGOGASTRODUODENOSCOPY;  Surgeon: Dallin Delgado MD;  Location: [facility] ENDOSCOPY;  Service: Gastroenterology;  Laterality: Left;  small hiatal hernia    HYSTERECTOMY      LEFT HEART CATH      MITRAL VALVE REPAIR/REPLACEMENT N/A 11/21/2024    Procedure: Transcatheter Mitral Valve Repair;  Surgeon: Dmitri Navarro MD;  Location: [facility] HYBRID OR;  Service: Cardiovascular;   Laterality: N/A;     Family History   Problem Relation Age of Onset    Heart disease Mother     Dementia Mother     Stroke Mother     Heart disease Father     Hypertension Father        Social History     Tobacco Use    Smoking status: Former     Types: Cigarettes    Smokeless tobacco: Never   Vaping Use    Vaping status: Never Used   Substance Use Topics    Alcohol use: Never    Drug use: Never     Medications Prior to Admission   Medication Sig Dispense Refill Last Dose/Taking    Adalimumab (Humira, 2 Pen,) 40 MG/0.4ML Pen-injector Kit Inject 40 mg under the skin into the appropriate area as directed 1 (One) Time Per Week. Saturdays   Indications: Rheumatoid Arthritis   12/24/2024    albuterol (PROVENTIL) (2.5 MG/3ML) 0.083% nebulizer solution Take 2.5 mg by nebulization Every 6 (Six) Hours As Needed for Wheezing. Indications: Spasm of Lung Air Passages   Taking As Needed    aspirin 81 MG EC tablet Take 1 tablet by mouth Daily for 30 days. Indications: Disease involving Lipid Deposits in the Arteries 30 tablet 0 12/26/2024 Morning    atorvastatin (LIPITOR) 40 MG tablet Take 1 tablet by mouth Every Night for 30 days. Indications: High Amount of Fats in the Blood 30 tablet 0 12/25/2024 Evening    calcium carbonate (OS-ARABELLA) 600 MG tablet Take 1 tablet by mouth 2 (Two) Times a Day With Meals.   12/26/2024 Morning    cefTRIAXone Sodium (ROCEPHIN IJ) Inject  as directed.   12/26/2024 Morning    cholecalciferol (VITAMIN D3) 1.25 MG (60028 UT) capsule Take 1 capsule by mouth 2 (Two) Times a Week. Wed, Sat  Indications: Vitamin D Deficiency   12/25/2024    clopidogrel (PLAVIX) 75 MG tablet Take 1 tablet by mouth Daily for 30 days. Indications: Ischemic Heart Disease 30 tablet 0 12/26/2024 Morning    diclofenac (VOLTAREN) 50 MG EC tablet Take 1 tablet by mouth 2 (Two) Times a Day.   12/26/2024 Morning    famotidine (Pepcid) 20 MG tablet Take 1 tablet by mouth 2 (Two) Times a Day. 60 tablet 0 12/26/2024 Morning    folic acid  (FOLVITE) 1 MG tablet Take 1 tablet by mouth Daily. Indications: Anemia From Inadequate Folic Acid   12/26/2024 Morning    guaifenesin (ROBITUSSIN) 100 MG/5ML liquid Take 10 mL by mouth Every 4 (Four) Hours As Needed for Cough. 180 mL 0 Taking As Needed    levothyroxine (SYNTHROID, LEVOTHROID) 50 MCG tablet Take 1 tablet by mouth Daily. Indications: Underactive Thyroid   12/26/2024 Morning    Melatonin (Melatonin Extra Strength) 10 MG tablet Take 1 tablet by mouth As Needed. Indications: Trouble Sleeping   12/25/2024    mesalamine (CANASA) 1000 MG suppository Insert 1 suppository into the rectum Every Night. 30 each 0 12/25/2024    methotrexate 2.5 MG tablet Take 6 tablets by mouth 1 (One) Time Per Week. Saturdays   Indications: Non-oncologic   12/26/2024 Morning    metoprolol tartrate (LOPRESSOR) 25 MG tablet Take 0.5 tablets by mouth Every 12 (Twelve) Hours. 30 tablet 0 12/26/2024 Morning    midodrine (PROAMATINE) 5 MG tablet Take 1 tablet by mouth 3 (Three) Times a Day Before Meals. Indications: Disorder of Low Blood Pressure 90 tablet 0 12/26/2024 Morning    ondansetron ODT (ZOFRAN-ODT) 4 MG disintegrating tablet Take 1 tablet by mouth Every 6 (Six) Hours As Needed for Nausea or Vomiting. Indications: Nausea and Vomiting Following an Operation 30 tablet 0 Taking As Needed    sertraline (ZOLOFT) 50 MG tablet Take 1 tablet by mouth Daily. Indications: Major Depressive Disorder   12/26/2024 Morning    spironolactone (ALDACTONE) 25 MG tablet Take 1 tablet by mouth Daily. Indications: Edema   12/26/2024 Morning    upadacitinib ER (Rinvoq) 45 MG tablet sustained-release 24 hour extended release tablet Take 1 tablet by mouth Daily. Indications: Rheumatoid Arthritis   12/26/2024 Morning    naloxone (NARCAN) 4 MG/0.1ML nasal spray Call 911. Don't prime. [place] in 1 nostril for overdose. Repeat in 2-3 minutes in other nostril if no or minimal breathing/responsiveness.  Indications: Opioid Overdose 2 each 0      [Held by  provider] aspirin, 81 mg, Oral, Daily  atorvastatin, 40 mg, Oral, Nightly  cefTRIAXone, 1,000 mg, Intravenous, Q24H  cholestyramine light, 1 packet, Oral, Q12H  [Held by provider] clopidogrel, 75 mg, Oral, Daily  fludrocortisone, 200 mcg, Oral, Once  folic acid, 1,000 mcg, Oral, Daily  heparin (porcine), 5,000 Units, Subcutaneous, Q12H  ipratropium-albuterol, 3 mL, Nebulization, 4x Daily - RT  levothyroxine, 50 mcg, Oral, Daily  lidocaine, 30 mL, Infiltration, Once  loperamide, 2 mg, Oral, 4x Daily  mesalamine, 1,000 mg, Rectal, Nightly  metoprolol tartrate, 12.5 mg, Oral, Q12H  metroNIDAZOLE, 500 mg, Intravenous, Q8H  midodrine, 5 mg, Oral, TID AC  Non-Formulary / Patient Supplied Medication, 45 dose, Oral, Daily  sertraline, 50 mg, Oral, Daily  sodium chloride, 10 mL, Intravenous, Q12H      lactated ringers, 75 mL/hr, Last Rate: 75 mL/hr (12/27/24 2000)  pantoprazole, 8 mg/hr, Last Rate: 8 mg/hr (12/27/24 1959)        albuterol    senna-docusate sodium **AND** polyethylene glycol **AND** bisacodyl **AND** bisacodyl    ipratropium-albuterol    melatonin    nitroglycerin    ondansetron    [COMPLETED] Insert Peripheral IV **AND** sodium chloride    sodium chloride    sodium chloride  Codeine and Penicillin g sodium    Review of Systems:   Unable to obtain    Vitals:  Temp:  [97.3 °F (36.3 °C)-98.2 °F (36.8 °C)] 97.3 °F (36.3 °C)  Heart Rate:  [62-85] 72  Resp:  [17-23] 22  BP: ()/(47-72) 124/47     Physical Exam:   No acute distress, alert, chronic ill appearing  Nonlabored respirations  Abdomen soft, nontender, ostomy and mucous fistula viable without evidence of bleeding  Extreme excoriation of a large area of the anterior abdominal wall secondary to leakage of the ileostomy appliance    Labs:  Results from last 7 days   Lab Units 12/27/24  1600 12/27/24  1109 12/27/24  0921 12/27/24  0504 12/26/24  2041   WBC 10*3/mm3  --   --  9.45 12.27* 16.49*   HEMOGLOBIN g/dL 8.5* 6.4* 7.0* 7.4* 10.5*   HEMATOCRIT %  --    --  21.7* 22.6* 33.6*   PLATELETS 10*3/mm3  --   --  429 460* 723*     Results from last 7 days   Lab Units 12/27/24  1109 12/27/24  0504 12/26/24  2335 12/26/24  2041   SODIUM mmol/L 139 138 135* 129*   POTASSIUM mmol/L 3.6 4.1 5.6* 7.2*   CHLORIDE mmol/L 96* 96* 98 93*   CO2 mmol/L 29.4* 26.6 19.9* 15.9*   BUN mg/dL 61* 71* 86* 90*   CREATININE mg/dL 3.47* 4.12* 5.25* 5.56*   CALCIUM mg/dL 7.1* 7.3* 8.6 9.4   BILIRUBIN mg/dL 0.2  --   --  0.2   ALK PHOS U/L 108  --   --  190*   ALT (SGPT) U/L 13  --   --  20   AST (SGOT) U/L 19  --   --  26   GLUCOSE mg/dL 134* 117* 137* 91     Results from last 7 days   Lab Units 12/26/24  2041   INR  1.08   APTT seconds 32.0     Imaging:  CT abdomen/pelvis 12/27/2024  Impression:  Interval postoperative changes without acute findings in the abdomen or pelvis.    Assessment and Plan:  74 y.o. female with an extensive past medical history including CAD on dual antiplatelet therapy, Crohn's disease, status post recent right colectomy with end ileostomy and mucous fistula.  With high ileostomy output and BISHNU.    - Recommend ostomy nurse consult for recommendations on ostomy and dealing with excoriated skin  - Recommend rehydration/resuscitation  - Recommend fiber and motility agents to slow down ileostomy output  - From a surgical standpoint there is no indication for any surgical intervention; will follow peripherally    This note was created using Dragon Voice Recognition software.    Nava Larios MD  12/27/24  20:17 EST

## 2024-12-29 LAB
ANION GAP SERPL CALCULATED.3IONS-SCNC: 9.5 MMOL/L (ref 5–15)
BASOPHILS # BLD AUTO: 0.05 10*3/MM3 (ref 0–0.2)
BASOPHILS NFR BLD AUTO: 0.6 % (ref 0–1.5)
BUN SERPL-MCNC: 31 MG/DL (ref 8–23)
BUN/CREAT SERPL: 15.7 (ref 7–25)
CALCIUM SPEC-SCNC: 7 MG/DL (ref 8.6–10.5)
CHLORIDE SERPL-SCNC: 105 MMOL/L (ref 98–107)
CO2 SERPL-SCNC: 24.5 MMOL/L (ref 22–29)
CREAT SERPL-MCNC: 1.98 MG/DL (ref 0.57–1)
D-LACTATE SERPL-SCNC: 1.2 MMOL/L (ref 0.5–2)
DEPRECATED RDW RBC AUTO: 60.5 FL (ref 37–54)
EGFRCR SERPLBLD CKD-EPI 2021: 26.1 ML/MIN/1.73
EOSINOPHIL # BLD AUTO: 0.09 10*3/MM3 (ref 0–0.4)
EOSINOPHIL NFR BLD AUTO: 1.1 % (ref 0.3–6.2)
ERYTHROCYTE [DISTWIDTH] IN BLOOD BY AUTOMATED COUNT: 17.8 % (ref 12.3–15.4)
FERRITIN SERPL-MCNC: 1854 NG/ML (ref 13–150)
GLUCOSE SERPL-MCNC: 73 MG/DL (ref 65–99)
HCT VFR BLD AUTO: 22.8 % (ref 34–46.6)
HCT VFR BLD AUTO: 29.4 % (ref 34–46.6)
HGB BLD-MCNC: 7 G/DL (ref 12–15.9)
HGB BLD-MCNC: 9.4 G/DL (ref 12–15.9)
IMM GRANULOCYTES # BLD AUTO: 0.03 10*3/MM3 (ref 0–0.05)
IMM GRANULOCYTES NFR BLD AUTO: 0.4 % (ref 0–0.5)
IRON 24H UR-MRATE: 105 MCG/DL (ref 37–145)
IRON SATN MFR SERPL: 76 % (ref 20–50)
LYMPHOCYTES # BLD AUTO: 3.87 10*3/MM3 (ref 0.7–3.1)
LYMPHOCYTES NFR BLD AUTO: 46.3 % (ref 19.6–45.3)
MCH RBC QN AUTO: 28.7 PG (ref 26.6–33)
MCHC RBC AUTO-ENTMCNC: 30.7 G/DL (ref 31.5–35.7)
MCV RBC AUTO: 93.4 FL (ref 79–97)
MONOCYTES # BLD AUTO: 0.17 10*3/MM3 (ref 0.1–0.9)
MONOCYTES NFR BLD AUTO: 2 % (ref 5–12)
NEUTROPHILS NFR BLD AUTO: 4.14 10*3/MM3 (ref 1.7–7)
NEUTROPHILS NFR BLD AUTO: 49.6 % (ref 42.7–76)
NRBC BLD AUTO-RTO: 0 /100 WBC (ref 0–0.2)
PLATELET # BLD AUTO: 324 10*3/MM3 (ref 140–450)
PMV BLD AUTO: 9.8 FL (ref 6–12)
POTASSIUM SERPL-SCNC: 3.4 MMOL/L (ref 3.5–5.2)
RBC # BLD AUTO: 2.44 10*6/MM3 (ref 3.77–5.28)
SODIUM SERPL-SCNC: 139 MMOL/L (ref 136–145)
TIBC SERPL-MCNC: 139 MCG/DL (ref 298–536)
TRANSFERRIN SERPL-MCNC: 93 MG/DL (ref 200–360)
WBC NRBC COR # BLD AUTO: 8.35 10*3/MM3 (ref 3.4–10.8)

## 2024-12-29 PROCEDURE — 85014 HEMATOCRIT: CPT | Performed by: INTERNAL MEDICINE

## 2024-12-29 PROCEDURE — 36430 TRANSFUSION BLD/BLD COMPNT: CPT

## 2024-12-29 PROCEDURE — 85018 HEMOGLOBIN: CPT | Performed by: INTERNAL MEDICINE

## 2024-12-29 PROCEDURE — 86900 BLOOD TYPING SEROLOGIC ABO: CPT

## 2024-12-29 PROCEDURE — 25010000002 METRONIDAZOLE 500 MG/100ML SOLUTION

## 2024-12-29 PROCEDURE — 82728 ASSAY OF FERRITIN: CPT | Performed by: INTERNAL MEDICINE

## 2024-12-29 PROCEDURE — P9016 RBC LEUKOCYTES REDUCED: HCPCS

## 2024-12-29 PROCEDURE — 83540 ASSAY OF IRON: CPT | Performed by: INTERNAL MEDICINE

## 2024-12-29 PROCEDURE — 80048 BASIC METABOLIC PNL TOTAL CA: CPT

## 2024-12-29 PROCEDURE — 85025 COMPLETE CBC W/AUTO DIFF WBC: CPT | Performed by: NURSE PRACTITIONER

## 2024-12-29 PROCEDURE — 83605 ASSAY OF LACTIC ACID: CPT

## 2024-12-29 PROCEDURE — 25010000002 CEFTRIAXONE PER 250 MG

## 2024-12-29 PROCEDURE — 84466 ASSAY OF TRANSFERRIN: CPT | Performed by: INTERNAL MEDICINE

## 2024-12-29 RX ORDER — OXYMETAZOLINE HYDROCHLORIDE 0.05 G/100ML
2 SPRAY NASAL 2 TIMES DAILY
Status: DISCONTINUED | OUTPATIENT
Start: 2024-12-29 | End: 2024-12-29

## 2024-12-29 RX ORDER — ECHINACEA PURPUREA EXTRACT 125 MG
1 TABLET ORAL AS NEEDED
Status: DISCONTINUED | OUTPATIENT
Start: 2024-12-29 | End: 2024-12-31 | Stop reason: HOSPADM

## 2024-12-29 RX ORDER — OXYMETAZOLINE HYDROCHLORIDE 0.05 G/100ML
2 SPRAY NASAL 2 TIMES DAILY
Status: DISCONTINUED | OUTPATIENT
Start: 2024-12-29 | End: 2024-12-31 | Stop reason: HOSPADM

## 2024-12-29 RX ADMIN — CEFTRIAXONE SODIUM 1000 MG: 1 INJECTION, POWDER, FOR SOLUTION INTRAMUSCULAR; INTRAVENOUS at 12:43

## 2024-12-29 RX ADMIN — LOPERAMIDE HYDROCHLORIDE 2 MG: 2 CAPSULE ORAL at 12:05

## 2024-12-29 RX ADMIN — CHOLESTYRAMINE 4 G: 4 POWDER, FOR SUSPENSION ORAL at 07:18

## 2024-12-29 RX ADMIN — LOPERAMIDE HYDROCHLORIDE 2 MG: 2 CAPSULE ORAL at 20:50

## 2024-12-29 RX ADMIN — Medication 10 ML: at 20:51

## 2024-12-29 RX ADMIN — METRONIDAZOLE 500 MG: 500 INJECTION, SOLUTION INTRAVENOUS at 17:00

## 2024-12-29 RX ADMIN — CHOLESTYRAMINE 4 G: 4 POWDER, FOR SUSPENSION ORAL at 20:50

## 2024-12-29 RX ADMIN — SODIUM CHLORIDE 8 MG/HR: 900 INJECTION INTRAVENOUS at 03:20

## 2024-12-29 RX ADMIN — MIDODRINE HYDROCHLORIDE 5 MG: 5 TABLET ORAL at 17:00

## 2024-12-29 RX ADMIN — METRONIDAZOLE 500 MG: 500 INJECTION, SOLUTION INTRAVENOUS at 02:46

## 2024-12-29 RX ADMIN — LEVOTHYROXINE SODIUM 50 MCG: 50 TABLET ORAL at 07:18

## 2024-12-29 RX ADMIN — MIDODRINE HYDROCHLORIDE 5 MG: 5 TABLET ORAL at 07:18

## 2024-12-29 RX ADMIN — ATORVASTATIN CALCIUM 40 MG: 40 TABLET, FILM COATED ORAL at 20:50

## 2024-12-29 RX ADMIN — Medication 12.5 MG: at 07:18

## 2024-12-29 RX ADMIN — Medication 10 ML: at 07:18

## 2024-12-29 RX ADMIN — FOLIC ACID 1000 MCG: 1 TABLET ORAL at 07:18

## 2024-12-29 RX ADMIN — OXYMETAZOLINE HYDROCHLORIDE 2 SPRAY: 0.5 SPRAY NASAL at 20:51

## 2024-12-29 RX ADMIN — LOPERAMIDE HYDROCHLORIDE 2 MG: 2 CAPSULE ORAL at 17:00

## 2024-12-29 RX ADMIN — SERTRALINE HYDROCHLORIDE 50 MG: 50 TABLET, FILM COATED ORAL at 07:18

## 2024-12-29 RX ADMIN — OXYMETAZOLINE HYDROCHLORIDE 2 SPRAY: 0.5 SPRAY NASAL at 14:36

## 2024-12-29 RX ADMIN — MIDODRINE HYDROCHLORIDE 5 MG: 5 TABLET ORAL at 12:06

## 2024-12-29 RX ADMIN — METRONIDAZOLE 500 MG: 500 INJECTION, SOLUTION INTRAVENOUS at 12:05

## 2024-12-29 RX ADMIN — LOPERAMIDE HYDROCHLORIDE 2 MG: 2 CAPSULE ORAL at 07:18

## 2024-12-29 RX ADMIN — Medication 12.5 MG: at 20:50

## 2024-12-29 RX ADMIN — MESALAMINE 1000 MG: 1000 SUPPOSITORY RECTAL at 21:34

## 2024-12-29 NOTE — PROGRESS NOTES
PROGRESS NOTE      Patient Name: Maryann Gaitan  : 1950  MRN: [id number]  Primary Care Physician: Azam Calhoun MD  Date of admission: 2024    Patient Care Team:  Azam Calhoun MD as PCP - General (Internal Medicine)  Niya Mendoza APRN as Nurse Practitioner (Cardiology)        Subjective   Subjective:     Patient is doing much better and much alert oriented eating and drinking all other review of system unremarkable elderly white  Review of systems:  All review of system unremarkable      Allergies:    Allergies   Allergen Reactions    Codeine Hives    Penicillin G Sodium Hives       Objective   Exam:     Vital Signs  Temp:  [97.7 °F (36.5 °C)-98.8 °F (37.1 °C)] 98.5 °F (36.9 °C)  Heart Rate:  [59-75] 59  Resp:  [13-23] 20  BP: (104-135)/(44-69) 113/46  SpO2:  [97 %-100 %] 98 %  on   ;   Device (Oxygen Therapy): room air  Body mass index is 20.67 kg/m².    General: Elderly white female in no acute distress.    Head:      Normocephalic and atraumatic.    Eyes:      PERRL/EOM intact, conjunctivae and sclerae clear without nystagmus.    Neck:      No masses, thyromegaly,  trachea central with normal respiratory effort   Lungs:    Clear bilaterally to auscultation.    Heart:      Regular rate and rhythm, no murmur no gallop  Abd:         bowel sounds positive previous surgery scars  Pulses:   Pulses palpable  Extr:        No cyanosis or clubbing--no significant edema.    Neuro:    No focal deficits.   alert oriented x3  Skin:       Intact without lesions or rashes.    Psych:    Alert and cooperative; normal mood and affect; .      Results Review:  I have personally reviewed most recent Data :  CBC    Results from last 7 days   Lab Units 24  1344 24  0245 24  0530 24  0014 24  2006 24  1600 24  1109 24  0921 24  0504 24  2041   WBC 10*3/mm3  --  8.35  --  9.43  --   --   --  9.45 12.27* 16.49*   HEMOGLOBIN g/dL 9.4* 7.0* 7.5* 7.4*  8.0* 8.5* 6.4* 7.0* 7.4* 10.5*   PLATELETS 10*3/mm3  --  324  --  336  --   --   --  429 460* 723*     CMP   Results from last 7 days   Lab Units 12/29/24  0245 12/28/24  0014 12/27/24  1109 12/27/24  0504 12/26/24  2335 12/26/24  2041   SODIUM mmol/L 139 138 139 138 135* 129*   POTASSIUM mmol/L 3.4* 3.6 3.6 4.1 5.6* 7.2*   CHLORIDE mmol/L 105 99 96* 96* 98 93*   CO2 mmol/L 24.5 27.2 29.4* 26.6 19.9* 15.9*   BUN mg/dL 31* 47* 61* 71* 86* 90*   CREATININE mg/dL 1.98* 2.80* 3.47* 4.12* 5.25* 5.56*   GLUCOSE mg/dL 73 82 134* 117* 137* 91   ALBUMIN g/dL  --  2.1* 2.1*  --   --  3.3*   BILIRUBIN mg/dL  --  0.2 0.2  --   --  0.2   ALK PHOS U/L  --  109 108  --   --  190*   AST (SGOT) U/L  --  24 19  --   --  26   ALT (SGPT) U/L  --  15 13  --   --  20     ABG      CT Abdomen Pelvis Without Contrast    Result Date: 12/27/2024  Impression: Interval postoperative changes without acute findings in the abdomen or pelvis. Electronically Signed: Mansoor Flores  12/27/2024 6:43 PM EST  Workstation ID: [id number]     Results for orders placed during the hospital encounter of 11/19/24    Adult Transthoracic Echo Limited W/ Cont if Necessary Per Protocol    Interpretation Summary    Left ventricular ejection fraction appears to be 56 - 60%.    There is a MitraClip mitral valve repair present.    There is trace residual MR.  Mean gradient is 4.6 mmHg    Scheduled Meds:[Held by provider] aspirin, 81 mg, Oral, Daily  atorvastatin, 40 mg, Oral, Nightly  cefTRIAXone, 1,000 mg, Intravenous, Q24H  cholestyramine light, 1 packet, Oral, Q12H  [Held by provider] clopidogrel, 75 mg, Oral, Daily  fludrocortisone, 200 mcg, Oral, Once  folic acid, 1,000 mcg, Oral, Daily  [Held by provider] heparin (porcine), 5,000 Units, Subcutaneous, Q12H  levothyroxine, 50 mcg, Oral, Daily  lidocaine, 30 mL, Infiltration, Once  loperamide, 2 mg, Oral, 4x Daily  mesalamine, 1,000 mg, Rectal, Nightly  metoprolol tartrate, 12.5 mg, Oral, Q12H  metroNIDAZOLE, 500  mg, Intravenous, Q8H  midodrine, 5 mg, Oral, TID AC  oxymetazoline, 2 spray, Each Nare, BID  sertraline, 50 mg, Oral, Daily  sodium chloride, 10 mL, Intravenous, Q12H  Upadacitinib ER, 1 tablet, Oral, Daily      Continuous Infusions:pantoprazole, 8 mg/hr, Last Rate: 8 mg/hr (12/29/24 0320)      PRN Meds:  acetaminophen **OR** acetaminophen    albuterol    senna-docusate sodium **AND** polyethylene glycol **AND** bisacodyl **AND** bisacodyl    ipratropium-albuterol    melatonin    nitroglycerin    ondansetron    [COMPLETED] Insert Peripheral IV **AND** sodium chloride    sodium chloride    sodium chloride    sodium chloride    Assessment & Plan   Assessment and Plan:         Acute renal failure (ARF)    Gastric bleed    ASSESSMENT:  BISHNU  Hyperkalemia   Acidosis getting better  Anemia   Hypotension   COPD  Crohn's on biologic therapy   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   Dehydration   RA: Patient noted to be on Humira 40 mg weekly with last dose 12/24/2024, methotrexate 6 mg weekly on Sundays, and on Rinvoq 45 mcg daily which has not formulary at this facility  CAD s/p stent placement on dual platelet therapy         RENAL US done 12/27/24: right kidney 9.3cm, left 9.1cm, no hydro or increased echogenicity   ECHO done 11/22/24: EF 56-60%, MV clip repair present, trace residual mitral valve regurg         PLAN :      Patient with BISHNU, etiology likely multifactorial with prerenal component secondary to dehydration from high volume output from ostomy, diuretic and NSAID use, and maybe some ATN with hypotension and recent hospitalization with severe anemia secondary to GI bleed. Creatinine 5.5 mg/dL on admission.   Renal US as above, negative for hydro or increased echogenicity   Patient on low-dose IV fluid renal functions are getting better creatinine is getting better urine output improving continue to hold Aldactone, Meloxicam. On Methotrexate weekly for RA, hold for now due to BISHNU   [place] cath placed,  continue strict I/O and renal functions are getting better at this time  Acidosis secondary to high ostomy output, improving and now WNL with bicarb based IVFs, also s/p 2amps of bicarb. Continue IVFs and monitor   Hyperkalemia resolved potassium is in fact on the low side with improvement in the renal functions  Will discontinue Ringer lactate as patient is eating and drinking and hemodynamics are improving  Hemoglobin better after transfusion now at 9.4  No history of significant proteinuria, around 100 mg/g back in October.   Blood pressure better lactic acidosis improved  Hyperphosphatemia improving  Check CMP at 1100  Strict I/O, avoid nephrotoxins  Thank you for this consultation, we will follow closely           Electronically signed by Buddy Pierre MD,   [facility] kidney consultant  [phone]  12/29/2024  14:49 EST

## 2024-12-29 NOTE — PROGRESS NOTES
[facility] MEDICINE SERVICE  DAILY PROGRESS NOTE    NAME: Maryann Gaitan  : 1950  MRN: [id number]      LOS: 3 days     PROVIDER OF SERVICE: Sallie Brown PA-C    Chief Complaint: Acute renal failure (ARF)    Subjective:     Interval History:  History taken from: patient chart    Patient seen and examined at bedside.  She does not have any significant pain issues today.        Review of Systems:   Review of Systems   Constitutional:  Positive for fatigue. Negative for fever.   HENT: Negative.     Eyes:  Negative for visual disturbance.   Respiratory:  Negative for cough and shortness of breath.    Cardiovascular:  Negative for chest pain, palpitations and leg swelling.   Gastrointestinal:  Negative for abdominal pain, blood in stool, nausea and vomiting.   Genitourinary: Negative.    Musculoskeletal: Negative.    Neurological:  Positive for weakness.       Objective:     Vital Signs  Temp:  [97.7 °F (36.5 °C)-98.8 °F (37.1 °C)] 98.5 °F (36.9 °C)  Heart Rate:  [59-75] 59  Resp:  [13-23] 20  BP: (104-135)/(44-69) 113/46   Body mass index is 20.67 kg/m².    Physical Exam  Physical Exam  Constitutional:       Appearance: She is ill-appearing.   Cardiovascular:      Rate and Rhythm: Normal rate and regular rhythm.      Pulses: Normal pulses.      Heart sounds: Normal heart sounds. No murmur heard.  Pulmonary:      Effort: Pulmonary effort is normal. No respiratory distress.      Breath sounds: Normal breath sounds. No wheezing or rales.   Abdominal:      General: There is no distension.      Tenderness: There is no abdominal tenderness.      Comments: Ileostomy with dark output  Hyperactive bowel sounds   Musculoskeletal:         General: No swelling, tenderness, deformity or signs of injury.      Cervical back: Normal range of motion.   Skin:     Findings: Bruising (Bilateral upper extremities) and erythema present.   Neurological:      General: No focal deficit present.      Mental Status: She is alert.  Mental status is at baseline.      Cranial Nerves: No cranial nerve deficit.      Sensory: No sensory deficit.      Motor: No weakness.      Coordination: Coordination normal.            Diagnostic Data    Results from last 7 days   Lab Units 12/29/24  0245 12/28/24  0530 12/28/24  0014   WBC 10*3/mm3 8.35  --  9.43   HEMOGLOBIN g/dL 7.0*   < > 7.4*   HEMATOCRIT % 22.8*  --  22.7*   PLATELETS 10*3/mm3 324  --  336   GLUCOSE mg/dL 73  --  82   CREATININE mg/dL 1.98*  --  2.80*   BUN mg/dL 31*  --  47*   SODIUM mmol/L 139  --  138   POTASSIUM mmol/L 3.4*  --  3.6   AST (SGOT) U/L  --   --  24   ALT (SGPT) U/L  --   --  15   ALK PHOS U/L  --   --  109   BILIRUBIN mg/dL  --   --  0.2   ANION GAP mmol/L 9.5  --  11.8    < > = values in this interval not displayed.       CT Abdomen Pelvis Without Contrast    Result Date: 12/27/2024  Impression: Interval postoperative changes without acute findings in the abdomen or pelvis. Electronically Signed: Mansoor Flores  12/27/2024 6:43 PM EST  Workstation ID: [id number]       I reviewed the patient's new clinical results.    Assessment/Plan:     Active and Resolved Problems  Active Hospital Problems    Diagnosis  POA    **Acute renal failure (ARF) [N17.9]  Yes    Gastric bleed [K92.2]  Yes      Resolved Hospital Problems   No resolved problems to display.       Assessment and plan      BISHNU likely prerenal secondary to dehydration due to high volume output from ileostomy due to Crohn's disease  Anion gap metabolic acidosis likely secondary to above  Lactic acidosis likely secondary to above  Hyperkalemia likely secondary to above  -Baseline creatinine 0.5 December 2024  -Status post IV fluid bolus Lokelma in the ED  -Nephrology consulted  -Initiated on bicarbonate infusion with significant improvement in her renal function and metabolic acidosis, creatinine down to 1.9 today  -Trend lactic acidosis until less than 2. Still elevated but improving. Most recent was 2.8.  -CT of the  abdomen/pelvis does not show any evidence of acute pathology  -Hold home dose of methotrexate, meloxicam, spironolactone    Acute blood loss anemia likely due to use of antiplatelet therapy  - likely secondary to GI bleed in the setting of antiplatelet therapy. Hgb has varied significantly over the last several months.   -Transfused PRBC with improvement in hemoglobin  -Hgb 7.0 today, additional unit of PRBCs ordered 12/29  -GI has been consulted; no indication for ileoscopy unless patient has persistent bleeding  -Transfuse for hemoglobin less than 8 with signs of active bleed  -PPI bolus and drip    Mild elevated troponin, type II ischemia  -Troponin peaked at 59  -No further cardiac workup necessary at this time  -Likely secondary to volume depletion and anemia causing mild demand ischemia     History of Crohn's disease  -Will defer to GI for continuation of continue of mesalamine suppository nightly                History of rheumatoid arthritis  -Patient noted to be on Humira 40 mg weekly with last dose 12/24/2024  -Hold methotrexate 6 mg weekly on Sundays due to renal failure  -Patient noted to be on Rinvoq 45 mcg daily which has not formulary at this facility     CAD with recent stent placement in October 2024  -Will hold aspirin 81 mg daily and Plavix 75 mg daily  -Continue metoprolol 12.5 mg twice daily  -Continue midodrine 5 mg 3 times daily  -Cardiology consult for management of DAPT    History of hyperlipidemia  Continue atorvastatin 40 mg nightly     Dietary supplementation, chronic:   -Hold secondary to acute renal failure     Antibiotic therapy, uncertain etiology:   -Patient noted to be on ceftriaxone daily injection     Chronic pain   -discontinue diclofenac 50 mg twice daily     GERD  -Home dose of famotidine  -Continue on PPI drip for GI bleed     COPD not in exacerbation  -chronic with chronic oxygen 2.5 L per nasal cannula: Continue guaifenesin  -Continue home dose of bronchodilators     History  of hypothyroidism  -Continue levothyroxine 50 mcg daily  -Check TSH     History of anxiety  -Continue Zoloft 50 mg daily     VTE Prophylaxis:  Pharmacologic VTE prophylaxis orders are present.       VTE Prophylaxis:  Pharmacologic VTE prophylaxis orders are present.             Disposition Planning:     Barriers to Discharge: severe anemia,renal failure  Anticipated Date of Discharge: 12/31  Place of Discharge: home      Time: 35 minutes     Code Status and Medical Interventions: CPR (Attempt to Resuscitate); Full Support   Ordered at: 12/26/24 9916     Code Status (Patient has no pulse and is not breathing):    CPR (Attempt to Resuscitate)     Medical Interventions (Patient has pulse or is breathing):    Full Support       Signature: Electronically signed by Sallie Brown PA-C, 12/29/24, 12:25 EST.  [facility] Hospitalist Team

## 2024-12-29 NOTE — PLAN OF CARE
Goal Outcome Evaluation:  Plan of Care Reviewed With: patient        Progress: no change  Outcome Evaluation: Patient alert and oriented, forgetful at times. Ostomy bag changed and wound care complete. Abdominal skin remains red and excoriated, painful to the touch. HGB 7.0 this morning with no obvious signs of bleeding. Patient on Agility bed for pressure wounds, wound care re-consulted. VSS

## 2024-12-29 NOTE — PLAN OF CARE
Problem: Adult Inpatient Plan of Care  Goal: Absence of Hospital-Acquired Illness or Injury  Intervention: Prevent Skin Injury  Recent Flowsheet Documentation  Taken 12/29/2024 0700 by River Cuevas RN  Body Position:   turned   weight shifting     Problem: Adult Inpatient Plan of Care  Goal: Absence of Hospital-Acquired Illness or Injury  Intervention: Prevent Infection  Recent Flowsheet Documentation  Taken 12/29/2024 1800 by River Cuevas RN  Infection Prevention:   hand hygiene promoted   personal protective equipment utilized   rest/sleep promoted   single patient room provided  Taken 12/29/2024 1625 by River Cuevas RN  Infection Prevention:   hand hygiene promoted   personal protective equipment utilized   rest/sleep promoted   single patient room provided  Taken 12/29/2024 1400 by River Cuevas RN  Infection Prevention:   hand hygiene promoted   personal protective equipment utilized   rest/sleep promoted   single patient room provided  Taken 12/29/2024 1232 by River Cuevas RN  Infection Prevention:   hand hygiene promoted   personal protective equipment utilized   rest/sleep promoted   single patient room provided  Taken 12/29/2024 1000 by River Cuevas RN  Infection Prevention:   hand hygiene promoted   personal protective equipment utilized   rest/sleep promoted   single patient room provided  Taken 12/29/2024 0800 by River Cuevas RN  Infection Prevention:   hand hygiene promoted   personal protective equipment utilized   rest/sleep promoted   single patient room provided     Goal Outcome Evaluation:  Plan of Care Reviewed With: patient        Progress: declining

## 2024-12-29 NOTE — SIGNIFICANT NOTE
12/29/24 1248   Rehab Time/Intention   Session Not Performed patient unavailable for evaluation  (RN declined)   Recommendation   PT - Next Appointment 12/30/24

## 2024-12-30 LAB
ANION GAP SERPL CALCULATED.3IONS-SCNC: 9.6 MMOL/L (ref 5–15)
BH BB BLOOD EXPIRATION DATE: NORMAL
BH BB BLOOD TYPE BARCODE: 5100
BH BB DISPENSE STATUS: NORMAL
BH BB PRODUCT CODE: NORMAL
BH BB UNIT NUMBER: NORMAL
BUN SERPL-MCNC: 20 MG/DL (ref 8–23)
BUN/CREAT SERPL: 14.8 (ref 7–25)
CALCIUM SPEC-SCNC: 7.4 MG/DL (ref 8.6–10.5)
CHLORIDE SERPL-SCNC: 107 MMOL/L (ref 98–107)
CO2 SERPL-SCNC: 22.4 MMOL/L (ref 22–29)
CREAT SERPL-MCNC: 1.35 MG/DL (ref 0.57–1)
CROSSMATCH INTERPRETATION: NORMAL
EGFRCR SERPLBLD CKD-EPI 2021: 41.3 ML/MIN/1.73
GLUCOSE SERPL-MCNC: 88 MG/DL (ref 65–99)
POTASSIUM SERPL-SCNC: 3.4 MMOL/L (ref 3.5–5.2)
SODIUM SERPL-SCNC: 139 MMOL/L (ref 136–145)
UNIT  ABO: NORMAL
UNIT  RH: NORMAL

## 2024-12-30 PROCEDURE — 25010000002 METRONIDAZOLE 500 MG/100ML SOLUTION

## 2024-12-30 PROCEDURE — 97162 PT EVAL MOD COMPLEX 30 MIN: CPT

## 2024-12-30 PROCEDURE — 97167 OT EVAL HIGH COMPLEX 60 MIN: CPT

## 2024-12-30 PROCEDURE — 80048 BASIC METABOLIC PNL TOTAL CA: CPT

## 2024-12-30 RX ORDER — CHOLESTYRAMINE LIGHT 4 G/5.7G
1 POWDER, FOR SUSPENSION ORAL 2 TIMES DAILY
Qty: 60 PACKET | Refills: 3 | Status: SHIPPED | OUTPATIENT
Start: 2024-12-30 | End: 2024-12-31

## 2024-12-30 RX ORDER — OXYMETAZOLINE HYDROCHLORIDE 0.05 G/100ML
2 SPRAY NASAL 2 TIMES DAILY
Qty: 2 ML | Refills: 0 | Status: SHIPPED | OUTPATIENT
Start: 2024-12-30 | End: 2024-12-31

## 2024-12-30 RX ORDER — PANTOPRAZOLE SODIUM 40 MG/1
40 TABLET, DELAYED RELEASE ORAL
Qty: 90 TABLET | Refills: 0 | Status: SHIPPED | OUTPATIENT
Start: 2024-12-31 | End: 2024-12-31

## 2024-12-30 RX ORDER — DIPHENOXYLATE HYDROCHLORIDE AND ATROPINE SULFATE 2.5; .025 MG/1; MG/1
1 TABLET ORAL 4 TIMES DAILY PRN
Qty: 60 TABLET | Refills: 3 | Status: SHIPPED | OUTPATIENT
Start: 2024-12-30 | End: 2024-12-31

## 2024-12-30 RX ORDER — PANTOPRAZOLE SODIUM 40 MG/1
40 TABLET, DELAYED RELEASE ORAL
Status: DISCONTINUED | OUTPATIENT
Start: 2024-12-30 | End: 2024-12-31 | Stop reason: HOSPADM

## 2024-12-30 RX ADMIN — OXYMETAZOLINE HYDROCHLORIDE 2 SPRAY: 0.5 SPRAY NASAL at 08:30

## 2024-12-30 RX ADMIN — LOPERAMIDE HYDROCHLORIDE 2 MG: 2 CAPSULE ORAL at 12:35

## 2024-12-30 RX ADMIN — LOPERAMIDE HYDROCHLORIDE 2 MG: 2 CAPSULE ORAL at 21:07

## 2024-12-30 RX ADMIN — CHOLESTYRAMINE 4 G: 4 POWDER, FOR SUSPENSION ORAL at 08:29

## 2024-12-30 RX ADMIN — MIDODRINE HYDROCHLORIDE 5 MG: 5 TABLET ORAL at 12:30

## 2024-12-30 RX ADMIN — Medication 10 ML: at 21:07

## 2024-12-30 RX ADMIN — CHOLESTYRAMINE 4 G: 4 POWDER, FOR SUSPENSION ORAL at 21:07

## 2024-12-30 RX ADMIN — METRONIDAZOLE 500 MG: 500 INJECTION, SOLUTION INTRAVENOUS at 01:34

## 2024-12-30 RX ADMIN — SODIUM CHLORIDE 8 MG/HR: 900 INJECTION INTRAVENOUS at 06:42

## 2024-12-30 RX ADMIN — LOPERAMIDE HYDROCHLORIDE 2 MG: 2 CAPSULE ORAL at 08:29

## 2024-12-30 RX ADMIN — SERTRALINE HYDROCHLORIDE 50 MG: 50 TABLET, FILM COATED ORAL at 08:29

## 2024-12-30 RX ADMIN — MIDODRINE HYDROCHLORIDE 5 MG: 5 TABLET ORAL at 08:29

## 2024-12-30 RX ADMIN — Medication 12.5 MG: at 08:31

## 2024-12-30 RX ADMIN — FOLIC ACID 1000 MCG: 1 TABLET ORAL at 08:29

## 2024-12-30 RX ADMIN — LEVOTHYROXINE SODIUM 50 MCG: 50 TABLET ORAL at 08:29

## 2024-12-30 RX ADMIN — PANTOPRAZOLE SODIUM 40 MG: 40 TABLET, DELAYED RELEASE ORAL at 10:18

## 2024-12-30 RX ADMIN — OXYMETAZOLINE HYDROCHLORIDE 2 SPRAY: 0.5 SPRAY NASAL at 21:07

## 2024-12-30 RX ADMIN — ATORVASTATIN CALCIUM 40 MG: 40 TABLET, FILM COATED ORAL at 21:07

## 2024-12-30 RX ADMIN — MIDODRINE HYDROCHLORIDE 5 MG: 5 TABLET ORAL at 18:07

## 2024-12-30 RX ADMIN — Medication 10 ML: at 08:29

## 2024-12-30 RX ADMIN — LOPERAMIDE HYDROCHLORIDE 2 MG: 2 CAPSULE ORAL at 18:07

## 2024-12-30 NOTE — SIGNIFICANT NOTE
Case Management/Social Work    Patient Name:  Maryann Gaitan  YOB: 1950  MRN: [id number]  Admit Date:  12/26/2024 12/30/24 1317   Post Acute Pre-Cert Documentation   Request Submitted by Facility - Type: Hospital   Post-Acute Authorization Type Submitted: SNF   Date Post Acute Pre-Cert Inititated per Facility 12/30/24   Accepting Facility [facility] Discharge Date Requested 12/30/24   Had Accepting Facility at Time of Submission Yes   Response Communicated to:    Authorization Number: [id number]   Post Acute Pre-Cert Initiated Comment Precert submitted for [facility] through [facility] on 12/30/2024. Precert pending at this time. Auth ID#[id number]. CM notfied patient CM and CM supervisor of status and updated in Epic.           Electronically signed by:  Maranda Shannon RN  12/30/24 13:18 EST    Office Phone: [phone]  Office Cell:     [phone]

## 2024-12-30 NOTE — PROGRESS NOTES
[facility] MEDICINE SERVICE  DAILY PROGRESS NOTE    NAME: Maryann Gaitan  : 1950  MRN: [id number]      LOS: 4 days     PROVIDER OF SERVICE: El Childress MD    Chief Complaint: Acute renal failure (ARF)    Subjective:     Interval History:  History taken from: patient chart    Patient doing well today.  She denies any difficulty with p.o. intake.  Denies any worsening of her ileostomy output and feels that it is actually improving.  She no longer has any nosebleeds.      Review of Systems:   Review of Systems   Constitutional:  Positive for fatigue. Negative for fever.   HENT: Negative.     Eyes:  Negative for visual disturbance.   Respiratory:  Negative for cough and shortness of breath.    Cardiovascular:  Negative for chest pain, palpitations and leg swelling.   Gastrointestinal:  Negative for abdominal pain, blood in stool, nausea and vomiting.   Genitourinary: Negative.    Musculoskeletal: Negative.    Neurological:  Positive for weakness.       Objective:     Vital Signs  Temp:  [97.7 °F (36.5 °C)-98.2 °F (36.8 °C)] 98.2 °F (36.8 °C)  Heart Rate:  [62-87] 86  Resp:  [16-23] 23  BP: ()/(48-80) 93/57   Body mass index is 19.83 kg/m².    Physical Exam  Physical Exam  Constitutional:       Appearance: She is ill-appearing.   Cardiovascular:      Rate and Rhythm: Normal rate and regular rhythm.      Pulses: Normal pulses.      Heart sounds: Normal heart sounds. No murmur heard.  Pulmonary:      Effort: Pulmonary effort is normal. No respiratory distress.      Breath sounds: Normal breath sounds. No wheezing or rales.   Abdominal:      General: There is no distension.      Tenderness: There is no abdominal tenderness.      Comments: Ileostomy with dark output  Hyperactive bowel sounds   Musculoskeletal:         General: No swelling, tenderness, deformity or signs of injury.      Cervical back: Normal range of motion.   Skin:     Findings: Bruising (Bilateral upper extremities) and erythema  present.   Neurological:      General: No focal deficit present.      Mental Status: She is alert. Mental status is at baseline.      Cranial Nerves: No cranial nerve deficit.      Sensory: No sensory deficit.      Motor: No weakness.      Coordination: Coordination normal.            Diagnostic Data    Results from last 7 days   Lab Units 12/30/24  0406 12/29/24  1344 12/29/24  0245 12/28/24  0530 12/28/24  0014   WBC 10*3/mm3  --   --  8.35  --  9.43   HEMOGLOBIN g/dL  --  9.4* 7.0*   < > 7.4*   HEMATOCRIT %  --  29.4* 22.8*  --  22.7*   PLATELETS 10*3/mm3  --   --  324  --  336   GLUCOSE mg/dL 88  --  73  --  82   CREATININE mg/dL 1.35*  --  1.98*  --  2.80*   BUN mg/dL 20  --  31*  --  47*   SODIUM mmol/L 139  --  139  --  138   POTASSIUM mmol/L 3.4*  --  3.4*  --  3.6   AST (SGOT) U/L  --   --   --   --  24   ALT (SGPT) U/L  --   --   --   --  15   ALK PHOS U/L  --   --   --   --  109   BILIRUBIN mg/dL  --   --   --   --  0.2   ANION GAP mmol/L 9.6  --  9.5  --  11.8    < > = values in this interval not displayed.       No radiology results for the last day      I reviewed the patient's new clinical results.    Assessment/Plan:     Active and Resolved Problems  Active Hospital Problems    Diagnosis  POA    **Acute renal failure (ARF) [N17.9]  Yes    Gastric bleed [K92.2]  Yes      Resolved Hospital Problems   No resolved problems to display.       Assessment and plan      BISHNU likely prerenal secondary to dehydration due to high volume output from ileostomy due to Crohn's disease  Anion gap metabolic acidosis likely secondary to above  Lactic acidosis likely secondary to above  Hyperkalemia likely secondary to above  -Baseline creatinine 0.5 December 2024  -Status post IV fluid bolus Lokelma in the ED  -Nephrology consulted  -Initiated on bicarbonate infusion with significant improvement in her renal function and metabolic acidosis, creatinine almost at baseline  -Trend lactic acidosis until less than 2. Still  elevated but improving. Most recent was 2.8.  -CT of the abdomen/pelvis does not show any evidence of acute pathology  -Hold home dose of methotrexate, meloxicam, spironolactone but can hopefully resume on discharge    Acute blood loss anemia likely due to use of antiplatelet therapy  - likely secondary to GI bleed in the setting of antiplatelet therapy. Hgb has varied significantly over the last several months.   -Transfused PRBC with improvement in hemoglobin on 12/29  -GI has been consulted; no indication for ileoscopy unless patient has persistent bleeding  -Transfuse for hemoglobin less than 8 with signs of active bleed  -Switched to daily PPI  -Okay to resume antiplatelet therapy on discharge    Mild elevated troponin, type II ischemia  -Troponin peaked at 59  -No further cardiac workup necessary at this time  -Likely secondary to volume depletion and anemia causing mild demand ischemia     History of Crohn's disease  -Will defer to GI for continuation of continue of mesalamine suppository nightly                History of rheumatoid arthritis  -Patient noted to be on Humira 40 mg weekly with last dose 12/24/2024  -Hold methotrexate 6 mg weekly on Sundays due to renal failure but resume on discharge  -Patient noted to be on Rinvoq 45 mcg daily which has not formulary at this facility     CAD with recent stent placement in October 2024  -Will hold aspirin 81 mg daily and Plavix 75 mg daily  -Continue metoprolol 12.5 mg twice daily  -Continue midodrine 5 mg 3 times daily  -Cardiology consult for management of DAPT    History of hyperlipidemia  Continue atorvastatin 40 mg nightly     Dietary supplementation, chronic:   -Hold secondary to acute renal failure     Antibiotic therapy, uncertain etiology:   -Patient noted to be on ceftriaxone daily injection     Chronic pain   -discontinue diclofenac 50 mg twice daily     GERD  -Continue daily PPI     COPD not in exacerbation  -chronic with chronic oxygen 2.5 L per  nasal cannula: Continue guaifenesin  -Continue home dose of bronchodilators     History of hypothyroidism  -Continue levothyroxine 50 mcg daily  -Check TSH     History of anxiety  -Continue Zoloft 50 mg daily     VTE Prophylaxis:  Pharmacologic VTE prophylaxis orders are present.       VTE Prophylaxis:  Pharmacologic VTE prophylaxis orders are present.             Disposition Planning:     Barriers to Discharge: Precertification  Anticipated Date of Discharge: 12/31  Place of Discharge: home      Time: 35 minutes     Code Status and Medical Interventions: CPR (Attempt to Resuscitate); Full Support   Ordered at: 12/26/24 8729     Code Status (Patient has no pulse and is not breathing):    CPR (Attempt to Resuscitate)     Medical Interventions (Patient has pulse or is breathing):    Full Support       Signature: Electronically signed by El Childress MD, 12/30/24, 13:37 EST.  [facility] Hospitalist Team

## 2024-12-30 NOTE — PLAN OF CARE
Goal Outcome Evaluation:  Plan of Care Reviewed With: patient           Outcome Evaluation: 75 y/o F with a h/o CAD s/p recent stent on dual platelet therapy, COPD, RA, Crohn's on biologic therapy, ileostomy with mucous fistula for GI bleed who presented to [facility] on 12/26/2024 with chief complaint of abnormal labs at the F, intermittent chest pain, and high-volume output from her ileostomy with poor adhesion of pouch and excoriation of the skin surrounding the stoma. Pt found to have BISHNU likely d/t dehydration, anion gap metabolic acidosis, lactic acidosis, hyperkalemia, mild elevated troponin, acute blood loss anemia. Of note, pt recently admitted to hospital 12/11-12/17/24 with chest pain, as well as d/c from hospital 12/4/2024 after being tx for a lower GI bleed with right hemicolectomy with ileostomy and mucous fistula creation requiring several units of PRBCs, as well as underwent a mitral valve clip on 11/21/2024. At baseline, pt lived at home with 2 granddaughters requiring assist with mobility/ADLs with use of RW/4WW. This date, pt requires Min A with bed mobility and Min A x 2 for STS transfers (x 2 attempts) at EOB. BP in bed upon entry: 113/75 mmhg, sitting at EOB: 108/54 mmhg, and standing at EOB 93/75 mmhg. Nursing notified. PT d/c recommendation of continued skilled PT services at SNF d/t inability to safely return home at this time.    Anticipated Discharge Disposition (PT): skilled nursing facility

## 2024-12-30 NOTE — PROGRESS NOTES
PROGRESS NOTE      Patient Name: Maryann Gaitan  : 1950  MRN: [id number]  Primary Care Physician: Azam Calhoun MD  Date of admission: 2024    Patient Care Team:  Azam Calhoun MD as PCP - General (Internal Medicine)  Niya Mendoza APRN as Nurse Practitioner (Cardiology)        Subjective   Subjective:     Patient seen and examined   Renal functions continue to improve  No new issues overnight     Review of systems:  All review of system unremarkable      Allergies:    Allergies   Allergen Reactions    Codeine Hives    Penicillin G Sodium Hives       Objective   Exam:     Vital Signs  Temp:  [97.7 °F (36.5 °C)-98.5 °F (36.9 °C)] 98.2 °F (36.8 °C)  Heart Rate:  [59-87] 86  Resp:  [16-23] 23  BP: ()/(46-80) 93/57  SpO2:  [91 %-99 %] 96 %  on   ;   Device (Oxygen Therapy): room air  Body mass index is 19.83 kg/m².    General: Elderly white female in no acute distress.    Head:      Normocephalic and atraumatic.    Eyes:      PERRL/EOM intact, conjunctivae and sclerae clear without nystagmus.    Neck:      No masses, thyromegaly,  trachea central with normal respiratory effort   Lungs:    Clear bilaterally to auscultation.    Heart:      Regular rate and rhythm, no murmur no gallop  Abd:         bowel sounds positive previous surgery scars  Pulses:   Pulses palpable  Extr:        No cyanosis or clubbing--no significant edema.    Neuro:    No focal deficits.   alert oriented x3  Skin:       Intact without lesions or rashes.    Psych:    Alert and cooperative; normal mood and affect; .      Results Review:  I have personally reviewed most recent Data :  CBC    Results from last 7 days   Lab Units 24  1344 24  0245 24  0530 24  0014 24  1600 24  1109 24  0921 24  0504 24  2041   WBC 10*3/mm3  --  8.35  --  9.43  --   --   --  9.45 12.27* 16.49*   HEMOGLOBIN g/dL 9.4* 7.0* 7.5* 7.4* 8.0* 8.5* 6.4* 7.0* 7.4* 10.5*    PLATELETS 10*3/mm3  --  324  --  336  --   --   --  429 460* 723*     CMP   Results from last 7 days   Lab Units 12/30/24  0406 12/29/24  0245 12/28/24  0014 12/27/24  1109 12/27/24  0504 12/26/24  2335 12/26/24  2041   SODIUM mmol/L 139 139 138 139 138 135* 129*   POTASSIUM mmol/L 3.4* 3.4* 3.6 3.6 4.1 5.6* 7.2*   CHLORIDE mmol/L 107 105 99 96* 96* 98 93*   CO2 mmol/L 22.4 24.5 27.2 29.4* 26.6 19.9* 15.9*   BUN mg/dL 20 31* 47* 61* 71* 86* 90*   CREATININE mg/dL 1.35* 1.98* 2.80* 3.47* 4.12* 5.25* 5.56*   GLUCOSE mg/dL 88 73 82 134* 117* 137* 91   ALBUMIN g/dL  --   --  2.1* 2.1*  --   --  3.3*   BILIRUBIN mg/dL  --   --  0.2 0.2  --   --  0.2   ALK PHOS U/L  --   --  109 108  --   --  190*   AST (SGOT) U/L  --   --  24 19  --   --  26   ALT (SGPT) U/L  --   --  15 13  --   --  20     ABG      No radiology results for the last day    Results for orders placed during the hospital encounter of 11/19/24    Adult Transthoracic Echo Limited W/ Cont if Necessary Per Protocol    Interpretation Summary    Left ventricular ejection fraction appears to be 56 - 60%.    There is a MitraClip mitral valve repair present.    There is trace residual MR.  Mean gradient is 4.6 mmHg    Scheduled Meds:[Held by provider] aspirin, 81 mg, Oral, Daily  atorvastatin, 40 mg, Oral, Nightly  cholestyramine light, 1 packet, Oral, Q12H  [Held by provider] clopidogrel, 75 mg, Oral, Daily  fludrocortisone, 200 mcg, Oral, Once  folic acid, 1,000 mcg, Oral, Daily  [Held by provider] heparin (porcine), 5,000 Units, Subcutaneous, Q12H  levothyroxine, 50 mcg, Oral, Daily  lidocaine, 30 mL, Infiltration, Once  loperamide, 2 mg, Oral, 4x Daily  mesalamine, 1,000 mg, Rectal, Nightly  metoprolol tartrate, 12.5 mg, Oral, Q12H  midodrine, 5 mg, Oral, TID AC  oxymetazoline, 2 spray, Each Nare, BID  pantoprazole, 40 mg, Oral, Q AM  sertraline, 50 mg, Oral, Daily  sodium chloride, 10 mL, Intravenous, Q12H  Upadacitinib ER, 1 tablet, Oral,  Daily      Continuous Infusions:     PRN Meds:  acetaminophen **OR** acetaminophen    albuterol    senna-docusate sodium **AND** polyethylene glycol **AND** bisacodyl **AND** bisacodyl    ipratropium-albuterol    melatonin    nitroglycerin    ondansetron    [COMPLETED] Insert Peripheral IV **AND** sodium chloride    sodium chloride    sodium chloride    sodium chloride    Assessment & Plan   Assessment and Plan:         Acute renal failure (ARF)    Gastric bleed    ASSESSMENT:  BISHNU  Hyperkalemia   Acidosis getting better  Anemia   Hypotension   COPD  Crohn's on biologic therapy   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   Dehydration   RA: Patient noted to be on Humira 40 mg weekly with last dose 12/24/2024, methotrexate 6 mg weekly on Sundays, and on Rinvoq 45 mcg daily which has not formulary at this facility  CAD s/p stent placement on dual platelet therapy         RENAL US done 12/27/24: right kidney 9.3cm, left 9.1cm, no hydro or increased echogenicity   ECHO done 11/22/24: EF 56-60%, MV clip repair present, trace residual mitral valve regurg         PLAN :      Patient with BISHNU, etiology likely multifactorial with prerenal component secondary to dehydration from high volume output from ostomy, diuretic and NSAID use, and maybe some ATN with hypotension and recent hospitalization with severe anemia secondary to GI bleed. Creatinine 5.5 mg/dL on admission.   Renal US as above, negative for hydro or increased echogenicity   Patient previously on low dose IFVFs, currently on hold and creatinine again is improved today.   Continue to hold Aldactone, Meloxicam. On Methotrexate weekly for RA, hold for now due to BISHNU   [place] cath placed, continue strict I/O, UOP around 900cc  Acidosis secondary to high ostomy output, resolved  Hyperkalemia resolved, potassium is in fact on the low side with improvement in the renal functions. Supplement per protocol  Hemoglobin better after transfusion, at 9.4 with most recent  labs   No history of significant proteinuria, around 100 mg/g back in October.   Blood pressure stable, lactic acidosis improved  Hyperphosphatemia improving  Strict I/O, avoid nephrotoxins  Daily labs   We will continue to follow           Electronically signed by DRU Bueno,   [facility] kidney consultant  [phone]  12/30/2024  11:34 EST

## 2024-12-30 NOTE — CONSULTS
visited patient in regards to spiritual care consult.  Patient seemed to be frustrated and ready to go home.   provided supportive presence and supportive conversation.

## 2024-12-30 NOTE — PLAN OF CARE
"Goal Outcome Evaluation:  Plan of Care Reviewed With: patient           Outcome Evaluation: Pt is a 74 y.o. year old female admitted 12/26/24 with abdnomrla labs. Noted with elevated troponin, likey 2* to volume depletion casuing demand ischemia. Dx with BISHNU, likely due to dehydration from high volume output from ileostomy.   Recent admissions 12/4 GI bleed and hemicolectomy, and 12/11 with chest pain.     PMHx significant for ileostomy, CAD, HTN, hyperlipidemia, RA, COPD, hypothyroidism, and Crohn's disease.    Pt admitted from [facility]. At baseline, pt resides on main level, x2 RICKEY, with x2 grandchildren (One works night shifts and the other works days shifts, able to provided 24/7 supervision) with a renter who is a family friend. Pt reports she is I with ADLs, utilizes walker and rollator though prefers walker, and does not drive. Pt c/o fatigue and pain, specifically abd and RUE pain. She reports she, \"caught it between bedrail and bed this morning\". NSG alerted. Pt required Min A and increased time for bed mobility.  Min A x2 for sit<>stand. Requires Mod A for all LB ADLs and Mod-Max A for toileting secondary to ostomy care. Pt with orthostatic hypotension with sit<>stand. She is below stated baseline and will require rehab at [place].                             "

## 2024-12-30 NOTE — DISCHARGE PLACEMENT REQUEST
"Bhargavi Gaitan (74 y.o. Female)       Date of Birth   1950    Social Security Number       Address   [address] [place] [address] [place] [facility] [place] [facility]    Home Phone   [phone]    MRN   [id number]       Presybeterian   None    Marital Status                               Admission Date   24    Admission Type   Emergency    Admitting Provider   Luma Ramirez MD    Attending Provider   El Childress MD    Department, Room/Bed   [facility] 2D, 261/1       Discharge Date       Discharge Disposition   Long Term Care (DC - External)    Discharge Destination                                 Attending Provider: El Childress MD    Allergies: Codeine, Penicillin G Sodium    Isolation: None   Infection: None   Code Status: CPR    Ht: 162.6 cm (64\")   Wt: 52.4 kg (115 lb 8 oz)    Admission Cmt: None   Principal Problem: Acute renal failure (ARF) [N17.9]                   Active Insurance as of 2024       Primary Coverage       Payor Plan Insurance Group Employer/Plan Group    HUMANA MEDICARE REPLACEMENT HUMANA MED ADV SNP HMO [id number]       Payor Plan Address Payor Plan Phone Number Payor Plan Fax Number Effective Dates    [address] [address] [phone]  2024 - None Entered    LT[facility], located within [facility] [address]         Subscriber Name Subscriber Birth Date Member ID       BHARGAVI GAITAN 1950 [id number]                     Emergency Contacts        (Rel.) Home Phone Work Phone Mobile Phone    Nadir Gaitan (Son) -- -- [phone]    Sania Celeste (Grandchild) -- -- [phone]    ReinierHector (Grandchild) -- -- [phone]                 History & Physical        Azeb Weeks APRN at 24 2325              [facility] Medicine Services  History & Physical    Patient Name: Bhargavi Gaitan  : 1950  MRN: [id number]  Primary Care Physician:  Azam Calhoun MD  Date of admission: 2024  Date and Time of Service: 2024 at " 9130    Subjective      Chief Complaint: Abnormal labs    History of Present Illness: Maryann Gaitan is a 74 y.o. female with a PMH of CAD s/p recent stent on dual platelet therapy, COPD, RA, Crohn's on biologic therapy, ileostomy with mucous fistula for GI bleed who presented to [facility] on 12/26/2024 with chief complaint of abnormal labs at the [facility].  Patient has had high-volume output from her ileostomy with poor adhesion of pouch and excoriation of the skin surrounding the stoma.  The patient is noted to be on a clear liquid diet.  Patient is alert and oriented at time of interview.  She report intermittent chest pain over the past few days and  reports she feels terrible since having her abdominal surgery.  Denies subjective fever or chills.    In the ER the patient was noted to be in acute Renal failure with creatinine 5.56 with comparison 0.50 on 12/16/2024.  Potassium was noted to be 7.2.  Nephrology was consulted and the patient was started on a bicarb drip and given Lokelma. HS troponin mildly elevated at 49 and 59.  Lactic acid 2.8.  INR 1.08.  Hemoglobin stable at 10.5 (may be lower after patient is hydrated) with comparison 8.9 on discharge 12/16/2024    Review of records with summary:   Patient was admitted 11/19/2024 for acute gastrointestinal hemorrhage, on 11/28/2024 patient underwent open ostomy and mucous fistula due to continued gastrointestinal bleeding despite GI evaluation without identification of bleeding area.  Patient on dual antiplatelet therapy secondary to recent stent placement.  On that admission patient received a total of 8 units of PRBCs.   Received total of 8 units of blood.  Still having large volume bloody bowel movements.      Review of Systems   Constitutional:  Negative for chills and fatigue.   Respiratory:  Positive for chest tightness. Negative for shortness of breath.    Cardiovascular:  Positive for chest pain. Negative for palpitations and leg swelling.   All  other systems reviewed and are negative.      Personal History     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 07/05/2016    Dyslipidemia 10/30/2024    Fecal urgency 11/21/2024    First degree hemorrhoids 07/09/2021    GERD (gastroesophageal reflux disease) 11/21/2024    Hashimoto's thyroiditis 11/21/2024    History of colonic polyps 07/09/2021    Hypertension     Hypothyroidism (acquired) 11/06/2024    Mitral regurgitation 10/08/2024    Moderate protein-calorie malnutrition 11/09/2024    Multiple tracheobronchial mucus plugs 10/08/2024    Nicotine dependence 11/21/2024    Rheumatoid arthritis 11/06/2024    S/P mitral valve clip implantation 11/21/2024    Second degree hemorrhoids 07/05/2016    Stress incontinence, female 11/21/2024    Vitamin D deficiency, unspecified 11/21/2024       Past Surgical History:   Procedure Laterality Date    BRONCHOSCOPY N/A 10/16/2024    Procedure: BRONCHOSCOPY;  Surgeon: Gallo Pope MD;  Location: [facility] ENDOSCOPY;  Service: Pulmonary;  Laterality: N/A;    CARDIAC CATHETERIZATION N/A 10/15/2024    Procedure: Left Heart Cath, possible pci;  Surgeon: Travis Connor MD;  Location: [facility] CATH INVASIVE LOCATION;  Service: Cardiovascular;  Laterality: N/A;    CARDIAC CATHETERIZATION N/A 10/22/2024    Procedure: Laser Coronary Atherectomy;  Surgeon: Travis Connor MD;  Location: [facility] CATH INVASIVE LOCATION;  Service: Cardiovascular;  Laterality: N/A;    COLON RESECTION Right 11/28/2024    Procedure: RIGHT HEMICOLECTOMY WITH ILEOSTOMY;  Surgeon: Olaf  MD Todd;  Location: [facility] MAIN OR;  Service: General;  Laterality: Right;    COLONOSCOPY N/A 10/12/2024    Procedure: COLONOSCOPY WITH BIOPSY AND WIRE GUIDED BALLOON DILATION OF TERMINAL ILEUM;  Surgeon: Rob Strong MD;  Location: [facility] ENDOSCOPY;  Service: Gastroenterology;  Laterality: N/A;  Colitis, crohns of terminal ileum, right colon ulcers, diverticulosis, hemorroids    ENDOSCOPY N/A 10/12/2024    Procedure: ESOPHAGOGASTRODUODENOSCOPY WITH BIOPSY X 2 AREA;  Surgeon: Rob Strong MD;  Location: [facility] ENDOSCOPY;  Service: Gastroenterology;  Laterality: N/A;  Chronic gastritis, HH    ENDOSCOPY Left 11/28/2024    Procedure: ESOPHAGOGASTRODUODENOSCOPY;  Surgeon: Dallin Delgado MD;  Location: [facility] ENDOSCOPY;  Service: Gastroenterology;  Laterality: Left;  small hiatal hernia    HYSTERECTOMY      LEFT HEART CATH      MITRAL VALVE REPAIR/REPLACEMENT N/A 11/21/2024    Procedure: Transcatheter Mitral Valve Repair;  Surgeon: Dmitri Navarro MD;  Location: [facility] HYBRID OR;  Service: Cardiovascular;  Laterality: N/A;       Family History: family history includes Dementia in her mother; Heart disease in her father and mother; Hypertension in her father; Stroke in her mother. Otherwise pertinent FHx was reviewed and not pertinent to current issue.    Social History:  reports that she has quit smoking. Her smoking use included cigarettes. She has never used smokeless tobacco. She reports that she does not drink alcohol and does not use drugs.    Home Medications:  Prior to Admission Medications       Prescriptions Last Dose Informant Patient Reported? Taking?    Adalimumab (Humira, 2 Pen,) 40 MG/0.4ML Pen-injector Kit   Yes No    Inject 40 mg under the skin into the appropriate area as directed 1 (One) Time Per Week. Saturdays   Indications: Rheumatoid Arthritis    albuterol (PROVENTIL) (2.5 MG/3ML) 0.083% nebulizer solution   Yes No    Take 2.5 mg by nebulization Every 6 (Six)  Hours As Needed for Wheezing. Indications: Spasm of Lung Air Passages    aspirin 81 MG EC tablet   No No    Take 1 tablet by mouth Daily for 30 days. Indications: Disease involving Lipid Deposits in the Arteries    atorvastatin (LIPITOR) 40 MG tablet   No No    Take 1 tablet by mouth Every Night for 30 days. Indications: High Amount of Fats in the Blood    calcium carbonate (OS-ARABELLA) 600 MG tablet   Yes No    Take 1 tablet by mouth 2 (Two) Times a Day With Meals.    cholecalciferol (VITAMIN D3) 1.25 MG (69235 UT) capsule   Yes No    Take 1 capsule by mouth 2 (Two) Times a Week. Wed, Sat  Indications: Vitamin D Deficiency    clopidogrel (PLAVIX) 75 MG tablet   No No    Take 1 tablet by mouth Daily for 30 days. Indications: Ischemic Heart Disease    diclofenac (VOLTAREN) 50 MG EC tablet   Yes No    Take 1 tablet by mouth 2 (Two) Times a Day.    famotidine (Pepcid) 20 MG tablet   No No    Take 1 tablet by mouth 2 (Two) Times a Day.    folic acid (FOLVITE) 1 MG tablet   Yes No    Take 1 tablet by mouth Daily. Indications: Anemia From Inadequate Folic Acid    guaifenesin (ROBITUSSIN) 100 MG/5ML liquid   No No    Take 10 mL by mouth Every 4 (Four) Hours As Needed for Cough.    levothyroxine (SYNTHROID, LEVOTHROID) 50 MCG tablet   Yes No    Take 1 tablet by mouth Daily. Indications: Underactive Thyroid    Melatonin (Melatonin Extra Strength) 10 MG tablet   Yes No    Take 1 tablet by mouth As Needed. Indications: Trouble Sleeping    mesalamine (CANASA) 1000 MG suppository   No No    Insert 1 suppository into the rectum Every Night.    methotrexate 2.5 MG tablet   Yes No    Take 6 tablets by mouth 1 (One) Time Per Week. Saturdays   Indications: Non-oncologic    metoprolol tartrate (LOPRESSOR) 25 MG tablet   No No    Take 0.5 tablets by mouth Every 12 (Twelve) Hours.    midodrine (PROAMATINE) 5 MG tablet   No No    Take 1 tablet by mouth 3 (Three) Times a Day Before Meals. Indications: Disorder of Low Blood Pressure     naloxone (NARCAN) 4 MG/0.1ML nasal spray   No No    Call 911. Don't prime. [place] in 1 nostril for overdose. Repeat in 2-3 minutes in other nostril if no or minimal breathing/responsiveness.  Indications: Opioid Overdose    ondansetron ODT (ZOFRAN-ODT) 4 MG disintegrating tablet   No No    Take 1 tablet by mouth Every 6 (Six) Hours As Needed for Nausea or Vomiting. Indications: Nausea and Vomiting Following an Operation    sertraline (ZOLOFT) 50 MG tablet   Yes No    Take 1 tablet by mouth Daily. Indications: Major Depressive Disorder    spironolactone (ALDACTONE) 25 MG tablet   Yes No    Take 1 tablet by mouth Daily. Indications: Edema    upadacitinib ER (Rinvoq) 45 MG tablet sustained-release 24 hour extended release tablet   Yes No    Take 1 tablet by mouth Daily. Indications: Rheumatoid Arthritis              Allergies:  Allergies   Allergen Reactions    Codeine Hives    Penicillin G Sodium Hives       Objective      Vitals:   Temp:  [98.4 °F (36.9 °C)] 98.4 °F (36.9 °C)  Heart Rate:  [77-85] 77  Resp:  [18-23] 23  BP: (115-134)/(55-72) 125/55  Body mass index is 21.46 kg/m².  Physical Exam  Vitals and nursing note reviewed.   Constitutional:       Appearance: Normal appearance. She is underweight. She is ill-appearing.   HENT:      Head: Normocephalic and atraumatic.      Right Ear: External ear normal.      Left Ear: External ear normal.      Nose: Nose normal.      Mouth/Throat:      Mouth: Mucous membranes are dry.   Eyes:      General: No scleral icterus.        Right eye: No discharge.         Left eye: No discharge.      Extraocular Movements: Extraocular movements intact.      Conjunctiva/sclera: Conjunctivae normal.      Pupils: Pupils are equal, round, and reactive to light.   Cardiovascular:      Rate and Rhythm: Normal rate and regular rhythm.      Pulses: Normal pulses.      Heart sounds: Normal heart sounds. No murmur heard.  Pulmonary:      Effort: Pulmonary effort is normal.      Breath sounds:  Examination of the right-lower field reveals decreased breath sounds. Examination of the left-lower field reveals decreased breath sounds. Wheezing present.   Abdominal:      General: Bowel sounds are normal.      Palpations: Abdomen is soft.   Musculoskeletal:      Cervical back: Normal range of motion and neck supple.      Right lower leg: No edema.      Left lower leg: No edema.   Skin:     Findings: Bruising present.      Comments: There is significant bruising to the bilateral upper and lower extremities, the skin   Neurological:      General: No focal deficit present.      Mental Status: She is alert and oriented to person, place, and time.   Psychiatric:         Attention and Perception: Attention normal.         Mood and Affect: Affect is flat.         Behavior: Behavior is cooperative.         Cognition and Memory: Cognition is not impaired. She does not exhibit impaired recent memory.         Judgment: Judgment normal.         Diagnostic Data:  Lab Results (last 24 hours)       Procedure Component Value Units Date/Time    POC Glucose Q1H [[id number]]  (Abnormal) Collected: 12/26/24 2215    Specimen: Blood Updated: 12/26/24 2217     Glucose 205 mg/dL      Comment: Serial Number: [id number]:  [id number]       POC Glucose Once [[id number]]  (Normal) Collected: 12/26/24 2132    Specimen: Blood Updated: 12/26/24 2134     Glucose 90 mg/dL      Comment: Serial Number: [id number]:  [id number]       Protime-INR [[id number]]  (Normal) Collected: 12/26/24 2041    Specimen: Blood Updated: 12/26/24 2114     Protime 14.0 Seconds      INR 1.08    aPTT [[id number]]  (Normal) Collected: 12/26/24 2041    Specimen: Blood Updated: 12/26/24 2114     PTT 32.0 seconds     High Sensitivity Troponin T [[id number]]  (Abnormal) Collected: 12/26/24 2041    Specimen: Blood Updated: 12/26/24 2113     HS Troponin T 59 ng/L     Narrative:      High Sensitive Troponin T Reference Range:  <14.0 ng/L- Negative Female for AMI  <22.0  ng/L- Negative Male for AMI  >=14 - Abnormal Female indicating possible myocardial injury.  >=22 - Abnormal Male indicating possible myocardial injury.   Clinicians would have to utilize clinical acumen, EKG, Troponin, and serial changes to determine if it is an Acute Myocardial Infarction or myocardial injury due to an underlying chronic condition.         Magnesium [[id number]]  (Normal) Collected: 12/26/24 2041    Specimen: Blood Updated: 12/26/24 2113     Magnesium 1.9 mg/dL     Comprehensive Metabolic Panel [[id number]]  (Abnormal) Collected: 12/26/24 2041    Specimen: Blood Updated: 12/26/24 2113     Glucose 91 mg/dL      BUN 90 mg/dL      Creatinine 5.56 mg/dL      Sodium 129 mmol/L      Potassium 7.2 mmol/L      Comment: Slight hemolysis detected by analyzer. Result may be falsely elevated.        Chloride 93 mmol/L      CO2 15.9 mmol/L      Calcium 9.4 mg/dL      Total Protein 7.0 g/dL      Albumin 3.3 g/dL      ALT (SGPT) 20 U/L      AST (SGOT) 26 U/L      Comment: Slight hemolysis detected by analyzer. Result may be falsely elevated.        Alkaline Phosphatase 190 U/L      Total Bilirubin 0.2 mg/dL      Globulin 3.7 gm/dL      A/G Ratio 0.9 g/dL      BUN/Creatinine Ratio 16.2     Anion Gap 20.1 mmol/L      eGFR 7.6 mL/min/1.73     Narrative:      GFR Categories in Chronic Kidney Disease (CKD)      GFR Category          GFR (mL/min/1.73)    Interpretation  G1                     90 or greater         Normal or high (1)  G2                      60-89                Mild decrease (1)  G3a                   45-59                Mild to moderate decrease  G3b                   30-44                Moderate to severe decrease  G4                    15-29                Severe decrease  G5                    14 or less           Kidney failure          (1)In the absence of evidence of kidney disease, neither GFR category G1 or G2 fulfill the criteria for CKD.    eGFR calculation 2021 CKD-EPI creatinine equation,  which does not include race as a factor    Phosphorus [[id number]]  (Abnormal) Collected: 12/26/24 2041    Specimen: Blood Updated: 12/26/24 2110     Phosphorus 10.4 mg/dL     CBC & Differential [[id number]]  (Abnormal) Collected: 12/26/24 2041    Specimen: Blood Updated: 12/26/24 2046    Narrative:      The following orders were created for panel order CBC & Differential.  Procedure                               Abnormality         Status                     ---------                               -----------         ------                     CBC Auto Differential[[id number]]        Abnormal            Final result                 Please view results for these tests on the individual orders.    CBC Auto Differential [[id number]]  (Abnormal) Collected: 12/26/24 2041    Specimen: Blood Updated: 12/26/24 2046     WBC 16.49 10*3/mm3      RBC 3.58 10*6/mm3      Hemoglobin 10.5 g/dL      Hematocrit 33.6 %      MCV 93.9 fL      MCH 29.3 pg      MCHC 31.3 g/dL      RDW 18.4 %      RDW-SD 62.8 fl      MPV 9.5 fL      Platelets 723 10*3/mm3      Neutrophil % 65.9 %      Lymphocyte % 28.8 %      Monocyte % 3.4 %      Eosinophil % 0.6 %      Basophil % 0.7 %      Immature Grans % 0.6 %      Neutrophils, Absolute 10.87 10*3/mm3      Lymphocytes, Absolute 4.75 10*3/mm3      Monocytes, Absolute 0.56 10*3/mm3      Eosinophils, Absolute 0.10 10*3/mm3      Basophils, Absolute 0.11 10*3/mm3      Immature Grans, Absolute 0.10 10*3/mm3      nRBC 0.1 /100 WBC     Extra Tubes [[id number]] Collected: 12/26/24 2041    Specimen: Blood, Venous Line Updated: 12/26/24 2045    Narrative:      The following orders were created for panel order Extra Tubes.  Procedure                               Abnormality         Status                     ---------                               -----------         ------                     Gold Top - SST[[id number]]                                   Final result                 Please view results for these tests  on the individual orders.    [facility] [[id number]] Collected: 12/26/24 2041    Specimen: Blood Updated: 12/26/24 2045     Extra Tube Hold for add-ons.     Comment: Auto resulted.                Imaging Results (Last 24 Hours)       Procedure Component Value Units Date/Time    XR Chest 1 View [[id number]] Collected: 12/26/24 2057     Updated: 12/26/24 2105    Narrative:      XR CHEST 1 VW    Date of Exam: 12/26/2024 8:41 PM EST    Indication: chest pain    Comparison: 12/11/2024    Findings:  Lungs appear somewhat hyperinflated with emphysematous changes in the upper lobes. Scarring in the peripheral left upper lung. No acute pulmonary abnormality. Heart size and pulmonary vasculature are within normal limits      Impression:      Impression:  Emphysema with no acute cardiopulmonary process demonstrated      Electronically Signed: Colt Diana    12/26/2024 9:03 PM EST    Workstation ID: [id number]              Assessment & Plan        This is a 74 y.o. female with:    Active and Resolved Problems  Active Hospital Problems    Diagnosis  POA    **Acute renal failure (ARF) [N17.9]  Yes      Resolved Hospital Problems   No resolved problems to display.     Acute renal failure, creatinine 5.56 with comparison 0.50 on 12/16/2024-likely secondary to acute volume deficit due to high volume output from ileostomy and diuretic therapy; postrenal obstruction cannot be excluded; patient also noted to be on methotrexate, meloxicam, and spironolactone:   IV bolus x 1 L given in the ER; will add additional 1 L normal saline  Nephrology consulted, Dr. Pierre who add  bicarb drip, 3 amps at 150  And renal ultrasound  Lactic acid 2.8  Hold spironolactone secondary to renal failure        Hyperkalemia, potassium 7.2-secondary renal failure:  given Lokelma with repeat K+ 5.4    HS troponin mildly elevated at 49 and 59:  Add cardiology consult     Hemoglobin stable at 10.5 (may be lower after patient is hydrated) with comparison 8.9  on discharge 12/16/2024:   Daily CBC    Crohn's disease, chronic:   Continue mesalamine suppository nightly     Rheumatoid arthritis, chronic:   Patient noted to be on Humira 40 mg weekly with last dose 12/24/2024  Hold methotrexate 6 mg weekly on Sundays due to renal failure  Patient noted to be on Rinvoq 45 mcg daily which has not formulary at this facility    CAD with recent stent placement less than 6 months, chronic:  Continue aspirin 81 mg daily  Continue Plavix 75 mg daily  Continue metoprolol 12.5 mg twice daily  Continue midodrine 5 mg 3 times daily    HLD, chronic:   Continue atorvastatin 40 mg nightly    Dietary supplementation, chronic:   Hold secondary to acute renal failure    Antibiotic therapy, uncertain etiology:   Patient noted to be on ceftriaxone daily injection      Chronic pain   discontinue diclofenac 50 mg twice daily    GERD, chronic: Continue Pepcid 20 mg twice daily    COPD, chronic with chronic oxygen 2.5 L per nasal cannula: Continue guaifenesin    Hypothyroidism, chronic:   Continue levothyroxine 50 mcg daily  Check TSH    Anxiety, chronic:   Continue Zoloft 50 mg daily    VTE Prophylaxis:  Pharmacologic VTE prophylaxis orders are present.        The patient desires to be as follows:    CODE STATUS:    Code Status (Patient has no pulse and is not breathing): CPR (Attempt to Resuscitate)  Medical Interventions (Patient has pulse or is breathing): Full Support          Admission Status:  I believe this patient meets inpatient status.    Expected Length of Stay: 4 to 5 days    PDMP and Medication Dispenses via Sidebar reviewed and consistent with patient reported medications.    I discussed the patient's findings and my recommendations with patient and nursing staff.      Signature:     This document has been electronically signed by DRU Moulton on December 26, 2024 23:25 [facility]    Electronically signed by Azeb Weeks APRN at 12/27/24 3599        Operative/Procedure Notes (all)    No notes of this type exist for this encounter.          Physician Progress Notes (last 48 hours)        Alexandrea Tipton APRN at 24 1134              PROGRESS NOTE      Patient Name: Maryann Gaitan  : 1950  MRN: [id number]  Primary Care Physician: Azam Calhoun MD  Date of admission: 2024    Patient Care Team:  Azam Calhoun MD as PCP - General (Internal Medicine)  Niya Mendoza APRN as Nurse Practitioner (Cardiology)        Subjective   Subjective:     Patient seen and examined   Renal functions continue to improve  No new issues overnight     Review of systems:  All review of system unremarkable      Allergies:    Allergies   Allergen Reactions    Codeine Hives    Penicillin G Sodium Hives       Objective   Exam:     Vital Signs  Temp:  [97.7 °F (36.5 °C)-98.5 °F (36.9 °C)] 98.2 °F (36.8 °C)  Heart Rate:  [59-87] 86  Resp:  [16-23] 23  BP: ()/(46-80) 93/57  SpO2:  [91 %-99 %] 96 %  on   ;   Device (Oxygen Therapy): room air  Body mass index is 19.83 kg/m².    General: Elderly white female in no acute distress.    Head:      Normocephalic and atraumatic.    Eyes:      PERRL/EOM intact, conjunctivae and sclerae clear without nystagmus.    Neck:      No masses, thyromegaly,  trachea central with normal respiratory effort   Lungs:    Clear bilaterally to auscultation.    Heart:      Regular rate and rhythm, no murmur no gallop  Abd:         bowel sounds positive previous surgery scars  Pulses:   Pulses palpable  Extr:        No cyanosis or clubbing--no significant edema.    Neuro:    No focal deficits.   alert oriented x3  Skin:       Intact without lesions or rashes.    Psych:    Alert and cooperative; normal mood and affect; .      Results Review:  I have personally reviewed most recent Data :  CBC    Results from last 7 days   Lab Units 24  1344 24  0245 24  0530 24  0014 24  2006 24  1600  12/27/24  1109 12/27/24  0921 12/27/24  0504 12/26/24  2041   WBC 10*3/mm3  --  8.35  --  9.43  --   --   --  9.45 12.27* 16.49*   HEMOGLOBIN g/dL 9.4* 7.0* 7.5* 7.4* 8.0* 8.5* 6.4* 7.0* 7.4* 10.5*   PLATELETS 10*3/mm3  --  324  --  336  --   --   --  429 460* 723*     CMP   Results from last 7 days   Lab Units 12/30/24  0406 12/29/24  0245 12/28/24  0014 12/27/24  1109 12/27/24  0504 12/26/24  2335 12/26/24  2041   SODIUM mmol/L 139 139 138 139 138 135* 129*   POTASSIUM mmol/L 3.4* 3.4* 3.6 3.6 4.1 5.6* 7.2*   CHLORIDE mmol/L 107 105 99 96* 96* 98 93*   CO2 mmol/L 22.4 24.5 27.2 29.4* 26.6 19.9* 15.9*   BUN mg/dL 20 31* 47* 61* 71* 86* 90*   CREATININE mg/dL 1.35* 1.98* 2.80* 3.47* 4.12* 5.25* 5.56*   GLUCOSE mg/dL 88 73 82 134* 117* 137* 91   ALBUMIN g/dL  --   --  2.1* 2.1*  --   --  3.3*   BILIRUBIN mg/dL  --   --  0.2 0.2  --   --  0.2   ALK PHOS U/L  --   --  109 108  --   --  190*   AST (SGOT) U/L  --   --  24 19  --   --  26   ALT (SGPT) U/L  --   --  15 13  --   --  20     ABG      No radiology results for the last day    Results for orders placed during the hospital encounter of 11/19/24    Adult Transthoracic Echo Limited W/ Cont if Necessary Per Protocol    Interpretation Summary    Left ventricular ejection fraction appears to be 56 - 60%.    There is a MitraClip mitral valve repair present.    There is trace residual MR.  Mean gradient is 4.6 mmHg    Scheduled Meds:[Held by provider] aspirin, 81 mg, Oral, Daily  atorvastatin, 40 mg, Oral, Nightly  cholestyramine light, 1 packet, Oral, Q12H  [Held by provider] clopidogrel, 75 mg, Oral, Daily  fludrocortisone, 200 mcg, Oral, Once  folic acid, 1,000 mcg, Oral, Daily  [Held by provider] heparin (porcine), 5,000 Units, Subcutaneous, Q12H  levothyroxine, 50 mcg, Oral, Daily  lidocaine, 30 mL, Infiltration, Once  loperamide, 2 mg, Oral, 4x Daily  mesalamine, 1,000 mg, Rectal, Nightly  metoprolol tartrate, 12.5 mg, Oral, Q12H  midodrine, 5 mg, Oral, TID  AC  oxymetazoline, 2 spray, Each Nare, BID  pantoprazole, 40 mg, Oral, Q AM  sertraline, 50 mg, Oral, Daily  sodium chloride, 10 mL, Intravenous, Q12H  Upadacitinib ER, 1 tablet, Oral, Daily      Continuous Infusions:     PRN Meds:  acetaminophen **OR** acetaminophen    albuterol    senna-docusate sodium **AND** polyethylene glycol **AND** bisacodyl **AND** bisacodyl    ipratropium-albuterol    melatonin    nitroglycerin    ondansetron    [COMPLETED] Insert Peripheral IV **AND** sodium chloride    sodium chloride    sodium chloride    sodium chloride    Assessment & Plan   Assessment and Plan:         Acute renal failure (ARF)    Gastric bleed    ASSESSMENT:  BISHNU  Hyperkalemia   Acidosis getting better  Anemia   Hypotension   COPD  Crohn's on biologic therapy   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   Dehydration   RA: Patient noted to be on Humira 40 mg weekly with last dose 12/24/2024, methotrexate 6 mg weekly on Sundays, and on Rinvoq 45 mcg daily which has not formulary at this facility  CAD s/p stent placement on dual platelet therapy         RENAL US done 12/27/24: right kidney 9.3cm, left 9.1cm, no hydro or increased echogenicity   ECHO done 11/22/24: EF 56-60%, MV clip repair present, trace residual mitral valve regurg         PLAN :      Patient with BISHNU, etiology likely multifactorial with prerenal component secondary to dehydration from high volume output from ostomy, diuretic and NSAID use, and maybe some ATN with hypotension and recent hospitalization with severe anemia secondary to GI bleed. Creatinine 5.5 mg/dL on admission.   Renal US as above, negative for hydro or increased echogenicity   Patient previously on low dose IFVFs, currently on hold and creatinine again is improved today.   Continue to hold Aldactone, Meloxicam. On Methotrexate weekly for RA, hold for now due to BISHNU   Loyola cath placed, continue strict I/O, UOP around 900cc  Acidosis secondary to high ostomy output,  resolved  Hyperkalemia resolved, potassium is in fact on the low side with improvement in the renal functions. Supplement per protocol  Hemoglobin better after transfusion, at 9.4 with most recent labs   No history of significant proteinuria, around 100 mg/g back in October.   Blood pressure stable, lactic acidosis improved  Hyperphosphatemia improving  Strict I/O, avoid nephrotoxins  Daily labs   We will continue to follow           Electronically signed by DRU Bueno   [facility] kidney consultant  [phone]  2024  11:34 EST      Electronically signed by Alexandrea Tipton APRN at 24 1137       Buddy Pierre MD at 24 1449              PROGRESS NOTE      Patient Name: Maryann Gaitan  : 1950  MRN: [id number]  Primary Care Physician: Azam Calhoun MD  Date of admission: 2024    Patient Care Team:  Azam Calhoun MD as PCP - General (Internal Medicine)  Niya Mendoza APRN as Nurse Practitioner (Cardiology)        Subjective   Subjective:     Patient is doing much better and much alert oriented eating and drinking all other review of system unremarkable elderly white  Review of systems:  All review of system unremarkable      Allergies:    Allergies   Allergen Reactions    Codeine Hives    Penicillin G Sodium Hives       Objective   Exam:     Vital Signs  Temp:  [97.7 °F (36.5 °C)-98.8 °F (37.1 °C)] 98.5 °F (36.9 °C)  Heart Rate:  [59-75] 59  Resp:  [13-23] 20  BP: (104-135)/(44-69) 113/46  SpO2:  [97 %-100 %] 98 %  on   ;   Device (Oxygen Therapy): room air  Body mass index is 20.67 kg/m².    General: Elderly white female in no acute distress.    Head:      Normocephalic and atraumatic.    Eyes:      PERRL/EOM intact, conjunctivae and sclerae clear without nystagmus.    Neck:      No masses, thyromegaly,  trachea central with normal respiratory effort   Lungs:    Clear bilaterally to auscultation.    Heart:      Regular rate and rhythm, no  murmur no gallop  Abd:         bowel sounds positive previous surgery scars  Pulses:   Pulses palpable  Extr:        No cyanosis or clubbing--no significant edema.    Neuro:    No focal deficits.   alert oriented x3  Skin:       Intact without lesions or rashes.    Psych:    Alert and cooperative; normal mood and affect; .      Results Review:  I have personally reviewed most recent Data :  CBC    Results from last 7 days   Lab Units 12/29/24  1344 12/29/24  0245 12/28/24  0530 12/28/24  0014 12/27/24 2006 12/27/24  1600 12/27/24  1109 12/27/24  0921 12/27/24  0504 12/26/24  2041   WBC 10*3/mm3  --  8.35  --  9.43  --   --   --  9.45 12.27* 16.49*   HEMOGLOBIN g/dL 9.4* 7.0* 7.5* 7.4* 8.0* 8.5* 6.4* 7.0* 7.4* 10.5*   PLATELETS 10*3/mm3  --  324  --  336  --   --   --  429 460* 723*     CMP   Results from last 7 days   Lab Units 12/29/24  0245 12/28/24  0014 12/27/24  1109 12/27/24  0504 12/26/24  2335 12/26/24  2041   SODIUM mmol/L 139 138 139 138 135* 129*   POTASSIUM mmol/L 3.4* 3.6 3.6 4.1 5.6* 7.2*   CHLORIDE mmol/L 105 99 96* 96* 98 93*   CO2 mmol/L 24.5 27.2 29.4* 26.6 19.9* 15.9*   BUN mg/dL 31* 47* 61* 71* 86* 90*   CREATININE mg/dL 1.98* 2.80* 3.47* 4.12* 5.25* 5.56*   GLUCOSE mg/dL 73 82 134* 117* 137* 91   ALBUMIN g/dL  --  2.1* 2.1*  --   --  3.3*   BILIRUBIN mg/dL  --  0.2 0.2  --   --  0.2   ALK PHOS U/L  --  109 108  --   --  190*   AST (SGOT) U/L  --  24 19  --   --  26   ALT (SGPT) U/L  --  15 13  --   --  20     ABG      CT Abdomen Pelvis Without Contrast    Result Date: 12/27/2024  Impression: Interval postoperative changes without acute findings in the abdomen or pelvis. Electronically Signed: Mansoor Flores  12/27/2024 6:43 PM EST  Workstation ID: [id number]     Results for orders placed during the hospital encounter of 11/19/24    Adult Transthoracic Echo Limited W/ Cont if Necessary Per Protocol    Interpretation Summary    Left ventricular ejection fraction appears to be 56 - 60%.    There is  a MitraClip mitral valve repair present.    There is trace residual MR.  Mean gradient is 4.6 mmHg    Scheduled Meds:[Held by provider] aspirin, 81 mg, Oral, Daily  atorvastatin, 40 mg, Oral, Nightly  cefTRIAXone, 1,000 mg, Intravenous, Q24H  cholestyramine light, 1 packet, Oral, Q12H  [Held by provider] clopidogrel, 75 mg, Oral, Daily  fludrocortisone, 200 mcg, Oral, Once  folic acid, 1,000 mcg, Oral, Daily  [Held by provider] heparin (porcine), 5,000 Units, Subcutaneous, Q12H  levothyroxine, 50 mcg, Oral, Daily  lidocaine, 30 mL, Infiltration, Once  loperamide, 2 mg, Oral, 4x Daily  mesalamine, 1,000 mg, Rectal, Nightly  metoprolol tartrate, 12.5 mg, Oral, Q12H  metroNIDAZOLE, 500 mg, Intravenous, Q8H  midodrine, 5 mg, Oral, TID AC  oxymetazoline, 2 spray, Each Nare, BID  sertraline, 50 mg, Oral, Daily  sodium chloride, 10 mL, Intravenous, Q12H  Upadacitinib ER, 1 tablet, Oral, Daily      Continuous Infusions:pantoprazole, 8 mg/hr, Last Rate: 8 mg/hr (12/29/24 0320)      PRN Meds:  acetaminophen **OR** acetaminophen    albuterol    senna-docusate sodium **AND** polyethylene glycol **AND** bisacodyl **AND** bisacodyl    ipratropium-albuterol    melatonin    nitroglycerin    ondansetron    [COMPLETED] Insert Peripheral IV **AND** sodium chloride    sodium chloride    sodium chloride    sodium chloride    Assessment & Plan   Assessment and Plan:         Acute renal failure (ARF)    Gastric bleed    ASSESSMENT:  BISHNU  Hyperkalemia   Acidosis getting better  Anemia   Hypotension   COPD  Crohn's on biologic therapy   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   Dehydration   RA: Patient noted to be on Humira 40 mg weekly with last dose 12/24/2024, methotrexate 6 mg weekly on Sundays, and on Rinvoq 45 mcg daily which has not formulary at this facility  CAD s/p stent placement on dual platelet therapy         RENAL US done 12/27/24: right kidney 9.3cm, left 9.1cm, no hydro or increased echogenicity   ECHO done  11/22/24: EF 56-60%, MV clip repair present, trace residual mitral valve regurg         PLAN :      Patient with BISHNU, etiology likely multifactorial with prerenal component secondary to dehydration from high volume output from ostomy, diuretic and NSAID use, and maybe some ATN with hypotension and recent hospitalization with severe anemia secondary to GI bleed. Creatinine 5.5 mg/dL on admission.   Renal US as above, negative for hydro or increased echogenicity   Patient on low-dose IV fluid renal functions are getting better creatinine is getting better urine output improving continue to hold Aldactone, Meloxicam. On Methotrexate weekly for RA, hold for now due to BISHNU   Loyola cath placed, continue strict I/O and renal functions are getting better at this time  Acidosis secondary to high ostomy output, improving and now WNL with bicarb based IVFs, also s/p 2amps of bicarb. Continue IVFs and monitor   Hyperkalemia resolved potassium is in fact on the low side with improvement in the renal functions  Will discontinue Ringer lactate as patient is eating and drinking and hemodynamics are improving  Hemoglobin better after transfusion now at 9.4  No history of significant proteinuria, around 100 mg/g back in October.   Blood pressure better lactic acidosis improved  Hyperphosphatemia improving  Check CMP at 1100  Strict I/O, avoid nephrotoxins  Thank you for this consultation, we will follow closely           Electronically signed by Buddy Pierre MD,   [facility] kidney consultant  [phone]  12/29/2024  14:49 EST      Electronically signed by Buddy Pierre MD at 12/29/24 1450       Sallie Brown PA-C at 12/29/24 1225       Attestation signed by El Childress MD at 12/29/24 1329    Addendum:    I saw and examined the patient in addition to the FARHEEN.  I agree with assessment and plan with the following addendum:    General Appearance:  Alert, cooperative, no distress, appears stated age  Head:  Normocephalic,  without obvious abnormality, atraumatic  Eyes:  PERRL, conjunctiva/corneas clear, EOM's intact, fundi benign, both eyes  Ears:  Normal TM's and external ear canals, both ears  Nose: Nares normal, septum midline, mucosa normal, no drainage or sinus tenderness  Throat: Lips, mucosa, and tongue normal; teeth and gums normal  Neck: Supple, symmetrical, trachea midline, no adenopathy, thyroid: not enlarged, symmetric, no tenderness/mass/nodules, no carotid bruit or JVD  Lungs:   Clear to auscultation bilaterally, respirations unlabored  Heart: Regular rate and rhythm, S1, S2 normal, no murmur, rub or gallop  Abdomen:  Soft, non-tender, bowel sounds active all four quadrants,  no masses, no organomegaly  Extremities: Extremities normal, atraumatic, no cyanosis or edema  Pulses: 2+ and symmetric  Skin: Skin color, texture, turgor normal, no rashes or lesions  Neurologic: Normal    I have reviewed th patient was overall doing better today however she has had some epistaxis with coughing up some bloody mucus.  Will initiate her on Afrin nasal spray.  Her renal function is improving daily with IV fluids.  However she does continue to have worsening hemoglobin and will transfuse her 1 unit PRBC today.  I will check iron studies as she has previous history of severe iron deficiency and may benefit from IV iron infusion if appropriate.  Continue to hold DAPT for now.  No further workup from a GI standpoint as patient does not seem to have any more further ileostomy bleeding.    El Childress MD  [facility] Hospitalist Team  24  13:29 [facility] MEDICINE SERVICE  DAILY PROGRESS NOTE    NAME: Maryann Gaitan  : 1950  MRN: [id number]      LOS: 3 days     PROVIDER OF SERVICE: Sallie Brown PA-C    Chief Complaint: Acute renal failure (ARF)    Subjective:     Interval History:  History taken from: patient chart    Patient seen and examined at bedside.  She does not have any significant pain  issues today.        Review of Systems:   Review of Systems   Constitutional:  Positive for fatigue. Negative for fever.   HENT: Negative.     Eyes:  Negative for visual disturbance.   Respiratory:  Negative for cough and shortness of breath.    Cardiovascular:  Negative for chest pain, palpitations and leg swelling.   Gastrointestinal:  Negative for abdominal pain, blood in stool, nausea and vomiting.   Genitourinary: Negative.    Musculoskeletal: Negative.    Neurological:  Positive for weakness.       Objective:     Vital Signs  Temp:  [97.7 °F (36.5 °C)-98.8 °F (37.1 °C)] 98.5 °F (36.9 °C)  Heart Rate:  [59-75] 59  Resp:  [13-23] 20  BP: (104-135)/(44-69) 113/46   Body mass index is 20.67 kg/m².    Physical Exam  Physical Exam  Constitutional:       Appearance: She is ill-appearing.   Cardiovascular:      Rate and Rhythm: Normal rate and regular rhythm.      Pulses: Normal pulses.      Heart sounds: Normal heart sounds. No murmur heard.  Pulmonary:      Effort: Pulmonary effort is normal. No respiratory distress.      Breath sounds: Normal breath sounds. No wheezing or rales.   Abdominal:      General: There is no distension.      Tenderness: There is no abdominal tenderness.      Comments: Ileostomy with dark output  Hyperactive bowel sounds   Musculoskeletal:         General: No swelling, tenderness, deformity or signs of injury.      Cervical back: Normal range of motion.   Skin:     Findings: Bruising (Bilateral upper extremities) and erythema present.   Neurological:      General: No focal deficit present.      Mental Status: She is alert. Mental status is at baseline.      Cranial Nerves: No cranial nerve deficit.      Sensory: No sensory deficit.      Motor: No weakness.      Coordination: Coordination normal.            Diagnostic Data    Results from last 7 days   Lab Units 12/29/24  0245 12/28/24  0530 12/28/24  0014   WBC 10*3/mm3 8.35  --  9.43   HEMOGLOBIN g/dL 7.0*   < > 7.4*   HEMATOCRIT % 22.8*   --  22.7*   PLATELETS 10*3/mm3 324  --  336   GLUCOSE mg/dL 73  --  82   CREATININE mg/dL 1.98*  --  2.80*   BUN mg/dL 31*  --  47*   SODIUM mmol/L 139  --  138   POTASSIUM mmol/L 3.4*  --  3.6   AST (SGOT) U/L  --   --  24   ALT (SGPT) U/L  --   --  15   ALK PHOS U/L  --   --  109   BILIRUBIN mg/dL  --   --  0.2   ANION GAP mmol/L 9.5  --  11.8    < > = values in this interval not displayed.       CT Abdomen Pelvis Without Contrast    Result Date: 12/27/2024  Impression: Interval postoperative changes without acute findings in the abdomen or pelvis. Electronically Signed: Mansoor Flores  12/27/2024 6:43 PM EST  Workstation ID: [id number]       I reviewed the patient's new clinical results.    Assessment/Plan:     Active and Resolved Problems  Active Hospital Problems    Diagnosis  POA    **Acute renal failure (ARF) [N17.9]  Yes    Gastric bleed [K92.2]  Yes      Resolved Hospital Problems   No resolved problems to display.       Assessment and plan      BISHNU likely prerenal secondary to dehydration due to high volume output from ileostomy due to Crohn's disease  Anion gap metabolic acidosis likely secondary to above  Lactic acidosis likely secondary to above  Hyperkalemia likely secondary to above  -Baseline creatinine 0.5 December 2024  -Status post IV fluid bolus Lokelma in the ED  -Nephrology consulted  -Initiated on bicarbonate infusion with significant improvement in her renal function and metabolic acidosis, creatinine down to 1.9 today  -Trend lactic acidosis until less than 2. Still elevated but improving. Most recent was 2.8.  -CT of the abdomen/pelvis does not show any evidence of acute pathology  -Hold home dose of methotrexate, meloxicam, spironolactone    Acute blood loss anemia likely due to use of antiplatelet therapy  - likely secondary to GI bleed in the setting of antiplatelet therapy. Hgb has varied significantly over the last several months.   -Transfused PRBC with improvement in hemoglobin  -Hgb  7.0 today, additional unit of PRBCs ordered 12/29  -GI has been consulted; no indication for ileoscopy unless patient has persistent bleeding  -Transfuse for hemoglobin less than 8 with signs of active bleed  -PPI bolus and drip    Mild elevated troponin, type II ischemia  -Troponin peaked at 59  -No further cardiac workup necessary at this time  -Likely secondary to volume depletion and anemia causing mild demand ischemia     History of Crohn's disease  -Will defer to GI for continuation of continue of mesalamine suppository nightly                History of rheumatoid arthritis  -Patient noted to be on Humira 40 mg weekly with last dose 12/24/2024  -Hold methotrexate 6 mg weekly on Sundays due to renal failure  -Patient noted to be on Rinvoq 45 mcg daily which has not formulary at this facility     CAD with recent stent placement in October 2024  -Will hold aspirin 81 mg daily and Plavix 75 mg daily  -Continue metoprolol 12.5 mg twice daily  -Continue midodrine 5 mg 3 times daily  -Cardiology consult for management of DAPT    History of hyperlipidemia  Continue atorvastatin 40 mg nightly     Dietary supplementation, chronic:   -Hold secondary to acute renal failure     Antibiotic therapy, uncertain etiology:   -Patient noted to be on ceftriaxone daily injection     Chronic pain   -discontinue diclofenac 50 mg twice daily     GERD  -Home dose of famotidine  -Continue on PPI drip for GI bleed     COPD not in exacerbation  -chronic with chronic oxygen 2.5 L per nasal cannula: Continue guaifenesin  -Continue home dose of bronchodilators     History of hypothyroidism  -Continue levothyroxine 50 mcg daily  -Check TSH     History of anxiety  -Continue Zoloft 50 mg daily     VTE Prophylaxis:  Pharmacologic VTE prophylaxis orders are present.       VTE Prophylaxis:  Pharmacologic VTE prophylaxis orders are present.             Disposition Planning:     Barriers to Discharge: severe anemia,renal failure  Anticipated Date of  Discharge:   Place of Discharge: home      Time: 35 minutes     Code Status and Medical Interventions: CPR (Attempt to Resuscitate); Full Support   Ordered at: 24 2065     Code Status (Patient has no pulse and is not breathing):    CPR (Attempt to Resuscitate)     Medical Interventions (Patient has pulse or is breathing):    Full Support       Signature: Electronically signed by Sallie Brown PA-C, 24, 12:25 EST.  [facility]    Electronically signed by El Childress MD at 24 1329       El Childress MD at 24 1340              [facility] MEDICINE SERVICE  DAILY PROGRESS NOTE    NAME: Maryann Gaitan  : 1950  MRN: [id number]      LOS: 2 days     PROVIDER OF SERVICE: El Childress MD    Chief Complaint: Acute renal failure (ARF)    Subjective:     Interval History:  History taken from: patient chart    Patient seen and examined at bedside.  She does not have any significant pain issues today.  There has been less bloody output from her ostomy.      Review of Systems:   Review of Systems    Objective:     Vital Signs  Temp:  [97.3 °F (36.3 °C)-98.3 °F (36.8 °C)] 98 °F (36.7 °C)  Heart Rate:  [63-89] 64  Resp:  [15-22] 16  BP: ()/(46-67) 108/52   Body mass index is 21.46 kg/m².    Physical Exam  Physical Exam  Constitutional:       Appearance: She is ill-appearing.   Cardiovascular:      Rate and Rhythm: Normal rate and regular rhythm.      Pulses: Normal pulses.      Heart sounds: Normal heart sounds. No murmur heard.  Pulmonary:      Effort: Pulmonary effort is normal. No respiratory distress.      Breath sounds: Normal breath sounds. No wheezing or rales.   Abdominal:      General: There is no distension.      Tenderness: There is no abdominal tenderness.      Comments: Ileostomy with dark output  Hyperactive bowel sounds   Musculoskeletal:         General: No swelling, tenderness, deformity or signs of injury.      Cervical back: Normal range of  motion.   Skin:     Findings: Bruising (Bilateral upper extremities) and erythema present.   Neurological:      General: No focal deficit present.      Mental Status: She is alert. Mental status is at baseline.      Cranial Nerves: No cranial nerve deficit.      Sensory: No sensory deficit.      Motor: No weakness.      Coordination: Coordination normal.            Diagnostic Data    Results from last 7 days   Lab Units 12/28/24  0530 12/28/24  0014   WBC 10*3/mm3  --  9.43   HEMOGLOBIN g/dL 7.5* 7.4*   HEMATOCRIT %  --  22.7*   PLATELETS 10*3/mm3  --  336   GLUCOSE mg/dL  --  82   CREATININE mg/dL  --  2.80*   BUN mg/dL  --  47*   SODIUM mmol/L  --  138   POTASSIUM mmol/L  --  3.6   AST (SGOT) U/L  --  24   ALT (SGPT) U/L  --  15   ALK PHOS U/L  --  109   BILIRUBIN mg/dL  --  0.2   ANION GAP mmol/L  --  11.8       CT Abdomen Pelvis Without Contrast    Result Date: 12/27/2024  Impression: Interval postoperative changes without acute findings in the abdomen or pelvis. Electronically Signed: Mansoor Flores  12/27/2024 6:43 PM EST  Workstation ID: [id number]    US Renal Bilateral    Result Date: 12/27/2024  Impression: Kidney ultrasound is within normal limits. Electronically Signed: Juan Dia MD  12/27/2024 6:33 AM EST  Workstation ID: [id number]    XR Chest 1 View    Result Date: 12/26/2024  Impression: Emphysema with no acute cardiopulmonary process demonstrated Electronically Signed: Colt Diana  12/26/2024 9:03 PM EST  Workstation ID: [id number]       I reviewed the patient's new clinical results.    Assessment/Plan:     Active and Resolved Problems  Active Hospital Problems    Diagnosis  POA    **Acute renal failure (ARF) [N17.9]  Yes    Gastric bleed [K92.2]  Yes      Resolved Hospital Problems   No resolved problems to display.       Assessment and plan      BISHNU likely prerenal secondary to dehydration due to high volume output from ileostomy due to Crohn's disease  Anion gap metabolic acidosis likely  secondary to above  Lactic acidosis likely secondary to above  Hyperkalemia likely secondary to above  -Baseline creatinine 0.5 December 2024  -Status post IV fluid bolus Lokelma in the ED  -Nephrology consulted  -Initiated on bicarbonate infusion with significant improvement in her renal function and metabolic acidosis  -Trend lactic acidosis until less than 2  -CT of the abdomen/pelvis does not show any evidence of acute pathology  -Hold home dose of methotrexate, meloxicam, spironolactone    Acute blood loss anemia likely due to use of antiplatelet therapy  - likely secondary to GI bleed in the setting of antiplatelet therapy  -Transfused PRBC with improvement in hemoglobin  -GI has been consulted; no indication for ileoscopy unless patient has persistent bleeding  -Transfuse for hemoglobin less than 8 with signs of active bleed  -PPI bolus and drip    Mild elevated troponin, type II ischemia  -Troponin peaked at 59  -No further cardiac workup necessary at this time  -Likely secondary to volume depletion and anemia causing mild demand ischemia     History of Crohn's disease  -Will defer to GI for continuation of continue of mesalamine suppository nightly                History of rheumatoid arthritis  -Patient noted to be on Humira 40 mg weekly with last dose 12/24/2024  -Hold methotrexate 6 mg weekly on Sundays due to renal failure  -Patient noted to be on Rinvoq 45 mcg daily which has not formulary at this facility     CAD with recent stent placement in October 2024  -Will hold aspirin 81 mg daily and Plavix 75 mg daily  -Continue metoprolol 12.5 mg twice daily  -Continue midodrine 5 mg 3 times daily  -Cardiology consult for management of DAPT    History of hyperlipidemia  Continue atorvastatin 40 mg nightly     Dietary supplementation, chronic:   -Hold secondary to acute renal failure     Antibiotic therapy, uncertain etiology:   -Patient noted to be on ceftriaxone daily injection     Chronic pain    -discontinue diclofenac 50 mg twice daily     GERD  -Home dose of famotidine  -Continue on PPI drip for GI bleed     COPD not in exacerbation  -chronic with chronic oxygen 2.5 L per nasal cannula: Continue guaifenesin  -Continue home dose of bronchodilators     History of hypothyroidism  -Continue levothyroxine 50 mcg daily  -Check TSH     History of anxiety  -Continue Zoloft 50 mg daily     VTE Prophylaxis:  Pharmacologic VTE prophylaxis orders are present.       VTE Prophylaxis:  Pharmacologic VTE prophylaxis orders are present.             Disposition Planning:     Barriers to Discharge: GI bleed,renal failure  Anticipated Date of Discharge: 72 hrs  Place of Discharge: skilled nursing facility      Time: 35 minutes     Code Status and Medical Interventions: CPR (Attempt to Resuscitate); Full Support   Ordered at: 24 2325     Code Status (Patient has no pulse and is not breathing):    CPR (Attempt to Resuscitate)     Medical Interventions (Patient has pulse or is breathing):    Full Support       Signature: Electronically signed by El Childress MD, 24, 13:40 EST.  [facility] Hospitalist Team    Electronically signed by El Childress MD at 24 1343       Consult Notes (last 48 hours)  Notes from 24 1301 through 24 1301   No notes of this type exist for this encounter.          Physical Therapy Notes (all)        Reyes, Carmela, PT at 24 1248  Version 1 of 1            24 1248   Rehab Time/Intention   Session Not Performed patient unavailable for evaluation  (RN declined)   Recommendation   PT - Next Appointment 24         Electronically signed by Reyes, Carmela, PT at 24 1248       Dorothy Davila PT at 24 1244  Version 1 of 1         Patient Name: Maryann Gaitan  : 1950    MRN: [id number]                              Today's Date: 2024       Admit Date: 2024    Visit Dx:     ICD-10-CM ICD-9-CM   1. Acute renal failure, unspecified  acute renal failure type  N17.9 584.9   2. Hyperkalemia  E87.5 276.7     Patient Active Problem List   Diagnosis    Mitral regurgitation    Cavitary lesion of lung    CAD, multiple vessel    Acute heart failure with preserved ejection fraction (HFpEF)    Severe mitral regurgitation    Dyslipidemia    Crohn's disease    Hypothyroidism (acquired)    Anxiety associated with depression    COPD (chronic obstructive pulmonary disease)    Rheumatoid arthritis    Moderate protein-calorie malnutrition    Acute lower GI bleeding    First degree hemorrhoids    Benign neoplasm of cecum    GERD (gastroesophageal reflux disease)    Hashimoto's thyroiditis    History of colonic polyps    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    Fecal urgency    Second degree hemorrhoids    Vitamin D deficiency, unspecified    Stress incontinence, female    Aphthae, oral    Hx of seborrheic keratosis    Primary hypertension    S/P mitral valve clip implantation    Gastrointestinal hemorrhage    Anemia    PNA (pneumonia)    Pneumonia due to other gram-negative bacteria    Acute renal failure (ARF)    Gastric bleed     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 07/05/2016    Dyslipidemia 10/30/2024    Fecal urgency 11/21/2024    First degree hemorrhoids 07/09/2021    GERD (gastroesophageal reflux disease) 11/21/2024    Hashimoto's thyroiditis 11/21/2024    History of colonic polyps 07/09/2021    Hypertension     Hypothyroidism (acquired) 11/06/2024    Mitral regurgitation 10/08/2024    Moderate protein-calorie  malnutrition 11/09/2024    Multiple tracheobronchial mucus plugs 10/08/2024    Nicotine dependence 11/21/2024    Rheumatoid arthritis 11/06/2024    S/P mitral valve clip implantation 11/21/2024    Second degree hemorrhoids 07/05/2016    Stress incontinence, female 11/21/2024    Vitamin D deficiency, unspecified 11/21/2024     Past Surgical History:   Procedure Laterality Date    BRONCHOSCOPY N/A 10/16/2024    Procedure: BRONCHOSCOPY;  Surgeon: Gallo Pope MD;  Location: [facility] ENDOSCOPY;  Service: Pulmonary;  Laterality: N/A;    CARDIAC CATHETERIZATION N/A 10/15/2024    Procedure: Left Heart Cath, possible pci;  Surgeon: Travis Connor MD;  Location: [facility] CATH INVASIVE LOCATION;  Service: Cardiovascular;  Laterality: N/A;    CARDIAC CATHETERIZATION N/A 10/22/2024    Procedure: Laser Coronary Atherectomy;  Surgeon: Travis Connor MD;  Location: [facility] CATH INVASIVE LOCATION;  Service: Cardiovascular;  Laterality: N/A;    COLON RESECTION Right 11/28/2024    Procedure: RIGHT HEMICOLECTOMY WITH ILEOSTOMY;  Surgeon: Todd Babin MD;  Location: [facility] MAIN OR;  Service: General;  Laterality: Right;    COLONOSCOPY N/A 10/12/2024    Procedure: COLONOSCOPY WITH BIOPSY AND WIRE GUIDED BALLOON DILATION OF TERMINAL ILEUM;  Surgeon: Rob Strong MD;  Location: [facility] ENDOSCOPY;  Service: Gastroenterology;  Laterality: N/A;  Colitis, crohns of terminal ileum, right colon ulcers, diverticulosis, hemorroids    ENDOSCOPY N/A 10/12/2024    Procedure: ESOPHAGOGASTRODUODENOSCOPY WITH BIOPSY X 2 AREA;  Surgeon: Rob Strong MD;  Location: [facility] ENDOSCOPY;  Service: Gastroenterology;  Laterality: N/A;  Chronic gastritis, HH    ENDOSCOPY Left 11/28/2024    Procedure: ESOPHAGOGASTRODUODENOSCOPY;  Surgeon: Dallin Delgado MD;  Location: [facility] ENDOSCOPY;  Service: Gastroenterology;  Laterality: Left;  small hiatal hernia    HYSTERECTOMY      LEFT HEART CATH      MITRAL VALVE  REPAIR/REPLACEMENT N/A 11/21/2024    Procedure: Transcatheter Mitral Valve Repair;  Surgeon: Dmitri Navarro MD;  Location: [facility] OR;  Service: Cardiovascular;  Laterality: N/A;      General Information       Row Name 12/30/24 1137          Physical Therapy Time and Intention    Document Type evaluation  -RM     Mode of Treatment physical therapy  -RM       Row Name 12/30/24 1137          General Information    Patient Profile Reviewed yes  -RM     Prior Level of Function --  24/7 care from her grandchildren receiving assistance with all mobility/ADLs with use of RW/4WW. Pt was at SNF for rehab prior to this hospitalization.  -RM     Existing Precautions/Restrictions fall  Unstable wound on coccyx, ileostomy--abdominal sparing precautions/watch gait belt placement, catheter, strict intake and output  -RM       Row Name 12/30/24 1137          Living Environment    People in Home --  Pt resides with 2 adult granddaughters in a [facility] with 2-3 RICKEY with B HR. No steps within home needs to navigate.  -RM       Row Name 12/30/24 1137          Cognition    Orientation Status (Cognition) oriented x 4  -RM       Row Name 12/30/24 1137          Safety Issues/Impairments Affecting Functional Mobility    Impairments Affecting Function (Mobility) balance;coordination;endurance/activity tolerance;pain;strength  -RM               User Key  (r) = Recorded By, (t) = Taken By, (c) = Cosigned By      Initials Name Provider Type    RM Dorothy Davila, PT Physical Therapist                   Mobility       Row Name 12/30/24 1145          Bed Mobility    Bed Mobility rolling right;supine-sit;sit-supine  -RM     Rolling Right [place] (Bed Mobility) minimum assist (75% patient effort)  -RM     Supine-Sit [place] (Bed Mobility) minimum assist (75% patient effort)  -RM     Sit-Supine [place] (Bed Mobility) minimum assist (75% patient effort)  -RM     Comment, (Bed Mobility) Increased time/effort needed; log roll technique  performed for abdominal sparing  -RM       Row Name 12/30/24 1145          Sit-Stand Transfer    Sit-Stand Leonard (Transfers) minimum assist (75% patient effort);2 person assist  -RM     Comment, (Sit-Stand Transfer) Cuing provided for technique/safety  -RM               User Key  (r) = Recorded By, (t) = Taken By, (c) = Cosigned By      Initials Name Provider Type     Dorothy Davila, PT Physical Therapist                   Obj/Interventions       Row Name 12/30/24 1147          Range of Motion Comprehensive    General Range of Motion bilateral lower extremity ROM WFL  -RM       Row Name 12/30/24 1147          Strength Comprehensive (MMT)    Comment, General Manual Muscle Testing (MMT) Assessment Not formally assessed, however no specific strength deficits identified during functional assessment; globalized weakness observed  -RM       Row Name 12/30/24 1147          Balance    Comment, Balance No overt LOB, however BLE instability observed during transfers/standing at EOB  -RM               User Key  (r) = Recorded By, (t) = Taken By, (c) = Cosigned By      Initials Name Provider Type     Dorothy Davila, PT Physical Therapist                   Goals/Plan       Row Name 12/30/24 1216          Bed Mobility Goal 1 (PT)    Activity/Assistive Device (Bed Mobility Goal 1, PT) bed mobility activities, all  -RM     Leonard Level/Cues Needed (Bed Mobility Goal 1, PT) standby assist  -RM     Time Frame (Bed Mobility Goal 1, PT) long term goal (LTG);2 weeks  -RM       Row Name 12/30/24 1216          Transfer Goal 1 (PT)    Activity/Assistive Device (Transfer Goal 1, PT) transfers, all  -RM     Leonard Level/Cues Needed (Transfer Goal 1, PT) minimum assist (75% or more patient effort)  -RM     Time Frame (Transfer Goal 1, PT) long term goal (LTG);2 weeks  -RM       Row Name 12/30/24 1216          Gait Training Goal 1 (PT)    Activity/Assistive Device (Gait Training Goal 1, PT) gait (walking locomotion)  -RM      [place] Level (Gait Training Goal 1, PT) minimum assist (75% or more patient effort)  -RM     Distance (Gait Training Goal 1, PT) x 25 ft with most appropriate AD  -RM     Time Frame (Gait Training Goal 1, PT) long term goal (LTG);2 weeks  -RM       Row Name 12/30/24 1216          Therapy Assessment/Plan (PT)    Planned Therapy Interventions (PT) balance training;bed mobility training;gait training;patient/family education;strengthening;transfer training  -RM               User Key  (r) = Recorded By, (t) = Taken By, (c) = Cosigned By      Initials Name Provider Type    RM Dorothy Davila, PT Physical Therapist                   Clinical Impression       Row Name 12/30/24 1148          Pain    Pretreatment Pain Rating 8/10  -RM     Posttreatment Pain Rating 8/10  -RM     Pre/Posttreatment Pain Comment 8/10 pain of abdominal region. Of note, swelling observed of RUE pt reports is d/t getting stuck in hospital bed rail denying pain. Nursing/ MD aware reporting no concern/ no current restrictions of RUE at this time. Nursing to further monitor.  -RM       Row Name 12/30/24 1148          Plan of Care Review    Plan of Care Reviewed With patient  -RM     Outcome Evaluation 75 y/o F with a h/o CAD s/p recent stent on dual platelet therapy, COPD, RA, Crohn's on biologic therapy, ileostomy with mucous fistula for GI bleed who presented to [facility] on 12/26/2024 with chief complaint of abnormal labs at the [facility], intermittent chest pain, and high-volume output from her ileostomy with poor adhesion of pouch and excoriation of the skin surrounding the stoma. Pt found to have BISHNU likely d/t dehydration, anion gap metabolic acidosis, lactic acidosis, hyperkalemia, mild elevated troponin, acute blood loss anemia. Of note, pt recently admitted to hospital 12/11-12/17/24 with chest pain, as well as d/c from hospital 12/4/2024 after being tx for a lower GI bleed with right hemicolectomy with ileostomy and mucous  fistula creation requiring several units of PRBCs, as well as underwent a mitral valve clip on 11/21/2024. At baseline, pt lived at home with 2 granddaughters requiring assist with mobility/ADLs with use of RW/4WW. This date, pt requires Min A with bed mobility and Min A x 2 for STS transfers (x 2 attempts) at EOB. BP in bed upon entry: 113/75 mmhg, sitting at EOB: 108/54 mmhg, and standing at EOB 93/75 mmhg. Nursing notified. PT d/c recommendation of continued skilled PT services at SNF d/t inability to safely return home at this time.  -       Row Name 12/30/24 1148          Positioning and Restraints    Pre-Treatment Position in bed  -RM     Post Treatment Position bed  -RM     In Bed notified nsg;call light within reach;encouraged to call for assist;exit alarm on  Requesting to return to bed at end of PT session  -RM               User Key  (r) = Recorded By, (t) = Taken By, (c) = Cosigned By      Initials Name Provider Type     Dorothy Davila, PT Physical Therapist                   Outcome Measures       Row Name 12/30/24 1216 12/30/24 0820       How much help from another person do you currently need...    Turning from your back to your side while in flat bed without using bedrails? 3  -RM 3  -EASTON    Moving from lying on back to sitting on the side of a flat bed without bedrails? 3  -RM 3  -EASTON    Moving to and from a bed to a chair (including a wheelchair)? 1  -RM 3  -EASTON    Standing up from a chair using your arms (e.g., wheelchair, bedside chair)? 1  -RM 2  -EASTON    Climbing 3-5 steps with a railing? 1  -RM 1  -EASTON    To walk in hospital room? 1  -RM 1  -EASTON    AM-PAC 6 Clicks Score (PT) 10  -RM 13  -EASTON      Row Name 12/30/24 1248          Functional Assessment    Outcome Measure Options AM-PAC 6 Clicks Daily Activity (OT)  -ES               User Key  (r) = Recorded By, (t) = Taken By, (c) = Cosigned By      Initials Name Provider Type    Paulette Burt, OT Occupational Therapist    Galina Hou,  RN Registered Nurse    Dorothy Rodriguez, PT Physical Therapist                                 Physical Therapy Education       Title: PT OT SLP Therapies (Done)       Topic: Physical Therapy (Done)       Point: Mobility training (Done)       Learning Progress Summary            Patient Acceptance, E, VU by  at 12/30/2024 1217                                      User Key       Initials Effective Dates Name Provider Type Discipline     03/27/23 -  Dorothy Davila, PT Physical Therapist PT                  PT Recommendation and Plan  Planned Therapy Interventions (PT): balance training, bed mobility training, gait training, patient/family education, strengthening, transfer training  Outcome Evaluation: 73 y/o F with a h/o CAD s/p recent stent on dual platelet therapy, COPD, RA, Crohn's on biologic therapy, ileostomy with mucous fistula for GI bleed who presented to [facility] on 12/26/2024 with chief complaint of abnormal labs at the F, intermittent chest pain, and high-volume output from her ileostomy with poor adhesion of pouch and excoriation of the skin surrounding the stoma. Pt found to have BISHNU likely d/t dehydration, anion gap metabolic acidosis, lactic acidosis, hyperkalemia, mild elevated troponin, acute blood loss anemia. Of note, pt recently admitted to hospital 12/11-12/17/24 with chest pain, as well as d/c from hospital 12/4/2024 after being tx for a lower GI bleed with right hemicolectomy with ileostomy and mucous fistula creation requiring several units of PRBCs, as well as underwent a mitral valve clip on 11/21/2024. At baseline, pt lived at home with 2 granddaughters requiring assist with mobility/ADLs with use of RW/4WW. This date, pt requires Min A with bed mobility and Min A x 2 for STS transfers (x 2 attempts) at EOB. BP in bed upon entry: 113/75 mmhg, sitting at EOB: 108/54 mmhg, and standing at EOB 93/75 mmhg. Nursing notified. PT d/c recommendation of continued skilled PT  services at SNF d/t inability to safely return home at this time.     Time Calculation:         PT Charges       Row Name 12/30/24 1217             Time Calculation    Start Time 1043  -RM      Stop Time 1106  -RM      Time Calculation (min) 23 min  -RM      PT Received On 12/30/24  -RM      PT - Next Appointment 12/31/24  -RM      PT Goal Re-Cert Due Date 01/13/25  -RM         Time Calculation- PT    Total Timed Code Minutes- PT 0 minute(s)  -RM                User Key  (r) = Recorded By, (t) = Taken By, (c) = Cosigned By      Initials Name Provider Type    RM Dorothy Davila, PT Physical Therapist                  Therapy Charges for Today       Code Description Service Date Service Provider Modifiers Qty    40027274187 HC PT EVAL MOD COMPLEXITY 4 12/30/2024 Dorothy Davila, PT GP 1            PT G-Codes  Outcome Measure Options: AM-PAC 6 Clicks Daily Activity (OT)  AM-PAC 6 Clicks Score (PT): 10  AM-PAC 6 Clicks Score (OT): 16  PT Discharge Summary  Anticipated Discharge Disposition (PT): skilled nursing facility    Dorothy Davila PT  12/30/2024      Electronically signed by Dorothy Davila, PT at 12/30/24 1253       Dorothy Davila, PT at 12/30/24 1253  Version 1 of 1         Goal Outcome Evaluation:  Plan of Care Reviewed With: patient           Outcome Evaluation: 73 y/o F with a h/o CAD s/p recent stent on dual platelet therapy, COPD, RA, Crohn's on biologic therapy, ileostomy with mucous fistula for GI bleed who presented to [facility] on 12/26/2024 with chief complaint of abnormal labs at the [facility], intermittent chest pain, and high-volume output from her ileostomy with poor adhesion of pouch and excoriation of the skin surrounding the stoma. Pt found to have BISHNU likely d/t dehydration, anion gap metabolic acidosis, lactic acidosis, hyperkalemia, mild elevated troponin, acute blood loss anemia. Of note, pt recently admitted to hospital 12/11-12/17/24 with chest pain, as well as d/c from hospital 12/4/2024  after being tx for a lower GI bleed with right hemicolectomy with ileostomy and mucous fistula creation requiring several units of PRBCs, as well as underwent a mitral valve clip on 2024. At baseline, pt lived at home with 2 granddaughters requiring assist with mobility/ADLs with use of RW/4WW. This date, pt requires Min A with bed mobility and Min A x 2 for STS transfers (x 2 attempts) at EOB. BP in bed upon entry: 113/75 mmhg, sitting at EOB: 108/54 mmhg, and standing at EOB 93/75 mmhg. Nursing notified. PT d/c recommendation of continued skilled PT services at SNF d/t inability to safely return home at this time.    Anticipated Discharge Disposition (PT): skilled nursing facility                          Electronically signed by Dorothy Davila PT at 24 1253          Occupational Therapy Notes (all)        Paulette Nelson OT at 24 1249          Patient Name: Maryann Gaitan  : 1950    MRN: [id number]                              Today's Date: 2024       Admit Date: 2024    Visit Dx:     ICD-10-CM ICD-9-CM   1. Acute renal failure, unspecified acute renal failure type  N17.9 584.9   2. Hyperkalemia  E87.5 276.7     Patient Active Problem List   Diagnosis    Mitral regurgitation    Cavitary lesion of lung    CAD, multiple vessel    Acute heart failure with preserved ejection fraction (HFpEF)    Severe mitral regurgitation    Dyslipidemia    Crohn's disease    Hypothyroidism (acquired)    Anxiety associated with depression    COPD (chronic obstructive pulmonary disease)    Rheumatoid arthritis    Moderate protein-calorie malnutrition    Acute lower GI bleeding    First degree hemorrhoids    Benign neoplasm of cecum    GERD (gastroesophageal reflux disease)    Hashimoto's thyroiditis    History of colonic polyps    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    Fecal urgency    Second degree hemorrhoids    Vitamin D deficiency, unspecified    Stress incontinence,  female    Aphthae, oral    Hx of seborrheic keratosis    Primary hypertension    S/P mitral valve clip implantation    Gastrointestinal hemorrhage    Anemia    PNA (pneumonia)    Pneumonia due to other gram-negative bacteria    Acute renal failure (ARF)    Gastric bleed     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 07/05/2016    Dyslipidemia 10/30/2024    Fecal urgency 11/21/2024    First degree hemorrhoids 07/09/2021    GERD (gastroesophageal reflux disease) 11/21/2024    Hashimoto's thyroiditis 11/21/2024    History of colonic polyps 07/09/2021    Hypertension     Hypothyroidism (acquired) 11/06/2024    Mitral regurgitation 10/08/2024    Moderate protein-calorie malnutrition 11/09/2024    Multiple tracheobronchial mucus plugs 10/08/2024    Nicotine dependence 11/21/2024    Rheumatoid arthritis 11/06/2024    S/P mitral valve clip implantation 11/21/2024    Second degree hemorrhoids 07/05/2016    Stress incontinence, female 11/21/2024    Vitamin D deficiency, unspecified 11/21/2024     Past Surgical History:   Procedure Laterality Date    BRONCHOSCOPY N/A 10/16/2024    Procedure: BRONCHOSCOPY;  Surgeon: Gallo Pope MD;  Location: [facility] ENDOSCOPY;  Service: Pulmonary;  Laterality: N/A;    CARDIAC CATHETERIZATION N/A 10/15/2024    Procedure: Left Heart Cath, possible pci;  Surgeon: Travis Connor MD;  Location: [facility] CATH INVASIVE LOCATION;  Service: Cardiovascular;  Laterality: N/A;    CARDIAC CATHETERIZATION N/A 10/22/2024    Procedure: Laser Coronary Atherectomy;  Surgeon: Geeta  Travis Pérez MD;  Location: [facility] CATH INVASIVE LOCATION;  Service: Cardiovascular;  Laterality: N/A;    COLON RESECTION Right 11/28/2024    Procedure: RIGHT HEMICOLECTOMY WITH ILEOSTOMY;  Surgeon: Todd Babin MD;  Location: [facility] MAIN OR;  Service: General;  Laterality: Right;    COLONOSCOPY N/A 10/12/2024    Procedure: COLONOSCOPY WITH BIOPSY AND WIRE GUIDED BALLOON DILATION OF TERMINAL ILEUM;  Surgeon: Rob Strong MD;  Location: [facility] ENDOSCOPY;  Service: Gastroenterology;  Laterality: N/A;  Colitis, crohns of terminal ileum, right colon ulcers, diverticulosis, hemorroids    ENDOSCOPY N/A 10/12/2024    Procedure: ESOPHAGOGASTRODUODENOSCOPY WITH BIOPSY X 2 AREA;  Surgeon: Rob Strong MD;  Location: [facility] ENDOSCOPY;  Service: Gastroenterology;  Laterality: N/A;  Chronic gastritis, HH    ENDOSCOPY Left 11/28/2024    Procedure: ESOPHAGOGASTRODUODENOSCOPY;  Surgeon: Dallin Delgado MD;  Location: [facility] ENDOSCOPY;  Service: Gastroenterology;  Laterality: Left;  small hiatal hernia    HYSTERECTOMY      LEFT HEART CATH      MITRAL VALVE REPAIR/REPLACEMENT N/A 11/21/2024    Procedure: Transcatheter Mitral Valve Repair;  Surgeon: Dmitri Navarro MD;  Location: [facility] HYBRID OR;  Service: Cardiovascular;  Laterality: N/A;      General Information       Row Name 12/30/24 1242          OT Time and Intention    Subjective Information complains of;weakness;fatigue;pain  -ES     Document Type evaluation  -ES     Mode of Treatment occupational therapy  -ES       Row Name 12/30/24 1242          General Information    Patient Profile Reviewed yes  -ES     Existing Precautions/Restrictions fall  Unstable wound on coccyx, ileostomy--abdominal sparing precautions/watch gait belt placement, catheter, strict intake and output  -ES       Row Name 12/30/24 1242          Occupational Profile    Reason for Services/Referral (Occupational Profile) Pt is a 74 y.o. year old female admitted  12/26/24 with abdnomrla labs. Noted with elevated troponin, likey 2* to volume depletion casuing demand ischemia. Dx with BISHNU, likely due to dehydration from high volume output from ileostomy.   Recent admissions 12/4 GI bleed and hemicolectomy, and 12/11 with chest pain.     PMHx significant for ileostomy, CAD, HTN, hyperlipidemia, RA, COPD, hypothyroidism, and Crohn's disease.    Pt admitted from [facility]. At baseline, pt resides on main level, x2 [place], with x2 grandchildren (One works night shifts and the other works days shifts, able to provided 24/7 supervision) with a renter who is a family friend. Pt reports she is I with ADLs, utilizes walker and rollator though prefers walker, and does not drive.  -ES       Row Name 12/30/24 1242          Home Main Entrance    Number of Stairs, Main Entrance two  -ES       Row Name 12/30/24 1242          Stairs Within Home, Primary    Number of Stairs, Within Home, Primary none  -ES       Row Name 12/30/24 1242          Cognition    Orientation Status (Cognition) oriented x 4  -ES       Row Name 12/30/24 1242          Safety Issues/Impairments Affecting Functional Mobility    Impairments Affecting Function (Mobility) balance;coordination;endurance/activity tolerance;pain;strength  -ES               User Key  (r) = Recorded By, (t) = Taken By, (c) = Cosigned By      Initials Name Provider Type    Paulette Burt OT Occupational Therapist                     Mobility/ADL's       Row Name 12/30/24 1243          Bed Mobility    Bed Mobility rolling right;supine-sit;sit-supine  -ES     Rolling Right [place] (Bed Mobility) minimum assist (75% patient effort)  -ES     Supine-Sit [place] (Bed Mobility) minimum assist (75% patient effort)  -ES     Sit-Supine [place] (Bed Mobility) minimum assist (75% patient effort)  -ES       Row Name 12/30/24 1243          Sit-Stand Transfer    Sit-Stand [place] (Transfers) minimum assist (75% patient effort);2  "person assist  -ES       Row Name 12/30/24 1243          Functional Mobility    Functional Mobility- Ind. Level not tested  -ES       Row Name 12/30/24 1243          Activities of Daily Living    BADL Assessment/Intervention upper body dressing;lower body dressing;toileting  -ES       Row Name 12/30/24 1243          Upper Body Dressing Assessment/Training    [place] Level (Upper Body Dressing) minimum assist (75% patient effort)  -ES       Row Name 12/30/24 1243          Lower Body Dressing Assessment/Training    [place] Level (Lower Body Dressing) moderate assist (50% patient effort)  -ES       Row Name 12/30/24 1243          Toileting Assessment/Training    [place] Level (Toileting) moderate assist (50% patient effort)  -ES               User Key  (r) = Recorded By, (t) = Taken By, (c) = Cosigned By      Initials Name Provider Type    Paulette Burt, OT Occupational Therapist                   Obj/Interventions       Row Name 12/30/24 1244          Sensory Assessment (Somatosensory)    Sensory Assessment (Somatosensory) unable/difficult to assess  -       Row Name 12/30/24 1244          Vision Assessment/Intervention    Visual Impairment/Limitations corrective lenses full-time  -ES       Row Name 12/30/24 1244          Range of Motion Comprehensive    Comment, General Range of Motion Co RUE pain \"caught it between bedrail and bed this morning\". NSG alerted.  -       Row Name 12/30/24 1244          Strength Comprehensive (MMT)    Comment, General Manual Muscle Testing (MMT) Assessment Limited due to pain. Grossly WFL  -ES       Row Name 12/30/24 1244          Balance    Balance Assessment sitting static balance;sitting dynamic balance;standing static balance  -ES     Static Sitting Balance supervision  -ES     Dynamic Sitting Balance contact guard  -ES     Static Standing Balance minimal assist;moderate assist  -ES     Balance Interventions sitting;standing;static  -ES               User " Key  (r) = Recorded By, (t) = Taken By, (c) = Cosigned By      Initials Name Provider Type    Paulette Burt OT Occupational Therapist                   Goals/Plan       Row Name 12/30/24 1248          Dressing Goal 1 (OT)    Activity/Device (Dressing Goal 1, OT) dressing skills, all  -ES     [place]/Cues Needed (Dressing Goal 1, OT) minimum assist (75% or more patient effort)  -ES     Time Frame (Dressing Goal 1, OT) 2 weeks  -ES       Row Name 12/30/24 1248          Toileting Goal 1 (OT)    Activity/Device (Toileting Goal 1, OT) toileting skills, all  -ES     [place] Level/Cues Needed (Toileting Goal 1, OT) minimum assist (75% or more patient effort)  -ES     Time Frame (Toileting Goal 1, OT) 2 weeks  -ES       Row Name 12/30/24 1248          Problem Specific Goal 1 (OT)    Problem Specific Goal 1 (OT) standing activity tolerance >3 minutes  -ES     Time Frame (Problem Specific Goal 1, OT) 2 weeks  -ES       Row Name 12/30/24 1248          Therapy Assessment/Plan (OT)    Planned Therapy Interventions (OT) activity tolerance training;BADL retraining;neuromuscular control/coordination retraining;occupation/activity based interventions;functional balance retraining;ROM/therapeutic exercise;strengthening exercise  -ES               User Key  (r) = Recorded By, (t) = Taken By, (c) = Cosigned By      Initials Name Provider Type    Paulette Burt OT Occupational Therapist                   Clinical Impression       Row Name 12/30/24 1249          Pain Assessment    Pretreatment Pain Rating 8/10  -ES     Posttreatment Pain Rating 8/10  -ES     Pain Location abdomen;extremity  -ES     Pain Side/Orientation right;left  upper  -ES       Row Name 12/30/24 1246          Plan of Care Review    Plan of Care Reviewed With patient  -ES     Outcome Evaluation Pt is a 74 y.o. year old female admitted 12/26/24 with abdnomrla labs. Noted with elevated troponin, likey 2* to volume depletion casuing demand  "ischemia. Dx with BISHNU, likely due to dehydration from high volume output from ileostomy.   Recent admissions 12/4 GI bleed and hemicolectomy, and 12/11 with chest pain.     PMHx significant for ileostomy, CAD, HTN, hyperlipidemia, RA, COPD, hypothyroidism, and Crohn's disease.    Pt admitted from [facility]. At baseline, pt resides on main level, x2 [facility], with x2 grandchildren (One works night shifts and the other works days shifts, able to provided 24/7 supervision) with a renter who is a family friend. Pt reports she is I with ADLs, utilizes walker and rollator though prefers walker, and does not drive. Pt c/o fatigue and pain, specifically abd and RUE pain. She reports she, \"caught it between bedrail and bed this morning\". NSG alerted. Pt required Min A and increased time for bed mobility.  Min A x2 for sit<>stand. Requires Mod A for all LB ADLs and Mod-Max A for toileting secondary to ostomy care. Pt with orthostatic hypotension with sit<>stand. She is below stated baseline and will require rehab at [place].  -ES       Row Name 12/30/24 1247          Therapy Assessment/Plan (OT)    Rehab Potential (OT) good  -ES     Criteria for Skilled Therapeutic Interventions Met (OT) yes;skilled treatment is necessary  -ES     Therapy Frequency (OT) 3 times/wk  -ES     Predicted Duration of Therapy Intervention (OT) until dc  -ES       Row Name 12/30/24 1249          Therapy Plan Review/Discharge Plan (OT)    Anticipated Discharge Disposition (OT) skilled nursing facility  -ES       Row Name 12/30/24 1052          Vital Signs    Pre Systolic BP Rehab 113  -ES     Pre Treatment Diastolic BP 75  -ES     Intra Systolic BP Rehab 108  -ES     Intra Treatment Diastolic BP 54  -ES     Post Systolic BP Rehab 93  -ES     Post Treatment Diastolic BP 57  -ES     Intratreatment Heart Rate (beats/min) 79  -ES     Posttreatment Heart Rate (beats/min) 69  -ES     Pre SpO2 (%) 94  -ES     O2 Delivery Pre Treatment room air  -ES     Intra " SpO2 (%) 99  -ES     O2 Delivery Intra Treatment room air  -ES     Post SpO2 (%) 100  -ES     O2 Delivery Post Treatment room air  -ES     Pre Patient Position Supine  -ES     Intra Patient Position Sitting  -ES     Post Patient Position Standing  -ES       Row Name 12/30/24 1052          Positioning and Restraints    Pre-Treatment Position in bed  -ES     Post Treatment Position bed  -ES     In Bed notified nsg;call light within reach;encouraged to call for assist;exit alarm on  -ES               User Key  (r) = Recorded By, (t) = Taken By, (c) = Cosigned By      Initials Name Provider Type    Paulette Burt OT Occupational Therapist                   Outcome Measures       Row Name 12/30/24 1248          How much help from another is currently needed...    Putting on and taking off regular lower body clothing? 2  -ES     Bathing (including washing, rinsing, and drying) 2  -ES     Toileting (which includes using toilet bed pan or urinal) 2  -ES     Putting on and taking off regular upper body clothing 3  -ES     Taking care of personal grooming (such as brushing teeth) 3  -ES     Eating meals 4  -ES     AM-PAC 6 Clicks Score (OT) 16  -ES       Row Name 12/30/24 1216 12/30/24 0820       How much help from another person do you currently need...    Turning from your back to your side while in flat bed without using bedrails? 3  -RM 3  -[place]    Moving from lying on back to sitting on the side of a flat bed without bedrails? 3  -RM 3  -[place]    Moving to and from a bed to a chair (including a wheelchair)? 1  -RM 3  -E[place]    Standing up from a chair using your arms (e.g., wheelchair, bedside chair)? 1  -RM 2  -EASTON    Climbing 3-5 steps with a railing? 1  -RM 1  -EASTON    To walk in hospital room? 1  -RM 1  -EASTON    AM-PAC 6 Clicks Score (PT) 10  -RM 13  -EASTON      Row Name 12/30/24 1248          Functional Assessment    Outcome Measure Options AM-PAC 6 Clicks Daily Activity (OT)  -ES               User Key  (r) = Recorded By,  "(t) = Taken By, (c) = Cosigned By      Initials Name Provider Type    Paulette Burt OT Occupational Therapist    Galina Hou, RN Registered Nurse    Dorothy Rodriguez, PT Physical Therapist                    Occupational Therapy Education        No education to display                  OT Recommendation and Plan  Planned Therapy Interventions (OT): activity tolerance training, BADL retraining, neuromuscular control/coordination retraining, occupation/activity based interventions, functional balance retraining, ROM/therapeutic exercise, strengthening exercise  Therapy Frequency (OT): 3 times/wk  Plan of Care Review  Plan of Care Reviewed With: patient  Outcome Evaluation: Pt is a 74 y.o. year old female admitted 12/26/24 with abdnomrla labs. Noted with elevated troponin, likey 2* to volume depletion casuing demand ischemia. Dx with BISHNU, likely due to dehydration from high volume output from ileostomy.   Recent admissions 12/4 GI bleed and hemicolectomy, and 12/11 with chest pain.     PMHx significant for ileostomy, CAD, HTN, hyperlipidemia, RA, COPD, hypothyroidism, and Crohn's disease.    Pt admitted from [facility]. At baseline, pt resides on main level, x2 [place], with x2 grandchildren (One works night shifts and the other works days shifts, able to provided 24/7 supervision) with a renter who is a family friend. Pt reports she is I with ADLs, utilizes walker and rollator though prefers walker, and does not drive. Pt c/o fatigue and pain, specifically abd and RUE pain. She reports she, \"caught it between bedrail and bed this morning\". NSG alerted. Pt required Min A and increased time for bed mobility.  Min A x2 for sit<>stand. Requires Mod A for all LB ADLs and Mod-Max A for toileting secondary to ostomy care. Pt with orthostatic hypotension with sit<>stand. She is below stated baseline and will require rehab at [place].     Time Calculation:         Time Calculation- OT       Row Name 12/30/24 9239 " "            Time Calculation- OT    OT Start Time 1043  -ES      OT Stop Time 1106  -ES      OT Time Calculation (min) 23 min  -ES      Total Timed Code Minutes- OT 0 minute(s)  -ES      OT Received On 12/30/24  -ES      OT - Next Appointment 01/02/25  -ES      OT Goal Re-Cert Due Date 01/13/25  -ES                User Key  (r) = Recorded By, (t) = Taken By, (c) = Cosigned By      Initials Name Provider Type    ES Paulette Nelson OT Occupational Therapist                  Therapy Charges for Today       Code Description Service Date Service Provider Modifiers Qty    [id number] HC OT EVAL HIGH COMPLEXITY 4 12/30/2024 Paulette Nelson OT GO 1                 Paulette Nelson OT  12/30/2024    Electronically signed by Paulette Nelson OT at 12/30/24 1249       Paulette Nelson OT at 12/30/24 1249          Goal Outcome Evaluation:  Plan of Care Reviewed With: patient           Outcome Evaluation: Pt is a 74 y.o. year old female admitted 12/26/24 with abdnomrla labs. Noted with elevated troponin, likey 2* to volume depletion casuing demand ischemia. Dx with BISHNU, likely due to dehydration from high volume output from ileostomy.   Recent admissions 12/4 GI bleed and hemicolectomy, and 12/11 with chest pain.     PMHx significant for ileostomy, CAD, HTN, hyperlipidemia, RA, COPD, hypothyroidism, and Crohn's disease.    Pt admitted from [facility]. At baseline, pt resides on main level, x2 [facility], with x2 grandchildren (One works night shifts and the other works days shifts, able to provided 24/7 supervision) with a renter who is a family friend. Pt reports she is I with ADLs, utilizes walker and rollator though prefers walker, and does not drive. Pt c/o fatigue and pain, specifically abd and RUE pain. She reports she, \"caught it between bedrail and bed this morning\". NSG alerted. Pt required Min A and increased time for bed mobility.  Min A x2 for sit<>stand. Requires Mod A for all LB ADLs and Mod-Max A " for toileting secondary to ostomy care. Pt with orthostatic hypotension with sit<>stand. She is below stated baseline and will require rehab at [place].                               Electronically signed by Paulette Nelson OT at 12/30/24 0881

## 2024-12-30 NOTE — THERAPY EVALUATION
Patient Name: Maryann Gaitan  : 1950    MRN: [id number]                              Today's Date: 2024       Admit Date: 2024    Visit Dx:     ICD-10-CM ICD-9-CM   1. Acute renal failure, unspecified acute renal failure type  N17.9 584.9   2. Hyperkalemia  E87.5 276.7     Patient Active Problem List   Diagnosis    Mitral regurgitation    Cavitary lesion of lung    CAD, multiple vessel    Acute heart failure with preserved ejection fraction (HFpEF)    Severe mitral regurgitation    Dyslipidemia    Crohn's disease    Hypothyroidism (acquired)    Anxiety associated with depression    COPD (chronic obstructive pulmonary disease)    Rheumatoid arthritis    Moderate protein-calorie malnutrition    Acute lower GI bleeding    First degree hemorrhoids    Benign neoplasm of cecum    GERD (gastroesophageal reflux disease)    Hashimoto's thyroiditis    History of colonic polyps    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    Fecal urgency    Second degree hemorrhoids    Vitamin D deficiency, unspecified    Stress incontinence, female    Aphthae, oral    Hx of seborrheic keratosis    Primary hypertension    S/P mitral valve clip implantation    Gastrointestinal hemorrhage    Anemia    PNA (pneumonia)    Pneumonia due to other gram-negative bacteria    Acute renal failure (ARF)    Gastric bleed     Past Medical History:   Diagnosis Date    Abnormal weight loss 2024    Acute kidney injury 2024    Acute UTI (urinary tract infection) 2024    Anxiety associated with depression 2024    Benign neoplasm of cecum 2016    CAD, multiple vessel 10/08/2024    Cavitary lesion of lung 10/08/2024    COPD (chronic obstructive pulmonary disease)     Crohn's disease 2024    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 2016    Dyslipidemia 10/30/2024    Fecal urgency 2024    First degree hemorrhoids 2021    GERD (gastroesophageal reflux  disease) 11/21/2024    Hashimoto's thyroiditis 11/21/2024    History of colonic polyps 07/09/2021    Hypertension     Hypothyroidism (acquired) 11/06/2024    Mitral regurgitation 10/08/2024    Moderate protein-calorie malnutrition 11/09/2024    Multiple tracheobronchial mucus plugs 10/08/2024    Nicotine dependence 11/21/2024    Rheumatoid arthritis 11/06/2024    S/P mitral valve clip implantation 11/21/2024    Second degree hemorrhoids 07/05/2016    Stress incontinence, female 11/21/2024    Vitamin D deficiency, unspecified 11/21/2024     Past Surgical History:   Procedure Laterality Date    BRONCHOSCOPY N/A 10/16/2024    Procedure: BRONCHOSCOPY;  Surgeon: Gallo Pope MD;  Location: [facility] ENDOSCOPY;  Service: Pulmonary;  Laterality: N/A;    CARDIAC CATHETERIZATION N/A 10/15/2024    Procedure: Left Heart Cath, possible pci;  Surgeon: Travis Connor MD;  Location: [facility] CATH INVASIVE LOCATION;  Service: Cardiovascular;  Laterality: N/A;    CARDIAC CATHETERIZATION N/A 10/22/2024    Procedure: Laser Coronary Atherectomy;  Surgeon: Travis Connor MD;  Location: [facility] CATH INVASIVE LOCATION;  Service: Cardiovascular;  Laterality: N/A;    COLON RESECTION Right 11/28/2024    Procedure: RIGHT HEMICOLECTOMY WITH ILEOSTOMY;  Surgeon: Todd Babin MD;  Location: [facility] MAIN OR;  Service: General;  Laterality: Right;    COLONOSCOPY N/A 10/12/2024    Procedure: COLONOSCOPY WITH BIOPSY AND WIRE GUIDED BALLOON DILATION OF TERMINAL ILEUM;  Surgeon: Rob Strong MD;  Location: [facility] ENDOSCOPY;  Service: Gastroenterology;  Laterality: N/A;  Colitis, crohns of terminal ileum, right colon ulcers, diverticulosis, hemorroids    ENDOSCOPY N/A 10/12/2024    Procedure: ESOPHAGOGASTRODUODENOSCOPY WITH BIOPSY X 2 AREA;  Surgeon: Rob Strong MD;  Location: [facility] ENDOSCOPY;  Service: Gastroenterology;  Laterality: N/A;  Chronic gastritis, HH    ENDOSCOPY Left 11/28/2024     Procedure: ESOPHAGOGASTRODUODENOSCOPY;  Surgeon: Dallin Delgado MD;  Location: [facility] ENDOSCOPY;  Service: Gastroenterology;  Laterality: Left;  small hiatal hernia    HYSTERECTOMY      LEFT HEART CATH      MITRAL VALVE REPAIR/REPLACEMENT N/A 11/21/2024    Procedure: Transcatheter Mitral Valve Repair;  Surgeon: Dmitri Navarro MD;  Location: [facility] HYBRID OR;  Service: Cardiovascular;  Laterality: N/A;      General Information       Row Name 12/30/24 1137          Physical Therapy Time and Intention    Document Type evaluation  -RM     Mode of Treatment physical therapy  -RM       Row Name 12/30/24 1137          General Information    Patient Profile Reviewed yes  -RM     Prior Level of Function --  24/7 care from her grandchildren receiving assistance with all mobility/ADLs with use of RW/4WW. Pt was at SNF for rehab prior to this hospitalization.  -RM     Existing Precautions/Restrictions fall  Unstable wound on coccyx, ileostomy--abdominal sparing precautions/watch gait belt placement, catheter, strict intake and output  -RM       Row Name 12/30/24 1137          Living Environment    People in Home --  Pt resides with 2 adult granddaughters in a [facility] with 2-3 RICKEY with B HR. No steps within home needs to navigate.  -RM       Row Name 12/30/24 1137          Cognition    Orientation Status (Cognition) oriented x 4  -RM       Row Name 12/30/24 1137          Safety Issues/Impairments Affecting Functional Mobility    Impairments Affecting Function (Mobility) balance;coordination;endurance/activity tolerance;pain;strength  -RM               User Key  (r) = Recorded By, (t) = Taken By, (c) = Cosigned By      Initials Name Provider Type    RM Dorothy Davila, PT Physical Therapist                   Mobility       Row Name 12/30/24 1145          Bed Mobility    Bed Mobility rolling right;supine-sit;sit-supine  -RM     Rolling Right [place] (Bed Mobility) minimum assist (75% patient effort)  -RM     Supine-Sit  [place] (Bed Mobility) minimum assist (75% patient effort)  -RM     Sit-Supine [place] (Bed Mobility) minimum assist (75% patient effort)  -RM     Comment, (Bed Mobility) Increased time/effort needed; log roll technique performed for abdominal sparing  -RM       Row Name 12/30/24 1145          Sit-Stand Transfer    Sit-Stand [place] (Transfers) minimum assist (75% patient effort);2 person assist  -RM     Comment, (Sit-Stand Transfer) Cuing provided for technique/safety  -RM               User Key  (r) = Recorded By, (t) = Taken By, (c) = Cosigned By      Initials Name Provider Type    Dorothy Rodriguez, PT Physical Therapist                   Obj/Interventions       Row Name 12/30/24 1147          Range of Motion Comprehensive    General Range of Motion bilateral lower extremity ROM WFL  -RM       Row Name 12/30/24 1147          Strength Comprehensive (MMT)    Comment, General Manual Muscle Testing (MMT) Assessment Not formally assessed, however no specific strength deficits identified during functional assessment; globalized weakness observed  -RM       Row Name 12/30/24 1147          Balance    Comment, Balance No overt LOB, however BLE instability observed during transfers/standing at EOB  -RM               User Key  (r) = Recorded By, (t) = Taken By, (c) = Cosigned By      Initials Name Provider Type     Dorothy Davila, PT Physical Therapist                   Goals/Plan       Row Name 12/30/24 1216          Bed Mobility Goal 1 (PT)    Activity/Assistive Device (Bed Mobility Goal 1, PT) bed mobility activities, all  -RM     [place] Level/Cues Needed (Bed Mobility Goal 1, PT) standby assist  -RM     Time Frame (Bed Mobility Goal 1, PT) long term goal (LTG);2 weeks  -RM       Row Name 12/30/24 1216          Transfer Goal 1 (PT)    Activity/Assistive Device (Transfer Goal 1, PT) transfers, all  -RM     [place] Level/Cues Needed (Transfer Goal 1, PT) minimum assist (75% or more patient  effort)  -RM     Time Frame (Transfer Goal 1, PT) long term goal (LTG);2 weeks  -RM       Row Name 12/30/24 1216          Gait Training Goal 1 (PT)    Activity/Assistive Device (Gait Training Goal 1, PT) gait (walking locomotion)  -RM     [place] Level (Gait Training Goal 1, PT) minimum assist (75% or more patient effort)  -RM     Distance (Gait Training Goal 1, PT) x 25 ft with most appropriate AD  -RM     Time Frame (Gait Training Goal 1, PT) long term goal (LTG);2 weeks  -RM       Row Name 12/30/24 1216          Therapy Assessment/Plan (PT)    Planned Therapy Interventions (PT) balance training;bed mobility training;gait training;patient/family education;strengthening;transfer training  -RM               User Key  (r) = Recorded By, (t) = Taken By, (c) = Cosigned By      Initials Name Provider Type    RM Dorothy Davila, PT Physical Therapist                   Clinical Impression       Row Name 12/30/24 1148          Pain    Pretreatment Pain Rating 8/10  -RM     Posttreatment Pain Rating 8/10  -RM     Pre/Posttreatment Pain Comment 8/10 pain of abdominal region. Of note, swelling observed of RUE pt reports is d/t getting stuck in hospital bed rail denying pain. Nursing/ MD aware reporting no concern/ no current restrictions of RUE at this time. Nursing to further monitor.  -       Row Name 12/30/24 1148          Plan of Care Review    Plan of Care Reviewed With patient  -RM     Outcome Evaluation 73 y/o F with a h/o CAD s/p recent stent on dual platelet therapy, COPD, RA, Crohn's on biologic therapy, ileostomy with mucous fistula for GI bleed who presented to [facility] on 12/26/2024 with chief complaint of abnormal labs at the [facility], intermittent chest pain, and high-volume output from her ileostomy with poor adhesion of pouch and excoriation of the skin surrounding the stoma. Pt found to have BISHNU likely d/t dehydration, anion gap metabolic acidosis, lactic acidosis, hyperkalemia, mild elevated  troponin, acute blood loss anemia. Of note, pt recently admitted to hospital 12/11-12/17/24 with chest pain, as well as d/c from hospital 12/4/2024 after being tx for a lower GI bleed with right hemicolectomy with ileostomy and mucous fistula creation requiring several units of PRBCs, as well as underwent a mitral valve clip on 11/21/2024. At baseline, pt lived at home with 2 granddaughters requiring assist with mobility/ADLs with use of RW/4WW. This date, pt requires Min A with bed mobility and Min A x 2 for STS transfers (x 2 attempts) at EOB. BP in bed upon entry: 113/75 mmhg, sitting at EOB: 108/54 mmhg, and standing at EOB 93/75 mmhg. Nursing notified. PT d/c recommendation of continued skilled PT services at [facility] d/t inability to safely return home at this time.  -       Row Name 12/30/24 1148          Positioning and Restraints    Pre-Treatment Position in bed  -RM     Post Treatment Position bed  -RM     In Bed notified nsg;call light within reach;encouraged to call for assist;exit alarm on  Requesting to return to bed at end of PT session  -RM               User Key  (r) = Recorded By, (t) = Taken By, (c) = Cosigned By      Initials Name Provider Type    Dorothy Rodriguez, PT Physical Therapist                   Outcome Measures       Row Name 12/30/24 1216 12/30/24 0820       How much help from another person do you currently need...    Turning from your back to your side while in flat bed without using bedrails? 3  -RM 3  -EASTON    Moving from lying on back to sitting on the side of a flat bed without bedrails? 3  -RM 3  -EASTON    Moving to and from a bed to a chair (including a wheelchair)? 1  -RM 3  -EASTON    Standing up from a chair using your arms (e.g., wheelchair, bedside chair)? 1  -RM 2  -EASTON    Climbing 3-5 steps with a railing? 1  -RM 1  -EASTON    To walk in hospital room? 1  -RM 1  -EASTON    AM-PAC 6 Clicks Score (PT) 10  -RM 13  -EASTON      Row Name 12/30/24 1248          Functional Assessment    Outcome Measure  Options AM-PAC 6 Clicks Daily Activity (OT)  -ES               User Key  (r) = Recorded By, (t) = Taken By, (c) = Cosigned By      Initials Name Provider Type    ES Paulette Nelson OT Occupational Therapist    Galina Hou RN Registered Nurse    Dorothy Rodriguez, PT Physical Therapist                                 Physical Therapy Education       Title: PT OT SLP Therapies (Done)       Topic: Physical Therapy (Done)       Point: Mobility training (Done)       Learning Progress Summary            Patient Acceptance, E, VU by  at 12/30/2024 1217                                      User Key       Initials Effective Dates Name Provider Type Discipline     03/27/23 -  Dorothy Davila, PT Physical Therapist PT                  PT Recommendation and Plan  Planned Therapy Interventions (PT): balance training, bed mobility training, gait training, patient/family education, strengthening, transfer training  Outcome Evaluation: 73 y/o F with a h/o CAD s/p recent stent on dual platelet therapy, COPD, RA, Crohn's on biologic therapy, ileostomy with mucous fistula for GI bleed who presented to [facility] on 12/26/2024 with chief complaint of abnormal labs at the F, intermittent chest pain, and high-volume output from her ileostomy with poor adhesion of pouch and excoriation of the skin surrounding the stoma. Pt found to have BISHNU likely d/t dehydration, anion gap metabolic acidosis, lactic acidosis, hyperkalemia, mild elevated troponin, acute blood loss anemia. Of note, pt recently admitted to hospital 12/11-12/17/24 with chest pain, as well as d/c from hospital 12/4/2024 after being tx for a lower GI bleed with right hemicolectomy with ileostomy and mucous fistula creation requiring several units of PRBCs, as well as underwent a mitral valve clip on 11/21/2024. At baseline, pt lived at home with 2 granddaughters requiring assist with mobility/ADLs with use of RW/4WW. This date, pt requires Min A with  bed mobility and Min A x 2 for STS transfers (x 2 attempts) at EOB. BP in bed upon entry: 113/75 mmhg, sitting at EOB: 108/54 mmhg, and standing at EOB 93/75 mmhg. Nursing notified. PT d/c recommendation of continued skilled PT services at SNF d/t inability to safely return home at this time.     Time Calculation:         PT Charges       Row Name 12/30/24 1217             Time Calculation    Start Time 1043  -RM      Stop Time 1106  -RM      Time Calculation (min) 23 min  -RM      PT Received On 12/30/24  -RM      PT - Next Appointment 12/31/24  -RM      PT Goal Re-Cert Due Date 01/13/25  -RM         Time Calculation- PT    Total Timed Code Minutes- PT 0 minute(s)  -RM                User Key  (r) = Recorded By, (t) = Taken By, (c) = Cosigned By      Initials Name Provider Type    Dorothy Rodriguez, PT Physical Therapist                  Therapy Charges for Today       Code Description Service Date Service Provider Modifiers Qty    [id number] HC PT EVAL MOD COMPLEXITY 4 12/30/2024 Dorothy Davila, PT GP 1            PT G-Codes  Outcome Measure Options: AM-PAC 6 Clicks Daily Activity (OT)  AM-PAC 6 Clicks Score (PT): 10  AM-PAC 6 Clicks Score (OT): 16  PT Discharge Summary  Anticipated Discharge Disposition (PT): skilled nursing facility    Dorothy Davila PT  12/30/2024

## 2024-12-30 NOTE — THERAPY EVALUATION
Patient Name: Maryann Gaitan  : 1950    MRN: [id number]                              Today's Date: 2024       Admit Date: 2024    Visit Dx:     ICD-10-CM ICD-9-CM   1. Acute renal failure, unspecified acute renal failure type  N17.9 584.9   2. Hyperkalemia  E87.5 276.7     Patient Active Problem List   Diagnosis    Mitral regurgitation    Cavitary lesion of lung    CAD, multiple vessel    Acute heart failure with preserved ejection fraction (HFpEF)    Severe mitral regurgitation    Dyslipidemia    Crohn's disease    Hypothyroidism (acquired)    Anxiety associated with depression    COPD (chronic obstructive pulmonary disease)    Rheumatoid arthritis    Moderate protein-calorie malnutrition    Acute lower GI bleeding    First degree hemorrhoids    Benign neoplasm of cecum    GERD (gastroesophageal reflux disease)    Hashimoto's thyroiditis    History of colonic polyps    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    Fecal urgency    Second degree hemorrhoids    Vitamin D deficiency, unspecified    Stress incontinence, female    Aphthae, oral    Hx of seborrheic keratosis    Primary hypertension    S/P mitral valve clip implantation    Gastrointestinal hemorrhage    Anemia    PNA (pneumonia)    Pneumonia due to other gram-negative bacteria    Acute renal failure (ARF)    Gastric bleed     Past Medical History:   Diagnosis Date    Abnormal weight loss 2024    Acute kidney injury 2024    Acute UTI (urinary tract infection) 2024    Anxiety associated with depression 2024    Benign neoplasm of cecum 2016    CAD, multiple vessel 10/08/2024    Cavitary lesion of lung 10/08/2024    COPD (chronic obstructive pulmonary disease)     Crohn's disease 2024    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 2016    Dyslipidemia 10/30/2024    Fecal urgency 2024    First degree hemorrhoids 2021    GERD (gastroesophageal reflux  disease) 11/21/2024    Hashimoto's thyroiditis 11/21/2024    History of colonic polyps 07/09/2021    Hypertension     Hypothyroidism (acquired) 11/06/2024    Mitral regurgitation 10/08/2024    Moderate protein-calorie malnutrition 11/09/2024    Multiple tracheobronchial mucus plugs 10/08/2024    Nicotine dependence 11/21/2024    Rheumatoid arthritis 11/06/2024    S/P mitral valve clip implantation 11/21/2024    Second degree hemorrhoids 07/05/2016    Stress incontinence, female 11/21/2024    Vitamin D deficiency, unspecified 11/21/2024     Past Surgical History:   Procedure Laterality Date    BRONCHOSCOPY N/A 10/16/2024    Procedure: BRONCHOSCOPY;  Surgeon: Gallo Pope MD;  Location: [facility] ENDOSCOPY;  Service: Pulmonary;  Laterality: N/A;    CARDIAC CATHETERIZATION N/A 10/15/2024    Procedure: Left Heart Cath, possible pci;  Surgeon: Travis Connor MD;  Location: [facility] CATH INVASIVE LOCATION;  Service: Cardiovascular;  Laterality: N/A;    CARDIAC CATHETERIZATION N/A 10/22/2024    Procedure: Laser Coronary Atherectomy;  Surgeon: Travis Connor MD;  Location: [facility] CATH INVASIVE LOCATION;  Service: Cardiovascular;  Laterality: N/A;    COLON RESECTION Right 11/28/2024    Procedure: RIGHT HEMICOLECTOMY WITH ILEOSTOMY;  Surgeon: Todd Babin MD;  Location: [facility] MAIN OR;  Service: General;  Laterality: Right;    COLONOSCOPY N/A 10/12/2024    Procedure: COLONOSCOPY WITH BIOPSY AND WIRE GUIDED BALLOON DILATION OF TERMINAL ILEUM;  Surgeon: Rob Strong MD;  Location: [facility] ENDOSCOPY;  Service: Gastroenterology;  Laterality: N/A;  Colitis, crohns of terminal ileum, right colon ulcers, diverticulosis, hemorroids    ENDOSCOPY N/A 10/12/2024    Procedure: ESOPHAGOGASTRODUODENOSCOPY WITH BIOPSY X 2 AREA;  Surgeon: Rob Strong MD;  Location: [facility] ENDOSCOPY;  Service: Gastroenterology;  Laterality: N/A;  Chronic gastritis, HH    ENDOSCOPY Left 11/28/2024     Procedure: ESOPHAGOGASTRODUODENOSCOPY;  Surgeon: Dallin Delgado MD;  Location: [facility] ENDOSCOPY;  Service: Gastroenterology;  Laterality: Left;  small hiatal hernia    HYSTERECTOMY      LEFT HEART CATH      MITRAL VALVE REPAIR/REPLACEMENT N/A 11/21/2024    Procedure: Transcatheter Mitral Valve Repair;  Surgeon: Dmitri Navarro MD;  Location: [facility] HYBRID OR;  Service: Cardiovascular;  Laterality: N/A;      General Information       Row Name 12/30/24 1242          OT Time and Intention    Subjective Information complains of;weakness;fatigue;pain  -ES     Document Type evaluation  -ES     Mode of Treatment occupational therapy  -ES       Row Name 12/30/24 1242          General Information    Patient Profile Reviewed yes  -ES     Existing Precautions/Restrictions fall  Unstable wound on coccyx, ileostomy--abdominal sparing precautions/watch gait belt placement, catheter, strict intake and output  -ES       Row Name 12/30/24 1242          Occupational Profile    Reason for Services/Referral (Occupational Profile) Pt is a 74 y.o. year old female admitted 12/26/24 with abdnomrla labs. Noted with elevated troponin, likey 2* to volume depletion casuing demand ischemia. Dx with BISHNU, likely due to dehydration from high volume output from ileostomy.   Recent admissions 12/4 GI bleed and hemicolectomy, and 12/11 with chest pain.     PMHx significant for ileostomy, CAD, HTN, hyperlipidemia, RA, COPD, hypothyroidism, and Crohn's disease.    Pt admitted from [facility]. At baseline, pt resides on main level, x2 [facility], with x2 grandchildren (One works night shifts and the other works days shifts, able to provided 24/7 supervision) with a renter who is a family friend. Pt reports she is I with ADLs, utilizes walker and rollator though prefers walker, and does not drive.  -ES       Row Name 12/30/24 1242          Home Main Entrance    Number of Stairs, Main Entrance two  -ES       Row Name 12/30/24 1242          Stairs  Within Home, Primary    Number of Stairs, Within Home, Primary none  -ES       Row Name 12/30/24 1242          Cognition    Orientation Status (Cognition) oriented x 4  -ES       Row Name 12/30/24 1242          Safety Issues/Impairments Affecting Functional Mobility    Impairments Affecting Function (Mobility) balance;coordination;endurance/activity tolerance;pain;strength  -ES               User Key  (r) = Recorded By, (t) = Taken By, (c) = Cosigned By      Initials Name Provider Type    Paulette Burt OT Occupational Therapist                     Mobility/ADL's       Row Name 12/30/24 1243          Bed Mobility    Bed Mobility rolling right;supine-sit;sit-supine  -ES     Rolling Right [place] (Bed Mobility) minimum assist (75% patient effort)  -ES     Supine-Sit [place] (Bed Mobility) minimum assist (75% patient effort)  -ES     Sit-Supine [place] (Bed Mobility) minimum assist (75% patient effort)  -ES       Row Name 12/30/24 1243          Sit-Stand Transfer    Sit-Stand [place] (Transfers) minimum assist (75% patient effort);2 person assist  -ES       Row Name 12/30/24 1243          Functional Mobility    Functional Mobility- Ind. Level not tested  -ES       Row Name 12/30/24 1243          Activities of Daily Living    BADL Assessment/Intervention upper body dressing;lower body dressing;toileting  -ES       Row Name 12/30/24 1243          Upper Body Dressing Assessment/Training    [place] Level (Upper Body Dressing) minimum assist (75% patient effort)  -ES       Row Name 12/30/24 1243          Lower Body Dressing Assessment/Training    [place] Level (Lower Body Dressing) moderate assist (50% patient effort)  -ES       Row Name 12/30/24 1243          Toileting Assessment/Training    [place] Level (Toileting) moderate assist (50% patient effort)  -ES               User Key  (r) = Recorded By, (t) = Taken By, (c) = Cosigned By      Initials Name Provider Type    YASMINE Nelson  "Paulette CERVANTES OT Occupational Therapist                   Obj/Interventions       Row Name 12/30/24 1244          Sensory Assessment (Somatosensory)    Sensory Assessment (Somatosensory) unable/difficult to assess  -ES       Row Name 12/30/24 1244          Vision Assessment/Intervention    Visual Impairment/Limitations corrective lenses full-time  -ES       Row Name 12/30/24 1244          Range of Motion Comprehensive    Comment, General Range of Motion Co RUE pain \"caught it between bedrail and bed this morning\". NSG alerted.  -ES       Row Name 12/30/24 1244          Strength Comprehensive (MMT)    Comment, General Manual Muscle Testing (MMT) Assessment Limited due to pain. Grossly WFL  -ES       Row Name 12/30/24 1244          Balance    Balance Assessment sitting static balance;sitting dynamic balance;standing static balance  -ES     Static Sitting Balance supervision  -ES     Dynamic Sitting Balance contact guard  -ES     Static Standing Balance minimal assist;moderate assist  -ES     Balance Interventions sitting;standing;static  -ES               User Key  (r) = Recorded By, (t) = Taken By, (c) = Cosigned By      Initials Name Provider Type     Paulette Nelson OT Occupational Therapist                   Goals/Plan       Row Name 12/30/24 1248          Dressing Goal 1 (OT)    Activity/Device (Dressing Goal 1, OT) dressing skills, all  -ES     [place]/Cues Needed (Dressing Goal 1, OT) minimum assist (75% or more patient effort)  -ES     Time Frame (Dressing Goal 1, OT) 2 weeks  -ES       Row Name 12/30/24 1248          Toileting Goal 1 (OT)    Activity/Device (Toileting Goal 1, OT) toileting skills, all  -ES     [place] Level/Cues Needed (Toileting Goal 1, OT) minimum assist (75% or more patient effort)  -ES     Time Frame (Toileting Goal 1, OT) 2 weeks  -ES       Row Name 12/30/24 1248          Problem Specific Goal 1 (OT)    Problem Specific Goal 1 (OT) standing activity tolerance >3 minutes  " "-ES     Time Frame (Problem Specific Goal 1, OT) 2 weeks  -ES       Row Name 12/30/24 1248          Therapy Assessment/Plan (OT)    Planned Therapy Interventions (OT) activity tolerance training;BADL retraining;neuromuscular control/coordination retraining;occupation/activity based interventions;functional balance retraining;ROM/therapeutic exercise;strengthening exercise  -ES               User Key  (r) = Recorded By, (t) = Taken By, (c) = Cosigned By      Initials Name Provider Type    ES Paulette Nelson OT Occupational Therapist                   Clinical Impression       Row Name 12/30/24 1247          Pain Assessment    Pretreatment Pain Rating 8/10  -ES     Posttreatment Pain Rating 8/10  -ES     Pain Location abdomen;extremity  -ES     Pain Side/Orientation right;left  upper  -ES       Row Name 12/30/24 1247          Plan of Care Review    Plan of Care Reviewed With patient  -ES     Outcome Evaluation Pt is a 74 y.o. year old female admitted 12/26/24 with abdnomrla labs. Noted with elevated troponin, likey 2* to volume depletion casuing demand ischemia. Dx with BISHNU, likely due to dehydration from high volume output from ileostomy.   Recent admissions 12/4 GI bleed and hemicolectomy, and 12/11 with chest pain.     PMHx significant for ileostomy, CAD, HTN, hyperlipidemia, RA, COPD, hypothyroidism, and Crohn's disease.    Pt admitted from [facility]. At baseline, pt resides on main level, x2 [facility], with x2 grandchildren (One works night shifts and the other works days shifts, able to provided 24/7 supervision) with a renter who is a family friend. Pt reports she is I with ADLs, utilizes walker and rollator though prefers walker, and does not drive. Pt c/o fatigue and pain, specifically abd and RUE pain. She reports she, \"caught it between bedrail and bed this morning\". NSG alerted. Pt required Min A and increased time for bed mobility.  Min A x2 for sit<>stand. Requires Mod A for all LB ADLs and Mod-Max " A for toileting secondary to ostomy care. Pt with orthostatic hypotension with sit<>stand. She is below stated baseline and will require rehab at [place].  -ES       Row Name 12/30/24 1247          Therapy Assessment/Plan (OT)    Rehab Potential (OT) good  -ES     Criteria for Skilled Therapeutic Interventions Met (OT) yes;skilled treatment is necessary  -ES     Therapy Frequency (OT) 3 times/wk  -ES     Predicted Duration of Therapy Intervention (OT) until dc  -ES       Row Name 12/30/24 1249          Therapy Plan Review/Discharge Plan (OT)    Anticipated Discharge Disposition (OT) skilled nursing facility  -ES       Row Name 12/30/24 1052          Vital Signs    Pre Systolic BP Rehab 113  -ES     Pre Treatment Diastolic BP 75  -ES     Intra Systolic BP Rehab 108  -ES     Intra Treatment Diastolic BP 54  -ES     Post Systolic BP Rehab 93  -ES     Post Treatment Diastolic BP 57  -ES     Intratreatment Heart Rate (beats/min) 79  -ES     Posttreatment Heart Rate (beats/min) 69  -ES     Pre SpO2 (%) 94  -ES     O2 Delivery Pre Treatment room air  -ES     Intra SpO2 (%) 99  -ES     O2 Delivery Intra Treatment room air  -ES     Post SpO2 (%) 100  -ES     O2 Delivery Post Treatment room air  -ES     Pre Patient Position Supine  -ES     Intra Patient Position Sitting  -ES     Post Patient Position Standing  -ES       Row Name 12/30/24 1052          Positioning and Restraints    Pre-Treatment Position in bed  -ES     Post Treatment Position bed  -ES     In Bed notified nsg;call light within reach;encouraged to call for assist;exit alarm on  -ES               User Key  (r) = Recorded By, (t) = Taken By, (c) = Cosigned By      Initials Name Provider Type    Paulette Burt OT Occupational Therapist                   Outcome Measures       Row Name 12/30/24 1248          How much help from another is currently needed...    Putting on and taking off regular lower body clothing? 2  -ES     Bathing (including washing, rinsing,  and drying) 2  -ES     Toileting (which includes using toilet bed pan or urinal) 2  -ES     Putting on and taking off regular upper body clothing 3  -ES     Taking care of personal grooming (such as brushing teeth) 3  -ES     Eating meals 4  -ES     AM-PAC 6 Clicks Score (OT) 16  -ES       Row Name 12/30/24 1216 12/30/24 0820       How much help from another person do you currently need...    Turning from your back to your side while in flat bed without using bedrails? 3  -RM 3  -EASTON    Moving from lying on back to sitting on the side of a flat bed without bedrails? 3  -RM 3  -EASTON    Moving to and from a bed to a chair (including a wheelchair)? 1  -RM 3  -EASTON    Standing up from a chair using your arms (e.g., wheelchair, bedside chair)? 1  -RM 2  -EASTON    Climbing 3-5 steps with a railing? 1  -RM 1  -EASTON    To walk in hospital room? 1  -RM 1  -ESATON    AM-PAC 6 Clicks Score (PT) 10  -RM 13  -EASTON      Row Name 12/30/24 1248          Functional Assessment    Outcome Measure Options AM-PAC 6 Clicks Daily Activity (OT)  -ES               User Key  (r) = Recorded By, (t) = Taken By, (c) = Cosigned By      Initials Name Provider Type    Paulette Burt, OT Occupational Therapist    Galina Hou, RN Registered Nurse    Dorothy Rodriguez, PT Physical Therapist                    Occupational Therapy Education        No education to display                  OT Recommendation and Plan  Planned Therapy Interventions (OT): activity tolerance training, BADL retraining, neuromuscular control/coordination retraining, occupation/activity based interventions, functional balance retraining, ROM/therapeutic exercise, strengthening exercise  Therapy Frequency (OT): 3 times/wk  Plan of Care Review  Plan of Care Reviewed With: patient  Outcome Evaluation: Pt is a 74 y.o. year old female admitted 12/26/24 with abdnomrla labs. Noted with elevated troponin, likey 2* to volume depletion casuing demand ischemia. Dx with BISHNU, likely due to  "dehydration from high volume output from ileostomy.   Recent admissions 12/4 GI bleed and hemicolectomy, and 12/11 with chest pain.     PMHx significant for ileostomy, CAD, HTN, hyperlipidemia, RA, COPD, hypothyroidism, and Crohn's disease.    Pt admitted from [facility]. At baseline, pt resides on main level, x2 [place], with x2 grandchildren (One works night shifts and the other works days shifts, able to provided 24/7 supervision) with a renter who is a family friend. Pt reports she is I with ADLs, utilizes walker and rollator though prefers walker, and does not drive. Pt c/o fatigue and pain, specifically abd and RUE pain. She reports she, \"caught it between bedrail and bed this morning\". NSG alerted. Pt required Min A and increased time for bed mobility.  Min A x2 for sit<>stand. Requires Mod A for all LB ADLs and Mod-Max A for toileting secondary to ostomy care. Pt with orthostatic hypotension with sit<>stand. She is below stated baseline and will require rehab at [place].     Time Calculation:         Time Calculation- OT       Row Name 12/30/24 1249             Time Calculation- OT    OT Start Time 1043  -ES      OT Stop Time 1106  -ES      OT Time Calculation (min) 23 min  -ES      Total Timed Code Minutes- OT 0 minute(s)  -ES      OT Received On 12/30/24  -ES      OT - Next Appointment 01/02/25  -ES      OT Goal Re-Cert Due Date 01/13/25  -ES                User Key  (r) = Recorded By, (t) = Taken By, (c) = Cosigned By      Initials Name Provider Type    ES Paulette Nelson OT Occupational Therapist                  Therapy Charges for Today       Code Description Service Date Service Provider Modifiers Qty    [id number] HC OT EVAL HIGH COMPLEXITY 4 12/30/2024 Paulette Nelson OT GO 1                 Paulette Nelson OT  12/30/2024  "

## 2024-12-30 NOTE — CONSULTS
Nutrition Services    Patient Name: Maryann Gaitan  YOB: 1950  MRN: [id number]  Admission date: 12/26/2024    Comment:    --Moderate chronic disease related malnutrition related to prolonged suboptimal intake as evidenced by moderate fat and muscle wasting per NFPE     --Boost Breeze BID (provides 500 kcals, 18 g protein if consumed)      CLINICAL NUTRITION ASSESSMENT      Reason for Assessment 12/30: Nursing admission screen      H&P      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 07/05/2016    Dyslipidemia 10/30/2024    Fecal urgency 11/21/2024    First degree hemorrhoids 07/09/2021    GERD (gastroesophageal reflux disease) 11/21/2024    Hashimoto's thyroiditis 11/21/2024    History of colonic polyps 07/09/2021    Hypertension     Hypothyroidism (acquired) 11/06/2024    Mitral regurgitation 10/08/2024    Moderate protein-calorie malnutrition 11/09/2024    Multiple tracheobronchial mucus plugs 10/08/2024    Nicotine dependence 11/21/2024    Rheumatoid arthritis 11/06/2024    S/P mitral valve clip implantation 11/21/2024    Second degree hemorrhoids 07/05/2016    Stress incontinence, female 11/21/2024    Vitamin D deficiency, unspecified 11/21/2024       Past Surgical History:   Procedure Laterality Date    BRONCHOSCOPY N/A 10/16/2024    Procedure: BRONCHOSCOPY;  Surgeon: Gallo Pope MD;  Location: [facility] ENDOSCOPY;  Service: Pulmonary;  Laterality: N/A;    CARDIAC CATHETERIZATION N/A 10/15/2024    Procedure: Left Heart Cath, possible pci;  Surgeon: Travis Connor MD;   Location: [facility] CATH INVASIVE LOCATION;  Service: Cardiovascular;  Laterality: N/A;    CARDIAC CATHETERIZATION N/A 10/22/2024    Procedure: Laser Coronary Atherectomy;  Surgeon: Travis Connor MD;  Location: [facility] CATH INVASIVE LOCATION;  Service: Cardiovascular;  Laterality: N/A;    COLON RESECTION Right 11/28/2024    Procedure: RIGHT HEMICOLECTOMY WITH ILEOSTOMY;  Surgeon: Todd Babin MD;  Location: [facility] MAIN OR;  Service: General;  Laterality: Right;    COLONOSCOPY N/A 10/12/2024    Procedure: COLONOSCOPY WITH BIOPSY AND WIRE GUIDED BALLOON DILATION OF TERMINAL ILEUM;  Surgeon: Rob Strong MD;  Location: [facility] ENDOSCOPY;  Service: Gastroenterology;  Laterality: N/A;  Colitis, crohns of terminal ileum, right colon ulcers, diverticulosis, hemorroids    ENDOSCOPY N/A 10/12/2024    Procedure: ESOPHAGOGASTRODUODENOSCOPY WITH BIOPSY X 2 AREA;  Surgeon: Rob Strong MD;  Location: [facility] ENDOSCOPY;  Service: Gastroenterology;  Laterality: N/A;  Chronic gastritis, HH    ENDOSCOPY Left 11/28/2024    Procedure: ESOPHAGOGASTRODUODENOSCOPY;  Surgeon: Dallin Delgado MD;  Location: [facility] ENDOSCOPY;  Service: Gastroenterology;  Laterality: Left;  small hiatal hernia    HYSTERECTOMY      LEFT HEART CATH      MITRAL VALVE REPAIR/REPLACEMENT N/A 11/21/2024    Procedure: Transcatheter Mitral Valve Repair;  Surgeon: Dmitri Navarro MD;  Location: [facility] HYBRID OR;  Service: Cardiovascular;  Laterality: N/A;        Current Problems   BISHNU likely prerenal secondary to dehydration due to high volume output from ileostomy due to Crohn's disease  -GI and surgery are following   Anion gap metabolic acidosis likely secondary to above  Lactic acidosis likely secondary to above  Hyperkalemia likely secondary to above  -Nephrology is following   Acute blood loss anemia likely due to use of antiplatelet therapy   Mild elevated troponin, type II ischemia   History of crohn's  "disease  History of rheumatoid arthritis   CAD with recent stent placement in October 2024  History of hyperlipidemia  Dietary supplementation, chronic  Antibiotic therapy, uncertain etiology  Chronic pain  GERD  COPD not in exacerbation  History of hypothyroidism  History of anxiety       Encounter Information        Trending Narrative     12/30: Pt was admitted with abnormal labs. BISHNU is likely prerenal secondary to dehydration due to high volume output from ileostomy due to crohn's disease. GI, surgery, and Nephrology are following. At my visit pt reports that she is eating well, although minimal intake is documented. Pt is agreeable to boost breeze, will order this due to elevated phos, although this has not been checked in a couple of days. Pt denies issues with chewing and swallowing. NFPE completed, consistent with nutrition diagnosis of malnutrition using AND/ASPEN criteria. See MSA below.  Per chart review, pt has lost 6# over 3 months (5% loss).      Anthropometrics        Current Height, Weight Height: 162.6 cm (64\")  Weight: 52.4 kg (115 lb 8 oz) (12/30/24 0414)       Usual Body Weight (UBW) Unknown        Trending Weight Hx     This admission: 12/30: 115#              PTA: 12/30: 6# loss over 3 months (5% loss)     Wt Readings from Last 30 Encounters:   12/30/24 0414 52.4 kg (115 lb 8 oz)   12/29/24 0416 54.6 kg (120 lb 6.4 oz)   12/26/24 2009 56.7 kg (125 lb)   12/11/24 1318 57.3 kg (126 lb 5.2 oz)   12/04/24 0616 57.3 kg (126 lb 5.2 oz)   12/03/24 0608 56.7 kg (125 lb)   12/02/24 0500 56.1 kg (123 lb 10.9 oz)   12/01/24 0504 57.7 kg (127 lb 3.3 oz)   11/30/24 0612 60.9 kg (134 lb 4.2 oz)   11/29/24 1616 60.8 kg (134 lb 0.6 oz)   11/27/24 1900 60.7 kg (133 lb 13.1 oz)   11/22/24 0724 54 kg (119 lb)   11/21/24 1129 54.1 kg (119 lb 4.3 oz)   11/21/24 0320 45.9 kg (101 lb 3.1 oz)   11/19/24 1551 45.3 kg (99 lb 13.9 oz)   11/19/24 1207 45.4 kg (100 lb)   11/13/24 1312 54.9 kg (121 lb)   11/06/24 1426 52.6 " kg (115 lb 15.4 oz)   10/30/24 1346 52.6 kg (116 lb)   10/12/24 1153 57.6 kg (127 lb)   10/12/24 0757 57.6 kg (127 lb)   10/11/24 0420 58 kg (127 lb 13.9 oz)   10/10/24 0503 58.1 kg (128 lb 1.4 oz)   10/08/24 1956 54.9 kg (121 lb 0.5 oz)   10/08/24 0828 54.9 kg (121 lb)      BMI kg/m2 Body mass index is 19.83 kg/m².       Labs        Pertinent Labs Low K+, recommend MD to order repletion    Results from last 7 days   Lab Units 12/30/24  0406 12/29/24  0245 12/28/24  0014 12/27/24  1109 12/26/24  2335 12/26/24  2041   SODIUM mmol/L 139 139 138 139   < > 129*   POTASSIUM mmol/L 3.4* 3.4* 3.6 3.6   < > 7.2*   CHLORIDE mmol/L 107 105 99 96*   < > 93*   CO2 mmol/L 22.4 24.5 27.2 29.4*   < > 15.9*   BUN mg/dL 20 31* 47* 61*   < > 90*   CREATININE mg/dL 1.35* 1.98* 2.80* 3.47*   < > 5.56*   CALCIUM mg/dL 7.4* 7.0* 6.8* 7.1*   < > 9.4   BILIRUBIN mg/dL  --   --  0.2 0.2  --  0.2   ALK PHOS U/L  --   --  109 108  --  190*   ALT (SGPT) U/L  --   --  15 13  --  20   AST (SGOT) U/L  --   --  24 19  --  26   GLUCOSE mg/dL 88 73 82 134*   < > 91    < > = values in this interval not displayed.     Results from last 7 days   Lab Units 12/29/24  1344 12/28/24  0530 12/28/24  0014 12/26/24  2335 12/26/24  2041   MAGNESIUM mg/dL  --   --   --   --  1.9   PHOSPHORUS mg/dL  --   --  5.1*   < > 10.4*   HEMOGLOBIN g/dL 9.4*   < > 7.4*   < > 10.5*   HEMATOCRIT % 29.4*   < > 22.7*   < > 33.6*   TRIGLYCERIDES mg/dL  --   --   --   --  139    < > = values in this interval not displayed.     Lab Results   Component Value Date    HGBA1C 5.64 (H) 10/13/2024        Medications    Scheduled Medications [Held by provider] aspirin, 81 mg, Oral, Daily  atorvastatin, 40 mg, Oral, Nightly  cholestyramine light, 1 packet, Oral, Q12H  [Held by provider] clopidogrel, 75 mg, Oral, Daily  fludrocortisone, 200 mcg, Oral, Once  folic acid, 1,000 mcg, Oral, Daily  [Held by provider] heparin (porcine), 5,000 Units, Subcutaneous, Q12H  levothyroxine, 50 mcg,  Oral, Daily  lidocaine, 30 mL, Infiltration, Once  loperamide, 2 mg, Oral, 4x Daily  mesalamine, 1,000 mg, Rectal, Nightly  metoprolol tartrate, 12.5 mg, Oral, Q12H  midodrine, 5 mg, Oral, TID AC  oxymetazoline, 2 spray, Each Nare, BID  pantoprazole, 40 mg, Oral, Q AM  sertraline, 50 mg, Oral, Daily  sodium chloride, 10 mL, Intravenous, Q12H  Upadacitinib ER, 1 tablet, Oral, Daily        Infusions      PRN Medications   acetaminophen **OR** acetaminophen    albuterol    senna-docusate sodium **AND** polyethylene glycol **AND** bisacodyl **AND** bisacodyl    ipratropium-albuterol    melatonin    nitroglycerin    ondansetron    [COMPLETED] Insert Peripheral IV **AND** sodium chloride    sodium chloride    sodium chloride    sodium chloride     Physical Findings        Trending Physical   Appearance, NFPE 12/30: NFPE completed, consistent with nutrition diagnosis of malnutrition using AND/ASPEN criteria. See MSA below.     --  Edema    No edema    Bowel Function   Ileostomy: 350 ml output    Tubes   No feeding tube    Chewing/Swallowing   No issues reported    Skin   Per wound care - the karine-stomal skin and RLQ abdomen are red and denuded from leakage   --  Current Nutrition Orders & Evaluation of Intake       Oral Nutrition     Food Allergies NKFA    Current PO Diet Diet: Renal, Gastrointestinal; Low Sodium (2-3g), Low Potassium, Low Phosphorus; Fiber-Restricted; Fluid Consistency: Thin (IDDSI 0)   Supplement None ordered    PO Evaluation     Trending % PO Intake 12/30: 25%    --  Nutritional Risk Screening        NRS-2002 Score          Nutrition Diagnosis         Nutrition Dx Problem 1 Moderate chronic disease related malnutrition related to prolonged suboptimal intake as evidenced by moderate fat and muscle wasting per NFPE       Nutrition Dx Problem 2        Intervention Goal         Intervention Goal(s) To consume at least 50% of meals      Nutrition Intervention        RD Action NFPE complete, order ONS       Nutrition Prescription          Diet Prescription Renal, gastrointestinal    Supplement Prescription Boost Breeze BID (provides 500 kcals, 18 g protein if consumed)     --  Monitor/Evaluation        Monitor PO intake, Supplement intake, Pertinent labs     Malnutrition Severity Assessment      Patient meets criteria for : Moderate (non-severe) Malnutrition  Malnutrition Type (Last 8 Hours)       Malnutrition Severity Assessment       Row Name 12/30/24 1551       Malnutrition Severity Assessment    Malnutrition Type Chronic Disease - Related Malnutrition      Row Name 12/30/24 8997       Muscle Loss    Loss of Muscle Mass Findings Moderate    Congregational Region Moderate - slight depression    Clavicle Bone Region Moderate - some protrusion in females, visible in males    Acromion Bone Region Moderate - acromion may slightly protrude    Dorsal Hand Region Moderate - slight depression    Patellar Region Moderate - patella more prominent, less muscle definition around patella    Anterior Thigh Region Moderate - mild depression on inner thigh    Posterior Calf Region Moderate - some roundness, slight firmness      Row Name 12/30/24 5644       Fat Loss    Subcutaneous Fat Loss Findings Moderate    Orbital Region  Moderate -  somewhat hollowness, slightly dark circles    Upper Arm Region Moderate - some fat tissue, not ample      Row Name 12/30/24 4860       Criteria Met (Must meet criteria for severity in at least 2 of these categories: M Wasting, Fat Loss, Fluid, Secondary Signs, Wt. Status, Intake)    Patient meets criteria for  Moderate (non-severe) Malnutrition                          Electronically signed by:  Laurie Mercado RD  12/30/24 10:00 EST

## 2024-12-30 NOTE — CASE MANAGEMENT/SOCIAL WORK
Continued Stay Note  ERIC Yeung     Patient Name: Maryann Gaitan  MRN: [id number]  Today's Date: 12/30/2024    Admit Date: 12/26/2024    Plan: DC PLAN: Return to [facility]. Will need Precert (Started 12/30) No PASRR needed.       Discharge Plan       Row Name 12/30/24 1439       Plan    Plan DC PLAN: Return to [facility]. Will need Precert (Started 12/30) No PASRR needed.        Patient/Family in Agreement with Plan yes    Plan Comments Requested Precert to be started.Precert submitted for [facility] through [facility] on 12/30/2024. Precert pending at this time. Auth ID#[id number]. CM notfied patient CM and CM supervisor of status and updated in Epic. Medically ready for discharge, Pending Precert.                      Expected Discharge Date and Time       Expected Discharge Date Expected Discharge Time    Dec 30, 2024           Arianne Hendricks RN    Case Management  [phone]

## 2024-12-30 NOTE — PLAN OF CARE
Problem: Adult Inpatient Plan of Care  Goal: Plan of Care Review  Outcome: Progressing  Flowsheets (Taken 12/30/2024 0239)  Progress: no change  Goal: Patient-Specific Goal (Individualized)  Outcome: Progressing  Goal: Absence of Hospital-Acquired Illness or Injury  Outcome: Progressing  Intervention: Identify and Manage Fall Risk  Recent Flowsheet Documentation  Taken 12/30/2024 0200 by Zaida Cisneros LPN  Safety Promotion/Fall Prevention: safety round/check completed  Taken 12/30/2024 0000 by Zaida Cisneros LPN  Safety Promotion/Fall Prevention: safety round/check completed  Taken 12/29/2024 2200 by Zaida Cisneros LPN  Safety Promotion/Fall Prevention:   safety round/check completed   room organization consistent  Taken 12/29/2024 2000 by Zaida Cisneros LPN  Safety Promotion/Fall Prevention:   safety round/check completed   nonskid shoes/slippers when out of bed  Intervention: Prevent Skin Injury  Recent Flowsheet Documentation  Taken 12/30/2024 0000 by Zaida Cisneros LPN  Body Position: weight shifting  Taken 12/29/2024 2000 by Zaida Cisneros LPN  Body Position: weight shifting  Intervention: Prevent Infection  Recent Flowsheet Documentation  Taken 12/30/2024 0200 by Zaida Cisneros LPN  Infection Prevention: hand hygiene promoted  Taken 12/29/2024 2200 by Zaida Cisneros LPN  Infection Prevention: hand hygiene promoted  Taken 12/29/2024 2000 by Zaida Cisneros LPN  Infection Prevention: hand hygiene promoted  Goal: Optimal Comfort and Wellbeing  Outcome: Progressing  Intervention: Provide Person-Centered Care  Recent Flowsheet Documentation  Taken 12/29/2024 2000 by Zaida Cisneros LPN  Trust Relationship/Rapport:   care explained   emotional support provided   empathic listening provided  Goal: Readiness for Transition of Care  Outcome: Progressing     Problem: Skin Injury Risk Increased  Goal: Skin Health and Integrity  Outcome: Progressing  Intervention: Optimize Skin Protection  Recent Flowsheet  Documentation  Taken 12/30/2024 0000 by Zaida Cisneros LPN  Pressure Reduction Techniques: frequent weight shift encouraged  Head of Bed (HOB) Positioning: [facility] elevated  Pressure Reduction Devices: specialty bed utilized  Taken 12/29/2024 2000 by Zaida Cisneros LPN  Head of Bed (HOB) Positioning: HOB elevated     Problem: Fall Injury Risk  Goal: Absence of Fall and Fall-Related Injury  Outcome: Progressing  Intervention: Promote Injury-Free Environment  Recent Flowsheet Documentation  Taken 12/30/2024 0200 by Zaida Cisneros LPN  Safety Promotion/Fall Prevention: safety round/check completed  Taken 12/30/2024 0000 by Zaida Cisneros LPN  Safety Promotion/Fall Prevention: safety round/check completed  Taken 12/29/2024 2200 by Zaida Cisneros LPN  Safety Promotion/Fall Prevention:   safety round/check completed   room organization consistent  Taken 12/29/2024 2000 by Zaida Cisneros LPN  Safety Promotion/Fall Prevention:   safety round/check completed   nonskid shoes/slippers when out of bed   Goal Outcome Evaluation:           Progress: no change        Pt alert and oriented. C/o of nasal dryness. Nasal spray administered per MAR orders. IVPB Metronidazole administered. Frequent nose bleeds from right nostril. Will continue to monitor..

## 2024-12-30 NOTE — SIGNIFICANT NOTE
Case Management/Social Work    Patient Name:  Maryann Gaitan  YOB: 1950  MRN: [id number]  Admit Date:  12/26/2024 12/30/24 1704   Post Acute Pre-Cert Documentation   Request Submitted by Facility - Type: Hospital   Post-Acute Authorization Type Submitted: SNF   Date Post Acute Pre-Cert Inititated per Facility 12/30/24   Verification from Payer Yes   Date Post Acute Pre-Cert Completed 12/30/24   Accepting Facility [facility] Discharge Date Requested 12/30/24   All Clinicals Submitted? Yes   Had Accepting Facility at Time of Submission Yes   Response Received from Insurance? Approval   Response Communicated to:    Post Acute Pre-Cert Initiated Comment Precert submitted for [facility] through [facility] on 12/30/2024. Precert approve 12/30/24 - 1/2/2024. Auth ID#[id number]. CM notfied patient CM and CM supervisor of status and updated in Epic.   New Pre-Cert Needed? No           Electronically signed by:  Maranda Shannon RN  12/30/24 17:05 EST    Office Phone: [phone]  Office Cell:     [phone]

## 2024-12-31 ENCOUNTER — READMISSION MANAGEMENT (OUTPATIENT)
Dept: CALL CENTER | Facility: HOSPITAL | Age: 74
End: 2024-12-31
Payer: MEDICARE

## 2024-12-31 VITALS
BODY MASS INDEX: 19.53 KG/M2 | HEIGHT: 64 IN | TEMPERATURE: 97 F | DIASTOLIC BLOOD PRESSURE: 57 MMHG | OXYGEN SATURATION: 99 % | RESPIRATION RATE: 20 BRPM | HEART RATE: 87 BPM | SYSTOLIC BLOOD PRESSURE: 97 MMHG | WEIGHT: 114.4 LBS

## 2024-12-31 LAB
ANION GAP SERPL CALCULATED.3IONS-SCNC: 11.4 MMOL/L (ref 5–15)
BACTERIA SPEC AEROBE CULT: NORMAL
BACTERIA SPEC AEROBE CULT: NORMAL
BUN SERPL-MCNC: 16 MG/DL (ref 8–23)
BUN/CREAT SERPL: 15.8 (ref 7–25)
CALCIUM SPEC-SCNC: 7.9 MG/DL (ref 8.6–10.5)
CHLORIDE SERPL-SCNC: 108 MMOL/L (ref 98–107)
CO2 SERPL-SCNC: 21.6 MMOL/L (ref 22–29)
CREAT SERPL-MCNC: 1.01 MG/DL (ref 0.57–1)
EGFRCR SERPLBLD CKD-EPI 2021: 58.5 ML/MIN/1.73
GLUCOSE SERPL-MCNC: 80 MG/DL (ref 65–99)
POTASSIUM SERPL-SCNC: 2.9 MMOL/L (ref 3.5–5.2)
SODIUM SERPL-SCNC: 141 MMOL/L (ref 136–145)

## 2024-12-31 PROCEDURE — 97530 THERAPEUTIC ACTIVITIES: CPT

## 2024-12-31 PROCEDURE — 97110 THERAPEUTIC EXERCISES: CPT

## 2024-12-31 PROCEDURE — 36415 COLL VENOUS BLD VENIPUNCTURE: CPT

## 2024-12-31 PROCEDURE — 80048 BASIC METABOLIC PNL TOTAL CA: CPT

## 2024-12-31 RX ORDER — CHOLESTYRAMINE LIGHT 4 G/5.7G
1 POWDER, FOR SUSPENSION ORAL 2 TIMES DAILY
Qty: 60 PACKET | Refills: 3 | Status: ON HOLD | OUTPATIENT
Start: 2024-12-31

## 2024-12-31 RX ORDER — OXYMETAZOLINE HYDROCHLORIDE 0.05 G/100ML
2 SPRAY NASAL 2 TIMES DAILY
Qty: 2 ML | Refills: 0 | Status: SHIPPED | OUTPATIENT
Start: 2024-12-31 | End: 2024-12-31

## 2024-12-31 RX ORDER — POTASSIUM CHLORIDE 1500 MG/1
40 TABLET, EXTENDED RELEASE ORAL EVERY 4 HOURS
Status: DISCONTINUED | OUTPATIENT
Start: 2024-12-31 | End: 2024-12-31 | Stop reason: HOSPADM

## 2024-12-31 RX ORDER — OXYMETAZOLINE HYDROCHLORIDE 0.05 G/100ML
2 SPRAY NASAL 2 TIMES DAILY
Qty: 2 ML | Refills: 0 | Status: SHIPPED | OUTPATIENT
Start: 2024-12-31 | End: 2025-01-03

## 2024-12-31 RX ORDER — PANTOPRAZOLE SODIUM 40 MG/1
40 TABLET, DELAYED RELEASE ORAL
Qty: 90 TABLET | Refills: 0 | Status: ON HOLD | OUTPATIENT
Start: 2024-12-31 | End: 2025-01-11

## 2024-12-31 RX ORDER — PANTOPRAZOLE SODIUM 40 MG/1
40 TABLET, DELAYED RELEASE ORAL
Qty: 90 TABLET | Refills: 0 | Status: SHIPPED | OUTPATIENT
Start: 2024-12-31 | End: 2024-12-31

## 2024-12-31 RX ORDER — DIPHENOXYLATE HYDROCHLORIDE AND ATROPINE SULFATE 2.5; .025 MG/1; MG/1
1 TABLET ORAL 4 TIMES DAILY PRN
Qty: 60 TABLET | Refills: 3 | Status: ON HOLD | OUTPATIENT
Start: 2024-12-31 | End: 2025-01-11

## 2024-12-31 RX ORDER — CHOLESTYRAMINE LIGHT 4 G/5.7G
1 POWDER, FOR SUSPENSION ORAL 2 TIMES DAILY
Qty: 60 PACKET | Refills: 3 | Status: SHIPPED | OUTPATIENT
Start: 2024-12-31 | End: 2024-12-31

## 2024-12-31 RX ORDER — DIPHENOXYLATE HYDROCHLORIDE AND ATROPINE SULFATE 2.5; .025 MG/1; MG/1
1 TABLET ORAL 4 TIMES DAILY PRN
Qty: 60 TABLET | Refills: 3 | Status: SHIPPED | OUTPATIENT
Start: 2024-12-31 | End: 2024-12-31

## 2024-12-31 RX ADMIN — POTASSIUM CHLORIDE 40 MEQ: 1500 TABLET, EXTENDED RELEASE ORAL at 06:27

## 2024-12-31 RX ADMIN — MIDODRINE HYDROCHLORIDE 5 MG: 5 TABLET ORAL at 07:33

## 2024-12-31 RX ADMIN — FOLIC ACID 1000 MCG: 1 TABLET ORAL at 07:33

## 2024-12-31 RX ADMIN — POTASSIUM CHLORIDE 40 MEQ: 1500 TABLET, EXTENDED RELEASE ORAL at 11:29

## 2024-12-31 RX ADMIN — PANTOPRAZOLE SODIUM 40 MG: 40 TABLET, DELAYED RELEASE ORAL at 06:27

## 2024-12-31 RX ADMIN — OXYMETAZOLINE HYDROCHLORIDE 2 SPRAY: 0.5 SPRAY NASAL at 07:34

## 2024-12-31 RX ADMIN — SERTRALINE HYDROCHLORIDE 50 MG: 50 TABLET, FILM COATED ORAL at 07:33

## 2024-12-31 RX ADMIN — LOPERAMIDE HYDROCHLORIDE 2 MG: 2 CAPSULE ORAL at 07:33

## 2024-12-31 RX ADMIN — Medication 10 ML: at 07:33

## 2024-12-31 RX ADMIN — LEVOTHYROXINE SODIUM 50 MCG: 50 TABLET ORAL at 07:33

## 2024-12-31 RX ADMIN — ACETAMINOPHEN 650 MG: 325 TABLET, FILM COATED ORAL at 00:17

## 2024-12-31 NOTE — CASE MANAGEMENT/SOCIAL WORK
Continued Stay Note  ERIC Yeung     Patient Name: Maryann Gaitan  MRN: [id number]  Today's Date: 12/31/2024    Admit Date: 12/26/2024    Plan: DC PLAN: Return to [place]. SNF. Precert approved. No PASRR needed. [facility] [place].       Discharge Plan       Row Name 12/31/24 0917       Plan    Plan DC PLAN: Return to [place]. SNF. Precert approved. No PASRR needed. [facility] [place].        Plan Comments Called and spoke with dhruv Schmitz, PT/OT notes reviewed, agreeable to SNF. Pharmacy updated in [place]. WIll need EMS transport. Medical Necessity form completed. Anticipate discharge today.                      Expected Discharge Date and Time       Expected Discharge Date Expected Discharge Time    Dec 31, 2024           Arianne Hendricks RN   Case Management  [phone]

## 2024-12-31 NOTE — DISCHARGE SUMMARY
[facility] Medicine Services  Discharge Summary    Date of Service: 2024  Patient Name: Maryann Gaitan  : 1950  MRN: [id number]    Date of Admission: 2024  Discharge Diagnosis:   BISHNU  Anion gap metabolic acidosis  Lactic acidosis  Acute hyperkalemia  Acute blood loss anemia secondary to possible GI bleed  Non-MI related elevated troponin  Crohn's disease  Rheumatoid arthritis  CAD  Dyslipidemia  Chronic pain syndrome  Epistaxis  History of anxiety  Hypothyroidism  COPD  GERD    Date of Discharge: 2024  Primary Care Physician: Azam Calhoun MD      Presenting Problem:   Hyperkalemia [E87.5]  Acute renal failure (ARF) [N17.9]  Gastric bleed [K92.2]  Acute renal failure, unspecified acute renal failure type [N17.9]    Active and Resolved Hospital Problems:  Active Hospital Problems    Diagnosis POA    **Acute renal failure (ARF) [N17.9] Yes    Gastric bleed [K92.2] Yes      Resolved Hospital Problems   No resolved problems to display.         Hospital Course     HPI:    Patient is a 74-year-old female who presented to the hospital with complaints of fatigue and high ileostomy output.  Please see H&P for details.    Hospital Course:  Patient was recently went to the hospital last month where she underwent partial small bowel resection with ileostomy.  However she had been having issues with high ileostomy output since then and return to the ER for concern of this as well as generalized weakness.  She was found to have profound acute kidney injury and hyperkalemia, likely related to volume depletion in the setting of high ileostomy output.  She was initiated on IV fluids with improvement of her renal function back to baseline over the next few days.  Nephrology did not feel that dialysis was warranted during her hospital stay.  She was also initiated on Lomotil to improve her ostomy output as well as cholestyramine.  She had significant improvement in her ostomy output.   She was able to tolerate p.o. intake fairly well.  Of note, there was concern for possible bleed from her ileostomy site.  GI did evaluate the patient and did not feel that an ileoscopy was appropriate at this time.  She did report PRBC transfusion during her hospital stay however her H&H remained stable posttransfusion.  Her Plavix was held initially, however this will be continued given that she had recent stent placement in October 2024.  PT/OT recommending return to SNF and the patient was agreeable to this.  She is stable for discharge.        DISCHARGE Follow Up Recommendations for labs and diagnostics:   Follow-up with PCP in 1 week.        Day of Discharge     Vital Signs:  Temp:  [97 °F (36.1 °C)-99.2 °F (37.3 °C)] 97 °F (36.1 °C)  Heart Rate:  [64-87] 87  Resp:  [20-21] 20  BP: ()/(46-66) 97/57    Physical Exam:  Physical Exam   General Appearance:  Alert, cooperative, no distress, appears stated age  Head:  Normocephalic, without obvious abnormality, atraumatic  Eyes:  PERRL, conjunctiva/corneas clear, EOM's intact, fundi benign, both eyes  Ears:  Normal TM's and external ear canals, both ears  Nose: Nares normal, septum midline, mucosa normal, no drainage or sinus tenderness  Throat: Lips, mucosa, and tongue normal; teeth and gums normal  Neck: Supple, symmetrical, trachea midline, no adenopathy, thyroid: not enlarged, symmetric, no tenderness/mass/nodules, no carotid bruit or JVD  Lungs:   Clear to auscultation bilaterally, respirations unlabored  Heart:  Regular rate and rhythm, S1, S2 normal, no murmur, rub or gallop  Abdomen:  Soft, non-tender, bowel sounds active all four quadrants,  no masses, no organomegaly, ileostomy intact  Extremities: Extremities normal, atraumatic, no cyanosis or edema  Pulses: 2+ and symmetric  Skin: Skin color, texture, turgor normal, no rashes or lesions  Neurologic: Normal        Pertinent  and/or Most Recent Results     LAB RESULTS:      Lab 12/29/24  1344  12/29/24  0245 12/28/24  0530 12/28/24  0014 12/27/24 2006 12/27/24  1709 12/27/24  1600 12/27/24  1109 12/27/24  0921 12/27/24  0640 12/27/24  0504 12/26/24  2340 12/26/24  2041   WBC  --  8.35  --  9.43  --   --   --   --  9.45  --  12.27*  --  16.49*   HEMOGLOBIN 9.4* 7.0* 7.5* 7.4* 8.0*  --    < > 6.4* 7.0*  --  7.4*  --  10.5*   HEMATOCRIT 29.4* 22.8*  --  22.7*  --   --   --   --  21.7*  --  22.6*  --  33.6*   PLATELETS  --  324  --  336  --   --   --   --  429  --  460*  --  723*   NEUTROS ABS  --  4.14  --   --   --   --   --   --   --   --  7.99*  --  10.87*   IMMATURE GRANS (ABS)  --  0.03  --   --   --   --   --   --   --   --  0.10*  --  0.10*   LYMPHS ABS  --  3.87*  --   --   --   --   --   --   --   --  3.55*  --  4.75*   MONOS ABS  --  0.17  --   --   --   --   --   --   --   --  0.50  --  0.56   EOS ABS  --  0.09  --   --   --   --   --   --   --   --  0.09  --  0.10   MCV  --  93.4  --  90.4  --   --   --   --  91.2  --  91.9  --  93.9   LACTATE 1.2  --   --   --   --  2.9*  --  4.0* 5.0* 2.5* 3.2*   < >  --    PROTIME  --   --   --   --   --   --   --   --   --   --   --   --  14.0   APTT  --   --   --   --   --   --   --   --   --   --   --   --  32.0    < > = values in this interval not displayed.         Lab 12/31/24  0331 12/30/24  0406 12/29/24  0245 12/28/24  0014 12/27/24  1109 12/27/24  0504 12/26/24  2335 12/26/24  2041   SODIUM 141 139 139 138 139   < > 135* 129*   POTASSIUM 2.9* 3.4* 3.4* 3.6 3.6   < > 5.6* 7.2*   CHLORIDE 108* 107 105 99 96*   < > 98 93*   CO2 21.6* 22.4 24.5 27.2 29.4*   < > 19.9* 15.9*   ANION GAP 11.4 9.6 9.5 11.8 13.6   < > 17.1* 20.1*   BUN 16 20 31* 47* 61*   < > 86* 90*   CREATININE 1.01* 1.35* 1.98* 2.80* 3.47*   < > 5.25* 5.56*   EGFR 58.5* 41.3* 26.1* 17.2* 13.3*   < > 8.1* 7.6*   GLUCOSE 80 88 73 82 134*   < > 137* 91   CALCIUM 7.9* 7.4* 7.0* 6.8* 7.1*   < > 8.6 9.4   MAGNESIUM  --   --   --   --   --   --   --  1.9   PHOSPHORUS  --   --   --  5.1*  --   --   9.2* 10.4*    < > = values in this interval not displayed.         Lab 12/28/24  0014 12/27/24  1109 12/26/24 2041   TOTAL PROTEIN 4.1* 4.3* 7.0   ALBUMIN 2.1* 2.1* 3.3*   GLOBULIN 2.0 2.2 3.7   ALT (SGPT) 15 13 20   AST (SGOT) 24 19 26   BILIRUBIN 0.2 0.2 0.2   ALK PHOS 109 108 190*         Lab 12/26/24  2335 12/26/24 2041   HSTROP T 49* 59*   PROTIME  --  14.0   INR  --  1.08         Lab 12/26/24 2041   CHOLESTEROL 126   LDL CHOL 45   HDL CHOL 57   TRIGLYCERIDES 139         Lab 12/29/24  0245 12/27/24  0921   IRON 105  --    IRON SATURATION (TSAT) 76*  --    TIBC 139*  --    TRANSFERRIN 93*  --    FERRITIN 1,854.00*  --    ABO TYPING  --  O   RH TYPING  --  Positive   ANTIBODY SCREEN  --  Negative         Brief Urine Lab Results  (Last result in the past 365 days)        Color   Clarity   Blood   Leuk Est   Nitrite   Protein   CREAT   Urine HCG        12/02/24 2056 Yellow   Cloudy   Negative   Moderate (2+)   Negative   Negative                 Microbiology Results (last 10 days)       Procedure Component Value - Date/Time    Blood Culture - Blood, Arm, Left [[id number]]  (Normal) Collected: 12/27/24 1109    Lab Status: Preliminary result Specimen: Blood from Arm, Left Updated: 12/31/24 1130     Blood Culture No growth at 4 days    Blood Culture - Blood, Arm, Right [[id number]]  (Normal) Collected: 12/27/24 1109    Lab Status: Preliminary result Specimen: Blood from Arm, Right Updated: 12/31/24 1130     Blood Culture No growth at 4 days    Blood Culture - Blood, Hand, Left [[id number]]  (Normal) Collected: 12/26/24 2335    Lab Status: Preliminary result Specimen: Blood from Hand, Left Updated: 12/30/24 2345     Blood Culture No growth at 4 days    Narrative:      Less than seven (7) mL's of blood was collected.  Insufficient quantity may yield false negative results.    Blood Culture - Blood, Arm, Left [[id number]]  (Normal) Collected: 12/26/24 2335    Lab Status: Preliminary result Specimen: Blood from Arm, Left  Updated: 12/30/24 2345     Blood Culture No growth at 4 days            CT Abdomen Pelvis Without Contrast    Result Date: 12/27/2024  Impression: Impression: Interval postoperative changes without acute findings in the abdomen or pelvis. Electronically Signed: Mansoor Flores  12/27/2024 6:43 PM EST  Workstation ID: [id number]    US Renal Bilateral    Result Date: 12/27/2024  Impression: Impression: Kidney ultrasound is within normal limits. Electronically Signed: Juan Dia MD  12/27/2024 6:33 AM EST  Workstation ID: [id number]    XR Chest 1 View    Result Date: 12/26/2024  Impression: Impression: Emphysema with no acute cardiopulmonary process demonstrated Electronically Signed: Colt Diana  12/26/2024 9:03 PM EST  Workstation ID: [id number]    XR Chest 1 View    Result Date: 12/11/2024  Impression: Impression: 1.Patchy consolidation in the upper lobes felt to represent bilateral pneumonia. 2.Right upper lobe subsegmental atelectasis. 3.Questionable trace right pleural effusion. Electronically Signed: Michi Taylor MD  12/11/2024 2:02 PM EST  Workstation ID: [id number]     Results for orders placed during the hospital encounter of 10/08/24    Duplex Venous Upper Extremity - Left CAR    Interpretation Summary    Acute left upper extremity superficial thrombophlebitis noted in the basilic (upper arm).    All other left sided vessels appear normal.      Results for orders placed during the hospital encounter of 10/08/24    Duplex Venous Upper Extremity - Left CAR    Interpretation Summary    Acute left upper extremity superficial thrombophlebitis noted in the basilic (upper arm).    All other left sided vessels appear normal.      Results for orders placed during the hospital encounter of 11/19/24    Adult Transthoracic Echo Limited W/ Cont if Necessary Per Protocol    Interpretation Summary    Left ventricular ejection fraction appears to be 56 - 60%.    There is a MitraClip mitral valve repair present.    There is  trace residual MR.  Mean gradient is 4.6 mmHg      Labs Pending at Discharge:  Pending Results       None            Procedures Performed           Consults:   Consults       Date and Time Order Name Status Description    12/27/2024  9:08 AM Inpatient General Surgery Consult Completed     12/26/2024  9:14 PM Nephrology (on -call MD unless specified) Completed     12/16/2024  3:00 PM Inpatient Psychiatrist Consult Completed     12/2/2024  3:47 PM Inpatient Psychiatrist Consult Completed     11/24/2024 10:44 AM Inpatient Infectious Diseases Consult Completed     11/22/2024 11:39 AM Hematology & Oncology Inpatient Consult Completed     11/19/2024  5:31 PM Inpatient Cardiology Consult Completed     11/19/2024  2:56 PM Gastroenterology (on-call MD unless specified) Completed               Discharge Details        Discharge Medications        New Medications        Instructions Start Date   cholestyramine light 4 g packet   4 g, Oral, 2 Times Daily      diphenoxylate-atropine 2.5-0.025 MG per tablet  Commonly known as: Lomotil   1 tablet, Oral, 4 Times Daily PRN      oxymetazoline 0.05 % nasal spray  Commonly known as: AFRIN   2 sprays, Nasal, 2 Times Daily      pantoprazole 40 MG EC tablet  Commonly known as: PROTONIX   40 mg, Oral, Every Early Morning             Continue These Medications        Instructions Start Date   albuterol (2.5 MG/3ML) 0.083% nebulizer solution  Commonly known as: PROVENTIL   2.5 mg, Every 6 Hours PRN      aspirin 81 MG EC tablet   81 mg, Oral, Daily      atorvastatin 40 MG tablet  Commonly known as: LIPITOR   40 mg, Oral, Nightly      calcium carbonate 600 MG tablet  Commonly known as: OS-ARABELLA   600 mg, 2 Times Daily With Meals      cholecalciferol 1.25 MG (09632 UT) capsule  Commonly known as: VITAMIN D3   1 capsule, 2 Times Weekly      clopidogrel 75 MG tablet  Commonly known as: PLAVIX   75 mg, Oral, Daily      diclofenac 50 MG EC tablet  Commonly known as: VOLTAREN   50 mg, 2 Times Daily       folic acid 1 MG tablet  Commonly known as: FOLVITE   1 tablet, Daily      guaifenesin 100 MG/5ML liquid  Commonly known as: ROBITUSSIN   200 mg, Oral, Every 4 Hours PRN      Humira (2 Pen) 40 MG/0.4ML Pen-injector Kit  Generic drug: Adalimumab   40 mg, Weekly      levothyroxine 50 MCG tablet  Commonly known as: SYNTHROID, LEVOTHROID   1 tablet, Daily      Melatonin Extra Strength 10 MG tablet  Generic drug: Melatonin   10 mg, As Needed      mesalamine 1000 MG suppository  Commonly known as: CANASA   1,000 mg, Rectal, Nightly      methotrexate 2.5 MG tablet   6 tablets, Weekly      metoprolol tartrate 25 MG tablet  Commonly known as: LOPRESSOR   12.5 mg, Oral, Every 12 Hours Scheduled      midodrine 5 MG tablet  Commonly known as: PROAMATINE   5 mg, Oral, 3 Times Daily Before Meals      naloxone 4 MG/0.1ML nasal spray  Commonly known as: NARCAN   Call 911. Don't prime. [place] in 1 nostril for overdose. Repeat in 2-3 minutes in other nostril if no or minimal breathing/responsiveness.      ondansetron ODT 4 MG disintegrating tablet  Commonly known as: ZOFRAN-ODT   4 mg, Oral, Every 6 Hours PRN      Rinvoq 45 MG tablet sustained-release 24 hour extended release tablet  Generic drug: upadacitinib ER   1 tablet, Daily      sertraline 50 MG tablet  Commonly known as: ZOLOFT   1 tablet, Daily      spironolactone 25 MG tablet  Commonly known as: ALDACTONE   1 tablet, Daily             Stop These Medications      famotidine 20 MG tablet  Commonly known as: Pepcid     ROCEPHIN IJ              Allergies   Allergen Reactions    Codeine Hives    Penicillin G Sodium Hives         Discharge Disposition:     Home-Health Care [facility] [place] [facility]    Diet:  Hospital:No active diet order        Discharge Activity:         CODE STATUS:  Code Status and Medical Interventions: CPR (Attempt to Resuscitate); Full Support   Ordered at: 12/26/24 8866     Code Status (Patient has no pulse and is not breathing):    CPR (Attempt to Resuscitate)     Medical  Interventions (Patient has pulse or is breathing):    Full Support         Future Appointments   Date Time Provider Department Center   1/6/2025  8:30 AM [facility] ECHO [facility] NA CARD CTR NA   1/6/2025  9:45 AM Travis Connor MD [facility] CARD CTR NA   1/13/2025  2:15 PM Todd Babin MD [facility] NA [facility]   9/24/2025 10:15 AM [facility] NA ECHO [facility] CC NA CARD CTR NA   9/24/2025 11:45 AM Travis Connor MD [facility] NA CARD CTR NA       Additional Instructions for the Follow-ups that You Need to Schedule       Discharge Follow-up with Specified Provider: Follow u p with your PCP in 2 weeks; 2 Weeks   As directed      To: Follow u p with your PCP in 2 weeks   Follow Up: 2 Weeks                Time spent on Discharge including face to face service: Greater than 30 minutes    Signature: Electronically signed by El Childress MD, 12/31/24, 14:20 EST.  [facility] Hospitalist Team

## 2024-12-31 NOTE — OUTREACH NOTE
Prep Survey      Flowsheet Row Responses   [facility] facility patient discharged from? [facility]   Is LACE score < 7 ? No   Eligibility Not Eligible   What are the reasons patient is not eligible? [facility]  [[place]. [facility]]   Does the patient have one of the following disease processes/diagnoses(primary or secondary)? Other   Prep survey completed? Yes            Ilene BAPTISTE - Registered Nurse

## 2024-12-31 NOTE — PLAN OF CARE
Goal Outcome Evaluation:  Plan of Care Reviewed With: patient           Outcome Evaluation: Pt has slept throughout the night, c/o headache earlier in the shift gave PRN Tylenol. Pt expected to discharge home w/ son. Will continue to monitor.

## 2024-12-31 NOTE — PROGRESS NOTES
PROGRESS NOTE      Patient Name: Maryann Gaitan  : 1950  MRN: [id number]  Primary Care Physician: Azam Calhoun MD  Date of admission: 2024    Patient Care Team:  Azam Calhoun MD as PCP - General (Internal Medicine)  Niya Mendzoa APRN as Nurse Practitioner (Cardiology)        Subjective   Subjective:     Patient seen and examined   Renal functions continue to improve  Hypokalemia noted    Review of systems:  All review of system unremarkable      Allergies:    Allergies   Allergen Reactions    Codeine Hives    Penicillin G Sodium Hives       Objective   Exam:     Vital Signs  Temp:  [97 °F (36.1 °C)-99.2 °F (37.3 °C)] 97 °F (36.1 °C)  Heart Rate:  [64-87] 87  Resp:  [20-23] 20  BP: ()/(46-66) 97/57  SpO2:  [96 %-100 %] 99 %  on   ;   Device (Oxygen Therapy): room air  Body mass index is 19.64 kg/m².    General: Elderly white female in no acute distress.    Head:      Normocephalic and atraumatic.    Eyes:      PERRL/EOM intact, conjunctivae and sclerae clear without nystagmus.    Neck:      No masses, thyromegaly,  trachea central with normal respiratory effort   Lungs:    Clear bilaterally to auscultation.    Heart:      Regular rate and rhythm, no murmur no gallop  Abd:         bowel sounds positive previous surgery scars  Pulses:   Pulses palpable  Extr:        No cyanosis or clubbing--no significant edema.    Neuro:    No focal deficits.   alert oriented x3  Skin:       Intact without lesions or rashes.    Psych:    Alert and cooperative; normal mood and affect; .      Results Review:  I have personally reviewed most recent Data :  CBC    Results from last 7 days   Lab Units 24  1344 24  0245 24  0530 24  0014 24  1600 24  1109 24  0921 24  0504 24  2041   WBC 10*3/mm3  --  8.35  --  9.43  --   --   --  9.45 12.27* 16.49*   HEMOGLOBIN g/dL 9.4* 7.0* 7.5* 7.4* 8.0* 8.5* 6.4* 7.0* 7.4* 10.5*   PLATELETS 10*3/mm3   --  324  --  336  --   --   --  429 460* 723*     CMP   Results from last 7 days   Lab Units 12/31/24  0331 12/30/24  0406 12/29/24  0245 12/28/24  0014 12/27/24  1109 12/27/24  0504 12/26/24  2335 12/26/24  2041   SODIUM mmol/L 141 139 139 138 139 138 135* 129*   POTASSIUM mmol/L 2.9* 3.4* 3.4* 3.6 3.6 4.1 5.6* 7.2*   CHLORIDE mmol/L 108* 107 105 99 96* 96* 98 93*   CO2 mmol/L 21.6* 22.4 24.5 27.2 29.4* 26.6 19.9* 15.9*   BUN mg/dL 16 20 31* 47* 61* 71* 86* 90*   CREATININE mg/dL 1.01* 1.35* 1.98* 2.80* 3.47* 4.12* 5.25* 5.56*   GLUCOSE mg/dL 80 88 73 82 134* 117* 137* 91   ALBUMIN g/dL  --   --   --  2.1* 2.1*  --   --  3.3*   BILIRUBIN mg/dL  --   --   --  0.2 0.2  --   --  0.2   ALK PHOS U/L  --   --   --  109 108  --   --  190*   AST (SGOT) U/L  --   --   --  24 19  --   --  26   ALT (SGPT) U/L  --   --   --  15 13  --   --  20     ABG      No radiology results for the last day    Results for orders placed during the hospital encounter of 11/19/24    Adult Transthoracic Echo Limited W/ Cont if Necessary Per Protocol    Interpretation Summary    Left ventricular ejection fraction appears to be 56 - 60%.    There is a MitraClip mitral valve repair present.    There is trace residual MR.  Mean gradient is 4.6 mmHg    Scheduled Meds:[Held by provider] aspirin, 81 mg, Oral, Daily  atorvastatin, 40 mg, Oral, Nightly  cholestyramine light, 1 packet, Oral, Q12H  [Held by provider] clopidogrel, 75 mg, Oral, Daily  fludrocortisone, 200 mcg, Oral, Once  folic acid, 1,000 mcg, Oral, Daily  [Held by provider] heparin (porcine), 5,000 Units, Subcutaneous, Q12H  levothyroxine, 50 mcg, Oral, Daily  lidocaine, 30 mL, Infiltration, Once  loperamide, 2 mg, Oral, 4x Daily  mesalamine, 1,000 mg, Rectal, Nightly  metoprolol tartrate, 12.5 mg, Oral, Q12H  midodrine, 5 mg, Oral, TID AC  oxymetazoline, 2 spray, Each Nare, BID  pantoprazole, 40 mg, Oral, Q AM  potassium chloride ER, 40 mEq, Oral, Q4H  sertraline, 50 mg, Oral,  Daily  sodium chloride, 10 mL, Intravenous, Q12H  Upadacitinib ER, 1 tablet, Oral, Daily      Continuous Infusions:     PRN Meds:  acetaminophen **OR** acetaminophen    albuterol    senna-docusate sodium **AND** polyethylene glycol **AND** bisacodyl **AND** bisacodyl    ipratropium-albuterol    melatonin    nitroglycerin    ondansetron    Potassium Replacement - Follow Nurse / BPA Driven Protocol    [COMPLETED] Insert Peripheral IV **AND** sodium chloride    sodium chloride    sodium chloride    sodium chloride    Assessment & Plan   Assessment and Plan:         Acute renal failure (ARF)    Gastric bleed    ASSESSMENT:  BISHNU  Hyperkalemia   Acidosis getting better  Anemia   Hypotension   COPD  Crohn's on biologic therapy   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   Dehydration   RA: Patient noted to be on Humira 40 mg weekly with last dose 12/24/2024, methotrexate 6 mg weekly on Sundays, and on Rinvoq 45 mcg daily which has not formulary at this facility  CAD s/p stent placement on dual platelet therapy         RENAL US done 12/27/24: right kidney 9.3cm, left 9.1cm, no hydro or increased echogenicity   ECHO done 11/22/24: EF 56-60%, MV clip repair present, trace residual mitral valve regurg         PLAN :      Patient with BISHNU, etiology likely multifactorial with prerenal component secondary to dehydration from high volume output from ostomy, diuretic and NSAID use, and maybe some ATN with hypotension and recent hospitalization with severe anemia secondary to GI bleed. Creatinine 5.5 mg/dL on admission.   Renal US as above, negative for hydro or increased echogenicity   Patient previously on low dose IVFs, currently on hold and creatinine continues to improve, down to 1.0 mg/dL today   Continue to hold Aldactone, Meloxicam. On Methotrexate weekly for RA, hold for now due to BISHNU   Continue strict I/O, UOP documented only 175cc in last 24 hours, not sure how accurate this is  Acidosis secondary to high ostomy  output, resolved  Previously with hyperkalemia now hypokalemic, trending down, agree with potassium supplementation   Hemoglobin better after transfusion, at 9.4 with most recent labs   No history of significant proteinuria, around 100 mg/g back in October.   Blood pressure stable, lactic acidosis improved  Hyperphosphatemia improving  Strict I/O, avoid nephrotoxins  Daily labs   We will continue to follow           Electronically signed by   Buddy Pierre MD.   [facility] kidney consultant  [phone]  12/31/2024  09:25 EST

## 2024-12-31 NOTE — CASE MANAGEMENT/SOCIAL WORK
"Physicians Statement of Medical Necessity for  Ambulance Transportation    GENERAL INFORMATION     Name: Maryann Gaitan  YOB: 1950  Medicare #:   Subscriber ID: [id number]     Transport Date: 12/31/2024 (Valid for round trips this date, or for scheduled repetitive trips for 60 days from the date signed below.)  Origin: [facility]  Destination: [place]  Is the Patient's stay covered under Medicare Part A (PPS/DRG?)Yes  Closest appropriate facility? Yes  If this a hosp-hosp transfer? No  Is this a hospice patient? No    MEDICAL NECESSITY QUESTIONAIRE    Ambulance Transportation is medically necessary only if other means of transportation are contraindicated or would be potentially harmful to the patient.  To meet this requirement, the patient must be either \"bed confined\" or suffer from a condition such that transport by means other than an ambulance is contraindicated by the patient's condition.  The following questions must be answered by the healthcare professional signing below for this form to be valid:     1) Describe the MEDICAL CONDITION (physical and/or mental) of this patient AT THE TIME OF AMBULANCE TRANSPORT that requires the patient to be transported in an ambulance, and why transport by other means is contraindicated by the patient's condition:     Max assist 2-3. Multiple Wounds, Orthostatic Hypotension. Leaking Ostomy    This patient cannot be safely transported by private vehicle.    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 07/05/2016    Dyslipidemia 10/30/2024 "    Fecal urgency 11/21/2024    First degree hemorrhoids 07/09/2021    GERD (gastroesophageal reflux disease) 11/21/2024    Hashimoto's thyroiditis 11/21/2024    History of colonic polyps 07/09/2021    Hypertension     Hypothyroidism (acquired) 11/06/2024    Mitral regurgitation 10/08/2024    Moderate protein-calorie malnutrition 11/09/2024    Multiple tracheobronchial mucus plugs 10/08/2024    Nicotine dependence 11/21/2024    Rheumatoid arthritis 11/06/2024    S/P mitral valve clip implantation 11/21/2024    Second degree hemorrhoids 07/05/2016    Stress incontinence, female 11/21/2024    Vitamin D deficiency, unspecified 11/21/2024      Past Surgical History:   Procedure Laterality Date    BRONCHOSCOPY N/A 10/16/2024    Procedure: BRONCHOSCOPY;  Surgeon: Gallo Pope MD;  Location: [facility] ENDOSCOPY;  Service: Pulmonary;  Laterality: N/A;    CARDIAC CATHETERIZATION N/A 10/15/2024    Procedure: Left Heart Cath, possible pci;  Surgeon: Travis Cononr MD;  Location: [facility] CATH INVASIVE LOCATION;  Service: Cardiovascular;  Laterality: N/A;    CARDIAC CATHETERIZATION N/A 10/22/2024    Procedure: Laser Coronary Atherectomy;  Surgeon: Travis Connor MD;  Location: [facility] CATH INVASIVE LOCATION;  Service: Cardiovascular;  Laterality: N/A;    COLON RESECTION Right 11/28/2024    Procedure: RIGHT HEMICOLECTOMY WITH ILEOSTOMY;  Surgeon: Todd Babin MD;  Location: [facility] MAIN OR;  Service: General;  Laterality: Right;    COLONOSCOPY N/A 10/12/2024    Procedure: COLONOSCOPY WITH BIOPSY AND WIRE GUIDED BALLOON DILATION OF TERMINAL ILEUM;  Surgeon: Rob Strong MD;  Location: [facility] ENDOSCOPY;  Service: Gastroenterology;  Laterality: N/A;  Colitis, crohns of terminal ileum, right colon ulcers, diverticulosis, hemorroids    ENDOSCOPY N/A 10/12/2024    Procedure: ESOPHAGOGASTRODUODENOSCOPY WITH BIOPSY X 2 AREA;  Surgeon: Rob Strong MD;  Location: [facility] ENDOSCOPY;   "Service: Gastroenterology;  Laterality: N/A;  Chronic gastritis, HH    ENDOSCOPY Left 11/28/2024    Procedure: ESOPHAGOGASTRODUODENOSCOPY;  Surgeon: Dallin Delgado MD;  Location: [facility] ENDOSCOPY;  Service: Gastroenterology;  Laterality: Left;  small hiatal hernia    HYSTERECTOMY      LEFT HEART CATH      MITRAL VALVE REPAIR/REPLACEMENT N/A 11/21/2024    Procedure: Transcatheter Mitral Valve Repair;  Surgeon: Dmitri Navarro MD;  Location: [facility] HYBRID OR;  Service: Cardiovascular;  Laterality: N/A;      2) Is this patient \"bed confined\" as defined below?Yes   To be \"bed confined\" the patient must satisfy all three of the following criteria:  (1) unable to get up from bed without assistance; AND (2) unable to ambulate;  AND (3) unable to sit in a chair or wheelchair.  3) Can this patient safely be transported by car or wheelchair van (I.e., may safely sit during transport, without an attendant or monitoring?)No   4. In addition to completing questions 1-3 above, please check any of the following conditions that apply*:          *Note: supporting documentation for any boxes checked must be maintained in the patient's medical records Patient is confused, Moderate/severe pain on movement, Unable to tolerate seated position for time needed to transport, and Unable to sit in a chair or wheelchair due to decubitus ulcers or other wounds      SIGNATURE OF PHYSICIAN OR OTHER AUTHORIZED HEALTHCARE PROFESSIONAL    I certify that the above information is true and correct based on my evaluation of this patient, and represent that the patient requires transport by ambulance and that other forms of transport are contraindicated.  I understand that this information will be used by the Centers for Medicare and Medicaid Services (CMS) to support the determiniation of medical necessity for ambulance services, and I represent that I have personal knowledge of the patient's condition at the time of transport.       If this box is " checked, I also certify that the patient is physically or mentally incapable of signing the ambulance service's claim form and that the institution with which I am affiliated has furnished care, services or assistance to the patient.  My signature below is made on behalf of the patient pursuant to 42 .36(b)(4). In accordance with 42 .37, the specific reason(s) that the patient is physically or mentally incapable of signing the claim for is as follows:     Signature of Physician or Healthcare Professional     TORV: /Kaylin ARORA Date/Time:     12/31/2024 7840     (For Scheduled repetitive transport, this form is not valid for transports performed more than 60 days after this date).                                                                                                                                            --------------------------------------------------------------------------------------------  Printed Name and Credentials of Physician or Authorized Healthcare Professional     *Form must be signed by patient's attending physician for scheduled, repetitive transports,.  For non-repetitive ambulance transports, if unable to obtain the signature of the attending physician, any of the following may sign (please select below):     Physician  Clinical Nurse Specialist  Registered Nurse X    Physician Assistant  Discharge Planner  Licensed Practical Nurse     Nurse Practitioner   X

## 2024-12-31 NOTE — THERAPY TREATMENT NOTE
"Subjective: Pt agreeable to therapeutic plan of care.    Objective:     Precautions - HFR    Bed mobility - Min-A and Assist x 2  Transfers - Min-A and Assist x 2  Ambulation - 5 feet Min-A, Assist x 2, and with rolling walker    Therapeutic Exercise - 10 Reps B LE AROM lying supine and unsupported sitting / EOB    Vitals: Hypotensive  BP 95/47 pre-tx  BP post tx sitting in recliner 63/49- Nsg notified    Pain: 0 VAS   Location: N/A  Intervention for pain: N/A    Education: Provided education on the importance of mobility in the acute care setting, Verbal/Tactile Cues, and Transfer Training    Assessment: Maryann Gaitan presents with functional mobility impairments which indicate the need for skilled intervention. Pt was hypotensive with mild c/o dizziness. Pt took pivoting steps from bed over to recliner with forward posture, posterior lean and decreased heel strike BLE. NSG was made aware of pt's hypotension. Tolerating session today without incident. Will continue to follow and progress as tolerated.     Plan/Recommendations:   If medically appropriate, Moderate Intensity Therapy recommended post-acute care. This is recommended as therapy feels the patient would require 3-4 days per week and wouldn't tolerate \"3 hour daily\" rehab intensity. SNF would be the preferred choice. If the patient does not agree to SNF, arrange HH or OP depending on home bound status. If patient is medically complex, consider LTACH. Pt requires no DME at discharge.     Pt desires Skilled Rehab placement at discharge. Pt cooperative; agreeable to therapeutic recommendations and plan of care.         Basic Mobility 6-click:  Rollin = Total, A lot = 2, A little = 3; 4 = None  Supine>Sit:   1 = Total, A lot = 2, A little = 3; 4 = None   Sit>Stand with arms:  1 = Total, A lot = 2, A little = 3; 4 = None  Bed>Chair:   1 = Total, A lot = 2, A little = 3; 4 = None  Ambulate in room:  1 = Total, A lot = 2, A little = 3; 4 = None  3-5 " Steps with railin = Total, A lot = 2, A little = 3; 4 = None  Score: 13    Modified Doug: N/A = No pre-op stroke/TIA    Post-Tx Position: Up in Chair, Alarms activated, and Call light and personal items within reach  PPE: gloves and gown    Therapy Charges for Today       Code Description Service Date Service Provider Modifiers Qty    86008576613 HC PT THERAPEUTIC ACT EA 15 MIN 2024 Sobia Vides, PT GP 1    01400670146 HC PT THER PROC EA 15 MIN 2024 Sobia Vides, PT GP 1           PT Charges       Row Name 24 1055             Time Calculation    Start Time 0915  -BR      Stop Time 0934  -BR      Time Calculation (min) 19 min  -BR      PT Received On 24  -BR      PT - Next Appointment 25  -BR         Time Calculation- PT    Total Timed Code Minutes- PT 19 minute(s)  -BR                User Key  (r) = Recorded By, (t) = Taken By, (c) = Cosigned By      Initials Name Provider Type    BR Sobia Vides, PT Physical Therapist

## 2025-01-01 LAB
BACTERIA SPEC AEROBE CULT: NORMAL
BACTERIA SPEC AEROBE CULT: NORMAL

## 2025-01-01 NOTE — CASE MANAGEMENT/SOCIAL WORK
Case Management Discharge Note      Final Note: [facility]         Selected Continued Care - Discharged on 12/31/2024 Admission date: 12/26/2024 - Discharge disposition: Home-Health Care c      Destination Coordination complete.      Service Provider Services Address Phone Fax Patient Preferred    [facility] [address] [place] [phone] [phone] --                 Transportation Services  Ambulance: [facility]    Final Discharge Disposition Code: 03 - skilled nursing facility (SNF)

## 2025-01-10 ENCOUNTER — HOSPITAL ENCOUNTER (INPATIENT)
Facility: HOSPITAL | Age: 75
LOS: 6 days | Discharge: NURSING, REHAB OR LONG-TERM CARE | End: 2025-01-16
Attending: EMERGENCY MEDICINE | Admitting: INTERNAL MEDICINE
Payer: MEDICARE

## 2025-01-10 ENCOUNTER — APPOINTMENT (OUTPATIENT)
Dept: GENERAL RADIOLOGY | Facility: HOSPITAL | Age: 75
End: 2025-01-10
Payer: MEDICARE

## 2025-01-10 ENCOUNTER — APPOINTMENT (OUTPATIENT)
Dept: CT IMAGING | Facility: HOSPITAL | Age: 75
End: 2025-01-10
Payer: MEDICARE

## 2025-01-10 DIAGNOSIS — R04.2 HEMOPTYSIS: ICD-10-CM

## 2025-01-10 DIAGNOSIS — J18.9 MULTIFOCAL PNEUMONIA: ICD-10-CM

## 2025-01-10 DIAGNOSIS — J18.9 PNEUMONIA OF BOTH LUNGS DUE TO INFECTIOUS ORGANISM, UNSPECIFIED PART OF LUNG: Primary | ICD-10-CM

## 2025-01-10 DIAGNOSIS — E83.42 HYPOMAGNESEMIA: ICD-10-CM

## 2025-01-10 LAB
ABO GROUP BLD: NORMAL
ALBUMIN SERPL-MCNC: 2.9 G/DL (ref 3.5–5.2)
ALBUMIN/GLOB SERPL: 1 G/DL
ALP SERPL-CCNC: 120 U/L (ref 39–117)
ALT SERPL W P-5'-P-CCNC: 32 U/L (ref 1–33)
ANION GAP SERPL CALCULATED.3IONS-SCNC: 8.3 MMOL/L (ref 5–15)
APTT PPP: 29.5 SECONDS (ref 22.7–35.4)
AST SERPL-CCNC: 31 U/L (ref 1–32)
B PARAPERT DNA SPEC QL NAA+PROBE: NOT DETECTED
B PERT DNA SPEC QL NAA+PROBE: NOT DETECTED
BASOPHILS # BLD AUTO: 0.06 10*3/MM3 (ref 0–0.2)
BASOPHILS NFR BLD AUTO: 0.9 % (ref 0–1.5)
BILIRUB SERPL-MCNC: 0.2 MG/DL (ref 0–1.2)
BLD GP AB SCN SERPL QL: NEGATIVE
BUN SERPL-MCNC: 24 MG/DL (ref 8–23)
BUN/CREAT SERPL: 17.1 (ref 7–25)
C PNEUM DNA NPH QL NAA+NON-PROBE: NOT DETECTED
CALCIUM SPEC-SCNC: 8.7 MG/DL (ref 8.6–10.5)
CHLORIDE SERPL-SCNC: 108 MMOL/L (ref 98–107)
CO2 SERPL-SCNC: 21.7 MMOL/L (ref 22–29)
CREAT SERPL-MCNC: 1.4 MG/DL (ref 0.57–1)
D-LACTATE SERPL-SCNC: 0.5 MMOL/L (ref 0.3–2)
DEPRECATED RDW RBC AUTO: 74.3 FL (ref 37–54)
EGFRCR SERPLBLD CKD-EPI 2021: 39.6 ML/MIN/1.73
EOSINOPHIL # BLD AUTO: 0.61 10*3/MM3 (ref 0–0.4)
EOSINOPHIL NFR BLD AUTO: 8.8 % (ref 0.3–6.2)
ERYTHROCYTE [DISTWIDTH] IN BLOOD BY AUTOMATED COUNT: 19.9 % (ref 12.3–15.4)
FLUAV RNA RESP QL NAA+PROBE: NOT DETECTED
FLUAV SUBTYP SPEC NAA+PROBE: NOT DETECTED
FLUBV RNA ISLT QL NAA+PROBE: NOT DETECTED
FLUBV RNA RESP QL NAA+PROBE: NOT DETECTED
GEN 5 1HR TROPONIN T REFLEX: 30 NG/L
GLOBULIN UR ELPH-MCNC: 2.8 GM/DL
GLUCOSE SERPL-MCNC: 93 MG/DL (ref 65–99)
HADV DNA SPEC NAA+PROBE: NOT DETECTED
HCOV 229E RNA SPEC QL NAA+PROBE: NOT DETECTED
HCOV HKU1 RNA SPEC QL NAA+PROBE: NOT DETECTED
HCOV NL63 RNA SPEC QL NAA+PROBE: NOT DETECTED
HCOV OC43 RNA SPEC QL NAA+PROBE: NOT DETECTED
HCT VFR BLD AUTO: 28 % (ref 34–46.6)
HGB BLD-MCNC: 8.4 G/DL (ref 12–15.9)
HMPV RNA NPH QL NAA+NON-PROBE: NOT DETECTED
HPIV1 RNA ISLT QL NAA+PROBE: NOT DETECTED
HPIV2 RNA SPEC QL NAA+PROBE: NOT DETECTED
HPIV3 RNA NPH QL NAA+PROBE: NOT DETECTED
HPIV4 P GENE NPH QL NAA+PROBE: NOT DETECTED
IMM GRANULOCYTES # BLD AUTO: 0.05 10*3/MM3 (ref 0–0.05)
IMM GRANULOCYTES NFR BLD AUTO: 0.7 % (ref 0–0.5)
INR PPP: 1.06 (ref 0.9–1.1)
LYMPHOCYTES # BLD AUTO: 3.43 10*3/MM3 (ref 0.7–3.1)
LYMPHOCYTES NFR BLD AUTO: 49.6 % (ref 19.6–45.3)
M PNEUMO IGG SER IA-ACNC: NOT DETECTED
MAGNESIUM SERPL-MCNC: 1.2 MG/DL (ref 1.6–2.4)
MCH RBC QN AUTO: 31 PG (ref 26.6–33)
MCHC RBC AUTO-ENTMCNC: 30 G/DL (ref 31.5–35.7)
MCV RBC AUTO: 103.3 FL (ref 79–97)
MONOCYTES # BLD AUTO: 0.96 10*3/MM3 (ref 0.1–0.9)
MONOCYTES NFR BLD AUTO: 13.9 % (ref 5–12)
MRSA DNA SPEC QL NAA+PROBE: NORMAL
NEUTROPHILS NFR BLD AUTO: 1.81 10*3/MM3 (ref 1.7–7)
NEUTROPHILS NFR BLD AUTO: 26.1 % (ref 42.7–76)
NRBC BLD AUTO-RTO: 0 /100 WBC (ref 0–0.2)
PLATELET # BLD AUTO: 469 10*3/MM3 (ref 140–450)
PMV BLD AUTO: 9.8 FL (ref 6–12)
POTASSIUM SERPL-SCNC: 5.4 MMOL/L (ref 3.5–5.2)
PROCALCITONIN SERPL-MCNC: 0.07 NG/ML (ref 0–0.25)
PROT SERPL-MCNC: 5.7 G/DL (ref 6–8.5)
PROTHROMBIN TIME: 13.8 SECONDS (ref 11.7–14.2)
RBC # BLD AUTO: 2.71 10*6/MM3 (ref 3.77–5.28)
RH BLD: POSITIVE
RHINOVIRUS RNA SPEC NAA+PROBE: NOT DETECTED
RSV RNA NPH QL NAA+NON-PROBE: NOT DETECTED
RSV RNA RESP QL NAA+PROBE: NOT DETECTED
SARS-COV-2 RNA RESP QL NAA+PROBE: NOT DETECTED
SARS-COV-2 RNA RESP QL NAA+PROBE: NOT DETECTED
SODIUM SERPL-SCNC: 138 MMOL/L (ref 136–145)
T&S EXPIRATION DATE: NORMAL
TROPONIN T % DELTA: -40 %
TROPONIN T NUMERIC DELTA: -20 NG/L
TROPONIN T SERPL HS-MCNC: 50 NG/L
WBC NRBC COR # BLD AUTO: 6.92 10*3/MM3 (ref 3.4–10.8)

## 2025-01-10 PROCEDURE — 25510000001 IOPAMIDOL PER 1 ML: Performed by: EMERGENCY MEDICINE

## 2025-01-10 PROCEDURE — 25010000002 MAGNESIUM SULFATE IN D5W 1G/100ML (PREMIX) 1-5 GM/100ML-% SOLUTION: Performed by: EMERGENCY MEDICINE

## 2025-01-10 PROCEDURE — 83605 ASSAY OF LACTIC ACID: CPT | Performed by: EMERGENCY MEDICINE

## 2025-01-10 PROCEDURE — 71045 X-RAY EXAM CHEST 1 VIEW: CPT

## 2025-01-10 PROCEDURE — 25810000003 SODIUM CHLORIDE 0.9 % SOLUTION 250 ML FLEX CONT: Performed by: EMERGENCY MEDICINE

## 2025-01-10 PROCEDURE — 87641 MR-STAPH DNA AMP PROBE: CPT | Performed by: INTERNAL MEDICINE

## 2025-01-10 PROCEDURE — 36415 COLL VENOUS BLD VENIPUNCTURE: CPT

## 2025-01-10 PROCEDURE — 85610 PROTHROMBIN TIME: CPT | Performed by: EMERGENCY MEDICINE

## 2025-01-10 PROCEDURE — 84484 ASSAY OF TROPONIN QUANT: CPT | Performed by: INTERNAL MEDICINE

## 2025-01-10 PROCEDURE — 84145 PROCALCITONIN (PCT): CPT | Performed by: EMERGENCY MEDICINE

## 2025-01-10 PROCEDURE — 25010000002 VANCOMYCIN 1 G RECONSTITUTED SOLUTION 1 EACH VIAL: Performed by: EMERGENCY MEDICINE

## 2025-01-10 PROCEDURE — 85025 COMPLETE CBC W/AUTO DIFF WBC: CPT | Performed by: EMERGENCY MEDICINE

## 2025-01-10 PROCEDURE — 85730 THROMBOPLASTIN TIME PARTIAL: CPT | Performed by: EMERGENCY MEDICINE

## 2025-01-10 PROCEDURE — 0202U NFCT DS 22 TRGT SARS-COV-2: CPT | Performed by: INTERNAL MEDICINE

## 2025-01-10 PROCEDURE — 86900 BLOOD TYPING SEROLOGIC ABO: CPT | Performed by: EMERGENCY MEDICINE

## 2025-01-10 PROCEDURE — 83735 ASSAY OF MAGNESIUM: CPT | Performed by: EMERGENCY MEDICINE

## 2025-01-10 PROCEDURE — 80053 COMPREHEN METABOLIC PANEL: CPT | Performed by: EMERGENCY MEDICINE

## 2025-01-10 PROCEDURE — 86850 RBC ANTIBODY SCREEN: CPT | Performed by: EMERGENCY MEDICINE

## 2025-01-10 PROCEDURE — 94799 UNLISTED PULMONARY SVC/PX: CPT

## 2025-01-10 PROCEDURE — 25810000003 SODIUM CHLORIDE 0.9 % SOLUTION: Performed by: EMERGENCY MEDICINE

## 2025-01-10 PROCEDURE — 25010000002 CEFEPIME PER 500 MG: Performed by: INTERNAL MEDICINE

## 2025-01-10 PROCEDURE — 71275 CT ANGIOGRAPHY CHEST: CPT

## 2025-01-10 PROCEDURE — 99285 EMERGENCY DEPT VISIT HI MDM: CPT

## 2025-01-10 PROCEDURE — 94640 AIRWAY INHALATION TREATMENT: CPT

## 2025-01-10 PROCEDURE — 87040 BLOOD CULTURE FOR BACTERIA: CPT | Performed by: EMERGENCY MEDICINE

## 2025-01-10 PROCEDURE — 25010000002 AZTREONAM PER 500 MG: Performed by: EMERGENCY MEDICINE

## 2025-01-10 PROCEDURE — 87637 SARSCOV2&INF A&B&RSV AMP PRB: CPT | Performed by: EMERGENCY MEDICINE

## 2025-01-10 PROCEDURE — 86901 BLOOD TYPING SEROLOGIC RH(D): CPT | Performed by: EMERGENCY MEDICINE

## 2025-01-10 RX ORDER — ALUMINA, MAGNESIA, AND SIMETHICONE 2400; 2400; 240 MG/30ML; MG/30ML; MG/30ML
15 SUSPENSION ORAL EVERY 6 HOURS PRN
Status: DISCONTINUED | OUTPATIENT
Start: 2025-01-10 | End: 2025-01-16 | Stop reason: HOSPADM

## 2025-01-10 RX ORDER — IPRATROPIUM BROMIDE AND ALBUTEROL SULFATE 2.5; .5 MG/3ML; MG/3ML
3 SOLUTION RESPIRATORY (INHALATION)
Status: DISCONTINUED | OUTPATIENT
Start: 2025-01-10 | End: 2025-01-16 | Stop reason: HOSPADM

## 2025-01-10 RX ORDER — ACETAMINOPHEN 325 MG/1
650 TABLET ORAL EVERY 4 HOURS PRN
Status: DISCONTINUED | OUTPATIENT
Start: 2025-01-10 | End: 2025-01-16 | Stop reason: HOSPADM

## 2025-01-10 RX ORDER — BISACODYL 10 MG
10 SUPPOSITORY, RECTAL RECTAL DAILY PRN
Status: DISCONTINUED | OUTPATIENT
Start: 2025-01-10 | End: 2025-01-16 | Stop reason: HOSPADM

## 2025-01-10 RX ORDER — SODIUM CHLORIDE 0.9 % (FLUSH) 0.9 %
10 SYRINGE (ML) INJECTION AS NEEDED
Status: DISCONTINUED | OUTPATIENT
Start: 2025-01-10 | End: 2025-01-16 | Stop reason: HOSPADM

## 2025-01-10 RX ORDER — IOPAMIDOL 755 MG/ML
100 INJECTION, SOLUTION INTRAVASCULAR
Status: COMPLETED | OUTPATIENT
Start: 2025-01-10 | End: 2025-01-10

## 2025-01-10 RX ORDER — BISACODYL 5 MG/1
5 TABLET, DELAYED RELEASE ORAL DAILY PRN
Status: DISCONTINUED | OUTPATIENT
Start: 2025-01-10 | End: 2025-01-16 | Stop reason: HOSPADM

## 2025-01-10 RX ORDER — AMOXICILLIN 250 MG
2 CAPSULE ORAL 2 TIMES DAILY PRN
Status: DISCONTINUED | OUTPATIENT
Start: 2025-01-10 | End: 2025-01-16 | Stop reason: HOSPADM

## 2025-01-10 RX ORDER — SODIUM CHLORIDE 0.9 % (FLUSH) 0.9 %
10 SYRINGE (ML) INJECTION EVERY 12 HOURS SCHEDULED
Status: DISCONTINUED | OUTPATIENT
Start: 2025-01-10 | End: 2025-01-16 | Stop reason: HOSPADM

## 2025-01-10 RX ORDER — POLYETHYLENE GLYCOL 3350 17 G/17G
17 POWDER, FOR SOLUTION ORAL DAILY PRN
Status: DISCONTINUED | OUTPATIENT
Start: 2025-01-10 | End: 2025-01-16 | Stop reason: HOSPADM

## 2025-01-10 RX ORDER — MAGNESIUM SULFATE 1 G/100ML
1 INJECTION INTRAVENOUS
Status: COMPLETED | OUTPATIENT
Start: 2025-01-10 | End: 2025-01-10

## 2025-01-10 RX ORDER — ACETAMINOPHEN 160 MG/5ML
650 SOLUTION ORAL EVERY 4 HOURS PRN
Status: DISCONTINUED | OUTPATIENT
Start: 2025-01-10 | End: 2025-01-16 | Stop reason: HOSPADM

## 2025-01-10 RX ORDER — ONDANSETRON 2 MG/ML
4 INJECTION INTRAMUSCULAR; INTRAVENOUS EVERY 6 HOURS PRN
Status: DISCONTINUED | OUTPATIENT
Start: 2025-01-10 | End: 2025-01-16 | Stop reason: HOSPADM

## 2025-01-10 RX ORDER — ACETAMINOPHEN 650 MG/1
650 SUPPOSITORY RECTAL EVERY 4 HOURS PRN
Status: DISCONTINUED | OUTPATIENT
Start: 2025-01-10 | End: 2025-01-16 | Stop reason: HOSPADM

## 2025-01-10 RX ORDER — NITROGLYCERIN 0.4 MG/1
0.4 TABLET SUBLINGUAL
Status: DISCONTINUED | OUTPATIENT
Start: 2025-01-10 | End: 2025-01-16 | Stop reason: HOSPADM

## 2025-01-10 RX ORDER — SODIUM CHLORIDE 9 MG/ML
40 INJECTION, SOLUTION INTRAVENOUS AS NEEDED
Status: DISCONTINUED | OUTPATIENT
Start: 2025-01-10 | End: 2025-01-16 | Stop reason: HOSPADM

## 2025-01-10 RX ORDER — ONDANSETRON 4 MG/1
4 TABLET, ORALLY DISINTEGRATING ORAL EVERY 6 HOURS PRN
Status: DISCONTINUED | OUTPATIENT
Start: 2025-01-10 | End: 2025-01-16 | Stop reason: HOSPADM

## 2025-01-10 RX ADMIN — MAGNESIUM SULFATE IN DEXTROSE 1 G: 10 INJECTION, SOLUTION INTRAVENOUS at 13:52

## 2025-01-10 RX ADMIN — IOPAMIDOL 100 ML: 755 INJECTION, SOLUTION INTRAVENOUS at 13:48

## 2025-01-10 RX ADMIN — Medication 10 ML: at 20:09

## 2025-01-10 RX ADMIN — MAGNESIUM SULFATE IN DEXTROSE 1 G: 10 INJECTION, SOLUTION INTRAVENOUS at 15:04

## 2025-01-10 RX ADMIN — IPRATROPIUM BROMIDE AND ALBUTEROL SULFATE 3 ML: .5; 3 SOLUTION RESPIRATORY (INHALATION) at 21:42

## 2025-01-10 RX ADMIN — MAGNESIUM SULFATE IN DEXTROSE 1 G: 10 INJECTION, SOLUTION INTRAVENOUS at 16:39

## 2025-01-10 RX ADMIN — VANCOMYCIN HYDROCHLORIDE 1000 MG: 1 INJECTION, POWDER, LYOPHILIZED, FOR SOLUTION INTRAVENOUS at 17:33

## 2025-01-10 RX ADMIN — SODIUM CHLORIDE 500 ML: 9 INJECTION, SOLUTION INTRAVENOUS at 12:06

## 2025-01-10 RX ADMIN — CEFEPIME 2000 MG: 2 INJECTION, POWDER, FOR SOLUTION INTRAVENOUS at 18:42

## 2025-01-10 RX ADMIN — SODIUM ZIRCONIUM CYCLOSILICATE 10 G: 10 POWDER, FOR SUSPENSION ORAL at 17:33

## 2025-01-10 RX ADMIN — SODIUM CHLORIDE 1000 ML: 9 INJECTION, SOLUTION INTRAVENOUS at 13:52

## 2025-01-10 RX ADMIN — AZTREONAM 2 G: 2 INJECTION, POWDER, LYOPHILIZED, FOR SOLUTION INTRAMUSCULAR; INTRAVENOUS at 15:15

## 2025-01-10 NOTE — Clinical Note
Level of Care: Telemetry [5]  Diagnosis: Hemoptysis [[id number]]  Certification: I Certify That Inpatient Hospital Services Are Medically Necessary For Greater Than 2 Midnights

## 2025-01-10 NOTE — H&P
History and Physical   Maryann Gaitan : 1950 MRN:[id number] LOS:0     Reason for admission: Hemoptysis     Assessment / Plan     #Acute hemoptysis likely from infection ? HCAP  -Patient presented with acute hemoptysis today.  Denies any other bleeding.  -She has chest tightness as well.  -Troponin pending.  -CT angio chest with no pulmonary embolism and but there is Interval development of spiculated nodules in the lungs  favorable to infection or inflammation. Interval resolution of cavitary lesion left upper lobe.   -started on IV cefepime and van   -MRSA pending   -sputum Cx to be done   -resp tx, oxygen supplement    #Hypomagnesemia  -lytes Replacement protocol initiated    #Mild Hyperkalemia  -one dose of lokelma to be given.     #Crohn's disease, chronic:  #RA   -Resume home medication once verified by pharmacy and clinically appropriate    #CAD s/p PCI  -Resume home medication once verified by pharmacy and clinically appropriate     #HLD  -Resume home medication once verified by pharmacy and clinically appropriate    #COPD  -resp tx     #Hypothyroidism  -Resume home medication once verified by pharmacy and clinically appropriate    #Anxiety  -Resume home medication once verified by pharmacy and clinically appropriate          Nutrition: NPO Diet NPO Type: Strict NPO     DVT Prophylaxis: Active VTE Prophylaxis  Mechanical:        Start        01/10/25 1445  Maintain Sequential Compression Device  Continuous                          Select Pharmacologic VTE Prophylaxis if Desired & Appropriate      History of Present illness     74 y.o. female with a PMH of CAD s/p recent stent on dual platelet therapy, COPD, RA, Crohn's on biologic therapy, ileostomy with mucous fistula for GI bleed who presented to [facility] with large amount of hemoptysis on admission day.  Patient mentioned that she started coughing and blood came out along with the coughing.  Patient has squeezing pain in the chest as  well.  In the ED patient labs showing no leukocytosis hemoglobin of 8.4 creatinine at 1.4 fluctuating around the baseline.  Potassium of 5.4.  CT angio chest with no pulmonary embolism.  None emphysema.  Interval resolution of cavitated lesion left upper lobe.  Interval development of a spiculated nodule Favored to be inflammation or infection.  Cannot exclude a neoplasm and and will need to follow-up CT chest in 3 months.  Stable descending thoracic aortic aneurysm and measuring up to 3.8 cm.  Coronary artery calcification. started on aztreonam in the ED. Pulm is consulted.     Of note, the Patient is recently in the hospital in December for acute renal failure with creatinine of 5.5 and hyperkalemia of 7.2.  Patient was admitted in November 2024 for acute GI bleeding.    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 07/05/2016    Dyslipidemia 10/30/2024    Fecal urgency 11/21/2024    First degree hemorrhoids 07/09/2021    GERD (gastroesophageal reflux disease) 11/21/2024    Hashimoto's thyroiditis 11/21/2024    History of colonic polyps 07/09/2021    Hypertension     Hypothyroidism (acquired) 11/06/2024    Mitral regurgitation 10/08/2024    Moderate protein-calorie malnutrition 11/09/2024    Multiple tracheobronchial mucus plugs 10/08/2024    Nicotine dependence 11/21/2024    Rheumatoid arthritis 11/06/2024    S/P mitral valve clip implantation 11/21/2024    Second degree  hemorrhoids 07/05/2016    Stress incontinence, female 11/21/2024    Vitamin D deficiency, unspecified 11/21/2024      Past Surgical History:   Procedure Laterality Date    BRONCHOSCOPY N/A 10/16/2024    Procedure: BRONCHOSCOPY;  Surgeon: Gallo Pope MD;  Location: [facility] ENDOSCOPY;  Service: Pulmonary;  Laterality: N/A;    CARDIAC CATHETERIZATION N/A 10/15/2024    Procedure: Left Heart Cath, possible pci;  Surgeon: Travis Connor MD;  Location: [facility] CATH INVASIVE LOCATION;  Service: Cardiovascular;  Laterality: N/A;    CARDIAC CATHETERIZATION N/A 10/22/2024    Procedure: Laser Coronary Atherectomy;  Surgeon: Travis Connor MD;  Location: [facility] CATH INVASIVE LOCATION;  Service: Cardiovascular;  Laterality: N/A;    COLON RESECTION Right 11/28/2024    Procedure: RIGHT HEMICOLECTOMY WITH ILEOSTOMY;  Surgeon: Todd Babin MD;  Location: [facility] MAIN OR;  Service: General;  Laterality: Right;    COLONOSCOPY N/A 10/12/2024    Procedure: COLONOSCOPY WITH BIOPSY AND WIRE GUIDED BALLOON DILATION OF TERMINAL ILEUM;  Surgeon: Rob Strong MD;  Location: [facility] ENDOSCOPY;  Service: Gastroenterology;  Laterality: N/A;  Colitis, crohns of terminal ileum, right colon ulcers, diverticulosis, hemorroids    ENDOSCOPY N/A 10/12/2024    Procedure: ESOPHAGOGASTRODUODENOSCOPY WITH BIOPSY X 2 AREA;  Surgeon: Rob Strong MD;  Location: [facility] ENDOSCOPY;  Service: Gastroenterology;  Laterality: N/A;  Chronic gastritis, HH    ENDOSCOPY Left 11/28/2024    Procedure: ESOPHAGOGASTRODUODENOSCOPY;  Surgeon: Dallin Delgado MD;  Location: [facility] ENDOSCOPY;  Service: Gastroenterology;  Laterality: Left;  small hiatal hernia    HYSTERECTOMY      LEFT HEART CATH      MITRAL VALVE REPAIR/REPLACEMENT N/A 11/21/2024    Procedure: Transcatheter Mitral Valve Repair;  Surgeon: Dmitri Navarro MD;  Location: [facility] HYBRID OR;  Service: Cardiovascular;  Laterality: N/A;      Social History      Socioeconomic History    Marital status:    Tobacco Use    Smoking status: Former     Types: Cigarettes    Smokeless tobacco: Never   Vaping Use    Vaping status: Never Used   Substance and Sexual Activity    Alcohol use: Never    Drug use: Never    Sexual activity: Defer      Family History   Problem Relation Age of Onset    Heart disease Mother     Dementia Mother     Stroke Mother     Heart disease Father     Hypertension Father       Allergies   Allergen Reactions    Codeine Hives    Penicillin G Sodium Hives        Home Medications     Prior to Admission medications    Medication Sig Start Date End Date Taking? Authorizing Provider   Adalimumab (Humira, 2 Pen,) 40 MG/0.4ML Pen-injector Kit Inject 40 mg under the skin into the appropriate area as directed 1 (One) Time Per Week. Saturdays   Indications: Rheumatoid Arthritis 10/25/24   Donna Kraft MD   albuterol (PROVENTIL) (2.5 MG/3ML) 0.083% nebulizer solution Take 2.5 mg by nebulization Every 6 (Six) Hours As Needed for Wheezing. Indications: Spasm of Lung Air Passages 11/13/24   Donna Kraft MD   aspirin 81 MG EC tablet Take 1 tablet by mouth Daily for 30 days. Indications: Disease involving Lipid Deposits in the Arteries 12/16/24 1/15/25  Jordan Lewis DO   calcium carbonate (OS-ARABELLA) 600 MG tablet Take 1 tablet by mouth 2 (Two) Times a Day With Meals.    Donna Kraft MD   cholecalciferol (VITAMIN D3) 1.25 MG (44806 UT) capsule Take 1 capsule by mouth 2 (Two) Times a Week. Wed, Sat  Indications: Vitamin D Deficiency 10/25/24   Donna Kraft MD   cholestyramine light 4 g packet Take 1 packet by mouth 2 (Two) Times a Day. 12/31/24   El Childress MD   diclofenac (VOLTAREN) 50 MG EC tablet Take 1 tablet by mouth 2 (Two) Times a Day.    Donna Kraft MD   diphenoxylate-atropine (Lomotil) 2.5-0.025 MG per tablet Take 1 tablet by mouth 4 (Four) Times a Day As Needed for Diarrhea. 12/31/24   El Childress MD    folic acid (FOLVITE) 1 MG tablet Take 1 tablet by mouth Daily. Indications: Anemia From Inadequate Folic Acid 10/25/24   Donna Kraft MD   guaifenesin (ROBITUSSIN) 100 MG/5ML liquid Take 10 mL by mouth Every 4 (Four) Hours As Needed for Cough. 12/16/24   Jordan Lewis DO   levothyroxine (SYNTHROID, LEVOTHROID) 50 MCG tablet Take 1 tablet by mouth Daily. Indications: Underactive Thyroid 10/25/24   Donna Kraft MD   Melatonin (Melatonin Extra Strength) 10 MG tablet Take 1 tablet by mouth As Needed. Indications: Trouble Sleeping 10/30/24   Donna Kraft MD   mesalamine (CANASA) 1000 MG suppository Insert 1 suppository into the rectum Every Night. 12/16/24   Jordan Lewis DO   methotrexate 2.5 MG tablet Take 6 tablets by mouth 1 (One) Time Per Week. Saturdays   Indications: Non-oncologic 10/25/24   Donna Kraft MD   metoprolol tartrate (LOPRESSOR) 25 MG tablet Take 0.5 tablets by mouth Every 12 (Twelve) Hours. 12/16/24   Jordan Lewis DO   midodrine (PROAMATINE) 5 MG tablet Take 1 tablet by mouth 3 (Three) Times a Day Before Meals. Indications: Disorder of Low Blood Pressure 12/2/24   Fco Figueroa PA-C   naloxone (NARCAN) 4 MG/0.1ML nasal spray Call 911. Don't prime. [place] in 1 nostril for overdose. Repeat in 2-3 minutes in other nostril if no or minimal breathing/responsiveness.  Indications: Opioid Overdose 12/2/24   Fco Figueroa PA-C   ondansetron ODT (ZOFRAN-ODT) 4 MG disintegrating tablet Take 1 tablet by mouth Every 6 (Six) Hours As Needed for Nausea or Vomiting. Indications: Nausea and Vomiting Following an Operation 12/2/24   Fco Figueroa PA-C   pantoprazole (PROTONIX) 40 MG EC tablet Take 1 tablet by mouth Every Morning. 12/31/24   El Childress MD   sertraline (ZOLOFT) 50 MG tablet Take 1 tablet by mouth Daily. Indications: Major Depressive Disorder 10/25/24   Donna Kraft MD   spironolactone (ALDACTONE) 25 MG tablet Take 1 tablet by mouth Daily.  Indications: Edema 11/13/24   ProviderDonna MD   upadacitinib ER (Rinvoq) 45 MG tablet sustained-release 24 hour extended release tablet Take 1 tablet by mouth Daily. Indications: Rheumatoid Arthritis 11/13/24   ProviderDonna MD      Objective / Physical Exam   Vital signs:  Temp: 98.2 °F (36.8 °C)  BP: 96/49  Heart Rate: 58  Resp: 18  SpO2: 98 %  Weight: 51.9 kg (114 lb 6.7 oz)    Admission Weight: Weight: 51.9 kg (114 lb 6.7 oz)    Physical Exam   Physical Exam  HENT:      Head: Normocephalic and atraumatic.      Nose: Nose normal.   Eyes:      Extraocular Movements: Extraocular movements intact.      Conjunctivae/sclerae: Conjunctivae normal.      Pupils: Pupils are equal, round, and reactive to light.   Cardiovascular:      Rate and Rhythm: Normal       Pulses: Normal pulses.      Heart sounds: Normal heart sounds.   Pulmonary:      Reduced BS   Abdominal:      General: Abdomen is flat. Bowel sounds are normal.      Palpations: Abdomen is soft.   Musculoskeletal:         General: Normal range of motion.      Cervical back: Normal range of motion and neck supple.   Skin:     General: Skin is dry.   Neurological:      General: No focal deficit present.      Mental Status: alert.   Psychiatric:         Mood and Affect: Mood normal.        Labs     Results from last 7 days   Lab Units 01/10/25  1146   WBC 10*3/mm3 6.92   HEMATOCRIT % 28.0*   PLATELETS 10*3/mm3 469*      Results from last 7 days   Lab Units 01/10/25  1146   SODIUM mmol/L 138   POTASSIUM mmol/L 5.4*   CHLORIDE mmol/L 108*   CO2 mmol/L 21.7*   BUN mg/dL 24*   CREATININE mg/dL 1.40*        Current Medications   Scheduled Meds:aztreonam, 2 g, Intravenous, Once  magnesium sulfate, 1 g, Intravenous, Q1H  sodium chloride, 10 mL, Intravenous, Q12H  vancomycin, 20 mg/kg, Intravenous, Once         Continuous Infusions:      Tip Stone MD  [facility]   01/10/25   14:46 EST

## 2025-01-10 NOTE — PROGRESS NOTES
"Pharmacy Antimicrobial Dosing Service    Subjective:  Maryann Gaitan is a 74 y.o.female admitted with Hemoptysis. Pharmacy has been consulted to dose Vancomycin for possible PNA.    PMH: \"Interval development of spiculated nodules in the lungs  favorable to infection or inflammation.\"    MRSA Swab pending      Assessment/Plan    1. Day #1 Vancomycin: Pulse dosing d/t renal dysfxn.   Patient to receive  a loading dose of vancomycin 1000 mg IV x 1 (~19.3 mg/kg ABW).  Will dose by levels for now given the renal function.   Vancomycin level scheduled for 1/11 prior to the next dose. Will plan to re-dose when predicted trough level is <20 mcg/mL.    2. Day #1 Cefepime: 2g IV q24h for estCrCl < 30 mL/min.    Will continue to monitor drug levels, renal function, culture and sensitivities, and patient clinical status.       Objective:  Relevant clinical data and objective history reviewed:  162.6 cm (64\")   51.9 kg (114 lb 6.7 oz)   Ideal body weight: 54.7 kg (120 lb 9.5 oz)  Body mass index is 19.64 kg/m².        Results from last 7 days   Lab Units 01/10/25  1146   CREATININE mg/dL 1.40*     Estimated Creatinine Clearance: 28.9 mL/min (A) (by C-G formula based on SCr of 1.4 mg/dL (H)).  No intake/output data recorded.    Results from last 7 days   Lab Units 01/10/25  1146   WBC 10*3/mm3 6.92     Temperature    01/10/25 1101   Temp: 98.2 °F (36.8 °C)     Baseline culture/source/susceptibility:  Microbiology Results (last 10 days)       Procedure Component Value - Date/Time    COVID-19, FLU A/B, RSV PCR 1 HR TAT - Swab, Nasopharynx [[id number]]  (Normal) Collected: 01/10/25 1150    Lab Status: Final result Specimen: Swab from Nasopharynx Updated: 01/10/25 1237     COVID19 Not Detected     Influenza A PCR Not Detected     Influenza B PCR Not Detected     RSV, PCR Not Detected    Narrative:      Fact sheet for providers: https://www.fda.gov/media/762803/download    Fact sheet for patients: " https://www.fda.gov/media/324291/download    Test performed by PCR.          Kingston Emery [facility]  01/10/25 16:47 EST

## 2025-01-10 NOTE — PLAN OF CARE
Goal Outcome Evaluation:              Outcome Evaluation: new admission, vs stable, patient awake alert and oriented x 4. patient on iv abx. colostomy. excoriation noted to buttocks and abdomen.

## 2025-01-10 NOTE — ED PROVIDER NOTES
Subjective   History of Present Illness  74-year-old female returns to the hospital today with reports of hemoptysis.  States been coughing her last couple of days and coughing up some bloody mucus.  She specifies that she is not vomiting or nauseated.  She reports no abdominal pain or fevers or chills.  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 07/05/2016    Dyslipidemia 10/30/2024    Fecal urgency 11/21/2024    First degree hemorrhoids 07/09/2021    GERD (gastroesophageal reflux disease) 11/21/2024    Hashimoto's thyroiditis 11/21/2024    History of colonic polyps 07/09/2021    Hypertension     Hypothyroidism (acquired) 11/06/2024    Mitral regurgitation 10/08/2024    Moderate protein-calorie malnutrition 11/09/2024    Multiple tracheobronchial mucus plugs 10/08/2024    Nicotine dependence 11/21/2024    Rheumatoid arthritis 11/06/2024    S/P mitral valve clip implantation 11/21/2024    Second degree hemorrhoids 07/05/2016    Stress incontinence, female 11/21/2024    Vitamin D deficiency, unspecified 11/21/2024       Allergies   Allergen Reactions    Codeine Hives    Penicillin G Sodium Hives       Past Surgical History:   Procedure Laterality Date    BRONCHOSCOPY N/A 10/16/2024    Procedure: BRONCHOSCOPY;  Surgeon: Gallo Pope MD;  Location: [facility] ENDOSCOPY;  Service: Pulmonary;  Laterality: N/A;    CARDIAC CATHETERIZATION N/A 10/15/2024    Procedure: Left Heart Cath, possible pci;  Surgeon: Travis Connor MD;  Location: [facility] CATH INVASIVE LOCATION;  Service:  Cardiovascular;  Laterality: N/A;    CARDIAC CATHETERIZATION N/A 10/22/2024    Procedure: Laser Coronary Atherectomy;  Surgeon: Travis Connor MD;  Location: [facility] CATH INVASIVE LOCATION;  Service: Cardiovascular;  Laterality: N/A;    COLON RESECTION Right 11/28/2024    Procedure: RIGHT HEMICOLECTOMY WITH ILEOSTOMY;  Surgeon: Todd Babin MD;  Location: [facility] MAIN OR;  Service: General;  Laterality: Right;    COLONOSCOPY N/A 10/12/2024    Procedure: COLONOSCOPY WITH BIOPSY AND WIRE GUIDED BALLOON DILATION OF TERMINAL ILEUM;  Surgeon: Rob Strong MD;  Location: [facility] ENDOSCOPY;  Service: Gastroenterology;  Laterality: N/A;  Colitis, crohns of terminal ileum, right colon ulcers, diverticulosis, hemorroids    ENDOSCOPY N/A 10/12/2024    Procedure: ESOPHAGOGASTRODUODENOSCOPY WITH BIOPSY X 2 AREA;  Surgeon: Rob Strong MD;  Location: [facility] ENDOSCOPY;  Service: Gastroenterology;  Laterality: N/A;  Chronic gastritis, HH    ENDOSCOPY Left 11/28/2024    Procedure: ESOPHAGOGASTRODUODENOSCOPY;  Surgeon: Dallin Delgado MD;  Location: [facility] ENDOSCOPY;  Service: Gastroenterology;  Laterality: Left;  small hiatal hernia    HYSTERECTOMY      LEFT HEART CATH      MITRAL VALVE REPAIR/REPLACEMENT N/A 11/21/2024    Procedure: Transcatheter Mitral Valve Repair;  Surgeon: Dmitri Navarro MD;  Location: [facility] HYBRID OR;  Service: Cardiovascular;  Laterality: N/A;       Family History   Problem Relation Age of Onset    Heart disease Mother     Dementia Mother     Stroke Mother     Heart disease Father     Hypertension Father        Social History     Socioeconomic History    Marital status:    Tobacco Use    Smoking status: Former     Types: Cigarettes    Smokeless tobacco: Never   Vaping Use    Vaping status: Never Used   Substance and Sexual Activity    Alcohol use: Never    Drug use: Never    Sexual activity: Defer     Prior to Admission medications    Medication Sig Start  Date End Date Taking? Authorizing Provider   Adalimumab (Humira, 2 Pen,) 40 MG/0.4ML Pen-injector Kit Inject 40 mg under the skin into the appropriate area as directed 1 (One) Time Per Week. Saturdays   Indications: Rheumatoid Arthritis 10/25/24   Donna Kraft MD   albuterol (PROVENTIL) (2.5 MG/3ML) 0.083% nebulizer solution Take 2.5 mg by nebulization Every 6 (Six) Hours As Needed for Wheezing. Indications: Spasm of Lung Air Passages 11/13/24   Donna Kraft MD   aspirin 81 MG EC tablet Take 1 tablet by mouth Daily for 30 days. Indications: Disease involving Lipid Deposits in the Arteries 12/16/24 1/15/25  Jordan Lewis DO   calcium carbonate (OS-ARABELLA) 600 MG tablet Take 1 tablet by mouth 2 (Two) Times a Day With Meals.    Donna Kraft MD   cholecalciferol (VITAMIN D3) 1.25 MG (73535 UT) capsule Take 1 capsule by mouth 2 (Two) Times a Week. Wed, Sat  Indications: Vitamin D Deficiency 10/25/24   Donna Kraft MD   cholestyramine light 4 g packet Take 1 packet by mouth 2 (Two) Times a Day. 12/31/24   El Childress MD   diclofenac (VOLTAREN) 50 MG EC tablet Take 1 tablet by mouth 2 (Two) Times a Day.    Donna Kraft MD   diphenoxylate-atropine (Lomotil) 2.5-0.025 MG per tablet Take 1 tablet by mouth 4 (Four) Times a Day As Needed for Diarrhea. 12/31/24   El Childress MD   folic acid (FOLVITE) 1 MG tablet Take 1 tablet by mouth Daily. Indications: Anemia From Inadequate Folic Acid 10/25/24   Donna Kraft MD   guaifenesin (ROBITUSSIN) 100 MG/5ML liquid Take 10 mL by mouth Every 4 (Four) Hours As Needed for Cough. 12/16/24   Jordan Lewis DO   levothyroxine (SYNTHROID, LEVOTHROID) 50 MCG tablet Take 1 tablet by mouth Daily. Indications: Underactive Thyroid 10/25/24   Donna Kraft MD   Melatonin (Melatonin Extra Strength) 10 MG tablet Take 1 tablet by mouth As Needed. Indications: Trouble Sleeping 10/30/24   Donna Kraft MD mesalamine (CANASA)  "1000 MG suppository Insert 1 suppository into the rectum Every Night. 12/16/24   Jordan Lewis DO   methotrexate 2.5 MG tablet Take 6 tablets by mouth 1 (One) Time Per Week. Saturdays   Indications: Non-oncologic 10/25/24   Donna Kraft MD   metoprolol tartrate (LOPRESSOR) 25 MG tablet Take 0.5 tablets by mouth Every 12 (Twelve) Hours. 12/16/24   Jordan Lewis DO   midodrine (PROAMATINE) 5 MG tablet Take 1 tablet by mouth 3 (Three) Times a Day Before Meals. Indications: Disorder of Low Blood Pressure 12/2/24   Fco Figueroa PA-C   naloxone (NARCAN) 4 MG/0.1ML nasal spray Call 911. Don't prime. [place] in 1 nostril for overdose. Repeat in 2-3 minutes in other nostril if no or minimal breathing/responsiveness.  Indications: Opioid Overdose 12/2/24   Fco Figueroa PA-C   ondansetron ODT (ZOFRAN-ODT) 4 MG disintegrating tablet Take 1 tablet by mouth Every 6 (Six) Hours As Needed for Nausea or Vomiting. Indications: Nausea and Vomiting Following an Operation 12/2/24   Fco Figueroa PA-C   pantoprazole (PROTONIX) 40 MG EC tablet Take 1 tablet by mouth Every Morning. 12/31/24   El Childress MD   sertraline (ZOLOFT) 50 MG tablet Take 1 tablet by mouth Daily. Indications: Major Depressive Disorder 10/25/24   Donna Kraft MD   spironolactone (ALDACTONE) 25 MG tablet Take 1 tablet by mouth Daily. Indications: Edema 11/13/24   Donna Kraft MD   upadacitinib ER (Rinvoq) 45 MG tablet sustained-release 24 hour extended release tablet Take 1 tablet by mouth Daily. Indications: Rheumatoid Arthritis 11/13/24   Donna Kraft MD     BP 96/49   Pulse 58   Temp 98.2 °F (36.8 °C)   Resp 18   Ht 162.6 cm (64\")   Wt 51.9 kg (114 lb 6.7 oz)   SpO2 98%   BMI 19.64 kg/m²         Objective   Physical Exam  General: Chronically ill-appearing elderly female awake and alert, no acute distress  Eyes:  sclera nonicteric  HEENT: Mucous membranes moist, no mucosal swelling  Neck: Supple, no nuchal " rigidity,   Respirations: Respirations nonlabored, equal breath sounds bilaterally, clear lungs  Heart regular rate and rhythm, no murmurs rubs or gallops,   Abdomen soft nontender nondistended, ileostomy, no hepatosplenomegaly, no hernia, no mass, normal bowel sounds, no CVA tenderness  Extremities no clubbing cyanosis or edema, calves are symmetric and nontender, numerous bruises on the extremities of no particular pattern and varying stages  Neuro cranial nerves grossly intact, no focal limb deficits, generally weak  Psych oriented, pleasant affect  Skin skin irritation on the abdominal wall around the ileostomy sites,   Procedures           ED Course        Results for orders placed or performed during the hospital encounter of 01/10/25   Comprehensive Metabolic Panel    Collection Time: 01/10/25 11:46 AM    Specimen: Arm, Left; Blood   Result Value Ref Range    Glucose 93 65 - 99 mg/dL    BUN 24 (H) 8 - 23 mg/dL    Creatinine 1.40 (H) 0.57 - 1.00 mg/dL    Sodium 138 136 - 145 mmol/L    Potassium 5.4 (H) 3.5 - 5.2 mmol/L    Chloride 108 (H) 98 - 107 mmol/L    CO2 21.7 (L) 22.0 - 29.0 mmol/L    Calcium 8.7 8.6 - 10.5 mg/dL    Total Protein 5.7 (L) 6.0 - 8.5 g/dL    Albumin 2.9 (L) 3.5 - 5.2 g/dL    ALT (SGPT) 32 1 - 33 U/L    AST (SGOT) 31 1 - 32 U/L    Alkaline Phosphatase 120 (H) 39 - 117 U/L    Total Bilirubin 0.2 0.0 - 1.2 mg/dL    Globulin 2.8 gm/dL    A/G Ratio 1.0 g/dL    BUN/Creatinine Ratio 17.1 7.0 - 25.0    Anion Gap 8.3 5.0 - 15.0 mmol/L    eGFR 39.6 (L) >60.0 mL/min/1.73   Protime-INR    Collection Time: 01/10/25 11:46 AM    Specimen: Arm, Left; Blood   Result Value Ref Range    Protime 13.8 11.7 - 14.2 Seconds    INR 1.06 0.90 - 1.10   aPTT    Collection Time: 01/10/25 11:46 AM    Specimen: Arm, Left; Blood   Result Value Ref Range    PTT 29.5 22.7 - 35.4 seconds   Procalcitonin    Collection Time: 01/10/25 11:46 AM    Specimen: Arm, Left; Blood   Result Value Ref Range    Procalcitonin 0.07 0.00 -  0.25 ng/mL   Magnesium    Collection Time: 01/10/25 11:46 AM    Specimen: Arm, Left; Blood   Result Value Ref Range    Magnesium 1.2 (L) 1.6 - 2.4 mg/dL   CBC Auto Differential    Collection Time: 01/10/25 11:46 AM    Specimen: Arm, Left; Blood   Result Value Ref Range    WBC 6.92 3.40 - 10.80 10*3/mm3    RBC 2.71 (L) 3.77 - 5.28 10*6/mm3    Hemoglobin 8.4 (L) 12.0 - 15.9 g/dL    Hematocrit 28.0 (L) 34.0 - 46.6 %    .3 (H) 79.0 - 97.0 fL    MCH 31.0 26.6 - 33.0 pg    MCHC 30.0 (L) 31.5 - 35.7 g/dL    RDW 19.9 (H) 12.3 - 15.4 %    RDW-SD 74.3 (H) 37.0 - 54.0 fl    MPV 9.8 6.0 - 12.0 fL    Platelets 469 (H) 140 - 450 10*3/mm3    Neutrophil % 26.1 (L) 42.7 - 76.0 %    Lymphocyte % 49.6 (H) 19.6 - 45.3 %    Monocyte % 13.9 (H) 5.0 - 12.0 %    Eosinophil % 8.8 (H) 0.3 - 6.2 %    Basophil % 0.9 0.0 - 1.5 %    Immature Grans % 0.7 (H) 0.0 - 0.5 %    Neutrophils, Absolute 1.81 1.70 - 7.00 10*3/mm3    Lymphocytes, Absolute 3.43 (H) 0.70 - 3.10 10*3/mm3    Monocytes, Absolute 0.96 (H) 0.10 - 0.90 10*3/mm3    Eosinophils, Absolute 0.61 (H) 0.00 - 0.40 10*3/mm3    Basophils, Absolute 0.06 0.00 - 0.20 10*3/mm3    Immature Grans, Absolute 0.05 0.00 - 0.05 10*3/mm3    nRBC 0.0 0.0 - 0.2 /100 WBC   Type & Screen    Collection Time: 01/10/25 11:46 AM    Specimen: Arm, Left; Blood   Result Value Ref Range    ABO Type O     RH type Positive     Antibody Screen Negative     T&S Expiration Date 1/13/2025 11:59:59 PM    COVID-19, FLU A/B, RSV PCR 1 HR TAT - Swab, Nasopharynx    Collection Time: 01/10/25 11:50 AM    Specimen: Nasopharynx; Swab   Result Value Ref Range    COVID19 Not Detected Not Detected - Ref. Range    Influenza A PCR Not Detected Not Detected    Influenza B PCR Not Detected Not Detected    RSV, PCR Not Detected Not Detected   POC Lactate    Collection Time: 01/10/25 11:53 AM    Specimen: Blood   Result Value Ref Range    Lactate 0.5 0.3 - 2.0 mmol/L     CT Angiogram Chest Pulmonary Embolism    Result Date:  1/10/2025  Impression: 1.No evidence of pulmonary embolism. 2.Lung emphysema. 3.Interval resolution of cavitary lesion left upper lobe. Interval development of spiculated nodules in the lungs as described above favored to be inflammatory or infectious. Cannot exclude neoplasm. Canal correlation a follow-up CT chest in 3 months recommended 4.Stable descending thoracic aortic aneurysm measuring up to 3.8 cm with diffuse irregular mural thrombus and a broad-based penetrating atherosclerotic ulcer. Additional stable smaller ulcerations. No aortic dissection. 5.Coronary artery calcifications. 6.Additional chronic findings as described above. Electronically Signed: Canelo Manriquez MD  1/10/2025 2:25 PM EST  Workstation ID: [id number]    XR Chest 1 View    Result Date: 1/10/2025  Impression: No acute process identified Electronically Signed: Junior Khan MD  1/10/2025 11:56 AM EST  Workstation ID: [id number]                                                  Medical Decision Making  Differential diagnosis including pulmonary embolus, pneumonia, lung mass, bronchitis    Patient is in no acute respiratory distress on exam oxygen saturations in the mid to low 90s.  She had no significant amounts during the emergency room course.  She has a borderline low blood pressures did respond to fluids.  She is chronically ill-appearing and has CT findings of some acute patchy infiltrates versus inflammatory nodules.  She was ordered IV antibiotics for healthcare acquired pneumonia.  She was ordered magnesium replacement.  Patient was advised of findings and agreeable plan of admission.  Case discussed with  with the hospitalist service who is agreeable plan of admission.    Problems Addressed:  Hemoptysis: complicated acute illness or injury  Pneumonia of both lungs due to infectious organism, unspecified part of lung: complicated acute illness or injury    Amount and/or Complexity of Data Reviewed  Labs: ordered. Decision-making  details documented in ED Course.     Details: CBC shows no leukocytosis there is anemia, magnesium low, comprehensive metabolic panel shows renal insufficiency and borderline hyperkalemia, lactate normal  Radiology: ordered and independent interpretation performed.     Details: My independent interpretation of chest x-ray image no apparent acute abnormality    Risk  OTC drugs.  Prescription drug management.  Decision regarding hospitalization.        Final diagnoses:   Pneumonia of both lungs due to infectious organism, unspecified part of lung   Hemoptysis   Hypomagnesemia       ED Disposition  ED Disposition       ED Disposition   Decision to Admit    Condition   --    Comment   Level of Care: Telemetry [5]   Admitting Physician: DOMINIQUE GILLIAM [[id number]]   Attending Physician: DOMINIQUE GILLIAM [[id number]]                 No follow-up provider specified.       Medication List      No changes were made to your prescriptions during this visit.            Byron Bernal MD  01/10/25 3330

## 2025-01-10 NOTE — LETTER
EMS Transport Request  For use at [facility], [place], [place], [place], and [place] only   Patient Name: Maryann Gaitan : 1950   Weight:51.9 kg (114 lb 6.7 oz) Pick-up Location: [facility] BLS/ALS: BLS/ALS: BLS   Insurance: [employer] MEDICARE REPLACEMENT Auth End Date: 2025   Pre-Cert #: D/C Summary complete:    Destination: Other [place]   Contact Precautions: None   Equipment (O2, Fluids, etc.): None   Arrive By Date/Time: 2025 Stretcher/WC: Wheelchair   CM Requesting: Arianne Hendricks RN Ext: [phone]   Notes/Medical Necessity: Family and Facility cannot provide.     ______________________________________________________________________    *Only 2 patient bags OR 1 carry-on size bag are permitted.  Wheelchairs and walkers CANNOT transported with the patient. Acknowledge: Yes

## 2025-01-11 PROBLEM — J18.9 MULTIFOCAL PNEUMONIA: Status: ACTIVE | Noted: 2025-01-10

## 2025-01-11 LAB
ANION GAP SERPL CALCULATED.3IONS-SCNC: 9.5 MMOL/L (ref 5–15)
BASOPHILS # BLD AUTO: 0.04 10*3/MM3 (ref 0–0.2)
BASOPHILS NFR BLD AUTO: 0.7 % (ref 0–1.5)
BUN SERPL-MCNC: 18 MG/DL (ref 8–23)
BUN/CREAT SERPL: 22.2 (ref 7–25)
CALCIUM SPEC-SCNC: 8.3 MG/DL (ref 8.6–10.5)
CHLORIDE SERPL-SCNC: 111 MMOL/L (ref 98–107)
CO2 SERPL-SCNC: 18.5 MMOL/L (ref 22–29)
CREAT SERPL-MCNC: 0.81 MG/DL (ref 0.57–1)
DEPRECATED RDW RBC AUTO: 72.8 FL (ref 37–54)
EGFRCR SERPLBLD CKD-EPI 2021: 76.3 ML/MIN/1.73
EOSINOPHIL # BLD AUTO: 0.38 10*3/MM3 (ref 0–0.4)
EOSINOPHIL NFR BLD AUTO: 6.6 % (ref 0.3–6.2)
ERYTHROCYTE [DISTWIDTH] IN BLOOD BY AUTOMATED COUNT: 19.7 % (ref 12.3–15.4)
GLUCOSE SERPL-MCNC: 83 MG/DL (ref 65–99)
HCT VFR BLD AUTO: 24.8 % (ref 34–46.6)
HGB BLD-MCNC: 7.6 G/DL (ref 12–15.9)
HOLD SPECIMEN: NORMAL
IMM GRANULOCYTES # BLD AUTO: 0.05 10*3/MM3 (ref 0–0.05)
IMM GRANULOCYTES NFR BLD AUTO: 0.9 % (ref 0–0.5)
LYMPHOCYTES # BLD AUTO: 2.36 10*3/MM3 (ref 0.7–3.1)
LYMPHOCYTES NFR BLD AUTO: 41 % (ref 19.6–45.3)
MAGNESIUM SERPL-MCNC: 2 MG/DL (ref 1.6–2.4)
MCH RBC QN AUTO: 31.3 PG (ref 26.6–33)
MCHC RBC AUTO-ENTMCNC: 30.6 G/DL (ref 31.5–35.7)
MCV RBC AUTO: 102.1 FL (ref 79–97)
MONOCYTES # BLD AUTO: 0.83 10*3/MM3 (ref 0.1–0.9)
MONOCYTES NFR BLD AUTO: 14.4 % (ref 5–12)
NEUTROPHILS NFR BLD AUTO: 2.09 10*3/MM3 (ref 1.7–7)
NEUTROPHILS NFR BLD AUTO: 36.4 % (ref 42.7–76)
NRBC BLD AUTO-RTO: 0 /100 WBC (ref 0–0.2)
PHOSPHATE SERPL-MCNC: 3.7 MG/DL (ref 2.5–4.5)
PLATELET # BLD AUTO: 416 10*3/MM3 (ref 140–450)
PMV BLD AUTO: 10 FL (ref 6–12)
POTASSIUM SERPL-SCNC: 5.1 MMOL/L (ref 3.5–5.2)
RBC # BLD AUTO: 2.43 10*6/MM3 (ref 3.77–5.28)
SODIUM SERPL-SCNC: 139 MMOL/L (ref 136–145)
VANCOMYCIN SERPL-MCNC: 6.8 MCG/ML (ref 5–40)
WBC NRBC COR # BLD AUTO: 5.75 10*3/MM3 (ref 3.4–10.8)

## 2025-01-11 PROCEDURE — 85025 COMPLETE CBC W/AUTO DIFF WBC: CPT | Performed by: INTERNAL MEDICINE

## 2025-01-11 PROCEDURE — 80048 BASIC METABOLIC PNL TOTAL CA: CPT | Performed by: INTERNAL MEDICINE

## 2025-01-11 PROCEDURE — 94799 UNLISTED PULMONARY SVC/PX: CPT

## 2025-01-11 PROCEDURE — 84100 ASSAY OF PHOSPHORUS: CPT | Performed by: INTERNAL MEDICINE

## 2025-01-11 PROCEDURE — 83735 ASSAY OF MAGNESIUM: CPT | Performed by: INTERNAL MEDICINE

## 2025-01-11 PROCEDURE — 80202 ASSAY OF VANCOMYCIN: CPT | Performed by: INTERNAL MEDICINE

## 2025-01-11 PROCEDURE — 94664 DEMO&/EVAL PT USE INHALER: CPT

## 2025-01-11 PROCEDURE — 25010000002 CEFEPIME PER 500 MG: Performed by: STUDENT IN AN ORGANIZED HEALTH CARE EDUCATION/TRAINING PROGRAM

## 2025-01-11 RX ORDER — DIPHENOXYLATE HYDROCHLORIDE AND ATROPINE SULFATE 2.5; .025 MG/1; MG/1
1 TABLET ORAL EVERY 6 HOURS PRN
COMMUNITY

## 2025-01-11 RX ORDER — FOLIC ACID 1 MG/1
1 TABLET ORAL DAILY
COMMUNITY

## 2025-01-11 RX ORDER — MIDODRINE HYDROCHLORIDE 5 MG/1
5 TABLET ORAL
Status: DISCONTINUED | OUTPATIENT
Start: 2025-01-11 | End: 2025-01-16 | Stop reason: HOSPADM

## 2025-01-11 RX ORDER — SPIRONOLACTONE 25 MG/1
25 TABLET ORAL DAILY
COMMUNITY

## 2025-01-11 RX ORDER — ATORVASTATIN CALCIUM 40 MG/1
40 TABLET, FILM COATED ORAL NIGHTLY
COMMUNITY

## 2025-01-11 RX ORDER — LEVOTHYROXINE SODIUM 50 UG/1
50 TABLET ORAL
COMMUNITY

## 2025-01-11 RX ORDER — DIPHENHYDRAMINE HCL 25 MG
25 CAPSULE ORAL EVERY 8 HOURS PRN
Status: DISCONTINUED | OUTPATIENT
Start: 2025-01-11 | End: 2025-01-16 | Stop reason: HOSPADM

## 2025-01-11 RX ORDER — MESALAMINE 1000 MG/1
1000 SUPPOSITORY RECTAL NIGHTLY
COMMUNITY

## 2025-01-11 RX ORDER — HYDROCODONE BITARTRATE AND ACETAMINOPHEN 5; 325 MG/1; MG/1
1 TABLET ORAL 2 TIMES DAILY
COMMUNITY

## 2025-01-11 RX ORDER — LEVOTHYROXINE SODIUM 50 UG/1
50 TABLET ORAL DAILY
Status: DISCONTINUED | OUTPATIENT
Start: 2025-01-11 | End: 2025-01-16 | Stop reason: HOSPADM

## 2025-01-11 RX ORDER — METHOTREXATE 2.5 MG/1
2.5 TABLET ORAL WEEKLY
COMMUNITY

## 2025-01-11 RX ORDER — HYDROCODONE BITARTRATE AND ACETAMINOPHEN 5; 325 MG/1; MG/1
1 TABLET ORAL 2 TIMES DAILY
Status: DISCONTINUED | OUTPATIENT
Start: 2025-01-11 | End: 2025-01-12

## 2025-01-11 RX ORDER — ADALIMUMAB 20MG/0.2ML
0.4 KIT SUBCUTANEOUS
COMMUNITY

## 2025-01-11 RX ORDER — UPADACITINIB 45 MG/1
45 TABLET, EXTENDED RELEASE ORAL DAILY
COMMUNITY

## 2025-01-11 RX ORDER — HYDROCODONE BITARTRATE AND ACETAMINOPHEN 5; 325 MG/1; MG/1
1 TABLET ORAL EVERY 6 HOURS PRN
COMMUNITY

## 2025-01-11 RX ORDER — PANTOPRAZOLE SODIUM 40 MG/1
40 TABLET, DELAYED RELEASE ORAL
Status: DISCONTINUED | OUTPATIENT
Start: 2025-01-12 | End: 2025-01-16 | Stop reason: HOSPADM

## 2025-01-11 RX ORDER — PANTOPRAZOLE SODIUM 40 MG/1
40 TABLET, DELAYED RELEASE ORAL DAILY
COMMUNITY

## 2025-01-11 RX ADMIN — IPRATROPIUM BROMIDE AND ALBUTEROL SULFATE 3 ML: .5; 3 SOLUTION RESPIRATORY (INHALATION) at 12:10

## 2025-01-11 RX ADMIN — CEFEPIME 2000 MG: 2 INJECTION, POWDER, FOR SOLUTION INTRAVENOUS at 12:30

## 2025-01-11 RX ADMIN — LEVOTHYROXINE SODIUM 50 MCG: 50 TABLET ORAL at 12:30

## 2025-01-11 RX ADMIN — IPRATROPIUM BROMIDE AND ALBUTEROL SULFATE 3 ML: .5; 3 SOLUTION RESPIRATORY (INHALATION) at 08:26

## 2025-01-11 RX ADMIN — HYDROCODONE BITARTRATE AND ACETAMINOPHEN 1 TABLET: 5; 325 TABLET ORAL at 21:40

## 2025-01-11 RX ADMIN — MIDODRINE HYDROCHLORIDE 5 MG: 5 TABLET ORAL at 16:54

## 2025-01-11 RX ADMIN — ACETAMINOPHEN 650 MG: 325 TABLET, FILM COATED ORAL at 16:54

## 2025-01-11 RX ADMIN — SERTRALINE HYDROCHLORIDE 50 MG: 50 TABLET, FILM COATED ORAL at 12:30

## 2025-01-11 RX ADMIN — Medication 10 ML: at 12:17

## 2025-01-11 RX ADMIN — IPRATROPIUM BROMIDE AND ALBUTEROL SULFATE 3 ML: .5; 3 SOLUTION RESPIRATORY (INHALATION) at 21:00

## 2025-01-11 RX ADMIN — IPRATROPIUM BROMIDE AND ALBUTEROL SULFATE 3 ML: .5; 3 SOLUTION RESPIRATORY (INHALATION) at 15:13

## 2025-01-11 NOTE — CONSULTS
Group: [facility]         CONSULT NOTE    Patient Identification:  Maryann Gaitan  74 y.o.  female  1950  [id number]            Requesting physician: Attending physician    Reason for Consultation: Hemoptysis      History of Present Illness:  74-year-old female with history of previous cavitary lung lesion, COPD, rheumatoid arthritis and chronic disease on biological treatment status post ileostomy with history of mucous fistula who presented 1/10/2025 with hemoptysis.  Also complaining of chest wall pain.  She reports mild hemoptysis with the strings of blood in the mucus.    Assessment:  Hemoptysis  Pneumonia due to unspecified pathogen  Resolution of the left upper lobe cavitary lesion: Was seen October 2024 with bronchoscopy 10/16/2024 showing negative cultures.  But this admission there is development of spiculated multiple nodules.  COPD but not acute exacerbation  History of strongyloidosis  Rheumatoid arthritis and Crohn disease on biological therapy status post ileostomy with mucous fistula status post GI hemorrhage: on Humira, mesalamine methotrexate outpatient  Chronic anemia  CAD status post PCI to LAD 10/22/2024  HLD  Hypothyroidism  History of severe MR status post mitral valve MitraClip 11/21/2024  Thyroid hormone replacement      Recommendations:  Plan bronchoscopy 1/12/2025    Hold aspirin until the hemoptysis resolved    Antibiotics: Cefepime  Oxygen supplement and titration to maintain saturation 90 to 95%, currently on room air  Bronchodilators    Levothyroxine  Protonix    I personally reviewed the radiological studies      Review of Sytems:  Constitutional: Negative for chills, and fever and positive for malaise/fatigue.   HENT: Negative.    Eyes: Negative.    Cardiovascular: Negative.    Respiratory: Positive for cough and shortness of breath.    Skin: Negative.    Musculoskeletal: Negative.    Gastrointestinal: Negative.    Genitourinary: Negative.    Neurological: Negative.     Psychiatric/Behavioral: Negative.    Past Medical 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 07/05/2016    Dyslipidemia 10/30/2024    Fecal urgency 11/21/2024    First degree hemorrhoids 07/09/2021    GERD (gastroesophageal reflux disease) 11/21/2024    Hashimoto's thyroiditis 11/21/2024    History of colonic polyps 07/09/2021    Hypertension     Hypothyroidism (acquired) 11/06/2024    Mitral regurgitation 10/08/2024    Moderate protein-calorie malnutrition 11/09/2024    Multiple tracheobronchial mucus plugs 10/08/2024    Nicotine dependence 11/21/2024    Rheumatoid arthritis 11/06/2024    S/P mitral valve clip implantation 11/21/2024    Second degree hemorrhoids 07/05/2016    Stress incontinence, female 11/21/2024    Vitamin D deficiency, unspecified 11/21/2024       Past Surgical History:  Past Surgical History:   Procedure Laterality Date    BRONCHOSCOPY N/A 10/16/2024    Procedure: BRONCHOSCOPY;  Surgeon: Gallo Pope MD;  Location: [facility] ENDOSCOPY;  Service: Pulmonary;  Laterality: N/A;    CARDIAC CATHETERIZATION N/A 10/15/2024    Procedure: Left Heart Cath, possible pci;  Surgeon: Travis Connor MD;  Location: [facility] CATH INVASIVE LOCATION;  Service: Cardiovascular;  Laterality: N/A;    CARDIAC CATHETERIZATION N/A 10/22/2024    Procedure: Laser Coronary Atherectomy;  Surgeon: Travis Connor MD;  Location: [facility] CATH INVASIVE LOCATION;  Service: Cardiovascular;  Laterality: N/A;    COLON RESECTION Right 11/28/2024    Procedure: RIGHT HEMICOLECTOMY WITH ILEOSTOMY;   Surgeon: Todd Babin MD;  Location: [facility] MAIN OR;  Service: General;  Laterality: Right;    COLONOSCOPY N/A 10/12/2024    Procedure: COLONOSCOPY WITH BIOPSY AND WIRE GUIDED BALLOON DILATION OF TERMINAL ILEUM;  Surgeon: Rob Strong MD;  Location: [facility] ENDOSCOPY;  Service: Gastroenterology;  Laterality: N/A;  Colitis, crohns of terminal ileum, right colon ulcers, diverticulosis, hemorroids    ENDOSCOPY N/A 10/12/2024    Procedure: ESOPHAGOGASTRODUODENOSCOPY WITH BIOPSY X 2 AREA;  Surgeon: Rob Strong MD;  Location: [facility] ENDOSCOPY;  Service: Gastroenterology;  Laterality: N/A;  Chronic gastritis, HH    ENDOSCOPY Left 11/28/2024    Procedure: ESOPHAGOGASTRODUODENOSCOPY;  Surgeon: Dallin Delgado MD;  Location: [facility] ENDOSCOPY;  Service: Gastroenterology;  Laterality: Left;  small hiatal hernia    HYSTERECTOMY      LEFT HEART CATH      MITRAL VALVE REPAIR/REPLACEMENT N/A 11/21/2024    Procedure: Transcatheter Mitral Valve Repair;  Surgeon: Dmitri Navarro MD;  Location: [facility] HYBRID OR;  Service: Cardiovascular;  Laterality: N/A;        Home Meds:  Medications Prior to Admission   Medication Sig Dispense Refill Last Dose/Taking    Adalimumab (Humira, 2 Pen,) 40 MG/0.4ML Pen-injector Kit Inject 40 mg under the skin into the appropriate area as directed 1 (One) Time Per Week. Saturdays   Indications: Rheumatoid Arthritis       albuterol (PROVENTIL) (2.5 MG/3ML) 0.083% nebulizer solution Take 2.5 mg by nebulization Every 6 (Six) Hours As Needed for Wheezing. Indications: Spasm of Lung Air Passages       aspirin 81 MG EC tablet Take 1 tablet by mouth Daily for 30 days. Indications: Disease involving Lipid Deposits in the Arteries 30 tablet 0     calcium carbonate (OS-ARABELLA) 600 MG tablet Take 1 tablet by mouth 2 (Two) Times a Day With Meals.       cholecalciferol (VITAMIN D3) 1.25 MG (92064 UT) capsule Take 1 capsule by mouth 2 (Two) Times a Week. Wed, Sat  Indications: Vitamin D  Deficiency       cholestyramine light 4 g packet Take 1 packet by mouth 2 (Two) Times a Day. 60 packet 3     diclofenac (VOLTAREN) 50 MG EC tablet Take 1 tablet by mouth 2 (Two) Times a Day.       diphenoxylate-atropine (Lomotil) 2.5-0.025 MG per tablet Take 1 tablet by mouth 4 (Four) Times a Day As Needed for Diarrhea. 60 tablet 3     folic acid (FOLVITE) 1 MG tablet Take 1 tablet by mouth Daily. Indications: Anemia From Inadequate Folic Acid       guaifenesin (ROBITUSSIN) 100 MG/5ML liquid Take 10 mL by mouth Every 4 (Four) Hours As Needed for Cough. 180 mL 0     levothyroxine (SYNTHROID, LEVOTHROID) 50 MCG tablet Take 1 tablet by mouth Daily. Indications: Underactive Thyroid       Melatonin (Melatonin Extra Strength) 10 MG tablet Take 1 tablet by mouth As Needed. Indications: Trouble Sleeping       mesalamine (CANASA) 1000 MG suppository Insert 1 suppository into the rectum Every Night. 30 each 0     methotrexate 2.5 MG tablet Take 6 tablets by mouth 1 (One) Time Per Week. Saturdays   Indications: Non-oncologic       metoprolol tartrate (LOPRESSOR) 25 MG tablet Take 0.5 tablets by mouth Every 12 (Twelve) Hours. 30 tablet 0     midodrine (PROAMATINE) 5 MG tablet Take 1 tablet by mouth 3 (Three) Times a Day Before Meals. Indications: Disorder of Low Blood Pressure 90 tablet 0     naloxone (NARCAN) 4 MG/0.1ML nasal spray Call 911. Don't prime. [place] in 1 nostril for overdose. Repeat in 2-3 minutes in other nostril if no or minimal breathing/responsiveness.  Indications: Opioid Overdose 2 each 0     ondansetron ODT (ZOFRAN-ODT) 4 MG disintegrating tablet Take 1 tablet by mouth Every 6 (Six) Hours As Needed for Nausea or Vomiting. Indications: Nausea and Vomiting Following an Operation 30 tablet 0     pantoprazole (PROTONIX) 40 MG EC tablet Take 1 tablet by mouth Every Morning. 90 tablet 0     sertraline (ZOLOFT) 50 MG tablet Take 1 tablet by mouth Daily. Indications: Major Depressive Disorder       spironolactone  "(ALDACTONE) 25 MG tablet Take 1 tablet by mouth Daily. Indications: Edema       upadacitinib ER (Rinvoq) 45 MG tablet sustained-release 24 hour extended release tablet Take 1 tablet by mouth Daily. Indications: Rheumatoid Arthritis          Allergies:  Allergies   Allergen Reactions    Codeine Hives    Penicillin G Sodium Hives       Social History:   Social History     Socioeconomic History    Marital status:    Tobacco Use    Smoking status: Former     Types: Cigarettes    Smokeless tobacco: Never   Vaping Use    Vaping status: Never Used   Substance and Sexual Activity    Alcohol use: Never    Drug use: Never    Sexual activity: Defer       Family History:  Family History   Problem Relation Age of Onset    Heart disease Mother     Dementia Mother     Stroke Mother     Heart disease Father     Hypertension Father        Physical Exam:  /61 (BP Location: Left arm, Patient Position: Lying)   Pulse 73   Temp 98.1 °F (36.7 °C) (Oral)   Resp 24   Ht 162.6 cm (64\")   Wt 51.9 kg (114 lb 6.7 oz)   SpO2 98%   BMI 19.64 kg/m²  Body mass index is 19.64 kg/m². 98% 51.9 kg (114 lb 6.7 oz)  General Appearance:  Alert   HEENT:  Normocephalic, without obvious abnormality, Conjunctiva/corneas clear,.   Nares normal, no drainage     Neck:  Supple, symmetrical, trachea midline. No JVD.  Lungs /Chest wall:   Bilateral basal rhonchi, respirations unlabored, symmetrical wall movement.     Heart:  Regular rate and rhythm, S1 S2 normal  Abdomen: Soft, non-tender, no masses, no organomegaly.    Extremities: No edema, no clubbing or cyanosis    LABS:  Lab Results   Component Value Date    CALCIUM 8.3 (L) 01/11/2025    PHOS 3.7 01/11/2025     Results from last 7 days   Lab Units 01/11/25  0031 01/10/25  1146   MAGNESIUM mg/dL 2.0 1.2*   SODIUM mmol/L 139 138   POTASSIUM mmol/L 5.1 5.4*   CHLORIDE mmol/L 111* 108*   CO2 mmol/L 18.5* 21.7*   BUN mg/dL 18 24*   CREATININE mg/dL 0.81 1.40*   GLUCOSE mg/dL 83 93   CALCIUM " "mg/dL 8.3* 8.7   WBC 10*3/mm3 5.75 6.92   HEMOGLOBIN g/dL 7.6* 8.4*   PLATELETS 10*3/mm3 416 469*   ALT (SGPT) U/L  --  32   AST (SGOT) U/L  --  31   PROCALCITONIN ng/mL  --  0.07     Lab Results   Component Value Date    CKTOTAL 41 12/27/2024    TROPONINT 30 (H) 01/10/2025     Results from last 7 days   Lab Units 01/10/25  1751 01/10/25  1146   HSTROP T ng/L 30* 50*         Results from last 7 days   Lab Units 01/10/25  1153 01/10/25  1146   PROCALCITONIN ng/mL  --  0.07   LACTATE mmol/L 0.5  --          Results from last 7 days   Lab Units 01/10/25  1645   ADENOVIRUS DETECTION BY PCR  Not Detected   CORONAVIRUS 229E  Not Detected   CORONAVIRUS HKU1  Not Detected   CORONAVIRUS NL63  Not Detected   CORONAVIRUS OC43  Not Detected   HUMAN METAPNEUMOVIRUS  Not Detected   HUMAN RHINOVIRUS/ENTEROVIRUS  Not Detected   INFLUENZA B PCR  Not Detected   PARAINFLUENZA 1  Not Detected   PARAINFLUENZA VIRUS 2  Not Detected   PARAINFLUENZA VIRUS 3  Not Detected   PARAINFLUENZA VIRUS 4  Not Detected   BORDETELLA PERTUSSIS PCR  Not Detected   CHLAMYDOPHILA PNEUMONIAE PCR  Not Detected   MYCOPLAMA PNEUMO PCR  Not Detected   INFLUENZA A PCR  Not Detected   RSV, PCR  Not Detected     Results from last 7 days   Lab Units 01/10/25  1146   INR  1.06         No results found for: \"TSH\"  Estimated Creatinine Clearance: 49.9 mL/min (by C-G formula based on SCr of 0.81 mg/dL).         Imaging:  Imaging Results (Last 24 Hours)       Procedure Component Value Units Date/Time    CT Angiogram Chest Pulmonary Embolism [[id number]] Collected: 01/10/25 1350     Updated: 01/10/25 1427    Narrative:      CT ANGIOGRAM CHEST PULMONARY EMBOLISM    Date of Exam: 1/10/2025 1:41 PM EST    Indication: pain, hemoptyisis.    Comparison: CT chest dated 10/14/2024    Technique: Axial CT images were obtained of the chest after the uneventful intravenous administration of iodinated contrast utilizing pulmonary embolism protocol.  Sagittal and coronal " reconstructions were performed.  Automated exposure control and   iterative reconstruction methods were used.      Findings:  The pulmonary arteries show a normal diameter and normal opacification with no evidence of pulmonary embolism. The heart is within normal limits in size. No pericardial effusion. There are coronary artery calcifications. There is atherosclerosis of the   thoracic aorta. There is aneurysm of the descending thoracic aorta measuring up to 3.8 cm and containing a broad-based penetrating atherosclerotic ulcer. Diffuse irregular mural thrombus. Additional stable smaller aortic ulcers. No aortic dissection.    Subcentimeter shotty mediastinal lymph nodes are seen. Lung emphysema. The central tracheobronchial tree is patent  There is interval resolution of a cavitary lesion in the left upper lobe. There is interval development of focal pleural-parenchymal thickening with a spiculated nodule in the right upper lobe measuring 2.1 x 1.8 cm (image 33 series 5. There is also a   spiculated pulmonary nodule in the left upper lobe measuring 1.8 x 1.7 cm with focal pleural thickening (image 50 series 5. Additional subpleural spiculated nodular area in the posterior right upper lobe measuring 3 x 1.8 cm (image 42) focal spiculated   nodular area in the right upper lobe adjacent to the horizontal fissure measures 1.3 x 0.7 cm (image 65). There are no focal infiltrate or consolidations. No pleural effusions or pneumothorax.    No acute fractures or destructive bone lesions.    No acute abnormalities in the upper abdomen      Impression:      Impression:  1.No evidence of pulmonary embolism.  2.Lung emphysema.  3.Interval resolution of cavitary lesion left upper lobe. Interval development of spiculated nodules in the lungs as described above favored to be inflammatory or infectious. Cannot exclude neoplasm. Canal correlation a follow-up CT chest in 3 months   recommended  4.Stable descending thoracic aortic  aneurysm measuring up to 3.8 cm with diffuse irregular mural thrombus and a broad-based penetrating atherosclerotic ulcer. Additional stable smaller ulcerations. No aortic dissection.  5.Coronary artery calcifications.  6.Additional chronic findings as described above.        Electronically Signed: Canelo Manriquez MD    1/10/2025 2:25 PM EST    Workstation ID: [id number]    XR Chest 1 View [[id number]] Collected: 01/10/25 1155     Updated: 01/10/25 1158    Narrative:      XR CHEST 1 VW    Date of Exam: 1/10/2025 11:52 AM EST    Indication: hemoptysis    Comparison: 12/26/2024    Findings:  Cardiomediastinal silhouette is unremarkable.  No airspace disease, pneumothorax, nor pleural effusion. No acute osseous abnormality identified.      Impression:      Impression:  No acute process identified      Electronically Signed: Junior Khan MD    1/10/2025 11:56 AM EST    Workstation ID: [id number]              Current Meds:   SCHEDULE  cefepime, 2,000 mg, Intravenous, Q12H  ipratropium-albuterol, 3 mL, Nebulization, 4x Daily - RT  sodium chloride, 10 mL, Intravenous, Q12H      Infusions  Pharmacy to dose vancomycin,       PRNs    acetaminophen **OR** acetaminophen **OR** acetaminophen    aluminum-magnesium hydroxide-simethicone    senna-docusate sodium **AND** polyethylene glycol **AND** bisacodyl **AND** bisacodyl    Calcium Replacement - Follow Nurse / BPA Driven Protocol    Magnesium Standard Dose Replacement - Follow Nurse / BPA Driven Protocol    melatonin    nitroglycerin    ondansetron ODT **OR** ondansetron    Pharmacy to dose vancomycin    Phosphorus Replacement - Follow Nurse / BPA Driven Protocol    Potassium Replacement - Follow Nurse / BPA Driven Protocol    [COMPLETED] Insert Peripheral IV **AND** sodium chloride    sodium chloride    sodium chloride    Vancomycin Pharmacy Intermittent/Pulse Dosing        Gallo Pope MD  1/11/2025  10:46 EST      Much of this encounter note is an electronic  transcription/translation of spoken language to printed text using Dragon Software.

## 2025-01-11 NOTE — PROGRESS NOTES
Maryann Gaitan is a 74 y.o. female admitted with hemoptysis.     Recent Labs     01/10/25  1146 01/11/25  0031   CREATININE 1.40* 0.81   BUN 24* 18    139   K 5.4* 5.1   * 111*   CO2 21.7* 18.5*     Estimated Creatinine Clearance: 49.9 mL/min (by C-G formula based on SCr of 0.81 mg/dL).    Assessment/Plan  Cefepime renally adjusted to 2 g Q12H per the Adult Renal Dosing of Medication policy for estCrCl 30-59 mL/min    Erika Kidd, [facility]  1/11/2025

## 2025-01-11 NOTE — PLAN OF CARE
Problem: Adult Inpatient Plan of Care  Goal: Plan of Care Review  Outcome: Progressing  Goal: Patient-Specific Goal (Individualized)  Outcome: Progressing  Goal: Absence of Hospital-Acquired Illness or Injury  Outcome: Progressing  Intervention: Identify and Manage Fall Risk  Recent Flowsheet Documentation  Taken 1/10/2025 2319 by Beverly Gooden RN  Safety Promotion/Fall Prevention:   clutter free environment maintained   fall prevention program maintained   lighting adjusted   nonskid shoes/slippers when out of bed   safety round/check completed  Taken 1/10/2025 2011 by Beverly Gooden RN  Safety Promotion/Fall Prevention:   clutter free environment maintained   fall prevention program maintained   lighting adjusted   nonskid shoes/slippers when out of bed   room organization consistent   safety round/check completed  Intervention: Prevent Skin Injury  Recent Flowsheet Documentation  Taken 1/10/2025 2319 by Beverly Gooden RN  Body Position:   turned   left  Taken 1/10/2025 2011 by Beverly Gooden RN  Body Position:   right   turned  Goal: Optimal Comfort and Wellbeing  Outcome: Progressing  Goal: Readiness for Transition of Care  Outcome: Progressing     Problem: Comorbidity Management  Goal: Blood Pressure in Desired Range  Outcome: Progressing     Problem: Skin Injury Risk Increased  Goal: Skin Health and Integrity  Outcome: Progressing  Intervention: Optimize Skin Protection  Recent Flowsheet Documentation  Taken 1/10/2025 2011 by Beverly Gooden RN  Activity Management: bedrest   Goal Outcome Evaluation:

## 2025-01-11 NOTE — PROGRESS NOTES
[facility] MEDICINE SERVICE  DAILY PROGRESS NOTE    NAME: Maryann Gaitan  : 1950  MRN: [id number]      LOS: 1 day     PROVIDER OF SERVICE: Winston Leslie MD    Chief Complaint: Hemoptysis    Subjective:     Interval History:  History taken from: patient  No acute overnight events, patient reports episode of hemoptysis.     Review of Systems:   Review of Systems    Objective:     Vital Signs  Temp:  [97.4 °F (36.3 °C)-98.8 °F (37.1 °C)] 97.4 °F (36.3 °C)  Heart Rate:  [] 101  Resp:  [16-24] 16  BP: ()/(39-61) 105/39   Body mass index is 19.64 kg/m².    HENT:      Head: Normocephalic and atraumatic.      Nose: Nose normal.   Eyes:      Extraocular Movements: Extraocular movements intact.      Conjunctivae/sclerae: Conjunctivae normal.      Pupils: Pupils are equal, round, and reactive to light.   Cardiovascular:      Rate and Rhythm: Normal       Pulses: Normal pulses.      Heart sounds: Normal heart sounds.   Pulmonary:      Reduced BS   Abdominal:      General: Abdomen is flat. Bowel sounds are normal.      Palpations: Abdomen is soft.   Musculoskeletal:         General: Normal range of motion.      Cervical back: Normal range of motion and neck supple.   Skin:     General: Skin is dry.   Neurological:      General: No focal deficit present.      Mental Status: alert.   Psychiatric:         Mood and Affect: Mood normal.               Scheduled Meds   cefepime, 2,000 mg, Intravenous, Q12H  HYDROcodone-acetaminophen, 1 tablet, Oral, BID  ipratropium-albuterol, 3 mL, Nebulization, 4x Daily - RT  levothyroxine, 50 mcg, Oral, Daily  midodrine, 5 mg, Oral, TID AC  [START ON 2025] pantoprazole, 40 mg, Oral, Q AM  sertraline, 50 mg, Oral, Daily  sodium chloride, 10 mL, Intravenous, Q12H       PRN Meds     acetaminophen **OR** acetaminophen **OR** acetaminophen    aluminum-magnesium hydroxide-simethicone    senna-docusate sodium **AND** polyethylene glycol **AND** bisacodyl **AND**  bisacodyl    Calcium Replacement - Follow Nurse / BPA Driven Protocol    diphenhydrAMINE    Magnesium Standard Dose Replacement - Follow Nurse / BPA Driven Protocol    melatonin    nitroglycerin    ondansetron ODT **OR** ondansetron    Phosphorus Replacement - Follow Nurse / BPA Driven Protocol    Potassium Replacement - Follow Nurse / BPA Driven Protocol    [COMPLETED] Insert Peripheral IV **AND** sodium chloride    sodium chloride    sodium chloride   Infusions         Diagnostic Data    Results from last 7 days   Lab Units 01/11/25  0031 01/10/25  1146   WBC 10*3/mm3 5.75 6.92   HEMOGLOBIN g/dL 7.6* 8.4*   HEMATOCRIT % 24.8* 28.0*   PLATELETS 10*3/mm3 416 469*   GLUCOSE mg/dL 83 93   CREATININE mg/dL 0.81 1.40*   BUN mg/dL 18 24*   SODIUM mmol/L 139 138   POTASSIUM mmol/L 5.1 5.4*   AST (SGOT) U/L  --  31   ALT (SGPT) U/L  --  32   ALK PHOS U/L  --  120*   BILIRUBIN mg/dL  --  0.2   ANION GAP mmol/L 9.5 8.3       CT Angiogram Chest Pulmonary Embolism    Result Date: 1/10/2025  Impression: 1.No evidence of pulmonary embolism. 2.Lung emphysema. 3.Interval resolution of cavitary lesion left upper lobe. Interval development of spiculated nodules in the lungs as described above favored to be inflammatory or infectious. Cannot exclude neoplasm. Canal correlation a follow-up CT chest in 3 months recommended 4.Stable descending thoracic aortic aneurysm measuring up to 3.8 cm with diffuse irregular mural thrombus and a broad-based penetrating atherosclerotic ulcer. Additional stable smaller ulcerations. No aortic dissection. 5.Coronary artery calcifications. 6.Additional chronic findings as described above. Electronically Signed: Canelo Manriquez MD  1/10/2025 2:25 PM EST  Workstation ID: [id number]    XR Chest 1 View    Result Date: 1/10/2025  Impression: No acute process identified Electronically Signed: Junior Khan MD  1/10/2025 11:56 AM EST  Workstation ID: [id number]       I reviewed the patient's new clinical  results.    Assessment/Plan:   74 y.o. female with a PMH of CAD s/p recent stent on dual platelet therapy, COPD, RA, Crohn's on biologic therapy, ileostomy with mucous fistula for GI bleed who presented to [facility] with large amount of hemoptysis on admission day.  Patient mentioned that she started coughing and blood came out along with the coughing.  Patient has squeezing pain in the chest as well.  In the ED patient labs showing no leukocytosis hemoglobin of 8.4 creatinine at 1.4 fluctuating around the baseline.  Potassium of 5.4.  CT angio chest with no pulmonary embolism.  None emphysema.  Interval resolution of cavitated lesion left upper lobe.  Interval development of a spiculated nodule Favored to be inflammation or infection.  Cannot exclude a neoplasm and and will need to follow-up CT chest in 3 months.  Stable descending thoracic aortic aneurysm and measuring up to 3.8 cm.  Coronary artery calcification. started on aztreonam in the ED. Pulm is consulted.      #Acute hemoptysis likely from infection ? HCAP vs Alveolar hemorrhage?, autoimmune etiology vs MAC given she is on biologic agent  #Patient is on TNA alpha antagonist - high risk of TB/MAC  -Patient presented with acute hemoptysis today.  Denies any other bleeding.  -She has chest tightness as well.  -Troponin pending.  -CT angio chest with no pulmonary embolism and but there is Interval development of spiculated nodules in the lungs  favorable to infection or inflammation. Interval resolution of cavitary lesion left upper lobe.   -Continue IV cefepime and van   -MRSA pending   -sputum Cx to be done   -resp tx, oxygen supplement  -Check KYLAH, ANCA panel, ESR/CRP to rule out autoimmune etiology given patient hx of Crohns disease  -Pulm consulted, will follow reccs  -Her last bronch in October 2024, was negative for AFB, Will discuss with pulm that she may need bronch again to rule out TB  -Will follow reccs    #Anemia  -recent labs from  December shows Hgb around 7, currently her hgb is 7, possible contributing loss from hemoptysis   -check iron profile, ferritin  -Check PT/INR  -Check Haptoglobin, LDH, retic count in am, also will add Preston test to rule out autoimmune hemolytic anemia       #Hypomagnesemia  -lytes Replacement protocol initiated     #Mild Hyperkalemia  -one dose of lokelma to be given.      #Crohn's disease, chronic:  #RA   -Hold biologics agents for now given possible lung infection, which needs to be ruled out, pending pulm evaluation  -may need to consult GI depending on if TB positive, her TNF alpha antag will be held     #CAD s/p PCI  -given hemoptysis, will hold ASA or dvt ppx, given possible concern of alveolar hemorrhage     #HLD  -Resume home medication once verified by pharmacy and clinically appropriate     #COPD  -resp tx     #Hypothyroidism  -Resume home medication once verified by pharmacy and clinically appropriate     #Anxiety  -Resume home medication once verified by pharmacy and clinically appropriate       VTE Prophylaxis:  Mechanical VTE prophylaxis orders are present.         Code status is   There are no questions and answers to display.       Plan for disposition: Pulm work up for hemoptysis.     Time: 30 minutes    Part of this note may be an electronic transcription/translation of spoken language to printed text using the Dragon Dictation System.    Signature: Electronically signed by Winston Leslie MD, 01/11/25, 18:23 EST.  [facility]

## 2025-01-11 NOTE — PLAN OF CARE
Goal Outcome Evaluation:              Outcome Evaluation: Pt has been resting comfortably throughout the shift. She has requested pain medication once. She will be NPOI at midnight for a bronchoscopy. She is stable with  no complaints at this time.

## 2025-01-12 ENCOUNTER — ANESTHESIA EVENT (OUTPATIENT)
Dept: GASTROENTEROLOGY | Facility: HOSPITAL | Age: 75
End: 2025-01-12
Payer: MEDICARE

## 2025-01-12 ENCOUNTER — ANESTHESIA (OUTPATIENT)
Dept: GASTROENTEROLOGY | Facility: HOSPITAL | Age: 75
End: 2025-01-12
Payer: MEDICARE

## 2025-01-12 LAB
ALBUMIN SERPL-MCNC: 2.8 G/DL (ref 3.5–5.2)
ALBUMIN/GLOB SERPL: 1.1 G/DL
ALP SERPL-CCNC: 121 U/L (ref 39–117)
ALT SERPL W P-5'-P-CCNC: 23 U/L (ref 1–33)
ANION GAP SERPL CALCULATED.3IONS-SCNC: 7.8 MMOL/L (ref 5–15)
AST SERPL-CCNC: 20 U/L (ref 1–32)
B PARAPERT DNA SPEC QL NAA+PROBE: NOT DETECTED
B PERT DNA SPEC QL NAA+PROBE: NOT DETECTED
BASOPHILS # BLD AUTO: 0.05 10*3/MM3 (ref 0–0.2)
BASOPHILS NFR BLD AUTO: 0.8 % (ref 0–1.5)
BILIRUB SERPL-MCNC: <0.2 MG/DL (ref 0–1.2)
BUN SERPL-MCNC: 13 MG/DL (ref 8–23)
BUN/CREAT SERPL: 15.1 (ref 7–25)
C PNEUM DNA NPH QL NAA+NON-PROBE: NOT DETECTED
CALCIUM SPEC-SCNC: 8.6 MG/DL (ref 8.6–10.5)
CHLORIDE SERPL-SCNC: 110 MMOL/L (ref 98–107)
CO2 SERPL-SCNC: 20.2 MMOL/L (ref 22–29)
CREAT SERPL-MCNC: 0.86 MG/DL (ref 0.57–1)
CRP SERPL-MCNC: 0.92 MG/DL (ref 0–0.5)
DAT POLY-SP REAG RBC QL: NEGATIVE
DEPRECATED RDW RBC AUTO: 73 FL (ref 37–54)
EGFRCR SERPLBLD CKD-EPI 2021: 71 ML/MIN/1.73
EOSINOPHIL # BLD AUTO: 0.36 10*3/MM3 (ref 0–0.4)
EOSINOPHIL NFR BLD AUTO: 6 % (ref 0.3–6.2)
ERYTHROCYTE [DISTWIDTH] IN BLOOD BY AUTOMATED COUNT: 20.1 % (ref 12.3–15.4)
ERYTHROCYTE [SEDIMENTATION RATE] IN BLOOD: 19 MM/HR (ref 0–30)
FERRITIN SERPL-MCNC: 1140 NG/ML (ref 13–150)
FLUAV SUBTYP SPEC NAA+PROBE: NOT DETECTED
FLUBV RNA ISLT QL NAA+PROBE: NOT DETECTED
GLOBULIN UR ELPH-MCNC: 2.5 GM/DL
GLUCOSE SERPL-MCNC: 114 MG/DL (ref 65–99)
HADV DNA SPEC NAA+PROBE: NOT DETECTED
HAPTOGLOB SERPL-MCNC: 167 MG/DL (ref 30–200)
HCOV 229E RNA SPEC QL NAA+PROBE: NOT DETECTED
HCOV HKU1 RNA SPEC QL NAA+PROBE: NOT DETECTED
HCOV NL63 RNA SPEC QL NAA+PROBE: NOT DETECTED
HCOV OC43 RNA SPEC QL NAA+PROBE: NOT DETECTED
HCT VFR BLD AUTO: 24.7 % (ref 34–46.6)
HGB BLD-MCNC: 7.4 G/DL (ref 12–15.9)
HIV 1+2 AB+HIV1 P24 AG SERPL QL IA: NORMAL
HMPV RNA NPH QL NAA+NON-PROBE: NOT DETECTED
HPIV1 RNA ISLT QL NAA+PROBE: NOT DETECTED
HPIV2 RNA SPEC QL NAA+PROBE: NOT DETECTED
HPIV3 RNA NPH QL NAA+PROBE: NOT DETECTED
HPIV4 P GENE NPH QL NAA+PROBE: NOT DETECTED
IMM GRANULOCYTES # BLD AUTO: 0.07 10*3/MM3 (ref 0–0.05)
IMM GRANULOCYTES NFR BLD AUTO: 1.2 % (ref 0–0.5)
INR PPP: 1.04 (ref 0.9–1.1)
IRON 24H UR-MRATE: 45 MCG/DL (ref 37–145)
IRON SATN MFR SERPL: 19 % (ref 20–50)
LDH SERPL-CCNC: 190 U/L (ref 135–214)
LYMPHOCYTES # BLD AUTO: 2.81 10*3/MM3 (ref 0.7–3.1)
LYMPHOCYTES NFR BLD AUTO: 46.8 % (ref 19.6–45.3)
M PNEUMO IGG SER IA-ACNC: NOT DETECTED
MAGNESIUM SERPL-MCNC: 1.7 MG/DL (ref 1.6–2.4)
MCH RBC QN AUTO: 30.2 PG (ref 26.6–33)
MCHC RBC AUTO-ENTMCNC: 30 G/DL (ref 31.5–35.7)
MCV RBC AUTO: 100.8 FL (ref 79–97)
MONOCYTES # BLD AUTO: 1.15 10*3/MM3 (ref 0.1–0.9)
MONOCYTES NFR BLD AUTO: 19.2 % (ref 5–12)
NEUTROPHILS NFR BLD AUTO: 1.56 10*3/MM3 (ref 1.7–7)
NEUTROPHILS NFR BLD AUTO: 26 % (ref 42.7–76)
NRBC BLD AUTO-RTO: 0 /100 WBC (ref 0–0.2)
PHOSPHATE SERPL-MCNC: 3.5 MG/DL (ref 2.5–4.5)
PLATELET # BLD AUTO: 427 10*3/MM3 (ref 140–450)
PMV BLD AUTO: 9.7 FL (ref 6–12)
POTASSIUM SERPL-SCNC: 4.5 MMOL/L (ref 3.5–5.2)
PROT SERPL-MCNC: 5.3 G/DL (ref 6–8.5)
PROTHROMBIN TIME: 13.6 SECONDS (ref 11.7–14.2)
RBC # BLD AUTO: 2.45 10*6/MM3 (ref 3.77–5.28)
RETICS # AUTO: 0.09 10*6/MM3 (ref 0.02–0.13)
RETICS/RBC NFR AUTO: 3.7 % (ref 0.7–1.9)
RHINOVIRUS RNA SPEC NAA+PROBE: NOT DETECTED
RSV RNA NPH QL NAA+NON-PROBE: NOT DETECTED
SARS-COV-2 RNA RESP QL NAA+PROBE: NOT DETECTED
SODIUM SERPL-SCNC: 138 MMOL/L (ref 136–145)
TIBC SERPL-MCNC: 237 MCG/DL (ref 298–536)
TRANSFERRIN SERPL-MCNC: 159 MG/DL (ref 200–360)
WBC NRBC COR # BLD AUTO: 6 10*3/MM3 (ref 3.4–10.8)

## 2025-01-12 PROCEDURE — 83735 ASSAY OF MAGNESIUM: CPT | Performed by: INTERNAL MEDICINE

## 2025-01-12 PROCEDURE — 85652 RBC SED RATE AUTOMATED: CPT | Performed by: STUDENT IN AN ORGANIZED HEALTH CARE EDUCATION/TRAINING PROGRAM

## 2025-01-12 PROCEDURE — 86140 C-REACTIVE PROTEIN: CPT | Performed by: STUDENT IN AN ORGANIZED HEALTH CARE EDUCATION/TRAINING PROGRAM

## 2025-01-12 PROCEDURE — 86037 ANCA TITER EACH ANTIBODY: CPT | Performed by: STUDENT IN AN ORGANIZED HEALTH CARE EDUCATION/TRAINING PROGRAM

## 2025-01-12 PROCEDURE — 83010 ASSAY OF HAPTOGLOBIN QUANT: CPT | Performed by: STUDENT IN AN ORGANIZED HEALTH CARE EDUCATION/TRAINING PROGRAM

## 2025-01-12 PROCEDURE — 25010000002 LIDOCAINE 2% SOLUTION: Performed by: INTERNAL MEDICINE

## 2025-01-12 PROCEDURE — 83615 LACTATE (LD) (LDH) ENZYME: CPT | Performed by: STUDENT IN AN ORGANIZED HEALTH CARE EDUCATION/TRAINING PROGRAM

## 2025-01-12 PROCEDURE — 85045 AUTOMATED RETICULOCYTE COUNT: CPT | Performed by: STUDENT IN AN ORGANIZED HEALTH CARE EDUCATION/TRAINING PROGRAM

## 2025-01-12 PROCEDURE — 0B9C8ZX DRAINAGE OF RIGHT UPPER LUNG LOBE, VIA NATURAL OR ARTIFICIAL OPENING ENDOSCOPIC, DIAGNOSTIC: ICD-10-PCS | Performed by: INTERNAL MEDICINE

## 2025-01-12 PROCEDURE — 94761 N-INVAS EAR/PLS OXIMETRY MLT: CPT

## 2025-01-12 PROCEDURE — 25010000002 PROPOFOL 10 MG/ML EMULSION

## 2025-01-12 PROCEDURE — G0432 EIA HIV-1/HIV-2 SCREEN: HCPCS | Performed by: STUDENT IN AN ORGANIZED HEALTH CARE EDUCATION/TRAINING PROGRAM

## 2025-01-12 PROCEDURE — 25010000002 LIDOCAINE PF 2% 2 % SOLUTION

## 2025-01-12 PROCEDURE — 86880 COOMBS TEST DIRECT: CPT | Performed by: STUDENT IN AN ORGANIZED HEALTH CARE EDUCATION/TRAINING PROGRAM

## 2025-01-12 PROCEDURE — 87206 SMEAR FLUORESCENT/ACID STAI: CPT | Performed by: INTERNAL MEDICINE

## 2025-01-12 PROCEDURE — 84466 ASSAY OF TRANSFERRIN: CPT | Performed by: STUDENT IN AN ORGANIZED HEALTH CARE EDUCATION/TRAINING PROGRAM

## 2025-01-12 PROCEDURE — 87102 FUNGUS ISOLATION CULTURE: CPT | Performed by: INTERNAL MEDICINE

## 2025-01-12 PROCEDURE — 94799 UNLISTED PULMONARY SVC/PX: CPT

## 2025-01-12 PROCEDURE — 3E1F88Z IRRIGATION OF RESPIRATORY TRACT USING IRRIGATING SUBSTANCE, VIA NATURAL OR ARTIFICIAL OPENING ENDOSCOPIC: ICD-10-PCS | Performed by: INTERNAL MEDICINE

## 2025-01-12 PROCEDURE — 83540 ASSAY OF IRON: CPT | Performed by: STUDENT IN AN ORGANIZED HEALTH CARE EDUCATION/TRAINING PROGRAM

## 2025-01-12 PROCEDURE — 25810000003 SODIUM CHLORIDE 0.9 % SOLUTION

## 2025-01-12 PROCEDURE — 87798 DETECT AGENT NOS DNA AMP: CPT | Performed by: INTERNAL MEDICINE

## 2025-01-12 PROCEDURE — 85610 PROTHROMBIN TIME: CPT | Performed by: STUDENT IN AN ORGANIZED HEALTH CARE EDUCATION/TRAINING PROGRAM

## 2025-01-12 PROCEDURE — 94664 DEMO&/EVAL PT USE INHALER: CPT

## 2025-01-12 PROCEDURE — 87385 HISTOPLASMA CAPSUL AG IA: CPT | Performed by: INTERNAL MEDICINE

## 2025-01-12 PROCEDURE — 84100 ASSAY OF PHOSPHORUS: CPT | Performed by: INTERNAL MEDICINE

## 2025-01-12 PROCEDURE — 87071 CULTURE AEROBIC QUANT OTHER: CPT | Performed by: INTERNAL MEDICINE

## 2025-01-12 PROCEDURE — 82728 ASSAY OF FERRITIN: CPT | Performed by: STUDENT IN AN ORGANIZED HEALTH CARE EDUCATION/TRAINING PROGRAM

## 2025-01-12 PROCEDURE — 25010000002 CEFEPIME PER 500 MG: Performed by: STUDENT IN AN ORGANIZED HEALTH CARE EDUCATION/TRAINING PROGRAM

## 2025-01-12 PROCEDURE — 80053 COMPREHEN METABOLIC PANEL: CPT | Performed by: STUDENT IN AN ORGANIZED HEALTH CARE EDUCATION/TRAINING PROGRAM

## 2025-01-12 PROCEDURE — 85025 COMPLETE CBC W/AUTO DIFF WBC: CPT | Performed by: INTERNAL MEDICINE

## 2025-01-12 PROCEDURE — 86038 ANTINUCLEAR ANTIBODIES: CPT | Performed by: STUDENT IN AN ORGANIZED HEALTH CARE EDUCATION/TRAINING PROGRAM

## 2025-01-12 PROCEDURE — 0202U NFCT DS 22 TRGT SARS-COV-2: CPT | Performed by: INTERNAL MEDICINE

## 2025-01-12 PROCEDURE — 25010000002 CEFEPIME PER 500 MG: Performed by: INTERNAL MEDICINE

## 2025-01-12 PROCEDURE — 83516 IMMUNOASSAY NONANTIBODY: CPT | Performed by: STUDENT IN AN ORGANIZED HEALTH CARE EDUCATION/TRAINING PROGRAM

## 2025-01-12 PROCEDURE — 87205 SMEAR GRAM STAIN: CPT | Performed by: INTERNAL MEDICINE

## 2025-01-12 PROCEDURE — 88108 CYTOPATH CONCENTRATE TECH: CPT | Performed by: INTERNAL MEDICINE

## 2025-01-12 PROCEDURE — 87116 MYCOBACTERIA CULTURE: CPT | Performed by: INTERNAL MEDICINE

## 2025-01-12 RX ORDER — LIDOCAINE HYDROCHLORIDE 20 MG/ML
JELLY TOPICAL
Status: DISPENSED
Start: 2025-01-12 | End: 2025-01-12

## 2025-01-12 RX ORDER — ASPIRIN 81 MG/1
81 TABLET ORAL DAILY
Status: DISCONTINUED | OUTPATIENT
Start: 2025-01-12 | End: 2025-01-16 | Stop reason: HOSPADM

## 2025-01-12 RX ORDER — LIDOCAINE HYDROCHLORIDE 20 MG/ML
JELLY TOPICAL AS NEEDED
Status: DISCONTINUED | OUTPATIENT
Start: 2025-01-12 | End: 2025-01-12 | Stop reason: HOSPADM

## 2025-01-12 RX ORDER — PROPOFOL 10 MG/ML
VIAL (ML) INTRAVENOUS AS NEEDED
Status: DISCONTINUED | OUTPATIENT
Start: 2025-01-12 | End: 2025-01-12 | Stop reason: SURG

## 2025-01-12 RX ORDER — HYDROCODONE BITARTRATE AND ACETAMINOPHEN 5; 325 MG/1; MG/1
1 TABLET ORAL EVERY 6 HOURS PRN
Status: DISCONTINUED | OUTPATIENT
Start: 2025-01-12 | End: 2025-01-16 | Stop reason: HOSPADM

## 2025-01-12 RX ORDER — SODIUM CHLORIDE 9 MG/ML
INJECTION, SOLUTION INTRAVENOUS CONTINUOUS PRN
Status: DISCONTINUED | OUTPATIENT
Start: 2025-01-12 | End: 2025-01-12 | Stop reason: SURG

## 2025-01-12 RX ORDER — LIDOCAINE HYDROCHLORIDE 20 MG/ML
INJECTION, SOLUTION INFILTRATION; PERINEURAL AS NEEDED
Status: DISCONTINUED | OUTPATIENT
Start: 2025-01-12 | End: 2025-01-12 | Stop reason: HOSPADM

## 2025-01-12 RX ORDER — LIDOCAINE HYDROCHLORIDE 20 MG/ML
INJECTION, SOLUTION EPIDURAL; INFILTRATION; INTRACAUDAL; PERINEURAL AS NEEDED
Status: DISCONTINUED | OUTPATIENT
Start: 2025-01-12 | End: 2025-01-12 | Stop reason: SURG

## 2025-01-12 RX ORDER — LIDOCAINE HYDROCHLORIDE 20 MG/ML
INJECTION, SOLUTION EPIDURAL; INFILTRATION; INTRACAUDAL; PERINEURAL
Status: DISPENSED
Start: 2025-01-12 | End: 2025-01-12

## 2025-01-12 RX ADMIN — PANTOPRAZOLE SODIUM 40 MG: 40 TABLET, DELAYED RELEASE ORAL at 03:51

## 2025-01-12 RX ADMIN — IPRATROPIUM BROMIDE AND ALBUTEROL SULFATE 3 ML: .5; 3 SOLUTION RESPIRATORY (INHALATION) at 11:31

## 2025-01-12 RX ADMIN — IPRATROPIUM BROMIDE AND ALBUTEROL SULFATE 3 ML: .5; 3 SOLUTION RESPIRATORY (INHALATION) at 07:47

## 2025-01-12 RX ADMIN — HYDROCODONE BITARTRATE AND ACETAMINOPHEN 1 TABLET: 5; 325 TABLET ORAL at 03:51

## 2025-01-12 RX ADMIN — MIDODRINE HYDROCHLORIDE 5 MG: 5 TABLET ORAL at 17:13

## 2025-01-12 RX ADMIN — LIDOCAINE HYDROCHLORIDE 60 MG: 20 INJECTION, SOLUTION EPIDURAL; INFILTRATION; INTRACAUDAL; PERINEURAL at 08:37

## 2025-01-12 RX ADMIN — CEFEPIME 2000 MG: 2 INJECTION, POWDER, FOR SOLUTION INTRAVENOUS at 11:00

## 2025-01-12 RX ADMIN — ASPIRIN 81 MG: 81 TABLET, COATED ORAL at 10:59

## 2025-01-12 RX ADMIN — SERTRALINE HYDROCHLORIDE 50 MG: 50 TABLET, FILM COATED ORAL at 10:59

## 2025-01-12 RX ADMIN — Medication 10 ML: at 21:00

## 2025-01-12 RX ADMIN — LEVOTHYROXINE SODIUM 50 MCG: 50 TABLET ORAL at 10:59

## 2025-01-12 RX ADMIN — PROPOFOL 20 MG: 10 INJECTION, EMULSION INTRAVENOUS at 08:39

## 2025-01-12 RX ADMIN — MIDODRINE HYDROCHLORIDE 5 MG: 5 TABLET ORAL at 11:00

## 2025-01-12 RX ADMIN — IPRATROPIUM BROMIDE AND ALBUTEROL SULFATE 3 ML: .5; 3 SOLUTION RESPIRATORY (INHALATION) at 21:24

## 2025-01-12 RX ADMIN — PROPOFOL 40 MG: 10 INJECTION, EMULSION INTRAVENOUS at 08:37

## 2025-01-12 RX ADMIN — SODIUM CHLORIDE: 9 INJECTION, SOLUTION INTRAVENOUS at 08:30

## 2025-01-12 RX ADMIN — CEFEPIME 2000 MG: 2 INJECTION, POWDER, FOR SOLUTION INTRAVENOUS at 02:00

## 2025-01-12 NOTE — PLAN OF CARE
Problem: Adult Inpatient Plan of Care  Goal: Optimal Comfort and Wellbeing  Outcome: Progressing  Intervention: Provide Person-Centered Care  Recent Flowsheet Documentation  Taken 1/12/2025 6942 by River Cuevas, RN  Trust Relationship/Rapport:   care explained   choices provided   emotional support provided   empathic listening provided   questions answered   questions encouraged   reassurance provided     Goal Outcome Evaluation:  Plan of Care Reviewed With: patient        Progress: improving

## 2025-01-12 NOTE — ANESTHESIA POSTPROCEDURE EVALUATION
Patient: Maryann Gaitan    Procedure Summary       Date: 01/12/25 Room / Location: [facility] / [facility]    Anesthesia Start: 0830 Anesthesia Stop: 0841    Procedure: BRONCHOSCOPY WITH LAVAGE (Bronchus) Diagnosis:       Hemoptysis      Multifocal pneumonia      (Hemoptysis [R04.2])      (Multifocal pneumonia [J18.9])    Surgeons: Gallo Pope MD Provider: Becca Buenrostro CRNA    Anesthesia Type: MAC, general ASA Status: 3 - Emergent            Anesthesia Type: MAC, general    Vitals  Vitals Value Taken Time   /60 01/12/25 0911   Temp     Pulse 88 01/12/25 0911   Resp 26 01/12/25 0911   SpO2 97 % 01/12/25 0911           Post Anesthesia Care and Evaluation    Patient location during evaluation: PACU  Patient participation: complete - patient participated  Level of consciousness: awake and alert  Pain score: 0  Pain management: adequate    Airway patency: patent  Anesthetic complications: No anesthetic complications  PONV Status: none  Cardiovascular status: acceptable  Respiratory status: acceptable  Hydration status: acceptable

## 2025-01-12 NOTE — ANESTHESIA PREPROCEDURE EVALUATION
Anesthesia Evaluation     Patient summary reviewed   NPO Solid Status: > 8 hours  NPO Liquid Status: > 2 hours           Airway   Mallampati: II  TM distance: >3 FB  Neck ROM: full  No difficulty expected  Dental    (+) edentulous    Pulmonary    (+) pneumonia , a smoker Former, COPD,home oxygen  Cardiovascular   Exercise tolerance: poor (<4 METS)    ECG reviewed  PT is on anticoagulation therapy  Rhythm: regular    (+) hypertension, valvular problems/murmurs, CAD, cardiac stents Drug eluting stent within the past 12 months , angina, CHF     ROS comment: 11/22 Echo      Left ventricular ejection fraction appears to be 56 - 60%.  ·  There is a MitraClip mitral valve repair present.  ·  There is trace residual MR.  Mean gradient is 4.6 mmHg      Neuro/Psych  (+) psychiatric history Anxiety and Depression, poor historian.  GI/Hepatic/Renal/Endo    (+) GERD, renal disease-, thyroid problem hypothyroidism    Musculoskeletal     Abdominal    Substance History      OB/GYN          Other   arthritis, autoimmune disease rheumatoid arthritis,       Other Comment: Crohn's              Anesthesia Plan    ASA 3 - emergent     MAC and general     intravenous induction     Anesthetic plan, risks, benefits, and alternatives have been provided, discussed and informed consent has been obtained with: patient.    Plan discussed with Surgeon.    CODE STATUS:

## 2025-01-12 NOTE — OP NOTE
Bronchoscopy Procedure Note    Procedure:  Bronchoscopy, Diagnostic  Bronchoscopy, Therapeutic lavage of multiple mucous plugs  Bronchoalveolar lavage, BAL from right upper lobe    Pre-Operative Diagnosis: Multiple lung nodules, multiple mucous plugs    Post-Operative Diagnosis: Same    Indication: Immunocompromised patient with new multiple lung nodules, rule out opportunistic infections    Anesthesia: Monitored Anesthesia Care (MAC)    Procedure Details: Patient was consented for the procedure with all risk and benefit of the procedure explained in detail.  Patient was given the opportunity to ask questions and all concerns were answered.    Timeout was done in the standard manner   the bronchoscope was inserted into the main airway via the oropharynx. An anatomical survey was done of the main airways and the subsegmental bronchus of the 5 lobes.  The findings are consistent of multiple thick mucous plugs with the colors ranging from clear to white.  A therapeutic lavage was performed using aliquots of normal saline instilled into the airways then aspirated back until clear.    Estimated Blood Loss: None           Specimens: Diagnostic BAL was performed in right upper lobe by instilling 90 mL of sterile normal saline and return of 35 mL                Complications:  None; patient tolerated the procedure well.           Disposition: PACU - hemodynamically stable.    Post op plan:  Resume p.o. after 2 hours  Follow-up results    Patient tolerated the procedure well.

## 2025-01-12 NOTE — PROGRESS NOTES
Daily Progress Note          Assessment    Hemoptysis  Pneumonia due to unspecified pathogen  Resolution of the left upper lobe cavitary lesion: Was seen October 2024 with bronchoscopy 10/16/2024 showing negative cultures.  But this admission there is development of spiculated multiple nodules.  COPD but not acute exacerbation  History of strongyloidosis  Rheumatoid arthritis and Crohn disease on biological therapy status post ileostomy with mucous fistula status post GI hemorrhage: on Humira, mesalamine methotrexate outpatient  Chronic anemia  CAD status post PCI to LAD 10/22/2024  HLD  Hypothyroidism  History of severe MR status post mitral valve MitraClip 11/21/2024  Thyroid hormone replacement        Recommendations:  Status post bronchoscopy 1/12/2025: No hemoptysis, multiple white mucous plugs seen, lavage from right upper lobe obtained to rule out opportunistic infections     Resume aspirin since the hemoptysis resolved     Antibiotics: Cefepime  Oxygen supplement and titration to maintain saturation 90 to 95%, currently on room air  Bronchodilators     Levothyroxine  Protonix     I personally reviewed the radiological studies             LOS: 2 days     Subjective     Mild cough and shortness of breath    Objective     Vital signs for last 24 hours:  Vitals:    01/12/25 0349 01/12/25 0733 01/12/25 0747 01/12/25 0751   BP: 112/57 117/63     BP Location:       Patient Position:       Pulse: 81 75 68 63   Resp: 19 20 13 13   Temp: 97.4 °F (36.3 °C) 98 °F (36.7 °C)     TempSrc:       SpO2: 99% 98% 99% 100%   Weight:       Height:           Intake/Output last 3 shifts:  I/O last 3 completed shifts:  In: 1130 [P.O.:720; I.V.:310; IV Piggyback:100]  Out: 2950 [Urine:2300; Stool:650]  Intake/Output this shift:  No intake/output data recorded.      Radiology  Imaging Results (Last 24 Hours)       ** No results found for the last 24 hours. **            Labs:  Results from last 7 days   Lab Units 01/12/25  0215   WBC  10*3/mm3 6.00   HEMOGLOBIN g/dL 7.4*   HEMATOCRIT % 24.7*   PLATELETS 10*3/mm3 427     Results from last 7 days   Lab Units 01/12/25  0215   SODIUM mmol/L 138   POTASSIUM mmol/L 4.5   CHLORIDE mmol/L 110*   CO2 mmol/L 20.2*   BUN mg/dL 13   CREATININE mg/dL 0.86   CALCIUM mg/dL 8.6   BILIRUBIN mg/dL <0.2   ALK PHOS U/L 121*   ALT (SGPT) U/L 23   AST (SGOT) U/L 20   GLUCOSE mg/dL 114*         Results from last 7 days   Lab Units 01/12/25  0215 01/10/25  1146   ALBUMIN g/dL 2.8* 2.9*     Results from last 7 days   Lab Units 01/10/25  1751 01/10/25  1146   HSTROP T ng/L 30* 50*         Results from last 7 days   Lab Units 01/12/25  0215   MAGNESIUM mg/dL 1.7     Results from last 7 days   Lab Units 01/12/25  0219 01/10/25  1146   INR  1.04 1.06   APTT seconds  --  29.5               Meds:   SCHEDULE  cefepime, 2,000 mg, Intravenous, Q12H  ipratropium-albuterol, 3 mL, Nebulization, 4x Daily - RT  levothyroxine, 50 mcg, Oral, Daily  Lidocaine HCl gel, , ,   lidocaine PF 2%, , ,   midodrine, 5 mg, Oral, TID AC  pantoprazole, 40 mg, Oral, Q AM  sertraline, 50 mg, Oral, Daily  sodium chloride, 10 mL, Intravenous, Q12H      Infusions     PRNs    acetaminophen **OR** acetaminophen **OR** acetaminophen    aluminum-magnesium hydroxide-simethicone    senna-docusate sodium **AND** polyethylene glycol **AND** bisacodyl **AND** bisacodyl    Calcium Replacement - Follow Nurse / BPA Driven Protocol    diphenhydrAMINE    HYDROcodone-acetaminophen    Lidocaine HCl gel    lidocaine PF 2%    Magnesium Standard Dose Replacement - Follow Nurse / BPA Driven Protocol    melatonin    nitroglycerin    ondansetron ODT **OR** ondansetron    Phosphorus Replacement - Follow Nurse / BPA Driven Protocol    Potassium Replacement - Follow Nurse / BPA Driven Protocol    [COMPLETED] Insert Peripheral IV **AND** sodium chloride    sodium chloride    sodium chloride    Physical Exam:  General Appearance:  Alert   HEENT:  Normocephalic, without obvious  abnormality, Conjunctiva/corneas clear,.   Nares normal, no drainage     Neck:  Supple, symmetrical, trachea midline.   Lungs /Chest wall:   Bilateral basal rhonchi, respirations unlabored, symmetrical wall movement.     Heart:  Regular rate and rhythm, S1 S2 normal  Abdomen: Soft, non-tender, no masses, no organomegaly.    Extremities: No edema, no clubbing or cyanosis     ROS  Constitutional: Negative for chills, fever and malaise/fatigue.   HENT: Negative.    Eyes: Negative.    Cardiovascular: Negative.    Respiratory: Positive for cough and shortness of breath.    Skin: Negative.    Musculoskeletal: Negative.    Gastrointestinal: Negative.    Genitourinary: Negative.    Neurological: Negative.    Psychiatric/Behavioral: Negative.      I reviewed the recent clinical results  I personally reviewed the latest radiological studies    Part of this note may be an electronic transcription/translation of spoken language to printed text using the Dragon Dictation System.

## 2025-01-12 NOTE — PLAN OF CARE
Problem: Adult Inpatient Plan of Care  Goal: Absence of Hospital-Acquired Illness or Injury  Intervention: Identify and Manage Fall Risk  Description: Perform standard risk assessment on admission using a validated tool or comprehensive approach appropriate to the patient; reassess fall risk frequently, with change in status or transfer to another level of care.  Communicate risk to interprofessional healthcare team; ensure fall risk visible cue.  Determine need for increased observation, equipment and environmental modification, as well as use of supportive, nonskid footwear.  Adjust safety measures to individual needs and identified risk factors.  Reinforce the importance of active participation with fall risk prevention, safety, and physical activity with the patient and family.  Perform regular intentional rounding to assess need for position change, pain assessment and personal needs, including assistance with toileting.  Recent Flowsheet Documentation  Taken 1/12/2025 0200 by Enrique Ku LPN  Safety Promotion/Fall Prevention:   safety round/check completed   room organization consistent   nonskid shoes/slippers when out of bed   muscle strengthening facilitated   mobility aid in reach   lighting adjusted   fall prevention program maintained   clutter free environment maintained   assistive device/personal items within reach   activity supervised  Taken 1/12/2025 0043 by Enrique Ku LPN  Safety Promotion/Fall Prevention:   safety round/check completed   room organization consistent   muscle strengthening facilitated   nonskid shoes/slippers when out of bed   mobility aid in reach   lighting adjusted   fall prevention program maintained   clutter free environment maintained   assistive device/personal items within reach   activity supervised  Intervention: Prevent Skin Injury  Description: Perform a screening for skin injury risk, such as pressure or moisture-associated skin damage on  admission and at regular intervals throughout hospital stay.  Keep all areas of skin (especially folds) clean and dry.  Maintain adequate skin hydration.  Relieve and redistribute pressure and protect bony prominences and skin at risk for injury; implement measures based on patient-specific risk factors.  Match turning and repositioning schedule to clinical condition.  Encourage weight shift frequently; assist with reposition if unable to complete independently.  Float heels off bed; avoid pressure on the Achilles tendon.  Keep skin free from extended contact with medical devices.  Optimize nutrition and hydration.  Encourage functional activity and mobility, as early as tolerated.  Use aids (e.g., slide boards, mechanical lift) during transfer.  Recent Flowsheet Documentation  Taken 1/12/2025 0200 by Enrique Ku LPN  Body Position: position changed independently  Taken 1/12/2025 0043 by Enrique Ku LPN  Body Position: position changed independently  Skin Protection: incontinence pads utilized  Intervention: Prevent Infection  Description: Maintain skin and mucous membrane integrity; promote hand, oral and pulmonary hygiene.  Optimize fluid balance, nutrition, sleep and glycemic control to maximize infection resistance.  Identify potential sources of infection early to prevent or mitigate progression of infection (e.g., wound, lines, devices).  Evaluate ongoing need for invasive devices; remove promptly when no longer indicated.  Review vaccination status.  Recent Flowsheet Documentation  Taken 1/12/2025 0200 by Enrique Ku LPN  Infection Prevention:   visitors restricted/screened   single patient room provided   rest/sleep promoted   personal protective equipment utilized   hand hygiene promoted  Taken 1/12/2025 0043 by Enrique Ku LPN  Infection Prevention:   visitors restricted/screened   single patient room provided   rest/sleep promoted   personal protective equipment  utilized   hand hygiene promoted   Goal Outcome Evaluation: Plan of care reviewed , pt educate purpose of transfer and the need for a neg pressure room due to rule out TB.,fall precaution in place , bed alarm active, in low position ,SR up x3 and call light within reach. Per pt request MD on call inform of pt request for PRN med, via using secure chat, on call provider acknowledge and change the freq from q 12 to q 6 hrs. Pt informed . Cont to monitor pt progress toward goal                                             80

## 2025-01-12 NOTE — PROGRESS NOTES
[facility] MEDICINE SERVICE  DAILY PROGRESS NOTE    NAME: Maryann Gaitan  : 1950  MRN: [id number]      LOS: 2 days     PROVIDER OF SERVICE: El Childress MD    Chief Complaint: Hemoptysis    Subjective:     Interval History:   Patient still with some hemoptysis.    Review of Systems:   Review of Systems    Objective:     Vital Signs  Temp:  [96.8 °F (36 °C)-98 °F (36.7 °C)] 96.8 °F (36 °C)  Heart Rate:  [] 79  Resp:  [13-26] 22  BP: ()/(39-63) 119/59  Flow (L/min) (Oxygen Therapy):  [10] 10   Body mass index is 19.64 kg/m².    HENT:      Head: Normocephalic and atraumatic.      Nose: Nose normal.   Eyes:      Extraocular Movements: Extraocular movements intact.      Conjunctivae/sclerae: Conjunctivae normal.      Pupils: Pupils are equal, round, and reactive to light.   Cardiovascular:      Rate and Rhythm: Normal       Pulses: Normal pulses.      Heart sounds: Normal heart sounds.   Pulmonary:      Reduced BS   Abdominal:      General: Abdomen is flat. Bowel sounds are normal.      Palpations: Abdomen is soft.   Musculoskeletal:         General: Normal range of motion.      Cervical back: Normal range of motion and neck supple.   Skin:     General: Skin is dry.   Neurological:      General: No focal deficit present.      Mental Status: alert.   Psychiatric:         Mood and Affect: Mood normal.               Scheduled Meds   aspirin, 81 mg, Oral, Daily  cefepime, 2,000 mg, Intravenous, Q12H  ipratropium-albuterol, 3 mL, Nebulization, 4x Daily - RT  levothyroxine, 50 mcg, Oral, Daily  Lidocaine HCl gel, , ,   lidocaine PF 2%, , ,   midodrine, 5 mg, Oral, TID AC  pantoprazole, 40 mg, Oral, Q AM  sertraline, 50 mg, Oral, Daily  sodium chloride, 10 mL, Intravenous, Q12H       PRN Meds     acetaminophen **OR** acetaminophen **OR** acetaminophen    aluminum-magnesium hydroxide-simethicone    senna-docusate sodium **AND** polyethylene glycol **AND** bisacodyl **AND** bisacodyl    Calcium  Replacement - Follow Nurse / BPA Driven Protocol    diphenhydrAMINE    HYDROcodone-acetaminophen    Lidocaine HCl gel    lidocaine PF 2%    Magnesium Standard Dose Replacement - Follow Nurse / BPA Driven Protocol    melatonin    nitroglycerin    ondansetron ODT **OR** ondansetron    Phosphorus Replacement - Follow Nurse / BPA Driven Protocol    Potassium Replacement - Follow Nurse / BPA Driven Protocol    [COMPLETED] Insert Peripheral IV **AND** sodium chloride    sodium chloride    sodium chloride   Infusions         Diagnostic Data    Results from last 7 days   Lab Units 01/12/25  0215   WBC 10*3/mm3 6.00   HEMOGLOBIN g/dL 7.4*   HEMATOCRIT % 24.7*   PLATELETS 10*3/mm3 427   GLUCOSE mg/dL 114*   CREATININE mg/dL 0.86   BUN mg/dL 13   SODIUM mmol/L 138   POTASSIUM mmol/L 4.5   AST (SGOT) U/L 20   ALT (SGPT) U/L 23   ALK PHOS U/L 121*   BILIRUBIN mg/dL <0.2   ANION GAP mmol/L 7.8       CT Angiogram Chest Pulmonary Embolism    Result Date: 1/10/2025  Impression: 1.No evidence of pulmonary embolism. 2.Lung emphysema. 3.Interval resolution of cavitary lesion left upper lobe. Interval development of spiculated nodules in the lungs as described above favored to be inflammatory or infectious. Cannot exclude neoplasm. Canal correlation a follow-up CT chest in 3 months recommended 4.Stable descending thoracic aortic aneurysm measuring up to 3.8 cm with diffuse irregular mural thrombus and a broad-based penetrating atherosclerotic ulcer. Additional stable smaller ulcerations. No aortic dissection. 5.Coronary artery calcifications. 6.Additional chronic findings as described above. Electronically Signed: Canelo Manriquez MD  1/10/2025 2:25 PM EST  Workstation ID: [id number]       I reviewed the patient's new clinical results.    Assessment/Plan:   74 y.o. female with a PMH of CAD s/p recent stent on dual platelet therapy, COPD, RA, Crohn's on biologic therapy, ileostomy with mucous fistula for GI bleed who presented [facility] with large amount of hemoptysis on admission day.  Patient mentioned that she started coughing and blood came out along with the coughing.  Patient has squeezing pain in the chest as well.  In the ED patient labs showing no leukocytosis hemoglobin of 8.4 creatinine at 1.4 fluctuating around the baseline.  Potassium of 5.4.  CT angio chest with no pulmonary embolism.  None emphysema.  Interval resolution of cavitated lesion left upper lobe.  Interval development of a spiculated nodule Favored to be inflammation or infection.  Cannot exclude a neoplasm and and will need to follow-up CT chest in 3 months.  Stable descending thoracic aortic aneurysm and measuring up to 3.8 cm.  Coronary artery calcification. started on aztreonam in the ED. Pulm is consulted.      #Acute hemoptysis likely from infection ? HCAP vs Alveolar hemorrhage?, autoimmune etiology vs MAC given she is on biologic agent  #Patient is on TNA alpha antagonist - high risk of TB/MAC  -Patient presented with acute hemoptysis today although this is slowly improving   -Troponin without any significant elevation  -CT angio chest with no pulmonary embolism and but there is Interval development of spiculated nodules in the lungs  favorable to infection or inflammation; interval resolution of cavitary lesion left upper lobe  -Continue IV cefepime  -MRSA negative  -resp tx, oxygen supplement  -Check KYLAH, ANCA panel, ESR/CRP to rule out autoimmune etiology given patient hx of Crohns disease  -Underwent bronchoscopy on 1/12 with multiple mucous plugs removed; cultures pending    #Anemia  -Patient has chronic anemia; hemoglobin 7.4 today but no indication for transfusion at this time  - iron profile does show some iron deficiency with elevated ferritin likely related to acute inflammatory process  -Check PT/INR  -No evidence of hemolytic anemia     #Hypomagnesemia  -lytes Replacement protocol initiated     #Mild Hyperkalemia  -one dose of lokelma to be  given.      #Crohn's disease, chronic:  #RA   -Hold biologics agents for now given possible lung infection, which needs to be ruled out, pending pulm evaluation  -may need to consult GI depending on if TB positive, her TNF alpha antag will be held     #CAD s/p PCI  -given hemoptysis, will hold ASA or dvt ppx, given possible concern of alveolar hemorrhage     #HLD  -Resume home medication once verified by pharmacy and clinically appropriate     #COPD  -resp tx     #Hypothyroidism  -Resume home medication once verified by pharmacy and clinically appropriate     #Anxiety  -Resume home medication once verified by pharmacy and clinically appropriate       VTE Prophylaxis:  Mechanical VTE prophylaxis orders are present.         Code status is   There are no questions and answers to display.       Plan for disposition: Pulm work up for hemoptysis.     Time: 30 minutes    Part of this note may be an electronic transcription/translation of spoken language to printed text using the Dragon Dictation System.    Signature: Electronically signed by El Childress MD, 01/12/25, 12:42 EST.  [facility] Hospitalist Team

## 2025-01-13 LAB
ANA SER QL: NEGATIVE
ANION GAP SERPL CALCULATED.3IONS-SCNC: 8.4 MMOL/L (ref 5–15)
BASOPHILS # BLD AUTO: 0.08 10*3/MM3 (ref 0–0.2)
BASOPHILS NFR BLD AUTO: 1 % (ref 0–1.5)
BUN SERPL-MCNC: 11 MG/DL (ref 8–23)
BUN/CREAT SERPL: 14.1 (ref 7–25)
CALCIUM SPEC-SCNC: 8.7 MG/DL (ref 8.6–10.5)
CHLORIDE SERPL-SCNC: 110 MMOL/L (ref 98–107)
CO2 SERPL-SCNC: 18.6 MMOL/L (ref 22–29)
CREAT SERPL-MCNC: 0.78 MG/DL (ref 0.57–1)
DEPRECATED RDW RBC AUTO: 73.4 FL (ref 37–54)
EGFRCR SERPLBLD CKD-EPI 2021: 79.8 ML/MIN/1.73
EOSINOPHIL # BLD AUTO: 0.62 10*3/MM3 (ref 0–0.4)
EOSINOPHIL NFR BLD AUTO: 7.5 % (ref 0.3–6.2)
ERYTHROCYTE [DISTWIDTH] IN BLOOD BY AUTOMATED COUNT: 20 % (ref 12.3–15.4)
GLUCOSE SERPL-MCNC: 82 MG/DL (ref 65–99)
HCT VFR BLD AUTO: 25.7 % (ref 34–46.6)
HGB BLD-MCNC: 7.9 G/DL (ref 12–15.9)
IMM GRANULOCYTES # BLD AUTO: 0.11 10*3/MM3 (ref 0–0.05)
IMM GRANULOCYTES NFR BLD AUTO: 1.3 % (ref 0–0.5)
LYMPHOCYTES # BLD AUTO: 3.41 10*3/MM3 (ref 0.7–3.1)
LYMPHOCYTES NFR BLD AUTO: 41.4 % (ref 19.6–45.3)
MAGNESIUM SERPL-MCNC: 1.4 MG/DL (ref 1.6–2.4)
MCH RBC QN AUTO: 31 PG (ref 26.6–33)
MCHC RBC AUTO-ENTMCNC: 30.7 G/DL (ref 31.5–35.7)
MCV RBC AUTO: 100.8 FL (ref 79–97)
MONOCYTES # BLD AUTO: 1.46 10*3/MM3 (ref 0.1–0.9)
MONOCYTES NFR BLD AUTO: 17.7 % (ref 5–12)
NEUTROPHILS NFR BLD AUTO: 2.56 10*3/MM3 (ref 1.7–7)
NEUTROPHILS NFR BLD AUTO: 31.1 % (ref 42.7–76)
NRBC BLD AUTO-RTO: 0 /100 WBC (ref 0–0.2)
PHOSPHATE SERPL-MCNC: 3 MG/DL (ref 2.5–4.5)
PLATELET # BLD AUTO: 435 10*3/MM3 (ref 140–450)
PMV BLD AUTO: 9.9 FL (ref 6–12)
POTASSIUM SERPL-SCNC: 4.2 MMOL/L (ref 3.5–5.2)
QT INTERVAL: 363 MS
QTC INTERVAL: 491 MS
RBC # BLD AUTO: 2.55 10*6/MM3 (ref 3.77–5.28)
SODIUM SERPL-SCNC: 137 MMOL/L (ref 136–145)
WBC NRBC COR # BLD AUTO: 8.24 10*3/MM3 (ref 3.4–10.8)

## 2025-01-13 PROCEDURE — 85025 COMPLETE CBC W/AUTO DIFF WBC: CPT | Performed by: INTERNAL MEDICINE

## 2025-01-13 PROCEDURE — 93010 ELECTROCARDIOGRAM REPORT: CPT | Performed by: INTERNAL MEDICINE

## 2025-01-13 PROCEDURE — 93005 ELECTROCARDIOGRAM TRACING: CPT | Performed by: INTERNAL MEDICINE

## 2025-01-13 PROCEDURE — 94761 N-INVAS EAR/PLS OXIMETRY MLT: CPT

## 2025-01-13 PROCEDURE — 80048 BASIC METABOLIC PNL TOTAL CA: CPT | Performed by: INTERNAL MEDICINE

## 2025-01-13 PROCEDURE — 94799 UNLISTED PULMONARY SVC/PX: CPT

## 2025-01-13 PROCEDURE — 84100 ASSAY OF PHOSPHORUS: CPT | Performed by: INTERNAL MEDICINE

## 2025-01-13 PROCEDURE — 25010000002 MAGNESIUM SULFATE 2 GM/50ML SOLUTION: Performed by: FAMILY MEDICINE

## 2025-01-13 PROCEDURE — 83735 ASSAY OF MAGNESIUM: CPT | Performed by: INTERNAL MEDICINE

## 2025-01-13 PROCEDURE — 97162 PT EVAL MOD COMPLEX 30 MIN: CPT

## 2025-01-13 PROCEDURE — 25010000002 CEFEPIME PER 500 MG: Performed by: INTERNAL MEDICINE

## 2025-01-13 RX ORDER — LEVOFLOXACIN 750 MG/1
750 TABLET, FILM COATED ORAL EVERY 24 HOURS
Status: DISCONTINUED | OUTPATIENT
Start: 2025-01-13 | End: 2025-01-15

## 2025-01-13 RX ORDER — MAGNESIUM SULFATE HEPTAHYDRATE 40 MG/ML
2 INJECTION, SOLUTION INTRAVENOUS
Status: COMPLETED | OUTPATIENT
Start: 2025-01-13 | End: 2025-01-13

## 2025-01-13 RX ADMIN — MAGNESIUM SULFATE IN WATER FOR 2 G: 40 INJECTION INTRAVENOUS at 07:38

## 2025-01-13 RX ADMIN — MAGNESIUM SULFATE IN WATER FOR 2 G: 40 INJECTION INTRAVENOUS at 05:48

## 2025-01-13 RX ADMIN — Medication 10 ML: at 08:48

## 2025-01-13 RX ADMIN — Medication 10 ML: at 21:00

## 2025-01-13 RX ADMIN — SERTRALINE HYDROCHLORIDE 50 MG: 50 TABLET, FILM COATED ORAL at 08:48

## 2025-01-13 RX ADMIN — IPRATROPIUM BROMIDE AND ALBUTEROL SULFATE 3 ML: .5; 3 SOLUTION RESPIRATORY (INHALATION) at 15:32

## 2025-01-13 RX ADMIN — LEVOTHYROXINE SODIUM 50 MCG: 50 TABLET ORAL at 08:48

## 2025-01-13 RX ADMIN — LEVOFLOXACIN 750 MG: 750 TABLET, FILM COATED ORAL at 15:00

## 2025-01-13 RX ADMIN — IPRATROPIUM BROMIDE AND ALBUTEROL SULFATE 3 ML: .5; 3 SOLUTION RESPIRATORY (INHALATION) at 12:15

## 2025-01-13 RX ADMIN — MAGNESIUM SULFATE IN WATER FOR 2 G: 40 INJECTION INTRAVENOUS at 11:02

## 2025-01-13 RX ADMIN — IPRATROPIUM BROMIDE AND ALBUTEROL SULFATE 3 ML: .5; 3 SOLUTION RESPIRATORY (INHALATION) at 19:45

## 2025-01-13 RX ADMIN — ASPIRIN 81 MG: 81 TABLET, COATED ORAL at 08:48

## 2025-01-13 RX ADMIN — HYDROCODONE BITARTRATE AND ACETAMINOPHEN 1 TABLET: 5; 325 TABLET ORAL at 04:03

## 2025-01-13 RX ADMIN — CEFEPIME 2000 MG: 2 INJECTION, POWDER, FOR SOLUTION INTRAVENOUS at 01:00

## 2025-01-13 RX ADMIN — PANTOPRAZOLE SODIUM 40 MG: 40 TABLET, DELAYED RELEASE ORAL at 04:02

## 2025-01-13 NOTE — PROGRESS NOTES
Daily Progress Note          Assessment    Hemoptysis  Pneumonia due to unspecified pathogen  Resolution of the left upper lobe cavitary lesion: Was seen October 2024 with bronchoscopy 10/16/2024 showing negative cultures.  But this admission there is development of spiculated multiple nodules.  COPD but not acute exacerbation  History of strongyloidosis  Rheumatoid arthritis and Crohn disease on biological therapy status post ileostomy with mucous fistula status post GI hemorrhage: on Humira, mesalamine methotrexate outpatient  Chronic anemia  CAD status post PCI to LAD 10/22/2024  HLD  Hypothyroidism  History of severe MR status post mitral valve MitraClip 11/21/2024  Thyroid hormone replacement        Recommendations:  Status post bronchoscopy 1/12/2025: No hemoptysis, multiple white mucous plugs seen, lavage from right upper lobe obtained to rule out opportunistic infections: AFB smear is negative, DC airborne isolation.  Gram-negative bacilli noted: Switch antibiotics to Levaquin for 3 days    Patient can be discharged from pulmonary standpoint will follow-up in 2 weeks in the office     Resume aspirin since the hemoptysis resolved     Antibiotics: Cefepime switched to Levaquin    Oxygen supplement and titration to maintain saturation 90 to 95%, currently on room air  Bronchodilators     Levothyroxine  Protonix     I personally reviewed the radiological studies             LOS: 3 days     Subjective     Mild cough and shortness of breath    Objective     Vital signs for last 24 hours:  Vitals:    01/13/25 0340 01/13/25 0845 01/13/25 1105 01/13/25 1149   BP: 114/53 104/66 116/89 119/61   BP Location: Left arm Left arm Left arm Right arm   Patient Position: Lying Lying Lying Sitting   Pulse: 77 67 78 92   Resp: 21 18 18 (!) 30   Temp: 98 °F (36.7 °C) 97.6 °F (36.4 °C)  97.3 °F (36.3 °C)   TempSrc: Oral Oral  Temporal   SpO2: 98% 97% 98% 98%   Weight:       Height:           Intake/Output last 3 shifts:  I/O last  3 completed shifts:  In: 540 [P.O.:240; I.V.:100; IV Piggyback:200]  Out: 1350 [Urine:700; Stool:650]  Intake/Output this shift:  I/O this shift:  In: 120 [P.O.:120]  Out: -       Radiology  Imaging Results (Last 24 Hours)       ** No results found for the last 24 hours. **            Labs:  Results from last 7 days   Lab Units 01/13/25  0358   WBC 10*3/mm3 8.24   HEMOGLOBIN g/dL 7.9*   HEMATOCRIT % 25.7*   PLATELETS 10*3/mm3 435     Results from last 7 days   Lab Units 01/13/25  0358 01/12/25  0215   SODIUM mmol/L 137 138   POTASSIUM mmol/L 4.2 4.5   CHLORIDE mmol/L 110* 110*   CO2 mmol/L 18.6* 20.2*   BUN mg/dL 11 13   CREATININE mg/dL 0.78 0.86   CALCIUM mg/dL 8.7 8.6   BILIRUBIN mg/dL  --  <0.2   ALK PHOS U/L  --  121*   ALT (SGPT) U/L  --  23   AST (SGOT) U/L  --  20   GLUCOSE mg/dL 82 114*         Results from last 7 days   Lab Units 01/12/25  0215 01/10/25  1146   ALBUMIN g/dL 2.8* 2.9*     Results from last 7 days   Lab Units 01/10/25  1751 01/10/25  1146   HSTROP T ng/L 30* 50*     Results from last 7 days   Lab Units 01/12/25  0215   KYLAH  Negative     Results from last 7 days   Lab Units 01/13/25  0358   MAGNESIUM mg/dL 1.4*     Results from last 7 days   Lab Units 01/12/25  0219 01/10/25  1146   INR  1.04 1.06   APTT seconds  --  29.5               Meds:   SCHEDULE  aspirin, 81 mg, Oral, Daily  cefepime, 2,000 mg, Intravenous, Q12H  ipratropium-albuterol, 3 mL, Nebulization, 4x Daily - RT  levothyroxine, 50 mcg, Oral, Daily  magnesium sulfate, 2 g, Intravenous, Q2H  midodrine, 5 mg, Oral, TID AC  pantoprazole, 40 mg, Oral, Q AM  sertraline, 50 mg, Oral, Daily  sodium chloride, 10 mL, Intravenous, Q12H      Infusions     PRNs    acetaminophen **OR** acetaminophen **OR** acetaminophen    aluminum-magnesium hydroxide-simethicone    senna-docusate sodium **AND** polyethylene glycol **AND** bisacodyl **AND** bisacodyl    Calcium Replacement - Follow Nurse / BPA Driven Protocol    diphenhydrAMINE     HYDROcodone-acetaminophen    Magnesium Standard Dose Replacement - Follow Nurse / BPA Driven Protocol    melatonin    nitroglycerin    ondansetron ODT **OR** ondansetron    Phosphorus Replacement - Follow Nurse / BPA Driven Protocol    Potassium Replacement - Follow Nurse / BPA Driven Protocol    [COMPLETED] Insert Peripheral IV **AND** sodium chloride    sodium chloride    sodium chloride    Physical Exam:  General Appearance:  Alert   HEENT:  Normocephalic, without obvious abnormality, Conjunctiva/corneas clear,.   Nares normal, no drainage     Neck:  Supple, symmetrical, trachea midline.   Lungs /Chest wall:   Good air entry with minimal bilateral basal rhonchi, respirations unlabored, symmetrical wall movement.     Heart:  Regular rate and rhythm, S1 S2 normal  Abdomen: Soft, non-tender, no masses, no organomegaly.    Extremities: No edema, no clubbing or cyanosis     ROS  Constitutional: Negative for chills, fever and malaise/fatigue.   HENT: Negative.    Eyes: Negative.    Cardiovascular: Negative.    Respiratory: Positive for improvement in the cough and shortness of breath.    Skin: Negative.    Musculoskeletal: Negative.    Gastrointestinal: Negative.    Genitourinary: Negative.    Neurological: Negative.    Psychiatric/Behavioral: Negative.      I reviewed the recent clinical results  I personally reviewed the latest radiological studies    Part of this note may be an electronic transcription/translation of spoken language to printed text using the Dragon Dictation System.

## 2025-01-13 NOTE — CASE MANAGEMENT/SOCIAL WORK
Discharge Planning Assessment   [facility]     Patient Name: Maryann Gaitan  MRN: [id number]  Today's Date: 1/13/2025    Admit Date: 1/10/2025    Plan: [place] PLAN: From [facility]. Okay to return. Will need new precert. May need transport at [place].       Discharge Needs Assessment       Row Name 01/13/25 1455       Living Environment    People in Home other (see comments)    Unique Family Situation LTC    Current Living Arrangements extended care facility    Potentially Unsafe Housing Conditions none    In the past 12 months has the electric, gas, oil, or water company threatened to shut off services in your home? No    Provides Primary Care For no one    Family Caregiver if Needed other (see comments)    Quality of Family Relationships helpful;involved;supportive    Able to Return to Prior Arrangements yes       Resource/Environmental Concerns    Resource/Environmental Concerns none    Transportation Concerns none       Transportation Needs    In the past 12 months, has lack of transportation kept you from medical appointments or from getting medications? no    In the past 12 months, has lack of transportation kept you from meetings, work, or from getting things needed for daily living? No       Food Insecurity    Within the past 12 months, you worried that your food would run out before you got the money to buy more. Never true    Within the past 12 months, the food you bought just didn't last and you didn't have money to get more. Never true       Transition Planning    Patient/Family Anticipates Transition to long-term care facility    Patient/Family Anticipated Services at Transition none    Transportation Anticipated health plan transportation;family or friend will provide       Discharge Needs Assessment    Readmission Within the Last 30 Days no previous admission in last 30 days    Equipment Currently Used at Home wheelchair;walker, rolling    Anticipated Changes Related to Illness none                    Discharge Plan       Row Name 01/13/25 1456       Plan    Plan [place] PLAN: From [facility]. Okay to return. Will need new precert. May need transport at [place].    Patient/Family in Agreement with Plan yes    Plan Comments CM met with patient at bedside, from [facility], DCP report sent, message sent to liasion, can accept patient. Will need new precert. Needs assistance with ADL's . Patient wants to return home with family, family wants patient to return to SNF. PCP and Pharmacy per facility. Able to afford medications, denies any issues with food or utilities. Denies any additional concerns. Denies any concerns at this time.  Pending Clinical course.                  Continued Care and Services - Admitted Since 1/10/2025       Destination       Service Provider Request Status Services Address Phone Fax Patient Preferred    [facility] Accepted -- [address] [place] [address] [phone] --                  Selected Continued Care - Prior Encounters Includes continued care and service providers with selected services from prior encounters from 10/12/2024 to 1/13/2025      Discharged on 12/31/2024 Admission date: 12/26/2024 - Discharge disposition: Skilled Nursing Facility (DC - External)      Destination       Service Provider Services Address Phone Fax Patient Preferred    [facility] [address] [place] [address] [phone] --                      Discharged on 12/17/2024 Admission date: 12/11/2024 - Discharge disposition: Skilled Nursing Facility (DC - External)      Destination       Service Provider Services Address Phone Fax Patient Preferred    [facility] [address] [place] [address] [phone] --                      Discharged on 12/4/2024 Admission date: 11/19/2024 - Discharge disposition: Skilled Nursing Facility (DC - External)      Destination       Service Provider Services Address  Phone Fax Patient Preferred    [facility] [address] [place] [id number] [phone] --              Home Medical Care       Service Provider Services Address Phone Fax Patient Preferred    [facility], Home Nursing, Home Rehabilitation [address] [facility] [place] [address] [phone] --                      Discharged on 11/12/2024 Admission date: 11/6/2024 - Discharge disposition: Home or Self Care      Home Medical Care       Service Provider Services Address Phone Fax Patient Preferred    [facility], Home Nursing [address] [facility] [facility] [place] [address] [phone] --                      Discharged on 10/23/2024 Admission date: 10/8/2024 - Discharge disposition: Home-Health Care [facility]      Home Medical Care       Service Provider Services Address Phone Fax Patient Preferred    [facility] [address] [facility] [facility] [place] [address] [phone] --                          Expected Discharge Date and Time       Expected Discharge Date Expected Discharge Time    Jan 14, 2025            Demographic Summary       Row Name 01/13/25 1432       General Information    Admission Type inpatient    Arrived From emergency department    Required Notices Provided Important Message from Medicare    Referral Source admission list    Reason for Consult discharge planning    Preferred Language English       Contact Information    Permission Granted to Share Info With     Contact Information Obtained for                    Functional Status       Row Name 01/13/25 1432       Functional Status    Usual Activity Tolerance poor    Current Activity Tolerance poor       Physical Activity    On average, how many days per week do you engage in moderate to strenuous exercise (like a brisk walk)? 0 days    On average, how many minutes do you engage in exercise at  this level? 0 min    Number of minutes of exercise per week 0       Assessment of Health Literacy    How often do you have someone help you read hospital materials? Sometimes    How often do you have problems learning about your medical condition because of difficulty understanding written information? Sometimes    How often do you have a problem understanding what is told to you about your medical condition? Sometimes    How confident are you filling out medical forms by yourself? Somewhat    Health Literacy Moderate       Functional Status, IADL    Medications completely dependent    Meal Preparation completely dependent    Housekeeping completely dependent    Laundry completely dependent    Shopping completely dependent       Mental Status    General Appearance WDL WDL       Mental Status Summary    Recent Changes in Mental Status/Cognitive Functioning no changes                  Arianne Hendricks RN    Case Management  [phone]

## 2025-01-13 NOTE — DISCHARGE PLACEMENT REQUEST
"Bhargavi Gaitan (74 y.o. Female)       Date of Birth   1950    Social Security Number       Address   [address] [place] [address] [place] Lives [place] [facility] [place] [facility]    Home Phone   [phone]    MRN   [id number]       Ad[employer]ist   None    Marital Status                               Admission Date   1/10/25    Admission Type   Emergency    Admitting Provider   Tip Stone MD    Attending Provider   Luma Ramirez MD    Department, Room/Bed   [facility] 2D, 268/1       Discharge Date       Discharge Disposition       Discharge Destination                                 Attending Provider: Luma Ramirez MD    Allergies: Codeine, Penicillin G Sodium    Isolation: Airborne   Infection: None   Code Status: Prior    Ht: 162.6 cm (64\")   Wt: 51.9 kg (114 lb 6.7 oz)    Admission Cmt: None   Principal Problem: Hemoptysis [R04.2]                   Active Insurance as of 1/10/2025       Primary Coverage       Payor Plan Insurance Group Employer/Plan Group    HUMANA MEDICARE REPLACEMENT HUMANA MED ADV SNP HMO [id number]       Payor Plan Address Payor Plan Phone Number Payor Plan Fax Number Effective Dates    [address] [address] [phone]  4/1/2024 - None Entered    [facility] [address]         Subscriber Name Subscriber Birth Date Member ID       BHARGAVI GAITAN 1950 [id number]                     Emergency Contacts        (Rel.) Home Phone Work Phone Mobile Phone    Nadir Gaitan (Son) -- -- [phone]    Sania Celeste (Grandchild) -- -- [phone]    Hector [place] (Grandchild) -- -- [phone]          "

## 2025-01-13 NOTE — THERAPY EVALUATION
Patient Name: Maryann Gaitan  : 1950    MRN: [id number]                              Today's Date: 2025       Admit Date: 1/10/2025    Visit Dx:     ICD-10-CM ICD-9-CM   1. Pneumonia of both lungs due to infectious organism, unspecified part of lung  J18.9 483.8   2. Hemoptysis  R04.2 786.30   3. Hypomagnesemia  E83.42 275.2   4. Multifocal pneumonia  J18.9 486     Patient Active Problem List   Diagnosis    Mitral regurgitation    Cavitary lesion of lung    CAD, multiple vessel    Acute heart failure with preserved ejection fraction (HFpEF)    Severe mitral regurgitation    Dyslipidemia    Crohn's disease    Hypothyroidism (acquired)    Anxiety associated with depression    COPD (chronic obstructive pulmonary disease)    Rheumatoid arthritis    Moderate protein-calorie malnutrition    Acute lower GI bleeding    First degree hemorrhoids    Benign neoplasm of cecum    GERD (gastroesophageal reflux disease)    Hashimoto's thyroiditis    History of colonic polyps    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    Fecal urgency    Second degree hemorrhoids    Vitamin D deficiency, unspecified    Stress incontinence, female    Aphthae, oral    Hx of seborrheic keratosis    Primary hypertension    S/P mitral valve clip implantation    Gastrointestinal hemorrhage    Anemia    PNA (pneumonia)    Pneumonia due to other gram-negative bacteria    Acute renal failure (ARF)    Gastric bleed    Hemoptysis    Multifocal pneumonia     Past Medical History:   Diagnosis Date    Abnormal weight loss 2024    Acute kidney injury 2024    Acute UTI (urinary tract infection) 2024    Anxiety associated with depression 2024    Benign neoplasm of cecum 2016    CAD, multiple vessel 10/08/2024    Cavitary lesion of lung 10/08/2024    COPD (chronic obstructive pulmonary disease)     Crohn's disease 2024    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 2016     Dyslipidemia 10/30/2024    Fecal urgency 11/21/2024    First degree hemorrhoids 07/09/2021    GERD (gastroesophageal reflux disease) 11/21/2024    Hashimoto's thyroiditis 11/21/2024    History of colonic polyps 07/09/2021    Hypertension     Hypothyroidism (acquired) 11/06/2024    Mitral regurgitation 10/08/2024    Moderate protein-calorie malnutrition 11/09/2024    Multiple tracheobronchial mucus plugs 10/08/2024    Nicotine dependence 11/21/2024    Rheumatoid arthritis 11/06/2024    S/P mitral valve clip implantation 11/21/2024    Second degree hemorrhoids 07/05/2016    Stress incontinence, female 11/21/2024    Vitamin D deficiency, unspecified 11/21/2024     Past Surgical History:   Procedure Laterality Date    BRONCHOSCOPY N/A 10/16/2024    Procedure: BRONCHOSCOPY;  Surgeon: Gallo Pope MD;  Location: [facility] ENDOSCOPY;  Service: Pulmonary;  Laterality: N/A;    CARDIAC CATHETERIZATION N/A 10/15/2024    Procedure: Left Heart Cath, possible pci;  Surgeon: Travis Connor MD;  Location: [facility] CATH INVASIVE LOCATION;  Service: Cardiovascular;  Laterality: N/A;    CARDIAC CATHETERIZATION N/A 10/22/2024    Procedure: Laser Coronary Atherectomy;  Surgeon: Travis Connor MD;  Location: [facility] CATH INVASIVE LOCATION;  Service: Cardiovascular;  Laterality: N/A;    COLON RESECTION Right 11/28/2024    Procedure: RIGHT HEMICOLECTOMY WITH ILEOSTOMY;  Surgeon: Todd Babin MD;  Location: [facility] MAIN OR;  Service: General;  Laterality: Right;    COLONOSCOPY N/A 10/12/2024    Procedure: COLONOSCOPY WITH BIOPSY AND WIRE GUIDED BALLOON DILATION OF TERMINAL ILEUM;  Surgeon: Rob Strong MD;  Location: [facility] ENDOSCOPY;  Service: Gastroenterology;  Laterality: N/A;  Colitis, crohns of terminal ileum, right colon ulcers, diverticulosis, hemorroids    ENDOSCOPY N/A 10/12/2024    Procedure: ESOPHAGOGASTRODUODENOSCOPY WITH BIOPSY X 2 AREA;  Surgeon: Rob tSrong MD;  Location:   [facility] ENDOSCOPY;  Service: Gastroenterology;  Laterality: N/A;  Chronic gastritis, HH    ENDOSCOPY Left 11/28/2024    Procedure: ESOPHAGOGASTRODUODENOSCOPY;  Surgeon: Dallin Delgado MD;  Location: [facility] ENDOSCOPY;  Service: Gastroenterology;  Laterality: Left;  small hiatal hernia    HYSTERECTOMY      LEFT HEART CATH      MITRAL VALVE REPAIR/REPLACEMENT N/A 11/21/2024    Procedure: Transcatheter Mitral Valve Repair;  Surgeon: Dmitri Navarro MD;  Location: [facility] HYBRID OR;  Service: Cardiovascular;  Laterality: N/A;      General Information       Row Name 01/13/25 1319          Physical Therapy Time and Intention    Document Type evaluation  -BR     Mode of Treatment physical therapy  -BR       Row Name 01/13/25 1325 01/13/25 1319       General Information    Patient Profile Reviewed -- yes  -BR    Prior Level of Function -- independent:;all household mobility;gait;transfer;w/c or scooter  Pt uses a rolling walker for short distances and a w/c for longer distances.  -BR    Existing Precautions/Restrictions other (see comments)  Pt has STI at buttock and coccyx.  -BR fall;cardiac  -BR    Barriers to Rehab -- medically complex;previous functional deficit  -BR      Row Name 01/13/25 1319          Living Environment    People in Home child(keegan), adult;grandchild(keegan)  -BR       Row Name 01/13/25 1319          Home Main Entrance    Number of Stairs, Main Entrance two  -BR       Row Name 01/13/25 1319          Stairs Within Home, Primary    Number of Stairs, Within Home, Primary none  -BR       Row Name 01/13/25 1319          Cognition    Orientation Status (Cognition) oriented x 4  -BR       Row Name 01/13/25 1319          Safety Issues/Impairments Affecting Functional Mobility    Impairments Affecting Function (Mobility) balance;endurance/activity tolerance;strength;shortness of breath;pain  -BR               User Key  (r) = Recorded By, (t) = Taken By, (c) = Cosigned By      Initials Name Provider Type    BR  Sobia Vides, PT Physical Therapist                   Mobility       Row Name 01/13/25 1321          Bed Mobility    Bed Mobility supine-sit  -BR     Supine-Sit [place] (Bed Mobility) contact guard;2 person assist  -BR     Assistive Device (Bed Mobility) head of bed elevated  -BR     Comment, (Bed Mobility) Pt required additional time.  -BR       Row Name 01/13/25 1321          Bed-Chair Transfer    Bed-Chair [place] (Transfers) moderate assist (50% patient effort);maximum assist (25% patient effort)  -BR     Assistive Device (Bed-Chair Transfers) walker, front-wheeled  -BR       Row Name 01/13/25 1321          Sit-Stand Transfer    Sit-Stand [place] (Transfers) moderate assist (50% patient effort)  -BR     Assistive Device (Sit-Stand Transfers) walker, front-wheeled  -BR       Row Name 01/13/25 1321          Gait/Stairs (Locomotion)    Comment, (Gait/Stairs) Pt took a few pivoting steps from bed to recliner. Pt had significant loss of balance posteriorly.  -BR               User Key  (r) = Recorded By, (t) = Taken By, (c) = Cosigned By      Initials Name Provider Type    BR Sobia Vides, PT Physical Therapist                   Obj/Interventions       Row Name 01/13/25 1325          Range of Motion Comprehensive    General Range of Motion bilateral lower extremity ROM WFL  -BR       Row Name 01/13/25 1325          Strength Comprehensive (MMT)    Comment, General Manual Muscle Testing (MMT) Assessment BLE strength grossly 3-/5  -BR       Row Name 01/13/25 1325          Balance    Balance Assessment sitting static balance;sitting dynamic balance;standing static balance  -BR     Static Sitting Balance standby assist  -BR     Dynamic Sitting Balance contact guard  -BR     Position, Sitting Balance unsupported;sitting edge of bed  -BR     Static Standing Balance moderate assist  -BR     Position/Device Used, Standing Balance supported  -BR       Row Name 01/13/25 1325          Sensory Assessment  (Somatosensory)    Sensory Assessment (Somatosensory) LE sensation intact  -BR               User Key  (r) = Recorded By, (t) = Taken By, (c) = Cosigned By      Initials Name Provider Type    Sobia Manzo PT Physical Therapist                   Goals/Plan       Row Name 01/13/25 1337          Bed Mobility Goal 1 (PT)    Activity/Assistive Device (Bed Mobility Goal 1, PT) bed mobility activities, all  -BR     [place] Level/Cues Needed (Bed Mobility Goal 1, PT) modified independence  -BR     Time Frame (Bed Mobility Goal 1, PT) long term goal (LTG);2 weeks  -BR       Row Name 01/13/25 1337          Transfer Goal 1 (PT)    Activity/Assistive Device (Transfer Goal 1, PT) transfers, all  -BR     [place] Level/Cues Needed (Transfer Goal 1, PT) modified independence  -BR     Time Frame (Transfer Goal 1, PT) long term goal (LTG);2 weeks  -BR       Row Name 01/13/25 1337          Gait Training Goal 1 (PT)    Activity/Assistive Device (Gait Training Goal 1, PT) gait (walking locomotion);assistive device use  -BR     [place] Level (Gait Training Goal 1, PT) supervision required  -BR     Distance (Gait Training Goal 1, PT) 35  -BR     Time Frame (Gait Training Goal 1, PT) long term goal (LTG);2 weeks  -BR       Row Name 01/13/25 1337          Therapy Assessment/Plan (PT)    Planned Therapy Interventions (PT) balance training;bed mobility training;gait training;patient/family education;neuromuscular re-education;transfer training;ROM (range of motion);strengthening;postural re-education  -BR               User Key  (r) = Recorded By, (t) = Taken By, (c) = Cosigned By      Initials Name Provider Type    Sobia Manzo, CHINTAN Physical Therapist                   Clinical Impression       Row Name 01/13/25 1326          Pain    Pretreatment Pain Rating 6/10  -BR     Posttreatment Pain Rating 6/10  -BR     Pain Location buttock;head;chest  -BR       Row Name 01/13/25 1337 01/13/25 1326       Plan of Care  Review    Plan of Care Reviewed With -- patient  -BR    Outcome Evaluation Pt presents as a 73 y/o F admitted to [facility] on 1/10/25 for hemoptysis. This is pt's 4 th hospital admission since Dec 2024. CTA (-) for PE. CXR (-).  Pulmonology following pt for pneumonia due to unspecified pathogen. PMHx: colostomy, R, COPD, leaky mitral valve, heart disease, heart failure, anxiety, Crohn's Disease, Hashimoto's. Pt A and O x 4 and on room air.  Pt typically lives with daughters and granddaughter in [facility] with 2 RICKEY. She uses a rolling walker for short distances and a w/c for longer distances and does this with independence. Pt has sore buttocks from STI. Pt requiring mod/max assist for stand-pivot-sit transfer with rolling walker with pt exhibiting significant posterior lean. Patient is a high risk of injurious falls and unsafe to return to prior living environment at this time.  Pt is far below her baseline for all areas of mobility and has had repeat hospital admissions. PT recommends that pt return to Skilled Nursing Facility to address her generalized weakness and immobility.  -BR --      Row Name 01/13/25 1326          Therapy Assessment/Plan (PT)    Rehab Potential (PT) good  -BR     Criteria for Skilled Interventions Met (PT) yes;meets criteria;skilled treatment is necessary  -BR     Therapy Frequency (PT) 5 times/wk  -BR     Predicted Duration of Therapy Intervention (PT) until D/C  -BR       Row Name 01/13/25 1326          Vital Signs    Pre Systolic BP Rehab 119  -BR     Pre Treatment Diastolic BP 61  -BR     Pretreatment Heart Rate (beats/min) 93  -BR     Intratreatment Heart Rate (beats/min) 118  -BR     Posttreatment Heart Rate (beats/min) 97  -BR     Pre SpO2 (%) 98  -BR     O2 Delivery Pre Treatment room air  -BR     Pre Patient Position Supine  -BR     Intra Patient Position Standing  -BR     Post Patient Position Sitting  -BR       Row Name 01/13/25 1326          Positioning and Restraints    Pre-Treatment  Position in bed  -BR     Post Treatment Position chair  -BR     In Chair notified nsg;reclined;call light within reach;encouraged to call for assist;exit alarm on;waffle cushion;with nsg  -BR               User Key  (r) = Recorded By, (t) = Taken By, (c) = Cosigned By      Initials Name Provider Type    Sobia Manzo, CHINTAN Physical Therapist                   Outcome Measures       Row Name 01/13/25 1338 01/13/25 0848       How much help from another person do you currently need...    Turning from your back to your side while in flat bed without using bedrails? 3  -BR 3  -HJ    Moving from lying on back to sitting on the side of a flat bed without bedrails? 3  -BR 3  -HJ    Moving to and from a bed to a chair (including a wheelchair)? 2  -BR 3  -HJ    Standing up from a chair using your arms (e.g., wheelchair, bedside chair)? 2  -BR 3  -HJ    Climbing 3-5 steps with a railing? 1  -BR 2  -HJ    To walk in hospital room? 1  -BR 2  -HJ    AM-PAC 6 Clicks Score (PT) 12  -BR 16  -HJ    Highest Level of Mobility Goal 4 --> Transfer to chair/commode  -BR 5 --> Static standing  -HJ      Row Name 01/13/25 1338          Functional Assessment    Outcome Measure Options AM-PAC 6 Clicks Basic Mobility (PT)  -BR               User Key  (r) = Recorded By, (t) = Taken By, (c) = Cosigned By      Initials Name Provider Type    Britney Kwan RN Registered Nurse    Sobia Manzo, PT Physical Therapist                                 Physical Therapy Education       Title: PT OT SLP Therapies (Done)       Topic: Physical Therapy (Done)       Point: Mobility training (Done)       Learning Progress Summary            Patient Acceptance, E,D, VU,DU by TRINO at 1/13/2025 1338                      Point: Body mechanics (Done)       Learning Progress Summary            Patient Acceptance, E,D, VU,DU by TRINO at 1/13/2025 1338                      Point: Precautions (Done)       Learning Progress Summary            Patient  Acceptance, E,D, VU,DU by BR at 1/13/2025 1338                                      User Key       Initials Effective Dates Name Provider Type Discipline    BR 02/01/22 -  Sobia Vides, CHINTAN Physical Therapist PT                  PT Recommendation and Plan  Planned Therapy Interventions (PT): balance training, bed mobility training, gait training, patient/family education, neuromuscular re-education, transfer training, ROM (range of motion), strengthening, postural re-education  Outcome Evaluation: Pt presents as a 73 y/o F admitted to [facility] on 1/10/25 for hemoptysis. This is pt's 4 th hospital admission since Dec 2024. CTA (-) for PE. CXR (-).  Pulmonology following pt for pneumonia due to unspecified pathogen. PMHx: colostomy, R, COPD, leaky mitral valve, heart disease, heart failure, anxiety, Crohn's Disease, Hashimoto's. Pt A and O x 4 and on room air.  Pt typically lives with daughters and granddaughter in [facility] with 2 RICKEY. She uses a rolling walker for short distances and a w/c for longer distances and does this with independence. Pt has sore buttocks from STI. Pt requiring mod/max assist for stand-pivot-sit transfer with rolling walker with pt exhibiting significant posterior lean. Patient is a high risk of injurious falls and unsafe to return to prior living environment at this time.  Pt is far below her baseline for all areas of mobility and has had repeat hospital admissions. PT recommends that pt return to Skilled Nursing Facility to address her generalized weakness and immobility.     Time Calculation:         PT Charges       Row Name 01/13/25 1339 01/13/25 1318          Time Calculation    Start Time 1245  -BR 1245  -BR     Stop Time 1314  -BR 1314  -BR     Time Calculation (min) 29 min  -BR 29 min  -BR     PT Received On 01/13/25  -BR 01/13/25  -BR     PT - Next Appointment 01/14/25  -BR 01/14/25  -BR     PT Goal Re-Cert Due Date 01/27/25  -BR 01/27/25  -BR        Time Calculation- PT    Total Timed  Code Minutes- PT 0 minute(s)  -BR 0 minute(s)  -BR               User Key  (r) = Recorded By, (t) = Taken By, (c) = Cosigned By      Initials Name Provider Type    Sobia Manzo, PT Physical Therapist                  Therapy Charges for Today       Code Description Service Date Service Provider Modifiers Qty    43801862089 HC PT EVAL MOD COMPLEXITY 4 1/13/2025 Sobia Vides, PT GP 1    36230272196 HC PT EVAL MOD COMPLEXITY 4 1/13/2025 Sobia Vides, PT GP 1            PT G-Codes  Outcome Measure Options: AM-PAC 6 Clicks Basic Mobility (PT)  AM-PAC 6 Clicks Score (PT): 12  PT Discharge Summary  Anticipated Discharge Disposition (PT): skilled nursing facility    Sobia Vides, CHINTAN  1/13/2025

## 2025-01-13 NOTE — DISCHARGE PLACEMENT REQUEST
"Bhargavi Gaitan (74 y.o. Female)       Date of Birth   1950    Social Security Number       Address   [address] [place] [address] [place] Lives at [facility] [place] [facility]    Home Phone   [phone]    MRN   [id number]       Restoration   None    Marital Status                               Admission Date   1/10/25    Admission Type   Emergency    Admitting Provider   Tip Stone MD    Attending Provider   Luma Ramirez MD    Department, Room/Bed   [facility] 2D, 268/1       Discharge Date       Discharge Disposition       Discharge Destination                                 Attending Provider: Luma Ramirez MD    Allergies: Codeine, Penicillin G Sodium    Isolation: None   Infection: None   Code Status: Prior    Ht: 162.6 cm (64\")   Wt: 51.9 kg (114 lb 6.7 oz)    Admission Cmt: None   Principal Problem: Hemoptysis [R04.2]                   Active Insurance as of 1/10/2025       Primary Coverage       Payor Plan Insurance Group Employer/Plan Group    HUMANA MEDICARE REPLACEMENT HUMANA MED ADV SNP HMO [id number]       Payor Plan Address Payor Plan Phone Number Payor Plan Fax Number Effective Dates    [address] [address] [phone]  2024 - None Entered    [facility] [id number]         Subscriber Name Subscriber Birth Date Member ID       BHARGAVI GAITAN 1950 [id number]                     Emergency Contacts        (Rel.) Home Phone Work Phone Mobile Phone    Nadir Gaitan (Son) -- -- [phone]    Sania Celeste (Grandchild) -- -- [phone]    Hector [place] (Grandchild) -- -- [phone]                 History & Physical        Tip Stone MD at 01/10/25 1446            History and Physical   Bhargavi Gaitan : 1950 MRN:[id number] LOS:0     Reason for admission: Hemoptysis     Assessment / Plan     #Acute hemoptysis likely from infection ? HCAP  -Patient presented with acute hemoptysis today.  Denies any other bleeding.  -She has chest tightness as " well.  -Troponin pending.  -CT angio chest with no pulmonary embolism and but there is Interval development of spiculated nodules in the lungs  favorable to infection or inflammation. Interval resolution of cavitary lesion left upper lobe.   -started on IV cefepime and van   -MRSA pending   -sputum Cx to be done   -resp tx, oxygen supplement    #Hypomagnesemia  -lytes Replacement protocol initiated    #Mild Hyperkalemia  -one dose of lokelma to be given.     #Crohn's disease, chronic:  #RA   -Resume home medication once verified by pharmacy and clinically appropriate    #CAD s/p PCI  -Resume home medication once verified by pharmacy and clinically appropriate     #HLD  -Resume home medication once verified by pharmacy and clinically appropriate    #COPD  -resp tx     #Hypothyroidism  -Resume home medication once verified by pharmacy and clinically appropriate    #Anxiety  -Resume home medication once verified by pharmacy and clinically appropriate          Nutrition: NPO Diet NPO Type: Strict NPO     DVT Prophylaxis: Active VTE Prophylaxis  Mechanical:        Start        01/10/25 1445  Maintain Sequential Compression Device  Continuous                          Select Pharmacologic VTE Prophylaxis if Desired & Appropriate      History of Present illness     74 y.o. female with a PMH of CAD s/p recent stent on dual platelet therapy, COPD, RA, Crohn's on biologic therapy, ileostomy with mucous fistula for GI bleed who presented to [facility] with large amount of hemoptysis on admission day.  Patient mentioned that she started coughing and blood came out along with the coughing.  Patient has squeezing pain in the chest as well.  In the ED patient labs showing no leukocytosis hemoglobin of 8.4 creatinine at 1.4 fluctuating around the baseline.  Potassium of 5.4.  CT angio chest with no pulmonary embolism.  None emphysema.  Interval resolution of cavitated lesion left upper lobe.  Interval development of a  spiculated nodule Favored to be inflammation or infection.  Cannot exclude a neoplasm and and will need to follow-up CT chest in 3 months.  Stable descending thoracic aortic aneurysm and measuring up to 3.8 cm.  Coronary artery calcification. started on aztreonam in the ED. Pulm is consulted.     Of note, the Patient is recently in the hospital in December for acute renal failure with creatinine of 5.5 and hyperkalemia of 7.2.  Patient was admitted in November 2024 for acute GI bleeding.    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 07/05/2016    Dyslipidemia 10/30/2024    Fecal urgency 11/21/2024    First degree hemorrhoids 07/09/2021    GERD (gastroesophageal reflux disease) 11/21/2024    Hashimoto's thyroiditis 11/21/2024    History of colonic polyps 07/09/2021    Hypertension     Hypothyroidism (acquired) 11/06/2024    Mitral regurgitation 10/08/2024    Moderate protein-calorie malnutrition 11/09/2024    Multiple tracheobronchial mucus plugs 10/08/2024    Nicotine dependence 11/21/2024    Rheumatoid arthritis 11/06/2024    S/P mitral valve clip implantation 11/21/2024    Second degree hemorrhoids 07/05/2016    Stress incontinence, female 11/21/2024    Vitamin D deficiency, unspecified 11/21/2024      Past Surgical History:   Procedure Laterality Date    BRONCHOSCOPY N/A 10/16/2024    Procedure: BRONCHOSCOPY;  Surgeon: Gallo Pope MD;  Location: [facility] ENDOSCOPY;   Service: Pulmonary;  Laterality: N/A;    CARDIAC CATHETERIZATION N/A 10/15/2024    Procedure: Left Heart Cath, possible pci;  Surgeon: Travis Connor MD;  Location: [facility] CATH INVASIVE LOCATION;  Service: Cardiovascular;  Laterality: N/A;    CARDIAC CATHETERIZATION N/A 10/22/2024    Procedure: Laser Coronary Atherectomy;  Surgeon: Travis Connor MD;  Location: [facility] CATH INVASIVE LOCATION;  Service: Cardiovascular;  Laterality: N/A;    COLON RESECTION Right 11/28/2024    Procedure: RIGHT HEMICOLECTOMY WITH ILEOSTOMY;  Surgeon: Todd Babin MD;  Location: [facility] MAIN OR;  Service: General;  Laterality: Right;    COLONOSCOPY N/A 10/12/2024    Procedure: COLONOSCOPY WITH BIOPSY AND WIRE GUIDED BALLOON DILATION OF TERMINAL ILEUM;  Surgeon: Rob Strong MD;  Location: [facility] ENDOSCOPY;  Service: Gastroenterology;  Laterality: N/A;  Colitis, crohns of terminal ileum, right colon ulcers, diverticulosis, hemorroids    ENDOSCOPY N/A 10/12/2024    Procedure: ESOPHAGOGASTRODUODENOSCOPY WITH BIOPSY X 2 AREA;  Surgeon: Rob Strong MD;  Location: [facility] ENDOSCOPY;  Service: Gastroenterology;  Laterality: N/A;  Chronic gastritis, HH    ENDOSCOPY Left 11/28/2024    Procedure: ESOPHAGOGASTRODUODENOSCOPY;  Surgeon: Dallin Delgado MD;  Location: [facility] ENDOSCOPY;  Service: Gastroenterology;  Laterality: Left;  small hiatal hernia    HYSTERECTOMY      LEFT HEART CATH      MITRAL VALVE REPAIR/REPLACEMENT N/A 11/21/2024    Procedure: Transcatheter Mitral Valve Repair;  Surgeon: Dmitri Navarro MD;  Location: [facility] HYBRID OR;  Service: Cardiovascular;  Laterality: N/A;      Social History     Socioeconomic History    Marital status:    Tobacco Use    Smoking status: Former     Types: Cigarettes    Smokeless tobacco: Never   Vaping Use    Vaping status: Never Used   Substance and Sexual Activity    Alcohol use: Never    Drug use: Never    Sexual activity: Defer       Family History   Problem Relation Age of Onset    Heart disease Mother     Dementia Mother     Stroke Mother     Heart disease Father     Hypertension Father       Allergies   Allergen Reactions    Codeine Hives    Penicillin G Sodium Hives        Home Medications     Prior to Admission medications    Medication Sig Start Date End Date Taking? Authorizing Provider   Adalimumab (Humira, 2 Pen,) 40 MG/0.4ML Pen-injector Kit Inject 40 mg under the skin into the appropriate area as directed 1 (One) Time Per Week. Saturdays   Indications: Rheumatoid Arthritis 10/25/24   Donna Kraft MD   albuterol (PROVENTIL) (2.5 MG/3ML) 0.083% nebulizer solution Take 2.5 mg by nebulization Every 6 (Six) Hours As Needed for Wheezing. Indications: Spasm of Lung Air Passages 11/13/24   Donna Kraft MD   aspirin 81 MG EC tablet Take 1 tablet by mouth Daily for 30 days. Indications: Disease involving Lipid Deposits in the Arteries 12/16/24 1/15/25  Jordan Lewis DO   calcium carbonate (OS-ARABELLA) 600 MG tablet Take 1 tablet by mouth 2 (Two) Times a Day With Meals.    Donna Kraft MD   cholecalciferol (VITAMIN D3) 1.25 MG (74936 UT) capsule Take 1 capsule by mouth 2 (Two) Times a Week. Wed, Sat  Indications: Vitamin D Deficiency 10/25/24   Donna Kraft MD   cholestyramine light 4 g packet Take 1 packet by mouth 2 (Two) Times a Day. 12/31/24   El Childress MD   diclofenac (VOLTAREN) 50 MG EC tablet Take 1 tablet by mouth 2 (Two) Times a Day.    Donna Kraft MD   diphenoxylate-atropine (Lomotil) 2.5-0.025 MG per tablet Take 1 tablet by mouth 4 (Four) Times a Day As Needed for Diarrhea. 12/31/24   El Childress MD   folic acid (FOLVITE) 1 MG tablet Take 1 tablet by mouth Daily. Indications: Anemia From Inadequate Folic Acid 10/25/24   Donna Kraft MD   guaifenesin (ROBITUSSIN) 100 MG/5ML liquid Take 10 mL by mouth Every 4 (Four) Hours As Needed for Cough. 12/16/24   Jordan Lewis DO    levothyroxine (SYNTHROID, LEVOTHROID) 50 MCG tablet Take 1 tablet by mouth Daily. Indications: Underactive Thyroid 10/25/24   Donna Kraft MD   Melatonin (Melatonin Extra Strength) 10 MG tablet Take 1 tablet by mouth As Needed. Indications: Trouble Sleeping 10/30/24   Donna Kraft MD   mesalamine (CANASA) 1000 MG suppository Insert 1 suppository into the rectum Every Night. 12/16/24   Jordan Lewis DO   methotrexate 2.5 MG tablet Take 6 tablets by mouth 1 (One) Time Per Week. Saturdays   Indications: Non-oncologic 10/25/24   Donna Kraft MD   metoprolol tartrate (LOPRESSOR) 25 MG tablet Take 0.5 tablets by mouth Every 12 (Twelve) Hours. 12/16/24   Jordan Lewis DO   midodrine (PROAMATINE) 5 MG tablet Take 1 tablet by mouth 3 (Three) Times a Day Before Meals. Indications: Disorder of Low Blood Pressure 12/2/24   Fco Figueroa PA-C   naloxone (NARCAN) 4 MG/0.1ML nasal spray Call 911. Don't prime. [place] in 1 nostril for overdose. Repeat in 2-3 minutes in other nostril if no or minimal breathing/responsiveness.  Indications: Opioid Overdose 12/2/24   Fco Figueroa PA-C   ondansetron ODT (ZOFRAN-ODT) 4 MG disintegrating tablet Take 1 tablet by mouth Every 6 (Six) Hours As Needed for Nausea or Vomiting. Indications: Nausea and Vomiting Following an Operation 12/2/24   Fco Figueroa PA-C   pantoprazole (PROTONIX) 40 MG EC tablet Take 1 tablet by mouth Every Morning. 12/31/24   El Childress MD   sertraline (ZOLOFT) 50 MG tablet Take 1 tablet by mouth Daily. Indications: Major Depressive Disorder 10/25/24   Donna Kraft MD   spironolactone (ALDACTONE) 25 MG tablet Take 1 tablet by mouth Daily. Indications: Edema 11/13/24   Donna Kraft MD   upadacitinib ER (Rinvoq) 45 MG tablet sustained-release 24 hour extended release tablet Take 1 tablet by mouth Daily. Indications: Rheumatoid Arthritis 11/13/24   Donna Kraft MD      Objective / Physical Exam   Vital  signs:  Temp: 98.2 °F (36.8 °C)  BP: 96/49  Heart Rate: 58  Resp: 18  SpO2: 98 %  Weight: 51.9 kg (114 lb 6.7 oz)    Admission Weight: Weight: 51.9 kg (114 lb 6.7 oz)    Physical Exam   Physical Exam  HENT:      Head: Normocephalic and atraumatic.      Nose: Nose normal.   Eyes:      Extraocular Movements: Extraocular movements intact.      Conjunctivae/sclerae: Conjunctivae normal.      Pupils: Pupils are equal, round, and reactive to light.   Cardiovascular:      Rate and Rhythm: Normal       Pulses: Normal pulses.      Heart sounds: Normal heart sounds.   Pulmonary:      Reduced BS   Abdominal:      General: Abdomen is flat. Bowel sounds are normal.      Palpations: Abdomen is soft.   Musculoskeletal:         General: Normal range of motion.      Cervical back: Normal range of motion and neck supple.   Skin:     General: Skin is dry.   Neurological:      General: No focal deficit present.      Mental Status: alert.   Psychiatric:         Mood and Affect: Mood normal.        Labs     Results from last 7 days   Lab Units 01/10/25  1146   WBC 10*3/mm3 6.92   HEMATOCRIT % 28.0*   PLATELETS 10*3/mm3 469*      Results from last 7 days   Lab Units 01/10/25  1146   SODIUM mmol/L 138   POTASSIUM mmol/L 5.4*   CHLORIDE mmol/L 108*   CO2 mmol/L 21.7*   BUN mg/dL 24*   CREATININE mg/dL 1.40*        Current Medications   Scheduled Meds:aztreonam, 2 g, Intravenous, Once  magnesium sulfate, 1 g, Intravenous, Q1H  sodium chloride, 10 mL, Intravenous, Q12H  vancomycin, 20 mg/kg, Intravenous, Once         Continuous Infusions:      Tip Stone MD  [facility]   01/10/25   14:46 EST         Electronically signed by Tip Stone MD at 01/10/25 1640          Operative/Procedure Notes (all)        Gallo Pope MD at 01/12/25 0838  Version 1 of 1         Bronchoscopy Procedure Note    Procedure:  Bronchoscopy, Diagnostic  Bronchoscopy, Therapeutic lavage of multiple mucous plugs  Bronchoalveolar lavage, BAL from right upper  lobe    Pre-Operative Diagnosis: Multiple lung nodules, multiple mucous plugs    Post-Operative Diagnosis: Same    Indication: Immunocompromised patient with new multiple lung nodules, rule out opportunistic infections    Anesthesia: Monitored Anesthesia Care (MAC)    Procedure Details: Patient was consented for the procedure with all risk and benefit of the procedure explained in detail.  Patient was given the opportunity to ask questions and all concerns were answered.    Timeout was done in the standard manner   the bronchoscope was inserted into the main airway via the oropharynx. An anatomical survey was done of the main airways and the subsegmental bronchus of the 5 lobes.  The findings are consistent of multiple thick mucous plugs with the colors ranging from clear to white.  A therapeutic lavage was performed using aliquots of normal saline instilled into the airways then aspirated back until clear.    Estimated Blood Loss: None           Specimens: Diagnostic BAL was performed in right upper lobe by instilling 90 mL of sterile normal saline and return of 35 mL                Complications:  None; patient tolerated the procedure well.           Disposition: PACU - hemodynamically stable.    Post op plan:  Resume p.o. after 2 hours  Follow-up results    Patient tolerated the procedure well.    Electronically signed by Gallo Pope MD at 25 0843          Physician Progress Notes (last 48 hours)        Luma Ramirez MD at 25 1408              [facility] MEDICINE SERVICE  DAILY PROGRESS NOTE    NAME: Maryann Gaitan  : 1950  MRN: [id number]      LOS: 3 days     PROVIDER OF SERVICE: Luma Ramirez MD    Chief Complaint: Hemoptysis    Subjective:     Interval History:  History taken from: patient    Patient otherwise feels well.  She wishes to be discharged home.  She misses her dog at home.  She states that she has numerous family members at home.        Review of Systems:   Review of  Systems   All other systems reviewed and are negative.      Objective:     Vital Signs  Temp:  [97.3 °F (36.3 °C)-98.6 °F (37 °C)] 97.3 °F (36.3 °C)  Heart Rate:  [] 82  Resp:  [14-30] 20  BP: (102-145)/(47-89) 119/61   Body mass index is 19.64 kg/m².    Physical Exam  Physical Exam  Constitutional:       General: She is awake.      Appearance: She is well-developed and well-groomed. She is ill-appearing.   HENT:      Head: Normocephalic and atraumatic.      Nose: Nose normal.      Mouth/Throat:      Mouth: Mucous membranes are moist.      Pharynx: Oropharynx is clear.   Eyes:      Extraocular Movements: Extraocular movements intact.      Conjunctiva/sclera: Conjunctivae normal.      Pupils: Pupils are equal, round, and reactive to light.   Cardiovascular:      Rate and Rhythm: Normal rate and regular rhythm.      Pulses: Normal pulses.      Heart sounds: Normal heart sounds.   Pulmonary:      Effort: Pulmonary effort is normal.      Breath sounds: Wheezing and rhonchi present.   Abdominal:      General: Abdomen is flat. Bowel sounds are normal.      Palpations: Abdomen is soft.   Musculoskeletal:         General: Normal range of motion.      Cervical back: Normal range of motion and neck supple.      Right lower leg: No edema.      Left lower leg: No edema.   Skin:     General: Skin is warm and dry.   Neurological:      General: No focal deficit present.      Mental Status: She is alert and oriented to person, place, and time. Mental status is at baseline.      Cranial Nerves: Cranial nerves 2-12 are intact.      Sensory: Sensation is intact.      Motor: Motor function is intact.   Psychiatric:         Mood and Affect: Mood normal.         Behavior: Behavior normal. Behavior is cooperative.         Thought Content: Thought content normal.         Judgment: Judgment normal.            Diagnostic Data    Results from last 7 days   Lab Units 01/13/25  0358 01/12/25  0215   WBC 10*3/mm3 8.24 6.00   HEMOGLOBIN  g/dL 7.9* 7.4*   HEMATOCRIT % 25.7* 24.7*   PLATELETS 10*3/mm3 435 427   GLUCOSE mg/dL 82 114*   CREATININE mg/dL 0.78 0.86   BUN mg/dL 11 13   SODIUM mmol/L 137 138   POTASSIUM mmol/L 4.2 4.5   AST (SGOT) U/L  --  20   ALT (SGPT) U/L  --  23   ALK PHOS U/L  --  121*   BILIRUBIN mg/dL  --  <0.2   ANION GAP mmol/L 8.4 7.8       No radiology results for the last day      I reviewed the patient's new clinical results.    Assessment/Plan:     Active and Resolved Problems  Active Hospital Problems    Diagnosis  POA    **Hemoptysis [R04.2]  Yes    Multifocal pneumonia [J18.9]  Unknown      Resolved Hospital Problems   No resolved problems to display.     Acute hemoptysis  Left upper lobe lesion/pneumonia  Coronary artery disease with recent stent placement on dual antiplatelet therapy  End-stage COPD  Rheumatoid arthritis  Crohn's disease on biological therapy  History of ileostomy  History of GI bleed    Patient was admitted with acute hemoptysis which slowly improved through hospital course.  Routine laboratory studies as well as imaging was conducted.  Noted patient did develop left upper lobe lesion cannot exclude possible neoplasm and did require repeat CT chest imaging in approximate 3 months.  Pulmonary services were consulted.      Patient underwent bronchoscopy on 1/12/2025.  Multiple mucous plugs were removed.  Cultures are currently pending.  Autoimmune etiology panel also ordered given history of Crohn's disease as well as being on chronic immunosuppression.  IV antibiotics continue for now.  Noted pulmonary services have now signed off.  Will transition to oral antibiotics at time of discharge.  Noted physical and Occupational Therapy services have made recommendation for rehab at time of discharge.  Plan discharge once rehab facility set up and/or insurance approved.    Recent PCI, resume aspirin as well as Plavix for now.    Continue to monitor given resumption of dual antiplatelet therapy for hemoptysis.   Recheck a.m. labs as well.  VTE Prophylaxis:  Mechanical VTE prophylaxis orders are present.             Disposition Planning:     Barriers to Discharge: Therapy evaluation, insurance approval for rehab facility  Anticipated Date of Discharge: 1/15/2025  Place of Discharge: Skilled nursing facility/rehab      Time: 45 minutes     There are no questions and answers to display.       Signature: Electronically signed by Luma Ramirez MD, 01/13/25, 14:09 EST.  [facility] Hospitalist Team      Electronically signed by Luma Ramirez MD at 01/13/25 1412       Gallo Pope MD at 01/13/25 1213          Daily Progress Note          Assessment    Hemoptysis  Pneumonia due to unspecified pathogen  Resolution of the left upper lobe cavitary lesion: Was seen October 2024 with bronchoscopy 10/16/2024 showing negative cultures.  But this admission there is development of spiculated multiple nodules.  COPD but not acute exacerbation  History of strongyloidosis  Rheumatoid arthritis and Crohn disease on biological therapy status post ileostomy with mucous fistula status post GI hemorrhage: on Humira, mesalamine methotrexate outpatient  Chronic anemia  CAD status post PCI to LAD 10/22/2024  HLD  Hypothyroidism  History of severe MR status post mitral valve MitraClip 11/21/2024  Thyroid hormone replacement        Recommendations:  Status post bronchoscopy 1/12/2025: No hemoptysis, multiple white mucous plugs seen, lavage from right upper lobe obtained to rule out opportunistic infections: AFB smear is negative, DC airborne isolation.  Gram-negative bacilli noted: Switch antibiotics to Levaquin for 3 days    Patient can be discharged from pulmonary standpoint will follow-up in 2 weeks in the office     Resume aspirin since the hemoptysis resolved     Antibiotics: Cefepime switched to Levaquin    Oxygen supplement and titration to maintain saturation 90 to 95%, currently on room air  Bronchodilators     Levothyroxine  Protonix      I personally reviewed the radiological studies             LOS: 3 days     Subjective     Mild cough and shortness of breath    Objective     Vital signs for last 24 hours:  Vitals:    01/13/25 0340 01/13/25 0845 01/13/25 1105 01/13/25 1149   BP: 114/53 104/66 116/89 119/61   BP Location: Left arm Left arm Left arm Right arm   Patient Position: Lying Lying Lying Sitting   Pulse: 77 67 78 92   Resp: 21 18 18 (!) 30   Temp: 98 °F (36.7 °C) 97.6 °F (36.4 °C)  97.3 °F (36.3 °C)   TempSrc: Oral Oral  Temporal   SpO2: 98% 97% 98% 98%   Weight:       Height:           Intake/Output last 3 shifts:  I/O last 3 completed shifts:  In: 540 [P.O.:240; I.V.:100; IV Piggyback:200]  Out: 1350 [Urine:700; Stool:650]  Intake/Output this shift:  I/O this shift:  In: 120 [P.O.:120]  Out: -       Radiology  Imaging Results (Last 24 Hours)       ** No results found for the last 24 hours. **            Labs:  Results from last 7 days   Lab Units 01/13/25  0358   WBC 10*3/mm3 8.24   HEMOGLOBIN g/dL 7.9*   HEMATOCRIT % 25.7*   PLATELETS 10*3/mm3 435     Results from last 7 days   Lab Units 01/13/25  0358 01/12/25  0215   SODIUM mmol/L 137 138   POTASSIUM mmol/L 4.2 4.5   CHLORIDE mmol/L 110* 110*   CO2 mmol/L 18.6* 20.2*   BUN mg/dL 11 13   CREATININE mg/dL 0.78 0.86   CALCIUM mg/dL 8.7 8.6   BILIRUBIN mg/dL  --  <0.2   ALK PHOS U/L  --  121*   ALT (SGPT) U/L  --  23   AST (SGOT) U/L  --  20   GLUCOSE mg/dL 82 114*         Results from last 7 days   Lab Units 01/12/25  0215 01/10/25  1146   ALBUMIN g/dL 2.8* 2.9*     Results from last 7 days   Lab Units 01/10/25  1751 01/10/25  1146   HSTROP T ng/L 30* 50*     Results from last 7 days   Lab Units 01/12/25  0215   KYLAH  Negative     Results from last 7 days   Lab Units 01/13/25  0358   MAGNESIUM mg/dL 1.4*     Results from last 7 days   Lab Units 01/12/25  0219 01/10/25  1146   INR  1.04 1.06   APTT seconds  --  29.5               Meds:   SCHEDULE  aspirin, 81 mg, Oral, Daily  cefepime,  2,000 mg, Intravenous, Q12H  ipratropium-albuterol, 3 mL, Nebulization, 4x Daily - RT  levothyroxine, 50 mcg, Oral, Daily  magnesium sulfate, 2 g, Intravenous, Q2H  midodrine, 5 mg, Oral, TID AC  pantoprazole, 40 mg, Oral, Q AM  sertraline, 50 mg, Oral, Daily  sodium chloride, 10 mL, Intravenous, Q12H      Infusions     PRNs    acetaminophen **OR** acetaminophen **OR** acetaminophen    aluminum-magnesium hydroxide-simethicone    senna-docusate sodium **AND** polyethylene glycol **AND** bisacodyl **AND** bisacodyl    Calcium Replacement - Follow Nurse / BPA Driven Protocol    diphenhydrAMINE    HYDROcodone-acetaminophen    Magnesium Standard Dose Replacement - Follow Nurse / BPA Driven Protocol    melatonin    nitroglycerin    ondansetron ODT **OR** ondansetron    Phosphorus Replacement - Follow Nurse / BPA Driven Protocol    Potassium Replacement - Follow Nurse / BPA Driven Protocol    [COMPLETED] Insert Peripheral IV **AND** sodium chloride    sodium chloride    sodium chloride    Physical Exam:  General Appearance:  Alert   HEENT:  Normocephalic, without obvious abnormality, Conjunctiva/corneas clear,.   Nares normal, no drainage     Neck:  Supple, symmetrical, trachea midline.   Lungs /Chest wall:   Good air entry with minimal bilateral basal rhonchi, respirations unlabored, symmetrical wall movement.     Heart:  Regular rate and rhythm, S1 S2 normal  Abdomen: Soft, non-tender, no masses, no organomegaly.    Extremities: No edema, no clubbing or cyanosis     ROS  Constitutional: Negative for chills, fever and malaise/fatigue.   HENT: Negative.    Eyes: Negative.    Cardiovascular: Negative.    Respiratory: Positive for improvement in the cough and shortness of breath.    Skin: Negative.    Musculoskeletal: Negative.    Gastrointestinal: Negative.    Genitourinary: Negative.    Neurological: Negative.    Psychiatric/Behavioral: Negative.      I reviewed the recent clinical results  I personally reviewed the latest  radiological studies    Part of this note may be an electronic transcription/translation of spoken language to printed text using the Dragon Dictation System.      Electronically signed by Gallo Pope MD at 25 1217       El Childress MD at 25 1242              [facility] MEDICINE SERVICE  DAILY PROGRESS NOTE    NAME: Maryann Gaitan  : 1950  MRN: [id number]      LOS: 2 days     PROVIDER OF SERVICE: El Childress MD    Chief Complaint: Hemoptysis    Subjective:     Interval History:   Patient still with some hemoptysis.    Review of Systems:   Review of Systems    Objective:     Vital Signs  Temp:  [96.8 °F (36 °C)-98 °F (36.7 °C)] 96.8 °F (36 °C)  Heart Rate:  [] 79  Resp:  [13-26] 22  BP: ()/(39-63) 119/59  Flow (L/min) (Oxygen Therapy):  [10] 10   Body mass index is 19.64 kg/m².    HENT:      Head: Normocephalic and atraumatic.      Nose: Nose normal.   Eyes:      Extraocular Movements: Extraocular movements intact.      Conjunctivae/sclerae: Conjunctivae normal.      Pupils: Pupils are equal, round, and reactive to light.   Cardiovascular:      Rate and Rhythm: Normal       Pulses: Normal pulses.      Heart sounds: Normal heart sounds.   Pulmonary:      Reduced BS   Abdominal:      General: Abdomen is flat. Bowel sounds are normal.      Palpations: Abdomen is soft.   Musculoskeletal:         General: Normal range of motion.      Cervical back: Normal range of motion and neck supple.   Skin:     General: Skin is dry.   Neurological:      General: No focal deficit present.      Mental Status: alert.   Psychiatric:         Mood and Affect: Mood normal.               Scheduled Meds   aspirin, 81 mg, Oral, Daily  cefepime, 2,000 mg, Intravenous, Q12H  ipratropium-albuterol, 3 mL, Nebulization, 4x Daily - RT  levothyroxine, 50 mcg, Oral, Daily  Lidocaine HCl gel, , ,   lidocaine PF 2%, , ,   midodrine, 5 mg, Oral, TID AC  pantoprazole, 40 mg, Oral, Q AM  sertraline, 50 mg,  Oral, Daily  sodium chloride, 10 mL, Intravenous, Q12H       PRN Meds     acetaminophen **OR** acetaminophen **OR** acetaminophen    aluminum-magnesium hydroxide-simethicone    senna-docusate sodium **AND** polyethylene glycol **AND** bisacodyl **AND** bisacodyl    Calcium Replacement - Follow Nurse / BPA Driven Protocol    diphenhydrAMINE    HYDROcodone-acetaminophen    Lidocaine HCl gel    lidocaine PF 2%    Magnesium Standard Dose Replacement - Follow Nurse / BPA Driven Protocol    melatonin    nitroglycerin    ondansetron ODT **OR** ondansetron    Phosphorus Replacement - Follow Nurse / BPA Driven Protocol    Potassium Replacement - Follow Nurse / BPA Driven Protocol    [COMPLETED] Insert Peripheral IV **AND** sodium chloride    sodium chloride    sodium chloride   Infusions         Diagnostic Data    Results from last 7 days   Lab Units 01/12/25  0215   WBC 10*3/mm3 6.00   HEMOGLOBIN g/dL 7.4*   HEMATOCRIT % 24.7*   PLATELETS 10*3/mm3 427   GLUCOSE mg/dL 114*   CREATININE mg/dL 0.86   BUN mg/dL 13   SODIUM mmol/L 138   POTASSIUM mmol/L 4.5   AST (SGOT) U/L 20   ALT (SGPT) U/L 23   ALK PHOS U/L 121*   BILIRUBIN mg/dL <0.2   ANION GAP mmol/L 7.8       CT Angiogram Chest Pulmonary Embolism    Result Date: 1/10/2025  Impression: 1.No evidence of pulmonary embolism. 2.Lung emphysema. 3.Interval resolution of cavitary lesion left upper lobe. Interval development of spiculated nodules in the lungs as described above favored to be inflammatory or infectious. Cannot exclude neoplasm. Canal correlation a follow-up CT chest in 3 months recommended 4.Stable descending thoracic aortic aneurysm measuring up to 3.8 cm with diffuse irregular mural thrombus and a broad-based penetrating atherosclerotic ulcer. Additional stable smaller ulcerations. No aortic dissection. 5.Coronary artery calcifications. 6.Additional chronic findings as described above. Electronically Signed: Canelo Manriquez MD  1/10/2025 2:25 PM EST  Workstation  ID: [id number]       I reviewed the patient's new clinical results.    Assessment/Plan:   74 y.o. female with a PMH of CAD s/p recent stent on dual platelet therapy, COPD, RA, Crohn's on biologic therapy, ileostomy with mucous fistula for GI bleed who presented to [facility] with large amount of hemoptysis on admission day.  Patient mentioned that she started coughing and blood came out along with the coughing.  Patient has squeezing pain in the chest as well.  In the ED patient labs showing no leukocytosis hemoglobin of 8.4 creatinine at 1.4 fluctuating around the baseline.  Potassium of 5.4.  CT angio chest with no pulmonary embolism.  None emphysema.  Interval resolution of cavitated lesion left upper lobe.  Interval development of a spiculated nodule Favored to be inflammation or infection.  Cannot exclude a neoplasm and and will need to follow-up CT chest in 3 months.  Stable descending thoracic aortic aneurysm and measuring up to 3.8 cm.  Coronary artery calcification. started on aztreonam in the ED. Pulm is consulted.      #Acute hemoptysis likely from infection ? HCAP vs Alveolar hemorrhage?, autoimmune etiology vs MAC given she is on biologic agent  #Patient is on TNA alpha antagonist - high risk of TB/MAC  -Patient presented with acute hemoptysis today although this is slowly improving   -Troponin without any significant elevation  -CT angio chest with no pulmonary embolism and but there is Interval development of spiculated nodules in the lungs  favorable to infection or inflammation; interval resolution of cavitary lesion left upper lobe  -Continue IV cefepime  -MRSA negative  -resp tx, oxygen supplement  -Check KYLAH, ANCA panel, ESR/CRP to rule out autoimmune etiology given patient hx of Crohns disease  -Underwent bronchoscopy on 1/12 with multiple mucous plugs removed; cultures pending    #Anemia  -Patient has chronic anemia; hemoglobin 7.4 today but no indication for transfusion at this time  -  iron profile does show some iron deficiency with elevated ferritin likely related to acute inflammatory process  -Check PT/INR  -No evidence of hemolytic anemia     #Hypomagnesemia  -lytes Replacement protocol initiated     #Mild Hyperkalemia  -one dose of lokelma to be given.      #Crohn's disease, chronic:  #RA   -Hold biologics agents for now given possible lung infection, which needs to be ruled out, pending pulm evaluation  -may need to consult GI depending on if TB positive, her TNF alpha antag will be held     #CAD s/p PCI  -given hemoptysis, will hold ASA or dvt ppx, given possible concern of alveolar hemorrhage     #HLD  -Resume home medication once verified by pharmacy and clinically appropriate     #COPD  -resp tx     #Hypothyroidism  -Resume home medication once verified by pharmacy and clinically appropriate     #Anxiety  -Resume home medication once verified by pharmacy and clinically appropriate       VTE Prophylaxis:  Mechanical VTE prophylaxis orders are present.         Code status is   There are no questions and answers to display.       Plan for disposition: Pulm work up for hemoptysis.     Time: 30 minutes    Part of this note may be an electronic transcription/translation of spoken language to printed text using the Dragon Dictation System.    Signature: Electronically signed by El Childress MD, 01/12/25, 12:42 EST.  [facility] Hospitalist Team     Electronically signed by El Childress MD at 01/12/25 1245       Gallo Pope MD at 01/12/25 0843          Daily Progress Note          Assessment    Hemoptysis  Pneumonia due to unspecified pathogen  Resolution of the left upper lobe cavitary lesion: Was seen October 2024 with bronchoscopy 10/16/2024 showing negative cultures.  But this admission there is development of spiculated multiple nodules.  COPD but not acute exacerbation  History of strongyloidosis  Rheumatoid arthritis and Crohn disease on biological therapy status post ileostomy  with mucous fistula status post GI hemorrhage: on Humira, mesalamine methotrexate outpatient  Chronic anemia  CAD status post PCI to LAD 10/22/2024  HLD  Hypothyroidism  History of severe MR status post mitral valve MitraClip 11/21/2024  Thyroid hormone replacement        Recommendations:  Status post bronchoscopy 1/12/2025: No hemoptysis, multiple white mucous plugs seen, lavage from right upper lobe obtained to rule out opportunistic infections     Resume aspirin since the hemoptysis resolved     Antibiotics: Cefepime  Oxygen supplement and titration to maintain saturation 90 to 95%, currently on room air  Bronchodilators     Levothyroxine  Protonix     I personally reviewed the radiological studies             LOS: 2 days     Subjective     Mild cough and shortness of breath    Objective     Vital signs for last 24 hours:  Vitals:    01/12/25 0349 01/12/25 0733 01/12/25 0747 01/12/25 0751   BP: 112/57 117/63     BP Location:       Patient Position:       Pulse: 81 75 68 63   Resp: 19 20 13 13   Temp: 97.4 °F (36.3 °C) 98 °F (36.7 °C)     TempSrc:       SpO2: 99% 98% 99% 100%   Weight:       Height:           Intake/Output last 3 shifts:  I/O last 3 completed shifts:  In: 1130 [P.O.:720; I.V.:310; IV Piggyback:100]  Out: 2950 [Urine:2300; Stool:650]  Intake/Output this shift:  No intake/output data recorded.      Radiology  Imaging Results (Last 24 Hours)       ** No results found for the last 24 hours. **            Labs:  Results from last 7 days   Lab Units 01/12/25  0215   WBC 10*3/mm3 6.00   HEMOGLOBIN g/dL 7.4*   HEMATOCRIT % 24.7*   PLATELETS 10*3/mm3 427     Results from last 7 days   Lab Units 01/12/25  0215   SODIUM mmol/L 138   POTASSIUM mmol/L 4.5   CHLORIDE mmol/L 110*   CO2 mmol/L 20.2*   BUN mg/dL 13   CREATININE mg/dL 0.86   CALCIUM mg/dL 8.6   BILIRUBIN mg/dL <0.2   ALK PHOS U/L 121*   ALT (SGPT) U/L 23   AST (SGOT) U/L 20   GLUCOSE mg/dL 114*         Results from last 7 days   Lab Units  01/12/25  0215 01/10/25  1146   ALBUMIN g/dL 2.8* 2.9*     Results from last 7 days   Lab Units 01/10/25  1751 01/10/25  1146   HSTROP T ng/L 30* 50*         Results from last 7 days   Lab Units 01/12/25  0215   MAGNESIUM mg/dL 1.7     Results from last 7 days   Lab Units 01/12/25  0219 01/10/25  1146   INR  1.04 1.06   APTT seconds  --  29.5               Meds:   SCHEDULE  cefepime, 2,000 mg, Intravenous, Q12H  ipratropium-albuterol, 3 mL, Nebulization, 4x Daily - RT  levothyroxine, 50 mcg, Oral, Daily  Lidocaine HCl gel, , ,   lidocaine PF 2%, , ,   midodrine, 5 mg, Oral, TID AC  pantoprazole, 40 mg, Oral, Q AM  sertraline, 50 mg, Oral, Daily  sodium chloride, 10 mL, Intravenous, Q12H      Infusions     PRNs    acetaminophen **OR** acetaminophen **OR** acetaminophen    aluminum-magnesium hydroxide-simethicone    senna-docusate sodium **AND** polyethylene glycol **AND** bisacodyl **AND** bisacodyl    Calcium Replacement - Follow Nurse / BPA Driven Protocol    diphenhydrAMINE    HYDROcodone-acetaminophen    Lidocaine HCl gel    lidocaine PF 2%    Magnesium Standard Dose Replacement - Follow Nurse / BPA Driven Protocol    melatonin    nitroglycerin    ondansetron ODT **OR** ondansetron    Phosphorus Replacement - Follow Nurse / BPA Driven Protocol    Potassium Replacement - Follow Nurse / BPA Driven Protocol    [COMPLETED] Insert Peripheral IV **AND** sodium chloride    sodium chloride    sodium chloride    Physical Exam:  General Appearance:  Alert   HEENT:  Normocephalic, without obvious abnormality, Conjunctiva/corneas clear,.   Nares normal, no drainage     Neck:  Supple, symmetrical, trachea midline.   Lungs /Chest wall:   Bilateral basal rhonchi, respirations unlabored, symmetrical wall movement.     Heart:  Regular rate and rhythm, S1 S2 normal  Abdomen: Soft, non-tender, no masses, no organomegaly.    Extremities: No edema, no clubbing or cyanosis     ROS  Constitutional: Negative for chills, fever and  malaise/fatigue.   HENT: Negative.    Eyes: Negative.    Cardiovascular: Negative.    Respiratory: Positive for cough and shortness of breath.    Skin: Negative.    Musculoskeletal: Negative.    Gastrointestinal: Negative.    Genitourinary: Negative.    Neurological: Negative.    Psychiatric/Behavioral: Negative.      I reviewed the recent clinical results  I personally reviewed the latest radiological studies    Part of this note may be an electronic transcription/translation of spoken language to printed text using the Dragon Dictation System.      Electronically signed by Gallo Pope MD at 25 0844       Winston Leslie MD at 25 1823              [facility] MEDICINE SERVICE  DAILY PROGRESS NOTE    NAME: Maryann Gaitan  : 1950  MRN: [id number]      LOS: 1 day     PROVIDER OF SERVICE: Winston Leslie MD    Chief Complaint: Hemoptysis    Subjective:     Interval History:  History taken from: patient  No acute overnight events, patient reports episode of hemoptysis.     Review of Systems:   Review of Systems    Objective:     Vital Signs  Temp:  [97.4 °F (36.3 °C)-98.8 °F (37.1 °C)] 97.4 °F (36.3 °C)  Heart Rate:  [] 101  Resp:  [16-24] 16  BP: ()/(39-61) 105/39   Body mass index is 19.64 kg/m².    HENT:      Head: Normocephalic and atraumatic.      Nose: Nose normal.   Eyes:      Extraocular Movements: Extraocular movements intact.      Conjunctivae/sclerae: Conjunctivae normal.      Pupils: Pupils are equal, round, and reactive to light.   Cardiovascular:      Rate and Rhythm: Normal       Pulses: Normal pulses.      Heart sounds: Normal heart sounds.   Pulmonary:      Reduced BS   Abdominal:      General: Abdomen is flat. Bowel sounds are normal.      Palpations: Abdomen is soft.   Musculoskeletal:         General: Normal range of motion.      Cervical back: Normal range of motion and neck supple.   Skin:     General: Skin is dry.   Neurological:      General:  No focal deficit present.      Mental Status: alert.   Psychiatric:         Mood and Affect: Mood normal.               Scheduled Meds   cefepime, 2,000 mg, Intravenous, Q12H  HYDROcodone-acetaminophen, 1 tablet, Oral, BID  ipratropium-albuterol, 3 mL, Nebulization, 4x Daily - RT  levothyroxine, 50 mcg, Oral, Daily  midodrine, 5 mg, Oral, TID AC  [START ON 1/12/2025] pantoprazole, 40 mg, Oral, Q AM  sertraline, 50 mg, Oral, Daily  sodium chloride, 10 mL, Intravenous, Q12H       PRN Meds     acetaminophen **OR** acetaminophen **OR** acetaminophen    aluminum-magnesium hydroxide-simethicone    senna-docusate sodium **AND** polyethylene glycol **AND** bisacodyl **AND** bisacodyl    Calcium Replacement - Follow Nurse / BPA Driven Protocol    diphenhydrAMINE    Magnesium Standard Dose Replacement - Follow Nurse / BPA Driven Protocol    melatonin    nitroglycerin    ondansetron ODT **OR** ondansetron    Phosphorus Replacement - Follow Nurse / BPA Driven Protocol    Potassium Replacement - Follow Nurse / BPA Driven Protocol    [COMPLETED] Insert Peripheral IV **AND** sodium chloride    sodium chloride    sodium chloride   Infusions         Diagnostic Data    Results from last 7 days   Lab Units 01/11/25  0031 01/10/25  1146   WBC 10*3/mm3 5.75 6.92   HEMOGLOBIN g/dL 7.6* 8.4*   HEMATOCRIT % 24.8* 28.0*   PLATELETS 10*3/mm3 416 469*   GLUCOSE mg/dL 83 93   CREATININE mg/dL 0.81 1.40*   BUN mg/dL 18 24*   SODIUM mmol/L 139 138   POTASSIUM mmol/L 5.1 5.4*   AST (SGOT) U/L  --  31   ALT (SGPT) U/L  --  32   ALK PHOS U/L  --  120*   BILIRUBIN mg/dL  --  0.2   ANION GAP mmol/L 9.5 8.3       CT Angiogram Chest Pulmonary Embolism    Result Date: 1/10/2025  Impression: 1.No evidence of pulmonary embolism. 2.Lung emphysema. 3.Interval resolution of cavitary lesion left upper lobe. Interval development of spiculated nodules in the lungs as described above favored to be inflammatory or infectious. Cannot exclude neoplasm. Canal  correlation a follow-up CT chest in 3 months recommended 4.Stable descending thoracic aortic aneurysm measuring up to 3.8 cm with diffuse irregular mural thrombus and a broad-based penetrating atherosclerotic ulcer. Additional stable smaller ulcerations. No aortic dissection. 5.Coronary artery calcifications. 6.Additional chronic findings as described above. Electronically Signed: Canelo Manriquez MD  1/10/2025 2:25 PM EST  Workstation ID: [id number]    XR Chest 1 View    Result Date: 1/10/2025  Impression: No acute process identified Electronically Signed: Junior Khan MD  1/10/2025 11:56 AM EST  Workstation ID: [id number]       I reviewed the patient's new clinical results.    Assessment/Plan:   74 y.o. female with a PMH of CAD s/p recent stent on dual platelet therapy, COPD, RA, Crohn's on biologic therapy, ileostomy with mucous fistula for GI bleed who presented to [facility] with large amount of hemoptysis on admission day.  Patient mentioned that she started coughing and blood came out along with the coughing.  Patient has squeezing pain in the chest as well.  In the ED patient labs showing no leukocytosis hemoglobin of 8.4 creatinine at 1.4 fluctuating around the baseline.  Potassium of 5.4.  CT angio chest with no pulmonary embolism.  None emphysema.  Interval resolution of cavitated lesion left upper lobe.  Interval development of a spiculated nodule Favored to be inflammation or infection.  Cannot exclude a neoplasm and and will need to follow-up CT chest in 3 months.  Stable descending thoracic aortic aneurysm and measuring up to 3.8 cm.  Coronary artery calcification. started on aztreonam in the ED. Pulm is consulted.      #Acute hemoptysis likely from infection ? HCAP vs Alveolar hemorrhage?, autoimmune etiology vs MAC given she is on biologic agent  #Patient is on TNA alpha antagonist - high risk of TB/MAC  -Patient presented with acute hemoptysis today.  Denies any other bleeding.  -She has  chest tightness as well.  -Troponin pending.  -CT angio chest with no pulmonary embolism and but there is Interval development of spiculated nodules in the lungs  favorable to infection or inflammation. Interval resolution of cavitary lesion left upper lobe.   -Continue IV cefepime and van   -MRSA pending   -sputum Cx to be done   -resp tx, oxygen supplement  -Check KYLAH, ANCA panel, ESR/CRP to rule out autoimmune etiology given patient hx of Crohns disease  -Pulm consulted, will follow reccs  -Her last bronch in October 2024, was negative for AFB, Will discuss with pulm that she may need bronch again to rule out TB  -Will follow reccs    #Anemia  -recent labs from December shows Hgb around 7, currently her hgb is 7, possible contributing loss from hemoptysis   -check iron profile, ferritin  -Check PT/INR  -Check Haptoglobin, LDH, retic count in am, also will add Preston test to rule out autoimmune hemolytic anemia       #Hypomagnesemia  -lytes Replacement protocol initiated     #Mild Hyperkalemia  -one dose of lokelma to be given.      #Crohn's disease, chronic:  #RA   -Hold biologics agents for now given possible lung infection, which needs to be ruled out, pending pulm evaluation  -may need to consult GI depending on if TB positive, her TNF alpha antag will be held     #CAD s/p PCI  -given hemoptysis, will hold ASA or dvt ppx, given possible concern of alveolar hemorrhage     #HLD  -Resume home medication once verified by pharmacy and clinically appropriate     #COPD  -resp tx     #Hypothyroidism  -Resume home medication once verified by pharmacy and clinically appropriate     #Anxiety  -Resume home medication once verified by pharmacy and clinically appropriate       VTE Prophylaxis:  Mechanical VTE prophylaxis orders are present.         Code status is   There are no questions and answers to display.       Plan for disposition: Pulm work up for hemoptysis.     Time: 30 minutes    Part of this note may be an  electronic transcription/translation of spoken language to printed text using the Dragon Dictation System.    Signature: Electronically signed by Winston Leslie MD, 25, 18:23 EST.  [facility] Hospitalist Team     Electronically signed by Winston Leslie MD at 25 1844       Consult Notes (last 48 hours)  Notes from 25 1614 through 25 1614   No notes of this type exist for this encounter.       Nutrition Notes (most recent note)    No notes exist for this encounter.       Occupational Therapy Notes (most recent note)    No notes exist for this encounter.       Speech Language Pathology Notes (most recent note)    No notes exist for this encounter.       Sobia Vides, PT   Physical Therapist  Physical Therapy  Therapy Evaluation     Signed  Date of Service:  25  Creation Time:  25     Signed        Expand All Collapse All  Patient Name: Maryann Gaitan                     : 1950                        MRN: [id number]                              Today's Date: 2025                                   Admit Date: 1/10/2025                        Visit Dx:   Visit Diagnosis       ICD-10-CM ICD-9-CM   1. Pneumonia of both lungs due to infectious organism, unspecified part of lung  J18.9 483.8   2. Hemoptysis  R04.2 786.30   3. Hypomagnesemia  E83.42 275.2   4. Multifocal pneumonia  J18.9 486         Problem List       Patient Active Problem List   Diagnosis    Mitral regurgitation    Cavitary lesion of lung    CAD, multiple vessel    Acute heart failure with preserved ejection fraction (HFpEF)    Severe mitral regurgitation    Dyslipidemia    Crohn's disease    Hypothyroidism (acquired)    Anxiety associated with depression    COPD (chronic obstructive pulmonary disease)    Rheumatoid arthritis    Moderate protein-calorie malnutrition    Acute lower GI bleeding    First degree hemorrhoids    Benign neoplasm of cecum    GERD (gastroesophageal  reflux disease)    Hashimoto's thyroiditis    History of colonic polyps    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    Fecal urgency    Second degree hemorrhoids    Vitamin D deficiency, unspecified    Stress incontinence, female    Aphthae, oral    Hx of seborrheic keratosis    Primary hypertension    S/P mitral valve clip implantation    Gastrointestinal hemorrhage    Anemia    PNA (pneumonia)    Pneumonia due to other gram-negative bacteria    Acute renal failure (ARF)    Gastric bleed    Hemoptysis    Multifocal pneumonia         Medical History        Past Medical History:   Diagnosis Date    Abnormal weight loss 11/21/2024    Acute kidney injury 11/06/2024    Acute UTI (urinary tract infection) 11/06/2024    Anxiety associated with depression 11/06/2024    Benign neoplasm of cecum 07/05/2016    CAD, multiple vessel 10/08/2024    Cavitary lesion of lung 10/08/2024    COPD (chronic obstructive pulmonary disease)      Crohn's disease 11/06/2024    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 07/05/2016    Dyslipidemia 10/30/2024    Fecal urgency 11/21/2024    First degree hemorrhoids 07/09/2021    GERD (gastroesophageal reflux disease) 11/21/2024    Hashimoto's thyroiditis 11/21/2024    History of colonic polyps 07/09/2021    Hypertension      Hypothyroidism (acquired) 11/06/2024    Mitral regurgitation 10/08/2024    Moderate protein-calorie malnutrition 11/09/2024    Multiple tracheobronchial mucus plugs 10/08/2024    Nicotine dependence 11/21/2024    Rheumatoid arthritis 11/06/2024    S/P mitral valve clip implantation 11/21/2024    Second degree hemorrhoids 07/05/2016    Stress incontinence, female 11/21/2024    Vitamin D deficiency, unspecified 11/21/2024         Surgical History         Past Surgical History:   Procedure Laterality Date    BRONCHOSCOPY N/A 10/16/2024     Procedure: BRONCHOSCOPY;  Surgeon: Gallo Pope MD;  Location: [facility] ENDOSCOPY;  Service: Pulmonary;   Laterality: N/A;    CARDIAC CATHETERIZATION N/A 10/15/2024     Procedure: Left Heart Cath, possible pci;  Surgeon: Travis Connor MD;  Location: [facility] CATH INVASIVE LOCATION;  Service: Cardiovascular;  Laterality: N/A;    CARDIAC CATHETERIZATION N/A 10/22/2024     Procedure: Laser Coronary Atherectomy;  Surgeon: Travis Connor MD;  Location: [facility] CATH INVASIVE LOCATION;  Service: Cardiovascular;  Laterality: N/A;    COLON RESECTION Right 11/28/2024     Procedure: RIGHT HEMICOLECTOMY WITH ILEOSTOMY;  Surgeon: Todd Babin MD;  Location: [facility] MAIN OR;  Service: General;  Laterality: Right;    COLONOSCOPY N/A 10/12/2024     Procedure: COLONOSCOPY WITH BIOPSY AND WIRE GUIDED BALLOON DILATION OF TERMINAL ILEUM;  Surgeon: Rob Strong MD;  Location: [facility] ENDOSCOPY;  Service: Gastroenterology;  Laterality: N/A;  Colitis, crohns of terminal ileum, right colon ulcers, diverticulosis, hemorroids    ENDOSCOPY N/A 10/12/2024     Procedure: ESOPHAGOGASTRODUODENOSCOPY WITH BIOPSY X 2 AREA;  Surgeon: Rob Strong MD;  Location: [facility] ENDOSCOPY;  Service: Gastroenterology;  Laterality: N/A;  Chronic gastritis, HH    ENDOSCOPY Left 11/28/2024     Procedure: ESOPHAGOGASTRODUODENOSCOPY;  Surgeon: Dallin Delgado MD;  Location: [facility] ENDOSCOPY;  Service: Gastroenterology;  Laterality: Left;  small hiatal hernia    HYSTERECTOMY        LEFT HEART CATH        MITRAL VALVE REPAIR/REPLACEMENT N/A 11/21/2024     Procedure: Transcatheter Mitral Valve Repair;  Surgeon: Dmitri Navarro MD;  Location: [facility] HYBRID OR;  Service: Cardiovascular;  Laterality: N/A;           General Information         Row Name 01/13/25 1319                 Physical Therapy Time and Intention     Document Type evaluation  -BR       Mode of Treatment physical therapy  -BR          Row Name 01/13/25 1325 01/13/25 1319            General Information     Patient Profile Reviewed -- yes  -BR     Prior  Level of Function -- independent:;all household mobility;gait;transfer;w/c or scooter  Pt uses a rolling walker for short distances and a w/c for longer distances.  -BR     Existing Precautions/Restrictions other (see comments)  Pt has STI at buttock and coccyx.  -BR fall;cardiac  -BR     Barriers to Rehab -- medically complex;previous functional deficit  -BR        Row Name 01/13/25 1319                 Living Environment     People in Home child(keegan), adult;grandchild(keegan)  -BR          Row Name 01/13/25 1319                 Home Main Entrance     Number of Stairs, Main Entrance two  -BR          Row Name 01/13/25 1319                 Stairs Within Home, Primary     Number of Stairs, Within Home, Primary none  -BR          Row Name 01/13/25 1319                 Cognition     Orientation Status (Cognition) oriented x 4  -BR          Row Name 01/13/25 1319                 Safety Issues/Impairments Affecting Functional Mobility     Impairments Affecting Function (Mobility) balance;endurance/activity tolerance;strength;shortness of breath;pain  -BR                       User Key  (r) = Recorded By, (t) = Taken By, (c) = Cosigned By        Initials Name Provider Type     BR Sobia Vides, PT Physical Therapist                            Mobility         Row Name 01/13/25 1321                 Bed Mobility     Bed Mobility supine-sit  -BR       Supine-Sit [place] (Bed Mobility) contact guard;2 person assist  -BR       Assistive Device (Bed Mobility) head of bed elevated  -BR       Comment, (Bed Mobility) Pt required additional time.  -BR          Row Name 01/13/25 1321                 Bed-Chair Transfer     Bed-Chair [place] (Transfers) moderate assist (50% patient effort);maximum assist (25% patient effort)  -BR       Assistive Device (Bed-Chair Transfers) walker, front-wheeled  -BR          Row Name 01/13/25 1321                 Sit-Stand Transfer     Sit-Stand [place] (Transfers) moderate assist  (50% patient effort)  -BR       Assistive Device (Sit-Stand Transfers) walker, front-wheeled  -BR          Row Name 01/13/25 1321                 Gait/Stairs (Locomotion)     Comment, (Gait/Stairs) Pt took a few pivoting steps from bed to recliner. Pt had significant loss of balance posteriorly.  -BR                       User Key  (r) = Recorded By, (t) = Taken By, (c) = Cosigned By        Initials Name Provider Type     Sobia Manzo PT Physical Therapist                            Obj/Interventions         Row Name 01/13/25 1325                 Range of Motion Comprehensive     General Range of Motion bilateral lower extremity ROM WFL  -BR          Row Name 01/13/25 1325                 Strength Comprehensive (MMT)     Comment, General Manual Muscle Testing (MMT) Assessment BLE strength grossly 3-/5  -BR          Row Name 01/13/25 1325                 Balance     Balance Assessment sitting static balance;sitting dynamic balance;standing static balance  -BR       Static Sitting Balance standby assist  -BR       Dynamic Sitting Balance contact guard  -BR       Position, Sitting Balance unsupported;sitting edge of bed  -BR       Static Standing Balance moderate assist  -BR       Position/Device Used, Standing Balance supported  -BR          Row Name 01/13/25 1325                 Sensory Assessment (Somatosensory)     Sensory Assessment (Somatosensory) LE sensation intact  -BR                       User Key  (r) = Recorded By, (t) = Taken By, (c) = Cosigned By        Initials Name Provider Type     Sobia Manzo PT Physical Therapist                            Goals/Plan         Row Name 01/13/25 1337                 Bed Mobility Goal 1 (PT)     Activity/Assistive Device (Bed Mobility Goal 1, PT) bed mobility activities, all  -BR       [place] Level/Cues Needed (Bed Mobility Goal 1, PT) modified independence  -BR       Time Frame (Bed Mobility Goal 1, PT) long term goal (LTG);2 weeks  -BR           Row Name 01/13/25 1337                 Transfer Goal 1 (PT)     Activity/Assistive Device (Transfer Goal 1, PT) transfers, all  -BR       [place] Level/Cues Needed (Transfer Goal 1, PT) modified independence  -BR       Time Frame (Transfer Goal 1, PT) long term goal (LTG);2 weeks  -BR          Row Name 01/13/25 1337                 Gait Training Goal 1 (PT)     Activity/Assistive Device (Gait Training Goal 1, PT) gait (walking locomotion);assistive device use  -BR       [place] Level (Gait Training Goal 1, PT) supervision required  -BR       Distance (Gait Training Goal 1, PT) 35  -BR       Time Frame (Gait Training Goal 1, PT) long term goal (LTG);2 weeks  -BR          Row Name 01/13/25 1337                 Therapy Assessment/Plan (PT)     Planned Therapy Interventions (PT) balance training;bed mobility training;gait training;patient/family education;neuromuscular re-education;transfer training;ROM (range of motion);strengthening;postural re-education  -BR                    User Key  (r) = Recorded By, (t) = Taken By, (c) = Cosigned By        Initials Name Provider Type     BR Sobia Vides, PT Physical Therapist                         Clinical Impression         Row Name 01/13/25 1326                 Pain     Pretreatment Pain Rating 6/10  -BR       Posttreatment Pain Rating 6/10  -BR       Pain Location buttock;head;chest  -BR          Row Name 01/13/25 1337 01/13/25 1326            Plan of Care Review     Plan of Care Reviewed With -- patient  -BR     Outcome Evaluation Pt presents as a 73 y/o F admitted to [facility] on 1/10/25 for hemoptysis. This is pt's 4 th hospital admission since Dec 2024. CTA (-) for PE. CXR (-).  Pulmonology following pt for pneumonia due to unspecified pathogen. PMHx: colostomy, R, COPD, leaky mitral valve, heart disease, heart failure, anxiety, Crohn's Disease, Hashimoto's. Pt A and O x 4 and on room air.  Pt typically lives with daughters and granddaughter in [facility] with 2  RICKEY. She uses a rolling walker for short distances and a w/c for longer distances and does this with independence. Pt has sore buttocks from STI. Pt requiring mod/max assist for stand-pivot-sit transfer with rolling walker with pt exhibiting significant posterior lean. Patient is a high risk of injurious falls and unsafe to return to prior living environment at this time.  Pt is far below her baseline for all areas of mobility and has had repeat hospital admissions. PT recommends that pt return to Skilled Nursing Facility to address her generalized weakness and immobility.  -BR --        Row Name 01/13/25 1326                 Therapy Assessment/Plan (PT)     Rehab Potential (PT) good  -BR       Criteria for Skilled Interventions Met (PT) yes;meets criteria;skilled treatment is necessary  -BR       Therapy Frequency (PT) 5 times/wk  -BR       Predicted Duration of Therapy Intervention (PT) until D/C  -BR          Row Name 01/13/25 1326                 Vital Signs     Pre Systolic BP Rehab 119  -BR       Pre Treatment Diastolic BP 61  -BR       Pretreatment Heart Rate (beats/min) 93  -BR       Intratreatment Heart Rate (beats/min) 118  -BR       Posttreatment Heart Rate (beats/min) 97  -BR       Pre SpO2 (%) 98  -BR       O2 Delivery Pre Treatment room air  -BR       Pre Patient Position Supine  -BR       Intra Patient Position Standing  -BR       Post Patient Position Sitting  -BR          Row Name 01/13/25 1326                 Positioning and Restraints     Pre-Treatment Position in bed  -BR       Post Treatment Position chair  -BR       In Chair notified nsg;reclined;call light within reach;encouraged to call for assist;exit alarm on;waffle cushion;with nsg  -BR                       User Key  (r) = Recorded By, (t) = Taken By, (c) = Cosigned By        Initials Name Provider Type     BR Sobia Vides, PT Physical Therapist                            Outcome Measures         Row Name 01/13/25 1338 01/13/25 0810             How much help from another person do you currently need...     Turning from your back to your side while in flat bed without using bedrails? 3  -BR 3  -HJ     Moving from lying on back to sitting on the side of a flat bed without bedrails? 3  -BR 3  -HJ     Moving to and from a bed to a chair (including a wheelchair)? 2  -BR 3  -HJ     Standing up from a chair using your arms (e.g., wheelchair, bedside chair)? 2  -BR 3  -HJ     Climbing 3-5 steps with a railing? 1  -BR 2  -HJ     To walk in hospital room? 1  -BR 2  -HJ     AM-PAC 6 Clicks Score (PT) 12  -BR 16  -HJ     Highest Level of Mobility Goal 4 --> Transfer to chair/commode  -BR 5 --> Static standing  -HJ        Row Name 01/13/25 1338                 Functional Assessment     Outcome Measure Options AM-PAC 6 Clicks Basic Mobility (PT)  -BR                       User Key  (r) = Recorded By, (t) = Taken By, (c) = Cosigned By        Initials Name Provider Type      Britney Perez RN Registered Nurse     Sobia Manzo, PT Physical Therapist                          Physical Therapy Education            Title: PT OT SLP Therapies (Done)         Topic: Physical Therapy (Done)         Point: Mobility training (Done)         Learning Progress Summary             Patient Acceptance, E,D, ELIEZER,KAY by TRINO at 1/13/2025 1338                            Point: Body mechanics (Done)         Learning Progress Summary             Patient Acceptance, E,D, VU,DU by BR at 1/13/2025 1338                            Point: Precautions (Done)         Learning Progress Summary             Patient Acceptance, E,D, ELIEZER,KAY by TRINO at 1/13/2025 1338                                                User Key         Initials Effective Dates Name Provider Type Discipline      02/01/22 -  Sobia Vides, CIHNTAN Physical Therapist PT                          PT Recommendation and Plan  Planned Therapy Interventions (PT): balance training, bed mobility training, gait training,  patient/family education, neuromuscular re-education, transfer training, ROM (range of motion), strengthening, postural re-education  Outcome Evaluation: Pt presents as a 73 y/o F admitted to [facility] on 1/10/25 for hemoptysis. This is pt's 4 th hospital admission since Dec 2024. CTA (-) for PE. CXR (-).  Pulmonology following pt for pneumonia due to unspecified pathogen. PMHx: colostomy, R, COPD, leaky mitral valve, heart disease, heart failure, anxiety, Crohn's Disease, Hashimoto's. Pt A and O x 4 and on room air.  Pt typically lives with daughters and granddaughter in [facility] with 2 RICKEY. She uses a rolling walker for short distances and a w/c for longer distances and does this with independence. Pt has sore buttocks from STI. Pt requiring mod/max assist for stand-pivot-sit transfer with rolling walker with pt exhibiting significant posterior lean. Patient is a high risk of injurious falls and unsafe to return to prior living environment at this time.  Pt is far below her baseline for all areas of mobility and has had repeat hospital admissions. PT recommends that pt return to Skilled Nursing Facility to address her generalized weakness and immobility.      Time Calculation:        PT Charges         Row Name 01/13/25 1339 01/13/25 1318                  Time Calculation     Start Time 1245  -BR 1245  -BR       Stop Time 1314  -BR 1314  -BR       Time Calculation (min) 29 min  -BR 29 min  -BR       PT Received On 01/13/25  -BR 01/13/25  -BR       PT - Next Appointment 01/14/25  -BR 01/14/25  -BR       PT Goal Re-Cert Due Date 01/27/25  -BR 01/27/25  -BR               Time Calculation- PT     Total Timed Code Minutes- PT 0 minute(s)  -BR 0 minute(s)  -BR                    User Key  (r) = Recorded By, (t) = Taken By, (c) = Cosigned By        Initials Name Provider Type     Sobia Manzo PT Physical Therapist                       Therapy Charges for Today         Code Description Service Date Service Provider  Modifiers Qty     42813197230 HC PT EVAL MOD COMPLEXITY 4 1/13/2025 Sobia Vides, PT GP 1     39630810916 HC PT EVAL MOD COMPLEXITY 4 1/13/2025 Sobia Vides, PT GP 1                PT G-Codes  Outcome Measure Options: AM-PAC 6 Clicks Basic Mobility (PT)  AM-PAC 6 Clicks Score (PT): 12  PT Discharge Summary  Anticipated Discharge Disposition (PT): skilled nursing facility     Sobia Vides, PT                        1/13/2025

## 2025-01-13 NOTE — PROGRESS NOTES
[facility] MEDICINE SERVICE  DAILY PROGRESS NOTE    NAME: Maryann Gaitan  : 1950  MRN: [id number]      LOS: 3 days     PROVIDER OF SERVICE: Luma Ramirez MD    Chief Complaint: Hemoptysis    Subjective:     Interval History:  History taken from: patient    Patient otherwise feels well.  She wishes to be discharged home.  She misses her dog at home.  She states that she has numerous family members at home.        Review of Systems:   Review of Systems   All other systems reviewed and are negative.      Objective:     Vital Signs  Temp:  [97.3 °F (36.3 °C)-98.6 °F (37 °C)] 97.3 °F (36.3 °C)  Heart Rate:  [] 82  Resp:  [14-30] 20  BP: (102-145)/(47-89) 119/61   Body mass index is 19.64 kg/m².    Physical Exam  Physical Exam  Constitutional:       General: She is awake.      Appearance: She is well-developed and well-groomed. She is ill-appearing.   HENT:      Head: Normocephalic and atraumatic.      Nose: Nose normal.      Mouth/Throat:      Mouth: Mucous membranes are moist.      Pharynx: Oropharynx is clear.   Eyes:      Extraocular Movements: Extraocular movements intact.      Conjunctiva/sclera: Conjunctivae normal.      Pupils: Pupils are equal, round, and reactive to light.   Cardiovascular:      Rate and Rhythm: Normal rate and regular rhythm.      Pulses: Normal pulses.      Heart sounds: Normal heart sounds.   Pulmonary:      Effort: Pulmonary effort is normal.      Breath sounds: Wheezing and rhonchi present.   Abdominal:      General: Abdomen is flat. Bowel sounds are normal.      Palpations: Abdomen is soft.   Musculoskeletal:         General: Normal range of motion.      Cervical back: Normal range of motion and neck supple.      Right lower leg: No edema.      Left lower leg: No edema.   Skin:     General: Skin is warm and dry.   Neurological:      General: No focal deficit present.      Mental Status: She is alert and oriented to person, place, and time. Mental status is at  baseline.      Cranial Nerves: Cranial nerves 2-12 are intact.      Sensory: Sensation is intact.      Motor: Motor function is intact.   Psychiatric:         Mood and Affect: Mood normal.         Behavior: Behavior normal. Behavior is cooperative.         Thought Content: Thought content normal.         Judgment: Judgment normal.            Diagnostic Data    Results from last 7 days   Lab Units 01/13/25  0358 01/12/25  0215   WBC 10*3/mm3 8.24 6.00   HEMOGLOBIN g/dL 7.9* 7.4*   HEMATOCRIT % 25.7* 24.7*   PLATELETS 10*3/mm3 435 427   GLUCOSE mg/dL 82 114*   CREATININE mg/dL 0.78 0.86   BUN mg/dL 11 13   SODIUM mmol/L 137 138   POTASSIUM mmol/L 4.2 4.5   AST (SGOT) U/L  --  20   ALT (SGPT) U/L  --  23   ALK PHOS U/L  --  121*   BILIRUBIN mg/dL  --  <0.2   ANION GAP mmol/L 8.4 7.8       No radiology results for the last day      I reviewed the patient's new clinical results.    Assessment/Plan:     Active and Resolved Problems  Active Hospital Problems    Diagnosis  POA    **Hemoptysis [R04.2]  Yes    Multifocal pneumonia [J18.9]  Unknown      Resolved Hospital Problems   No resolved problems to display.     Acute hemoptysis  Left upper lobe lesion/pneumonia  Coronary artery disease with recent stent placement on dual antiplatelet therapy  End-stage COPD  Rheumatoid arthritis  Crohn's disease on biological therapy  History of ileostomy  History of GI bleed    Patient was admitted with acute hemoptysis which slowly improved through hospital course.  Routine laboratory studies as well as imaging was conducted.  Noted patient did develop left upper lobe lesion cannot exclude possible neoplasm and did require repeat CT chest imaging in approximate 3 months.  Pulmonary services were consulted.      Patient underwent bronchoscopy on 1/12/2025.  Multiple mucous plugs were removed.  Cultures are currently pending.  Autoimmune etiology panel also ordered given history of Crohn's disease as well as being on chronic  immunosuppression.  IV antibiotics continue for now.  Noted pulmonary services have now signed off.  Will transition to oral antibiotics at time of discharge.  Noted physical and Occupational Therapy services have made recommendation for rehab at time of discharge.  Plan discharge once rehab facility set up and/or insurance approved.    Recent PCI, resume aspirin as well as Plavix for now.    Continue to monitor given resumption of dual antiplatelet therapy for hemoptysis.  Recheck a.m. labs as well.  VTE Prophylaxis:  Mechanical VTE prophylaxis orders are present.             Disposition Planning:     Barriers to Discharge: Therapy evaluation, insurance approval for rehab facility  Anticipated Date of Discharge: 1/15/2025  Place of Discharge: Skilled nursing facility/rehab      Time: 45 minutes     There are no questions and answers to display.       Signature: Electronically signed by Luma Ramirez MD, 01/13/25, 14:09 EST.  [facility] Hospitalist Team

## 2025-01-13 NOTE — NURSING NOTE
WOCN note:    74 yr old female admitted from [facility] on 1/10/25 with hemoptysis. WOCN consult received for pressure injury noted upon admission.   Patient presents with a partial thickness stage 2 pressure injury to her sacrum measuring approximately 0.3 x 0.3 cm as well as moisture associated dermatitis with blanchable erythema to the sacrum and buttocks. Zinc barrier paste was applied and patient was provided a waffle cushion prior to getting up in the chair. There is a low air loss pump in place to the bed surface. Continue pressure injury prevention measures per protocol and skin care for any episodes of incontinence.

## 2025-01-13 NOTE — CASE MANAGEMENT/SOCIAL WORK
Case Management Readmission Assessment Note    Case Management Readmission Assessment (all recorded)       Readmission Interview       Row Name 01/13/25 1204             Readmission Indications    Is the patient and/or family able to complete the readmission assessment questions? Yes  But poor historian      Is this hospitalization related to the prior hospital diagnosis? No        Row Name 01/13/25 1204             Recommendation for rehospitalization    Did you speak with your physician prior to coming to the hospital No        Row Name 01/13/25 1204             Follow-up Appointments    Do you have a PCP? Yes  Dr. Calhoun      Did you have an appointment with PCP after your hospitalization? No      Did you have an appointment with a Specialist? Yes      When was your appointment scheduled? 01/15/25  Dr. Babin      Did you go to appointment? No      Are you current with the Pulmonary Clinic? No      Are you current with the CHF Clinic? No        Row Name 01/13/25 1204             Medications    Did you have newly prescribed medications at discharge? Yes      Did you understand the reasons for your medications at discharge and how to take them? Yes      Did you understand the side effects of your medications? Yes      Are you taking all of you prescribed medications? Yes      What pharmacy was used to fill prescription(s)? [facility]      Were medications picked up? Yes        Row Name 01/13/25 1204             Discharge Instructions    Did you understand your discharge instructions? Yes      Did your family/caregiver hear your instructions? Yes      Were you told to eat a special diet? No      Did you adhere to the diet? No      Were you given a number of someone to call if you had questions or concerns? Yes        [facility] Name 01/13/25 1204             Index discharge location/services    Where did you go upon discharge? Skilled Nursing Facility      Do you have supportive family or friends in the home? Yes      Was  the provider seen at the facility? Yes      Which Skilled Nursing Facility were your admitted from? [facility]        Row Name 01/13/25 1204             Discharge Readiness    On a scale of 1-5 (5 being well prepared), how ready were you for discharge 3      Recommendation based on interview Education on diagnosis/self management        Row Name 01/13/25 1204             Palliative Care/Hospice    Are you current with Palliative Care? No      Are you current with Hospice Care? No        Row Name 01/13/25 1204 01/10/25 1621          Advance Directives (For Healthcare)    Pre-existing AND/MOST/POLST Order No Yes, notify physician for order     Advance Directive Status Patient has advance directive, copy requested Patient has advance directive, copy requested     Type of Advance Directive -- Health care directive/Living will     Have you reviewed your Advance Directive and is it valid for this stay? No No     Literature Provided on Advance Directives No No     Patient Requests Assistance on Advance Directives Patient Declined Patient Declined       Row Name 01/13/25 1204             Readmission Assessment Final Comments    Final Comments HX: New ileostomy. High output. CAD, COPD, Chron's        PREVIOUS ADMISSION: 12/26 ED from facility with abnormal labs. K+ 7.2, Elevated BUN/CRT. Elevated WBC. IVF bolus, wound care consult. Started on Immodium. Clear liquid diet. CXR negative.      12/31- Discharged back to [facility].                          CURRENT ADMISSION: 1/10 ED with coughing up blood.  CT chest negative for PE. Left upper lobe lung lession. Pulmonary consult. Bronch 1/12. Pending Clinical Progress.

## 2025-01-13 NOTE — PLAN OF CARE
Problem: Adult Inpatient Plan of Care  Goal: Absence of Hospital-Acquired Illness or Injury  Intervention: Identify and Manage Fall Risk  Description: Perform standard risk assessment on admission using a validated tool or comprehensive approach appropriate to the patient; reassess fall risk frequently, with change in status or transfer to another level of care.  Communicate risk to interprofessional healthcare team; ensure fall risk visible cue.  Determine need for increased observation, equipment and environmental modification, as well as use of supportive, nonskid footwear.  Adjust safety measures to individual needs and identified risk factors.  Reinforce the importance of active participation with fall risk prevention, safety, and physical activity with the patient and family.  Perform regular intentional rounding to assess need for position change, pain assessment and personal needs, including assistance with toileting.  Recent Flowsheet Documentation  Taken 1/13/2025 0400 by Enrique Ku LPN  Safety Promotion/Fall Prevention:   safety round/check completed   room organization consistent   nonskid shoes/slippers when out of bed   muscle strengthening facilitated   mobility aid in reach   lighting adjusted   fall prevention program maintained   clutter free environment maintained   assistive device/personal items within reach   activity supervised  Taken 1/13/2025 0200 by Enrique Ku LPN  Safety Promotion/Fall Prevention:   safety round/check completed   room organization consistent   nonskid shoes/slippers when out of bed   muscle strengthening facilitated   mobility aid in reach   lighting adjusted   fall prevention program maintained   clutter free environment maintained  Taken 1/13/2025 0000 by Enrique Ku LPN  Safety Promotion/Fall Prevention:   safety round/check completed   room organization consistent   nonskid shoes/slippers when out of bed   muscle strengthening  facilitated   mobility aid in reach   lighting adjusted   fall prevention program maintained   clutter free environment maintained   assistive device/personal items within reach   activity supervised  Taken 1/12/2025 2200 by Enrique Ku LPN  Safety Promotion/Fall Prevention:   safety round/check completed   room organization consistent   nonskid shoes/slippers when out of bed   muscle strengthening facilitated   mobility aid in reach   lighting adjusted   fall prevention program maintained   clutter free environment maintained   assistive device/personal items within reach  Taken 1/12/2025 2000 by Enrique Ku LPN  Safety Promotion/Fall Prevention:   safety round/check completed   room organization consistent   muscle strengthening facilitated   nonskid shoes/slippers when out of bed   mobility aid in reach   lighting adjusted   fall prevention program maintained   clutter free environment maintained   assistive device/personal items within reach   activity supervised  Intervention: Prevent Skin Injury  Description: Perform a screening for skin injury risk, such as pressure or moisture-associated skin damage on admission and at regular intervals throughout hospital stay.  Keep all areas of skin (especially folds) clean and dry.  Maintain adequate skin hydration.  Relieve and redistribute pressure and protect bony prominences and skin at risk for injury; implement measures based on patient-specific risk factors.  Match turning and repositioning schedule to clinical condition.  Encourage weight shift frequently; assist with reposition if unable to complete independently.  Float heels off bed; avoid pressure on the Achilles tendon.  Keep skin free from extended contact with medical devices.  Optimize nutrition and hydration.  Encourage functional activity and mobility, as early as tolerated.  Use aids (e.g., slide boards, mechanical lift) during transfer.  Recent Flowsheet Documentation  Taken  1/13/2025 0400 by Enrique Ku LPN  Skin Protection: silicone foam dressing in place  Taken 1/13/2025 0300 by Enrique Ku LPN  Body Position:   turned   supine  Taken 1/13/2025 0200 by Enrique Ku LPN  Body Position: weight shifting  Taken 1/13/2025 0000 by Enrique Ku LPN  Body Position: turned  Skin Protection: silicone foam dressing in place  Taken 1/12/2025 2000 by Enrique Ku LPN  Body Position: weight shifting  Skin Protection: silicone foam dressing in place  Intervention: Prevent and Manage VTE (Venous Thromboembolism) Risk  Description: Assess for VTE (venous thromboembolism) risk.  Promote early mobilization; encourage both active and passive leg exercises, if unable to ambulate.  Initiate and maintain compression or other therapy, as indicated, based on identified risk in accordance with organizational protocol and provider order.  Recognize the patient's individual risk for bleeding before initiating pharmacologic thromboprophylaxis.  Recent Flowsheet Documentation  Taken 1/13/2025 0000 by Enrique Ku LPN  VTE Prevention/Management: patient refused intervention  Taken 1/12/2025 2000 by Enrique Ku LPN  VTE Prevention/Management:   patient refused intervention   SCDs (sequential compression devices) off  Intervention: Prevent Infection  Description: Maintain skin and mucous membrane integrity; promote hand, oral and pulmonary hygiene.  Optimize fluid balance, nutrition, sleep and glycemic control to maximize infection resistance.  Identify potential sources of infection early to prevent or mitigate progression of infection (e.g., wound, lines, devices).  Evaluate ongoing need for invasive devices; remove promptly when no longer indicated.  Review vaccination status.  Recent Flowsheet Documentation  Taken 1/13/2025 0400 by Enrique Ku LPN  Infection Prevention:   visitors restricted/screened   single patient room provided   rest/sleep  promoted   personal protective equipment utilized   hand hygiene promoted  Taken 1/13/2025 0200 by Enrique Ku LPN  Infection Prevention:   visitors restricted/screened   single patient room provided   rest/sleep promoted   personal protective equipment utilized  Taken 1/13/2025 0000 by Enrique Ku LPN  Infection Prevention:   visitors restricted/screened   single patient room provided   rest/sleep promoted   personal protective equipment utilized   hand hygiene promoted  Taken 1/12/2025 2200 by Enrique Ku LPN  Infection Prevention:   visitors restricted/screened   single patient room provided   rest/sleep promoted   hand hygiene promoted   personal protective equipment utilized  Taken 1/12/2025 2000 by Enrique Ku LPN  Infection Prevention:   visitors restricted/screened   single patient room provided   rest/sleep promoted   personal protective equipment utilized   hand hygiene promoted  Goal: Optimal Comfort and Wellbeing  Intervention: Monitor Pain and Promote Comfort  Description: Assess pain level, treatment efficacy and patient response at regular intervals using a consistent pain scale.  Consider the presence and impact of preexisting chronic pain.  Encourage patient and caregiver involvement in pain assessment, interventions and safety measures.  Promote activity; balance with sleep and rest to enhance healing.  Recent Flowsheet Documentation  Taken 1/13/2025 0400 by Enrique Ku LPN  Pain Management Interventions:   unnecessary movement minimized   position adjusted   pain management plan reviewed with patient/caregiver  Taken 1/13/2025 0000 by Enrique Ku LPN  Pain Management Interventions: position adjusted  Taken 1/12/2025 2000 by Enrique Ku LPN  Pain Management Interventions:   relaxation techniques promoted   position adjusted   medication offered but refused   pain management plan reviewed with patient/caregiver   Goal Outcome  Evaluation:   Plan of care review and progress toward goal in monitor awaiting specimen from Bronch to determent if pt is TB positive or neg , fall protocol in place and pt has been educated on it and turn and reposition freq in prevention of skin breakdown , Mepix , edmundo pump and pt on the turn temp list. Pt c/o chronic pt , prn med order and pt is educated on med freq and route , ext cath for I & O . Cont to monitor pt progress

## 2025-01-13 NOTE — PLAN OF CARE
Goal Outcome Evaluation:  Plan of Care Reviewed With: patient  Pt presents as a 75 y/o F admitted to [facility] on 1/10/25 for hemoptysis. This is pt's 4 th hospital admission since Dec 2024. CTA (-) for PE. CXR (-).  Pulmonology following pt for pneumonia due to unspecified pathogen. PMHx: colostomy, R, COPD, leaky mitral valve, heart disease, heart failure, anxiety, Crohn's Disease, Hashimoto's. Pt A and O x 4 and on room air.  Pt typically lives with daughters and granddaughter in [facility] with 2 RICKEY. She uses a rolling walker for short distances and a w/c for longer distances and does this with independence. Pt has sore buttocks from STI. Pt requiring mod/max assist for stand-pivot-sit transfer with rolling walker with pt exhibiting significant posterior lean. Patient is a high risk of injurious falls and unsafe to return to prior living environment at this time.  Pt is far below her baseline for all areas of mobility and has had repeat hospital admissions. PT recommends that pt return to Skilled Nursing Facility to address her generalized weakness and immobility.               Anticipated Discharge Disposition (PT): skilled nursing facility

## 2025-01-13 NOTE — PLAN OF CARE
Goal Outcome Evaluation:  Plan of Care Reviewed With: patient        Progress: improving  Outcome Evaluation: Patient A&Ox4 with intermittent forgetfulness. Airborne precautions discontinued this shift. DC Cefepime and PO Levaquin started per pulmnology. Patient would like to discharge home with family but remains very weak. Son, POA, would like patient to return back to [place]. Patient is in agreement to do what the MD suggest. Patient has scant hemoptysis this shift. Tolerated sitting up in the chair this afternoon. Patient resting comfortably in bed, asleep, with no noted distress. Repositioned by staff every two hours to prevent additional skin breakdown. Fall precautions remain in place and call light within reach.

## 2025-01-14 LAB
ANION GAP SERPL CALCULATED.3IONS-SCNC: 9 MMOL/L (ref 5–15)
BACTERIA SPEC AEROBE CULT: NORMAL
BUN SERPL-MCNC: 10 MG/DL (ref 8–23)
BUN/CREAT SERPL: 12.7 (ref 7–25)
CALCIUM SPEC-SCNC: 8.6 MG/DL (ref 8.6–10.5)
CHLORIDE SERPL-SCNC: 107 MMOL/L (ref 98–107)
CO2 SERPL-SCNC: 20 MMOL/L (ref 22–29)
CREAT SERPL-MCNC: 0.79 MG/DL (ref 0.57–1)
DEPRECATED RDW RBC AUTO: 73.2 FL (ref 37–54)
EGFRCR SERPLBLD CKD-EPI 2021: 78.6 ML/MIN/1.73
ERYTHROCYTE [DISTWIDTH] IN BLOOD BY AUTOMATED COUNT: 19.7 % (ref 12.3–15.4)
GLUCOSE SERPL-MCNC: 79 MG/DL (ref 65–99)
GRAM STN SPEC: NORMAL
GRAM STN SPEC: NORMAL
HCT VFR BLD AUTO: 28.3 % (ref 34–46.6)
HGB BLD-MCNC: 8.6 G/DL (ref 12–15.9)
LAB AP CASE REPORT: NORMAL
MAGNESIUM SERPL-MCNC: 2.5 MG/DL (ref 1.6–2.4)
MCH RBC QN AUTO: 30.9 PG (ref 26.6–33)
MCHC RBC AUTO-ENTMCNC: 30.4 G/DL (ref 31.5–35.7)
MCV RBC AUTO: 101.8 FL (ref 79–97)
PATH REPORT.FINAL DX SPEC: NORMAL
PATH REPORT.GROSS SPEC: NORMAL
PLATELET # BLD AUTO: 483 10*3/MM3 (ref 140–450)
PMV BLD AUTO: 9.9 FL (ref 6–12)
POTASSIUM SERPL-SCNC: 4.3 MMOL/L (ref 3.5–5.2)
RBC # BLD AUTO: 2.78 10*6/MM3 (ref 3.77–5.28)
SODIUM SERPL-SCNC: 136 MMOL/L (ref 136–145)
WBC NRBC COR # BLD AUTO: 9.09 10*3/MM3 (ref 3.4–10.8)

## 2025-01-14 PROCEDURE — 97530 THERAPEUTIC ACTIVITIES: CPT

## 2025-01-14 PROCEDURE — 94761 N-INVAS EAR/PLS OXIMETRY MLT: CPT

## 2025-01-14 PROCEDURE — 83735 ASSAY OF MAGNESIUM: CPT | Performed by: FAMILY MEDICINE

## 2025-01-14 PROCEDURE — 80048 BASIC METABOLIC PNL TOTAL CA: CPT | Performed by: FAMILY MEDICINE

## 2025-01-14 PROCEDURE — 94799 UNLISTED PULMONARY SVC/PX: CPT

## 2025-01-14 PROCEDURE — 94664 DEMO&/EVAL PT USE INHALER: CPT

## 2025-01-14 PROCEDURE — 85027 COMPLETE CBC AUTOMATED: CPT | Performed by: FAMILY MEDICINE

## 2025-01-14 PROCEDURE — 97166 OT EVAL MOD COMPLEX 45 MIN: CPT

## 2025-01-14 RX ORDER — CLOPIDOGREL BISULFATE 75 MG/1
75 TABLET ORAL DAILY
Status: DISCONTINUED | OUTPATIENT
Start: 2025-01-14 | End: 2025-01-16 | Stop reason: HOSPADM

## 2025-01-14 RX ADMIN — CLOPIDOGREL BISULFATE 75 MG: 75 TABLET ORAL at 09:50

## 2025-01-14 RX ADMIN — IPRATROPIUM BROMIDE AND ALBUTEROL SULFATE 3 ML: .5; 3 SOLUTION RESPIRATORY (INHALATION) at 08:16

## 2025-01-14 RX ADMIN — ASPIRIN 81 MG: 81 TABLET, COATED ORAL at 08:24

## 2025-01-14 RX ADMIN — PANTOPRAZOLE SODIUM 40 MG: 40 TABLET, DELAYED RELEASE ORAL at 05:52

## 2025-01-14 RX ADMIN — LEVOTHYROXINE SODIUM 50 MCG: 50 TABLET ORAL at 08:28

## 2025-01-14 RX ADMIN — Medication 10 ML: at 21:00

## 2025-01-14 RX ADMIN — LEVOFLOXACIN 750 MG: 750 TABLET, FILM COATED ORAL at 15:17

## 2025-01-14 RX ADMIN — IPRATROPIUM BROMIDE AND ALBUTEROL SULFATE 3 ML: .5; 3 SOLUTION RESPIRATORY (INHALATION) at 11:31

## 2025-01-14 RX ADMIN — IPRATROPIUM BROMIDE AND ALBUTEROL SULFATE 3 ML: .5; 3 SOLUTION RESPIRATORY (INHALATION) at 20:03

## 2025-01-14 RX ADMIN — SERTRALINE HYDROCHLORIDE 50 MG: 50 TABLET, FILM COATED ORAL at 08:24

## 2025-01-14 RX ADMIN — Medication 10 ML: at 08:24

## 2025-01-14 RX ADMIN — IPRATROPIUM BROMIDE AND ALBUTEROL SULFATE 3 ML: .5; 3 SOLUTION RESPIRATORY (INHALATION) at 14:26

## 2025-01-14 NOTE — PLAN OF CARE
"Goal Outcome Evaluation:  Plan of Care Reviewed With: patient   Assessment: Maryann Gaitan presents with functional mobility impairments which indicate the need for skilled intervention. Pt transfers to recliner with posterior lean and absent foot flat bilaterally. Pt has poor awareness of her functional deficits. PT cont to recommend return to SNF. Tolerating session today without incident. Will continue to follow and progress as tolerated.     Plan/Recommendations:   If medically appropriate, Moderate Intensity Therapy recommended post-acute care. This is recommended as therapy feels the patient would require 3-4 days per week and wouldn't tolerate \"3 hour daily\" rehab intensity. SNF would be the preferred choice. If the patient does not agree to SNF, arrange HH or OP depending on home bound status. If patient is medically complex, consider LTACH. Pt requires no DME at discharge.     Pt desires Skilled Rehab placement at discharge. Pt cooperative; agreeable to therapeutic recommendations and plan of care.          Anticipated Discharge Disposition (PT): skilled nursing facility                        "

## 2025-01-14 NOTE — PROGRESS NOTES
[facility] MEDICINE SERVICE  DAILY PROGRESS NOTE    NAME: Maryann Gaitan  : 1950  MRN: [id number]      LOS: 4 days     PROVIDER OF SERVICE: Sallie Brown PA-C    Chief Complaint: Hemoptysis    Subjective:     Interval History:  History taken from: patient    Patient otherwise feels well.  She wishes to be discharged home.  She misses her dog at home.  She states that she has numerous family members at home.        Review of Systems:   Review of Systems   All other systems reviewed and are negative.      Objective:     Vital Signs  Temp:  [97.4 °F (36.3 °C)-97.7 °F (36.5 °C)] 97.6 °F (36.4 °C)  Heart Rate:  [] 99  Resp:  [17-25] 20  BP: (102-131)/(51-71) 119/56   Body mass index is 19.64 kg/m².    Physical Exam  Physical Exam  Constitutional:       General: She is awake.      Appearance: She is well-developed and well-groomed. She is ill-appearing.   HENT:      Head: Normocephalic and atraumatic.      Nose: Nose normal.      Mouth/Throat:      Mouth: Mucous membranes are moist.      Pharynx: Oropharynx is clear.   Eyes:      Extraocular Movements: Extraocular movements intact.      Conjunctiva/sclera: Conjunctivae normal.      Pupils: Pupils are equal, round, and reactive to light.   Cardiovascular:      Rate and Rhythm: Normal rate and regular rhythm.      Pulses: Normal pulses.      Heart sounds: Normal heart sounds.   Pulmonary:      Effort: Pulmonary effort is normal.      Breath sounds: Wheezing and rhonchi present.   Abdominal:      General: Abdomen is flat. Bowel sounds are normal.      Palpations: Abdomen is soft.   Musculoskeletal:         General: Normal range of motion.      Cervical back: Normal range of motion and neck supple.      Right lower leg: No edema.      Left lower leg: No edema.   Skin:     General: Skin is warm and dry.   Neurological:      General: No focal deficit present.      Mental Status: She is alert and oriented to person, place, and time. Mental status is at  baseline.      Cranial Nerves: Cranial nerves 2-12 are intact.      Sensory: Sensation is intact.      Motor: Motor function is intact.   Psychiatric:         Mood and Affect: Mood normal.         Behavior: Behavior normal. Behavior is cooperative.         Thought Content: Thought content normal.         Judgment: Judgment normal.            Diagnostic Data    Results from last 7 days   Lab Units 01/14/25  0325 01/13/25  0358 01/12/25  0215   WBC 10*3/mm3 9.09   < > 6.00   HEMOGLOBIN g/dL 8.6*   < > 7.4*   HEMATOCRIT % 28.3*   < > 24.7*   PLATELETS 10*3/mm3 483*   < > 427   GLUCOSE mg/dL 79   < > 114*   CREATININE mg/dL 0.79   < > 0.86   BUN mg/dL 10   < > 13   SODIUM mmol/L 136   < > 138   POTASSIUM mmol/L 4.3   < > 4.5   AST (SGOT) U/L  --   --  20   ALT (SGPT) U/L  --   --  23   ALK PHOS U/L  --   --  121*   BILIRUBIN mg/dL  --   --  <0.2   ANION GAP mmol/L 9.0   < > 7.8    < > = values in this interval not displayed.       No radiology results for the last day      I reviewed the patient's new clinical results.    Assessment/Plan:     Active and Resolved Problems  Active Hospital Problems    Diagnosis  POA    **Hemoptysis [R04.2]  Yes    Multifocal pneumonia [J18.9]  Unknown      Resolved Hospital Problems   No resolved problems to display.     Acute hemoptysis  Left upper lobe lesion/pneumonia  Coronary artery disease with recent stent placement on dual antiplatelet therapy  End-stage COPD  Rheumatoid arthritis  Crohn's disease on biological therapy  History of ileostomy  History of GI bleed    Patient was admitted with acute hemoptysis which slowly improved through hospital course.  Routine laboratory studies as well as imaging was conducted.  Noted patient did develop left upper lobe lesion cannot exclude possible neoplasm and did require repeat CT chest imaging in approximate 3 months.  Pulmonary services were consulted, noted recommendations, they will follow up with her in 2 weeks.      Patient underwent  bronchoscopy on 1/12/2025.  Multiple mucous plugs were removed.  AFB negative. Final cultures with normal respiratory wu.  Autoimmune etiology panel also ordered given history of Crohn's disease as well as being on chronic immunosuppression.  IV antibiotics switched to p.o. Levaquin for 3 days, last dose tomorrow. Pulmonology services signed off.     Noted physical and Occupational Therapy services have made recommendation for rehab at time of discharge.  Plan discharge once rehab facility set up and/or insurance approved.    Recent cardiac PCI with stent in October.  Reviewed cardiology notes from previous admission, they have recommended remaining on DAPT to prevent stent thrombosis.  Plavix and aspirin have been resumed, recommend to observe another day for recurrence of bleeding/drop in hgb. Hgb has been trending up 7.4->7.9->8.6.  If recurrence of hemoptysis or drop in hemoglobin, may consider consulting cardiology regarding DAPT.      VTE Prophylaxis:  Mechanical VTE prophylaxis orders are present.             Disposition Planning:     Barriers to Discharge: monitoring for recurrence of hemoptysis  Anticipated Date of Discharge: 1/16/2025  Place of Discharge: Skilled nursing facility/rehab      Time: 45 minutes     Code Status and Medical Interventions: No CPR (Do Not Attempt to Resuscitate); Full Support   Ordered at: 01/14/25 0912     Code Status (Patient has no pulse and is not breathing):    No CPR (Do Not Attempt to Resuscitate)     Medical Interventions (Patient has pulse or is breathing):    Full Support       Signature: Electronically signed by Sallie Brown PA-C, 01/14/25, 13:59 EST.  [facility] Hospitalist Team

## 2025-01-14 NOTE — THERAPY EVALUATION
Patient Name: Maryann Gaitan  : 1950    MRN: [id number]                              Today's Date: 2025       Admit Date: 1/10/2025    Visit Dx:     ICD-10-CM ICD-9-CM   1. Pneumonia of both lungs due to infectious organism, unspecified part of lung  J18.9 483.8   2. Hemoptysis  R04.2 786.30   3. Hypomagnesemia  E83.42 275.2   4. Multifocal pneumonia  J18.9 486     Patient Active Problem List   Diagnosis    Mitral regurgitation    Cavitary lesion of lung    CAD, multiple vessel    Acute heart failure with preserved ejection fraction (HFpEF)    Severe mitral regurgitation    Dyslipidemia    Crohn's disease    Hypothyroidism (acquired)    Anxiety associated with depression    COPD (chronic obstructive pulmonary disease)    Rheumatoid arthritis    Moderate protein-calorie malnutrition    Acute lower GI bleeding    First degree hemorrhoids    Benign neoplasm of cecum    GERD (gastroesophageal reflux disease)    Hashimoto's thyroiditis    History of colonic polyps    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    Fecal urgency    Second degree hemorrhoids    Vitamin D deficiency, unspecified    Stress incontinence, female    Aphthae, oral    Hx of seborrheic keratosis    Primary hypertension    S/P mitral valve clip implantation    Gastrointestinal hemorrhage    Anemia    PNA (pneumonia)    Pneumonia due to other gram-negative bacteria    Acute renal failure (ARF)    Gastric bleed    Hemoptysis    Multifocal pneumonia     Past Medical History:   Diagnosis Date    Abnormal weight loss 2024    Acute kidney injury 2024    Acute UTI (urinary tract infection) 2024    Anxiety associated with depression 2024    Benign neoplasm of cecum 2016    CAD, multiple vessel 10/08/2024    Cavitary lesion of lung 10/08/2024    COPD (chronic obstructive pulmonary disease)     Crohn's disease 2024    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 2016     Dyslipidemia 10/30/2024    Fecal urgency 11/21/2024    First degree hemorrhoids 07/09/2021    GERD (gastroesophageal reflux disease) 11/21/2024    Hashimoto's thyroiditis 11/21/2024    History of colonic polyps 07/09/2021    Hypertension     Hypothyroidism (acquired) 11/06/2024    Mitral regurgitation 10/08/2024    Moderate protein-calorie malnutrition 11/09/2024    Multiple tracheobronchial mucus plugs 10/08/2024    Nicotine dependence 11/21/2024    Rheumatoid arthritis 11/06/2024    S/P mitral valve clip implantation 11/21/2024    Second degree hemorrhoids 07/05/2016    Stress incontinence, female 11/21/2024    Vitamin D deficiency, unspecified 11/21/2024     Past Surgical History:   Procedure Laterality Date    BRONCHOSCOPY N/A 10/16/2024    Procedure: BRONCHOSCOPY;  Surgeon: Gallo Pope MD;  Location: [facility] ENDOSCOPY;  Service: Pulmonary;  Laterality: N/A;    BRONCHOSCOPY N/A 1/12/2025    Procedure: BRONCHOSCOPY WITH LAVAGE;  Surgeon: Gallo Pope MD;  Location: [facility] ENDOSCOPY;  Service: Pulmonary;  Laterality: N/A;    CARDIAC CATHETERIZATION N/A 10/15/2024    Procedure: Left Heart Cath, possible pci;  Surgeon: Travis Connor MD;  Location: [facility] CATH INVASIVE LOCATION;  Service: Cardiovascular;  Laterality: N/A;    CARDIAC CATHETERIZATION N/A 10/22/2024    Procedure: Laser Coronary Atherectomy;  Surgeon: Travis Connor MD;  Location: [facility] CATH INVASIVE LOCATION;  Service: Cardiovascular;  Laterality: N/A;    COLON RESECTION Right 11/28/2024    Procedure: RIGHT HEMICOLECTOMY WITH ILEOSTOMY;  Surgeon: Todd Babin MD;  Location: [facility] MAIN OR;  Service: General;  Laterality: Right;    COLONOSCOPY N/A 10/12/2024    Procedure: COLONOSCOPY WITH BIOPSY AND WIRE GUIDED BALLOON DILATION OF TERMINAL ILEUM;  Surgeon: Rob Strong MD;  Location: [facility] ENDOSCOPY;  Service: Gastroenterology;  Laterality: N/A;  Colitis, crohns of terminal ileum, right colon ulcers,  diverticulosis, hemorroids    ENDOSCOPY N/A 10/12/2024    Procedure: ESOPHAGOGASTRODUODENOSCOPY WITH BIOPSY X 2 AREA;  Surgeon: Rob Strong MD;  Location: [facility] ENDOSCOPY;  Service: Gastroenterology;  Laterality: N/A;  Chronic gastritis, HH    ENDOSCOPY Left 11/28/2024    Procedure: ESOPHAGOGASTRODUODENOSCOPY;  Surgeon: Dallin Delgado MD;  Location: [facility] ENDOSCOPY;  Service: Gastroenterology;  Laterality: Left;  small hiatal hernia    HYSTERECTOMY      LEFT HEART CATH      MITRAL VALVE REPAIR/REPLACEMENT N/A 11/21/2024    Procedure: Transcatheter Mitral Valve Repair;  Surgeon: Dmitri Navarro MD;  Location: [facility] HYBRID OR;  Service: Cardiovascular;  Laterality: N/A;      General Information       Row Name 01/14/25 1137          OT Time and Intention    Document Type evaluation  -ES     Mode of Treatment occupational therapy  -ES       Row Name 01/14/25 1137          General Information    Patient Profile Reviewed yes  -ES     Existing Precautions/Restrictions other (see comments)  Pt has STI at buttock and coccyx.  -ES       Row Name 01/14/25 1137          Occupational Profile    Reason for Services/Referral (Occupational Profile) Pt is a 74 y.o. year old female admitted 1/10/25 with hemoptysis.    Recent admissions 12/26 with abd pain and elevated troponin, 12/4 GI bleed and hemicolectomy, and 12/11 with chest pain.     PMHx significant for ileostomy, CAD, HTN, hyperlipidemia, RA, COPD, hypothyroidism, and Crohn's disease.    Pt admitted from [facility]. At baseline, pt resides on main level, x2 [facility], with x2 daughter and granddaugher (One works night shifts and the other works days shifts, able to provided 24/7 supervision) with a renter who is a family friend. Pt reports she is I with ADLs, utilizes walker and rollator though prefers walker, and does not drive.  -ES       Row Name 01/14/25 1137          Living Environment    People in Home other (see comments)  -ES       Row Name  01/14/25 1137          Home Main Entrance    Number of Stairs, Main Entrance two  -ES       Row Name 01/14/25 1137          Stairs Within Home, Primary    Number of Stairs, Within Home, Primary none  -ES       Row Name 01/14/25 1137          Cognition    Orientation Status (Cognition) oriented x 4  -ES       Row Name 01/14/25 1137          Safety Issues/Impairments Affecting Functional Mobility    Safety Issues Affecting Function (Mobility) impulsivity;insight into deficits/self-awareness;judgment;safety precaution awareness;safety precautions follow-through/compliance  -ES     Impairments Affecting Function (Mobility) balance;endurance/activity tolerance;strength;shortness of breath;pain  -ES               User Key  (r) = Recorded By, (t) = Taken By, (c) = Cosigned By      Initials Name Provider Type    Paulette Burt OT Occupational Therapist                     Mobility/ADL's       Row Name 01/14/25 1139          Bed Mobility    Bed Mobility supine-sit  -ES     Supine-Sit [place] (Bed Mobility) minimum assist (75% patient effort)  -ES     Assistive Device (Bed Mobility) head of bed elevated  -ES       Row Name 01/14/25 1139          Bed-Chair Transfer    Bed-Chair [place] (Transfers) minimum assist (75% patient effort)  -ES     Assistive Device (Bed-Chair Transfers) walker, front-wheeled  -ES       Row Name 01/14/25 1139          Sit-Stand Transfer    Sit-Stand [place] (Transfers) minimum assist (75% patient effort)  -ES     Assistive Device (Sit-Stand Transfers) walker, front-wheeled  -ES       Row Name 01/14/25 1139          Functional Mobility    Functional Mobility- Ind. Level moderate assist (50% patient effort)  -ES       Row Name 01/14/25 1139          Activities of Daily Living    BADL Assessment/Intervention upper body dressing;lower body dressing;toileting  -ES       Row Name 01/14/25 1139          Upper Body Dressing Assessment/Training    [place] Level (Upper Body Dressing)  standby assist  -ES       Row Name 01/14/25 1139          Lower Body Dressing Assessment/Training    [place] Level (Lower Body Dressing) moderate assist (50% patient effort)  -ES       Row Name 01/14/25 1139          Toileting Assessment/Training    [place] Level (Toileting) minimum assist (75% patient effort)  -ES     Comment, (Toileting) pt reports she has been emptying ostomy bag, and demos fair understanding  -ES               User Key  (r) = Recorded By, (t) = Taken By, (c) = Cosigned By      Initials Name Provider Type    Paulette Burt OT Occupational Therapist                   Obj/Interventions       Row Name 01/14/25 1143          Sensory Assessment (Somatosensory)    Sensory Assessment (Somatosensory) UE sensation intact  -ES       Row Name 01/14/25 1143          Vision Assessment/Intervention    Visual Impairment/Limitations corrective lenses for reading  -ES       Row Name 01/14/25 1143          Range of Motion Comprehensive    Comment, General Range of Motion LUE chronic RTC tear.  -ES       Row Name 01/14/25 1143          Strength Comprehensive (MMT)    Comment, General Manual Muscle Testing (MMT) Assessment BUE 3+/5 within ROM  -ES       Row Name 01/14/25 1143          Balance    Balance Assessment sitting static balance;sitting dynamic balance;standing static balance;standing dynamic balance  -ES     Static Sitting Balance standby assist  -ES     Dynamic Sitting Balance contact guard  -ES     Static Standing Balance minimal assist  -ES     Dynamic Standing Balance moderate assist  -ES     Comment, Balance significant posterior lean in standing  -ES               User Key  (r) = Recorded By, (t) = Taken By, (c) = Cosigned By      Initials Name Provider Type    Paulette Burt OT Occupational Therapist                   Goals/Plan       Row Name 01/14/25 1147          Bathing Goal 1 (OT)    Activity/Device (Bathing Goal 1, OT) bathing skills, all  -ES     [place]  Level/Cues Needed (Bathing Goal 1, OT) supervision required  -ES     Time Frame (Bathing Goal 1, OT) 2 weeks  -ES       Row Name 01/14/25 1147          Dressing Goal 1 (OT)    Activity/Device (Dressing Goal 1, OT) dressing skills, all  -ES     [place]/Cues Needed (Dressing Goal 1, OT) modified independence  -ES     Time Frame (Dressing Goal 1, OT) 2 weeks  -ES       Row Name 01/14/25 1147          Toileting Goal 1 (OT)    Activity/Device (Toileting Goal 1, OT) toileting skills, all  -ES     [place] Level/Cues Needed (Toileting Goal 1, OT) modified independence  -ES     Time Frame (Toileting Goal 1, OT) 2 weeks  -ES       Row Name 01/14/25 1147          Therapy Assessment/Plan (OT)    Planned Therapy Interventions (OT) activity tolerance training;BADL retraining;functional balance retraining;strengthening exercise;neuromuscular control/coordination retraining;occupation/activity based interventions;passive ROM/stretching;transfer/mobility retraining;ROM/therapeutic exercise  -ES               User Key  (r) = Recorded By, (t) = Taken By, (c) = Cosigned By      Initials Name Provider Type    ES Paulette Nelson, OT Occupational Therapist                   Clinical Impression       Row Name 01/14/25 1144          Pain Assessment    Pretreatment Pain Rating 6/10  -ES     Posttreatment Pain Rating 6/10  -ES     Pain Side/Orientation generalized  -ES       Row Name 01/14/25 1144          Plan of Care Review    Plan of Care Reviewed With patient  -ES     Outcome Evaluation Pt is a 74 y.o. year old female admitted 1/10/25 with hemoptysis.    Recent admissions 12/26 with abd pain and elevated troponin, 12/4 GI bleed and hemicolectomy, and 12/11 with chest pain.     PMHx significant for ileostomy, CAD, HTN, hyperlipidemia, RA, COPD, hypothyroidism, and Crohn's disease.    Pt admitted from [facility]. At baseline, pt resides on main level, x2 [facility], with x2 daughter and granddaugher (One works night shifts and  the other works days shifts, able to provided 24/7 supervision) with a renter who is a family friend. Pt reports she is I with ADLs, utilizes walker and rollator though prefers walker, and does not drive. Pt c/o fatigue and pain, specifically abd and RUE pain. Bed mobility with Min - CGA. Min A for sit<>stand, but requires Min- Mod A for standing balance due to sigificant posterior lean. She requires mod A for LB ADLs and demos global weakness. MD arrives at beginning of OT eval and states pt is going home today, and patient is very excited to return home to [facility]. However, patient continues to be below stated baseline and would benefit from SNF rehab at [place].  -ES       Row Name 01/14/25 1144          Therapy Assessment/Plan (OT)    Rehab Potential (OT) good  -ES     Criteria for Skilled Therapeutic Interventions Met (OT) yes;skilled treatment is necessary  -ES     Therapy Frequency (OT) 5 times/wk  -ES     Predicted Duration of Therapy Intervention (OT) until dc  -ES       Row Name 01/14/25 1144 01/14/25 0907       Vital Signs    Post Systolic BP Rehab -- 123  -ES    Post Treatment Diastolic BP -- 67  -ES    Intratreatment Heart Rate (beats/min) -- 139  -ES    Posttreatment Heart Rate (beats/min) -- 99  -ES    O2 Delivery Pre Treatment -- room air  -ES    Intra SpO2 (%) -- 98  -ES    O2 Delivery Intra Treatment -- room air  -ES    Post SpO2 (%) -- 99  -ES    O2 Delivery Post Treatment -- room air  -ES    Pre Patient Position Supine  -ES Supine  -ES    Intra Patient Position Standing  -ES Standing  -ES    Post Patient Position Sitting  -ES Sitting  -ES      Row Name 01/14/25 0859          Vital Signs    Pre Systolic BP Rehab 131  -ES     Pre Treatment Diastolic BP 64  -ES     Pretreatment Heart Rate (beats/min) 104  -ES     Pretreatment Resp Rate (breaths/min) 14  -ES       Row Name 01/14/25 1144 01/14/25 0907       Positioning and Restraints    Pre-Treatment Position in bed  -ES in bed  -ES    Post Treatment Position  chair  -ES chair  -ES    In Chair notified nsg;call light within reach;encouraged to call for assist;exit alarm on  -ES notified nsg;encouraged to call for assist;exit alarm on;call light within reach  -ES              User Key  (r) = Recorded By, (t) = Taken By, (c) = Cosigned By      Initials Name Provider Type    Paulette Burt OT Occupational Therapist                   Outcome Measures       Row Name 01/14/25 1149          How much help from another is currently needed...    Putting on and taking off regular lower body clothing? 2  -ES     Bathing (including washing, rinsing, and drying) 2  -ES     Toileting (which includes using toilet bed pan or urinal) 2  -ES     Putting on and taking off regular upper body clothing 3  -ES     Taking care of personal grooming (such as brushing teeth) 3  -ES     Eating meals 4  -ES     AM-PAC 6 Clicks Score (OT) 16  -ES       Row Name 01/14/25 1149          Functional Assessment    Outcome Measure Options AM-PAC 6 Clicks Daily Activity (OT)  -ES               User Key  (r) = Recorded By, (t) = Taken By, (c) = Cosigned By      Initials Name Provider Type    Paulette Burt OT Occupational Therapist                    Occupational Therapy Education        No education to display                  OT Recommendation and Plan  Planned Therapy Interventions (OT): activity tolerance training, BADL retraining, functional balance retraining, strengthening exercise, neuromuscular control/coordination retraining, occupation/activity based interventions, passive ROM/stretching, transfer/mobility retraining, ROM/therapeutic exercise  Therapy Frequency (OT): 5 times/wk  Plan of Care Review  Plan of Care Reviewed With: patient  Outcome Evaluation: Pt is a 74 y.o. year old female admitted 1/10/25 with hemoptysis.    Recent admissions 12/26 with abd pain and elevated troponin, 12/4 GI bleed and hemicolectomy, and 12/11 with chest pain.     PMHx significant for ileostomy, CAD,  HTN, hyperlipidemia, RA, COPD, hypothyroidism, and Crohn's disease.    Pt admitted from [facility]. At baseline, pt resides on main level, x2 [place], with x2 daughter and granddaugher (One works night shifts and the other works days shifts, able to provided 24/7 supervision) with a renter who is a family friend. Pt reports she is I with ADLs, utilizes walker and rollator though prefers walker, and does not drive. Pt c/o fatigue and pain, specifically abd and RUE pain. Bed mobility with Min - CGA. Min A for sit<>stand, but requires Min- Mod A for standing balance due to sigificant posterior lean. She requires mod A for LB ADLs and demos global weakness. MD arrives at beginning of OT eval and states pt is going home today, and patient is very excited to return home to [facility]. However, patient continues to be below stated baseline and would benefit from SNF rehab at [place].     Time Calculation:         Time Calculation- OT       Row Name 01/14/25 1149             Time Calculation- OT    OT Start Time 0855  -ES      OT Stop Time 0915  -ES      OT Time Calculation (min) 20 min  -ES      Total Timed Code Minutes- OT 0 minute(s)  -ES      OT Received On 01/14/25  -ES      OT - Next Appointment 01/16/25  -ES      OT Goal Re-Cert Due Date 01/28/25  -ES                User Key  (r) = Recorded By, (t) = Taken By, (c) = Cosigned By      Initials Name Provider Type    Paulette Burt OT Occupational Therapist                           Paulette Nelson OT  1/14/2025

## 2025-01-14 NOTE — THERAPY TREATMENT NOTE
"Subjective: Pt agreeable to therapeutic plan of care.    Objective:     Precautions - HFR    Bed mobility - Supervision  Transfers - Mod-A and Max-A  Ambulation - 4 feet Mod-A, Max-A, and with rolling walker    Vitals: Tachycardic  HR up to 139 at highest    Pain: 6 VAS   Location: chest and buttocks  Intervention for pain: Repositioned and Therapeutic Presence    Education: Provided education on the importance of mobility in the acute care setting, Verbal/Tactile Cues, Transfer Training, and Gait Training    Assessment: Maryann Gaitan presents with functional mobility impairments which indicate the need for skilled intervention. Pt transfers to recliner with posterior lean and absent foot flat bilaterally. Pt has poor awareness of her functional deficits. PT cont to recommend return to SNF. Tolerating session today without incident. Will continue to follow and progress as tolerated.     Plan/Recommendations:   If medically appropriate, Moderate Intensity Therapy recommended post-acute care. This is recommended as therapy feels the patient would require 3-4 days per week and wouldn't tolerate \"3 hour daily\" rehab intensity. SNF would be the preferred choice. If the patient does not agree to SNF, arrange HH or OP depending on home bound status. If patient is medically complex, consider LTACH. Pt requires no DME at discharge.     Pt desires Skilled Rehab placement at discharge. Pt cooperative; agreeable to therapeutic recommendations and plan of care.         Basic Mobility 6-click:  Rollin = Total, A lot = 2, A little = 3; 4 = None  Supine>Sit:   1 = Total, A lot = 2, A little = 3; 4 = None   Sit>Stand with arms:  1 = Total, A lot = 2, A little = 3; 4 = None  Bed>Chair:   1 = Total, A lot = 2, A little = 3; 4 = None  Ambulate in room:  1 = Total, A lot = 2, A little = 3; 4 = None  3-5 Steps with railin = Total, A lot = 2, A little = 3; 4 = None  Score: 13    Modified Doug: N/A = No pre-op " stroke/TIA    Post-Tx Position: Up in Chair, Staff Present, Alarms activated, and Call light and personal items within reach  PPE: gloves    Therapy Charges for Today       Code Description Service Date Service Provider Modifiers Qty    82467027487 HC PT EVAL MOD COMPLEXITY 4 1/13/2025 Sobia Vides, PT GP 1    73338882309 HC PT EVAL MOD COMPLEXITY 4 1/13/2025 Sobia Vides, PT GP 1    39152682235 HC PT THERAPEUTIC ACT EA 15 MIN 1/14/2025 Sobia Vides, PT GP 1           PT Charges       Row Name 01/14/25 1415             Time Calculation    Start Time 0856  -BR      Stop Time 0907  -BR      Time Calculation (min) 11 min  -BR      PT Received On 01/14/25  -BR      PT - Next Appointment 01/15/25  -BR         Time Calculation- PT    Total Timed Code Minutes- PT 11 minute(s)  -BR                User Key  (r) = Recorded By, (t) = Taken By, (c) = Cosigned By      Initials Name Provider Type    BR Sobia Vides, PT Physical Therapist

## 2025-01-14 NOTE — PLAN OF CARE
Goal Outcome Evaluation:  Plan of Care Reviewed With: patient           Outcome Evaluation: Pt is a 74 y.o. year old female admitted 1/10/25 with hemoptysis.    Recent admissions 12/26 with abd pain and elevated troponin, 12/4 GI bleed and hemicolectomy, and 12/11 with chest pain.     PMHx significant for ileostomy, CAD, HTN, hyperlipidemia, RA, COPD, hypothyroidism, and Crohn's disease.    Pt admitted from [facility]. At baseline, pt resides on main level, x2 [facility], with x2 daughter and granddaugher (One works night shifts and the other works days shifts, able to provided 24/7 supervision) with a renter who is a family friend. Pt reports she is I with ADLs, utilizes walker and rollator though prefers walker, and does not drive. Pt c/o fatigue and pain, specifically abd and RUE pain. Bed mobility with Min - CGA. Min A for sit<>stand, but requires Min- Mod A for standing balance due to sigificant posterior lean. She requires mod A for LB ADLs and demos global weakness. MD arrives at beginning of OT eval and states pt is going home today, and patient is very excited to return home to [facility]. However, patient continues to be below stated baseline and would benefit from SNF rehab at [place].

## 2025-01-14 NOTE — SIGNIFICANT NOTE
Case Management/Social Work    Patient Name:  Maryann Gaitan  YOB: 1950  MRN: [id number]  Admit Date:  1/10/2025           01/14/25 0859   Post Acute Pre-Cert Documentation   Date Post Acute Pre-Cert Completed 01/14/25   All Clinicals Submitted? Yes   Response Received from Insurance? Approval   Post Acute Pre-Cert Initiated Comment SANDI verified SNF precert approval via [facility] portal. Plan auth ID [id number], portal auth ID [id number]. Valid 1/14-1/16. CM made aware.           Electronically signed by:  Tavon West CMA  01/14/25 09:00 EST    Tavon West  Case Management Associate  [address] [address]  P: [phone]  F: [phone]

## 2025-01-14 NOTE — PROGRESS NOTES
Daily Progress Note          Assessment    Hemoptysis  Pneumonia due to unspecified pathogen  Resolution of the left upper lobe cavitary lesion: Was seen October 2024 with bronchoscopy 10/16/2024 showing negative cultures.  But this admission there is development of spiculated multiple nodules.  COPD but not acute exacerbation  History of strongyloidosis  Rheumatoid arthritis and Crohn disease on biological therapy status post ileostomy with mucous fistula status post GI hemorrhage: on Humira, mesalamine methotrexate outpatient  Chronic anemia  CAD status post PCI to LAD 10/22/2024  HLD  Hypothyroidism  History of severe MR status post mitral valve MitraClip 11/21/2024  Thyroid hormone replacement        Recommendations:  Status post bronchoscopy 1/12/2025: No hemoptysis, multiple white mucous plugs seen, lavage from right upper lobe obtained to rule out opportunistic infections: AFB smear is negative, DC airborne isolation.  Gram-negative bacilli noted but the final is normal respiratory wu: Switched on 1/13/2025 antibiotics to Levaquin for 3 days    Patient can be discharged from pulmonary standpoint will follow-up in 2 weeks in the office     Resumed aspirin since the hemoptysis resolved       Oxygen supplement and titration to maintain saturation 90 to 95%, currently on room air  Bronchodilators     Levothyroxine  Protonix     I personally reviewed the radiological studies             LOS: 4 days     Subjective     Mild cough and and improved shortness of breath    Objective     Vital signs for last 24 hours:  Vitals:    01/14/25 0440 01/14/25 0816 01/14/25 0820 01/14/25 0825   BP: 118/58   131/64   BP Location: Left arm   Left arm   Patient Position: Lying   Lying   Pulse: 88 82 81 87   Resp: 20 20 20 18   Temp: 97.7 °F (36.5 °C)   97.5 °F (36.4 °C)   TempSrc: Oral   Oral   SpO2: (!) 70% 100% 100% 100%   Weight:       Height:           Intake/Output last 3 shifts:  I/O last 3 completed shifts:  In: 460  [P.O.:360; IV Piggyback:100]  Out: 750 [Stool:750]  Intake/Output this shift:  I/O this shift:  In: -   Out: 925 [Urine:800; Stool:125]      Radiology  Imaging Results (Last 24 Hours)       ** No results found for the last 24 hours. **            Labs:  Results from last 7 days   Lab Units 01/14/25  0325   WBC 10*3/mm3 9.09   HEMOGLOBIN g/dL 8.6*   HEMATOCRIT % 28.3*   PLATELETS 10*3/mm3 483*     Results from last 7 days   Lab Units 01/14/25  0325 01/13/25  0358 01/12/25  0215   SODIUM mmol/L 136   < > 138   POTASSIUM mmol/L 4.3   < > 4.5   CHLORIDE mmol/L 107   < > 110*   CO2 mmol/L 20.0*   < > 20.2*   BUN mg/dL 10   < > 13   CREATININE mg/dL 0.79   < > 0.86   CALCIUM mg/dL 8.6   < > 8.6   BILIRUBIN mg/dL  --   --  <0.2   ALK PHOS U/L  --   --  121*   ALT (SGPT) U/L  --   --  23   AST (SGOT) U/L  --   --  20   GLUCOSE mg/dL 79   < > 114*    < > = values in this interval not displayed.         Results from last 7 days   Lab Units 01/12/25  0215 01/10/25  1146   ALBUMIN g/dL 2.8* 2.9*     Results from last 7 days   Lab Units 01/10/25  1751 01/10/25  1146   HSTROP T ng/L 30* 50*     Results from last 7 days   Lab Units 01/12/25  0215   KYLAH  Negative     Results from last 7 days   Lab Units 01/14/25  0325   MAGNESIUM mg/dL 2.5*     Results from last 7 days   Lab Units 01/12/25  0219 01/10/25  1146   INR  1.04 1.06   APTT seconds  --  29.5               Meds:   SCHEDULE  aspirin, 81 mg, Oral, Daily  clopidogrel, 75 mg, Oral, Daily  ipratropium-albuterol, 3 mL, Nebulization, 4x Daily - RT  levoFLOXacin, 750 mg, Oral, Q24H  levothyroxine, 50 mcg, Oral, Daily  midodrine, 5 mg, Oral, TID AC  pantoprazole, 40 mg, Oral, Q AM  sertraline, 50 mg, Oral, Daily  sodium chloride, 10 mL, Intravenous, Q12H      Infusions     PRNs    acetaminophen **OR** acetaminophen **OR** acetaminophen    aluminum-magnesium hydroxide-simethicone    senna-docusate sodium **AND** polyethylene glycol **AND** bisacodyl **AND** bisacodyl    Calcium  Replacement - Follow Nurse / BPA Driven Protocol    diphenhydrAMINE    HYDROcodone-acetaminophen    Magnesium Standard Dose Replacement - Follow Nurse / BPA Driven Protocol    melatonin    nitroglycerin    ondansetron ODT **OR** ondansetron    Phosphorus Replacement - Follow Nurse / BPA Driven Protocol    Potassium Replacement - Follow Nurse / BPA Driven Protocol    [COMPLETED] Insert Peripheral IV **AND** sodium chloride    sodium chloride    sodium chloride    Physical Exam:  General Appearance:  Alert   HEENT:  Normocephalic, without obvious abnormality, Conjunctiva/corneas clear,.   Nares normal, no drainage     Neck:  Supple, symmetrical, trachea midline.   Lungs /Chest wall:   Good air entry with minimal bilateral basal rhonchi, respirations unlabored, symmetrical wall movement.     Heart:  Regular rate and rhythm, S1 S2 normal  Abdomen: Soft, non-tender, no masses, no organomegaly.    Extremities: No edema, no clubbing or cyanosis     ROS  Constitutional: Negative for chills, fever and malaise/fatigue.   HENT: Negative.    Eyes: Negative.    Cardiovascular: Negative.    Respiratory: Positive for improvement in the cough and shortness of breath.    Skin: Negative.    Musculoskeletal: Negative.    Gastrointestinal: Negative.    Genitourinary: Negative.    Neurological: Negative.    Psychiatric/Behavioral: Negative.      I reviewed the recent clinical results  I personally reviewed the latest radiological studies    Part of this note may be an electronic transcription/translation of spoken language to printed text using the Dragon Dictation System.

## 2025-01-14 NOTE — PLAN OF CARE
Goal Outcome Evaluation:  Plan of Care Reviewed With: patient        Progress: improving  Outcome Evaluation: Patient is anxious to discharge back home but agreeable to return to [facility]. Plavix restarted, observe s/sx of bleeding. Likely to discharge tomorrow if no drop in H&H or hemoptysis. Patient repositioned by staff every two hours to prevent skin break down. Fall precautions remain in place and call light within reach.

## 2025-01-15 LAB
ANION GAP SERPL CALCULATED.3IONS-SCNC: 10.8 MMOL/L (ref 5–15)
BACTERIA SPEC AEROBE CULT: NORMAL
BACTERIA SPEC AEROBE CULT: NORMAL
BASOPHILS # BLD AUTO: 0.12 10*3/MM3 (ref 0–0.2)
BASOPHILS NFR BLD AUTO: 1.4 % (ref 0–1.5)
BUN SERPL-MCNC: 9 MG/DL (ref 8–23)
BUN/CREAT SERPL: 13.6 (ref 7–25)
CALCIUM SPEC-SCNC: 9.3 MG/DL (ref 8.6–10.5)
CHLORIDE SERPL-SCNC: 105 MMOL/L (ref 98–107)
CO2 SERPL-SCNC: 21.2 MMOL/L (ref 22–29)
CREAT SERPL-MCNC: 0.66 MG/DL (ref 0.57–1)
DEPRECATED RDW RBC AUTO: 71.9 FL (ref 37–54)
EGFRCR SERPLBLD CKD-EPI 2021: 92.2 ML/MIN/1.73
EOSINOPHIL # BLD AUTO: 0.39 10*3/MM3 (ref 0–0.4)
EOSINOPHIL NFR BLD AUTO: 4.4 % (ref 0.3–6.2)
ERYTHROCYTE [DISTWIDTH] IN BLOOD BY AUTOMATED COUNT: 19.5 % (ref 12.3–15.4)
GEN 5 1HR TROPONIN T REFLEX: 21 NG/L
GLUCOSE SERPL-MCNC: 78 MG/DL (ref 65–99)
HCT VFR BLD AUTO: 29.5 % (ref 34–46.6)
HGB BLD-MCNC: 9 G/DL (ref 12–15.9)
IMM GRANULOCYTES # BLD AUTO: 0.44 10*3/MM3 (ref 0–0.05)
IMM GRANULOCYTES NFR BLD AUTO: 5 % (ref 0–0.5)
LYMPHOCYTES # BLD AUTO: 3.9 10*3/MM3 (ref 0.7–3.1)
LYMPHOCYTES NFR BLD AUTO: 44.1 % (ref 19.6–45.3)
MCH RBC QN AUTO: 30.6 PG (ref 26.6–33)
MCHC RBC AUTO-ENTMCNC: 30.5 G/DL (ref 31.5–35.7)
MCV RBC AUTO: 100.3 FL (ref 79–97)
MONOCYTES # BLD AUTO: 1.21 10*3/MM3 (ref 0.1–0.9)
MONOCYTES NFR BLD AUTO: 13.7 % (ref 5–12)
NEUTROPHILS NFR BLD AUTO: 2.78 10*3/MM3 (ref 1.7–7)
NEUTROPHILS NFR BLD AUTO: 31.4 % (ref 42.7–76)
NRBC BLD AUTO-RTO: 0.2 /100 WBC (ref 0–0.2)
PLATELET # BLD AUTO: 496 10*3/MM3 (ref 140–450)
PMV BLD AUTO: 9.5 FL (ref 6–12)
POTASSIUM SERPL-SCNC: 4.6 MMOL/L (ref 3.5–5.2)
RBC # BLD AUTO: 2.94 10*6/MM3 (ref 3.77–5.28)
SODIUM SERPL-SCNC: 137 MMOL/L (ref 136–145)
T4 FREE SERPL-MCNC: 0.95 NG/DL (ref 0.93–1.7)
TROPONIN T % DELTA: -16
TROPONIN T NUMERIC DELTA: -4 NG/L
TROPONIN T SERPL HS-MCNC: 25 NG/L
TSH SERPL DL<=0.05 MIU/L-ACNC: 7.68 UIU/ML (ref 0.27–4.2)
WBC NRBC COR # BLD AUTO: 8.84 10*3/MM3 (ref 3.4–10.8)

## 2025-01-15 PROCEDURE — 84439 ASSAY OF FREE THYROXINE: CPT | Performed by: INTERNAL MEDICINE

## 2025-01-15 PROCEDURE — 99024 POSTOP FOLLOW-UP VISIT: CPT | Performed by: STUDENT IN AN ORGANIZED HEALTH CARE EDUCATION/TRAINING PROGRAM

## 2025-01-15 PROCEDURE — 93005 ELECTROCARDIOGRAM TRACING: CPT | Performed by: INTERNAL MEDICINE

## 2025-01-15 PROCEDURE — 84484 ASSAY OF TROPONIN QUANT: CPT | Performed by: NURSE PRACTITIONER

## 2025-01-15 PROCEDURE — 85025 COMPLETE CBC W/AUTO DIFF WBC: CPT

## 2025-01-15 PROCEDURE — 93010 ELECTROCARDIOGRAM REPORT: CPT | Performed by: INTERNAL MEDICINE

## 2025-01-15 PROCEDURE — 80048 BASIC METABOLIC PNL TOTAL CA: CPT

## 2025-01-15 PROCEDURE — 94761 N-INVAS EAR/PLS OXIMETRY MLT: CPT

## 2025-01-15 PROCEDURE — 94799 UNLISTED PULMONARY SVC/PX: CPT

## 2025-01-15 PROCEDURE — 84443 ASSAY THYROID STIM HORMONE: CPT | Performed by: INTERNAL MEDICINE

## 2025-01-15 PROCEDURE — 99222 1ST HOSP IP/OBS MODERATE 55: CPT | Performed by: INTERNAL MEDICINE

## 2025-01-15 PROCEDURE — 93005 ELECTROCARDIOGRAM TRACING: CPT | Performed by: NURSE PRACTITIONER

## 2025-01-15 RX ORDER — METOPROLOL TARTRATE 1 MG/ML
5 INJECTION, SOLUTION INTRAVENOUS ONCE
Status: COMPLETED | OUTPATIENT
Start: 2025-01-15 | End: 2025-01-15

## 2025-01-15 RX ADMIN — IPRATROPIUM BROMIDE AND ALBUTEROL SULFATE 3 ML: .5; 3 SOLUTION RESPIRATORY (INHALATION) at 15:19

## 2025-01-15 RX ADMIN — Medication 10 ML: at 20:44

## 2025-01-15 RX ADMIN — METOPROLOL TARTRATE 5 MG: 5 INJECTION INTRAVENOUS at 14:01

## 2025-01-15 RX ADMIN — IPRATROPIUM BROMIDE AND ALBUTEROL SULFATE 3 ML: .5; 3 SOLUTION RESPIRATORY (INHALATION) at 07:18

## 2025-01-15 RX ADMIN — HYDROCODONE BITARTRATE AND ACETAMINOPHEN 1 TABLET: 5; 325 TABLET ORAL at 00:44

## 2025-01-15 RX ADMIN — Medication 5 MG: at 00:44

## 2025-01-15 RX ADMIN — IPRATROPIUM BROMIDE AND ALBUTEROL SULFATE 3 ML: .5; 3 SOLUTION RESPIRATORY (INHALATION) at 19:14

## 2025-01-15 RX ADMIN — MIDODRINE HYDROCHLORIDE 5 MG: 5 TABLET ORAL at 17:50

## 2025-01-15 RX ADMIN — MIDODRINE HYDROCHLORIDE 5 MG: 5 TABLET ORAL at 11:04

## 2025-01-15 RX ADMIN — CLOPIDOGREL BISULFATE 75 MG: 75 TABLET ORAL at 08:50

## 2025-01-15 RX ADMIN — SERTRALINE HYDROCHLORIDE 50 MG: 50 TABLET, FILM COATED ORAL at 08:50

## 2025-01-15 RX ADMIN — LEVOTHYROXINE SODIUM 50 MCG: 50 TABLET ORAL at 08:50

## 2025-01-15 RX ADMIN — ASPIRIN 81 MG: 81 TABLET, COATED ORAL at 08:50

## 2025-01-15 RX ADMIN — PANTOPRAZOLE SODIUM 40 MG: 40 TABLET, DELAYED RELEASE ORAL at 06:12

## 2025-01-15 RX ADMIN — IPRATROPIUM BROMIDE AND ALBUTEROL SULFATE 3 ML: .5; 3 SOLUTION RESPIRATORY (INHALATION) at 11:21

## 2025-01-15 NOTE — PROGRESS NOTES
General Surgery Progress Note    Name: Maryann Gaitan ADMIT: 1/10/2025   : 1950  PCP: Azam Calhoun MD    MRN: [id number] LOS: 5 days   AGE/SEX: 74 y.o. female  ROOM: [address] [place] [address]    Chief Complaint   Patient presents with    Coughing Up Blood     Subjective     Pleasantly confused, nurse says she has had no bloody ostomy output since admission, incisions healed.    Objective     Scheduled Medications:   aspirin, 81 mg, Oral, Daily  clopidogrel, 75 mg, Oral, Daily  ipratropium-albuterol, 3 mL, Nebulization, 4x Daily - RT  levoFLOXacin, 750 mg, Oral, Q24H  levothyroxine, 50 mcg, Oral, Daily  midodrine, 5 mg, Oral, TID AC  pantoprazole, 40 mg, Oral, Q AM  sertraline, 50 mg, Oral, Daily  sodium chloride, 10 mL, Intravenous, Q12H        Active Infusions:       As Needed Medications:    acetaminophen **OR** acetaminophen **OR** acetaminophen    aluminum-magnesium hydroxide-simethicone    senna-docusate sodium **AND** polyethylene glycol **AND** bisacodyl **AND** bisacodyl    Calcium Replacement - Follow Nurse / BPA Driven Protocol    diphenhydrAMINE    HYDROcodone-acetaminophen    Magnesium Standard Dose Replacement - Follow Nurse / BPA Driven Protocol    melatonin    nitroglycerin    ondansetron ODT **OR** ondansetron    Phosphorus Replacement - Follow Nurse / BPA Driven Protocol    Potassium Replacement - Follow Nurse / BPA Driven Protocol    [COMPLETED] Insert Peripheral IV **AND** sodium chloride    sodium chloride    sodium chloride    Vital Signs  Vital Signs Patient Vitals for the past 24 hrs:   BP Temp Temp src Pulse Resp SpO2   01/15/25 0852 -- -- -- 114 24 --   01/15/25 0833 132/67 -- -- 120 -- --   01/15/25 0724 -- -- -- 82 20 98 %   01/15/25 0718 -- -- -- 85 20 98 %   01/15/25 0400 118/60 97.9 °F (36.6 °C) Oral 86 20 100 %   01/15/25 0000 116/60 98.3 °F (36.8 °C) Oral 94 23 99 %   255 118/63 98 °F (36.7 °C) Oral 109 21 96 %   25 -- -- -- 95 20 --   25 -- -- --  96 20 --   01/14/25 1542 137/70 98 °F (36.7 °C) Oral 116 28 --   01/14/25 1430 -- -- -- 98 20 95 %   01/14/25 1426 -- -- -- 97 20 95 %   01/14/25 1135 -- -- -- 99 20 95 %   01/14/25 1131 -- -- -- 97 21 100 %   01/14/25 1114 119/56 97.6 °F (36.4 °C) Oral 105 18 100 %   01/14/25 1048 -- -- -- 106 -- 99 %     I/O:  I/O last 3 completed shifts:  In: 600 [P.O.:600]  Out: 2575 [Urine:1200; Stool:1375]    Physical Exam:  Physical Exam  Constitutional:       General: She is not in acute distress.     Appearance: Normal appearance. She is not ill-appearing.   HENT:      Head: Normocephalic and atraumatic.      Right Ear: External ear normal.      Left Ear: External ear normal.   Eyes:      Extraocular Movements: Extraocular movements intact.      Conjunctiva/sclera: Conjunctivae normal.   Cardiovascular:      Rate and Rhythm: Normal rate and regular rhythm.   Pulmonary:      Effort: Pulmonary effort is normal. No respiratory distress.   Abdominal:      General: There is no distension.      Palpations: Abdomen is soft.      Tenderness: There is no abdominal tenderness.   Musculoskeletal:         General: No swelling or deformity.   Skin:     General: Skin is warm and dry.   Neurological:      Mental Status: She is alert and oriented to person, place, and time. Mental status is at baseline.         Results Review:     CBC    Results from last 7 days   Lab Units 01/15/25  0730 01/14/25 0325 01/13/25 0358 01/12/25 0215 01/11/25  0031 01/10/25  1146   WBC 10*3/mm3 8.84 9.09 8.24 6.00 5.75 6.92   HEMOGLOBIN g/dL 9.0* 8.6* 7.9* 7.4* 7.6* 8.4*   PLATELETS 10*3/mm3 496* 483* 435 427 416 469*     BMP   Results from last 7 days   Lab Units 01/15/25  0730 01/14/25  0325 01/13/25  0358 01/12/25  0215 01/11/25  0031 01/10/25  1146   SODIUM mmol/L 137 136 137 138 139 138   POTASSIUM mmol/L 4.6 4.3 4.2 4.5 5.1 5.4*   CHLORIDE mmol/L 105 107 110* 110* 111* 108*   CO2 mmol/L 21.2* 20.0* 18.6* 20.2* 18.5* 21.7*   BUN mg/dL 9 10 11 13 18 24*    CREATININE mg/dL 0.66 0.79 0.78 0.86 0.81 1.40*   GLUCOSE mg/dL 78 79 82 114* 83 93   MAGNESIUM mg/dL  --  2.5* 1.4* 1.7 2.0 1.2*   PHOSPHORUS mg/dL  --   --  3.0 3.5 3.7  --      Radiology(recent) No radiology results for the last day    I reviewed the patient's new clinical results.    Assessment & Plan       Hemoptysis    Multifocal pneumonia    74-year-old lady status post right hemicolectomy for acute blood loss in setting of Crohn's disease.  Pleasantly confused, nurse says she has had no bloody ostomy output since admission, incisions healed.  Okay for discharge from my standpoint.  Recommend continuing twice daily Metamucil, if has over a liter of ostomy output and today can take up to 8 tablets of Imodium daily.  If continues to have high ostomy output on Imodium and fiber, call the office and we will prescribe Lomotil.  Will need to wait 6 months from her cardiac stent to stop dual antiplatelet, will see her in the office in March to discuss ostomy reversal.  Will be available inpatient as needed please call with changes questions or concerns.        This note was created using Dragon Voice Recognition software.    Todd Babin MD  01/15/25  09:49 EST

## 2025-01-15 NOTE — PLAN OF CARE
Problem: Adult Inpatient Plan of Care  Goal: Absence of Hospital-Acquired Illness or Injury  Intervention: Identify and Manage Fall Risk  Description: Perform standard risk assessment on admission using a validated tool or comprehensive approach appropriate to the patient; reassess fall risk frequently, with change in status or transfer to another level of care.  Communicate risk to interprofessional healthcare team; ensure fall risk visible cue.  Determine need for increased observation, equipment and environmental modification, as well as use of supportive, nonskid footwear.  Adjust safety measures to individual needs and identified risk factors.  Reinforce the importance of active participation with fall risk prevention, safety, and physical activity with the patient and family.  Perform regular intentional rounding to assess need for position change, pain assessment and personal needs, including assistance with toileting.  Recent Flowsheet Documentation  Taken 1/15/2025 0200 by Enriqeu Ku LPN  Safety Promotion/Fall Prevention:   safety round/check completed   room organization consistent   nonskid shoes/slippers when out of bed   muscle strengthening facilitated   lighting adjusted   mobility aid in reach   fall prevention program maintained   clutter free environment maintained   assistive device/personal items within reach   activity supervised  Taken 1/15/2025 0000 by Enrique Ku LPN  Safety Promotion/Fall Prevention:   room organization consistent   safety round/check completed   nonskid shoes/slippers when out of bed   muscle strengthening facilitated   mobility aid in reach   lighting adjusted   fall prevention program maintained   assistive device/personal items within reach   clutter free environment maintained   activity supervised  Taken 1/14/2025 2030 by Enrique Ku LPN  Safety Promotion/Fall Prevention:   safety round/check completed   room organization consistent    nonskid shoes/slippers when out of bed   muscle strengthening facilitated   mobility aid in reach   lighting adjusted   fall prevention program maintained   clutter free environment maintained   assistive device/personal items within reach   activity supervised  Intervention: Prevent Skin Injury  Description: Perform a screening for skin injury risk, such as pressure or moisture-associated skin damage on admission and at regular intervals throughout hospital stay.  Keep all areas of skin (especially folds) clean and dry.  Maintain adequate skin hydration.  Relieve and redistribute pressure and protect bony prominences and skin at risk for injury; implement measures based on patient-specific risk factors.  Match turning and repositioning schedule to clinical condition.  Encourage weight shift frequently; assist with reposition if unable to complete independently.  Float heels off bed; avoid pressure on the Achilles tendon.  Keep skin free from extended contact with medical devices.  Optimize nutrition and hydration.  Encourage functional activity and mobility, as early as tolerated.  Use aids (e.g., slide boards, mechanical lift) during transfer.  Recent Flowsheet Documentation  Taken 1/15/2025 0200 by Enrique Ku LPN  Skin Protection: incontinence pads utilized  Taken 1/15/2025 0100 by Enrique Ku LPN  Body Position: turned  Taken 1/15/2025 0000 by Enrique Ku LPN  Body Position: weight shifting  Skin Protection: incontinence pads utilized  Taken 1/14/2025 2030 by Enrique Ku LPN  Body Position: weight shifting  Skin Protection: incontinence pads utilized  Intervention: Prevent Infection  Description: Maintain skin and mucous membrane integrity; promote hand, oral and pulmonary hygiene.  Optimize fluid balance, nutrition, sleep and glycemic control to maximize infection resistance.  Identify potential sources of infection early to prevent or mitigate progression of infection  (e.g., wound, lines, devices).  Evaluate ongoing need for invasive devices; remove promptly when no longer indicated.  Review vaccination status.  Recent Flowsheet Documentation  Taken 1/15/2025 0200 by Enrique Ku LPN  Infection Prevention:   visitors restricted/screened   single patient room provided   rest/sleep promoted   personal protective equipment utilized   hand hygiene promoted  Taken 1/15/2025 0000 by Enrique Ku LPN  Infection Prevention:   visitors restricted/screened   single patient room provided   rest/sleep promoted   personal protective equipment utilized   hand hygiene promoted  Taken 1/14/2025 2030 by Enrique Ku LPN  Infection Prevention:   visitors restricted/screened   single patient room provided   rest/sleep promoted   personal protective equipment utilized   hand hygiene promoted  Goal: Optimal Comfort and Wellbeing  Intervention: Monitor Pain and Promote Comfort  Description: Assess pain level, treatment efficacy and patient response at regular intervals using a consistent pain scale.  Consider the presence and impact of preexisting chronic pain.  Encourage patient and caregiver involvement in pain assessment, interventions and safety measures.  Promote activity; balance with sleep and rest to enhance healing.  Recent Flowsheet Documentation  Taken 1/15/2025 0044 by Enrique Ku LPN  Pain Management Interventions:   relaxation techniques promoted   position adjusted   pain medication given   pain management plan reviewed with patient/caregiver  Taken 1/15/2025 0000 by Enrique Ku LPN  Pain Management Interventions:   position adjusted   pain management plan reviewed with patient/caregiver   medication offered but refused  Taken 1/14/2025 2030 by Enrique Ku LPN  Pain Management Interventions:   quiet environment facilitated   position adjusted   pain management plan reviewed with patient/caregiver   medication offered but refused   Goal  Outcome Evaluation:   Progressing toward goal plan is to discharge in am back to SNF to continue their rehab, after MD rounds, will monitor

## 2025-01-15 NOTE — NURSING NOTE
Pt remove their IV on their own, pt explain to this nurse the reason of the iv was catching onto their hospital ID armband , pt was educated that the policy is to maintain an IV  till discharge orders were obtain or if the MD was to orders  to discontinue  of the IV, and educated pt to call the nurse to inform them of the discomfort, or that items are causing an irration or pulling at the IV Pt verbalize understanding and they wish to not to have the IV reinserted, agreed to leave out unless the MD was to ordered any type medication that required an IV route, then a staff member would have to reinsert the IV., again pt indication of understanding and would be agreeable.

## 2025-01-15 NOTE — CASE MANAGEMENT/SOCIAL WORK
Continued Stay Note  ERIC Yeung     Patient Name: Maryann Gaitan  MRN: [id number]  Today's Date: 1/15/2025    Admit Date: 1/10/2025    Plan: DC PLAN: From [facility]. Okay to return Precert approved 1/14-1/16. May need transport at DC.     Discharge Plan       Row Name 01/15/25 1617       Plan    Plan DC PLAN: From [facility]. Okay to return [facility] approved 1/14-1/16. May need transport at DC.      Patient/Family in Agreement with Plan yes    Plan Comments DC BARRIERS: Devoped chest pain, Cardiology consult. Troponins, Once Cardiology signs off, can discharge. Anticipate AM discharge.                      Expected Discharge Date and Time       Expected Discharge Date Expected Discharge Time    Jan 16, 2025           Arianne Hendricks RN    Case Management  [phone]

## 2025-01-15 NOTE — CONSULTS
Cardiology Consult Note    Patient Identification:  Name: Maryann Gaitan  Age: 74 y.o.  Sex: female  :  1950  MRN: [id number]             Requesting Physician :  Dr. Ramirez, Hospitalist     Reason for Consultation / Chief Complaint :   Chest pain     History of Present Illness:      Ms. Maryann Gaitan has PMH of     CAD status post cardiac cath with multivessel CAD poor candidate for CABG, underwent PCI to ostial and proximal LAD 10/22/2024  Moderate to severe MR with central jet noted status post Azeb Clip 2024   COPD  Hypertension  Rheumatoid arthritis  Crohn's disease  GI bleed status post right hemicolectomy with ileostomy 2024     Presented to the ED on 1/10/2025 with shortness of breath and hemoptysis.  Patient had CT chest that showed intervental development of spiculated nodules in the lungs favored to be inflammatory vs infectious; stable descending thoracic aortic aneurysm 3.8 cm with diffuse irregular mural thrombus.  Patient has been receiving antibiotic therapy with some improvement.  Today she apparently complained of chest pain therefore cardiology was consulted.  Patient reports that she had another episode of coughing with blood in her sputum.  On my examination she was sitting in bed, heart rate was mildly elevated in the low 100s.       Patient underwent bronchoscopy on 2025 that showed multiple lung nodules and multiple mucous plugging    Labs on admission showed high-sensitivity troponin of 50--30 potassium 5.4 BUN 24 creatinine 1.4 hemoglobin 8.4 platelets 469 chest x-ray was unremarkable    Labs today showed high-sensitivity troponin of 25--21 potassium 4.6 TSH 7.68 hemoglobin 9 platelets 496      On re-examination with Dr. Connor HR was in the 140s, patient was up in the chair eating lunch.  She did report some mild palpitations.    Dr. Connor ordered EKG and IV metoprolol 5mg and may repeat in 15 mins.       Further assessment and plan per   Geeta    Electronically signed by Corina Murcia, APRN, 01/15/25, 3:28 PM EST.    Cardiology attending addendum :    I have personally performed a face-to-face diagnostic evaluation, physical exam and reviewed data on this patient.  I have reviewed documentation done by me and nurse practitioner  and corrected as needed.  And agree with the different components of documentation.Greater than 50% of the time spent in the care of this patient was provided by attending consultant/me.               Recent hospitalization     Patient was recently in the hospital 10/8/2024 with nausea vomiting diarrhea and abnormal labs.  With hyponatremia and hypokalemia and anemia.  Patient suddenly started having chest pain and fast team was called because of sharp left-sided chest pain with shortness of breath dizziness EKG showed sinus tachycardia and cardiac workup revealed severe MR, cath 10/15/2024 revealing severe ostial LAD and outpouching in the descending thoracic aorta probably a penetrating aortic ulcer versus aneurysm.  CT surgery was consulted and she was turned down for CABG therefore patient underwent drug-eluting stent to ostial LAD on 10/22/2024 and was supposed to have clip to mitral valve in follow-up.  In the meantime has been having worsening symptoms and abdominal pain, weight loss.  Patient has been restarted on Plavix.    Presented 11/6/2024 through [facility] with complaint of abdominal pain and dark red stool / rectal bleed.  Patient has close disease and rheumatoid arthritis and is on immunosuppressants methotrexate, Humira, Rinvoq now presented with rectal bleed and abdominal pain.  GI did a scope and biopsies of terminal ileum which were consistent with active Crohn's and biopsies of stomach and duodenum showed strong colitis infection, was treated by ivermectin.  GI started patient on Solu-Medrol and is holding immunosuppressants and wants to hold Plavix.  Presented to the emergency room on 11/19/2024 with  abdominal pain and chest pain.  Patient reports that she started having bright red blood per rectum again yesterday and has been having constant chest pain since.    CT abdomen and pelvis shows distal terminal ileitis colonic fatty marrow infiltration which can be seen in the setting of chronic inflammatory bowel disease   patient was transfused with 1 unit of PRBC and hemoglobin improved to 7.3 and this morning she is 9.1     Unfortunately patient is in the tough situation as she had stent placed on 10/22/2024 and needs dual antiplatelet therapy to prevent clotting of stent however she has presented for the second time with significant GI bleeding.  Currently her aspirin and Plavix are both on hold.           Data:       EGD/colonoscopy 10/12/2024 revealed small hiatal hernia, nonerosive gastritis, T1 stricture s/p dilatation with 12 mm, T1 ulcer, colon ulcers, sigmoid diverticulosis, grade 2 internal hemorrhoids and strongyloides  Echo 10/12/2024 EF of 51 to 55% with mild RV enlargement, severe left atrial enlargement, moderate to severe MR  Transesophageal echo 10/16/2024: LVEF of 55 to 60% with severe left atrial enlargement, moderate to severe MR and grade 3 descending aortic plaque  Cardiac cath 10/15/2024 reveals total right and severe ostial LAD, descending thoracic aorta had an outpouching consistent with penetrating aortic ulcer versus aneurysm.   Patient was evaluated by CT surgery not a candidate for CABG therefore patient underwent PCI and drug-eluting stent to the ostium proximal LAD.            Assessment:  :    Chest pain, occurring at rest, atypical for angina  Tachyarrhythmia  Hemoptysis  Multiple lung nodules and multiple mucous plugging  Multifocal pneumonia  Recent abdominal pain, rectal bleed status post right hemicolectomy with ileostomy  Anemia  Crohn's disease  CAD history of PCI  Mitral regurgitation status post MitraClip  Chronic HFpEF due to valvular heart disease from mitral  regurgitation, now well compensated  Aortic aneurysm/descending thoracic aortic ulcer  Hypertension cardiovascular disease  COPD, chronic steroid therapy  Anemia  Hyperglycemia/prediabetes       Recommendations / Plan:        Patient presented 1/10/2025 with complaint of acute hemoptysis.  Patient has been having chest pain which is like tightness.  Patient has known coronary artery disease previous PCI with dyslipidemia history.  HS troponin is elevated at 25 but has no significant trend and repeat was 21.  Labs reveal normal BMP, TSH is elevated, CBC with a hemoglobin of 9  EKG #1 done at 9:45 AM on 1/15/2025 reveals sinus rhythm at the rate of 94 bpm with no acute ST-T change  Patient went into sinus tachycardia at 150 bpm cannot rule out underlying atrial flutter.  Will try to give IV metoprolol boluses   If he does not convert will transfer to PCU and start patient on IV amiodarone drip.  Patient was in the hospital 11/19/2024 with bleeding and hemoglobin of 5.7 requiring transfusion.  Has CAD and had drug-eluting stent placed 10/22/2024.  Will benefit from dual antiplatelet therapy.  Had MitraClip done on 11/21/2024.  But unfortunately patient had bleeding transfusion and right hemicolectomy and end ileostomy on 11/28/2024.  Will continue dual antiplatelet therapy as tolerated.    Will monitor rhythm.  Will follow and consider further evaluation treatment              Review of records     Patient underwent cardiac cath 10/15/2024 which revealed total right and severe ostial LAD disease.  Descending thoracic aorta has an outpouching probably saccular aneurysm versus  penetrating aortic ulcer .  Echocardiogram 10/12/2024 is revealing EF of 51 to 55% with mild RV enlargement severe left atrial enlargement and right atrial enlargement and moderate to severe MR  GONZÁLEZ is revealing moderate to severe MR.                Diagnosis Plan   1. Pneumonia of both lungs due to infectious organism, unspecified part of lung         2. Hemoptysis  Case request    Case request    Non-gynecologic Cytology    Non-gynecologic Cytology    Fungus Culture - Lavage, Lung, R    Fungus Culture - Lavage, Lung, R    AFB Culture - Lavage, Lung, R    AFB Culture - Lavage, Lung, R    BAL Culture, Quantitative - Lavage, Lung, R    BAL Culture, Quantitative - Lavage, Lung, R    Histoplasma Antigen, CSF or BAL - Lavage, Lung, R    Histoplasma Antigen, CSF or BAL - Lavage, Lung, R    Pneumocystis PCR - Lavage, Lung, R    Pneumocystis PCR - Lavage, Lung, R    Respiratory Panel PCR w/COVID-19(SARS-CoV-2) ANAMIKA/FABRICIO/JOSSY/PAD/COR/DANIELLE In-House, NP Swab in UTM/VTM, 2 HR TAT - Lavage, Lung, R    Respiratory Panel PCR w/COVID-19(SARS-CoV-2) ANAMIKA/FABRICIO/JOSSY/PAD/COR/DANIELLE In-House, NP Swab in UTM/VTM, 2 HR TAT - Lavage, Lung, R      3. Hypomagnesemia        4. Multifocal pneumonia  Case request    Case request    Non-gynecologic Cytology    Non-gynecologic Cytology    Fungus Culture - Lavage, Lung, R    Fungus Culture - Lavage, Lung, R    AFB Culture - Lavage, Lung, R    AFB Culture - Lavage, Lung, R    BAL Culture, Quantitative - Lavage, Lung, R    BAL Culture, Quantitative - Lavage, Lung, R    Histoplasma Antigen, CSF or BAL - Lavage, Lung, R    Histoplasma Antigen, CSF or BAL - Lavage, Lung, R    Pneumocystis PCR - Lavage, Lung, R    Pneumocystis PCR - Lavage, Lung, R    Respiratory Panel PCR w/COVID-19(SARS-CoV-2) ANAMIKA/FABRICIO/JOSSY/PAD/COR/DANIELLE In-House, NP Swab in UTM/VTM, 2 HR TAT - Lavage, Lung, R    Respiratory Panel PCR w/COVID-19(SARS-CoV-2) ANAMIKA/FABRICIO/JOSSY/PAD/COR/DANIELLE In-House, NP Swab in UTM/VTM, 2 HR TAT - Lavage, Lung, R                 Past Medical History:  Past Medical History:   Diagnosis Date    Abnormal weight loss 11/21/2024    Acute kidney injury 11/06/2024    Acute UTI (urinary tract infection) 11/06/2024    Anxiety associated with depression 11/06/2024    Benign neoplasm of cecum 07/05/2016    CAD, multiple vessel 10/08/2024    Cavitary lesion of lung 10/08/2024     COPD (chronic obstructive pulmonary disease)     Crohn's disease 11/06/2024    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 07/05/2016    Dyslipidemia 10/30/2024    Fecal urgency 11/21/2024    First degree hemorrhoids 07/09/2021    GERD (gastroesophageal reflux disease) 11/21/2024    Hashimoto's thyroiditis 11/21/2024    History of colonic polyps 07/09/2021    Hypertension     Hypothyroidism (acquired) 11/06/2024    Mitral regurgitation 10/08/2024    Moderate protein-calorie malnutrition 11/09/2024    Multiple tracheobronchial mucus plugs 10/08/2024    Nicotine dependence 11/21/2024    Rheumatoid arthritis 11/06/2024    S/P mitral valve clip implantation 11/21/2024    Second degree hemorrhoids 07/05/2016    Stress incontinence, female 11/21/2024    Vitamin D deficiency, unspecified 11/21/2024     Past Surgical History:  Past Surgical History:   Procedure Laterality Date    BRONCHOSCOPY N/A 10/16/2024    Procedure: BRONCHOSCOPY;  Surgeon: Gallo Pope MD;  Location: [facility] ENDOSCOPY;  Service: Pulmonary;  Laterality: N/A;    BRONCHOSCOPY N/A 1/12/2025    Procedure: BRONCHOSCOPY WITH LAVAGE;  Surgeon: Gallo Pope MD;  Location: [facility] ENDOSCOPY;  Service: Pulmonary;  Laterality: N/A;    CARDIAC CATHETERIZATION N/A 10/15/2024    Procedure: Left Heart Cath, possible pci;  Surgeon: Travis Connor MD;  Location: [facility] CATH INVASIVE LOCATION;  Service: Cardiovascular;  Laterality: N/A;    CARDIAC CATHETERIZATION N/A 10/22/2024    Procedure: Laser Coronary Atherectomy;  Surgeon: Travis Connor MD;  Location: [facility] CATH INVASIVE LOCATION;  Service: Cardiovascular;  Laterality: N/A;    COLON RESECTION Right 11/28/2024    Procedure: RIGHT HEMICOLECTOMY WITH ILEOSTOMY;  Surgeon: Todd Babin MD;  Location: [facility] MAIN OR;  Service: General;  Laterality: Right;    COLONOSCOPY N/A 10/12/2024    Procedure: COLONOSCOPY WITH BIOPSY AND WIRE GUIDED BALLOON DILATION OF TERMINAL ILEUM;   Surgeon: Rob Strong MD;  Location: [facility] ENDOSCOPY;  Service: Gastroenterology;  Laterality: N/A;  Colitis, crohns of terminal ileum, right colon ulcers, diverticulosis, hemorroids    ENDOSCOPY N/A 10/12/2024    Procedure: ESOPHAGOGASTRODUODENOSCOPY WITH BIOPSY X 2 AREA;  Surgeon: Rob Strong MD;  Location: [facility] ENDOSCOPY;  Service: Gastroenterology;  Laterality: N/A;  Chronic gastritis, HH    ENDOSCOPY Left 11/28/2024    Procedure: ESOPHAGOGASTRODUODENOSCOPY;  Surgeon: Dallin Delgado MD;  Location: [facility] ENDOSCOPY;  Service: Gastroenterology;  Laterality: Left;  small hiatal hernia    HYSTERECTOMY      LEFT HEART CATH      MITRAL VALVE REPAIR/REPLACEMENT N/A 11/21/2024    Procedure: Transcatheter Mitral Valve Repair;  Surgeon: Dmitri Navarro MD;  Location: [facility] HYBRID OR;  Service: Cardiovascular;  Laterality: N/A;      Allergies:  Allergies   Allergen Reactions    Codeine Hives    Penicillin G Sodium Hives     Home Meds:  Medications Prior to Admission   Medication Sig Dispense Refill Last Dose/Taking    Adalimumab (Humira, 2 Syringe,) 20 MG/0.2ML Prefilled Syringe Kit Inject 0.4 mL under the skin into the appropriate area as directed Every 7 (Seven) Days.   1/6/2025 Morning    albuterol (PROVENTIL) (2.5 MG/3ML) 0.083% nebulizer solution Take 2.5 mg by nebulization Every 6 (Six) Hours As Needed for Wheezing. Indications: Spasm of Lung Air Passages   Taking As Needed    aspirin 81 MG EC tablet Take 1 tablet by mouth Daily for 30 days. Indications: Disease involving Lipid Deposits in the Arteries 30 tablet 0 Taking    atorvastatin (LIPITOR) 40 MG tablet Take 1 tablet by mouth Every Night.   Taking    calcium carbonate (OS-ARABELLA) 600 MG tablet Take 1 tablet by mouth 2 (Two) Times a Day With Meals.   Taking    cholestyramine light 4 g packet Take 1 packet by mouth 2 (Two) Times a Day. 60 packet 3 Taking    diclofenac (VOLTAREN) 50 MG EC tablet Take 1 tablet by mouth 2 (Two)  Times a Day.   Taking    diphenoxylate-atropine (LOMOTIL) 2.5-0.025 MG per tablet Take 1 tablet by mouth Every 6 (Six) Hours As Needed for Diarrhea.   Taking As Needed    folic acid (FOLVITE) 1 MG tablet Take 1 tablet by mouth Daily.   Taking    guaifenesin (ROBITUSSIN) 100 MG/5ML liquid Take 10 mL by mouth Every 4 (Four) Hours As Needed for Cough. 180 mL 0 Taking As Needed    HYDROcodone-acetaminophen (NORCO) 5-325 MG per tablet Take 1 tablet by mouth 2 (Two) Times a Day.   Taking    HYDROcodone-acetaminophen (NORCO) 5-325 MG per tablet Take 1 tablet by mouth Every 6 (Six) Hours As Needed for Severe Pain.   Taking As Needed    levothyroxine (SYNTHROID, LEVOTHROID) 50 MCG tablet Take 1 tablet by mouth Every Morning.   Taking    Melatonin (Melatonin Extra Strength) 10 MG tablet Take 1 tablet by mouth As Needed. Indications: Trouble Sleeping   Taking As Needed    mesalamine (Canasa) 1000 MG suppository Insert 1 suppository into the rectum Every Night.   Taking    methotrexate 2.5 MG tablet Take 1 tablet by mouth 1 (One) Time Per Week. Tuesdays   Taking    metoprolol tartrate (LOPRESSOR) 12.5 MG half tablet Take 2 half tablet by mouth 2 (Two) Times a Day.   Taking    midodrine (PROAMATINE) 5 MG tablet Take 1 tablet by mouth 3 (Three) Times a Day Before Meals. Indications: Disorder of Low Blood Pressure 90 tablet 0 Taking    ondansetron ODT (ZOFRAN-ODT) 4 MG disintegrating tablet Take 1 tablet by mouth Every 6 (Six) Hours As Needed for Nausea or Vomiting. Indications: Nausea and Vomiting Following an Operation 30 tablet 0 Taking As Needed    pantoprazole (PROTONIX) 40 MG EC tablet Take 1 tablet by mouth Daily.   Taking    sertraline (ZOLOFT) 50 MG tablet Take 1 tablet by mouth Daily.   Taking    spironolactone (ALDACTONE) 25 MG tablet Take 1 tablet by mouth Daily.   Taking    upadacitinib ER (Rinvoq) 45 MG tablet sustained-release 24 hour extended release tablet Take 1 tablet by mouth Daily.   Taking    vitamin D3 125  MCG (5000 UT) capsule capsule Take 1 capsule by mouth 2 (Two) Times a Week. Monday and Thursday   Taking     Current Meds:     Current Facility-Administered Medications:     acetaminophen (TYLENOL) tablet 650 mg, 650 mg, Oral, Q4H PRN, 650 mg at 01/11/25 1654 **OR** acetaminophen (TYLENOL) 160 MG/5ML oral solution 650 mg, 650 mg, Oral, Q4H PRN **OR** acetaminophen (TYLENOL) suppository 650 mg, 650 mg, Rectal, Q4H PRN, Gallo Pope MD    aluminum-magnesium hydroxide-simethicone (MAALOX MAX) 400-400-40 MG/5ML suspension 15 mL, 15 mL, Oral, Q6H PRN, Gallo Pope MD    aspirin EC tablet 81 mg, 81 mg, Oral, Daily, Gallo Pope MD, 81 mg at 01/15/25 0850    sennosides-docusate (PERICOLACE) 8.6-50 MG per tablet 2 tablet, 2 tablet, Oral, BID PRN **AND** polyethylene glycol (MIRALAX) packet 17 g, 17 g, Oral, Daily PRN **AND** bisacodyl (DULCOLAX) EC tablet 5 mg, 5 mg, Oral, Daily PRN **AND** bisacodyl (DULCOLAX) suppository 10 mg, 10 mg, Rectal, Daily PRN, Gallo Pope MD    Calcium Replacement - Follow Nurse / BPA Driven Protocol, , Not Applicable, PRN, Gallo Pope MD    clopidogrel (PLAVIX) tablet 75 mg, 75 mg, Oral, Daily, Luma Ramirez MD, 75 mg at 01/15/25 0850    diphenhydrAMINE (BENADRYL) capsule 25 mg, 25 mg, Oral, Q8H PRN, Gallo Pope MD    HYDROcodone-acetaminophen (NORCO) 5-325 MG per tablet 1 tablet, 1 tablet, Oral, Q6H PRN, Gallo Pope MD, 1 tablet at 01/15/25 0044    ipratropium-albuterol (DUO-NEB) nebulizer solution 3 mL, 3 mL, Nebulization, 4x Daily - RT, Gallo Pope MD, 3 mL at 01/15/25 1121    levothyroxine (SYNTHROID, LEVOTHROID) tablet 50 mcg, 50 mcg, Oral, Daily, Gallo Pope MD, 50 mcg at 01/15/25 0850    Magnesium Standard Dose Replacement - Follow Nurse / BPA Driven Protocol, , Not Applicable, PRN, Gallo Pope MD    melatonin tablet 5 mg, 5 mg, Oral, Nightly PRN, Gallo Pope MD, 5 mg at 01/15/25 0044    midodrine (PROAMATINE) tablet 5 mg, 5 mg, Oral, TID AC,  Gallo Pope MD, 5 mg at 01/15/25 1104    nitroglycerin (NITROSTAT) SL tablet 0.4 mg, 0.4 mg, Sublingual, Q5 Min PRN, Gallo Pope MD    ondansetron ODT (ZOFRAN-ODT) disintegrating tablet 4 mg, 4 mg, Oral, Q6H PRN **OR** ondansetron (ZOFRAN) injection 4 mg, 4 mg, Intravenous, Q6H PRN, Gallo Pope MD    pantoprazole (PROTONIX) EC tablet 40 mg, 40 mg, Oral, Q AM, Gallo Pope MD, 40 mg at 01/15/25 0612    Phosphorus Replacement - Follow Nurse / BPA Driven Protocol, , Not Applicable, PRToshia AVALOS Youssef, MD    Potassium Replacement - Follow Nurse / BPA Driven Protocol, , Not Applicable, PRROBBIE, Gallo Pope MD    sertraline (ZOLOFT) tablet 50 mg, 50 mg, Oral, Daily, Gallo Pope MD, 50 mg at 01/15/25 0850    [COMPLETED] Insert Peripheral IV, , , Once **AND** sodium chloride 0.9 % flush 10 mL, 10 mL, Intravenous, PRN, Gallo Pope MD    sodium chloride 0.9 % flush 10 mL, 10 mL, Intravenous, Q12H, Gallo Pope MD, 10 mL at 01/14/25 2100    sodium chloride 0.9 % flush 10 mL, 10 mL, Intravenous, PRN, Gallo Pope MD    sodium chloride 0.9 % infusion 40 mL, 40 mL, Intravenous, PRN, Gallo Pope MD  Social History:   Social History     Tobacco Use    Smoking status: Former     Types: Cigarettes    Smokeless tobacco: Never   Substance Use Topics    Alcohol use: Never      Family History:  Family History   Problem Relation Age of Onset    Heart disease Mother     Dementia Mother     Stroke Mother     Heart disease Father     Hypertension Father         Review of Systems : Review of Systems   Cardiovascular:  Positive for chest pain, dyspnea on exertion, irregular heartbeat and palpitations.   Respiratory:  Positive for cough, hemoptysis, shortness of breath and sputum production.    All other systems reviewed and are negative.         Constitutional:  Temp:  [97.9 °F (36.6 °C)-98.6 °F (37 °C)] 98 °F (36.7 °C)  Heart Rate:  [] 161  Resp:  [19-28] 22  BP: (103-137)/(53-70) 127/53    Physical  "Exam   /53 (BP Location: Left arm, Patient Position: Lying)   Pulse (!) 161   Temp 98 °F (36.7 °C) (Temporal)   Resp 22   Ht 162.6 cm (64\")   Wt 51.9 kg (114 lb 6.7 oz)   SpO2 94%   BMI 19.64 kg/m²   Physical Exam  General:  Appears in no acute distress, frail, chronically ill   Eyes: Sclerae are anicteric,  conjunctivae are clear   HEENT:  No JVD. Thyroid not visibly enlarged. No mucosal pallor or cyanosis  Respiratory: Respirations regular and unlabored at rest.  Bilaterally good breath sounds with good air entry in all fields. No crackles, rubs or wheezes auscultated  Cardiovascular: S1,S2 Regular rate and rhythm.  Tachycardic rhythm which is regular  Gastrointestinal: Abdomen soft, flat, nontender. Bowel sounds present.   Musculoskeletal:  No abnormal movements  Extremities: No digital clubbing or cyanosis  Skin: Color pink. Skin warm and dry to touch. No rashes  No xanthoma  Neuro: Alert and awake, no lateralizing deficits appreciated    Cardiographics  ECG: EKG tracing was  personally reviewed/interpreted by me  ECG 12 Lead Chest Pain   Preliminary Result   HEART RATE=94  bpm   RR Sixnzxzj=332  ms   OH Zhdxnomg=738  ms   P Horizontal Axis=56  deg   P Front Axis=59  deg   QRSD Interval=85  ms   QT Kgvxzoyp=204  ms   PFhV=291  ms   QRS Axis=18  deg   T Wave Axis=65  deg   - ABNORMAL ECG -   Incomplete analysis due to missing data in precordial lead(s)   Sinus rhythm   Atrial premature complexes   Prolonged QT interval   Date and Time of Study:2025-01-15 09:45:07      ECG 12 Lead QT Measurement   Final Result   HEART OFCJ=762  bpm   RR Mrdvafkc=717  ms   OH Cqwsjrru=760  ms   P Horizontal Axis=70  deg   P Front Axis=93  deg   QRSD Rtczjwnz=671  ms   QT Txdtllqf=327  ms   TCpR=412  ms   QRS Axis=23  deg   T Wave Axis=73  deg   - ABNORMAL ECG -   Sinus tachycardia   Multiform ventricular premature complexes   ST elevation, consider lateral injury   When compared with ECG of 28-Dec-2024 05:16:55, "   Significant rate increase   Electronically Signed By: Marciano Gurrola (JOSSY) 2025-01-13 17:24:26   Date and Time of Study:2025-01-13 13:54:21      Telemetry Scan   Final Result      Telemetry Scan   Final Result      Telemetry Scan   Final Result      Telemetry Scan   Final Result      Telemetry Scan   Final Result      Telemetry Scan   Final Result      Telemetry Scan   Final Result      Telemetry Scan   Final Result      Telemetry Scan   Final Result          Telemetry: Sinus tachycardia    Echocardiogram:   Results for orders placed during the hospital encounter of 11/19/24    Adult Transthoracic Echo Limited W/ Cont if Necessary Per Protocol    Interpretation Summary    Left ventricular ejection fraction appears to be 56 - 60%.    There is a MitraClip mitral valve repair present.    There is trace residual MR.  Mean gradient is 4.6 mmHg      Imaging  Chest X-ray:   Imaging Results (Last 24 Hours)       ** No results found for the last 24 hours. **            Lab Review: I have reviewed the labs  Results from last 7 days   Lab Units 01/15/25  1000 01/15/25  0730 01/10/25  1751   HSTROP T ng/L 21* 25* 30*     Results from last 7 days   Lab Units 01/14/25  0325   MAGNESIUM mg/dL 2.5*     Results from last 7 days   Lab Units 01/15/25  0730   SODIUM mmol/L 137   POTASSIUM mmol/L 4.6   BUN mg/dL 9   CREATININE mg/dL 0.66   CALCIUM mg/dL 9.3             Results from last 7 days   Lab Units 01/15/25  0730 01/14/25  0325 01/13/25  0358   WBC 10*3/mm3 8.84 9.09 8.24   HEMOGLOBIN g/dL 9.0* 8.6* 7.9*   HEMATOCRIT % 29.5* 28.3* 25.7*   PLATELETS 10*3/mm3 496* 483* 435     Results from last 7 days   Lab Units 01/12/25  0219 01/10/25  1146   INR  1.04 1.06   APTT seconds  --  29.5             Travis Connor MD  1/15/2025, 13:07 EST      EMR Dragon/Transcription:   Dictated utilizing Dragon dictation

## 2025-01-15 NOTE — SIGNIFICANT NOTE
01/15/25 1426   OTHER   Discipline physical therapist   Rehab Time/Intention   Session Not Performed unable to treat, medical status change;other (see comments)  (RN hold for PT. Pt tachycardic and awaiting IV line placement.)   Therapy Assessment/Plan (PT)   Criteria for Skilled Interventions Met (PT) yes   Recommendation   PT - Next Appointment 01/16/25

## 2025-01-15 NOTE — PLAN OF CARE
Goal Outcome Evaluation:Cardio on team as tachycardic today and patient complains of chest pain. Says has been going on but hasn't said anything because she wants to go home. She has shopping to do Saturday and is asking if she can leave and come back. Geeta saw patient and gave Lopressor 5mg which seemed to help HR. HR currently 90. After cardio signs off she can go back to [place].

## 2025-01-15 NOTE — PROGRESS NOTES
Daily Progress Note          Assessment    Hemoptysis  Pneumonia due to unspecified pathogen  Resolution of the left upper lobe cavitary lesion: Was seen October 2024 with bronchoscopy 10/16/2024 showing negative cultures.  But this admission there is development of spiculated multiple nodules.  COPD but not acute exacerbation  History of strongyloidosis  Rheumatoid arthritis and Crohn disease on biological therapy status post ileostomy with mucous fistula status post GI hemorrhage: on Humira, mesalamine methotrexate outpatient  Chronic anemia  CAD status post PCI to LAD 10/22/2024  HLD  Hypothyroidism  History of severe MR status post mitral valve MitraClip 11/21/2024  Thyroid hormone replacement        Recommendations:  Patient stable from pulmonary status but she is having chest pain today: Workup as per attending physician     status post bronchoscopy 1/12/2025: No hemoptysis, multiple white mucous plugs seen, lavage from right upper lobe obtained to rule out opportunistic infections: AFB smear is negative  Gram-negative bacilli noted but the final is normal respiratory wu: Switched on 1/13/2025 antibiotics to Levaquin for 3 days    Patient can be discharged from pulmonary standpoint will follow-up in 2 weeks in the office     Resumed aspirin since the hemoptysis resolved       Oxygen supplement and titration to maintain saturation 90 to 95%, currently on room air  Bronchodilators     Levothyroxine  Protonix     I personally reviewed the radiological studies             LOS: 5 days     Subjective     Decreased cough and and improved shortness of breath    Objective     Vital signs for last 24 hours:  Vitals:    01/15/25 0724 01/15/25 0833 01/15/25 0852 01/15/25 1016   BP:  132/67     BP Location:  Left arm     Patient Position:       Pulse: 82 120 114 100   Resp: 20  24    Temp:       TempSrc:       SpO2: 98%      Weight:       Height:           Intake/Output last 3 shifts:  I/O last 3 completed shifts:  In:  600 [P.O.:600]  Out: 2575 [Urine:1200; Stool:1375]  Intake/Output this shift:  I/O this shift:  In: 600 [P.O.:600]  Out: -       Radiology  Imaging Results (Last 24 Hours)       ** No results found for the last 24 hours. **            Labs:  Results from last 7 days   Lab Units 01/15/25  0730   WBC 10*3/mm3 8.84   HEMOGLOBIN g/dL 9.0*   HEMATOCRIT % 29.5*   PLATELETS 10*3/mm3 496*     Results from last 7 days   Lab Units 01/15/25  0730 01/13/25  0358 01/12/25  0215   SODIUM mmol/L 137   < > 138   POTASSIUM mmol/L 4.6   < > 4.5   CHLORIDE mmol/L 105   < > 110*   CO2 mmol/L 21.2*   < > 20.2*   BUN mg/dL 9   < > 13   CREATININE mg/dL 0.66   < > 0.86   CALCIUM mg/dL 9.3   < > 8.6   BILIRUBIN mg/dL  --   --  <0.2   ALK PHOS U/L  --   --  121*   ALT (SGPT) U/L  --   --  23   AST (SGOT) U/L  --   --  20   GLUCOSE mg/dL 78   < > 114*    < > = values in this interval not displayed.         Results from last 7 days   Lab Units 01/12/25  0215 01/10/25  1146   ALBUMIN g/dL 2.8* 2.9*     Results from last 7 days   Lab Units 01/15/25  0730 01/10/25  1751 01/10/25  1146   HSTROP T ng/L 25* 30* 50*     Results from last 7 days   Lab Units 01/12/25  0215   KYLAH  Negative     Results from last 7 days   Lab Units 01/14/25  0325   MAGNESIUM mg/dL 2.5*     Results from last 7 days   Lab Units 01/12/25  0219 01/10/25  1146   INR  1.04 1.06   APTT seconds  --  29.5               Meds:   SCHEDULE  aspirin, 81 mg, Oral, Daily  clopidogrel, 75 mg, Oral, Daily  ipratropium-albuterol, 3 mL, Nebulization, 4x Daily - RT  levoFLOXacin, 750 mg, Oral, Q24H  levothyroxine, 50 mcg, Oral, Daily  midodrine, 5 mg, Oral, TID AC  pantoprazole, 40 mg, Oral, Q AM  sertraline, 50 mg, Oral, Daily  sodium chloride, 10 mL, Intravenous, Q12H      Infusions     PRNs    acetaminophen **OR** acetaminophen **OR** acetaminophen    aluminum-magnesium hydroxide-simethicone    senna-docusate sodium **AND** polyethylene glycol **AND** bisacodyl **AND** bisacodyl     Calcium Replacement - Follow Nurse / BPA Driven Protocol    diphenhydrAMINE    HYDROcodone-acetaminophen    Magnesium Standard Dose Replacement - Follow Nurse / BPA Driven Protocol    melatonin    nitroglycerin    ondansetron ODT **OR** ondansetron    Phosphorus Replacement - Follow Nurse / BPA Driven Protocol    Potassium Replacement - Follow Nurse / BPA Driven Protocol    [COMPLETED] Insert Peripheral IV **AND** sodium chloride    sodium chloride    sodium chloride    Physical Exam:  General Appearance:  Alert   HEENT:  Normocephalic, without obvious abnormality, Conjunctiva/corneas clear,.   Nares normal, no drainage     Neck:  Supple, symmetrical, trachea midline.   Lungs /Chest wall:   Good air entry with minimal bilateral basal rhonchi, respirations unlabored, symmetrical wall movement.     Heart:  Regular rate and rhythm, S1 S2 normal  Abdomen: Soft, non-tender, no masses, no organomegaly.    Extremities: No edema, no clubbing or cyanosis     ROS  Constitutional: Negative for chills, fever and malaise/fatigue.   HENT: Negative.    Eyes: Negative.    Cardiovascular: Mild anterior chest pain  Respiratory: Positive for improvement in the cough and shortness of breath.    Skin: Negative.    Musculoskeletal: Negative.    Gastrointestinal: Negative.    Genitourinary: Negative.    Neurological: Negative.    Psychiatric/Behavioral: Negative.      I reviewed the recent clinical results  I personally reviewed the latest radiological studies    Part of this note may be an electronic transcription/translation of spoken language to printed text using the Dragon Dictation System.

## 2025-01-15 NOTE — PROGRESS NOTES
[facility] MEDICINE SERVICE  DAILY PROGRESS NOTE    NAME: Maryann Gaitan  : 1950  MRN: [id number]      LOS: 5 days     PROVIDER OF SERVICE: Luma Ramirez MD    Chief Complaint: Hemoptysis    Subjective:     Interval History:  History taken from: patient    Patient otherwise feels well this morning.  She states that she has been having chest pain off and on with a pressure-like sensation over the past 48 hours.  She did not mention it yesterday because she wanted to be discharged home.        Review of Systems:   Review of Systems   Respiratory:  Positive for chest tightness and shortness of breath.    Cardiovascular:  Positive for chest pain.   All other systems reviewed and are negative.      Objective:     Vital Signs  Temp:  [97.6 °F (36.4 °C)-98.3 °F (36.8 °C)] 97.9 °F (36.6 °C)  Heart Rate:  [] 114  Resp:  [18-28] 24  BP: (116-137)/(56-70) 132/67   Body mass index is 19.64 kg/m².    Physical Exam  Physical Exam  Constitutional:       General: She is awake.      Appearance: She is well-developed and well-groomed. She is ill-appearing.   HENT:      Head: Normocephalic and atraumatic.      Nose: Nose normal.      Mouth/Throat:      Mouth: Mucous membranes are moist.      Pharynx: Oropharynx is clear.   Eyes:      Extraocular Movements: Extraocular movements intact.      Conjunctiva/sclera: Conjunctivae normal.      Pupils: Pupils are equal, round, and reactive to light.   Cardiovascular:      Rate and Rhythm: Normal rate and regular rhythm.      Pulses: Normal pulses.      Heart sounds: Normal heart sounds.   Pulmonary:      Effort: Pulmonary effort is normal.      Breath sounds: Normal breath sounds. Wheezing present.   Abdominal:      General: Abdomen is flat. Bowel sounds are normal.      Palpations: Abdomen is soft.   Musculoskeletal:         General: Normal range of motion.      Cervical back: Normal range of motion and neck supple.      Right lower leg: No edema.      Left lower leg:  No edema.   Skin:     General: Skin is warm and dry.   Neurological:      General: No focal deficit present.      Mental Status: She is alert and oriented to person, place, and time. Mental status is at baseline.      Cranial Nerves: Cranial nerves 2-12 are intact.      Sensory: Sensation is intact.      Motor: Motor function is intact.   Psychiatric:         Mood and Affect: Mood normal.         Behavior: Behavior normal. Behavior is cooperative.         Thought Content: Thought content normal.         Judgment: Judgment normal.            Diagnostic Data    Results from last 7 days   Lab Units 01/15/25  0730 01/13/25  0358 01/12/25  0215   WBC 10*3/mm3 8.84   < > 6.00   HEMOGLOBIN g/dL 9.0*   < > 7.4*   HEMATOCRIT % 29.5*   < > 24.7*   PLATELETS 10*3/mm3 496*   < > 427   GLUCOSE mg/dL 78   < > 114*   CREATININE mg/dL 0.66   < > 0.86   BUN mg/dL 9   < > 13   SODIUM mmol/L 137   < > 138   POTASSIUM mmol/L 4.6   < > 4.5   AST (SGOT) U/L  --   --  20   ALT (SGPT) U/L  --   --  23   ALK PHOS U/L  --   --  121*   BILIRUBIN mg/dL  --   --  <0.2   ANION GAP mmol/L 10.8   < > 7.8    < > = values in this interval not displayed.       No radiology results for the last day      I reviewed the patient's new clinical results.    Assessment/Plan:     Active and Resolved Problems  Active Hospital Problems    Diagnosis  POA    **Hemoptysis [R04.2]  Yes    Multifocal pneumonia [J18.9]  Unknown      Resolved Hospital Problems   No resolved problems to display.     Acute hemoptysis  Left upper lobe lesion/pneumonia  Coronary artery disease with recent stent placement on dual antiplatelet therapy  End-stage COPD  Rheumatoid arthritis  Crohn's disease on biological therapy  History of ileostomy  History of GI bleed  Chest pain, new onset over 48 hours    Patient was admitted with acute hemoptysis which has slowly improved through this hospital course.  Laboratory studies show stability of hemoglobin levels.  Imaging was conducted.   Patient was noted to have a left upper lobe lesion and could not exclude underlying neoplasm.  Pulmonology services were consulted.  They recommended outpatient noncontrast CT chest in approximate 3 months to ensure resolution.  Patient did undergo bronchoscopy on 1/12/2025.  Multiple mucous plugs were removed at that point in time.  AFB negative.  Final cultures were consistent with normal respiratory wu.  Autoimmune etiology panel is currently pending.  Patient is on chronic immunosuppression for her Crohn's disease.  She does have an ileostomy as well.  She was seen and evaluated by general surgery who recommended 6-month follow-up once she is off dual antiplatelet therapy for possible reversal.  Antibiotics have been since transition to oral.  Today's last day of oral Levaquin.  Pulmonary services have signed off.    plans were being made for patient to be discharged to rehab facility today however patient continued to endorse weakness.  This morning she is also stating that she is having chest pain as well.  She feels a pressure and/or heaviness on her chest.  She has had a recent cardiac stent and/or evaluation in October 2024.  We reviewed cardiology notes from that point in time and they did recommend continuing DAPT therapy to prevent thrombosis.  Both Plavix and aspirin were resumed yesterday.  Hemoglobin has now remained stable.    Recent cardiac stent history as well as cardiac catheterization less than 6 months ago and new onset of chest pain this morning we will go ahead and consult cardiology services for evaluation prior to any further disposition planning.  Note we had to place beta-blocker as well as ACE inhibitor on hold secondary to persistent hypotension.          VTE Prophylaxis:  Mechanical VTE prophylaxis orders are present.             Disposition Planning:     Barriers to Discharge: Cardiology evaluation, patient endorses chest pain  Anticipated Date of Discharge: 1/17/2025  Place of  Discharge: [place]/rehab facility      Time: 45 minutes     Code Status and Medical Interventions: No CPR (Do Not Attempt to Resuscitate); Full Support   Ordered at: 01/14/25 0912     Code Status (Patient has no pulse and is not breathing):    No CPR (Do Not Attempt to Resuscitate)     Medical Interventions (Patient has pulse or is breathing):    Full Support       Signature: Electronically signed by Luma Ramirez MD, 01/15/25, 10:08 EST.  [facility] Hospitalist Team

## 2025-01-16 VITALS
HEART RATE: 76 BPM | TEMPERATURE: 97.8 F | BODY MASS INDEX: 19.53 KG/M2 | HEIGHT: 64 IN | WEIGHT: 114.42 LBS | RESPIRATION RATE: 20 BRPM | SYSTOLIC BLOOD PRESSURE: 130 MMHG | OXYGEN SATURATION: 100 % | DIASTOLIC BLOOD PRESSURE: 56 MMHG

## 2025-01-16 LAB
ANION GAP SERPL CALCULATED.3IONS-SCNC: 11.2 MMOL/L (ref 5–15)
BUN SERPL-MCNC: 9 MG/DL (ref 8–23)
BUN/CREAT SERPL: 13 (ref 7–25)
C-ANCA TITR SER IF: NORMAL TITER
CALCIUM SPEC-SCNC: 9.5 MG/DL (ref 8.6–10.5)
CHLORIDE SERPL-SCNC: 107 MMOL/L (ref 98–107)
CO2 SERPL-SCNC: 20.8 MMOL/L (ref 22–29)
CREAT SERPL-MCNC: 0.69 MG/DL (ref 0.57–1)
EGFRCR SERPLBLD CKD-EPI 2021: 91.2 ML/MIN/1.73
FUNGUS WND CULT: ABNORMAL
GLUCOSE SERPL-MCNC: 90 MG/DL (ref 65–99)
MYELOPEROXIDASE AB SER IA-ACNC: <0.2 UNITS (ref 0–0.9)
P JIROVECII DNA L RESP QL NAA+NON-PROBE: NEGATIVE
P-ANCA ATYPICAL TITR SER IF: NORMAL TITER
P-ANCA TITR SER IF: NORMAL TITER
POTASSIUM SERPL-SCNC: 4.2 MMOL/L (ref 3.5–5.2)
PROTEINASE3 AB SER IA-ACNC: <0.2 UNITS (ref 0–0.9)
REF LAB TEST METHOD: NORMAL
SODIUM SERPL-SCNC: 139 MMOL/L (ref 136–145)

## 2025-01-16 PROCEDURE — 94799 UNLISTED PULMONARY SVC/PX: CPT

## 2025-01-16 PROCEDURE — 99232 SBSQ HOSP IP/OBS MODERATE 35: CPT | Performed by: INTERNAL MEDICINE

## 2025-01-16 PROCEDURE — 80048 BASIC METABOLIC PNL TOTAL CA: CPT

## 2025-01-16 PROCEDURE — 97116 GAIT TRAINING THERAPY: CPT

## 2025-01-16 PROCEDURE — 97110 THERAPEUTIC EXERCISES: CPT

## 2025-01-16 PROCEDURE — 97112 NEUROMUSCULAR REEDUCATION: CPT

## 2025-01-16 PROCEDURE — 94664 DEMO&/EVAL PT USE INHALER: CPT

## 2025-01-16 RX ORDER — CLOPIDOGREL BISULFATE 75 MG/1
75 TABLET ORAL DAILY
Qty: 30 TABLET | Refills: 0 | Status: SHIPPED | OUTPATIENT
Start: 2025-01-17 | End: 2025-02-16

## 2025-01-16 RX ORDER — METOPROLOL SUCCINATE 25 MG/1
12.5 TABLET, EXTENDED RELEASE ORAL
Status: DISCONTINUED | OUTPATIENT
Start: 2025-01-16 | End: 2025-01-16 | Stop reason: HOSPADM

## 2025-01-16 RX ORDER — ASPIRIN 81 MG/1
81 TABLET ORAL DAILY
Qty: 30 TABLET | Refills: 0 | Status: SHIPPED | OUTPATIENT
Start: 2025-01-16 | End: 2025-02-15

## 2025-01-16 RX ORDER — METOPROLOL SUCCINATE 25 MG/1
12.5 TABLET, EXTENDED RELEASE ORAL
Qty: 15 TABLET | Refills: 0 | Status: SHIPPED | OUTPATIENT
Start: 2025-01-17 | End: 2025-02-16

## 2025-01-16 RX ORDER — PANTOPRAZOLE SODIUM 40 MG/1
40 TABLET, DELAYED RELEASE ORAL
Qty: 30 TABLET | Refills: 0 | Status: SHIPPED | OUTPATIENT
Start: 2025-01-17

## 2025-01-16 RX ADMIN — Medication 10 ML: at 08:12

## 2025-01-16 RX ADMIN — MIDODRINE HYDROCHLORIDE 5 MG: 5 TABLET ORAL at 08:12

## 2025-01-16 RX ADMIN — MIDODRINE HYDROCHLORIDE 5 MG: 5 TABLET ORAL at 10:47

## 2025-01-16 RX ADMIN — PANTOPRAZOLE SODIUM 40 MG: 40 TABLET, DELAYED RELEASE ORAL at 06:04

## 2025-01-16 RX ADMIN — ASPIRIN 81 MG: 81 TABLET, COATED ORAL at 08:12

## 2025-01-16 RX ADMIN — CLOPIDOGREL BISULFATE 75 MG: 75 TABLET ORAL at 08:12

## 2025-01-16 RX ADMIN — SERTRALINE HYDROCHLORIDE 50 MG: 50 TABLET, FILM COATED ORAL at 08:12

## 2025-01-16 RX ADMIN — LEVOTHYROXINE SODIUM 50 MCG: 50 TABLET ORAL at 08:12

## 2025-01-16 RX ADMIN — METOPROLOL SUCCINATE 12.5 MG: 25 TABLET, EXTENDED RELEASE ORAL at 10:47

## 2025-01-16 RX ADMIN — IPRATROPIUM BROMIDE AND ALBUTEROL SULFATE 3 ML: .5; 3 SOLUTION RESPIRATORY (INHALATION) at 11:48

## 2025-01-16 RX ADMIN — IPRATROPIUM BROMIDE AND ALBUTEROL SULFATE 3 ML: .5; 3 SOLUTION RESPIRATORY (INHALATION) at 07:44

## 2025-01-16 NOTE — PLAN OF CARE
"Assessment: Maryann Gaitan presents with ADL impairments affecting function including balance, cognition, coordination, endurance / activity tolerance, pain, range of motion (ROM), and strength. Pt A&O x4, though exhibits bouts of confusion throughout treatment session. Pt reports 5/10 pain in chest with associated dizziness. Pt comes to standing and ambulates with mod A x2 and FWW. Returned to chair where she completed BUE exercises in unsupported sitting, demos poor ROM in LUE this date. Demonstrated functioning below baseline abilities indicate the need for continued skilled intervention while inpatient. Tolerating session today without incident. Will continue to follow and progress as tolerated.      Plan/Recommendations:   Moderate Intensity Therapy recommended post-acute care. This is recommended as therapy feels the patient would require 3-4 days per week and wouldn't tolerate \"3 hour daily\" rehab intensity. SNF would be the preferred choice. If the patient does not agree to SNF, arrange HH or OP depending on home bound status. If patient is medically complex, consider LTACH.  "

## 2025-01-16 NOTE — PLAN OF CARE
Goal Outcome Evaluation:  Plan of Care Reviewed With: patient        Progress: improving                Pt resting comfortably with no complaints. Ostomy leaking and has had to be changed multiple times, skin is very excoriated on abdomen. EKG last night shows sinus rhythm. Pt hoping to discharge today. Will continue to monitor...

## 2025-01-16 NOTE — PLAN OF CARE
"Goal Outcome Evaluation:  Plan of Care Reviewed With: patient  Assessment: Maryann Gaitan presents with functional mobility impairments which indicate the need for skilled intervention. Pt ambulated with posterior lean and on her toes. She required 100% cueing for walker management and to for safe hand placement with transfers. Pt reported to PT that she walker all around the hallway yesterday which did not happen. PT cued pt for more controlled movement through full, available ROM with therapeutic exercises. Tolerating session today without incident. Will continue to follow and progress as tolerated.     Plan/Recommendations:   If medically appropriate, Moderate Intensity Therapy recommended post-acute care. This is recommended as therapy feels the patient would require 3-4 days per week and wouldn't tolerate \"3 hour daily\" rehab intensity. SNF would be the preferred choice. If the patient does not agree to SNF, arrange HH or OP depending on home bound status. If patient is medically complex, consider LTACH. Pt requires no DME at discharge.     Pt desires Skilled Rehab placement at discharge. Pt cooperative; agreeable to therapeutic recommendations and plan of care.         Anticipated Discharge Disposition (PT): skilled nursing facility                        "

## 2025-01-16 NOTE — PROGRESS NOTES
Cardiology Progress Note    Patient Identification:  Name: Maryann Gaitan  Age: 74 y.o.  Sex: female  :  1950  MRN: [id number]                 Follow Up / Chief Complaint: Tachycardia  Chief Complaint   Patient presents with    Coughing Up Blood       Interval History: Patient presented with hemoptysis.  Underwent recent bronchoscopy was noted to have pneumonia.     NP note: Patient seen with Dr. Connor.  Sitting up in chair no distress noted sinus rhythm today started on low-dose beta-blocker.  Discussed with hospitalist NP patient can discharge back to rehab.  Continue aspirin and Plavix  Hemoglobin closely.  Increase beta-blocker as tolerated with midodrine    Electronically signed by DRU Rubio, 25, 1:50 PM EST.    Cardiology attending addendum :    I have personally performed a face-to-face diagnostic evaluation, physical exam and reviewed data on this patient.  I have reviewed documentation done by me and nurse practitioner  and corrected as needed.  And agree with the different components of documentation.Greater than 50% of the time spent in the care of this patient was provided by attending consultant/me.         Subjective: Seen and examined.  Chart reviewed.  Labs reviewed.  Discussed with RN taking care of patient.  Patient blood pressure is low.      Objective:  Labs on admission showed high-sensitivity troponin of 50--30 potassium 5.4 BUN 24 creatinine 1.4 hemoglobin 8.4 platelets 469 chest x-ray was unremarkable     1/15/2025: high-sensitivity troponin of 25--21 potassium 4.6 TSH 7.68 hemoglobin 9 platelets 496  2025: BNP is normal.    History of present illness:      Ms. Maryann Gaitan has PMH of     CAD status post cardiac cath with multivessel CAD poor candidate for CABG, underwent PCI to ostial and proximal LAD 10/22/2024  Moderate to severe MR with central jet noted status post Azeb Clip 2024   COPD  Hypertension  Rheumatoid arthritis  Crohn's disease  GI  bleed status post right hemicolectomy with ileostomy 11/28/2024      Presented to the ED on 1/10/2025 with shortness of breath and hemoptysis.  Patient had CT chest that showed intervental development of spiculated nodules in the lungs favored to be inflammatory vs infectious; stable descending thoracic aortic aneurysm 3.8 cm with diffuse irregular mural thrombus.  Patient has been receiving antibiotic therapy with some improvement.  Today she apparently complained of chest pain therefore cardiology was consulted.  Patient reports that she had another episode of coughing with blood in her sputum.  On my examination she was sitting in bed, heart rate was mildly elevated in the low 100s.        Patient underwent bronchoscopy on 1/12/2025 that showed multiple lung nodules and multiple mucous plugging     Labs on admission showed high-sensitivity troponin of 50--30 potassium 5.4 BUN 24 creatinine 1.4 hemoglobin 8.4 platelets 469 chest x-ray was unremarkable     Labs today showed high-sensitivity troponin of 25--21 potassium 4.6 TSH 7.68 hemoglobin 9 platelets 496        On re-examination with Dr. Connor HR was in the 140s, patient was up in the chair eating lunch.  She did report some mild palpitations.    Dr. Connor ordered EKG and IV metoprolol 5mg and may repeat in 15 mins.          Recent hospitalization     Patient was recently in the hospital 10/8/2024 with nausea vomiting diarrhea and abnormal labs.  With hyponatremia and hypokalemia and anemia.  Patient suddenly started having chest pain and fast team was called because of sharp left-sided chest pain with shortness of breath dizziness EKG showed sinus tachycardia and cardiac workup revealed severe MR, cath 10/15/2024 revealing severe ostial LAD and outpouching in the descending thoracic aorta probably a penetrating aortic ulcer versus aneurysm.  CT surgery was consulted and she was turned down for CABG therefore patient underwent drug-eluting stent to ostial  LAD on 10/22/2024 and was supposed to have clip to mitral valve in follow-up.  In the meantime has been having worsening symptoms and abdominal pain, weight loss.  Patient has been restarted on Plavix.     Presented 11/6/2024 through [facility] with complaint of abdominal pain and dark red stool / rectal bleed.  Patient has close disease and rheumatoid arthritis and is on immunosuppressants methotrexate, Humira, Rinvoq now presented with rectal bleed and abdominal pain.  GI did a scope and biopsies of terminal ileum which were consistent with active Crohn's and biopsies of stomach and duodenum showed strong colitis infection, was treated by ivermectin.  GI started patient on Solu-Medrol and is holding immunosuppressants and wants to hold Plavix.  Presented to the emergency room on 11/19/2024 with abdominal pain and chest pain.  Patient reports that she started having bright red blood per rectum again yesterday and has been having constant chest pain since.     CT abdomen and pelvis shows distal terminal ileitis colonic fatty marrow infiltration which can be seen in the setting of chronic inflammatory bowel disease   patient was transfused with 1 unit of PRBC and hemoglobin improved to 7.3 and this morning she is 9.1     Unfortunately patient is in the tough situation as she had stent placed on 10/22/2024 and needs dual antiplatelet therapy to prevent clotting of stent however she has presented for the second time with significant GI bleeding.  Currently her aspirin and Plavix are both on hold.              Data:        EGD/colonoscopy 10/12/2024 revealed small hiatal hernia, nonerosive gastritis, T1 stricture s/p dilatation with 12 mm, T1 ulcer, colon ulcers, sigmoid diverticulosis, grade 2 internal hemorrhoids and strongyloides  Echo 10/12/2024 EF of 51 to 55% with mild RV enlargement, severe left atrial enlargement, moderate to severe MR  Transesophageal echo 10/16/2024: LVEF of 55 to 60% with severe left atrial  enlargement, moderate to severe MR and grade 3 descending aortic plaque  Cardiac cath 10/15/2024 reveals total right and severe ostial LAD, descending thoracic aorta had an outpouching consistent with penetrating aortic ulcer versus aneurysm.   Patient was evaluated by CT surgery not a candidate for CABG therefore patient underwent PCI and drug-eluting stent to the ostium proximal LAD.                Assessment:  :     Chest pain, occurring at rest, atypical for angina  Tachyarrhythmia  Hemoptysis  Multiple lung nodules and multiple mucous plugging  Multifocal pneumonia  Recent abdominal pain, rectal bleed status post right hemicolectomy with ileostomy  Anemia  Crohn's disease  CAD history of PCI  Mitral regurgitation status post MitraClip  Chronic HFpEF due to valvular heart disease from mitral regurgitation, now well compensated  Aortic aneurysm/descending thoracic aortic ulcer  Hypertension cardiovascular disease  COPD, chronic steroid therapy  Anemia  Hyperglycemia/prediabetes        Recommendations / Plan:        Patient presented 1/10/2025 with complaint of acute hemostasis.  Her chest pain was like tightness.  Patient has known coronary artery disease previous PCI  Troponin was 25, and was flat with repeat 21.  Labs including BNP was normal.  TSH was elevated.  CBC revealed hemoglobin which was low.    EKG #1 done at 9:45 AM on 1/15/2025 reveals sinus rhythm at the rate of 94 bpm with no acute ST-T change  Patient went into sinus tachycardia at 150 bpm cannot rule out underlying atrial flutter.  Patient was given IV metoprolol boluses.  Patient converted to sinus rhythm.  Pressure is low.  Will give midodrine.  Patient was in the hospital 11/19/2024 with bleeding and hemoglobin of 5.7 requiring transfusion.  Has CAD and had drug-eluting stent placed 10/22/2024.  Will benefit from dual antiplatelet therapy.  Had MitraClip done on 11/21/2024.  But unfortunately patient had bleeding transfusion and right  hemicolectomy and end ileostomy on 11/28/2024.  Will continue dual antiplatelet therapy as noted tolerated  Monitor rhythm.  Will follow and consider further evaluation treatment depending on how her condition evolves.             Review of records     Patient underwent cardiac cath 10/15/2024 which revealed total right and severe ostial LAD disease.  Descending thoracic aorta has an outpouching probably saccular aneurysm versus  penetrating aortic ulcer .  Echocardiogram 10/12/2024 is revealing EF of 51 to 55% with mild RV enlargement severe left atrial enlargement and right atrial enlargement and moderate to severe MR  GONZÁLEZ is revealing moderate to severe MR.          Copied text in this portion of the note has been reviewed and is accurate as of 1/16/2025    Past Medical 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 07/05/2016    Dyslipidemia 10/30/2024    Fecal urgency 11/21/2024    First degree hemorrhoids 07/09/2021    GERD (gastroesophageal reflux disease) 11/21/2024    Hashimoto's thyroiditis 11/21/2024    History of colonic polyps 07/09/2021    Hypertension     Hypothyroidism (acquired) 11/06/2024    Mitral regurgitation 10/08/2024    Moderate protein-calorie malnutrition 11/09/2024    Multiple tracheobronchial mucus plugs 10/08/2024    Nicotine dependence 11/21/2024    Rheumatoid arthritis 11/06/2024    S/P mitral valve clip implantation 11/21/2024    Second degree hemorrhoids 07/05/2016    Stress incontinence, female 11/21/2024    Vitamin D deficiency, unspecified 11/21/2024     Past  Surgical History:  Past Surgical History:   Procedure Laterality Date    BRONCHOSCOPY N/A 10/16/2024    Procedure: BRONCHOSCOPY;  Surgeon: Gallo Pope MD;  Location: [facility] ENDOSCOPY;  Service: Pulmonary;  Laterality: N/A;    BRONCHOSCOPY N/A 1/12/2025    Procedure: BRONCHOSCOPY WITH LAVAGE;  Surgeon: Gallo Pope MD;  Location: [facility] ENDOSCOPY;  Service: Pulmonary;  Laterality: N/A;    CARDIAC CATHETERIZATION N/A 10/15/2024    Procedure: Left Heart Cath, possible pci;  Surgeon: Travis Connor MD;  Location: [facility] CATH INVASIVE LOCATION;  Service: Cardiovascular;  Laterality: N/A;    CARDIAC CATHETERIZATION N/A 10/22/2024    Procedure: Laser Coronary Atherectomy;  Surgeon: Travis Connor MD;  Location: [facility] CATH INVASIVE LOCATION;  Service: Cardiovascular;  Laterality: N/A;    COLON RESECTION Right 11/28/2024    Procedure: RIGHT HEMICOLECTOMY WITH ILEOSTOMY;  Surgeon: Todd Babin MD;  Location: [facility] MAIN OR;  Service: General;  Laterality: Right;    COLONOSCOPY N/A 10/12/2024    Procedure: COLONOSCOPY WITH BIOPSY AND WIRE GUIDED BALLOON DILATION OF TERMINAL ILEUM;  Surgeon: Rob Strong MD;  Location: [facility] ENDOSCOPY;  Service: Gastroenterology;  Laterality: N/A;  Colitis, crohns of terminal ileum, right colon ulcers, diverticulosis, hemorroids    ENDOSCOPY N/A 10/12/2024    Procedure: ESOPHAGOGASTRODUODENOSCOPY WITH BIOPSY X 2 AREA;  Surgeon: Rob Strong MD;  Location: [facility] ENDOSCOPY;  Service: Gastroenterology;  Laterality: N/A;  Chronic gastritis, HH    ENDOSCOPY Left 11/28/2024    Procedure: ESOPHAGOGASTRODUODENOSCOPY;  Surgeon: Dallin Delgado MD;  Location: [facility] ENDOSCOPY;  Service: Gastroenterology;  Laterality: Left;  small hiatal hernia    HYSTERECTOMY      LEFT HEART CATH      MITRAL VALVE REPAIR/REPLACEMENT N/A 11/21/2024    Procedure: Transcatheter Mitral Valve Repair;  Surgeon: Dmitri Navarro MD;  Location: [facility] HYBRID OR;   "Service: Cardiovascular;  Laterality: N/A;        Social History:   Social History     Tobacco Use    Smoking status: Former     Types: Cigarettes    Smokeless tobacco: Never   Substance Use Topics    Alcohol use: Never      Family History:  Family History   Problem Relation Age of Onset    Heart disease Mother     Dementia Mother     Stroke Mother     Heart disease Father     Hypertension Father           Allergies:  Allergies   Allergen Reactions    Codeine Hives    Penicillin G Sodium Hives     Scheduled Meds:  aspirin, 81 mg, Daily  clopidogrel, 75 mg, Daily  ipratropium-albuterol, 3 mL, 4x Daily - RT  levothyroxine, 50 mcg, Daily  midodrine, 5 mg, TID AC  pantoprazole, 40 mg, Q AM  sertraline, 50 mg, Daily  sodium chloride, 10 mL, Q12H          Review of Systems:   ROS  Review of Systems   Constitution: Negative for chills and fever.   Cardiovascular: Negative for chest pain and palpitations.   Respiratory: Negative for cough and hemoptysis.    Gastrointestinal: Negative for nausea.        Constitutional:  Temp:  [97.6 °F (36.4 °C)-98.6 °F (37 °C)] 97.8 °F (36.6 °C)  Heart Rate:  [] 81  Resp:  [17-28] 20  BP: ()/(53-73) 130/56    Physical Exam   /56   Pulse 81   Temp 97.8 °F (36.6 °C) (Oral)   Resp 20   Ht 162.6 cm (64\")   Wt 51.9 kg (114 lb 6.7 oz)   SpO2 95%   BMI 19.64 kg/m²   General:  Appears in no acute distress  Eyes: Sclera is anicteric,  conjunctiva is clear   HEENT:  No JVD. Thyroid not visibly enlarged. No mucosal pallor or cyanosis  Respiratory: Respirations regular and unlabored at rest.  Clear to auscultation  Cardiovascular: S1,S2 Regular rate and rhythm. No murmur, rub or gallop auscultated.  . No pretibial pitting edema  Gastrointestinal: Abdomen nondistended.  Musculoskeletal:  No abnormal movements  Extremities: No digital clubbing or cyanosis  Skin: Color pink.   Neuro: Alert and awake.    INTAKE AND OUTPUT:    Intake/Output Summary (Last 24 hours) at 1/16/2025 " 0837  Last data filed at 1/15/2025 2000  Gross per 24 hour   Intake 960 ml   Output 900 ml   Net 60 ml       Cardiographics  Telemetry: Reviewed and interpreted by me reveals sinus rhythm    ECG:   ECG 12 Lead Rhythm Change   Preliminary Result   HEART RATE=81  bpm   RR Mybgswhg=531  ms   FL Ancozwrc=701  ms   P Horizontal Axis=33  deg   P Front Axis=69  deg   QRSD Interval=74  ms   QT Zpjozono=999  ms   BDmW=905  ms   QRS Axis=56  deg   T Wave Axis=10  deg   - OTHERWISE NORMAL ECG -   Sinus rhythm   Low voltage, precordial leads   Date and Time of Study:2025-01-15 23:17:14      ECG 12 Lead Rhythm Change   Preliminary Result   HEART ECAX=163  bpm   RR Cfagjyui=878  ms   FL Xgplitvq=952  ms   P Horizontal Axis=72  deg   P Front Axis=54  deg   QRSD Interval=93  ms   QT Meuvaazz=121  ms   HMdW=916  ms   QRS Axis=18  deg   T Wave Axis=64  deg   - ABNORMAL ECG -   Sinus tachycardia   Atrial premature complex   Inferior infarct, old   Date and Time of Study:2025-01-15 13:54:39      ECG 12 Lead Chest Pain   Preliminary Result   HEART RATE=94  bpm   RR Newuqqcw=974  ms   FL Hysplhgy=975  ms   P Horizontal Axis=56  deg   P Front Axis=59  deg   QRSD Interval=85  ms   QT Onewjdwc=488  ms   JPzC=055  ms   QRS Axis=18  deg   T Wave Axis=65  deg   - ABNORMAL ECG -   Incomplete analysis due to missing data in precordial lead(s)   Sinus rhythm   Atrial premature complexes   Prolonged QT interval   Date and Time of Study:2025-01-15 09:45:07      ECG 12 Lead QT Measurement   Final Result   HEART ETJV=071  bpm   RR Intzdque=323  ms   FL Ilubeijb=046  ms   P Horizontal Axis=70  deg   P Front Axis=93  deg   QRSD Nghgsyro=807  ms   QT Pxvkxkgf=274  ms   BRsT=604  ms   QRS Axis=23  deg   T Wave Axis=73  deg   - ABNORMAL ECG -   Sinus tachycardia   Multiform ventricular premature complexes   ST elevation, consider lateral injury   When compared with ECG of 28-Dec-2024 05:16:55,   Significant rate increase   Electronically Signed By: Galileo  "Marciano (JOSSY) 2025-01-13 17:24:26   Date and Time of Study:2025-01-13 13:54:21      Telemetry Scan   Final Result      Telemetry Scan   Final Result      Telemetry Scan   Final Result      Telemetry Scan   Final Result      Telemetry Scan   Final Result      Telemetry Scan   Final Result      Telemetry Scan   Final Result      Telemetry Scan   Final Result      Telemetry Scan   Final Result      Telemetry Scan   Final Result      Telemetry Scan   Final Result      Telemetry Scan   Final Result      Telemetry Scan   Final Result        I have personally reviewed EKG    Echocardiogram: Results for orders placed during the hospital encounter of 11/19/24    Adult Transthoracic Echo Limited W/ Cont if Necessary Per Protocol    Interpretation Summary    Left ventricular ejection fraction appears to be 56 - 60%.    There is a MitraClip mitral valve repair present.    There is trace residual MR.  Mean gradient is 4.6 mmHg      Lab Review   I have reviewed the labs  Results from last 7 days   Lab Units 01/15/25  1000 01/15/25  0730 01/10/25  1751   HSTROP T ng/L 21* 25* 30*     Results from last 7 days   Lab Units 01/14/25  0325   MAGNESIUM mg/dL 2.5*     Results from last 7 days   Lab Units 01/15/25  0730   SODIUM mmol/L 137   POTASSIUM mmol/L 4.6   BUN mg/dL 9   CREATININE mg/dL 0.66   CALCIUM mg/dL 9.3         Results from last 7 days   Lab Units 01/15/25  0730 01/14/25  0325 01/13/25  0358   WBC 10*3/mm3 8.84 9.09 8.24   HEMOGLOBIN g/dL 9.0* 8.6* 7.9*   HEMATOCRIT % 29.5* 28.3* 25.7*   PLATELETS 10*3/mm3 496* 483* 435     Results from last 7 days   Lab Units 01/12/25  0219 01/10/25  1146   INR  1.04 1.06   APTT seconds  --  29.5       RADIOLOGY:  Imaging Results (Last 24 Hours)       ** No results found for the last 24 hours. **                  )1/16/2025  Travis Connor MD      EMR Dragon/Transcription:   \"Dictated utilizing Dragon dictation\".   "

## 2025-01-16 NOTE — PROGRESS NOTES
Daily Progress Note          Assessment    Hemoptysis  Pneumonia due to unspecified pathogen  Resolution of the left upper lobe cavitary lesion: Was seen October 2024 with bronchoscopy 10/16/2024 showing negative cultures.  But this admission there is development of spiculated multiple nodules.  COPD but not acute exacerbation  History of strongyloidosis  Rheumatoid arthritis and Crohn disease on biological therapy status post ileostomy with mucous fistula status post GI hemorrhage: on Humira, mesalamine methotrexate outpatient  Chronic anemia  CAD status post PCI to LAD 10/22/2024  HLD  Hypothyroidism  History of severe MR status post mitral valve MitraClip 11/21/2024  Thyroid hormone replacement        Recommendations:  Patient is stable from pulmonary status on room air      status post bronchoscopy 1/12/2025: No hemoptysis, multiple white mucous plugs seen, lavage from right upper lobe obtained to rule out opportunistic infections: AFB smear is negative  Gram-negative bacilli noted but the final is normal respiratory wu: Switched on 1/13/2025 antibiotics to Levaquin for 3 days    Patient can be discharged from pulmonary standpoint will follow-up in 2 weeks in the office     Aspirin  Cardiology consulted       Oxygen supplement and titration to maintain saturation 90 to 95%, currently on room air  Bronchodilators as needed     Levothyroxine  Protonix     I personally reviewed the radiological studies             LOS: 6 days     Subjective     Decreased cough and and improved shortness of breath    Objective     Vital signs for last 24 hours:  Vitals:    01/16/25 0455 01/16/25 0744 01/16/25 0749 01/16/25 0810   BP: 120/60   130/56   BP Location: Left arm      Patient Position: Lying      Pulse: 77 71 73 81   Resp: 20 20 20    Temp: 97.8 °F (36.6 °C)      TempSrc: Oral      SpO2: 99% 98% 95%    Weight:       Height:           Intake/Output last 3 shifts:  I/O last 3 completed shifts:  In: 960 [P.O.:960]  Out:  1600 [Urine:1000; Stool:600]  Intake/Output this shift:  No intake/output data recorded.      Radiology  Imaging Results (Last 24 Hours)       ** No results found for the last 24 hours. **            Labs:  Results from last 7 days   Lab Units 01/15/25  0730   WBC 10*3/mm3 8.84   HEMOGLOBIN g/dL 9.0*   HEMATOCRIT % 29.5*   PLATELETS 10*3/mm3 496*     Results from last 7 days   Lab Units 01/16/25  1001 01/13/25  0358 01/12/25  0215   SODIUM mmol/L 139   < > 138   POTASSIUM mmol/L 4.2   < > 4.5   CHLORIDE mmol/L 107   < > 110*   CO2 mmol/L 20.8*   < > 20.2*   BUN mg/dL 9   < > 13   CREATININE mg/dL 0.69   < > 0.86   CALCIUM mg/dL 9.5   < > 8.6   BILIRUBIN mg/dL  --   --  <0.2   ALK PHOS U/L  --   --  121*   ALT (SGPT) U/L  --   --  23   AST (SGOT) U/L  --   --  20   GLUCOSE mg/dL 90   < > 114*    < > = values in this interval not displayed.         Results from last 7 days   Lab Units 01/12/25  0215 01/10/25  1146   ALBUMIN g/dL 2.8* 2.9*     Results from last 7 days   Lab Units 01/15/25  1000 01/15/25  0730 01/10/25  1751   HSTROP T ng/L 21* 25* 30*     Results from last 7 days   Lab Units 01/12/25  0215   KYLAH  Negative     Results from last 7 days   Lab Units 01/14/25  0325   MAGNESIUM mg/dL 2.5*     Results from last 7 days   Lab Units 01/12/25  0219 01/10/25  1146   INR  1.04 1.06   APTT seconds  --  29.5     Results from last 7 days   Lab Units 01/15/25  1000   TSH uIU/mL 7.680*   FREE T4 ng/dL 0.95           Meds:   SCHEDULE  aspirin, 81 mg, Oral, Daily  clopidogrel, 75 mg, Oral, Daily  ipratropium-albuterol, 3 mL, Nebulization, 4x Daily - RT  levothyroxine, 50 mcg, Oral, Daily  metoprolol succinate XL, 12.5 mg, Oral, Q24H  midodrine, 5 mg, Oral, TID AC  pantoprazole, 40 mg, Oral, Q AM  sertraline, 50 mg, Oral, Daily  sodium chloride, 10 mL, Intravenous, Q12H      Infusions     PRNs    acetaminophen **OR** acetaminophen **OR** acetaminophen    aluminum-magnesium hydroxide-simethicone    senna-docusate sodium  **AND** polyethylene glycol **AND** bisacodyl **AND** bisacodyl    Calcium Replacement - Follow Nurse / BPA Driven Protocol    diphenhydrAMINE    HYDROcodone-acetaminophen    Magnesium Standard Dose Replacement - Follow Nurse / BPA Driven Protocol    melatonin    nitroglycerin    ondansetron ODT **OR** ondansetron    Phosphorus Replacement - Follow Nurse / BPA Driven Protocol    Potassium Replacement - Follow Nurse / BPA Driven Protocol    [COMPLETED] Insert Peripheral IV **AND** sodium chloride    sodium chloride    sodium chloride    Physical Exam:  General Appearance:  Alert   HEENT:  Normocephalic, without obvious abnormality, Conjunctiva/corneas clear,.   Nares normal, no drainage     Neck:  Supple, symmetrical, trachea midline.   Lungs /Chest wall:   Good air entry with minimal bilateral basal rhonchi, respirations unlabored, symmetrical wall movement.     Heart:  Regular rate and rhythm, S1 S2 normal  Abdomen: Soft, non-tender, no masses, no organomegaly.    Extremities: No edema, no clubbing or cyanosis     ROS  Constitutional: Negative for chills, fever and malaise/fatigue.   HENT: Negative.    Eyes: Negative.    Cardiovascular: Less mild anterior chest pain  Respiratory: Positive for improvement in the cough and shortness of breath.    Skin: Negative.    Musculoskeletal: Negative.    Gastrointestinal: Negative.    Genitourinary: Negative.    Neurological: Negative.    Psychiatric/Behavioral: Negative.      I reviewed the recent clinical results  I personally reviewed the latest radiological studies    Part of this note may be an electronic transcription/translation of spoken language to printed text using the Dragon Dictation System.

## 2025-01-16 NOTE — CASE MANAGEMENT/SOCIAL WORK
Continued Stay Note  ERIC Yeung     Patient Name: Maryann Gaitan  MRN: [id number]  Today's Date: 1/16/2025    Admit Date: 1/10/2025    Plan: DC PLAN: [facility]. Okay to return. [facility] approved 1/14-1/16. [facility] [place].       Discharge Plan       Row Name 01/16/25 1538       Plan    Plan DC PLAN: [facility]. Okay to return. [facility] approved 1/14-1/16. [facility] [place].        Patient/Family in Agreement with Plan yes    Plan Comments Reached out to Rad[facility] who states unable to provide transportation, Family unable to provide. [facility] [employer] [facility] requested.                      Expected Discharge Date and Time       Expected Discharge Date Expected Discharge Time    Jan 16, 2025           Arianne Hendricks RN   Case Management  [phone]

## 2025-01-16 NOTE — THERAPY TREATMENT NOTE
"Subjective: Pt agreeable to therapeutic plan of care.    Objective:     Precautions - HFR    Bed mobility - N/A or Not attempted.  Transfers - Mod-A and with rolling walker  Ambulation - 12 feet Mod-A, Assist x 2, and with rolling walker    Therapeutic Exercise - 10 Reps Bilaterally AROM supported sitting / chair    Vitals: Tachycardic  HR up to 131 at highest    Pain: 5 VAS   Location: generalized  Intervention for pain: Repositioned and Increased Activity    Education: Provided education on the importance of mobility in the acute care setting, Verbal/Tactile Cues, Transfer Training, and Gait Training    Assessment: Maryann Gaitan presents with functional mobility impairments which indicate the need for skilled intervention. Pt ambulated with posterior lean and on her toes. She required 100% cueing for walker management and to for safe hand placement with transfers. Pt reported to PT that she walker all around the hallway yesterday which did not happen. PT cued pt for more controlled movement through full, available ROM with therapeutic exercises. Tolerating session today without incident. Will continue to follow and progress as tolerated.     Plan/Recommendations:   If medically appropriate, Moderate Intensity Therapy recommended post-acute care. This is recommended as therapy feels the patient would require 3-4 days per week and wouldn't tolerate \"3 hour daily\" rehab intensity. SNF would be the preferred choice. If the patient does not agree to SNF, arrange HH or OP depending on home bound status. If patient is medically complex, consider LTACH. Pt requires no DME at discharge.     Pt desires Skilled Rehab placement at discharge. Pt cooperative; agreeable to therapeutic recommendations and plan of care.         Basic Mobility 6-click:  Rollin = Total, A lot = 2, A little = 3; 4 = None  Supine>Sit:   1 = Total, A lot = 2, A little = 3; 4 = None   Sit>Stand with arms:  1 = Total, A lot = 2, A little = " 3; 4 = None  Bed>Chair:   1 = Total, A lot = 2, A little = 3; 4 = None  Ambulate in room:  1 = Total, A lot = 2, A little = 3; 4 = None  3-5 Steps with railin = Total, A lot = 2, A little = 3; 4 = None  Score: 12    Modified [place]: 4 = Moderately severe disability (Unable to attend to own bodily needs without assistance, and unable to walk unassisted)     Post-Tx Position: Up in Chair, Alarms activated, and Call light and personal items within reach  PPE: gloves    Therapy Charges for Today       Code Description Service Date Service Provider Modifiers Qty    44166824681 HC GAIT TRAINING EA 15 MIN 2025 Sobia Vides, PT GP 1    [id number]  PT THER PROC EA 15 MIN 2025 Sobia Vides, PT GP 1           PT Charges       Row Name 25 1327             Time Calculation    Start Time 1101  -BR      Stop Time 1120  -BR      Time Calculation (min) 19 min  -BR      PT Received On 25  -BR      PT - Next Appointment 25  -BR         Time Calculation- PT    Total Timed Code Minutes- PT 19 minute(s)  -BR                User Key  (r) = Recorded By, (t) = Taken By, (c) = Cosigned By      Initials Name Provider Type    Sobia Manzo, PT Physical Therapist

## 2025-01-16 NOTE — PROGRESS NOTES
"Enter Query Response Below      Query Response: Stage 2 pressure injury to sacrum, present on admission              If applicable, please update the problem list.     Patient: Maryann Gaitan        : 1950  Account: [id number]           Admit Date:         How to Respond to this query:       a. Click New Note     b. Answer query within the yellow box.                c. Update the Problem List, if applicable.      If you have any questions about this query contact me at: [email or web address]     Hazel Knapp DRU,     Patient presented 1/10 for hemoptysis. Wound care nurse evaluated patient on , noting \"WOCN consult received for pressure injury noted upon admission. Patient presents with a partial thickness stage 2 pressure injury to her sacrum.\" Treatment recommendations/plan includes \"Zinc barrier paste was applied and patient was provided a waffle cushion prior to getting up in the chair. There is a low air loss pump in place to the bed surface. Continue pressure injury prevention measures per protocol and skin care for any episodes of incontinence.\"    Please clarify conflicting documentation as:    - Stage 2 pressure injury to sacrum, present on admission  - Other ___________________  - Unable to clinically determine    By submitting this query, we are merely seeking further clarification of documentation to accurately reflect all conditions that you are monitoring, evaluating, treating or that extend the hospitalization or utilize additional resources of care. Please utilize your independent clinical judgment when addressing the question(s) above.     This query and your response, once completed, will be entered into the legal medical record.    Sincerely,  Ramiro Lee RN, CDS  Clinical Documentation Integrity Program     "

## 2025-01-16 NOTE — DISCHARGE SUMMARY
"[facility] Medicine Services  Discharge Summary    Date of Service: 2025  Patient Name: Maryann Gaitan  : 1950  MRN: [id number]    Date of Admission: 1/10/2025  Discharge Diagnosis: Hemoptysis  Date of Discharge: 2025  Primary Care Physician: Azam Calhoun MD      Presenting Problem:   Hypomagnesemia [E83.42]  Hemoptysis [R04.2]  Pneumonia of both lungs due to infectious organism, unspecified part of lung [J18.9]    Active and Resolved Hospital Problems:  Active Hospital Problems    Diagnosis POA    **Hemoptysis [R04.2] Yes    Multifocal pneumonia [J18.9] Unknown      Resolved Hospital Problems   No resolved problems to display.         Hospital Course     HPI:    \"74 y.o. female with a PMH of CAD s/p recent stent on dual platelet therapy, COPD, RA, Crohn's on biologic therapy, ileostomy with mucous fistula for GI bleed who presented to [facility] with large amount of hemoptysis on admission day.  Patient mentioned that she started coughing and blood came out along with the coughing.  Patient has squeezing pain in the chest as well.  In the ED patient labs showing no leukocytosis hemoglobin of 8.4 creatinine at 1.4 fluctuating around the baseline.  Potassium of 5.4.  CT angio chest with no pulmonary embolism.  None emphysema.  Interval resolution of cavitated lesion left upper lobe.  Interval development of a spiculated nodule Favored to be inflammation or infection.  Cannot exclude a neoplasm and and will need to follow-up CT chest in 3 months.  Stable descending thoracic aortic aneurysm and measuring up to 3.8 cm.  Coronary artery calcification. started on aztreonam in the ED. Pulm is consulted.      Of note, the Patient is recently in the hospital in December for acute renal failure with creatinine of 5.5 and hyperkalemia of 7.2.  Patient was admitted in 2024 for acute GI bleeding.\"    Hospital Course:  Patient was admitted with acute hemoptysis which has " slowly improved through this hospital course.  Laboratory studies show stability of hemoglobin levels.  Imaging was conducted.  Patient was noted to have a left upper lobe lesion and could not exclude underlying neoplasm.  Pulmonology services were consulted.  They recommended outpatient noncontrast CT chest in approximate 3 months to ensure resolution.  Patient did undergo bronchoscopy on 1/12/2025.  Multiple mucous plugs were removed at that point in time.  AFB negative.  Final cultures were consistent with normal respiratory wu, yeast isolated.  Autoimmune etiology panel is currently pending.  Patient is on chronic immunosuppression for her Crohn's disease.  She does have an ileostomy as well.  She was seen and evaluated by general surgery who recommended 6-month follow-up once she is off dual antiplatelet therapy for possible reversal.  Antibiotics have been since transition to oral.  Patient finished Levaquin course.  Pulmonary services have signed off.     plans were being made for patient to be discharged to rehab facility yesterday however patient continued to endorse weakness.  Yesterday morning she also stated that she is having chest pain as well.  She felt a pressure and/or heaviness on her chest.  She has had a recent cardiac stent and/or evaluation in October 2024.  We reviewed cardiology notes from that point in time and they did recommend continuing DAPT therapy to prevent thrombosis.  Both Plavix and aspirin were resumed 1/14.  Hemoglobin has now remained stable.     Recent cardiac stent history as well as cardiac catheterization less than 6 months ago and new onset of chest pain this morning we will go ahead and consult cardiology services for evaluation prior to any further disposition planning.  Cardiology evaluated and monitored patient overnight, patient did require IV push of Lopressor yesterday.  Cardiology started patient on metoprolol succinate.  If hypotension continues, can always  increase midodrine as well.  Patient was cleared from cardiology standpoint.        DISCHARGE Follow Up Recommendations for labs and diagnostics: PCP 2-3 days  Pulmonology Dr. Pope 2 weeks  General surgery follow up in 6 months      Day of Discharge     Vital Signs:  Temp:  [97.6 °F (36.4 °C)-98.4 °F (36.9 °C)] 97.8 °F (36.6 °C)  Heart Rate:  [] 76  Resp:  [17-28] 20  BP: ()/(55-73) 130/56    Physical Exam:  Physical Exam   General: 75 yo female, Alert and oriented, well nourished, no acute distress.  HENT: Normocephalic, normal hearing, moist oral mucosa, no scleral icterus.  Neck: Supple, nontender, no carotid bruits, no JVD, no LAD.  Lungs: nonlabored respiration.  Heart: RRR.  Abdomen: Soft, nontender, nondistended.  Musculoskeletal: Normal range of motion and strength, no tenderness or swelling.  Skin: Skin is warm, dry and pink, no rashes or lesions.  Psychiatric: Cooperative, appropriate mood and affect.        Pertinent  and/or Most Recent Results     LAB RESULTS:      Lab 01/15/25  0730 01/14/25  0325 01/13/25  0358 01/12/25  0219 01/12/25  0215 01/11/25  0031 01/10/25  1153 01/10/25  1146   WBC 8.84 9.09 8.24  --  6.00 5.75  --  6.92   HEMOGLOBIN 9.0* 8.6* 7.9*  --  7.4* 7.6*  --  8.4*   HEMATOCRIT 29.5* 28.3* 25.7*  --  24.7* 24.8*  --  28.0*   PLATELETS 496* 483* 435  --  427 416  --  469*   NEUTROS ABS 2.78  --  2.56  --  1.56* 2.09  --  1.81   IMMATURE GRANS (ABS) 0.44*  --  0.11*  --  0.07* 0.05  --  0.05   LYMPHS ABS 3.90*  --  3.41*  --  2.81 2.36  --  3.43*   MONOS ABS 1.21*  --  1.46*  --  1.15* 0.83  --  0.96*   EOS ABS 0.39  --  0.62*  --  0.36 0.38  --  0.61*   .3* 101.8* 100.8*  --  100.8* 102.1*  --  103.3*   SED RATE  --   --   --   --  19  --   --   --    CRP  --   --   --   --  0.92*  --   --   --    PROCALCITONIN  --   --   --   --   --   --   --  0.07   LACTATE  --   --   --   --   --   --  0.5  --    LDH  --   --   --   --  190  --   --   --    PROTIME  --   --   --   13.6  --   --   --  13.8   APTT  --   --   --   --   --   --   --  29.5         Lab 01/16/25  1001 01/15/25  1000 01/15/25  0730 01/14/25  0325 01/13/25  0358 01/12/25 0215 01/11/25  0031 01/10/25  1146   SODIUM 139  --  137 136 137 138 139 138   POTASSIUM 4.2  --  4.6 4.3 4.2 4.5 5.1 5.4*   CHLORIDE 107  --  105 107 110* 110* 111* 108*   CO2 20.8*  --  21.2* 20.0* 18.6* 20.2* 18.5* 21.7*   ANION GAP 11.2  --  10.8 9.0 8.4 7.8 9.5 8.3   BUN 9  --  9 10 11 13 18 24*   CREATININE 0.69  --  0.66 0.79 0.78 0.86 0.81 1.40*   EGFR 91.2  --  92.2 78.6 79.8 71.0 76.3 39.6*   GLUCOSE 90  --  78 79 82 114* 83 93   CALCIUM 9.5  --  9.3 8.6 8.7 8.6 8.3* 8.7   MAGNESIUM  --   --   --  2.5* 1.4* 1.7 2.0 1.2*   PHOSPHORUS  --   --   --   --  3.0 3.5 3.7  --    TSH  --  7.680*  --   --   --   --   --   --          Lab 01/12/25  0215 01/10/25  1146   TOTAL PROTEIN 5.3* 5.7*   ALBUMIN 2.8* 2.9*   GLOBULIN 2.5 2.8   ALT (SGPT) 23 32   AST (SGOT) 20 31   BILIRUBIN <0.2 0.2   ALK PHOS 121* 120*         Lab 01/15/25  1000 01/15/25  0730 01/12/25  0219 01/10/25  1751 01/10/25  1146   HSTROP T 21* 25*  --  30* 50*   PROTIME  --   --  13.6  --  13.8   INR  --   --  1.04  --  1.06             Lab 01/12/25  0215 01/10/25  1146   IRON 45  --    IRON SATURATION (TSAT) 19*  --    TIBC 237*  --    TRANSFERRIN 159*  --    FERRITIN 1,140.00*  --    ABO TYPING  --  O   RH TYPING  --  Positive   ANTIBODY SCREEN  --  Negative         Brief Urine Lab Results  (Last result in the past 365 days)        Color   Clarity   Blood   Leuk Est   Nitrite   Protein   CREAT   Urine HCG        12/02/24 2056 Yellow   Cloudy   Negative   Moderate (2+)   Negative   Negative                 Microbiology Results (last 10 days)       Procedure Component Value - Date/Time    Fungus Culture - Lavage, Lung, R [[id number]]  (Abnormal) Collected: 01/12/25 0838    Lab Status: Preliminary result Specimen: Lavage from Lung, R Updated: 01/16/25 0748     Fungus Culture Yeast  isolated    AFB Culture - Lavage, Lung, R [[id number]] Collected: 01/12/25 0838    Lab Status: Preliminary result Specimen: Lavage from Lung, R Updated: 01/13/25 1005     AFB Stain No acid fast bacilli seen on concentrated smear    BAL Culture, Quantitative - Lavage, Lung, R [[id number]] Collected: 01/12/25 0838    Lab Status: Final result Specimen: Lavage from Lung, R Updated: 01/14/25 1128     BAL Culture 50,000 CFU/mL Normal respiratory wu. No S. aureus or Pseudomonas aeruginosa detected. Final report.     Gram Stain Few (2+) WBCs seen      Rare (1+) Gram negative bacilli    Respiratory Panel PCR w/COVID-19(SARS-CoV-2) ANAMIKA/FABRICIO/JOSSY/PAD/COR/DANIELLE In-House, NP Swab in UTM/VTM, 2 HR TAT - Lavage, Lung, R [[id number]]  (Normal) Collected: 01/12/25 0838    Lab Status: Final result Specimen: Lavage from Lung, R Updated: 01/12/25 1106     ADENOVIRUS, PCR Not Detected     Coronavirus 229E Not Detected     Coronavirus HKU1 Not Detected     Coronavirus NL63 Not Detected     Coronavirus OC43 Not Detected     COVID19 Not Detected     Human Metapneumovirus Not Detected     Human Rhinovirus/Enterovirus Not Detected     Influenza A PCR Not Detected     Influenza B PCR Not Detected     Parainfluenza Virus 1 Not Detected     Parainfluenza Virus 2 Not Detected     Parainfluenza Virus 3 Not Detected     Parainfluenza Virus 4 Not Detected     RSV, PCR Not Detected     Bordetella pertussis pcr Not Detected     Bordetella parapertussis PCR Not Detected     Chlamydophila pneumoniae PCR Not Detected     Mycoplasma pneumo by PCR Not Detected    Narrative:      In the setting of a positive respiratory panel with a viral infection PLUS a negative procalcitonin without other underlying concern for bacterial infection, consider observing off antibiotics or discontinuation of antibiotics and continue supportive care. If the respiratory panel is positive for atypical bacterial infection (Bordetella pertussis, Chlamydophila pneumoniae, or  Mycoplasma pneumoniae), consider antibiotic de-escalation to target atypical bacterial infection.    MRSA Screen, PCR (Inpatient) - Swab, Nares [[id number]]  (Normal) Collected: 01/10/25 1645    Lab Status: Final result Specimen: Swab from Nares Updated: 01/10/25 1904     MRSA PCR No MRSA Detected    Narrative:      The negative predictive value of this diagnostic test is high and should only be used to consider de-escalating anti-MRSA therapy. A positive result may indicate colonization with MRSA and must be correlated clinically.    Respiratory Panel PCR w/COVID-19(SARS-CoV-2) ANAMIKA/FABRICIO/JOSSY/PAD/COR/DANIELLE In-House, NP Swab in UTM/VTM, 2 HR TAT - Swab, Nasopharynx [[id number]]  (Normal) Collected: 01/10/25 1645    Lab Status: Final result Specimen: Swab from Nasopharynx Updated: 01/10/25 1838     ADENOVIRUS, PCR Not Detected     Coronavirus 229E Not Detected     Coronavirus HKU1 Not Detected     Coronavirus NL63 Not Detected     Coronavirus OC43 Not Detected     COVID19 Not Detected     Human Metapneumovirus Not Detected     Human Rhinovirus/Enterovirus Not Detected     Influenza A PCR Not Detected     Influenza B PCR Not Detected     Parainfluenza Virus 1 Not Detected     Parainfluenza Virus 2 Not Detected     Parainfluenza Virus 3 Not Detected     Parainfluenza Virus 4 Not Detected     RSV, PCR Not Detected     Bordetella pertussis pcr Not Detected     Bordetella parapertussis PCR Not Detected     Chlamydophila pneumoniae PCR Not Detected     Mycoplasma pneumo by PCR Not Detected    Narrative:      In the setting of a positive respiratory panel with a viral infection PLUS a negative procalcitonin without other underlying concern for bacterial infection, consider observing off antibiotics or discontinuation of antibiotics and continue supportive care. If the respiratory panel is positive for atypical bacterial infection (Bordetella pertussis, Chlamydophila pneumoniae, or Mycoplasma pneumoniae), consider antibiotic  de-escalation to target atypical bacterial infection.    Blood Culture - Blood, Hand, Left [[id number]]  (Normal) Collected: 01/10/25 1207    Lab Status: Final result Specimen: Blood from Hand, Left Updated: 01/15/25 1215     Blood Culture No growth at 5 days    COVID-19, FLU A/B, RSV PCR 1 HR TAT - Swab, Nasopharynx [[id number]]  (Normal) Collected: 01/10/25 1150    Lab Status: Final result Specimen: Swab from Nasopharynx Updated: 01/10/25 1237     COVID19 Not Detected     Influenza A PCR Not Detected     Influenza B PCR Not Detected     RSV, PCR Not Detected    Narrative:      Fact sheet for providers: https://www.fda.gov/media/795274/download    Fact sheet for patients: https://www.fda.gov/media/103198/download    Test performed by PCR.    Blood Culture - Blood, Arm, Left [[id number]]  (Normal) Collected: 01/10/25 1146    Lab Status: Final result Specimen: Blood from Arm, Left Updated: 01/15/25 1200     Blood Culture No growth at 5 days            CT Angiogram Chest Pulmonary Embolism    Result Date: 1/10/2025  Impression: Impression: 1.No evidence of pulmonary embolism. 2.Lung emphysema. 3.Interval resolution of cavitary lesion left upper lobe. Interval development of spiculated nodules in the lungs as described above favored to be inflammatory or infectious. Cannot exclude neoplasm. Canal correlation a follow-up CT chest in 3 months recommended 4.Stable descending thoracic aortic aneurysm measuring up to 3.8 cm with diffuse irregular mural thrombus and a broad-based penetrating atherosclerotic ulcer. Additional stable smaller ulcerations. No aortic dissection. 5.Coronary artery calcifications. 6.Additional chronic findings as described above. Electronically Signed: Canelo Manriquez MD  1/10/2025 2:25 PM EST  Workstation ID: [id number]    XR Chest 1 View    Result Date: 1/10/2025  Impression: Impression: No acute process identified Electronically Signed: Junior Khan MD  1/10/2025 11:56 AM EST  Workstation ID:  [id number]    CT Abdomen Pelvis Without Contrast    Result Date: 12/27/2024  Impression: Impression: Interval postoperative changes without acute findings in the abdomen or pelvis. Electronically Signed: Mansoor Flores  12/27/2024 6:43 PM EST  Workstation ID: [id number]    US Renal Bilateral    Result Date: 12/27/2024  Impression: Impression: Kidney ultrasound is within normal limits. Electronically Signed: Juan Dia MD  12/27/2024 6:33 AM EST  Workstation ID: [id number]    XR Chest 1 View    Result Date: 12/26/2024  Impression: Impression: Emphysema with no acute cardiopulmonary process demonstrated Electronically Signed: Colt Lebron  12/26/2024 9:03 PM EST  Workstation ID: [id number]     Results for orders placed during the hospital encounter of 10/08/24    Duplex Venous Upper Extremity - Left CAR    Interpretation Summary    Acute left upper extremity superficial thrombophlebitis noted in the basilic (upper arm).    All other left sided vessels appear normal.      Results for orders placed during the hospital encounter of 10/08/24    Duplex Venous Upper Extremity - Left CAR    Interpretation Summary    Acute left upper extremity superficial thrombophlebitis noted in the basilic (upper arm).    All other left sided vessels appear normal.      Results for orders placed during the hospital encounter of 11/19/24    Adult Transthoracic Echo Limited W/ Cont if Necessary Per Protocol    Interpretation Summary    Left ventricular ejection fraction appears to be 56 - 60%.    There is a MitraClip mitral valve repair present.    There is trace residual MR.  Mean gradient is 4.6 mmHg      Labs Pending at Discharge:  Pending Results       Procedure [Order ID] Specimen - Date/Time    ANCA Panel [[id number]] Collected: 01/12/25 0215    Specimen: Blood from Arm, Left Updated: 01/12/25 0225    Histoplasma Antigen, CSF or BAL - Lavage, Lung, R [[id number]] Collected: 01/12/25 0838    Specimen: Lavage from Lung, R Updated: 01/12/25  0957    Pneumocystis PCR - Lavage, Lung, R [[id number]] Collected: 01/12/25 0838    Specimen: Lavage from Lung, R Updated: 01/12/25 0957            Procedures Performed  Procedure(s):  BRONCHOSCOPY WITH LAVAGE         Consults:   Consults       Date and Time Order Name Status Description    1/15/2025  8:44 AM Inpatient Cardiology Consult      1/10/2025  2:46 PM Inpatient Pulmonology Consult Completed     1/10/2025  2:33 PM Hospitalist (on-call MD unless specified)      12/27/2024  9:08 AM Inpatient General Surgery Consult Completed     12/26/2024  9:14 PM Nephrology (on -call MD unless specified) Completed     12/16/2024  3:00 PM Inpatient Psychiatrist Consult Completed               Discharge Details        Discharge Medications        New Medications        Instructions Start Date   clopidogrel 75 MG tablet  Commonly known as: PLAVIX   75 mg, Oral, Daily   Start Date: January 17, 2025     metoprolol succinate XL 25 MG 24 hr tablet  Commonly known as: TOPROL-XL   12.5 mg, Oral, Every 24 Hours Scheduled   Start Date: January 17, 2025            Continue These Medications        Instructions Start Date   albuterol (2.5 MG/3ML) 0.083% nebulizer solution  Commonly known as: PROVENTIL   2.5 mg, Every 6 Hours PRN      aspirin 81 MG EC tablet   81 mg, Oral, Daily      atorvastatin 40 MG tablet  Commonly known as: LIPITOR   40 mg, Nightly      calcium carbonate 600 MG tablet  Commonly known as: OS-ARABELLA   600 mg, 2 Times Daily With Meals      Canasa 1000 MG suppository  Generic drug: mesalamine   1,000 mg, Nightly      cholestyramine light 4 g packet   4 g, Oral, 2 Times Daily      diclofenac 50 MG EC tablet  Commonly known as: VOLTAREN   50 mg, 2 Times Daily      diphenoxylate-atropine 2.5-0.025 MG per tablet  Commonly known as: LOMOTIL   1 tablet, Every 6 Hours PRN      folic acid 1 MG tablet  Commonly known as: FOLVITE   1 mg, Daily      guaifenesin 100 MG/5ML liquid  Commonly known as: ROBITUSSIN   200 mg, Oral, Every 4  Hours PRN      Humira (2 Syringe) 20 MG/0.2ML Prefilled Syringe Kit  Generic drug: Adalimumab   0.4 mL, Subcutaneous, Every 7 Days      HYDROcodone-acetaminophen 5-325 MG per tablet  Commonly known as: NORCO   1 tablet, 2 Times Daily      HYDROcodone-acetaminophen 5-325 MG per tablet  Commonly known as: NORCO   1 tablet, Every 6 Hours PRN      levothyroxine 50 MCG tablet  Commonly known as: SYNTHROID, LEVOTHROID   50 mcg, Every Early Morning      Melatonin Extra Strength 10 MG tablet  Generic drug: Melatonin   10 mg, As Needed      methotrexate 2.5 MG tablet   2.5 mg, Weekly      midodrine 5 MG tablet  Commonly known as: PROAMATINE   5 mg, Oral, 3 Times Daily Before Meals      ondansetron ODT 4 MG disintegrating tablet  Commonly known as: ZOFRAN-ODT   4 mg, Oral, Every 6 Hours PRN      pantoprazole 40 MG EC tablet  Commonly known as: PROTONIX   40 mg, Oral, Every Early Morning   Start Date: January 17, 2025     pantoprazole 40 MG EC tablet  Commonly known as: PROTONIX   40 mg, Daily      Rinvoq 45 MG tablet sustained-release 24 hour extended release tablet  Generic drug: upadacitinib ER   45 mg, Daily      sertraline 50 MG tablet  Commonly known as: ZOLOFT   50 mg, Daily      spironolactone 25 MG tablet  Commonly known as: ALDACTONE   25 mg, Daily      vitamin D3 125 MCG (5000 UT) capsule capsule   5,000 Units, 2 Times Weekly             Stop These Medications      metoprolol tartrate 12.5 MG half tablet  Commonly known as: LOPRESSOR              Allergies   Allergen Reactions    Codeine Hives    Penicillin G Sodium Hives         Discharge Disposition:   Skilled Nursing Facility (DC - External)    Diet:  Hospital:  Diet Order   Procedures    Diet: Gastrointestinal; High Fiber; Fluid Consistency: Thin (IDDSI 0)         Discharge Activity:         CODE STATUS:  Code Status and Medical Interventions: No CPR (Do Not Attempt to Resuscitate); Full Support   Ordered at: 01/14/25 0912     Code Status (Patient has no pulse  and is not breathing):    No CPR (Do Not Attempt to Resuscitate)     Medical Interventions (Patient has pulse or is breathing):    Full Support         Future Appointments   Date Time Provider Department Center   1/22/2025  9:15 AM [facility] NA   1/22/2025 10:40 AM Travis Connor MD [facility]   9/24/2025 10:15 AM [facility] NA ECHO [facility] NA   9/24/2025 11:45 AM Travis Connor MD [facility] NA       Additional Instructions for the Follow-ups that You Need to Schedule       Discharge Follow-up with PCP   As directed       Currently Documented PCP:    Azam Calhoun MD    PCP Phone Number:    [phone]     Follow Up Details: 2-3 days        Discharge Follow-up with Specified Provider: Dr. Pope; 2 Weeks   As directed      To: Dr. Pope   Follow Up: 2 Weeks                Time spent on Discharge including face to face service:  45 minutes    Signature: Electronically signed by DRU Hutson, 01/16/25, 12:14 EST.  [facility] Hospitalist Team

## 2025-01-16 NOTE — THERAPY TREATMENT NOTE
"Subjective: Pt agreeable to therapeutic plan of care.  Cognition: oriented to Person, Place, Time, and Situation, though bouts of confusion throughout treatment session, reporting she ambulated in hallway yesterday, however pt only able to ambulate <15ft at this time with moderate assist x2 and FWW.    Objective:     Precautions - High Fall Risk    Bed Mobility: N/A or Not attempted.   Functional Transfers: Mod-A and with rolling walker  Balance: with UE support and standing Mod-A and with rolling walker  Functional Ambulation: Mod-A, Assist x 2, and with rolling walker    Therapeutic Exercise - 10 Reps B UE AROM unsupported sitting / chair, dynamic reaching in various planes and shoulder flex/ext.     Vitals: Tachycardic,  with min exertion    Pain: 5 VAS  Location: Chest/headache, pt reports feelings of dizziness  Interventions for pain: Therapeutic Presence  Education: Provided education on the importance of mobility in the acute care setting, Verbal/Tactile Cues, and Transfer Training    Assessment: Maryann Gaitan presents with ADL impairments affecting function including balance, cognition, coordination, endurance / activity tolerance, pain, range of motion (ROM), and strength. Pt A&O x4, though exhibits bouts of confusion throughout treatment session. Pt reports 5/10 pain in chest with associated dizziness. Pt comes to standing and ambulates with mod A x2 and FWW. Returned to chair where she completed BUE exercises in unsupported sitting, demos poor ROM in LUE this date. Demonstrated functioning below baseline abilities indicate the need for continued skilled intervention while inpatient. Tolerating session today without incident. Will continue to follow and progress as tolerated.     Plan/Recommendations:   Moderate Intensity Therapy recommended post-acute care. This is recommended as therapy feels the patient would require 3-4 days per week and wouldn't tolerate \"3 hour daily\" rehab intensity. SNF " would be the preferred choice. If the patient does not agree to SNF, arrange HH or OP depending on home bound status. If patient is medically complex, consider LTACH.    Pt desires Skilled Rehab placement at discharge. Pt cooperative; agreeable to therapeutic recommendations and plan of care.     Modified [place]: N/A = No pre-op stroke/TIA    Post-Tx Position: Up in Chair, Alarms activated, and Call light and personal items within reach  PPE: gloves and surgical mask    Therapy Charges for Today       Code Description Service Date Service Provider Modifiers Qty    [id number]  OT THER PROC EA 15 MIN 1/16/2025 Andriy Guevara OT GO 1    [id number]  OT NEUROMUSC RE EDUCATION EA 15 MIN 1/16/2025 Andriy Guevara OT GO 1           Time Calculation- OT       Row Name 01/16/25 1129             Time Calculation- OT    OT Start Time 1102  -SP      OT Stop Time 1118  -SP      OT Time Calculation (min) 16 min  -SP      Total Timed Code Minutes- OT 16 minute(s)  -SP      OT Received On 01/16/25  -SP      OT - Next Appointment 01/17/25  -SP                User Key  (r) = Recorded By, (t) = Taken By, (c) = Cosigned By      Initials Name Provider Type    SP Andriy Guevara, OT Occupational Therapist

## 2025-01-17 LAB
QT INTERVAL: 297 MS
QT INTERVAL: 360 MS
QT INTERVAL: 397 MS
QTC INTERVAL: 420 MS
QTC INTERVAL: 460 MS
QTC INTERVAL: 503 MS
REF LAB TEST METHOD: NORMAL

## 2025-01-19 LAB
MYCOBACTERIUM SPEC CULT: NORMAL
NIGHT BLUE STAIN TISS: NORMAL

## 2025-01-19 NOTE — PROGRESS NOTES
Subjective:     Encounter Date:01/22/2025      Patient ID: Maryann Gaitan is a 74 y.o. female.    Chief Complaint and history of present illness:    Follow-up for valvular heart disease, mitral valve clip, CAD, CABG, hypertension       History of present illness:       Ms. Maryann Gaitan has PMH of     CAD status post cardiac cath with multivessel CAD poor candidate for CABG, underwent PCI to ostial and proximal LAD 10/22/2024  Moderate to severe MR with central jet noted status post Azeb Clip 11/21/2024   COPD  Hypertension  Rheumatoid arthritis  Crohn's disease  GI bleed status post right hemicolectomy with ileostomy 11/28/2024     Here for follow-up.  Patient says she has not felt well since her procedure.  Had multiple hospital admissions with GI bleed and had surgery had to hold on dual antiplatelet therapy right after her stents.  Is complaining of left-sided chest pain with no aggravating or relieving factors occurring at rest.      Patient's arterial blood pressure is 110/63, heart rate 60, O2 sat of 100% on room air.     Review of records reveal that patient presented to the ED on 1/10/2025 with shortness of breath and hemoptysis.  Patient had CT chest that showed intervental development of spiculated nodules in the lungs favored to be inflammatory vs infectious; stable descending thoracic aortic aneurysm 3.8 cm with diffuse irregular mural thrombus.  Patient has been receiving antibiotic therapy with some improvement.  Today she apparently complained of chest pain therefore cardiology was consulted.  Patient reports that she had another episode of coughing with blood in her sputum.  On my examination she was sitting in bed, heart rate was mildly elevated in the low 100s.        Patient underwent bronchoscopy on 1/12/2025 that showed multiple lung nodules and multiple mucous plugging     Labs on admission showed high-sensitivity troponin of 50--30 potassium 5.4 BUN 24 creatinine 1.4 hemoglobin 8.4  platelets 469 chest x-ray was unremarkable     Labs today showed high-sensitivity troponin of 25--21 potassium 4.6 TSH 7.68 hemoglobin 9 platelets 496      Recent hospitalization     Patient was recently in the hospital 10/8/2024 with nausea vomiting diarrhea and abnormal labs.  With hyponatremia and hypokalemia and anemia.  Patient suddenly started having chest pain and fast team was called because of sharp left-sided chest pain with shortness of breath dizziness EKG showed sinus tachycardia and cardiac workup revealed severe MR, cath 10/15/2024 revealing severe ostial LAD and outpouching in the descending thoracic aorta probably a penetrating aortic ulcer versus aneurysm.  CT surgery was consulted and she was turned down for CABG therefore patient underwent drug-eluting stent to ostial LAD on 10/22/2024 and was supposed to have clip to mitral valve in follow-up.  In the meantime has been having worsening symptoms and abdominal pain, weight loss.  Patient has been restarted on Plavix.     Presented 11/6/2024 through [facility] with complaint of abdominal pain and dark red stool / rectal bleed.  Patient has close disease and rheumatoid arthritis and is on immunosuppressants methotrexate, Humira, Rinvoq now presented with rectal bleed and abdominal pain.  GI did a scope and biopsies of terminal ileum which were consistent with active Crohn's and biopsies of stomach and duodenum showed strong colitis infection, was treated by ivermectin.  GI started patient on Solu-Medrol and is holding immunosuppressants and wants to hold Plavix.  Presented to the emergency room on 11/19/2024 with abdominal pain and chest pain.  Patient reports that she started having bright red blood per rectum again yesterday and has been having constant chest pain since.     CT abdomen and pelvis shows distal terminal ileitis colonic fatty marrow infiltration which can be seen in the setting of chronic inflammatory bowel disease   patient was  transfused with 1 unit of PRBC and hemoglobin improved to 7.3 and this morning she is 9.1     Unfortunately patient is in the tough situation as she had stent placed on 10/22/2024 and needs dual antiplatelet therapy to prevent clotting of stent however she has presented for the second time with significant GI bleeding.  Currently her aspirin and Plavix are both on hold.              Data:        EGD/colonoscopy 10/12/2024 revealed small hiatal hernia, nonerosive gastritis, T1 stricture s/p dilatation with 12 mm, T1 ulcer, colon ulcers, sigmoid diverticulosis, grade 2 internal hemorrhoids and strongyloides  Echo 10/12/2024 EF of 51 to 55% with mild RV enlargement, severe left atrial enlargement, moderate to severe MR  Transesophageal echo 10/16/2024: LVEF of 55 to 60% with severe left atrial enlargement, moderate to severe MR and grade 3 descending aortic plaque  Cardiac cath 10/15/2024 reveals total right and severe ostial LAD, descending thoracic aorta had an outpouching consistent with penetrating aortic ulcer versus aneurysm.   Patient was evaluated by CT surgery not a candidate for CABG therefore patient underwent PCI and drug-eluting stent to the ostium proximal LAD.       Labs 1/16/2025 reveal normal BMP.  Labs 1/15/2024 reveal elevated TSH at 7.68 and CBC with a hemoglobin of 9        Assessment:  :       Chest pain, CAD, PCI  Hemoptysis, Multiple lung nodules and multiple mucous plugging, History of multifocal pneumonia  Recent abdominal pain, rectal bleed status post right hemicolectomy with ileostomy  Anemia  Crohn's disease  Mitral regurgitation status post MitraClip  Chronic HFpEF due to valvular heart disease from mitral regurgitation, now well compensated  Aortic aneurysm/descending thoracic aortic ulcer  History of hypertension cardiovascular disease  COPD, chronic steroid therapy  Anemia  Hyperglycemia/prediabetes        Recommendations / Plan:         Patient says she is feeling poorly.  Is  complaining of chest pain at rest.  Patient had cath 11/21/2024 and had MARIAN to ostial and proximal LAD.  Patient underwent MitraClip 11/21/2024.  Patient had multiple admissions for pneumonia and hemostasis anemia.  Underwent a right hemicolectomy and end ileostomy on 11/28/2024 and had transfusions done.  Patient's dual antiplatelet therapy was held during those admissions due to blood loss anemia.  Will schedule a stress test to evaluate for ischemia.  Patient says she cannot walk due to deconditioning.  Will do Lexiscan Cardiolite if she cannot walk.     Continue medical management with aspirin, atorvastatin, clopidogrel, metoprolol as tolerated  Unfortunately patient is not a candidate for guideline directed medical therapy due to chronic low blood pressure.  Will continue midodrine as tolerated.             Review of records     Patient underwent cardiac cath 10/15/2024 which revealed total right and severe ostial LAD disease.  Descending thoracic aorta has an outpouching probably saccular aneurysm versus  penetrating aortic ulcer .  Echocardiogram 10/12/2024 is revealing EF of 51 to 55% with mild RV enlargement severe left atrial enlargement and right atrial enlargement and moderate to severe MR  GONZÁLEZ is revealing moderate to severe MR.         Procedures    EKG done 1/15/2025 reviewed/interpreted by me reveals sinus rhythm with rate of 81 bpm    Copied text in this portion of the note has been reviewed and is accurate as of 1/22/2025  The following portions of the patient's history were reviewed and updated as appropriate: allergies, current medications, past family history, past medical history, past social history, past surgical history and problem list.    Assessment:         [facility]       Diagnosis Plan   1. Precordial pain  Stress Test With Myocardial Perfusion One Day      2. CAD S/P percutaneous coronary angioplasty  Stress Test With Myocardial Perfusion One Day      3. S/P mitral valve clip implantation   Stress Test With Myocardial Perfusion One Day      4. Dyslipidemia  Stress Test With Myocardial Perfusion One Day      5. Chronic hypotension  Stress Test With Myocardial Perfusion One Day      6. Prediabetes        7. Panlobular emphysema               Plan:               Past Medical 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 07/05/2016    Dyslipidemia 10/30/2024    Fecal urgency 11/21/2024    First degree hemorrhoids 07/09/2021    GERD (gastroesophageal reflux disease) 11/21/2024    Hashimoto's thyroiditis 11/21/2024    History of colonic polyps 07/09/2021    Hypertension     Hypothyroidism (acquired) 11/06/2024    Mitral regurgitation 10/08/2024    Moderate protein-calorie malnutrition 11/09/2024    Multiple tracheobronchial mucus plugs 10/08/2024    Nicotine dependence 11/21/2024    Rheumatoid arthritis 11/06/2024    S/P mitral valve clip implantation 11/21/2024    Second degree hemorrhoids 07/05/2016    Stress incontinence, female 11/21/2024    Vitamin D deficiency, unspecified 11/21/2024     Past Surgical History:  Past Surgical History:   Procedure Laterality Date    BRONCHOSCOPY N/A 10/16/2024    Procedure: BRONCHOSCOPY;  Surgeon: Gallo Pope MD;  Location: [facility] ENDOSCOPY;  Service: Pulmonary;  Laterality: N/A;    BRONCHOSCOPY N/A 1/12/2025    Procedure: BRONCHOSCOPY WITH LAVAGE;  Surgeon: Gallo Pope MD;  Location: [facility] ENDOSCOPY;  Service: Pulmonary;  Laterality: N/A;    CARDIAC CATHETERIZATION N/A 10/15/2024    Procedure: Left Heart Cath, possible pci;  Surgeon: Geeta  Travis Pérez MD;  Location: [facility] CATH INVASIVE LOCATION;  Service: Cardiovascular;  Laterality: N/A;    CARDIAC CATHETERIZATION N/A 10/22/2024    Procedure: Laser Coronary Atherectomy;  Surgeon: Travis Connor MD;  Location: [facility] CATH INVASIVE LOCATION;  Service: Cardiovascular;  Laterality: N/A;    COLON RESECTION Right 11/28/2024    Procedure: RIGHT HEMICOLECTOMY WITH ILEOSTOMY;  Surgeon: Todd Babin MD;  Location: [facility] MAIN OR;  Service: General;  Laterality: Right;    COLONOSCOPY N/A 10/12/2024    Procedure: COLONOSCOPY WITH BIOPSY AND WIRE GUIDED BALLOON DILATION OF TERMINAL ILEUM;  Surgeon: Rob Strong MD;  Location: [facility] ENDOSCOPY;  Service: Gastroenterology;  Laterality: N/A;  Colitis, crohns of terminal ileum, right colon ulcers, diverticulosis, hemorroids    ENDOSCOPY N/A 10/12/2024    Procedure: ESOPHAGOGASTRODUODENOSCOPY WITH BIOPSY X 2 AREA;  Surgeon: Rob Strong MD;  Location: [facility] ENDOSCOPY;  Service: Gastroenterology;  Laterality: N/A;  Chronic gastritis, HH    ENDOSCOPY Left 11/28/2024    Procedure: ESOPHAGOGASTRODUODENOSCOPY;  Surgeon: Dallin Delgado MD;  Location: [facility] ENDOSCOPY;  Service: Gastroenterology;  Laterality: Left;  small hiatal hernia    HYSTERECTOMY      LEFT HEART CATH      MITRAL VALVE REPAIR/REPLACEMENT N/A 11/21/2024    Procedure: Transcatheter Mitral Valve Repair;  Surgeon: Dmitri Navarro MD;  Location: [facility] HYBRID OR;  Service: Cardiovascular;  Laterality: N/A;      Allergies:  Allergies   Allergen Reactions    Codeine Hives    Penicillin G Sodium Hives     Home Meds:  Current Meds:     Current Outpatient Medications:     Adalimumab (Humira, 2 Syringe,) 20 MG/0.2ML Prefilled Syringe Kit, Inject 0.4 mL under the skin into the appropriate area as directed Every 7 (Seven) Days., Disp: , Rfl:     albuterol (PROVENTIL) (2.5 MG/3ML) 0.083% nebulizer solution, Take 2.5 mg by nebulization Every 6 (Six) Hours As Needed  for Wheezing. Indications: Spasm of Lung Air Passages, Disp: , Rfl:     aspirin 81 MG EC tablet, Take 1 tablet by mouth Daily for 30 days. Indications: Disease involving Lipid Deposits in the Arteries, Disp: 30 tablet, Rfl: 0    atorvastatin (LIPITOR) 40 MG tablet, Take 1 tablet by mouth Every Night., Disp: , Rfl:     calcium carbonate (OS-ARABELLA) 600 MG tablet, Take 1 tablet by mouth 2 (Two) Times a Day With Meals., Disp: , Rfl:     cholestyramine light 4 g packet, Take 1 packet by mouth 2 (Two) Times a Day., Disp: 60 packet, Rfl: 3    clopidogrel (PLAVIX) 75 MG tablet, Take 1 tablet by mouth Daily for 30 days., Disp: 30 tablet, Rfl: 0    diclofenac (VOLTAREN) 50 MG EC tablet, Take 1 tablet by mouth 2 (Two) Times a Day., Disp: , Rfl:     diphenoxylate-atropine (LOMOTIL) 2.5-0.025 MG per tablet, Take 1 tablet by mouth Every 6 (Six) Hours As Needed for Diarrhea., Disp: , Rfl:     folic acid (FOLVITE) 1 MG tablet, Take 1 tablet by mouth Daily., Disp: , Rfl:     guaifenesin (ROBITUSSIN) 100 MG/5ML liquid, Take 10 mL by mouth Every 4 (Four) Hours As Needed for Cough., Disp: 180 mL, Rfl: 0    HYDROcodone-acetaminophen (NORCO) 5-325 MG per tablet, Take 1 tablet by mouth 2 (Two) Times a Day., Disp: , Rfl:     HYDROcodone-acetaminophen (NORCO) 5-325 MG per tablet, Take 1 tablet by mouth Every 6 (Six) Hours As Needed for Severe Pain., Disp: , Rfl:     levothyroxine (SYNTHROID, LEVOTHROID) 50 MCG tablet, Take 1 tablet by mouth Every Morning., Disp: , Rfl:     Melatonin (Melatonin Extra Strength) 10 MG tablet, Take 1 tablet by mouth As Needed. Indications: Trouble Sleeping, Disp: , Rfl:     mesalamine (Canasa) 1000 MG suppository, Insert 1 suppository into the rectum Every Night., Disp: , Rfl:     methotrexate 2.5 MG tablet, Take 1 tablet by mouth 1 (One) Time Per Week. Tuesdays, Disp: , Rfl:     metoprolol succinate XL (TOPROL-XL) 25 MG 24 hr tablet, Take 0.5 tablets by mouth Daily for 30 days., Disp: 15 tablet, Rfl: 0     midodrine (PROAMATINE) 5 MG tablet, Take 1 tablet by mouth 3 (Three) Times a Day Before Meals. Indications: Disorder of Low Blood Pressure, Disp: 90 tablet, Rfl: 0    ondansetron ODT (ZOFRAN-ODT) 4 MG disintegrating tablet, Take 1 tablet by mouth Every 6 (Six) Hours As Needed for Nausea or Vomiting. Indications: Nausea and Vomiting Following an Operation, Disp: 30 tablet, Rfl: 0    pantoprazole (PROTONIX) 40 MG EC tablet, Take 1 tablet by mouth Daily., Disp: , Rfl:     sertraline (ZOLOFT) 50 MG tablet, Take 1 tablet by mouth Daily., Disp: , Rfl:     spironolactone (ALDACTONE) 25 MG tablet, Take 1 tablet by mouth Daily., Disp: , Rfl:     vitamin D3 125 MCG (5000 UT) capsule capsule, Take 1 capsule by mouth 2 (Two) Times a Week. Monday and Thursday, Disp: , Rfl:     pantoprazole (PROTONIX) 40 MG EC tablet, Take 1 tablet by mouth Every Morning. (Patient not taking: Reported on 1/22/2025), Disp: 30 tablet, Rfl: 0    upadacitinib ER (Rinvoq) 45 MG tablet sustained-release 24 hour extended release tablet, Take 1 tablet by mouth Daily., Disp: , Rfl:   Social History:   Social History     Tobacco Use    Smoking status: Former     Types: Cigarettes     Passive exposure: Past    Smokeless tobacco: Never   Substance Use Topics    Alcohol use: Never      Family History:  Family History   Problem Relation Age of Onset    Heart disease Mother     Dementia Mother     Stroke Mother     Heart disease Father     Hypertension Father               Review of Systems   Cardiovascular:  Positive for chest pain. Negative for leg swelling and palpitations.   Respiratory:  Positive for shortness of breath.    Neurological:  Positive for dizziness and numbness.     All other systems are negative         Objective:     Physical Exam  /63 (BP Location: Left arm, Patient Position: Sitting, Cuff Size: Adult)   Pulse 60   SpO2 100%   General:  Appears in no acute distress  Eyes: Sclera is anicteric,  conjunctiva is clear   HEENT:  No JVD.   "No carotid bruits  Respiratory: Respirations regular and unlabored at rest.  Clear to auscultation  Cardiovascular: S1,S2 Regular rate and rhythm. .   Extremities: No digital clubbing or cyanosis, no edema  Skin: Color pink. Skin warm and dry to touch. No rashes  No xanthoma  Neuro: Alert and awake.    Lab Reviewed:         Travis Connor MD  1/22/2025 11:08 EST      EMR Dragon/Transcription:   \"Dictated utilizing Dragon dictation\".        "

## 2025-01-19 NOTE — PROGRESS NOTES
"Enter Query Response Below      Query Response: - Bacterial pneumonia unspecified                 If applicable, please update the problem list.     Patient: Maryann Gaitan        : 1950  Account: [id number]           Admit Date:         How to Respond to this query:       a. Click New Note     b. Answer query within the yellow box.                c. Update the Problem List, if applicable.      If you have any questions about this query contact me at: [email or web address]     Hazel Ramorgan GONSALES,    Patient with history of COPD, chronic diastolic CHF, Crohn's disease, and rheumatoid arthritis presented 1/10 for hemoptysis. Notes include multifocal pneumonia. Cardiology consult notes \"is on immunosuppressants methotrexate, Humira, Rinvoq.\" Pulmonary note dated  notes \"status post bronchoscopy 2025: No hemoptysis, multiple white mucous plugs seen, lavage from right upper lobe obtained to rule out opportunistic infections: AFB smear is negative Gram-negative bacilli noted but the final is normal respiratory wu: Switched on 2025 antibiotics to Levaquin for 3 days.\" Patient treated with monitoring, supplemental oxygen, bronchoscopy, IV aztreonam 1/10, IV cefepime 1/10 - , IV vancomycin 1/10, and PO levofloxacin  - .     Please clarify the type of pneumonia the patient was treated/monitored for:     - Gram-negative pneumonia (excluding Haemophilus influenzae)  - Bacterial pneumonia unspecified  - Other ___________________  - Unable to clinically determine    By submitting this query, we are merely seeking further clarification of documentation to accurately reflect all conditions that you are monitoring, evaluating, treating or that extend the hospitalization or utilize additional resources of care. Please utilize your independent clinical judgment when addressing the question(s) above.     This query and your response, once completed, will be entered into the legal medical " record.    Sincerely,  Ramiro Lee RN, CDS  Clinical Documentation Integrity Program

## 2025-01-22 ENCOUNTER — OFFICE VISIT (OUTPATIENT)
Dept: CARDIOLOGY | Facility: CLINIC | Age: 75
End: 2025-01-22
Payer: MEDICARE

## 2025-01-22 ENCOUNTER — TELEPHONE (OUTPATIENT)
Dept: CARDIOLOGY | Facility: CLINIC | Age: 75
End: 2025-01-22

## 2025-01-22 ENCOUNTER — HOSPITAL ENCOUNTER (OUTPATIENT)
Dept: CARDIOLOGY | Facility: HOSPITAL | Age: 75
Discharge: HOME | End: 2025-01-22
Admitting: INTERNAL MEDICINE
Payer: MEDICARE

## 2025-01-22 VITALS
SYSTOLIC BLOOD PRESSURE: 139 MMHG | BODY MASS INDEX: 19.46 KG/M2 | DIASTOLIC BLOOD PRESSURE: 58 MMHG | HEIGHT: 64 IN | WEIGHT: 114 LBS

## 2025-01-22 VITALS — OXYGEN SATURATION: 100 % | HEART RATE: 60 BPM | DIASTOLIC BLOOD PRESSURE: 63 MMHG | SYSTOLIC BLOOD PRESSURE: 110 MMHG

## 2025-01-22 DIAGNOSIS — Z95.818 S/P MITRAL VALVE CLIP IMPLANTATION: ICD-10-CM

## 2025-01-22 DIAGNOSIS — R07.2 PRECORDIAL PAIN: Primary | ICD-10-CM

## 2025-01-22 DIAGNOSIS — I95.89 CHRONIC HYPOTENSION: ICD-10-CM

## 2025-01-22 DIAGNOSIS — Z98.890 S/P MITRAL VALVE CLIP IMPLANTATION: ICD-10-CM

## 2025-01-22 DIAGNOSIS — I34.0 SEVERE MITRAL REGURGITATION: ICD-10-CM

## 2025-01-22 DIAGNOSIS — E78.5 DYSLIPIDEMIA: ICD-10-CM

## 2025-01-22 DIAGNOSIS — R73.03 PREDIABETES: ICD-10-CM

## 2025-01-22 DIAGNOSIS — Z98.61 CAD S/P PERCUTANEOUS CORONARY ANGIOPLASTY: ICD-10-CM

## 2025-01-22 DIAGNOSIS — I25.10 CAD S/P PERCUTANEOUS CORONARY ANGIOPLASTY: ICD-10-CM

## 2025-01-22 DIAGNOSIS — J43.1 PANLOBULAR EMPHYSEMA: ICD-10-CM

## 2025-01-22 LAB
AV MEAN PRESS GRAD SYS DOP V1V2: 5.3 MMHG
AV VMAX SYS DOP: 165.7 CM/SEC
BH CV ECHO LEFT VENTRICLE GLOBAL LONGITUDINAL STRAIN: -22 %
BH CV ECHO MEAS - AO MAX PG: 11 MMHG
BH CV ECHO MEAS - AO ROOT DIAM: 2.8 CM
BH CV ECHO MEAS - AO V2 VTI: 42.1 CM
BH CV ECHO MEAS - AVA(I,D): 1.43 CM2
BH CV ECHO MEAS - EDV(CUBED): 67.6 ML
BH CV ECHO MEAS - EDV(MOD-SP2): 62.1 ML
BH CV ECHO MEAS - EDV(MOD-SP4): 53.5 ML
BH CV ECHO MEAS - EF(MOD-SP2): 60.6 %
BH CV ECHO MEAS - EF(MOD-SP4): 63 %
BH CV ECHO MEAS - ESV(CUBED): 16.1 ML
BH CV ECHO MEAS - ESV(MOD-SP2): 24.5 ML
BH CV ECHO MEAS - ESV(MOD-SP4): 19.8 ML
BH CV ECHO MEAS - FS: 38 %
BH CV ECHO MEAS - IVS/LVPW: 0.95 CM
BH CV ECHO MEAS - IVSD: 0.98 CM
BH CV ECHO MEAS - LA DIMENSION: 3.6 CM
BH CV ECHO MEAS - LAT PEAK E' VEL: 5.1 CM/SEC
BH CV ECHO MEAS - LV MASS(C)D: 131.7 GRAMS
BH CV ECHO MEAS - LV MAX PG: 2.9 MMHG
BH CV ECHO MEAS - LV MEAN PG: 1.61 MMHG
BH CV ECHO MEAS - LV V1 MAX: 85.3 CM/SEC
BH CV ECHO MEAS - LV V1 VTI: 21.1 CM
BH CV ECHO MEAS - LVIDD: 4.1 CM
BH CV ECHO MEAS - LVIDS: 2.5 CM
BH CV ECHO MEAS - LVOT AREA: 2.9 CM2
BH CV ECHO MEAS - LVOT DIAM: 1.91 CM
BH CV ECHO MEAS - LVPWD: 1.03 CM
BH CV ECHO MEAS - MED PEAK E' VEL: 4.3 CM/SEC
BH CV ECHO MEAS - MR MAX PG: 66.6 MMHG
BH CV ECHO MEAS - MR MAX VEL: 407.9 CM/SEC
BH CV ECHO MEAS - MV A DUR: 0.14 SEC
BH CV ECHO MEAS - MV A MAX VEL: 113.2 CM/SEC
BH CV ECHO MEAS - MV DEC SLOPE: 445 CM/SEC2
BH CV ECHO MEAS - MV DEC TIME: 0.34 SEC
BH CV ECHO MEAS - MV E MAX VEL: 152 CM/SEC
BH CV ECHO MEAS - MV E/A: 1.34
BH CV ECHO MEAS - MV MAX PG: 11.2 MMHG
BH CV ECHO MEAS - MV MEAN PG: 4 MMHG
BH CV ECHO MEAS - MV V2 VTI: 57.1 CM
BH CV ECHO MEAS - MVA(VTI): 1.05 CM2
BH CV ECHO MEAS - PA ACC TIME: 0.19 SEC
BH CV ECHO MEAS - PA V2 MAX: 96.3 CM/SEC
BH CV ECHO MEAS - PULM A REVS DUR: 0.12 SEC
BH CV ECHO MEAS - PULM A REVS VEL: 34.4 CM/SEC
BH CV ECHO MEAS - PULM DIAS VEL: 44.3 CM/SEC
BH CV ECHO MEAS - PULM S/D: 1.11
BH CV ECHO MEAS - PULM SYS VEL: 49.2 CM/SEC
BH CV ECHO MEAS - RAP SYSTOLE: 3 MMHG
BH CV ECHO MEAS - RV MAX PG: 1.27 MMHG
BH CV ECHO MEAS - RV V1 MAX: 56.4 CM/SEC
BH CV ECHO MEAS - RV V1 VTI: 15.1 CM
BH CV ECHO MEAS - RVSP: 37 MMHG
BH CV ECHO MEAS - SV(LVOT): 60.2 ML
BH CV ECHO MEAS - SV(MOD-SP2): 37.6 ML
BH CV ECHO MEAS - SV(MOD-SP4): 33.7 ML
BH CV ECHO MEAS - TAPSE (>1.6): 2.44 CM
BH CV ECHO MEAS - TR MAX PG: 34 MMHG
BH CV ECHO MEAS - TR MAX VEL: 291.6 CM/SEC
BH CV ECHO MEASUREMENTS AVERAGE E/E' RATIO: 32.34
LV EF BIPLANE MOD: 63 %

## 2025-01-22 PROCEDURE — 93356 MYOCRD STRAIN IMG SPCKL TRCK: CPT

## 2025-01-22 PROCEDURE — 93306 TTE W/DOPPLER COMPLETE: CPT

## 2025-01-22 PROCEDURE — 93306 TTE W/DOPPLER COMPLETE: CPT | Performed by: INTERNAL MEDICINE

## 2025-01-22 PROCEDURE — 93356 MYOCRD STRAIN IMG SPCKL TRCK: CPT | Performed by: INTERNAL MEDICINE

## 2025-01-22 NOTE — TELEPHONE ENCOUNTER
Called spoke to Carley, no pt was not given Nitro or anything at the office today    She requested that I fax recent EKG and office note   Fax # [phone]

## 2025-01-22 NOTE — TELEPHONE ENCOUNTER
Carley with [facility]  Phon [phone]    Patient had appt today. She told them we gave her nitro at visit. They were not aware of this. Patient is having chest pain.  Would like call back.

## 2025-01-26 LAB
MYCOBACTERIUM SPEC CULT: NORMAL
NIGHT BLUE STAIN TISS: NORMAL

## 2025-01-29 ENCOUNTER — HOSPITAL ENCOUNTER (OUTPATIENT)
Dept: CARDIOLOGY | Facility: HOSPITAL | Age: 75
Discharge: HOME | End: 2025-01-29
Payer: MEDICARE

## 2025-01-29 DIAGNOSIS — Z98.61 CAD S/P PERCUTANEOUS CORONARY ANGIOPLASTY: ICD-10-CM

## 2025-01-29 DIAGNOSIS — R07.2 PRECORDIAL PAIN: ICD-10-CM

## 2025-01-29 DIAGNOSIS — Z98.890 S/P MITRAL VALVE CLIP IMPLANTATION: ICD-10-CM

## 2025-01-29 DIAGNOSIS — I95.89 CHRONIC HYPOTENSION: ICD-10-CM

## 2025-01-29 DIAGNOSIS — Z95.818 S/P MITRAL VALVE CLIP IMPLANTATION: ICD-10-CM

## 2025-01-29 DIAGNOSIS — E78.5 DYSLIPIDEMIA: ICD-10-CM

## 2025-01-29 DIAGNOSIS — I25.10 CAD S/P PERCUTANEOUS CORONARY ANGIOPLASTY: ICD-10-CM

## 2025-01-29 LAB
BH CV REST NUCLEAR ISOTOPE DOSE: 10.7 MCI
BH CV STRESS COMMENTS STAGE 1: NORMAL
BH CV STRESS DOSE REGADENOSON STAGE 1: 0.4
BH CV STRESS DURATION MIN STAGE 1: 0
BH CV STRESS DURATION SEC STAGE 1: 10
BH CV STRESS NUCLEAR ISOTOPE DOSE: 32 MCI
BH CV STRESS PROTOCOL 1: NORMAL
BH CV STRESS RECOVERY BP: NORMAL MMHG
BH CV STRESS RECOVERY HR: 100 BPM
BH CV STRESS STAGE 1: 1
MAXIMAL PREDICTED HEART RATE: 146 BPM
SPECT HRT GATED+EF W RNC IV: 88 %
STRESS BASELINE BP: NORMAL MMHG
STRESS BASELINE HR: 70 BPM
STRESS TARGET HR: 124 BPM

## 2025-01-29 PROCEDURE — 78452 HT MUSCLE IMAGE SPECT MULT: CPT

## 2025-01-29 PROCEDURE — A9500 TC99M SESTAMIBI: HCPCS | Performed by: INTERNAL MEDICINE

## 2025-01-29 PROCEDURE — 34310000005 TECHNETIUM SESTAMIBI: Performed by: INTERNAL MEDICINE

## 2025-01-29 PROCEDURE — 25010000002 REGADENOSON 0.4 MG/5ML SOLUTION: Performed by: INTERNAL MEDICINE

## 2025-01-29 PROCEDURE — 93017 CV STRESS TEST TRACING ONLY: CPT

## 2025-01-29 RX ORDER — REGADENOSON 0.08 MG/ML
0.4 INJECTION, SOLUTION INTRAVENOUS
Status: COMPLETED | OUTPATIENT
Start: 2025-01-29 | End: 2025-01-29

## 2025-01-29 RX ADMIN — REGADENOSON 0.4 MG: 0.08 INJECTION, SOLUTION INTRAVENOUS at 14:10

## 2025-01-29 RX ADMIN — TECHNETIUM TC 99M SESTAMIBI 1 DOSE: 1 INJECTION INTRAVENOUS at 12:26

## 2025-01-31 ENCOUNTER — TELEPHONE (OUTPATIENT)
Dept: CARDIOLOGY | Facility: CLINIC | Age: 75
End: 2025-01-31
Payer: MEDICARE

## 2025-01-31 NOTE — TELEPHONE ENCOUNTER
Called patient to discuss stress test results.  Patient was discharged from rehab facility home with hospice    Discussed options of cardiac cath versus continued medical treatment with hospice care.      Patient would like to continue medical treatment with hospice care

## 2025-02-02 LAB
MYCOBACTERIUM SPEC CULT: NORMAL
NIGHT BLUE STAIN TISS: NORMAL

## 2025-02-03 ENCOUNTER — HOSPITAL ENCOUNTER (EMERGENCY)
Facility: HOSPITAL | Age: 75
Discharge: HOME | End: 2025-02-03
Attending: EMERGENCY MEDICINE | Admitting: EMERGENCY MEDICINE
Payer: MEDICARE

## 2025-02-03 VITALS
DIASTOLIC BLOOD PRESSURE: 77 MMHG | HEIGHT: 64 IN | TEMPERATURE: 97.8 F | SYSTOLIC BLOOD PRESSURE: 133 MMHG | RESPIRATION RATE: 17 BRPM | OXYGEN SATURATION: 100 % | BODY MASS INDEX: 19.63 KG/M2 | HEART RATE: 85 BPM | WEIGHT: 115 LBS

## 2025-02-03 DIAGNOSIS — Z93.9 HISTORY OF CREATION OF OSTOMY: Primary | ICD-10-CM

## 2025-02-03 PROCEDURE — 99283 EMERGENCY DEPT VISIT LOW MDM: CPT

## 2025-02-03 NOTE — ED PROVIDER NOTES
Subjective   History of Present Illness  74-year-old female presents from home stating that she needs her ostomy and urostomy bag changed.  States she did not have the supplies at home she denies any other complaints.  States she was recently released from an extended care facility.  She states she is under hospice care but they have not yet supplied her with bags for home care and maintenance.  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 07/05/2016    Dyslipidemia 10/30/2024    Fecal urgency 11/21/2024    First degree hemorrhoids 07/09/2021    GERD (gastroesophageal reflux disease) 11/21/2024    Hashimoto's thyroiditis 11/21/2024    History of colonic polyps 07/09/2021    Hypertension     Hypothyroidism (acquired) 11/06/2024    Mitral regurgitation 10/08/2024    Moderate protein-calorie malnutrition 11/09/2024    Multiple tracheobronchial mucus plugs 10/08/2024    Nicotine dependence 11/21/2024    Rheumatoid arthritis 11/06/2024    S/P mitral valve clip implantation 11/21/2024    Second degree hemorrhoids 07/05/2016    Stress incontinence, female 11/21/2024    Vitamin D deficiency, unspecified 11/21/2024       Allergies   Allergen Reactions    Codeine Hives    Penicillin G Sodium Hives       Past Surgical History:   Procedure Laterality Date    BRONCHOSCOPY N/A 10/16/2024    Procedure: BRONCHOSCOPY;  Surgeon: Gallo Pope MD;  Location: [facility] ENDOSCOPY;  Service: Pulmonary;  Laterality: N/A;    BRONCHOSCOPY N/A 1/12/2025    Procedure: BRONCHOSCOPY WITH LAVAGE;  Surgeon: Toshia  MD Gallo;  Location: [facility] ENDOSCOPY;  Service: Pulmonary;  Laterality: N/A;    CARDIAC CATHETERIZATION N/A 10/15/2024    Procedure: Left Heart Cath, possible pci;  Surgeon: Travis Connor MD;  Location: [facility] CATH INVASIVE LOCATION;  Service: Cardiovascular;  Laterality: N/A;    CARDIAC CATHETERIZATION N/A 10/22/2024    Procedure: Laser Coronary Atherectomy;  Surgeon: Travis Connor MD;  Location: [facility] CATH INVASIVE LOCATION;  Service: Cardiovascular;  Laterality: N/A;    COLON RESECTION Right 11/28/2024    Procedure: RIGHT HEMICOLECTOMY WITH ILEOSTOMY;  Surgeon: Todd Babin MD;  Location: [facility] MAIN OR;  Service: General;  Laterality: Right;    COLONOSCOPY N/A 10/12/2024    Procedure: COLONOSCOPY WITH BIOPSY AND WIRE GUIDED BALLOON DILATION OF TERMINAL ILEUM;  Surgeon: Rob Strong MD;  Location: [facility] ENDOSCOPY;  Service: Gastroenterology;  Laterality: N/A;  Colitis, crohns of terminal ileum, right colon ulcers, diverticulosis, hemorroids    ENDOSCOPY N/A 10/12/2024    Procedure: ESOPHAGOGASTRODUODENOSCOPY WITH BIOPSY X 2 AREA;  Surgeon: Rob Strong MD;  Location: [facility] ENDOSCOPY;  Service: Gastroenterology;  Laterality: N/A;  Chronic gastritis, HH    ENDOSCOPY Left 11/28/2024    Procedure: ESOPHAGOGASTRODUODENOSCOPY;  Surgeon: Dallin Delgado MD;  Location: [facility] ENDOSCOPY;  Service: Gastroenterology;  Laterality: Left;  small hiatal hernia    HYSTERECTOMY      LEFT HEART CATH      MITRAL VALVE REPAIR/REPLACEMENT N/A 11/21/2024    Procedure: Transcatheter Mitral Valve Repair;  Surgeon: Dmitri Navarro MD;  Location: [facility] HYBRID OR;  Service: Cardiovascular;  Laterality: N/A;       Family History   Problem Relation Age of Onset    Heart disease Mother     Dementia Mother     Stroke Mother     Heart disease Father     Hypertension Father        Social History     Socioeconomic History    Marital status:    Tobacco Use    Smoking  status: Former     Types: Cigarettes     Passive exposure: Past    Smokeless tobacco: Never   Vaping Use    Vaping status: Never Used   Substance and Sexual Activity    Alcohol use: Never    Drug use: Never    Sexual activity: Defer       Prior to Admission medications    Medication Sig Start Date End Date Taking? Authorizing Provider   Adalimumab (Humira, 2 Syringe,) 20 MG/0.2ML Prefilled Syringe Kit Inject 0.4 mL under the skin into the appropriate area as directed Every 7 (Seven) Days.    Donna Kraft MD   albuterol (PROVENTIL) (2.5 MG/3ML) 0.083% nebulizer solution Take 2.5 mg by nebulization Every 6 (Six) Hours As Needed for Wheezing. Indications: Spasm of Lung Air Passages 11/13/24   Donna Kraft MD   aspirin 81 MG EC tablet Take 1 tablet by mouth Daily for 30 days. Indications: Disease involving Lipid Deposits in the Arteries 1/16/25 2/15/25  Hazle Knapp APRN   atorvastatin (LIPITOR) 40 MG tablet Take 1 tablet by mouth Every Night.    Donna Kraft MD   calcium carbonate (OS-ARABELLA) 600 MG tablet Take 1 tablet by mouth 2 (Two) Times a Day With Meals.    Donna Kraft MD   cholestyramine light 4 g packet Take 1 packet by mouth 2 (Two) Times a Day. 12/31/24   El Childress MD   clopidogrel (PLAVIX) 75 MG tablet Take 1 tablet by mouth Daily for 30 days. 1/17/25 2/16/25  Hazel Knapp APRN   diclofenac (VOLTAREN) 50 MG EC tablet Take 1 tablet by mouth 2 (Two) Times a Day.    Donna Kraft MD   diphenoxylate-atropine (LOMOTIL) 2.5-0.025 MG per tablet Take 1 tablet by mouth Every 6 (Six) Hours As Needed for Diarrhea.    Donna Kraft MD   folic acid (FOLVITE) 1 MG tablet Take 1 tablet by mouth Daily.    Donna Kraft MD   guaifenesin (ROBITUSSIN) 100 MG/5ML liquid Take 10 mL by mouth Every 4 (Four) Hours As Needed for Cough. 12/16/24   Jordan Lewis DO   HYDROcodone-acetaminophen (NORCO) 5-325 MG per tablet Take 1 tablet by mouth 2 (Two) Times a Day.     Donna Kraft MD   HYDROcodone-acetaminophen (NORCO) 5-325 MG per tablet Take 1 tablet by mouth Every 6 (Six) Hours As Needed for Severe Pain.    Donna Kraft MD   levothyroxine (SYNTHROID, LEVOTHROID) 50 MCG tablet Take 1 tablet by mouth Every Morning.    Donna Kraft MD   Melatonin (Melatonin Extra Strength) 10 MG tablet Take 1 tablet by mouth As Needed. Indications: Trouble Sleeping 10/30/24   Donna Kraft MD   mesalamine (Canasa) 1000 MG suppository Insert 1 suppository into the rectum Every Night.    Donna Kraft MD   methotrexate 2.5 MG tablet Take 1 tablet by mouth 1 (One) Time Per Week. Tuesdays    Donna Kraft MD   metoprolol succinate XL (TOPROL-XL) 25 MG 24 hr tablet Take 0.5 tablets by mouth Daily for 30 days. 1/17/25 2/16/25  Hazel Knapp APRN   midodrine (PROAMATINE) 5 MG tablet Take 1 tablet by mouth 3 (Three) Times a Day Before Meals. Indications: Disorder of Low Blood Pressure 12/2/24   Fco Figueroa PA-C   ondansetron ODT (ZOFRAN-ODT) 4 MG disintegrating tablet Take 1 tablet by mouth Every 6 (Six) Hours As Needed for Nausea or Vomiting. Indications: Nausea and Vomiting Following an Operation 12/2/24   Fco Figueroa PA-C   pantoprazole (PROTONIX) 40 MG EC tablet Take 1 tablet by mouth Daily.    Donna Kraft MD   pantoprazole (PROTONIX) 40 MG EC tablet Take 1 tablet by mouth Every Morning.  Patient not taking: Reported on 1/22/2025 1/17/25   Hazel Knapp APRN   sertraline (ZOLOFT) 50 MG tablet Take 1 tablet by mouth Daily.    Donna Kraft MD   spironolactone (ALDACTONE) 25 MG tablet Take 1 tablet by mouth Daily.    Donna Kraft MD   upadacitinib ER (Rinvoq) 45 MG tablet sustained-release 24 hour extended release tablet Take 1 tablet by mouth Daily.    Donna Kraft MD   vitamin D3 125 MCG (5000 UT) capsule capsule Take 1 capsule by mouth 2 (Two) Times a Week. Monday and Thursday    Swapnil  "MD Donna     /77 (BP Location: Left arm, Patient Position: Lying)   Pulse 85   Temp 97.8 °F (36.6 °C) (Oral)   Resp 17   Ht 162.6 cm (64\")   Wt 52.2 kg (115 lb)   SpO2 100%   BMI 19.74 kg/m²       Objective   Physical Exam  General: Well-appearing, no acute distress  Psych: Oriented, pleasant affect  Respirations: Clear, nonlabored respirations  Abdomen soft nontender nondistended, there are 2 stomas in the right hemiabdomen that appear to have normal mucosa and no surrounding cellulitis or breakdown.  Skin: No rash, normal color  Procedures           ED Course                                                       Medical Decision Making  Ostomy bags were changed per patient's request and she is discharged home to have ongoing follow-up with her home health care and hospice.    Problems Addressed:  History of creation of ostomy: acute illness or injury        Final diagnoses:   History of creation of ostomy       ED Disposition  ED Disposition       ED Disposition   Discharge    Condition   Stable    Comment   --               Azam Calhoun MD  [address] [facility] [id number] [phone]    Schedule an appointment as soon as possible for a visit in 1 week           Medication List      No changes were made to your prescriptions during this visit.            Byron Bernal MD  02/03/25 0107    "

## 2025-02-09 LAB
MYCOBACTERIUM SPEC CULT: NORMAL
NIGHT BLUE STAIN TISS: NORMAL

## 2025-02-13 LAB — FUNGUS WND CULT: ABNORMAL

## 2025-02-16 LAB
MYCOBACTERIUM SPEC CULT: NORMAL
NIGHT BLUE STAIN TISS: NORMAL

## 2025-02-23 LAB
MYCOBACTERIUM SPEC CULT: NORMAL
NIGHT BLUE STAIN TISS: NORMAL

## (undated) DEVICE — TBG NAMIC PRESS MONTR A/ F/M 12IN

## (undated) DEVICE — BITEBLOCK ENDO W/STRAP 60F A/ LF DISP

## (undated) DEVICE — SUT PDS 0 CT 36IN VIO PDP358T

## (undated) DEVICE — SPNG LAP PREWSH SFTPK 18X18IN STRL PK/5

## (undated) DEVICE — SINGLE-USE BIOPSY FORCEPS: Brand: RADIAL JAW 4

## (undated) DEVICE — DGW .035 MC J3MM 150CM T H AMP: Brand: EMERALD

## (undated) DEVICE — 6F .070 XB LAD 3.5 100CM: Brand: VISTA BRITE TIP

## (undated) DEVICE — PROVE COVER: Brand: UNBRANDED

## (undated) DEVICE — SOL IRR NACL 0.9PCT BO 1000ML

## (undated) DEVICE — STPCK 3WY HP ROT

## (undated) DEVICE — COVER,TABLE,44X90,STERILE: Brand: MEDLINE

## (undated) DEVICE — CATH DIAG IMPULSE FL4 6F 100CM

## (undated) DEVICE — SYR LL TP 10ML STRL

## (undated) DEVICE — ST ACC MICROPUNCTURE STFF/CANN PLAT/TP 4F 21G 40CM

## (undated) DEVICE — SOLUTION,WATER,IRRIGATION,1000ML,STERILE: Brand: MEDLINE

## (undated) DEVICE — TOTAL TRAY, DB, 100% SILI FOLEY, 16FR 10: Brand: MEDLINE

## (undated) DEVICE — THE STERILE CAMERA HANDLE COVER IS FOR USE WITH THE STERIS SURGICAL LIGHTING AND VISUALIZATION SYSTEMS.

## (undated) DEVICE — DGW .035 FC J3MM 150CM TEF HEP: Brand: EMERALD

## (undated) DEVICE — CATH DIAG IMPULSE PIG .056 6F 110CM

## (undated) DEVICE — CONTRST ISOVUE300 61PCT 50ML

## (undated) DEVICE — PK TRY HEART CATH 50

## (undated) DEVICE — SUT VIC 0 SUTUPAK TIES 18IN J906G

## (undated) DEVICE — Device: Brand: OMNIWIRE PRESSURE GUIDE WIRE

## (undated) DEVICE — PENCL SMOKE/EVAC MEGADYNE TELESCP 15FT

## (undated) DEVICE — THE STERILE LIGHT HANDLE COVER IS USED WITH STERIS SURGICAL LIGHTING AND VISUALIZATION SYSTEMS.

## (undated) DEVICE — SYR LUERLOK 50ML

## (undated) DEVICE — PINNACLE INTRODUCER SHEATH: Brand: PINNACLE

## (undated) DEVICE — BAPTIST FLOYD BRONCHOSCOPY: Brand: MEDLINE INDUSTRIES, INC.

## (undated) DEVICE — PK MAJ LAPAROTOMY 50

## (undated) DEVICE — PK ENDO GI 50

## (undated) DEVICE — UNDERGLV SURG BIOGEL INDICATOR LF PF 7.5

## (undated) DEVICE — 1000ML,PRESSURE INFUSER W/STOPCOCK: Brand: MEDLINE

## (undated) DEVICE — PROXIMATE SKIN STAPLERS (35 WIDE) CONTAINS 35 STAINLESS STEEL STAPLES (FIXED HEAD): Brand: PROXIMATE

## (undated) DEVICE — ESOPHAGEAL/PYLORIC/COLONIC/BILIARY WIREGUIDED BALLOON DILATATION CATHETER: Brand: CRE™ PRO

## (undated) DEVICE — GW XCHG AMPLTZ XSTIF PTFE CRV .035 3X260

## (undated) DEVICE — Device

## (undated) DEVICE — ANTIBACTERIAL UNDYED BRAIDED (POLYGLACTIN 910), SYNTHETIC ABSORBABLE SUTURE: Brand: COATED VICRYL

## (undated) DEVICE — DRSNG SURESITE WNDW 4X4.5

## (undated) DEVICE — 450 ML BOTTLE OF 0.05% CHLORHEXIDINE GLUCONATE IN 99.95% STERILE WATER FOR IRRIGATION, USP AND APPLICATOR.: Brand: IRRISEPT ANTIMICROBIAL WOUND LAVAGE

## (undated) DEVICE — TBG PRESS/MONITOR FIX M/F LL A/ 24IN STRL

## (undated) DEVICE — DEV OPN LIGASURE CRV 180D 36MM 13.5CM  1P/U

## (undated) DEVICE — DEV INFL COMPAK W/ACCESSPLUS IN4530

## (undated) DEVICE — GW RUNTHROUGH NS HYPERCOAT .014 3X180CM

## (undated) DEVICE — TUBING, SUCTION, 1/4" X 12', STRAIGHT: Brand: MEDLINE

## (undated) DEVICE — PERCLOSE™ PROSTYLE™ SUTURE-MEDIATED CLOSURE AND REPAIR SYSTEM: Brand: PERCLOSE™ PROSTYLE™

## (undated) DEVICE — RADIFOCUS GLIDEWIRE: Brand: GLIDEWIRE

## (undated) DEVICE — SUT PDS 1 CTX 36IN Z371T

## (undated) DEVICE — 3M™ PATIENT PLATE, CORDED, SPLIT, LARGE, 40 PER CASE, 1179: Brand: 3M™

## (undated) DEVICE — 1 X VERSACROSS LARGE ACCESS TRANSSEPTAL DILATOR (INCLUDING 1 X J-TIP MECHANICAL GUIDEWIRE); 1 X VERSACROSS RF WIRE (INCLUDING 1 X CONNECTOR CABLE (SINGLE USE)); 1 X DISPERSIVE ELECTRODE: Brand: VERSACROSS LARGE ACCESS SOLUTION

## (undated) DEVICE — INTRO PERFORMER CHECKFLO/LG RAD/BND NO/GW 16F .038IN 30CM

## (undated) DEVICE — COVER,MAYO STAND,STERILE: Brand: MEDLINE

## (undated) DEVICE — CATH DIAG IMPULSE FR4 6F 100CM

## (undated) DEVICE — DRSNG WND BORDR/ADHS NONADHR/GZ LF 4X10IN STRL

## (undated) DEVICE — TOWEL,OR,DSP,ST,WHITE,DLX,4/PK,20PK/CS: Brand: MEDLINE

## (undated) DEVICE — BLANKT WARM UPPR/BDY ARM/OUT 57X196CM

## (undated) DEVICE — YANKAUER,BULB TIP,W/O VENT,RIGID,STERILE: Brand: MEDLINE

## (undated) DEVICE — PENCL HND ROCKRSWTCH HOLSTR EZ CLEAN TP CRD 10FT

## (undated) DEVICE — DRAPE SHEET ULTRAGARD: Brand: MEDLINE

## (undated) DEVICE — STERILE CURV CRILE FORCEP (CF81810): Brand: CENTURION

## (undated) DEVICE — ELECTRD DEFIB M/FUNC PROPADZ RADIOL 2PK

## (undated) DEVICE — TBG IV DRIP CHAMBER MACRO SGL 72IN

## (undated) DEVICE — PAD E/S GRND SGL/FOIL 9FT/CORD DISP

## (undated) DEVICE — SUT ABS VICRLY/PLS COAT BR 3/0 NO/NDL TIE/12X18IN VIL

## (undated) DEVICE — LN INJ CONTRST FLXCIL HP F/M LL 1200PSI72